# Patient Record
Sex: MALE | Race: BLACK OR AFRICAN AMERICAN | Employment: OTHER | URBAN - METROPOLITAN AREA
[De-identification: names, ages, dates, MRNs, and addresses within clinical notes are randomized per-mention and may not be internally consistent; named-entity substitution may affect disease eponyms.]

---

## 2017-01-05 ENCOUNTER — HOSPITAL ENCOUNTER (EMERGENCY)
Age: 52
Discharge: HOME | End: 2017-01-05
Attending: EMERGENCY MEDICINE
Payer: COMMERCIAL

## 2017-01-05 ENCOUNTER — APPOINTMENT (OUTPATIENT)
Dept: GENERAL RADIOLOGY | Age: 52
End: 2017-01-05
Attending: EMERGENCY MEDICINE
Payer: COMMERCIAL

## 2017-01-05 VITALS — SYSTOLIC BLOOD PRESSURE: 150 MMHG | DIASTOLIC BLOOD PRESSURE: 92 MMHG | HEART RATE: 114 BPM | OXYGEN SATURATION: 94 %

## 2017-01-05 DIAGNOSIS — F43.21 GRIEF REACTION: ICD-10-CM

## 2017-01-05 DIAGNOSIS — R06.00 DYSPNEA, UNSPECIFIED TYPE: Primary | ICD-10-CM

## 2017-01-05 DIAGNOSIS — F41.0 ANXIETY ATTACK: ICD-10-CM

## 2017-01-05 DIAGNOSIS — R05.9 COUGH: ICD-10-CM

## 2017-01-05 LAB
ALBUMIN SERPL BCP-MCNC: 3.6 G/DL (ref 3.5–5)
ALBUMIN/GLOB SERPL: 1.1 {RATIO} (ref 1.2–3.5)
ALP SERPL-CCNC: 59 U/L (ref 50–136)
ALT SERPL-CCNC: 29 U/L (ref 12–65)
ANION GAP BLD CALC-SCNC: 11 MMOL/L (ref 7–16)
AST SERPL W P-5'-P-CCNC: 53 U/L (ref 15–37)
ATRIAL RATE: 115 BPM
BASOPHILS # BLD AUTO: 0 K/UL (ref 0–0.2)
BASOPHILS # BLD: 0 % (ref 0–2)
BILIRUB SERPL-MCNC: 0.9 MG/DL (ref 0.2–1.1)
BNP SERPL-MCNC: 105 PG/ML
BUN SERPL-MCNC: 15 MG/DL (ref 6–23)
CALCIUM SERPL-MCNC: 8.1 MG/DL (ref 8.3–10.4)
CALCULATED P AXIS, ECG09: 68 DEGREES
CALCULATED R AXIS, ECG10: -8 DEGREES
CALCULATED T AXIS, ECG11: -134 DEGREES
CHLORIDE SERPL-SCNC: 108 MMOL/L (ref 98–107)
CO2 SERPL-SCNC: 23 MMOL/L (ref 21–32)
CREAT SERPL-MCNC: 1.15 MG/DL (ref 0.8–1.5)
DIAGNOSIS, 93000: NORMAL
DIASTOLIC BP, ECG02: NORMAL MMHG
DIFFERENTIAL METHOD BLD: ABNORMAL
EOSINOPHIL # BLD: 0.1 K/UL (ref 0–0.8)
EOSINOPHIL NFR BLD: 2 % (ref 0.5–7.8)
ERYTHROCYTE [DISTWIDTH] IN BLOOD BY AUTOMATED COUNT: 13.9 % (ref 11.9–14.6)
GLOBULIN SER CALC-MCNC: 3.4 G/DL (ref 2.3–3.5)
GLUCOSE SERPL-MCNC: 99 MG/DL (ref 65–100)
HCT VFR BLD AUTO: 37.7 % (ref 41.1–50.3)
HGB BLD-MCNC: 12.6 G/DL (ref 13.6–17.2)
IMM GRANULOCYTES # BLD: 0 K/UL (ref 0–0.5)
IMM GRANULOCYTES NFR BLD AUTO: 0.2 % (ref 0–5)
LYMPHOCYTES # BLD AUTO: 32 % (ref 13–44)
LYMPHOCYTES # BLD: 1.7 K/UL (ref 0.5–4.6)
MCH RBC QN AUTO: 32.9 PG (ref 26.1–32.9)
MCHC RBC AUTO-ENTMCNC: 33.4 G/DL (ref 31.4–35)
MCV RBC AUTO: 98.4 FL (ref 79.6–97.8)
MONOCYTES # BLD: 0.2 K/UL (ref 0.1–1.3)
MONOCYTES NFR BLD AUTO: 4 % (ref 4–12)
NEUTS SEG # BLD: 3.2 K/UL (ref 1.7–8.2)
NEUTS SEG NFR BLD AUTO: 62 % (ref 43–78)
P-R INTERVAL, ECG05: 152 MS
PLATELET # BLD AUTO: 244 K/UL (ref 150–450)
PMV BLD AUTO: 9.5 FL (ref 10.8–14.1)
POTASSIUM SERPL-SCNC: 3.8 MMOL/L (ref 3.5–5.1)
PROT SERPL-MCNC: 7 G/DL (ref 6.3–8.2)
Q-T INTERVAL, ECG07: 304 MS
QRS DURATION, ECG06: 100 MS
QTC CALCULATION (BEZET), ECG08: 413 MS
RBC # BLD AUTO: 3.83 M/UL (ref 4.23–5.67)
SODIUM SERPL-SCNC: 142 MMOL/L (ref 136–145)
SYSTOLIC BP, ECG01: NORMAL MMHG
VENTRICULAR RATE, ECG03: 111 BPM
WBC # BLD AUTO: 5.3 K/UL (ref 4.3–11.1)

## 2017-01-05 PROCEDURE — 71010 XR CHEST PORT: CPT

## 2017-01-05 PROCEDURE — 93005 ELECTROCARDIOGRAM TRACING: CPT | Performed by: EMERGENCY MEDICINE

## 2017-01-05 PROCEDURE — 74011250637 HC RX REV CODE- 250/637: Performed by: EMERGENCY MEDICINE

## 2017-01-05 PROCEDURE — 74011250636 HC RX REV CODE- 250/636: Performed by: EMERGENCY MEDICINE

## 2017-01-05 PROCEDURE — 83880 ASSAY OF NATRIURETIC PEPTIDE: CPT | Performed by: EMERGENCY MEDICINE

## 2017-01-05 PROCEDURE — 80053 COMPREHEN METABOLIC PANEL: CPT | Performed by: EMERGENCY MEDICINE

## 2017-01-05 PROCEDURE — 99284 EMERGENCY DEPT VISIT MOD MDM: CPT | Performed by: EMERGENCY MEDICINE

## 2017-01-05 PROCEDURE — 85025 COMPLETE CBC W/AUTO DIFF WBC: CPT | Performed by: EMERGENCY MEDICINE

## 2017-01-05 PROCEDURE — 96374 THER/PROPH/DIAG INJ IV PUSH: CPT | Performed by: EMERGENCY MEDICINE

## 2017-01-05 RX ORDER — HYDROCODONE BITARTRATE AND HOMATROPINE METHYLBROMIDE 1.5; 5 MG/5ML; MG/5ML
5 SYRUP ORAL
Qty: 120 ML | Refills: 0 | Status: SHIPPED | OUTPATIENT
Start: 2017-01-05 | End: 2017-03-28 | Stop reason: SDUPTHER

## 2017-01-05 RX ORDER — ONDANSETRON 8 MG/1
8 TABLET, ORALLY DISINTEGRATING ORAL
Qty: 12 TAB | Refills: 0 | Status: SHIPPED | OUTPATIENT
Start: 2017-01-05 | End: 2018-02-20 | Stop reason: SDUPTHER

## 2017-01-05 RX ORDER — HYDROCODONE BITARTRATE AND HOMATROPINE METHYLBROMIDE 1.5; 5 MG/5ML; MG/5ML
5 SYRUP ORAL
Status: COMPLETED | OUTPATIENT
Start: 2017-01-05 | End: 2017-01-05

## 2017-01-05 RX ORDER — ONDANSETRON 8 MG/1
8 TABLET, ORALLY DISINTEGRATING ORAL
Status: COMPLETED | OUTPATIENT
Start: 2017-01-05 | End: 2017-01-05

## 2017-01-05 RX ORDER — LORAZEPAM 2 MG/ML
1 INJECTION INTRAMUSCULAR
Status: COMPLETED | OUTPATIENT
Start: 2017-01-05 | End: 2017-01-05

## 2017-01-05 RX ADMIN — ONDANSETRON 8 MG: 8 TABLET, ORALLY DISINTEGRATING ORAL at 05:49

## 2017-01-05 RX ADMIN — HYDROCODONE BITARTRATE AND HOMATROPINE METHYLBROMIDE 5 ML: 5; 1.5 SOLUTION ORAL at 05:09

## 2017-01-05 RX ADMIN — LORAZEPAM 1 MG: 2 INJECTION, SOLUTION INTRAMUSCULAR; INTRAVENOUS at 03:57
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
## 2017-01-05 NOTE — ED NOTES
Patient discharged home ambulatory with spouse. Patient report feeling better. Medication, discharge, and follow up discussed with patient. Patient verbalized understanding.

## 2017-01-05 NOTE — DISCHARGE INSTRUCTIONS
Panic Attacks: Care Instructions  Your Care Instructions  During a panic attack, you may have a feeling of intense fear or terror, trouble breathing, chest pain or tightness, heartbeat changes, dizziness, sweating, and shaking. A panic attack starts suddenly and usually lasts from 5 to 20 minutes but may last even longer. You have the most anxiety about 10 minutes after the attack starts. An attack can begin with a stressful event, or it can happen without a cause. Although panic attacks can cause scary symptoms, you can learn to manage them with self-care, counseling, and medicine. Follow-up care is a key part of your treatment and safety. Be sure to make and go to all appointments, and call your doctor if you are having problems. It's also a good idea to know your test results and keep a list of the medicines you take. How can you care for yourself at home? · Take your medicine exactly as directed. Call your doctor if you think you are having a problem with your medicine. · Go to your counseling sessions and follow-up appointments. · Recognize and accept your anxiety. Then, when you are in a situation that makes you anxious, say to yourself, \"This is not an emergency. I feel uncomfortable, but I am not in danger. I can keep going even if I feel anxious. \"  · Be kind to your body:  ¨ Relieve tension with exercise or a massage. ¨ Get enough rest.  ¨ Avoid alcohol, caffeine, nicotine, and illegal drugs. They can increase your anxiety level, cause sleep problems, or trigger a panic attack. ¨ Learn and do relaxation techniques. See below for more about these techniques. · Engage your mind. Get out and do something you enjoy. Go to a funny movie, or take a walk or hike. Plan your day. Having too much or too little to do can make you anxious. · Keep a record of your symptoms. Discuss your fears with a good friend or family member, or join a support group for people with similar problems.  Talking to others sometimes relieves stress. · Get involved in social groups, or volunteer to help others. Being alone sometimes makes things seem worse than they are. · Get at least 30 minutes of exercise on most days of the week to relieve stress. Walking is a good choice. You also may want to do other activities, such as running, swimming, cycling, or playing tennis or team sports. Relaxation techniques  Do relaxation exercises for 10 to 20 minutes a day. You can play soothing, relaxing music while you do them, if you wish. · Tell others in your house that you are going to do your relaxation exercises. Ask them not to disturb you. · Find a comfortable place, away from all distractions and noise. · Lie down on your back, or sit with your back straight. · Focus on your breathing. Make it slow and steady. · Breathe in through your nose. Breathe out through either your nose or mouth. · Breathe deeply, filling up the area between your navel and your rib cage. Breathe so that your belly goes up and down. · Do not hold your breath. · Breathe like this for 5 to 10 minutes. Notice the feeling of calmness throughout your whole body. As you continue to breathe slowly and deeply, relax by doing the following for another 5 to 10 minutes:  · Tighten and relax each muscle group in your body. You can begin at your toes and work your way up to your head. · Imagine your muscle groups relaxing and becoming heavy. · Empty your mind of all thoughts. · Let yourself relax more and more deeply. · Become aware of the state of calmness that surrounds you. · When your relaxation time is over, you can bring yourself back to alertness by moving your fingers and toes and then your hands and feet and then stretching and moving your entire body. Sometimes people fall asleep during relaxation, but they usually wake up shortly afterward.   · Always give yourself time to return to full alertness before you drive a car or do anything that might cause an accident if you are not fully alert. Never play a relaxation tape while driving a car. When should you call for help? Call 911 anytime you think you may need emergency care. For example, call if:  · You feel you cannot stop from hurting yourself or someone else. Watch closely for changes in your health, and be sure to contact your doctor if:  · Your panic attacks get worse. · You have new or different anxiety. · You are not getting better as expected. Where can you learn more? Go to http://edmundo-isi.info/. Enter H601 in the search box to learn more about \"Panic Attacks: Care Instructions. \"  Current as of: July 26, 2016  Content Version: 11.1  © 20067418-1498 [employer]. Care instructions adapted under license by [employer] ( [employer] ) (which disclaims liability or warranty for this information). If you have questions about a medical condition or this instruction, always ask your healthcare professional. [place] [address] any warranty or liability for your use of this information. Grief (Actual/Anticipated): Care Instructions  Your Care Instructions  Grief is your emotional reaction to a major loss. The words \"sorrow\" and \"heartache\" often are used to describe feelings of grief. You feel grief when you lose a beloved person, pet, place, or thing. It is also natural to feel grief when you lose a valued way of life, such as a job, marriage, or good health. You may begin to grieve before a loss occurs. You may grieve for a loved one who is sick and dying. Children and adults often feel the pain of loss before a big move or divorce. This type of grief helps you get ready for a loss. Grief is different for each person. There is no \"normal\" or \"expected\" period of time for grieving. Some people adjust to their loss within a couple of months.  Others may take 2 years or longer, especially if their lives were changed a lot or if the loss was sudden and shocking. Grieving can cause problems such as headaches, loss of appetite, and trouble with thinking or sleeping. You may withdraw from friends and family and behave in ways that are unusual for you. Grief may cause you to question your beliefs and views about life. Grief is natural and does not require medical treatment. But if you have trouble sleeping, it may help to take sleeping pills for a short time. It may help to talk with people who have been through or are going through similar losses. You may also want to talk to a counselor about your feelings. Talking about your loss, sharing your cares and concerns, and getting support from others are important parts of healthy grieving. Follow-up care is a key part of your treatment and safety. Be sure to make and go to all appointments, and call your doctor if you are having problems. Its also a good idea to know your test results and keep a list of the medicines you take. How can you care for yourself at home? · Get enough sleep. Your mind helps make sense of your life while you sleep. Missing sleep can lead to illness and make it harder for you to deal with your grief. · Eat healthy foods. Try to avoid eating only foods that give you comfort. Ask someone to join you for a meal if you do not like eating alone. Consider taking a multivitamin every day. · Get some exercise every day. Even a walk can help you deal with your grief. Other exercises, such as yoga, can also help you manage stress. · Comfort yourself. Take time to look at photos or use special items that make you feel better. · Stay involved in your life. Do not withdraw from the activities you enjoy. People you know at work, Hindu, clubs, or other groups can help you get through your period of grief. · Think about joining a support group to help you deal with your grief. There are many support groups to help people recover from grief. When should you call for help?   Be sure to contact your doctor if:  · You feel that life is meaningless, or you think about killing yourself. · A grieving person you know talks about hurting himself or herself. · You have any of the following problems that last for 2 or more weeks:  ¨ You feel sad a lot or cry all the time. ¨ You have trouble sleeping, or you sleep too much. ¨ You find it hard to concentrate, make decisions, or remember things. ¨ You change how you normally eat. ¨ You feel guilty about the death or loss you have suffered. ¨ You are using alcohol or drugs to help you cope with your loss. Where can you learn more? Go to http://edmundo-isi.info/. Enter H249 in the search box to learn more about \"Grief (Actual/Anticipated): Care Instructions. \"  Current as of: February 24, 2016  Content Version: 11.1  © 5821-6366 [employer]. Care instructions adapted under license by [employer] (which disclaims liability or warranty for this information). If you have questions about a medical condition or this instruction, always ask your healthcare professional. [address] any warranty or liability for your use of this information.

## 2017-01-06 ENCOUNTER — PATIENT OUTREACH (OUTPATIENT)
Dept: CASE MANAGEMENT | Age: 52
End: 2017-01-06

## 2017-01-06 NOTE — PROGRESS NOTES
This note will not be viewable in [address] ED Transition of Care Discharge Follow-up Questionnaire   Date/Time of Call:   1/06/2017  9:31 am      What was the patient seen in the ED for? Patient was seen in ED for diagnosis of:  Cough, dyspnea, and anxiety. Does the patient understand his/her diagnosis and/or treatment and what happened during the ED visit? Patient voiced understanding of diagnosis and /or treatment. Did the patient receive discharge instructions from the ED? Yes. Patient states discharge instructions explained and received before discharge to home. Review any discharge instructions (see notes in [facility]). Ask patient if they understand these. Do they have any questions? Care Coordinator and patient reviewed discharge instructions per [facility]. Opportunity for questions and clarification provided. Patient verbalized understanding of instructions. Were home services ordered (nursing, PT, OT, ST, etc.)? No home services were ordered. If so, has the first visit occurred? If not, why? (Assist with coordination of services if necessary.)      n/a         Was any DME ordered? No DME ordered. If so, has it been received? If not, why?  (Assist with coordination of arranging DME orders if necessary.)   n/a     Complete a review of all medications (new, continued and discontinued meds per the D/C instructions and medication tab in [facility]). Care Coordinator and patient reviewed medications per [facility]. Were all new prescriptions filled? If not, why?  (Assist with obtainment of medications if necessary.)   Hycodan and Zofran were prescribed by ED physician and is being taken as ordered. Does the patient understand the purpose and dosing instructions for all medications? (If patient has questions, provide explanation and education.)   Patient voiced understanding of purpose and dosing instructions for all medications. Does the patient have any problems in performing ADLs? (If patient is unable to perform ADLs  what is the limiting factor(s)? Do they have a support system that can assist? If no support system is present, discuss possible assistance that they may be able to obtain.)       Patient is independent and is able to perform ADLs. Does the patient have all follow-up appointments scheduled? Has transportation been arranged? [facility] follow-up should be within 7 days of discharge; all others should have PCP follow-up within 7   Days of discharge; follow-ups with other specialists as appropriate or ordered.)      Patient encouraged and voiced agreement to schedule ED follow up with Dr. Claudio Willis within 7 days. Patient has transportation to appointments. Any other questions or concerns expressed by the patient? Patient voiced no other questions or concerns and was appreciative of call.          SYDNEE Call Completed By:   Fabián Amanda, [address] Coordinator  Demetrius WATSON

## 2017-03-13 ENCOUNTER — HOSPITAL ENCOUNTER (EMERGENCY)
Age: 52
Discharge: OTHER INSTITUTION | DRG: 287 | End: 2017-03-14
Attending: EMERGENCY MEDICINE
Payer: COMMERCIAL

## 2017-03-13 ENCOUNTER — APPOINTMENT (OUTPATIENT)
Dept: GENERAL RADIOLOGY | Age: 52
DRG: 287 | End: 2017-03-13
Attending: EMERGENCY MEDICINE
Payer: COMMERCIAL

## 2017-03-13 DIAGNOSIS — R06.00 DYSPNEA, UNSPECIFIED TYPE: ICD-10-CM

## 2017-03-13 DIAGNOSIS — I21.4 NON-ST ELEVATION MI (NSTEMI) (HCC): Primary | ICD-10-CM

## 2017-03-13 LAB
ALBUMIN SERPL BCP-MCNC: 3.9 G/DL (ref 3.5–5)
ALBUMIN/GLOB SERPL: 1.1 {RATIO} (ref 1.2–3.5)
ALP SERPL-CCNC: 82 U/L (ref 50–136)
ALT SERPL-CCNC: 46 U/L (ref 12–65)
ANION GAP BLD CALC-SCNC: 17 MMOL/L (ref 7–16)
AST SERPL W P-5'-P-CCNC: 92 U/L (ref 15–37)
BASOPHILS # BLD AUTO: 0 K/UL (ref 0–0.2)
BASOPHILS # BLD: 0 % (ref 0–2)
BILIRUB SERPL-MCNC: 2.1 MG/DL (ref 0.2–1.1)
BNP SERPL-MCNC: 266 PG/ML
BUN SERPL-MCNC: 6 MG/DL (ref 6–23)
CALCIUM SERPL-MCNC: 9.1 MG/DL (ref 8.3–10.4)
CHLORIDE SERPL-SCNC: 106 MMOL/L (ref 98–107)
CO2 SERPL-SCNC: 22 MMOL/L (ref 21–32)
CREAT SERPL-MCNC: 0.96 MG/DL (ref 0.8–1.5)
DIFFERENTIAL METHOD BLD: ABNORMAL
DIGOXIN SERPL-MCNC: 0.2 NG/ML (ref 0.9–2.1)
EOSINOPHIL # BLD: 0 K/UL (ref 0–0.8)
EOSINOPHIL NFR BLD: 1 % (ref 0.5–7.8)
ERYTHROCYTE [DISTWIDTH] IN BLOOD BY AUTOMATED COUNT: 15.2 % (ref 11.9–14.6)
GLOBULIN SER CALC-MCNC: 3.5 G/DL (ref 2.3–3.5)
GLUCOSE SERPL-MCNC: 124 MG/DL (ref 65–100)
HCT VFR BLD AUTO: 40.3 % (ref 41.1–50.3)
HGB BLD-MCNC: 13.5 G/DL (ref 13.6–17.2)
IMM GRANULOCYTES # BLD: 0 K/UL (ref 0–0.5)
IMM GRANULOCYTES NFR BLD AUTO: 0.2 % (ref 0–5)
LYMPHOCYTES # BLD AUTO: 32 % (ref 13–44)
LYMPHOCYTES # BLD: 1.5 K/UL (ref 0.5–4.6)
MCH RBC QN AUTO: 32.4 PG (ref 26.1–32.9)
MCHC RBC AUTO-ENTMCNC: 33.5 G/DL (ref 31.4–35)
MCV RBC AUTO: 96.6 FL (ref 79.6–97.8)
MONOCYTES # BLD: 0.4 K/UL (ref 0.1–1.3)
MONOCYTES NFR BLD AUTO: 9 % (ref 4–12)
NEUTS SEG # BLD: 2.8 K/UL (ref 1.7–8.2)
NEUTS SEG NFR BLD AUTO: 58 % (ref 43–78)
PLATELET # BLD AUTO: 166 K/UL (ref 150–450)
PMV BLD AUTO: 10.1 FL (ref 10.8–14.1)
POTASSIUM SERPL-SCNC: 3.4 MMOL/L (ref 3.5–5.1)
PROT SERPL-MCNC: 7.4 G/DL (ref 6.3–8.2)
RBC # BLD AUTO: 4.17 M/UL (ref 4.23–5.67)
SODIUM SERPL-SCNC: 145 MMOL/L (ref 136–145)
TROPONIN I BLD-MCNC: 0.43 NG/ML (ref 0–0.08)
WBC # BLD AUTO: 4.8 K/UL (ref 4.3–11.1)

## 2017-03-13 RX ORDER — FUROSEMIDE 10 MG/ML
40 INJECTION INTRAMUSCULAR; INTRAVENOUS
Status: COMPLETED | OUTPATIENT
Start: 2017-03-13 | End: 2017-03-13

## 2017-03-13 RX ORDER — SODIUM CHLORIDE 0.9 % (FLUSH) 0.9 %
5-10 SYRINGE (ML) INJECTION AS NEEDED
Status: DISCONTINUED | OUTPATIENT
Start: 2017-03-13 | End: 2017-03-14 | Stop reason: HOSPADM

## 2017-03-13 RX ORDER — GUAIFENESIN 100 MG/5ML
324 LIQUID (ML) ORAL
Status: COMPLETED | OUTPATIENT
Start: 2017-03-13 | End: 2017-03-13

## 2017-03-13 RX ORDER — SODIUM CHLORIDE 0.9 % (FLUSH) 0.9 %
5-10 SYRINGE (ML) INJECTION EVERY 8 HOURS
Status: DISCONTINUED | OUTPATIENT
Start: 2017-03-13 | End: 2017-03-14 | Stop reason: HOSPADM

## 2017-03-13 RX ADMIN — FUROSEMIDE 40 MG: 10 INJECTION, SOLUTION INTRAMUSCULAR; INTRAVENOUS at 23:33

## 2017-03-13 RX ADMIN — ASPIRIN 81 MG CHEWABLE TABLET 324 MG: 81 TABLET CHEWABLE at 23:31

## 2017-03-13 RX ADMIN — NITROGLYCERIN 1 INCH: 20 OINTMENT TOPICAL at 23:31

## 2017-03-14 ENCOUNTER — HOSPITAL ENCOUNTER (INPATIENT)
Age: 52
LOS: 11 days | Discharge: HOME | DRG: 287 | End: 2017-03-25
Attending: INTERNAL MEDICINE | Admitting: INTERNAL MEDICINE
Payer: COMMERCIAL

## 2017-03-14 ENCOUNTER — APPOINTMENT (OUTPATIENT)
Dept: CT IMAGING | Age: 52
DRG: 287 | End: 2017-03-14
Attending: EMERGENCY MEDICINE
Payer: COMMERCIAL

## 2017-03-14 VITALS
BODY MASS INDEX: 23.03 KG/M2 | OXYGEN SATURATION: 98 % | HEIGHT: 72 IN | SYSTOLIC BLOOD PRESSURE: 126 MMHG | HEART RATE: 114 BPM | RESPIRATION RATE: 18 BRPM | TEMPERATURE: 98.7 F | WEIGHT: 170 LBS | DIASTOLIC BLOOD PRESSURE: 96 MMHG

## 2017-03-14 DIAGNOSIS — F41.8 ANXIETY ASSOCIATED WITH DEPRESSION: Chronic | ICD-10-CM

## 2017-03-14 DIAGNOSIS — R06.09 DYSPNEA ON EXERTION: ICD-10-CM

## 2017-03-14 DIAGNOSIS — R77.8 ELEVATED TROPONIN: ICD-10-CM

## 2017-03-14 DIAGNOSIS — Z95.810 ICD (IMPLANTABLE CARDIOVERTER-DEFIBRILLATOR) IN PLACE: Chronic | ICD-10-CM

## 2017-03-14 DIAGNOSIS — I42.8 NON-ISCHEMIC CARDIOMYOPATHY (HCC): Chronic | ICD-10-CM

## 2017-03-14 DIAGNOSIS — I47.20 VENTRICULAR TACHYCARDIA: ICD-10-CM

## 2017-03-14 DIAGNOSIS — R00.0 TACHYCARDIA: ICD-10-CM

## 2017-03-14 DIAGNOSIS — F41.9 ANXIETY: ICD-10-CM

## 2017-03-14 DIAGNOSIS — R06.02 SHORTNESS OF BREATH: ICD-10-CM

## 2017-03-14 DIAGNOSIS — I50.22 CHRONIC SYSTOLIC HEART FAILURE (HCC): Chronic | ICD-10-CM

## 2017-03-14 DIAGNOSIS — I50.23 ACUTE ON CHRONIC SYSTOLIC HEART FAILURE (HCC): ICD-10-CM

## 2017-03-14 DIAGNOSIS — R06.1 STRIDOR: ICD-10-CM

## 2017-03-14 LAB
ANION GAP BLD CALC-SCNC: 13 MMOL/L (ref 7–16)
APTT PPP: 35.2 SEC (ref 23.5–31.7)
APTT PPP: 45 SEC (ref 23.5–31.7)
ATRIAL RATE: 118 BPM
ATRIAL RATE: 122 BPM
ATRIAL RATE: 123 BPM
BUN SERPL-MCNC: 7 MG/DL (ref 6–23)
CALCIUM SERPL-MCNC: 9.1 MG/DL (ref 8.3–10.4)
CALCULATED P AXIS, ECG09: 44 DEGREES
CALCULATED P AXIS, ECG09: 46 DEGREES
CALCULATED P AXIS, ECG09: 73 DEGREES
CALCULATED R AXIS, ECG10: -23 DEGREES
CALCULATED R AXIS, ECG10: -7 DEGREES
CALCULATED R AXIS, ECG10: 1 DEGREES
CALCULATED T AXIS, ECG11: -160 DEGREES
CALCULATED T AXIS, ECG11: 159 DEGREES
CALCULATED T AXIS, ECG11: 179 DEGREES
CHLORIDE SERPL-SCNC: 103 MMOL/L (ref 98–107)
CO2 SERPL-SCNC: 24 MMOL/L (ref 21–32)
CREAT SERPL-MCNC: 1.01 MG/DL (ref 0.8–1.5)
D DIMER PPP FEU-MCNC: 0.71 UG/ML(FEU)
DIAGNOSIS, 93000: NORMAL
DIASTOLIC BP, ECG02: NORMAL MMHG
ERYTHROCYTE [DISTWIDTH] IN BLOOD BY AUTOMATED COUNT: 15 % (ref 11.9–14.6)
GLUCOSE SERPL-MCNC: 107 MG/DL (ref 65–100)
HCT VFR BLD AUTO: 36.8 % (ref 41.1–50.3)
HGB BLD-MCNC: 12.6 G/DL (ref 13.6–17.2)
MAGNESIUM SERPL-MCNC: 1.7 MG/DL (ref 1.8–2.4)
MCH RBC QN AUTO: 32.7 PG (ref 26.1–32.9)
MCHC RBC AUTO-ENTMCNC: 34.2 G/DL (ref 31.4–35)
MCV RBC AUTO: 95.6 FL (ref 79.6–97.8)
P-R INTERVAL, ECG05: 154 MS
P-R INTERVAL, ECG05: 164 MS
P-R INTERVAL, ECG05: 170 MS
PLATELET # BLD AUTO: 188 K/UL (ref 150–450)
PMV BLD AUTO: 9.6 FL (ref 10.8–14.1)
POTASSIUM SERPL-SCNC: 3.2 MMOL/L (ref 3.5–5.1)
Q-T INTERVAL, ECG07: 296 MS
Q-T INTERVAL, ECG07: 314 MS
Q-T INTERVAL, ECG07: 330 MS
QRS DURATION, ECG06: 100 MS
QRS DURATION, ECG06: 100 MS
QRS DURATION, ECG06: 102 MS
QTC CALCULATION (BEZET), ECG08: 421 MS
QTC CALCULATION (BEZET), ECG08: 440 MS
QTC CALCULATION (BEZET), ECG08: 472 MS
RBC # BLD AUTO: 3.85 M/UL (ref 4.23–5.67)
SODIUM SERPL-SCNC: 140 MMOL/L (ref 136–145)
SYSTOLIC BP, ECG01: NORMAL MMHG
TROPONIN I BLD-MCNC: 0.34 NG/ML (ref 0–0.08)
TROPONIN I SERPL-MCNC: 1.88 NG/ML (ref 0.02–0.05)
TROPONIN I SERPL-MCNC: 2.01 NG/ML (ref 0.02–0.05)
VENTRICULAR RATE, ECG03: 118 BPM
VENTRICULAR RATE, ECG03: 122 BPM
VENTRICULAR RATE, ECG03: 123 BPM
WBC # BLD AUTO: 4.5 K/UL (ref 4.3–11.1)

## 2017-03-14 PROCEDURE — 36415 COLL VENOUS BLD VENIPUNCTURE: CPT | Performed by: NURSE PRACTITIONER

## 2017-03-14 PROCEDURE — 74011250636 HC RX REV CODE- 250/636: Performed by: INTERNAL MEDICINE

## 2017-03-14 PROCEDURE — C8929 TTE W OR WO FOL WCON,DOPPLER: HCPCS

## 2017-03-14 PROCEDURE — 94640 AIRWAY INHALATION TREATMENT: CPT

## 2017-03-14 PROCEDURE — 94760 N-INVAS EAR/PLS OXIMETRY 1: CPT

## 2017-03-14 PROCEDURE — 74011250636 HC RX REV CODE- 250/636: Performed by: NURSE PRACTITIONER

## 2017-03-14 PROCEDURE — 74011250637 HC RX REV CODE- 250/637: Performed by: NURSE PRACTITIONER

## 2017-03-14 PROCEDURE — 85730 THROMBOPLASTIN TIME PARTIAL: CPT | Performed by: INTERNAL MEDICINE

## 2017-03-14 PROCEDURE — 93005 ELECTROCARDIOGRAM TRACING: CPT | Performed by: NURSE PRACTITIONER

## 2017-03-14 PROCEDURE — 97162 PT EVAL MOD COMPLEX 30 MIN: CPT

## 2017-03-14 PROCEDURE — 74011000302 HC RX REV CODE- 302: Performed by: INTERNAL MEDICINE

## 2017-03-14 PROCEDURE — 85027 COMPLETE CBC AUTOMATED: CPT | Performed by: INTERNAL MEDICINE

## 2017-03-14 PROCEDURE — 84484 ASSAY OF TROPONIN QUANT: CPT | Performed by: NURSE PRACTITIONER

## 2017-03-14 PROCEDURE — 74011000250 HC RX REV CODE- 250: Performed by: NURSE PRACTITIONER

## 2017-03-14 PROCEDURE — 65660000000 HC RM CCU STEPDOWN

## 2017-03-14 PROCEDURE — 86580 TB INTRADERMAL TEST: CPT | Performed by: INTERNAL MEDICINE

## 2017-03-14 PROCEDURE — 74011000250 HC RX REV CODE- 250: Performed by: INTERNAL MEDICINE

## 2017-03-14 PROCEDURE — 83735 ASSAY OF MAGNESIUM: CPT | Performed by: NURSE PRACTITIONER

## 2017-03-14 PROCEDURE — 80048 BASIC METABOLIC PNL TOTAL CA: CPT | Performed by: NURSE PRACTITIONER

## 2017-03-14 PROCEDURE — 74011250636 HC RX REV CODE- 250/636

## 2017-03-14 RX ORDER — PANTOPRAZOLE SODIUM 40 MG/1
40 TABLET, DELAYED RELEASE ORAL
Status: DISCONTINUED | OUTPATIENT
Start: 2017-03-14 | End: 2017-03-25 | Stop reason: HOSPADM

## 2017-03-14 RX ORDER — LEVALBUTEROL INHALATION SOLUTION 1.25 MG/3ML
1.25 SOLUTION RESPIRATORY (INHALATION)
Status: DISCONTINUED | OUTPATIENT
Start: 2017-03-14 | End: 2017-03-17

## 2017-03-14 RX ORDER — HYDROCODONE BITARTRATE AND ACETAMINOPHEN 10; 325 MG/1; MG/1
1 TABLET ORAL
Status: DISCONTINUED | OUTPATIENT
Start: 2017-03-14 | End: 2017-03-19

## 2017-03-14 RX ORDER — POTASSIUM CHLORIDE 20 MEQ/1
40 TABLET, EXTENDED RELEASE ORAL DAILY
Status: DISCONTINUED | OUTPATIENT
Start: 2017-03-14 | End: 2017-03-15

## 2017-03-14 RX ORDER — ALPRAZOLAM 0.5 MG/1
1 TABLET ORAL
Status: DISCONTINUED | OUTPATIENT
Start: 2017-03-14 | End: 2017-03-25 | Stop reason: HOSPADM

## 2017-03-14 RX ORDER — HEPARIN SODIUM 5000 [USP'U]/ML
35 INJECTION, SOLUTION INTRAVENOUS; SUBCUTANEOUS ONCE
Status: COMPLETED | OUTPATIENT
Start: 2017-03-14 | End: 2017-03-14

## 2017-03-14 RX ORDER — MORPHINE SULFATE 2 MG/ML
INJECTION, SOLUTION INTRAMUSCULAR; INTRAVENOUS
Status: ACTIVE
Start: 2017-03-14 | End: 2017-03-15

## 2017-03-14 RX ORDER — ATORVASTATIN CALCIUM 40 MG/1
80 TABLET, FILM COATED ORAL DAILY
Status: DISCONTINUED | OUTPATIENT
Start: 2017-03-14 | End: 2017-03-25 | Stop reason: HOSPADM

## 2017-03-14 RX ORDER — METOPROLOL TARTRATE 5 MG/5ML
5 INJECTION INTRAVENOUS ONCE
Status: COMPLETED | OUTPATIENT
Start: 2017-03-14 | End: 2017-03-14

## 2017-03-14 RX ORDER — AMLODIPINE BESYLATE 5 MG/1
5 TABLET ORAL DAILY
Status: DISCONTINUED | OUTPATIENT
Start: 2017-03-14 | End: 2017-03-15

## 2017-03-14 RX ORDER — FUROSEMIDE 10 MG/ML
40 INJECTION INTRAMUSCULAR; INTRAVENOUS 2 TIMES DAILY
Status: DISCONTINUED | OUTPATIENT
Start: 2017-03-14 | End: 2017-03-18

## 2017-03-14 RX ORDER — DIGOXIN 250 MCG
0.25 TABLET ORAL DAILY
Status: DISCONTINUED | OUTPATIENT
Start: 2017-03-15 | End: 2017-03-14

## 2017-03-14 RX ORDER — SODIUM CHLORIDE 0.9 % (FLUSH) 0.9 %
10 SYRINGE (ML) INJECTION
Status: COMPLETED | OUTPATIENT
Start: 2017-03-14 | End: 2017-03-14

## 2017-03-14 RX ORDER — CARVEDILOL 25 MG/1
25 TABLET ORAL 2 TIMES DAILY WITH MEALS
Status: DISCONTINUED | OUTPATIENT
Start: 2017-03-14 | End: 2017-03-15

## 2017-03-14 RX ORDER — ONDANSETRON 2 MG/ML
4 INJECTION INTRAMUSCULAR; INTRAVENOUS
Status: DISCONTINUED | OUTPATIENT
Start: 2017-03-14 | End: 2017-03-25 | Stop reason: HOSPADM

## 2017-03-14 RX ORDER — HEPARIN SODIUM 5000 [USP'U]/ML
60 INJECTION, SOLUTION INTRAVENOUS; SUBCUTANEOUS ONCE
Status: COMPLETED | OUTPATIENT
Start: 2017-03-14 | End: 2017-03-14

## 2017-03-14 RX ORDER — LOSARTAN POTASSIUM 50 MG/1
50 TABLET ORAL DAILY
Status: DISCONTINUED | OUTPATIENT
Start: 2017-03-14 | End: 2017-03-15

## 2017-03-14 RX ORDER — HEPARIN SODIUM 5000 [USP'U]/100ML
12-25 INJECTION, SOLUTION INTRAVENOUS
Status: DISCONTINUED | OUTPATIENT
Start: 2017-03-14 | End: 2017-03-14 | Stop reason: HOSPADM

## 2017-03-14 RX ORDER — HEPARIN SODIUM 5000 [USP'U]/100ML
12-25 INJECTION, SOLUTION INTRAVENOUS
Status: DISCONTINUED | OUTPATIENT
Start: 2017-03-14 | End: 2017-03-16

## 2017-03-14 RX ORDER — DIGOXIN 0.25 MG/ML
250 INJECTION INTRAMUSCULAR; INTRAVENOUS
Status: DISCONTINUED | OUTPATIENT
Start: 2017-03-14 | End: 2017-03-14

## 2017-03-14 RX ADMIN — ONDANSETRON 4 MG: 2 INJECTION INTRAMUSCULAR; INTRAVENOUS at 12:09

## 2017-03-14 RX ADMIN — AMLODIPINE BESYLATE 5 MG: 5 TABLET ORAL at 07:36

## 2017-03-14 RX ADMIN — CARVEDILOL 25 MG: 25 TABLET, FILM COATED ORAL at 07:37

## 2017-03-14 RX ADMIN — IOVERSOL 100 ML: 741 INJECTION INTRA-ARTERIAL; INTRAVENOUS at 00:19

## 2017-03-14 RX ADMIN — ALPRAZOLAM 1 MG: 0.5 TABLET ORAL at 05:54

## 2017-03-14 RX ADMIN — TUBERCULIN PURIFIED PROTEIN DERIVATIVE 5 UNITS: 5 INJECTION INTRADERMAL at 18:09

## 2017-03-14 RX ADMIN — SODIUM CHLORIDE 100 ML: 900 INJECTION, SOLUTION INTRAVENOUS at 00:20

## 2017-03-14 RX ADMIN — POTASSIUM CHLORIDE 40 MEQ: 20 TABLET, EXTENDED RELEASE ORAL at 07:42

## 2017-03-14 RX ADMIN — HYDROCODONE BITARTRATE AND ACETAMINOPHEN 1 TABLET: 10; 325 TABLET ORAL at 12:08

## 2017-03-14 RX ADMIN — LEVALBUTEROL HYDROCHLORIDE 1.25 MG: 1.25 SOLUTION RESPIRATORY (INHALATION) at 18:27

## 2017-03-14 RX ADMIN — LOSARTAN POTASSIUM 50 MG: 50 TABLET ORAL at 07:37

## 2017-03-14 RX ADMIN — Medication 10 ML: at 00:20

## 2017-03-14 RX ADMIN — ALPRAZOLAM 1 MG: 0.5 TABLET ORAL at 12:08

## 2017-03-14 RX ADMIN — HEPARIN SODIUM AND DEXTROSE 12 UNITS/KG/HR: 5000; 5 INJECTION INTRAVENOUS at 05:23

## 2017-03-14 RX ADMIN — LEVALBUTEROL HYDROCHLORIDE 1.25 MG: 1.25 SOLUTION RESPIRATORY (INHALATION) at 23:00

## 2017-03-14 RX ADMIN — ATORVASTATIN CALCIUM 80 MG: 40 TABLET, FILM COATED ORAL at 07:35

## 2017-03-14 RX ADMIN — FUROSEMIDE 40 MG: 10 INJECTION, SOLUTION INTRAMUSCULAR; INTRAVENOUS at 07:38

## 2017-03-14 RX ADMIN — HEPARIN SODIUM 2700 UNITS: 5000 INJECTION, SOLUTION INTRAVENOUS; SUBCUTANEOUS at 22:19

## 2017-03-14 RX ADMIN — HYDROCODONE BITARTRATE AND ACETAMINOPHEN 1 TABLET: 10; 325 TABLET ORAL at 05:32

## 2017-03-14 RX ADMIN — HEPARIN SODIUM AND DEXTROSE 12 UNITS/KG/HR: 5000; 5 INJECTION INTRAVENOUS at 01:53

## 2017-03-14 RX ADMIN — HEPARIN SODIUM 2700 UNITS: 5000 INJECTION, SOLUTION INTRAVENOUS; SUBCUTANEOUS at 13:16

## 2017-03-14 RX ADMIN — PANTOPRAZOLE SODIUM 40 MG: 40 TABLET, DELAYED RELEASE ORAL at 07:35

## 2017-03-14 RX ADMIN — HEPARIN SODIUM 4650 UNITS: 5000 INJECTION, SOLUTION INTRAVENOUS; SUBCUTANEOUS at 01:51

## 2017-03-14 RX ADMIN — METOPROLOL TARTRATE 5 MG: 5 INJECTION INTRAVENOUS at 05:14

## 2017-03-14 RX ADMIN — PERFLUTREN 1 ML: 6.52 INJECTION, SUSPENSION INTRAVENOUS at 09:00
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
## 2017-03-14 NOTE — IP AVS SNAPSHOT
Current Discharge Medication List  
  
START taking these medications Dose & Instructions Dispensing Information Comments Morning Noon Evening Bedtime  
 aspirin 81 mg chewable tablet Your last dose was: Your next dose is:    
   
   
 Dose:  81 mg Take 1 Tab by mouth daily. Refills:  0  
     
   
   
   
  
 clindamycin 150 mg capsule Commonly known as:  CLEOCIN Your last dose was: Your next dose is:    
   
   
 Dose:  150 mg Take 1 Cap by mouth three (3) times daily for 21 days. Quantity:  63 Cap Refills:  0  
     
   
   
   
  
 escitalopram oxalate 10 mg tablet Commonly known as:  Donavan Suns Your last dose was: Your next dose is:    
   
   
 Dose:  10 mg Take 1 Tab by mouth daily. Quantity:  30 Tab Refills:  1  
     
   
   
   
  
 guaiFENesin 1,200 mg Ta12 ER tablet Commonly known as:  Og & Og Your last dose was: Your next dose is:    
   
   
 Dose:  1200 mg Take 1 Tab by mouth two (2) times a day. Quantity:  60 Tab Refills:  1  
     
   
   
   
  
 magnesium oxide 400 mg tablet Commonly known as:  MAG-OX Your last dose was: Your next dose is:    
   
   
 Dose:  400 mg Take 1 Tab by mouth every twelve (12) hours. Quantity:  60 Tab Refills:  1 CONTINUE these medications which have CHANGED Dose & Instructions Dispensing Information Comments Morning Noon Evening Bedtime  
 furosemide 40 mg tablet Commonly known as:  LASIX What changed:   
- medication strength 
- how much to take Your last dose was: Your next dose is:    
   
   
 Dose:  40 mg Take 1 Tab by mouth daily. Quantity:  30 Tab Refills:  6  
     
   
   
   
  
 gabapentin 300 mg capsule Commonly known as:  NEURONTIN What changed:  when to take this Your last dose was:     
   
Your next dose is:    
   
   
 Dose:  300 mg  
 Take 1 Cap by mouth three (3) times daily. Quantity:  42 Cap Refills:  0 CONTINUE these medications which have NOT CHANGED Dose & Instructions Dispensing Information Comments Morning Noon Evening Bedtime Albuterol (Bulk) Powd Your last dose was: Your next dose is:    
   
   
 by Does Not Apply route. Refills:  0 ALPRAZolam 1 mg tablet Commonly known as:  Rory Vargas Your last dose was: Your next dose is:    
   
   
 Dose:  1 mg Take 1 Tab by mouth nightly as needed. Max Daily Amount: 1 mg. Quantity:  30 Tab Refills:  3  
     
   
   
   
  
 atorvastatin 80 mg tablet Commonly known as:  LIPITOR Your last dose was: Your next dose is:    
   
   
 Dose:  80 mg Take 1 Tab by mouth daily. Quantity:  30 Tab Refills:  3  
     
   
   
   
  
 carvedilol 12.5 mg tablet Commonly known as:  Ehsan Lindsay Your last dose was: Your next dose is:    
   
   
 Dose:  12.5 mg Take 1 Tab by mouth two (2) times daily (with meals). Quantity:  60 Tab Refills:  11  
     
   
   
   
  
 eplerenone 25 mg tablet Commonly known as:  Ford Doty Your last dose was: Your next dose is:    
   
   
 Dose:  25 mg  
25 mg once. Refills:  1 HYDROcodone-homatropine 5-1.5 mg/5 mL (5 mL) syrup Commonly known as:  HYCODAN Your last dose was: Your next dose is:    
   
   
 Dose:  5 mL Take 5 mL by mouth four (4) times daily as needed for Cough. Max Daily Amount: 20 mL. Quantity:  120 mL Refills:  0  
     
   
   
   
  
 losartan 25 mg tablet Commonly known as:  COZAAR Your last dose was: Your next dose is:    
   
   
 Dose:  25 mg Take 1 Tab by mouth daily. Quantity:  30 Tab Refills:  11 MIRALAX 17 gram/dose powder Generic drug:  polyethylene glycol Your last dose was: Your next dose is:    
   
   
 Dose:  17 g Take 17 g by mouth daily. Refills:  0 NEXIUM PO Your last dose was: Your next dose is:    
   
   
 Dose:  1 Cap Take 1 Cap by mouth two (2) times a day. Refills:  0 NORCO  mg tablet Generic drug:  HYDROcodone-acetaminophen Your last dose was: Your next dose is:    
   
   
 1 tablet q4-6hrs prn pain, brand only Quantity:  150 Tab Refills:  0  
     
   
   
   
  
 ondansetron 8 mg disintegrating tablet Commonly known as:  ZOFRAN ODT Your last dose was: Your next dose is:    
   
   
 Dose:  8 mg Take 1 Tab by mouth every eight (8) hours as needed for Nausea. Quantity:  12 Tab Refills:  0  
     
   
   
   
  
 potassium chloride 20 mEq tablet Commonly known as:  K-DUR, KLOR-CON Your last dose was: Your next dose is:    
   
   
 Dose:  20 mEq Take 1 Tab by mouth daily. Quantity:  30 Tab Refills:  6  
     
   
   
   
  
 valACYclovir 1 gram tablet Commonly known as:  VALTREX Your last dose was: Your next dose is:    
   
   
 Dose:  1000 mg Take 1 Tab by mouth three (3) times daily. Quantity:  21 Tab Refills:  0 STOP taking these medications   
 amLODIPine 5 mg tablet Commonly known as:  NORVASC  
   
  
 digoxin 0.25 mg tablet Commonly known as:  Jovita Freedman Where to Get Your Medications These medications were sent to [address] [place] [address], [address] [place] [address] Crossing  [address] [place] [id number] Phone:  [phone]  
  clindamycin 150 mg capsule  
 escitalopram oxalate 10 mg tablet  
 furosemide 40 mg tablet  
 guaiFENesin 1,200 mg Ta12 ER tablet  
 magnesium oxide 400 mg tablet

## 2017-03-14 NOTE — PROGRESS NOTES
Bedside shift change report received from Karen Lutz RN. Report included the following information SBAR, Kardex, MAR and Recent Results.    Heparin drip rate verified with oncoming nurse

## 2017-03-14 NOTE — PROGRESS NOTES
TRANSFER - IN REPORT:    Verbal report received from [facility] (name) on Wilda Ray  being received from  [place] (unit) for routine progression of care      Report consisted of patients Situation, Background, Assessment and   Recommendations(SBAR). Information from the following report(s) SBAR, Kardex, [facility] and Recent Results was reviewed with the receiving nurse. Opportunity for questions and clarification was provided. Assessment completed upon patients arrival to unit and care assumed.

## 2017-03-14 NOTE — PROGRESS NOTES
MEWS score noted to be 4. Charge nurse, Katia Bower RN informed and assessed patient.  informed and will assess patient. High MEWS score noted due to HR and BP. Vitals signs to be completed every 2 hours. Will continue to monitor.

## 2017-03-14 NOTE — PROGRESS NOTES
Problem: Mobility Impaired (Adult and Pediatric)  Goal: *Acute Goals and Plan of Care (Insert Text)  STG:  ((1.)Mr. Adal Andujar will transfer from bed to chair and chair to bed with SUPERVISION using the least restrictive device within 3 day(s). (2.)Mr. Adal Andujar will ambulate with SUPERVISION for 50 feet with the least restrictive device within 3 day(s). LTG:  (1.)Mr. Adal Andujar will transfer from bed to chair and chair to bed with INDEPENDENT using the least restrictive device within 7 day(s). (2.)Mr. Adal Andujar will ambulate with INDEPENDENT for 100 feet with the least restrictive device within 7 day(s). (3.) Mr. Adal Andujar will ascend/descend 6 steps with use of L handrail with STAND BY ASSIST within 7 days. ________________________________________________________________________________________________      PHYSICAL THERAPY: INITIAL ASSESSMENT, AM 3/14/2017  INPATIENT: Hospital Day: 1  Payor: Mago Booker / [place] / Product Type: PPO /      NAME/AGE/GENDER: Patrick Dozier is a 46 y.o. male   PRIMARY DIAGNOSIS: NSTEMI MI Elevated Troponin  Acute on chronic systolic heart failure (HCC) Acute on chronic systolic heart failure (HCC) Acute on chronic systolic heart failure (HCC)        ICD-10: Treatment Diagnosis:       · Generalized Muscle Weakness (M62.81)  · Difficulty in walking, Not elsewhere classified (R26.2)  · History of falling (Z91.81)   Precaution/Allergies:  Review of patient's allergies indicates no known allergies. ASSESSMENT:      Mr. Adal Andujar presents is supine upon contact and agreeable to PT evaluation. Pt reports living at home with wife and kids. He states that \"for awhile now\" he has been experiencing overall generalized weakness and lack of endurance in performance of daily activities. He reports having approximately 3 falls in the past six months in which he describes that he just \"blacked out\" with one fall resulting in injury to his L shoulder.  He reports being able to walk independently very short distances before he has to take rest breaks and continue with activity. He reports getting fatigued with activities such as putting on his socks and showering. He states that at this time he is unable to tolerate walking community distances. PT assisted pt to restroom due to pt having diarrhea and assisted pt back to bed. PT recommended trying exercises for LE strengthening today but pt declined due to fatigue. Yoly Hanson will benefit from skilled PT (medically necessary) to address decreased strength, decreased balance, decreased functional tolerance, decreased cardiopulmonary endurance affecting participation in basic ADLs and functional tasks. This section established at most recent assessment   PROBLEM LIST (Impairments causing functional limitations):  1. Decreased Strength  2. Decreased ADL/Functional Activities  3. Decreased Transfer Abilities  4. Decreased Ambulation Ability/Technique  5. Decreased Balance  6. Decreased Activity Tolerance  7. Increased Fatigue  8. Increased Shortness of Breath  9. Decreased [place] with Home Exercise Program    INTERVENTIONS PLANNED: (Benefits and precautions of physical therapy have been discussed with the patient.)  1. Balance Exercise  2. Family Education  3. Gait Training  4. Home Exercise Program (HEP)  5. Neuromuscular Re-education/Strengthening  6. Range of Motion (ROM)  7. Therapeutic Activites  8. Therapeutic Exercise/Strengthening  9. Group Therapy      TREATMENT PLAN: Frequency/Duration: 3 times a week for duration of hospital stay  Rehabilitation Potential For Stated Goals: GOOD      RECOMMENDED REHABILITATION/EQUIPMENT: (at time of discharge pending progress): Continue Skilled Therapy and Home Health: Physical Therapy.                    HISTORY:   History of Present Injury/Illness (Reason for Referral):  Generalized weakness and SOB  Past Medical History/Comorbidities:   Mr. Enrique Chaney  has a past medical history of Arthritis; CAD (coronary artery disease); CHF (congestive heart failure) ([facility] Chronic alcoholism ([facility] [facility] Chronic back pain; Chronic neck pain; Chronic systolic heart failure ([facility] [facility] (4/21/2011); Depression; Dizziness - light-headed; GERD (gastroesophageal reflux disease); Gynecomastia (2/22/2016); Heart failure ([facility] [address] [facility] History of implantable cardioverter-defibrillator (ICD) placement (2/22/2016); Hypertension; Psychiatric disorder; Situational depression (2/22/2016); and Ventricular tachycardia ([facility] [facility] (2/22/2016). Mr. Denita Alfaro  has a past surgical history that includes heart catheterization (9/21/10) and pacemaker. Social History/Living Environment:   Home Environment: Private residence  # Steps to Enter: 6  Rails to Enter: Yes  Hand Rails : Left  One/Two Story Residence: One story  Living Alone: No  Support Systems: Child(cahrlene), Spouse/Significant Other/Partner  Patient Expects to be Discharged to[de-identified] Private residence  Current DME Used/Available at Home: None  Tub or Shower Type: Tub/Shower combination  Prior Level of Function/Work/Activity:  Pt reports limited tolerance for functional activities- PT unable to determine how long these symptoms have been going on      Number of Personal Factors/Comorbidities that affect the Plan of Care: 3+: HIGH COMPLEXITY   EXAMINATION:   Most Recent Physical Functioning:   Gross Assessment:  AROM: Generally decreased, functional  Strength: Generally decreased, functional               Posture:     Balance:  Sitting: Intact; Without support  Standing: Intact; Without support Bed Mobility:  Supine to Sit: Independent  Sit to Supine: Independent  Wheelchair Mobility:     Transfers:  Sit to Stand: Stand-by asssistance  Stand to Sit: Stand-by asssistance  Gait:     Base of Support: Narrowed  Speed/Treasure: Shuffled  Step Length: Left shortened;Right shortened  Gait Abnormalities: Decreased step clearance; Path deviations  Distance (ft): 25 Feet (ft)  Ambulation - Level of Assistance: Stand-by asssistance  Interventions: Safety awareness training;Verbal cues       Body Structures Involved:  1. Heart  2. Lungs  3. Bones  4. Joints  5. Muscles  6. Ligaments Body Functions Affected:  1. Sensory/Pain  2. Cardio  3. Respiratory  4. Neuromusculoskeletal  5. Movement Related Activities and Participation Affected:  1. General Tasks and Demands  2. Mobility  3. Self Care  4. Domestic Life  5. Interpersonal Interactions and Relationships  6. Community, Social and [place]   Number of elements that affect the Plan of Care: 4+: HIGH COMPLEXITY   CLINICAL PRESENTATION:   Presentation: Evolving clinical presentation with changing clinical characteristics: MODERATE COMPLEXITY   CLINICAL DECISION MAKIN [facility] Mobility Inpatient Short Form  How much difficulty does the patient currently have. .. Unable A Lot A Little None   1. Turning over in bed (including adjusting bedclothes, sheets and blankets)? [ ] 1   [ ] 2   [ ] 3   [X] 4   2. Sitting down on and standing up from a chair with arms ( e.g., wheelchair, bedside commode, etc.)   [ ] 1   [ ] 2   [ ] 3   [X] 4   3. Moving from lying on back to sitting on the side of the bed? [ ] 1   [ ] 2   [ ] 3   [X] 4   How much help from another person does the patient currently need. .. Total A Lot A Little None   4. Moving to and from a bed to a chair (including a wheelchair)? [ ] 1   [ ] 2   [X] 3   [ ] 4   5. Need to walk in hospital room? [ ] 1   [ ] 2   [X] 3   [ ] 4   6. Climbing 3-5 steps with a railing? [ ] 1   [ ] 2   [X] 3   [ ] 4   © , Trustees of [address] [place] [address] [address] [address], under license to [employer]. All rights reserved    Score:  Initial: 21 Most Recent: X (Date: -- )     Interpretation of Tool:  Represents activities that are increasingly more difficult (i.e. Bed mobility, Transfers, Gait).        Score 24 23 22-20 19-15 14-10 9-7 6       Modifier CH CI CJ CK CL CM CN         · Mobility - Walking and Moving Around:               - CURRENT STATUS:    CJ - 20%-39% impaired, limited or restricted               - GOAL STATUS:           CI - 1%-19% impaired, limited or restricted               - D/C STATUS:                       ---------------To be determined---------------  Payor: BLUE CROSS / [place] / Product Type: PPO /       Medical Necessity:     · Patient is expected to demonstrate progress in strength, range of motion, balance, coordination and functional technique to increase independence with gait and functional mobility. Reason for Services/Other Comments:  · Patient continues to require present interventions due to patient's inability to perform daily functional activites independently. Use of outcome tool(s) and clinical judgement create a POC that gives a: Questionable prediction of patient's progress: MODERATE COMPLEXITY                 TREATMENT:   (In addition to Assessment/Re-Assessment sessions the following treatments were rendered)   Pre-treatment Symptoms/Complaints:  Pt reports feeling very very weak  Pain: Initial:     0/10 Post Session:  0/10      Assessment/Reassessment only, no treatment provided today     Braces/Orthotics/Lines/Etc:   · IV  · O2 Device: Nasal cannula  Treatment/Session Assessment:    · Response to Treatment:  Increased fatigue  · Interdisciplinary Collaboration:  · Registered Nurse  · After treatment position/precautions:  · Supine in bed  · Bed/Chair-wheels locked  · Bed in low position  · Call light within reach  · RN notified  · Compliance with Program/Exercises: Will assess as treatment progresses. · Recommendations/Intent for next treatment session: \"Next visit will focus on advancements to more challenging activities and reduction in assistance provided\".   Total Treatment Duration:  PT Patient Time In/Time Out  Time In: 0920  Time Out: Leonel 5

## 2017-03-14 NOTE — ED NOTES
TRANSFER - OUT REPORT:    Verbal report given to Kenyon Bacon RN(name) on Boston Worley  being transferred to [address] [place] [address] for routine progression of care       Report consisted of patients Situation, Background, Assessment and   Recommendations(SBAR). Information from the following report(s) ED Summary was reviewed with the receiving nurse. Lines:   Peripheral IV 03/13/17 Left Antecubital (Active)   Site Assessment Clean, dry, & intact 3/13/2017 10:45 PM   Phlebitis Assessment 0 3/13/2017 10:45 PM   Infiltration Assessment 0 3/13/2017 10:45 PM        Opportunity for questions and clarification was provided.       Patient transported with:   Monitor

## 2017-03-14 NOTE — PROGRESS NOTES
Care Management Interventions  PCP Verified by CM: Yes  Transition of Care Consult (CM Consult): Discharge Planning  Physical Therapy Consult: Yes  Occupational Therapy Consult: Yes  Current Support Network: Lives with Spouse  Confirm Follow Up Transport: Family  Plan discussed with Pt/Family/Caregiver: Yes    [facility]. Adm with acute CHF. Alert and oriented in all spheres. Resides with spouse who works during the day. Pt is typically indep with ambulation and ADLs but reports a several week period of generalized weakness and lack of endurance. Spouse provides transportation. Pt has a PCP and Rx coverage. No DME other than a shower bench. No current HH involvement. PT worked with pt today. OT consulted as well. PPD placed. Pt feels [facility] . Expressed preference for [facility]. [facility] informed that pt may not meet requirements for acute rehab but will send a referral to [facility] today. Provided pt with a SNF list requesting that he identify a couple of facility  preferences in the event he is declined for the acute rehab setting.

## 2017-03-14 NOTE — PROGRESS NOTES
Bedside and Verbal shift change report given to Twan Ray RN (oncoming nurse) by self (offgoing nurse). Report included the following information SBAR, Kardex, MAR and Recent Results.      Heparin gtt verified with on coming RN

## 2017-03-14 NOTE — IP AVS SNAPSHOT
Douglas Ahumada 
 
 
 [address] 
[phone] Patient: Stephen Solis MRN: [id number] LN:[birth year] You are allergic to the following No active allergies Immunizations Administered for This Admission Name Date  
 TB Skin Test (PPD) Intradermal 3/14/2017 Recent Documentation Height Weight BMI Smoking Status 1.803 m 92.2 kg 28.35 kg/m2 Never Smoker Emergency Contacts Name Discharge Info Relation Home Work Mobile Donaldo Clayton  Spouse [3] [phone] Aleisha Braun  Mother [14] [phone] About your hospitalization You were admitted on:  March 14, 2017 You last received care in the:  [facility] 3 TELEMETRY You were discharged on:  March 25, 2017 Unit phone number:  [phone] Why you were hospitalized Your primary diagnosis was:  Acute On Chronic Systolic Heart Failure (Hcc) Your diagnoses also included:  Htn (Hypertension), Icd (Implantable Cardioverter-Defibrillator) In Place, Non-Ischemic Cardiomyopathy (Hcc), Shortness Of Breath, Tachycardia, Elevated Troponin, Stridor Providers Seen During Your Hospitalizations Provider Role Specialty Primary office phone Dana Goncalves MD Attending Provider Cardiology [phone] Your Primary Care Physician (PCP) Primary Care Physician Office Phone Office Fax [address] [facility], [address] [place] [address] [phone] Follow-up Information Follow up With Details Comments Contact Info Sagrario Cui MD Schedule an appointment as soon as possible for a visit  [address] [place] 
[phone] Gladys Sutherland MD  We will call pt with appt  [address] [place] [address] 
[phone] Current Discharge Medication List  
  
START taking these medications Dose & Instructions Dispensing Information Comments Morning Noon Evening Bedtime  
 aspirin 81 mg chewable tablet Your last dose was: Your next dose is:    
   
   
 Dose:  81 mg Take 1 Tab by mouth daily. Refills:  0  
     
   
   
   
  
 clindamycin 150 mg capsule Commonly known as:  CLEOCIN Your last dose was: Your next dose is:    
   
   
 Dose:  150 mg Take 1 Cap by mouth three (3) times daily for 21 days. Quantity:  63 Cap Refills:  0  
     
   
   
   
  
 escitalopram oxalate 10 mg tablet Commonly known as:  Bernardo Ramp Your last dose was: Your next dose is:    
   
   
 Dose:  10 mg Take 1 Tab by mouth daily. Quantity:  30 Tab Refills:  1  
     
   
   
   
  
 guaiFENesin 1,200 mg Ta12 ER tablet Commonly known as:  [facility] Your last dose was: Your next dose is:    
   
   
 Dose:  1200 mg Take 1 Tab by mouth two (2) times a day. Quantity:  60 Tab Refills:  1  
     
   
   
   
  
 magnesium oxide 400 mg tablet Commonly known as:  MAG-OX Your last dose was: Your next dose is:    
   
   
 Dose:  400 mg Take 1 Tab by mouth every twelve (12) hours. Quantity:  60 Tab Refills:  1 CONTINUE these medications which have CHANGED Dose & Instructions Dispensing Information Comments Morning Noon Evening Bedtime  
 furosemide 40 mg tablet Commonly known as:  LASIX What changed:   
- medication strength 
- how much to take Your last dose was: Your next dose is:    
   
   
 Dose:  40 mg Take 1 Tab by mouth daily. Quantity:  30 Tab Refills:  6  
     
   
   
   
  
 gabapentin 300 mg capsule Commonly known as:  NEURONTIN What changed:  when to take this Your last dose was: Your next dose is:    
   
   
 Dose:  300 mg Take 1 Cap by mouth three (3) times daily. Quantity:  42 Cap Refills:  0 CONTINUE these medications which have NOT CHANGED Dose & Instructions Dispensing Information Comments Morning Noon Evening Bedtime Albuterol (Bulk) Powd Your last dose was: Your next dose is:    
   
   
 by Does Not Apply route. Refills:  0 ALPRAZolam 1 mg tablet Commonly known as:  [facility] Curio Your last dose was: Your next dose is:    
   
   
 Dose:  1 mg Take 1 Tab by mouth nightly as needed. Max Daily Amount: 1 mg. Quantity:  30 Tab Refills:  3  
     
   
   
   
  
 atorvastatin 80 mg tablet Commonly known as:  LIPITOR Your last dose was: Your next dose is:    
   
   
 Dose:  80 mg Take 1 Tab by mouth daily. Quantity:  30 Tab Refills:  3  
     
   
   
   
  
 carvedilol 12.5 mg tablet Commonly known as:  Ozdinah Bitter Your last dose was: Your next dose is:    
   
   
 Dose:  12.5 mg Take 1 Tab by mouth two (2) times daily (with meals). Quantity:  60 Tab Refills:  11  
     
   
   
   
  
 eplerenone 25 mg tablet Commonly known as:  Gerhardt Folks Your last dose was: Your next dose is:    
   
   
 Dose:  25 mg  
25 mg once. Refills:  1 HYDROcodone-homatropine 5-1.5 mg/5 mL (5 mL) syrup Commonly known as:  HYCODAN Your last dose was: Your next dose is:    
   
   
 Dose:  5 mL Take 5 mL by mouth four (4) times daily as needed for Cough. Max Daily Amount: 20 mL. Quantity:  120 mL Refills:  0  
     
   
   
   
  
 losartan 25 mg tablet Commonly known as:  COZAAR Your last dose was: Your next dose is:    
   
   
 Dose:  25 mg Take 1 Tab by mouth daily. Quantity:  30 Tab Refills:  11 MIRALAX 17 gram/dose powder Generic drug:  polyethylene glycol Your last dose was: Your next dose is:    
   
   
 Dose:  17 g Take 17 g by mouth daily. Refills:  0 NEXIUM PO Your last dose was: Your next dose is:    
   
   
 Dose:  1 Cap Take 1 Cap by mouth two (2) times a day. Refills:  0 NORCO  mg tablet Generic drug:  HYDROcodone-acetaminophen Your last dose was: Your next dose is:    
   
   
 1 tablet q4-6hrs prn pain, brand only Quantity:  150 Tab Refills:  0  
     
   
   
   
  
 ondansetron 8 mg disintegrating tablet Commonly known as:  ZOFRAN ODT Your last dose was: Your next dose is:    
   
   
 Dose:  8 mg Take 1 Tab by mouth every eight (8) hours as needed for Nausea. Quantity:  12 Tab Refills:  0  
     
   
   
   
  
 potassium chloride 20 mEq tablet Commonly known as:  K-DUR, KLOR-CON Your last dose was: Your next dose is:    
   
   
 Dose:  20 mEq Take 1 Tab by mouth daily. Quantity:  30 Tab Refills:  6  
     
   
   
   
  
 valACYclovir 1 gram tablet Commonly known as:  VALTREX Your last dose was: Your next dose is:    
   
   
 Dose:  1000 mg Take 1 Tab by mouth three (3) times daily. Quantity:  21 Tab Refills:  0 STOP taking these medications   
 amLODIPine 5 mg tablet Commonly known as:  NORVASC  
   
  
 digoxin 0.25 mg tablet Commonly known as:  Narcisa Ta Where to Get Your Medications These medications were sent to [address] [place] [address], [address] [place] [address] Crossing  [address] [place] [id number] Phone:  [phone]  
  clindamycin 150 mg capsule  
 escitalopram oxalate 10 mg tablet  
 furosemide 40 mg tablet  
 guaiFENesin 1,200 mg Ta12 ER tablet  
 magnesium oxide 400 mg tablet Discharge Instructions Heart Failure: Care Instructions Your Care Instructions Heart failure occurs when your heart does not pump as much blood as the body needs. Failure does not mean that the heart has stopped pumping but rather that it is not pumping as well as it should. Over time, this causes fluid buildup in your lungs and other parts of your body. Fluid buildup can cause shortness of breath, fatigue, swollen ankles, and other problems. By taking medicines regularly, reducing sodium (salt) in your diet, checking your weight every day, and making lifestyle changes, you can feel better and live longer. Follow-up care is a key part of your treatment and safety. Be sure to make and go to all appointments, and call your doctor if you are having problems. It's also a good idea to know your test results and keep a list of the medicines you take. How can you care for yourself at home? Medicines · Be safe with medicines. Take your medicines exactly as prescribed. Call your doctor if you think you are having a problem with your medicine. · Do not take any vitamins, over-the-counter medicine, or herbal products without talking to your doctor first. Ravi Garcia not take ibuprofen (Advil or Motrin) and naproxen (Aleve) without talking to your doctor first. They could make your heart failure worse. · You may be taking some of the following medicine. ¨ Beta-blockers can slow heart rate, decrease blood pressure, and improve your condition. Taking a beta-blocker may lower your chance of needing to be hospitalized. ¨ Angiotensin-converting enzyme inhibitors (ACEIs) reduce the heart's workload, lower blood pressure, and reduce swelling. Taking an ACEI may lower your chance of needing to be hospitalized again. ¨ Angiotensin II receptor blockers (ARBs) work like ACEIs. Your doctor may prescribe them instead of ACEIs. ¨ Diuretics, also called water pills, reduce swelling. ¨ Potassium supplements replace this important mineral, which is sometimes lost with diuretics. ¨ Aspirin and other blood thinners prevent blood clots, which can cause a stroke or heart attack. You will get more details on the specific medicines your doctor prescribes. Diet · Your doctor may suggest that you limit sodium to 2,000 milligrams (mg) a day or less. That is less than 1 teaspoon of salt a day, including all the salt you eat in cooking or in packaged foods. People get most of their sodium from processed foods. Fast food and restaurant meals also tend to be very high in sodium. · Ask your doctor how much liquid you can drink each day. You may have to limit liquids. Weight · Weigh yourself without clothing at the same time each day. Record your weight. Call your doctor if you gain more than 3 pounds in 2 to 3 days. A sudden weight gain may mean that your heart failure is getting worse. Activity level · Start light exercise (if your doctor says it is okay). Even if you can only do a small amount, exercise will help you get stronger, have more energy, and manage your weight and your stress. Walking is an easy way to get exercise. Start out by walking a little more than you did before. Bit by bit, increase the amount you walk. · When you exercise, watch for signs that your heart is working too hard. You are pushing yourself too hard if you cannot talk while you are exercising. If you become short of breath or dizzy or have chest pain, stop, sit down, and rest. 
· If you feel \"wiped out\" the day after you exercise, walk slower or for a shorter distance until you can work up to a better pace. · Get enough rest at night. Sleeping with 1 or 2 pillows under your upper body and head may help you breathe easier. Lifestyle changes · Do not smoke. Smoking can make a heart condition worse. If you need help quitting, talk to your doctor about stop-smoking programs and medicines. These can increase your chances of quitting for good. Quitting smoking may be the most important step you can take to protect your heart. · Limit alcohol to 2 drinks a day for men and 1 drink a day for women.  Too much alcohol can cause health problems. · Avoid getting sick from colds and the flu. Get a pneumococcal vaccine shot. If you have had one before, ask your doctor whether you need another dose. Get a flu shot each year. If you must be around people with colds or the flu, wash your hands often. When should you call for help? Call 911 if you have symptoms of sudden heart failure such as: 
· You have severe trouble breathing. · You cough up pink, foamy mucus. · You have a new irregular or rapid heartbeat. Call your doctor now or seek immediate medical care if: 
· You have new or increased shortness of breath. · You are dizzy or lightheaded, or you feel like you may faint. · You have sudden weight gain, such as 3 pounds or more in 2 to 3 days. · You have increased swelling in your legs, ankles, or feet. · You are suddenly so tired or weak that you cannot do your usual activities. Watch closely for changes in your health, and be sure to contact your doctor if: 
· You develop new symptoms. Where can you learn more? Go to http://edmundo-isi.info/. Enter C320 in the search box to learn more about \"Heart Failure: Care Instructions. \" Current as of: January 27, 2016 Content Version: 11.1 © 7907-0976 [employer]. Care instructions adapted under license by [employer] (which disclaims liability or warranty for this information). If you have questions about a medical condition or this instruction, always ask your healthcare professional. [place] [address] any warranty or liability for your use of this information. DISCHARGE SUMMARY from Nurse The following personal items are in your possession at time of discharge: 
 
Dental Appliances: None Visual Aid: None Home Medications: None Jewelry: None Clothing: At bedside, Footwear, Pants, Shirt, Socks, Undergarments Other Valuables: None PATIENT INSTRUCTIONS: 
 
 
F-face looks uneven A-arms unable to move or move unevenly S-speech slurred or non-existent T-time-call 911 as soon as signs and symptoms begin-DO NOT go Back to bed or wait to see if you get better-TIME IS BRAIN. Warning Signs of HEART ATTACK Call 911 if you have these symptoms: 
? Chest discomfort. Most heart attacks involve discomfort in the center of the chest that lasts more than a few minutes, or that goes away and comes back. It can feel like uncomfortable pressure, squeezing, fullness, or pain. ? Discomfort in other areas of the upper body. Symptoms can include pain or discomfort in one or both arms, the back, neck, jaw, or stomach. ? Shortness of breath with or without chest discomfort. ? Other signs may include breaking out in a cold sweat, nausea, or lightheadedness. Don't wait more than five minutes to call [address]! Fast action can save your life. Calling 911 is almost always the fastest way to get lifesaving treatment. Emergency Medical Services staff can begin treatment when they arrive  up to an hour sooner than if someone gets to the hospital by car. The discharge information has been reviewed with the patient. The patient verbalized understanding. Discharge medications reviewed with the patient and appropriate educational materials and side effects teaching were provided. Discharge Orders Procedure Order Date Status Priority Quantity Spec Type Associated Dx METABOLIC PANEL, BASIC 50/82/31 0857 Future Routine 1 Blood Comments:  Chronic systolic heart failure MAGNESIUM 03/25/17 0857 Future Routine 1 Serum Comments:  Chronic systolic heart failure ACO Transitions of Care Introducing [employer] offers a voluntary care coordination program to provide high quality service and care to [facility] fee-for-service alphonseLuis Salmeron was designed to help you enhance your health and well-being through the following services: ? Transitions of Care  support for individuals who are transitioning from one care setting to another (example: Hospital to home). ? Chronic and Complex Care Coordination  support for individuals and caregivers of those with serious or chronic illnesses or with more than one chronic (ongoing) condition and those who take a number of different medications. If you meet specific medical criteria, a [facility] [address] may call you directly to coordinate your care with your primary care physician and your other care providers. For questions about the [facility] programs, please, contact your physicians office. For general questions or additional information about Accountable Care Organizations: 
Please visit www.medicare.gov/acos. html or call 1-800-MEDICARE (9-905.470.4266) TTY users should call 6-918.484.5452. [employer] Announcement We are excited to announce that we are making your provider's discharge notes available to you in [employer] Info. You will see these notes when they are completed and signed by the physician that discharged you from your recent hospital stay. If you have any questions or concerns about any information you see in [employer]nt, please call the Health Information Department where you were seen or reach out to your Primary Care Provider for more information about your plan of care. Introducing [facility]! Dear Padmaja Drew: Thank you for requesting a [employer] account. Our records indicate that you already have an active [employer] account. You can access your account anytime at [email or web address]. [email or web address] Did you know that you can access your hospital and ER discharge instructions at any time in [employer] Info? You can also review all of your test results from your hospital stay or ER visit. Additional Information If you have questions, please visit the Frequently Asked Questions section of the [employer] website at [email or web address] Electronic Payment and Services (EPS). [email or web address] Remember, MyChart is NOT to be used for urgent needs. For medical emergencies, dial 911. Now available from your iPhone and Android! General Information Please provide this summary of care documentation to your next provider. Patient Signature:  ____________________________________________________________ Date:  ____________________________________________________________  
  
[facility] Provider Signature:  ____________________________________________________________ Date:  ____________________________________________________________

## 2017-03-14 NOTE — PROGRESS NOTES
Chart reviewed in regard to IP Consult to Cardiac Rehab for Heart Failure; he also has elevated troponin. Patient will not be seen at hospital today but will be contacted after discharge when qualifying referral to [address] [place] [address] is received.

## 2017-03-14 NOTE — ED TRIAGE NOTES
Pt presents to ER for SOB w/ cough and rapid HR x 2 days. Pt admits to not taking his medications like he is supposed to though took all his meds today, pt states that he has been getting calls from [facility] for f/u appt to come in w/ pt stating that he was scared that they were going to admit him so stayed away.

## 2017-03-14 NOTE — PROGRESS NOTES
Bedside and Verbal shift change report given to self (oncoming nurse) by Samanta Godfrey RN (offgoing nurse). Report included the following information SBAR, Kardex, MAR and Recent Results.      Heparin gtt verified with off going RN

## 2017-03-14 NOTE — PROGRESS NOTES
Bedside and Verbal shift change report given to Shaneka Cline RN (oncoming nurse) by Zita Taylor RN (offgoing nurse). Report included the following information SBAR, Kardex, MAR and Recent Results. Verified heparin gtt at beside.

## 2017-03-15 ENCOUNTER — PATIENT OUTREACH (OUTPATIENT)
Dept: CASE MANAGEMENT | Age: 52
End: 2017-03-15

## 2017-03-15 LAB
ANION GAP BLD CALC-SCNC: 14 MMOL/L (ref 7–16)
APTT PPP: 49.1 SEC (ref 23.5–31.7)
APTT PPP: 59.9 SEC (ref 23.5–31.7)
APTT PPP: 75.3 SEC (ref 23.5–31.7)
BUN SERPL-MCNC: 13 MG/DL (ref 6–23)
CALCIUM SERPL-MCNC: 7.9 MG/DL (ref 8.3–10.4)
CHLORIDE SERPL-SCNC: 102 MMOL/L (ref 98–107)
CO2 SERPL-SCNC: 23 MMOL/L (ref 21–32)
CREAT SERPL-MCNC: 1.51 MG/DL (ref 0.8–1.5)
ERYTHROCYTE [DISTWIDTH] IN BLOOD BY AUTOMATED COUNT: 15.3 % (ref 11.9–14.6)
GLUCOSE SERPL-MCNC: 119 MG/DL (ref 65–100)
HCT VFR BLD AUTO: 35.6 % (ref 41.1–50.3)
HGB BLD-MCNC: 11.8 G/DL (ref 13.6–17.2)
MAGNESIUM SERPL-MCNC: 1.4 MG/DL (ref 1.8–2.4)
MCH RBC QN AUTO: 32.2 PG (ref 26.1–32.9)
MCHC RBC AUTO-ENTMCNC: 33.1 G/DL (ref 31.4–35)
MCV RBC AUTO: 97 FL (ref 79.6–97.8)
PLATELET # BLD AUTO: 178 K/UL (ref 150–450)
PMV BLD AUTO: 9.3 FL (ref 10.8–14.1)
POTASSIUM SERPL-SCNC: 3.2 MMOL/L (ref 3.5–5.1)
RBC # BLD AUTO: 3.67 M/UL (ref 4.23–5.67)
SODIUM SERPL-SCNC: 139 MMOL/L (ref 136–145)
WBC # BLD AUTO: 4.5 K/UL (ref 4.3–11.1)

## 2017-03-15 PROCEDURE — 74011250637 HC RX REV CODE- 250/637: Performed by: NURSE PRACTITIONER

## 2017-03-15 PROCEDURE — 74011250636 HC RX REV CODE- 250/636: Performed by: NURSE PRACTITIONER

## 2017-03-15 PROCEDURE — 74011250637 HC RX REV CODE- 250/637: Performed by: INTERNAL MEDICINE

## 2017-03-15 PROCEDURE — 85027 COMPLETE CBC AUTOMATED: CPT | Performed by: NURSE PRACTITIONER

## 2017-03-15 PROCEDURE — 97166 OT EVAL MOD COMPLEX 45 MIN: CPT

## 2017-03-15 PROCEDURE — 65660000000 HC RM CCU STEPDOWN

## 2017-03-15 PROCEDURE — 85730 THROMBOPLASTIN TIME PARTIAL: CPT | Performed by: INTERNAL MEDICINE

## 2017-03-15 PROCEDURE — 74011250636 HC RX REV CODE- 250/636: Performed by: INTERNAL MEDICINE

## 2017-03-15 PROCEDURE — 83735 ASSAY OF MAGNESIUM: CPT | Performed by: NURSE PRACTITIONER

## 2017-03-15 PROCEDURE — 80048 BASIC METABOLIC PNL TOTAL CA: CPT | Performed by: NURSE PRACTITIONER

## 2017-03-15 PROCEDURE — 36415 COLL VENOUS BLD VENIPUNCTURE: CPT | Performed by: NURSE PRACTITIONER

## 2017-03-15 PROCEDURE — 85730 THROMBOPLASTIN TIME PARTIAL: CPT | Performed by: NURSE PRACTITIONER

## 2017-03-15 RX ORDER — HEPARIN SODIUM 5000 [USP'U]/ML
35 INJECTION, SOLUTION INTRAVENOUS; SUBCUTANEOUS ONCE
Status: COMPLETED | OUTPATIENT
Start: 2017-03-15 | End: 2017-03-15

## 2017-03-15 RX ORDER — POTASSIUM CHLORIDE 20 MEQ/1
40 TABLET, EXTENDED RELEASE ORAL 3 TIMES DAILY
Status: COMPLETED | OUTPATIENT
Start: 2017-03-15 | End: 2017-03-15

## 2017-03-15 RX ORDER — MAGNESIUM SULFATE HEPTAHYDRATE 40 MG/ML
2 INJECTION, SOLUTION INTRAVENOUS ONCE
Status: COMPLETED | OUTPATIENT
Start: 2017-03-15 | End: 2017-03-22

## 2017-03-15 RX ORDER — METOPROLOL SUCCINATE 25 MG/1
25 TABLET, EXTENDED RELEASE ORAL DAILY
Status: DISCONTINUED | OUTPATIENT
Start: 2017-03-15 | End: 2017-03-25 | Stop reason: HOSPADM

## 2017-03-15 RX ADMIN — PANTOPRAZOLE SODIUM 40 MG: 40 TABLET, DELAYED RELEASE ORAL at 07:14

## 2017-03-15 RX ADMIN — ALPRAZOLAM 1 MG: 0.5 TABLET ORAL at 05:00

## 2017-03-15 RX ADMIN — ALPRAZOLAM 1 MG: 0.5 TABLET ORAL at 11:36

## 2017-03-15 RX ADMIN — HEPARIN SODIUM 2700 UNITS: 5000 INJECTION, SOLUTION INTRAVENOUS; SUBCUTANEOUS at 07:13

## 2017-03-15 RX ADMIN — MAGNESIUM SULFATE HEPTAHYDRATE 2 G: 40 INJECTION, SOLUTION INTRAVENOUS at 07:59

## 2017-03-15 RX ADMIN — ATORVASTATIN CALCIUM 80 MG: 40 TABLET, FILM COATED ORAL at 09:42

## 2017-03-15 RX ADMIN — FUROSEMIDE 40 MG: 10 INJECTION, SOLUTION INTRAMUSCULAR; INTRAVENOUS at 17:50

## 2017-03-15 RX ADMIN — MAGNESIUM SULFATE HEPTAHYDRATE 2 G: 40 INJECTION, SOLUTION INTRAVENOUS at 07:13

## 2017-03-15 RX ADMIN — POTASSIUM CHLORIDE 40 MEQ: 20 TABLET, EXTENDED RELEASE ORAL at 07:58

## 2017-03-15 RX ADMIN — FUROSEMIDE 40 MG: 10 INJECTION, SOLUTION INTRAMUSCULAR; INTRAVENOUS at 09:42

## 2017-03-15 RX ADMIN — METOPROLOL SUCCINATE 25 MG: 25 TABLET, EXTENDED RELEASE ORAL at 09:42

## 2017-03-15 RX ADMIN — POTASSIUM CHLORIDE 40 MEQ: 1500 TABLET, EXTENDED RELEASE ORAL at 16:19

## 2017-03-15 RX ADMIN — HEPARIN SODIUM AND DEXTROSE 18 UNITS/KG/HR: 5000; 5 INJECTION INTRAVENOUS at 05:01

## 2017-03-15 RX ADMIN — POTASSIUM CHLORIDE 40 MEQ: 1500 TABLET, EXTENDED RELEASE ORAL at 21:35

## 2017-03-15 RX ADMIN — HYDROCODONE BITARTRATE AND ACETAMINOPHEN 1 TABLET: 10; 325 TABLET ORAL at 11:36

## 2017-03-15 RX ADMIN — HYDROCODONE BITARTRATE AND ACETAMINOPHEN 1 TABLET: 10; 325 TABLET ORAL at 17:52

## 2017-03-15 RX ADMIN — HYDROCODONE BITARTRATE AND ACETAMINOPHEN 1 TABLET: 10; 325 TABLET ORAL at 05:00

## 2017-03-15 NOTE — PROGRESS NOTES
Problem: Self Care Deficits Care Plan (Adult)  Goal: *Acute Goals and Plan of Care (Insert Text)  1. Patient will complete lower body bathing and dressing with stand by assist and adaptive equipment as needed. 2. Patient will complete toileting with modified independence as least restrictive adaptive equipment. 3. Patient will tolerate 20 minutes of OT treatment with less than 4 rest breaks to increase activity tolerance for ADLs. 4. Patient will complete functional transfers with independence and adaptive equipment as needed. 5. Patient will verbalize 3 energy conservation techniques with 100% accuracy. Timeframe: 7 visits     Comments:       OCCUPATIONAL THERAPY: Initial Assessment and PM 3/15/2017  INPATIENT: Hospital Day: 2  Payor: BLUE CROSS / Plan[place] FED[place] / Product Type: PPO /      NAME/AGE/GENDER: Aicha Michael is a 46 y.o. male   PRIMARY DIAGNOSIS:  NSTEMI MI Elevated Troponin  Acute on chronic systolic heart failure (HCC) Acute on chronic systolic heart failure (HCC) Acute on chronic systolic heart failure (HCC)        ICD-10: Treatment Diagnosis:        · Pain in Left Shoulder (M25.512)  · Stiffness of Left Shoulder, Not elsewhere classified (M25.612)  · Generalized Muscle Weakness (M62.81)  · Other lack of cordination (R27.8)  · History of falling (Z91.81)   Precautions/Allergies:         Review of patient's allergies indicates no known allergies. ASSESSMENT:      Mr. Zachariah Rush presents to hospital with NSTEMI, elevated troponin levels, and acute on chronic systolic heart failure. Upon entry of OT, patient was supine in bed watching television. He was willing to participate in OT eval with a lot of encouragement. He reports that he has been experiencing generalized weakness and fatigue for some time now, but that he complete ADLs independently with extra time and frequent rest breaks.  He also states that he fell ~1 month ago and hurt his L shoulder, which he believes is contributing to his decreased UE ROM and strength, as observed in MMT and AROM screening. Pt sat edge of bed with modified independence, using bed rails and requiring extra time. Sitting balance appears intact. He refused to stand, stating that he is too  tired due to lack of sleep last night. Pt reports that he wants to receive rehab upon discharge, stating, \"If I go home I know I won't do anything. \"  He would benefit from continued skilled OT at this time due to decreased activity tolerance, decreased UE strength/ROM, increased fatigue, decreased energy conservation techniques, and decreased safety with ADLs, in order to improve safety and independence with daily functional tasks. Will follow. This section established at most recent assessment   PROBLEM LIST (Impairments causing functional limitations):  1. Decreased Strength  2. Decreased ADL/Functional Activities  3. Decreased Transfer Abilities  4. Decreased Ambulation Ability/Technique  5. Decreased Activity Tolerance  6. Decreased Work Simplification/Energy Conservation Techniques  7. Increased Fatigue  8. Decreased Flexibility/Joint Mobility    INTERVENTIONS PLANNED: (Benefits and precautions of occupational therapy have been discussed with the patient.)  1. Activities of daily living training  2. Adaptive equipment training  3. Clothing management  4. Donning&doffing training  5. Group therapy  6. Theraputic activity  7. Theraputic exercise      TREATMENT PLAN: Frequency/Duration: Follow patient 3x/week to address above goals. Rehabilitation Potential For Stated Goals: GOOD      RECOMMENDED REHABILITATION/EQUIPMENT: (at time of discharge pending progress): Continue Skilled Therapy.                       OCCUPATIONAL PROFILE AND HISTORY:   History of Present Injury/Illness (Reason for Referral):  Per H&P: \"Reno Persaud is a 46 y.o. male with past medical history of NICM, ICD in place, HTN, dyslipidemia, chronic back pain, depression/anxiety and nonobstructive CAD who presented to the ER at [facility] with complaints of cough, shortness of breath and rapid heart rate for 2 days. Denies chest pain, or n/v but does admit to diarrhea. He admitted that he has not been taking his medications as prescribed. Upon arrival to the ER, he was found to be tachycardic with HR of 145. EKG shows ST without acute ST/T wave changes. Labs showed elevated POC troponin of 0.43 and d-dimer of 0.71. Chest CT was done that showed no evidence of PE. Other pertinent labs include digoxin 0.2,  and lab troponin of 2.01. While in the ER, he was given 324mg ASA, 40mg IV Lasix, 1\" topical nitro, and 4650 unit IV heparin bolus followed by heparin drip. Patient was transferred to [facility] and admitted to telemetry for further care. Upon arrival to telemetry, patient remains tachycardic with HR in mid 120s to 130s. Continues to deny chest pain. \"  Past Medical History/Comorbidities:   Mr. Cesia Maldonado  has a past medical history of Arthritis; CAD (coronary artery disease); CHF (congestive heart failure) ([address] Chronic alcoholism ([address] Chronic back pain; Chronic neck pain; Chronic systolic heart failure ([address] (4/21/2011); Depression; Dizziness - light-headed; GERD (gastroesophageal reflux disease); Gynecomastia (2/22/2016); Heart failure ([address] History of implantable cardioverter-defibrillator (ICD) placement (2/22/2016); Hypertension; Psychiatric disorder; Situational depression (2/22/2016); and Ventricular tachycardia ([address] (2/22/2016). Mr. Cesia Maldonado  has a past surgical history that includes heart catheterization (9/21/10) and pacemaker.   Social History/Living Environment:   Home Environment: Private residence  # Steps to Enter: 6  Rails to Enter: Yes  Hand Rails : Left  One/Two Story Residence: One story  Living Alone: No  Support Systems: Child(charlene), Spouse/Significant Other/Partner  Patient Expects to be Discharged to[de-identified] Rehabilitation facility  Current DME Used/Available at Home: None  Tub or Shower Type: Tub/Shower combination  Prior Level of Function/Work/Activity:  Lives in [facility] with wife and one high school aged child. Reports 6 ALBARO. Requires extra time and rest breaks for ADL completion at baseline. Personal Factors:          Sex:  male        Age:  46 y.o. Social Background:  Strong support from wife   Number of Personal Factors/Comorbidities that affect the Plan of Care: Expanded review of therapy/medical records (1-2):  MODERATE COMPLEXITY   ASSESSMENT OF OCCUPATIONAL PERFORMANCE[de-identified]   Activities of Daily Living:           Basic ADLs (From Assessment) Complex ADLs (From Assessment)   Basic ADL  Feeding: Independent  Oral Facial Hygiene/Grooming: Independent  Bathing: Minimum assistance  Upper Body Dressing: Setup  Lower Body Dressing: Minimum assistance  Toileting: Stand by assistance Instrumental ADL  Meal Preparation: Maximum assistance  Homemaking: Maximum assistance  Medication Management: Independent  Financial Management: Independent   Grooming/Bathing/Dressing Activities of Daily Living     Cognitive Retraining  Safety/Judgement: Awareness of environment; Insight into deficits                       Bed/Mat Mobility  Supine to Sit: Modified independent (extra time; bed rails)  Sit to Supine: Modified independent (extra time; bed rails)  Scooting: Independent          Most Recent Physical Functioning:   Gross Assessment:  AROM: Generally decreased, functional (BUE (L shoulder with flexion/abduction))  Strength: Generally decreased, functional (BUE )               Posture:     Balance:  Sitting: Intact Bed Mobility:  Supine to Sit: Modified independent (extra time; bed rails)  Sit to Supine: Modified independent (extra time; bed rails)  Scooting: Independent  Wheelchair Mobility:     Transfers:                       Patient Vitals for the past 6 hrs:       BP BP Patient Position SpO2 Pulse   03/15/17 1145 118/84 At rest 98 % 89        Mental Status  Neurologic State: Alert  Orientation Level: Oriented X4  Cognition: Appropriate decision making  Perception: Appears intact  Perseveration: No perseveration noted  Safety/Judgement: Awareness of environment, Insight into deficits                               Physical Skills Involved:  1. Range of Motion  2. Mobility  3. Strength  4. Endurance Cognitive Skills Affected (resulting in the inability to perform in a timely and safe manner):  1. None Psychosocial Skills Affected:  1. Habits  2. Routines and Behaviors  3. Environmental Adaptations   Number of elements that affect the Plan of Care: 5+:  HIGH COMPLEXITY   CLINICAL DECISION MAKIN [facility] Mobility Inpatient Short Form  How much help from another person does the patient currently need. .. Total A Lot A Little None   1. Putting on and taking off regular lower body clothing?   [ ] 1   [ ] 2   [X] 3   [ ] 4   2. Bathing (including washing, rinsing, drying)? [ ] 1   [ ] 2   [X] 3   [ ] 4   3. Toileting, which includes using toilet, bedpan or urinal?   [ ] 1   [ ] 2   [X] 3   [ ] 4   4. Putting on and taking off regular upper body clothing?   [ ] 1   [ ] 2   [ ] 3   [X] 4   5. Taking care of personal grooming such as brushing teeth? [ ] 1   [ ] 2   [ ] 3   [X] 4   6. Eating meals? [ ] 1   [ ] 2   [ ] 3   [X] 4   © , Trustees of [address] [place] [address] [address] [address], under license to [employer]. All rights reserved    Score:  Initial: 21 Most Recent: X (Date: -- )     Interpretation of Tool:  Represents activities that are increasingly more difficult (i.e. Bed mobility, Transfers, Gait).        Score 24 23 22-20 19-15 14-10 9-7 6       Modifier CH CI CJ CK CL CM CN         · Self Care:               - CURRENT STATUS:    CJ - 20%-39% impaired, limited or restricted               - GOAL STATUS:           CI - 1%-19% impaired, limited or restricted               - D/C STATUS:                       ---------------To be determined---------------  Payor: BLUE CROSS / [place] / Product Type: PPO /       Medical Necessity:     · Patient demonstrates good rehab potential due to higher previous functional level. Reason for Services/Other Comments:  · Patient continues to require present interventions due to patient's inability to complete ADLs independently. .   Use of outcome tool(s) and clinical judgement create a POC that gives a: MODERATE COMPLEXITY             TREATMENT:   (In addition to Assessment/Re-Assessment sessions the following treatments were rendered)      Pre-treatment Symptoms/Complaints:    Pain: Initial:   Pain Intensity 1: 0  Post Session:  0/10      Assessment/Reassessment only, no treatment provided today     Braces/Orthotics/Lines/Etc:   · IV  · O2 Device: Room air  Treatment/Session Assessment:    · Response to Treatment:  Tolerated well w/o complaints. · Interdisciplinary Collaboration:  · Registered Nurse  · After treatment position/precautions:  · Supine in bed  · Bed/Chair-wheels locked  · Bed in low position  · Call light within reach  · RN notified  · Side rails x 2  · Compliance with Program/Exercises: Will assess as treatment progresses. · Recommendations/Intent for next treatment session: \"Next visit will focus on advancements to more challenging activities and reduction in assistance provided\".   Total Treatment Duration:  OT Patient Time In/Time Out  Time In: 1350  Time Out: 17[address]

## 2017-03-15 NOTE — PROGRESS NOTES
Verbal bedside report received from Destiney Hughes RN. Assumed care of patient. Heparin IV drip verified at bedside with outgoing RN.

## 2017-03-15 NOTE — PROGRESS NOTES
SW spoke with Admissions at [facility] today. It is unlikely they will accept pt secondary to lack of medical complexity . SW spoke with pt afterwards and encouraged him to identify a couple of SNF preferences for rehab. Will follow up.

## 2017-03-15 NOTE — PROGRESS NOTES
PT note:    PT attempted treatment this morning but pt declined due to [place] a really bad night\" and refused to get out of bed. Pt will attempt again as schedule allows.     Juan Raygoza, PT

## 2017-03-15 NOTE — PROGRESS NOTES
Spoke with Mable Horn concerning pt B/P being low and creatinine going up. He stated to hold all pt B/P and diuretic medication for now until he looked at him.

## 2017-03-15 NOTE — PROGRESS NOTES
Verbal bedside report given to Meliza Miranda oncoming RN. Patient's situation, background, assessment and recommendations provided. Opportunity for questions provided. Oncoming RN assumed care of patient. Heparin IV drip verified at bedside with oncoming RN.

## 2017-03-15 NOTE — PROGRESS NOTES
[facility] CARDIOLOGY PROGRESS NOTE           3/15/2017 8:28 AM    Admit Date: 3/14/2017      Subjective:   Patient denies any chest pain but notes continued dyspnea and is unable to lie flat. Non-compliant with medications at home. Hypotensive after \"home meds\" yesterday. Lasix held last night. Renal function worse today. ROS:  Cardiovascular:  As noted above    Objective:      Vitals:    03/14/17 2327 03/15/17 0149 03/15/17 0441 03/15/17 0705   BP: 98/68 106/74 113/81 107/80   Pulse: 94 (!) 109 94 96   Resp: 20 20 20 20   Temp: 97.6 °F (36.4 °C) 97.8 °F (36.6 °C) 98.2 °F (36.8 °C) 97.5 °F (36.4 °C)   SpO2: 96% 99% 99% 98%   Weight:   77.1 kg (169 lb 14.4 oz)    Height:   5' 11\" (1.803 m)        Physical Exam:  General-No Acute Distress  Neck- supple, moderate JVD  CV- regular rate and rhythm no MRG  Lung- clear bilaterally  Abd- soft, nontender, nondistended  Ext- no edema bilaterally. Skin- warm and dry      Data Review:   Recent Labs      03/15/17   0521  03/14/17   0732   NA  139  140   K  3.2*  3.2*   MG  1.4*  1.7*   BUN  13  7   CREA  1.51*  1.01   GLU  119*  107*   WBC  4.5  4.5   HGB  11.8*  12.6*   HCT  35.6*  36.8*   PLT  178  188      No results found for: Rayjuliocesar Macedoer, TROPT, TNIPOC    Echo (3/15/17):  -  Left ventricle: The ventricle was mildly dilated. Systolic function was severely reduced. Ejection fraction was estimated to be 15 %. There was   Severe diffuse hypokinesis with regional variations. There was mild concentric hypertrophy. -  Right ventricle: Systolic function was reduced. Derotha Hals, systemic arteries: The root exhibited mild dilatation. -  Pericardium: A trivial pericardial effusion was identified. Assessment/Plan:     Principal Problem:    Acute on chronic systolic heart failure ([address] (3/14/2017)  Severe LV dysfunction. EF 15%. Poor prognosis with non-compliance and limited insight into condition. Hypotension with meds. Will stop all prior CV meds. Start low dose toprol and attempt to continue lasix. Monitor daily BMP. Active Problems:    ICD (implantable cardioverter-defibrillator) in place (4/22/2011)  Noted      HTN (hypertension) (11/29/2011)  See above      Elevated troponin (3/14/2017)  Significantly tachycardic on arrival.  Cath 3/16 with mild CAD. Suspect supply/demand imbalance with sustained tachycardia in 130-140s. ON IV heparin. Planned for cath but unable to lie flat. Assess daily. If renal function worsens would consider medical therapy unless angina.                  Airam Araiza MD  3/15/2017 8:28 AM

## 2017-03-15 NOTE — PROGRESS NOTES
Verbal and bedside report received from Zita Nazario [place]. Heparin IV drip verified.

## 2017-03-15 NOTE — PROGRESS NOTES
Problem: Gas Exchange - Impaired  Goal: *Absence of hypoxia  Outcome: Progressing Towards Goal  Pt continues to have SOB and much wheezing.   He is unable to lay flat

## 2017-03-15 NOTE — ROUTINE PROCESS
CHF teaching started post introduction to pt.; aware of diagnosis. Planner/scale @ BS and will follow. Smoking/ ETOH/Illicit drug use cessation covered. Pt. aware that I can not prescribe nor adjust  medications: 15mins  Palliative Care score: 20, none  Start 2 L/D FR  CHF teaching continues to pt/family. Emphasis on taking prescription meds as ordered, to keep F/U appts and to call MD STAT if any of the following occur:   If you gain 2 lbs in one day or 5 lbs in a week, and short of breath.  If you can not lay flat without developing short of breath or rapid breathing at night; or if it wakes you up. Develop a cough or wheezing.  If you notice swollen hands/feet/ankles or stomach with a bloated/ full feeling.  If you become confused or mentally fuzzy or dizzy.  If you notice a rapid or change in your heart rate.  If you become more exhausted all the time and unable to do the same level of activity without stopping to catch your breath. Drink no more than 8 cups a day in 8 oz. cups. Your Heart can not handle any more. Stay away from salt (limit anything with salt or sodium in it). Limit to 250mg per serving.   Pt/family verbalizes understanding, will follow to reinforce teaching skills: 20 mins    LHC  Can not lay flat/ cough  Start no progress in s/sx management  Advised to take prescription meds as ordered

## 2017-03-15 NOTE — PROGRESS NOTES
Problem: Falls - Risk of  Goal: *Absence of falls  Outcome: Progressing Towards Goal  Fall precautions in place. Red socks on. Instructed to only get OOB with assistance. Voiced understanding. Call light in reach.

## 2017-03-15 NOTE — PROGRESS NOTES
This note will not be viewable in [address] Patient ineligible for [facility] program at this time.     Patient currently admitted to [facility].

## 2017-03-15 NOTE — PROGRESS NOTES
Bedside and Verbal shift change report given to KADEN Smith (oncoming nurse) by Demetria Pulido RN (offgoing nurse). Report included the following information SBAR, Kardex, Accordion and Recent Results.

## 2017-03-16 ENCOUNTER — APPOINTMENT (OUTPATIENT)
Dept: GENERAL RADIOLOGY | Age: 52
DRG: 287 | End: 2017-03-16
Attending: PHYSICIAN ASSISTANT
Payer: COMMERCIAL

## 2017-03-16 LAB
ANION GAP BLD CALC-SCNC: 15 MMOL/L (ref 7–16)
APTT PPP: 69.3 SEC (ref 23.5–31.7)
BUN SERPL-MCNC: 11 MG/DL (ref 6–23)
CALCIUM SERPL-MCNC: 8 MG/DL (ref 8.3–10.4)
CHLORIDE SERPL-SCNC: 105 MMOL/L (ref 98–107)
CO2 SERPL-SCNC: 20 MMOL/L (ref 21–32)
CREAT SERPL-MCNC: 1.13 MG/DL (ref 0.8–1.5)
ERYTHROCYTE [DISTWIDTH] IN BLOOD BY AUTOMATED COUNT: 15.2 % (ref 11.9–14.6)
GLUCOSE SERPL-MCNC: 100 MG/DL (ref 65–100)
HCT VFR BLD AUTO: 33 % (ref 41.1–50.3)
HGB BLD-MCNC: 10.8 G/DL (ref 13.6–17.2)
MAGNESIUM SERPL-MCNC: 2.3 MG/DL (ref 1.8–2.4)
MCH RBC QN AUTO: 31.9 PG (ref 26.1–32.9)
MCHC RBC AUTO-ENTMCNC: 32.7 G/DL (ref 31.4–35)
MCV RBC AUTO: 97.3 FL (ref 79.6–97.8)
MM INDURATION POC: 0 MM (ref 0–5)
MM INDURATION POC: 0 MM (ref 0–5)
PLATELET # BLD AUTO: 183 K/UL (ref 150–450)
PMV BLD AUTO: 9.5 FL (ref 10.8–14.1)
POTASSIUM SERPL-SCNC: 4.1 MMOL/L (ref 3.5–5.1)
PPD POC: NEGATIVE NEGATIVE
PPD POC: NORMAL NEGATIVE
RBC # BLD AUTO: 3.39 M/UL (ref 4.23–5.67)
SODIUM SERPL-SCNC: 140 MMOL/L (ref 136–145)
WBC # BLD AUTO: 3.9 K/UL (ref 4.3–11.1)

## 2017-03-16 PROCEDURE — 80048 BASIC METABOLIC PNL TOTAL CA: CPT | Performed by: NURSE PRACTITIONER

## 2017-03-16 PROCEDURE — 65660000000 HC RM CCU STEPDOWN

## 2017-03-16 PROCEDURE — 74011250636 HC RX REV CODE- 250/636: Performed by: NURSE PRACTITIONER

## 2017-03-16 PROCEDURE — 74011250637 HC RX REV CODE- 250/637: Performed by: INTERNAL MEDICINE

## 2017-03-16 PROCEDURE — 85027 COMPLETE CBC AUTOMATED: CPT | Performed by: NURSE PRACTITIONER

## 2017-03-16 PROCEDURE — 85730 THROMBOPLASTIN TIME PARTIAL: CPT | Performed by: INTERNAL MEDICINE

## 2017-03-16 PROCEDURE — 99222 1ST HOSP IP/OBS MODERATE 55: CPT | Performed by: INTERNAL MEDICINE

## 2017-03-16 PROCEDURE — 36415 COLL VENOUS BLD VENIPUNCTURE: CPT | Performed by: NURSE PRACTITIONER

## 2017-03-16 PROCEDURE — 71020 XR CHEST PA LAT: CPT

## 2017-03-16 PROCEDURE — 74011250637 HC RX REV CODE- 250/637: Performed by: NURSE PRACTITIONER

## 2017-03-16 PROCEDURE — 83735 ASSAY OF MAGNESIUM: CPT | Performed by: NURSE PRACTITIONER

## 2017-03-16 RX ADMIN — HYDROCODONE BITARTRATE AND ACETAMINOPHEN 1 TABLET: 10; 325 TABLET ORAL at 22:49

## 2017-03-16 RX ADMIN — HYDROCODONE BITARTRATE AND ACETAMINOPHEN 1 TABLET: 10; 325 TABLET ORAL at 00:08

## 2017-03-16 RX ADMIN — HYDROCODONE BITARTRATE AND ACETAMINOPHEN 1 TABLET: 10; 325 TABLET ORAL at 14:38

## 2017-03-16 RX ADMIN — ATORVASTATIN CALCIUM 80 MG: 40 TABLET, FILM COATED ORAL at 09:13

## 2017-03-16 RX ADMIN — METOPROLOL SUCCINATE 25 MG: 25 TABLET, EXTENDED RELEASE ORAL at 09:13

## 2017-03-16 RX ADMIN — PANTOPRAZOLE SODIUM 40 MG: 40 TABLET, DELAYED RELEASE ORAL at 09:18

## 2017-03-16 RX ADMIN — ALPRAZOLAM 1 MG: 0.5 TABLET ORAL at 00:08

## 2017-03-16 RX ADMIN — FUROSEMIDE 40 MG: 10 INJECTION, SOLUTION INTRAMUSCULAR; INTRAVENOUS at 09:13

## 2017-03-16 RX ADMIN — FUROSEMIDE 40 MG: 10 INJECTION, SOLUTION INTRAMUSCULAR; INTRAVENOUS at 17:36

## 2017-03-16 RX ADMIN — HEPARIN SODIUM AND DEXTROSE 20 UNITS/KG/HR: 5000; 5 INJECTION INTRAVENOUS at 00:08

## 2017-03-16 RX ADMIN — ALPRAZOLAM 1 MG: 0.5 TABLET ORAL at 21:40

## 2017-03-16 RX ADMIN — ALPRAZOLAM 1 MG: 0.5 TABLET ORAL at 14:38

## 2017-03-16 NOTE — PROGRESS NOTES
Problem: Falls - Risk of  Goal: *Absence of falls  Outcome: Progressing Towards Goal  Fall precautions in place. Red socks on. Instructed to only get OOB with assistance. Voiced understanding. Call light in reach. Goal: *Knowledge of fall prevention  Outcome: Progressing Towards Goal  Pt educated on fall prevention and to use call light for assistance. Pt verbalizes understanding and will use call light for assistance. Problem: Gas Exchange - Impaired  Goal: *Absence of hypoxia  Outcome: Progressing Towards Goal  Patient shows no sign of distress. Audible scattered wheezing. Encouraged patient to wear oxygen and take breathing treatments. Will continue to monitor.

## 2017-03-16 NOTE — PROGRESS NOTES
Please send her primary care physician a copy of my consultation note. Verbal and bedside report received from [facility].

## 2017-03-16 NOTE — PROGRESS NOTES
Problem: Falls - Risk of  Goal: *Absence of falls  Outcome: Progressing Towards Goal  Pt progressing towards goal. No falls since admission. Bed low and locked. Call light within reach. Side rails x 2. Gripper socks applied. Personal belongings within reach. Pt verbalizes understanding to call for assistance.          Problem: Gas Exchange - Impaired  Goal: *Absence of hypoxia  Outcome: Not Progressing Towards Goal  Cardiology has consulted Pulmmonology

## 2017-03-16 NOTE — PROGRESS NOTES
3/16/2017 12:48 PM    Admit Date: 3/14/2017    Admit Diagnosis: NSTEMI MI Elevated Troponin;Acute on chronic systolic heart*      Subjective:   No cp- still sob- productive cough present      Objective:      Visit Vitals    /81 (BP 1 Location: Right arm, BP Patient Position: At rest)    Pulse 97    Temp 97.4 °F (36.3 °C)    Resp 18    Ht 5' 11\" (1.803 m)    Wt 81.1 kg (178 lb 14.4 oz)    SpO2 99%    BMI 24.95 kg/m2       Physical Exam:  General-Well Developed, Well Nourished, No Acute Distress, Alert & Oriented x 3, appropriate mood. Neck- supple, no JVD  CV- regular rate and rhythm no MRG  Lung- wheezes bilaterally  Abd- soft, nontender, nondistended  Ext- no edema bilaterally. Skin- warm and dry        Data Review:   Recent Labs      03/16/17   0340   NA  140   K  4.1   BUN  11   CREA  1.13   WBC  3.9*   HGB  10.8*   HCT  33.0*   PLT  183       Assessment/Plan:     Principal Problem:    Acute on chronic systolic heart failure ([facility] (3/14/2017)Improved with current therapy.  Will continue medications    Active Problems:    ICD (implantable cardioverter-defibrillator) in place (4/22/2011)      HTN (hypertension) (11/29/2011)      Non-ischemic cardiomyopathy ([facility] (3/24/2016)      Shortness of breath (3/25/2016)- couggh and wheezing- cannot lie flat in spite of diuresis with increased cr- pulm to see- cxr      Tachycardia (3/14/2017)      Elevated troponin (3/14/2017)- liekly demand ischemia- cath when pulm issues resolved- stop heparin

## 2017-03-16 NOTE — PROGRESS NOTES
Verbal bedside report given to Yumiko Prince oncoming RN. Patient's situation, background, assessment and recommendations provided. Opportunity for questions provided. Oncoming RN assumed care of patient.

## 2017-03-16 NOTE — PROGRESS NOTES
SW attempted to discuss dc plan (rehab choices) with pt and spouse. Pt stated he had \"too much going on\" and didn't want to pursue the conversation further. SW will follow up later.

## 2017-03-16 NOTE — PROGRESS NOTES
Bedside and Verbal shift change report given to Vida Briceño, RN (oncoming nurse) by Kori Rosa RN (offgoing nurse). Report included the following information SBAR, Kardex, Accordion and Recent Results   JESUS castro c/d/iLuis Springer

## 2017-03-16 NOTE — PROGRESS NOTES
Call made to Dr. Tanisha David. Pt states that he needs to go home and wants to sign out AMA secondary to family emergency. Dr Tanisha David notified and stated that if he is on any new medications, Kathya ALLEN can write the RX. At the current time family is in talking to the pt to try to convince him to stay for treatment. Will follow up with family after allowing them time to talk with pt    1450:  Pt has decided to stay . There has been a death in the family but the family supports him staying in the hospital.  Spoke with pt and he is calm and wanting to stay at this time. Have given pt PRN medication for anxiety. 1800:   Pt spoke with RN about his grief concerning the death of his 22year old son in January 2016. His son was murdered. Pt continues to have a flat affect but he states that he is receiving counseling. Pt has family in the room the majority of the time.

## 2017-03-16 NOTE — PROGRESS NOTES
Verbal bedside report received from Laura Saldaña RN. Assumed care of patient. Heparin IV drip verified at bedside with outgoing RN.

## 2017-03-16 NOTE — PROGRESS NOTES
PT note:    Nursing requested to hold PT until this afternoon due to pulmonary consult this AM. Will try back again in afternoon.     Gulshan Underwood, PT

## 2017-03-16 NOTE — PROGRESS NOTES
Occupational Therapy Note;    Charts reviewed. Pt c/o difficulty breathing. RN requested hold therapy until pulmonary consult. Will re-attempt again later if schedule permits.      Thanks,    [place], OTR/L

## 2017-03-16 NOTE — PROGRESS NOTES
PT note:    Discussed with nursing and pt is trying to leave AMA due to family emergency. PT will attempt again as schedule allows.      German Oakes, PT

## 2017-03-17 LAB
ANION GAP BLD CALC-SCNC: 11 MMOL/L (ref 7–16)
APTT PPP: 24 SEC (ref 23.5–31.7)
BASOPHILS # BLD AUTO: 0 K/UL (ref 0–0.2)
BASOPHILS # BLD: 1 % (ref 0–2)
BNP SERPL-MCNC: 58 PG/ML
BUN SERPL-MCNC: 13 MG/DL (ref 6–23)
CALCIUM SERPL-MCNC: 8.6 MG/DL (ref 8.3–10.4)
CHLORIDE SERPL-SCNC: 104 MMOL/L (ref 98–107)
CO2 SERPL-SCNC: 23 MMOL/L (ref 21–32)
CREAT SERPL-MCNC: 1.24 MG/DL (ref 0.8–1.5)
DIFFERENTIAL METHOD BLD: ABNORMAL
EOSINOPHIL # BLD: 0.1 K/UL (ref 0–0.8)
EOSINOPHIL NFR BLD: 2 % (ref 0.5–7.8)
ERYTHROCYTE [DISTWIDTH] IN BLOOD BY AUTOMATED COUNT: 15.6 % (ref 11.9–14.6)
GLUCOSE SERPL-MCNC: 102 MG/DL (ref 65–100)
HCT VFR BLD AUTO: 34.9 % (ref 41.1–50.3)
HGB BLD-MCNC: 11.4 G/DL (ref 13.6–17.2)
IMM GRANULOCYTES # BLD: 0 K/UL (ref 0–0.5)
IMM GRANULOCYTES NFR BLD AUTO: 0 % (ref 0–5)
LYMPHOCYTES # BLD AUTO: 31 % (ref 13–44)
LYMPHOCYTES # BLD: 1 K/UL (ref 0.5–4.6)
MAGNESIUM SERPL-MCNC: 1.8 MG/DL (ref 1.8–2.4)
MCH RBC QN AUTO: 32.3 PG (ref 26.1–32.9)
MCHC RBC AUTO-ENTMCNC: 32.7 G/DL (ref 31.4–35)
MCV RBC AUTO: 98.9 FL (ref 79.6–97.8)
MM INDURATION POC: 0 MM (ref 0–5)
MONOCYTES # BLD: 0.4 K/UL (ref 0.1–1.3)
MONOCYTES NFR BLD AUTO: 11 % (ref 4–12)
NEUTS SEG # BLD: 1.8 K/UL (ref 1.7–8.2)
NEUTS SEG NFR BLD AUTO: 55 % (ref 43–78)
PLATELET # BLD AUTO: 178 K/UL (ref 150–450)
PMV BLD AUTO: 9.8 FL (ref 10.8–14.1)
POTASSIUM SERPL-SCNC: 4.4 MMOL/L (ref 3.5–5.1)
PPD POC: NEGATIVE NEGATIVE
RBC # BLD AUTO: 3.53 M/UL (ref 4.23–5.67)
SODIUM SERPL-SCNC: 138 MMOL/L (ref 136–145)
WBC # BLD AUTO: 3.2 K/UL (ref 4.3–11.1)

## 2017-03-17 PROCEDURE — 94640 AIRWAY INHALATION TREATMENT: CPT

## 2017-03-17 PROCEDURE — 36415 COLL VENOUS BLD VENIPUNCTURE: CPT | Performed by: NURSE PRACTITIONER

## 2017-03-17 PROCEDURE — 74011250637 HC RX REV CODE- 250/637: Performed by: INTERNAL MEDICINE

## 2017-03-17 PROCEDURE — 83735 ASSAY OF MAGNESIUM: CPT | Performed by: NURSE PRACTITIONER

## 2017-03-17 PROCEDURE — 74011250636 HC RX REV CODE- 250/636: Performed by: INTERNAL MEDICINE

## 2017-03-17 PROCEDURE — 74011250636 HC RX REV CODE- 250/636: Performed by: NURSE PRACTITIONER

## 2017-03-17 PROCEDURE — 80048 BASIC METABOLIC PNL TOTAL CA: CPT | Performed by: NURSE PRACTITIONER

## 2017-03-17 PROCEDURE — 74011000250 HC RX REV CODE- 250: Performed by: INTERNAL MEDICINE

## 2017-03-17 PROCEDURE — 99232 SBSQ HOSP IP/OBS MODERATE 35: CPT | Performed by: INTERNAL MEDICINE

## 2017-03-17 PROCEDURE — 85025 COMPLETE CBC W/AUTO DIFF WBC: CPT | Performed by: INTERNAL MEDICINE

## 2017-03-17 PROCEDURE — 85730 THROMBOPLASTIN TIME PARTIAL: CPT | Performed by: INTERNAL MEDICINE

## 2017-03-17 PROCEDURE — 74011000258 HC RX REV CODE- 258: Performed by: INTERNAL MEDICINE

## 2017-03-17 PROCEDURE — 83880 ASSAY OF NATRIURETIC PEPTIDE: CPT | Performed by: INTERNAL MEDICINE

## 2017-03-17 PROCEDURE — 65660000000 HC RM CCU STEPDOWN

## 2017-03-17 PROCEDURE — 94760 N-INVAS EAR/PLS OXIMETRY 1: CPT

## 2017-03-17 PROCEDURE — 74011250637 HC RX REV CODE- 250/637: Performed by: NURSE PRACTITIONER

## 2017-03-17 RX ORDER — GUAIFENESIN 600 MG/1
1200 TABLET, EXTENDED RELEASE ORAL 2 TIMES DAILY
Status: DISCONTINUED | OUTPATIENT
Start: 2017-03-17 | End: 2017-03-25 | Stop reason: HOSPADM

## 2017-03-17 RX ORDER — BUDESONIDE 0.5 MG/2ML
500 INHALANT ORAL
Status: DISCONTINUED | OUTPATIENT
Start: 2017-03-17 | End: 2017-03-22

## 2017-03-17 RX ORDER — LEVALBUTEROL INHALATION SOLUTION 1.25 MG/3ML
1.25 SOLUTION RESPIRATORY (INHALATION)
Status: DISCONTINUED | OUTPATIENT
Start: 2017-03-17 | End: 2017-03-24

## 2017-03-17 RX ADMIN — GUAIFENESIN 1200 MG: 600 TABLET, EXTENDED RELEASE ORAL at 17:35

## 2017-03-17 RX ADMIN — BUDESONIDE 500 MCG: 0.5 SUSPENSION RESPIRATORY (INHALATION) at 19:57

## 2017-03-17 RX ADMIN — FUROSEMIDE 40 MG: 10 INJECTION, SOLUTION INTRAMUSCULAR; INTRAVENOUS at 09:20

## 2017-03-17 RX ADMIN — LEVALBUTEROL HYDROCHLORIDE 1.25 MG: 1.25 SOLUTION RESPIRATORY (INHALATION) at 13:00

## 2017-03-17 RX ADMIN — HYDROCODONE BITARTRATE AND ACETAMINOPHEN 1 TABLET: 10; 325 TABLET ORAL at 10:43

## 2017-03-17 RX ADMIN — BUDESONIDE 500 MCG: 0.5 SUSPENSION RESPIRATORY (INHALATION) at 07:17

## 2017-03-17 RX ADMIN — LEVALBUTEROL HYDROCHLORIDE 1.25 MG: 1.25 SOLUTION RESPIRATORY (INHALATION) at 02:30

## 2017-03-17 RX ADMIN — METHYLPREDNISOLONE SODIUM SUCCINATE 40 MG: 125 INJECTION, POWDER, FOR SOLUTION INTRAMUSCULAR; INTRAVENOUS at 14:01

## 2017-03-17 RX ADMIN — DOXYCYCLINE 100 MG: 100 INJECTION, POWDER, LYOPHILIZED, FOR SOLUTION INTRAVENOUS at 03:09

## 2017-03-17 RX ADMIN — METHYLPREDNISOLONE SODIUM SUCCINATE 40 MG: 125 INJECTION, POWDER, FOR SOLUTION INTRAMUSCULAR; INTRAVENOUS at 21:18

## 2017-03-17 RX ADMIN — LEVALBUTEROL HYDROCHLORIDE 1.25 MG: 1.25 SOLUTION RESPIRATORY (INHALATION) at 19:57

## 2017-03-17 RX ADMIN — DOXYCYCLINE 100 MG: 100 INJECTION, POWDER, LYOPHILIZED, FOR SOLUTION INTRAVENOUS at 14:01

## 2017-03-17 RX ADMIN — LEVALBUTEROL HYDROCHLORIDE 1.25 MG: 1.25 SOLUTION RESPIRATORY (INHALATION) at 07:17

## 2017-03-17 RX ADMIN — ALPRAZOLAM 1 MG: 0.5 TABLET ORAL at 10:43

## 2017-03-17 RX ADMIN — GUAIFENESIN 1200 MG: 600 TABLET, EXTENDED RELEASE ORAL at 03:09

## 2017-03-17 RX ADMIN — PANTOPRAZOLE SODIUM 40 MG: 40 TABLET, DELAYED RELEASE ORAL at 09:20

## 2017-03-17 RX ADMIN — HYDROCODONE BITARTRATE AND ACETAMINOPHEN 1 TABLET: 10; 325 TABLET ORAL at 23:48

## 2017-03-17 RX ADMIN — HYDROCODONE BITARTRATE AND ACETAMINOPHEN 1 TABLET: 10; 325 TABLET ORAL at 17:35

## 2017-03-17 RX ADMIN — ALPRAZOLAM 1 MG: 0.5 TABLET ORAL at 15:32

## 2017-03-17 RX ADMIN — ATORVASTATIN CALCIUM 80 MG: 40 TABLET, FILM COATED ORAL at 09:20

## 2017-03-17 RX ADMIN — FUROSEMIDE 40 MG: 10 INJECTION, SOLUTION INTRAMUSCULAR; INTRAVENOUS at 17:34

## 2017-03-17 RX ADMIN — METOPROLOL SUCCINATE 25 MG: 25 TABLET, EXTENDED RELEASE ORAL at 09:20

## 2017-03-17 RX ADMIN — ALPRAZOLAM 1 MG: 0.5 TABLET ORAL at 23:48

## 2017-03-17 RX ADMIN — GUAIFENESIN 1200 MG: 600 TABLET, EXTENDED RELEASE ORAL at 09:21

## 2017-03-17 NOTE — PROGRESS NOTES
Problem: Falls - Risk of  Goal: *Absence of falls  Outcome: Progressing Towards Goal  Fall precautions in place. Red socks on. Instructed to only get OOB with assistance. Voiced understanding. Call light in reach. Goal: *Knowledge of fall prevention  Outcome: Progressing Towards Goal  Pt educated on fall prevention and to use call light for assistance. Pt verbalizes understanding and will use call light for assistance. Problem: Gas Exchange - Impaired  Goal: *Absence of hypoxia  Outcome: Progressing Towards Goal  Patient shows no sign of distress. Lungs sound course with scattered wheezing. Encouraged patient to use oxygen.

## 2017-03-17 NOTE — PROGRESS NOTES
[facility] CARDIOLOGY PROGRESS NOTE           3/17/2017 10:31 AM    Admit Date: 3/14/2017      Subjective:   Pt up in room, reports continued SOB, orthopnea, audible wheezing. Some right-sided chest pain. Very stressed with son being killed in January. ROS:  Cardiovascular:  As noted above    Objective:      Vitals:    03/17/17 0233 03/17/17 0434 03/17/17 0720 03/17/17 0730   BP:  110/79 110/72    Pulse:  87 94    Resp:  18 18    Temp:  97.5 °F (36.4 °C) 97.7 °F (36.5 °C)    SpO2: 99% 98% 99% 97%   Weight:  81.3 kg (179 lb 3.2 oz)     Height:           Physical Exam:  General-No Acute Distress  Neck- supple, no JVD  CV- regular rate and rhythm no MRG  Lung- wheezing and rhonchi bilaterally  Abd- soft, nontender, nondistended  Ext- no edema bilaterally.   Skin- warm and dry    Data Review:   Recent Labs      03/17/17   0514  03/16/17   0340   NA  138  140   K  4.4  4.1   MG  1.8  2.3   BUN  13  11   CREA  1.24  1.13   GLU  102*  100   WBC  3.2*  3.9*   HGB  11.4*  10.8*   HCT  34.9*  33.0*   PLT  178  183       Assessment/Plan:     Principal Problem:    Acute on chronic systolic heart failure (HCC) (3/14/2017)- EF 15%, continue Toprol XL, IV lasix, consider ACE-I  Active Problems:    ICD (implantable cardioverter-defibrillator) in place (4/22/2011)      HTN (hypertension) (11/29/2011)- controlled, monitor      Non-ischemic cardiomyopathy ([facility] [address] (3/24/2016)      Shortness of breath (3/25/2016)- IV lasix, pulmonary following      Tachycardia (3/14/2017)      Elevated troponin (3/14/2017)- [facility] when he can lay flat      Pablo Gallegos PA-C  3/17/2017 10:31 AM
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
## 2017-03-17 NOTE — PROGRESS NOTES
SW spoke with pt today. Chart review indicates he's not ready for dc yet. However, he wants to go to rehab when the time comes. Declined by RCP. Pt prefers [facility]. Will make referral to [facility] as dc nears.

## 2017-03-17 NOTE — PROGRESS NOTES
Problem: Gas Exchange - Impaired  Goal: *Absence of hypoxia  Outcome: Progressing Towards Goal  Pt has been started on breathing treatments as well as antibiotics.

## 2017-03-17 NOTE — PROGRESS NOTES
Teresita Robles  Admission Date: 3/14/2017             Daily Progress Note: 3/17/2017    The patient's chart is reviewed and the patient is discussed with the staff. 46 y.o.  male seen and evaluated at the request of Dr. Jaren Granados. The patient is known to have chronic systolic congestive heart failure and he has noted increasing dyspnea on exertion and orthopnea. He also had paroxysmal nocturnal dyspnea. He was admitted for recurrent symptoms and worsening shortness of breath. He had a follow-up CT of the chest which did not reveal any acute pathology no pulmonary emboli. The patient has AICD in place. He also noted productive cough with greenish sputum. No definite fever or chills or hemoptysis.  No history of smoking previously or known lung disease    Subjective:     Up to side of the bed, family at bedside   Cough with green sputum  CAMARILLO    Current Facility-Administered Medications   Medication Dose Route Frequency    levalbuterol (XOPENEX) nebulizer soln 1.25 mg/3 mL  1.25 mg Nebulization Q6H RT    budesonide (PULMICORT) 500 mcg/2 ml nebulizer suspension  500 mcg Nebulization BID RT    doxycycline (VIBRAMYCIN) 100 mg in 0.9% sodium chloride (MBP/ADV) 100 mL  100 mg IntraVENous Q12H    guaiFENesin ER (MUCINEX) tablet 1,200 mg  1,200 mg Oral BID    metoprolol succinate (TOPROL-XL) XL tablet 25 mg  25 mg Oral DAILY    ALPRAZolam (XANAX) tablet 1 mg  1 mg Oral TID PRN    atorvastatin (LIPITOR) tablet 80 mg  80 mg Oral DAILY    pantoprazole (PROTONIX) tablet 40 mg  40 mg Oral ACB    HYDROcodone-acetaminophen (NORCO)  mg tablet 1 Tab  1 Tab Oral Q6H PRN    ondansetron (ZOFRAN) injection 4 mg  4 mg IntraVENous Q6H PRN    furosemide (LASIX) injection 40 mg  40 mg IntraVENous BID       Review of Systems  Constitutional: negative for fever, chills, sweats  Cardiovascular: negative for chest pain, palpitations, syncope, edema  Gastrointestinal:  negative for dysphagia, reflux, vomiting, diarrhea, abdominal pain, or melena  Neurologic:  negative for focal weakness, numbness, headache    Objective:     Vitals:    03/17/17 0233 03/17/17 0434 03/17/17 0720 03/17/17 0730   BP:  110/79 110/72    Pulse:  87 94    Resp:  18 18    Temp:  97.5 °F (36.4 °C) 97.7 °F (36.5 °C)    SpO2: 99% 98% 99% 97%   Weight:  179 lb 3.2 oz (81.3 kg)     Height:         Intake and Output:   03/15 1901 - 03/17 0700  In: 1790 [P.O.:1790]  Out: 1200 [Urine:1200]       Physical Exam:   Constitution:  the patient is well developed and in no acute distress, on room air   EENMT:  Sclera clear, pupils equal, oral mucosa moist  Respiratory: wheezing throughout, some rhonchi  Cardiovascular:  RRR without M,G,R  Gastrointestinal: soft and non-tender; with positive bowel sounds. Musculoskeletal: warm without cyanosis. There is no lower leg edema. Skin:  no jaundice or rashes, no open wounds   Neurologic: no gross neuro deficits     Psychiatric:  alert and oriented x 3     CHEST XRAY 3/16/17:       LAB  No results for input(s): GLUCPOC in the last 72 hours. No lab exists for component: [place]   Recent Labs      03/17/17   0514  03/16/17   0340  03/15/17   0521   WBC  3.2*  3.9*  4.5   HGB  11.4*  10.8*  11.8*   HCT  34.9*  33.0*  35.6*   PLT  178  183  178     Recent Labs      03/17/17   0514  03/16/17   0340  03/15/17   0521   NA  138  140  139   K  4.4  4.1  3.2*   CL  104  105  102   CO2  23  20*  23   GLU  102*  100  119*   BUN  13  11  13   CREA  1.24  1.13  1.51*   MG  1.8  2.3  1.4*   CA  8.6  8.0*  7.9*     No results for input(s): PH, PCO2, PO2, HCO3 in the last 72 hours. No results for input(s): LCAD, LAC in the last 72 hours.       Assessment:  (Medical Decision Making)     Hospital Problems  Date Reviewed: 3/17/2017          Codes Class Noted POA    * (Principal)Acute on chronic systolic heart failure ([facility] [address] ICD-10-CM: I50.23  ICD-9-CM: 428.23  3/14/2017 Yes    Dyspnea related to volume overload Tachycardia ICD-10-CM: R00.0  ICD-9-CM: 785.0  3/14/2017 Yes    Improved     Elevated troponin ICD-10-CM: R79.89  ICD-9-CM: 790.6  3/14/2017 Yes    Per cardiology, likely demand ischemia, needs cath     Shortness of breath ICD-10-CM: R06.02  ICD-9-CM: 786.05  3/25/2016 Yes    Ongoing, CAMARILLO     Non-ischemic cardiomyopathy (HCC) (Chronic) ICD-10-CM: I42.9  ICD-9-CM: 425.4  3/24/2016 Yes        HTN (hypertension) (Chronic) ICD-10-CM: I10  ICD-9-CM: 401.9  11/29/2011 Yes    Chronic, sbp ranges     ICD (implantable cardioverter-defibrillator) in place (Chronic) ICD-10-CM: Z95.810  ICD-9-CM: V45.02  4/22/2011 Yes    Unchanged           Plan:  (Medical Decision Making)     --Pulmicort, Xopenex, Mucinex   --Doxycycline day 1   --Continue lasix, unable to determine accurate net output   --Strict I/Os  --Recheck BNP    More than 50% of the time documented was spent in face-to-face contact with the patient and in the care of the patient on the floor/unit where the patient is located. Alonso Schwartz NP          Lungs:  Rhonchi   Heart:  RRR with no Murmur/Rubs/Gallops    Additional Comments:  As above, few doses of solumedrol     I have spoken with and examined the patient. I agree with the above assessment and plan as documented.     Rylan Mcdaniels MD

## 2017-03-17 NOTE — PROGRESS NOTES
Called and spoke with Mau Aguilar concerning pts. Creatinine and IV lasix. Desi Blankenship stated to go ahead and give laisx today.

## 2017-03-17 NOTE — PROGRESS NOTES
Bedside and Verbal shift change report given to KADEN Smith (oncoming nurse) by Lucrecia Padilla RN (offgoing nurse). Report included the following information SBAR, Kardex, Accordion and Recent Results.

## 2017-03-17 NOTE — PROGRESS NOTES
Bedside and Verbal shift change report given to Corin Prince (oncoming nurse) by Altagracia Whittaker RN (offgoing nurse). Report included the following information SBAR, Kardex, MAR and Recent Results.

## 2017-03-17 NOTE — ROUTINE PROCESS
CHF teaching completed to pt/family, Emphasis to report worsening dyspnea, Planner/scale @ home.  Pass post test; 1 hr total

## 2017-03-17 NOTE — PROGRESS NOTES
Verbal bedside report given to melvi Vides RN. Patient's situation, background, assessment and recommendations provided. Opportunity for questions provided. Oncoming RN assumed care of patient.

## 2017-03-18 ENCOUNTER — APPOINTMENT (OUTPATIENT)
Dept: GENERAL RADIOLOGY | Age: 52
DRG: 287 | End: 2017-03-18
Attending: INTERNAL MEDICINE
Payer: COMMERCIAL

## 2017-03-18 PROBLEM — F41.8 ANXIETY ASSOCIATED WITH DEPRESSION: Chronic | Status: ACTIVE | Noted: 2017-03-18

## 2017-03-18 PROBLEM — M54.9 CHRONIC BACK PAIN: Chronic | Status: ACTIVE | Noted: 2017-03-18

## 2017-03-18 PROBLEM — I25.10 CAD (CORONARY ARTERY DISEASE): Status: ACTIVE | Noted: 2017-03-18

## 2017-03-18 PROBLEM — I25.10 CAD (CORONARY ARTERY DISEASE): Chronic | Status: ACTIVE | Noted: 2017-03-18

## 2017-03-18 PROBLEM — G89.29 CHRONIC BACK PAIN: Chronic | Status: ACTIVE | Noted: 2017-03-18

## 2017-03-18 LAB
ANION GAP BLD CALC-SCNC: 13 MMOL/L (ref 7–16)
ATRIAL RATE: 122 BPM
BUN SERPL-MCNC: 17 MG/DL (ref 6–23)
CALCIUM SERPL-MCNC: 8.9 MG/DL (ref 8.3–10.4)
CALCULATED P AXIS, ECG09: 34 DEGREES
CALCULATED R AXIS, ECG10: -21 DEGREES
CALCULATED T AXIS, ECG11: 157 DEGREES
CHLORIDE SERPL-SCNC: 103 MMOL/L (ref 98–107)
CO2 SERPL-SCNC: 22 MMOL/L (ref 21–32)
CREAT SERPL-MCNC: 1.36 MG/DL (ref 0.8–1.5)
DIAGNOSIS, 93000: NORMAL
DIASTOLIC BP, ECG02: NORMAL MMHG
ERYTHROCYTE [DISTWIDTH] IN BLOOD BY AUTOMATED COUNT: 15.2 % (ref 11.9–14.6)
GLUCOSE SERPL-MCNC: 166 MG/DL (ref 65–100)
HCT VFR BLD AUTO: 36.3 % (ref 41.1–50.3)
HGB BLD-MCNC: 12.3 G/DL (ref 13.6–17.2)
MAGNESIUM SERPL-MCNC: 1.6 MG/DL (ref 1.8–2.4)
MCH RBC QN AUTO: 33.1 PG (ref 26.1–32.9)
MCHC RBC AUTO-ENTMCNC: 33.9 G/DL (ref 31.4–35)
MCV RBC AUTO: 97.6 FL (ref 79.6–97.8)
P-R INTERVAL, ECG05: 148 MS
PLATELET # BLD AUTO: 215 K/UL (ref 150–450)
PMV BLD AUTO: 9.7 FL (ref 10.8–14.1)
POTASSIUM SERPL-SCNC: 4.3 MMOL/L (ref 3.5–5.1)
Q-T INTERVAL, ECG07: 320 MS
QRS DURATION, ECG06: 94 MS
QTC CALCULATION (BEZET), ECG08: 456 MS
RBC # BLD AUTO: 3.72 M/UL (ref 4.23–5.67)
SODIUM SERPL-SCNC: 138 MMOL/L (ref 136–145)
SYSTOLIC BP, ECG01: NORMAL MMHG
TROPONIN I SERPL-MCNC: 0.7 NG/ML (ref 0.02–0.05)
TSH SERPL DL<=0.005 MIU/L-ACNC: 0.9 UIU/ML (ref 0.36–3.74)
VENTRICULAR RATE, ECG03: 122 BPM
WBC # BLD AUTO: 5.4 K/UL (ref 4.3–11.1)

## 2017-03-18 PROCEDURE — 93005 ELECTROCARDIOGRAM TRACING: CPT | Performed by: INTERNAL MEDICINE

## 2017-03-18 PROCEDURE — 74011250636 HC RX REV CODE- 250/636: Performed by: NURSE PRACTITIONER

## 2017-03-18 PROCEDURE — 84484 ASSAY OF TROPONIN QUANT: CPT | Performed by: INTERNAL MEDICINE

## 2017-03-18 PROCEDURE — 74011250637 HC RX REV CODE- 250/637: Performed by: INTERNAL MEDICINE

## 2017-03-18 PROCEDURE — 94640 AIRWAY INHALATION TREATMENT: CPT

## 2017-03-18 PROCEDURE — 84443 ASSAY THYROID STIM HORMONE: CPT | Performed by: INTERNAL MEDICINE

## 2017-03-18 PROCEDURE — 94760 N-INVAS EAR/PLS OXIMETRY 1: CPT

## 2017-03-18 PROCEDURE — 65660000000 HC RM CCU STEPDOWN

## 2017-03-18 PROCEDURE — 99232 SBSQ HOSP IP/OBS MODERATE 35: CPT | Performed by: INTERNAL MEDICINE

## 2017-03-18 PROCEDURE — 74011250636 HC RX REV CODE- 250/636: Performed by: INTERNAL MEDICINE

## 2017-03-18 PROCEDURE — 74011000258 HC RX REV CODE- 258: Performed by: INTERNAL MEDICINE

## 2017-03-18 PROCEDURE — 83735 ASSAY OF MAGNESIUM: CPT | Performed by: NURSE PRACTITIONER

## 2017-03-18 PROCEDURE — 74011000250 HC RX REV CODE- 250: Performed by: INTERNAL MEDICINE

## 2017-03-18 PROCEDURE — 73030 X-RAY EXAM OF SHOULDER: CPT

## 2017-03-18 PROCEDURE — 77010033678 HC OXYGEN DAILY

## 2017-03-18 PROCEDURE — 74011250637 HC RX REV CODE- 250/637: Performed by: NURSE PRACTITIONER

## 2017-03-18 PROCEDURE — 85027 COMPLETE CBC AUTOMATED: CPT | Performed by: NURSE PRACTITIONER

## 2017-03-18 PROCEDURE — 36415 COLL VENOUS BLD VENIPUNCTURE: CPT | Performed by: NURSE PRACTITIONER

## 2017-03-18 PROCEDURE — 80048 BASIC METABOLIC PNL TOTAL CA: CPT | Performed by: NURSE PRACTITIONER

## 2017-03-18 RX ORDER — HEPARIN SODIUM 5000 [USP'U]/ML
5000 INJECTION, SOLUTION INTRAVENOUS; SUBCUTANEOUS EVERY 12 HOURS
Status: DISCONTINUED | OUTPATIENT
Start: 2017-03-18 | End: 2017-03-25 | Stop reason: HOSPADM

## 2017-03-18 RX ORDER — MORPHINE SULFATE 2 MG/ML
2 INJECTION, SOLUTION INTRAMUSCULAR; INTRAVENOUS
Status: DISCONTINUED | OUTPATIENT
Start: 2017-03-18 | End: 2017-03-22

## 2017-03-18 RX ORDER — NITROGLYCERIN 0.4 MG/1
0.4 TABLET SUBLINGUAL AS NEEDED
Status: DISCONTINUED | OUTPATIENT
Start: 2017-03-18 | End: 2017-03-25 | Stop reason: HOSPADM

## 2017-03-18 RX ORDER — GUAIFENESIN 100 MG/5ML
81 LIQUID (ML) ORAL DAILY
Status: DISCONTINUED | OUTPATIENT
Start: 2017-03-18 | End: 2017-03-25 | Stop reason: HOSPADM

## 2017-03-18 RX ORDER — HYDRALAZINE HYDROCHLORIDE 25 MG/1
25 TABLET, FILM COATED ORAL 2 TIMES DAILY
Status: DISCONTINUED | OUTPATIENT
Start: 2017-03-18 | End: 2017-03-19

## 2017-03-18 RX ADMIN — LEVALBUTEROL HYDROCHLORIDE 1.25 MG: 1.25 SOLUTION RESPIRATORY (INHALATION) at 08:37

## 2017-03-18 RX ADMIN — METHYLPREDNISOLONE SODIUM SUCCINATE 40 MG: 40 INJECTION, POWDER, FOR SOLUTION INTRAMUSCULAR; INTRAVENOUS at 15:29

## 2017-03-18 RX ADMIN — HYDROCODONE BITARTRATE AND ACETAMINOPHEN 1 TABLET: 10; 325 TABLET ORAL at 06:30

## 2017-03-18 RX ADMIN — LEVALBUTEROL HYDROCHLORIDE 1.25 MG: 1.25 SOLUTION RESPIRATORY (INHALATION) at 14:58

## 2017-03-18 RX ADMIN — ALPRAZOLAM 1 MG: 0.5 TABLET ORAL at 12:43

## 2017-03-18 RX ADMIN — HEPARIN SODIUM 5000 UNITS: 5000 INJECTION, SOLUTION INTRAVENOUS; SUBCUTANEOUS at 22:55

## 2017-03-18 RX ADMIN — LEVALBUTEROL HYDROCHLORIDE 1.25 MG: 1.25 SOLUTION RESPIRATORY (INHALATION) at 03:41

## 2017-03-18 RX ADMIN — LEVALBUTEROL HYDROCHLORIDE 1.25 MG: 1.25 SOLUTION RESPIRATORY (INHALATION) at 20:34

## 2017-03-18 RX ADMIN — Medication 2 MG: at 16:41

## 2017-03-18 RX ADMIN — GUAIFENESIN 1200 MG: 600 TABLET, EXTENDED RELEASE ORAL at 10:46

## 2017-03-18 RX ADMIN — HYDRALAZINE HYDROCHLORIDE 25 MG: 25 TABLET, FILM COATED ORAL at 10:46

## 2017-03-18 RX ADMIN — ONDANSETRON 4 MG: 2 INJECTION INTRAMUSCULAR; INTRAVENOUS at 09:53

## 2017-03-18 RX ADMIN — HYDROCODONE BITARTRATE AND ACETAMINOPHEN 1 TABLET: 10; 325 TABLET ORAL at 18:40

## 2017-03-18 RX ADMIN — NITROGLYCERIN 0.4 MG: 0.4 TABLET SUBLINGUAL at 03:58

## 2017-03-18 RX ADMIN — HEPARIN SODIUM 5000 UNITS: 5000 INJECTION, SOLUTION INTRAVENOUS; SUBCUTANEOUS at 10:49

## 2017-03-18 RX ADMIN — Medication 2 MG: at 09:48

## 2017-03-18 RX ADMIN — METHYLPREDNISOLONE SODIUM SUCCINATE 40 MG: 40 INJECTION, POWDER, FOR SOLUTION INTRAMUSCULAR; INTRAVENOUS at 19:59

## 2017-03-18 RX ADMIN — HYDROCODONE BITARTRATE AND ACETAMINOPHEN 1 TABLET: 10; 325 TABLET ORAL at 12:43

## 2017-03-18 RX ADMIN — BUDESONIDE 500 MCG: 0.5 SUSPENSION RESPIRATORY (INHALATION) at 20:34

## 2017-03-18 RX ADMIN — HYDRALAZINE HYDROCHLORIDE 25 MG: 25 TABLET, FILM COATED ORAL at 17:29

## 2017-03-18 RX ADMIN — Medication 2 MG: at 22:55

## 2017-03-18 RX ADMIN — PANTOPRAZOLE SODIUM 40 MG: 40 TABLET, DELAYED RELEASE ORAL at 06:30

## 2017-03-18 RX ADMIN — ALPRAZOLAM 1 MG: 0.5 TABLET ORAL at 06:30

## 2017-03-18 RX ADMIN — GUAIFENESIN 1200 MG: 600 TABLET, EXTENDED RELEASE ORAL at 17:29

## 2017-03-18 RX ADMIN — ASPIRIN 81 MG: 81 TABLET, CHEWABLE ORAL at 10:47

## 2017-03-18 RX ADMIN — ATORVASTATIN CALCIUM 80 MG: 40 TABLET, FILM COATED ORAL at 10:46

## 2017-03-18 RX ADMIN — DOXYCYCLINE 100 MG: 100 INJECTION, POWDER, LYOPHILIZED, FOR SOLUTION INTRAVENOUS at 15:30

## 2017-03-18 RX ADMIN — ALPRAZOLAM 1 MG: 0.5 TABLET ORAL at 22:56

## 2017-03-18 RX ADMIN — DOXYCYCLINE 100 MG: 100 INJECTION, POWDER, LYOPHILIZED, FOR SOLUTION INTRAVENOUS at 03:17

## 2017-03-18 RX ADMIN — FUROSEMIDE 40 MG: 10 INJECTION, SOLUTION INTRAMUSCULAR; INTRAVENOUS at 10:02

## 2017-03-18 RX ADMIN — BUDESONIDE 500 MCG: 0.5 SUSPENSION RESPIRATORY (INHALATION) at 08:36

## 2017-03-18 RX ADMIN — METOPROLOL SUCCINATE 25 MG: 25 TABLET, EXTENDED RELEASE ORAL at 10:46

## 2017-03-18 NOTE — PROGRESS NOTES
Bedside and Verbal shift change report given to self (oncoming nurse) by Ama Pope (offgoing nurse). Report included the following information SBAR, Kardex, MAR and Recent Results.

## 2017-03-18 NOTE — PROGRESS NOTES
MEWS score noted to be 3. Charge nurse, Sil Bhandari RN informed and assessed patient. High MEWS score noted due to HR. Vitals signs to be completed every 2 hours. Will continue to monitor.

## 2017-03-18 NOTE — PROGRESS NOTES
Demetra Maguire  Admission Date: 3/14/2017             Daily Progress Note: 3/18/2017    The patient's chart is reviewed and the patient is discussed with the staff. 45 yo male with a history of chronic back pain, anxiety, depression, non-obstructive CAD, HTN, HL, chronic sCHF, non-ischemic cardiomyopathy s/p ICD placement (EF <20%). Son traumatically killed in January. Presented with increase HR, sob, and cough. He admitted to non-compliance with medications. CT chest negative for PE. Cardiology admitted with acute on chronic sCHF with demand ischemia. [facility] planned with patient able to lie flat. We were consulted for sob. Patient was started on nebs, mucinex, and short course of abx.        multifactorial in nature and likely related to CHF, reactive airway disease and ineffective mucus clearance     Subjective:     Currently on RA with O2 sat 99%. Ongoing sob and cough but only able to get up small amounts of green mucus - not able to effectively clear secretions. Had CP last night alleviated with NTG.       Current Facility-Administered Medications   Medication Dose Route Frequency    nitroglycerin (NITROSTAT) tablet 0.4 mg  0.4 mg SubLINGual PRN    morphine injection 2 mg  2 mg IntraVENous Q6H PRN    hydrALAZINE (APRESOLINE) tablet 25 mg  25 mg Oral BID    aspirin chewable tablet 81 mg  81 mg Oral DAILY    heparin (porcine) injection 5,000 Units  5,000 Units SubCUTAneous Q12H    levalbuterol (XOPENEX) nebulizer soln 1.25 mg/3 mL  1.25 mg Nebulization Q6H RT    budesonide (PULMICORT) 500 mcg/2 ml nebulizer suspension  500 mcg Nebulization BID RT    doxycycline (VIBRAMYCIN) 100 mg in 0.9% sodium chloride (MBP/ADV) 100 mL  100 mg IntraVENous Q12H    guaiFENesin ER (MUCINEX) tablet 1,200 mg  1,200 mg Oral BID    metoprolol succinate (TOPROL-XL) XL tablet 25 mg  25 mg Oral DAILY    ALPRAZolam (XANAX) tablet 1 mg  1 mg Oral TID PRN    atorvastatin (LIPITOR) tablet 80 mg  80 mg Oral DAILY    pantoprazole (PROTONIX) tablet 40 mg  40 mg Oral ACB    HYDROcodone-acetaminophen (NORCO)  mg tablet 1 Tab  1 Tab Oral Q6H PRN    ondansetron (ZOFRAN) injection 4 mg  4 mg IntraVENous Q6H PRN       Review of Systems  Constitutional: negative for fever, chills, sweats  Cardiovascular: negative for chest pain - had CP last night, palpitations, syncope, edema  Gastrointestinal:  negative for dysphagia, reflux, vomiting, diarrhea, abdominal pain, or melena  Neurologic:  negative for focal weakness, numbness, headache    Objective:     Vitals:    03/18/17 0549 03/18/17 0628 03/18/17 0745 03/18/17 0840   BP:  126/86 (!) 129/91    Pulse:   (!) 111    Resp:  20 17    Temp:   98.2 °F (36.8 °C)    SpO2:   94% 99%   Weight: 179 lb 9.6 oz (81.5 kg)      Height: 5' 11\" (1.803 m)        Intake and Output:   03/16 1901 - 03/18 0700  In: 3060 [P.O.:3060]  Out: 1800 [Urine:1800]  03/18 0701 - 03/18 1900  In: -   Out: 500 [Urine:500]    Physical Exam:   Constitution:  the patient is well developed and in no acute distress  EENMT:  Sclera clear, pupils equal, oral mucosa moist  Respiratory: rhonchi throughout, RA  Cardiovascular:  RRR without M,G,R  Gastrointestinal: soft and non-tender; with positive bowel sounds. Musculoskeletal: warm without cyanosis. There is no lower leg edema.   Skin:  no jaundice or rashes, no open wounds   Neurologic: no gross neuro deficits     Psychiatric:  alert and oriented x 3    CHEST XRAY:   3/16      LAB   Recent Labs      03/18/17   0415  03/17/17   0514  03/16/17   0340   WBC  5.4  3.2*  3.9*   HGB  12.3*  11.4*  10.8*   HCT  36.3*  34.9*  33.0*   PLT  215  178  183     Recent Labs      03/18/17   0415  03/17/17   0514  03/16/17   0340   NA  138  138  140   K  4.3  4.4  4.1   CL  103  104  105   CO2  22  23  20*   GLU  166*  102*  100   BUN  17  13  11   CREA  1.36  1.24  1.13   MG  1.6*  1.8  2.3   CA  8.9  8.6  8.0*       Assessment:  (Medical Decision Making)     Hospital Problems  Date Reviewed: 3/18/2017          Codes Class Noted POA    * (Principal)Acute on chronic systolic heart failure ([facility] ICD-10-CM: I50.23  ICD-9-CM: 428.23  3/14/2017 Yes    Management per Cardiology  Planning for [facility] if patient is able to lie flat on Monday      Tachycardia ICD-10-CM: R00.0  ICD-9-CM: 785.0  3/14/2017 Yes    HR 110s this am      Elevated troponin ICD-10-CM: R79.89  ICD-9-CM: 790.6  3/14/2017 Yes    Plan for [facility] on Monday      Shortness of breath ICD-10-CM: R06.02  ICD-9-CM: 786.05  3/25/2016 Yes        Non-ischemic cardiomyopathy (HCC) (Chronic) ICD-10-CM: I42.9  ICD-9-CM: 425.4  3/24/2016 Yes    EF <20%  ICD      HTN (hypertension) (Chronic) ICD-10-CM: I10  ICD-9-CM: 401.9  11/29/2011 Yes    Remains elevated at times      ICD (implantable cardioverter-defibrillator) in place (Chronic) ICD-10-CM: Z95.810  ICD-9-CM: V45.02  4/22/2011 Yes            Creat 1.36    BNP 58    Plan:  (Medical Decision Making)     --pulmicort / xopenex  --doxy 3   WBC 5.4  --mucinex  --not able to expectorate secretions. May need to consider bronch so that patient is able to tolerate lying flat for procedure Monday  --LHC on Monday    More than 50% of the time documented was spent in face-to-face contact with the patient and in the care of the patient on the floor/unit where the patient is located. Nazia Staff, NP          The patient has been seen and examined by me personally, the chart,labs, and radiographic studies have been reviewed. Chest: bilateral wheezing  Extremities: 1+ edema    I agree with the above assessment and plan. He may have underlying asthma or RADS- although this is all likely cardiac asthma- will add a trial of systemic steroid and see if this will help.   Discussed with patient    Phoenix Winkler MD.

## 2017-03-18 NOTE — PROGRESS NOTES
Bedside and Verbal shift change report given to Rafael Beltre RN (oncoming nurse) by Bang Ruff RN (offgoing nurse). Report included the following information SBAR, Kardex, MAR and Recent Results.

## 2017-03-18 NOTE — PROGRESS NOTES
Bedside and Verbal shift change report received from [address], [address] [facility]

## 2017-03-18 NOTE — PROGRESS NOTES
Pt c/o severe headache, 10/10 that is all around head. States it began after getting nitroglycerin tablet overnight. Pupils equal and reactive. R eyelid slightly drooped; smile even,  weak but equal, no arm drift. Dr. Yajaira Collier notified, orders for IV morphine and states he will assess patient shortly.

## 2017-03-18 NOTE — PROGRESS NOTES
Pt reports chest pain 8/10 and SOB. /108 's, O2 sat 100%. 2L nasal cannula. Contacted respiratory therapist for PRN breathing treatment due to expiratory wheezing in all lobes. One sublingual nitroglycerin administered. Pt reports chest pain 7/10, declines further nitroglycerin. Pt declines further pain medication and interventions. Will continue to assess.

## 2017-03-18 NOTE — PROGRESS NOTES
[facility] CARDIOLOGY PROGRESS NOTE           3/18/2017 10:03 AM    Admit Date: 3/14/2017      Subjective:   Had some CP again last night, better with SL NTG, has HA now. No edema. Still on IV lasix. EF < 20% on the echo. He was not taking meds for a week before the admission, but compliance overall seems better. ROS:  Cardiovascular:  As noted above    Objective:      Vitals:    03/18/17 0549 03/18/17 0628 03/18/17 0745 03/18/17 0840   BP:  126/86 (!) 129/91    Pulse:   (!) 111    Resp:  20 17    Temp:   98.2 °F (36.8 °C)    SpO2:   94% 99%   Weight: 81.5 kg (179 lb 9.6 oz)      Height: 5' 11\" (1.803 m)          Physical Exam:  General-No Acute Distress  Neck- supple, no JVD  CV- regular rate and rhythm no MRG  Lung- clear bilaterally  Abd- soft, nontender, nondistended  Ext- no edema bilaterally. Skin- warm and dry    Data Review:   Recent Labs      03/18/17   0415  03/17/17   0514   NA  138  138   K  4.3  4.4   MG  1.6*  1.8   BUN  17  13   CREA  1.36  1.24   GLU  166*  102*   WBC  5.4  3.2*   HGB  12.3*  11.4*   HCT  36.3*  34.9*   PLT  215  178       Assessment/Plan:     Principal Problem:    Acute on chronic systolic heart failure ([facility] [address] (3/14/2017): better, stop IV lasix. Replace Mg, check labs with BNP in the AM.  Check TSH. Add PO BID hyd for more effective afterload reduction. Remain on toprol    Active Problems:    ICD (implantable cardioverter-defibrillator) in place (4/22/2011): recent device check reviewed, follow for now      HTN (hypertension) (11/29/2011): meds adjusted as above, follow      Non-ischemic cardiomyopathy ([facility] (3/24/2016): severe. As above      Shortness of breath (3/25/2016)      Tachycardia (3/14/2017)      Elevated troponin (3/14/2017): ongoing CP, better with NTG. May plan for [facility] on Monday. Add ASA today. Check another trop, follow trend. Possible supply demand ischemia. Follow BP. Remain on statin, ASA and BB.           Alonzo Hurst,   3/18/2017 10:03 AM

## 2017-03-19 LAB
ANION GAP BLD CALC-SCNC: 10 MMOL/L (ref 7–16)
BNP SERPL-MCNC: 73 PG/ML
BUN SERPL-MCNC: 20 MG/DL (ref 6–23)
CALCIUM SERPL-MCNC: 8.7 MG/DL (ref 8.3–10.4)
CHLORIDE SERPL-SCNC: 105 MMOL/L (ref 98–107)
CO2 SERPL-SCNC: 26 MMOL/L (ref 21–32)
CREAT SERPL-MCNC: 1.14 MG/DL (ref 0.8–1.5)
ERYTHROCYTE [DISTWIDTH] IN BLOOD BY AUTOMATED COUNT: 15.5 % (ref 11.9–14.6)
GLUCOSE SERPL-MCNC: 138 MG/DL (ref 65–100)
HCT VFR BLD AUTO: 34.2 % (ref 41.1–50.3)
HGB BLD-MCNC: 11.4 G/DL (ref 13.6–17.2)
MAGNESIUM SERPL-MCNC: 1.5 MG/DL (ref 1.8–2.4)
MCH RBC QN AUTO: 32.7 PG (ref 26.1–32.9)
MCHC RBC AUTO-ENTMCNC: 33.3 G/DL (ref 31.4–35)
MCV RBC AUTO: 98 FL (ref 79.6–97.8)
PLATELET # BLD AUTO: 258 K/UL (ref 150–450)
PMV BLD AUTO: 9.8 FL (ref 10.8–14.1)
POTASSIUM SERPL-SCNC: 4.2 MMOL/L (ref 3.5–5.1)
RBC # BLD AUTO: 3.49 M/UL (ref 4.23–5.67)
SODIUM SERPL-SCNC: 141 MMOL/L (ref 136–145)
WBC # BLD AUTO: 8.9 K/UL (ref 4.3–11.1)

## 2017-03-19 PROCEDURE — 85027 COMPLETE CBC AUTOMATED: CPT | Performed by: NURSE PRACTITIONER

## 2017-03-19 PROCEDURE — 65660000000 HC RM CCU STEPDOWN

## 2017-03-19 PROCEDURE — 83880 ASSAY OF NATRIURETIC PEPTIDE: CPT | Performed by: INTERNAL MEDICINE

## 2017-03-19 PROCEDURE — 94640 AIRWAY INHALATION TREATMENT: CPT

## 2017-03-19 PROCEDURE — 74011000258 HC RX REV CODE- 258: Performed by: INTERNAL MEDICINE

## 2017-03-19 PROCEDURE — 74011250637 HC RX REV CODE- 250/637: Performed by: NURSE PRACTITIONER

## 2017-03-19 PROCEDURE — 36415 COLL VENOUS BLD VENIPUNCTURE: CPT | Performed by: NURSE PRACTITIONER

## 2017-03-19 PROCEDURE — 74011250636 HC RX REV CODE- 250/636: Performed by: INTERNAL MEDICINE

## 2017-03-19 PROCEDURE — 74011000250 HC RX REV CODE- 250: Performed by: INTERNAL MEDICINE

## 2017-03-19 PROCEDURE — 74011250637 HC RX REV CODE- 250/637: Performed by: INTERNAL MEDICINE

## 2017-03-19 PROCEDURE — 99232 SBSQ HOSP IP/OBS MODERATE 35: CPT | Performed by: INTERNAL MEDICINE

## 2017-03-19 PROCEDURE — 94760 N-INVAS EAR/PLS OXIMETRY 1: CPT

## 2017-03-19 PROCEDURE — 80048 BASIC METABOLIC PNL TOTAL CA: CPT | Performed by: NURSE PRACTITIONER

## 2017-03-19 PROCEDURE — 83735 ASSAY OF MAGNESIUM: CPT | Performed by: NURSE PRACTITIONER

## 2017-03-19 PROCEDURE — 74011250636 HC RX REV CODE- 250/636: Performed by: NURSE PRACTITIONER

## 2017-03-19 RX ORDER — MAGNESIUM SULFATE HEPTAHYDRATE 40 MG/ML
2 INJECTION, SOLUTION INTRAVENOUS ONCE
Status: COMPLETED | OUTPATIENT
Start: 2017-03-19 | End: 2017-03-22

## 2017-03-19 RX ORDER — HYDROCODONE BITARTRATE AND ACETAMINOPHEN 10; 325 MG/1; MG/1
1 TABLET ORAL EVERY 4 HOURS
Status: DISCONTINUED | OUTPATIENT
Start: 2017-03-19 | End: 2017-03-25 | Stop reason: HOSPADM

## 2017-03-19 RX ORDER — HYDRALAZINE HYDROCHLORIDE 50 MG/1
50 TABLET, FILM COATED ORAL 2 TIMES DAILY
Status: DISCONTINUED | OUTPATIENT
Start: 2017-03-19 | End: 2017-03-20

## 2017-03-19 RX ADMIN — ALPRAZOLAM 1 MG: 0.5 TABLET ORAL at 23:38

## 2017-03-19 RX ADMIN — BUDESONIDE 500 MCG: 0.5 SUSPENSION RESPIRATORY (INHALATION) at 20:23

## 2017-03-19 RX ADMIN — Medication 2 MG: at 17:48

## 2017-03-19 RX ADMIN — LEVALBUTEROL HYDROCHLORIDE 1.25 MG: 1.25 SOLUTION RESPIRATORY (INHALATION) at 07:44

## 2017-03-19 RX ADMIN — HYDRALAZINE HYDROCHLORIDE 50 MG: 50 TABLET, FILM COATED ORAL at 17:03

## 2017-03-19 RX ADMIN — HEPARIN SODIUM 5000 UNITS: 5000 INJECTION, SOLUTION INTRAVENOUS; SUBCUTANEOUS at 11:14

## 2017-03-19 RX ADMIN — METHYLPREDNISOLONE SODIUM SUCCINATE 40 MG: 40 INJECTION, POWDER, FOR SOLUTION INTRAMUSCULAR; INTRAVENOUS at 23:38

## 2017-03-19 RX ADMIN — LEVALBUTEROL HYDROCHLORIDE 1.25 MG: 1.25 SOLUTION RESPIRATORY (INHALATION) at 13:39

## 2017-03-19 RX ADMIN — HYDROCODONE BITARTRATE AND ACETAMINOPHEN 1 TABLET: 10; 325 TABLET ORAL at 08:36

## 2017-03-19 RX ADMIN — GUAIFENESIN 1200 MG: 600 TABLET, EXTENDED RELEASE ORAL at 17:03

## 2017-03-19 RX ADMIN — ASPIRIN 81 MG: 81 TABLET, CHEWABLE ORAL at 08:36

## 2017-03-19 RX ADMIN — DOXYCYCLINE 100 MG: 100 INJECTION, POWDER, LYOPHILIZED, FOR SOLUTION INTRAVENOUS at 14:28

## 2017-03-19 RX ADMIN — MAGNESIUM SULFATE HEPTAHYDRATE 2 G: 40 INJECTION, SOLUTION INTRAVENOUS at 15:43

## 2017-03-19 RX ADMIN — HEPARIN SODIUM 5000 UNITS: 5000 INJECTION, SOLUTION INTRAVENOUS; SUBCUTANEOUS at 22:24

## 2017-03-19 RX ADMIN — GUAIFENESIN 1200 MG: 600 TABLET, EXTENDED RELEASE ORAL at 08:37

## 2017-03-19 RX ADMIN — BUDESONIDE 500 MCG: 0.5 SUSPENSION RESPIRATORY (INHALATION) at 07:44

## 2017-03-19 RX ADMIN — METHYLPREDNISOLONE SODIUM SUCCINATE 40 MG: 40 INJECTION, POWDER, FOR SOLUTION INTRAMUSCULAR; INTRAVENOUS at 05:48

## 2017-03-19 RX ADMIN — Medication 2 MG: at 11:15

## 2017-03-19 RX ADMIN — HYDROCODONE BITARTRATE AND ACETAMINOPHEN 1 TABLET: 10; 325 TABLET ORAL at 16:30

## 2017-03-19 RX ADMIN — METOPROLOL SUCCINATE 25 MG: 25 TABLET, EXTENDED RELEASE ORAL at 08:36

## 2017-03-19 RX ADMIN — METHYLPREDNISOLONE SODIUM SUCCINATE 40 MG: 40 INJECTION, POWDER, FOR SOLUTION INTRAMUSCULAR; INTRAVENOUS at 00:40

## 2017-03-19 RX ADMIN — HYDROCODONE BITARTRATE AND ACETAMINOPHEN 1 TABLET: 10; 325 TABLET ORAL at 20:04

## 2017-03-19 RX ADMIN — Medication 2 MG: at 23:48

## 2017-03-19 RX ADMIN — ALPRAZOLAM 1 MG: 0.5 TABLET ORAL at 08:36

## 2017-03-19 RX ADMIN — DOXYCYCLINE 100 MG: 100 INJECTION, POWDER, LYOPHILIZED, FOR SOLUTION INTRAVENOUS at 03:07

## 2017-03-19 RX ADMIN — PANTOPRAZOLE SODIUM 40 MG: 40 TABLET, DELAYED RELEASE ORAL at 08:36

## 2017-03-19 RX ADMIN — ATORVASTATIN CALCIUM 80 MG: 40 TABLET, FILM COATED ORAL at 08:37

## 2017-03-19 RX ADMIN — METHYLPREDNISOLONE SODIUM SUCCINATE 40 MG: 40 INJECTION, POWDER, FOR SOLUTION INTRAMUSCULAR; INTRAVENOUS at 11:13

## 2017-03-19 RX ADMIN — METHYLPREDNISOLONE SODIUM SUCCINATE 40 MG: 40 INJECTION, POWDER, FOR SOLUTION INTRAMUSCULAR; INTRAVENOUS at 17:03

## 2017-03-19 RX ADMIN — HYDRALAZINE HYDROCHLORIDE 25 MG: 25 TABLET, FILM COATED ORAL at 08:36

## 2017-03-19 RX ADMIN — MAGNESIUM SULFATE HEPTAHYDRATE 2 G: 40 INJECTION, SOLUTION INTRAVENOUS at 14:28

## 2017-03-19 RX ADMIN — HYDROCODONE BITARTRATE AND ACETAMINOPHEN 1 TABLET: 10; 325 TABLET ORAL at 12:35

## 2017-03-19 RX ADMIN — HYDROCODONE BITARTRATE AND ACETAMINOPHEN 1 TABLET: 10; 325 TABLET ORAL at 23:39

## 2017-03-19 RX ADMIN — ALPRAZOLAM 1 MG: 0.5 TABLET ORAL at 16:30

## 2017-03-19 RX ADMIN — LEVALBUTEROL HYDROCHLORIDE 1.25 MG: 1.25 SOLUTION RESPIRATORY (INHALATION) at 20:23

## 2017-03-19 NOTE — PROGRESS NOTES
Bedside and Verbal shift change report given to [facility], [address]

## 2017-03-19 NOTE — PROGRESS NOTES
[facility] CARDIOLOGY PROGRESS NOTE           3/19/2017 12:54 PM    Admit Date: 3/14/2017      Subjective:   Up in chair, states \"unable to lay flat\". + CP chronic better right now 6/10, + SOB as well. BNP 73 and TSH OK. Conner troponin yesterday and trending down . 70. ROS:  Cardiovascular:  As noted above    Objective:      Vitals:    03/19/17 0545 03/19/17 0747 03/19/17 0816 03/19/17 1204   BP: 106/71  131/88 (!) 140/110   Pulse: 94  96 93   Resp: 20 18 19   Temp: 98 °F (36.7 °C)  97 °F (36.1 °C) 97 °F (36.1 °C)   SpO2: 92% 99% 97% 96%   Weight: 82.1 kg (181 lb)      Height: 5' 11\" (1.803 m)          Physical Exam:  General-No Acute Distress  Neck- supple, no JVD  CV- regular rate and rhythm no MRG  Lung- upper airway rhonchi with exp wheezes   Abd- soft, nontender, nondistended  Ext- no edema bilaterally. Skin- warm and dry    Data Review:   Recent Labs      03/19/17   0513  03/18/17   1125  03/18/17   0415   NA  141   --   138   K  4.2   --   4.3   MG  1.5*   --   1.6*   BUN  20   --   17   CREA  1.14   --   1.36   GLU  138*   --   166*   WBC  8.9   --   5.4   HGB  11.4*   --   12.3*   HCT  34.2*   --   36.3*   PLT  258   --   215   TROIQ   --   0.70*   --        Assessment/Plan:     Principal Problem:  Acute on chronic systolic heart failure ([facility] [address] (3/14/2017): improved;  stop IV lasix yesterday and BNP today 73. Replace Mg. Continue asa, statin, hydralazine, and Toprol.      Active Problems:  ICD (implantable cardioverter-defibrillator) in place (4/22/2011): recent device check reviewed, follow for now     HTN (hypertension) (11/29/2011): meds adjusted as above, follow     Non-ischemic cardiomyopathy ([facility] [address] (3/24/2016): severe. As above     Shortness of breath (3/25/2016)     Tachycardia (3/14/2017)     Elevated troponin (3/14/2017): ongoing CP, better this am. Will continue asa, statin, hyralazine and BB. Reviewed Pul note and may need bronch.  Will likely need LHC when pulmonary issues resolve and patient can lay flat. Troponins trending downward. Possible supply demand ischemia. Follow BP.      HypoMg+-- replace today and crystal in am        Angelina Moreau NP  3/19/2017 12:54 PM

## 2017-03-19 NOTE — PROGRESS NOTES
Pt with audible wheezing when in room talking to patient. Lung sounds course with scattered wheezing. Received breathing treatment as ordered. Wheezing not audible when patient is asleep or at rest without staff present.

## 2017-03-19 NOTE — PROGRESS NOTES
Verbal bedside report given to oncoming RNKrystian. Patient's situation, background, assessment and recommendations provided. Opportunity for questions provided. Oncoming RN assumed care of patient.

## 2017-03-19 NOTE — PROGRESS NOTES
has known patient for many years. Patient having multiple struggles with physical, emotional and spiritual needs. Patient shared some personal details as a friend. Prayed with him and will continue to follow thru admission.   Signed by chaplain Kendra

## 2017-03-19 NOTE — PROGRESS NOTES
Vilma Love  Admission Date: 3/14/2017             Daily Progress Note: 3/19/2017    The patient's chart is reviewed and the patient is discussed with the staff. 47 yo male with a history of chronic back pain, anxiety, depression, non-obstructive CAD, HTN, HL, chronic sCHF, non-ischemic cardiomyopathy s/p ICD placement (EF <20%). Son traumatically killed in January. Presented with increase HR, sob, and cough. He admitted to non-compliance with medications. CT chest negative for PE. Cardiology admitted with acute on chronic sCHF with demand ischemia. [facility] planned with patient able to lie flat. We were consulted for sob. Patient was started on nebs, mucinex, and short course of abx. Subjective:     Currently on RA with O2 sat 97%. Ongoing sob, wheeze, and cough but only able to get up small amounts of green mucus - not able to effectively clear secretions. Ongoing chest pain but NTG gave him severe HA.         Current Facility-Administered Medications   Medication Dose Route Frequency    nitroglycerin (NITROSTAT) tablet 0.4 mg  0.4 mg SubLINGual PRN    morphine injection 2 mg  2 mg IntraVENous Q6H PRN    hydrALAZINE (APRESOLINE) tablet 25 mg  25 mg Oral BID    aspirin chewable tablet 81 mg  81 mg Oral DAILY    heparin (porcine) injection 5,000 Units  5,000 Units SubCUTAneous Q12H    methylPREDNISolone (PF) (SOLU-MEDROL) injection 40 mg  40 mg IntraVENous Q6H    levalbuterol (XOPENEX) nebulizer soln 1.25 mg/3 mL  1.25 mg Nebulization Q6H RT    budesonide (PULMICORT) 500 mcg/2 ml nebulizer suspension  500 mcg Nebulization BID RT    doxycycline (VIBRAMYCIN) 100 mg in 0.9% sodium chloride (MBP/ADV) 100 mL  100 mg IntraVENous Q12H    guaiFENesin ER (MUCINEX) tablet 1,200 mg  1,200 mg Oral BID    metoprolol succinate (TOPROL-XL) XL tablet 25 mg  25 mg Oral DAILY    ALPRAZolam (XANAX) tablet 1 mg  1 mg Oral TID PRN    atorvastatin (LIPITOR) tablet 80 mg  80 mg Oral DAILY    pantoprazole (PROTONIX) tablet 40 mg  40 mg Oral ACB    HYDROcodone-acetaminophen (NORCO)  mg tablet 1 Tab  1 Tab Oral Q6H PRN    ondansetron (ZOFRAN) injection 4 mg  4 mg IntraVENous Q6H PRN       Review of Systems  Constitutional: negative for fever, chills, sweats  Cardiovascular: negative for chest pain - had CP last night, palpitations, syncope, edema  Gastrointestinal:  negative for dysphagia, reflux, vomiting, diarrhea, abdominal pain, or melena  Neurologic:  negative for focal weakness, numbness, headache    Objective:     Vitals:    03/19/17 0114 03/19/17 0545 03/19/17 0747 03/19/17 0816   BP: 119/71 106/71  131/88   Pulse: (!) 108 94  96   Resp: 20 20  18   Temp: 97.9 °F (36.6 °C) 98 °F (36.7 °C)  97 °F (36.1 °C)   SpO2: 98% 92% 99% 97%   Weight:  181 lb (82.1 kg)     Height:  5' 11\" (1.803 m)       Intake and Output:   03/17 1901 - 03/19 0700  In: 0151 [P.O.:1470; I.V.:100]  Out: 2275 [Urine:2275]       Physical Exam:   Constitution:  the patient is well developed and in no acute distress  EENMT:  Sclera clear, pupils equal, oral mucosa moist  Respiratory: rhonchi/wheezing throughout, RA  Cardiovascular:  RRR without M,G,R  Gastrointestinal: soft and non-tender; with positive bowel sounds. Musculoskeletal: warm without cyanosis. There is no lower leg edema.   Skin:  no jaundice or rashes, no open wounds   Neurologic: no gross neuro deficits     Psychiatric:  alert and oriented x 3    CHEST XRAY:   3/16      LAB   Recent Labs      03/19/17   0513  03/18/17   0415  03/17/17   0514   WBC  8.9  5.4  3.2*   HGB  11.4*  12.3*  11.4*   HCT  34.2*  36.3*  34.9*   PLT  258  215  178     Recent Labs      03/19/17   0513  03/18/17   1125  03/18/17   0415  03/17/17   0514   NA  141   --   138  138   K  4.2   --   4.3  4.4   CL  105   --   103  104   CO2  26   --   22  23   GLU  138*   --   166*  102*   BUN  20   --   17  13   CREA  1.14   --   1.36  1.24   MG  1.5*   --   1.6*  1.8   CA  8.7   --   8.9  8.6 TROIQ   --   0.70*   --    --        Assessment:  (Medical Decision Making)     Hospital Problems  Date Reviewed: 3/18/2017          Codes Class Noted POA    * (Principal)Acute on chronic systolic heart failure [facility]) ICD-10-CM: M81.08  ICD-9-CM: 428.23  3/14/2017 Yes    Management per Cardiology  Planning for [facility] if patient is able to lie flat on Monday - would like to defer until Wednesday when family is able to be present. Tachycardia ICD-10-CM: R00.0  ICD-9-CM: 785.0  3/14/2017 Yes    HR 90s this am      Elevated troponin ICD-10-CM: R79.89  ICD-9-CM: 790.6  3/14/2017 Yes    Plan for LHC on Monday      Shortness of breath ICD-10-CM: R06.02  ICD-9-CM: 786.05  3/25/2016 Yes    multifactorial in nature and likely related to CHF, reactive airway disease and ineffective mucus clearance       Non-ischemic cardiomyopathy (HCC) (Chronic) ICD-10-CM: I42.9  ICD-9-CM: 425.4  3/24/2016 Yes    EF <20%  ICD      HTN (hypertension) (Chronic) ICD-10-CM: I10  ICD-9-CM: 401.9  11/29/2011 Yes    Remains elevated at times      ICD (implantable cardioverter-defibrillator) in place (Chronic) ICD-10-CM: Z95.810  ICD-9-CM: V45.02  4/22/2011 Yes            Creat 1.14    BNP 73    Plan:  (Medical Decision Making)     --pulmicort / xopenex  --doxy 4   WBC 5.4  --solu-medrol 40 mg IV q 6 hours  --mucinex  --Mg++ supplemented  --not able to expectorate secretions. Add flutter valve. May need to consider bronch so that patient is able to tolerate lying flat for procedure Monday  --LHC on Monday - would like to defer until Wednesday when family is able to be present    More than 50% of the time documented was spent in face-to-face contact with the patient and in the care of the patient on the floor/unit where the patient is located.     Addison Edge, VALERIANO Gandara MD.

## 2017-03-19 NOTE — PROGRESS NOTES
Gwendolyn Hubbard  Admission Date: 3/14/2017             Daily Progress Note: 3/19/2017    The patient's chart is reviewed and the patient is discussed with the staff. 47 yo male with a history of chronic back pain, anxiety, depression, non-obstructive CAD, HTN, HL, chronic sCHF, non-ischemic cardiomyopathy s/p ICD placement (EF <20%). Son traumatically killed in January. Presented with increase HR, sob, and cough. He admitted to non-compliance with medications. CT chest negative for PE. Cardiology admitted with acute on chronic sCHF with demand ischemia. [facility] planned with patient able to lie flat. We were consulted for sob. Patient was started on nebs, mucinex, and short course of abx. Subjective:     Currently on RA with O2 sat 97%. Ongoing sob, wheeze, and cough but only able to get up small amounts of green mucus - not able to effectively clear secretions. Ongoing chest pain but NTG gave him severe HA. His breathing pattern and raspiness seems volitional- he does not seem to have problems when he is talking or sleeping.        Current Facility-Administered Medications   Medication Dose Route Frequency    HYDROcodone-acetaminophen (NORCO)  mg tablet 1 Tab  1 Tab Oral Q4H    magnesium sulfate 2 g/50 ml IVPB (premix or compounded)  2 g IntraVENous ONCE    nitroglycerin (NITROSTAT) tablet 0.4 mg  0.4 mg SubLINGual PRN    morphine injection 2 mg  2 mg IntraVENous Q6H PRN    hydrALAZINE (APRESOLINE) tablet 25 mg  25 mg Oral BID    aspirin chewable tablet 81 mg  81 mg Oral DAILY    heparin (porcine) injection 5,000 Units  5,000 Units SubCUTAneous Q12H    methylPREDNISolone (PF) (SOLU-MEDROL) injection 40 mg  40 mg IntraVENous Q6H    levalbuterol (XOPENEX) nebulizer soln 1.25 mg/3 mL  1.25 mg Nebulization Q6H RT    budesonide (PULMICORT) 500 mcg/2 ml nebulizer suspension  500 mcg Nebulization BID RT    doxycycline (VIBRAMYCIN) 100 mg in 0.9% sodium chloride (MBP/ADV) 100 mL  100 mg IntraVENous Q12H    guaiFENesin ER (MUCINEX) tablet 1,200 mg  1,200 mg Oral BID    metoprolol succinate (TOPROL-XL) XL tablet 25 mg  25 mg Oral DAILY    ALPRAZolam (XANAX) tablet 1 mg  1 mg Oral TID PRN    atorvastatin (LIPITOR) tablet 80 mg  80 mg Oral DAILY    pantoprazole (PROTONIX) tablet 40 mg  40 mg Oral ACB    ondansetron (ZOFRAN) injection 4 mg  4 mg IntraVENous Q6H PRN       Review of Systems  Constitutional: negative for fever, chills, sweats  Cardiovascular: negative for chest pain - had CP last night, palpitations, syncope, edema  Gastrointestinal:  negative for dysphagia, reflux, vomiting, diarrhea, abdominal pain, or melena  Neurologic:  negative for focal weakness, numbness, headache    Objective:     Vitals:    03/19/17 0747 03/19/17 0816 03/19/17 1204 03/19/17 1341   BP:  131/88 (!) 140/110    Pulse:  96 93    Resp:  18 19    Temp:  97 °F (36.1 °C) 97 °F (36.1 °C)    SpO2: 99% 97% 96% 97%   Weight:       Height:         Intake and Output:   03/17 1901 - 03/19 0700  In: 7461 [P.O.:1470; I.V.:100]  Out: 2275 [Urine:2275]  03/19 0701 - 03/19 1900  In: -   Out: 550 [Urine:550]    Physical Exam:   Constitution:  the patient is well developed and in no acute distress  EENMT:  Sclera clear, pupils equal, oral mucosa moist  Respiratory: rhonchi/wheezing throughout, RA  Cardiovascular:  RRR without M,G,R  Gastrointestinal: soft and non-tender; with positive bowel sounds. Musculoskeletal: warm without cyanosis. There is no lower leg edema.   Skin:  no jaundice or rashes, no open wounds   Neurologic: no gross neuro deficits     Psychiatric:  alert and oriented x 3    CHEST XRAY:   3/16      LAB   Recent Labs      03/19/17   0513  03/18/17   0415  03/17/17   0514   WBC  8.9  5.4  3.2*   HGB  11.4*  12.3*  11.4*   HCT  34.2*  36.3*  34.9*   PLT  258  215  178     Recent Labs      03/19/17   0513  03/18/17   1125  03/18/17   0415  03/17/17   0514   NA  141   --   138  138   K  4.2   --   4.3 4.4   CL  105   --   103  104   CO2  26   --   22  23   GLU  138*   --   166*  102*   BUN  20   --   17  13   CREA  1.14   --   1.36  1.24   MG  1.5*   --   1.6*  1.8   CA  8.7   --   8.9  8.6   TROIQ   --   0.70*   --    --        Assessment:  (Medical Decision Making)     Hospital Problems  Date Reviewed: 3/18/2017          Codes Class Noted POA    * (Principal)Acute on chronic systolic heart failure [facility]) ICD-10-CM: V38.55  ICD-9-CM: 428.23  3/14/2017 Yes    Management per Cardiology  Planning for [facility] if patient is able to lie flat on Monday - would like to defer until Wednesday when family is able to be present. Tachycardia ICD-10-CM: R00.0  ICD-9-CM: 785.0  3/14/2017 Yes    HR 90s this am      Elevated troponin ICD-10-CM: R79.89  ICD-9-CM: 790.6  3/14/2017 Yes    Plan for LHC on Monday      Shortness of breath ICD-10-CM: R06.02  ICD-9-CM: 786.05  3/25/2016 Yes    multifactorial in nature and likely related to CHF, reactive airway disease and ineffective mucus clearance       Non-ischemic cardiomyopathy (HCC) (Chronic) ICD-10-CM: I42.9  ICD-9-CM: 425.4  3/24/2016 Yes    EF <20%  ICD      HTN (hypertension) (Chronic) ICD-10-CM: I10  ICD-9-CM: 401.9  11/29/2011 Yes    Remains elevated at times      ICD (implantable cardioverter-defibrillator) in place (Chronic) ICD-10-CM: Z95.810  ICD-9-CM: V45.02  4/22/2011 Yes            Creat 1.14    BNP 73    Plan:  (Medical Decision Making)     --pulmicort / xopenex  --doxy 4   WBC 5.4  --solu-medrol 40 mg IV q 6 hours  --mucinex  --Mg++ supplemented  --not able to expectorate secretions. Add flutter valve. May need to consider bronch so that patient is able to tolerate lying flat for procedure Monday  --LHC on Monday - would like to defer until Wednesday when family is able to be present    More than 50% of the time documented was spent in face-to-face contact with the patient and in the care of the patient on the floor/unit where the patient is located.     Frances Niño MD [facility]

## 2017-03-19 NOTE — PROGRESS NOTES
Patient had some ectopy ? MD Evelyn Fletcher looked at the MD eloisa states \" have oncoming RN call [employer] today and interrogate his ICD\".

## 2017-03-19 NOTE — PROGRESS NOTES
Bedside and Verbal shift change report received from [place], [address] [facility]

## 2017-03-19 NOTE — PROGRESS NOTES
Received bedside and verbal report from [place] [facility]

## 2017-03-19 NOTE — PROGRESS NOTES
Pt still c/o severe headaches, pain is somewhat relieved with medications. No neurological deficits noted. Discussed with Dr. Tanisha Eduardo, instructed to continue pain meds as ordered. Also discussed ectopy on monitor that occurred on night shift and Dr. Amy Ozuna recommendation for pacemaker interrogation. MD reviewed strips.

## 2017-03-20 LAB
ANION GAP BLD CALC-SCNC: 10 MMOL/L (ref 7–16)
BNP SERPL-MCNC: 163 PG/ML
BUN SERPL-MCNC: 23 MG/DL (ref 6–23)
CALCIUM SERPL-MCNC: 8.3 MG/DL (ref 8.3–10.4)
CHLORIDE SERPL-SCNC: 106 MMOL/L (ref 98–107)
CO2 SERPL-SCNC: 24 MMOL/L (ref 21–32)
CREAT SERPL-MCNC: 1.18 MG/DL (ref 0.8–1.5)
GLUCOSE SERPL-MCNC: 142 MG/DL (ref 65–100)
MAGNESIUM SERPL-MCNC: 2.2 MG/DL (ref 1.8–2.4)
POTASSIUM SERPL-SCNC: 4 MMOL/L (ref 3.5–5.1)
SODIUM SERPL-SCNC: 140 MMOL/L (ref 136–145)

## 2017-03-20 PROCEDURE — 94640 AIRWAY INHALATION TREATMENT: CPT

## 2017-03-20 PROCEDURE — 74011250637 HC RX REV CODE- 250/637: Performed by: INTERNAL MEDICINE

## 2017-03-20 PROCEDURE — 92610 EVALUATE SWALLOWING FUNCTION: CPT

## 2017-03-20 PROCEDURE — 97110 THERAPEUTIC EXERCISES: CPT

## 2017-03-20 PROCEDURE — 74011250637 HC RX REV CODE- 250/637: Performed by: NURSE PRACTITIONER

## 2017-03-20 PROCEDURE — 74011250636 HC RX REV CODE- 250/636: Performed by: INTERNAL MEDICINE

## 2017-03-20 PROCEDURE — 36415 COLL VENOUS BLD VENIPUNCTURE: CPT | Performed by: NURSE PRACTITIONER

## 2017-03-20 PROCEDURE — 83735 ASSAY OF MAGNESIUM: CPT | Performed by: NURSE PRACTITIONER

## 2017-03-20 PROCEDURE — 94760 N-INVAS EAR/PLS OXIMETRY 1: CPT

## 2017-03-20 PROCEDURE — 99232 SBSQ HOSP IP/OBS MODERATE 35: CPT | Performed by: INTERNAL MEDICINE

## 2017-03-20 PROCEDURE — 83880 ASSAY OF NATRIURETIC PEPTIDE: CPT | Performed by: INTERNAL MEDICINE

## 2017-03-20 PROCEDURE — 74011000258 HC RX REV CODE- 258: Performed by: INTERNAL MEDICINE

## 2017-03-20 PROCEDURE — 65660000000 HC RM CCU STEPDOWN

## 2017-03-20 PROCEDURE — 80048 BASIC METABOLIC PNL TOTAL CA: CPT | Performed by: NURSE PRACTITIONER

## 2017-03-20 PROCEDURE — 74011000250 HC RX REV CODE- 250: Performed by: INTERNAL MEDICINE

## 2017-03-20 RX ORDER — SPIRONOLACTONE 25 MG/1
25 TABLET ORAL DAILY
Status: DISCONTINUED | OUTPATIENT
Start: 2017-03-20 | End: 2017-03-25 | Stop reason: HOSPADM

## 2017-03-20 RX ORDER — LOSARTAN POTASSIUM 25 MG/1
25 TABLET ORAL DAILY
Status: DISCONTINUED | OUTPATIENT
Start: 2017-03-20 | End: 2017-03-25 | Stop reason: HOSPADM

## 2017-03-20 RX ADMIN — ATORVASTATIN CALCIUM 80 MG: 40 TABLET, FILM COATED ORAL at 09:52

## 2017-03-20 RX ADMIN — BUDESONIDE 500 MCG: 0.5 SUSPENSION RESPIRATORY (INHALATION) at 20:02

## 2017-03-20 RX ADMIN — ASPIRIN 81 MG: 81 TABLET, CHEWABLE ORAL at 09:52

## 2017-03-20 RX ADMIN — BUDESONIDE 500 MCG: 0.5 SUSPENSION RESPIRATORY (INHALATION) at 08:15

## 2017-03-20 RX ADMIN — GUAIFENESIN 1200 MG: 600 TABLET, EXTENDED RELEASE ORAL at 09:53

## 2017-03-20 RX ADMIN — PANTOPRAZOLE SODIUM 40 MG: 40 TABLET, DELAYED RELEASE ORAL at 09:53

## 2017-03-20 RX ADMIN — Medication 2 MG: at 06:37

## 2017-03-20 RX ADMIN — HYDROCODONE BITARTRATE AND ACETAMINOPHEN 1 TABLET: 10; 325 TABLET ORAL at 21:28

## 2017-03-20 RX ADMIN — HYDROCODONE BITARTRATE AND ACETAMINOPHEN 1 TABLET: 10; 325 TABLET ORAL at 18:16

## 2017-03-20 RX ADMIN — SPIRONOLACTONE 25 MG: 25 TABLET, FILM COATED ORAL at 09:53

## 2017-03-20 RX ADMIN — HYDROCODONE BITARTRATE AND ACETAMINOPHEN 1 TABLET: 10; 325 TABLET ORAL at 12:40

## 2017-03-20 RX ADMIN — LEVALBUTEROL HYDROCHLORIDE 1.25 MG: 1.25 SOLUTION RESPIRATORY (INHALATION) at 08:14

## 2017-03-20 RX ADMIN — LEVALBUTEROL HYDROCHLORIDE 1.25 MG: 1.25 SOLUTION RESPIRATORY (INHALATION) at 01:30

## 2017-03-20 RX ADMIN — LEVALBUTEROL HYDROCHLORIDE 1.25 MG: 1.25 SOLUTION RESPIRATORY (INHALATION) at 20:02

## 2017-03-20 RX ADMIN — METHYLPREDNISOLONE SODIUM SUCCINATE 40 MG: 40 INJECTION, POWDER, FOR SOLUTION INTRAMUSCULAR; INTRAVENOUS at 18:16

## 2017-03-20 RX ADMIN — METHYLPREDNISOLONE SODIUM SUCCINATE 40 MG: 40 INJECTION, POWDER, FOR SOLUTION INTRAMUSCULAR; INTRAVENOUS at 12:41

## 2017-03-20 RX ADMIN — DOXYCYCLINE 100 MG: 100 INJECTION, POWDER, LYOPHILIZED, FOR SOLUTION INTRAVENOUS at 15:28

## 2017-03-20 RX ADMIN — GUAIFENESIN 1200 MG: 600 TABLET, EXTENDED RELEASE ORAL at 18:15

## 2017-03-20 RX ADMIN — LEVALBUTEROL HYDROCHLORIDE 1.25 MG: 1.25 SOLUTION RESPIRATORY (INHALATION) at 13:59

## 2017-03-20 RX ADMIN — HYDROCODONE BITARTRATE AND ACETAMINOPHEN 1 TABLET: 10; 325 TABLET ORAL at 09:53

## 2017-03-20 RX ADMIN — LOSARTAN POTASSIUM 25 MG: 25 TABLET, FILM COATED ORAL at 09:54

## 2017-03-20 RX ADMIN — METOPROLOL SUCCINATE 25 MG: 25 TABLET, EXTENDED RELEASE ORAL at 09:53

## 2017-03-20 RX ADMIN — DOXYCYCLINE 100 MG: 100 INJECTION, POWDER, LYOPHILIZED, FOR SOLUTION INTRAVENOUS at 03:21

## 2017-03-20 RX ADMIN — ALPRAZOLAM 1 MG: 0.5 TABLET ORAL at 18:16

## 2017-03-20 RX ADMIN — METHYLPREDNISOLONE SODIUM SUCCINATE 40 MG: 40 INJECTION, POWDER, FOR SOLUTION INTRAMUSCULAR; INTRAVENOUS at 06:08

## 2017-03-20 RX ADMIN — Medication 2 MG: at 15:26

## 2017-03-20 RX ADMIN — HEPARIN SODIUM 5000 UNITS: 5000 INJECTION, SOLUTION INTRAVENOUS; SUBCUTANEOUS at 12:40

## 2017-03-20 RX ADMIN — ALPRAZOLAM 1 MG: 0.5 TABLET ORAL at 11:14

## 2017-03-20 NOTE — PROGRESS NOTES
Problem: Self Care Deficits Care Plan (Adult)  Goal: *Acute Goals and Plan of Care (Insert Text)  1. Patient will complete lower body bathing and dressing with stand by assist and adaptive equipment as needed. 2. Patient will complete toileting with modified independence as least restrictive adaptive equipment. 3. Patient will tolerate 20 minutes of OT treatment with less than 4 rest breaks to increase activity tolerance for ADLs. 4. Patient will complete functional transfers with independence and adaptive equipment as needed. 5. Patient will verbalize 3 energy conservation techniques with 100% accuracy. Timeframe: 7 visits     Comments:       OCCUPATIONAL THERAPY: Daily Note, Treatment Day: 1st and PM 3/20/2017  INPATIENT: Hospital Day: 7  Payor: Gwendolyn Sow / [place] / Product Type: PPO /      NAME/AGE/GENDER: Aicha Michael is a 46 y.o. male   PRIMARY DIAGNOSIS:  NSTEMI MI Elevated Troponin  Acute on chronic systolic heart failure (HCC) Acute on chronic systolic heart failure (HCC) Acute on chronic systolic heart failure (HCC)        ICD-10: Treatment Diagnosis:        · Pain in Left Shoulder (M25.512)  · Stiffness of Left Shoulder, Not elsewhere classified (M25.612)  · Generalized Muscle Weakness (M62.81)  · Other lack of cordination (R27.8)  · History of falling (Z91.81)   Precautions/Allergies:         Review of patient's allergies indicates no known allergies. ASSESSMENT:   Mr. Zachariah Rush was admitted for the above diagnosis. Pt presents coming out of bathroom upon arrival. Pt performed UE exercises (in grid below) to increase strength and activity tolerance for ADLs. Pt given foam block as well to increase hand strength for fine motor tasks. Pt may benefit from tub bench at discharge due to patient having falls in the past. Pt doing well at this time. Did not want to over do it because PT was coming back to walk with patient.  Pt was also given long handle sponge to assist with LB bathing and energy conservation. Will continue to benefit from skilled OT during stay to increase activity tolerance. This section established at most recent assessment   PROBLEM LIST (Impairments causing functional limitations):  1. Decreased Strength  2. Decreased ADL/Functional Activities  3. Decreased Transfer Abilities  4. Decreased Ambulation Ability/Technique  5. Decreased Activity Tolerance  6. Decreased Work Simplification/Energy Conservation Techniques  7. Increased Fatigue  8. Decreased Flexibility/Joint Mobility    INTERVENTIONS PLANNED: (Benefits and precautions of occupational therapy have been discussed with the patient.)  1. Activities of daily living training  2. Adaptive equipment training  3. Clothing management  4. Donning&doffing training  5. Group therapy  6. Theraputic activity  7. Theraputic exercise      TREATMENT PLAN: Frequency/Duration: Follow patient 3x/week to address above goals. Rehabilitation Potential For Stated Goals: GOOD      RECOMMENDED REHABILITATION/EQUIPMENT: (at time of discharge pending progress): Continue Skilled Therapy. OCCUPATIONAL PROFILE AND HISTORY:   History of Present Injury/Illness (Reason for Referral):  Per H&P: \"Reno Saunders is a 46 y.o. male with past medical history of NICM, ICD in place, HTN, dyslipidemia, chronic back pain, depression/anxiety and nonobstructive CAD who presented to the ER at [facility] with complaints of cough, shortness of breath and rapid heart rate for 2 days. Denies chest pain, or n/v but does admit to diarrhea. He admitted that he has not been taking his medications as prescribed. Upon arrival to the ER, he was found to be tachycardic with HR of 145. EKG shows ST without acute ST/T wave changes. Labs showed elevated POC troponin of 0.43 and d-dimer of 0.71. Chest CT was done that showed no evidence of PE. Other pertinent labs include digoxin 0.2,  and lab troponin of 2.01.  While in the ER, he was given 324mg ASA, 40mg IV Lasix, 1\" topical nitro, and 4650 unit IV heparin bolus followed by heparin drip. Patient was transferred to [facility] and admitted to telemetry for further care. Upon arrival to telemetry, patient remains tachycardic with HR in mid 120s to 130s. Continues to deny chest pain. \"  Past Medical History/Comorbidities:   Mr. Juleen Fabry  has a past medical history of Arthritis; CAD (coronary artery disease); CHF (congestive heart failure) ([address] Chronic alcoholism ([address] Chronic back pain; Chronic neck pain; Chronic systolic heart failure ([address] (4/21/2011); Depression; Dizziness - light-headed; GERD (gastroesophageal reflux disease); Gynecomastia (2/22/2016); Heart failure ([address] History of implantable cardioverter-defibrillator (ICD) placement (2/22/2016); Hypertension; Psychiatric disorder; Situational depression (2/22/2016); and Ventricular tachycardia ([address] (2/22/2016). Mr. Juleen Fabry  has a past surgical history that includes heart catheterization (9/21/10) and pacemaker. Social History/Living Environment:   Home Environment: Private residence  # Steps to Enter: 6  Rails to Enter: Yes  Hand Rails : Left  One/Two Story Residence: One story  Living Alone: No  Support Systems: Child(charlene), Spouse/Significant Other/Partner  Patient Expects to be Discharged to[de-identified] Rehabilitation facility  Current DME Used/Available at Home: None  Tub or Shower Type: Tub/Shower combination  Prior Level of Function/Work/Activity:  Lives in [place] with wife and one high school aged child. Reports 6 ALBARO. Requires extra time and rest breaks for ADL completion at baseline. Personal Factors:          Sex:  male        Age:  46 y.o.         Social Background:  Strong support from wife   Number of Personal Factors/Comorbidities that affect the Plan of Care: Expanded review of therapy/medical records (1-2):  MODERATE COMPLEXITY   ASSESSMENT OF OCCUPATIONAL PERFORMANCE[de-identified]   Activities of Daily Living:           Basic ADLs (From Assessment) Complex ADLs (From Assessment)   Basic ADL  Feeding: Independent  Oral Facial Hygiene/Grooming: Independent  Bathing: Minimum assistance  Upper Body Dressing: Setup  Lower Body Dressing: Minimum assistance  Toileting: Stand by assistance Instrumental ADL  Meal Preparation: Maximum assistance  Homemaking: Maximum assistance  Medication Management: Independent  Financial Management: Independent   Grooming/Bathing/Dressing Activities of Daily Living     Cognitive Retraining  Safety/Judgement: Awareness of environment                       Bed/Mat Mobility  Sit to Stand: Independent          Most Recent Physical Functioning:   Gross Assessment:                  Posture:     Balance:  Sitting: Intact Bed Mobility:     Wheelchair Mobility:     Transfers:  Sit to Stand: Independent  Stand to Sit: Independent                    Patient Vitals for the past 6 hrs:   BP BP Patient Position SpO2 Pulse   17 1135 (!) 134/98 Sitting 99 % 85        Mental Status  Neurologic State: Alert, Appropriate for age  Orientation Level: Oriented X4  Cognition: Appropriate decision making, Follows commands  Perception: Appears intact  Perseveration: No perseveration noted  Safety/Judgement: Awareness of environment                               Physical Skills Involved:  1. Range of Motion  2. Mobility  3. Strength  4. Endurance Cognitive Skills Affected (resulting in the inability to perform in a timely and safe manner):  1. None Psychosocial Skills Affected:  1. Habits  2. Routines and Behaviors  3. Environmental Adaptations   Number of elements that affect the Plan of Care: 5+:  HIGH COMPLEXITY   CLINICAL DECISION MAKIN [facility] Mobility Inpatient Short Form  How much help from another person does the patient currently need. .. Total A Lot A Little None   1. Putting on and taking off regular lower body clothing?   [ ] 1   [ ] 2   [X] 3   [ ] 4   2. Bathing (including washing, rinsing, drying)?    [ ] 1   [ ] 2 [X] 3   [ ] 4   3. Toileting, which includes using toilet, bedpan or urinal?   [ ] 1   [ ] 2   [X] 3   [ ] 4   4. Putting on and taking off regular upper body clothing?   [ ] 1   [ ] 2   [ ] 3   [X] 4   5. Taking care of personal grooming such as brushing teeth? [ ] 1   [ ] 2   [ ] 3   [X] 4   6. Eating meals? [ ] 1   [ ] 2   [ ] 3   [X] 4   © 2007, Trustees of [address] [place] [address] [address] [address], under license to [employer]. All rights reserved    Score:  Initial: 21 Most Recent: X (Date: -- )     Interpretation of Tool:  Represents activities that are increasingly more difficult (i.e. Bed mobility, Transfers, Gait). Score 24 23 22-20 19-15 14-10 9-7 6       Modifier CH CI CJ CK CL CM CN         · Self Care:               - CURRENT STATUS:    CJ - 20%-39% impaired, limited or restricted               - GOAL STATUS:           CI - 1%-19% impaired, limited or restricted               - D/C STATUS:                       ---------------To be determined---------------  Payor: BLUE CROSS / [place] / Product Type: PPO /       Medical Necessity:     · Patient demonstrates good rehab potential due to higher previous functional level. Reason for Services/Other Comments:  · Patient continues to require present interventions due to patient's inability to complete ADLs independently. .   Use of outcome tool(s) and clinical judgement create a POC that gives a: MODERATE COMPLEXITY             TREATMENT:   (In addition to Assessment/Re-Assessment sessions the following treatments were rendered)      Pre-treatment Symptoms/Complaints:    Pain: Initial:   Pain Intensity 1: 0  Post Session:  0/10      Therapeutic Exercise: (10 minutes):  Exercises per grid below to improve mobility, strength and activity tolerance. Required minimal visual, verbal and tactile cues to promote proper body alignment and promote proper body mechanics. Progressed resistance and repetitions as indicated.     UE Exercises (with theraband) Date:  3/20/2017 Date:   Date:     Activity/Exercise Parameters Parameters Parameters   Shoulder Abd/Adduction 10 reps     Shoulder Flexion Did not want to attempt due to pain from fall      Elbow Flexion 10 reps     Punches 10 reps                              Braces/Orthotics/Lines/Etc:   · IV  · O2 Device: Room air  Treatment/Session Assessment:    · Response to Treatment:  Tolerated well w/o complaints. · Interdisciplinary Collaboration:  · Certified Occupational Therapy Assistant and Registered Nurse  · After treatment position/precautions:  · Up in chair, Bed/Chair-wheels locked and Call light within reach  · Compliance with Program/Exercises: Will assess as treatment progresses. · Recommendations/Intent for next treatment session: \"Next visit will focus on advancements to more challenging activities and reduction in assistance provided\".   Total Treatment Duration:  OT Patient Time In/Time Out  Time In: 1420  Time Out: 7[address] [facility]

## 2017-03-20 NOTE — PROGRESS NOTES
Speech language pathology: bedside swallow note: Initial Assessment and Discharge    NAME/AGE/GENDER: Brenda Buck is a 46 y.o. male  DATE: 3/20/2017  PRIMARY DIAGNOSIS: NSTEMI MI Elevated Troponin  Acute on chronic systolic heart failure (HCC)       ICD-10: Treatment Diagnosis: dysphagia; unspecified R13.10  INTERDISCIPLINARY COLLABORATION: RN  PRECAUTIONS/ALLERGIES: Review of patient's allergies indicates no known allergies. ASSESSMENT:Based on the objective data described below, Mr. Feng George presents with a swallow within functional limits. Patient was finishing his lunch. No signs/sx of aspiration with regular textures. He ate 100% of his salad. Tolerated several trials of thin liquids via the cup with no overt signs/sx of aspiration. Patient with preference to drink from the cup. Noted dry baseline cough during conversation x2. Patient has CHF. Chest CT without acute process. RN reports no dysphagia noted with pills or previous meals. Recommend continue current cardiac diet. No further ST indicated at this time. ?????? ? ? This section established at most recent assessment??????????  PROBLEM LIST (Impairments causing functional limitations):  1. n/a  REHABILITATION POTENTIAL FOR STATED GOALS: n/a  PLAN OF CARE:   Patient will benefit from skilled intervention to address the following impairments. RECOMMENDATIONS AND PLANNED INTERVENTIONS (Benefits and precautions of therapy have been discussed with the patient.):  · PO:  Regular  · Liquids:  regular thin  MEDICATIONS:  · With liquid  COMPENSATORY STRATEGIES/MODIFICATIONS INCLUDING:  · None  OTHER RECOMMENDATIONS (including follow up treatment recommendations):   · n/a  RECOMMENDED DIET MODIFICATIONS DISCUSSED WITH:  · Nursing  · Patient  · FREQUENCY/DURATION: Will not continue to follow patient to address above goals. RECOMMENDED REHABILITATION/EQUIPMENT: (at time of discharge pending progress):   None. SUBJECTIVE:   Cooperative.   History of Present Injury/Illness: Mr. Ernestine Larose  has a past medical history of Arthritis; CAD (coronary artery disease); CHF ([address]estive heart failure) [address] Chronic alcoholism ([facility] Chronic back pain; Chronic neck pain; Chronic systolic heart failure ([facility] (4/21/2011); Depression; Dizziness - light-headed; GERD (gastroesophageal reflux disease); Gynecomastia (2/22/2016); Heart failure ([facility] History of implantable cardioverter-defibrillator (ICD) placement (2/22/2016); Hypertension; Psychiatric disorder; Situational depression (2/22/2016); and Ventricular tachycardia ([facility] (2/22/2016). He also  has a past surgical history that includes heart catheterization (9/21/10) and pacemaker. Present Symptoms: n/a  Pain Intensity 1: 0  Pain Location 1: Chest, Head  Pain Orientation 1: Mid  Pain Intervention(s) 1: Medication (see MAR)  Current Medications:   No current facility-administered medications on file prior to encounter. Current Outpatient Prescriptions on File Prior to Encounter   Medication Sig Dispense Refill    gabapentin (NEURONTIN) 300 mg capsule Take 1 Cap by mouth three (3) times daily. (Patient taking differently: Take 300 mg by mouth two (2) times a day.) 42 Cap 0    atorvastatin (LIPITOR) 80 mg tablet Take 1 Tab by mouth daily. 30 Tab 3    carvedilol (COREG) 12.5 mg tablet Take 1 Tab by mouth two (2) times daily (with meals). 60 Tab 11    digoxin (DIGITEK) 0.25 mg tablet Take 1 Tab by mouth daily. 30 Tab 11    amLODIPine (NORVASC) 5 mg tablet Take 1 Tab by mouth daily. 30 Tab 11    potassium chloride (K-DUR, KLOR-CON) 20 mEq tablet Take 1 Tab by mouth daily. 30 Tab 6    polyethylene glycol (MIRALAX) 17 gram/dose powder Take 17 g by mouth daily.  eplerenone (INSPRA) 25 mg tablet 25 mg once. 1    furosemide (LASIX) 20 mg tablet Take 1 Tab by mouth daily. 30 Tab 4    ESOMEPRAZOLE MAG TRIHYDRATE (NEXIUM PO) Take 1 Cap by mouth two (2) times a day.       ALPRAZolam (XANAX) 1 mg tablet Take 1 Tab by mouth nightly as needed. Max Daily Amount: 1 mg. 30 Tab 3    NORCO  mg tablet 1 tablet q4-6hrs prn pain, brand only 150 Tab 0    HYDROcodone-homatropine (HYCODAN) 5-1.5 mg/5 mL (5 mL) syrup Take 5 mL by mouth four (4) times daily as needed for Cough. Max Daily Amount: 20 mL. 120 mL 0    ondansetron (ZOFRAN ODT) 8 mg disintegrating tablet Take 1 Tab by mouth every eight (8) hours as needed for Nausea. 12 Tab 0    valACYclovir (VALTREX) 1 gram tablet Take 1 Tab by mouth three (3) times daily. 21 Tab 0    losartan (COZAAR) 25 mg tablet Take 1 Tab by mouth daily. 30 Tab 11    Albuterol, Bulk, powd by Does Not Apply route. Current Dietary Status:  Regular textures      History of reflux:  NO     Social History/Home Situation: home with wife  Home Environment: Private residence  # Steps to Enter: 6  Rails to Enter: Yes  Hand Rails : Left  One/Two Story Residence: One story  Living Alone: No  Support Systems: Child(charlene), Spouse/Significant Other/Partner  Patient Expects to be Discharged to[de-identified] Rehabilitation facility  Current DME Used/Available at Home: None  Tub or Shower Type: Tub/Shower combination  OBJECTIVE:   Respiratory Status:  Room air  0 l/min  CXR Results:No acute findings in the chest  MRI/CT Results: n/a  Oral Motor Structure/Speech:  Oral-Motor Structure/Motor Speech  Labial: No impairment  Dentition: Natural  Oral Hygiene: adequate  Lingual: No impairment    Cognitive and Communication Status:  Neurologic State: Alert  Orientation Level: Oriented X4  Cognition: Appropriate decision making  Perception: Appears intact  Perseveration: No perseveration noted  Safety/Judgement: Awareness of environment    BEDSIDE SWALLOW EVALUATION  Oral Assessment:  Oral Assessment  Labial: No impairment  Dentition: Natural  Oral Hygiene: adequate  Lingual: No impairment  P.O. Trials:  Patient Position: upright in bed    The patient was given tsp to cup amounts of the following:   Consistency Presented:  Thin liquid; Solid  How Presented: Self-fed/presented;Cup/sip;Spoon;Cup/gulp    ORAL PHASE:  Bolus Acceptance: No impairment  Bolus Formation/Control: No impairment  Propulsion: No impairment     Oral Residue: None    PHARYNGEAL PHASE:  Initiation of Swallow: No impairment     Aspiration Signs/Symptoms: None  Vocal Quality: No impairment           Pharyngeal Phase Characteristics: No impairment, issues, or problems     OTHER OBSERVATIONS:  Rate/bite size: WNL   Endurance: WNL     Tool Used: Dysphagia Outcome and Severity Scale (DOUG)    Score Comments   Normal Diet  [x] 7 With no strategies or extra time needed   Functional Swallow  [] 6 May have mild oral or pharyngeal delay       Mild Dysphagia    [] 5 Which may require one diet consistency restricted (those who demonstrate penetration which is entirely cleared on MBS would be included)   Mild-Moderate Dysphagia  [] 4 With 1-2 diet consistencies restricted       Moderate Dysphagia  [] 3 With 2 or more diet consistencies restricted       Moderately Severe Dysphagia  [] 2 With partial PO strategies (trials with ST only)       Severe Dysphagia  [] 1 With inability to tolerate any PO safely          Score:  Initial: 7 Most Recent: X (Date: -- )   Interpretation of Tool: The Dysphagia Outcome and Severity Scale (DOUG) is a simple, easy-to-use, 7-point scale developed to systematically rate the functional severity of dysphagia based on objective assessment and make recommendations for diet level, independence level, and type of nutrition. Score 7 6 5 4 3 2 1   Modifier CH CI CJ CK CL CM CN   ?  Swallowing:     - CURRENT STATUS: CH - 0% impaired, limited or restricted    - GOAL STATUS:  CH - 0% impaired, limited or restricted    - D/C STATUS:  CH - 0% impaired, limited or restricted  Payor: BLUE CROSS / [place] / Product Type: PPO /     TREATMENT:    (In addition to Assessment/Re-Assessment sessions the following treatments were rendered)  Assessment/Reassessment only, no treatment provided today    __________________________________________________________________________________________________  Safety:   After treatment position/precautions:  · RN notified  · Upright in Bed    Total Treatment Duration:  Time In: 1246  Time Out: 800 MertensRobert Prasad MS, SLP

## 2017-03-20 NOTE — PROGRESS NOTES
Bedside and Verbal shift change report given to Anne Galeas RN (oncoming nurse) by self Keven gardiner). Report included the following information SBAR, Kardex, MAR and Recent Results.

## 2017-03-20 NOTE — PROGRESS NOTES
Problem: Mobility Impaired (Adult and Pediatric)  Goal: *Acute Goals and Plan of Care (Insert Text)  STG:  ((1.)Mr. Ernestine Larose will transfer from bed to chair and chair to bed with SUPERVISION using the least restrictive device within 3 day(s). Met 3/20/2017   (2.)Mr. Ernestine Larose will ambulate with SUPERVISION for 50 feet with the least restrictive device within 3 day(s). LTG:  (1.)Mr. Ernestine Larose will transfer from bed to chair and chair to bed with INDEPENDENT using the least restrictive device within 7 day(s). (2.)Mr. Ernestine Larose will ambulate with INDEPENDENT for 100 feet with the least restrictive device within 7 day(s). (3.) Mr. Ernestine Larose will ascend/descend 6 steps with use of L handrail with STAND BY ASSIST within 7 days. ________________________________________________________________________________________________      PHYSICAL THERAPY: Daily Note, Treatment Day: 1st and PM 3/20/2017  INPATIENT: Hospital Day: 7  Payor: Darryle Blander / Plan[place] FEDER[place] / Product Type: PPO /      NAME/AGE/GENDER: [facility] is a 46 y.o. male   PRIMARY DIAGNOSIS: NSTEMI MI Elevated Troponin  Acute on chronic systolic heart failure (HCC) Acute on chronic systolic heart failure (HCC) Acute on chronic systolic heart failure (HCC)        ICD-10: Treatment Diagnosis:       · Generalized Muscle Weakness (M62.81)  · Difficulty in walking, Not elsewhere classified (R26.2)  · History of falling (Z91.81)   Precaution/Allergies:  Review of patient's allergies indicates no known allergies. ASSESSMENT:      Mr. Ernestnie Larose presents sitting in chair and agreeable to PT. Patient stood independently and ambulated 100' with 1 standing rest break, HR increased slightly from 99 to 102. Julianna Martin Rested, then stood again and complained of dizziness. Took BP sitting 140/94 and standing 150/93, . Patient then ambulated another 100' with standing rest break. Then instructed patient in below exercises.   He has demonstrated an increase in activity tolerance and independence. Patient states that he is interested in rehab, will address this closer to discharge as he may not need it by then. Mozelle Gilford will benefit from skilled PT (medically necessary) to address decreased strength, decreased balance, decreased functional tolerance, decreased cardiopulmonary endurance affecting participation in basic ADLs and functional tasks. This section established at most recent assessment   PROBLEM LIST (Impairments causing functional limitations):  1. Decreased Strength  2. Decreased ADL/Functional Activities  3. Decreased Transfer Abilities  4. Decreased Ambulation Ability/Technique  5. Decreased Balance  6. Decreased Activity Tolerance  7. Increased Fatigue  8. Increased Shortness of Breath  9. Decreased [place] with Home Exercise Program    INTERVENTIONS PLANNED: (Benefits and precautions of physical therapy have been discussed with the patient.)  1. Balance Exercise  2. Family Education  3. Gait Training  4. Home Exercise Program (HEP)  5. Neuromuscular Re-education/Strengthening  6. Range of Motion (ROM)  7. Therapeutic Activites  8. Therapeutic Exercise/Strengthening  9. Group Therapy      TREATMENT PLAN: Frequency/Duration: 3 times a week for duration of hospital stay  Rehabilitation Potential For Stated Goals: GOOD      RECOMMENDED REHABILITATION/EQUIPMENT: (at time of discharge pending progress): Continue Skilled Therapy and Home Health: Physical Therapy. vs. Outpatient vs. Rehab? ???                   HISTORY:   History of Present Injury/Illness (Reason for Referral):  Generalized weakness and SOB  Past Medical History/Comorbidities:   Mr. Carolann Schwartz  has a past medical history of Arthritis; CAD (coronary artery disease); CHF (congestive heart failure) ([facility] [address] Chronic alcoholism ([facility] [address] Chronic back pain; Chronic neck pain; Chronic systolic heart failure ([facility] [address] (4/21/2011); Depression; Dizziness - light-headed; GERD (gastroesophageal reflux disease);  Gynecomastia (2/22/2016); Heart failure ([facility] [address] History of implantable cardioverter-defibrillator (ICD) placement (2/22/2016); Hypertension; Psychiatric disorder; Situational depression (2/22/2016); and Ventricular tachycardia ([facility] [address] (2/22/2016). Mr. Zachariah Rush  has a past surgical history that includes heart catheterization (9/21/10) and pacemaker. Social History/Living Environment:   Home Environment: Private residence  # Steps to Enter: 6  Rails to Enter: Yes  Hand Rails : Left  One/Two Story Residence: One story  Living Alone: No  Support Systems: Child(charlene), Spouse/Significant Other/Partner  Patient Expects to be Discharged to[de-identified] Rehabilitation facility  Current DME Used/Available at Home: None  Tub or Shower Type: Tub/Shower combination  Prior Level of Function/Work/Activity:  Pt reports limited tolerance for functional activities- PT unable to determine how long these symptoms have been going on. Pt reports living at home with wife and kids. He states that \"for awhile now\" he has been experiencing overall generalized weakness and lack of endurance in performance of daily activities. He reports having approximately 3 falls in the past six months in which he describes that he just \"blacked out\" with one fall resulting in injury to his L shoulder. He reports being able to walk independently very short distances before he has to take rest breaks and continue with activity. Number of Personal Factors/Comorbidities that affect the Plan of Care: 3+: HIGH COMPLEXITY   EXAMINATION:   Most Recent Physical Functioning:   Gross Assessment:                  Posture:     Balance:  Sitting: Intact Bed Mobility:     Wheelchair Mobility:     Transfers:  Sit to Stand: Independent  Stand to Sit: Independent  Gait:     Base of Support: Center of gravity altered  Speed/Treasure: Slow  Step Length: Right shortened;Left shortened  Gait Abnormalities: Decreased step clearance; Path deviations  Distance (ft): 100 Feet (ft) (x2)  Ambulation - Level of Assistance: Stand-by asssistance  Interventions: Verbal cues; Tactile cues       Body Structures Involved:  1. Heart  2. Lungs  3. Bones  4. Joints  5. Muscles  6. Ligaments Body Functions Affected:  1. Sensory/Pain  2. Cardio  3. Respiratory  4. Neuromusculoskeletal  5. Movement Related Activities and Participation Affected:  1. General Tasks and Demands  2. Mobility  3. Self Care  4. Domestic Life  5. Interpersonal Interactions and Relationships  6. Community, Social and [place]   Number of elements that affect the Plan of Care: 4+: HIGH COMPLEXITY   CLINICAL PRESENTATION:   Presentation: Evolving clinical presentation with changing clinical characteristics: MODERATE COMPLEXITY   CLINICAL DECISION MAKIN [facility] Mobility Inpatient Short Form  How much difficulty does the patient currently have. .. Unable A Lot A Little None   1. Turning over in bed (including adjusting bedclothes, sheets and blankets)? [ ] 1   [ ] 2   [ ] 3   [X] 4   2. Sitting down on and standing up from a chair with arms ( e.g., wheelchair, bedside commode, etc.)   [ ] 1   [ ] 2   [ ] 3   [X] 4   3. Moving from lying on back to sitting on the side of the bed? [ ] 1   [ ] 2   [ ] 3   [X] 4   How much help from another person does the patient currently need. .. Total A Lot A Little None   4. Moving to and from a bed to a chair (including a wheelchair)? [ ] 1   [ ] 2   [X] 3   [ ] 4   5. Need to walk in hospital room? [ ] 1   [ ] 2   [X] 3   [ ] 4   6. Climbing 3-5 steps with a railing? [ ] 1   [ ] 2   [X] 3   [ ] 4   © , Trustees of [address] [place] [address] [address] [address], under license to [employer]. All rights reserved    Score:  Initial: 21 Most Recent: X (Date: -- )     Interpretation of Tool:  Represents activities that are increasingly more difficult (i.e. Bed mobility, Transfers, Gait).        Score 24 23 22-20 19-15 14-10 9-7 6       Modifier CH CI CJ CK CL CM CN         · Mobility - Walking and Moving Around:               - CURRENT STATUS:    CJ - 20%-39% impaired, limited or restricted               - GOAL STATUS:           CI - 1%-19% impaired, limited or restricted               - D/C STATUS:                       ---------------To be determined---------------  Payor: BLUE CR[place] / [place] / Product Type: PPO /       Medical Necessity:     · Patient is expected to demonstrate progress in strength, range of motion, balance, coordination and functional technique to increase independence with gait and functional mobility. Reason for Services/Other Comments:  · Patient continues to require present interventions due to patient's inability to perform daily functional activites independently. Use of outcome tool(s) and clinical judgement create a POC that gives a: Questionable prediction of patient's progress: MODERATE COMPLEXITY                 TREATMENT:   (In addition to Assessment/Re-Assessment sessions the following treatments were rendered)   Pre-treatment Symptoms/Complaints:  Pt reports feeling very very weak  Pain: Initial:   Pain Intensity 1: 0 0/10 Post Session:  0/10      Therapeutic Exercise: (23 Minutes):  Exercises per grid below to improve mobility, strength, balance and coordination. Required minimal visual and verbal cues to promote proper body alignment and promote proper body breathing techniques. Progressed complexity of movement as indicated.      Date: 3/20/17        Ambulation  Device  assistance 100'x2  None  SBA        Partial Squats         Hip Abduction/ Adduction Standing         Heel Raises  Standing x10        Toe Raises Standing x10        Hip Flexion Standing         Sit to Stand         Key:  R=right, L=left, B=bilaterally, A=active, AA=active assisted, P=passive       Braces/Orthotics/Lines/Etc:   · none  Treatment/Session Assessment:    · Response to Treatment:  Increased fatigue  · Interdisciplinary Collaboration:  · Physical Therapist, Registered Nurse and Certified Nursing Assistant/Patient Care Technician  · After treatment position/precautions:  · Up in chair, Bed/Chair-wheels locked and Call light within reach  · Compliance with Program/Exercises: Will assess as treatment progresses. · Recommendations/Intent for next treatment session: \"Next visit will focus on advancements to more challenging activities and reduction in assistance provided\".   Total Treatment Duration:  PT Patient Time In/Time Out  Time In: 1440  Time Out: Rhea 94, PT

## 2017-03-20 NOTE — PROGRESS NOTES
[facility] CARDIOLOGY PROGRESS NOTE           3/20/2017 8:28 AM    Admit Date: 3/14/2017      Subjective:   Patient denies any chest pain but notes continued dyspnea and is unable to lie flat. . Plans for possible bronchoscopy. ROS:  Cardiovascular:  As noted above    Objective:      Vitals:    03/20/17 0153 03/20/17 0555 03/20/17 0705 03/20/17 0815   BP: 114/74 124/80 123/86    Pulse: 92 91 83    Resp: 19 19 18    Temp: 97.6 °F (36.4 °C) 97.5 °F (36.4 °C) 97.8 °F (36.6 °C)    SpO2: 98% 98% 98% 97%   Weight:  83.5 kg (184 lb)     Height:           Physical Exam:  General-No Acute Distress  Neck- supple, moderate JVD  CV- regular rate and rhythm no MRG  Lung- coarse BS  Abd- soft, nontender, nondistended  Ext- no edema bilaterally. Skin- warm and dry      Data Review:   Recent Labs      03/20/17   0430  03/19/17   0513  03/18/17   0415   NA  140  141  138   K  4.0  4.2  4.3   MG  2.2  1.5*  1.6*   BUN  23  20  17   CREA  1.18  1.14  1.36   GLU  142*  138*  166*   WBC   --   8.9  5.4   HGB   --   11.4*  12.3*   HCT   --   34.2*  36.3*   PLT   --   258  215      No results found for: Kaleb Cone, TROPT, TNIPOC    Echo (3/15/17):  -  Left ventricle: The ventricle was mildly dilated. Systolic function was severely reduced. Ejection fraction was estimated to be 15 %. There was   Severe diffuse hypokinesis with regional variations. There was mild concentric hypertrophy. -  Right ventricle: Systolic function was reduced. Delia Irma, systemic arteries: The root exhibited mild dilatation. -  Pericardium: A trivial pericardial effusion was identified. Assessment/Plan:     Principal Problem:    Acute on chronic systolic heart failure ([address] (3/14/2017)  Severe LV dysfunction. EF 15%. Poor prognosis with non-compliance and limited insight into condition. On toprol and hydralazine. Hold hydralazine and restart Cozaar. Start aldactone.       Active Problems:    ICD (implantable cardioverter-defibrillator) in place (4/22/2011)  Noted      HTN (hypertension) (11/29/2011)  See above      Elevated troponin (3/14/2017)  Significantly tachycardic on arrival.  Cath 3/16 with mild CAD. Suspect supply/demand imbalance with sustained tachycardia in 130-140s. ON IV heparin. Planned for cath but unable to lie flat. Assess daily.             Barbara Lo MD  3/20/2017 8:28 AM

## 2017-03-21 PROBLEM — R06.1 STRIDOR: Status: ACTIVE | Noted: 2017-03-21

## 2017-03-21 LAB
ANION GAP BLD CALC-SCNC: 9 MMOL/L (ref 7–16)
BUN SERPL-MCNC: 19 MG/DL (ref 6–23)
CALCIUM SERPL-MCNC: 8.3 MG/DL (ref 8.3–10.4)
CHLORIDE SERPL-SCNC: 106 MMOL/L (ref 98–107)
CO2 SERPL-SCNC: 25 MMOL/L (ref 21–32)
CREAT SERPL-MCNC: 1.14 MG/DL (ref 0.8–1.5)
GLUCOSE SERPL-MCNC: 160 MG/DL (ref 65–100)
MAGNESIUM SERPL-MCNC: 2 MG/DL (ref 1.8–2.4)
POTASSIUM SERPL-SCNC: 4.1 MMOL/L (ref 3.5–5.1)
SODIUM SERPL-SCNC: 140 MMOL/L (ref 136–145)

## 2017-03-21 PROCEDURE — 74011250637 HC RX REV CODE- 250/637: Performed by: NURSE PRACTITIONER

## 2017-03-21 PROCEDURE — 65660000000 HC RM CCU STEPDOWN

## 2017-03-21 PROCEDURE — 80048 BASIC METABOLIC PNL TOTAL CA: CPT | Performed by: NURSE PRACTITIONER

## 2017-03-21 PROCEDURE — 99232 SBSQ HOSP IP/OBS MODERATE 35: CPT | Performed by: INTERNAL MEDICINE

## 2017-03-21 PROCEDURE — 74011250636 HC RX REV CODE- 250/636: Performed by: INTERNAL MEDICINE

## 2017-03-21 PROCEDURE — 74011250637 HC RX REV CODE- 250/637: Performed by: INTERNAL MEDICINE

## 2017-03-21 PROCEDURE — 74011000258 HC RX REV CODE- 258: Performed by: INTERNAL MEDICINE

## 2017-03-21 PROCEDURE — 83735 ASSAY OF MAGNESIUM: CPT | Performed by: NURSE PRACTITIONER

## 2017-03-21 PROCEDURE — 74011000250 HC RX REV CODE- 250: Performed by: INTERNAL MEDICINE

## 2017-03-21 PROCEDURE — 94640 AIRWAY INHALATION TREATMENT: CPT

## 2017-03-21 PROCEDURE — 94760 N-INVAS EAR/PLS OXIMETRY 1: CPT

## 2017-03-21 RX ADMIN — SPIRONOLACTONE 25 MG: 25 TABLET, FILM COATED ORAL at 09:59

## 2017-03-21 RX ADMIN — HYDROCODONE BITARTRATE AND ACETAMINOPHEN 1 TABLET: 10; 325 TABLET ORAL at 23:59

## 2017-03-21 RX ADMIN — LEVALBUTEROL HYDROCHLORIDE 1.25 MG: 1.25 SOLUTION RESPIRATORY (INHALATION) at 19:41

## 2017-03-21 RX ADMIN — BUDESONIDE 500 MCG: 0.5 SUSPENSION RESPIRATORY (INHALATION) at 19:41

## 2017-03-21 RX ADMIN — METHYLPREDNISOLONE SODIUM SUCCINATE 40 MG: 40 INJECTION, POWDER, FOR SOLUTION INTRAMUSCULAR; INTRAVENOUS at 19:51

## 2017-03-21 RX ADMIN — ALPRAZOLAM 1 MG: 0.5 TABLET ORAL at 00:03

## 2017-03-21 RX ADMIN — HYDROCODONE BITARTRATE AND ACETAMINOPHEN 1 TABLET: 10; 325 TABLET ORAL at 10:00

## 2017-03-21 RX ADMIN — HEPARIN SODIUM 5000 UNITS: 5000 INJECTION, SOLUTION INTRAVENOUS; SUBCUTANEOUS at 23:59

## 2017-03-21 RX ADMIN — METHYLPREDNISOLONE SODIUM SUCCINATE 40 MG: 40 INJECTION, POWDER, FOR SOLUTION INTRAMUSCULAR; INTRAVENOUS at 12:50

## 2017-03-21 RX ADMIN — ALPRAZOLAM 1 MG: 0.5 TABLET ORAL at 23:59

## 2017-03-21 RX ADMIN — Medication 2 MG: at 00:00

## 2017-03-21 RX ADMIN — METHYLPREDNISOLONE SODIUM SUCCINATE 40 MG: 40 INJECTION, POWDER, FOR SOLUTION INTRAMUSCULAR; INTRAVENOUS at 00:00

## 2017-03-21 RX ADMIN — DOXYCYCLINE 100 MG: 100 INJECTION, POWDER, LYOPHILIZED, FOR SOLUTION INTRAVENOUS at 04:31

## 2017-03-21 RX ADMIN — GUAIFENESIN 1200 MG: 600 TABLET, EXTENDED RELEASE ORAL at 19:50

## 2017-03-21 RX ADMIN — HEPARIN SODIUM 5000 UNITS: 5000 INJECTION, SOLUTION INTRAVENOUS; SUBCUTANEOUS at 00:00

## 2017-03-21 RX ADMIN — HYDROCODONE BITARTRATE AND ACETAMINOPHEN 1 TABLET: 10; 325 TABLET ORAL at 02:41

## 2017-03-21 RX ADMIN — METHYLPREDNISOLONE SODIUM SUCCINATE 40 MG: 40 INJECTION, POWDER, FOR SOLUTION INTRAMUSCULAR; INTRAVENOUS at 06:44

## 2017-03-21 RX ADMIN — Medication 2 MG: at 15:53

## 2017-03-21 RX ADMIN — HEPARIN SODIUM 5000 UNITS: 5000 INJECTION, SOLUTION INTRAVENOUS; SUBCUTANEOUS at 12:50

## 2017-03-21 RX ADMIN — BUDESONIDE 500 MCG: 0.5 SUSPENSION RESPIRATORY (INHALATION) at 08:32

## 2017-03-21 RX ADMIN — ATORVASTATIN CALCIUM 80 MG: 40 TABLET, FILM COATED ORAL at 09:59

## 2017-03-21 RX ADMIN — ALPRAZOLAM 1 MG: 0.5 TABLET ORAL at 16:19

## 2017-03-21 RX ADMIN — ASPIRIN 81 MG: 81 TABLET, CHEWABLE ORAL at 09:59

## 2017-03-21 RX ADMIN — HYDROCODONE BITARTRATE AND ACETAMINOPHEN 1 TABLET: 10; 325 TABLET ORAL at 19:50

## 2017-03-21 RX ADMIN — GUAIFENESIN 1200 MG: 600 TABLET, EXTENDED RELEASE ORAL at 09:59

## 2017-03-21 RX ADMIN — HYDROCODONE BITARTRATE AND ACETAMINOPHEN 1 TABLET: 10; 325 TABLET ORAL at 06:44

## 2017-03-21 RX ADMIN — LOSARTAN POTASSIUM 25 MG: 25 TABLET, FILM COATED ORAL at 09:59

## 2017-03-21 RX ADMIN — PANTOPRAZOLE SODIUM 40 MG: 40 TABLET, DELAYED RELEASE ORAL at 06:44

## 2017-03-21 RX ADMIN — DOXYCYCLINE 100 MG: 100 INJECTION, POWDER, LYOPHILIZED, FOR SOLUTION INTRAVENOUS at 15:57

## 2017-03-21 RX ADMIN — LEVALBUTEROL HYDROCHLORIDE 1.25 MG: 1.25 SOLUTION RESPIRATORY (INHALATION) at 08:31

## 2017-03-21 RX ADMIN — ALPRAZOLAM 1 MG: 0.5 TABLET ORAL at 10:06

## 2017-03-21 RX ADMIN — Medication 2 MG: at 06:44

## 2017-03-21 RX ADMIN — METOPROLOL SUCCINATE 25 MG: 25 TABLET, EXTENDED RELEASE ORAL at 09:59

## 2017-03-21 NOTE — PROGRESS NOTES
Able to place CPAP on and turn on. Reviewed importance of use for airway and heart benefits. Wore the CPAP for about 10 minutes and took off stating it made him feel like he was suffocating. Encouraged to wear, stated \"I will put it back on later. \"

## 2017-03-21 NOTE — PROGRESS NOTES
PT note:     Pt up in bathroom brushing teeth upon contact. PT attempted treatment this afternoon but pt declined due to not being in the right mental state due to his grandmother's  today. He requested to hold treatment until tomorrow. Pt reports he has been doing the exercises in his room. PT will check back tomorrow or as schedule allows.     Zabrina Phan, PT

## 2017-03-21 NOTE — PROGRESS NOTES
[facility] CARDIOLOGY PROGRESS NOTE           3/21/2017 8:28 AM    Admit Date: 3/14/2017      Subjective:   Patient denies any chest pain. Still with significant dyspnea and upper airway congestion. Awaiting pulmonary plans in regard to bronchoscopy. ROS:  Cardiovascular:  As noted above    Objective:      Vitals:    03/20/17 2002 03/20/17 2142 03/21/17 0149 03/21/17 0548   BP:  (!) 152/96 122/75 128/86   Pulse:  93 96 90   Resp:  18 18 18   Temp:  97.2 °F (36.2 °C) 97.4 °F (36.3 °C) 97.6 °F (36.4 °C)   SpO2: 97% 99% 100% 98%   Weight:    86.2 kg (190 lb)   Height:           Physical Exam:  General-No Acute Distress  Neck- supple, moderate JVD  CV- regular rate and rhythm no MRG  Lung- coarse upper airway congestion. Abd- soft, nontender, nondistended  Ext- no edema bilaterally. Skin- warm and dry      Data Review:   Recent Labs      03/21/17   0500  03/20/17   0430  03/19/17   0513   NA  140  140  141   K  4.1  4.0  4.2   MG  2.0  2.2  1.5*   BUN  19  23  20   CREA  1.14  1.18  1.14   GLU  160*  142*  138*   WBC   --    --   8.9   HGB   --    --   11.4*   HCT   --    --   34.2*   PLT   --    --   258      No results found for: KRISHNA Andrade    Echo (3/15/17):  -  Left ventricle: The ventricle was mildly dilated. Systolic function was severely reduced. Ejection fraction was estimated to be 15 %. There was   Severe diffuse hypokinesis with regional variations. There was mild concentric hypertrophy. -  Right ventricle: Systolic function was reduced. Destiney Clemons systemic arteries: The root exhibited mild dilatation. -  Pericardium: A trivial pericardial effusion was identified. Assessment/Plan:     Principal Problem:    Acute on chronic systolic heart failure ([address] (3/14/2017)  Severe LV dysfunction. EF 15%. On toprol, cozaar and aldactone. Appears compensated from volume standpoint. Difficult to assess with pulmonary airway congestion.   Check BNP in AM.  If increasing may need daily lasix. Active Problems:    ICD (implantable cardioverter-defibrillator) in place (4/22/2011)  Noted      HTN (hypertension) (11/29/2011)  See above      Elevated troponin (3/14/2017)  Significantly tachycardic on arrival.  Cath 3/16 with mild CAD. Suspect supply/demand imbalance with sustained tachycardia in 130-140s. Planned for cath but unable to lie flat. Assess daily. Dyspnea  Appreciate pulmonary input. Await input in regards to bronchoscopy. On antibiotics and steroids.            Anitra Leavitt MD  3/21/2017 8:28 AM

## 2017-03-21 NOTE — PROGRESS NOTES
Problem: Nutrition Deficit  Goal: *Optimize nutritional status  Nutrition:  Assessment based on LOS. Assessment:  Anthropometrics:              Ht - 5'11\", wgt - 86.2 kg (standing scale 3/21/17 telemetry), BMI 26.5 c/w \"overweight\", edema - none reported. Macronutrient Needs:  Estimated calorie needs - 5538-8672 johnathon/day (20-22 johnathon/kg/day) (110% IBW)  Estimated protein needs - 78-94 gm pro/day (1-1.2 gm pro/kgIBW/day) (GFR >60 ml/min)  Intake/Comparative Standards: This patient's average intake of cardiac diet for the past 2 recorded days/4 meals: 100%. This potentially meets 100% of calorie and 100% of protein goals. Diet:              Cardiac  Pertinent Medications:              Solumedrol, Aldactone. Food/Nutrition History:              The patient presents with no acute nutrition risk factors based on the nursing admission malnutrition screen. He reports an increased appetite with the start of steroids which he welcomed since he mentioned that his intake had dropped off in recent months due to \"things going on\" in his life. Diagnosis (Nutrition):  No nutrition diagnosis at this time. Intervention:  Meals and Snacks: Cardiac. The patient has a copy of the cardiac menu and uses it to order his meals. Monitoring/Evaluation:   Monitor clinical course, POC, oral intake. Follow-up based on standards of care or if a need arises. Wes Wells.  [facility]  [phone]

## 2017-03-21 NOTE — PROGRESS NOTES
Avni Cespedes  Admission Date: 3/14/2017             Daily Progress Note: 3/21/2017    The patient's chart is reviewed and the patient is discussed with the staff. 45 yo male with a history of chronic back pain, anxiety, depression, non-obstructive CAD, HTN, HL, chronic sCHF, non-ischemic cardiomyopathy s/p ICD placement (EF <20%). Son traumatically killed in January. Presented with increase HR, sob, and cough. He admitted to non-compliance with medications. CT chest negative for PE. Cardiology admitted with acute on chronic sCHF with demand ischemia. [facility] planned with patient able to lie flat. We were consulted for sob. Patient was started on nebs, mucinex, and short course of abx. Subjective:     Remains on room air. O2 sat 98%  Ongoing cough with green sputum and audible breathing with congestion. Stated that he wore the CPAP a few hours overnight and some yesterday without improvement. Per RT, patient worse CPAP 10 min overnight and removed it after feeling as if he was suffocating.      Current Facility-Administered Medications   Medication Dose Route Frequency    losartan (COZAAR) tablet 25 mg  25 mg Oral DAILY    spironolactone (ALDACTONE) tablet 25 mg  25 mg Oral DAILY    HYDROcodone-acetaminophen (NORCO)  mg tablet 1 Tab  1 Tab Oral Q4H    nitroglycerin (NITROSTAT) tablet 0.4 mg  0.4 mg SubLINGual PRN    morphine injection 2 mg  2 mg IntraVENous Q6H PRN    aspirin chewable tablet 81 mg  81 mg Oral DAILY    heparin (porcine) injection 5,000 Units  5,000 Units SubCUTAneous Q12H    methylPREDNISolone (PF) (SOLU-MEDROL) injection 40 mg  40 mg IntraVENous Q6H    levalbuterol (XOPENEX) nebulizer soln 1.25 mg/3 mL  1.25 mg Nebulization Q6H RT    budesonide (PULMICORT) 500 mcg/2 ml nebulizer suspension  500 mcg Nebulization BID RT    doxycycline (VIBRAMYCIN) 100 mg in 0.9% sodium chloride (MBP/ADV) 100 mL  100 mg IntraVENous Q12H    guaiFENesin ER (MUCINEX) tablet 1,200 mg 1,200 mg Oral BID    metoprolol succinate (TOPROL-XL) XL tablet 25 mg  25 mg Oral DAILY    ALPRAZolam (XANAX) tablet 1 mg  1 mg Oral TID PRN    atorvastatin (LIPITOR) tablet 80 mg  80 mg Oral DAILY    pantoprazole (PROTONIX) tablet 40 mg  40 mg Oral ACB    ondansetron (ZOFRAN) injection 4 mg  4 mg IntraVENous Q6H PRN       Review of Systems  Constitutional: negative for fever, chills, sweats  Cardiovascular: negative for chest pain, palpitations, syncope, edema  Gastrointestinal:  negative for dysphagia, reflux, vomiting, diarrhea, abdominal pain, or melena  Neurologic:  negative for focal weakness, numbness, headache    Objective:     Vitals:    03/21/17 0149 03/21/17 0548 03/21/17 0833 03/21/17 0857   BP: 122/75 128/86  (!) 135/91   Pulse: 96 90  81   Resp: 18 18 18   Temp: 97.4 °F (36.3 °C) 97.6 °F (36.4 °C)  96.3 °F (35.7 °C)   SpO2: 100% 98% 99% 98%   Weight:  190 lb (86.2 kg)     Height:         Intake and Output:   03/19 1901 - 03/21 0700  In: 1100 [P.O.:1100]  Out: 1350 [Urine:1350]       Physical Exam:   Constitution:  the patient is well developed and in no acute distress, on room air   EENMT:  Sclera clear, pupils equal, oral mucosa moist  Respiratory: rhonchi and wheeze louder in upper airways and neck. Cardiovascular:  RRR without M,G,R  Gastrointestinal: soft and non-tender; with positive bowel sounds. Musculoskeletal: warm without cyanosis. There is no lower leg edema. Skin:  no jaundice or rashes, no open wounds   Neurologic: no gross neuro deficits     Psychiatric:  alert and oriented x 3     CHEST XRAY 3/16/17:       LAB  No results for input(s): GLUCPOC in the last 72 hours.     No lab exists for component: [place]   Recent Labs      03/19/17   0513   WBC  8.9   HGB  11.4*   HCT  34.2*   PLT  258     Recent Labs      03/21/17   0500  03/20/17   0430  03/19/17   0513  03/18/17   1125   NA  140  140  141   --    K  4.1  4.0  4.2   --    CL  106  106  105   --    CO2  25  24  26   --    GLU 160*  142*  138*   --    BUN  19  23  20   --    CREA  1.14  1.18  1.14   --    MG  2.0  2.2  1.5*   --    CA  8.3  8.3  8.7   --    TROIQ   --    --    --   0.70*     No results for input(s): PH, PCO2, PO2, HCO3 in the last 72 hours. No results for input(s): LCAD, LAC in the last 72 hours. Assessment:  (Medical Decision Making)     Hospital Problems  Date Reviewed: 3/21/2017          Codes Class Noted POA    * (Principal)Acute on chronic systolic heart failure ([facility] [address] ICD-10-CM: I50.23  ICD-9-CM: 428.23  3/14/2017 Yes    Ongoing, recent bnp 163     Tachycardia ICD-10-CM: R00.0  ICD-9-CM: 785.0  3/14/2017 Yes    Rate controlled     Elevated troponin ICD-10-CM: R79.89  ICD-9-CM: 790.6  3/14/2017 Yes    Needs [facility]     Shortness of breath ICD-10-CM: R06.02  ICD-9-CM: 786.05  3/25/2016 Yes    Ongoing with ambulation     Non-ischemic cardiomyopathy (HCC) (Chronic) ICD-10-CM: I42.9  ICD-9-CM: 425.4  3/24/2016 Yes    Chronic     HTN (hypertension) (Chronic) ICD-10-CM: I10  ICD-9-CM: 401.9  11/29/2011 Yes        ICD (implantable cardioverter-defibrillator) in place (Chronic) ICD-10-CM: Z95.810  ICD-9-CM: V45.02  4/22/2011 Yes    Unchanged           Plan:  (Medical Decision Making)     --Xopenex, Pulmicort, Mucinex  --Continue doxycycline. --Solu-Medrol 40mg IV q 6 hours   --Continue aldactone  --Further plan per Dr. Aries Shipman. Bronch vs CT neck     More than 50% of the time documented was spent in face-to-face contact with the patient and in the care of the patient on the floor/unit where the patient is located. Rocio Pump, NP    Lungs:  Probable stridor  Heart:  RRR with no Murmur/Rubs/Gallops    Additional Comments: Will set up bronch to evaluate ,  heliox    I have spoken with and examined the patient. I agree with the above assessment and plan as documented.     Carnella Aase, MD

## 2017-03-21 NOTE — PROGRESS NOTES
[facility] Pt resting in bed. Will monitor.

## 2017-03-22 LAB
ANION GAP BLD CALC-SCNC: 9 MMOL/L (ref 7–16)
BNP SERPL-MCNC: 64 PG/ML
BUN SERPL-MCNC: 23 MG/DL (ref 6–23)
CALCIUM SERPL-MCNC: 8.1 MG/DL (ref 8.3–10.4)
CHLORIDE SERPL-SCNC: 109 MMOL/L (ref 98–107)
CO2 SERPL-SCNC: 24 MMOL/L (ref 21–32)
CREAT SERPL-MCNC: 1.15 MG/DL (ref 0.8–1.5)
GLUCOSE SERPL-MCNC: 138 MG/DL (ref 65–100)
MAGNESIUM SERPL-MCNC: 1.8 MG/DL (ref 1.8–2.4)
POTASSIUM SERPL-SCNC: 4.1 MMOL/L (ref 3.5–5.1)
SODIUM SERPL-SCNC: 142 MMOL/L (ref 136–145)

## 2017-03-22 PROCEDURE — C1769 GUIDE WIRE: HCPCS

## 2017-03-22 PROCEDURE — 74011000250 HC RX REV CODE- 250: Performed by: INTERNAL MEDICINE

## 2017-03-22 PROCEDURE — C1894 INTRO/SHEATH, NON-LASER: HCPCS

## 2017-03-22 PROCEDURE — 80048 BASIC METABOLIC PNL TOTAL CA: CPT | Performed by: NURSE PRACTITIONER

## 2017-03-22 PROCEDURE — 99153 MOD SED SAME PHYS/QHP EA: CPT

## 2017-03-22 PROCEDURE — 76040000019: Performed by: INTERNAL MEDICINE

## 2017-03-22 PROCEDURE — 74011250637 HC RX REV CODE- 250/637: Performed by: NURSE PRACTITIONER

## 2017-03-22 PROCEDURE — 77030004559 HC CATH ANGI DX SUPT CARD -B

## 2017-03-22 PROCEDURE — 74011250637 HC RX REV CODE- 250/637: Performed by: INTERNAL MEDICINE

## 2017-03-22 PROCEDURE — 77030004534 HC CATH ANGI DX INFN CARD -A

## 2017-03-22 PROCEDURE — 74011250636 HC RX REV CODE- 250/636: Performed by: INTERNAL MEDICINE

## 2017-03-22 PROCEDURE — 83735 ASSAY OF MAGNESIUM: CPT | Performed by: NURSE PRACTITIONER

## 2017-03-22 PROCEDURE — 4A023N7 MEASUREMENT OF CARDIAC SAMPLING AND PRESSURE, LEFT HEART, PERCUTANEOUS APPROACH: ICD-10-PCS | Performed by: INTERNAL MEDICINE

## 2017-03-22 PROCEDURE — 36415 COLL VENOUS BLD VENIPUNCTURE: CPT | Performed by: NURSE PRACTITIONER

## 2017-03-22 PROCEDURE — 93458 L HRT ARTERY/VENTRICLE ANGIO: CPT

## 2017-03-22 PROCEDURE — B2151ZZ FLUOROSCOPY OF LEFT HEART USING LOW OSMOLAR CONTRAST: ICD-10-PCS | Performed by: INTERNAL MEDICINE

## 2017-03-22 PROCEDURE — 65660000000 HC RM CCU STEPDOWN

## 2017-03-22 PROCEDURE — 74011636320 HC RX REV CODE- 636/320: Performed by: INTERNAL MEDICINE

## 2017-03-22 PROCEDURE — 77030029997 HC DEV COM RDL R BND TELE -B

## 2017-03-22 PROCEDURE — 74011250636 HC RX REV CODE- 250/636

## 2017-03-22 PROCEDURE — 77030012699 HC VLV SUC CNTRL OCOA -A: Performed by: INTERNAL MEDICINE

## 2017-03-22 PROCEDURE — 31622 DX BRONCHOSCOPE/WASH: CPT | Performed by: INTERNAL MEDICINE

## 2017-03-22 PROCEDURE — B2111ZZ FLUOROSCOPY OF MULTIPLE CORONARY ARTERIES USING LOW OSMOLAR CONTRAST: ICD-10-PCS | Performed by: INTERNAL MEDICINE

## 2017-03-22 PROCEDURE — 99153 MOD SED SAME PHYS/QHP EA: CPT | Performed by: INTERNAL MEDICINE

## 2017-03-22 PROCEDURE — 83880 ASSAY OF NATRIURETIC PEPTIDE: CPT | Performed by: INTERNAL MEDICINE

## 2017-03-22 PROCEDURE — 99232 SBSQ HOSP IP/OBS MODERATE 35: CPT | Performed by: INTERNAL MEDICINE

## 2017-03-22 PROCEDURE — 77030015766

## 2017-03-22 PROCEDURE — 94760 N-INVAS EAR/PLS OXIMETRY 1: CPT

## 2017-03-22 PROCEDURE — 99152 MOD SED SAME PHYS/QHP 5/>YRS: CPT

## 2017-03-22 PROCEDURE — 99152 MOD SED SAME PHYS/QHP 5/>YRS: CPT | Performed by: INTERNAL MEDICINE

## 2017-03-22 PROCEDURE — 74011000258 HC RX REV CODE- 258: Performed by: INTERNAL MEDICINE

## 2017-03-22 PROCEDURE — 0BJ08ZZ INSPECTION OF TRACHEOBRONCHIAL TREE, VIA NATURAL OR ARTIFICIAL OPENING ENDOSCOPIC: ICD-10-PCS | Performed by: INTERNAL MEDICINE

## 2017-03-22 PROCEDURE — 94640 AIRWAY INHALATION TREATMENT: CPT

## 2017-03-22 RX ORDER — LIDOCAINE HYDROCHLORIDE 40 MG/ML
SOLUTION TOPICAL
Status: COMPLETED | OUTPATIENT
Start: 2017-03-22 | End: 2017-03-22

## 2017-03-22 RX ORDER — SODIUM CHLORIDE 9 MG/ML
75 INJECTION, SOLUTION INTRAVENOUS CONTINUOUS
Status: DISCONTINUED | OUTPATIENT
Start: 2017-03-22 | End: 2017-03-23

## 2017-03-22 RX ORDER — SODIUM CHLORIDE 0.9 % (FLUSH) 0.9 %
5-10 SYRINGE (ML) INJECTION EVERY 8 HOURS
Status: DISCONTINUED | OUTPATIENT
Start: 2017-03-22 | End: 2017-03-22 | Stop reason: HOSPADM

## 2017-03-22 RX ORDER — MIDAZOLAM HYDROCHLORIDE 1 MG/ML
1-6 INJECTION, SOLUTION INTRAMUSCULAR; INTRAVENOUS
Status: DISCONTINUED | OUTPATIENT
Start: 2017-03-22 | End: 2017-03-22

## 2017-03-22 RX ORDER — ACETAMINOPHEN 325 MG/1
650 TABLET ORAL
Status: DISCONTINUED | OUTPATIENT
Start: 2017-03-22 | End: 2017-03-25 | Stop reason: HOSPADM

## 2017-03-22 RX ORDER — HYDROCODONE BITARTRATE AND ACETAMINOPHEN 5; 325 MG/1; MG/1
1 TABLET ORAL
Status: DISCONTINUED | OUTPATIENT
Start: 2017-03-22 | End: 2017-03-25 | Stop reason: HOSPADM

## 2017-03-22 RX ORDER — FUROSEMIDE 40 MG/1
40 TABLET ORAL DAILY
Status: DISCONTINUED | OUTPATIENT
Start: 2017-03-23 | End: 2017-03-25 | Stop reason: HOSPADM

## 2017-03-22 RX ORDER — LIDOCAINE HYDROCHLORIDE 20 MG/ML
1-20 INJECTION, SOLUTION INFILTRATION; PERINEURAL ONCE
Status: COMPLETED | OUTPATIENT
Start: 2017-03-22 | End: 2017-03-22

## 2017-03-22 RX ORDER — SODIUM CHLORIDE 0.9 % (FLUSH) 0.9 %
5-10 SYRINGE (ML) INJECTION AS NEEDED
Status: DISCONTINUED | OUTPATIENT
Start: 2017-03-22 | End: 2017-03-25 | Stop reason: HOSPADM

## 2017-03-22 RX ORDER — HEPARIN SODIUM 200 [USP'U]/100ML
25 INJECTION, SOLUTION INTRAVENOUS CONTINUOUS
Status: DISCONTINUED | OUTPATIENT
Start: 2017-03-22 | End: 2017-03-22

## 2017-03-22 RX ORDER — SODIUM CHLORIDE 0.9 % (FLUSH) 0.9 %
5-10 SYRINGE (ML) INJECTION EVERY 8 HOURS
Status: DISCONTINUED | OUTPATIENT
Start: 2017-03-22 | End: 2017-03-25 | Stop reason: HOSPADM

## 2017-03-22 RX ORDER — FENTANYL CITRATE 50 UG/ML
50 INJECTION, SOLUTION INTRAMUSCULAR; INTRAVENOUS
Status: DISCONTINUED | OUTPATIENT
Start: 2017-03-22 | End: 2017-03-22 | Stop reason: HOSPADM

## 2017-03-22 RX ORDER — ONDANSETRON 2 MG/ML
4 INJECTION INTRAMUSCULAR; INTRAVENOUS
Status: DISCONTINUED | OUTPATIENT
Start: 2017-03-22 | End: 2017-03-25 | Stop reason: HOSPADM

## 2017-03-22 RX ORDER — MIDAZOLAM HYDROCHLORIDE 1 MG/ML
.25-5 INJECTION, SOLUTION INTRAMUSCULAR; INTRAVENOUS
Status: DISCONTINUED | OUTPATIENT
Start: 2017-03-22 | End: 2017-03-22 | Stop reason: HOSPADM

## 2017-03-22 RX ORDER — FENTANYL CITRATE 50 UG/ML
25-100 INJECTION, SOLUTION INTRAMUSCULAR; INTRAVENOUS
Status: DISCONTINUED | OUTPATIENT
Start: 2017-03-22 | End: 2017-03-22

## 2017-03-22 RX ORDER — SODIUM CHLORIDE 9 MG/ML
1000 INJECTION, SOLUTION INTRAVENOUS CONTINUOUS
Status: DISCONTINUED | OUTPATIENT
Start: 2017-03-22 | End: 2017-03-22 | Stop reason: HOSPADM

## 2017-03-22 RX ORDER — LIDOCAINE HYDROCHLORIDE 20 MG/ML
JELLY TOPICAL
Status: COMPLETED | OUTPATIENT
Start: 2017-03-22 | End: 2017-03-22

## 2017-03-22 RX ADMIN — DOXYCYCLINE 100 MG: 100 INJECTION, POWDER, LYOPHILIZED, FOR SOLUTION INTRAVENOUS at 06:34

## 2017-03-22 RX ADMIN — MIDAZOLAM HYDROCHLORIDE 1 MG: 1 INJECTION, SOLUTION INTRAMUSCULAR; INTRAVENOUS at 12:11

## 2017-03-22 RX ADMIN — Medication 10 ML: at 23:39

## 2017-03-22 RX ADMIN — PANTOPRAZOLE SODIUM 40 MG: 40 TABLET, DELAYED RELEASE ORAL at 10:30

## 2017-03-22 RX ADMIN — Medication 2 MG: at 00:44

## 2017-03-22 RX ADMIN — SODIUM CHLORIDE 1000 ML: 900 INJECTION, SOLUTION INTRAVENOUS at 09:05

## 2017-03-22 RX ADMIN — GUAIFENESIN 1200 MG: 600 TABLET, EXTENDED RELEASE ORAL at 15:44

## 2017-03-22 RX ADMIN — ALPRAZOLAM 1 MG: 0.5 TABLET ORAL at 10:37

## 2017-03-22 RX ADMIN — MIDAZOLAM HYDROCHLORIDE 2 MG: 1 INJECTION, SOLUTION INTRAMUSCULAR; INTRAVENOUS at 09:26

## 2017-03-22 RX ADMIN — HEPARIN SODIUM 5000 UNITS: 5000 INJECTION, SOLUTION INTRAVENOUS; SUBCUTANEOUS at 10:36

## 2017-03-22 RX ADMIN — METHYLPREDNISOLONE SODIUM SUCCINATE 40 MG: 40 INJECTION, POWDER, FOR SOLUTION INTRAMUSCULAR; INTRAVENOUS at 00:44

## 2017-03-22 RX ADMIN — LOSARTAN POTASSIUM 25 MG: 25 TABLET, FILM COATED ORAL at 10:30

## 2017-03-22 RX ADMIN — HYDROCODONE BITARTRATE AND ACETAMINOPHEN 1 TABLET: 10; 325 TABLET ORAL at 20:42

## 2017-03-22 RX ADMIN — LIDOCAINE HYDROCHLORIDE 60 MG: 20 INJECTION, SOLUTION INFILTRATION; PERINEURAL at 12:09

## 2017-03-22 RX ADMIN — Medication 5 ML: at 14:16

## 2017-03-22 RX ADMIN — FENTANYL CITRATE 50 MCG: 50 INJECTION, SOLUTION INTRAMUSCULAR; INTRAVENOUS at 09:21

## 2017-03-22 RX ADMIN — HYDROCODONE BITARTRATE AND ACETAMINOPHEN 1 TABLET: 10; 325 TABLET ORAL at 10:30

## 2017-03-22 RX ADMIN — IOPAMIDOL 130 ML: 755 INJECTION, SOLUTION INTRAVENOUS at 12:30

## 2017-03-22 RX ADMIN — HEPARIN SODIUM 25 ML/HR: 200 INJECTION, SOLUTION INTRAVENOUS at 11:59

## 2017-03-22 RX ADMIN — MIDAZOLAM HYDROCHLORIDE 2 MG: 1 INJECTION, SOLUTION INTRAMUSCULAR; INTRAVENOUS at 09:21

## 2017-03-22 RX ADMIN — DOXYCYCLINE 100 MG: 100 INJECTION, POWDER, LYOPHILIZED, FOR SOLUTION INTRAVENOUS at 14:16

## 2017-03-22 RX ADMIN — LIDOCAINE HYDROCHLORIDE 7 ML: 40 SOLUTION TOPICAL at 09:30

## 2017-03-22 RX ADMIN — ALPRAZOLAM 1 MG: 0.5 TABLET ORAL at 23:48

## 2017-03-22 RX ADMIN — HEPARIN SODIUM 25 ML/HR: 200 INJECTION, SOLUTION INTRAVENOUS at 11:51

## 2017-03-22 RX ADMIN — LEVALBUTEROL HYDROCHLORIDE 1.25 MG: 1.25 SOLUTION RESPIRATORY (INHALATION) at 07:45

## 2017-03-22 RX ADMIN — FENTANYL CITRATE 50 MCG: 50 INJECTION, SOLUTION INTRAMUSCULAR; INTRAVENOUS at 12:03

## 2017-03-22 RX ADMIN — ASPIRIN 81 MG: 81 TABLET, CHEWABLE ORAL at 10:30

## 2017-03-22 RX ADMIN — Medication 10 ML: at 23:38

## 2017-03-22 RX ADMIN — FENTANYL CITRATE 50 MCG: 50 INJECTION, SOLUTION INTRAMUSCULAR; INTRAVENOUS at 09:23

## 2017-03-22 RX ADMIN — HEPARIN SODIUM 5000 UNITS: 5000 INJECTION, SOLUTION INTRAVENOUS; SUBCUTANEOUS at 23:31

## 2017-03-22 RX ADMIN — HEPARIN SODIUM 2 ML: 10000 INJECTION, SOLUTION INTRAVENOUS; SUBCUTANEOUS at 12:09

## 2017-03-22 RX ADMIN — METHYLPREDNISOLONE SODIUM SUCCINATE 40 MG: 40 INJECTION, POWDER, FOR SOLUTION INTRAMUSCULAR; INTRAVENOUS at 06:36

## 2017-03-22 RX ADMIN — ATORVASTATIN CALCIUM 80 MG: 40 TABLET, FILM COATED ORAL at 10:30

## 2017-03-22 RX ADMIN — HYDROCODONE BITARTRATE AND ACETAMINOPHEN 1 TABLET: 10; 325 TABLET ORAL at 15:44

## 2017-03-22 RX ADMIN — FENTANYL CITRATE 50 MCG: 50 INJECTION, SOLUTION INTRAMUSCULAR; INTRAVENOUS at 09:03

## 2017-03-22 RX ADMIN — MIDAZOLAM HYDROCHLORIDE 2 MG: 1 INJECTION, SOLUTION INTRAMUSCULAR; INTRAVENOUS at 12:03

## 2017-03-22 RX ADMIN — MIDAZOLAM HYDROCHLORIDE 2 MG: 1 INJECTION, SOLUTION INTRAMUSCULAR; INTRAVENOUS at 09:03

## 2017-03-22 RX ADMIN — GUAIFENESIN 1200 MG: 600 TABLET, EXTENDED RELEASE ORAL at 10:30

## 2017-03-22 RX ADMIN — MIDAZOLAM HYDROCHLORIDE 1 MG: 1 INJECTION, SOLUTION INTRAMUSCULAR; INTRAVENOUS at 09:24

## 2017-03-22 RX ADMIN — FENTANYL CITRATE 50 MCG: 50 INJECTION, SOLUTION INTRAMUSCULAR; INTRAVENOUS at 09:27

## 2017-03-22 RX ADMIN — LIDOCAINE HYDROCHLORIDE 1 ML: 20 JELLY TOPICAL at 09:29

## 2017-03-22 RX ADMIN — HYDROCODONE BITARTRATE AND ACETAMINOPHEN 1 TABLET: 10; 325 TABLET ORAL at 06:36

## 2017-03-22 RX ADMIN — BUDESONIDE 500 MCG: 0.5 SUSPENSION RESPIRATORY (INHALATION) at 07:45

## 2017-03-22 RX ADMIN — METOPROLOL SUCCINATE 25 MG: 25 TABLET, EXTENDED RELEASE ORAL at 10:30

## 2017-03-22 RX ADMIN — SPIRONOLACTONE 25 MG: 25 TABLET, FILM COATED ORAL at 10:30

## 2017-03-22 NOTE — PROGRESS NOTES
TRANSFER - OUT REPORT:    Verbal report given to KADEN Cho on Brenda Buck  being transferred to [address] [place] [address] for ordered procedure       Report consisted of patients Situation, Background, Assessment and Recommendations(SBAR). Information from the following report(s) SBAR, Kardex, Procedure Summary, Intake/Output, MAR, Accordion and Recent Results was reviewed with the receiving nurse. Opportunity for questions and clarification was provided.

## 2017-03-22 NOTE — PROGRESS NOTES
TRANSFER - IN REPORT:    Verbal report received from [facility] on Princess Hopping  being received from [address] for ordered procedure      Report consisted of patients Situation, Background, Assessment and   Recommendations(SBAR). Information from the following report(s) SBAR and Kardex was reviewed with the receiving RRT. Opportunity for questions and clarification was provided.       A

## 2017-03-22 NOTE — PROGRESS NOTES
Verbal bedside report given to Javon Dozier oncoming RN. Patient's situation, background, assessment and recommendations provided. Opportunity for questions provided. Oncoming RN assumed care of patient. Right radial cath site visualized.

## 2017-03-22 NOTE — PROGRESS NOTES
Removed Morphine 2mg from pyxis machine because patient was complaining of pain. When arrived in room, patient states he does not want the Morphine and wishes to wait until it comes time for his Norco again. Morphine, unopened, returned to pyxis machine.

## 2017-03-22 NOTE — PROGRESS NOTES
Verbal and bedside report received from Sonny Walker, [address] radial visualized, dressing c/d/i.

## 2017-03-22 NOTE — PROGRESS NOTES
Problem: Unstable angina/NSTEMI: Day 2  Goal: Nutrition/Diet  Outcome: Progressing Towards Goal  NPO for heart cath. Goal: Treatments/Interventions/Procedures  Outcome: Progressing Towards Goal  Cardiac catheterization scheduled for today. Goal: Psychosocial  Outcome: Progressing Towards Goal  Family support at bedside. Problem: Activity Intolerance  Goal: *Oxygen saturation during activity within specified parameters  Outcome: Progressing Towards Goal  Scheduled for bronchoscopy today.

## 2017-03-22 NOTE — PROCEDURES
Brief Cardiac Procedure Note    Patient: Boston Worley MRN: [id number]  SSN: [id number]    YOB: 1965  Age: 46 y.o. Sex: male      Date of Procedure: 3/22/2017     Pre-procedure Diagnosis: Chest pain    Post-procedure Diagnosis: Non-cardiac chest pain    Procedure: Left Heart Catheterization    Brief Description of Procedure: As above    Performed By: Yasmine Greco MD     Assistants: None    Anesthesia: Moderate Sedation    Estimated Blood Loss: Less than 10 mL      Specimens: None    Implants: None    Findings: Mild non-obstructive CAD. Complications: None    Recommendations: Continue medical therapy.     Signed By: Yasmine Greco MD     March 22, 2017

## 2017-03-22 NOTE — PROGRESS NOTES
[facility] CARDIOLOGY PROGRESS NOTE           3/22/2017 8:28 AM    Admit Date: 3/14/2017      Subjective:   Patient denies any chest pain. Still with significant dyspnea and upper airway congestion. Awaiting  Bronchoscopy later today. BNP 64    ROS:  Cardiovascular:  As noted above    Objective:      Vitals:    03/22/17 0041 03/22/17 0623 03/22/17 0735 03/22/17 0747   BP: 135/90 126/82 (!) 142/94    Pulse: (!) 101 85 83    Resp: 18 18 17    Temp: 98 °F (36.7 °C) 98 °F (36.7 °C) 96.8 °F (36 °C)    SpO2: 98% 99% 92% 99%   Weight:  87.7 kg (193 lb 4.8 oz)     Height:           Physical Exam:  General-No Acute Distress  Neck- supple, moderate JVD  CV- regular rate and rhythm no MRG  Lung- coarse upper airway congestion. Abd- soft, nontender, nondistended  Ext- no edema bilaterally. Skin- warm and dry      Data Review:   Recent Labs      03/22/17   0550  03/21/17   0500   NA  142  140   K  4.1  4.1   MG  1.8  2.0   BUN  23  19   CREA  1.15  1.14   GLU  138*  160*      No results found for: KRISHNA Andrade    Echo (3/15/17):  -  Left ventricle: The ventricle was mildly dilated. Systolic function was severely reduced. Ejection fraction was estimated to be 15 %. There was   Severe diffuse hypokinesis with regional variations. There was mild concentric hypertrophy. -  Right ventricle: Systolic function was reduced. Destiney Clemons systemic arteries: The root exhibited mild dilatation. -  Pericardium: A trivial pericardial effusion was identified. Assessment/Plan:     Principal Problem:    Acute on chronic systolic heart failure ([address] (3/14/2017)  Severe LV dysfunction. EF 15%. On toprol, cozaar and aldactone. Appears compensated from volume standpoint with normal BNP. Difficult to assess with pulmonary airway congestion.       Active Problems:    ICD (implantable cardioverter-defibrillator) in place (4/22/2011)  Noted      HTN (hypertension) (11/29/2011)  See above      Elevated troponin (3/14/2017)  Significantly tachycardic on arrival.  Cath 3/16 with mild CAD. Suspect supply/demand imbalance with sustained tachycardia in 130-140s. Planned for cath but unable to lie flat. Assess daily. Dyspnea  Appreciate pulmonary input. Await input in regards to bronchoscopy. On antibiotics and steroids.            Basilia Squires MD  3/22/2017 8:28 AM

## 2017-03-22 NOTE — PROGRESS NOTES
PT Note:  Attempted PT, however, patient refused stating he's been out of room all morning and wants to eat. Will attempt another time/day as schedule permits.   A Dana Moran, PT
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
## 2017-03-22 NOTE — PROGRESS NOTES
Bedside and Verbal shift change report given to Silvia Meléndez RN (oncoming nurse) by Arash Hughes RN (offgoing nurse). Report included the following information SBAR, Kardex, Accordion and Recent Results.

## 2017-03-22 NOTE — INTERVAL H&P NOTE
H&P Update:  Beth Valenzuela was seen and examined. History and physical has been reviewed. The patient has been examined. There have been no significant clinical changes since the completion of the originally dated History and Physical.    Patient  For therapeutic bronchoscopy.       Signed By: Richi Buck MD     March 22, 2017 9:04 AM

## 2017-03-22 NOTE — PROCEDURES
PROCEDURE    Bronchoscopy with airway inspection    INDICATION   Stridor    EQUIPMENT:  Olympus T 180 Bronchhoscope. ANESTHESIA    Concious sedation with: Fentanyl 200 mcg IV; Versed 7mg IV; Lidocaine 200 mg to tracheo-bronchial tree and vocal cords    AIRWAY INSPECTION    After obtaining informed consent, using a bite block/, an Olympus T180 video bronchoscope was  introduced into the trachea through the vocal chords, without complication. RIGHT    LOCATION NORM/ABNORM DESCRIPTION   VOCAL CORDS NL Normal with normal adduction and abduction - symmetric movement and no obstructing lesions or deformities. TRACHEA NL The entire inspected airway was normal and free of occluding mucus, tumors,  secretions or foreign bodies   NYASIA NL    RMSB NL    RUL NL    BI NL    RML NL    SUP SEGM RLL NL    MED BASAL NL    ANTERIOR BASAL NL    LATERAL BASAL NL    POSTERIOR BASAL NL                                              LEFT    LOCATION NORMAL/ABNORMAL TYPE   LMSB NL    DEENA NL    LINGULA NL    SUPERIOR DIVISION NL    SUPERIOR SEG LLL NL    BENY-MEDIAL LLL NL    LATERAL LLL NL    POSTERIOR LLL NL        The procedure was completed  without complication and the patient tolerated the procedure well. EBL:   None    Recommendations:  Stop Pulmicort and solumedrol  Consider Psychological counseling  No contraindication to LHC(laying flat for procedure) from pulmonary stand point.   Arlet Walker MD

## 2017-03-22 NOTE — PROGRESS NOTES
TRANSFER - OUT REPORT:    Verbal report given to Dung Robles (RN) on Delia Powell  Being returned back to [facility] for routine progression of care       Report consisted of patients Situation, Background, Assessment and   Recommendations(SBAR). Information from the following report(s) Procedure Summary was reviewed with the receiving nurse. Opportunity for questions and clarification was provided.

## 2017-03-22 NOTE — PROGRESS NOTES
Jaena Crow  Admission Date: 3/14/2017             Daily Progress Note: 3/22/2017    The patient's chart is reviewed and the patient is discussed with the staff. 45 yo male with a history of chronic back pain, anxiety, depression, non-obstructive CAD, HTN, HL, chronic sCHF, non-ischemic cardiomyopathy s/p ICD placement (EF <20%). Son traumatically killed in January. Presented with increase HR, sob, and cough. He admitted to non-compliance with medications. CT chest negative for PE. Cardiology admitted with acute on chronic sCHF with demand ischemia. [facility] planned with patient able to lie flat. We were consulted for sob. Patient was started on nebs, mucinex, and short course of abx. Subjective:     Remains on room air. O2 sat 98%  Ongoing cough with green sputum and audible breathing with congestion. Stated that he wore the CPAP a few hours overnight and some yesterday without improvement. Per RT, patient worse CPAP 10 min overnight and removed it after feeling as if he was suffocating.      Current Facility-Administered Medications   Medication Dose Route Frequency    sodium chloride (NS) flush 5-10 mL  5-10 mL IntraVENous Q8H    sodium chloride (NS) flush 5-10 mL  5-10 mL IntraVENous PRN    midazolam (VERSED) injection 0.25-5 mg  0.25-5 mg IntraVENous Multiple    fentaNYL citrate (PF) injection 50 mcg  50 mcg IntraVENous Multiple    lidocaine (XYLOCAINE) 2 % jelly   Mouth/Throat ENDO ONCE    lidocaine (XYLOCAINE) 4 % (40 mg/mL) topical solution   TransTRACHeal ENDO ONCE    0.9% sodium chloride infusion 1,000 mL  1,000 mL IntraVENous CONTINUOUS    losartan (COZAAR) tablet 25 mg  25 mg Oral DAILY    spironolactone (ALDACTONE) tablet 25 mg  25 mg Oral DAILY    HYDROcodone-acetaminophen (NORCO)  mg tablet 1 Tab  1 Tab Oral Q4H    nitroglycerin (NITROSTAT) tablet 0.4 mg  0.4 mg SubLINGual PRN    morphine injection 2 mg  2 mg IntraVENous Q6H PRN    aspirin chewable tablet 81 mg 81 mg Oral DAILY    heparin (porcine) injection 5,000 Units  5,000 Units SubCUTAneous Q12H    methylPREDNISolone (PF) (SOLU-MEDROL) injection 40 mg  40 mg IntraVENous Q6H    levalbuterol (XOPENEX) nebulizer soln 1.25 mg/3 mL  1.25 mg Nebulization Q6H RT    budesonide (PULMICORT) 500 mcg/2 ml nebulizer suspension  500 mcg Nebulization BID RT    doxycycline (VIBRAMYCIN) 100 mg in 0.9% sodium chloride (MBP/ADV) 100 mL  100 mg IntraVENous Q12H    guaiFENesin ER (MUCINEX) tablet 1,200 mg  1,200 mg Oral BID    metoprolol succinate (TOPROL-XL) XL tablet 25 mg  25 mg Oral DAILY    ALPRAZolam (XANAX) tablet 1 mg  1 mg Oral TID PRN    atorvastatin (LIPITOR) tablet 80 mg  80 mg Oral DAILY    pantoprazole (PROTONIX) tablet 40 mg  40 mg Oral ACB    ondansetron (ZOFRAN) injection 4 mg  4 mg IntraVENous Q6H PRN       Review of Systems  Constitutional: negative for fever, chills, sweats  Cardiovascular: negative for chest pain, palpitations, syncope, edema  Gastrointestinal:  negative for dysphagia, reflux, vomiting, diarrhea, abdominal pain, or melena  Neurologic:  negative for focal weakness, numbness, headache    Objective:     Vitals:    03/22/17 0623 03/22/17 0735 03/22/17 0747 03/22/17 0851   BP: 126/82 (!) 142/94  (!) 143/101   Pulse: 85 83  79   Resp: 18 17  16   Temp: 98 °F (36.7 °C) 96.8 °F (36 °C)     SpO2: 99% 92% 99% 98%   Weight: 193 lb 4.8 oz (87.7 kg)      Height:         Intake and Output:   03/20 1901 - 03/22 0700  In: 800 [P.O.:800]  Out: 3500 [Urine:3500]       Physical Exam:   Constitution:  the patient is well developed and in no acute distress, on room air   EENMT:  Sclera clear, pupils equal, oral mucosa moist  Respiratory: rhonchi and wheeze louder in upper airways and neck again intermittent and seem to be volitional-disappear when patient distracted with other tasks or sleeping.   Cardiovascular:  RRR without M,G,R  Gastrointestinal: soft and non-tender; with positive bowel sounds. Musculoskeletal: warm without cyanosis. There is no lower leg edema. Skin:  no jaundice or rashes, no open wounds   Neurologic: no gross neuro deficits     Psychiatric:  alert and oriented x 3     CHEST XRAY 3/16/17:       LAB  No results for input(s): GLUCPOC in the last 72 hours. No lab exists for component: GLPOC   No results for input(s): WBC, HGB, HCT, PLT, INR, HGBEXT, HCTEXT, PLTEXT, HGBEXT, HCTEXT, PLTEXT in the last 72 hours. No lab exists for component: Kit Just  Recent Labs      03/22/17   0550  03/21/17   0500  03/20/17   0430   NA  142  140  140   K  4.1  4.1  4.0   CL  109*  106  106   CO2  24  25  24   GLU  138*  160*  142*   BUN  23  19  23   CREA  1.15  1.14  1.18   MG  1.8  2.0  2.2   CA  8.1*  8.3  8.3     No results for input(s): PH, PCO2, PO2, HCO3 in the last 72 hours. No results for input(s): LCAD, LAC in the last 72 hours.       Assessment:  (Medical Decision Making)     Patient Active Problem List   Diagnosis Code    Chronic systolic heart failure (HCC) I50.22    ICD (implantable cardioverter-defibrillator) in place Z95.810    HTN (hypertension) I10    Gynecomastia N62    Ventricular tachycardia (HCC) I47.2    Nausea & vomiting R11.2    Dyspnea R06.00    Non-ischemic cardiomyopathy (HCC) I42.9    Shortness of breath R06.02    Acute on chronic systolic heart failure (HCC) I50.23    Tachycardia R00.0    Elevated troponin R79.89    CAD (coronary artery disease) I25.10    Chronic back pain M54.9, G89.29    Anxiety associated with depression F41.8    Stridor R06.1           Plan:  (Medical Decision Making)     Hospital Problems  Date Reviewed: 3/21/2017          Codes Class Noted POA    * (Principal)Acute on chronic systolic heart failure ([facility] [address] ICD-10-CM: V02.66  ICD-9-CM: 428.23  3/14/2017 Yes    Ongoing, recent bnp 163 ==>64    Tachycardia ICD-10-CM: R00.0  ICD-9-CM: 785.0  3/14/2017 Yes    Rate controlled     Elevated troponin ICD-10-CM: R79.89  ICD-9-CM: 790.6  3/14/2017 Yes    Needs [facility]     Shortness of breath ICD-10-CM: R06.02  ICD-9-CM: 786.05  3/25/2016 Yes    Ongoing with ambulation     Non-ischemic cardiomyopathy ([address] (Chronic) ICD-10-CM: I42.9  ICD-9-CM: 425.4  3/24/2016 Yes    Chronic     HTN (hypertension) (Chronic) ICD-10-CM: I10  ICD-9-CM: 401.9  11/29/2011 Yes        ICD (implantable cardioverter-defibrillator) in place (Chronic) ICD-10-CM: Z95.810  ICD-9-CM: V45.02  4/22/2011 Yes    Unchanged         --Therapeutic bronchoscopy today revealed no evidence of obstruction or excessive mucus- no stridor was noted during or after the procedure and once present(stridor) when patient was prompted to stop he did confirming volitional character of stridor. Airways were completely normal- patient likely needs psychological counseling. Will therefore discontinue pulmicort and solumedrol and may use xopenex PRN. --Continue doxycycline. --Continue aldactone    --I see no reason to postpone LHC from pulmonary stand point and the patient tolerated laying flat during bronchoscopy without any orthopnea at all. More than 50% of the time documented was spent in face-to-face contact with the patient and in the care of the patient on the floor/unit where the patient is located.     Ant Manuel MD

## 2017-03-22 NOTE — PROGRESS NOTES
Back to room 315 via bed, accompanied by patient transport. Right radial band intact, no bleeding or hematoma. Denies pain.

## 2017-03-22 NOTE — PROGRESS NOTES
Occupational Therapy Note:      Charts reviewed, treatment attempted. Pt refused OT treatment today, reporting he got bad news from the doctor and he didn't feel up to it. Will re-attempt at a later date if schedule permits.      Thanks,    Santo Riojas, OTR/L

## 2017-03-22 NOTE — PROGRESS NOTES
TRANSFER - IN REPORT:    Verbal report received from KADEN Rasmussen on [facility] being received from [address] [place] [address] for routine progression of care. Report consisted of patients Situation, Background, Assessment and Recommendations(SBAR). Information from the following reports was reviewed: Kardex, Procedure Summary, MAR and Recent Results. Opportunity for questions and clarification was provided. Assessment completed upon patients arrival to unit and care assumed. Patient received back to room 315 and assessment completed. Patient connected to telemetry monitor and eagle with BP cycling every 15 minutes. Patient oriented to room and plan of care reviewed. Patient voiced understanding of keeping wrist immobilized. Right radial site benign, dressing dry and intact, no hematoma; R band in place. Patient provided with clear liquids. Patient aware to use call light to communicate needs. Instructed patient to not use arm for any pushing or pulling.

## 2017-03-22 NOTE — PROGRESS NOTES
Spoke with Dr. Bubba Vogt, Psychiatry. Consult in process in patient room. Recommendations faxed and placed on chart.

## 2017-03-22 NOTE — PROCEDURES
[address]       Name:  Bianca Person   MR#:  [id number]   :  1965   Account #:  [de-identified]   Date of Adm:  2017       PROCEDURES: Left heart cath, selective coronary arteriography,   left ventriculogram via the right radial approach. INDICATION: Tachycardia, chest pain in a patient with history of   nonischemic cardiomyopathy, but a troponin level 4. Rule out   acute coronary syndrome. TECHNICAL FACTORS: After informed consent was obtained, patient   brought to the cardiac catheterization lab. Right radial artery   was prepped and draped in the usual sterile fashion. Utilizing   modified Seldinger technique and micropuncture needle, the right   radial artery was entered. A 6-Bahamian Terumo Slender sheath was   placed without difficulty. A radial cocktail consisting of 2000   units heparin, 2 mg verapamil, 200 mcg nitroglycerin was   administered. 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 Ultimately, the right coronary   artery was engaged with a 6-Bahamian Tiger 4.0 catheter and a 5-  Western Allison JL 3.5 catheter was used to selectively engage the ostium   of the left main coronary artery. Selective injections in   various projections were performed. A multipurpose catheter was   used to cross the aortic valve and enter the left ventricle. Hemodynamic measurements and left ventriculogram were obtained. Left ventricular aortic pressure gradient was obtained by   pullback technique. MODERATE SEDATION: The patient received supervised moderate   sedation with total of 3 mg of Versed and 50 mcg of fentanyl. Total duration of sedation was 15 minutes. CONTRAST UTILIZATION: Isovue 130. HEMODYNAMIC RESULTS:   1. Aortic pressure 138/88 with a mean of 102.    2. Left ventricular end-diastolic pressure was 32.   3. There was no significant gradient across the aortic valve. ANGIOGRAPHIC RESULTS:   1. Left main coronary artery: Large caliber vessel. Angiographically normal.   2. LAD: A large caliber vessel, 20% proximal stenosis. 3. Ramus intermediate is a medium caliber vessel. Widely patent. 4. Left circumflex: Medium caliber, codominant vessel. Angiographically normal.   5. First obtuse marginal artery: Small caliber vessel. Normal.   6. Right coronary artery is a medium caliber, dominant vessel. Angiographically normal.   7. Right PDA: Small caliber vessels. Normal.   8. Left ventriculogram performed in GALINDO projection shows   severely dilated left ventricle with EF 15%-20%. CONCLUSION:   1. Minimal nonobstructive coronary artery disease. 2. Severely reduced left ventricular systolic function. 3. Left ventricular end-diastolic pressure was 32. PLAN: The patient with no identifiable etiology of his recurrent   stridor or wheezing. With sedation, this completely resolved. Throughout the catheterization, his oxygen saturation were above   98%. It appears that he may have a somatoform disorder as the   etiology of his recurrent dyspnea and stridor. Will ask   Psychiatry for evaluation. Will start Lasix 40 mg a day given   his elevated EDP.          MD KOLBY Araiza / [place]   D:  03/22/2017   12:40   T:  03/22/2017   13:01   Job #:  [id number]

## 2017-03-22 NOTE — ROUTINE PROCESS
TRANSFER - OUT REPORT:    Verbal report given to Corby Lemons RN (name) on Mozelle Gilford  being transferred to CPRU (unit) for routine progression of care       Report consisted of patients Situation, Background, Assessment and   Recommendations(SBAR). Information from the following report(s) Procedure Summary, MAR and Recent Results was reviewed with the receiving nurse. Lines:   Peripheral IV 03/22/17 Right Wrist (Active)   Site Assessment Clean, dry, & intact 3/22/2017 10:16 AM   Phlebitis Assessment 0 3/22/2017 10:16 AM   Infiltration Assessment 0 3/22/2017 10:16 AM   Dressing Status Clean, dry, & intact 3/22/2017 10:16 AM   Dressing Type Transparent;Tape 3/22/2017 10:16 AM   Hub Color/Line Status Flushed;Capped 3/22/2017 10:16 AM        Opportunity for questions and clarification was provided.       Patient transported with:   [facility] w/ Dr Ketty Hummel  No interventions needed   R band to right radial w/ 12 mls at 1230  Versed 3 mg IV  Fentanyl 50 mcg IV

## 2017-03-22 NOTE — PROGRESS NOTES
TRANSFER - IN REPORT:    Verbal report received from RT Esther on Jeanice Eisenmenger being received from [facility]'s for routine post - op      Report consisted of patients Situation, Background, Assessment and Recommendations(SBAR). Information from the following report(s) SBAR, Kardex, Procedure Summary, Intake/Output, MAR and Accordion was reviewed with the receiving nurse. Opportunity for questions and clarification was provided. Assessment completed upon patients arrival to unit and care assumed.

## 2017-03-23 LAB
ANION GAP BLD CALC-SCNC: 13 MMOL/L (ref 7–16)
BUN SERPL-MCNC: 26 MG/DL (ref 6–23)
CALCIUM SERPL-MCNC: 8 MG/DL (ref 8.3–10.4)
CHLORIDE SERPL-SCNC: 105 MMOL/L (ref 98–107)
CO2 SERPL-SCNC: 22 MMOL/L (ref 21–32)
CREAT SERPL-MCNC: 1.26 MG/DL (ref 0.8–1.5)
GLUCOSE SERPL-MCNC: 110 MG/DL (ref 65–100)
MAGNESIUM SERPL-MCNC: 1.5 MG/DL (ref 1.8–2.4)
POTASSIUM SERPL-SCNC: 3.4 MMOL/L (ref 3.5–5.1)
SODIUM SERPL-SCNC: 140 MMOL/L (ref 136–145)

## 2017-03-23 PROCEDURE — 74011000250 HC RX REV CODE- 250: Performed by: INTERNAL MEDICINE

## 2017-03-23 PROCEDURE — 74011250637 HC RX REV CODE- 250/637: Performed by: INTERNAL MEDICINE

## 2017-03-23 PROCEDURE — 83735 ASSAY OF MAGNESIUM: CPT | Performed by: NURSE PRACTITIONER

## 2017-03-23 PROCEDURE — 74011250637 HC RX REV CODE- 250/637: Performed by: NURSE PRACTITIONER

## 2017-03-23 PROCEDURE — 65660000000 HC RM CCU STEPDOWN

## 2017-03-23 PROCEDURE — 74011000258 HC RX REV CODE- 258: Performed by: INTERNAL MEDICINE

## 2017-03-23 PROCEDURE — 94760 N-INVAS EAR/PLS OXIMETRY 1: CPT

## 2017-03-23 PROCEDURE — 97110 THERAPEUTIC EXERCISES: CPT

## 2017-03-23 PROCEDURE — 94640 AIRWAY INHALATION TREATMENT: CPT

## 2017-03-23 PROCEDURE — 80048 BASIC METABOLIC PNL TOTAL CA: CPT | Performed by: NURSE PRACTITIONER

## 2017-03-23 PROCEDURE — 74011250636 HC RX REV CODE- 250/636: Performed by: INTERNAL MEDICINE

## 2017-03-23 PROCEDURE — 99232 SBSQ HOSP IP/OBS MODERATE 35: CPT | Performed by: INTERNAL MEDICINE

## 2017-03-23 PROCEDURE — 36415 COLL VENOUS BLD VENIPUNCTURE: CPT | Performed by: NURSE PRACTITIONER

## 2017-03-23 RX ORDER — ALPRAZOLAM 0.5 MG/1
0.25 TABLET ORAL 3 TIMES DAILY
Status: DISCONTINUED | OUTPATIENT
Start: 2017-03-23 | End: 2017-03-25 | Stop reason: HOSPADM

## 2017-03-23 RX ADMIN — HYDROCODONE BITARTRATE AND ACETAMINOPHEN 1 TABLET: 10; 325 TABLET ORAL at 00:45

## 2017-03-23 RX ADMIN — HEPARIN SODIUM 5000 UNITS: 5000 INJECTION, SOLUTION INTRAVENOUS; SUBCUTANEOUS at 11:37

## 2017-03-23 RX ADMIN — ATORVASTATIN CALCIUM 80 MG: 40 TABLET, FILM COATED ORAL at 09:51

## 2017-03-23 RX ADMIN — HYDROCODONE BITARTRATE AND ACETAMINOPHEN 1 TABLET: 10; 325 TABLET ORAL at 21:03

## 2017-03-23 RX ADMIN — HYDROCODONE BITARTRATE AND ACETAMINOPHEN 1 TABLET: 10; 325 TABLET ORAL at 11:37

## 2017-03-23 RX ADMIN — ALPRAZOLAM 0.25 MG: 0.5 TABLET ORAL at 11:36

## 2017-03-23 RX ADMIN — HYDROCODONE BITARTRATE AND ACETAMINOPHEN 1 TABLET: 10; 325 TABLET ORAL at 09:52

## 2017-03-23 RX ADMIN — GUAIFENESIN 1200 MG: 600 TABLET, EXTENDED RELEASE ORAL at 18:36

## 2017-03-23 RX ADMIN — ASPIRIN 81 MG: 81 TABLET, CHEWABLE ORAL at 09:51

## 2017-03-23 RX ADMIN — Medication 10 ML: at 05:06

## 2017-03-23 RX ADMIN — Medication 10 ML: at 21:03

## 2017-03-23 RX ADMIN — DOXYCYCLINE 100 MG: 100 INJECTION, POWDER, LYOPHILIZED, FOR SOLUTION INTRAVENOUS at 15:19

## 2017-03-23 RX ADMIN — LEVALBUTEROL HYDROCHLORIDE 1.25 MG: 1.25 SOLUTION RESPIRATORY (INHALATION) at 14:10

## 2017-03-23 RX ADMIN — SPIRONOLACTONE 25 MG: 25 TABLET, FILM COATED ORAL at 09:52

## 2017-03-23 RX ADMIN — GUAIFENESIN 1200 MG: 600 TABLET, EXTENDED RELEASE ORAL at 09:51

## 2017-03-23 RX ADMIN — LEVALBUTEROL HYDROCHLORIDE 1.25 MG: 1.25 SOLUTION RESPIRATORY (INHALATION) at 07:25

## 2017-03-23 RX ADMIN — Medication 5 ML: at 15:59

## 2017-03-23 RX ADMIN — ALPRAZOLAM 0.25 MG: 0.5 TABLET ORAL at 23:01

## 2017-03-23 RX ADMIN — PANTOPRAZOLE SODIUM 40 MG: 40 TABLET, DELAYED RELEASE ORAL at 09:52

## 2017-03-23 RX ADMIN — ALPRAZOLAM 0.25 MG: 0.5 TABLET ORAL at 16:00

## 2017-03-23 RX ADMIN — LEVALBUTEROL HYDROCHLORIDE 1.25 MG: 1.25 SOLUTION RESPIRATORY (INHALATION) at 22:15

## 2017-03-23 RX ADMIN — HYDROCODONE BITARTRATE AND ACETAMINOPHEN 1 TABLET: 10; 325 TABLET ORAL at 05:05

## 2017-03-23 RX ADMIN — FUROSEMIDE 40 MG: 40 TABLET ORAL at 09:52

## 2017-03-23 RX ADMIN — HYDROCODONE BITARTRATE AND ACETAMINOPHEN 1 TABLET: 10; 325 TABLET ORAL at 16:00

## 2017-03-23 RX ADMIN — HEPARIN SODIUM 5000 UNITS: 5000 INJECTION, SOLUTION INTRAVENOUS; SUBCUTANEOUS at 23:01

## 2017-03-23 RX ADMIN — LEVALBUTEROL HYDROCHLORIDE 1.25 MG: 1.25 SOLUTION RESPIRATORY (INHALATION) at 02:02

## 2017-03-23 RX ADMIN — DOXYCYCLINE 100 MG: 100 INJECTION, POWDER, LYOPHILIZED, FOR SOLUTION INTRAVENOUS at 03:52

## 2017-03-23 RX ADMIN — LOSARTAN POTASSIUM 25 MG: 25 TABLET, FILM COATED ORAL at 09:52

## 2017-03-23 RX ADMIN — METOPROLOL SUCCINATE 25 MG: 25 TABLET, EXTENDED RELEASE ORAL at 09:51

## 2017-03-23 NOTE — PROGRESS NOTES
Patient has order for IP Consult to Cardiac Rehab for diagnosis of Heart Failure with EF=15-20%. Upon review of chart, it is noted that patient had elevated troponin and LHC with no intervention. Patient will not be seen at hospital today but will be contacted after discharge when qualifying referral to [address] [place] [address] is received.

## 2017-03-23 NOTE — PROGRESS NOTES
Problem: Mobility Impaired (Adult and Pediatric)  Goal: *Acute Goals and Plan of Care (Insert Text)  STG:  ((1.)Mr. Abbie Boland will transfer from bed to chair and chair to bed with SUPERVISION using the least restrictive device within 3 day(s). Met 3/20/2017   (2.)Mr. Abbie Boland will ambulate with SUPERVISION for 50 feet with the least restrictive device within 3 day(s). Met 3/23/2017     LTG:  (1.)Mr. Abbie Boland will transfer from bed to chair and chair to bed with INDEPENDENT using the least restrictive device within 7 day(s). Met 3/23/2017   (2.)Mr. Abbie Boland will ambulate with INDEPENDENT for 100 feet with the least restrictive device within 7 day(s). (3.) Mr. Abbie Boland will ascend/descend 6 steps with use of L handrail with STAND BY ASSIST within 7 days. ________________________________________________________________________________________________      PHYSICAL THERAPY: Daily Note, Treatment Day: 1st and PM 3/23/2017  INPATIENT: Hospital Day: 10  Payor: BLUE CROSS / [place] / Product Type: PPO /      NAME/AGE/GENDER: Ruddy Alberto is a 46 y.o. male   PRIMARY DIAGNOSIS: Stridor [R06.1] Acute on chronic systolic heart failure (HCC) Acute on chronic systolic heart failure (HCC)  Procedure(s) (LRB):  BRONCHOSCOPY (N/A)  1 Day Post-Op  ICD-10: Treatment Diagnosis:       · Generalized Muscle Weakness (M62.81)  · Difficulty in walking, Not elsewhere classified (R26.2)  · History of falling (Z91.81)   Precaution/Allergies:  Review of patient's allergies indicates no known allergies. ASSESSMENT:      Mr. Abbie Boland presents sitting edge of bed with mother in room and agreeable to PT. Patient stood independently and ambulated 500'. During quiet times patient exhibited loud breathing, but when engaged in conversation breathing sounded quiet and normal.  Then instructed patient in below exercises. When patient instructed to inhale through nose and exhale through mouth he did not seem to attempt and kept his mouth closed. Then reported that INHALING through mouth makes him cough. He has demonstrated an increase in activity tolerance and independence. Patient appears to be close to his baseline. He may benefit from practicing diaphragmatic breathing or even yoga after he returns home. This section established at most recent assessment   PROBLEM LIST (Impairments causing functional limitations):  1. Decreased Strength  2. Decreased ADL/Functional Activities  3. Decreased Transfer Abilities  4. Decreased Ambulation Ability/Technique  5. Decreased Balance  6. Decreased Activity Tolerance  7. Increased Fatigue  8. Increased Shortness of Breath  9. Decreased [place] with Home Exercise Program    INTERVENTIONS PLANNED: (Benefits and precautions of physical therapy have been discussed with the patient.)  1. Balance Exercise  2. Family Education  3. Gait Training  4. Home Exercise Program (HEP)  5. Neuromuscular Re-education/Strengthening  6. Range of Motion (ROM)  7. Therapeutic Activites  8. Therapeutic Exercise/Strengthening  9. Group Therapy      TREATMENT PLAN: Frequency/Duration: 3 times a week for duration of hospital stay  Rehabilitation Potential For Stated Goals: GOOD      RECOMMENDED REHABILITATION/EQUIPMENT: (at time of discharge pending progress): not sure due to psych component  Possibly outpatient PT????                   HISTORY:   History of Present Injury/Illness (Reason for Referral):  Generalized weakness and SOB  Past Medical History/Comorbidities:   Mr. Momo Iqbal  has a past medical history of Arthritis; CAD (coronary artery disease); CHF (congestive heart failure) ([address] Chronic alcoholism ([address] Chronic back pain; Chronic neck pain; Chronic systolic heart failure ([address] (4/21/2011); Depression; Dizziness - light-headed; GERD (gastroesophageal reflux disease); Gynecomastia (2/22/2016); Heart failure ([address] History of implantable cardioverter-defibrillator (ICD) placement (2/22/2016);  Hypertension; Psychiatric disorder; Situational depression (2/22/2016); and Ventricular tachycardia ([address] (2/22/2016). Mr. Yamilka Ruano  has a past surgical history that includes heart catheterization (9/21/10) and pacemaker. Social History/Living Environment:   Home Environment: Private residence  # Steps to Enter: 6  Rails to Enter: Yes  Hand Rails : Left  One/Two Story Residence: One story  Living Alone: No  Support Systems: Child(charlene), Spouse/Significant Other/Partner  Patient Expects to be Discharged to[de-identified] Rehabilitation facility  Current DME Used/Available at Home: None  Tub or Shower Type: Tub/Shower combination  Prior Level of Function/Work/Activity:  Pt reports limited tolerance for functional activities- PT unable to determine how long these symptoms have been going on. Pt reports living at home with wife and kids. He states that \"for awhile now\" he has been experiencing overall generalized weakness and lack of endurance in performance of daily activities. He reports having approximately 3 falls in the past six months in which he describes that he just \"blacked out\" with one fall resulting in injury to his L shoulder. He reports being able to walk independently very short distances before he has to take rest breaks and continue with activity. Number of Personal Factors/Comorbidities that affect the Plan of Care: 3+: HIGH COMPLEXITY   EXAMINATION:   Most Recent Physical Functioning:   Gross Assessment:                  Posture:     Balance:  Sitting: Intact  Standing: Intact Bed Mobility:     Wheelchair Mobility:     Transfers:  Sit to Stand: Independent  Stand to Sit: Independent  Gait:     Speed/Treasure: Slow  Step Length: Right shortened;Left shortened  Gait Abnormalities: Decreased step clearance  Distance (ft): 500 Feet (ft)  Ambulation - Level of Assistance: Supervision  Interventions: Verbal cues; Visual/Demos       Body Structures Involved:  1. Heart  2. Lungs  3. Bones  4. Joints  5. Muscles  6.  Ligaments Body Functions Affected:  1. Sensory/Pain  2. Cardio  3. Respiratory  4. Neuromusculoskeletal  5. Movement Related Activities and Participation Affected:  1. General Tasks and Demands  2. Mobility  3. Self Care  4. Domestic Life  5. Interpersonal Interactions and Relationships  6. Community, Social and [place]   Number of elements that affect the Plan of Care: 4+: HIGH COMPLEXITY   CLINICAL PRESENTATION:   Presentation: Evolving clinical presentation with changing clinical characteristics: MODERATE COMPLEXITY   CLINICAL DECISION MAKIN [facility] Mobility Inpatient Short Form  How much difficulty does the patient currently have. .. Unable A Lot A Little None   1. Turning over in bed (including adjusting bedclothes, sheets and blankets)? [ ] 1   [ ] 2   [ ] 3   [X] 4   2. Sitting down on and standing up from a chair with arms ( e.g., wheelchair, bedside commode, etc.)   [ ] 1   [ ] 2   [ ] 3   [X] 4   3. Moving from lying on back to sitting on the side of the bed? [ ] 1   [ ] 2   [ ] 3   [X] 4   How much help from another person does the patient currently need. .. Total A Lot A Little None   4. Moving to and from a bed to a chair (including a wheelchair)? [ ] 1   [ ] 2   [X] 3   [ ] 4   5. Need to walk in hospital room? [ ] 1   [ ] 2   [X] 3   [ ] 4   6. Climbing 3-5 steps with a railing? [ ] 1   [ ] 2   [X] 3   [ ] 4   © , Trustees of [facility] [employer], under license to [employer]. All rights reserved    Score:  Initial: 21 Most Recent: X (Date: -- )     Interpretation of Tool:  Represents activities that are increasingly more difficult (i.e. Bed mobility, Transfers, Gait).        Score 24 23 22-20 19-15 14-10 9-7 6       Modifier CH CI CJ CK CL CM CN         · Mobility - Walking and Moving Around:               - CURRENT STATUS:    CJ - 20%-39% impaired, limited or restricted               - GOAL STATUS:           CI - 1%-19% impaired, limited or restricted  - D/C STATUS:                       ---------------To be determined---------------  Payor: BLUE CR[place] / [place] / Product Type: PPO /       Medical Necessity:     · Patient is expected to demonstrate progress in strength, range of motion, balance, coordination and functional technique to increase independence with gait and functional mobility. Reason for Services/Other Comments:  · Patient continues to require present interventions due to patient's inability to perform daily functional activites independently. Use of outcome tool(s) and clinical judgement create a POC that gives a: Questionable prediction of patient's progress: MODERATE COMPLEXITY                 TREATMENT:   (In addition to Assessment/Re-Assessment sessions the following treatments were rendered)   Pre-treatment Symptoms/Complaints:  HA  Pain: Initial:   Pain Intensity 1: 8  Pain Location 1: Head  Pain Intervention(s) 1: Ambulation/Increased Activity  Post Session:  unchanged      Therapeutic Exercise: ( 14 minutes):  Exercises per grid below to improve mobility, strength, balance and coordination. Required minimal visual and verbal cues to promote proper body alignment and promote proper body breathing techniques. Progressed complexity of movement as indicated.      Date: 3/20/17 3/23/17       Ambulation  Device  assistance 100'x2  None  '  None  supervision       Partial Squats  x10       Hip Abduction/ Adduction Standing         Heel Raises  Standing x10 x10       Toe Raises Standing x10 x10       Hip Flexion Standing         Sit to Stand         Key:  R=right, L=left, B=bilaterally, A=active, AA=active assisted, P=passive       Braces/Orthotics/Lines/Etc:   · none  Treatment/Session Assessment:    · Response to Treatment:    · Interdisciplinary Collaboration:  · Physical Therapist, Registered Nurse, Certified Nursing Assistant/Patient Care Technician and NP  · After treatment position/precautions:  · Bed/Chair-wheels locked, Call light within reach, Family at bedside and sitting edge of bed  · Compliance with Program/Exercises: Will assess as treatment progresses. · Recommendations/Intent for next treatment session: \"Next visit will focus on advancements to more challenging activities and reduction in assistance provided\".   Total Treatment Duration:  PT Patient Time In/Time Out  Time In: 0930  Time Out: [address] [facility], [place]

## 2017-03-23 NOTE — PROGRESS NOTES
Bedside and Verbal shift change report given to Otoniel Zavala (oncoming nurse) by Eriberto Porter RN (offgoing nurse). Report included the following information SBAR, Kardex, MAR and Recent Results.

## 2017-03-23 NOTE — PROGRESS NOTES
Spiritual Care visit. Initial Visit.  has made previous attempts to see patient, but others were giving him care. Today, patient was in a very good mood, perhaps due to his belief that he may be discharged tomorrow.  affirmed his emotion, and prayed for his health.     Visit by Bong Ruelas M.Ed., Th.B. ,Staff

## 2017-03-23 NOTE — PROGRESS NOTES
Problem: Falls - Risk of  Goal: *Absence of falls  Outcome: Progressing Towards Goal  Fall precautions in place. Red socks on. Instructed to only get OOB with assistance. Voiced understanding. Call light in reach. Goal: *Knowledge of fall prevention  Outcome: Progressing Towards Goal  Pt educated on fall prevention and to use call light for assistance. Pt verbalizes understanding and will use call light for assistance. Problem: [place] - Impaired  Goal: *Absence of hypoxia  Outcome: Progressing Towards Goal  Patient shows no sign of distress. O2 WNL.

## 2017-03-23 NOTE — PROGRESS NOTES
Dyspnea and difficulty breathing persists. Apparently, his signs and symptoms improve when he is sedated for procedures. A psychiatric eval. was requested yesterday, ? conclusion. I have added Xanax and will observe and ? Home tomorrow.

## 2017-03-23 NOTE — PROGRESS NOTES
Spent 45 minutes with pt talking and getting new IV. The only time that he wheezed was on the new stick for the IV.  His audible breathing was clear and without any coarse or wheezy sounds

## 2017-03-23 NOTE — PROGRESS NOTES
Jeannie Ayoub  Admission Date: 3/14/2017             Daily Progress Note: 3/23/2017    The patient's chart is reviewed and the patient is discussed with the staff. 45 yo male with a history of chronic back pain, anxiety, depression, non-obstructive CAD, HTN, HL, chronic sCHF, non-ischemic cardiomyopathy s/p ICD placement (EF <20%). Son traumatically killed in January. Presented with increase HR, sob, and cough. He admitted to non-compliance with medications. CT chest negative for PE. Cardiology admitted with acute on chronic sCHF with demand ischemia. [facility] planned with patient able to lie flat. We were consulted for sob. Patient was started on nebs, mucinex, and short course of abx.  3/22- therapeutic bronch with no evidence of obstruction or excessive mucous. Airways normal. Pulmicort and solumedrol was d/c. PRN xopenex  3/22 [facility] with mild non-obstructive CAD. Severely reduced left vent. Systolic function  Telepsych consult with Adjustment disorder. Recommends outpatient therapy for grief. Subjective: On room air, O2 sat 98%  Observed patient ambulating with PT without upper airway noise. Spoke with PT and reinforced my observations. Patient had upper airway noise when I entered the room but noise stopped when patient was talking and deep breathing.      Current Facility-Administered Medications   Medication Dose Route Frequency    ALPRAZolam (XANAX) tablet 0.25 mg  0.25 mg Oral TID    sodium chloride (NS) flush 5-10 mL  5-10 mL IntraVENous Q8H    sodium chloride (NS) flush 5-10 mL  5-10 mL IntraVENous PRN    sodium chloride (NS) flush 5-10 mL  5-10 mL IntraVENous PRN    sodium chloride (NS) flush 5-10 mL  5-10 mL IntraVENous Q8H    sodium chloride (NS) flush 5-10 mL  5-10 mL IntraVENous PRN    acetaminophen (TYLENOL) tablet 650 mg  650 mg Oral Q4H PRN    HYDROcodone-acetaminophen (NORCO) 5-325 mg per tablet 1 Tab  1 Tab Oral Q4H PRN    ondansetron (ZOFRAN) injection 4 mg  4 mg IntraVENous ONCE PRN    furosemide (LASIX) tablet 40 mg  40 mg Oral DAILY    losartan (COZAAR) tablet 25 mg  25 mg Oral DAILY    spironolactone (ALDACTONE) tablet 25 mg  25 mg Oral DAILY    HYDROcodone-acetaminophen (NORCO)  mg tablet 1 Tab  1 Tab Oral Q4H    nitroglycerin (NITROSTAT) tablet 0.4 mg  0.4 mg SubLINGual PRN    aspirin chewable tablet 81 mg  81 mg Oral DAILY    heparin (porcine) injection 5,000 Units  5,000 Units SubCUTAneous Q12H    levalbuterol (XOPENEX) nebulizer soln 1.25 mg/3 mL  1.25 mg Nebulization Q6H RT    doxycycline (VIBRAMYCIN) 100 mg in 0.9% sodium chloride (MBP/ADV) 100 mL  100 mg IntraVENous Q12H    guaiFENesin ER (MUCINEX) tablet 1,200 mg  1,200 mg Oral BID    metoprolol succinate (TOPROL-XL) XL tablet 25 mg  25 mg Oral DAILY    ALPRAZolam (XANAX) tablet 1 mg  1 mg Oral TID PRN    atorvastatin (LIPITOR) tablet 80 mg  80 mg Oral DAILY    pantoprazole (PROTONIX) tablet 40 mg  40 mg Oral ACB    ondansetron (ZOFRAN) injection 4 mg  4 mg IntraVENous Q6H PRN       Review of Systems  Constitutional: negative for fever, chills, sweats  Cardiovascular: negative for chest pain, palpitations, syncope, edema  Gastrointestinal:  negative for dysphagia, reflux, vomiting, diarrhea, abdominal pain, or melena  Neurologic:  negative for focal weakness, numbness, headache    Objective:     Vitals:    03/23/17 0202 03/23/17 0605 03/23/17 0725 03/23/17 0857   BP:  151/73  (!) 147/99   Pulse:  93  92   Resp:  18  16   Temp:  97 °F (36.1 °C)  97.9 °F (36.6 °C)   SpO2: 99% 98% 98% 98%   Weight:  199 lb 9.6 oz (90.5 kg)     Height:         Intake and Output:   03/21 1901 - 03/23 0700  In: 1762.5 [P.O.:1440; I.V.:322.5]  Out: 4300 [Urine:4300]       Physical Exam:   Constitution:  the patient is well developed and in no acute distress, on room air   EENMT:  Sclera clear, pupils equal, oral mucosa moist  Respiratory: clear   Cardiovascular:  RRR without M,G,R  Gastrointestinal: soft and non-tender; with positive bowel sounds. Musculoskeletal: warm without cyanosis. There is no lower leg edema. Skin:  no jaundice or rashes, no open wounds   Neurologic: no gross neuro deficits     Psychiatric:  alert and oriented x 3    CHEST XRAY 3/16/17:       LAB  No results for input(s): GLUCPOC in the last 72 hours. No lab exists for component: GLPOC   No results for input(s): WBC, HGB, HCT, PLT, INR, HGBEXT, HCTEXT, PLTEXT, HGBEXT, HCTEXT, PLTEXT in the last 72 hours. No lab exists for component: INREXT, INREXT  Recent Labs      03/23/17   0500  03/22/17   0550  03/21/17   0500   NA  140  142  140   K  3.4*  4.1  4.1   CL  105  109*  106   CO2  22  24  25   GLU  110*  138*  160*   BUN  26*  23  19   CREA  1.26  1.15  1.14   MG  1.5*  1.8  2.0   CA  8.0*  8.1*  8.3     No results for input(s): PH, PCO2, PO2, HCO3 in the last 72 hours. No results for input(s): LCAD, LAC in the last 72 hours. Assessment:  (Medical Decision Making)     Hospital Problems  Date Reviewed: 3/23/2017          Codes Class Noted POA    Stridor ICD-10-CM: R06.1  ICD-9-CM: 786.1  3/21/2017 Unknown    Resolved. * (Principal)Acute on chronic systolic heart failure (HCC) ICD-10-CM: I50.23  ICD-9-CM: 428.23  3/14/2017 Yes    Recent bnp 64    Tachycardia ICD-10-CM: R00.0  ICD-9-CM: 785.0  3/14/2017 Yes        Elevated troponin ICD-10-CM: R79.89  ICD-9-CM: 790.6  3/14/2017 Yes    C yesterday     Shortness of breath ICD-10-CM: R06.02  ICD-9-CM: 786.05  3/25/2016 Yes    Resolved. Non-ischemic cardiomyopathy (HCC) (Chronic) ICD-10-CM: I42.9  ICD-9-CM: 425.4  3/24/2016 Yes        HTN (hypertension) (Chronic) ICD-10-CM: I10  ICD-9-CM: 401.9  11/29/2011 Yes        ICD (implantable cardioverter-defibrillator) in place (Chronic) ICD-10-CM: Z95.810  ICD-9-CM: V45.02  4/22/2011 Yes              Plan:  (Medical Decision Making)     --continue Xopenex  --Doxycycline day 7- duration?  --Continue aldactone.   --On room air with sats acceptable. --Further plan per Cardiology  --Respiratory status is stable on room air. No further pulmonary needs noted at this time and we have nothing further to add. We will sign off but please call if we can be of further assistance. More than 50% of the time documented was spent in face-to-face contact with the patient and in the care of the patient on the floor/unit where the patient is located. Kiarra Lo NP        Lungs:  clear  Heart:  RRR with no Murmur/Rubs/Gallops    Additional Comments:  Home in AM per cardiology, nothing else to add ,will sign off, call if eneded    I have spoken with and examined the patient. I agree with the above assessment and plan as documented.     Ryanne Gleason MD

## 2017-03-23 NOTE — PROGRESS NOTES
Bedside and Verbal shift change report given to KADEN Smith (oncoming nurse) by Cherie Hughes RN (offgoing nurse). Report included the following information SBAR, Kardex, Accordion and Recent Results.

## 2017-03-24 LAB
ANION GAP BLD CALC-SCNC: 14 MMOL/L (ref 7–16)
BUN SERPL-MCNC: 25 MG/DL (ref 6–23)
CALCIUM SERPL-MCNC: 8.1 MG/DL (ref 8.3–10.4)
CHLORIDE SERPL-SCNC: 109 MMOL/L (ref 98–107)
CO2 SERPL-SCNC: 21 MMOL/L (ref 21–32)
CREAT SERPL-MCNC: 1.04 MG/DL (ref 0.8–1.5)
GLUCOSE SERPL-MCNC: 110 MG/DL (ref 65–100)
MAGNESIUM SERPL-MCNC: 1.6 MG/DL (ref 1.8–2.4)
POTASSIUM SERPL-SCNC: 3.3 MMOL/L (ref 3.5–5.1)
SODIUM SERPL-SCNC: 144 MMOL/L (ref 136–145)

## 2017-03-24 PROCEDURE — 74011250637 HC RX REV CODE- 250/637: Performed by: INTERNAL MEDICINE

## 2017-03-24 PROCEDURE — 74011250636 HC RX REV CODE- 250/636: Performed by: INTERNAL MEDICINE

## 2017-03-24 PROCEDURE — 36415 COLL VENOUS BLD VENIPUNCTURE: CPT | Performed by: NURSE PRACTITIONER

## 2017-03-24 PROCEDURE — 65660000000 HC RM CCU STEPDOWN

## 2017-03-24 PROCEDURE — 80048 BASIC METABOLIC PNL TOTAL CA: CPT | Performed by: NURSE PRACTITIONER

## 2017-03-24 PROCEDURE — 74011000258 HC RX REV CODE- 258: Performed by: INTERNAL MEDICINE

## 2017-03-24 PROCEDURE — 74011250637 HC RX REV CODE- 250/637: Performed by: NURSE PRACTITIONER

## 2017-03-24 PROCEDURE — 74011000250 HC RX REV CODE- 250: Performed by: INTERNAL MEDICINE

## 2017-03-24 PROCEDURE — 83735 ASSAY OF MAGNESIUM: CPT | Performed by: NURSE PRACTITIONER

## 2017-03-24 RX ORDER — AZITHROMYCIN 250 MG/1
250 TABLET, FILM COATED ORAL DAILY
Status: DISCONTINUED | OUTPATIENT
Start: 2017-03-25 | End: 2017-03-24

## 2017-03-24 RX ORDER — POTASSIUM CHLORIDE 20 MEQ/1
40 TABLET, EXTENDED RELEASE ORAL EVERY 12 HOURS
Status: COMPLETED | OUTPATIENT
Start: 2017-03-24 | End: 2017-03-24

## 2017-03-24 RX ORDER — ESCITALOPRAM OXALATE 10 MG/1
10 TABLET ORAL DAILY
Status: DISCONTINUED | OUTPATIENT
Start: 2017-03-24 | End: 2017-03-25 | Stop reason: HOSPADM

## 2017-03-24 RX ORDER — MAGNESIUM SULFATE HEPTAHYDRATE 40 MG/ML
2 INJECTION, SOLUTION INTRAVENOUS ONCE
Status: COMPLETED | OUTPATIENT
Start: 2017-03-24 | End: 2017-03-24

## 2017-03-24 RX ORDER — LANOLIN ALCOHOL/MO/W.PET/CERES
400 CREAM (GRAM) TOPICAL EVERY 12 HOURS
Status: DISCONTINUED | OUTPATIENT
Start: 2017-03-24 | End: 2017-03-25 | Stop reason: HOSPADM

## 2017-03-24 RX ORDER — LEVALBUTEROL INHALATION SOLUTION 1.25 MG/3ML
1.25 SOLUTION RESPIRATORY (INHALATION)
Status: DISCONTINUED | OUTPATIENT
Start: 2017-03-24 | End: 2017-03-25 | Stop reason: HOSPADM

## 2017-03-24 RX ORDER — POTASSIUM CHLORIDE 14.9 MG/ML
20 INJECTION INTRAVENOUS
Status: DISPENSED | OUTPATIENT
Start: 2017-03-24 | End: 2017-03-24

## 2017-03-24 RX ORDER — AZITHROMYCIN 250 MG/1
500 TABLET, FILM COATED ORAL
Status: DISCONTINUED | OUTPATIENT
Start: 2017-03-24 | End: 2017-03-24

## 2017-03-24 RX ADMIN — ALPRAZOLAM 0.25 MG: 0.5 TABLET ORAL at 09:16

## 2017-03-24 RX ADMIN — HYDROCODONE BITARTRATE AND ACETAMINOPHEN 1 TABLET: 10; 325 TABLET ORAL at 04:09

## 2017-03-24 RX ADMIN — LOSARTAN POTASSIUM 25 MG: 25 TABLET, FILM COATED ORAL at 09:18

## 2017-03-24 RX ADMIN — DOXYCYCLINE 100 MG: 100 INJECTION, POWDER, LYOPHILIZED, FOR SOLUTION INTRAVENOUS at 15:51

## 2017-03-24 RX ADMIN — POTASSIUM CHLORIDE 40 MEQ: 20 TABLET, EXTENDED RELEASE ORAL at 19:56

## 2017-03-24 RX ADMIN — PANTOPRAZOLE SODIUM 40 MG: 40 TABLET, DELAYED RELEASE ORAL at 06:33

## 2017-03-24 RX ADMIN — POTASSIUM CHLORIDE 20 MEQ: 14.9 INJECTION, SOLUTION INTRAVENOUS at 09:20

## 2017-03-24 RX ADMIN — HYDROCODONE BITARTRATE AND ACETAMINOPHEN 1 TABLET: 10; 325 TABLET ORAL at 00:23

## 2017-03-24 RX ADMIN — Medication 10 ML: at 22:34

## 2017-03-24 RX ADMIN — Medication 400 MG: at 12:08

## 2017-03-24 RX ADMIN — Medication 400 MG: at 19:57

## 2017-03-24 RX ADMIN — FUROSEMIDE 40 MG: 40 TABLET ORAL at 09:17

## 2017-03-24 RX ADMIN — ASPIRIN 81 MG: 81 TABLET, CHEWABLE ORAL at 09:18

## 2017-03-24 RX ADMIN — ESCITALOPRAM OXALATE 10 MG: 10 TABLET ORAL at 09:18

## 2017-03-24 RX ADMIN — GUAIFENESIN 1200 MG: 600 TABLET, EXTENDED RELEASE ORAL at 09:20

## 2017-03-24 RX ADMIN — GUAIFENESIN 1200 MG: 600 TABLET, EXTENDED RELEASE ORAL at 17:17

## 2017-03-24 RX ADMIN — ATORVASTATIN CALCIUM 80 MG: 40 TABLET, FILM COATED ORAL at 09:19

## 2017-03-24 RX ADMIN — HEPARIN SODIUM 5000 UNITS: 5000 INJECTION, SOLUTION INTRAVENOUS; SUBCUTANEOUS at 22:29

## 2017-03-24 RX ADMIN — MAGNESIUM SULFATE HEPTAHYDRATE 2 G: 40 INJECTION, SOLUTION INTRAVENOUS at 09:20

## 2017-03-24 RX ADMIN — METOPROLOL SUCCINATE 25 MG: 25 TABLET, EXTENDED RELEASE ORAL at 09:17

## 2017-03-24 RX ADMIN — HEPARIN SODIUM 5000 UNITS: 5000 INJECTION, SOLUTION INTRAVENOUS; SUBCUTANEOUS at 10:19

## 2017-03-24 RX ADMIN — HYDROCODONE BITARTRATE AND ACETAMINOPHEN 1 TABLET: 10; 325 TABLET ORAL at 15:51

## 2017-03-24 RX ADMIN — POTASSIUM CHLORIDE 40 MEQ: 20 TABLET, EXTENDED RELEASE ORAL at 12:08

## 2017-03-24 RX ADMIN — SPIRONOLACTONE 25 MG: 25 TABLET, FILM COATED ORAL at 09:17

## 2017-03-24 RX ADMIN — HYDROCODONE BITARTRATE AND ACETAMINOPHEN 1 TABLET: 10; 325 TABLET ORAL at 12:08

## 2017-03-24 RX ADMIN — ALPRAZOLAM 0.25 MG: 0.5 TABLET ORAL at 22:29

## 2017-03-24 RX ADMIN — HYDROCODONE BITARTRATE AND ACETAMINOPHEN 1 TABLET: 10; 325 TABLET ORAL at 09:19

## 2017-03-24 RX ADMIN — HYDROCODONE BITARTRATE AND ACETAMINOPHEN 1 TABLET: 10; 325 TABLET ORAL at 19:56

## 2017-03-24 RX ADMIN — Medication 10 ML: at 05:09

## 2017-03-24 RX ADMIN — ALPRAZOLAM 0.25 MG: 0.5 TABLET ORAL at 15:52

## 2017-03-24 RX ADMIN — DOXYCYCLINE 100 MG: 100 INJECTION, POWDER, LYOPHILIZED, FOR SOLUTION INTRAVENOUS at 03:03

## 2017-03-24 NOTE — PROGRESS NOTES
Spoke with Dr Aguilar Connolly about being unable to get IV line in pt. He gave new orders for replacement of Potassium and Magnesium.

## 2017-03-24 NOTE — PROGRESS NOTES
OT Note:    OT observed patient this afternoon ambulating in hallway. Pt stated he is doing well at this time with ADLs etc... OT will discontinue services at this time due to patient functioning at/near baseline. Please re-consult if necessary.     Thanks,  Bobbi Lowery

## 2017-03-24 NOTE — PROGRESS NOTES
Bedside and Verbal shift change report given to Allison Dennis RN (oncoming nurse) by Hardeep Zhou RN (offgoing nurse). Report included the following information SBAR, Kardex, MAR and Recent Results.

## 2017-03-24 NOTE — PROGRESS NOTES
Verbal bedside report given to Josemanuel Basilio oncoming RN. Patient's situation, background, assessment and recommendations provided. Opportunity for questions provided. Oncoming RN assumed care of patient.

## 2017-03-24 NOTE — DISCHARGE SUMMARY
[address] [facility] 121 Cardiology Discharge Summary     Patient ID:  Aicha Michael  [id number]  46 y.o.  1965    Admit date: 3/14/2017    Discharge date:  3/25/2017    Admitting Physician: Patti Sheffield MD     Discharge Physician: TIFFANY Kendrick/Dr. Jerica Sanon    Admission Diagnoses: Stridor [R06.1]    Discharge Diagnoses:    Diagnosis    Stridor    CAD (coronary artery disease)    Chronic back pain    Anxiety associated with depression    Acute on chronic systolic heart failure (HCC)    Tachycardia    Elevated troponin    Shortness of breath    Dyspnea    Non-ischemic cardiomyopathy (HCC)    Nausea & vomiting    Gynecomastia    Ventricular tachycardia (HCC)    HTN (hypertension)    ICD (implantable cardioverter-defibrillator) in place    Chronic systolic heart failure ([facility] [address]     Transitional care follow up with [address] [place] [address] [address] Cardiology Dr. Jerica Sanon     Cardiology Procedures this admission:  Diagnostic left heart catheterization, echo   Consults: Pulmonary/Intensive care, tele psych     Hospital Course: Patient presented to the emergency department of [facility] with complaints of progressive shortness of breath and cough. He has a known NICM s/p ICD. Patient was evaluated and subsequently admitted for further cardiac evaluation and treatment. The patient was started on IV Lasix for diuresis. He diuresed well and felt better. An echocardiogram was performed with report as follows:     -  Left ventricle: The ventricle was mildly dilated. Systolic function was  severely reduced. Ejection fraction was estimated to be 15 %. There was   severe  diffuse hypokinesis with regional variations. There was mild concentric  hypertrophy. -  Right ventricle: Systolic function was reduced. Celina Pique, systemic arteries: The root exhibited mild dilatation. -  Pericardium: A trivial pericardial effusion was identified. He had persistent sinus tach and elevated troponin at . 43- heparin was started.   Chest CT was negative for PE. He had not been compliant with home meds. It was suspected his elevated troponin was due to supply demand mismatch and heparin was stopped. Despite diuresis patient had continued orthopnea and pulmonary was consulted. They added nebs, mucinex, solu-medrol, and short course of antibiotics. On 3-22 due to persistent SOB bronchoscopy was performed which showed no abnormalities. On 3-22 he was taken to the cath lab by Dr Marilu Torrez. LHC showed mild non obstructive disease. It was noted while sedated his orthopnea was much improved. It was felt some of his symptoms may have been related to somatoform disorder. Telepsych was consulted and felt his anxiety and stress from the murder of his son might be contributing to his SOB, and that he continue outpatient counseling. Xanax was added. Pulmonary signed off. IV doxycycline course and solu medrol was completed. The morning of 3/25/2016 patient was up feeling well without any complaints shortness of breath. LE edema was much improved. He diuresed a total of 6L. Patient's labs were stable with creatinine of .92, mag low at 1.7 and replaced prior to discharge. Patient was seen and examined by Dr. Lanny Hallman and determined stable and ready for discharge. Patient was instructed on the importance of medication compliance, low sodium diet, 2 liter per day fluid restriction and daily weights. For maximized medical therapy of congestive heart failure, patient will continue use of BB and ARB. The patient will have close transitional care follow up with [facility] Cardiology Dr. Lanny Hallman - we will call pt with appt. Will check BMP and mag in one week. Will give 3 week rx of clindamycin for tooth abscess. He is to f/u with outpatient therapist for grief counseling. DISPOSITION: The patient is being discharged home in stable condition on a low saturated fat, low cholesterol and low salt diet.  The patient is instructed to advance activities as tolerated to the limit of fatigue or shortness of breath. The patient is informed to monitor daily weights and maintain a 2 liter per day fluid restriction. The patient is instructed to call the office for any shortness of breath, weight gain, or increased peripheral edema. Discharge Exam:   Visit Vitals    BP (!) 152/118 (BP 1 Location: Left arm, BP Patient Position: At rest)    Pulse 89    Temp 97.2 °F (36.2 °C)    Resp 18    Ht 5' 11\" (1.803 m)    Wt 91.5 kg (201 lb 12.8 oz)    SpO2 98%    BMI 28.15 kg/m2     Patient has been seen by Dr. Bubba Peraza: see his progress note for exam details. Recent Results (from the past 24 hour(s))   METABOLIC PANEL, BASIC    Collection Time: 03/24/17  6:15 AM   Result Value Ref Range    Sodium 144 136 - 145 mmol/L    Potassium 3.3 (L) 3.5 - 5.1 mmol/L    Chloride 109 (H) 98 - 107 mmol/L    CO2 21 21 - 32 mmol/L    Anion gap 14 7 - 16 mmol/L    Glucose 110 (H) 65 - 100 mg/dL    BUN 25 (H) 6 - 23 MG/DL    Creatinine 1.04 0.8 - 1.5 MG/DL    GFR est AA >60 >60 ml/min/1.73m2    GFR est non-AA >60 >60 ml/min/1.73m2    Calcium 8.1 (L) 8.3 - 10.4 MG/DL   MAGNESIUM    Collection Time: 03/24/17  6:15 AM   Result Value Ref Range    Magnesium 1.6 (L) 1.8 - 2.4 mg/dL         Patient Instructions:   Current Discharge Medication List      START taking these medications    Details   aspirin 81 mg chewable tablet Take 1 Tab by mouth daily. guaiFENesin ER (MUCINEX) 1,200 mg Ta12 ER tablet Take 1 Tab by mouth two (2) times a day. Qty: 60 Tab, Refills: 1      magnesium oxide (MAG-OX) 400 mg tablet Take 1 Tab by mouth every twelve (12) hours. Qty: 60 Tab, Refills: 1      escitalopram oxalate (LEXAPRO) 10 mg tablet Take 1 Tab by mouth daily. Qty: 30 Tab, Refills: 1      clindamycin (CLEOCIN) 150 mg capsule Take 1 Cap by mouth three (3) times daily for 21 days.   Qty: 63 Cap, Refills: 0         CONTINUE these medications which have CHANGED    Details   furosemide (LASIX) 40 mg tablet Take 1 Tab by mouth daily. Qty: 30 Tab, Refills: 6      ALPRAZolam (XANAX) 0.25 mg tablet Take 1 Tab by mouth three (3) times daily as needed. Max Daily Amount: 0.75 mg. Qty: 30 Tab, Refills: 0    Associated Diagnoses: Anxiety         CONTINUE these medications which have NOT CHANGED    Details   gabapentin (NEURONTIN) 300 mg capsule Take 1 Cap by mouth three (3) times daily. Qty: 42 Cap, Refills: 0    Associated Diagnoses: Other postherpetic nervous system involvement      atorvastatin (LIPITOR) 80 mg tablet Take 1 Tab by mouth daily. Qty: 30 Tab, Refills: 3      carvedilol (COREG) 12.5 mg tablet Take 1 Tab by mouth two (2) times daily (with meals). Qty: 60 Tab, Refills: 11      potassium chloride (K-DUR, KLOR-CON) 20 mEq tablet Take 1 Tab by mouth daily. Qty: 30 Tab, Refills: 6      polyethylene glycol (MIRALAX) 17 gram/dose powder Take 17 g by mouth daily. eplerenone (INSPRA) 25 mg tablet 25 mg once. Refills: 1      ESOMEPRAZOLE MAG TRIHYDRATE (NEXIUM PO) Take 1 Cap by mouth two (2) times a day. NORCO  mg tablet 1 tablet q4-6hrs prn pain, brand only  Qty: 150 Tab, Refills: 0    Associated Diagnoses: Chronic midline low back pain without sciatica; Chronic neck pain      HYDROcodone-homatropine (HYCODAN) 5-1.5 mg/5 mL (5 mL) syrup Take 5 mL by mouth four (4) times daily as needed for Cough. Max Daily Amount: 20 mL. Qty: 120 mL, Refills: 0      ondansetron (ZOFRAN ODT) 8 mg disintegrating tablet Take 1 Tab by mouth every eight (8) hours as needed for Nausea. Qty: 12 Tab, Refills: 0      valACYclovir (VALTREX) 1 gram tablet Take 1 Tab by mouth three (3) times daily. Qty: 21 Tab, Refills: 0    Associated Diagnoses: Other postherpetic nervous system involvement      losartan (COZAAR) 25 mg tablet Take 1 Tab by mouth daily. Qty: 30 Tab, Refills: 11      Albuterol, Bulk, powd by Does Not Apply route.          STOP taking these medications       digoxin (DIGITEK) 0.25 mg tablet Comments: Reason for Stopping:         amLODIPine (NORVASC) 5 mg tablet Comments:   Reason for Stopping:                 Signed:  Isabella Egan PA-C  3/24/2017  3:26 PM

## 2017-03-24 NOTE — PROGRESS NOTES
Received a call about change in heart rhythm for pt. Strips received and shown to Dr. Olivier Gray for evaluation. Pt having elongating NM interval with Paced beats taking over then returning to SR. This has happened this morning. Dr. Olivier Gray stated that the pacer is doing what it is supposed to and that there is no further action needed. No ECG required per Dr. Olivier Gray.

## 2017-03-24 NOTE — PROGRESS NOTES
[facility] CARDIOLOGY PROGRESS NOTE    3/24/2017 8:24 AM    Admit Date: 3/14/2017    Admit Diagnosis: Stridor [R06.1]      Subjective:   No angina, CHF, or palpitations but still  With cough, occasional upper resp wheezing, but vitals and exam stable. Objective:      Vitals:    03/23/17 2300 03/24/17 0020 03/24/17 0408 03/24/17 0534   BP:  101/63 135/77 135/77   Pulse:  (!) 102 95 95   Resp: 20 20 20 20   Temp:  97.5 °F (36.4 °C) 97.9 °F (36.6 °C) 97.9 °F (36.6 °C)   SpO2:  98% 98% 98%   Weight:    91.5 kg (201 lb 12.8 oz)   Height:    5' 11\" (1.803 m)       Physical Exam:  Neck- supple, no JVD at 60 deg  CV- regular rate and rhythm, 2/6 TR murmur  Lung- clear bilaterally apically, coarse bibasilar  Abd- soft, nontender, nondistended  Ext- no edema  Skin- warm and dry    Data Review:   Recent Labs      03/24/17   0615  03/23/17   0500   NA  144  140   K  3.3*  3.4*   MG  1.6*  1.5*   BUN  25*  26*   CREA  1.04  1.26   GLU  110*  110*       Assessment and Plan:     Principal Problem:    Acute on chronic systolic heart failure (HCC) (3/14/2017)- improved, net 6L out- continue lasix, replete K and Mg today, recheck in AM.      Active Problems:    ICD (implantable cardioverter-defibrillator) in place - stable, continue outpatient surveillance      HTN (hypertension)- stable, continue meds      Non-ischemic cardiomyopathy ([address]- stable, continue meds      Shortness of breath- stable, continue meds      Tachycardia- stable, continue meds      Elevated troponin- stable, continue meds      Stridor- stable, continue meds- continue xanax for anxiety. pulm claus appreciated     HypoK+ and HypoMg++ - replete today, recheck in AM    Home tomorrow if continues to do well. IV KCL and Mg today, recheck labs in AM, increase activity today.      Sakshi Noble MD  [facility] Cardiology  Pager [phone]

## 2017-03-24 NOTE — PROGRESS NOTES
Pt walked 500' with PT. Not appropriate for STR. Pt agrees. States he is feeling much better. Affirmed that he will continue seeing his therapist for grief counseling. Much brighter affect today.

## 2017-03-24 NOTE — PROGRESS NOTES
Problem:  Activity Intolerance  Goal: *Oxygen saturation during activity within specified parameters  Outcome: Progressing Towards Goal  Pt showing very little dyspnea today

## 2017-03-25 VITALS
WEIGHT: 203.3 LBS | DIASTOLIC BLOOD PRESSURE: 93 MMHG | BODY MASS INDEX: 28.46 KG/M2 | SYSTOLIC BLOOD PRESSURE: 133 MMHG | OXYGEN SATURATION: 97 % | HEART RATE: 86 BPM | TEMPERATURE: 98.2 F | HEIGHT: 71 IN | RESPIRATION RATE: 16 BRPM

## 2017-03-25 LAB
ANION GAP BLD CALC-SCNC: 8 MMOL/L (ref 7–16)
BUN SERPL-MCNC: 20 MG/DL (ref 6–23)
CALCIUM SERPL-MCNC: 8 MG/DL (ref 8.3–10.4)
CHLORIDE SERPL-SCNC: 110 MMOL/L (ref 98–107)
CO2 SERPL-SCNC: 25 MMOL/L (ref 21–32)
CREAT SERPL-MCNC: 0.92 MG/DL (ref 0.8–1.5)
ERYTHROCYTE [DISTWIDTH] IN BLOOD BY AUTOMATED COUNT: 15.5 % (ref 11.9–14.6)
GLUCOSE SERPL-MCNC: 105 MG/DL (ref 65–100)
HCT VFR BLD AUTO: 31.7 % (ref 41.1–50.3)
HGB BLD-MCNC: 10.3 G/DL (ref 13.6–17.2)
MAGNESIUM SERPL-MCNC: 1.7 MG/DL (ref 1.8–2.4)
MCH RBC QN AUTO: 31.4 PG (ref 26.1–32.9)
MCHC RBC AUTO-ENTMCNC: 32.5 G/DL (ref 31.4–35)
MCV RBC AUTO: 96.6 FL (ref 79.6–97.8)
PLATELET # BLD AUTO: 281 K/UL (ref 150–450)
PMV BLD AUTO: 8.8 FL (ref 10.8–14.1)
POTASSIUM SERPL-SCNC: 4 MMOL/L (ref 3.5–5.1)
RBC # BLD AUTO: 3.28 M/UL (ref 4.23–5.67)
SODIUM SERPL-SCNC: 143 MMOL/L (ref 136–145)
WBC # BLD AUTO: 5.9 K/UL (ref 4.3–11.1)

## 2017-03-25 PROCEDURE — 80048 BASIC METABOLIC PNL TOTAL CA: CPT | Performed by: NURSE PRACTITIONER

## 2017-03-25 PROCEDURE — 83735 ASSAY OF MAGNESIUM: CPT | Performed by: NURSE PRACTITIONER

## 2017-03-25 PROCEDURE — 74011250637 HC RX REV CODE- 250/637: Performed by: INTERNAL MEDICINE

## 2017-03-25 PROCEDURE — 36415 COLL VENOUS BLD VENIPUNCTURE: CPT | Performed by: NURSE PRACTITIONER

## 2017-03-25 PROCEDURE — 74011250637 HC RX REV CODE- 250/637: Performed by: NURSE PRACTITIONER

## 2017-03-25 PROCEDURE — 74011000258 HC RX REV CODE- 258: Performed by: INTERNAL MEDICINE

## 2017-03-25 PROCEDURE — 85027 COMPLETE CBC AUTOMATED: CPT | Performed by: NURSE PRACTITIONER

## 2017-03-25 PROCEDURE — 74011000250 HC RX REV CODE- 250: Performed by: INTERNAL MEDICINE

## 2017-03-25 RX ORDER — LANOLIN ALCOHOL/MO/W.PET/CERES
400 CREAM (GRAM) TOPICAL 2 TIMES DAILY
Status: DISCONTINUED | OUTPATIENT
Start: 2017-03-25 | End: 2017-03-25

## 2017-03-25 RX ORDER — FUROSEMIDE 40 MG/1
40 TABLET ORAL DAILY
Qty: 30 TAB | Refills: 6 | Status: SHIPPED | OUTPATIENT
Start: 2017-03-25 | End: 2017-12-15 | Stop reason: SDUPTHER

## 2017-03-25 RX ORDER — GUAIFENESIN 100 MG/5ML
81 LIQUID (ML) ORAL DAILY
Status: SHIPPED | COMMUNITY
Start: 2017-03-25 | End: 2017-07-13

## 2017-03-25 RX ORDER — LANOLIN ALCOHOL/MO/W.PET/CERES
400 CREAM (GRAM) TOPICAL EVERY 12 HOURS
Qty: 60 TAB | Refills: 1 | Status: SHIPPED | OUTPATIENT
Start: 2017-03-25 | End: 2018-05-01 | Stop reason: SDUPTHER

## 2017-03-25 RX ORDER — ALPRAZOLAM 0.25 MG/1
0.25 TABLET ORAL
Qty: 30 TAB | Refills: 0 | Status: SHIPPED
Start: 2017-03-25 | End: 2017-03-28 | Stop reason: SDUPTHER

## 2017-03-25 RX ORDER — ESCITALOPRAM OXALATE 10 MG/1
10 TABLET ORAL DAILY
Qty: 30 TAB | Refills: 1 | Status: ON HOLD | OUTPATIENT
Start: 2017-03-25 | End: 2018-07-08

## 2017-03-25 RX ORDER — CLINDAMYCIN HYDROCHLORIDE 150 MG/1
150 CAPSULE ORAL 3 TIMES DAILY
Qty: 63 CAP | Refills: 0 | Status: SHIPPED | OUTPATIENT
Start: 2017-03-25 | End: 2017-04-15

## 2017-03-25 RX ORDER — DOXYCYCLINE 150 MG/1
150 TABLET ORAL 2 TIMES DAILY
Qty: 42 TAB | Refills: 0 | Status: SHIPPED | OUTPATIENT
Start: 2017-03-25 | End: 2017-03-25

## 2017-03-25 RX ADMIN — ESCITALOPRAM OXALATE 10 MG: 10 TABLET ORAL at 08:56

## 2017-03-25 RX ADMIN — ASPIRIN 81 MG: 81 TABLET, CHEWABLE ORAL at 08:57

## 2017-03-25 RX ADMIN — ALPRAZOLAM 0.25 MG: 0.5 TABLET ORAL at 08:59

## 2017-03-25 RX ADMIN — Medication 10 ML: at 05:34

## 2017-03-25 RX ADMIN — METOPROLOL SUCCINATE 25 MG: 25 TABLET, EXTENDED RELEASE ORAL at 08:56

## 2017-03-25 RX ADMIN — SPIRONOLACTONE 25 MG: 25 TABLET, FILM COATED ORAL at 08:56

## 2017-03-25 RX ADMIN — HYDROCODONE BITARTRATE AND ACETAMINOPHEN 1 TABLET: 10; 325 TABLET ORAL at 03:28

## 2017-03-25 RX ADMIN — ATORVASTATIN CALCIUM 80 MG: 40 TABLET, FILM COATED ORAL at 08:57

## 2017-03-25 RX ADMIN — HYDROCODONE BITARTRATE AND ACETAMINOPHEN 1 TABLET: 10; 325 TABLET ORAL at 00:26

## 2017-03-25 RX ADMIN — FUROSEMIDE 40 MG: 40 TABLET ORAL at 08:57

## 2017-03-25 RX ADMIN — LOSARTAN POTASSIUM 25 MG: 25 TABLET, FILM COATED ORAL at 08:59

## 2017-03-25 RX ADMIN — GUAIFENESIN 1200 MG: 600 TABLET, EXTENDED RELEASE ORAL at 08:59

## 2017-03-25 RX ADMIN — HYDROCODONE BITARTRATE AND ACETAMINOPHEN 1 TABLET: 10; 325 TABLET ORAL at 08:57

## 2017-03-25 RX ADMIN — PANTOPRAZOLE SODIUM 40 MG: 40 TABLET, DELAYED RELEASE ORAL at 05:33

## 2017-03-25 RX ADMIN — DOXYCYCLINE 100 MG: 100 INJECTION, POWDER, LYOPHILIZED, FOR SOLUTION INTRAVENOUS at 03:28

## 2017-03-25 RX ADMIN — Medication 400 MG: at 08:57

## 2017-03-25 NOTE — PROGRESS NOTES
[facility] CARDIOLOGY PROGRESS NOTE    3/25/2017 8:17 AM    Admit Date: 3/14/2017    Admit Diagnosis: Stridor [R06.1]      Subjective:   Feeling and looking better today. Doing well since yesterday without interval angina, CHF, palpitations, edema, presyncope or syncope. Vitals controlled and tolerating meds well. Stridor, wheezing resolved today. Ready to go home. Complains of tooth abscess with pus drainage. We will give clindamycin until he sees dentist later this week. Objective:      Vitals:    03/24/17 1748 03/24/17 2044 03/25/17 0032 03/25/17 0510   BP: 141/83 135/89 (!) 154/99 113/72   Pulse: 93 99 93 94   Resp: 18 16 16 16   Temp: 97 °F (36.1 °C) 97.4 °F (36.3 °C) 97.6 °F (36.4 °C) 97.9 °F (36.6 °C)   SpO2: 98% 98% 96% 96%   Weight:    92.2 kg (203 lb 4.8 oz)   Height:           Physical Exam:  Neck- supple, no JVD  CV- regular rate and rhythm no MRG  Lung- clear bilaterally  Abd- soft, nontender, nondistended  Ext- no edema  Skin- warm and dry    Data Review:   Recent Labs      03/25/17   0657  03/24/17   0615   NA  143  144   K  4.0  3.3*   MG  1.7*  1.6*   BUN  20  25*   CREA  0.92  1.04   GLU  105*  110*   WBC  5.9   --    HGB  10.3*   --    HCT  31.7*   --    PLT  281   --        Assessment and Plan:     Principal Problem:  Acute on chronic systolic heart failure (HCC) (3/14/2017)- improved, net 6L out- continue lasix, current meds at discharge.      Active Problems:  ICD (implantable cardioverter-defibrillator) in place - stable, continue outpatient surveillance     HTN (hypertension)- stable, continue meds     Non-ischemic cardiomyopathy ([facility] [address]- stable, continue meds     Shortness of breath- stable, continue meds     Tachycardia- stable, continue meds     Elevated troponin- stable, continue meds     Stridor- stable, continue meds- continue xanax for anxiety. pulm eval appreciated     HypoK+ and HypoMg++ - replete today, recheck in AM.     Home today with TC 14 visit.      Philomena Cohen, MD  [facility] Cardiology  Pager [phone]

## 2017-03-25 NOTE — DISCHARGE INSTRUCTIONS
Heart Failure: Care Instructions  Your Care Instructions    Heart failure occurs when your heart does not pump as much blood as the body needs. Failure does not mean that the heart has stopped pumping but rather that it is not pumping as well as it should. Over time, this causes fluid buildup in your lungs and other parts of your body. Fluid buildup can cause shortness of breath, fatigue, swollen ankles, and other problems. By taking medicines regularly, reducing sodium (salt) in your diet, checking your weight every day, and making lifestyle changes, you can feel better and live longer. Follow-up care is a key part of your treatment and safety. Be sure to make and go to all appointments, and call your doctor if you are having problems. It's also a good idea to know your test results and keep a list of the medicines you take. How can you care for yourself at home? Medicines  · Be safe with medicines. Take your medicines exactly as prescribed. Call your doctor if you think you are having a problem with your medicine. · Do not take any vitamins, over-the-counter medicine, or herbal products without talking to your doctor first. Link Stefanie not take ibuprofen (Advil or Motrin) and naproxen (Aleve) without talking to your doctor first. They could make your heart failure worse. · You may be taking some of the following medicine. ¨ Beta-blockers can slow heart rate, decrease blood pressure, and improve your condition. Taking a beta-blocker may lower your chance of needing to be hospitalized. ¨ Angiotensin-converting enzyme inhibitors (ACEIs) reduce the heart's workload, lower blood pressure, and reduce swelling. Taking an ACEI may lower your chance of needing to be hospitalized again. ¨ Angiotensin II receptor blockers (ARBs) work like ACEIs. Your doctor may prescribe them instead of ACEIs. ¨ Diuretics, also called water pills, reduce swelling.   ¨ Potassium supplements replace this important mineral, which is sometimes lost with diuretics. ¨ Aspirin and other blood thinners prevent blood clots, which can cause a stroke or heart attack. You will get more details on the specific medicines your doctor prescribes. Diet  · Your doctor may suggest that you limit sodium to 2,000 milligrams (mg) a day or less. That is less than 1 teaspoon of salt a day, including all the salt you eat in cooking or in packaged foods. People get most of their sodium from processed foods. Fast food and restaurant meals also tend to be very high in sodium. · Ask your doctor how much liquid you can drink each day. You may have to limit liquids. Weight  · Weigh yourself without clothing at the same time each day. Record your weight. Call your doctor if you gain more than 3 pounds in 2 to 3 days. A sudden weight gain may mean that your heart failure is getting worse. Activity level  · Start light exercise (if your doctor says it is okay). Even if you can only do a small amount, exercise will help you get stronger, have more energy, and manage your weight and your stress. Walking is an easy way to get exercise. Start out by walking a little more than you did before. Bit by bit, increase the amount you walk. · When you exercise, watch for signs that your heart is working too hard. You are pushing yourself too hard if you cannot talk while you are exercising. If you become short of breath or dizzy or have chest pain, stop, sit down, and rest.  · If you feel \"wiped out\" the day after you exercise, walk slower or for a shorter distance until you can work up to a better pace. · Get enough rest at night. Sleeping with 1 or 2 pillows under your upper body and head may help you breathe easier. Lifestyle changes  · Do not smoke. Smoking can make a heart condition worse. If you need help quitting, talk to your doctor about stop-smoking programs and medicines. These can increase your chances of quitting for good.  Quitting smoking may be the most important step you can take to protect your heart. · Limit alcohol to 2 drinks a day for men and 1 drink a day for women. Too much alcohol can cause health problems. · Avoid getting sick from colds and the flu. Get a pneumococcal vaccine shot. If you have had one before, ask your doctor whether you need another dose. Get a flu shot each year. If you must be around people with colds or the flu, wash your hands often. When should you call for help? Call 911 if you have symptoms of sudden heart failure such as:  · You have severe trouble breathing. · You cough up pink, foamy mucus. · You have a new irregular or rapid heartbeat. Call your doctor now or seek immediate medical care if:  · You have new or increased shortness of breath. · You are dizzy or lightheaded, or you feel like you may faint. · You have sudden weight gain, such as 3 pounds or more in 2 to 3 days. · You have increased swelling in your legs, ankles, or feet. · You are suddenly so tired or weak that you cannot do your usual activities. Watch closely for changes in your health, and be sure to contact your doctor if:  · You develop new symptoms. Where can you learn more? Go to http://edmundo-isi.info/. Enter M317 in the search box to learn more about \"Heart Failure: Care Instructions. \"  Current as of: January 27, 2016  Content Version: 11.1  © 9871-1452 [employer]. Care instructions adapted under license by [employer] (which disclaims liability or warranty for this information). If you have questions about a medical condition or this instruction, always ask your healthcare professional. [place] [address] any warranty or liability for your use of this information. DISCHARGE SUMMARY from Nurse    The following personal items are in your possession at time of discharge:    Dental Appliances: None  Visual Aid: None     Home Medications: None  Jewelry: None  Clothing:  At bedside, Footwear, Pants, Shirt, Socks, Undergarments  Other Valuables: None             PATIENT INSTRUCTIONS:    After general anesthesia or intravenous sedation, for 24 hours or while taking prescription Narcotics:  · Limit your activities  · Do not drive and operate hazardous machinery  · Do not make important personal or business decisions  · Do  not drink alcoholic beverages  · If you have not urinated within 8 hours after discharge, please contact your surgeon on call. Report the following to your surgeon:  · Excessive pain, swelling, redness or odor of or around the surgical area  · Temperature over 100.5  · Nausea and vomiting lasting longer than 4 hours or if unable to take medications  · Any signs of decreased circulation or nerve impairment to extremity: change in color, persistent  numbness, tingling, coldness or increase pain  · Any questions          *  Please give a list of your current medications to your Primary Care Provider. *  Please update this list whenever your medications are discontinued, doses are      changed, or new medications (including over-the-counter products) are added. *  Please carry medication information at all times in case of emergency situations. These are general instructions for a healthy lifestyle:    No smoking/ No tobacco products/ Avoid exposure to second hand smoke    Surgeon General's Warning:  Quitting smoking now greatly reduces serious risk to your health. Obesity, smoking, and sedentary lifestyle greatly increases your risk for illness    A healthy diet, regular physical exercise & weight monitoring are important for maintaining a healthy lifestyle    You may be retaining fluid if you have a history of heart failure or if you experience any of the following symptoms:  Weight gain of 3 pounds or more overnight or 5 pounds in a week, increased swelling in our hands or feet or shortness of breath while lying flat in bed.   Please call your doctor as soon as you notice any of these symptoms; do not wait until your next office visit. Recognize signs and symptoms of STROKE:    F-face looks uneven    A-arms unable to move or move unevenly    S-speech slurred or non-existent    T-time-call 911 as soon as signs and symptoms begin-DO NOT go       Back to bed or wait to see if you get better-TIME IS BRAIN. Warning Signs of HEART ATTACK     Call 911 if you have these symptoms:   Chest discomfort. Most heart attacks involve discomfort in the center of the chest that lasts more than a few minutes, or that goes away and comes back. It can feel like uncomfortable pressure, squeezing, fullness, or pain.  Discomfort in other areas of the upper body. Symptoms can include pain or discomfort in one or both arms, the back, neck, jaw, or stomach.  Shortness of breath with or without chest discomfort.  Other signs may include breaking out in a cold sweat, nausea, or lightheadedness. Don't wait more than five minutes to call 911 - MINUTES MATTER! Fast action can save your life. Calling 911 is almost always the fastest way to get lifesaving treatment. Emergency Medical Services staff can begin treatment when they arrive -- up to an hour sooner than if someone gets to the hospital by car. The discharge information has been reviewed with the patient. The patient verbalized understanding. Discharge medications reviewed with the patient and appropriate educational materials and side effects teaching were provided.

## 2017-03-25 NOTE — PROGRESS NOTES
Discharge instructions reviewed with patient. Prescriptions sent to pharmacy and med info sheets provided for all new medications. Opportunity for questions provided. Patient voiced understanding of all discharge instructions. Awaiting ride, to call when wife gets here    Addendum 1055: prescription given for xanax as requested.

## 2017-03-27 ENCOUNTER — PATIENT OUTREACH (OUTPATIENT)
Dept: CASE MANAGEMENT | Age: 52
End: 2017-03-27

## 2017-05-18 NOTE — PROGRESS NOTES
[facility] [address]     [address]    [facility] [address]    Phone:  [phone]       Thank You for choosing us for your health care visit. We are glad to serve you and happy to provide you with this summary of your visit. Please help us to ensure we have accurate records. If you find anything that needs to be changed, please let our staff know as soon as possible.          Your Demographic Information     Patient Name Sex     Jacquelyn Chauhan Female 1977       Ethnic Group Patient Race    Not of  or  Origin White      Your Visit Details     Date & Time Provider Department    2017 2:20 PM Meme Sanabria MD [facility] [address]       Conditions Discussed Today or Order-Related Diagnoses        Comments    Physical exam, routine    -  Primary       Your Vitals Were     BP Pulse Height Weight LMP SpO2    116/68 72 5' 1.97\" (1.574 m) 128 lb 12 oz (58.4 kg) 2017 99%    BMI Smoking Status                23.57 kg/m2 Never Smoker          Medications Prescribed or Re-Ordered Today     SYNTHROID 75 MCG tablet    Sig - Route: Take 1 tablet by mouth daily. - Oral    Class: Eprescribe    Pharmacy: [facility] [address] [place] [address] [address] AT [facility] Ph #: [phone]      Your Current Medications Are        Disp Refills Start End    SYNTHROID 75 MCG tablet 30 tablet 10 2017     Sig - Route: Take 1 tablet by mouth daily. - Oral    Class: Eprescribe    Probiotic Product (PROBIOTIC ADVANCED PO)        Class: Historical Med    Route: Oral    naproxen (NAPROSYN) 500 MG tablet 30 tablet 0 2016     Sig - Route: Take 1 tablet by mouth 2 times daily. - Oral    DIAZepam (VALIUM) 5 MG tablet 20 tablet 0 2016     Sig - Route: Take 1 tablet by mouth every 6 hours as needed (Pain). - Oral    Multiple Vitamins-Minerals (MULTIPLE VITAMINS/WOMENS) Tab 30 tablet  3/12/2015     Sig - Route: Take  by mouth  Problem: Falls - Risk of  Goal: *Absence of falls  Outcome: Progressing Towards Goal  Pt progressing towards goal. No falls since admission. Bed low and locked. Call light within reach. Side rails x 2. Gripper socks applied. Personal belongings within reach. Pt verbalizes understanding to call for assistance. daily. - Oral    Class: Historical Med      Allergies     Latex ERYTHEMA    Sulfa Antibiotics RASH      Immunizations History as of 5/18/2017     Name Date    Hepatitis A - Adult 3/12/2015, 10/28/2013    Tdap 10/28/2013      Problem List as of 5/18/2017     Unspecified hypothyroidism            Patient Instructions     None

## 2017-07-13 PROBLEM — K02.9 DENTAL CARIES: Chronic | Status: ACTIVE | Noted: 2017-07-13

## 2018-02-13 ENCOUNTER — HOSPITAL ENCOUNTER (OUTPATIENT)
Dept: LAB | Age: 53
Discharge: HOME | End: 2018-02-13
Payer: COMMERCIAL

## 2018-02-13 DIAGNOSIS — I25.10 CORONARY ARTERY DISEASE INVOLVING NATIVE CORONARY ARTERY OF NATIVE HEART WITHOUT ANGINA PECTORIS: Chronic | ICD-10-CM

## 2018-02-13 DIAGNOSIS — I47.20 VENTRICULAR TACHYCARDIA: ICD-10-CM

## 2018-02-13 LAB
ANION GAP SERPL CALC-SCNC: 9 MMOL/L
BUN SERPL-MCNC: 17 MG/DL (ref 6–23)
CALCIUM SERPL-MCNC: 8.6 MG/DL (ref 8.3–10.4)
CHLORIDE SERPL-SCNC: 100 MMOL/L (ref 98–107)
CO2 SERPL-SCNC: 29 MMOL/L (ref 21–32)
CREAT SERPL-MCNC: 2 MG/DL (ref 0.8–1.5)
GLUCOSE SERPL-MCNC: 94 MG/DL (ref 65–100)
MAGNESIUM SERPL-MCNC: 2.1 MG/DL (ref 1.8–2.4)
POTASSIUM SERPL-SCNC: 4.5 MMOL/L (ref 3.5–5.1)
SODIUM SERPL-SCNC: 138 MMOL/L (ref 136–145)
TROPONIN I SERPL-MCNC: 0.11 NG/ML (ref 0.02–0.05)

## 2018-02-13 PROCEDURE — 80048 BASIC METABOLIC PNL TOTAL CA: CPT | Performed by: INTERNAL MEDICINE

## 2018-02-13 PROCEDURE — 36415 COLL VENOUS BLD VENIPUNCTURE: CPT | Performed by: INTERNAL MEDICINE

## 2018-02-13 PROCEDURE — 84484 ASSAY OF TROPONIN QUANT: CPT | Performed by: INTERNAL MEDICINE

## 2018-02-13 PROCEDURE — 83735 ASSAY OF MAGNESIUM: CPT | Performed by: INTERNAL MEDICINE

## 2018-02-13 NOTE — PROGRESS NOTES
creatinine 2.0 now  Stop lasix for now, rcheck bmp on Friday. Resume lasix if SOB, [place], edema or orthopnea develops in interim.

## 2018-07-08 ENCOUNTER — APPOINTMENT (OUTPATIENT)
Dept: ULTRASOUND IMAGING | Age: 53
DRG: 392 | End: 2018-07-08
Attending: EMERGENCY MEDICINE
Payer: COMMERCIAL

## 2018-07-08 ENCOUNTER — HOSPITAL ENCOUNTER (INPATIENT)
Age: 53
LOS: 4 days | Discharge: HOME | DRG: 392 | End: 2018-07-12
Attending: EMERGENCY MEDICINE | Admitting: FAMILY MEDICINE
Payer: COMMERCIAL

## 2018-07-08 ENCOUNTER — APPOINTMENT (OUTPATIENT)
Dept: GENERAL RADIOLOGY | Age: 53
DRG: 392 | End: 2018-07-08
Attending: EMERGENCY MEDICINE
Payer: COMMERCIAL

## 2018-07-08 ENCOUNTER — APPOINTMENT (OUTPATIENT)
Dept: CT IMAGING | Age: 53
DRG: 392 | End: 2018-07-08
Attending: EMERGENCY MEDICINE
Payer: COMMERCIAL

## 2018-07-08 DIAGNOSIS — M54.2 CHRONIC NECK PAIN: ICD-10-CM

## 2018-07-08 DIAGNOSIS — M54.50 CHRONIC MIDLINE LOW BACK PAIN WITHOUT SCIATICA: ICD-10-CM

## 2018-07-08 DIAGNOSIS — E83.42 HYPOMAGNESEMIA: ICD-10-CM

## 2018-07-08 DIAGNOSIS — G89.29 CHRONIC NECK PAIN: ICD-10-CM

## 2018-07-08 DIAGNOSIS — G89.29 CHRONIC MIDLINE LOW BACK PAIN WITHOUT SCIATICA: ICD-10-CM

## 2018-07-08 DIAGNOSIS — E86.0 DEHYDRATION: ICD-10-CM

## 2018-07-08 DIAGNOSIS — E87.29 HIGH ANION GAP METABOLIC ACIDOSIS: ICD-10-CM

## 2018-07-08 DIAGNOSIS — R11.2 INTRACTABLE VOMITING WITH NAUSEA, UNSPECIFIED VOMITING TYPE: Primary | ICD-10-CM

## 2018-07-08 DIAGNOSIS — R00.0 TACHYCARDIA: ICD-10-CM

## 2018-07-08 PROBLEM — I50.23 ACUTE ON CHRONIC SYSTOLIC HEART FAILURE (HCC): Status: RESOLVED | Noted: 2017-03-14 | Resolved: 2018-07-08

## 2018-07-08 PROBLEM — R74.01 ELEVATED TRANSAMINASE LEVEL: Status: ACTIVE | Noted: 2017-03-14

## 2018-07-08 PROBLEM — R77.8 ELEVATED TROPONIN: Status: RESOLVED | Noted: 2017-03-14 | Resolved: 2018-07-08

## 2018-07-08 PROBLEM — R06.1 STRIDOR: Status: RESOLVED | Noted: 2017-03-21 | Resolved: 2018-07-08

## 2018-07-08 PROBLEM — R17 ELEVATED BILIRUBIN: Status: ACTIVE | Noted: 2018-07-08

## 2018-07-08 PROBLEM — D53.9 MACROCYTIC ANEMIA: Status: ACTIVE | Noted: 2018-07-08

## 2018-07-08 PROBLEM — F10.10 ALCOHOL ABUSE: Status: ACTIVE | Noted: 2018-07-08

## 2018-07-08 LAB
ALBUMIN SERPL-MCNC: 4 G/DL (ref 3.5–5)
ALBUMIN/GLOB SERPL: 1.3 {RATIO} (ref 1.2–3.5)
ALP SERPL-CCNC: 148 U/L (ref 50–136)
ALT SERPL-CCNC: 84 U/L (ref 12–65)
ANION GAP SERPL CALC-SCNC: 15 MMOL/L (ref 7–16)
ANION GAP SERPL CALC-SCNC: 20 MMOL/L (ref 7–16)
ANION GAP SERPL CALC-SCNC: 27 MMOL/L (ref 7–16)
AST SERPL-CCNC: 165 U/L (ref 15–37)
ATRIAL RATE: 108 BPM
BASOPHILS # BLD: 0 K/UL (ref 0–0.2)
BASOPHILS # BLD: 0 K/UL (ref 0–0.2)
BASOPHILS NFR BLD: 0 % (ref 0–2)
BASOPHILS NFR BLD: 1 % (ref 0–2)
BILIRUB SERPL-MCNC: 1.3 MG/DL (ref 0.2–1.1)
BNP SERPL-MCNC: 394 PG/ML
BUN SERPL-MCNC: 7 MG/DL (ref 6–23)
BUN SERPL-MCNC: 7 MG/DL (ref 6–23)
BUN SERPL-MCNC: 8 MG/DL (ref 6–23)
CALCIUM SERPL-MCNC: 8.4 MG/DL (ref 8.3–10.4)
CALCIUM SERPL-MCNC: 8.8 MG/DL (ref 8.3–10.4)
CALCIUM SERPL-MCNC: 8.9 MG/DL (ref 8.3–10.4)
CALCULATED P AXIS, ECG09: 52 DEGREES
CALCULATED R AXIS, ECG10: -18 DEGREES
CALCULATED T AXIS, ECG11: 153 DEGREES
CHLORIDE SERPL-SCNC: 103 MMOL/L (ref 98–107)
CHLORIDE SERPL-SCNC: 104 MMOL/L (ref 98–107)
CHLORIDE SERPL-SCNC: 105 MMOL/L (ref 98–107)
CO2 SERPL-SCNC: 14 MMOL/L (ref 21–32)
CO2 SERPL-SCNC: 19 MMOL/L (ref 21–32)
CO2 SERPL-SCNC: 20 MMOL/L (ref 21–32)
CREAT SERPL-MCNC: 1.19 MG/DL (ref 0.8–1.5)
CREAT SERPL-MCNC: 1.28 MG/DL (ref 0.8–1.5)
CREAT SERPL-MCNC: 1.31 MG/DL (ref 0.8–1.5)
DIAGNOSIS, 93000: NORMAL
DIFFERENTIAL METHOD BLD: ABNORMAL
DIFFERENTIAL METHOD BLD: ABNORMAL
EOSINOPHIL # BLD: 0 K/UL (ref 0–0.8)
EOSINOPHIL # BLD: 0 K/UL (ref 0–0.8)
EOSINOPHIL NFR BLD: 0 % (ref 0.5–7.8)
EOSINOPHIL NFR BLD: 0 % (ref 0.5–7.8)
ERYTHROCYTE [DISTWIDTH] IN BLOOD BY AUTOMATED COUNT: 13.8 % (ref 11.9–14.6)
ERYTHROCYTE [DISTWIDTH] IN BLOOD BY AUTOMATED COUNT: 14.1 % (ref 11.9–14.6)
FOLATE SERPL-MCNC: 5.1 NG/ML (ref 3.1–17.5)
GLOBULIN SER CALC-MCNC: 3.1 G/DL (ref 2.3–3.5)
GLUCOSE SERPL-MCNC: 114 MG/DL (ref 65–100)
GLUCOSE SERPL-MCNC: 94 MG/DL (ref 65–100)
GLUCOSE SERPL-MCNC: 96 MG/DL (ref 65–100)
HCT VFR BLD AUTO: 36.8 % (ref 41.1–50.3)
HCT VFR BLD AUTO: 39.6 % (ref 41.1–50.3)
HGB BLD-MCNC: 11.8 G/DL (ref 13.6–17.2)
HGB BLD-MCNC: 12.3 G/DL (ref 13.6–17.2)
IMM GRANULOCYTES # BLD: 0 K/UL (ref 0–0.5)
IMM GRANULOCYTES # BLD: 0 K/UL (ref 0–0.5)
IMM GRANULOCYTES NFR BLD AUTO: 0 % (ref 0–5)
IMM GRANULOCYTES NFR BLD AUTO: 0 % (ref 0–5)
LACTATE BLD-SCNC: 2.1 MMOL/L (ref 0.5–1.9)
LIPASE SERPL-CCNC: 121 U/L (ref 73–393)
LYMPHOCYTES # BLD: 0.8 K/UL (ref 0.5–4.6)
LYMPHOCYTES # BLD: 0.8 K/UL (ref 0.5–4.6)
LYMPHOCYTES NFR BLD: 10 % (ref 13–44)
LYMPHOCYTES NFR BLD: 13 % (ref 13–44)
MAGNESIUM SERPL-MCNC: 1.5 MG/DL (ref 1.8–2.4)
MCH RBC QN AUTO: 33.6 PG (ref 26.1–32.9)
MCH RBC QN AUTO: 34 PG (ref 26.1–32.9)
MCHC RBC AUTO-ENTMCNC: 31.1 G/DL (ref 31.4–35)
MCHC RBC AUTO-ENTMCNC: 32.1 G/DL (ref 31.4–35)
MCV RBC AUTO: 106.1 FL (ref 79.6–97.8)
MCV RBC AUTO: 108.2 FL (ref 79.6–97.8)
MONOCYTES # BLD: 0.6 K/UL (ref 0.1–1.3)
MONOCYTES # BLD: 0.7 K/UL (ref 0.1–1.3)
MONOCYTES NFR BLD: 10 % (ref 4–12)
MONOCYTES NFR BLD: 9 % (ref 4–12)
NEUTS SEG # BLD: 4.6 K/UL (ref 1.7–8.2)
NEUTS SEG # BLD: 6.5 K/UL (ref 1.7–8.2)
NEUTS SEG NFR BLD: 76 % (ref 43–78)
NEUTS SEG NFR BLD: 81 % (ref 43–78)
P-R INTERVAL, ECG05: 164 MS
PLATELET # BLD AUTO: 180 K/UL (ref 150–450)
PLATELET # BLD AUTO: 198 K/UL (ref 150–450)
PMV BLD AUTO: 10 FL (ref 10.8–14.1)
PMV BLD AUTO: 9.9 FL (ref 10.8–14.1)
POTASSIUM SERPL-SCNC: 4 MMOL/L (ref 3.5–5.1)
POTASSIUM SERPL-SCNC: 4.9 MMOL/L (ref 3.5–5.1)
POTASSIUM SERPL-SCNC: 5.1 MMOL/L (ref 3.5–5.1)
PROT SERPL-MCNC: 7.1 G/DL (ref 6.3–8.2)
Q-T INTERVAL, ECG07: 358 MS
QRS DURATION, ECG06: 100 MS
QTC CALCULATION (BEZET), ECG08: 479 MS
RBC # BLD AUTO: 3.47 M/UL (ref 4.23–5.67)
RBC # BLD AUTO: 3.66 M/UL (ref 4.23–5.67)
SODIUM SERPL-SCNC: 140 MMOL/L (ref 136–145)
SODIUM SERPL-SCNC: 143 MMOL/L (ref 136–145)
SODIUM SERPL-SCNC: 144 MMOL/L (ref 136–145)
TROPONIN I BLD-MCNC: 0.02 NG/ML (ref 0.02–0.05)
VENTRICULAR RATE, ECG03: 108 BPM
VIT B12 SERPL-MCNC: 1175 PG/ML (ref 193–986)
WBC # BLD AUTO: 5.9 K/UL (ref 4.3–11.1)
WBC # BLD AUTO: 8.1 K/UL (ref 4.3–11.1)

## 2018-07-08 PROCEDURE — 74011250637 HC RX REV CODE- 250/637: Performed by: INTERNAL MEDICINE

## 2018-07-08 PROCEDURE — 82746 ASSAY OF FOLIC ACID SERUM: CPT | Performed by: FAMILY MEDICINE

## 2018-07-08 PROCEDURE — 74177 CT ABD & PELVIS W/CONTRAST: CPT

## 2018-07-08 PROCEDURE — 74011000250 HC RX REV CODE- 250: Performed by: FAMILY MEDICINE

## 2018-07-08 PROCEDURE — 99285 EMERGENCY DEPT VISIT HI MDM: CPT | Performed by: EMERGENCY MEDICINE

## 2018-07-08 PROCEDURE — 81003 URINALYSIS AUTO W/O SCOPE: CPT | Performed by: EMERGENCY MEDICINE

## 2018-07-08 PROCEDURE — 74011250636 HC RX REV CODE- 250/636: Performed by: FAMILY MEDICINE

## 2018-07-08 PROCEDURE — 80048 BASIC METABOLIC PNL TOTAL CA: CPT | Performed by: FAMILY MEDICINE

## 2018-07-08 PROCEDURE — 96361 HYDRATE IV INFUSION ADD-ON: CPT | Performed by: EMERGENCY MEDICINE

## 2018-07-08 PROCEDURE — 74011000258 HC RX REV CODE- 258: Performed by: EMERGENCY MEDICINE

## 2018-07-08 PROCEDURE — 93005 ELECTROCARDIOGRAM TRACING: CPT | Performed by: EMERGENCY MEDICINE

## 2018-07-08 PROCEDURE — 74011250637 HC RX REV CODE- 250/637: Performed by: EMERGENCY MEDICINE

## 2018-07-08 PROCEDURE — 76705 ECHO EXAM OF ABDOMEN: CPT

## 2018-07-08 PROCEDURE — 83690 ASSAY OF LIPASE: CPT | Performed by: EMERGENCY MEDICINE

## 2018-07-08 PROCEDURE — 83735 ASSAY OF MAGNESIUM: CPT | Performed by: EMERGENCY MEDICINE

## 2018-07-08 PROCEDURE — 74011250636 HC RX REV CODE- 250/636: Performed by: EMERGENCY MEDICINE

## 2018-07-08 PROCEDURE — 82607 VITAMIN B-12: CPT | Performed by: FAMILY MEDICINE

## 2018-07-08 PROCEDURE — 74022 RADEX COMPL AQT ABD SERIES: CPT

## 2018-07-08 PROCEDURE — 85025 COMPLETE CBC W/AUTO DIFF WBC: CPT | Performed by: EMERGENCY MEDICINE

## 2018-07-08 PROCEDURE — 96374 THER/PROPH/DIAG INJ IV PUSH: CPT | Performed by: EMERGENCY MEDICINE

## 2018-07-08 PROCEDURE — 65270000029 HC RM PRIVATE

## 2018-07-08 PROCEDURE — 83880 ASSAY OF NATRIURETIC PEPTIDE: CPT | Performed by: EMERGENCY MEDICINE

## 2018-07-08 PROCEDURE — 83605 ASSAY OF LACTIC ACID: CPT

## 2018-07-08 PROCEDURE — 74011636320 HC RX REV CODE- 636/320: Performed by: EMERGENCY MEDICINE

## 2018-07-08 PROCEDURE — 36415 COLL VENOUS BLD VENIPUNCTURE: CPT | Performed by: FAMILY MEDICINE

## 2018-07-08 PROCEDURE — 80053 COMPREHEN METABOLIC PANEL: CPT | Performed by: EMERGENCY MEDICINE

## 2018-07-08 PROCEDURE — 74011250637 HC RX REV CODE- 250/637: Performed by: FAMILY MEDICINE

## 2018-07-08 PROCEDURE — 84484 ASSAY OF TROPONIN QUANT: CPT

## 2018-07-08 RX ORDER — SODIUM CHLORIDE 0.9 % (FLUSH) 0.9 %
10 SYRINGE (ML) INJECTION
Status: COMPLETED | OUTPATIENT
Start: 2018-07-08 | End: 2018-07-08

## 2018-07-08 RX ORDER — HEPARIN SODIUM 5000 [USP'U]/ML
5000 INJECTION, SOLUTION INTRAVENOUS; SUBCUTANEOUS EVERY 8 HOURS
Status: DISCONTINUED | OUTPATIENT
Start: 2018-07-08 | End: 2018-07-09

## 2018-07-08 RX ORDER — ACETAMINOPHEN 325 MG/1
650 TABLET ORAL
Status: DISCONTINUED | OUTPATIENT
Start: 2018-07-08 | End: 2018-07-12 | Stop reason: HOSPADM

## 2018-07-08 RX ORDER — ONDANSETRON 2 MG/ML
4 INJECTION INTRAMUSCULAR; INTRAVENOUS
Status: DISCONTINUED | OUTPATIENT
Start: 2018-07-08 | End: 2018-07-12 | Stop reason: HOSPADM

## 2018-07-08 RX ORDER — HYDROCODONE BITARTRATE AND ACETAMINOPHEN 10; 325 MG/1; MG/1
1 TABLET ORAL
Status: DISCONTINUED | OUTPATIENT
Start: 2018-07-08 | End: 2018-07-12 | Stop reason: HOSPADM

## 2018-07-08 RX ORDER — SODIUM CHLORIDE 0.9 % (FLUSH) 0.9 %
5-10 SYRINGE (ML) INJECTION EVERY 8 HOURS
Status: DISCONTINUED | OUTPATIENT
Start: 2018-07-08 | End: 2018-07-12 | Stop reason: HOSPADM

## 2018-07-08 RX ORDER — ESCITALOPRAM OXALATE 10 MG/1
10 TABLET ORAL DAILY
Status: DISCONTINUED | OUTPATIENT
Start: 2018-07-08 | End: 2018-07-08

## 2018-07-08 RX ORDER — GABAPENTIN 300 MG/1
300 CAPSULE ORAL 3 TIMES DAILY
Status: DISCONTINUED | OUTPATIENT
Start: 2018-07-08 | End: 2018-07-12 | Stop reason: HOSPADM

## 2018-07-08 RX ORDER — ONDANSETRON 2 MG/ML
4 INJECTION INTRAMUSCULAR; INTRAVENOUS
Status: COMPLETED | OUTPATIENT
Start: 2018-07-08 | End: 2018-07-08

## 2018-07-08 RX ORDER — MAGNESIUM SULFATE HEPTAHYDRATE 40 MG/ML
2 INJECTION, SOLUTION INTRAVENOUS
Status: COMPLETED | OUTPATIENT
Start: 2018-07-08 | End: 2018-07-08

## 2018-07-08 RX ORDER — LOSARTAN POTASSIUM 25 MG/1
25 TABLET ORAL DAILY
Status: DISCONTINUED | OUTPATIENT
Start: 2018-07-08 | End: 2018-07-12 | Stop reason: HOSPADM

## 2018-07-08 RX ORDER — PANTOPRAZOLE SODIUM 40 MG/1
40 TABLET, DELAYED RELEASE ORAL
Status: DISCONTINUED | OUTPATIENT
Start: 2018-07-08 | End: 2018-07-12 | Stop reason: HOSPADM

## 2018-07-08 RX ORDER — SODIUM CHLORIDE 9 MG/ML
100 INJECTION, SOLUTION INTRAVENOUS CONTINUOUS
Status: DISCONTINUED | OUTPATIENT
Start: 2018-07-08 | End: 2018-07-12 | Stop reason: HOSPADM

## 2018-07-08 RX ORDER — LANOLIN ALCOHOL/MO/W.PET/CERES
400 CREAM (GRAM) TOPICAL ONCE
Status: COMPLETED | OUTPATIENT
Start: 2018-07-08 | End: 2018-07-08

## 2018-07-08 RX ORDER — LANOLIN ALCOHOL/MO/W.PET/CERES
400 CREAM (GRAM) TOPICAL EVERY 12 HOURS
Status: DISCONTINUED | OUTPATIENT
Start: 2018-07-08 | End: 2018-07-12 | Stop reason: HOSPADM

## 2018-07-08 RX ORDER — ADHESIVE BANDAGE
30 BANDAGE TOPICAL DAILY PRN
Status: DISCONTINUED | OUTPATIENT
Start: 2018-07-08 | End: 2018-07-12 | Stop reason: HOSPADM

## 2018-07-08 RX ORDER — MORPHINE SULFATE 2 MG/ML
4 INJECTION, SOLUTION INTRAMUSCULAR; INTRAVENOUS ONCE
Status: COMPLETED | OUTPATIENT
Start: 2018-07-08 | End: 2018-07-08

## 2018-07-08 RX ORDER — ONDANSETRON 2 MG/ML
4 INJECTION INTRAMUSCULAR; INTRAVENOUS
Status: DISCONTINUED | OUTPATIENT
Start: 2018-07-08 | End: 2018-07-08 | Stop reason: SDUPTHER

## 2018-07-08 RX ORDER — ATORVASTATIN CALCIUM 40 MG/1
80 TABLET, FILM COATED ORAL DAILY
Status: DISCONTINUED | OUTPATIENT
Start: 2018-07-08 | End: 2018-07-12 | Stop reason: HOSPADM

## 2018-07-08 RX ORDER — ALPRAZOLAM 0.5 MG/1
1 TABLET ORAL
Status: DISCONTINUED | OUTPATIENT
Start: 2018-07-08 | End: 2018-07-12 | Stop reason: HOSPADM

## 2018-07-08 RX ORDER — CARVEDILOL 6.25 MG/1
6.25 TABLET ORAL 2 TIMES DAILY WITH MEALS
Status: DISCONTINUED | OUTPATIENT
Start: 2018-07-08 | End: 2018-07-12 | Stop reason: HOSPADM

## 2018-07-08 RX ORDER — SODIUM CHLORIDE 0.9 % (FLUSH) 0.9 %
5-10 SYRINGE (ML) INJECTION AS NEEDED
Status: DISCONTINUED | OUTPATIENT
Start: 2018-07-08 | End: 2018-07-12 | Stop reason: HOSPADM

## 2018-07-08 RX ADMIN — CARVEDILOL 6.25 MG: 6.25 TABLET, FILM COATED ORAL at 08:46

## 2018-07-08 RX ADMIN — GABAPENTIN 300 MG: 300 CAPSULE ORAL at 17:21

## 2018-07-08 RX ADMIN — PROMETHAZINE HYDROCHLORIDE 12.5 MG: 25 INJECTION INTRAMUSCULAR; INTRAVENOUS at 18:40

## 2018-07-08 RX ADMIN — GABAPENTIN 300 MG: 300 CAPSULE ORAL at 08:45

## 2018-07-08 RX ADMIN — CARVEDILOL 6.25 MG: 6.25 TABLET, FILM COATED ORAL at 17:21

## 2018-07-08 RX ADMIN — Medication 10 ML: at 06:04

## 2018-07-08 RX ADMIN — MAGNESIUM SULFATE HEPTAHYDRATE 2 G: 40 INJECTION, SOLUTION INTRAVENOUS at 07:02

## 2018-07-08 RX ADMIN — FOLIC ACID: 5 INJECTION, SOLUTION INTRAMUSCULAR; INTRAVENOUS; SUBCUTANEOUS at 11:08

## 2018-07-08 RX ADMIN — HEPARIN SODIUM 5000 UNITS: 5000 INJECTION, SOLUTION INTRAVENOUS; SUBCUTANEOUS at 17:21

## 2018-07-08 RX ADMIN — SODIUM CHLORIDE 100 ML: 900 INJECTION, SOLUTION INTRAVENOUS at 06:04

## 2018-07-08 RX ADMIN — Medication 400 MG: at 04:51

## 2018-07-08 RX ADMIN — Medication 10 ML: at 11:08

## 2018-07-08 RX ADMIN — ATORVASTATIN CALCIUM 80 MG: 40 TABLET, FILM COATED ORAL at 08:45

## 2018-07-08 RX ADMIN — HYDROCODONE BITARTRATE AND ACETAMINOPHEN 1 TABLET: 10; 325 TABLET ORAL at 14:33

## 2018-07-08 RX ADMIN — SODIUM CHLORIDE 250 ML: 900 INJECTION, SOLUTION INTRAVENOUS at 03:15

## 2018-07-08 RX ADMIN — PROMETHAZINE HYDROCHLORIDE 12.5 MG: 25 INJECTION INTRAMUSCULAR; INTRAVENOUS at 22:47

## 2018-07-08 RX ADMIN — SODIUM CHLORIDE 250 ML: 900 INJECTION, SOLUTION INTRAVENOUS at 04:50

## 2018-07-08 RX ADMIN — Medication 400 MG: at 22:37

## 2018-07-08 RX ADMIN — PROMETHAZINE HYDROCHLORIDE 12.5 MG: 25 INJECTION INTRAMUSCULAR; INTRAVENOUS at 14:33

## 2018-07-08 RX ADMIN — ALPRAZOLAM 1 MG: 0.5 TABLET ORAL at 22:37

## 2018-07-08 RX ADMIN — PANTOPRAZOLE SODIUM 40 MG: 40 TABLET, DELAYED RELEASE ORAL at 08:45

## 2018-07-08 RX ADMIN — HYDROCODONE BITARTRATE AND ACETAMINOPHEN 1 TABLET: 10; 325 TABLET ORAL at 18:39

## 2018-07-08 RX ADMIN — LOSARTAN POTASSIUM 25 MG: 25 TABLET ORAL at 08:46

## 2018-07-08 RX ADMIN — HYDROCODONE BITARTRATE AND ACETAMINOPHEN 1 TABLET: 10; 325 TABLET ORAL at 22:47

## 2018-07-08 RX ADMIN — Medication 10 ML: at 14:39

## 2018-07-08 RX ADMIN — MORPHINE SULFATE 4 MG: 2 INJECTION, SOLUTION INTRAMUSCULAR; INTRAVENOUS at 03:15

## 2018-07-08 RX ADMIN — ONDANSETRON 4 MG: 2 INJECTION INTRAMUSCULAR; INTRAVENOUS at 03:15

## 2018-07-08 RX ADMIN — ACETAMINOPHEN 650 MG: 325 TABLET ORAL at 08:45

## 2018-07-08 RX ADMIN — IOPAMIDOL 100 ML: 755 INJECTION, SOLUTION INTRAVENOUS at 06:03

## 2018-07-08 RX ADMIN — SODIUM CHLORIDE 75 ML/HR: 900 INJECTION, SOLUTION INTRAVENOUS at 08:00

## 2018-07-08 RX ADMIN — PROMETHAZINE HYDROCHLORIDE 12.5 MG: 25 INJECTION INTRAMUSCULAR; INTRAVENOUS at 07:00

## 2018-07-08 RX ADMIN — GABAPENTIN 300 MG: 300 CAPSULE ORAL at 22:37

## 2018-07-08 RX ADMIN — HEPARIN SODIUM 5000 UNITS: 5000 INJECTION, SOLUTION INTRAVENOUS; SUBCUTANEOUS at 08:47

## 2018-07-08 RX ADMIN — Medication 400 MG: at 08:46
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
## 2018-07-08 NOTE — IP AVS SNAPSHOT
[address] [facility] [address] 
[phone] Patient: Avni Cespedes MRN: [id number] About your hospitalization You were admitted on:  July 8, 2018 You last received care in the:  [address] [place] [address] You were discharged on:  July 12, 2018 Why you were hospitalized Your primary diagnosis was:  Nausea & Vomiting Your diagnoses also included:  Chronic Systolic Heart Failure (Hcc), Htn (Hypertension), Cad (Coronary Artery Disease), Chronic Back Pain, Macrocytic Anemia, High Anion Gap Metabolic Acidosis, Elevated Bilirubin, Elevated Transaminase Level, Elevated Lactic Acid Level Follow-up Information Follow up With Details Comments Contact Info Dominik David MD In 5 days APPT. JULY 16th @ 10:40 Jay Ray [phone] [place] 
[phone] [facility]  OFFICE WILL CALL PATIENT WITH APPT. [address] [address] [phone] Your Scheduled Appointments Monday July 16, 2018 10:40 AM EDT Office Visit with Dominik David MD  
[address]  
[phone] Wednesday August 15, 2018  3:00 PM EDT Office Visit with Val Ramirez MD  
[facility] ([address] [place] [address]) [address] [place]  
[address] [facility] 81  
[phone] Discharge Orders None A check kamron indicates which time of day the medication should be taken. My Medications START taking these medications Instructions Each Dose to Equal  
 Morning Noon Evening Bedtime  
 promethazine 12.5 mg tablet Commonly known as:  PHENERGAN Your last dose was: Your next dose is: Take 1 Tab by mouth every six (6) hours as needed for Nausea.   
 12.5 mg  
    
   
   
   
  
  
 CONTINUE taking these medications Instructions Each Dose to Equal  
 Morning Noon Evening Bedtime ALPRAZolam 1 mg tablet Commonly known as:  Lalitha Strauss Your last dose was: Your next dose is: Take 1 Tab by mouth nightly. Max Daily Amount: 1 mg. Indications: anxiety 1 mg  
    
   
   
   
  
 atorvastatin 80 mg tablet Commonly known as:  LIPITOR Your last dose was: Your next dose is: Take 1 Tab by mouth daily. 80 mg  
    
   
   
   
  
 carvedilol 6.25 mg tablet Commonly known as:  Mike [place] Your last dose was: Your next dose is: Take 1 Tab by mouth two (2) times daily (with meals). 6.25 mg  
    
   
   
   
  
 furosemide 40 mg tablet Commonly known as:  LASIX Your last dose was: Your next dose is: Take 1 Tab by mouth daily. 40 mg  
    
   
   
   
  
 gabapentin 300 mg capsule Commonly known as:  NEURONTIN Your last dose was: Your next dose is: Take 1 Cap by mouth three (3) times daily. 300 mg HYDROcodone-acetaminophen  mg tablet Commonly known as:  Birda Cochran Your last dose was: Your next dose is:    
   
   
 1 tablet q4-6hrs prn pain, brand only  
     
   
   
   
  
 losartan 25 mg tablet Commonly known as:  COZAAR Your last dose was: Your next dose is: Take 1 Tab by mouth daily. 25 mg  
    
   
   
   
  
 magnesium oxide 400 mg tablet Commonly known as:  MAG-OX Your last dose was: Your next dose is: Take 1 Tab by mouth every twelve (12) hours. 400 mg NEXIUM PO Your last dose was: Your next dose is: Take 1 Cap by mouth two (2) times a day. 1 Cap  
    
   
   
   
  
 potassium chloride 20 mEq tablet Commonly known as:  K-DUR, KLOR-CON Your last dose was: Your next dose is: Take 1 Tab by mouth daily. 20 mEq  
    
   
   
   
  
 sildenafil citrate 100 mg tablet Commonly known as:  VIAGRA Your last dose was: Your next dose is: Take 1 Tab by mouth as needed. 100 mg  
    
   
   
   
  
  
STOP taking these medications   
 ondansetron 8 mg disintegrating tablet Commonly known as:  ZOFRAN ODT Where to Get Your Medications Information on where to get these meds will be given to you by the nurse or doctor. ! Ask your nurse or doctor about these medications HYDROcodone-acetaminophen  mg tablet  
 promethazine 12.5 mg tablet Opioid Education Prescription Opioids: What You Need to Know: 
 
 
 
F-face looks uneven A-arms unable to move or move unevenly S-speech slurred or non-existent T-time-call 911 as soon as signs and symptoms begin-DO NOT go Back to bed or wait to see if you get better-TIME IS BRAIN. Warning Signs of HEART ATTACK Call 911 if you have these symptoms: 
? Chest discomfort. Most heart attacks involve discomfort in the center of the chest that lasts more than a few minutes, or that goes away and comes back. It can feel like uncomfortable pressure, squeezing, fullness, or pain. ? Discomfort in other areas of the upper body.  Symptoms can include pain or discomfort in one or both arms, the back, neck, jaw, or stomach. ? Shortness of breath with or without chest discomfort. ? Other signs may include breaking out in a cold sweat, nausea, or lightheadedness. Don't wait more than five minutes to call [address]! Fast action can save your life. Calling 911 is almost always the fastest way to get lifesaving treatment. Emergency Medical Services staff can begin treatment when they arrive  up to an hour sooner than if someone gets to the hospital by car. The discharge information has been reviewed with the patient. The patient verbalized understanding. Discharge medications reviewed with the patient Hernia: Care Instructions Your Care Instructions A hernia develops when tissue bulges through a weak spot in the wall of your belly. The groin area and the navel are common areas for a hernia. A hernia can also develop near the area of a surgery you had before. Pressure from lifting, straining, or coughing can tear the weak area, causing the hernia to bulge and be painful. If you cannot push a hernia back into place, the tissue may become trapped outside the belly wall. If the hernia gets twisted and loses its blood supply, it will swell and die. This is called a strangulated hernia. It usually causes a lot of pain. It needs treatment right away. Some hernias need to be repaired to prevent a strangulated hernia. If your hernia causes symptoms or is large, you may need surgery. Follow-up care is a key part of your treatment and safety. Be sure to make and go to all appointments, and call your doctor if you are having problems. It's also a good idea to know your test results and keep a list of the medicines you take. How can you care for yourself at home? · Take care when lifting heavy objects. · Stay at a healthy weight. · Do not smoke.  Smoking can cause coughing, which can cause your hernia to bulge. If you need help quitting, talk to your doctor about stop-smoking programs and medicines. These can increase your chances of quitting for good. · Talk with your doctor before wearing a corset or truss for a hernia. These devices are not recommended for treating hernias and sometimes can do more harm than good. There may be certain situations when your doctor thinks a truss would work, but these are rare. When should you call for help? Call your doctor now or seek immediate medical care if: 
  · You have new or worse belly pain.  
  · You are vomiting.  
  · You cannot pass stools or gas.  
  · You cannot push the hernia back into place with gentle pressure when you are lying down.  
  · The area over the hernia turns red or becomes tender.  
 Watch closely for changes in your health, and be sure to contact your doctor if you have any problems. Where can you learn more? Go to http://edmundo-isi.info/. Enter C129 in the search box to learn more about \"Hernia: Care Instructions. \" Current as of: May 12, 2017 Content Version: 11.7 © 8290-4670 [employer]. Care instructions adapted under license by [employer] (which disclaims liability or warranty for this information). If you have questions about a medical condition or this instruction, always ask your healthcare professional. [address] any warranty or liability for your use of this information. and appropriate educational materials and side effects teaching were provided. ___________________________________________________________________________________________________________________________________ ACO Transitions of Care Introducing [employer] [address] offers a voluntary care coordination program to provide high quality service and care to Talib Maurer fee-for-service beneficiaries. [place]ha [place] was designed to help you enhance your health and well-being through the following services: ? Transitions of Care  support for individuals who are transitioning from one care setting to another (example: Hospital to home). ? Chronic and Complex Care Coordination  support for individuals and caregivers of those with serious or chronic illnesses or with more than one chronic (ongoing) condition and those who take a number of different medications. If you meet specific medical criteria, a [address] [place] [address] Rd may call you directly to coordinate your care with your primary care physician and your other care providers. For questions about the [facility] programs, please, contact your physicians office. For general questions or additional information about Accountable Care Organizations: 
Please visit www.medicare.gov/acos. html or call 1-800-MEDICARE (1-479.544.1479) TTY users should call 3-891.836.2252. [email or web address] Announcement We are excited to announce that we are making your provider's discharge notes available to you in [email or web address]. You will see these notes when they are completed and signed by the physician that discharged you from your recent hospital stay. If you have any questions or concerns about any information you see in Personal Style Findert, please call the Health Information Department where you were seen or reach out to your Primary Care Provider for more information about your plan of care. Introducing [facility] & HEALTH SERVICES! Dear Maya Zimmerman: Thank you for requesting a [email or web address] account. Our records indicate that you already have an active [email or web address] account. You can access your account anytime at [email or web address] Did you know that you can access your hospital and ER discharge instructions at any time in [email or web address]? You can also review all of your test results from your hospital stay or ER visit. Additional Information If you have questions, please visit the Frequently Asked Questions section of the MyChart website at [email or web address] Remember, ClickDeliveryhart is NOT to be used for urgent needs. For medical emergencies, dial 911. Now available from your iPhone and Android! Introducing Russell Bustillos As a [address] [place] [address] patient, I wanted to make you aware of our electronic visit tool called Russell Bustillos. PASSNFLY 24/7 allows you to connect within minutes with a medical provider 24 hours a day, seven days a week via a mobile device or tablet or logging into a secure website from your computer. You can access Russell Bustillos from anywhere in the [place]. A virtual visit might be right for you when you have a simple condition and feel like you just dont want to get out of bed, or cant get away from work for an appointment, when your regular [facility] provider is not available (evenings, weekends or holidays), or when youre out of town and need minor care. Electronic visits cost only $49 and if the [facility] 24/7 provider determines a prescription is needed to treat your condition, one can be electronically transmitted to a nearby pharmacy*. Please take a moment to enroll today if you have not already done so. The enrollment process is free and takes just a few minutes. To enroll, please download the The [employer] Times 24/7 rachel to your tablet or phone, or visit [email or web address] to enroll on your computer. And, as an [address] [place] [address] patient with a [employer] account, the results of your visits will be scanned into your electronic medical record and your primary care provider will be able to view the scanned results. We urge you to continue to see your regular [address] [place] [address] provider for your ongoing medical care.   And while your primary care provider may not be the one available when you seek a Russell Bustillos virtual visit, the peace of mind you get from getting a real diagnosis real time can be priceless. For more information on Russell Bustillos, view our Frequently Asked Questions (FAQs) at [email or web address]. Sincerely, 
 
Tasia Garcia MD 
Chief Medical Officer [address] *:  certain medications cannot be prescribed via Russell Bustillos Providers Seen During Your Hospitalization Provider Specialty Primary office phone [address] [place] Emergency Medicine [phone] Elaine Driscoll MD [facility] [phone] Your Primary Care Physician (PCP) Primary Care Physician Office Phone Office Fax [address] [facility], [address] [place] [address] [phone] You are allergic to the following No active allergies Recent Documentation Height Weight BMI Smoking Status 1.803 m 74.4 kg 22.88 kg/m2 Never Smoker Emergency Contacts Name Discharge Info Relation Home Work Mobile Sanford Guadalupe  Spouse [3] [phone] Aniyah Harley  Mother [14] [phone] Patient Belongings The following personal items are in your possession at time of discharge: 
  Dental Appliances: None  Visual Aid: None      Home Medications: None   Jewelry: None  Clothing: Pants, Shirt, Slippers    Other Valuables: None  Personal Items Sent to Safe: none Please provide this summary of care documentation to your next provider. Signatures-by signing, you are acknowledging that this After Visit Summary has been reviewed with you and you have received a copy. Patient Signature:  ____________________________________________________________ Date:  ____________________________________________________________  
  
Maggie Aaron Provider Signature:  ____________________________________________________________ Date:  ____________________________________________________________

## 2018-07-08 NOTE — ED TRIAGE NOTES
Pt complains of headache and vomiting with generalized body aches he reports has been going on for rachel 24 hours. Denies diarrhea and denies trouble urinating.

## 2018-07-08 NOTE — ED NOTES
TRANSFER - OUT REPORT:    Verbal report given to [facility] RN (name) on [place]ley  being transferred to [address] [place] [address] (unit) for routine progression of care       Report consisted of patients Situation, Background, Assessment and   Recommendations(SBAR). Information from the following report(s) ED Summary was reviewed with the receiving nurse. Lines:   Peripheral IV 07/08/18 Right Hand (Active)   Site Assessment Clean, dry, & intact 7/8/2018  3:27 AM   Phlebitis Assessment 0 7/8/2018  3:27 AM   Infiltration Assessment 0 7/8/2018  3:27 AM   Dressing Status Clean, dry, & intact 7/8/2018  3:27 AM       Peripheral IV 07/08/18 Left Forearm (Active)   Site Assessment Clean, dry, & intact 7/8/2018  3:43 AM   Phlebitis Assessment 0 7/8/2018  3:43 AM   Infiltration Assessment 0 7/8/2018  3:43 AM   Dressing Status Clean, dry, & intact 7/8/2018  3:43 AM        Opportunity for questions and clarification was provided.       Patient transported with:  RN

## 2018-07-08 NOTE — PROGRESS NOTES
TRANSFER - IN REPORT:    Verbal report received from [facility]) on [place] [facility]  being received from ER(unit) for routine progression of care      Report consisted of patients Situation, Background, Assessment and   Recommendations(SBAR). Information from the following report(s) Kardex was reviewed with the receiving nurse. Opportunity for questions and clarification was provided. Assessment completed upon patients arrival to unit and care assumed.

## 2018-07-08 NOTE — H&P
HOSPITALIST INITIAL HISTORY AND PHYSICAL 
 
NAME:  Jeannie Ayoub Age:  48 y.o. 
:   1965 MRN:   [id number] PCP: Jimenez Zamora MD 
Consulting MD: Treatment Team: Attending Provider: Mukund Carmichael MD; Primary Nurse: Kwadwo Ward RN 
 
CHIEF COMPLAINT: nausea, vomiting, abdominal pain HISTORY OF PRESENT ILLNESS:  
Jeannie Ayoub is a 48 y.o. male with a past medical history of CAD, chronic alcoholism (per chart, denied by patient), GERD, sCHF who presents to the ER with complaint of nausea, vomiting, and abdominal pain that started last night around 7 pm. He reports that it started suddenly, and since he has vomited multiple times. Reports that his vomitus has had some dark red material but no bright red blood or coffee ground material. Pain is all throughout his stomach. Reports that he does not feel any better than when he came in, in spite of IV pain and nausea medication in ER. Denies fevers, chills, diarrhea, constipation, chest pain, SOB. REVIEW OF SYSTEMS: Comprehensive ROS performed and negative except as stated in HPI. Past Medical History:  
Diagnosis Date  Arthritis  CAD (coronary artery disease)  CHF (congestive heart failure) ([address]  Chronic alcoholism ([address]  Chronic back pain   
 from [facility]  Chronic neck pain   
 from [facility]  Chronic systolic heart failure ([address] 2011  Depression  Dizziness - light-headed  GERD (gastroesophageal reflux disease)   
 under control with nexium  Gynecomastia 2016  Heart failure ([address]  History of implantable cardioverter-defibrillator (ICD) placement 2016  Hypertension  Psychiatric disorder   
 anxiety  Situational depression 2016  Ventricular tachycardia ([address] 2016 Past Surgical History:  
Procedure Laterality Date  HX HEART CATHETERIZATION  9/21/10  
 HX PACEMAKER    
 defibrillator Prior to Admission Medications Prescriptions Last Dose Informant Patient Reported? Taking? ALPRAZolam (XANAX) 1 mg tablet   No No  
Sig: Take 1 Tab by mouth nightly. Max Daily Amount: 1 mg. Indications: anxiety ESOMEPRAZOLE MAG TRIHYDRATE (NEXIUM PO)   Yes No  
Sig: Take 1 Cap by mouth two (2) times a day. HYDROcodone-acetaminophen (NORCO)  mg tablet   No No  
Si tablet q4-6hrs prn pain, brand only  
atorvastatin (LIPITOR) 80 mg tablet   No No  
Sig: Take 1 Tab by mouth daily. carvedilol (COREG) 6.25 mg tablet   No No  
Sig: Take 1 Tab by mouth two (2) times daily (with meals). escitalopram oxalate (LEXAPRO) 10 mg tablet   No No  
Sig: Take 1 Tab by mouth daily. furosemide (LASIX) 40 mg tablet   No No  
Sig: Take 1 Tab by mouth daily. gabapentin (NEURONTIN) 300 mg capsule   No No  
Sig: Take 1 Cap by mouth three (3) times daily. losartan (COZAAR) 25 mg tablet   No No  
Sig: Take 1 Tab by mouth daily. magnesium oxide (MAG-OX) 400 mg tablet   No No  
Sig: Take 1 Tab by mouth every twelve (12) hours. ondansetron (ZOFRAN ODT) 8 mg disintegrating tablet   No No  
Sig: Take 1 Tab by mouth every eight (8) hours as needed for Nausea. potassium chloride (K-DUR, KLOR-CON) 20 mEq tablet   No No  
Sig: Take 1 Tab by mouth daily. sildenafil citrate (VIAGRA) 100 mg tablet   No No  
Sig: Take 1 Tab by mouth as needed. Facility-Administered Medications: None No Known Allergies FAMILY HISTORY: Reviewed. Negative except Family History Problem Relation Age of Onset  Heart Disease Father CABG  
 Diabetes Father  Arthritis-rheumatoid Mother Social History Substance Use Topics  Smoking status: Never Smoker  Smokeless tobacco: Never Used  Alcohol use Yes Comment: occasi Objective:  
 
Visit Vitals  BP (!) 153/121  Pulse (!) 112  Temp 98.4 °F (36.9 °C)  Resp 20  
 Ht 5' 11\" (1.803 m)  Wt 76.2 kg (168 lb)  SpO2 100%  BMI 23.43 kg/m2 Temp (24hrs), Av.4 °F (36.9 °C), Min:98.4 °F (36.9 °C), Max:98.4 °F (36.9 °C) Oxygen Therapy O2 Sat (%): 100 % (07/08/18 0615) Pulse via Oximetry: 111 beats per minute (07/08/18 0615) O2 Device: Room air (07/08/18 0119) Physical Exam: 
General:    The patient is a pleasant middle aged male in no acute distress. Head:   Normocephalic/atraumatic. Eyes:  No palpebral pallor or scleral icterus. ENT:  External auricular and nasal exam within normal limits. Mucous membranes are tacky. Neck:  Supple, non-tender, no JVD. Lungs:   Clear to auscultation bilaterally without wheezes or crackles. No respiratory distress or accessory muscle use. Heart:   Regular rate and rhythm, without murmurs, rubs, or gallops. Abdomen:   Soft, non-tender, non-distended with normoactive bowel sounds. Genitourinary: No tenderness over the bladder or bilateral CVAs. Extremities: Without clubbing, cyanosis, or edema. Skin:     Normal color, texture, and turgor. No rashes, lesions, or jaundice. Pulses: Radial and dorsalis pedis pulses present 2+ bilaterally. Capillary refill <2s. Neurologic: CN II-XII grossly intact and symmetrical.  
  Moving all four extremities well with no focal deficits. Psychiatric: Pleasant demeanor, appropriate affect. Alert and oriented x 3 Data Review:  
Recent Results (from the past 24 hour(s)) CBC WITH AUTOMATED DIFF Collection Time: 07/08/18  1:38 AM  
Result Value Ref Range WBC 8.1 4.3 - 11.1 K/uL  
 RBC 3.66 (L) 4.23 - 5.67 M/uL  
 HGB 12.3 (L) 13.6 - 17.2 g/dL HCT 39.6 (L) 41.1 - 50.3 % .2 (H) 79.6 - 97.8 FL  
 MCH 33.6 (H) 26.1 - 32.9 PG  
 MCHC 31.1 (L) 31.4 - 35.0 g/dL  
 RDW 14.1 11.9 - 14.6 % PLATELET 010 917 - 169 K/uL MPV 9.9 (L) 10.8 - 14.1 FL  
 DF AUTOMATED NEUTROPHILS 81 (H) 43 - 78 % LYMPHOCYTES 10 (L) 13 - 44 % MONOCYTES 9 4.0 - 12.0 % EOSINOPHILS 0 (L) 0.5 - 7.8 % BASOPHILS 0 0.0 - 2.0 % IMMATURE GRANULOCYTES 0 0.0 - 5.0 %  
 ABS.  NEUTROPHILS 6.5 1.7 - 8.2 K/UL  
 ABS. LYMPHOCYTES 0.8 0.5 - 4.6 K/UL  
 ABS. MONOCYTES 0.7 0.1 - 1.3 K/UL  
 ABS. EOSINOPHILS 0.0 0.0 - 0.8 K/UL  
 ABS. BASOPHILS 0.0 0.0 - 0.2 K/UL  
 ABS. IMM. GRANS. 0.0 0.0 - 0.5 K/UL  
POC TROPONIN-I Collection Time: 07/08/18  2:23 AM  
Result Value Ref Range Troponin-I (POC) 0.02 0.02 - 0.05 ng/ml EKG, 12 LEAD, INITIAL Collection Time: 07/08/18  2:27 AM  
Result Value Ref Range Ventricular Rate 108 BPM  
 Atrial Rate 108 BPM  
 P-R Interval 164 ms QRS Duration 100 ms Q-T Interval 358 ms QTC Calculation (Bezet) 479 ms Calculated P Axis 52 degrees Calculated R Axis -18 degrees Calculated T Axis 153 degrees Diagnosis Sinus tachycardia with occasional Premature ventricular complexes Left ventricular hypertrophy with repolarization abnormality Abnormal ECG When compared with ECG of 18-MAR-2017 03:59, No significant change was found LIPASE Collection Time: 07/08/18  3:48 AM  
Result Value Ref Range Lipase 121 73 - 393 U/L MAGNESIUM Collection Time: 07/08/18  3:48 AM  
Result Value Ref Range Magnesium 1.5 (L) 1.8 - 2.4 mg/dL METABOLIC PANEL, COMPREHENSIVE Collection Time: 07/08/18  3:48 AM  
Result Value Ref Range Sodium 144 136 - 145 mmol/L Potassium 5.1 3.5 - 5.1 mmol/L Chloride 103 98 - 107 mmol/L  
 CO2 14 (L) 21 - 32 mmol/L Anion gap 27 (H) 7 - 16 mmol/L Glucose 96 65 - 100 mg/dL BUN 8 6 - 23 MG/DL Creatinine 1.31 0.8 - 1.5 MG/DL  
 GFR est AA >60 >60 ml/min/1.73m2 GFR est non-AA >60 >60 ml/min/1.73m2 Calcium 8.8 8.3 - 10.4 MG/DL Bilirubin, total 1.3 (H) 0.2 - 1.1 MG/DL  
 ALT (SGPT) 84 (H) 12 - 65 U/L  
 AST (SGOT) 165 (H) 15 - 37 U/L Alk. phosphatase 148 (H) 50 - 136 U/L Protein, total 7.1 6.3 - 8.2 g/dL Albumin 4.0 3.5 - 5.0 g/dL Globulin 3.1 2.3 - 3.5 g/dL A-G Ratio 1.3 1.2 - 3.5 BNP Collection Time: 07/08/18  3:48 AM  
Result Value Ref Range  pg/mL POC LACTIC ACID Collection Time: 07/08/18  3:49 AM  
Result Value Ref Range Lactic Acid (POC) 2.1 (H) 0.5 - 1.9 mmol/L Imaging Dylan Serve /Studies: Xr Abd Acute W 1 V Chest 
 
Result Date: 7/8/2018 IMPRESSION: No acute abnormality [facility] Result Date: 7/8/2018 IMPRESSION: 1. Fatty infiltration liver. 2. Mild gallbladder distention. Biliary sludge present Assessment and Plan:  
 
Principal Problem: 
  Nausea & vomiting (2/26/2016) Uncertain etiology. CT abd/pelvis pending. RUQ US unremarkable. Lipase WNL. Diff dx includes gastritis, viral syndrome. F/u CT findings, IVF, treat nausea and pain symptomatically. Active Problems: 
  Macrocytic anemia (7/8/2018) Likely related to alcohol use/poor nutrition. Will check B12, folate. Follow CBC. High anion gap metabolic acidosis (0/5/3128) Likely alcoholic ketosis, no hx of DM. Aggressive IVF, Follow q4h BMP until AG improves. [place] Alcohol abuse (7/8/2018) Pt reports only infrequent alcohol use. Elevated transaminase level (3/14/2017) Mild, follow CMP. Elevated bilirubin (7/8/2018) Mild, follow daily CMP. Chronic systolic heart failure ([address] (4/21/2011) Stable. HTN (hypertension) (11/29/2011) Stable, continue home meds CAD (coronary artery disease) (3/18/2017) Stable, continue home meds Chronic back pain (3/18/2017) Stable. DVT Prophylaxis: Heparin Code Status: FULL CODE Disposition: Admit to med/surg for evaluation and treatment as per above. Anticipated discharge: 2-3 days Signed By: Kandice Perez MD   
 July 8, 2018

## 2018-07-08 NOTE — IP AVS SNAPSHOT
[address] 
[phone] Patient: Moises Llamas MRN: [id number] VZK:9[birth year] A check kamron indicates which time of day the medication should be taken. My Medications START taking these medications Instructions Each Dose to Equal  
 Morning Noon Evening Bedtime  
 promethazine 12.5 mg tablet Commonly known as:  PHENERGAN Your last dose was: Your next dose is: Take 1 Tab by mouth every six (6) hours as needed for Nausea. 12.5 mg  
    
   
   
   
  
  
CONTINUE taking these medications Instructions Each Dose to Equal  
 Morning Noon Evening Bedtime ALPRAZolam 1 mg tablet Commonly known as:  Charnoy Benedict Your last dose was: Your next dose is: Take 1 Tab by mouth nightly. Max Daily Amount: 1 mg. Indications: anxiety 1 mg  
    
   
   
   
  
 atorvastatin 80 mg tablet Commonly known as:  LIPITOR Your last dose was: Your next dose is: Take 1 Tab by mouth daily. 80 mg  
    
   
   
   
  
 carvedilol 6.25 mg tablet Commonly known as:  [employer] [place] Your last dose was: Your next dose is: Take 1 Tab by mouth two (2) times daily (with meals). 6.25 mg  
    
   
   
   
  
 furosemide 40 mg tablet Commonly known as:  LASIX Your last dose was: Your next dose is: Take 1 Tab by mouth daily. 40 mg  
    
   
   
   
  
 gabapentin 300 mg capsule Commonly known as:  NEURONTIN Your last dose was: Your next dose is: Take 1 Cap by mouth three (3) times daily. 300 mg HYDROcodone-acetaminophen  mg tablet Commonly known as:  Uziel Ill Your last dose was: Your next dose is:    
   
   
 1 tablet q4-6hrs prn pain, brand only  
     
   
   
   
  
 losartan 25 mg tablet Commonly known as:  COZAAR  
   
 Your last dose was: Your next dose is: Take 1 Tab by mouth daily. 25 mg  
    
   
   
   
  
 magnesium oxide 400 mg tablet Commonly known as:  MAG-OX Your last dose was: Your next dose is: Take 1 Tab by mouth every twelve (12) hours. 400 mg NEXIUM PO Your last dose was: Your next dose is: Take 1 Cap by mouth two (2) times a day. 1 Cap  
    
   
   
   
  
 potassium chloride 20 mEq tablet Commonly known as:  K-DUR, KLOR-CON Your last dose was: Your next dose is: Take 1 Tab by mouth daily. 20 mEq  
    
   
   
   
  
 sildenafil citrate 100 mg tablet Commonly known as:  VIAGRA Your last dose was: Your next dose is: Take 1 Tab by mouth as needed. 100 mg  
    
   
   
   
  
  
STOP taking these medications   
 ondansetron 8 mg disintegrating tablet Commonly known as:  ZOFRAN ODT Where to Get Your Medications Information on where to get these meds will be given to you by the nurse or doctor. ! Ask your nurse or doctor about these medications HYDROcodone-acetaminophen  mg tablet  
 promethazine 12.5 mg tablet

## 2018-07-08 NOTE — PROGRESS NOTES
07/08/18 0735   Dual Skin Pressure Injury Assessment   Dual Skin Pressure Injury Assessment WDL   skin intact

## 2018-07-08 NOTE — PROGRESS NOTES
Shift assessment complete. Respirations present. Even and unlabored. No s/s of distress. Zero c/o pain at this time. Call light within reach. Encouraged patient to call for assistance. Patient verbalizes understanding. See Doc Flowsheet for assessment details. Patient resting in bed. With saline well intact to left arm with antibiotic infusing. Skin intact. Alert and oriented times 3. Ambulating to bathroom at intervals. Complaints of headache. Offered tylenol, but refused.

## 2018-07-08 NOTE — PROGRESS NOTES
Hospitalist Follow Up     2018       NAME: Patrick Dozier   :  1965   MRN:  [id number]   Attending: Mir Singh MD  PCP:  Echo Whitehead MD  Treatment Team: Attending Provider: Mir Singh MD    Patient admitted after midnight. Reports 2 episodes of vomiting after getting to the floor this morning, but no vomiting since. CT abdomen shows fatty liver and mildly distended gallbladder (US consistent with this). He denies abdominal pain at present. Seen with wife and grandchild at bedside and asked them to step out. He adamantly denies heavy alcohol use despite elevated LFTs and macrocytosis. Anion gap has closed with IVF.       Visit Vitals    BP (!) 140/96 (BP 1 Location: Right arm, BP Patient Position: At rest;Head of bed elevated (Comment degrees))  Comment: rn notified    Pulse 88    Temp 97.6 °F (36.4 °C)    Resp 20    Ht 5' 11\" (1.803 m)    Wt 76.2 kg (168 lb)    SpO2 100%    BMI 23.43 kg/m2     Patient Vitals for the past 24 hrs:   Temp Pulse Resp BP SpO2   18 1550 97.6 °F (36.4 °C) 88 20 (!) 140/96 100 %   18 1152 98.5 °F (36.9 °C) (!) 101 22 (!) 126/95 98 %   18 0735 97.8 °F (36.6 °C) 100 20 (!) 158/100 100 %   18 0700 - (!) 105 13 (!) 158/107 100 %   18 0615 - (!) 112 20 (!) 153/121 100 %   18 0531 - (!) 114 27 (!) 158/100 100 %   18 0504 - (!) 107 (!) 41 - 100 %   18 0501 - - - (!) 160/101 -   18 0457 - (!) 108 (!) 34 - 100 %   18 0438 - (!) 113 21 - 100 %   18 0431 - (!) 123 26 (!) 160/110 100 %   18 0423 - (!) 144 (!) 31 - 100 %   18 0401 - (!) 118 20 (!) 156/99 100 %   18 0331 - (!) 136 (!) 35 (!) 164/101 100 %   18 0320 - (!) 121 22 - 100 %   18 0319 - (!) 119 23 (!) 160/102 -   18 0249 - (!) 114 (!) 33 - 100 %   18 0231 - (!) 105 23 (!) 173/101 100 %   18 0229 - (!) 105 22 (!) 165/102 100 %   18 0119 98.4 °F (36.9 °C) (!) 125 17 (!) 170/121 100 %     Exam: Gen- NAD  CV- RRR  Lungs- CTA  Abd- soft, nontender, + BS    Recent Results (from the past 24 hour(s))   CBC WITH AUTOMATED DIFF    Collection Time: 07/08/18  1:38 AM   Result Value Ref Range    WBC 8.1 4.3 - 11.1 K/uL    RBC 3.66 (L) 4.23 - 5.67 M/uL    HGB 12.3 (L) 13.6 - 17.2 g/dL    HCT 39.6 (L) 41.1 - 50.3 %    .2 (H) 79.6 - 97.8 FL    MCH 33.6 (H) 26.1 - 32.9 PG    MCHC 31.1 (L) 31.4 - 35.0 g/dL    RDW 14.1 11.9 - 14.6 %    PLATELET 418 634 - 231 K/uL    MPV 9.9 (L) 10.8 - 14.1 FL    DF AUTOMATED      NEUTROPHILS 81 (H) 43 - 78 %    LYMPHOCYTES 10 (L) 13 - 44 %    MONOCYTES 9 4.0 - 12.0 %    EOSINOPHILS 0 (L) 0.5 - 7.8 %    BASOPHILS 0 0.0 - 2.0 %    IMMATURE GRANULOCYTES 0 0.0 - 5.0 %    ABS. NEUTROPHILS 6.5 1.7 - 8.2 K/UL    ABS. LYMPHOCYTES 0.8 0.5 - 4.6 K/UL    ABS. MONOCYTES 0.7 0.1 - 1.3 K/UL    ABS. EOSINOPHILS 0.0 0.0 - 0.8 K/UL    ABS. BASOPHILS 0.0 0.0 - 0.2 K/UL    ABS. IMM.  GRANS. 0.0 0.0 - 0.5 K/UL   POC TROPONIN-I    Collection Time: 07/08/18  2:23 AM   Result Value Ref Range    Troponin-I (POC) 0.02 0.02 - 0.05 ng/ml   EKG, 12 LEAD, INITIAL    Collection Time: 07/08/18  2:27 AM   Result Value Ref Range    Ventricular Rate 108 BPM    Atrial Rate 108 BPM    P-R Interval 164 ms    QRS Duration 100 ms    Q-T Interval 358 ms    QTC Calculation (Bezet) 479 ms    Calculated P Axis 52 degrees    Calculated R Axis -18 degrees    Calculated T Axis 153 degrees    Diagnosis       Sinus tachycardia with occasional Premature ventricular complexes  Left ventricular hypertrophy with repolarization abnormality  Abnormal ECG  When compared with ECG of 18-MAR-2017 03:59,  No significant change was found  Confirmed by Carmen Brody MD (), LISS YARBROUGH ([id number]) on 7/8/2018 2:07:32 PM     LIPASE    Collection Time: 07/08/18  3:48 AM   Result Value Ref Range    Lipase 121 73 - 393 U/L   MAGNESIUM    Collection Time: 07/08/18  3:48 AM   Result Value Ref Range    Magnesium 1.5 (L) 1.8 - 2.4 mg/dL   METABOLIC PANEL, COMPREHENSIVE    Collection Time: 07/08/18  3:48 AM   Result Value Ref Range    Sodium 144 136 - 145 mmol/L    Potassium 5.1 3.5 - 5.1 mmol/L    Chloride 103 98 - 107 mmol/L    CO2 14 (L) 21 - 32 mmol/L    Anion gap 27 (H) 7 - 16 mmol/L    Glucose 96 65 - 100 mg/dL    BUN 8 6 - 23 MG/DL    Creatinine 1.31 0.8 - 1.5 MG/DL    GFR est AA >60 >60 ml/min/1.73m2    GFR est non-AA >60 >60 ml/min/1.73m2    Calcium 8.8 8.3 - 10.4 MG/DL    Bilirubin, total 1.3 (H) 0.2 - 1.1 MG/DL    ALT (SGPT) 84 (H) 12 - 65 U/L    AST (SGOT) 165 (H) 15 - 37 U/L    Alk.  phosphatase 148 (H) 50 - 136 U/L    Protein, total 7.1 6.3 - 8.2 g/dL    Albumin 4.0 3.5 - 5.0 g/dL    Globulin 3.1 2.3 - 3.5 g/dL    A-G Ratio 1.3 1.2 - 3.5     BNP    Collection Time: 07/08/18  3:48 AM   Result Value Ref Range     pg/mL   VITAMIN B12    Collection Time: 07/08/18  3:48 AM   Result Value Ref Range    Vitamin B12 1175 (H) 193 - 986 pg/mL   FOLATE    Collection Time: 07/08/18  3:48 AM   Result Value Ref Range    Folate 5.1 3.1 - 17.5 ng/mL   POC LACTIC ACID    Collection Time: 07/08/18  3:49 AM   Result Value Ref Range    Lactic Acid (POC) 2.1 (H) 0.5 - 1.9 mmol/L   METABOLIC PANEL, BASIC    Collection Time: 07/08/18  9:00 AM   Result Value Ref Range    Sodium 143 136 - 145 mmol/L    Potassium 4.9 3.5 - 5.1 mmol/L    Chloride 104 98 - 107 mmol/L    CO2 19 (L) 21 - 32 mmol/L    Anion gap 20 (H) 7 - 16 mmol/L    Glucose 94 65 - 100 mg/dL    BUN 7 6 - 23 MG/DL    Creatinine 1.28 0.8 - 1.5 MG/DL    GFR est AA >60 >60 ml/min/1.73m2    GFR est non-AA >60 >60 ml/min/1.73m2    Calcium 8.9 8.3 - 10.4 MG/DL   CBC WITH AUTOMATED DIFF    Collection Time: 07/08/18  9:00 AM   Result Value Ref Range    WBC 5.9 4.3 - 11.1 K/uL    RBC 3.47 (L) 4.23 - 5.67 M/uL    HGB 11.8 (L) 13.6 - 17.2 g/dL    HCT 36.8 (L) 41.1 - 50.3 %    .1 (H) 79.6 - 97.8 FL    MCH 34.0 (H) 26.1 - 32.9 PG    MCHC 32.1 31.4 - 35.0 g/dL    RDW 13.8 11.9 - 14.6 %    PLATELET 693 573 - 354 K/uL MPV 10.0 (L) 10.8 - 14.1 FL    DF AUTOMATED      NEUTROPHILS 76 43 - 78 %    LYMPHOCYTES 13 13 - 44 %    MONOCYTES 10 4.0 - 12.0 %    EOSINOPHILS 0 (L) 0.5 - 7.8 %    BASOPHILS 1 0.0 - 2.0 %    IMMATURE GRANULOCYTES 0 0.0 - 5.0 %    ABS. NEUTROPHILS 4.6 1.7 - 8.2 K/UL    ABS. LYMPHOCYTES 0.8 0.5 - 4.6 K/UL    ABS. MONOCYTES 0.6 0.1 - 1.3 K/UL    ABS. EOSINOPHILS 0.0 0.0 - 0.8 K/UL    ABS. BASOPHILS 0.0 0.0 - 0.2 K/UL    ABS. IMM. GRANS. 0.0 0.0 - 0.5 K/UL   METABOLIC PANEL, BASIC    Collection Time: 07/08/18  3:20 PM   Result Value Ref Range    Sodium 140 136 - 145 mmol/L    Potassium 4.0 3.5 - 5.1 mmol/L    Chloride 105 98 - 107 mmol/L    CO2 20 (L) 21 - 32 mmol/L    Anion gap 15 7 - 16 mmol/L    Glucose 114 (H) 65 - 100 mg/dL    BUN 7 6 - 23 MG/DL    Creatinine 1.19 0.8 - 1.5 MG/DL    GFR est AA >60 >60 ml/min/1.73m2    GFR est non-AA >60 >60 ml/min/1.73m2    Calcium 8.4 8.3 - 10.4 MG/DL     Imaging:    CT ABD PELV W CONT   Final Result   IMPRESSION:   1. Fatty infiltration liver. 2. Mild distention of the gallbladder. No calcified stones. US ABD LTD   Final Result   IMPRESSION:   1. Fatty infiltration liver. 2. Mild gallbladder distention. Biliary sludge present      XR ABD ACUTE W 1 V CHEST   Final Result   IMPRESSION: No acute abnormality          Active Hospital Problems    Diagnosis Date Noted    Macrocytic anemia 07/08/2018    High anion gap metabolic acidosis 45/42/4320    Elevated bilirubin 07/08/2018    CAD (coronary artery disease) 03/18/2017     Non-obstructive      Chronic back pain 03/18/2017    Elevated transaminase level 03/14/2017    Nausea & vomiting 02/26/2016    HTN (hypertension) 11/29/2011    Chronic systolic heart failure ([facility] 04/21/2011     Plan:  - Continue NS.  - Stop banana bag after today. - Recheck lactic acid in AM.  - Stop q4 hours BMP. - Continue symptomatic nausea treatment. Signed By: Owen Kelley MD     July 8, 2018

## 2018-07-09 PROBLEM — R79.89 ELEVATED LACTIC ACID LEVEL: Status: ACTIVE | Noted: 2018-07-09

## 2018-07-09 LAB
ALBUMIN SERPL-MCNC: 3.2 G/DL (ref 3.5–5)
ALBUMIN/GLOB SERPL: 1.2 {RATIO} (ref 1.2–3.5)
ALP SERPL-CCNC: 119 U/L (ref 50–136)
ALT SERPL-CCNC: 64 U/L (ref 12–65)
ANION GAP SERPL CALC-SCNC: 12 MMOL/L (ref 7–16)
AST SERPL-CCNC: 128 U/L (ref 15–37)
BASOPHILS # BLD: 0 K/UL (ref 0–0.2)
BASOPHILS NFR BLD: 0 % (ref 0–2)
BILIRUB DIRECT SERPL-MCNC: 0.5 MG/DL
BILIRUB SERPL-MCNC: 1.5 MG/DL (ref 0.2–1.1)
BUN SERPL-MCNC: 5 MG/DL (ref 6–23)
CALCIUM SERPL-MCNC: 8.2 MG/DL (ref 8.3–10.4)
CHLORIDE SERPL-SCNC: 106 MMOL/L (ref 98–107)
CK SERPL-CCNC: 111 U/L (ref 21–215)
CO2 SERPL-SCNC: 21 MMOL/L (ref 21–32)
CREAT SERPL-MCNC: 1.17 MG/DL (ref 0.8–1.5)
DIFFERENTIAL METHOD BLD: ABNORMAL
EOSINOPHIL # BLD: 0 K/UL (ref 0–0.8)
EOSINOPHIL NFR BLD: 1 % (ref 0.5–7.8)
ERYTHROCYTE [DISTWIDTH] IN BLOOD BY AUTOMATED COUNT: 13.4 % (ref 11.9–14.6)
GLOBULIN SER CALC-MCNC: 2.6 G/DL (ref 2.3–3.5)
GLUCOSE SERPL-MCNC: 151 MG/DL (ref 65–100)
HCT VFR BLD AUTO: 34.4 % (ref 41.1–50.3)
HGB BLD-MCNC: 11.3 G/DL (ref 13.6–17.2)
IMM GRANULOCYTES # BLD: 0 K/UL (ref 0–0.5)
IMM GRANULOCYTES NFR BLD AUTO: 0 % (ref 0–5)
LACTATE SERPL-SCNC: 3.1 MMOL/L (ref 0.4–2)
LYMPHOCYTES # BLD: 0.5 K/UL (ref 0.5–4.6)
LYMPHOCYTES NFR BLD: 17 % (ref 13–44)
MCH RBC QN AUTO: 33.5 PG (ref 26.1–32.9)
MCHC RBC AUTO-ENTMCNC: 32.8 G/DL (ref 31.4–35)
MCV RBC AUTO: 102.1 FL (ref 79.6–97.8)
MONOCYTES # BLD: 0.2 K/UL (ref 0.1–1.3)
MONOCYTES NFR BLD: 6 % (ref 4–12)
NEUTS SEG # BLD: 2.3 K/UL (ref 1.7–8.2)
NEUTS SEG NFR BLD: 76 % (ref 43–78)
PLATELET # BLD AUTO: 129 K/UL (ref 150–450)
PMV BLD AUTO: 9.9 FL (ref 10.8–14.1)
POTASSIUM SERPL-SCNC: 3.6 MMOL/L (ref 3.5–5.1)
PROT SERPL-MCNC: 5.8 G/DL (ref 6.3–8.2)
RBC # BLD AUTO: 3.37 M/UL (ref 4.23–5.67)
SODIUM SERPL-SCNC: 139 MMOL/L (ref 136–145)
WBC # BLD AUTO: 3 K/UL (ref 4.3–11.1)

## 2018-07-09 PROCEDURE — 74011250636 HC RX REV CODE- 250/636: Performed by: FAMILY MEDICINE

## 2018-07-09 PROCEDURE — 74011250637 HC RX REV CODE- 250/637: Performed by: FAMILY MEDICINE

## 2018-07-09 PROCEDURE — 77030020263 HC SOL INJ SOD CL0.9% LFCR 1000ML

## 2018-07-09 PROCEDURE — 65270000029 HC RM PRIVATE

## 2018-07-09 PROCEDURE — 36415 COLL VENOUS BLD VENIPUNCTURE: CPT | Performed by: FAMILY MEDICINE

## 2018-07-09 PROCEDURE — 97161 PT EVAL LOW COMPLEX 20 MIN: CPT

## 2018-07-09 PROCEDURE — 82248 BILIRUBIN DIRECT: CPT | Performed by: FAMILY MEDICINE

## 2018-07-09 PROCEDURE — 74011250636 HC RX REV CODE- 250/636: Performed by: INTERNAL MEDICINE

## 2018-07-09 PROCEDURE — 74011250637 HC RX REV CODE- 250/637: Performed by: INTERNAL MEDICINE

## 2018-07-09 PROCEDURE — 83605 ASSAY OF LACTIC ACID: CPT | Performed by: INTERNAL MEDICINE

## 2018-07-09 PROCEDURE — 85025 COMPLETE CBC W/AUTO DIFF WBC: CPT | Performed by: FAMILY MEDICINE

## 2018-07-09 PROCEDURE — 74011000250 HC RX REV CODE- 250: Performed by: FAMILY MEDICINE

## 2018-07-09 PROCEDURE — 82550 ASSAY OF CK (CPK): CPT | Performed by: INTERNAL MEDICINE

## 2018-07-09 PROCEDURE — 80053 COMPREHEN METABOLIC PANEL: CPT | Performed by: FAMILY MEDICINE

## 2018-07-09 RX ADMIN — PROMETHAZINE HYDROCHLORIDE 12.5 MG: 25 INJECTION INTRAMUSCULAR; INTRAVENOUS at 20:42

## 2018-07-09 RX ADMIN — PROMETHAZINE HYDROCHLORIDE 12.5 MG: 25 INJECTION INTRAMUSCULAR; INTRAVENOUS at 15:15

## 2018-07-09 RX ADMIN — Medication 400 MG: at 20:41

## 2018-07-09 RX ADMIN — CARVEDILOL 6.25 MG: 6.25 TABLET, FILM COATED ORAL at 16:59

## 2018-07-09 RX ADMIN — HYDROCODONE BITARTRATE AND ACETAMINOPHEN 1 TABLET: 10; 325 TABLET ORAL at 20:42

## 2018-07-09 RX ADMIN — Medication 5 ML: at 20:42

## 2018-07-09 RX ADMIN — HYDROCODONE BITARTRATE AND ACETAMINOPHEN 1 TABLET: 10; 325 TABLET ORAL at 05:41

## 2018-07-09 RX ADMIN — ATORVASTATIN CALCIUM 80 MG: 40 TABLET, FILM COATED ORAL at 09:08

## 2018-07-09 RX ADMIN — HEPARIN SODIUM 5000 UNITS: 5000 INJECTION, SOLUTION INTRAVENOUS; SUBCUTANEOUS at 09:09

## 2018-07-09 RX ADMIN — ALPRAZOLAM 1 MG: 0.5 TABLET ORAL at 20:41

## 2018-07-09 RX ADMIN — HEPARIN SODIUM 5000 UNITS: 5000 INJECTION, SOLUTION INTRAVENOUS; SUBCUTANEOUS at 00:11

## 2018-07-09 RX ADMIN — SODIUM CHLORIDE 75 ML/HR: 900 INJECTION, SOLUTION INTRAVENOUS at 00:11

## 2018-07-09 RX ADMIN — CARVEDILOL 6.25 MG: 6.25 TABLET, FILM COATED ORAL at 09:07

## 2018-07-09 RX ADMIN — GABAPENTIN 300 MG: 300 CAPSULE ORAL at 15:15

## 2018-07-09 RX ADMIN — PROMETHAZINE HYDROCHLORIDE 12.5 MG: 25 INJECTION INTRAMUSCULAR; INTRAVENOUS at 05:41

## 2018-07-09 RX ADMIN — SODIUM CHLORIDE 100 ML/HR: 900 INJECTION, SOLUTION INTRAVENOUS at 16:59

## 2018-07-09 RX ADMIN — Medication 5 ML: at 05:46

## 2018-07-09 RX ADMIN — GABAPENTIN 300 MG: 300 CAPSULE ORAL at 09:07

## 2018-07-09 RX ADMIN — HYDROCODONE BITARTRATE AND ACETAMINOPHEN 1 TABLET: 10; 325 TABLET ORAL at 15:16

## 2018-07-09 RX ADMIN — GABAPENTIN 300 MG: 300 CAPSULE ORAL at 20:41

## 2018-07-09 RX ADMIN — LOSARTAN POTASSIUM 25 MG: 25 TABLET ORAL at 09:08

## 2018-07-09 RX ADMIN — Medication 400 MG: at 09:08

## 2018-07-09 RX ADMIN — PROMETHAZINE HYDROCHLORIDE 12.5 MG: 25 INJECTION INTRAMUSCULAR; INTRAVENOUS at 09:55

## 2018-07-09 RX ADMIN — PANTOPRAZOLE SODIUM 40 MG: 40 TABLET, DELAYED RELEASE ORAL at 09:08

## 2018-07-09 RX ADMIN — HYDROCODONE BITARTRATE AND ACETAMINOPHEN 1 TABLET: 10; 325 TABLET ORAL at 09:55

## 2018-07-09 NOTE — PROGRESS NOTES
Dr. Aurora Stiles notified of patient complaining of chest pain. States he will come and see patient. No orders received at this time.

## 2018-07-09 NOTE — PROGRESS NOTES
Hospitalist Progress Note    2018  Admit Date: 2018  1:22 AM   NAME: Vee Javier   :  1965   MRN:  [id number]   Attending: Homero Galvan MD  PCP:  Rebecca Galaviz MD    SUBJECTIVE:   Patient admitted  with abdominal pain, n/v, and transaminitis/elevated bili. Reports RUQ pain today and vomited x 1 this AM.  Imaging shows dilated gallbladder on US and CT with sludge seen on US. He also is complaining of dysphagia (even with liquids) feeling like water gets 'stuck down low' (points to just above epigastric area). Review of Systems negative with exception of pertinent positives noted above  PHYSICAL EXAM     Visit Vitals    BP (!) 130/97 (BP 1 Location: Right arm, BP Patient Position: At rest)    Pulse (!) 105    Temp 98.4 °F (36.9 °C)    Resp 16    Ht 5' 11\" (1.803 m)    Wt 76.2 kg (168 lb)    SpO2 100%    BMI 23.43 kg/m2      Temp (24hrs), Av.7 °F (36.5 °C), Min:97 °F (36.1 °C), Max:98.5 °F (36.9 °C)    Patient Vitals for the past 24 hrs:   Temp Pulse Resp BP SpO2   18 0913 98.4 °F (36.9 °C) (!) 105 16 (!) 130/97 100 %   18 0750 97 °F (36.1 °C) 100 18 130/75 98 %   18 0429 97.3 °F (36.3 °C) 99 16 125/88 100 %   18 0057 97.4 °F (36.3 °C) (!) 109 18 (!) 132/93 98 %   18 2033 98 °F (36.7 °C) 96 18 (!) 132/92 100 %   18 1550 97.6 °F (36.4 °C) 88 20 (!) 140/96 100 %   18 1152 98.5 °F (36.9 °C) (!) 101 22 (!) 126/95 98 %       Oxygen Therapy  O2 Sat (%): 100 % (18 0913)  Pulse via Oximetry: 106 beats per minute (18 0700)  O2 Device: Room air (18 0119)    Intake/Output Summary (Last 24 hours) at 18 1135  Last data filed at 18 0429   Gross per 24 hour   Intake                0 ml   Output             1720 ml   Net            -1720 ml      General: No acute distress    Lungs:  CTA Bilaterally.    Heart:  Regular rate and rhythm,  No murmur, rub, or gallop  Abdomen: Soft, Non distended, Non tender, Positive bowel sounds  Extremities: No cyanosis, clubbing or edema  Neurologic:  No focal deficits    Recent Results (from the past 24 hour(s))   METABOLIC PANEL, BASIC    Collection Time: 07/08/18  3:20 PM   Result Value Ref Range    Sodium 140 136 - 145 mmol/L    Potassium 4.0 3.5 - 5.1 mmol/L    Chloride 105 98 - 107 mmol/L    CO2 20 (L) 21 - 32 mmol/L    Anion gap 15 7 - 16 mmol/L    Glucose 114 (H) 65 - 100 mg/dL    BUN 7 6 - 23 MG/DL    Creatinine 1.19 0.8 - 1.5 MG/DL    GFR est AA >60 >60 ml/min/1.73m2    GFR est non-AA >60 >60 ml/min/1.73m2    Calcium 8.4 8.3 - 10.4 MG/DL   CBC WITH AUTOMATED DIFF    Collection Time: 07/09/18  6:02 AM   Result Value Ref Range    WBC 3.0 (L) 4.3 - 11.1 K/uL    RBC 3.37 (L) 4.23 - 5.67 M/uL    HGB 11.3 (L) 13.6 - 17.2 g/dL    HCT 34.4 (L) 41.1 - 50.3 %    .1 (H) 79.6 - 97.8 FL    MCH 33.5 (H) 26.1 - 32.9 PG    MCHC 32.8 31.4 - 35.0 g/dL    RDW 13.4 11.9 - 14.6 %    PLATELET 076 (L) 032 - 450 K/uL    MPV 9.9 (L) 10.8 - 14.1 FL    DF AUTOMATED      NEUTROPHILS 76 43 - 78 %    LYMPHOCYTES 17 13 - 44 %    MONOCYTES 6 4.0 - 12.0 %    EOSINOPHILS 1 0.5 - 7.8 %    BASOPHILS 0 0.0 - 2.0 %    IMMATURE GRANULOCYTES 0 0.0 - 5.0 %    ABS. NEUTROPHILS 2.3 1.7 - 8.2 K/UL    ABS. LYMPHOCYTES 0.5 0.5 - 4.6 K/UL    ABS. MONOCYTES 0.2 0.1 - 1.3 K/UL    ABS. EOSINOPHILS 0.0 0.0 - 0.8 K/UL    ABS. BASOPHILS 0.0 0.0 - 0.2 K/UL    ABS. IMM.  GRANS. 0.0 0.0 - 0.5 K/UL   METABOLIC PANEL, COMPREHENSIVE    Collection Time: 07/09/18  6:02 AM   Result Value Ref Range    Sodium 139 136 - 145 mmol/L    Potassium 3.6 3.5 - 5.1 mmol/L    Chloride 106 98 - 107 mmol/L    CO2 21 21 - 32 mmol/L    Anion gap 12 7 - 16 mmol/L    Glucose 151 (H) 65 - 100 mg/dL    BUN 5 (L) 6 - 23 MG/DL    Creatinine 1.17 0.8 - 1.5 MG/DL    GFR est AA >60 >60 ml/min/1.73m2    GFR est non-AA >60 >60 ml/min/1.73m2    Calcium 8.2 (L) 8.3 - 10.4 MG/DL    Bilirubin, total 1.5 (H) 0.2 - 1.1 MG/DL    ALT (SGPT) 64 12 - 65 U/L    AST (SGOT) 128 (H) 15 - 37 U/L    Alk. phosphatase 119 50 - 136 U/L    Protein, total 5.8 (L) 6.3 - 8.2 g/dL    Albumin 3.2 (L) 3.5 - 5.0 g/dL    Globulin 2.6 2.3 - 3.5 g/dL    A-G Ratio 1.2 1.2 - 3.5     LACTIC ACID    Collection Time: 07/09/18  6:02 AM   Result Value Ref Range    Lactic acid 3.1 (HH) 0.4 - 2.0 MMOL/L   BILIRUBIN, DIRECT    Collection Time: 07/09/18  6:02 AM   Result Value Ref Range    Bilirubin, direct 0.5 (H) <0.4 MG/DL     Imaging:   CT ABD PELV W CONT   Final Result   IMPRESSION:   1. Fatty infiltration liver. 2. Mild distention of the gallbladder. No calcified stones. US ABD LTD   Final Result   IMPRESSION:   1. Fatty infiltration liver. 2. Mild gallbladder distention.  Biliary sludge present      XR ABD ACUTE W 1 V CHEST   Final Result   IMPRESSION: No acute abnormality            ASSESSMENT      Hospital Problems as of 7/9/2018  Date Reviewed: 3/15/2018          Codes Class Noted - Resolved POA    Elevated lactic acid level ICD-10-CM: R79.89  ICD-9-CM: 276.2  7/9/2018 - Present Yes        Macrocytic anemia ICD-10-CM: D53.9  ICD-9-CM: 281.9  7/8/2018 - Present Yes        High anion gap metabolic acidosis YDL-25-PM: E87.2  ICD-9-CM: 276.2  7/8/2018 - Present Yes        Elevated bilirubin ICD-10-CM: R17  ICD-9-CM: 277.4  7/8/2018 - Present Yes        CAD (coronary artery disease) (Chronic) ICD-10-CM: I25.10  ICD-9-CM: 414.00  3/18/2017 - Present Yes    Overview Signed 3/18/2017 11:27 AM by Perla Leslie NP     Non-obstructive             Chronic back pain (Chronic) ICD-10-CM: M54.9, G89.29  ICD-9-CM: 724.5, 338.29  3/18/2017 - Present Yes        Elevated transaminase level ICD-10-CM: R74.0  ICD-9-CM: 790.4  3/14/2017 - Present Yes        * (Principal)Nausea & vomiting (Chronic) ICD-10-CM: R11.2  ICD-9-CM: 787.01  2/26/2016 - Present Yes        HTN (hypertension) (Chronic) ICD-10-CM: I10  ICD-9-CM: 401.9  11/29/2011 - Present Yes        Chronic systolic heart failure (HCC) (Chronic) ICD-10-CM: I50.22  ICD-9-CM: 428.22  4/21/2011 - Present Yes            Plan:  · Make NPO. · Increase IVF to 100 ml/hr due to elevated lactic acid. · Consult GI for evaluation for EGD/ERCP. Transaminitis and elevated total and direct bili indicating possible obstructive process. Also with dysphagia to liquids, ? Mass. · Continue other meds. DVT Prophylaxis: SCDs    Signed By: Aiden Munguia.  Tanisha David MD     July 9, 2018
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
## 2018-07-09 NOTE — PROGRESS NOTES
Called by [facility], RN for L chest discomfort. He reports L side of chest hurts but he 'can't explain it.'  Upon direct questioning, pain is sharp and hurts with deep inspiration. Has some intercostal discomfort on palpation. Likely musculoskeletal discomfort from vomiting.     Laquita Boles MD

## 2018-07-09 NOTE — PROGRESS NOTES
Problem: Mobility Impaired (Adult and Pediatric)  Goal: *Acute Goals and Plan of Care (Insert Text)  STG:  (1.)Mr. Kyrie Hernández will move from supine to sit and sit to supine  with INDEPENDENT within 1-2 treatment day(s). (2.)Mr. Kyrie Hernández will transfer from bed to chair and chair to bed with INDEPENDENT using the least restrictive device within 1-2 treatment day(s). (3.)Mr. Kyrie Hernández will ambulate with INDEPENDENT for 50 feet with the least restrictive device within 1-2 treatment day(s). LTG:  (1.)Mr. Kyrie Hernández will ambulate with INDEPENDENT for  feet with the least restrictive device within 3-7 treatment day(s). ________________________________________________________________________________________________    Outcome: Progressing Towards Goal    PHYSICAL THERAPY: Initial Assessment 7/9/2018  INPATIENT: Hospital Day: 2  [place] / Product Type: Medicare /      NAME/AGE/GENDER: Teresita Robles is a 48 y.o. male   PRIMARY DIAGNOSIS: Nausea and vomiting, intractability of vomiting not specified, unspecified vomiting type [R11.2] Nausea & vomiting Nausea & vomiting  Procedure(s) (LRB):  ESOPHAGOGASTRODUODENOSCOPY (EGD)/ 29/ 360 (N/A)     ICD-10: Treatment Diagnosis:    · Difficulty in walking, Not elsewhere classified (R26.2)  · Other abnormalities of gait and mobility (R26.89)   Precaution/Allergies:  Review of patient's allergies indicates no known allergies. ASSESSMENT:     Mr. Kyrie Hernández presents with decreased independence with functional mobility. Therapy will maximize independence with functional mobility. Pt plans to return home following hospital stay. Pt's mobility appears limited by neuropathy and foot pain. This section established at most recent assessment   PROBLEM LIST (Impairments causing functional limitations):  1. Decreased Strength  2. Decreased Transfer Abilities  3. Decreased Ambulation Ability/Technique  4. Decreased Balance  5. Increased Pain  6.  Decreased Activity Tolerance  7. Decreased Flexibility/Joint Mobility   INTERVENTIONS PLANNED: (Benefits and precautions of physical therapy have been discussed with the patient.)  1. Bed Mobility  2. Gait Training  3. Therapeutic Activites  4. Transfer Training  5. Group Therapy     TREATMENT PLAN: Frequency/Duration: daily for duration of hospital stay  Rehabilitation Potential For Stated Goals: Good     RECOMMENDED REHABILITATION/EQUIPMENT: (at time of discharge pending progress): Due to the probability of continued deficits (see above) this patient will not likely need continued skilled physical therapy after discharge. Equipment:    None at this time              HISTORY:   History of Present Injury/Illness (Reason for Referral):  Jeana Crow is a 48 y.o. male with a past medical history of CAD, chronic alcoholism (per chart, denied by patient), GERD, sCHF who presents to the ER with complaint of nausea, vomiting, and abdominal pain that started last night around 7 pm. He reports that it started suddenly, and since he has vomited multiple times. Reports that his vomitus has had some dark red material but no bright red blood or coffee ground material. Pain is all throughout his stomach. Reports that he does not feel any better than when he came in, in spite of IV pain and nausea medication in ER. Denies fevers, chills, diarrhea, constipation, chest pain, SOB. Past Medical History/Comorbidities:   Mr. Vasquez Owen  has a past medical history of Arthritis; CAD (coronary artery disease); CHF (congestive heart failure) ([address] Chronic alcoholism ([address] Chronic back pain; Chronic neck pain; Chronic systolic heart failure ([address] (4/21/2011); Depression; Dizziness - light-headed; GERD (gastroesophageal reflux disease); Gynecomastia (2/22/2016); Heart failure ([address] History of implantable cardioverter-defibrillator (ICD) placement (2/22/2016);  Hypertension; Psychiatric disorder; Situational depression (2/22/2016); and Ventricular tachycardia ([facility] (2016). Mr. Brooklyn Gao  has a past surgical history that includes hx heart catheterization (9/21/10) and hx pacemaker. Social History/Living Environment:   Home Environment: Private residence  # Steps to Enter: 0  One/Two Story Residence: One story  Living Alone: No  Support Systems: Child(charlene)  Patient Expects to be Discharged to[de-identified] Private residence  Current DME Used/Available at Home: None  Tub or Shower Type: Tub/Shower combination  Prior Level of Function/Work/Activity:  [place] with ambulation. Number of Personal Factors/Comorbidities that affect the Plan of Care: 0: LOW COMPLEXITY   EXAMINATION:   Most Recent Physical Functioning:   Gross Assessment:  AROM: Generally decreased, functional  Strength: Generally decreased, functional  Coordination: Generally decreased, functional               Posture:  Posture (WDL): Within defined limits  Balance:  Sitting: Intact  Standing: Intact Bed Mobility:  Supine to Sit: Supervision  Sit to Supine: Supervision  Wheelchair Mobility:     Transfers:  Sit to Stand: Supervision  Stand to Sit: Supervision  Gait:     Gait Abnormalities: Other (slightly unsteady gait)  Distance (ft): 15 Feet (ft)  Ambulation - Level of Assistance: Stand-by assistance      Body Structures Involved:  1. Bones  2. Joints  3. Muscles  4. Ligaments Body Functions Affected:  1. Neuromusculoskeletal  2. Movement Related Activities and Participation Affected:  1. Mobility   Number of elements that affect the Plan of Care: 4+: HIGH COMPLEXITY   CLINICAL PRESENTATION:   Presentation: Stable and uncomplicated: LOW COMPLEXITY   CLINICAL DECISION MAKIN \A Chronology of [place] Hospitals\"" [address] AM-PAC 6 Clicks   Basic Mobility Inpatient Short Form  How much difficulty does the patient currently have. .. Unable A Lot A Little None   1. Turning over in bed (including adjusting bedclothes, sheets and blankets)? [] 1   [] 2   [] 3   [x] 4   2.   Sitting down on and standing up from a chair with arms ( e.g., wheelchair, bedside commode, etc.)   [] 1   [] 2   [] 3   [x] 4   3. Moving from lying on back to sitting on the side of the bed? [] 1   [] 2   [] 3   [x] 4   How much help from another person does the patient currently need. .. Total A Lot A Little None   4. Moving to and from a bed to a chair (including a wheelchair)? [] 1   [] 2   [] 3   [x] 4   5. Need to walk in hospital room? [] 1   [] 2   [] 3   [x] 4   6. Climbing 3-5 steps with a railing? [] 1   [] 2   [x] 3   [] 4   © 2007, Trustees of [address] [place] [address] [address] [address], under license to [employer]. All rights reserved      Score:  Initial: 23 Most Recent: X (Date: -- )    Interpretation of Tool:  Represents activities that are increasingly more difficult (i.e. Bed mobility, Transfers, Gait). Score 24 23 22-20 19-15 14-10 9-7 6     Modifier CH CI CJ CK CL CM CN      ? Mobility - Walking and Moving Around:     - CURRENT STATUS: CI - 1%-19% impaired, limited or restricted    - GOAL STATUS: CI - 1%-19% impaired, limited or restricted    - D/C STATUS:  CI - 1%-19% impaired, limited or restricted  [place] / [place] / Product Type: Medicare /      Medical Necessity:     · Skilled intervention continues to be required due to decreased mobility ability. Reason for Services/Other Comments:  · Patient continues to require skilled intervention due to decreased mobility ability. Use of outcome tool(s) and clinical judgement create a POC that gives a: Clear prediction of patient's progress: LOW COMPLEXITY            TREATMENT:   (In addition to Assessment/Re-Assessment sessions the following treatments were rendered)   Pre-treatment Symptoms/Complaints:  No complaints. Pain: Initial:   Pain Intensity 1: 0  Post Session:  No complaints.      Assessment/Reassessment only, no treatment provided today    Braces/Orthotics/Lines/Etc:   · O2 Device: Room air  Treatment/Session Assessment:    · Response to Treatment:  Pt agreeable to take steps in room. · Interdisciplinary Collaboration:   o Physical Therapist  o Occupational Therapist  o Registered Nurse  o Rehabilitation Attendant  · After treatment position/precautions:   o Supine in bed  o Bed/Chair-wheels locked  o Bed in low position  o Call light within reach  o RN notified   · Compliance with Program/Exercises: compliant most of the time. · Recommendations/Intent for next treatment session: \"Next visit will focus on advancements to more challenging activities and reduction in assistance provided\".   Total Treatment Duration:  PT Patient Time In/Time Out  Time In: 1500  Time Out: Kia [address], PT

## 2018-07-09 NOTE — PROGRESS NOTES
Care Management Interventions  PCP Verified by CM: No  Mode of Transport at Discharge: Other (see comment)  Transition of Care Consult (CM Consult): Other  Current Support Network: Own Home  Confirm Follow Up Transport: Family  Plan discussed with Pt/Family/Caregiver: Yes  Freedom of Choice Offered: Yes  Discharge Location  Discharge Placement: Home  Visited with pt about d/c planning, pt is indep with ADL's at home with wife, drives, with no needs at this time.

## 2018-07-09 NOTE — PROGRESS NOTES
Spiritual Care initial visit made by [employer]. Card left. SUSSY Harrison Div  / Bereavement Coordinator

## 2018-07-09 NOTE — PROGRESS NOTES
PM assessment complete. A/O x4. Respirations present, non-labored. PIV intact, infusing w/o difficulty. Abdomen soft, tender, c/o of nausea at times. Voiding via urinal. Safety measures in place. Will continue to monitor hourly.

## 2018-07-09 NOTE — PROGRESS NOTES
Problem: Interdisciplinary Rounds  Goal: Interdisciplinary Rounds  Interdisciplinary team rounds were held 7/9/2018 with the following team members:Care Management, Nursing, Nutrition, Pharmacy, Physical Therapy and Physician and the patient. Plan of care discussed. See clinical pathway and/or care plan for interventions and desired outcomes.

## 2018-07-09 NOTE — PROGRESS NOTES
AM Assessment. Pt. A/O X4. Respirations even and unlabored. S1 & S2 auscultated. Lungs clear. Abd. Soft and nausea noted. Bowel Sounds active X4 quads. IV patent. Denies any weakness. Call light within reach. Will monitor hourly.

## 2018-07-10 ENCOUNTER — ANESTHESIA EVENT (OUTPATIENT)
Dept: ENDOSCOPY | Age: 53
DRG: 392 | End: 2018-07-10
Payer: COMMERCIAL

## 2018-07-10 ENCOUNTER — ANESTHESIA (OUTPATIENT)
Dept: ENDOSCOPY | Age: 53
DRG: 392 | End: 2018-07-10
Payer: COMMERCIAL

## 2018-07-10 LAB
ALBUMIN SERPL-MCNC: 2.9 G/DL (ref 3.5–5)
ALBUMIN/GLOB SERPL: 1.1 {RATIO} (ref 1.2–3.5)
ALP SERPL-CCNC: 108 U/L (ref 50–136)
ALT SERPL-CCNC: 56 U/L (ref 12–65)
ANION GAP SERPL CALC-SCNC: 10 MMOL/L (ref 7–16)
AST SERPL-CCNC: 96 U/L (ref 15–37)
BASOPHILS # BLD: 0 K/UL (ref 0–0.2)
BASOPHILS NFR BLD: 0 % (ref 0–2)
BILIRUB SERPL-MCNC: 1 MG/DL (ref 0.2–1.1)
BUN SERPL-MCNC: 3 MG/DL (ref 6–23)
CALCIUM SERPL-MCNC: 8.2 MG/DL (ref 8.3–10.4)
CHLORIDE SERPL-SCNC: 112 MMOL/L (ref 98–107)
CO2 SERPL-SCNC: 24 MMOL/L (ref 21–32)
CREAT SERPL-MCNC: 0.75 MG/DL (ref 0.8–1.5)
DIFFERENTIAL METHOD BLD: ABNORMAL
EOSINOPHIL # BLD: 0 K/UL (ref 0–0.8)
EOSINOPHIL NFR BLD: 1 % (ref 0.5–7.8)
ERYTHROCYTE [DISTWIDTH] IN BLOOD BY AUTOMATED COUNT: 13.4 % (ref 11.9–14.6)
GLOBULIN SER CALC-MCNC: 2.6 G/DL (ref 2.3–3.5)
GLUCOSE SERPL-MCNC: 91 MG/DL (ref 65–100)
HCT VFR BLD AUTO: 33.2 % (ref 41.1–50.3)
HGB BLD-MCNC: 10.7 G/DL (ref 13.6–17.2)
IMM GRANULOCYTES # BLD: 0 K/UL (ref 0–0.5)
IMM GRANULOCYTES NFR BLD AUTO: 0 % (ref 0–5)
LYMPHOCYTES # BLD: 0.5 K/UL (ref 0.5–4.6)
LYMPHOCYTES NFR BLD: 18 % (ref 13–44)
MCH RBC QN AUTO: 33.4 PG (ref 26.1–32.9)
MCHC RBC AUTO-ENTMCNC: 32.2 G/DL (ref 31.4–35)
MCV RBC AUTO: 103.8 FL (ref 79.6–97.8)
MONOCYTES # BLD: 0.2 K/UL (ref 0.1–1.3)
MONOCYTES NFR BLD: 9 % (ref 4–12)
NEUTS SEG # BLD: 1.8 K/UL (ref 1.7–8.2)
NEUTS SEG NFR BLD: 72 % (ref 43–78)
PLATELET # BLD AUTO: 118 K/UL (ref 150–450)
PMV BLD AUTO: 9.8 FL (ref 10.8–14.1)
POTASSIUM SERPL-SCNC: 3.6 MMOL/L (ref 3.5–5.1)
PROT SERPL-MCNC: 5.5 G/DL (ref 6.3–8.2)
RBC # BLD AUTO: 3.2 M/UL (ref 4.23–5.67)
SODIUM SERPL-SCNC: 146 MMOL/L (ref 136–145)
WBC # BLD AUTO: 2.5 K/UL (ref 4.3–11.1)

## 2018-07-10 PROCEDURE — 76060000031 HC ANESTHESIA FIRST 0.5 HR: Performed by: INTERNAL MEDICINE

## 2018-07-10 PROCEDURE — 36415 COLL VENOUS BLD VENIPUNCTURE: CPT | Performed by: FAMILY MEDICINE

## 2018-07-10 PROCEDURE — 74011250636 HC RX REV CODE- 250/636: Performed by: ANESTHESIOLOGY

## 2018-07-10 PROCEDURE — 77030032490 HC SLV COMPR SCD KNE COVD -B

## 2018-07-10 PROCEDURE — 77030020263 HC SOL INJ SOD CL0.9% LFCR 1000ML

## 2018-07-10 PROCEDURE — 74011250637 HC RX REV CODE- 250/637: Performed by: INTERNAL MEDICINE

## 2018-07-10 PROCEDURE — 74011000250 HC RX REV CODE- 250: Performed by: FAMILY MEDICINE

## 2018-07-10 PROCEDURE — 74011250637 HC RX REV CODE- 250/637: Performed by: FAMILY MEDICINE

## 2018-07-10 PROCEDURE — 74011250636 HC RX REV CODE- 250/636

## 2018-07-10 PROCEDURE — 76040000025: Performed by: INTERNAL MEDICINE

## 2018-07-10 PROCEDURE — 65270000029 HC RM PRIVATE

## 2018-07-10 PROCEDURE — 74011000250 HC RX REV CODE- 250: Performed by: ANESTHESIOLOGY

## 2018-07-10 PROCEDURE — 85025 COMPLETE CBC W/AUTO DIFF WBC: CPT | Performed by: FAMILY MEDICINE

## 2018-07-10 PROCEDURE — 0D758ZZ DILATION OF ESOPHAGUS, VIA NATURAL OR ARTIFICIAL OPENING ENDOSCOPIC: ICD-10-PCS | Performed by: INTERNAL MEDICINE

## 2018-07-10 PROCEDURE — 74011250636 HC RX REV CODE- 250/636: Performed by: INTERNAL MEDICINE

## 2018-07-10 PROCEDURE — 74011250636 HC RX REV CODE- 250/636: Performed by: FAMILY MEDICINE

## 2018-07-10 PROCEDURE — 80053 COMPREHEN METABOLIC PANEL: CPT | Performed by: FAMILY MEDICINE

## 2018-07-10 RX ORDER — SODIUM CHLORIDE, SODIUM LACTATE, POTASSIUM CHLORIDE, CALCIUM CHLORIDE 600; 310; 30; 20 MG/100ML; MG/100ML; MG/100ML; MG/100ML
100 INJECTION, SOLUTION INTRAVENOUS CONTINUOUS
Status: DISCONTINUED | OUTPATIENT
Start: 2018-07-10 | End: 2018-07-10 | Stop reason: HOSPADM

## 2018-07-10 RX ORDER — PROPOFOL 10 MG/ML
INJECTION, EMULSION INTRAVENOUS AS NEEDED
Status: DISCONTINUED | OUTPATIENT
Start: 2018-07-10 | End: 2018-07-10 | Stop reason: HOSPADM

## 2018-07-10 RX ORDER — SODIUM CHLORIDE 0.9 % (FLUSH) 0.9 %
5-10 SYRINGE (ML) INJECTION AS NEEDED
Status: DISCONTINUED | OUTPATIENT
Start: 2018-07-10 | End: 2018-07-10 | Stop reason: HOSPADM

## 2018-07-10 RX ORDER — MORPHINE SULFATE 2 MG/ML
2 INJECTION, SOLUTION INTRAMUSCULAR; INTRAVENOUS ONCE
Status: COMPLETED | OUTPATIENT
Start: 2018-07-10 | End: 2018-07-10

## 2018-07-10 RX ORDER — MORPHINE SULFATE 4 MG/ML
3 INJECTION, SOLUTION INTRAMUSCULAR; INTRAVENOUS ONCE
Status: COMPLETED | OUTPATIENT
Start: 2018-07-10 | End: 2018-07-10

## 2018-07-10 RX ORDER — SODIUM CHLORIDE, SODIUM LACTATE, POTASSIUM CHLORIDE, CALCIUM CHLORIDE 600; 310; 30; 20 MG/100ML; MG/100ML; MG/100ML; MG/100ML
75 INJECTION, SOLUTION INTRAVENOUS CONTINUOUS
Status: CANCELLED | OUTPATIENT
Start: 2018-07-10

## 2018-07-10 RX ORDER — PROMETHAZINE HYDROCHLORIDE 25 MG/ML
INJECTION, SOLUTION INTRAMUSCULAR; INTRAVENOUS
Status: ACTIVE
Start: 2018-07-10 | End: 2018-07-11

## 2018-07-10 RX ORDER — METOCLOPRAMIDE 10 MG/1
10 TABLET ORAL
Status: DISCONTINUED | OUTPATIENT
Start: 2018-07-10 | End: 2018-07-12

## 2018-07-10 RX ADMIN — PROPOFOL 30 MG: 10 INJECTION, EMULSION INTRAVENOUS at 12:17

## 2018-07-10 RX ADMIN — PROPOFOL 30 MG: 10 INJECTION, EMULSION INTRAVENOUS at 12:22

## 2018-07-10 RX ADMIN — PROPOFOL 40 MG: 10 INJECTION, EMULSION INTRAVENOUS at 12:16

## 2018-07-10 RX ADMIN — HYDROCODONE BITARTRATE AND ACETAMINOPHEN 1 TABLET: 10; 325 TABLET ORAL at 04:39

## 2018-07-10 RX ADMIN — PROPOFOL 20 MG: 10 INJECTION, EMULSION INTRAVENOUS at 12:19

## 2018-07-10 RX ADMIN — SODIUM CHLORIDE 100 ML/HR: 900 INJECTION, SOLUTION INTRAVENOUS at 04:38

## 2018-07-10 RX ADMIN — PROMETHAZINE HYDROCHLORIDE 12.5 MG: 25 INJECTION INTRAMUSCULAR; INTRAVENOUS at 15:49

## 2018-07-10 RX ADMIN — HYDROCODONE BITARTRATE AND ACETAMINOPHEN 1 TABLET: 10; 325 TABLET ORAL at 15:49

## 2018-07-10 RX ADMIN — ONDANSETRON 4 MG: 2 INJECTION INTRAMUSCULAR; INTRAVENOUS at 04:39

## 2018-07-10 RX ADMIN — LOSARTAN POTASSIUM 25 MG: 25 TABLET ORAL at 07:59

## 2018-07-10 RX ADMIN — GABAPENTIN 300 MG: 300 CAPSULE ORAL at 15:49

## 2018-07-10 RX ADMIN — Medication 400 MG: at 23:21

## 2018-07-10 RX ADMIN — GABAPENTIN 300 MG: 300 CAPSULE ORAL at 23:20

## 2018-07-10 RX ADMIN — Medication 5 ML: at 06:05

## 2018-07-10 RX ADMIN — PANTOPRAZOLE SODIUM 40 MG: 40 TABLET, DELAYED RELEASE ORAL at 06:05

## 2018-07-10 RX ADMIN — METOCLOPRAMIDE HYDROCHLORIDE 10 MG: 10 TABLET ORAL at 23:21

## 2018-07-10 RX ADMIN — GABAPENTIN 300 MG: 300 CAPSULE ORAL at 07:59

## 2018-07-10 RX ADMIN — MORPHINE SULFATE 3 MG: 4 INJECTION, SOLUTION INTRAMUSCULAR; INTRAVENOUS at 11:19

## 2018-07-10 RX ADMIN — CARVEDILOL 6.25 MG: 6.25 TABLET, FILM COATED ORAL at 07:58

## 2018-07-10 RX ADMIN — PROMETHAZINE HYDROCHLORIDE 6.25 MG: 25 INJECTION INTRAMUSCULAR; INTRAVENOUS at 12:43

## 2018-07-10 RX ADMIN — PROMETHAZINE HYDROCHLORIDE 12.5 MG: 25 INJECTION INTRAMUSCULAR; INTRAVENOUS at 20:24

## 2018-07-10 RX ADMIN — ATORVASTATIN CALCIUM 80 MG: 40 TABLET, FILM COATED ORAL at 07:59

## 2018-07-10 RX ADMIN — PROPOFOL 20 MG: 10 INJECTION, EMULSION INTRAVENOUS at 12:20

## 2018-07-10 RX ADMIN — MORPHINE SULFATE 2 MG: 2 INJECTION, SOLUTION INTRAMUSCULAR; INTRAVENOUS at 13:52

## 2018-07-10 RX ADMIN — HYDROCODONE BITARTRATE AND ACETAMINOPHEN 1 TABLET: 10; 325 TABLET ORAL at 20:24

## 2018-07-10 RX ADMIN — Medication 400 MG: at 07:59

## 2018-07-10 RX ADMIN — CARVEDILOL 6.25 MG: 6.25 TABLET, FILM COATED ORAL at 15:49

## 2018-07-10 RX ADMIN — Medication 5 ML: at 23:22

## 2018-07-10 RX ADMIN — SODIUM CHLORIDE, SODIUM LACTATE, POTASSIUM CHLORIDE, AND CALCIUM CHLORIDE 100 ML/HR: 600; 310; 30; 20 INJECTION, SOLUTION INTRAVENOUS at 12:01

## 2018-07-10 RX ADMIN — ALPRAZOLAM 1 MG: 0.5 TABLET ORAL at 23:21

## 2018-07-10 RX ADMIN — METOCLOPRAMIDE HYDROCHLORIDE 10 MG: 10 TABLET ORAL at 15:49

## 2018-07-10 NOTE — ANESTHESIA PREPROCEDURE EVALUATION
Anesthetic History               Review of Systems / Medical History  Patient summary reviewed and pertinent labs reviewed    Pulmonary          Shortness of breath      Comments: Pulmonary edema   Neuro/Psych   Within defined limits           Cardiovascular          CHF: NYHA Classification IV, orthopnea, PND and dyspnea on exertion  Dysrhythmias (vtach a month ago, was shocked)   Pacemaker (ICD)  Pertinent negatives: CAD: nonobstructive CAD.     Comments: ECHO 2017 with EF 15%   GI/Hepatic/Renal     GERD: well controlled           Endo/Other  Within defined limits           Other Findings            Physical Exam    Airway  Mallampati: I  TM Distance: 4 - 6 cm  Neck ROM: normal range of motion   Mouth opening: Normal     Cardiovascular    Rhythm: regular  Rate: normal         Dental  No notable dental hx       Pulmonary    Rhonchi      Rales:bilateral       Abdominal         Other Findings            Anesthetic Plan    ASA: 4  Anesthesia type: total IV anesthesia          Induction: Intravenous  Anesthetic plan and risks discussed with: Patient

## 2018-07-10 NOTE — PERIOP NOTES
TRANSFER - IN REPORT:    Verbal report received from [facility], RN on Aicha Michael  being received from 2rd Med-Surg for routine progression of care      Report consisted of patients Situation, Background, Assessment and   Recommendations(SBAR). Information from the following report(s) SBAR and MAR was reviewed with the receiving nurse. Opportunity for questions and clarification was provided. Assessment completed upon patients arrival to unit and care assumed.

## 2018-07-10 NOTE — PROGRESS NOTES
Attempted PT with patient this afternoon. Patient stated \"I can't walk\". When asked why, patient stated \"I just had surgery (EGD and RN ok'd therapy) and my feet\". When asked what was wrong with his feet, patient reported he had neuropathy. Explained to patient that movement helped with neuropathy but he should probably wear shoes instead of just socks. Patient reported he had sandals he could wear but a doctor had told him previously he should probably wear tennis shoes. Talked for a bit about different brands of shoes. Told patient he could walk with sandals but he declined again. He did agree to walk tomorrow. Encouraged patient to get up to go to the bathroom - after calling for nursing - instead of using the urinal to get some mobility. Patient agreed. Will attempt again tomorrow.     Amauri Jackson, PT

## 2018-07-10 NOTE — H&P (VIEW-ONLY)
[facility] Consult Note Primary GI Physician: Dr. Blessing Jennings formerly Referring Physician:  Dr. Naomi Kelley Consult Date:  7/9/2018 Admit Date:  7/8/2018 Chief Complaint:  RUQ abdominal pain, N&V Subjective:  
 
History of Present Illness:  Patient is a 48 y.o. male with PMH of (but not limited to CHF with ICD, CAD, Chronic pain on norco, HTN, anxiety, and GERD who is seen in consultation at the request of Dr. Naomi Kelley for N&V and abdominal pain. He states that when he eats he gets a sharp pain in the epigastric region [place] food doesn't want to go down\" followed by regurgitation of food. This has led to changes in diet and acc to patient he has lost almost 50lbs. He does not smoke but drinks [employer] kamron (states twice weekly). He denies NSAIDs as well. He was admitted on 7/8/18 with mild elevation of his transaminases with AST>ALT as well as macrocytosis on labs. . He is a known patient to Dr. Blessing Jennings who has been evaluated in the past for abdominal pain and N&V. He was last evaluated in the office by Dr. Tatyana Frederick in 2016 for the above symptoms. At that time, his N&V was thought secondary to constipation. He was started on Miralax at that time. He has never had a colonoscopy to his knowledge which is incorrect. Labs:7/8/18 WBC 8.1, Hgb 12.3, Hct 39.6, .2, RDW 14.1, plt 198, ALT 84, , , T bili 1.3, lipase 121, albumin 4.0 
 
EGD on 3/27/16 by Dr. Ashley Mcmanus revealed 8mm subepithelial lesion in the stomach. He underwent empiric dilation #54 FR Gandhi. Biopsies revealed chronic reactive reparative changes with focal lymphoid aggregate present and scattered predominantly mononuclear inflammatory cells noted in the lamina propria. Repeat rec in 1 yr Colonoscopy on 3/27/16 by Dr. Ashley Mcmanus revealed normal exam with sub-optimal prep.   
 
 
CT abdomen and pelvis dated 7/8/2018 
  
CLINICAL INFORMATION: Abdominal pain and vomiting 
  
Spiral images the abdomen and pelvis were obtained during intravenous injection 
of one or cc Isovue 370 intravenous contrast. 
  
This study was requested, and performed no oral contrast. 
  
CT abdomen 
  
Upper images show a moderate size hiatal hernia. 
  
Spleen is unremarkable. There is diffuse fatty infiltration of the liver. The 
gallbladder appears mildly distended. No calcified stones are seen. Pancreas is 
normal. Adrenals are normal. Kidneys are normal in size and unremarkable except 
for a few small subcentimeter cysts. No hydronephrosis. The abdominal aorta is 
normal in size. No adenopathy. 
  
CT PELVIS: 
No mass, adenopathy or abnormal fluid collection. 
  
Bowel loops have a grossly normal appearance in the abdomen and pelvis. There is 
no evidence of bowel obstruction. Appendix not definitely identified. There is 
no infiltration in the pericecal fat. 
  
IMPRESSION IMPRESSION: 
1. Fatty infiltration liver. 
  
  
2. Mild distention of the gallbladder. No calcified stones. Right upper quadrant ultrasound dated 7/8/2018 
  
CLINICAL INFORMATION: Elevated liver function test. Abdominal pain and nausea 
  
Liver is normal in size, 17 cm in height. Echogenicity is coarsened suggesting 
fatty infiltration. Doppler flow in the main portal vein is hepatopedal. Common 
bile duct is normal, 5.6 mm. Gallbladder appears distended. Wall does not appear 
thickened. Nons had owing sludge is noted. No shadowing stones are seen. Pancreas poorly visualized. Right kidney is normal size and echogenicity. No 
hydronephrosis. Proximal abdominal aorta is normal in size, 2.6 cm. IVC patent. 
  
  
  
IMPRESSION IMPRESSION: 
1. Fatty infiltration liver. 2. Mild gallbladder distention. Biliary sludge present 
  
PMH: 
Past Medical History:  
Diagnosis Date  Arthritis  CAD (coronary artery disease)  [facility]estive [facility]) [facility]  Chronic alcoholism ([facility]  Chronic back pain   
 from [facility]  Chronic neck pain   
 from [facility]  
 Chronic systolic heart failure ([facility] 4/21/2011  Depression  Dizziness - light-headed  GERD (gastroesophageal reflux disease)   
 under control with nexium  Gynecomastia 2/22/2016  Heart failure ([facility]  History of implantable cardioverter-defibrillator (ICD) placement 2/22/2016  Hypertension  Psychiatric disorder   
 anxiety  Situational depression 2/22/2016  Ventricular tachycardia ([facility] 2/22/2016 PSH: 
Past Surgical History:  
Procedure Laterality Date  HX HEART CATHETERIZATION  9/21/10  
 HX PACEMAKER    
 defibrillator Allergies: 
No Known Allergies Home Medications: 
Prior to Admission medications Medication Sig Start Date End Date Taking? Authorizing Provider HYDROcodone-acetaminophen (NORCO)  mg tablet 1 tablet q4-6hrs prn pain, brand only 6/14/18   Magali Patterson MD  
ALPRAZolam Shahla Drone) 1 mg tablet Take 1 Tab by mouth nightly. Max Daily Amount: 1 mg. Indications: anxiety 5/16/18   Magali Patterson MD  
losartan (COZAAR) 25 mg tablet Take 1 Tab by mouth daily. 5/1/18   Taina Lo MD  
furosemide (LASIX) 40 mg tablet Take 1 Tab by mouth daily. 5/1/18   Taina Lo MD  
magnesium oxide (MAG-OX) 400 mg tablet Take 1 Tab by mouth every twelve (12) hours. 5/1/18   Taina Lo MD  
atorvastatin (LIPITOR) 80 mg tablet Take 1 Tab by mouth daily. 5/1/18   Taina Lo MD  
potassium chloride (K-DUR, KLOR-CON) 20 mEq tablet Take 1 Tab by mouth daily. 5/1/18   Taina Lo MD  
carvedilol (COREG) 6.25 mg tablet Take 1 Tab by mouth two (2) times daily (with meals). 3/15/18   Taina Lo MD  
ondansetron (ZOFRAN ODT) 8 mg disintegrating tablet Take 1 Tab by mouth every eight (8) hours as needed for Nausea. 2/20/18   Magali Patterson MD  
gabapentin (NEURONTIN) 300 mg capsule Take 1 Cap by mouth three (3) times daily.  12/8/17   Magali Patterson MD  
sildenafil citrate (VIAGRA) 100 mg tablet Take 1 Tab by mouth as needed. 10/19/17   Dann Ca MD  
ESOMEPRAZOLE MAG TRIHYDRATE (NEXIUM PO) Take 1 Cap by mouth two (2) times a day. 4/14/11   Mary Head MD  
 
 
Hospital Medications: 
Current Facility-Administered Medications Medication Dose Route Frequency  ALPRAZolam (XANAX) tablet 1 mg  1 mg Oral QHS  atorvastatin (LIPITOR) tablet 80 mg  80 mg Oral DAILY  carvedilol (COREG) tablet 6.25 mg  6.25 mg Oral BID WITH MEALS  pantoprazole (PROTONIX) tablet 40 mg  40 mg Oral ACB  gabapentin (NEURONTIN) capsule 300 mg  300 mg Oral TID  losartan (COZAAR) tablet 25 mg  25 mg Oral DAILY  magnesium oxide (MAG-OX) tablet 400 mg  400 mg Oral Q12H  
 sodium chloride (NS) flush 5-10 mL  5-10 mL IntraVENous Q8H  
 sodium chloride (NS) flush 5-10 mL  5-10 mL IntraVENous PRN  
 acetaminophen (TYLENOL) tablet 650 mg  650 mg Oral Q4H PRN  
 ondansetron (ZOFRAN) injection 4 mg  4 mg IntraVENous Q4H PRN  
 magnesium hydroxide (MILK OF MAGNESIA) 400 mg/5 mL oral suspension 30 mL  30 mL Oral DAILY PRN  
 0.9% sodium chloride infusion  100 mL/hr IntraVENous CONTINUOUS  promethazine (PHENERGAN) with saline injection 12.5 mg  12.5 mg IntraVENous Q4H PRN  
 HYDROcodone-acetaminophen (NORCO)  mg tablet 1 Tab  1 Tab Oral Q4H PRN Social History: 
Social History Substance Use Topics  Smoking status: Never Smoker  Smokeless tobacco: Never Used  Alcohol use Yes Comment: occasi Family History: 
Family History Problem Relation Age of Onset  Heart Disease Father CABG  
 Diabetes Father  Arthritis-rheumatoid Mother Review of Systems: A detailed 10 system ROS is obtained, with pertinent positives as listed above. All others are negative. Diet:  Clear liquid Objective:  
 
Physical Exam: 
Vitals: 
Visit Vitals  /86 (BP 1 Location: Right arm, BP Patient Position: At rest;Head of bed elevated (Comment degrees))  Pulse 90  Temp 97.7 °F (36.5 °C)  Resp 18  Ht 5' 11\" (1.803 m)  Wt 76.2 kg (168 lb)  SpO2 99%  BMI 23.43 kg/m2 Gen:  Pt is alert, cooperative, no acute distress Skin:  Extremities and face reveal no rashes. HEENT: Sclerae anicteric. Extra-occular muscles are intact. No oral ulcers. No abnormal pigmentation of the lips. The neck is supple. Cardiovascular: Regular rate and rhythm. No murmurs, gallops, or rubs. Respiratory:  Comfortable breathing with no accessory muscle use. Clear breath sounds anteriorly with no wheezes, rales, or rhonchi. GI:  Abdomen nondistended, soft, and nontender. Normal active bowel sounds. No enlargement of the liver or spleen. No masses palpable. Rectal:  Deferred Musculoskeletal:  No pitting edema of the lower legs. Neurological:  Gross memory appears intact. Patient is alert and oriented. Psychiatric:  Mood appears appropriate with judgement intact. Lymphatic:  No cervical or supraclavicular adenopathy. Laboratory:   
Recent Labs  
   07/09/18 
 0602  07/08/18 
 1520  07/08/18 
 0900  07/08/18 
 0348  07/08/18 
 5816 WBC  3.0*   --   5.9   --   8.1 HGB  11.3*   --   11.8*   --   12.3*  
HCT  34.4*   --   36.8*   --   39.6*  
PLT  129*   --   180   --   198 MCV  102.1*   --   106.1*   --   108.2* NA  139  140  143  144   --   
K  3.6  4.0  4.9  5.1   --   
CL  106  105  104  103   --   
CO2  21  20*  19*  14*   --   
BUN  5*  7  7  8   --   
CREA  1.17  1.19  1.28  1.31   --   
CA  8.2*  8.4  8.9  8.8   --   
MG   --    --    --   1.5*   --   
GLU  151*  114*  94  96   --   
AP  119   --    --   148*   --   
SGOT  128*   --    --   165*   --   
ALT  64   --    --   84*   --   
TBILI  1.5*   --    --   1.3*   --   
CBIL  0.5*   --    --    --    --   
ALB  3.2*   --    --   4.0   --   
TP  5.8*   --    --   7.1   --   
LPSE   --    --    --   121   -- Assessment:  
 
Principal Problem: 
  Nausea & vomiting (2/26/2016) Active Problems: Chronic systolic heart failure ([facility] (4/21/2011) HTN (hypertension) (11/29/2011) Elevated transaminase level (3/14/2017) CAD (coronary artery disease) (3/18/2017) Overview: Non-obstructive Chronic back pain (3/18/2017) Macrocytic anemia (7/8/2018) High anion gap metabolic acidosis (8/9/2139) Elevated bilirubin (7/8/2018) Elevated lactic acid level (7/9/2018) 48 y.o. male with PMH of (but not limited to CHF with ICD, CAD, Chronic pain, HTN, anxiety, and GERD who is seen in consultation at the request of Dr. Mariam Wells for N&V and abdominal pain. He was admitted on 7/8/18 with mild elevation of his transaminases, abdominal pain and N&V. Labs:7/8/18  ALT 84, , , T bili 1.3, lipase 121, albumin 4.0. US on 7/8/18 revealed mild gallbladder distention with CBD of 5.6mm, biliary sludge. Clinical history and exam is not consistent with acute gallbladder/biliary pathology. Without fevers or WBC elevation will proceed with EGD first given upper gi symptoms and weight loss. If negative and symptoms persist then would proceed with MRCP and surgical evaluation. Plan: 1. E[facility] tomorrow [facility]. Risks and benefits discussed with patient to include risk of infection, bleeding, perforation, anesthesia and possible mortality. Patient verbalized understanding and wishes to proceed with EGD. 2.NPO after midnight 3. Continue Pantoprazole 40mg one po daily 4. If negative EGD, may consider MRCP in the future 5. Trend LFTs - suspect more ETOH use than revealed Karla Nelson MD 
[facility]

## 2018-07-10 NOTE — PROGRESS NOTES
GI DAILY PROGRESS NOTE    Admit Date:  7/8/2018    Today's Date:  7/10/2018    CC: Abdominal pain, Elevated LFTS    Subjective:     Patient:tolerating clear liquid diet. Asking for food. Denies any N&V. Patient admits to drinking ETOH regularly. \"I'm not stopping that. \"    ESOPHAGOGASTRODUODENOSCOPY     DATE of PROCEDURE: 7/10/2018     PT NAME: Iglesia Negrete     [id number]     MEDICATION:   MAC        INSTRUMENT: GIFH 190     SPECIAL PROCEDURE: 56 fr camara  BLOOD LOSS- 0 or min. SPEC- no  IMPLANT- none     PROCEDURE:  Standard EGD w/ dilation       ASSESSMENT:  1. schatzki's ring- expected amt of trauma seen with dilation  2. Moderate hiatal hernia  3. Mild diffuse gastropathy     PLAN:   1. Trial of reglan  2.  F/U inpt     C Juan José Reis MD          Medications:   Current Facility-Administered Medications   Medication Dose Route Frequency    metoclopramide HCl (REGLAN) tablet 10 mg  10 mg Oral AC&HS    promethazine (PHENERGAN) 25 mg/mL injection        ALPRAZolam (XANAX) tablet 1 mg  1 mg Oral QHS    atorvastatin (LIPITOR) tablet 80 mg  80 mg Oral DAILY    carvedilol (COREG) tablet 6.25 mg  6.25 mg Oral BID WITH MEALS    pantoprazole (PROTONIX) tablet 40 mg  40 mg Oral ACB    gabapentin (NEURONTIN) capsule 300 mg  300 mg Oral TID    losartan (COZAAR) tablet 25 mg  25 mg Oral DAILY    magnesium oxide (MAG-OX) tablet 400 mg  400 mg Oral Q12H    sodium chloride (NS) flush 5-10 mL  5-10 mL IntraVENous Q8H    sodium chloride (NS) flush 5-10 mL  5-10 mL IntraVENous PRN    acetaminophen (TYLENOL) tablet 650 mg  650 mg Oral Q4H PRN    ondansetron (ZOFRAN) injection 4 mg  4 mg IntraVENous Q4H PRN    magnesium hydroxide (MILK OF MAGNESIA) 400 mg/5 mL oral suspension 30 mL  30 mL Oral DAILY PRN    0.9% sodium chloride infusion  100 mL/hr IntraVENous CONTINUOUS    promethazine (PHENERGAN) with saline injection 12.5 mg  12.5 mg IntraVENous Q4H PRN    HYDROcodone-acetaminophen (NORCO)  mg tablet 1 Tab  1 Tab Oral Q4H PRN       Review of Systems:  ROS was obtained, with pertinent positives as listed above. No chest pain or SOB. Diet:  Clear liquid diet    Objective:   Vitals:  Visit Vitals    BP (!) 154/110 (BP 1 Location: Right arm, BP Patient Position: At rest;Head of bed elevated (Comment degrees))    Pulse 91    Temp 98.8 °F (37.1 °C)    Resp 14    Ht 5' 11\" (1.803 m)    Wt 76.2 kg (168 lb)    SpO2 100%    BMI 23.43 kg/m2     Intake/Output:  07/10 0701 - 07/10 1900  In: 250 [I.V.:250]  Out: 420 [Urine:420]  07/08 1901 - 07/10 0700  In: -   Out: 2000 [Urine:2000]  Exam:  General appearance: alert, cooperative, no distress  Lungs: clear to auscultation bilaterally anteriorly  Heart: regular rate and rhythm  Abdomen: soft, non-tender.  Bowel sounds normal. No masses, no organomegaly  Extremities: extremities normal, atraumatic, no cyanosis or edema  Neuro:  alert and oriented    Data Review (Labs):    Recent Labs      07/10/18   0613  07/09/18   0602  07/08/18   1520  07/08/18   0900  07/08/18   0348  07/08/18   0138   WBC  2.5*  3.0*   --   5.9   --   8.1   HGB  10.7*  11.3*   --   11.8*   --   12.3*   HCT  33.2*  34.4*   --   36.8*   --   39.6*   PLT  118*  129*   --   180   --   198   MCV  103.8*  102.1*   --   106.1*   --   108.2*   NA  146*  139  140  143  144   --    K  3.6  3.6  4.0  4.9  5.1   --    CL  112*  106  105  104  103   --    CO2  24  21  20*  19*  14*   --    BUN  3*  5*  7  7  8   --    CREA  0.75*  1.17  1.19  1.28  1.31   --    CA  8.2*  8.2*  8.4  8.9  8.8   --    MG   --    --    --    --   1.5*   --    GLU  91  151*  114*  94  96   --    AP  108  119   --    --   148*   --    SGOT  96*  128*   --    --   165*   --    ALT  56  64   --    --   84*   --    TBILI  1.0  1.5*   --    --   1.3*   --    CBIL   --   0.5*   --    --    --    --    ALB  2.9*  3.2*   --    --   4.0   --    TP  5.5*  5.8*   --    --   7.1   --    LPSE   --    --    --    --   121   --        Assessment: Principal Problem:    Nausea & vomiting (2/26/2016)    Active Problems:    Chronic systolic heart failure ([facility] [address] (4/21/2011)      HTN (hypertension) (11/29/2011)      Elevated transaminase level (3/14/2017)      CAD (coronary artery disease) (3/18/2017)      Overview: Non-obstructive      Chronic back pain (3/18/2017)      Macrocytic anemia (7/8/2018)      High anion gap metabolic acidosis (7/6/0138)      Elevated bilirubin (7/8/2018)      Elevated lactic acid level (7/9/2018)    48 y.o. male with PMH of (but not limited to CHF with ICD, CAD, Chronic pain, HTN, anxiety, and GERD who we are following for N&V and abdominal pain. He was admitted on 7/8/18 with mild elevation of his transaminases, abdominal pain and N&V. Labs:7/8/18  ALT 84 (now 56 on 7/10),  (96),  (108), T bili 1.3 (1.0), lipase 121, albumin 4.0. US on 7/8/18 revealed mild gallbladder distention with CBD of 5.6mm, biliary sludge. Clinical history and exam is not consistent with acute gallbladder/biliary pathology. EGD on 7/10 by Dr. Polo Mabry revealed diffuse gastropathy, moderate hiatal hernia and Schatzki's ring which was dilated with #56 Loye Stains. Transaminases are improving. Likely consistent with ETOH    Plan: 1. Continue reglan  2. Continue pantoprazole  3. Advance to low fat GI soft diet  4. Serial LFTS  5. Home per primary team  6. We will sign off and make a follow up outpatient appt. Please call with any questions. Janee Marin.  Binh Long in collaboration with Dr. Mary Mendez  [facility]

## 2018-07-10 NOTE — PROGRESS NOTES
TRANSFER - IN REPORT:    Verbal report received from Shaquille Meyer RN on [place] [facility]  being received from [facility]) for routine progression of care      Report consisted of patients Situation, Background, Assessment and   Recommendations(SBAR). Information from the following report(s) SBAR, Kardex, Procedure Summary and Intake/Output was reviewed with the receiving nurse. Opportunity for questions and clarification was provided. Assessment completed upon patients arrival to unit and care assumed.

## 2018-07-10 NOTE — PROGRESS NOTES
Problem: Interdisciplinary Rounds  Goal: Interdisciplinary Rounds  Interdisciplinary team rounds were held 7/10/2018 with the following team members:Care Management, Nursing, Nutrition, Pharmacy, Physical Therapy and Physician and the patient. Plan of care discussed. See clinical pathway and/or care plan for interventions and desired outcomes.

## 2018-07-10 NOTE — PROGRESS NOTES
Hospitalist Progress Note    7/10/2018  Admit Date: 2018  1:22 AM   NAME: Jeanice Eisenmenger   :  1965   MRN:  [id number]   Attending: Sudeep Medeiros MD  PCP:  Twila Duque MD    SUBJECTIVE:   Patient admitted  with abdominal pain, n/v, and transaminitis/elevated bili. Reports RUQ pain today and vomited x 1 this AM.  Imaging shows dilated gallbladder on US and CT with sludge seen on US. He also is complaining of dysphagia (even with liquids) feeling like water gets 'stuck down low' (points to just above epigastric area). 7/10:  Pt returned form EGD with findings of Schatzki's ring which was dilated and gastropathy. Trial of Reglan. Pt still with mild RUQ ttp. Also with headache present since admission. Top of head and circumferential, mod severity with photophobia. No Fevers or chills.       Review of Systems negative with exception of pertinent positives noted above  PHYSICAL EXAM     Visit Vitals    BP (!) 154/110 (BP 1 Location: Right arm, BP Patient Position: At rest;Head of bed elevated (Comment degrees))    Pulse 91    Temp 98.8 °F (37.1 °C)    Resp 14    Ht 5' 11\" (1.803 m)    Wt 76.2 kg (168 lb)    SpO2 100%    BMI 23.43 kg/m2      Temp (24hrs), Av.8 °F (36.6 °C), Min:97.1 °F (36.2 °C), Max:98.8 °F (37.1 °C)    Patient Vitals for the past 24 hrs:   Temp Pulse Resp BP SpO2   07/10/18 1305 98.8 °F (37.1 °C) 91 14 (!) 154/110 100 %   07/10/18 1253 - 90 20 (!) 151/104 99 %   07/10/18 1245 - 89 20 (!) 150/100 100 %   07/10/18 1240 - 90 20 (!) 148/103 100 %   07/10/18 1235 - 95 24 (!) 148/94 97 %   07/10/18 1228 97.7 °F (36.5 °C) (!) 103 18 (!) 139/98 100 %   07/10/18 1214 - 84 12 (!) 151/104 96 %   07/10/18 0758 - - - (!) 143/102 -   07/10/18 0745 97.1 °F (36.2 °C) 81 12 (!) 143/102 100 %   07/10/18 0434 97.4 °F (36.3 °C) 90 18 (!) 142/98 99 %   07/10/18 0032 98 °F (36.7 °C) 82 17 (!) 135/94 98 %   18 2023 97.9 °F (36.6 °C) (!) 105 18 127/81 99 %       Oxygen Therapy  O2 Sat (%): 100 % (07/10/18 1305)  Pulse via Oximetry: 106 beats per minute (07/08/18 0700)  O2 Device: Nasal cannula (07/10/18 1253)  O2 Flow Rate (L/min): 3 l/min (07/10/18 1253)    Intake/Output Summary (Last 24 hours) at 07/10/18 1651  Last data filed at 07/10/18 1223   Gross per 24 hour   Intake              250 ml   Output             1320 ml   Net            -1070 ml      General: No acute distress  aaox3  Lungs:  CTA Bilaterally. Heart:  Regular rate and rhythm,  No murmur, rub, or gallop  Abdomen: Soft, Non distended, ttt RUQ, Positive bowel sounds  Extremities: No cyanosis, clubbing or edema  Neurologic:  No focal deficits    Recent Results (from the past 24 hour(s))   CBC WITH AUTOMATED DIFF    Collection Time: 07/10/18  6:13 AM   Result Value Ref Range    WBC 2.5 (L) 4.3 - 11.1 K/uL    RBC 3.20 (L) 4.23 - 5.67 M/uL    HGB 10.7 (L) 13.6 - 17.2 g/dL    HCT 33.2 (L) 41.1 - 50.3 %    .8 (H) 79.6 - 97.8 FL    MCH 33.4 (H) 26.1 - 32.9 PG    MCHC 32.2 31.4 - 35.0 g/dL    RDW 13.4 11.9 - 14.6 %    PLATELET 736 (L) 148 - 450 K/uL    MPV 9.8 (L) 10.8 - 14.1 FL    DF AUTOMATED      NEUTROPHILS 72 43 - 78 %    LYMPHOCYTES 18 13 - 44 %    MONOCYTES 9 4.0 - 12.0 %    EOSINOPHILS 1 0.5 - 7.8 %    BASOPHILS 0 0.0 - 2.0 %    IMMATURE GRANULOCYTES 0 0.0 - 5.0 %    ABS. NEUTROPHILS 1.8 1.7 - 8.2 K/UL    ABS. LYMPHOCYTES 0.5 0.5 - 4.6 K/UL    ABS. MONOCYTES 0.2 0.1 - 1.3 K/UL    ABS. EOSINOPHILS 0.0 0.0 - 0.8 K/UL    ABS. BASOPHILS 0.0 0.0 - 0.2 K/UL    ABS. IMM.  GRANS. 0.0 0.0 - 0.5 K/UL   METABOLIC PANEL, COMPREHENSIVE    Collection Time: 07/10/18  6:13 AM   Result Value Ref Range    Sodium 146 (H) 136 - 145 mmol/L    Potassium 3.6 3.5 - 5.1 mmol/L    Chloride 112 (H) 98 - 107 mmol/L    CO2 24 21 - 32 mmol/L    Anion gap 10 7 - 16 mmol/L    Glucose 91 65 - 100 mg/dL    BUN 3 (L) 6 - 23 MG/DL    Creatinine 0.75 (L) 0.8 - 1.5 MG/DL    GFR est AA >60 >60 ml/min/1.73m2    GFR est non-AA >60 >60 ml/min/1.73m2    Calcium 8.2 (L) 8.3 - 10.4 MG/DL    Bilirubin, total 1.0 0.2 - 1.1 MG/DL    ALT (SGPT) 56 12 - 65 U/L    AST (SGOT) 96 (H) 15 - 37 U/L    Alk. phosphatase 108 50 - 136 U/L    Protein, total 5.5 (L) 6.3 - 8.2 g/dL    Albumin 2.9 (L) 3.5 - 5.0 g/dL    Globulin 2.6 2.3 - 3.5 g/dL    A-G Ratio 1.1 (L) 1.2 - 3.5       Imaging:   CT ABD PELV W CONT   Final Result   IMPRESSION:   1. Fatty infiltration liver. 2. Mild distention of the gallbladder. No calcified stones. US ABD LTD   Final Result   IMPRESSION:   1. Fatty infiltration liver. 2. Mild gallbladder distention. Biliary sludge present      XR ABD ACUTE W 1 V CHEST   Final Result   IMPRESSION: No acute abnormality            ASSESSMENT      Hospital Problems as of 7/10/2018  Date Reviewed: 3/15/2018          Codes Class Noted - Resolved POA    Elevated lactic acid level ICD-10-CM: R79.89  ICD-9-CM: 276.2  7/9/2018 - Present Yes        Macrocytic anemia ICD-10-CM: D53.9  ICD-9-CM: 281.9  7/8/2018 - Present Yes        High anion gap metabolic acidosis TDN-81-PL: E87.2  ICD-9-CM: 276.2  7/8/2018 - Present Yes        Elevated bilirubin ICD-10-CM: R17  ICD-9-CM: 277.4  7/8/2018 - Present Yes        CAD (coronary artery disease) (Chronic) ICD-10-CM: I25.10  ICD-9-CM: 414.00  3/18/2017 - Present Yes    Overview Signed 3/18/2017 11:27 AM by Latoya Malcolm NP     Non-obstructive             Chronic back pain (Chronic) ICD-10-CM: M54.9, G89.29  ICD-9-CM: 724.5, 338.29  3/18/2017 - Present Yes        Elevated transaminase level ICD-10-CM: R74.0  ICD-9-CM: 790.4  3/14/2017 - Present Yes        * (Principal)Nausea & vomiting (Chronic) ICD-10-CM: R11.2  ICD-9-CM: 787.01  2/26/2016 - Present Yes        HTN (hypertension) (Chronic) ICD-10-CM: I10  ICD-9-CM: 401.9  11/29/2011 - Present Yes        Chronic systolic heart failure (HCC) (Chronic) ICD-10-CM: U15.70  ICD-9-CM: 428.22  4/21/2011 - Present Yes            GI input appreciated.   Trial IV Reglan, dany ppi  Await further reccs reagrding elev LFTs and RUQ pain  ?significance of distended GB omn CT scan  Consider CT head for HA if recurs. Neuro exam nonfocal.  Vitals stable.     SCDs    Signed By: Zee Junior MD     July 10, 2018

## 2018-07-10 NOTE — PROGRESS NOTES
Saw pt in interdisciplinarily rounds, plan of care and discharge date/ location discussed. Per the hospitalitis plan to review EDG results, have PT see pt to eval for d/c needs.  Plan to d/c home in 1-2 days

## 2018-07-10 NOTE — PERIOP NOTES
TRANSFER - OUT REPORT:    Verbal report given to [facility] JORGE A CRAVEN RN(name) on Traci Benavides  being transferred to Med/Surg(unit) for routine post - op       Report consisted of patients Situation, Background, Assessment and   Recommendations(SBAR). Information from the following report(s) SBAR, Kardex, Procedure Summary, Intake/Output and MAR was reviewed with the receiving nurse. Opportunity for questions and clarification was provided.       Patient transported with:   O2 @ 3 liters  Tech

## 2018-07-10 NOTE — ANESTHESIA POSTPROCEDURE EVALUATION
Post-Anesthesia Evaluation and Assessment    Patient: Iglesia Negrete MRN: [id number]  SSN: [id number]    YOB: 1965  Age: 48 y.o. Sex: male       Cardiovascular Function/Vital Signs  Visit Vitals    BP (!) 148/94    Pulse 95    Temp 36.5 °C (97.7 °F)    Resp 24    Ht 5' 11\" (1.803 m)    Wt 76.2 kg (168 lb)    SpO2 97%    BMI 23.43 kg/m2       Patient is status post total IV anesthesia anesthesia for Procedure(s):  ESOPHAGOGASTRODUODENOSCOPY (EGD)/ 29/ 360  ESOPHAGEAL DILATION. Nausea/Vomiting: None    Postoperative hydration reviewed and adequate. Pain:  Pain Scale 1: Numeric (0 - 10) (07/10/18 1235)  Pain Intensity 1: 0 (07/10/18 1235)   Managed    Neurological Status: At baseline    Mental Status and Level of Consciousness: Arousable    Pulmonary Status:   O2 Device: CO2 nasal cannula (07/10/18 1228)   Adequate oxygenation and airway patent    Complications related to anesthesia: None    Post-anesthesia assessment completed.  No concerns    Signed By: Mayank Oseguera MD     July 10, 2018

## 2018-07-10 NOTE — PROGRESS NOTES
Uneventful shift. Hourly rounds completed throughout shift. Vomited x1 this shift. Received pain and nausea medication x2 this shift. Patient denies needs at this time. Will continue to monitor and give bedside report to oncoming day shift nurse.

## 2018-07-10 NOTE — PROGRESS NOTES
TRANSFER - OUT REPORT:    Verbal report given to KADEN Lang on Brenda Buck  being transferred to Pre-op for routine progression of care       Report consisted of patients Situation, Background, Assessment and   Recommendations(SBAR). Information from the following report(s) SBAR, Kardex and MAR was reviewed with the receiving nurse. Lines:   Peripheral IV 07/10/18 Right Hand (Active)   Site Assessment Clean, dry, & intact 7/10/2018 11:00 AM   Phlebitis Assessment 0 7/10/2018 11:00 AM   Infiltration Assessment 0 7/10/2018 11:00 AM   Dressing Status Clean, dry, & intact 7/10/2018 11:00 AM   Dressing Type Transparent 7/10/2018 11:00 AM   Hub Color/Line Status Pink; Infusing 7/10/2018 11:00 AM        Opportunity for questions and clarification was provided.

## 2018-07-10 NOTE — INTERVAL H&P NOTE
H&P Update:  Iglesia Negrete was seen and examined. History and physical has been reviewed. The patient has been examined.  There have been no significant clinical changes since the completion of the originally dated History and Physical.    Signed By: Trini Stacy MD     July 10, 2018 11:44 AM

## 2018-07-11 LAB
ALBUMIN SERPL-MCNC: 2.5 G/DL (ref 3.5–5)
ALBUMIN/GLOB SERPL: 1.1 {RATIO} (ref 1.2–3.5)
ALP SERPL-CCNC: 89 U/L (ref 50–136)
ALT SERPL-CCNC: 47 U/L (ref 12–65)
ANION GAP SERPL CALC-SCNC: 8 MMOL/L (ref 7–16)
AST SERPL-CCNC: 73 U/L (ref 15–37)
BILIRUB SERPL-MCNC: 0.5 MG/DL (ref 0.2–1.1)
BUN SERPL-MCNC: 2 MG/DL (ref 6–23)
CALCIUM SERPL-MCNC: 7.3 MG/DL (ref 8.3–10.4)
CHLORIDE SERPL-SCNC: 112 MMOL/L (ref 98–107)
CO2 SERPL-SCNC: 24 MMOL/L (ref 21–32)
CREAT SERPL-MCNC: 0.94 MG/DL (ref 0.8–1.5)
GLOBULIN SER CALC-MCNC: 2.3 G/DL (ref 2.3–3.5)
GLUCOSE SERPL-MCNC: 130 MG/DL (ref 65–100)
POTASSIUM SERPL-SCNC: 3 MMOL/L (ref 3.5–5.1)
PROT SERPL-MCNC: 4.8 G/DL (ref 6.3–8.2)
SODIUM SERPL-SCNC: 144 MMOL/L (ref 136–145)

## 2018-07-11 PROCEDURE — 74011250636 HC RX REV CODE- 250/636: Performed by: FAMILY MEDICINE

## 2018-07-11 PROCEDURE — 74011250637 HC RX REV CODE- 250/637: Performed by: INTERNAL MEDICINE

## 2018-07-11 PROCEDURE — 74011250636 HC RX REV CODE- 250/636: Performed by: INTERNAL MEDICINE

## 2018-07-11 PROCEDURE — 74011250637 HC RX REV CODE- 250/637: Performed by: FAMILY MEDICINE

## 2018-07-11 PROCEDURE — 77030020263 HC SOL INJ SOD CL0.9% LFCR 1000ML

## 2018-07-11 PROCEDURE — 36415 COLL VENOUS BLD VENIPUNCTURE: CPT | Performed by: FAMILY MEDICINE

## 2018-07-11 PROCEDURE — 74011000250 HC RX REV CODE- 250: Performed by: FAMILY MEDICINE

## 2018-07-11 PROCEDURE — 80053 COMPREHEN METABOLIC PANEL: CPT | Performed by: FAMILY MEDICINE

## 2018-07-11 PROCEDURE — 97530 THERAPEUTIC ACTIVITIES: CPT

## 2018-07-11 PROCEDURE — 94760 N-INVAS EAR/PLS OXIMETRY 1: CPT

## 2018-07-11 PROCEDURE — 97116 GAIT TRAINING THERAPY: CPT

## 2018-07-11 PROCEDURE — 65270000029 HC RM PRIVATE

## 2018-07-11 RX ORDER — POTASSIUM CHLORIDE 20 MEQ/1
40 TABLET, EXTENDED RELEASE ORAL
Status: COMPLETED | OUTPATIENT
Start: 2018-07-11 | End: 2018-07-11

## 2018-07-11 RX ORDER — MORPHINE SULFATE 2 MG/ML
2 INJECTION, SOLUTION INTRAMUSCULAR; INTRAVENOUS ONCE
Status: COMPLETED | OUTPATIENT
Start: 2018-07-11 | End: 2018-07-11

## 2018-07-11 RX ADMIN — HYDROCODONE BITARTRATE AND ACETAMINOPHEN 1 TABLET: 10; 325 TABLET ORAL at 13:10

## 2018-07-11 RX ADMIN — HYDROCODONE BITARTRATE AND ACETAMINOPHEN 1 TABLET: 10; 325 TABLET ORAL at 22:27

## 2018-07-11 RX ADMIN — PANTOPRAZOLE SODIUM 40 MG: 40 TABLET, DELAYED RELEASE ORAL at 10:32

## 2018-07-11 RX ADMIN — PROMETHAZINE HYDROCHLORIDE 12.5 MG: 25 INJECTION INTRAMUSCULAR; INTRAVENOUS at 13:10

## 2018-07-11 RX ADMIN — ALPRAZOLAM 1 MG: 0.5 TABLET ORAL at 22:16

## 2018-07-11 RX ADMIN — SODIUM CHLORIDE 100 ML/HR: 900 INJECTION, SOLUTION INTRAVENOUS at 06:16

## 2018-07-11 RX ADMIN — METOCLOPRAMIDE HYDROCHLORIDE 10 MG: 10 TABLET ORAL at 22:16

## 2018-07-11 RX ADMIN — HYDROCODONE BITARTRATE AND ACETAMINOPHEN 1 TABLET: 10; 325 TABLET ORAL at 17:07

## 2018-07-11 RX ADMIN — METOCLOPRAMIDE HYDROCHLORIDE 10 MG: 10 TABLET ORAL at 10:32

## 2018-07-11 RX ADMIN — CARVEDILOL 6.25 MG: 6.25 TABLET, FILM COATED ORAL at 17:07

## 2018-07-11 RX ADMIN — Medication 10 ML: at 22:50

## 2018-07-11 RX ADMIN — PROMETHAZINE HYDROCHLORIDE 12.5 MG: 25 INJECTION INTRAMUSCULAR; INTRAVENOUS at 22:27

## 2018-07-11 RX ADMIN — METOCLOPRAMIDE HYDROCHLORIDE 10 MG: 10 TABLET ORAL at 08:50

## 2018-07-11 RX ADMIN — POTASSIUM CHLORIDE 40 MEQ: 20 TABLET, EXTENDED RELEASE ORAL at 08:51

## 2018-07-11 RX ADMIN — SODIUM CHLORIDE 100 ML/HR: 900 INJECTION, SOLUTION INTRAVENOUS at 22:16

## 2018-07-11 RX ADMIN — HYDROCODONE BITARTRATE AND ACETAMINOPHEN 1 TABLET: 10; 325 TABLET ORAL at 06:16

## 2018-07-11 RX ADMIN — CARVEDILOL 6.25 MG: 6.25 TABLET, FILM COATED ORAL at 08:50

## 2018-07-11 RX ADMIN — Medication 400 MG: at 08:50

## 2018-07-11 RX ADMIN — GABAPENTIN 300 MG: 300 CAPSULE ORAL at 22:16

## 2018-07-11 RX ADMIN — Medication 5 ML: at 06:30

## 2018-07-11 RX ADMIN — Medication 400 MG: at 22:16

## 2018-07-11 RX ADMIN — METOCLOPRAMIDE HYDROCHLORIDE 10 MG: 10 TABLET ORAL at 17:07

## 2018-07-11 RX ADMIN — GABAPENTIN 300 MG: 300 CAPSULE ORAL at 08:50

## 2018-07-11 RX ADMIN — MORPHINE SULFATE 2 MG: 2 INJECTION, SOLUTION INTRAMUSCULAR; INTRAVENOUS at 10:32

## 2018-07-11 RX ADMIN — ATORVASTATIN CALCIUM 80 MG: 40 TABLET, FILM COATED ORAL at 08:50

## 2018-07-11 RX ADMIN — HYDROCODONE BITARTRATE AND ACETAMINOPHEN 1 TABLET: 10; 325 TABLET ORAL at 00:48

## 2018-07-11 RX ADMIN — PROMETHAZINE HYDROCHLORIDE 12.5 MG: 25 INJECTION INTRAMUSCULAR; INTRAVENOUS at 06:17

## 2018-07-11 RX ADMIN — LOSARTAN POTASSIUM 25 MG: 25 TABLET ORAL at 08:50

## 2018-07-11 RX ADMIN — PROMETHAZINE HYDROCHLORIDE 12.5 MG: 25 INJECTION INTRAMUSCULAR; INTRAVENOUS at 17:07

## 2018-07-11 RX ADMIN — PROMETHAZINE HYDROCHLORIDE 12.5 MG: 25 INJECTION INTRAMUSCULAR; INTRAVENOUS at 00:48

## 2018-07-11 RX ADMIN — GABAPENTIN 300 MG: 300 CAPSULE ORAL at 17:07

## 2018-07-11 NOTE — PROGRESS NOTES
Saw pt in interdisciplinarily rounds, plan of care and discharge date/ location discussed.  Per the hospitalitis plan is to advance pt to regular diet for dinner, if continue to roya then will d/c tomorrow am.

## 2018-07-11 NOTE — PROGRESS NOTES
Hospitalist Progress Note    2018  Admit Date: 2018  1:22 AM   NAME: Patrick Dozier   :  1965   MRN:  [id number]   Attending: Mir Singh MD  PCP:  Echo Whitehead MD    SUBJECTIVE:   Patient admitted  with abdominal pain, n/v, and transaminitis/elevated bili. Reports RUQ pain today and vomited x 1 this AM.  Imaging shows dilated gallbladder on US and CT with sludge seen on US. He also is complaining of dysphagia (even with liquids) feeling like water gets 'stuck down low' (points to just above epigastric area). 7/10:  Pt returned form EGD with findings of Schatzki's ring which was dilated and gastropathy. Trial of Reglan. Pt still with mild RUQ ttp. Also with headache present since admission. Top of head and circumferential, mod severity with photophobia. No Fevers or chills. :  Overall improved. Abd discomfort improved. roya po's. HA resolved. Still weak and [id number] toward increased ambulation. Diet advanced to full.       Review of Systems negative with exception of pertinent positives noted above  PHYSICAL EXAM     Visit Vitals    /87 (BP 1 Location: Left arm, BP Patient Position: At rest)    Pulse 94    Temp 97.9 °F (36.6 °C)    Resp 17    Ht 5' 11\" (1.803 m)    Wt 76.2 kg (168 lb)    SpO2 100%    BMI 23.43 kg/m2      Temp (24hrs), Av.8 °F (36.6 °C), Min:96.6 °F (35.9 °C), Max:98.5 °F (36.9 °C)    Patient Vitals for the past 24 hrs:   Temp Pulse Resp BP SpO2   18 1200 97.9 °F (36.6 °C) 94 17 124/87 100 %   18 0830 98 °F (36.7 °C) 84 13 139/69 100 %   18 0805 - - - - 98 %   18 0413 97.9 °F (36.6 °C) 99 18 109/75 99 %   18 0026 97.9 °F (36.6 °C) 100 16 118/83 99 %   07/10/18 1942 98.5 °F (36.9 °C) (!) 110 17 113/71 99 %   07/10/18 1710 96.6 °F (35.9 °C) 98 15 116/89 100 %       Oxygen Therapy  O2 Sat (%): 100 % (18 1200)  Pulse via Oximetry: 84 beats per minute (18 08)  O2 Device: Room air (18 08)  O2 Flow Rate (L/min): 3 l/min (07/10/18 1253)    Intake/Output Summary (Last 24 hours) at 07/11/18 1404  Last data filed at 07/11/18 0830   Gross per 24 hour   Intake                0 ml   Output             2200 ml   Net            -2200 ml      General: No acute distress  aaox3  Lungs:  CTA Bilaterally. Heart:  Regular rate and rhythm,  No murmur, rub, or gallop  Abdomen: Soft, Non distended, ttt RUQ improved, Positive bowel sounds  Extremities: No cyanosis, clubbing or edema  Neurologic:  No focal deficits    Recent Results (from the past 24 hour(s))   METABOLIC PANEL, COMPREHENSIVE    Collection Time: 07/11/18  6:44 AM   Result Value Ref Range    Sodium 144 136 - 145 mmol/L    Potassium 3.0 (L) 3.5 - 5.1 mmol/L    Chloride 112 (H) 98 - 107 mmol/L    CO2 24 21 - 32 mmol/L    Anion gap 8 7 - 16 mmol/L    Glucose 130 (H) 65 - 100 mg/dL    BUN 2 (L) 6 - 23 MG/DL    Creatinine 0.94 0.8 - 1.5 MG/DL    GFR est AA >60 >60 ml/min/1.73m2    GFR est non-AA >60 >60 ml/min/1.73m2    Calcium 7.3 (L) 8.3 - 10.4 MG/DL    Bilirubin, total 0.5 0.2 - 1.1 MG/DL    ALT (SGPT) 47 12 - 65 U/L    AST (SGOT) 73 (H) 15 - 37 U/L    Alk. phosphatase 89 50 - 136 U/L    Protein, total 4.8 (L) 6.3 - 8.2 g/dL    Albumin 2.5 (L) 3.5 - 5.0 g/dL    Globulin 2.3 2.3 - 3.5 g/dL    A-G Ratio 1.1 (L) 1.2 - 3.5       Imaging:   CT ABD PELV W CONT   Final Result   IMPRESSION:   1. Fatty infiltration liver. 2. Mild distention of the gallbladder. No calcified stones. US ABD LTD   Final Result   IMPRESSION:   1. Fatty infiltration liver. 2. Mild gallbladder distention.  Biliary sludge present      XR ABD ACUTE W 1 V CHEST   Final Result   IMPRESSION: No acute abnormality            ASSESSMENT      Hospital Problems as of 7/11/2018  Date Reviewed: 3/15/2018          Codes Class Noted - Resolved POA    Elevated lactic acid level ICD-10-CM: R79.89  ICD-9-CM: 276.2  7/9/2018 - Present Yes        Macrocytic anemia ICD-10-CM: D53.9  ICD-9-CM: 281.9  7/8/2018 - Present Yes        High anion gap metabolic acidosis TQB-96-CF: E87.2  ICD-9-CM: 276.2  7/8/2018 - Present Yes        Elevated bilirubin ICD-10-CM: R17  ICD-9-CM: 277.4  7/8/2018 - Present Yes        CAD (coronary artery disease) (Chronic) ICD-10-CM: I25.10  ICD-9-CM: 414.00  3/18/2017 - Present Yes    Overview Signed 3/18/2017 11:27 AM by Ulises Aguero NP     Non-obstructive             Chronic back pain (Chronic) ICD-10-CM: M54.9, G89.29  ICD-9-CM: 724.5, 338.29  3/18/2017 - Present Yes        Elevated transaminase level ICD-10-CM: R74.0  ICD-9-CM: 790.4  3/14/2017 - Present Yes        * (Principal)Nausea & vomiting (Chronic) ICD-10-CM: R11.2  ICD-9-CM: 787.01  2/26/2016 - Present Yes        HTN (hypertension) (Chronic) ICD-10-CM: I10  ICD-9-CM: 401.9  11/29/2011 - Present Yes        Chronic systolic heart failure (HCC) (Chronic) ICD-10-CM: S90.03  ICD-9-CM: 428.22  4/21/2011 - Present Yes            GI input appreciated. Trial IV Reglan, continue ppi  Await further reccs reagrding elev LFTs and RUQ pain    7/11:  Improving, adv diet  ?significance of distended GB omn CT scan      HA - resolved    Elev Liver enzymes - monitor    Outpt GI follow up iris may.     SCDs    Signed By: Abdulkadir Keenan MD     July 11, 2018

## 2018-07-11 NOTE — PROGRESS NOTES
Shift assessment complete. Pt sitting in bed at this time. Pt is visibly upset and tearful, nurse asked what's wrong, pt stated he is not ready to go home tomorrow due to his legs/feet being numb due to his neuropathy and he got a bad call from his wife about his son. This nurse encouraged pt to call if he needs anything, or if he just needs someone to talk to. HR slightly elevated. IV clean, dry, intact, infusing as ordered. PRN pain and nausea medication given as requested. Bed low and locked, call light within reach. Encouraged to call for any needs. Will continue to monitor.

## 2018-07-11 NOTE — PROGRESS NOTES
Problem: Mobility Impaired (Adult and Pediatric)  Goal: *Acute Goals and Plan of Care (Insert Text)  STG:  (1.)Mr. Katiuska Tang will move from supine to sit and sit to supine  with INDEPENDENT within 1-2 treatment day(s). (2.)Mr. Katiuska Tang will transfer from bed to chair and chair to bed with INDEPENDENT using the least restrictive device within 1-2 treatment day(s). (3.)Mr. Katiuska Tang will ambulate with INDEPENDENT for 50 feet with the least restrictive device within 1-2 treatment day(s). LTG:  (1.)Mr. Katiuska Tang will ambulate with INDEPENDENT for  feet with the least restrictive device within 3-7 treatment day(s). ________________________________________________________________________________________________    Outcome: Progressing Towards Goal    PHYSICAL THERAPY: Daily Note, Treatment Day: 1st, AM 7/11/2018  INPATIENT: Hospital Day: 4  Payor: Ross Khalil / [place] / Product Type: PPO /      NAME/AGE/GENDER: Lj Villegas is a 48 y.o. male   PRIMARY DIAGNOSIS: Nausea and vomiting, intractability of vomiting not specified, unspecified vomiting type [R11.2] Nausea & vomiting Nausea & vomiting  Procedure(s) (LRB):  ESOPHAGOGASTRODUODENOSCOPY (EGD)/ 29/ 360 (N/A)  ESOPHAGEAL DILATION (N/A)  1 Day Post-Op  ICD-10: Treatment Diagnosis:    · Difficulty in walking, Not elsewhere classified (R26.2)  · Other abnormalities of gait and mobility (R26.89)   Precaution/Allergies:  Review of patient's allergies indicates no known allergies. ASSESSMENT:     Mr. Katiuska Tang presents with decreased independence with functional mobility. Therapy will maximize independence with functional mobility. Pt plans to return home following hospital stay. Pt's mobility appears limited by neuropathy and foot pain. 7/11--Pt performed supine to sit, with encouragement. Pt sat to put on flip-flops. Pt stood and took steps in room. Pt agreeable to slightly more ambulation today.  Pt states he gets short of breath with ambulation due to congestive heart failure. This section established at most recent assessment   PROBLEM LIST (Impairments causing functional limitations):  1. Decreased Strength  2. Decreased Transfer Abilities  3. Decreased Ambulation Ability/Technique  4. Decreased Balance  5. Increased Pain  6. Decreased Activity Tolerance  7. Decreased Flexibility/Joint Mobility   INTERVENTIONS PLANNED: (Benefits and precautions of physical therapy have been discussed with the patient.)  1. Bed Mobility  2. Gait Training  3. Therapeutic Activites  4. Transfer Training  5. Group Therapy     TREATMENT PLAN: Frequency/Duration: daily for duration of hospital stay  Rehabilitation Potential For Stated Goals: Good     RECOMMENDED REHABILITATION/EQUIPMENT: (at time of discharge pending progress): Due to the probability of continued deficits (see above) this patient will not likely need continued skilled physical therapy after discharge. Equipment:    None at this time              HISTORY:   History of Present Injury/Illness (Reason for Referral):  Francheska Frey is a 48 y.o. male with a past medical history of CAD, chronic alcoholism (per chart, denied by patient), GERD, sCHF who presents to the ER with complaint of nausea, vomiting, and abdominal pain that started last night around 7 pm. He reports that it started suddenly, and since he has vomited multiple times. Reports that his vomitus has had some dark red material but no bright red blood or coffee ground material. Pain is all throughout his stomach. Reports that he does not feel any better than when he came in, in spite of IV pain and nausea medication in ER. Denies fevers, chills, diarrhea, constipation, chest pain, SOB. Past Medical History/Comorbidities:   Mr. Brooklyn Gao  has a past medical history of Arthritis; CAD (coronary artery disease); CHF (congestive heart failure) ([facility] [address] Chronic alcoholism ([facility] [address]  Chronic back pain; Chronic neck pain; Chronic systolic heart failure ([address] (4/21/2011); Depression; Dizziness - light-headed; GERD (gastroesophageal reflux disease); Gynecomastia (2016); Heart failure ([address] History of implantable cardioverter-defibrillator (ICD) placement (2016); Hypertension; Psychiatric disorder; Schatzki's ring (07/10/2018); Situational depression (2016); and Ventricular tachycardia ([address] (2016). Mr. Long Moore  has a past surgical history that includes hx heart catheterization (9/21/10) and hx pacemaker. Social History/Living Environment:   Home Environment: Private residence  # Steps to Enter: 0  One/Two Story Residence: One story  Living Alone: No  Support Systems: Child(charlene)  Patient Expects to be Discharged to[de-identified] Private residence  Current DME Used/Available at Home: None  Tub or Shower Type: Tub/Shower combination  Prior Level of Function/Work/Activity:  [place] with ambulation. Number of Personal Factors/Comorbidities that affect the Plan of Care: 0: LOW COMPLEXITY   EXAMINATION:   Most Recent Physical Functioning:   Gross Assessment:  AROM: Generally decreased, functional  Strength: Generally decreased, functional  Coordination: Generally decreased, functional               Posture:  Posture (WDL): Within defined limits  Balance:  Sitting: Intact  Standing: Pull to stand; With support Bed Mobility:     Wheelchair Mobility:     Transfers:  Sit to Stand: Supervision  Stand to Sit: Supervision  Gait:     Gait Abnormalities: Antalgic  Distance (ft): 45 Feet (ft)  Ambulation - Level of Assistance: Stand-by assistance      Body Structures Involved:  1. Bones  2. Joints  3. Muscles  4. Ligaments Body Functions Affected:  1. Neuromusculoskeletal  2. Movement Related Activities and Participation Affected:  1.  Mobility   Number of elements that affect the Plan of Care: 4+: HIGH COMPLEXITY   CLINICAL PRESENTATION:   Presentation: Stable and uncomplicated: LOW COMPLEXITY   CLINICAL DECISION MAKIN [address] Short Form  How much difficulty does the patient currently have. .. Unable A Lot A Little None   1. Turning over in bed (including adjusting bedclothes, sheets and blankets)? [] 1   [] 2   [] 3   [x] 4   2. Sitting down on and standing up from a chair with arms ( e.g., wheelchair, bedside commode, etc.)   [] 1   [] 2   [] 3   [x] 4   3. Moving from lying on back to sitting on the side of the bed? [] 1   [] 2   [] 3   [x] 4   How much help from another person does the patient currently need. .. Total A Lot A Little None   4. Moving to and from a bed to a chair (including a wheelchair)? [] 1   [] 2   [] 3   [x] 4   5. Need to walk in hospital room? [] 1   [] 2   [] 3   [x] 4   6. Climbing 3-5 steps with a railing? [] 1   [] 2   [x] 3   [] 4   © 2007, Trustees of [facility] [employer], under license to [employer]. All rights reserved      Score:  Initial: 23 Most Recent: X (Date: -- )    Interpretation of Tool:  Represents activities that are increasingly more difficult (i.e. Bed mobility, Transfers, Gait). Score 24 23 22-20 19-15 14-10 9-7 6     Modifier CH CI CJ CK CL CM CN      ? Mobility - Walking and Moving Around:     - CURRENT STATUS: CI - 1%-19% impaired, limited or restricted    - GOAL STATUS: CI - 1%-19% impaired, limited or restricted    - D/C STATUS:  CI - 1%-19% impaired, limited or restricted  Payor: BLUE CROSS / [place] / Product Type: PPO /      Medical Necessity:     · Skilled intervention continues to be required due to decreased mobility ability. Reason for Services/Other Comments:  · Patient continues to require skilled intervention due to decreased mobility ability.    Use of outcome tool(s) and clinical judgement create a POC that gives a: Clear prediction of patient's progress: LOW COMPLEXITY            TREATMENT:   (In addition to Assessment/Re-Assessment sessions the following treatments were rendered)   Pre-treatment Symptoms/Complaints:  No complaints. Pain: Initial:   Pain Intensity 1: 8  Pain Location 1: Foot, Leg  Pain Orientation 1: Left, Right  Post Session:  No complaints. Gait Training (  15 minutes):  Gait training to improve and/or restore physical functioning as related to mobility. Ambulated 45 Feet (ft) with Stand-by assistance   Therapeutic Activity: (    8 minutes): Therapeutic activities including bed transfers and ambulation on level ground to improve mobility. Braces/Orthotics/Lines/Etc:   · O2 Device: Room air  Treatment/Session Assessment:    · Response to Treatment:  Pt asks, \"Why did you come so early (9am)? \" Pt appeared ready to decline therapy, then states, \"I want to walk. \" Pt agreeable to ambulate in room. · Interdisciplinary Collaboration:   o Physical Therapist  o Occupational Therapist  o Registered Nurse  o Rehabilitation Attendant  · After treatment position/precautions:   o Supine in bed  o Bed/Chair-wheels locked  o Bed in low position  o Call light within reach  o RN notified   · Compliance with Program/Exercises: compliant most of the time. · Recommendations/Intent for next treatment session: \"Next visit will focus on advancements to more challenging activities and reduction in assistance provided\".   Total Treatment Duration:  PT Patient Time In/Time Out  Time In: 0900  Time Out: [facility], PT

## 2018-07-11 NOTE — PROGRESS NOTES
Shift assessment complete. Pt in bed at this time. IV clean, dry, intact, infusing per orders. BP elevated. Pt notes weakness to BLE. Pt aware that discharge tomorrow is likely. Bed low and locked, call light within reach. Encouraged to call for any needs. Will continue to monitor. No complaints/concerns at this time.

## 2018-07-11 NOTE — PROGRESS NOTES
Problem: Interdisciplinary Rounds  Goal: Interdisciplinary Rounds  Interdisciplinary team rounds were held 7/11/2018 with the following team members:Care Management, Nursing, Pharmacy, Physical Therapy and Physician and the patient. Plan of care discussed. See clinical pathway and/or care plan for interventions and desired outcomes.

## 2018-07-11 NOTE — PROGRESS NOTES
Problem: Falls - Risk of  Goal: *Absence of Falls  Document Chery Fall Risk and appropriate interventions in the flowsheet.    Outcome: Progressing Towards Goal  Fall Risk Interventions:  Mobility Interventions: Patient to call before getting OOB, PT Consult for mobility concerns         Medication Interventions: Patient to call before getting OOB, Teach patient to arise slowly

## 2018-07-12 VITALS
SYSTOLIC BLOOD PRESSURE: 139 MMHG | HEART RATE: 97 BPM | HEIGHT: 71 IN | OXYGEN SATURATION: 99 % | DIASTOLIC BLOOD PRESSURE: 92 MMHG | RESPIRATION RATE: 20 BRPM | BODY MASS INDEX: 22.96 KG/M2 | TEMPERATURE: 98.3 F | WEIGHT: 164.02 LBS

## 2018-07-12 PROCEDURE — 74011250636 HC RX REV CODE- 250/636: Performed by: FAMILY MEDICINE

## 2018-07-12 PROCEDURE — 74011000250 HC RX REV CODE- 250: Performed by: FAMILY MEDICINE

## 2018-07-12 PROCEDURE — 74011250637 HC RX REV CODE- 250/637: Performed by: FAMILY MEDICINE

## 2018-07-12 PROCEDURE — 74011250637 HC RX REV CODE- 250/637: Performed by: INTERNAL MEDICINE

## 2018-07-12 RX ORDER — PROMETHAZINE HYDROCHLORIDE 12.5 MG/1
12.5 TABLET ORAL
Qty: 30 TAB | Refills: 0 | Status: SHIPPED | OUTPATIENT
Start: 2018-07-12 | End: 2018-07-17 | Stop reason: SDUPTHER

## 2018-07-12 RX ORDER — HYDROCODONE BITARTRATE AND ACETAMINOPHEN 10; 325 MG/1; MG/1
TABLET ORAL
Qty: 30 TAB | Refills: 0 | Status: SHIPPED | OUTPATIENT
Start: 2018-07-12 | End: 2018-07-17 | Stop reason: SDUPTHER

## 2018-07-12 RX ORDER — METOCLOPRAMIDE 10 MG/1
10 TABLET ORAL
Qty: 40 TAB | Refills: 0 | Status: SHIPPED | OUTPATIENT
Start: 2018-07-12 | End: 2018-07-17 | Stop reason: SDUPTHER

## 2018-07-12 RX ORDER — PROMETHAZINE HYDROCHLORIDE 25 MG/1
12.5 TABLET ORAL
Status: DISCONTINUED | OUTPATIENT
Start: 2018-07-12 | End: 2018-07-12 | Stop reason: HOSPADM

## 2018-07-12 RX ADMIN — GABAPENTIN 300 MG: 300 CAPSULE ORAL at 08:00

## 2018-07-12 RX ADMIN — Medication 400 MG: at 08:00

## 2018-07-12 RX ADMIN — PROMETHAZINE HYDROCHLORIDE 12.5 MG: 25 INJECTION INTRAMUSCULAR; INTRAVENOUS at 08:00

## 2018-07-12 RX ADMIN — LOSARTAN POTASSIUM 25 MG: 25 TABLET ORAL at 08:00

## 2018-07-12 RX ADMIN — PROMETHAZINE HYDROCHLORIDE 12.5 MG: 25 INJECTION INTRAMUSCULAR; INTRAVENOUS at 04:12

## 2018-07-12 RX ADMIN — PANTOPRAZOLE SODIUM 40 MG: 40 TABLET, DELAYED RELEASE ORAL at 08:00

## 2018-07-12 RX ADMIN — CARVEDILOL 6.25 MG: 6.25 TABLET, FILM COATED ORAL at 08:00

## 2018-07-12 RX ADMIN — HYDROCODONE BITARTRATE AND ACETAMINOPHEN 1 TABLET: 10; 325 TABLET ORAL at 08:00

## 2018-07-12 RX ADMIN — ATORVASTATIN CALCIUM 80 MG: 40 TABLET, FILM COATED ORAL at 08:00

## 2018-07-12 RX ADMIN — METOCLOPRAMIDE HYDROCHLORIDE 10 MG: 10 TABLET ORAL at 08:00

## 2018-07-12 RX ADMIN — HYDROCODONE BITARTRATE AND ACETAMINOPHEN 1 TABLET: 10; 325 TABLET ORAL at 04:12

## 2018-07-12 RX ADMIN — Medication 5 ML: at 05:42

## 2018-07-12 NOTE — PROGRESS NOTES
Problem: Falls - Risk of  Goal: *Absence of Falls  Document Chery Fall Risk and appropriate interventions in the flowsheet.    Outcome: Progressing Towards Goal  Fall Risk Interventions:  Mobility Interventions: Patient to call before getting OOB         Medication Interventions: Patient to call before getting OOB

## 2018-07-12 NOTE — PROGRESS NOTES
AM assessment completed. Pt alert/oriented x4. Resting in bed quietly. Respirations even and unlabored, lung sounds clear bilaterally on RA. Pt reports some nausea. Tolerating regular diet. Voiding w/o difficulty. Pt instructed not to get OOB without help. All safety measures in place.

## 2018-07-12 NOTE — PROGRESS NOTES
Pt discharged home. Received new prescriptions and verbalized understanding of discharge instructions. Aware to call when ready to leave. No distress noted at this time.

## 2018-07-12 NOTE — DISCHARGE INSTRUCTIONS
DISCHARGE SUMMARY from Nurse    PATIENT INSTRUCTIONS:    After general anesthesia or intravenous sedation, for 24 hours or while taking prescription Narcotics:  · Limit your activities  · Do not drive and operate hazardous machinery  · Do not make important personal or business decisions  · Do  not drink alcoholic beverages  · If you have not urinated within 8 hours after discharge, please contact your surgeon on call. Report the following to your surgeon:  · Excessive pain, swelling, redness or odor of or around the surgical area  · Temperature over 100.5  · Nausea and vomiting lasting longer than 4 hours or if unable to take medications  · Any signs of decreased circulation or nerve impairment to extremity: change in color, persistent  numbness, tingling, coldness or increase pain  · Any questions    What to do at Home:  Recommended activity: Activity as tolerated, and as told by your doctor. If you experience any of the following symptoms listed below, please follow up with your doctor. *  Please give a list of your current medications to your Primary Care Provider. *  Please update this list whenever your medications are discontinued, doses are      changed, or new medications (including over-the-counter products) are added. *  Please carry medication information at all times in case of emergency situations. These are general instructions for a healthy lifestyle:    No smoking/ No tobacco products/ Avoid exposure to second hand smoke  Surgeon General's Warning:  Quitting smoking now greatly reduces serious risk to your health.     Obesity, smoking, and sedentary lifestyle greatly increases your risk for illness    A healthy diet, regular physical exercise & weight monitoring are important for maintaining a healthy lifestyle    You may be retaining fluid if you have a history of heart failure or if you experience any of the following symptoms:  Weight gain of 3 pounds or more overnight or 5 pounds in a week, increased swelling in our hands or feet or shortness of breath while lying flat in bed. Please call your doctor as soon as you notice any of these symptoms; do not wait until your next office visit. Recognize signs and symptoms of STROKE:    F-face looks uneven    A-arms unable to move or move unevenly    S-speech slurred or non-existent    T-time-call 911 as soon as signs and symptoms begin-DO NOT go       Back to bed or wait to see if you get better-TIME IS BRAIN. Warning Signs of HEART ATTACK     Call 911 if you have these symptoms:   Chest discomfort. Most heart attacks involve discomfort in the center of the chest that lasts more than a few minutes, or that goes away and comes back. It can feel like uncomfortable pressure, squeezing, fullness, or pain.  Discomfort in other areas of the upper body. Symptoms can include pain or discomfort in one or both arms, the back, neck, jaw, or stomach.  Shortness of breath with or without chest discomfort.  Other signs may include breaking out in a cold sweat, nausea, or lightheadedness. Don't wait more than five minutes to call 911 - MINUTES MATTER! Fast action can save your life. Calling 911 is almost always the fastest way to get lifesaving treatment. Emergency Medical Services staff can begin treatment when they arrive -- up to an hour sooner than if someone gets to the hospital by car. The discharge information has been reviewed with the patient. The patient verbalized understanding. Discharge medications reviewed with the patient     Hernia: Care Instructions  Your Care Instructions    A hernia develops when tissue bulges through a weak spot in the wall of your belly. The groin area and the navel are common areas for a hernia. A hernia can also develop near the area of a surgery you had before. Pressure from lifting, straining, or coughing can tear the weak area, causing the hernia to bulge and be painful.   If you cannot push a hernia back into place, the tissue may become trapped outside the belly wall. If the hernia gets twisted and loses its blood supply, it will swell and die. This is called a strangulated hernia. It usually causes a lot of pain. It needs treatment right away. Some hernias need to be repaired to prevent a strangulated hernia. If your hernia causes symptoms or is large, you may need surgery. Follow-up care is a key part of your treatment and safety. Be sure to make and go to all appointments, and call your doctor if you are having problems. It's also a good idea to know your test results and keep a list of the medicines you take. How can you care for yourself at home? · Take care when lifting heavy objects. · Stay at a healthy weight. · Do not smoke. Smoking can cause coughing, which can cause your hernia to bulge. If you need help quitting, talk to your doctor about stop-smoking programs and medicines. These can increase your chances of quitting for good. · Talk with your doctor before wearing a corset or truss for a hernia. These devices are not recommended for treating hernias and sometimes can do more harm than good. There may be certain situations when your doctor thinks a truss would work, but these are rare. When should you call for help? Call your doctor now or seek immediate medical care if:    · You have new or worse belly pain.     · You are vomiting.     · You cannot pass stools or gas.     · You cannot push the hernia back into place with gentle pressure when you are lying down.     · The area over the hernia turns red or becomes tender.    Watch closely for changes in your health, and be sure to contact your doctor if you have any problems. Where can you learn more? Go to http://edmundo-isi.info/. Enter C129 in the search box to learn more about \"Hernia: Care Instructions. \"  Current as of: May 12, 2017  Content Version: 11.7  © 0298-1961 [employer], [facility].  Care instructions adapted under license by [employer] (which disclaims liability or warranty for this information). If you have questions about a medical condition or this instruction, always ask your healthcare professional. [address] any warranty or liability for your use of this information. and appropriate educational materials and side effects teaching were provided.   ___________________________________________________________________________________________________________________________________

## 2018-07-12 NOTE — PROGRESS NOTES
Attempted PT with patient this am.  Patient, however, declined stating he was going home. Suggested he work with PT to make sure he felt comfortable at home but he again declined stating he was fine. Patient did ask to speak with the SW and request relayed to [facility]. Patient has complained about being weak and difficulty walking because of neuropathy in his feet. He, however, has declined PT twice in the past 3 days and also declined a walker when SW asked if he would like for her to order one. Did ask patient if he would be willing to work with PT at home and he did agree to that.       Annalisa Artis, PT

## 2018-07-12 NOTE — DISCHARGE SUMMARY
Hospitalist Discharge Summary     Admit Date:  2018  1:22 AM   Name:  Crescencio Kennedy   Age:  48 y.o.  :  1965   MRN:  [id number]   PCP:  Arina Garg MD  Treatment Team: Attending Provider: Ramana Yanez MD; Utilization Review: Casandra Albrecht RN    Problem List for this Hospitalization:  Hospital Problems as of 2018  Date Reviewed: 3/15/2018          Codes Class Noted - Resolved POA    Elevated lactic acid level ICD-10-CM: R79.89  ICD-9-CM: 276.2  2018 - Present Yes        Macrocytic anemia ICD-10-CM: D53.9  ICD-9-CM: 281.9  2018 - Present Yes        High anion gap metabolic acidosis UUN-57-AH: E87.2  ICD-9-CM: 276.2  2018 - Present Yes        Elevated bilirubin ICD-10-CM: R17  ICD-9-CM: 277.4  2018 - Present Yes        CAD (coronary artery disease) (Chronic) ICD-10-CM: I25.10  ICD-9-CM: 414.00  3/18/2017 - Present Yes    Overview Signed 3/18/2017 11:27 AM by Ulises Aguero NP     Non-obstructive             Chronic back pain (Chronic) ICD-10-CM: M54.9, G89.29  ICD-9-CM: 724.5, 338.29  3/18/2017 - Present Yes        Elevated transaminase level ICD-10-CM: R74.0  ICD-9-CM: 790.4  3/14/2017 - Present Yes        * (Principal)Nausea & vomiting (Chronic) ICD-10-CM: R11.2  ICD-9-CM: 787.01  2016 - Present Yes        HTN (hypertension) (Chronic) ICD-10-CM: I10  ICD-9-CM: 401.9  2011 - Present Yes        Chronic systolic heart failure (HCC) (Chronic) ICD-10-CM: I50.22  ICD-9-CM: 428.22  2011 - Present Yes                Admission HPI from 2018:    Crescencio Kennedy is a 48 y.o. male with a past medical history of CAD, chronic alcoholism (per chart, denied by patient), GERD, sCHF who presents to the ER with complaint of nausea, vomiting, and abdominal pain that started last night around 7 pm. He reports that it started suddenly, and since he has vomited multiple times.  Reports that his vomitus has had some dark red material but no bright red blood or coffee ground material. Pain is all throughout his stomach. Reports that he does not feel any better than when he came in, in spite of IV pain and nausea medication in ER. Denies fevers, chills, diarrhea, constipation, chest pain, SOB. Labs revealed elev liver enzymes:  ALT 84, , AlkP 148 TB 1.3, Lipase 121. WBC nml. Pt was seen in consult by GI.  US Abd revealed mild gallbladder distension w CBD 5.6 mm, and biliary sludge. GI not convinced pt had acute cholecysitis maxime in absence of fever of leukocytosis. Pt underwent EGD showing: Schatzki's ring which was dilated, mod hiatal hernia and mild diffuse gastropathy. Pt was offered a trial of reglan. Pt improved but did not want to continue reglan, instead asked for phenergan as he states this has worked well in past.    Pts symptoms did improve. Pt stable for dc. His liver enzymes improved with ALT 47, AST 73, AlkP 89. Plan at this point is to continue medical management and if sx recur or persist then consider MRCP. Pt to follow up with GI as outpt. Stable for dc. Hospital Course: Follow up instructions below. Plan was discussed with the patient. All questions answered. Patient was stable at time of discharge and was instructed to call or return if there are any concerns or recurrence of symptoms. Diagnostic Imaging/Tests:   Xr Abd Acute W 1 V Chest    Result Date: 7/8/2018  Chest and abdomen 3 views Exam date: 7/8/2018 CLINICAL INFORMATION: Intractable nausea and vomiting PA view of the chest. There is a dual-lead pacemaker. Heart normal size. Hiatal hernia is present posterior to the heart, mediastinum otherwise unremarkable. Pulmonary vascularity appears normal. Lungs are clear. No pleural effusion. No free air under the diaphragm. 2 views the abdomen show a nonspecific gas pattern and no abnormal calcification.      IMPRESSION: No acute abnormality    Ct Abd Pelv W Cont    Result Date: 7/8/2018  CT abdomen and pelvis dated 7/8/2018 CLINICAL INFORMATION: Abdominal pain and vomiting Spiral images the abdomen and pelvis were obtained during intravenous injection of one or cc Isovue 370 intravenous contrast. This study was requested, and performed no oral contrast. CT abdomen Upper images show a moderate size hiatal hernia. Spleen is unremarkable. There is diffuse fatty infiltration of the liver. The gallbladder appears mildly distended. No calcified stones are seen. Pancreas is normal. Adrenals are normal. Kidneys are normal in size and unremarkable except for a few small subcentimeter cysts. No hydronephrosis. The abdominal aorta is normal in size. No adenopathy. CT PELVIS: No mass, adenopathy or abnormal fluid collection. Bowel loops have a grossly normal appearance in the abdomen and pelvis. There is no evidence of bowel obstruction. Appendix not definitely identified. There is no infiltration in the pericecal fat. IMPRESSION: 1. Fatty infiltration liver. 2. Mild distention of the gallbladder. No calcified stones. [address] [facility]    Result Date: 7/8/2018  Right upper quadrant ultrasound dated 7/8/2018 CLINICAL INFORMATION: Elevated liver function test. Abdominal pain and nausea Liver is normal in size, 17 cm in height. Echogenicity is coarsened suggesting fatty infiltration. Doppler flow in the main portal vein is hepatopedal. Common bile duct is normal, 5.6 mm. Gallbladder appears distended. Wall does not appear thickened. Nons had owing sludge is noted. No shadowing stones are seen. Pancreas poorly visualized. Right kidney is normal size and echogenicity. No hydronephrosis. Proximal abdominal aorta is normal in size, 2.6 cm. IVC patent. IMPRESSION: 1. Fatty infiltration liver. 2. Mild gallbladder distention. Biliary sludge present      Echocardiogram results:  No results found for this visit on 07/08/18.       All Micro Results     None          Labs: Results:       BMP, Mg, Phos Recent Labs      07/11/18   0644  07/10/18 7634   NA  144  146*   K  3.0*  3.6   CL  112*  112*   CO2  24  24   AGAP  8  10   BUN  2*  3*   CREA  0.94  0.75*   CA  7.3*  8.2*   GLU  130*  91      CBC Recent Labs      07/10/18   0613   WBC  2.5*   RBC  3.20*   HGB  10.7*   HCT  33.2*   PLT  118*   GRANS  72   LYMPH  18   EOS  1   MONOS  9   BASOS  0   IG  0   ANEU  1.8   ABL  0.5   ANJU  0.0   ABM  0.2   ABB  0.0   AIG  0.0      LFT Recent Labs      07/11/18   0644  07/10/18   0613   SGOT  73*  96*   ALT  47  56   AP  89  108   TP  4.8*  5.5*   ALB  2.5*  2.9*   GLOB  2.3  2.6   AGRAT  1.1*  1.1*      Cardiac Testing Lab Results   Component Value Date/Time     07/08/2018 03:48 AM    BNP 64 03/22/2017 05:50 AM     03/20/2017 04:30 AM     01/05/2017 03:51 AM    BNP 27 10/13/2016 08:10 PM    BNP 69 03/29/2016 05:20 AM     07/09/2018 06:02 AM    Troponin-I, Qt. 0.11 () 02/13/2018 11:08 AM    Troponin-I, Qt. 0.70 () 03/18/2017 11:25 AM    Troponin-I, Qt. 1.88 () 03/14/2017 07:32 AM      Coagulation Tests Lab Results   Component Value Date/Time    Prothrombin time 10.2 01/27/2014 01:40 AM    Prothrombin time 10.7 11/28/2011 06:20 PM    Prothrombin time 10.8 (L) 04/06/2011 09:10 AM    INR 1.0 01/27/2014 01:40 AM    INR 1.1 11/28/2011 06:20 PM    INR 0.9 04/06/2011 09:10 AM    aPTT 24.0 03/17/2017 05:14 AM    aPTT 69.3 (H) 03/16/2017 03:40 AM    aPTT 75.3 (H) 03/15/2017 07:37 PM      A1c Lab Results   Component Value Date/Time    Hemoglobin A1c 4.7 (L) 12/06/2017 12:36 PM      Lipid Panel Lab Results   Component Value Date/Time    Cholesterol, total 217 (H) 12/06/2017 12:36 PM    HDL Cholesterol 131 12/06/2017 12:36 PM    LDL,Direct 71 03/25/2016 04:35 AM    LDL, calculated 71 12/06/2017 12:36 PM    VLDL, calculated 15 12/06/2017 12:36 PM    Triglyceride 75 12/06/2017 12:36 PM    CHOL/HDL Ratio 2.8 03/25/2016 04:35 AM      Thyroid Panel Lab Results   Component Value Date/Time    TSH 1.010 12/06/2017 12:36 PM    TSH 0.901 03/18/2017 11:25 AM        Most Recent UA Lab Results   Component Value Date/Time    Color YELLOW 03/28/2016 04:00 PM    Appearance CLEAR 03/28/2016 04:00 PM    Specific gravity 1.003 03/28/2016 04:00 PM    pH (UA) 5.5 03/28/2016 04:00 PM    Protein NEGATIVE  03/28/2016 04:00 PM    Glucose NEGATIVE  03/28/2016 04:00 PM    Ketone NEGATIVE  03/28/2016 04:00 PM    Bilirubin NEGATIVE  03/28/2016 04:00 PM    Blood NEGATIVE  03/28/2016 04:00 PM    Urobilinogen 0.2 03/28/2016 04:00 PM    Nitrites NEGATIVE  03/28/2016 04:00 PM    Leukocyte Esterase NEGATIVE  03/28/2016 04:00 PM        No Known Allergies  Immunization History   Administered Date(s) Administered    TB Skin Test (PPD) Intradermal 03/14/2017       All Labs from Last 24 Hrs:  No results found for this or any previous visit (from the past 24 hour(s)). Discharge Exam:  Patient Vitals for the past 24 hrs:   Temp Pulse Resp BP SpO2   07/12/18 0409 97.4 °F (36.3 °C) 94 18 (!) 136/91 98 %   07/12/18 0053 97.2 °F (36.2 °C) 94 18 (!) 144/92 99 %   07/11/18 2120 96 °F (35.6 °C) 93 18 (!) 153/101 100 %   07/11/18 1627 98.7 °F (37.1 °C) 99 18 130/90 99 %   07/11/18 1200 97.9 °F (36.6 °C) 94 17 124/87 100 %     Oxygen Therapy  O2 Sat (%): 98 % (07/12/18 0409)  Pulse via Oximetry: 84 beats per minute (07/11/18 0805)  O2 Device: Room air (07/11/18 0805)  O2 Flow Rate (L/min): 3 l/min (07/10/18 1253)    Intake/Output Summary (Last 24 hours) at 07/12/18 0831  Last data filed at 07/12/18 0409   Gross per 24 hour   Intake                0 ml   Output             1100 ml   Net            -1100 ml       General:    Well nourished. Alert. No distress. Eyes:   Normal sclera. Extraocular movements intact. ENT:  Normocephalic, atraumatic. Moist mucous membranes  CV:   Regular rate and rhythm. No murmur, rub, or gallop. Lungs:  Clear to auscultation bilaterally. No wheezing, rhonchi, or rales. Abdomen: Soft mild epig ttp (improved per pt), nondistended.  Bowel sounds normal. Extremities: Warm and dry. No cyanosis or edema. Neurologic: CN II-XII grossly intact. Sensation intact. Skin:     No rashes or jaundice. Psych:  Normal mood and affect. Discharge Info:   Current Discharge Medication List            Disposition: home    Activity: Activity as tolerated  Diet: DIET REGULAR    No orders of the defined types were placed in this encounter. Follow-up Information     None          Time spent in patient discharge planning and coordination 35 minutes. Signed:  Randi Hernandez MD     N/V abd pain felt due to gastropathy, poss gastroparesis.

## 2018-07-13 ENCOUNTER — PATIENT OUTREACH (OUTPATIENT)
Dept: CASE MANAGEMENT | Age: 53
End: 2018-07-13

## 2018-07-13 NOTE — PROGRESS NOTES
Transition of Care Discharge Follow-up Questionnaire   Date/Time of Call:   July 13, 2018 12:27PM   What was the patient hospitalized for? Nausea and Vomiting       Does the patient understand his/her diagnosis and/or treatment and what happened during the hospitalization? Care Coordinator spoke with patient who agreed to Transitions of Care Outreach Call. Patient states understanding of diagnosis and treatment plan during hospitalization. Did the patient receive discharge instructions? Yes   CM Assessed Risk for Readmission:           Patient stated Risk for Readmission:      Low to Moderate risk for readmit related to complications from Nausea and vomiting  and other comorbidities. Patient states he is still vomiting off and on. Review any discharge instructions (see discharge instructions/AVS in [facility]). Ask patient if they understand these. Do they have any questions? Patient states understanding of discharge instructions, patient states no questions. Were home services ordered (nursing, PT, OT, ST, etc.)? No home health services ordered. If so, has the first visit occurred? If not, why? (Assist with coordination of services if necessary. )   NA   Was any DME ordered? No durable medical equipment ordered. If so, has it been received? If not, why?  (Assist patient in obtaining DME orders &/or equipment if necessary. ) NA         Complete a review of all medications (new, continued and discontinued meds per the D/C instructions and medication tab in [facility]). Care Coordinator reviewed all medications with patient per [facility] who verbalized understanding. Were all new prescriptions filled?   If not, why?  (Assist patient in obtaining medications if necessary  escalate for CCM &/or SW if ongoing issues are verbalized by pt or anticipated)   Yes, patient states all new prescriptions filled and being taken per doctor's order         Does the patient understand the purpose and dosing instructions for all medications? (If patient has questions, provide explanation and education.)   Patient states understanding of current medications and dosing instructions. Care Coordinator educated patient on the importance of medication compliance and reporting medication side effects to PCP/Specialist.      Does the patient have any problems in performing ADLs? (If patient is unable to perform ADLs  what is the limiting factor(s)? Do they have a support system that can assist? If no support system is present, discuss possible assistance that they may be able to obtain. Escalate for CCM/SW if ongoing issues are verbalized by pt or anticipated)   Patient states he is independent with ADL's and ambulation. Patient states he is feeling okay and states he continues to vomit off and on despite taking po medication to stop nausea and vomiting. Care Coordinator advised patient to call PCP office with concern. Patient states he call if vomiting becomes worst.               Does the patient have all follow-up appointments scheduled? 7 day f/up with PCP?   (f/up with PCP may be w/in 14 days if patient has a f/up with their specialist w/in 7 days)    7-14 day f/up with specialist?   (or per discharge instructions)    If f/up has not been made  what actions has the care coordinator made to accomplish this? Has transportation been arranged? Yes      7/16/2018 10:40 AM MD India Farmer  Coordinator informed patient per discharge instructions to be expecting call from [facility] with appointment date and time. Yes   Any other questions or concerns expressed by the patient? No further needs identified: Patient instructed to call Care Coordinator if further questions or concerns arise   Schedule next appointment with DEACON Hankins or refer to RN Case Manager/ per the workflow guidelines.       When is care coordinators next follow-up call scheduled? If referred for CCM  what RN care manager was the referral assigned? NA          Care Coordinator provided contact information if assistance is needed for concerns or questions. NA   SYDNEE Call Completed By: FOREIGN Grimes ACO  Care Coordinator         This note will not be viewable in [address]

## 2018-08-15 ENCOUNTER — APPOINTMENT (OUTPATIENT)
Dept: ULTRASOUND IMAGING | Age: 53
End: 2018-08-15
Attending: EMERGENCY MEDICINE
Payer: COMMERCIAL

## 2018-08-15 ENCOUNTER — HOSPITAL ENCOUNTER (EMERGENCY)
Age: 53
Discharge: HOME | End: 2018-08-15
Attending: EMERGENCY MEDICINE
Payer: COMMERCIAL

## 2018-08-15 ENCOUNTER — APPOINTMENT (OUTPATIENT)
Dept: GENERAL RADIOLOGY | Age: 53
End: 2018-08-15
Attending: EMERGENCY MEDICINE
Payer: COMMERCIAL

## 2018-08-15 VITALS
WEIGHT: 165 LBS | HEIGHT: 71 IN | BODY MASS INDEX: 23.1 KG/M2 | OXYGEN SATURATION: 99 % | TEMPERATURE: 98.2 F | DIASTOLIC BLOOD PRESSURE: 97 MMHG | SYSTOLIC BLOOD PRESSURE: 153 MMHG | HEART RATE: 82 BPM | RESPIRATION RATE: 18 BRPM

## 2018-08-15 DIAGNOSIS — R07.89 ATYPICAL CHEST PAIN: ICD-10-CM

## 2018-08-15 DIAGNOSIS — E83.42 HYPOMAGNESEMIA: ICD-10-CM

## 2018-08-15 DIAGNOSIS — R10.9 ACUTE ABDOMINAL PAIN: Primary | ICD-10-CM

## 2018-08-15 LAB
ALBUMIN SERPL-MCNC: 3.6 G/DL (ref 3.5–5)
ALBUMIN/GLOB SERPL: 1.1 {RATIO} (ref 1.2–3.5)
ALP SERPL-CCNC: 82 U/L (ref 50–136)
ALT SERPL-CCNC: 29 U/L (ref 12–65)
AMPHET UR QL SCN: NEGATIVE
ANION GAP SERPL CALC-SCNC: 18 MMOL/L (ref 7–16)
AST SERPL-CCNC: 148 U/L (ref 15–37)
BARBITURATES UR QL SCN: NEGATIVE
BASOPHILS # BLD: 0 K/UL
BASOPHILS NFR BLD: 1 % (ref 0–2)
BENZODIAZ UR QL: NEGATIVE
BILIRUB SERPL-MCNC: 0.8 MG/DL (ref 0.2–1.1)
BNP SERPL-MCNC: 76 PG/ML
BUN SERPL-MCNC: 8 MG/DL (ref 6–23)
CALCIUM SERPL-MCNC: 7.1 MG/DL (ref 8.3–10.4)
CANNABINOIDS UR QL SCN: NEGATIVE
CHLORIDE SERPL-SCNC: 101 MMOL/L (ref 98–107)
CO2 SERPL-SCNC: 24 MMOL/L (ref 21–32)
COCAINE UR QL SCN: NEGATIVE
CREAT SERPL-MCNC: 0.73 MG/DL (ref 0.8–1.5)
DIFFERENTIAL METHOD BLD: ABNORMAL
EOSINOPHIL # BLD: 0 K/UL
EOSINOPHIL NFR BLD: 0 % (ref 0.5–7.8)
ERYTHROCYTE [DISTWIDTH] IN BLOOD BY AUTOMATED COUNT: 13.7 %
ETHANOL SERPL-MCNC: 284 MG/DL
GLOBULIN SER CALC-MCNC: 3.2 G/DL (ref 2.3–3.5)
GLUCOSE SERPL-MCNC: 104 MG/DL (ref 65–100)
HCT VFR BLD AUTO: 32 % (ref 41.1–50.3)
HGB BLD-MCNC: 11 G/DL (ref 13.6–17.2)
IMM GRANULOCYTES # BLD: 0 K/UL
IMM GRANULOCYTES NFR BLD AUTO: 0 % (ref 0–5)
LIPASE SERPL-CCNC: 129 U/L (ref 73–393)
LIPASE SERPL-CCNC: 91 U/L (ref 73–393)
LYMPHOCYTES # BLD: 1 K/UL
LYMPHOCYTES NFR BLD: 37 % (ref 13–44)
MAGNESIUM SERPL-MCNC: 1 MG/DL (ref 1.8–2.4)
MCH RBC QN AUTO: 33.1 PG (ref 26.1–32.9)
MCHC RBC AUTO-ENTMCNC: 34.4 G/DL (ref 31.4–35)
MCV RBC AUTO: 96.4 FL (ref 79.6–97.8)
METHADONE UR QL: NEGATIVE
MONOCYTES # BLD: 0.3 K/UL
MONOCYTES NFR BLD: 9 % (ref 4–12)
NEUTS SEG # BLD: 1.4 K/UL
NEUTS SEG NFR BLD: 52 % (ref 43–78)
NRBC # BLD: 0 K/UL (ref 0–0.2)
OPIATES UR QL: POSITIVE
PCP UR QL: NEGATIVE
PHOSPHATE SERPL-MCNC: 4 MG/DL (ref 2.5–4.5)
PLATELET # BLD AUTO: 209 K/UL (ref 150–450)
PMV BLD AUTO: 8.7 FL (ref 9.4–12.3)
POTASSIUM SERPL-SCNC: 2.7 MMOL/L (ref 3.5–5.1)
PROT SERPL-MCNC: 6.8 G/DL (ref 6.3–8.2)
RBC # BLD AUTO: 3.32 M/UL (ref 4.23–5.6)
SODIUM SERPL-SCNC: 143 MMOL/L (ref 136–145)
TROPONIN I SERPL-MCNC: 0.07 NG/ML (ref 0.02–0.05)
TROPONIN I SERPL-MCNC: 0.08 NG/ML (ref 0.02–0.05)
WBC # BLD AUTO: 2.8 K/UL (ref 4.3–11.1)

## 2018-08-15 PROCEDURE — 80307 DRUG TEST PRSMV CHEM ANLYZR: CPT

## 2018-08-15 PROCEDURE — 74011250636 HC RX REV CODE- 250/636: Performed by: EMERGENCY MEDICINE

## 2018-08-15 PROCEDURE — 83690 ASSAY OF LIPASE: CPT

## 2018-08-15 PROCEDURE — 85025 COMPLETE CBC W/AUTO DIFF WBC: CPT

## 2018-08-15 PROCEDURE — 84100 ASSAY OF PHOSPHORUS: CPT

## 2018-08-15 PROCEDURE — 96365 THER/PROPH/DIAG IV INF INIT: CPT | Performed by: EMERGENCY MEDICINE

## 2018-08-15 PROCEDURE — 71046 X-RAY EXAM CHEST 2 VIEWS: CPT

## 2018-08-15 PROCEDURE — 83880 ASSAY OF NATRIURETIC PEPTIDE: CPT

## 2018-08-15 PROCEDURE — 84484 ASSAY OF TROPONIN QUANT: CPT

## 2018-08-15 PROCEDURE — 93005 ELECTROCARDIOGRAM TRACING: CPT | Performed by: EMERGENCY MEDICINE

## 2018-08-15 PROCEDURE — 80053 COMPREHEN METABOLIC PANEL: CPT

## 2018-08-15 PROCEDURE — 76705 ECHO EXAM OF ABDOMEN: CPT

## 2018-08-15 PROCEDURE — 83735 ASSAY OF MAGNESIUM: CPT

## 2018-08-15 PROCEDURE — 99284 EMERGENCY DEPT VISIT MOD MDM: CPT | Performed by: EMERGENCY MEDICINE

## 2018-08-15 PROCEDURE — 96361 HYDRATE IV INFUSION ADD-ON: CPT | Performed by: EMERGENCY MEDICINE

## 2018-08-15 PROCEDURE — 96375 TX/PRO/DX INJ NEW DRUG ADDON: CPT | Performed by: EMERGENCY MEDICINE

## 2018-08-15 RX ORDER — PROMETHAZINE HYDROCHLORIDE 25 MG/ML
25 INJECTION, SOLUTION INTRAMUSCULAR; INTRAVENOUS
Status: DISCONTINUED | OUTPATIENT
Start: 2018-08-15 | End: 2018-08-16 | Stop reason: HOSPADM

## 2018-08-15 RX ORDER — MAGNESIUM SULFATE HEPTAHYDRATE 40 MG/ML
2 INJECTION, SOLUTION INTRAVENOUS
Status: COMPLETED | OUTPATIENT
Start: 2018-08-15 | End: 2018-08-15

## 2018-08-15 RX ORDER — SUCRALFATE 1 G/10ML
1 SUSPENSION ORAL
Status: DISCONTINUED | OUTPATIENT
Start: 2018-08-15 | End: 2018-08-15 | Stop reason: ALTCHOICE

## 2018-08-15 RX ORDER — ONDANSETRON 2 MG/ML
4 INJECTION INTRAMUSCULAR; INTRAVENOUS
Status: COMPLETED | OUTPATIENT
Start: 2018-08-15 | End: 2018-08-15

## 2018-08-15 RX ADMIN — ONDANSETRON 4 MG: 2 INJECTION INTRAMUSCULAR; INTRAVENOUS at 18:33

## 2018-08-15 RX ADMIN — MAGNESIUM SULFATE IN WATER 2 G: 40 INJECTION, SOLUTION INTRAVENOUS at 20:18

## 2018-08-15 RX ADMIN — SODIUM CHLORIDE 1000 ML: 900 INJECTION, SOLUTION INTRAVENOUS at 18:33
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
## 2018-08-15 NOTE — ED TRIAGE NOTES
Pt states he was seen at cardiology today and sent to the er for chest and right sided pain. Pt reports onset was when he got out of the hospital. Pt reports ongoing sob and weight loss.

## 2018-08-16 LAB
ATRIAL RATE: 122 BPM
CALCULATED P AXIS, ECG09: 56 DEGREES
CALCULATED R AXIS, ECG10: 35 DEGREES
CALCULATED T AXIS, ECG11: -139 DEGREES
DIAGNOSIS, 93000: NORMAL
P-R INTERVAL, ECG05: 128 MS
Q-T INTERVAL, ECG07: 366 MS
QRS DURATION, ECG06: 98 MS
QTC CALCULATION (BEZET), ECG08: 521 MS
VENTRICULAR RATE, ECG03: 122 BPM

## 2018-08-16 NOTE — ED NOTES
I have reviewed discharge instructions with the patient. The patient verbalized understanding. Patient left ED via Discharge Method: ambulatory to Home with self. Opportunity for questions and clarification provided. Patient given 0 scripts. Pt in no acute distress at time of discharge. To continue your aftercare when you leave the hospital, you may receive an automated call from our care team to check in on how you are doing. This is a free service and part of our promise to provide the best care and service to meet your aftercare needs.  If you have questions, or wish to unsubscribe from this service please call [phone]. Thank you for Choosing our [facility].

## 2018-08-16 NOTE — DISCHARGE INSTRUCTIONS
Return with any fevers, vomiting, difficulty breathing, worsening symptoms, or additional concerns. Follow up with your primary care doctor as needed.

## 2018-09-14 ENCOUNTER — HOME HEALTH ADMISSION (OUTPATIENT)
Dept: HOME HEALTH SERVICES | Facility: HOME HEALTH | Age: 53
End: 2018-09-14

## 2018-09-15 ENCOUNTER — HOME CARE VISIT (OUTPATIENT)
Dept: HOME HEALTH SERVICES | Facility: HOME HEALTH | Age: 53
End: 2018-09-15

## 2018-09-19 ENCOUNTER — HOME CARE VISIT (OUTPATIENT)
Dept: SCHEDULING | Facility: HOME HEALTH | Age: 53
End: 2018-09-19

## 2018-09-19 ENCOUNTER — HOME CARE VISIT (OUTPATIENT)
Dept: HOME HEALTH SERVICES | Facility: HOME HEALTH | Age: 53
End: 2018-09-19

## 2018-12-22 ENCOUNTER — HOSPITAL ENCOUNTER (INPATIENT)
Age: 53
LOS: 4 days | Discharge: HOME | DRG: 315 | End: 2018-12-26
Attending: EMERGENCY MEDICINE | Admitting: INTERNAL MEDICINE
Payer: COMMERCIAL

## 2018-12-22 ENCOUNTER — APPOINTMENT (OUTPATIENT)
Dept: GENERAL RADIOLOGY | Age: 53
DRG: 315 | End: 2018-12-22
Attending: EMERGENCY MEDICINE
Payer: COMMERCIAL

## 2018-12-22 DIAGNOSIS — T82.198A INAPPROPRIATE DISCHARGE OF IMPLANTABLE CARDIOVERTER-DEFIBRILLATOR (ICD), INITIAL ENCOUNTER: ICD-10-CM

## 2018-12-22 DIAGNOSIS — R00.0 TACHYCARDIA: Primary | ICD-10-CM

## 2018-12-22 DIAGNOSIS — F41.9 ANXIETY: ICD-10-CM

## 2018-12-22 PROBLEM — I47.1 SVT (SUPRAVENTRICULAR TACHYCARDIA) (HCC): Status: ACTIVE | Noted: 2018-12-22

## 2018-12-22 LAB
ALBUMIN SERPL-MCNC: 3.9 G/DL (ref 3.5–5)
ALBUMIN/GLOB SERPL: 1.1 {RATIO} (ref 1.2–3.5)
ALP SERPL-CCNC: 80 U/L (ref 50–136)
ALT SERPL-CCNC: 28 U/L (ref 12–65)
ANION GAP SERPL CALC-SCNC: 11 MMOL/L (ref 7–16)
AST SERPL-CCNC: 81 U/L (ref 15–37)
BASOPHILS # BLD: 0 K/UL (ref 0–0.2)
BASOPHILS NFR BLD: 0 % (ref 0–2)
BILIRUB SERPL-MCNC: 2.7 MG/DL (ref 0.2–1.1)
BUN SERPL-MCNC: 6 MG/DL (ref 6–23)
CALCIUM SERPL-MCNC: 8.5 MG/DL (ref 8.3–10.4)
CHLORIDE SERPL-SCNC: 99 MMOL/L (ref 98–107)
CO2 SERPL-SCNC: 23 MMOL/L (ref 21–32)
CREAT SERPL-MCNC: 0.98 MG/DL (ref 0.8–1.5)
DIFFERENTIAL METHOD BLD: ABNORMAL
EOSINOPHIL # BLD: 0 K/UL (ref 0–0.8)
EOSINOPHIL NFR BLD: 0 % (ref 0.5–7.8)
ERYTHROCYTE [DISTWIDTH] IN BLOOD BY AUTOMATED COUNT: 17.1 % (ref 11.9–14.6)
ETHANOL SERPL-MCNC: <3 MG/DL
GLOBULIN SER CALC-MCNC: 3.5 G/DL (ref 2.3–3.5)
GLUCOSE SERPL-MCNC: 143 MG/DL (ref 65–100)
HCT VFR BLD AUTO: 34.1 % (ref 41.1–50.3)
HGB BLD-MCNC: 11.9 G/DL (ref 13.6–17.2)
IMM GRANULOCYTES # BLD: 0 K/UL (ref 0–0.5)
IMM GRANULOCYTES NFR BLD AUTO: 0 % (ref 0–5)
LYMPHOCYTES # BLD: 0.7 K/UL (ref 0.5–4.6)
LYMPHOCYTES NFR BLD: 14 % (ref 13–44)
MAGNESIUM SERPL-MCNC: 1.2 MG/DL (ref 1.8–2.4)
MCH RBC QN AUTO: 33.2 PG (ref 26.1–32.9)
MCHC RBC AUTO-ENTMCNC: 34.9 G/DL (ref 31.4–35)
MCV RBC AUTO: 95.3 FL (ref 79.6–97.8)
MONOCYTES # BLD: 0.2 K/UL (ref 0.1–1.3)
MONOCYTES NFR BLD: 4 % (ref 4–12)
NEUTS SEG # BLD: 4 K/UL (ref 1.7–8.2)
NEUTS SEG NFR BLD: 82 % (ref 43–78)
NRBC # BLD: 0.03 K/UL (ref 0–0.2)
PHOSPHATE SERPL-MCNC: 2.5 MG/DL (ref 2.5–4.5)
PLATELET # BLD AUTO: 160 K/UL (ref 150–450)
PMV BLD AUTO: 9.7 FL (ref 9.4–12.3)
POTASSIUM SERPL-SCNC: 2.9 MMOL/L (ref 3.5–5.1)
POTASSIUM SERPL-SCNC: 6 MMOL/L (ref 3.5–5.1)
PROT SERPL-MCNC: 7.4 G/DL (ref 6.3–8.2)
RBC # BLD AUTO: 3.58 M/UL (ref 4.23–5.6)
SODIUM SERPL-SCNC: 133 MMOL/L (ref 136–145)
TROPONIN I BLD-MCNC: 0.03 NG/ML (ref 0.02–0.05)
WBC # BLD AUTO: 4.9 K/UL (ref 4.3–11.1)

## 2018-12-22 PROCEDURE — 85025 COMPLETE CBC W/AUTO DIFF WBC: CPT

## 2018-12-22 PROCEDURE — 85027 COMPLETE CBC AUTOMATED: CPT

## 2018-12-22 PROCEDURE — 74011250636 HC RX REV CODE- 250/636

## 2018-12-22 PROCEDURE — 96375 TX/PRO/DX INJ NEW DRUG ADDON: CPT | Performed by: EMERGENCY MEDICINE

## 2018-12-22 PROCEDURE — 74011250637 HC RX REV CODE- 250/637: Performed by: EMERGENCY MEDICINE

## 2018-12-22 PROCEDURE — 36415 COLL VENOUS BLD VENIPUNCTURE: CPT

## 2018-12-22 PROCEDURE — 74011250636 HC RX REV CODE- 250/636: Performed by: NURSE PRACTITIONER

## 2018-12-22 PROCEDURE — 83735 ASSAY OF MAGNESIUM: CPT

## 2018-12-22 PROCEDURE — 93005 ELECTROCARDIOGRAM TRACING: CPT | Performed by: EMERGENCY MEDICINE

## 2018-12-22 PROCEDURE — 84132 ASSAY OF SERUM POTASSIUM: CPT

## 2018-12-22 PROCEDURE — 96365 THER/PROPH/DIAG IV INF INIT: CPT | Performed by: EMERGENCY MEDICINE

## 2018-12-22 PROCEDURE — 65660000000 HC RM CCU STEPDOWN

## 2018-12-22 PROCEDURE — 74011250636 HC RX REV CODE- 250/636: Performed by: EMERGENCY MEDICINE

## 2018-12-22 PROCEDURE — 71045 X-RAY EXAM CHEST 1 VIEW: CPT

## 2018-12-22 PROCEDURE — 84484 ASSAY OF TROPONIN QUANT: CPT

## 2018-12-22 PROCEDURE — 74011250637 HC RX REV CODE- 250/637: Performed by: NURSE PRACTITIONER

## 2018-12-22 PROCEDURE — 80053 COMPREHEN METABOLIC PANEL: CPT

## 2018-12-22 PROCEDURE — 80307 DRUG TEST PRSMV CHEM ANLYZR: CPT

## 2018-12-22 PROCEDURE — 84100 ASSAY OF PHOSPHORUS: CPT

## 2018-12-22 PROCEDURE — 99285 EMERGENCY DEPT VISIT HI MDM: CPT | Performed by: EMERGENCY MEDICINE

## 2018-12-22 RX ORDER — SODIUM CHLORIDE 0.9 % (FLUSH) 0.9 %
5-10 SYRINGE (ML) INJECTION AS NEEDED
Status: DISCONTINUED | OUTPATIENT
Start: 2018-12-22 | End: 2018-12-26 | Stop reason: HOSPADM

## 2018-12-22 RX ORDER — MAGNESIUM SULFATE HEPTAHYDRATE 40 MG/ML
2 INJECTION, SOLUTION INTRAVENOUS
Status: COMPLETED | OUTPATIENT
Start: 2018-12-22 | End: 2018-12-22

## 2018-12-22 RX ORDER — PROMETHAZINE HYDROCHLORIDE 25 MG/1
12.5 TABLET ORAL
Status: DISCONTINUED | OUTPATIENT
Start: 2018-12-22 | End: 2018-12-23

## 2018-12-22 RX ORDER — ONDANSETRON 2 MG/ML
4 INJECTION INTRAMUSCULAR; INTRAVENOUS
Status: COMPLETED | OUTPATIENT
Start: 2018-12-22 | End: 2018-12-22

## 2018-12-22 RX ORDER — HYDROCODONE BITARTRATE AND ACETAMINOPHEN 10; 325 MG/1; MG/1
1 TABLET ORAL
Status: DISCONTINUED | OUTPATIENT
Start: 2018-12-22 | End: 2018-12-23

## 2018-12-22 RX ORDER — LOSARTAN POTASSIUM 25 MG/1
25 TABLET ORAL DAILY
Status: DISCONTINUED | OUTPATIENT
Start: 2018-12-23 | End: 2018-12-26 | Stop reason: HOSPADM

## 2018-12-22 RX ORDER — ALPRAZOLAM 0.5 MG/1
1 TABLET ORAL
Status: DISCONTINUED | OUTPATIENT
Start: 2018-12-22 | End: 2018-12-23

## 2018-12-22 RX ORDER — MORPHINE SULFATE 2 MG/ML
2 INJECTION, SOLUTION INTRAMUSCULAR; INTRAVENOUS
Status: DISCONTINUED | OUTPATIENT
Start: 2018-12-22 | End: 2018-12-23

## 2018-12-22 RX ORDER — GABAPENTIN 300 MG/1
300 CAPSULE ORAL 3 TIMES DAILY
Status: DISCONTINUED | OUTPATIENT
Start: 2018-12-22 | End: 2018-12-23

## 2018-12-22 RX ORDER — MAGNESIUM SULFATE HEPTAHYDRATE 40 MG/ML
2 INJECTION, SOLUTION INTRAVENOUS ONCE
Status: ACTIVE | OUTPATIENT
Start: 2018-12-22 | End: 2018-12-23

## 2018-12-22 RX ORDER — CARVEDILOL 12.5 MG/1
12.5 TABLET ORAL 2 TIMES DAILY WITH MEALS
Status: DISCONTINUED | OUTPATIENT
Start: 2018-12-22 | End: 2018-12-26 | Stop reason: HOSPADM

## 2018-12-22 RX ORDER — ENOXAPARIN SODIUM 100 MG/ML
40 INJECTION SUBCUTANEOUS EVERY 24 HOURS
Status: DISCONTINUED | OUTPATIENT
Start: 2018-12-22 | End: 2018-12-26 | Stop reason: HOSPADM

## 2018-12-22 RX ORDER — ATORVASTATIN CALCIUM 40 MG/1
80 TABLET, FILM COATED ORAL DAILY
Status: DISCONTINUED | OUTPATIENT
Start: 2018-12-23 | End: 2018-12-26 | Stop reason: HOSPADM

## 2018-12-22 RX ORDER — ONDANSETRON 2 MG/ML
INJECTION INTRAMUSCULAR; INTRAVENOUS
Status: COMPLETED
Start: 2018-12-22 | End: 2018-12-22

## 2018-12-22 RX ORDER — MORPHINE SULFATE 2 MG/ML
4 INJECTION, SOLUTION INTRAMUSCULAR; INTRAVENOUS
Status: COMPLETED | OUTPATIENT
Start: 2018-12-22 | End: 2018-12-22

## 2018-12-22 RX ORDER — POTASSIUM CHLORIDE 20 MEQ/1
40 TABLET, EXTENDED RELEASE ORAL
Status: COMPLETED | OUTPATIENT
Start: 2018-12-22 | End: 2018-12-22

## 2018-12-22 RX ORDER — FUROSEMIDE 40 MG/1
40 TABLET ORAL DAILY
Status: DISCONTINUED | OUTPATIENT
Start: 2018-12-23 | End: 2018-12-23

## 2018-12-22 RX ORDER — SODIUM CHLORIDE 0.9 % (FLUSH) 0.9 %
5-10 SYRINGE (ML) INJECTION EVERY 8 HOURS
Status: DISCONTINUED | OUTPATIENT
Start: 2018-12-22 | End: 2018-12-26 | Stop reason: HOSPADM

## 2018-12-22 RX ORDER — POTASSIUM CHLORIDE 20 MEQ/1
20 TABLET, EXTENDED RELEASE ORAL DAILY
Status: DISCONTINUED | OUTPATIENT
Start: 2018-12-23 | End: 2018-12-26 | Stop reason: HOSPADM

## 2018-12-22 RX ORDER — POTASSIUM CHLORIDE 20 MEQ/1
40 TABLET, EXTENDED RELEASE ORAL EVERY 4 HOURS
Status: COMPLETED | OUTPATIENT
Start: 2018-12-22 | End: 2018-12-23

## 2018-12-22 RX ORDER — LORAZEPAM 2 MG/ML
1 INJECTION INTRAMUSCULAR
Status: COMPLETED | OUTPATIENT
Start: 2018-12-22 | End: 2018-12-22

## 2018-12-22 RX ORDER — LANOLIN ALCOHOL/MO/W.PET/CERES
400 CREAM (GRAM) TOPICAL EVERY 12 HOURS
Status: DISCONTINUED | OUTPATIENT
Start: 2018-12-22 | End: 2018-12-26 | Stop reason: HOSPADM

## 2018-12-22 RX ADMIN — LORAZEPAM 1 MG: 2 INJECTION, SOLUTION INTRAMUSCULAR; INTRAVENOUS at 13:55

## 2018-12-22 RX ADMIN — ONDANSETRON 4 MG: 2 INJECTION INTRAMUSCULAR; INTRAVENOUS at 12:48

## 2018-12-22 RX ADMIN — POTASSIUM CHLORIDE 40 MEQ: 20 TABLET, EXTENDED RELEASE ORAL at 17:22

## 2018-12-22 RX ADMIN — MORPHINE SULFATE 2 MG: 2 INJECTION, SOLUTION INTRAMUSCULAR; INTRAVENOUS at 18:23

## 2018-12-22 RX ADMIN — CARVEDILOL 12.5 MG: 12.5 TABLET, FILM COATED ORAL at 17:23

## 2018-12-22 RX ADMIN — MAGNESIUM SULFATE HEPTAHYDRATE 2 G: 40 INJECTION, SOLUTION INTRAVENOUS at 13:56

## 2018-12-22 RX ADMIN — POTASSIUM CHLORIDE 40 MEQ: 20 TABLET, EXTENDED RELEASE ORAL at 21:59

## 2018-12-22 RX ADMIN — ALPRAZOLAM 1 MG: 0.5 TABLET ORAL at 22:02

## 2018-12-22 RX ADMIN — Medication 400 MG: at 22:02

## 2018-12-22 RX ADMIN — POTASSIUM CHLORIDE 40 MEQ: 20 TABLET, EXTENDED RELEASE ORAL at 15:38

## 2018-12-22 RX ADMIN — Medication 5 ML: at 17:45

## 2018-12-22 RX ADMIN — Medication 10 ML: at 22:13

## 2018-12-22 RX ADMIN — HYDROCODONE BITARTRATE AND ACETAMINOPHEN 1 TABLET: 10; 325 TABLET ORAL at 17:32

## 2018-12-22 RX ADMIN — GABAPENTIN 300 MG: 300 CAPSULE ORAL at 21:59

## 2018-12-22 RX ADMIN — MORPHINE SULFATE 4 MG: 2 INJECTION, SOLUTION INTRAMUSCULAR; INTRAVENOUS at 13:55

## 2018-12-22 RX ADMIN — SODIUM CHLORIDE 1000 ML: 900 INJECTION, SOLUTION INTRAVENOUS at 14:12

## 2018-12-22 RX ADMIN — PROMETHAZINE HYDROCHLORIDE 12.5 MG: 25 TABLET ORAL at 17:31

## 2018-12-22 RX ADMIN — ENOXAPARIN SODIUM 40 MG: 40 INJECTION SUBCUTANEOUS at 17:30

## 2018-12-22 RX ADMIN — GABAPENTIN 300 MG: 300 CAPSULE ORAL at 17:23

## 2018-12-22 NOTE — ED TRIAGE NOTES
Pt arrives per EMS from home for complaints of defibrillator going off 7 times. Pt was loading his car with trash when the first time went off. Defibrillator did go off when EMS was with him when HR was upwards of 150. Pt thinks he has a biotronic device. States device went off two years ago.
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
## 2018-12-22 NOTE — PROGRESS NOTES
TRANSFER - IN REPORT:    Verbal report received from KADEN Jackson on Cha Mew being received from ED for routine progression of care      Report consisted of patients Situation, Background, Assessment and Recommendations(SBAR). Information from the following report(s) SBA, MAR was reviewed with the receiving nurse. Opportunity for questions and clarification was provided.       Patient to be transported to room 308
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
## 2018-12-23 PROBLEM — G93.40 ENCEPHALOPATHY ACUTE: Status: ACTIVE | Noted: 2018-12-23

## 2018-12-23 PROBLEM — G93.41 ACUTE METABOLIC ENCEPHALOPATHY: Status: ACTIVE | Noted: 2018-12-23

## 2018-12-23 LAB
AMYLASE SERPL-CCNC: 115 U/L (ref 25–115)
ANION GAP SERPL CALC-SCNC: 13 MMOL/L (ref 7–16)
ARTERIAL PATENCY WRIST A: YES
ATRIAL RATE: 131 BPM
BASE DEFICIT BLD-SCNC: 3 MMOL/L
BDY SITE: ABNORMAL
BODY TEMPERATURE: 98.6
BUN SERPL-MCNC: 8 MG/DL (ref 6–23)
CALCIUM SERPL-MCNC: 7.9 MG/DL (ref 8.3–10.4)
CALCULATED P AXIS, ECG09: 62 DEGREES
CALCULATED R AXIS, ECG10: -29 DEGREES
CALCULATED T AXIS, ECG11: 155 DEGREES
CHLORIDE SERPL-SCNC: 105 MMOL/L (ref 98–107)
CHOLEST SERPL-MCNC: 181 MG/DL
CO2 BLD-SCNC: 21 MMOL/L
CO2 SERPL-SCNC: 21 MMOL/L (ref 21–32)
COLLECT TIME,HTIME: 1357
CREAT SERPL-MCNC: 1.22 MG/DL (ref 0.8–1.5)
DIAGNOSIS, 93000: NORMAL
ERYTHROCYTE [DISTWIDTH] IN BLOOD BY AUTOMATED COUNT: 17.2 % (ref 11.9–14.6)
GAS FLOW.O2 O2 DELIVERY SYS: ABNORMAL L/MIN
GLUCOSE BLD STRIP.AUTO-MCNC: 126 MG/DL (ref 65–100)
GLUCOSE SERPL-MCNC: 101 MG/DL (ref 65–100)
HCO3 BLD-SCNC: 19.9 MMOL/L (ref 22–26)
HCT VFR BLD AUTO: 30.7 % (ref 41.1–50.3)
HDLC SERPL-MCNC: 90 MG/DL (ref 40–60)
HDLC SERPL: 2 {RATIO}
HGB BLD-MCNC: 10.3 G/DL (ref 13.6–17.2)
LDLC SERPL CALC-MCNC: 73.2 MG/DL
LIPASE SERPL-CCNC: 345 U/L (ref 73–393)
LIPID PROFILE,FLP: ABNORMAL
MAGNESIUM SERPL-MCNC: 1.6 MG/DL (ref 1.8–2.4)
MCH RBC QN AUTO: 33 PG (ref 26.1–32.9)
MCHC RBC AUTO-ENTMCNC: 33.6 G/DL (ref 31.4–35)
MCV RBC AUTO: 98.4 FL (ref 79.6–97.8)
NRBC # BLD: 0.1 K/UL (ref 0–0.2)
O2/TOTAL GAS SETTING VFR VENT: 21 %
P-R INTERVAL, ECG05: 118 MS
PCO2 BLD: 28.8 MMHG (ref 35–45)
PH BLD: 7.45 [PH] (ref 7.35–7.45)
PLATELET # BLD AUTO: 145 K/UL (ref 150–450)
PMV BLD AUTO: 9.4 FL (ref 9.4–12.3)
PO2 BLD: 75 MMHG (ref 75–100)
POTASSIUM SERPL-SCNC: 3.4 MMOL/L (ref 3.5–5.1)
Q-T INTERVAL, ECG07: 326 MS
QRS DURATION, ECG06: 102 MS
QTC CALCULATION (BEZET), ECG08: 481 MS
RBC # BLD AUTO: 3.12 M/UL (ref 4.23–5.6)
SAO2 % BLD: 96 % (ref 95–98)
SERVICE CMNT-IMP: ABNORMAL
SODIUM SERPL-SCNC: 139 MMOL/L (ref 136–145)
SPECIMEN TYPE: ABNORMAL
TRIGL SERPL-MCNC: 89 MG/DL (ref 35–150)
VENTRICULAR RATE, ECG03: 131 BPM
VLDLC SERPL CALC-MCNC: 17.8 MG/DL (ref 6–23)
WBC # BLD AUTO: 4.7 K/UL (ref 4.3–11.1)

## 2018-12-23 PROCEDURE — 74011250636 HC RX REV CODE- 250/636: Performed by: INTERNAL MEDICINE

## 2018-12-23 PROCEDURE — 74011250636 HC RX REV CODE- 250/636: Performed by: NURSE PRACTITIONER

## 2018-12-23 PROCEDURE — 94760 N-INVAS EAR/PLS OXIMETRY 1: CPT

## 2018-12-23 PROCEDURE — 74011250637 HC RX REV CODE- 250/637: Performed by: NURSE PRACTITIONER

## 2018-12-23 PROCEDURE — 80048 BASIC METABOLIC PNL TOTAL CA: CPT

## 2018-12-23 PROCEDURE — 65660000000 HC RM CCU STEPDOWN

## 2018-12-23 PROCEDURE — 80061 LIPID PANEL: CPT

## 2018-12-23 PROCEDURE — 83690 ASSAY OF LIPASE: CPT

## 2018-12-23 PROCEDURE — 74011250636 HC RX REV CODE- 250/636: Performed by: HOSPITALIST

## 2018-12-23 PROCEDURE — 36600 WITHDRAWAL OF ARTERIAL BLOOD: CPT

## 2018-12-23 PROCEDURE — 83735 ASSAY OF MAGNESIUM: CPT

## 2018-12-23 PROCEDURE — 82803 BLOOD GASES ANY COMBINATION: CPT

## 2018-12-23 PROCEDURE — 77010033678 HC OXYGEN DAILY

## 2018-12-23 PROCEDURE — 82150 ASSAY OF AMYLASE: CPT

## 2018-12-23 PROCEDURE — 36415 COLL VENOUS BLD VENIPUNCTURE: CPT

## 2018-12-23 PROCEDURE — 74011250636 HC RX REV CODE- 250/636

## 2018-12-23 PROCEDURE — 82962 GLUCOSE BLOOD TEST: CPT

## 2018-12-23 PROCEDURE — 77030020263 HC SOL INJ SOD CL0.9% LFCR 1000ML

## 2018-12-23 PROCEDURE — 74011250637 HC RX REV CODE- 250/637: Performed by: HOSPITALIST

## 2018-12-23 RX ORDER — TRAMADOL HYDROCHLORIDE 50 MG/1
50 TABLET ORAL
Status: DISCONTINUED | OUTPATIENT
Start: 2018-12-23 | End: 2018-12-26 | Stop reason: HOSPADM

## 2018-12-23 RX ORDER — ONDANSETRON 4 MG/1
4 TABLET, ORALLY DISINTEGRATING ORAL
Status: DISCONTINUED | OUTPATIENT
Start: 2018-12-23 | End: 2018-12-26 | Stop reason: HOSPADM

## 2018-12-23 RX ORDER — NALOXONE HYDROCHLORIDE 0.4 MG/ML
1 INJECTION, SOLUTION INTRAMUSCULAR; INTRAVENOUS; SUBCUTANEOUS ONCE
Status: COMPLETED | OUTPATIENT
Start: 2018-12-23 | End: 2018-12-23

## 2018-12-23 RX ORDER — NALOXONE HYDROCHLORIDE 0.4 MG/ML
INJECTION, SOLUTION INTRAMUSCULAR; INTRAVENOUS; SUBCUTANEOUS
Status: COMPLETED
Start: 2018-12-23 | End: 2018-12-23

## 2018-12-23 RX ORDER — NALOXONE HYDROCHLORIDE 0.4 MG/ML
INJECTION, SOLUTION INTRAMUSCULAR; INTRAVENOUS; SUBCUTANEOUS
Status: ACTIVE
Start: 2018-12-23 | End: 2018-12-24

## 2018-12-23 RX ORDER — ACETAMINOPHEN 325 MG/1
650 TABLET ORAL
Status: DISCONTINUED | OUTPATIENT
Start: 2018-12-23 | End: 2018-12-26 | Stop reason: HOSPADM

## 2018-12-23 RX ADMIN — POTASSIUM CHLORIDE 20 MEQ: 20 TABLET, EXTENDED RELEASE ORAL at 08:10

## 2018-12-23 RX ADMIN — MORPHINE SULFATE 2 MG: 2 INJECTION, SOLUTION INTRAMUSCULAR; INTRAVENOUS at 03:24

## 2018-12-23 RX ADMIN — POTASSIUM CHLORIDE 40 MEQ: 20 TABLET, EXTENDED RELEASE ORAL at 00:46

## 2018-12-23 RX ADMIN — NALOXONE HYDROCHLORIDE 1 MG: 0.4 INJECTION, SOLUTION INTRAMUSCULAR; INTRAVENOUS; SUBCUTANEOUS at 13:31

## 2018-12-23 RX ADMIN — PROMETHAZINE HYDROCHLORIDE 12.5 MG: 25 TABLET ORAL at 07:07

## 2018-12-23 RX ADMIN — Medication 400 MG: at 08:10

## 2018-12-23 RX ADMIN — PROMETHAZINE HYDROCHLORIDE 12.5 MG: 25 TABLET ORAL at 00:45

## 2018-12-23 RX ADMIN — Medication 10 ML: at 21:17

## 2018-12-23 RX ADMIN — HYDROCODONE BITARTRATE AND ACETAMINOPHEN 1 TABLET: 10; 325 TABLET ORAL at 07:07

## 2018-12-23 RX ADMIN — MORPHINE SULFATE 2 MG: 2 INJECTION, SOLUTION INTRAMUSCULAR; INTRAVENOUS at 08:10

## 2018-12-23 RX ADMIN — ENOXAPARIN SODIUM 40 MG: 40 INJECTION SUBCUTANEOUS at 17:22

## 2018-12-23 RX ADMIN — CARVEDILOL 12.5 MG: 12.5 TABLET, FILM COATED ORAL at 08:10

## 2018-12-23 RX ADMIN — FUROSEMIDE 40 MG: 40 TABLET ORAL at 08:10

## 2018-12-23 RX ADMIN — SODIUM CHLORIDE 250 ML: 900 INJECTION, SOLUTION INTRAVENOUS at 13:19

## 2018-12-23 RX ADMIN — Medication 5 ML: at 06:14

## 2018-12-23 RX ADMIN — ATORVASTATIN CALCIUM 80 MG: 40 TABLET, FILM COATED ORAL at 08:10

## 2018-12-23 RX ADMIN — Medication 5 ML: at 13:20

## 2018-12-23 RX ADMIN — ACETAMINOPHEN 650 MG: 325 TABLET ORAL at 20:23

## 2018-12-23 RX ADMIN — GABAPENTIN 300 MG: 300 CAPSULE ORAL at 08:10

## 2018-12-23 RX ADMIN — LOSARTAN POTASSIUM 25 MG: 25 TABLET ORAL at 08:10

## 2018-12-23 RX ADMIN — HYDROCODONE BITARTRATE AND ACETAMINOPHEN 1 TABLET: 10; 325 TABLET ORAL at 00:45

## 2018-12-23 RX ADMIN — NALOXONE HYDROCHLORIDE 1 MG: 0.4 INJECTION, SOLUTION INTRAMUSCULAR; INTRAVENOUS; SUBCUTANEOUS at 13:04

## 2018-12-23 RX ADMIN — CARVEDILOL 12.5 MG: 12.5 TABLET, FILM COATED ORAL at 17:20

## 2018-12-23 RX ADMIN — ONDANSETRON 4 MG: 4 TABLET, ORALLY DISINTEGRATING ORAL at 21:14

## 2018-12-23 RX ADMIN — Medication 400 MG: at 20:23

## 2018-12-23 RX ADMIN — SODIUM CHLORIDE 250 ML: 900 INJECTION, SOLUTION INTRAVENOUS at 20:24

## 2018-12-23 NOTE — PROGRESS NOTES
Verbal bedside report given to melvi Kelly RN. Patient's situation, background, assessment and recommendations provided. Opportunity for questions provided. Oncoming RN assumed care of patient.

## 2018-12-23 NOTE — PROGRESS NOTES
Problem: Falls - Risk of  Goal: *Absence of Falls  Document Chery Fall Risk and appropriate interventions in the flowsheet.   Fall Risk Interventions:            Medication Interventions: Patient to call before getting OOB

## 2018-12-23 NOTE — PROGRESS NOTES
Problem: Falls - Risk of  Goal: *Absence of Falls  Document Chery Fall Risk and appropriate interventions in the flowsheet.   Outcome: Progressing Towards Goal  Fall Risk Interventions:            Medication Interventions: Patient to call before getting OOB, Evaluate medications/consider consulting pharmacy, Teach patient to arise slowly

## 2018-12-23 NOTE — PROGRESS NOTES
Problem: Nutrition Deficit  Goal: *Optimize nutritional status  Nutrition  Reason for assessment: Referral received from nursing admission Malnutrition Screening Tool for recently lost >33# without trying and eating poorly due to decreased appetite. Assessment:   Diet order(s): cardiac with ensure enlive TID  Food/Nutrition Patient History:  Pt states that he has had a 100+ pound weight loss in 6 months and states that he weighed 250 pounds. Pt reports that he eats better in the hospital because people are cooking meals for him. He states \"they are trying to figure out why i'm losing weight and they think I am doing drugs. \"  No c/o barriers to intake with exception of not preparing meals for himself. Denies any GI issues. He actually has no c/o n/v/d during or PTA. Anthropometrics:Height: 5' 11\" (180.3 cm),  Weight: 66 kg (145 lb 6.4 oz),  , Body mass index is 20.28 kg/m². BMI class of normal weight. WT / BMI 12/23/2018 12/18/2018 11/27/2018 11/27/2018 11/12/2018   WEIGHT 145 lb 6.4 oz 149 lb 154 lb 154 lb 156 lb     WT / BMI 9/14/2018 8/15/2018 8/15/2018 7/17/2018 7/11/2018   WEIGHT 158 lb 12.8 oz 165 lb 145 lb 161 lb 164 lb 0.4 oz     WT / BMI 3/15/2018 2/20/2018 2/13/2018 12/21/2017   WEIGHT 169 lb 3.2 oz 158 lb 158 lb 162 lb   Per weights listed in EMR, potential for a 19 pound, 11.5% weight loss within 6 months. Macronutrient needs:  EER:  1134-9373 kcal /day (25-30 kcal/kg listed BW)  EPR:  53-66 grams protein/day (0.8-1 grams/kg listed BW)  Intake/Comparative Standards: Per RD meal rounds: 50% of breakfast. No recorded meal intakes. Nutrition Diagnosis: Unintentional weight loss r/t inadequate energy intake as evidenced by pt report of eating poorly d/t not cooking meals, weight loss noted above. Intervention:  Meals and snacks: Continue current diet. Nutrition Supplement Therapy: continue ensure enlive - decrease to 1 per day at lunch. Discharge nutrition plan:  Too soon to determine.     Joann Hoskins [address], [address] [place] [address], [address] [place] [address], [phone]

## 2018-12-23 NOTE — PROGRESS NOTES
1255: Patient hypotensive 70's/40's, very lethargic. No command following, not opening eyes and only responding to sternal rub. MD Paulino notified, new orders for 1mg Narcan and 250cc NS bolus. Wife at bedside    1325: Pt still lethargic and not responding to Narcan, addition dose of 1mg ordered by MD Salma Londono. Stat consult for hospitalist ordered to evaluate AMS. 1331: Second dose of Narcan given    1340: Patient opening eyes to voice at this time. MD Svetlana Morley at bedside. New orders received for UA, UDS and ABG    1400: ABG resulted (-). Hospitalist notified.
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
## 2018-12-23 NOTE — PROGRESS NOTES
Verbal bedside report given to melvi Whitman RN. Patient's situation, background, assessment and recommendations provided. Opportunity for questions provided. Oncoming RN assumed care of patient.

## 2018-12-23 NOTE — PROGRESS NOTES
Pt requesting IV Phenergan instead of PO. In no distress. Does not appear nauseated and has not had any vomiting episodes. Can swallow pills without any difficulty. Pt wanted me to notify doctor. Notified Rogelio Gaston NP. No new orders at this time.
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
## 2018-12-23 NOTE — PROGRESS NOTES
Bedside and Verbal shift change report given to Pipe Connell RN (oncoming nurse) by Marlen Hilario RN (offgoing nurse).  Report included the following information SBAR, Kardex, Intake/Output, MAR, Recent Results, Med Rec Status and Cardiac Rhythm ST.

## 2018-12-24 LAB
ALBUMIN SERPL-MCNC: 3 G/DL (ref 3.5–5)
ALBUMIN/GLOB SERPL: 1.2 {RATIO} (ref 1.2–3.5)
ALP SERPL-CCNC: 58 U/L (ref 50–136)
ALT SERPL-CCNC: 14 U/L (ref 12–65)
AMPHET UR QL SCN: NEGATIVE
ANION GAP SERPL CALC-SCNC: 8 MMOL/L (ref 7–16)
AST SERPL-CCNC: 27 U/L (ref 15–37)
BARBITURATES UR QL SCN: NEGATIVE
BENZODIAZ UR QL: POSITIVE
BILIRUB DIRECT SERPL-MCNC: 0.2 MG/DL
BILIRUB SERPL-MCNC: 0.6 MG/DL (ref 0.2–1.1)
BUN SERPL-MCNC: 9 MG/DL (ref 6–23)
CALCIUM SERPL-MCNC: 7.8 MG/DL (ref 8.3–10.4)
CANNABINOIDS UR QL SCN: NEGATIVE
CHLORIDE SERPL-SCNC: 105 MMOL/L (ref 98–107)
CO2 SERPL-SCNC: 26 MMOL/L (ref 21–32)
COCAINE UR QL SCN: NEGATIVE
CREAT SERPL-MCNC: 1.01 MG/DL (ref 0.8–1.5)
GLOBULIN SER CALC-MCNC: 2.6 G/DL (ref 2.3–3.5)
GLUCOSE SERPL-MCNC: 99 MG/DL (ref 65–100)
MAGNESIUM SERPL-MCNC: 1.5 MG/DL (ref 1.8–2.4)
METHADONE UR QL: NEGATIVE
OPIATES UR QL: POSITIVE
PCP UR QL: NEGATIVE
POTASSIUM SERPL-SCNC: 3.9 MMOL/L (ref 3.5–5.1)
PROT SERPL-MCNC: 5.6 G/DL (ref 6.3–8.2)
SODIUM SERPL-SCNC: 139 MMOL/L (ref 136–145)

## 2018-12-24 PROCEDURE — 74011250636 HC RX REV CODE- 250/636: Performed by: NURSE PRACTITIONER

## 2018-12-24 PROCEDURE — 80048 BASIC METABOLIC PNL TOTAL CA: CPT

## 2018-12-24 PROCEDURE — 80076 HEPATIC FUNCTION PANEL: CPT

## 2018-12-24 PROCEDURE — 80307 DRUG TEST PRSMV CHEM ANLYZR: CPT

## 2018-12-24 PROCEDURE — C8929 TTE W OR WO FOL WCON,DOPPLER: HCPCS

## 2018-12-24 PROCEDURE — 74011250636 HC RX REV CODE- 250/636: Performed by: INTERNAL MEDICINE

## 2018-12-24 PROCEDURE — 74011000250 HC RX REV CODE- 250: Performed by: INTERNAL MEDICINE

## 2018-12-24 PROCEDURE — 36415 COLL VENOUS BLD VENIPUNCTURE: CPT

## 2018-12-24 PROCEDURE — 74011250637 HC RX REV CODE- 250/637: Performed by: HOSPITALIST

## 2018-12-24 PROCEDURE — 83735 ASSAY OF MAGNESIUM: CPT

## 2018-12-24 PROCEDURE — 74011250637 HC RX REV CODE- 250/637: Performed by: NURSE PRACTITIONER

## 2018-12-24 PROCEDURE — 65660000000 HC RM CCU STEPDOWN

## 2018-12-24 PROCEDURE — 74011250637 HC RX REV CODE- 250/637: Performed by: FAMILY MEDICINE

## 2018-12-24 PROCEDURE — 74011250636 HC RX REV CODE- 250/636: Performed by: FAMILY MEDICINE

## 2018-12-24 RX ORDER — MAG HYDROX/ALUMINUM HYD/SIMETH 200-200-20
30 SUSPENSION, ORAL (FINAL DOSE FORM) ORAL
Status: DISCONTINUED | OUTPATIENT
Start: 2018-12-24 | End: 2018-12-26 | Stop reason: HOSPADM

## 2018-12-24 RX ORDER — HYDROCODONE BITARTRATE AND ACETAMINOPHEN 5; 325 MG/1; MG/1
1 TABLET ORAL
Status: DISCONTINUED | OUTPATIENT
Start: 2018-12-24 | End: 2018-12-26 | Stop reason: HOSPADM

## 2018-12-24 RX ORDER — MAGNESIUM SULFATE HEPTAHYDRATE 40 MG/ML
2 INJECTION, SOLUTION INTRAVENOUS ONCE
Status: COMPLETED | OUTPATIENT
Start: 2018-12-24 | End: 2018-12-24

## 2018-12-24 RX ORDER — PANTOPRAZOLE SODIUM 40 MG/1
40 TABLET, DELAYED RELEASE ORAL
Status: DISCONTINUED | OUTPATIENT
Start: 2018-12-24 | End: 2018-12-26 | Stop reason: HOSPADM

## 2018-12-24 RX ADMIN — Medication 10 ML: at 08:12

## 2018-12-24 RX ADMIN — PERFLUTREN 1 ML: 6.52 INJECTION, SUSPENSION INTRAVENOUS at 09:00

## 2018-12-24 RX ADMIN — Medication 400 MG: at 08:11

## 2018-12-24 RX ADMIN — CARVEDILOL 12.5 MG: 12.5 TABLET, FILM COATED ORAL at 08:12

## 2018-12-24 RX ADMIN — ENOXAPARIN SODIUM 40 MG: 40 INJECTION SUBCUTANEOUS at 17:17

## 2018-12-24 RX ADMIN — POTASSIUM CHLORIDE 20 MEQ: 20 TABLET, EXTENDED RELEASE ORAL at 08:11

## 2018-12-24 RX ADMIN — LOSARTAN POTASSIUM 25 MG: 25 TABLET ORAL at 08:12

## 2018-12-24 RX ADMIN — PANTOPRAZOLE SODIUM 40 MG: 40 TABLET, DELAYED RELEASE ORAL at 08:12

## 2018-12-24 RX ADMIN — ATORVASTATIN CALCIUM 80 MG: 40 TABLET, FILM COATED ORAL at 08:12

## 2018-12-24 RX ADMIN — Medication 10 ML: at 21:05

## 2018-12-24 RX ADMIN — MAGNESIUM SULFATE HEPTAHYDRATE 2 G: 40 INJECTION, SOLUTION INTRAVENOUS at 08:09

## 2018-12-24 RX ADMIN — Medication 400 MG: at 21:05

## 2018-12-24 RX ADMIN — CARVEDILOL 12.5 MG: 12.5 TABLET, FILM COATED ORAL at 17:17

## 2018-12-24 RX ADMIN — TRAMADOL HYDROCHLORIDE 50 MG: 50 TABLET, FILM COATED ORAL at 01:11

## 2018-12-24 RX ADMIN — HYDROCODONE BITARTRATE AND ACETAMINOPHEN 1 TABLET: 5; 325 TABLET ORAL at 12:10

## 2018-12-24 RX ADMIN — ALUMINUM HYDROXIDE, MAGNESIUM HYDROXIDE, AND SIMETHICONE 30 ML: 200; 200; 20 SUSPENSION ORAL at 01:36

## 2018-12-24 RX ADMIN — HYDROCODONE BITARTRATE AND ACETAMINOPHEN 1 TABLET: 5; 325 TABLET ORAL at 18:11

## 2018-12-24 RX ADMIN — Medication 10 ML: at 05:20

## 2018-12-24 NOTE — PROGRESS NOTES
Problem: Falls - Risk of  Goal: *Absence of Falls  Document Chery Fall Risk and appropriate interventions in the flowsheet.   Outcome: Progressing Towards Goal  Fall Risk Interventions:            Medication Interventions: Evaluate medications/consider consulting pharmacy, Patient to call before getting OOB, Teach patient to arise slowly

## 2018-12-24 NOTE — PROGRESS NOTES
Bedside and Verbal shift change report given to Isabell Lutz RN (oncoming nurse) by self Alisia Todd nurse). Report included the following information SBAR, Kardex, MAR and Recent Results.

## 2018-12-24 NOTE — PROGRESS NOTES
[facility] CARDIOLOGY PROGRESS NOTE           12/24/2018 7:54 AM    Admit Date: 12/22/2018    Admit Diagnosis: Inappropriate shocks from ICD (implantable cardioverter-defibrillator)      Subjective:   Complaints of chronic pain this AM    Interval History: (History of pertinent interval events obtained from nursing staff)  Pain meds held 2/2 AMS yesterday of unclear etiology    ROS:  GEN:  No fever or chills  Cardiovascular:  As noted above  Pulmonary:  As noted above  Neuro:  No new focal motor or sensory loss      Objective:     Vitals:    12/23/18 1819 12/23/18 2054 12/24/18 0057 12/24/18 0519   BP: (!) 89/49 (!) 88/52 96/67 108/73   Pulse: (!) 112 (!) 104 (!) 115 (!) 104   Resp: 17 18 18 18   Temp: 98.8 °F (37.1 °C) 98.6 °F (37 °C) 100 °F (37.8 °C) 98.7 °F (37.1 °C)   SpO2: 98% 98% 98% 97%   Weight:    69.9 kg (154 lb 3.2 oz)   Height:           Physical Exam:  General-Well Developed, Well Nourished, No Acute Distress, Alert & Oriented x 3, appropriate mood. Neck- supple, no JVD  CV- regular rate and rhythm no MRG  Lung- clear bilaterally  Abd- soft, nontender, nondistended  Ext- no edema bilaterally.   Skin- warm and dry    Current Facility-Administered Medications   Medication Dose Route Frequency    pantoprazole (PROTONIX) tablet 40 mg  40 mg Oral ACB    alum-mag hydroxide-simeth (MYLANTA) oral suspension 30 mL  30 mL Oral Q4H PRN    magnesium sulfate 2 g/50 ml IVPB (premix or compounded)  2 g IntraVENous ONCE    acetaminophen (TYLENOL) tablet 650 mg  650 mg Oral Q6H PRN    ondansetron (ZOFRAN ODT) tablet 4 mg  4 mg Oral Q6H PRN    traMADol (ULTRAM) tablet 50 mg  50 mg Oral Q6H PRN    atorvastatin (LIPITOR) tablet 80 mg  80 mg Oral DAILY    carvedilol (COREG) tablet 12.5 mg  12.5 mg Oral BID WITH MEALS    losartan (COZAAR) tablet 25 mg  25 mg Oral DAILY    magnesium oxide (MAG-OX) tablet 400 mg  400 mg Oral Q12H    potassium chloride (K-DUR, KLOR-CON) SR tablet 20 mEq  20 mEq Oral DAILY    sodium chloride (NS) flush 5-10 mL  5-10 mL IntraVENous Q8H    sodium chloride (NS) flush 5-10 mL  5-10 mL IntraVENous PRN    enoxaparin (LOVENOX) injection 40 mg  40 mg SubCUTAneous Q24H     Data Review:   Recent Results (from the past 24 hour(s))   GLUCOSE, POC    Collection Time: 12/23/18  1:48 PM   Result Value Ref Range    Glucose (POC) 126 (H) 65 - 100 mg/dL   POC G3    Collection Time: 12/23/18  2:00 PM   Result Value Ref Range    Device: ROOM AIR      FIO2 (POC) 21 %    pH (POC) 7.448 7.35 - 7.45      pCO2 (POC) 28.8 (L) 35 - 45 MMHG    pO2 (POC) 75 75 - 100 MMHG    HCO3 (POC) 19.9 (L) 22 - 26 MMOL/L    sO2 (POC) 96 95 - 98 %    Base deficit (POC) 3 mmol/L    Allens test (POC) YES      Site RIGHT RADIAL      Patient temp. 98.6      Specimen type (POC) ARTERIAL      Performed by [email or web address]     CO2, POC 21 MMOL/L    COLLECT TIME 0,839     METABOLIC PANEL, BASIC    Collection Time: 12/24/18  3:41 AM   Result Value Ref Range    Sodium 139 136 - 145 mmol/L    Potassium 3.9 3.5 - 5.1 mmol/L    Chloride 105 98 - 107 mmol/L    CO2 26 21 - 32 mmol/L    Anion gap 8 7 - 16 mmol/L    Glucose 99 65 - 100 mg/dL    BUN 9 6 - 23 MG/DL    Creatinine 1.01 0.8 - 1.5 MG/DL    GFR est AA >60 >60 ml/min/1.73m2    GFR est non-AA >60 >60 ml/min/1.73m2    Calcium 7.8 (L) 8.3 - 10.4 MG/DL   MAGNESIUM    Collection Time: 12/24/18  3:41 AM   Result Value Ref Range    Magnesium 1.5 (L) 1.8 - 2.4 mg/dL   HEPATIC FUNCTION PANEL    Collection Time: 12/24/18  3:41 AM   Result Value Ref Range    Protein, total 5.6 (L) 6.3 - 8.2 g/dL    Albumin 3.0 (L) 3.5 - 5.0 g/dL    Globulin 2.6 2.3 - 3.5 g/dL    A-G Ratio 1.2 1.2 - 3.5      Bilirubin, total 0.6 0.2 - 1.1 MG/DL    Bilirubin, direct 0.2 <0.4 MG/DL    Alk.  phosphatase 58 50 - 136 U/L    AST (SGOT) 27 15 - 37 U/L    ALT (SGPT) 14 12 - 65 U/L       EKG:  (EKG has been independently visualized by me with interpretation below)  Assessment:     Principal Problem: Inappropriate shocks from ICD (implantable cardioverter-defibrillator) (12/22/2018)    Active Problems:    Chronic systolic heart failure ([facility] [address] (4/21/2011)      ICD (implantable cardioverter-defibrillator) in place (4/22/2011)      HTN (hypertension) (11/29/2011)      Ventricular tachycardia ([address] (2/22/2016)      Non-ischemic cardiomyopathy ([facility] [address] (3/24/2016)      SVT (supraventricular tachycardia) ([facility] [address] (12/22/2018)      Acute metabolic encephalopathy (94/60/7567)    48 y. o. male with known hx of NICM, VT, SVT, ETOH abuse, depression, last EF of 15 % last year with cath noting no evidence of obstructive CAD (3/17), s/p [employer] ICD in place that is at AIDA with planned gen change who presented to the ER after sustaining several shocks from his ICD with device interrogation revealing inappropriate ICD therapy for SVT and EP was consulted for management. Plan:   1. ICD shocks, uncontrolled: Interrogation reviewed, appears ATP and ICD shocks for SVT in the setting of critically low potassium, magnesium and medications noncompliance. Will replace electrolytes, cont coreg and titrate up as tolerated, pending response may need to consider short term amiodarone, however, Pt has not been taking current medications. Check TTE today, labs improving, supplementing mag this AM.     2. Nausea, vomiting: improved     3. SOB: unclear etiology CXR is clear, clear lungs this AM, no complaints of SOB today     4. ICD: AIDA, Pt is currently scheduled for ICD gen change as outpatient on 1/2     5. CAD: nonobstructive, cardiomyopathy is nonischemic    6. AMS: resolved, unclear etiology, awaiting UDS however Pt is on chronic opioids    Miki Paluino MD  Cardiology/Electrophysiology
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
## 2018-12-24 NOTE — PROGRESS NOTES
Bedside and Verbal shift change report given to Sandeep Lawrence RN (oncoming nurse) by Keyon Mitchell RN (offgoing nurse).  Report included the following information SBAR, Kardex, Intake/Output, MAR, Recent Results, Med Rec Status and Cardiac Rhythm ST.

## 2018-12-24 NOTE — PROGRESS NOTES
Care Management Interventions  PCP Verified by CM: Yes  Palliative Care Criteria Met (RRAT>21 & CHF Dx)?: No(RRAT 14 Dx ICD shocks)  Transition of Care Consult (CM Consult): Discharge Planning  Discharge Durable Medical Equipment: No  Physical Therapy Consult: No  Occupational Therapy Consult: No  Speech Therapy Consult: No  Current Support Network: Lives with Spouse  Confirm Follow Up Transport: Family  Plan discussed with Pt/Family/Caregiver: Yes  Freedom of Choice Offered: Yes  Discharge Location  Discharge Placement: Home  Met with patient for d/c planning. Patient does not feel well today. He lives with his wife and is independent of ADL's. He requested CM come back another time as he does not wish to talk to anyone. He plans to d/c home when stable.

## 2018-12-24 NOTE — PROGRESS NOTES
Verbal bedside report given to [facility] oncoming RN. Patient's situation, background, assessment and recommendations provided. Opportunity for questions provided. Oncoming RN assumed care of patient.

## 2018-12-24 NOTE — PROGRESS NOTES
Patient is a long time friend of   Supported him in his journey  He shared how scared he was when his defibrillator went off as he was sitting in car  Will continue to follow during his admission    Suellen Cabral, staff [address] [facility]  /   [email or web address]

## 2018-12-24 NOTE — PROGRESS NOTES
Verbal bedside report given to Snow Cohen oncconrado RN. Patient's situation, background, assessment and recommendations provided. Opportunity for questions provided. Oncoming RN assumed care of patient.

## 2018-12-25 LAB
ANION GAP SERPL CALC-SCNC: 6 MMOL/L (ref 7–16)
BUN SERPL-MCNC: 9 MG/DL (ref 6–23)
CALCIUM SERPL-MCNC: 7.7 MG/DL (ref 8.3–10.4)
CHLORIDE SERPL-SCNC: 107 MMOL/L (ref 98–107)
CO2 SERPL-SCNC: 25 MMOL/L (ref 21–32)
CREAT SERPL-MCNC: 0.76 MG/DL (ref 0.8–1.5)
GLUCOSE SERPL-MCNC: 94 MG/DL (ref 65–100)
MAGNESIUM SERPL-MCNC: 1.9 MG/DL (ref 1.8–2.4)
POTASSIUM SERPL-SCNC: 4.2 MMOL/L (ref 3.5–5.1)
SODIUM SERPL-SCNC: 138 MMOL/L (ref 136–145)

## 2018-12-25 PROCEDURE — 74011250637 HC RX REV CODE- 250/637: Performed by: NURSE PRACTITIONER

## 2018-12-25 PROCEDURE — 74011250637 HC RX REV CODE- 250/637: Performed by: FAMILY MEDICINE

## 2018-12-25 PROCEDURE — 74011250636 HC RX REV CODE- 250/636: Performed by: NURSE PRACTITIONER

## 2018-12-25 PROCEDURE — 65660000000 HC RM CCU STEPDOWN

## 2018-12-25 PROCEDURE — 80048 BASIC METABOLIC PNL TOTAL CA: CPT

## 2018-12-25 PROCEDURE — 36415 COLL VENOUS BLD VENIPUNCTURE: CPT

## 2018-12-25 PROCEDURE — 83735 ASSAY OF MAGNESIUM: CPT

## 2018-12-25 RX ORDER — ALPRAZOLAM 0.5 MG/1
0.5 TABLET ORAL
Status: DISCONTINUED | OUTPATIENT
Start: 2018-12-25 | End: 2018-12-26 | Stop reason: HOSPADM

## 2018-12-25 RX ORDER — GABAPENTIN 300 MG/1
300 CAPSULE ORAL 2 TIMES DAILY
Status: DISCONTINUED | OUTPATIENT
Start: 2018-12-25 | End: 2018-12-26 | Stop reason: HOSPADM

## 2018-12-25 RX ADMIN — POTASSIUM CHLORIDE 20 MEQ: 20 TABLET, EXTENDED RELEASE ORAL at 08:20

## 2018-12-25 RX ADMIN — HYDROCODONE BITARTRATE AND ACETAMINOPHEN 1 TABLET: 5; 325 TABLET ORAL at 20:31

## 2018-12-25 RX ADMIN — Medication 400 MG: at 08:21

## 2018-12-25 RX ADMIN — LOSARTAN POTASSIUM 25 MG: 25 TABLET ORAL at 08:20

## 2018-12-25 RX ADMIN — HYDROCODONE BITARTRATE AND ACETAMINOPHEN 1 TABLET: 5; 325 TABLET ORAL at 00:21

## 2018-12-25 RX ADMIN — CARVEDILOL 12.5 MG: 12.5 TABLET, FILM COATED ORAL at 17:30

## 2018-12-25 RX ADMIN — HYDROCODONE BITARTRATE AND ACETAMINOPHEN 1 TABLET: 5; 325 TABLET ORAL at 08:20

## 2018-12-25 RX ADMIN — ENOXAPARIN SODIUM 40 MG: 40 INJECTION SUBCUTANEOUS at 17:29

## 2018-12-25 RX ADMIN — Medication 5 ML: at 23:22

## 2018-12-25 RX ADMIN — Medication 10 ML: at 06:00

## 2018-12-25 RX ADMIN — HYDROCODONE BITARTRATE AND ACETAMINOPHEN 1 TABLET: 5; 325 TABLET ORAL at 14:27

## 2018-12-25 RX ADMIN — ATORVASTATIN CALCIUM 80 MG: 40 TABLET, FILM COATED ORAL at 08:20

## 2018-12-25 RX ADMIN — ALPRAZOLAM 0.5 MG: 0.5 TABLET ORAL at 23:22

## 2018-12-25 RX ADMIN — GABAPENTIN 300 MG: 300 CAPSULE ORAL at 17:30

## 2018-12-25 RX ADMIN — CARVEDILOL 12.5 MG: 12.5 TABLET, FILM COATED ORAL at 08:21

## 2018-12-25 RX ADMIN — Medication 10 ML: at 13:39

## 2018-12-25 RX ADMIN — PANTOPRAZOLE SODIUM 40 MG: 40 TABLET, DELAYED RELEASE ORAL at 08:21

## 2018-12-25 RX ADMIN — Medication 400 MG: at 20:31

## 2018-12-25 NOTE — PROGRESS NOTES
Problem: Falls - Risk of  Goal: *Absence of Falls  Document Chery Fall Risk and appropriate interventions in the flowsheet. Outcome: Progressing Towards Goal  Fall Risk Interventions:    Pt progressing towards goal. No falls since admission. Bed low and locked. Call light within reach. Side rails x 2. Gripper socks applied. Personal belongings within reach. Pt verbalizes understanding to call for assistance.             Medication Interventions: Patient to call before getting OOB         History of Falls Interventions: Bed/chair exit alarm

## 2018-12-25 NOTE — PROGRESS NOTES
Verbal bedside report received from Duyen, [facility]. Assumed care of patient.

## 2018-12-25 NOTE — PROGRESS NOTES
[facility] CARDIOLOGY PROGRESS NOTE           12/25/2018 8:22 AM    Admit Date: 12/22/2018    Admit Diagnosis: Inappropriate shocks from ICD (implantable cardioverter-defibrillator)      Subjective:   No complaints this AM, no chest pain or shortness of breath    Interval History: (History of pertinent interval events obtained from nursing staff)    ROS:  GEN:  No fever or chills  Cardiovascular:  As noted above  Pulmonary:  As noted above  Neuro:  No new focal motor or sensory loss      Objective:     Vitals:    12/24/18 1623 12/24/18 2105 12/25/18 0050 12/25/18 0445   BP: 100/68 107/68 114/78 109/78   Pulse: (!) 104 (!) 105 (!) 105 99   Resp: 18 18 18 18   Temp: 98.6 °F (37 °C) 99.4 °F (37.4 °C) 98.8 °F (37.1 °C) 98.7 °F (37.1 °C)   SpO2: 98% 99% 99% 98%   Weight:    70.5 kg (155 lb 6.4 oz)   Height:           Physical Exam:  General-Well Developed, Well Nourished, No Acute Distress, Alert & Oriented x 3, appropriate mood. Neck- supple, no JVD  CV- regular rate and rhythm no MRG  Lung- clear bilaterally  Abd- soft, nontender, nondistended  Ext- no edema bilaterally.   Skin- warm and dry    Current Facility-Administered Medications   Medication Dose Route Frequency    pantoprazole (PROTONIX) tablet 40 mg  40 mg Oral ACB    alum-mag hydroxide-simeth (MYLANTA) oral suspension 30 mL  30 mL Oral Q4H PRN    HYDROcodone-acetaminophen (NORCO) 5-325 mg per tablet 1 Tab  1 Tab Oral Q6H PRN    acetaminophen (TYLENOL) tablet 650 mg  650 mg Oral Q6H PRN    ondansetron (ZOFRAN ODT) tablet 4 mg  4 mg Oral Q6H PRN    traMADol (ULTRAM) tablet 50 mg  50 mg Oral Q6H PRN    atorvastatin (LIPITOR) tablet 80 mg  80 mg Oral DAILY    carvedilol (COREG) tablet 12.5 mg  12.5 mg Oral BID WITH MEALS    losartan (COZAAR) tablet 25 mg  25 mg Oral DAILY    magnesium oxide (MAG-OX) tablet 400 mg  400 mg Oral Q12H    potassium chloride (K-DUR, KLOR-CON) SR tablet 20 mEq  20 mEq Oral DAILY    sodium chloride (NS) flush 5-10 mL  5-10 mL IntraVENous Q8H    sodium chloride (NS) flush 5-10 mL  5-10 mL IntraVENous PRN    enoxaparin (LOVENOX) injection 40 mg  40 mg SubCUTAneous Q24H     Data Review:   Recent Results (from the past 24 hour(s))   METABOLIC PANEL, BASIC    Collection Time: 12/25/18  3:51 AM   Result Value Ref Range    Sodium 138 136 - 145 mmol/L    Potassium 4.2 3.5 - 5.1 mmol/L    Chloride 107 98 - 107 mmol/L    CO2 25 21 - 32 mmol/L    Anion gap 6 (L) 7 - 16 mmol/L    Glucose 94 65 - 100 mg/dL    BUN 9 6 - 23 MG/DL    Creatinine 0.76 (L) 0.8 - 1.5 MG/DL    GFR est AA >60 >60 ml/min/1.73m2    GFR est non-AA >60 >60 ml/min/1.73m2    Calcium 7.7 (L) 8.3 - 10.4 MG/DL   MAGNESIUM    Collection Time: 12/25/18  3:51 AM   Result Value Ref Range    Magnesium 1.9 1.8 - 2.4 mg/dL       EKG:  (EKG has been independently visualized by me with interpretation below)  Assessment:     Principal Problem:    Inappropriate shocks from ICD (implantable cardioverter-defibrillator) (12/22/2018)    Active Problems:    Chronic systolic heart failure ([address] (4/21/2011)      ICD (implantable cardioverter-defibrillator) in place (4/22/2011)      HTN (hypertension) (11/29/2011)      Ventricular tachycardia ([address] (2/22/2016)      Non-ischemic cardiomyopathy ([address] (3/24/2016)      SVT (supraventricular tachycardia) ([address] (12/22/2018)      Acute metabolic encephalopathy (16/44/6940)      Plan:   1.  ICD shocks, uncontrolled: Interrogation reviewed, appears ATP and ICD shocks for SVT in the setting of critically low potassium, magnesium and medications noncompliance. Will replace electrolytes, cont coreg and titrate up as tolerated, pending response may need to consider short term amiodarone, however, Pt has not been taking current medications. TTE with EF 15%, cont OMT     2. Nausea, vomiting: improved     3. SOB: unclear etiology CXR is clear, clear lungs this AM, improved     4.  ICD: AIDA, Pt is currently scheduled for ICD gen change as outpatient on 1/2     5. CAD: nonobstructive, cardiomyopathy is nonischemic     6. AMS: resolved, unclear etiology, appreciate hospitalist input      Cheo Paulino MD  Cardiology/Electrophysiology
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
## 2018-12-25 NOTE — PROGRESS NOTES
Bedside and Verbal shift change report given to self (oncoming nurse) by Chris Kay RN (offgoing nurse). Report included the following information SBAR, Kardex, MAR and Recent Results.

## 2018-12-25 NOTE — PROGRESS NOTES
Verbal bedside report given to Bowen Lopez oncoming RN. Patient's situation, background, assessment and recommendations provided. Opportunity for questions provided. Oncoming RN assumed care of patient.

## 2018-12-26 VITALS
BODY MASS INDEX: 21.76 KG/M2 | HEART RATE: 99 BPM | SYSTOLIC BLOOD PRESSURE: 142 MMHG | OXYGEN SATURATION: 100 % | DIASTOLIC BLOOD PRESSURE: 103 MMHG | WEIGHT: 155.4 LBS | RESPIRATION RATE: 16 BRPM | HEIGHT: 71 IN | TEMPERATURE: 98.1 F

## 2018-12-26 LAB — MAGNESIUM SERPL-MCNC: 1.9 MG/DL (ref 1.8–2.4)

## 2018-12-26 PROCEDURE — 83735 ASSAY OF MAGNESIUM: CPT

## 2018-12-26 PROCEDURE — 74011250637 HC RX REV CODE- 250/637: Performed by: FAMILY MEDICINE

## 2018-12-26 PROCEDURE — 74011250637 HC RX REV CODE- 250/637: Performed by: NURSE PRACTITIONER

## 2018-12-26 PROCEDURE — 36415 COLL VENOUS BLD VENIPUNCTURE: CPT

## 2018-12-26 RX ORDER — ATORVASTATIN CALCIUM 80 MG/1
80 TABLET, FILM COATED ORAL DAILY
Qty: 90 TAB | Refills: 3 | Status: SHIPPED | OUTPATIENT
Start: 2018-12-26 | End: 2019-12-06 | Stop reason: SDUPTHER

## 2018-12-26 RX ORDER — CARVEDILOL 12.5 MG/1
12.5 TABLET ORAL 2 TIMES DAILY WITH MEALS
Qty: 60 TAB | Refills: 11 | Status: SHIPPED | OUTPATIENT
Start: 2018-12-26 | End: 2019-12-06

## 2018-12-26 RX ORDER — ALPRAZOLAM 1 MG/1
0.5 TABLET ORAL
Qty: 30 TAB | Refills: 3 | Status: SHIPPED | OUTPATIENT
Start: 2018-12-26 | End: 2019-02-07 | Stop reason: SDUPTHER

## 2018-12-26 RX ORDER — LANOLIN ALCOHOL/MO/W.PET/CERES
400 CREAM (GRAM) TOPICAL EVERY 12 HOURS
Qty: 180 TAB | Refills: 3 | Status: SHIPPED | OUTPATIENT
Start: 2018-12-26 | End: 2020-10-05

## 2018-12-26 RX ORDER — LOSARTAN POTASSIUM 25 MG/1
25 TABLET ORAL DAILY
Qty: 90 TAB | Refills: 3 | Status: SHIPPED | OUTPATIENT
Start: 2018-12-26 | End: 2019-05-11

## 2018-12-26 RX ORDER — ONDANSETRON 4 MG/1
4 TABLET, ORALLY DISINTEGRATING ORAL
Qty: 60 TAB | Refills: 3 | Status: SHIPPED | OUTPATIENT
Start: 2018-12-26 | End: 2018-12-26

## 2018-12-26 RX ORDER — FUROSEMIDE 40 MG/1
40 TABLET ORAL DAILY
Qty: 90 TAB | Refills: 3 | Status: SHIPPED | OUTPATIENT
Start: 2018-12-26 | End: 2019-05-11

## 2018-12-26 RX ORDER — POTASSIUM CHLORIDE 20 MEQ/1
20 TABLET, EXTENDED RELEASE ORAL DAILY
Qty: 90 TAB | Refills: 3 | Status: SHIPPED | OUTPATIENT
Start: 2018-12-26 | End: 2019-05-11

## 2018-12-26 RX ADMIN — Medication 10 ML: at 05:59

## 2018-12-26 RX ADMIN — GABAPENTIN 300 MG: 300 CAPSULE ORAL at 08:00

## 2018-12-26 RX ADMIN — HYDROCODONE BITARTRATE AND ACETAMINOPHEN 1 TABLET: 5; 325 TABLET ORAL at 09:59

## 2018-12-26 RX ADMIN — LOSARTAN POTASSIUM 25 MG: 25 TABLET ORAL at 08:00

## 2018-12-26 RX ADMIN — PANTOPRAZOLE SODIUM 40 MG: 40 TABLET, DELAYED RELEASE ORAL at 07:59

## 2018-12-26 RX ADMIN — Medication 400 MG: at 08:00

## 2018-12-26 RX ADMIN — ATORVASTATIN CALCIUM 80 MG: 40 TABLET, FILM COATED ORAL at 08:00

## 2018-12-26 RX ADMIN — POTASSIUM CHLORIDE 20 MEQ: 20 TABLET, EXTENDED RELEASE ORAL at 08:00

## 2018-12-26 RX ADMIN — HYDROCODONE BITARTRATE AND ACETAMINOPHEN 1 TABLET: 5; 325 TABLET ORAL at 02:49

## 2018-12-26 RX ADMIN — CARVEDILOL 12.5 MG: 12.5 TABLET, FILM COATED ORAL at 07:59

## 2018-12-26 NOTE — PROGRESS NOTES
Verbal bedside report received from Lyn Crow, [facility]. Assumed care of patient.

## 2018-12-26 NOTE — PROGRESS NOTES
Discharge instructions reviewed with Patient. Prescriptions given for coreg, potassium, magnesium, cozaar and lipitor and med info sheets provided for all new medications. Opportunity for questions provided. Pt voiced understanding of all discharge instructions.

## 2018-12-26 NOTE — PROGRESS NOTES
Care Management Interventions  PCP Verified by CM: Yes  Palliative Care Criteria Met (RRAT>21 & CHF Dx)?: No(RRAT 14 Dx ICD shocks)  Mode of Transport at Discharge: Other (see comment)(family)  Transition of Care Consult (CM Consult): Discharge Planning  Discharge Durable Medical Equipment: No  Physical Therapy Consult: No  Occupational Therapy Consult: No  Speech Therapy Consult: No  Current Support Network: Lives with Spouse  Confirm Follow Up Transport: Family  Plan discussed with Pt/Family/Caregiver: Yes  Freedom of Choice Offered: Yes  Discharge Location  Discharge Placement: Home  Patient d/c home with family.  Voices no concerns or needs for d/c

## 2018-12-26 NOTE — DISCHARGE INSTRUCTIONS
DISCHARGE SUMMARY from Nurse    PATIENT INSTRUCTIONS:    After general anesthesia or intravenous sedation, for 24 hours or while taking prescription Narcotics:  · Limit your activities  · Do not drive and operate hazardous machinery  · Do not make important personal or business decisions  · Do  not drink alcoholic beverages  · If you have not urinated within 8 hours after discharge, please contact your surgeon on call. Report the following to your surgeon:  · Excessive pain, swelling, redness or odor of or around the surgical area  · Temperature over 100.5  · Nausea and vomiting lasting longer than 4 hours or if unable to take medications  · Any signs of decreased circulation or nerve impairment to extremity: change in color, persistent  numbness, tingling, coldness or increase pain  · Any questions    What to do at Home:  Recommended activity: Activity as tolerated    If you experience any of the following symptoms chest pain, shortness of breath, weight gain of 3 pounds overnight or 5 pounds in a week please follow up with [address] [place] [address] [address] [facility]. *  Please give a list of your current medications to your Primary Care Provider. *  Please update this list whenever your medications are discontinued, doses are      changed, or new medications (including over-the-counter products) are added. *  Please carry medication information at all times in case of emergency situations. These are general instructions for a healthy lifestyle:    No smoking/ No tobacco products/ Avoid exposure to second hand smoke  Surgeon General's Warning:  Quitting smoking now greatly reduces serious risk to your health.     Obesity, smoking, and sedentary lifestyle greatly increases your risk for illness    A healthy diet, regular physical exercise & weight monitoring are important for maintaining a healthy lifestyle    You may be retaining fluid if you have a history of heart failure or if you experience any of the following symptoms:  Weight gain of 3 pounds or more overnight or 5 pounds in a week, increased swelling in our hands or feet or shortness of breath while lying flat in bed. Please call your doctor as soon as you notice any of these symptoms; do not wait until your next office visit. Recognize signs and symptoms of STROKE:    F-face looks uneven    A-arms unable to move or move unevenly    S-speech slurred or non-existent    T-time-call 911 as soon as signs and symptoms begin-DO NOT go       Back to bed or wait to see if you get better-TIME IS BRAIN. Warning Signs of HEART ATTACK     Call 911 if you have these symptoms:   Chest discomfort. Most heart attacks involve discomfort in the center of the chest that lasts more than a few minutes, or that goes away and comes back. It can feel like uncomfortable pressure, squeezing, fullness, or pain.  Discomfort in other areas of the upper body. Symptoms can include pain or discomfort in one or both arms, the back, neck, jaw, or stomach.  Shortness of breath with or without chest discomfort.  Other signs may include breaking out in a cold sweat, nausea, or lightheadedness. Don't wait more than five minutes to call 911 - MINUTES MATTER! Fast action can save your life. Calling 911 is almost always the fastest way to get lifesaving treatment. Emergency Medical Services staff can begin treatment when they arrive -- up to an hour sooner than if someone gets to the hospital by car. The discharge information has been reviewed with the patient. The patient verbalized understanding. Discharge medications reviewed with the patient and appropriate educational materials and side effects teaching were provided. ___________________________________________________________________________________________________________________________________     Learning About ICD Shocks  What are ICDs and ICD shocks?     An implantable cardioverter-defibrillator (ICD) is a device that is placed under the skin of your chest. It has thin wires (called leads). Most of the time, these leads are placed inside the heart. The ICD is always checking your heart. If it detects a life-threatening rapid heart rhythm, it tries to slow the rhythm to get it back to normal. If the dangerous rhythm does not stop, the ICD sends an electric shock to the heart to restore a normal rhythm. The device then goes back to its watchful mode. The idea of living with an ICD and getting shocked worries some people. The shock can be uncomfortable. It may feel like you are being kicked in the chest. For many people, getting a shock can cause anxiety and depression. It's normal to be worried about living with an ICD. After all, you don't know when a shock might occur, and a shock could be a reminder that your heart is not as healthy as it could be. But an ICD is an important part of your treatment. It can save your life. If you take a few simple steps, you can feel better about having an ICD. How can you get over your fears about the ICD? Know your ICD treatment  · Learn how the ICD works, what it does, and how it keeps you safe. This can help reduce any anxiety you may feel. · Keep your regular doctor appointments. Your doctor:  ? Sets both the rate at which a shock will occur and the level of shock needed to restore your heart to a normal rate. ? Checks to see whether the ICD has given you any shocks since your last visit. This helps your doctor know if your medicines need to be adjusted. ? Checks the ICD battery and replaces it as needed. · Talk with others who have an ICD. Ask them if they have been shocked and what it was like. Ask them how they cope with it. Talking with others can help you feel better. · Always carry your ICD identity card, a list of all the medicines you are taking, and your doctor's name and phone number. This will help you get the best possible treatment if you get a shock and need help.   Make an action plan  Talk to your doctor about making an action plan for what to do if you get shocked. Here is an example:  · After one shock:  ? Call 911 or other emergency services right away if you feel bad or have symptoms like chest pain. ? Call your doctor soon if you feel fine right away after the shock. Your doctor may want to talk about the shock and schedule a follow-up visit. · If you get a second shock in a 24-hour period, call your doctor right away. Call even if you feel fine right away. Stay calm after a shock  · Follow the action plan you made with your doctor. · Do some breathing exercises. They may help you relax. ? Sit or lie in a comfortable position. Put one hand on your belly just below your ribs and the other hand on your chest.  ? Take a deep breath in through your nose, and let your belly push your hand out. Your chest should not move. ? Breathe out through pursed lips as if you were whistling. Feel the hand on your belly go in, and use it to push all the air out. ? Breathe in and out like this until you feel more relaxed. · Keep a good attitude. When you've had a shock, you may question how healthy you are or worry about getting another shock. But try to focus on the positive things in your life, like loving relationships, pleasant activities, or good friends. · Don't make changes in what you do. You may want to avoid an action because you think it caused the shock. But a shock can occur at any time, and you can't prevent shocks by your actions alone. Don't stop doing things you enjoy to try to avoid a shock. Follow-up care is a key part of your treatment and safety. Be sure to make and go to all appointments, and call your doctor if you are having problems. It's also a good idea to know your test results and keep a list of the medicines you take. Where can you learn more? Go to http://edmundo-isi.info/.   Enter M515 in the search box to learn more about \"Learning About ICD Shocks. \"  Current as of: December 6, 2017  Content Version: 11.8  © 3429-6284 [employer]. Care instructions adapted under license by [employer] (which disclaims liability or warranty for this information). If you have questions about a medical condition or this instruction, always ask your healthcare professional. [facility] [address] any warranty or liability for your use of this information. Avoiding Triggers With Heart Failure: Care Instructions  Your Care Instructions    Triggers are anything that make your heart failure flare up. A flare-up is also called \"sudden heart failure\" or \"acute heart failure. \" When you have a flare-up, fluid builds up in your lungs, and you have problems breathing. You might need to go to the hospital. By watching for changes in your condition and avoiding triggers, you can prevent heart failure flare-ups. Follow-up care is a key part of your treatment and safety. Be sure to make and go to all appointments, and call your doctor if you are having problems. It's also a good idea to know your test results and keep a list of the medicines you take. How can you care for yourself at home? Watch for changes in your weight and condition  · Weigh yourself without clothing at the same time each day. Record your weight. Call your doctor if you have sudden weight gain, such as more than 2 to 3 pounds in a day or 5 pounds in a week. (Your doctor may suggest a different range of weight gain.) A sudden weight gain may mean that your heart failure is getting worse. · Keep a daily record of your symptoms. Write down any changes in how you feel, such as new shortness of breath, cough, or problems eating. Also record if your ankles are more swollen than usual and if you feel more tired than usual. Note anything that you ate or did that could have triggered these changes. Limit sodium  Sodium causes your body to hold on to extra water. This may cause your heart failure symptoms to get worse. People get most of their sodium from processed foods. Fast food and restaurant meals also tend to be very high in sodium. · Your doctor may suggest that you limit sodium to 2,000 milligrams (mg) a day or less. That is less than 1 teaspoon of salt a day, including all the salt you eat in cooking or in packaged foods. · Read food labels on cans and food packages. They tell you how much sodium you get in one serving. Check the serving size. If you eat more than one serving, you are getting more sodium. · Be aware that sodium can come in forms other than salt, including monosodium glutamate (MSG), sodium citrate, and sodium bicarbonate (baking soda). MSG is often added to Asian food. You can sometimes ask for food without MSG or salt. · Slowly reducing salt will help you adjust to the taste. Take the salt shaker off the table. · Flavor your food with garlic, lemon juice, onion, vinegar, herbs, and spices instead of salt. Do not use soy sauce, steak sauce, onion salt, garlic salt, mustard, or ketchup on your food, unless it is labeled \"low-sodium\" or \"low-salt. \"  · Make your own salad dressings, sauces, and ketchup without adding salt. · Use fresh or frozen ingredients, instead of canned ones, whenever you can. Choose low-sodium canned goods. · Eat less processed food and food from restaurants, including fast food. Exercise as directed  Moderate, regular exercise is very good for your heart. It improves your blood flow and helps control your weight. But too much exercise can stress your heart and cause a heart failure flare-up. · Check with your doctor before you start an exercise program.  · Walking is an easy way to get exercise. Start out slowly. Gradually increase the length and pace of your walk. Swimming, riding a bike, and using a treadmill are also good forms of exercise. · When you exercise, watch for signs that your heart is working too hard. You are pushing yourself too hard if you cannot talk while you are exercising. If you become short of breath or dizzy or have chest pain, stop, sit down, and rest.  · Do not exercise when you do not feel well. Take medicines correctly  · Take your medicines exactly as prescribed. Call your doctor if you think you are having a problem with your medicine. · Make a list of all the medicines you take. Include those prescribed to you by other doctors and any over-the-counter medicines, vitamins, or supplements you take. Take this list with you when you go to any doctor. · Take your medicines at the same time every day. It may help you to post a list of all the medicines you take every day and what time of day you take them. · Make taking your medicine as simple as you can. Plan times to take your medicines when you are doing other things, such as eating a meal or getting ready for bed. This will make it easier to remember to take your medicines. · Get organized. Use helpful tools, such as daily or weekly pill containers. When should you call for help? Call 911 if you have symptoms of sudden heart failure such as:    · You have severe trouble breathing.     · You cough up pink, foamy mucus.     · You have a new irregular or rapid heartbeat.    Call your doctor now or seek immediate medical care if:    · You have new or increased shortness of breath.     · You are dizzy or lightheaded, or you feel like you may faint.     · You have sudden weight gain, such as more than 2 to 3 pounds in a day or 5 pounds in a week. (Your doctor may suggest a different range of weight gain.)     · You have increased swelling in your legs, ankles, or feet.     · You are suddenly so tired or weak that you cannot do your usual activities.    Watch closely for changes in your health, and be sure to contact your doctor if you develop new symptoms. Where can you learn more? Go to http://edmundo-isi.info/.   Enter R278 in the search box to learn more about \"Avoiding Triggers With Heart Failure: Care Instructions. \"  Current as of: December 6, 2017  Content Version: 11.8  © 3220-2840 [employer]. Care instructions adapted under license by [employer] (which disclaims liability or warranty for this information). If you have questions about a medical condition or this instruction, always ask your healthcare professional. [place] [address] any warranty or liability for your use of this information.

## 2018-12-26 NOTE — PROGRESS NOTES
[facility] CARDIOLOGY PROGRESS NOTE           12/26/2018 6:50 AM    Admit Date: 12/22/2018    Admit Diagnosis: Inappropriate shocks from ICD (implantable cardioverter-defibrillator)      Subjective:   No complaints this AM, no chest pain or shortness of breath    Interval History: (History of pertinent interval events obtained from nursing staff)  No events    ROS:  GEN:  No fever or chills  Cardiovascular:  As noted above  Pulmonary:  As noted above  Neuro:  No new focal motor or sensory loss      Objective:     Vitals:    12/25/18 1850 12/25/18 2047 12/26/18 0054 12/26/18 0501   BP: 132/79 116/84 102/70 129/77   Pulse: 96 93 (!) 108 100   Resp: 20 18 18 16   Temp: 99 °F (37.2 °C) 99.2 °F (37.3 °C) 98.1 °F (36.7 °C) 97.5 °F (36.4 °C)   SpO2: 96% 99% 100% 100%   Weight:    70.5 kg (155 lb 6.4 oz)   Height:           Physical Exam:  General-Well Developed, Well Nourished, No Acute Distress, Alert & Oriented x 3, appropriate mood. Neck- supple, no JVD  CV- regular rate and rhythm no MRG  Lung- clear bilaterally  Abd- soft, nontender, nondistended  Ext- no edema bilaterally.   Skin- warm and dry    Current Facility-Administered Medications   Medication Dose Route Frequency    gabapentin (NEURONTIN) capsule 300 mg  300 mg Oral BID    ALPRAZolam (XANAX) tablet 0.5 mg  0.5 mg Oral QHS PRN    pantoprazole (PROTONIX) tablet 40 mg  40 mg Oral ACB    alum-mag hydroxide-simeth (MYLANTA) oral suspension 30 mL  30 mL Oral Q4H PRN    HYDROcodone-acetaminophen (NORCO) 5-325 mg per tablet 1 Tab  1 Tab Oral Q6H PRN    acetaminophen (TYLENOL) tablet 650 mg  650 mg Oral Q6H PRN    ondansetron (ZOFRAN ODT) tablet 4 mg  4 mg Oral Q6H PRN    traMADol (ULTRAM) tablet 50 mg  50 mg Oral Q6H PRN    atorvastatin (LIPITOR) tablet 80 mg  80 mg Oral DAILY    carvedilol (COREG) tablet 12.5 mg  12.5 mg Oral BID WITH MEALS    losartan (COZAAR) tablet 25 mg  25 mg Oral DAILY    magnesium oxide (MAG-OX) tablet 400 mg 400 mg Oral Q12H    potassium chloride (K-DUR, KLOR-CON) SR tablet 20 mEq  20 mEq Oral DAILY    sodium chloride (NS) flush 5-10 mL  5-10 mL IntraVENous Q8H    sodium chloride (NS) flush 5-10 mL  5-10 mL IntraVENous PRN    enoxaparin (LOVENOX) injection 40 mg  40 mg SubCUTAneous Q24H     Data Review:   Recent Results (from the past 24 hour(s))   MAGNESIUM    Collection Time: 12/26/18  3:49 AM   Result Value Ref Range    Magnesium 1.9 1.8 - 2.4 mg/dL       EKG:  (EKG has been independently visualized by me with interpretation below)  Assessment:     Principal Problem:    Inappropriate shocks from ICD (implantable cardioverter-defibrillator) (12/22/2018)    Active Problems:    Chronic systolic heart failure ([address] (4/21/2011)      ICD (implantable cardioverter-defibrillator) in place (4/22/2011)      HTN (hypertension) (11/29/2011)      Ventricular tachycardia ([address] (2/22/2016)      Non-ischemic cardiomyopathy ([address] (3/24/2016)      SVT (supraventricular tachycardia) ([address] (12/22/2018)      Acute metabolic encephalopathy (45/67/6039)      Plan:   1.  ICD shocks: Interrogation reviewed, appears ATP and ICD shocks for SVT in the setting of critically low potassium, magnesium and medications noncompliance. Replaced electrolytes, cont coreg and titrate up as tolerated. TTE with EF 15%, cont OMT     2. Nausea, vomiting: resolved     3. SOB: unclear etiology CXR is clear, clear lungs this AM, resolved     4. ICD: AIDA, Pt is currently scheduled for ICD gen change as outpatient on 1/2     5. CAD: nonobstructive, cardiomyopathy is nonischemic     6. AMS: resolved    7. Dispo: D/C home today    Liana Paulino MD  Cardiology/Electrophysiology

## 2018-12-26 NOTE — DISCHARGE SUMMARY
[facility] Cardiology Discharge Summary     Patient ID:  Kami Chandler  [id number]  46 y.o.  1965    Admit date: 12/22/2018    Discharge date and time:  12/26/18    Admitting Physician: Starr Lovell MD     Discharge Physician: Angela Jaime NP/Dr. Endy Caraballo    Admission Diagnoses: Inappropriate shocks from ICD (implantable cardioverter-defibrillator)    Discharge Diagnoses:   Patient Active Problem List    Diagnosis Date Noted    Acute metabolic encephalopathy 65/73/7381    SVT (supraventricular tachycardia) ([address] 12/22/2018    Inappropriate shocks from ICD (implantable cardioverter-defibrillator) 12/22/2018    Elevated lactic acid level 07/09/2018    Macrocytic anemia 07/08/2018    High anion gap metabolic acidosis 47/00/6794    Elevated bilirubin 07/08/2018    Dental caries 07/13/2017    CAD (coronary artery disease) 03/18/2017    Chronic back pain 03/18/2017    Anxiety associated with depression 03/18/2017    Elevated transaminase level 03/14/2017    Non-ischemic cardiomyopathy ([address] 03/24/2016    Nausea & vomiting 02/26/2016    Gynecomastia 02/22/2016    Ventricular tachycardia ([address] 02/22/2016    HTN (hypertension) 11/29/2011    ICD (implantable cardioverter-defibrillator) in place 04/22/2011    Chronic systolic heart failure ([address] 04/21/2011       Cardiology Procedures this admission: PPM interrogation,     Consults: IM for [address] Course:     He presents to ER on 12/22/2018 after sustaining several shocks from his ICD. He was noted to be profoundly hypokalemic and hypomagnesia. Interrogation of his device revealed shocks for SVT. He has a hx of ETOH abuse and medical non compliance. He was admitted to the hospital for Echo, PPM interrogation, and Electrolytes replacement. After stopping home medications that could cause possible AMS including pain medication, Neurontin, and xanax by IM the patient AMS resolved. Echo: SUMMARY:    -  Left ventricle:  The ventricle was mildly dilated. Systolic function was  markedly reduced. Ejection fraction was estimated to be 15 %. There was   severe  diffuse hypokinesis. Wall thickness was mildly increased. There was mild  concentric hypertrophy. -  Left atrium: The atrium was mildly dilated. The day of discharge the patient K and Mag have returned to normal values. PT requested new scripts for his cardiac medications at discharge. He was instructed on the proper use of medication and the importance of medication compliance to avoid further ICD shocks. Pt was seen and examined by Dr. Tony Farley and determined stable and ready for discharge. Pt was instructed to follow discharge instructions given by nursing staff. He has a follow up appointment for a Device battery change on Wednesday Jan 2 at 1 pm.  The patient was instructed this is for end of life of his device and the importance of making his surgical appointment. DISPOSITION: The patient is being discharged home in stable condition on a low saturated fat, low cholesterol and low salt diet. Pt is instructed to advance activities as tolerated limited to fatigue or shortness of breath. Discharge Exam:   Visit Vitals  BP (!) 142/103   Pulse 99   Temp 98.1 °F (36.7 °C)   Resp 16   Ht 5' 11\" (1.803 m)   Wt 70.5 kg (155 lb 6.4 oz)   SpO2 100%   BMI 21.67 kg/m²       Recent Results (from the past 24 hour(s))   MAGNESIUM    Collection Time: 12/26/18  3:49 AM   Result Value Ref Range    Magnesium 1.9 1.8 - 2.4 mg/dL         Patient Instructions:     Current Discharge Medication List      CONTINUE these medications which have CHANGED    Details   carvedilol (COREG) 12.5 mg tablet Take 1 Tab by mouth two (2) times daily (with meals). Qty: 60 Tab, Refills: 11      ALPRAZolam (XANAX) 1 mg tablet Take 0.5 Tabs by mouth nightly.  Max Daily Amount: 0.5 mg.  Qty: 30 Tab, Refills: 3    Associated Diagnoses: Anxiety      potassium chloride (K-DUR, KLOR-CON) 20 mEq tablet Take 1 Tab by mouth daily. Qty: 90 Tab, Refills: 3      magnesium oxide (MAG-OX) 400 mg tablet Take 1 Tab by mouth every twelve (12) hours. Qty: 180 Tab, Refills: 3      furosemide (LASIX) 40 mg tablet Take 1 Tab by mouth daily. Qty: 90 Tab, Refills: 3      losartan (COZAAR) 25 mg tablet Take 1 Tab by mouth daily. Qty: 90 Tab, Refills: 3      atorvastatin (LIPITOR) 80 mg tablet Take 1 Tab by mouth daily. Qty: 90 Tab, Refills: 3         CONTINUE these medications which have NOT CHANGED    Details   HYDROcodone-acetaminophen (NORCO)  mg tablet 1 tablet q4-6hrs prn pain,  Qty: 150 Tab, Refills: 0    Associated Diagnoses: Chronic midline low back pain without sciatica; Chronic neck pain      sildenafil citrate (VIAGRA) 100 mg tablet Take 1 Tab by mouth as needed. Qty: 10 Tab, Refills: 3      promethazine (PHENERGAN) 12.5 mg tablet Take 1 Tab by mouth every six (6) hours as needed for Nausea. Qty: 30 Tab, Refills: 4    Associated Diagnoses: Nausea      gabapentin (NEURONTIN) 300 mg capsule Take 1 Cap by mouth three (3) times daily. 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                Signed:  Megan Brenner NP  12/26/2018  7:11 AM

## 2018-12-26 NOTE — PROGRESS NOTES
Verbal bedside report given to Champ Conti oncoming RN. Patient's situation, background, assessment and recommendations provided. Opportunity for questions provided. Oncoming RN assumed care of patient.

## 2018-12-31 ENCOUNTER — ANESTHESIA EVENT (OUTPATIENT)
Dept: SURGERY | Age: 53
End: 2018-12-31
Payer: COMMERCIAL

## 2018-12-31 RX ORDER — HYDROMORPHONE HYDROCHLORIDE 2 MG/ML
0.5 INJECTION, SOLUTION INTRAMUSCULAR; INTRAVENOUS; SUBCUTANEOUS
Status: CANCELLED | OUTPATIENT
Start: 2018-12-31

## 2018-12-31 RX ORDER — SODIUM CHLORIDE 9 MG/ML
50 INJECTION, SOLUTION INTRAVENOUS CONTINUOUS
Status: CANCELLED | OUTPATIENT
Start: 2018-12-31

## 2018-12-31 RX ORDER — ACETAMINOPHEN 500 MG
1000 TABLET ORAL
Status: CANCELLED | OUTPATIENT
Start: 2018-12-31

## 2018-12-31 RX ORDER — HYDROCODONE BITARTRATE AND ACETAMINOPHEN 5; 325 MG/1; MG/1
1 TABLET ORAL AS NEEDED
Status: CANCELLED | OUTPATIENT
Start: 2018-12-31

## 2018-12-31 RX ORDER — SODIUM CHLORIDE, SODIUM LACTATE, POTASSIUM CHLORIDE, CALCIUM CHLORIDE 600; 310; 30; 20 MG/100ML; MG/100ML; MG/100ML; MG/100ML
150 INJECTION, SOLUTION INTRAVENOUS CONTINUOUS
Status: CANCELLED | OUTPATIENT
Start: 2018-12-31

## 2018-12-31 NOTE — PROGRESS NOTES
Patient pre-assessment complete for ICD generator change with DR Rosario Mccoy scheduled for 19 at 12:30 pm, arrival time 10:30am. Patient verified using . Patient instructed to bring all home medications in labeled bottles on the day of procedure. NPO status reinforced. Patient instructed to HOLD lasix & losartan the am of procedure. Instructed they can take all other medications excluding vitamins & supplements. Patient verbalizes understanding of all instructions & denies any questions at this time.

## 2019-01-02 ENCOUNTER — HOSPITAL ENCOUNTER (OUTPATIENT)
Age: 54
Setting detail: OUTPATIENT SURGERY
Discharge: HOME | End: 2019-01-02
Attending: INTERNAL MEDICINE | Admitting: INTERNAL MEDICINE
Payer: COMMERCIAL

## 2019-01-02 ENCOUNTER — APPOINTMENT (OUTPATIENT)
Dept: CARDIAC CATH/INVASIVE PROCEDURES | Age: 54
End: 2019-01-02
Payer: COMMERCIAL

## 2019-01-02 ENCOUNTER — ANESTHESIA (OUTPATIENT)
Dept: SURGERY | Age: 54
End: 2019-01-02
Payer: COMMERCIAL

## 2019-01-02 VITALS
BODY MASS INDEX: 21.84 KG/M2 | OXYGEN SATURATION: 98 % | HEART RATE: 97 BPM | WEIGHT: 156 LBS | DIASTOLIC BLOOD PRESSURE: 96 MMHG | HEIGHT: 71 IN | SYSTOLIC BLOOD PRESSURE: 152 MMHG | TEMPERATURE: 98 F | RESPIRATION RATE: 20 BRPM

## 2019-01-02 LAB
ALBUMIN SERPL-MCNC: 3.8 G/DL (ref 3.5–5)
ALBUMIN/GLOB SERPL: 1.1 {RATIO} (ref 1.2–3.5)
ALP SERPL-CCNC: 75 U/L (ref 50–136)
ALT SERPL-CCNC: 17 U/L (ref 12–65)
ANION GAP SERPL CALC-SCNC: 8 MMOL/L (ref 7–16)
AST SERPL-CCNC: 43 U/L (ref 15–37)
ATRIAL RATE: 88 BPM
BILIRUB SERPL-MCNC: 0.9 MG/DL (ref 0.2–1.1)
BUN SERPL-MCNC: 13 MG/DL (ref 6–23)
CALCIUM SERPL-MCNC: 8.9 MG/DL (ref 8.3–10.4)
CALCULATED P AXIS, ECG09: 38 DEGREES
CALCULATED R AXIS, ECG10: -19 DEGREES
CALCULATED T AXIS, ECG11: -165 DEGREES
CHLORIDE SERPL-SCNC: 103 MMOL/L (ref 98–107)
CO2 SERPL-SCNC: 29 MMOL/L (ref 21–32)
CREAT SERPL-MCNC: 0.89 MG/DL (ref 0.8–1.5)
DIAGNOSIS, 93000: NORMAL
ERYTHROCYTE [DISTWIDTH] IN BLOOD BY AUTOMATED COUNT: 17.6 % (ref 11.9–14.6)
GLOBULIN SER CALC-MCNC: 3.6 G/DL (ref 2.3–3.5)
GLUCOSE SERPL-MCNC: 97 MG/DL (ref 65–100)
HCT VFR BLD AUTO: 33.5 % (ref 41.1–50.3)
HGB BLD-MCNC: 11.1 G/DL (ref 13.6–17.2)
INR PPP: 1
MCH RBC QN AUTO: 32.5 PG (ref 26.1–32.9)
MCHC RBC AUTO-ENTMCNC: 33.1 G/DL (ref 31.4–35)
MCV RBC AUTO: 98 FL (ref 79.6–97.8)
NRBC # BLD: 0 K/UL (ref 0–0.2)
P-R INTERVAL, ECG05: 188 MS
PLATELET # BLD AUTO: 568 K/UL (ref 150–450)
PMV BLD AUTO: 8.7 FL (ref 9.4–12.3)
POTASSIUM SERPL-SCNC: 5.3 MMOL/L (ref 3.5–5.1)
PROT SERPL-MCNC: 7.4 G/DL (ref 6.3–8.2)
PROTHROMBIN TIME: 12.9 SEC (ref 11.7–14.5)
Q-T INTERVAL, ECG07: 396 MS
QRS DURATION, ECG06: 102 MS
QTC CALCULATION (BEZET), ECG08: 479 MS
RBC # BLD AUTO: 3.42 M/UL (ref 4.23–5.6)
SODIUM SERPL-SCNC: 140 MMOL/L (ref 136–145)
VENTRICULAR RATE, ECG03: 88 BPM
WBC # BLD AUTO: 3.4 K/UL (ref 4.3–11.1)

## 2019-01-02 PROCEDURE — 74011250636 HC RX REV CODE- 250/636

## 2019-01-02 PROCEDURE — 74011250636 HC RX REV CODE- 250/636: Performed by: INTERNAL MEDICINE

## 2019-01-02 PROCEDURE — 74011000250 HC RX REV CODE- 250: Performed by: INTERNAL MEDICINE

## 2019-01-02 PROCEDURE — 85610 PROTHROMBIN TIME: CPT

## 2019-01-02 PROCEDURE — 33263 RMVL & RPLCMT DFB GEN 2 LEAD: CPT

## 2019-01-02 PROCEDURE — 77030008467 HC STPLR SKN COVD -B

## 2019-01-02 PROCEDURE — 80053 COMPREHEN METABOLIC PANEL: CPT

## 2019-01-02 PROCEDURE — 77030012935 HC DRSG AQUACEL BMS -B

## 2019-01-02 PROCEDURE — C1721 AICD, DUAL CHAMBER: HCPCS

## 2019-01-02 PROCEDURE — 74011000272 HC RX REV CODE- 272: Performed by: INTERNAL MEDICINE

## 2019-01-02 PROCEDURE — 93005 ELECTROCARDIOGRAM TRACING: CPT | Performed by: INTERNAL MEDICINE

## 2019-01-02 PROCEDURE — 77030019908 HC STETH ESOPH SIMS -A: Performed by: ANESTHESIOLOGY

## 2019-01-02 PROCEDURE — 76060000033 HC ANESTHESIA 1 TO 1.5 HR: Performed by: INTERNAL MEDICINE

## 2019-01-02 PROCEDURE — 77030028698 HC BLD TISS PLSM MEDT -D

## 2019-01-02 PROCEDURE — 85027 COMPLETE CBC AUTOMATED: CPT

## 2019-01-02 PROCEDURE — 74011250636 HC RX REV CODE- 250/636: Performed by: ANESTHESIOLOGY

## 2019-01-02 RX ORDER — FAMOTIDINE 20 MG/1
20 TABLET, FILM COATED ORAL ONCE
Status: DISCONTINUED | OUTPATIENT
Start: 2019-01-02 | End: 2019-01-02 | Stop reason: HOSPADM

## 2019-01-02 RX ORDER — MIDAZOLAM HYDROCHLORIDE 1 MG/ML
INJECTION, SOLUTION INTRAMUSCULAR; INTRAVENOUS AS NEEDED
Status: DISCONTINUED | OUTPATIENT
Start: 2019-01-02 | End: 2019-01-02 | Stop reason: HOSPADM

## 2019-01-02 RX ORDER — FENTANYL CITRATE 50 UG/ML
INJECTION, SOLUTION INTRAMUSCULAR; INTRAVENOUS AS NEEDED
Status: DISCONTINUED | OUTPATIENT
Start: 2019-01-02 | End: 2019-01-02 | Stop reason: HOSPADM

## 2019-01-02 RX ORDER — PROPOFOL 10 MG/ML
INJECTION, EMULSION INTRAVENOUS
Status: DISCONTINUED | OUTPATIENT
Start: 2019-01-02 | End: 2019-01-02 | Stop reason: HOSPADM

## 2019-01-02 RX ORDER — ONDANSETRON 2 MG/ML
4 INJECTION INTRAMUSCULAR; INTRAVENOUS ONCE
Status: COMPLETED | OUTPATIENT
Start: 2019-01-02 | End: 2019-01-02

## 2019-01-02 RX ORDER — LIDOCAINE HYDROCHLORIDE 10 MG/ML
0.1 INJECTION INFILTRATION; PERINEURAL AS NEEDED
Status: DISCONTINUED | OUTPATIENT
Start: 2019-01-02 | End: 2019-01-02 | Stop reason: HOSPADM

## 2019-01-02 RX ORDER — MIDAZOLAM HYDROCHLORIDE 1 MG/ML
2 INJECTION, SOLUTION INTRAMUSCULAR; INTRAVENOUS
Status: DISCONTINUED | OUTPATIENT
Start: 2019-01-02 | End: 2019-01-02 | Stop reason: HOSPADM

## 2019-01-02 RX ORDER — CEFAZOLIN SODIUM/WATER 2 G/20 ML
2 SYRINGE (ML) INTRAVENOUS
Status: COMPLETED | OUTPATIENT
Start: 2019-01-02 | End: 2019-01-02

## 2019-01-02 RX ORDER — FENTANYL CITRATE 50 UG/ML
100 INJECTION, SOLUTION INTRAMUSCULAR; INTRAVENOUS ONCE
Status: DISCONTINUED | OUTPATIENT
Start: 2019-01-02 | End: 2019-01-02 | Stop reason: HOSPADM

## 2019-01-02 RX ORDER — BUPIVACAINE HYDROCHLORIDE AND EPINEPHRINE 5; 5 MG/ML; UG/ML
60 INJECTION, SOLUTION EPIDURAL; INTRACAUDAL; PERINEURAL ONCE
Status: COMPLETED | OUTPATIENT
Start: 2019-01-02 | End: 2019-01-02

## 2019-01-02 RX ORDER — PROPOFOL 10 MG/ML
INJECTION, EMULSION INTRAVENOUS AS NEEDED
Status: DISCONTINUED | OUTPATIENT
Start: 2019-01-02 | End: 2019-01-02 | Stop reason: HOSPADM

## 2019-01-02 RX ORDER — CEFAZOLIN SODIUM/WATER 2 G/20 ML
2 SYRINGE (ML) INTRAVENOUS ONCE
Status: DISCONTINUED | OUTPATIENT
Start: 2019-01-02 | End: 2019-01-02 | Stop reason: HOSPADM

## 2019-01-02 RX ORDER — SODIUM CHLORIDE, SODIUM LACTATE, POTASSIUM CHLORIDE, CALCIUM CHLORIDE 600; 310; 30; 20 MG/100ML; MG/100ML; MG/100ML; MG/100ML
150 INJECTION, SOLUTION INTRAVENOUS CONTINUOUS
Status: DISCONTINUED | OUTPATIENT
Start: 2019-01-02 | End: 2019-01-02 | Stop reason: HOSPADM

## 2019-01-02 RX ADMIN — MIDAZOLAM HYDROCHLORIDE 0.5 MG: 1 INJECTION, SOLUTION INTRAMUSCULAR; INTRAVENOUS at 13:35

## 2019-01-02 RX ADMIN — FENTANYL CITRATE 25 MCG: 50 INJECTION, SOLUTION INTRAMUSCULAR; INTRAVENOUS at 13:30

## 2019-01-02 RX ADMIN — FENTANYL CITRATE 25 MCG: 50 INJECTION, SOLUTION INTRAMUSCULAR; INTRAVENOUS at 13:41

## 2019-01-02 RX ADMIN — FENTANYL CITRATE 25 MCG: 50 INJECTION, SOLUTION INTRAMUSCULAR; INTRAVENOUS at 13:25

## 2019-01-02 RX ADMIN — Medication 2 G: at 16:00

## 2019-01-02 RX ADMIN — Medication 2 G: at 13:20

## 2019-01-02 RX ADMIN — MIDAZOLAM HYDROCHLORIDE 0.5 MG: 1 INJECTION, SOLUTION INTRAMUSCULAR; INTRAVENOUS at 13:20

## 2019-01-02 RX ADMIN — PROPOFOL 100 MCG/KG/MIN: 10 INJECTION, EMULSION INTRAVENOUS at 13:20

## 2019-01-02 RX ADMIN — FENTANYL CITRATE 25 MCG: 50 INJECTION, SOLUTION INTRAMUSCULAR; INTRAVENOUS at 13:20

## 2019-01-02 RX ADMIN — BUPIVACAINE HYDROCHLORIDE AND EPINEPHRINE BITARTRATE 30 MG: 5; .005 INJECTION, SOLUTION EPIDURAL; INTRACAUDAL; PERINEURAL at 13:24

## 2019-01-02 RX ADMIN — ONDANSETRON 4 MG: 2 INJECTION INTRAMUSCULAR; INTRAVENOUS at 16:05

## 2019-01-02 RX ADMIN — MIDAZOLAM HYDROCHLORIDE 0.5 MG: 1 INJECTION, SOLUTION INTRAMUSCULAR; INTRAVENOUS at 13:18

## 2019-01-02 RX ADMIN — SODIUM CHLORIDE, SODIUM LACTATE, POTASSIUM CHLORIDE, AND CALCIUM CHLORIDE: 600; 310; 30; 20 INJECTION, SOLUTION INTRAVENOUS at 13:16

## 2019-01-02 RX ADMIN — MIDAZOLAM HYDROCHLORIDE 0.5 MG: 1 INJECTION, SOLUTION INTRAMUSCULAR; INTRAVENOUS at 13:40

## 2019-01-02 RX ADMIN — PROPOFOL 30 MG: 10 INJECTION, EMULSION INTRAVENOUS at 13:40

## 2019-01-02 RX ADMIN — PROPOFOL 50 MG: 10 INJECTION, EMULSION INTRAVENOUS at 13:42

## 2019-01-02 RX ADMIN — BACITRACIN: 50000 INJECTION, POWDER, LYOPHILIZED, FOR SOLUTION INTRAMUSCULAR at 13:24

## 2019-01-02 NOTE — ANESTHESIA POSTPROCEDURE EVALUATION
Procedure(s): ICD GEN CHANGE. Anesthesia Post Evaluation Multimodal analgesia: multimodal analgesia used between 6 hours prior to anesthesia start to PACU discharge Patient location during evaluation: PACU Patient participation: complete - patient participated Level of consciousness: awake and awake and alert Pain management: adequate Airway patency: patent Anesthetic complications: no 
Cardiovascular status: acceptable Respiratory status: acceptable Hydration status: acceptable Visit Vitals BP (!) 163/102 Pulse 86 Temp 36.7 °C (98 °F) Resp 15 Ht 5' 11\" (1.803 m) Wt 70.8 kg (156 lb) SpO2 97% BMI 21.76 kg/m²

## 2019-01-02 NOTE — DISCHARGE INSTRUCTIONS
Pacemaker Battery Change Out    Keep your incision dry for 14 days. DO NOT put any lotions, creams, powders, ointments over your incision for 2 weeks. You may remove your bandage after 3 days. If you have strips of tape over your incision please DO NOT remove them. These will fall off on their own. If you have staples or stitches these will be removed at your follow-up appointment. If your incision becomes very red, swollen, there is drainage coming out of the incision, tender, or you have a fever greater than 101 please contact your doctor immediately. You may use your pacemaker arm but DO NOT raise the arm higher than your shoulder for the first 2 weeks so that your incision can heal.    DO NOT lift more than 5-10 pounds for 2 weeks and 20 pounds for one month. DO NOT push or pull with the pacemaker arm for 2 weeks. Microwaves will not harm your pacemaker. Remember to keep your pacemaker card with you at all times. Within 6 weeks you should receive your permanent card. Keep your temporary card as a spare. If you do not receive your permanent card or lose your card please contact your doctor. At airports, always show your pacemaker card. You may walk through the metal detectors, but DO NOT allow the hand held wand near your pacemaker. Be sure to talk with your doctor before having an MRI. If you need to change the follow up appointment that has been made for you please contact your doctor's office.

## 2019-01-02 NOTE — PROGRESS NOTES
Bedside report received from [address] [place] [address] for continued care following EP procedure. Left chest wall site with Aquaseal D&I. No complaints of pain at present.

## 2019-01-02 NOTE — PROGRESS NOTES
Pt arrived, ambulated to room with no visible problems, planned Gen Change for Dr Jaz Londono. Consent signed, Procedure discussed with pt all questions answered voiced understanding. Medications and history discussed with pt. Pt prepped per ordersThe patient has a fraility score of 4-VULNERABLE, based on ability to complete ADLs without assistance, increased symptoms on exertion.

## 2019-01-02 NOTE — PROCEDURES
Pre-Procedure Diagnosis  1. Chronic systolic heart failure, ejection fraction of 15%. 2. Non ischemic dilated cardiomyopathy. 3. Class III heart failure symptoms. 4. Chronic systolic heart failure for a minimum of 3 months duration on optimal medical therapy. 5. Primary prevention indications for defibrillator  6. Life expectancy greater than 1 year. 7. ICD AIDA    Procedure Performed  1. Dual Chamber ICD Gen Change    Anesthesia: MAC     Estimated Blood Loss: Less than 10 mL     Specimens: * No specimens in log *     Patient Information and Indications: The procedure, indications, risks, benefits, and alternatives were discussed with the patient and family members, who desired to proceed after questions were answered and informed consent was documented. Procedure: After informed consent was obtained, the patient was brought to the Electrophysiology Laboratory in a fasting state and was prepped and draped in sterile fashion. Prophylactic antibiotic was administered prior to skin incision: (Ancef 2gms). Conscious sedation was administered with continuous oxygen saturation measurement and blood pressure measurement by Anesthesia. Local anesthetic (sensorcaine) was delivered to the left pectoral region and an incision was made directly over the prior surgical scar. The subcutaneous pocket was opened using blunt dissection and electrocautery, and adequate hemostasis was established. The device was freed from overlying fibrotic tissue and the leads freed to give enough slack for device exchange. The leads were individually removed from the old generator and tested using the PSA revealing excellent pacing parameters. The lead pins were then cleaned with antibiotic soaked gauze, dried gently, and attached to a new ICD generator. Pins were directly observed to pass the tip electrode, and the ring hex wrench screws were secured, and leads tug tested. The device and leads were gently positioned within the pocket. The pocket was irrigated copiously with a saline antimicrobial solution prior to device positioning. The device and leads were tested a second time prior to pocket closure. The wound was closed with multiple layers of absorbable suture followed by skin closure with staples. The patient tolerated the procedure well and left the lab in good condition. Complications: None     Device:   Pulse Generator Model #  Serial # Location Implant/Explant   [id number] Biotronik [id number] Left Pectoral Implant   [id number] Biotronik [id number] Left Pectoral Explant     Lead Model Number  Serial Number Lead position Implant/Explant   RA [id number] Biotronik [id number] RA Appendage Implanted on 04/21/2011   RV [id number] Biotronik [id number] RV [place] Implanted on 04/21/2011     Lead Sensitivity and Threshold  Lead R or P sensitivity (mv) Threshold (V) Threshold PW (msec) Impedance (ohms) Final output Voltage (V) Final PW (msec)   RA 1.5 0.9 0.4 867 2.5 0.4   RV 24.2 0.7 0.4 447 2.0 0.4     Bradycardia Settings  S[place]rick Mode LRL URL Pace AVD (ms) Sense AVD (ms) Rate Response Mode Switching Mode SW Rate   DDD 60 110 350 350 Off On 160     Tachycardia Settings  Zone Type VT1 VT2 VF   ON/OFF/  MONITOR MONITOR ON ON   Zone Rate 158 188 222   1st Therapy Type -- ATP-burst x2 ATP-burst x1   Energy (J) -- 80% 80%   2nd Therapy Type -- ATP-ramp x2 Shock    Energy (J) -- 80% 30   3rd Therapy Type -- Shock Shock   Energy (J) -- 25 40   4th Therapy Type -- Shock Shock   Energy (J) -- 40 40   5th Therapy Type -- Shock Shock   Energy (J) -- 40 40   6th Therapy Type -- Shock Shock   Energy (J) -- 40*5 40*4     No Defibrillation Threshold Testing    No contrast    Shock Impedance: 53 ohms     No Fluoro      Impression: 1) Successful dual chamber ICD generator replacement. Roni Paulino MD, MS  Clinical Cardiac Electrophysiology  1/2/2019  2:09 PM

## 2019-02-15 ENCOUNTER — HOSPITAL ENCOUNTER (OUTPATIENT)
Dept: GENERAL RADIOLOGY | Age: 54
Discharge: HOME | End: 2019-02-15
Attending: INTERNAL MEDICINE

## 2019-02-15 DIAGNOSIS — I42.8 NON-ISCHEMIC CARDIOMYOPATHY (HCC): Chronic | ICD-10-CM

## 2019-02-15 DIAGNOSIS — I50.22 CHRONIC SYSTOLIC HEART FAILURE (HCC): Chronic | ICD-10-CM

## 2019-03-08 ENCOUNTER — HOSPITAL ENCOUNTER (EMERGENCY)
Age: 54
Discharge: HOME | End: 2019-03-08
Attending: EMERGENCY MEDICINE
Payer: COMMERCIAL

## 2019-03-08 ENCOUNTER — APPOINTMENT (OUTPATIENT)
Dept: GENERAL RADIOLOGY | Age: 54
End: 2019-03-08
Attending: EMERGENCY MEDICINE
Payer: COMMERCIAL

## 2019-03-08 VITALS
DIASTOLIC BLOOD PRESSURE: 100 MMHG | WEIGHT: 150.6 LBS | SYSTOLIC BLOOD PRESSURE: 144 MMHG | TEMPERATURE: 98.1 F | RESPIRATION RATE: 17 BRPM | HEART RATE: 85 BPM | OXYGEN SATURATION: 97 % | HEIGHT: 71 IN | BODY MASS INDEX: 21.08 KG/M2

## 2019-03-08 DIAGNOSIS — R07.89 ATYPICAL CHEST PAIN: Primary | ICD-10-CM

## 2019-03-08 DIAGNOSIS — R11.0 NAUSEA WITHOUT VOMITING: ICD-10-CM

## 2019-03-08 DIAGNOSIS — G62.9 NEUROPATHY: ICD-10-CM

## 2019-03-08 LAB
ALBUMIN SERPL-MCNC: 3.6 G/DL (ref 3.5–5)
ALBUMIN/GLOB SERPL: 1.2 {RATIO}
ALP SERPL-CCNC: 109 U/L (ref 50–136)
ALT SERPL-CCNC: 40 U/L (ref 12–65)
ANION GAP SERPL CALC-SCNC: 9 MMOL/L
AST SERPL-CCNC: 155 U/L (ref 15–37)
ATRIAL RATE: 83 BPM
ATRIAL RATE: 97 BPM
BASOPHILS # BLD: 0.1 K/UL (ref 0–0.2)
BASOPHILS NFR BLD: 3 % (ref 0–2)
BILIRUB SERPL-MCNC: 1.3 MG/DL (ref 0.2–1.1)
BNP SERPL-MCNC: 117 PG/ML
BUN SERPL-MCNC: 14 MG/DL (ref 6–23)
CALCIUM SERPL-MCNC: 7.9 MG/DL (ref 8.3–10.4)
CALCULATED P AXIS, ECG09: 32 DEGREES
CALCULATED P AXIS, ECG09: 49 DEGREES
CALCULATED R AXIS, ECG10: -16 DEGREES
CALCULATED R AXIS, ECG10: -5 DEGREES
CALCULATED T AXIS, ECG11: 161 DEGREES
CALCULATED T AXIS, ECG11: 170 DEGREES
CHLORIDE SERPL-SCNC: 104 MMOL/L (ref 98–107)
CO2 SERPL-SCNC: 29 MMOL/L (ref 21–32)
CREAT SERPL-MCNC: 0.95 MG/DL (ref 0.8–1.5)
DIAGNOSIS, 93000: NORMAL
DIAGNOSIS, 93000: NORMAL
DIFFERENTIAL METHOD BLD: ABNORMAL
EOSINOPHIL # BLD: 0.1 K/UL (ref 0–0.8)
EOSINOPHIL NFR BLD: 6 % (ref 0.5–7.8)
ERYTHROCYTE [DISTWIDTH] IN BLOOD BY AUTOMATED COUNT: 18.6 % (ref 11.9–14.6)
ETHANOL SERPL-MCNC: 127 MG/DL
GLOBULIN SER CALC-MCNC: 3 G/DL (ref 2.3–3.5)
GLUCOSE SERPL-MCNC: 117 MG/DL (ref 65–100)
HCT VFR BLD AUTO: 31.8 % (ref 41.1–50.3)
HGB BLD-MCNC: 10.6 G/DL (ref 13.6–17.2)
IMM GRANULOCYTES # BLD AUTO: 0 K/UL (ref 0–0.5)
IMM GRANULOCYTES NFR BLD AUTO: 1 % (ref 0–5)
LYMPHOCYTES # BLD: 0.8 K/UL (ref 0.5–4.6)
LYMPHOCYTES NFR BLD: 38 % (ref 13–44)
MCH RBC QN AUTO: 32 PG (ref 26.1–32.9)
MCHC RBC AUTO-ENTMCNC: 33.3 G/DL (ref 31.4–35)
MCV RBC AUTO: 96.1 FL (ref 79.6–97.8)
MONOCYTES # BLD: 0.1 K/UL (ref 0.1–1.3)
MONOCYTES NFR BLD: 6 % (ref 4–12)
NEUTS SEG # BLD: 1 K/UL (ref 1.7–8.2)
NEUTS SEG NFR BLD: 47 % (ref 43–78)
NRBC # BLD: 0 K/UL (ref 0–0.2)
P-R INTERVAL, ECG05: 176 MS
P-R INTERVAL, ECG05: 180 MS
PLATELET # BLD AUTO: 227 K/UL (ref 150–450)
PMV BLD AUTO: 9.4 FL (ref 9.4–12.3)
POTASSIUM SERPL-SCNC: 4.2 MMOL/L (ref 3.5–5.1)
PROT SERPL-MCNC: 6.6 G/DL
Q-T INTERVAL, ECG07: 380 MS
Q-T INTERVAL, ECG07: 386 MS
QRS DURATION, ECG06: 102 MS
QRS DURATION, ECG06: 98 MS
QTC CALCULATION (BEZET), ECG08: 453 MS
QTC CALCULATION (BEZET), ECG08: 482 MS
RBC # BLD AUTO: 3.31 M/UL (ref 4.23–5.6)
SODIUM SERPL-SCNC: 142 MMOL/L (ref 136–145)
TROPONIN I BLD-MCNC: 0 NG/ML (ref 0.02–0.05)
TROPONIN I SERPL-MCNC: 0.09 NG/ML (ref 0.02–0.05)
VENTRICULAR RATE, ECG03: 83 BPM
VENTRICULAR RATE, ECG03: 97 BPM
WBC # BLD AUTO: 2.2 K/UL (ref 4.3–11.1)

## 2019-03-08 PROCEDURE — 99285 EMERGENCY DEPT VISIT HI MDM: CPT | Performed by: EMERGENCY MEDICINE

## 2019-03-08 PROCEDURE — 96375 TX/PRO/DX INJ NEW DRUG ADDON: CPT | Performed by: EMERGENCY MEDICINE

## 2019-03-08 PROCEDURE — 93005 ELECTROCARDIOGRAM TRACING: CPT | Performed by: EMERGENCY MEDICINE

## 2019-03-08 PROCEDURE — 84484 ASSAY OF TROPONIN QUANT: CPT

## 2019-03-08 PROCEDURE — 80307 DRUG TEST PRSMV CHEM ANLYZR: CPT

## 2019-03-08 PROCEDURE — 83880 ASSAY OF NATRIURETIC PEPTIDE: CPT

## 2019-03-08 PROCEDURE — 96376 TX/PRO/DX INJ SAME DRUG ADON: CPT | Performed by: EMERGENCY MEDICINE

## 2019-03-08 PROCEDURE — 74011250636 HC RX REV CODE- 250/636: Performed by: EMERGENCY MEDICINE

## 2019-03-08 PROCEDURE — 96374 THER/PROPH/DIAG INJ IV PUSH: CPT | Performed by: EMERGENCY MEDICINE

## 2019-03-08 PROCEDURE — 85025 COMPLETE CBC W/AUTO DIFF WBC: CPT

## 2019-03-08 PROCEDURE — 71045 X-RAY EXAM CHEST 1 VIEW: CPT

## 2019-03-08 PROCEDURE — 80053 COMPREHEN METABOLIC PANEL: CPT

## 2019-03-08 RX ORDER — ONDANSETRON 2 MG/ML
4 INJECTION INTRAMUSCULAR; INTRAVENOUS
Status: COMPLETED | OUTPATIENT
Start: 2019-03-08 | End: 2019-03-08

## 2019-03-08 RX ORDER — MORPHINE SULFATE 2 MG/ML
2 INJECTION, SOLUTION INTRAMUSCULAR; INTRAVENOUS
Status: COMPLETED | OUTPATIENT
Start: 2019-03-08 | End: 2019-03-08

## 2019-03-08 RX ORDER — ONDANSETRON 4 MG/1
4 TABLET, ORALLY DISINTEGRATING ORAL
Qty: 12 TAB | Refills: 0 | Status: SHIPPED | OUTPATIENT
Start: 2019-03-08 | End: 2020-09-29

## 2019-03-08 RX ADMIN — MORPHINE SULFATE 2 MG: 2 INJECTION, SOLUTION INTRAMUSCULAR; INTRAVENOUS at 13:37

## 2019-03-08 RX ADMIN — ONDANSETRON 4 MG: 2 INJECTION INTRAMUSCULAR; INTRAVENOUS at 13:37

## 2019-03-08 RX ADMIN — ONDANSETRON 4 MG: 2 INJECTION INTRAMUSCULAR; INTRAVENOUS at 14:51

## 2019-03-08 RX ADMIN — MORPHINE SULFATE 2 MG: 2 INJECTION, SOLUTION INTRAMUSCULAR; INTRAVENOUS at 14:51

## 2019-03-08 NOTE — DISCHARGE INSTRUCTIONS
Take the Zofran as needed for nausea. Follow up their family doctor on Monday for repeat evaluation or if you develop any new concerning symptoms please return to the emergency department at that time.
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
## 2019-03-08 NOTE — ED TRIAGE NOTES
Pt to ED c/o shortness of breath that got worse last night. Hx of CHF and states he has a pacemaker/defibrillator. +feet pain due to neuropathy. States cough and nausea as well.

## 2019-03-08 NOTE — ED NOTES
I have reviewed discharge instructions with the patient. The patient verbalized understanding. Patient left ED via Discharge Method: ambulatory to Home with spouse. Opportunity for questions and clarification provided. Patient given 1 scripts. To continue your aftercare when you leave the hospital, you may receive an automated call from our care team to check in on how you are doing. This is a free service and part of our promise to provide the best care and service to meet your aftercare needs.  If you have questions, or wish to unsubscribe from this service please call [phone]. Thank you for Choosing our [facility] Emergency Department.

## 2019-05-07 ENCOUNTER — APPOINTMENT (OUTPATIENT)
Dept: GENERAL RADIOLOGY | Age: 54
DRG: 292 | End: 2019-05-07
Attending: EMERGENCY MEDICINE
Payer: COMMERCIAL

## 2019-05-07 ENCOUNTER — HOSPITAL ENCOUNTER (INPATIENT)
Age: 54
LOS: 2 days | Discharge: HOME | DRG: 292 | End: 2019-05-11
Attending: EMERGENCY MEDICINE | Admitting: INTERNAL MEDICINE
Payer: COMMERCIAL

## 2019-05-07 DIAGNOSIS — I50.23 ACUTE ON CHRONIC SYSTOLIC CONGESTIVE HEART FAILURE (HCC): Primary | ICD-10-CM

## 2019-05-07 PROBLEM — D72.819 LEUKOPENIA: Status: ACTIVE | Noted: 2019-05-07

## 2019-05-07 PROBLEM — I50.33 ACUTE ON CHRONIC DIASTOLIC (CONGESTIVE) HEART FAILURE (HCC): Status: ACTIVE | Noted: 2019-05-07

## 2019-05-07 LAB
ALBUMIN SERPL-MCNC: 3.5 G/DL (ref 3.5–5)
ALBUMIN/GLOB SERPL: 1.3 {RATIO} (ref 1.2–3.5)
ALP SERPL-CCNC: 80 U/L (ref 50–136)
ALT SERPL-CCNC: 33 U/L (ref 12–65)
ANION GAP SERPL CALC-SCNC: 7 MMOL/L (ref 7–16)
AST SERPL-CCNC: 95 U/L (ref 15–37)
ATRIAL RATE: 109 BPM
BASOPHILS # BLD: 0 K/UL (ref 0–0.2)
BASOPHILS NFR BLD: 1 % (ref 0–2)
BILIRUB SERPL-MCNC: 0.9 MG/DL (ref 0.2–1.1)
BNP SERPL-MCNC: 503 PG/ML
BUN SERPL-MCNC: 5 MG/DL (ref 6–23)
CALCIUM SERPL-MCNC: 8.5 MG/DL (ref 8.3–10.4)
CALCULATED P AXIS, ECG09: 44 DEGREES
CALCULATED R AXIS, ECG10: -7 DEGREES
CALCULATED T AXIS, ECG11: -85 DEGREES
CHLORIDE SERPL-SCNC: 104 MMOL/L (ref 98–107)
CO2 SERPL-SCNC: 28 MMOL/L (ref 21–32)
CREAT SERPL-MCNC: 0.82 MG/DL (ref 0.8–1.5)
DIAGNOSIS, 93000: NORMAL
DIFFERENTIAL METHOD BLD: ABNORMAL
EOSINOPHIL # BLD: 0.1 K/UL (ref 0–0.8)
EOSINOPHIL NFR BLD: 2 % (ref 0.5–7.8)
ERYTHROCYTE [DISTWIDTH] IN BLOOD BY AUTOMATED COUNT: 17.2 % (ref 11.9–14.6)
ETHANOL SERPL-MCNC: 4 MG/DL
GLOBULIN SER CALC-MCNC: 2.7 G/DL (ref 2.3–3.5)
GLUCOSE SERPL-MCNC: 99 MG/DL (ref 65–100)
HCT VFR BLD AUTO: 29.1 % (ref 41.1–50.3)
HGB BLD-MCNC: 9.4 G/DL (ref 13.6–17.2)
IMM GRANULOCYTES # BLD AUTO: 0 K/UL (ref 0–0.5)
IMM GRANULOCYTES NFR BLD AUTO: 0 % (ref 0–5)
LYMPHOCYTES # BLD: 1 K/UL (ref 0.5–4.6)
LYMPHOCYTES NFR BLD: 28 % (ref 13–44)
MCH RBC QN AUTO: 31.5 PG (ref 26.1–32.9)
MCHC RBC AUTO-ENTMCNC: 32.3 G/DL (ref 31.4–35)
MCV RBC AUTO: 97.7 FL (ref 79.6–97.8)
MONOCYTES # BLD: 0.2 K/UL (ref 0.1–1.3)
MONOCYTES NFR BLD: 6 % (ref 4–12)
NEUTS SEG # BLD: 2.2 K/UL (ref 1.7–8.2)
NEUTS SEG NFR BLD: 62 % (ref 43–78)
NRBC # BLD: 0 K/UL (ref 0–0.2)
P-R INTERVAL, ECG05: 158 MS
PLATELET # BLD AUTO: 338 K/UL (ref 150–450)
PMV BLD AUTO: 9.2 FL (ref 9.4–12.3)
POTASSIUM SERPL-SCNC: 4.1 MMOL/L (ref 3.5–5.1)
PROT SERPL-MCNC: 6.2 G/DL (ref 6.3–8.2)
Q-T INTERVAL, ECG07: 342 MS
QRS DURATION, ECG06: 96 MS
QTC CALCULATION (BEZET), ECG08: 460 MS
RBC # BLD AUTO: 2.98 M/UL (ref 4.23–5.6)
SODIUM SERPL-SCNC: 139 MMOL/L (ref 136–145)
TROPONIN I BLD-MCNC: 0 NG/ML (ref 0.02–0.05)
VENTRICULAR RATE, ECG03: 109 BPM
WBC # BLD AUTO: 3.6 K/UL (ref 4.3–11.1)

## 2019-05-07 PROCEDURE — 74011000250 HC RX REV CODE- 250: Performed by: NURSE PRACTITIONER

## 2019-05-07 PROCEDURE — 71046 X-RAY EXAM CHEST 2 VIEWS: CPT

## 2019-05-07 PROCEDURE — 83880 ASSAY OF NATRIURETIC PEPTIDE: CPT

## 2019-05-07 PROCEDURE — 96374 THER/PROPH/DIAG INJ IV PUSH: CPT | Performed by: EMERGENCY MEDICINE

## 2019-05-07 PROCEDURE — 74011250637 HC RX REV CODE- 250/637: Performed by: EMERGENCY MEDICINE

## 2019-05-07 PROCEDURE — 84484 ASSAY OF TROPONIN QUANT: CPT

## 2019-05-07 PROCEDURE — 85025 COMPLETE CBC W/AUTO DIFF WBC: CPT

## 2019-05-07 PROCEDURE — 93005 ELECTROCARDIOGRAM TRACING: CPT | Performed by: EMERGENCY MEDICINE

## 2019-05-07 PROCEDURE — 99218 HC RM OBSERVATION: CPT

## 2019-05-07 PROCEDURE — 99284 EMERGENCY DEPT VISIT MOD MDM: CPT | Performed by: EMERGENCY MEDICINE

## 2019-05-07 PROCEDURE — 96375 TX/PRO/DX INJ NEW DRUG ADDON: CPT | Performed by: EMERGENCY MEDICINE

## 2019-05-07 PROCEDURE — 80307 DRUG TEST PRSMV CHEM ANLYZR: CPT

## 2019-05-07 PROCEDURE — 80053 COMPREHEN METABOLIC PANEL: CPT

## 2019-05-07 PROCEDURE — 74011250636 HC RX REV CODE- 250/636: Performed by: NURSE PRACTITIONER

## 2019-05-07 PROCEDURE — 74011250636 HC RX REV CODE- 250/636: Performed by: EMERGENCY MEDICINE

## 2019-05-07 RX ORDER — ONDANSETRON 4 MG/1
4 TABLET, ORALLY DISINTEGRATING ORAL
Status: COMPLETED | OUTPATIENT
Start: 2019-05-07 | End: 2019-05-07

## 2019-05-07 RX ORDER — HYDROCODONE BITARTRATE AND ACETAMINOPHEN 5; 325 MG/1; MG/1
1 TABLET ORAL
Status: COMPLETED | OUTPATIENT
Start: 2019-05-07 | End: 2019-05-07

## 2019-05-07 RX ORDER — FUROSEMIDE 10 MG/ML
40 INJECTION INTRAMUSCULAR; INTRAVENOUS
Status: COMPLETED | OUTPATIENT
Start: 2019-05-07 | End: 2019-05-07

## 2019-05-07 RX ADMIN — ONDANSETRON 4 MG: 4 TABLET, ORALLY DISINTEGRATING ORAL at 18:38

## 2019-05-07 RX ADMIN — PROMETHAZINE HYDROCHLORIDE 12.5 MG: 25 INJECTION INTRAMUSCULAR; INTRAVENOUS at 21:41

## 2019-05-07 RX ADMIN — FUROSEMIDE 40 MG: 10 INJECTION, SOLUTION INTRAMUSCULAR; INTRAVENOUS at 18:02

## 2019-05-07 RX ADMIN — NITROGLYCERIN 1 INCH: 20 OINTMENT TOPICAL at 18:02

## 2019-05-07 RX ADMIN — HYDROCODONE BITARTRATE AND ACETAMINOPHEN 1 TABLET: 5; 325 TABLET ORAL at 18:38
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
## 2019-05-07 NOTE — ED TRIAGE NOTES
Patient here for SOBx 3-4 days with dry cough. Reports bilateral lower extremity edema. JVD present on left side. Patient seen at PCP yesterday. States that he contacted his cardiologist and was told to come to the ER.
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
## 2019-05-08 ENCOUNTER — ANESTHESIA EVENT (OUTPATIENT)
Dept: ENDOSCOPY | Age: 54
DRG: 292 | End: 2019-05-08
Payer: COMMERCIAL

## 2019-05-08 LAB
ANION GAP SERPL CALC-SCNC: 10 MMOL/L (ref 7–16)
BUN SERPL-MCNC: 5 MG/DL (ref 6–23)
CALCIUM SERPL-MCNC: 8.4 MG/DL (ref 8.3–10.4)
CHLORIDE SERPL-SCNC: 102 MMOL/L (ref 98–107)
CO2 SERPL-SCNC: 27 MMOL/L (ref 21–32)
CREAT SERPL-MCNC: 0.96 MG/DL (ref 0.8–1.5)
GLUCOSE SERPL-MCNC: 115 MG/DL (ref 65–100)
POTASSIUM SERPL-SCNC: 3.2 MMOL/L (ref 3.5–5.1)
POTASSIUM SERPL-SCNC: 4.2 MMOL/L (ref 3.5–5.1)
SODIUM SERPL-SCNC: 139 MMOL/L (ref 136–145)

## 2019-05-08 PROCEDURE — 36415 COLL VENOUS BLD VENIPUNCTURE: CPT

## 2019-05-08 PROCEDURE — 74011250637 HC RX REV CODE- 250/637: Performed by: NURSE PRACTITIONER

## 2019-05-08 PROCEDURE — 74011250637 HC RX REV CODE- 250/637: Performed by: INTERNAL MEDICINE

## 2019-05-08 PROCEDURE — 74011250636 HC RX REV CODE- 250/636: Performed by: NURSE PRACTITIONER

## 2019-05-08 PROCEDURE — 74011000250 HC RX REV CODE- 250: Performed by: INTERNAL MEDICINE

## 2019-05-08 PROCEDURE — 99218 HC RM OBSERVATION: CPT

## 2019-05-08 PROCEDURE — 74011000250 HC RX REV CODE- 250: Performed by: NURSE PRACTITIONER

## 2019-05-08 PROCEDURE — 84132 ASSAY OF SERUM POTASSIUM: CPT

## 2019-05-08 PROCEDURE — 80048 BASIC METABOLIC PNL TOTAL CA: CPT

## 2019-05-08 PROCEDURE — 74011250636 HC RX REV CODE- 250/636: Performed by: INTERNAL MEDICINE

## 2019-05-08 RX ORDER — HYDROCODONE BITARTRATE AND ACETAMINOPHEN 10; 325 MG/1; MG/1
2 TABLET ORAL
Status: DISCONTINUED | OUTPATIENT
Start: 2019-05-08 | End: 2019-05-08

## 2019-05-08 RX ORDER — LOSARTAN POTASSIUM 50 MG/1
50 TABLET ORAL DAILY
Status: DISCONTINUED | OUTPATIENT
Start: 2019-05-08 | End: 2019-05-11 | Stop reason: HOSPADM

## 2019-05-08 RX ORDER — NITROGLYCERIN 0.4 MG/1
0.4 TABLET SUBLINGUAL
Status: DISCONTINUED | OUTPATIENT
Start: 2019-05-08 | End: 2019-05-11 | Stop reason: HOSPADM

## 2019-05-08 RX ORDER — POTASSIUM CHLORIDE 20 MEQ/1
40 TABLET, EXTENDED RELEASE ORAL
Status: COMPLETED | OUTPATIENT
Start: 2019-05-08 | End: 2019-05-08

## 2019-05-08 RX ORDER — CARVEDILOL 12.5 MG/1
12.5 TABLET ORAL 2 TIMES DAILY WITH MEALS
Status: DISCONTINUED | OUTPATIENT
Start: 2019-05-08 | End: 2019-05-11 | Stop reason: HOSPADM

## 2019-05-08 RX ORDER — SODIUM CHLORIDE 0.9 % (FLUSH) 0.9 %
5-40 SYRINGE (ML) INJECTION EVERY 8 HOURS
Status: DISCONTINUED | OUTPATIENT
Start: 2019-05-08 | End: 2019-05-11 | Stop reason: HOSPADM

## 2019-05-08 RX ORDER — SPIRONOLACTONE 25 MG/1
25 TABLET ORAL DAILY
Status: DISCONTINUED | OUTPATIENT
Start: 2019-05-08 | End: 2019-05-11 | Stop reason: HOSPADM

## 2019-05-08 RX ORDER — ONDANSETRON 4 MG/1
4 TABLET, ORALLY DISINTEGRATING ORAL
Status: DISCONTINUED | OUTPATIENT
Start: 2019-05-08 | End: 2019-05-08

## 2019-05-08 RX ORDER — LANOLIN ALCOHOL/MO/W.PET/CERES
400 CREAM (GRAM) TOPICAL EVERY 12 HOURS
Status: DISCONTINUED | OUTPATIENT
Start: 2019-05-08 | End: 2019-05-11 | Stop reason: HOSPADM

## 2019-05-08 RX ORDER — ATORVASTATIN CALCIUM 40 MG/1
80 TABLET, FILM COATED ORAL DAILY
Status: DISCONTINUED | OUTPATIENT
Start: 2019-05-08 | End: 2019-05-11 | Stop reason: HOSPADM

## 2019-05-08 RX ORDER — METOCLOPRAMIDE 10 MG/1
10 TABLET ORAL
Status: DISCONTINUED | OUTPATIENT
Start: 2019-05-08 | End: 2019-05-11 | Stop reason: HOSPADM

## 2019-05-08 RX ORDER — PANTOPRAZOLE SODIUM 40 MG/1
40 TABLET, DELAYED RELEASE ORAL DAILY
Status: DISCONTINUED | OUTPATIENT
Start: 2019-05-08 | End: 2019-05-11 | Stop reason: HOSPADM

## 2019-05-08 RX ORDER — HYDROCODONE BITARTRATE AND ACETAMINOPHEN 10; 325 MG/1; MG/1
1 TABLET ORAL
Status: DISCONTINUED | OUTPATIENT
Start: 2019-05-08 | End: 2019-05-11 | Stop reason: HOSPADM

## 2019-05-08 RX ORDER — FUROSEMIDE 10 MG/ML
60 INJECTION INTRAMUSCULAR; INTRAVENOUS 2 TIMES DAILY
Status: DISCONTINUED | OUTPATIENT
Start: 2019-05-08 | End: 2019-05-09

## 2019-05-08 RX ORDER — GABAPENTIN 300 MG/1
300 CAPSULE ORAL 2 TIMES DAILY
Status: DISCONTINUED | OUTPATIENT
Start: 2019-05-08 | End: 2019-05-11 | Stop reason: HOSPADM

## 2019-05-08 RX ORDER — SODIUM CHLORIDE 0.9 % (FLUSH) 0.9 %
5-40 SYRINGE (ML) INJECTION AS NEEDED
Status: DISCONTINUED | OUTPATIENT
Start: 2019-05-08 | End: 2019-05-11 | Stop reason: HOSPADM

## 2019-05-08 RX ORDER — ALPRAZOLAM 0.5 MG/1
0.5 TABLET ORAL
Status: DISCONTINUED | OUTPATIENT
Start: 2019-05-08 | End: 2019-05-11 | Stop reason: HOSPADM

## 2019-05-08 RX ORDER — MORPHINE SULFATE 2 MG/ML
2 INJECTION, SOLUTION INTRAMUSCULAR; INTRAVENOUS
Status: DISCONTINUED | OUTPATIENT
Start: 2019-05-08 | End: 2019-05-11 | Stop reason: HOSPADM

## 2019-05-08 RX ADMIN — FUROSEMIDE 60 MG: 10 INJECTION, SOLUTION INTRAMUSCULAR; INTRAVENOUS at 17:27

## 2019-05-08 RX ADMIN — POTASSIUM CHLORIDE 40 MEQ: 20 TABLET, EXTENDED RELEASE ORAL at 09:49

## 2019-05-08 RX ADMIN — ALPRAZOLAM 0.5 MG: 0.5 TABLET ORAL at 21:31

## 2019-05-08 RX ADMIN — Medication 400 MG: at 21:31

## 2019-05-08 RX ADMIN — Medication 10 ML: at 02:29

## 2019-05-08 RX ADMIN — PROMETHAZINE HYDROCHLORIDE 12.5 MG: 25 INJECTION INTRAMUSCULAR; INTRAVENOUS at 06:03

## 2019-05-08 RX ADMIN — FUROSEMIDE 60 MG: 10 INJECTION, SOLUTION INTRAMUSCULAR; INTRAVENOUS at 09:48

## 2019-05-08 RX ADMIN — METOCLOPRAMIDE HYDROCHLORIDE 10 MG: 10 TABLET ORAL at 21:31

## 2019-05-08 RX ADMIN — Medication 400 MG: at 01:29

## 2019-05-08 RX ADMIN — Medication 10 ML: at 21:31

## 2019-05-08 RX ADMIN — ATORVASTATIN CALCIUM 80 MG: 40 TABLET, FILM COATED ORAL at 09:49

## 2019-05-08 RX ADMIN — HYDROCODONE BITARTRATE AND ACETAMINOPHEN 1 TABLET: 10; 325 TABLET ORAL at 19:36

## 2019-05-08 RX ADMIN — PANTOPRAZOLE SODIUM 40 MG: 40 TABLET, DELAYED RELEASE ORAL at 09:49

## 2019-05-08 RX ADMIN — Medication 400 MG: at 09:50

## 2019-05-08 RX ADMIN — SPIRONOLACTONE 25 MG: 25 TABLET ORAL at 09:50

## 2019-05-08 RX ADMIN — PROMETHAZINE HYDROCHLORIDE 12.5 MG: 25 INJECTION INTRAMUSCULAR; INTRAVENOUS at 12:46

## 2019-05-08 RX ADMIN — LOSARTAN POTASSIUM 50 MG: 50 TABLET ORAL at 09:50

## 2019-05-08 RX ADMIN — HYDROCODONE BITARTRATE AND ACETAMINOPHEN 1 TABLET: 10; 325 TABLET ORAL at 01:29

## 2019-05-08 RX ADMIN — CARVEDILOL 12.5 MG: 12.5 TABLET, FILM COATED ORAL at 09:50

## 2019-05-08 RX ADMIN — METOCLOPRAMIDE HYDROCHLORIDE 10 MG: 10 TABLET ORAL at 17:27

## 2019-05-08 RX ADMIN — ALPRAZOLAM 0.5 MG: 0.5 TABLET ORAL at 01:29

## 2019-05-08 RX ADMIN — GABAPENTIN 300 MG: 300 CAPSULE ORAL at 17:27

## 2019-05-08 RX ADMIN — HYDROCODONE BITARTRATE AND ACETAMINOPHEN 1 TABLET: 10; 325 TABLET ORAL at 07:25

## 2019-05-08 RX ADMIN — CARVEDILOL 12.5 MG: 12.5 TABLET, FILM COATED ORAL at 17:27

## 2019-05-08 RX ADMIN — Medication 5 ML: at 13:28

## 2019-05-08 RX ADMIN — PROMETHAZINE HYDROCHLORIDE 12.5 MG: 25 INJECTION INTRAMUSCULAR; INTRAVENOUS at 19:15

## 2019-05-08 RX ADMIN — Medication 10 ML: at 06:04

## 2019-05-08 RX ADMIN — GABAPENTIN 300 MG: 300 CAPSULE ORAL at 09:50

## 2019-05-08 RX ADMIN — HYDROCODONE BITARTRATE AND ACETAMINOPHEN 1 TABLET: 10; 325 TABLET ORAL at 13:27

## 2019-05-08 NOTE — PROGRESS NOTES
Initial visit was made, emotional support and a spiritual presence were provided.  card was left with the patient. [facility] [place]

## 2019-05-08 NOTE — PROGRESS NOTES
Problem: Patient Education: Go to Patient Education Activity Goal: Patient/Family Education Outcome: Progressing Towards Goal 
  
Problem: Heart Failure: Day 1 Goal: Activity/Safety Outcome: Progressing Towards Goal 
Goal: Medications Outcome: Progressing Towards Goal

## 2019-05-08 NOTE — PROGRESS NOTES
Pt non-compliant with fluid restriction Educated on risks, encouraged patient to limit fluid and sodium intake. Pt verbalized understanding. Continue to monitor Jose Sargent RN 
6:27 AM

## 2019-05-08 NOTE — ED NOTES
TRANSFER - OUT REPORT: 
 
Verbal report given to Tonny Interiano RN on Vilma Love  being transferred to [facility] for routine progression of care Report consisted of patients Situation, Background, Assessment and  
Recommendations(SBAR). Information from the following report(s) SBAR, ED Summary, [facility] and Recent Results was reviewed with the receiving nurse. Lines:  
Peripheral IV 05/07/19 Right Forearm (Active) Site Assessment Clean, dry, & intact 5/7/2019  3:48 PM  
Phlebitis Assessment 0 5/7/2019  3:48 PM  
Infiltration Assessment 0 5/7/2019  3:48 PM  
Dressing Status Clean, dry, & intact 5/7/2019  3:48 PM  
Hub Color/Line Status Green 5/7/2019  3:48 PM  
  
 
Opportunity for questions and clarification was provided. Patient transported with: 
 Monitor Registered Nurse

## 2019-05-08 NOTE — PROGRESS NOTES
TRANSFER - IN REPORT: 
 
Verbal report received from KADEN Campoverde on Teresita Robles being received from ED for routine progression of care Report consisted of patients Situation, Background, Assessment and Recommendations(SBAR). Information from the following report(s) SBAR was reviewed with the receiving nurse. Opportunity for questions and clarification was provided. Assessment completed upon patients arrival to unit and care assumed.

## 2019-05-08 NOTE — PROGRESS NOTES
Problem: Patient Education: Go to Patient Education Activity Goal: Patient/Family Education Outcome: Progressing Towards Goal 
  
Problem: Heart Failure: Day 1 Goal: Off Pathway (Use only if patient is Off Pathway) Outcome: Progressing Towards Goal 
Goal: Activity/Safety Outcome: Progressing Towards Goal 
Goal: Consults, if ordered Outcome: Progressing Towards Goal 
Goal: Diagnostic Test/Procedures Outcome: Progressing Towards Goal 
Goal: Nutrition/Diet Outcome: Progressing Towards Goal 
Goal: Discharge Planning Outcome: Progressing Towards Goal 
Goal: Medications Outcome: Progressing Towards Goal 
Goal: Respiratory Outcome: Progressing Towards Goal 
Goal: Treatments/Interventions/Procedures Outcome: Progressing Towards Goal 
Goal: Psychosocial 
Outcome: Progressing Towards Goal 
Goal: *Oxygen saturation within defined limits Outcome: Progressing Towards Goal 
Goal: *Hemodynamically stable Outcome: Progressing Towards Goal 
Goal: *Optimal pain control at patient's stated goal 
Outcome: Progressing Towards Goal 
Goal: *Anxiety reduced or absent Outcome: Progressing Towards Goal 
  
Problem: Falls - Risk of 
Goal: *Absence of Falls Description Document Nella Ruano Fall Risk and appropriate interventions in the flowsheet. Outcome: Progressing Towards Goal 
  
Problem: Patient Education: Go to Patient Education Activity Goal: Patient/Family Education Outcome: Progressing Towards Goal

## 2019-05-08 NOTE — PROGRESS NOTES
Monitor tech notified RN of HR in the 150's Pt asymptomatic, noted to be repositioning in bed HR now 105 on monitor. Will continue to closely monitor Zhang Villegas RN 
4:13 AM

## 2019-05-08 NOTE — PROGRESS NOTES
[facility] CARDIOLOGY PROGRESS NOTE 
 
5/8/2019 9:26 AM 
 
Admit Date: 5/7/2019 Admit Diagnosis: Acute on chronic diastolic (congestive) heart failure ([facility] [address] [I50.33] Subjective:  
Stable overnight without angina or palpitations but still SOB and still LE edema. Complains of recent recurrence dysphagia with prior esophageal dilatation in past, acute on chronic worsening of nausea as well. Requests GI evaluation. Objective:  
  
Vitals:  
 05/08/19 0110 05/08/19 8284 05/08/19 6012 05/08/19 9624 BP: 131/82 128/82 (!) 144/91 131/81 Pulse: (!) 109 (!) 110 (!) 106 96 Resp: 18 18 18 18 Temp:  97.7 °F (36.5 °C) 98.1 °F (36.7 °C) 97.6 °F (36.4 °C) SpO2: 96% 99% 97% 100% Weight: 72.8 kg (160 lb 8 oz) Height: 5' 11\" (1.803 m) Physical Exam: 
Neck- supple, 10cm JVD at 45 deg CV- regular rate and rhythm no MRG Lung- clear bilaterally Abd- soft, nontender, nondistended Ext-1+ pitting below knee edema Skin- warm and dry Data Review:  
Recent Labs 05/08/19 
0413 05/07/19 
1553 05/06/19 
1206  139 139  
K 3.2* 4.1 4.8 BUN 5* 5* 6  
CREA 0.96 0.82 0.81 * 99 82 WBC  --  3.6* 2.8* HGB  --  9.4* 8.3* HCT  --  29.1* 25.7*  
PLT  --  338 328 Assessment and Plan:  
 
Principal Problem: 
  Acute on chronic systolic (congestive) heart failure ([facility] [address] (5/7/2019)- IV lasix, elevate legs, recheck labs in AM 
 
Active Problems: 
  Chronic systolic heart failure ([facility] [address]- acute worsening recently, ? Prior non-compliance. As above. ICD (implantable cardioverter-defibrillator) in place - stable, no recent discharges, replete lytes as tolerated HTN (hypertension) - reassess after diuresis, augment CHF regimen as tolerated Dysphagia/chronic nausea- GI consult today. Sakshi Noble MD 
[facility] Cardiology Pager [phone]

## 2019-05-08 NOTE — PROGRESS NOTES
Verbal and bedside shift report received from Elvin Mccurdy, [address] [facility].

## 2019-05-08 NOTE — H&P (VIEW-ONLY)
[facility] Consult Note Consulting GI Physician: Dr. Yaneth Montelongo Referring Provider:  Dr. Loy Leventhal Consult Date:  5/8/2019 Admit Date:  5/7/2019 Chief Complaint:  Dysphagia s/p prior esophageal dilation. Chronic nausea. Subjective:  
 
History of Present Illness:  Patient is a 47 y.o. male with PMH of CHF with reduced ejection fraction of 15% on ECHO 12/2018 s/p ICD, nonischemic cardiomyopathy, chronic pain, HTN, Ventricular tachycardia, Anxiety, Arthritis, GERD, admitted to observation 5/7/19 for acute on chronic CHF/volume overload after presenting with c/o SOB, LE edema x1-2wks, currently being seen in GI consultation at the request of Dr. Loy Leventhal for dysphagia s/p prior esophageal dilation, chronic nausea. He reports intermittent dysphagia to solids, liquids, pills (seem to become stuck in the back of his throat when swallowing) at least over the past year. No odynophagia. This did not seem to improve with esophageal dilation in 7/2018. It can occur on average 3x weekly. Also has frequent nausea that is chronic at least over the last year, occurring typically in the mornings and with vomiting of brown liquid at times. No hematemesis or cge. He takes OTC Esomeprazole every other day or so with some relief of any heartburn/reflux symptoms. He denies use of NSAIDs, tobacco or regular/heavy ETOH (on average may have glass of wine 3 nights of the week). His appetite has been decreased and he feels that he has had significant unintentional weight loss. He reports regular bowel patterns without BRBPR. He has noticed some dark stools on occasion without use of Pepto bismol and without iron supplement. He was seen by our group on a prior admission 7/2018 for n/v,abd pain with mild transaminase elevation.    Labs:7/8/18  ALT 84 (56 on 7/10),  (96),  (108), T bili 1.3 (1.0), lipase 121, albumin 4.0. US on 7/8/18 revealed mild gallbladder distention with CBD of 5.6mm, biliary sludge. Clinical history and exam then was not felt to be consistent with acute gallbladder/biliary pathology. He had an EGD 7/10/18 by Dr. Moisés Walker that revealed diffuse gastropathy, moderate hiatal hernia and Schatzki's ring which was dilated with #56 Nanine Junk. Transaminases were improving and ultimately this was felt to be consistent with ETOH use then. LFTs on this admit remained improved and normal with exception of elevated AST of 60 and then 95. PMH: 
Past Medical History:  
Diagnosis Date  Arthritis  CAD (coronary artery disease)  CHF (congestive heart failure) ([address]  Chronic alcoholism ([address]  Chronic back pain   
 from [facility]  Chronic neck pain   
 from [facility]  Chronic systolic heart failure ([address] 4/21/2011  Depression  Dizziness - light-headed  GERD (gastroesophageal reflux disease)   
 under control with nexium  Gynecomastia 2/22/2016  Heart failure ([address]  History of implantable cardioverter-defibrillator (ICD) placement 2/22/2016  Hypertension  Psychiatric disorder   
 anxiety  Schatzki's ring 07/10/2018  Situational depression 2/22/2016  Ventricular tachycardia ([address] 2/22/2016 PSH: 
Past Surgical History:  
Procedure Laterality Date  HX HEART CATHETERIZATION  9/21/10  
 HX PACEMAKER    
 defibrillator Allergies: 
No Known Allergies Home Medications: 
Prior to Admission medications Medication Sig Start Date End Date Taking? Authorizing Provider HYDROcodone-acetaminophen (NORCO)  mg tablet Take 2 Tabs by mouth every eight (8) hours as needed for Pain for up to 30 days. Max Daily Amount: 6 Tabs. 1 tablet q4-6hrs prn pain, 4/13/19 5/13/19 Yes Grupo Deluca MD  
ondansetron (ZOFRAN ODT) 4 mg disintegrating tablet Take 1 Tab by mouth every eight (8) hours as needed for Nausea.  3/8/19  Yes Lakshmi Anderson,   
 ALPRAZolam (XANAX) 1 mg tablet Take 0.5 Tabs by mouth nightly. Max Daily Amount: 0.5 mg. 2/7/19  Yes Denis Lambert MD  
carvedilol (COREG) 12.5 mg tablet Take 1 Tab by mouth two (2) times daily (with meals). 12/26/18  Yes Greer Childs NP  
potassium chloride (K-DUR, KLOR-CON) 20 mEq tablet Take 1 Tab by mouth daily. 12/26/18  Yes Price YARBROUGH NP  
magnesium oxide (MAG-OX) 400 mg tablet Take 1 Tab by mouth every twelve (12) hours. 12/26/18  Yes Greer Childs NP  
furosemide (LASIX) 40 mg tablet Take 1 Tab by mouth daily. 12/26/18  Yes Greer Childs NP  
losartan (COZAAR) 25 mg tablet Take 1 Tab by mouth daily. 12/26/18  Yes Greer Childs NP  
atorvastatin (LIPITOR) 80 mg tablet Take 1 Tab by mouth daily. 12/26/18  Yes Greer Childs NP  
promethazine (PHENERGAN) 12.5 mg tablet Take 1 Tab by mouth every six (6) hours as needed for Nausea. 7/17/18  Yes Denis Lambert MD  
gabapentin (NEURONTIN) 300 mg capsule Take 1 Cap by mouth three (3) times daily. Patient taking differently: Take 300 mg by mouth two (2) times a day. 12/8/17  Yes Denis Lambert MD  
ESOMEPRAZOLE MAG TRIHYDRATE (NEXIUM PO) Take 1 Cap by mouth two (2) times a day. 4/14/11  Yes Mary Head MD  
sildenafil citrate (VIAGRA) 100 mg tablet Take 1 Tab by mouth as needed. 9/14/18   Candelaria Matthews MD  
 
 
[facility] Medications: 
Current Facility-Administered Medications Medication Dose Route Frequency  ALPRAZolam (XANAX) tablet 0.5 mg  0.5 mg Oral QHS  atorvastatin (LIPITOR) tablet 80 mg  80 mg Oral DAILY  carvedilol (COREG) tablet 12.5 mg  12.5 mg Oral BID WITH MEALS  pantoprazole (PROTONIX) tablet 40 mg  40 mg Oral DAILY  gabapentin (NEURONTIN) capsule 300 mg  300 mg Oral BID  magnesium oxide (MAG-OX) tablet 400 mg  400 mg Oral Q12H  
 ondansetron (ZOFRAN ODT) tablet 4 mg  4 mg Oral Q8H PRN  
 sodium chloride (NS) flush 5-40 mL  5-40 mL IntraVENous Q8H  
  sodium chloride (NS) flush 5-40 mL  5-40 mL IntraVENous PRN  
 nitroglycerin (NITROSTAT) tablet 0.4 mg  0.4 mg SubLINGual Q5MIN PRN  
 morphine injection 2 mg  2 mg IntraVENous Q4H PRN  
 furosemide (LASIX) injection 60 mg  60 mg IntraVENous BID  spironolactone (ALDACTONE) tablet 25 mg  25 mg Oral DAILY  losartan (COZAAR) tablet 50 mg  50 mg Oral DAILY  HYDROcodone-acetaminophen (NORCO)  mg tablet 1 Tab  1 Tab Oral Q6H PRN Social History: 
Social History Tobacco Use  Smoking status: Never Smoker  Smokeless tobacco: Never Used Substance Use Topics  Alcohol use: Yes Comment: occasi Pt denies any history of drug use, blood transfusions, or tattoos. Family History: 
Family History Problem Relation Age of Onset  Heart Disease Father CABG  
 Diabetes Father  Arthritis-rheumatoid Mother Review of Systems: A detailed 10 system ROS is obtained, with pertinent positives as listed above. All others are negative. Diet:  Cardiac Regular, 2gm Na Objective:  
 
Physical Exam: 
Vitals: 
Visit Vitals /81 Pulse 96 Temp 97.6 °F (36.4 °C) Resp 18 Ht 5' 11\" (1.803 m) Wt 72.8 kg (160 lb 8 oz) SpO2 100% BMI 22.39 kg/m² Gen:  Pt is alert, cooperative, no acute distress Skin:  Extremities and face reveal no rashes. HEENT: Sclerae anicteric. Extra-occular muscles are intact. No oral ulcers. No abnormal pigmentation of the lips. The neck is supple. Cardiovascular: Regular rate and rhythm. No murmurs, gallops, or rubs. Respiratory:  Comfortable breathing with no accessory muscle use. Clear breath sounds anteriorly with no wheezes, rales, or rhonchi. GI:  Abdomen nondistended, soft, and nontender. Normal active bowel sounds. No enlargement of the liver or spleen. No masses palpable. Rectal:  Deferred Musculoskeletal:  No pitting edema of the lower legs. Neurological:  Gross memory appears intact.   Patient is alert and oriented. Psychiatric:  Mood appears appropriate with judgement intact. Lymphatic:  No cervical or supraclavicular adenopathy. Laboratory:   
Recent Labs 05/08/19 
0413 05/07/19 
1553 05/06/19 
1206 WBC  --  3.6* 2.8* HGB  --  9.4* 8.3* HCT  --  29.1* 25.7*  
PLT  --  338 328 MCV  --  97.7 97  139 139  
K 3.2* 4.1 4.8  
 104 103 CO2 27 28 22 BUN 5* 5* 6  
CREA 0.96 0.82 0.81 CA 8.4 8.5 8.5*  
* 99 82 AP  --  80 71 SGOT  --  95* 60* ALT  --  33 21 TBILI  --  0.9 0.4 ALB  --  3.5 3.7 TP  --  6.2* 5.5* Labs:7/8/18 WBC 8.1, Hgb 12.3, Hct 39.6, .2, RDW 14.1, plt 198, ALT 84, , , T bili 1.3, lipase 121, albumin 4.0 
 
EGD w/dilation 7/10/2018 schatzki's ring- expected amt of trauma seen with dilation, Moderate hiatal hernia, Mild diffuse gastropathy. Started trial of Reglan. EGD on 3/27/16 by Dr. Lorenzo Ospina revealed 8mm subepithelial lesion in the stomach. He underwent empiric dilation #54 FR Gandhi. Biopsies revealed chronic reactive reparative changes with focal lymphoid aggregate present and scattered predominantly mononuclear inflammatory cells noted in the lamina propria. Repeat rec in 1 yr Colonoscopy on 3/27/16 by Dr. Lorenzo Ospina revealed normal exam with sub-optimal prep.  
  
  
CT abdomen and pelvis dated 7/8/2018 for Abdominal pain and vomiting CT abdomen Upper images show a moderate size hiatal hernia. Spleen is unremarkable. There is diffuse fatty infiltration of the liver. The gallbladder appears mildly distended. No calcified stones are seen. Pancreas is 
normal. Adrenals are normal. Kidneys are normal in size and unremarkable except for a few small subcentimeter cysts. No hydronephrosis. The abdominal aorta is normal in size. No adenopathy.  
CT PELVIS: 
No mass, adenopathy or abnormal fluid collection. Bowel loops have a grossly normal appearance in the abdomen and pelvis. There is no evidence of bowel obstruction. Appendix not definitely identified. There is no infiltration in the pericecal fat. IMPRESSION: 
1. Fatty infiltration liver. 2. Mild distention of the gallbladder. No calcified stones. 
  
Right upper quadrant ultrasound dated 7/8/2018 for Elevated liver function test. Abdominal pain and nausea Liver is normal in size, 17 cm in height. Echogenicity is coarsened suggesting fatty infiltration. Doppler flow in the main portal vein is hepatopedal. Common 
bile duct is normal, 5.6 mm. Gallbladder appears distended. Wall does not appear thickened. Nons had owing sludge is noted. No shadowing stones are seen. Pancreas poorly visualized. Right kidney is normal size and echogenicity. No hydronephrosis. Proximal abdominal aorta is normal in size, 2.6 cm. IVC patent. IMPRESSION: 
1. Fatty infiltration liver. 2. Mild gallbladder distention. Biliary sludge present [facility]: 2017 1. Minimal nonobstructive coronary artery disease. 2. Severely reduced left ventricular systolic function. 3. Left ventricular end-diastolic pressure was 32. Echo: 12/2018 -  Left ventricle: The ventricle was mildly dilated. Systolic function was markedly reduced. Ejection fraction was estimated to be 15 %. There was Severe diffuse hypokinesis. Wall thickness was mildly increased. There was mild concentric hypertrophy. -  Left atrium: The atrium was mildly dilated. Assessment:  
 
Principal Problem: 
  Systolic CHF, acute on chronic ([address] (4/21/2011) Active Problems: 
  ICD (implantable cardioverter-defibrillator) in place (4/22/2011) HTN (hypertension) (11/29/2011) Ventricular tachycardia ([address] (2/22/2016) Non-ischemic cardiomyopathy ([address] (3/24/2016) Leukopenia (5/7/2019)  47 y.o. male with PMH of CHF with reduced ejection fraction of 15% on ECHO 12/2018 s/p ICD, nonischemic cardiomyopathy, chronic pain, HTN, Ventricular tachycardia, Anxiety, Arthritis, GERD, admitted to observation 5/7/19 for acute on chronic CHF/volume overload after presenting with c/o SOB, LE edema x1-2wks, currently being seen in GI consultation at the request of Dr. Ministerio Chaney for dysphagia s/p prior esophageal dilation, chronic nausea with vomiting some mornings, some dark stools at times. On OTC Esomeprazole daily. Denies regular NSAIDs, Tobacco.  Reports glass of wine on average 3x weekly. Denies improvement following prior EGD 7/10/18 by Dr. Christofer Alcantar that revealed diffuse gastropathy, moderate hiatal hernia and Schatzki's ring which was dilated with #56 Ebb Master. Plan:  
 
-Continue PPI daily, currently receiving po Protonix 40mg qAM 
-Supportive care with anti-emetic p.r.n. Paullette Pee has been effective. Phenergan helps though he requests that this be scheduled.   
-Plan EGD with possible esophageal dilation tomorrow 5/9/19 with Dr. Irl Schwab Further recommendations will be based upon pt clinical course and findings on his EGD Libby Rincon PA-C [facility]

## 2019-05-08 NOTE — PROGRESS NOTES
Verbal bedside report given to melvi Wick RN. Patient's situation, background, assessment and recommendations provided. Opportunity for questions provided. Oncoming RN assumed care of patient.

## 2019-05-08 NOTE — PROGRESS NOTES
Care Management Interventions PCP Verified by CM: Yes Last Visit to PCP: 05/06/19 Mode of Transport at Discharge: Other (see comment)(Spouse Jess Kilgore [phone]) Transition of Care Consult (CM Consult): Discharge Planning Discharge Durable Medical Equipment: No 
Physical Therapy Consult: No 
Occupational Therapy Consult: No 
Speech Therapy Consult: No 
Current Support Network: Lives with Spouse, Own Home Confirm Follow Up Transport: Family Plan discussed with Pt/Family/Caregiver: Yes Freedom of Choice Offered: Yes Discharge Location Discharge Placement: Home Pt admitted to 3rd floor [facility] for acute on chronic diastolic HF. CM met with pt to discuss CM needs & DCP. Pt is A&Ox4. Pt is indep at home with all ADLS. Pt lives with spouse. Pt has no DME needs. Pt has no difficulty with obtaining medications or transport. DCP home with spouse. No further needs noted. CM to continue to monitor.
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
## 2019-05-08 NOTE — PROGRESS NOTES
Verbal bedside report received from [address] [place] [address] Rd, [id number] [address]. Assumed care of patient.

## 2019-05-08 NOTE — PROGRESS NOTES
Admission note: Pt arrived from ED. VSS. Afebrile. ST on monitor. Skin assessed, no skin issues noted, skin remains intact. Continue to monitor.

## 2019-05-09 ENCOUNTER — ANESTHESIA (OUTPATIENT)
Dept: ENDOSCOPY | Age: 54
DRG: 292 | End: 2019-05-09
Payer: COMMERCIAL

## 2019-05-09 LAB
ANION GAP SERPL CALC-SCNC: 6 MMOL/L (ref 7–16)
BUN SERPL-MCNC: 11 MG/DL (ref 6–23)
CALCIUM SERPL-MCNC: 8.3 MG/DL (ref 8.3–10.4)
CHLORIDE SERPL-SCNC: 102 MMOL/L (ref 98–107)
CO2 SERPL-SCNC: 31 MMOL/L (ref 21–32)
CREAT SERPL-MCNC: 1.15 MG/DL (ref 0.8–1.5)
ERYTHROCYTE [DISTWIDTH] IN BLOOD BY AUTOMATED COUNT: 17.5 % (ref 11.9–14.6)
GLUCOSE SERPL-MCNC: 104 MG/DL (ref 65–100)
HCT VFR BLD AUTO: 25.9 % (ref 41.1–50.3)
HGB BLD-MCNC: 8.3 G/DL (ref 13.6–17.2)
MAGNESIUM SERPL-MCNC: 1.7 MG/DL (ref 1.8–2.4)
MCH RBC QN AUTO: 31.3 PG (ref 26.1–32.9)
MCHC RBC AUTO-ENTMCNC: 32 G/DL (ref 31.4–35)
MCV RBC AUTO: 97.7 FL (ref 79.6–97.8)
NRBC # BLD: 0.02 K/UL (ref 0–0.2)
PLATELET # BLD AUTO: 292 K/UL (ref 150–450)
PMV BLD AUTO: 8.8 FL (ref 9.4–12.3)
POTASSIUM SERPL-SCNC: 4 MMOL/L (ref 3.5–5.1)
RBC # BLD AUTO: 2.65 M/UL (ref 4.23–5.6)
SODIUM SERPL-SCNC: 139 MMOL/L (ref 136–145)
WBC # BLD AUTO: 4.1 K/UL (ref 4.3–11.1)

## 2019-05-09 PROCEDURE — 74011250636 HC RX REV CODE- 250/636: Performed by: INTERNAL MEDICINE

## 2019-05-09 PROCEDURE — 76060000031 HC ANESTHESIA FIRST 0.5 HR: Performed by: INTERNAL MEDICINE

## 2019-05-09 PROCEDURE — 74011250636 HC RX REV CODE- 250/636

## 2019-05-09 PROCEDURE — 74011250636 HC RX REV CODE- 250/636: Performed by: ANESTHESIOLOGY

## 2019-05-09 PROCEDURE — 74011250636 HC RX REV CODE- 250/636: Performed by: NURSE PRACTITIONER

## 2019-05-09 PROCEDURE — 0D758ZZ DILATION OF ESOPHAGUS, VIA NATURAL OR ARTIFICIAL OPENING ENDOSCOPIC: ICD-10-PCS | Performed by: INTERNAL MEDICINE

## 2019-05-09 PROCEDURE — 83735 ASSAY OF MAGNESIUM: CPT

## 2019-05-09 PROCEDURE — 99218 HC RM OBSERVATION: CPT

## 2019-05-09 PROCEDURE — 65660000000 HC RM CCU STEPDOWN

## 2019-05-09 PROCEDURE — 74011000250 HC RX REV CODE- 250: Performed by: INTERNAL MEDICINE

## 2019-05-09 PROCEDURE — 36415 COLL VENOUS BLD VENIPUNCTURE: CPT

## 2019-05-09 PROCEDURE — 74011250637 HC RX REV CODE- 250/637: Performed by: NURSE PRACTITIONER

## 2019-05-09 PROCEDURE — 85027 COMPLETE CBC AUTOMATED: CPT

## 2019-05-09 PROCEDURE — 74011250637 HC RX REV CODE- 250/637: Performed by: INTERNAL MEDICINE

## 2019-05-09 PROCEDURE — 80048 BASIC METABOLIC PNL TOTAL CA: CPT

## 2019-05-09 PROCEDURE — 76040000025: Performed by: INTERNAL MEDICINE

## 2019-05-09 RX ORDER — SODIUM CHLORIDE, SODIUM LACTATE, POTASSIUM CHLORIDE, CALCIUM CHLORIDE 600; 310; 30; 20 MG/100ML; MG/100ML; MG/100ML; MG/100ML
100 INJECTION, SOLUTION INTRAVENOUS CONTINUOUS
Status: DISCONTINUED | OUTPATIENT
Start: 2019-05-09 | End: 2019-05-09

## 2019-05-09 RX ORDER — PROPOFOL 10 MG/ML
INJECTION, EMULSION INTRAVENOUS AS NEEDED
Status: DISCONTINUED | OUTPATIENT
Start: 2019-05-09 | End: 2019-05-09 | Stop reason: HOSPADM

## 2019-05-09 RX ORDER — SODIUM CHLORIDE, SODIUM LACTATE, POTASSIUM CHLORIDE, CALCIUM CHLORIDE 600; 310; 30; 20 MG/100ML; MG/100ML; MG/100ML; MG/100ML
INJECTION, SOLUTION INTRAVENOUS
Status: DISCONTINUED | OUTPATIENT
Start: 2019-05-09 | End: 2019-05-09 | Stop reason: HOSPADM

## 2019-05-09 RX ORDER — FUROSEMIDE 10 MG/ML
40 INJECTION INTRAMUSCULAR; INTRAVENOUS 2 TIMES DAILY
Status: DISCONTINUED | OUTPATIENT
Start: 2019-05-09 | End: 2019-05-10

## 2019-05-09 RX ADMIN — HYDROCODONE BITARTRATE AND ACETAMINOPHEN 1 TABLET: 10; 325 TABLET ORAL at 04:47

## 2019-05-09 RX ADMIN — Medication 10 ML: at 14:09

## 2019-05-09 RX ADMIN — SODIUM CHLORIDE, SODIUM LACTATE, POTASSIUM CHLORIDE, AND CALCIUM CHLORIDE 100 ML/HR: 600; 310; 30; 20 INJECTION, SOLUTION INTRAVENOUS at 07:44

## 2019-05-09 RX ADMIN — PROPOFOL 20 MG: 10 INJECTION, EMULSION INTRAVENOUS at 08:37

## 2019-05-09 RX ADMIN — Medication 5 ML: at 06:00

## 2019-05-09 RX ADMIN — CARVEDILOL 12.5 MG: 12.5 TABLET, FILM COATED ORAL at 09:45

## 2019-05-09 RX ADMIN — FUROSEMIDE 40 MG: 10 INJECTION, SOLUTION INTRAMUSCULAR; INTRAVENOUS at 17:35

## 2019-05-09 RX ADMIN — MORPHINE SULFATE 2 MG: 2 INJECTION, SOLUTION INTRAMUSCULAR; INTRAVENOUS at 22:41

## 2019-05-09 RX ADMIN — METOCLOPRAMIDE HYDROCHLORIDE 10 MG: 10 TABLET ORAL at 09:46

## 2019-05-09 RX ADMIN — METOCLOPRAMIDE HYDROCHLORIDE 10 MG: 10 TABLET ORAL at 21:57

## 2019-05-09 RX ADMIN — METOCLOPRAMIDE HYDROCHLORIDE 10 MG: 10 TABLET ORAL at 17:35

## 2019-05-09 RX ADMIN — LOSARTAN POTASSIUM 50 MG: 50 TABLET ORAL at 09:46

## 2019-05-09 RX ADMIN — Medication 400 MG: at 09:45

## 2019-05-09 RX ADMIN — SPIRONOLACTONE 25 MG: 25 TABLET ORAL at 09:46

## 2019-05-09 RX ADMIN — ALPRAZOLAM 0.5 MG: 0.5 TABLET ORAL at 21:57

## 2019-05-09 RX ADMIN — PROMETHAZINE HYDROCHLORIDE 12.5 MG: 25 INJECTION INTRAMUSCULAR; INTRAVENOUS at 22:05

## 2019-05-09 RX ADMIN — PROPOFOL 50 MG: 10 INJECTION, EMULSION INTRAVENOUS at 08:35

## 2019-05-09 RX ADMIN — METOCLOPRAMIDE HYDROCHLORIDE 10 MG: 10 TABLET ORAL at 12:41

## 2019-05-09 RX ADMIN — PROMETHAZINE HYDROCHLORIDE 12.5 MG: 25 INJECTION INTRAMUSCULAR; INTRAVENOUS at 03:41

## 2019-05-09 RX ADMIN — PROMETHAZINE HYDROCHLORIDE 12.5 MG: 25 INJECTION INTRAMUSCULAR; INTRAVENOUS at 09:59

## 2019-05-09 RX ADMIN — FUROSEMIDE 40 MG: 10 INJECTION, SOLUTION INTRAMUSCULAR; INTRAVENOUS at 09:46

## 2019-05-09 RX ADMIN — PROPOFOL 20 MG: 10 INJECTION, EMULSION INTRAVENOUS at 08:39

## 2019-05-09 RX ADMIN — PANTOPRAZOLE SODIUM 40 MG: 40 TABLET, DELAYED RELEASE ORAL at 09:46

## 2019-05-09 RX ADMIN — HYDROCODONE BITARTRATE AND ACETAMINOPHEN 1 TABLET: 10; 325 TABLET ORAL at 18:06

## 2019-05-09 RX ADMIN — Medication 400 MG: at 21:57

## 2019-05-09 RX ADMIN — HYDROCODONE BITARTRATE AND ACETAMINOPHEN 1 TABLET: 10; 325 TABLET ORAL at 11:51

## 2019-05-09 RX ADMIN — GABAPENTIN 300 MG: 300 CAPSULE ORAL at 17:35

## 2019-05-09 RX ADMIN — ATORVASTATIN CALCIUM 80 MG: 40 TABLET, FILM COATED ORAL at 09:45

## 2019-05-09 RX ADMIN — PROMETHAZINE HYDROCHLORIDE 12.5 MG: 25 INJECTION INTRAMUSCULAR; INTRAVENOUS at 14:08

## 2019-05-09 RX ADMIN — Medication 5 ML: at 22:00

## 2019-05-09 RX ADMIN — GABAPENTIN 300 MG: 300 CAPSULE ORAL at 09:45

## 2019-05-09 RX ADMIN — SODIUM CHLORIDE, SODIUM LACTATE, POTASSIUM CHLORIDE, CALCIUM CHLORIDE: 600; 310; 30; 20 INJECTION, SOLUTION INTRAVENOUS at 08:33

## 2019-05-09 RX ADMIN — CARVEDILOL 12.5 MG: 12.5 TABLET, FILM COATED ORAL at 17:36

## 2019-05-09 NOTE — PROGRESS NOTES
Verbal bedside report received from [place], [facility]. Assumed care of patient.

## 2019-05-09 NOTE — INTERVAL H&P NOTE
H&P Update: 
Jair Malave was seen and examined. History and physical has been reviewed. The patient has been examined.  There have been no significant clinical changes since the completion of the originally dated History and Physical.

## 2019-05-09 NOTE — PROGRESS NOTES
[facility] CARDIOLOGY PROGRESS NOTE 
      
 
5/9/2019 7:19 AM 
 
Admit Date: 5/7/2019 Subjective: C/o muscle cramps ROS: 
Cardiovascular:  As noted above Objective:  
  
Vitals:  
 05/08/19 1723 05/08/19 2102 05/09/19 5589 05/09/19 4441 BP: 122/87 107/77 94/61 107/70 Pulse: 94 92 99 85 Resp: 18 18 18 18 Temp: 98.8 °F (37.1 °C) 99.4 °F (37.4 °C) 97.9 °F (36.6 °C) 97.6 °F (36.4 °C) SpO2: 97% 100% 99% 100% Weight:    70.9 kg (156 lb 3.2 oz) Height:      
 
 
Physical Exam: 
General-No Acute Distress Neck- supple, no JVD 
CV- regular rate and rhythm no MRG Lung- clear bilaterally Abd- soft, nontender, nondistended Ext- no edema bilaterally, + diffuse muscle contractions. Skin- warm and dry Data Review:  
Recent Labs 05/09/19 
0330 05/08/19 
1542 05/08/19 
0413 05/07/19 
1553   --  139 139  
K 4.0 4.2 3.2* 4.1 MG 1.7*  --   --   --   
BUN 11  --  5* 5*  
CREA 1.15  --  0.96 0.82 *  --  115* 99 WBC 4.1*  --   --  3.6* HGB 8.3*  --   --  9.4* HCT 25.9*  --   --  29.1*  
  --   --  338 Assessment/Plan:  
 
Principal Problem: 
  Systolic CHF, acute on chronic ([facility] (4/21/2011) Active Problems: 
  ICD (implantable cardioverter-defibrillator) in place (4/22/2011) HTN (hypertension) (11/29/2011) [facility] ([facility] (2/22/2016) Non-ischemic cardiomyopathy ([facility] (3/24/2016) Leukopenia (5/7/2019) 
   
//// He is not volume overloaded currently. Will stop lasix short term and observe.  
 
 
 
 
Andres Chavarria MD 
5/9/2019 7:19 AM

## 2019-05-09 NOTE — PROGRESS NOTES
Problem: Patient Education: Go to Patient Education Activity Goal: Patient/Family Education Outcome: Progressing Towards Goal 
  
Problem: Heart Failure: Day 1 Goal: Off Pathway (Use only if patient is Off Pathway) Outcome: Progressing Towards Goal 
Goal: Activity/Safety Outcome: Progressing Towards Goal 
Goal: Consults, if ordered Outcome: Progressing Towards Goal 
Goal: Diagnostic Test/Procedures Outcome: Progressing Towards Goal 
Goal: Nutrition/Diet Outcome: Progressing Towards Goal 
Goal: Discharge Planning Outcome: Progressing Towards Goal 
Goal: Medications Outcome: Progressing Towards Goal 
Goal: Respiratory Outcome: Progressing Towards Goal 
Goal: Treatments/Interventions/Procedures Outcome: Progressing Towards Goal 
Goal: Psychosocial 
Outcome: Progressing Towards Goal 
Goal: *Oxygen saturation within defined limits Outcome: Progressing Towards Goal 
Goal: *Hemodynamically stable Outcome: Progressing Towards Goal 
Goal: *Optimal pain control at patient's stated goal 
Outcome: Progressing Towards Goal 
Goal: *Anxiety reduced or absent Outcome: Progressing Towards Goal 
  
Problem: Falls - Risk of 
Goal: *Absence of Falls Description Document Hoffman Beady Fall Risk and appropriate interventions in the flowsheet. Outcome: Progressing Towards Goal 
  
Problem: Patient Education: Go to Patient Education Activity Goal: Patient/Family Education Outcome: Progressing Towards Goal

## 2019-05-09 NOTE — PROGRESS NOTES
TRANSFER - IN REPORT: 
 
Verbal report received from KADEN Tirado on Jeana Crow being received from GI lab for ordered procedure Report consisted of patients Situation, Background, Assessment and Recommendations(SBAR). Information from the following report(s) Procedure Summary was reviewed with the receiving nurse. Opportunity for questions and clarification was provided. Assessment completed upon patients arrival to unit and care assumed.

## 2019-05-09 NOTE — PROCEDURES
ESOPHAGOGASTRODUODENOSCOPY    DATE of PROCEDURE: 5/9/2019    PT NAME: Laith Espino     [id number]    MEDICATION:   MAC       INSTRUMENT: GIFH 190    SPECIAL PROCEDURE: 60 fr camara  BLOOD LOSS- 0 or min. SPEC- no  IMPLANT- none    PROCEDURE:  Standard EGD  With dilation    ASSESSMENT:  1. schatzkis ring- min trauma with dilation  2. Moderate HH  3.  Mild gastropathy    Consider cardiac cachexia    PLAN:   1. F/U inpt    C Erick Bullock MD

## 2019-05-09 NOTE — PROGRESS NOTES
Bedside and Verbal shift change report given to Sarita Boggs and Janie Santamaria RN (oncoming nurse) by self Mihai Thomason nurse). Report included the following information SBAR, Kardex, Intake/Output, MAR, Recent Results and Med Rec Status.

## 2019-05-09 NOTE — PROGRESS NOTES
Bedside report received from [facility]. Opportunity for questions, concerns. Patient involved.
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
## 2019-05-09 NOTE — PROGRESS NOTES
TRANSFER - IN REPORT: 
 
Verbal report received from Jennifer(name) on Jeana Crow  being received from [facility](unit) for routine progression of care Report consisted of patients Situation, Background, Assessment and  
Recommendations(SBAR). Information from the following report(s) SBAR and Kardex was reviewed with the receiving nurse. Opportunity for questions and clarification was provided. Assessment completed upon patients arrival to unit and care assumed.

## 2019-05-09 NOTE — PROGRESS NOTES
Bedside and verbal report received from [facility]. Pt going down for procedure. Consent in chart.

## 2019-05-09 NOTE — ANESTHESIA POSTPROCEDURE EVALUATION
Procedure(s): ESOPHAGOGASTRODUODENOSCOPY (EGD)/ 23/ 320 ESOPHAGEAL DILATION. total IV anesthesia Anesthesia Post Evaluation Patient location during evaluation: PACU Patient participation: complete - patient participated Level of consciousness: awake Pain management: satisfactory to patient Airway patency: patent Anesthetic complications: no 
Cardiovascular status: hemodynamically stable Respiratory status: spontaneous ventilation Hydration status: euvolemic Post anesthesia nausea and vomiting:  none Vitals Value Taken Time /86 5/9/2019  8:56 AM  
Temp Pulse 98 5/9/2019  8:59 AM  
Resp 18 5/9/2019  8:56 AM  
SpO2 96 % 5/9/2019  8:59 AM  
Vitals shown include unvalidated device data.

## 2019-05-09 NOTE — PROGRESS NOTES
TRANSFER - IN REPORT: 
 
Verbal report received from Radha Mccloud RN on Mariela Munoz  being received from [facility] for routine progression of care Report consisted of patients Situation, Background, Assessment and  
Recommendations(SBAR). Information from the following report(s) SBAR was reviewed with the receiving nurse. Opportunity for questions and clarification was provided. Assessment completed upon patients arrival to unit and care assumed. Dual assessment completed with RN. No skin breakdown noted.

## 2019-05-09 NOTE — PROGRESS NOTES
Verbal bedside report given to [facility], oncoming RN. Patient's situation, background, assessment and recommendations provided. Opportunity for questions provided. Oncoming RN assumed care of patient.

## 2019-05-09 NOTE — PROGRESS NOTES
TRANSFER - OUT REPORT: 
 
Verbal report given to KADEN Herndon on Matt Chapa  being transferred to [facility] for routine progression of care Report consisted of patients Situation, Background, Assessment and  
Recommendations(SBAR). Information from the following report(s) SBAR was reviewed with the receiving nurse. Lines:  
Peripheral IV 05/07/19 Right Forearm (Active) Site Assessment Clean, dry, & intact 5/9/2019  7:30 AM  
Phlebitis Assessment 0 5/9/2019  7:30 AM  
Infiltration Assessment 0 5/9/2019  7:30 AM  
Dressing Status Clean, dry, & intact 5/9/2019  7:30 AM  
Dressing Type Transparent;Tape 5/9/2019  4:42 AM  
Hub Color/Line Status Patent 5/9/2019  7:30 AM  
Alcohol Cap Used No 5/9/2019  4:42 AM  
  
 
Opportunity for questions and clarification was provided.

## 2019-05-09 NOTE — PHYSICIAN ADVISORY
Letter of Determination: Upgrade from Observation to Inpatient Status This patient was originally hospitalized as Outpatient Status with Observation Services on 5/7/2019 for acute on chronic congestive heart failure. This patient now meets for Inpatient Admission based on medical necessity. The patient's stay was medically necessary based on failure of outpatient management with increased lasix 80 mg daily, history of non-ischemic cardiomyopathy with ejection fraction of 15%, prior episode of ventricular tachycardia with implantable cardiac defibrillator placement, and noncompliance with hospital therapy. It is our recommendation that this patient's hospitalization status should be upgraded from OBSERVATION to INPATIENT status.  
  
The final decision regarding the patient's hospitalization status depends on the attending physician's judgement. Mario Alberto Knowles MD, BRU[facility], Physician Advisor [id number] [facility].

## 2019-05-10 PROBLEM — R13.19 ESOPHAGEAL DYSPHAGIA: Status: ACTIVE | Noted: 2019-05-10

## 2019-05-10 PROBLEM — R10.11 RIGHT UPPER QUADRANT ABDOMINAL PAIN: Status: ACTIVE | Noted: 2019-05-10

## 2019-05-10 LAB
ALBUMIN SERPL-MCNC: 3.1 G/DL (ref 3.5–5)
ALBUMIN/GLOB SERPL: 1.1 {RATIO} (ref 1.2–3.5)
ALP SERPL-CCNC: 69 U/L (ref 50–136)
ALT SERPL-CCNC: 19 U/L (ref 12–65)
ANION GAP SERPL CALC-SCNC: 8 MMOL/L (ref 7–16)
AST SERPL-CCNC: 23 U/L (ref 15–37)
BILIRUB DIRECT SERPL-MCNC: <0.1 MG/DL
BILIRUB SERPL-MCNC: 0.3 MG/DL (ref 0.2–1.1)
BUN SERPL-MCNC: 13 MG/DL (ref 6–23)
CALCIUM SERPL-MCNC: 8.1 MG/DL (ref 8.3–10.4)
CHLORIDE SERPL-SCNC: 102 MMOL/L (ref 98–107)
CO2 SERPL-SCNC: 30 MMOL/L (ref 21–32)
CREAT SERPL-MCNC: 1.02 MG/DL (ref 0.8–1.5)
GLOBULIN SER CALC-MCNC: 2.7 G/DL (ref 2.3–3.5)
GLUCOSE SERPL-MCNC: 92 MG/DL (ref 65–100)
POTASSIUM SERPL-SCNC: 3.6 MMOL/L (ref 3.5–5.1)
PROT SERPL-MCNC: 5.8 G/DL (ref 6.3–8.2)
SODIUM SERPL-SCNC: 140 MMOL/L (ref 136–145)

## 2019-05-10 PROCEDURE — 80048 BASIC METABOLIC PNL TOTAL CA: CPT

## 2019-05-10 PROCEDURE — 74011250636 HC RX REV CODE- 250/636: Performed by: INTERNAL MEDICINE

## 2019-05-10 PROCEDURE — 74011000250 HC RX REV CODE- 250: Performed by: INTERNAL MEDICINE

## 2019-05-10 PROCEDURE — 80076 HEPATIC FUNCTION PANEL: CPT

## 2019-05-10 PROCEDURE — 74011250637 HC RX REV CODE- 250/637: Performed by: NURSE PRACTITIONER

## 2019-05-10 PROCEDURE — 74011250637 HC RX REV CODE- 250/637: Performed by: INTERNAL MEDICINE

## 2019-05-10 PROCEDURE — 65660000000 HC RM CCU STEPDOWN

## 2019-05-10 PROCEDURE — 36415 COLL VENOUS BLD VENIPUNCTURE: CPT

## 2019-05-10 PROCEDURE — 74011250636 HC RX REV CODE- 250/636: Performed by: NURSE PRACTITIONER

## 2019-05-10 RX ORDER — FUROSEMIDE 40 MG/1
40 TABLET ORAL
Status: DISCONTINUED | OUTPATIENT
Start: 2019-05-10 | End: 2019-05-11 | Stop reason: HOSPADM

## 2019-05-10 RX ORDER — SUCRALFATE 1 G/1
1 TABLET ORAL
Status: DISCONTINUED | OUTPATIENT
Start: 2019-05-10 | End: 2019-05-11 | Stop reason: HOSPADM

## 2019-05-10 RX ADMIN — FUROSEMIDE 40 MG: 10 INJECTION, SOLUTION INTRAMUSCULAR; INTRAVENOUS at 09:00

## 2019-05-10 RX ADMIN — Medication 5 ML: at 22:34

## 2019-05-10 RX ADMIN — PROMETHAZINE HYDROCHLORIDE 12.5 MG: 25 INJECTION INTRAMUSCULAR; INTRAVENOUS at 13:19

## 2019-05-10 RX ADMIN — GABAPENTIN 300 MG: 300 CAPSULE ORAL at 17:09

## 2019-05-10 RX ADMIN — METOCLOPRAMIDE HYDROCHLORIDE 10 MG: 10 TABLET ORAL at 13:19

## 2019-05-10 RX ADMIN — MORPHINE SULFATE 2 MG: 2 INJECTION, SOLUTION INTRAMUSCULAR; INTRAVENOUS at 10:19

## 2019-05-10 RX ADMIN — Medication 400 MG: at 22:33

## 2019-05-10 RX ADMIN — CARVEDILOL 12.5 MG: 12.5 TABLET, FILM COATED ORAL at 17:00

## 2019-05-10 RX ADMIN — ALPRAZOLAM 0.5 MG: 0.5 TABLET ORAL at 22:33

## 2019-05-10 RX ADMIN — PROMETHAZINE HYDROCHLORIDE 12.5 MG: 25 INJECTION INTRAMUSCULAR; INTRAVENOUS at 19:22

## 2019-05-10 RX ADMIN — HYDROCODONE BITARTRATE AND ACETAMINOPHEN 1 TABLET: 10; 325 TABLET ORAL at 19:23

## 2019-05-10 RX ADMIN — SUCRALFATE 1 G: 1 TABLET ORAL at 16:30

## 2019-05-10 RX ADMIN — MORPHINE SULFATE 2 MG: 2 INJECTION, SOLUTION INTRAMUSCULAR; INTRAVENOUS at 22:33

## 2019-05-10 RX ADMIN — HYDROCODONE BITARTRATE AND ACETAMINOPHEN 1 TABLET: 10; 325 TABLET ORAL at 04:05

## 2019-05-10 RX ADMIN — MORPHINE SULFATE 2 MG: 2 INJECTION, SOLUTION INTRAMUSCULAR; INTRAVENOUS at 16:48

## 2019-05-10 RX ADMIN — MORPHINE SULFATE 2 MG: 2 INJECTION, SOLUTION INTRAMUSCULAR; INTRAVENOUS at 06:13

## 2019-05-10 RX ADMIN — METOCLOPRAMIDE HYDROCHLORIDE 10 MG: 10 TABLET ORAL at 09:00

## 2019-05-10 RX ADMIN — Medication 10 ML: at 13:20

## 2019-05-10 RX ADMIN — Medication 400 MG: at 09:01

## 2019-05-10 RX ADMIN — CARVEDILOL 12.5 MG: 12.5 TABLET, FILM COATED ORAL at 09:01

## 2019-05-10 RX ADMIN — SUCRALFATE 1 G: 1 TABLET ORAL at 22:33

## 2019-05-10 RX ADMIN — GABAPENTIN 300 MG: 300 CAPSULE ORAL at 09:01

## 2019-05-10 RX ADMIN — Medication 10 ML: at 05:15

## 2019-05-10 RX ADMIN — ATORVASTATIN CALCIUM 80 MG: 40 TABLET, FILM COATED ORAL at 09:00

## 2019-05-10 RX ADMIN — METOCLOPRAMIDE HYDROCHLORIDE 10 MG: 10 TABLET ORAL at 16:30

## 2019-05-10 RX ADMIN — PANTOPRAZOLE SODIUM 40 MG: 40 TABLET, DELAYED RELEASE ORAL at 09:00

## 2019-05-10 RX ADMIN — PROMETHAZINE HYDROCHLORIDE 12.5 MG: 25 INJECTION INTRAMUSCULAR; INTRAVENOUS at 04:38

## 2019-05-10 RX ADMIN — METOCLOPRAMIDE HYDROCHLORIDE 10 MG: 10 TABLET ORAL at 22:33

## 2019-05-10 RX ADMIN — SUCRALFATE 1 G: 1 TABLET ORAL at 13:19

## 2019-05-10 RX ADMIN — LOSARTAN POTASSIUM 50 MG: 50 TABLET ORAL at 09:00

## 2019-05-10 RX ADMIN — PROMETHAZINE HYDROCHLORIDE 12.5 MG: 25 INJECTION INTRAMUSCULAR; INTRAVENOUS at 08:58

## 2019-05-10 RX ADMIN — FUROSEMIDE 40 MG: 40 TABLET ORAL at 16:30

## 2019-05-10 RX ADMIN — SPIRONOLACTONE 25 MG: 25 TABLET ORAL at 09:00

## 2019-05-10 RX ADMIN — HYDROCODONE BITARTRATE AND ACETAMINOPHEN 1 TABLET: 10; 325 TABLET ORAL at 13:20

## 2019-05-10 NOTE — PROGRESS NOTES
5/10/2019 2:34 PM 
 
Admit Date: 5/7/2019 Admit Diagnosis: Acute on chronic diastolic (congestive) heart failure ([address] [I50.33]; Acute on chronic diastolic (congestive) heart failure ([address] [I50.33] Subjective: No cp or sob Objective:  
  
Hr 107/60 Physical Exam: 
Nixon Gomez, Well Nourished, No Acute Distress, Alert & Oriented x 3, appropriate mood. Neck- supple, no JVD 
CV- regular rate and rhythm no MRG Lung- clear bilaterally Abd- soft, nontender, nondistended Ext- no edema bilaterally. Skin- warm and dry Data Review:  
Recent Labs 05/10/19 
0421 05/09/19 
0330  139  
K 3.6 4.0  
BUN 13 11 CREA 1.02 1.15 WBC  --  4.1* HGB  --  8.3* HCT  --  25.9*  
PLT  --  292 Assessment/Plan:  
 
Principal Problem: 
  Systolic CHF, acute on chronic (HCC) (4/21/2011)Improved with current therapy. Will continue medications Restart po lasix Active Problems: 
  ICD (implantable cardioverter-defibrillator) in place (4/22/2011) HTN (hypertension) (11/29/2011) Stable. Continue current medical therapy. Ventricular tachycardia ([address] (2/22/2016) Non-ischemic cardiomyopathy ([facility] (3/24/2016) Acute on chronic diastolic (congestive) heart failure ([facility] [facility] (5/7/2019) Leukopenia (5/7/2019) Esophageal dysphagia (5/10/2019) Right upper quadrant abdominal pain (5/10/2019)- per gi

## 2019-05-10 NOTE — ROUTINE PROCESS
CHF teaching started post introduction to pt.; aware of diagnosis. Planner/scale(home) @ BS and will follow. Smoking/ ETOH/Illicit drug use cessation and maintain a healthy weight covered. Pt. aware that I can not prescribe nor adjust  medications: 15mins Palliative Care score: 
Refused ACP on admission Start 2L/D Fluid restriction/ cardiac diet CHF teaching continues to pt. . Emphasis on taking prescription meds as ordered, to keep F/U appts and to call MD STAT if any of the following occur: ? If you gain 2 lbs in one day or 5 lbs in a week, and short of breath. ? If you can not lay flat without developing short of breath or rapid breathing at night; or if it wakes you up. Develop a cough or wheezing. reinforce

## 2019-05-10 NOTE — PROGRESS NOTES
Problem: Patient Education: Go to Patient Education Activity Goal: Patient/Family Education Outcome: Progressing Towards Goal 
  
Problem: Heart Failure: Day 1 Goal: Off Pathway (Use only if patient is Off Pathway) Outcome: Progressing Towards Goal 
Goal: Activity/Safety Outcome: Progressing Towards Goal 
Goal: Consults, if ordered Outcome: Progressing Towards Goal 
Goal: Diagnostic Test/Procedures Outcome: Progressing Towards Goal 
Goal: Nutrition/Diet Outcome: Progressing Towards Goal 
Goal: Discharge Planning Outcome: Progressing Towards Goal 
Goal: Medications Outcome: Progressing Towards Goal 
Goal: Respiratory Outcome: Progressing Towards Goal 
Goal: Treatments/Interventions/Procedures Outcome: Progressing Towards Goal 
Goal: Psychosocial 
Outcome: Progressing Towards Goal 
Goal: *Oxygen saturation within defined limits Outcome: Progressing Towards Goal 
Goal: *Hemodynamically stable Outcome: Progressing Towards Goal 
Goal: *Optimal pain control at patient's stated goal 
Outcome: Progressing Towards Goal 
Goal: *Anxiety reduced or absent Outcome: Progressing Towards Goal 
  
Problem: Falls - Risk of 
Goal: *Absence of Falls Description Document Mauricio Jones Fall Risk and appropriate interventions in the flowsheet. Outcome: Progressing Towards Goal 
  
Problem: Patient Education: Go to Patient Education Activity Goal: Patient/Family Education Outcome: Progressing Towards Goal

## 2019-05-10 NOTE — PROGRESS NOTES
Verbal bedside report given to [facility], oncoming RN. Patient's situation, background, assessment and recommendations provided. Opportunity for questions provided. Oncoming RN assumed care of patient.

## 2019-05-10 NOTE — PROGRESS NOTES
Problem: Heart Failure: Day 1 Goal: Activity/Safety Outcome: Progressing Towards Goal 
Goal: Nutrition/Diet Outcome: Progressing Towards Goal 
  
Problem: Falls - Risk of 
Goal: *Absence of Falls Description Document iMke Osuna Fall Risk and appropriate interventions in the flowsheet.  
Outcome: Progressing Towards Goal

## 2019-05-10 NOTE — PROGRESS NOTES
Bedside report received from Maximino Vences and [facility]. Pt resting in bed, family at bedside.

## 2019-05-10 NOTE — PROGRESS NOTES
Patient was calm Long time friend of  Provided supportive presence Danielle Trujillo, staff , [address], [id number] [facility] Juan  /   [email or web address]

## 2019-05-10 NOTE — PROGRESS NOTES
MEWS score noted to be 4 due to increased HR, pt currently in sinus tach, HR is 104, which has been documented in the patient's chart since 0610 . Clinical Coordinator, Misael Clayton RN informed and assessed patient. Patient resting in bed asymptomatic. Will continue to monitor.

## 2019-05-10 NOTE — PROGRESS NOTES
5/10/2019 Admit Date: 5/7/2019 Subjective: CHIEF COMPLAINT- abd pain HPI Overall-the same Diet-cardiac Appetite-fair Nausea-yes Vomiting-no Pain-ruq and right back BM-yes Bleeding-no Medications-reviewed and adjusted as appropriate IV FLUIDS-reviewed FAM HX-per H&P SH-per H&P 
 tob-no 
          etoh-yes Past Medical History:  
Diagnosis Date  Arthritis  CAD (coronary artery disease)  [facility]F (congestive heart failure) ([facility]  Chronic alcoholism ([facility]  Chronic back pain   
 from [facility]  Chronic neck pain   
 from [facility]  Chronic systolic heart failure ([facility] 4/21/2011  Depression  Dizziness - light-headed  GERD (gastroesophageal reflux disease)   
 under control with nexium  Gynecomastia 2/22/2016  Heart failure ([facility]  History of implantable cardioverter-defibrillator (ICD) placement 2/22/2016  Hypertension  Psychiatric disorder   
 anxiety  Schatzki's ring 07/10/2018  Situational depression 2/22/2016  Ventricular tachycardia ([facility] 2/22/2016 Past Surgical History:  
Procedure Laterality Date  EGD  5/9/2019  HX HEART CATHETERIZATION  9/21/10  
 HX PACEMAKER    
 defibrillator ROS-- 
               RESP-neg CARDIAC-neg -neg Further ROS as per PMH and PSH- see problem list     
                   
 
Objective:  
 
Visit Vitals /65 Pulse (!) 103 Temp 97.8 °F (36.6 °C) Resp 18 Ht 5' 11\" (1.803 m) Wt 72.3 kg (159 lb 4.8 oz) SpO2 97% BMI 22.22 kg/m² Intake/Output Summary (Last 24 hours) at 5/10/2019 9987 Last data filed at 5/10/2019 7358 Gross per 24 hour Intake 1200 ml Output 650 ml Net 550 ml EXAM:   
 NEURO-a&o HEENT-wnl LUNGS-clear [place] G[place] ABD-soft , min tenderness, no rebound, good bs- mostly ruq EXT-no edema LABS- 
Lab Results Component Value Date/Time WBC 4.1 (L) 05/09/2019 03:30 AM  
 RBC 2.65 (L) 05/09/2019 03:30 AM  
 HGB 8.3 (L) 05/09/2019 03:30 AM  
 HCT 25.9 (L) 05/09/2019 03:30 AM  
 PLATELET 192 81/27/8932 03:30 AM  
 
Lab Results Component Value Date/Time Sodium 140 05/10/2019 04:21 AM  
 Potassium 3.6 05/10/2019 04:21 AM  
 Chloride 102 05/10/2019 04:21 AM  
 CO2 30 05/10/2019 04:21 AM  
 Anion gap 8 05/10/2019 04:21 AM  
 Glucose 92 05/10/2019 04:21 AM  
 BUN 13 05/10/2019 04:21 AM  
 Creatinine 1.02 05/10/2019 04:21 AM  
 GFR est AA >60 05/10/2019 04:21 AM  
 GFR est non-AA >60 05/10/2019 04:21 AM  
 Calcium 8.1 (L) 05/10/2019 04:21 AM  
 Magnesium 1.7 (L) 05/09/2019 03:30 AM  
 Phosphorus 2.5 12/22/2018 12:53 PM  
 Albumin 3.5 05/07/2019 03:53 PM  
 Bilirubin, total 0.9 05/07/2019 03:53 PM  
 Protein, total 6.2 (L) 05/07/2019 03:53 PM  
 Globulin 2.7 05/07/2019 03:53 PM  
 A-G Ratio 1.3 05/07/2019 03:53 PM  
 AST (SGOT) 95 (H) 05/07/2019 03:53 PM  
 ALT (SGPT) 33 05/07/2019 03:53 PM  
 
 
    RADIOLOGY- had biliary work up in the past 
 
 
Assessment:  
 
Principal Problem: 
  Systolic CHF, acute on chronic ([address] (4/21/2011) Active Problems: 
  ICD (implantable cardioverter-defibrillator) in place (4/22/2011) HTN (hypertension) (11/29/2011) Ventricular tachycardia ([address] (2/22/2016) Non-ischemic cardiomyopathy ([address] (3/24/2016) Acute on chronic diastolic (congestive) heart failure ([address] (5/7/2019) Leukopenia (5/7/2019) Esophageal dysphagia (5/10/2019) Right upper quadrant abdominal pain (5/10/2019) 
 
slight soreness post dilation Had biliary evaluation last year and poor surgical candidate Likely some cardiac cachexia with nonspecific gastropathy Known biliary sludge Plan:  
 
Add carafate Recheck enzymes Might get HIDA scan PT SEEN AND EXAMINED AND PLAN DISCUSSED AND IMPLEMENTED.  
Momo Almendarez MD

## 2019-05-10 NOTE — ADT AUTH CERT NOTES
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 interrogation in the morning HTN (hypertension) (2011) Increase ARB, continue to monitor Leukopenia (2019) Noted since  this year. Needs further investigation, consider IM or Hematology consult. Darreld Apgar, NP 
 
2019 
 
6:48 PM 
 
  
 
I have personally seen and examined patient and agree with above assessment. I agree and confirm with findings with additional details/exceptions as listed below: 
 
  
 
Prior history of nonischemic cardiomyopathy possibly due possibly to alcoholic cardiomyopathy. Prior noted coronary angiogram in 2017 with mild nonobstructive disease. Also some prior history of noncompliance. Presented to the ER with increased lower extremity edema/dyspnea over the last week. Also increased cough/orthopnea over the last 24 hours. Some reproducible focal chest discomfort exacerbated with coughing. States compliant with all his medications. Normally on Lasix 40 mg daily but increased his dosing to twice a day over the last week due to his symptoms and presented to the ER due to persistent symptoms. Also complains of some right-sided flank pain. Denies any fevers chills/URI like symptoms. Noted elevated blood pressures on presentation to the ER and states blood pressures have been elevated of late. Exam consistent with volume overload and likely exacerbated by elevated blood pressures as well. Flank pain possibly due to hepatic congestion. Start IV diuresis. Increase losartan dose. Ideally Aldactone but appears some history of breast tenderness prior and plan starting eplerenone and assess tolerability. If blood pressures tolerate, consideration for starting entresto. Denies any significant alcohol intake and reports occasionally drinking some wine. Reports no hard alcohol in over a month. Stressed need for abstinence/moderation. Further recommendations per clinical course. Tavia Green MD 
 
2019  7:56 PM 
  
  
 
 
 
 
 
 
Patient Demographics Patient Name Gaetano Bloom, [address] 
[id number] Sex Male  
1965 Address [address] [place] [address] Phone [phone] (Home) *Preferred* 
[phone] [facility] CSN: 
 
 
[id number] Admit Date: 
 
Admit Time Room Bed May 7, 2019  4:24  [30278] 01 [804] Attending Providers Provider Pager From To Carmen Cherry MD  19 Dandy Rocha MD  19 Emergency Contact(s) Name Relation Home Work Mobile Burton Nathalia Spouse [phone] Magali Lomeli Mother [phone] Utilization Reviews Letter of Status Determination by Yolanda Bonds RN Review Entered Review Status 2019 14:01 In Primary Criteria Review Letter of Determination: Upgrade from Observation to Inpatient Status This patient was originally hospitalized as Outpatient Status with Observation Services on 2019 for acute on chronic congestive heart failure. This patient now meets for Inpatient Admission based on medical necessity.   The patient's stay was medically necessary based on failure of outpatient management with increased lasix 80 mg daily, history of non-ischemic cardiomyopathy with ejection fraction of 15%, prior episode of ventricular tachycardia with implantable cardiac defibrillator placement, and noncompliance with hospital therapy. It is our recommendation that this patient's hospitalization status should be upgraded from OBSERVATION to INPATIENT status. The final decision regarding the patient's hospitalization status depends on the attending physician's judgement. Miranda Butler MD, BRUCE, Physician Advisor Alex Curry Day 3 (5/9/2019) by Richard Wilson RN Review Entered Review Status 5/9/2019 14:00 Completed Criteria Review Care Day: 3 Care Date: 5/9/2019 Level of Care: Telemetry Guideline Day 3 Clinical Status  
(X) * Hemodynamic stability 5/9/2019 14:00:26 EDT by Richard Wilson   
vitals- 107/70, 85, 18, 97.6, 100%. (X) * Tachypnea absent 5/9/2019 14:00:26 EDT by Richard Wilson RR 18   
  
( ) * Oxygenation at baseline or acceptable for next level of care   
(X) * Dyspnea absent, at baseline, or acceptable for next level of care 5/9/2019 14:00:26 EDT by Richard Wilson Comfortable breathing with no accessory muscle use. Clear breath sounds anteriorly with no wheezes, rales, or rhonchi   
  
(X) * Cardiac rate and rhythm acceptable 5/9/2019 14:00:26 EDT by Richard Wilson CV- regular rate and rhythm no MRG   
  
(X) * Pulmonary edema absent or acceptable for next level of care 5/9/2019 14:00:26 EDT by Richard Wilson Ext- no edema bilaterally, + diffuse muscle contractions   
  
(X) * Peripheral or sacral edema absent, at baseline, or improved 5/9/2019 14:00:26 EDT by Richard Wilson Ext- no edema bilaterally, + diffuse muscle contractions   
  
(X) * Mental status at baseline 5/9/2019 14:00:26 EDT by Richard Wilson Pt is alert, cooperative, no acute distress ( ) * Volume status acceptable on oral medication ( ) * Renal function at baseline or acceptable for next level of care ( ) * Electrolyte levels normal or acceptable for next level of care ( ) * Immediate precipitating factors absent or controlled ( ) * Discharge plans and education understood Activity ( ) * Ambulatory Routes  
(X) * Oral hydration, medications, and diet 5/9/2019 14:00:26 EDT by Hilario Johnson   
xanax 0.5 mg po q d, coreg 12.5 mg po bid, neurontin 300 mg po bid, norco po q 6 hrs prn x 2, cozaar 50 mg po q d, mag ox po q 12 hrs, reglan 10 mg po id and qhs, protonix 40 mg po q d, aldactone 25 mg po q d, cardiac diet Interventions ( ) * Oxygen absent   
(X) Weigh 5/9/2019 14:00:26 EDT by Hilario Johnson 70.9 kg Medications (X) Diuretics 5/9/2019 14:00:26 EDT by Hilario Johnson   
lasix 40 mg iv bid   
  
(X) Beta-blocker 5/9/2019 14:00:26 EDT by Hilario Johnson   
lipitor 80 mg po q d   
  
  
  
  
  
* Milestone Additional Notes 5-9-19 Cardiology- C/o muscle cramps Principal Problem:  
  Systolic CHF, acute on chronic ([address] (4/21/2011) Active Problems:  
  ICD (implantable cardioverter-defibrillator) in place (4/22/2011) HTN (hypertension) (11/29/2011) Ventricular tachycardia ([address] (2/22/2016) Non-ischemic cardiomyopathy [address] (3/24/2016) Leukopenia (5/7/2019) He is not volume overloaded currently. Will stop lasix short term and observe GI- Continue PPI daily, currently receiving po Protonix 40mg qAM  
-Supportive care with anti-emetic p.r.n. Angela Patel has been effective. Phenergan helps though he requests that this be scheduled.    
-Plan EGD with possible esophageal dilation tomorrow 5/9/19 with Dr. Micah Guzman Further recommendations will be based upon pt clinical course and findings on his EGD PROCEDURE:  Standard EGD  With dilation ASSESSMENT:  
1. schatzkis ring- min trauma with dilation 2. Moderate HH 3. Mild gastropathy Consider cardiac cachexia PLAN:   
1. F/U Meds- Phenergan 12.5 mg iv q 4 hrs prn x 2, lr 100 cc/hr iv cont Heart Failure - Care Day 2 (5/8/2019) by Meaghan Ribeiro RN Review Entered Review Status 5/8/2019 12:49 Completed Criteria Review Care Day: 2 Care Date: 5/8/2019 Level of Care: Telemetry Guideline Day 2 Level Of Care (X) Intermediate care or floor 5/8/2019 12:49:43 EDT by Meaghan Ribeiro   
tele floor Clinical Status  
(X) * Hemodynamic stability 5/8/2019 12:49:43 EDT by Meaghan Ribeiro   
vitals- 131/81, 96, 18, 100%, 97.6.   
  
(X) * Mental status at baseline 5/8/2019 12:49:43 EDT by Meaghan Ribeiro Pt is alert, cooperative, no acute distress   
  
(X) * MI excluded 5/8/2019 12:49:43 EDT by Meaghan Ribeiro   
none noted   
  
(X) * Cardiac rate and rhythm acceptable 5/8/2019 12:49:43 EDT by Meaghan Ribeiro   
regular rate and rhythm no MRG   
  
(X) * Oxygenation at baseline or improved 5/8/2019 12:49:43 EDT by Meaghan Ribeiro   
pt in room air   
  
(X) * Pulmonary edema absent or improved 5/8/2019 12:49:43 EDT by Meaghan Ribeiro   
none noted Routes  
(X) Oral or parenteral medications 5/8/2019 12:49:43 EDT by Meaghan Ribeiro   
xanax 0.5 mg po q hs,coreg 12.5 mg po bid,neurontin 300 mg po bid, norco po q 6 hrs prn x 2, cozaar 50 mg po q d,mag ox po q 12 hrs, protonix 40 mg po q d, aldactone 15 mg po q d, eglan 10 mg po qid and qhs,klor 40 meq po x 1,phenergan 12.5 mg iv x 1   
  
(X) Low-salt diet 5/8/2019 12:49:43 EDT by Meaghan Ribeiro   
reg cardiac 2 gm na diet, npo after mn Interventions  
(X) * Pulmonary catheter absent 5/8/2019 12:49:43 EDT by Meaghan Ribeiro   
none noted (X) Weigh 5/8/2019 12:49:43 EDT by Rosendo Freitas 72.8 kg (X) Possible electrolytes[I] 5/8/2019 12:49:43 EDT by Rosendo Freitas   
abnl labs- k 3.2, glucose 115, bun 5 Medications (X) Diuretics 5/8/2019 12:49:43 EDT by Rosendo Freitas   
lasix 60 mg iv bid   
  
(X) Beta-blocker 5/8/2019 12:49:43 EDT by Rosendo Freitas   
lipitor 80 mg po q d   
  
  
  
  
  
* Milestone Additional Notes 5-8-19 Cardiology- Stable overnight without angina or palpitations but still SOB and still LE edema. Complains of recent recurrence dysphagia with prior esophageal dilatation in past, acute on chronic worsening of nausea as well. Requests GI evaluation Principal Problem:  
  Acute on chronic systolic (congestive) heart failure ([address] (5/7/2019)- IV lasix, elevate legs, recheck labs in AM  
 Active Problems:  
  Chronic systolic heart failure ([address]- acute worsening recently, ? Prior non-compliance. As above. ICD (implantable cardioverter-defibrillator) in place - stable, no recent discharges, replete lytes as tolerated HTN (hypertension) - reassess after diuresis, augment CHF regimen as tolerated Dysphagia/chronic nausea- GI consult today. GI- Continue PPI daily, currently receiving po Protonix 40mg qAM  
-Supportive care with anti-emetic p.r.n. Alfredo Jonh has been effective. Phenergan helps though he requests that this be scheduled.    
-Plan EGD with possible esophageal dilation tomorrow 5/9/19 with Dr. Donell Goodell Further recommendations will be based upon pt clinical course and findings on his EGD Heart Failure - Care Day 1 (5/7/2019) by Rosendo Freitas RN Review Entered Review Status 5/8/2019 07:40 Completed Criteria Review Care Day: 1 Care Date: 5/7/2019 Level of Care: Telemetry Guideline Day 1 Level Of Care (X) ICU[D]or intermediate care[E]after emergency treatment 5/8/2019 07:40:01 EDT by Peg Go   
tele floor Clinical Status  
(X) * Clinical Indications met[F] Activity  
(X) Initial bed rest   
5/8/2019 07:40:01 EDT by Peg Go   
yes Routes (X) Parenteral medications 5/8/2019 07:40:01 EDT by Peg Go   
phenergan 12.5 mg iv x 1   
  
(X) Oral hydration 5/8/2019 07:40:01 EDT by Peg Go   
reg cardiac 2 gm na diet (X) Low-salt diet 5/8/2019 07:40:01 EDT by Peg Go   
reg cardiac 2 gm na diet Interventions  
(X) ECG, CXR, ABG   
5/8/2019 07:40:01 EDT by Peg [place] CXR- Negative for acute abnormality EKG- Sinus tachycardia Cannot rule out Anterior infarct Abnormal ECG   
  
(X) Cardiac biomarkers, BNP   
5/8/2019 07:40:01 EDT by Peg Go Abnl labs- wbc 3.6, rbc 2.98, hgb 9.4, bun 5, tot prot 6.2, ast 95, bnp 503 (X) Electrolytes 5/8/2019 07:40:01 EDT by Peg Go Abnl labs- wbc 3.6, rbc 2.98, hgb 9.4, bun 5, tot prot 6.2, ast 95, bnp 503 (X) Weigh 5/8/2019 07:40:01 EDT by Peg Go 72.8 kg Medications (X) IV diuretics 5/8/2019 07:40:01 EDT by Peg Go   
lasix 40 mg iv x 1   
  
  
  
  
  
* Milestone Additional Notes 5-7-19 Principal Problem:  
  Acute on chronic diastolic (congestive) heart failure (HCC) (5/7/2019) Admit overnight strict I and O, Low sodium and volume restricted diet, continue HF approved BB, increase ARB, add Aldactone. Monitor I and O and BP. IV lasix 60 BID with Daily BMP. Will continue to evaluate and adjust medications as needed. Active Problems:  
  Chronic systolic heart failure ([address] (4/21/2011) Continue as above, consider adding Entresto if patients vitals tolerate new medications adjustments. ICD (implantable cardioverter-defibrillator) in place (4/22/2011) Consider interrogation in the morning HTN (hypertension) (11/29/2011) Increase ARB, continue to monitor Leukopenia (5/7/2019) Noted since [place] this year. Needs further investigation, consider IM or Hematology consult Vitals- 98.3, 93, 20, 158/112, 98%. Meds- norco po x 1, nitrobid 1 inch x 1, Zofran 4 mg po x 1, Heart Failure - Clinical Indications for Admission to Inpatient Care by Carie Borrero RN Review Entered Review Status 5/8/2019 07:36 Completed Criteria Review Clinical Indications for Admission to Inpatient Care Most Recent : Carie Borrero Most Recent Date: 5/8/2019 07:36:14 EDT  
(X) Admission is indicated by1 or more of the following(1)(2)(3)(4)(5)(6): (X) Other condition, treatment, or monitoring requiring inpatient admission 5/8/2019 07:36:14 EDT by Carie Borrero Acute on chronic diastolic (congestive) heart failure Additional Notes 5-7-19  
47 y.o. male who presents with Patient history of heart failure with reduced ejection fraction status post ICD, nonischemic cardiomyopathy, chronic pain, GERD, hypertension, anxiety, arthritis, and ventricular tachycardia. The patient presented to the ER after one week of shortness of breath, 2 weeks of increased lower limb edema, and one week of increased Lasix use to 80 mg daily. The patient's had a progressive cough that has been nonproductive and now reproducible chest pain while moving and coughing. This chest pain is non-radiation can be reproduced with palpation and increases with breathing and movement. At home he has had increased sleep disturbances has not been able to lay flat, and reports new night sweats.   He currently denies nausea, vomiting, diarrhea, recent illness, changes in medicine except for what she reported above, noncompliance with medicines, and has not been weighing himself on a daily basis. Review of recent lab work shows recurrent leukopenia of unknown etiology, Clear chest x-ray, and no ST segment changes on EKG Vitals- 98.3, 93, 20, 158/112, 98%. Abnl labs- wbc 3.6, rbc 2.98, hgb 9.4, bun 5, tot prot 6.2, ast 95, bnp 503 CXR- Negative for acute abnormality EKG- Sinus tachycardia Cannot rule out Anterior infarct Abnormal ECG

## 2019-05-11 VITALS
WEIGHT: 162.2 LBS | HEART RATE: 92 BPM | DIASTOLIC BLOOD PRESSURE: 69 MMHG | SYSTOLIC BLOOD PRESSURE: 94 MMHG | BODY MASS INDEX: 22.71 KG/M2 | TEMPERATURE: 97.4 F | RESPIRATION RATE: 17 BRPM | OXYGEN SATURATION: 97 % | HEIGHT: 71 IN

## 2019-05-11 LAB
ANION GAP SERPL CALC-SCNC: 8 MMOL/L (ref 7–16)
BUN SERPL-MCNC: 13 MG/DL (ref 6–23)
CALCIUM SERPL-MCNC: 8.2 MG/DL (ref 8.3–10.4)
CHLORIDE SERPL-SCNC: 105 MMOL/L (ref 98–107)
CO2 SERPL-SCNC: 28 MMOL/L (ref 21–32)
CREAT SERPL-MCNC: 1.02 MG/DL (ref 0.8–1.5)
GLUCOSE SERPL-MCNC: 93 MG/DL (ref 65–100)
POTASSIUM SERPL-SCNC: 4 MMOL/L (ref 3.5–5.1)
SODIUM SERPL-SCNC: 141 MMOL/L (ref 136–145)

## 2019-05-11 PROCEDURE — 74011250637 HC RX REV CODE- 250/637: Performed by: INTERNAL MEDICINE

## 2019-05-11 PROCEDURE — 36415 COLL VENOUS BLD VENIPUNCTURE: CPT

## 2019-05-11 PROCEDURE — 74011250637 HC RX REV CODE- 250/637: Performed by: NURSE PRACTITIONER

## 2019-05-11 PROCEDURE — 74011250636 HC RX REV CODE- 250/636: Performed by: INTERNAL MEDICINE

## 2019-05-11 PROCEDURE — 80048 BASIC METABOLIC PNL TOTAL CA: CPT

## 2019-05-11 PROCEDURE — 74011000250 HC RX REV CODE- 250: Performed by: INTERNAL MEDICINE

## 2019-05-11 PROCEDURE — 74011250636 HC RX REV CODE- 250/636: Performed by: NURSE PRACTITIONER

## 2019-05-11 RX ORDER — LOSARTAN POTASSIUM 50 MG/1
50 TABLET ORAL DAILY
Qty: 30 TAB | Refills: 11 | Status: SHIPPED | OUTPATIENT
Start: 2019-05-12 | End: 2019-12-06 | Stop reason: SDUPTHER

## 2019-05-11 RX ORDER — SUCRALFATE 1 G/1
1 TABLET ORAL
Qty: 120 TAB | Refills: 0 | Status: SHIPPED | OUTPATIENT
Start: 2019-05-11 | End: 2020-09-29

## 2019-05-11 RX ORDER — EPLERENONE 25 MG/1
25 TABLET, FILM COATED ORAL DAILY
Qty: 30 TAB | Refills: 5 | Status: SHIPPED | OUTPATIENT
Start: 2019-05-11 | End: 2019-12-06 | Stop reason: SDUPTHER

## 2019-05-11 RX ORDER — FUROSEMIDE 40 MG/1
40 TABLET ORAL 2 TIMES DAILY
Qty: 60 TAB | Refills: 11 | Status: SHIPPED | OUTPATIENT
Start: 2019-05-11 | End: 2019-12-06 | Stop reason: SDUPTHER

## 2019-05-11 RX ADMIN — PROMETHAZINE HYDROCHLORIDE 12.5 MG: 25 INJECTION INTRAMUSCULAR; INTRAVENOUS at 06:32

## 2019-05-11 RX ADMIN — MORPHINE SULFATE 2 MG: 2 INJECTION, SOLUTION INTRAMUSCULAR; INTRAVENOUS at 10:44

## 2019-05-11 RX ADMIN — LOSARTAN POTASSIUM 50 MG: 50 TABLET ORAL at 08:18

## 2019-05-11 RX ADMIN — SPIRONOLACTONE 25 MG: 25 TABLET ORAL at 08:18

## 2019-05-11 RX ADMIN — Medication 10 ML: at 06:39

## 2019-05-11 RX ADMIN — GABAPENTIN 300 MG: 300 CAPSULE ORAL at 08:18

## 2019-05-11 RX ADMIN — SUCRALFATE 1 G: 1 TABLET ORAL at 08:18

## 2019-05-11 RX ADMIN — METOCLOPRAMIDE HYDROCHLORIDE 10 MG: 10 TABLET ORAL at 10:44

## 2019-05-11 RX ADMIN — PROMETHAZINE HYDROCHLORIDE 12.5 MG: 25 INJECTION INTRAMUSCULAR; INTRAVENOUS at 00:36

## 2019-05-11 RX ADMIN — FUROSEMIDE 40 MG: 40 TABLET ORAL at 08:18

## 2019-05-11 RX ADMIN — CARVEDILOL 12.5 MG: 12.5 TABLET, FILM COATED ORAL at 08:18

## 2019-05-11 RX ADMIN — SUCRALFATE 1 G: 1 TABLET ORAL at 10:44

## 2019-05-11 RX ADMIN — ATORVASTATIN CALCIUM 80 MG: 40 TABLET, FILM COATED ORAL at 08:18

## 2019-05-11 RX ADMIN — HYDROCODONE BITARTRATE AND ACETAMINOPHEN 1 TABLET: 10; 325 TABLET ORAL at 08:17

## 2019-05-11 RX ADMIN — MORPHINE SULFATE 2 MG: 2 INJECTION, SOLUTION INTRAMUSCULAR; INTRAVENOUS at 06:32

## 2019-05-11 RX ADMIN — Medication 400 MG: at 08:17

## 2019-05-11 RX ADMIN — PROMETHAZINE HYDROCHLORIDE 12.5 MG: 25 INJECTION INTRAMUSCULAR; INTRAVENOUS at 10:44

## 2019-05-11 RX ADMIN — METOCLOPRAMIDE HYDROCHLORIDE 10 MG: 10 TABLET ORAL at 08:18

## 2019-05-11 RX ADMIN — PANTOPRAZOLE SODIUM 40 MG: 40 TABLET, DELAYED RELEASE ORAL at 08:18

## 2019-05-11 NOTE — PROGRESS NOTES
Discharge instructions were reviewed with patient. An opportunity was given for questions. All medications were reviewed, and information was given on the new medications - losartan, lasix, carafate, inspra. Patient verbalized understanding, and has no questions at this time. IV and telemetry monitor removed by primary RN.

## 2019-05-11 NOTE — PROGRESS NOTES
5/11/2019 Admit Date: 5/7/2019 Subjective: CHIEF COMPLAINT- abd pain HPI Overall-the same- dysphagia a little better Diet-cardiac Appetite-fair Nausea-yes Vomiting-no Pain-ruq and right back BM-yes Bleeding-no Medications-reviewed and adjusted as appropriate IV FLUIDS-reviewed FAM HX-per H&P SH-per H&P 
 tob-no 
          etoh-yes Past Medical History:  
Diagnosis Date  Arthritis  CAD (coronary artery disease)  [facility]F (congestive heart failure) ([facility]  Chronic alcoholism ([facility]  Chronic back pain   
 from [facility]va  Chronic neck pain   
 from [facility]va  Chronic systolic heart failure ([facility] 4/21/2011  Depression  Dizziness - light-headed  GERD (gastroesophageal reflux disease)   
 under control with nexium  Gynecomastia 2/22/2016  Heart failure ([facility]  History of implantable cardioverter-defibrillator (ICD) placement 2/22/2016  Hypertension  Psychiatric disorder   
 anxiety  Schatzki's ring 07/10/2018  Situational depression 2/22/2016  Ventricular tachycardia ([facility] 2/22/2016 Past Surgical History:  
Procedure Laterality Date  EGD  5/9/2019  HX HEART CATHETERIZATION  9/21/10  
 HX PACEMAKER    
 defibrillator ROS-- 
               RESP-neg CARDIAC-neg -neg Further ROS as per PMH and PSH- see problem list     
                   
 
Objective:  
 
Visit Vitals /68 (BP 1 Location: Right arm, BP Patient Position: At rest) Pulse 94 Temp 98.1 °F (36.7 °C) Resp 16 Ht 5' 11\" (1.803 m) Wt 73.6 kg (162 lb 3.2 oz) SpO2 98% BMI 22.62 kg/m² Intake/Output Summary (Last 24 hours) at 5/11/2019 5303 Last data filed at 5/11/2019 7840 Gross per 24 hour Intake 960 ml Output 1775 ml Net -815 ml EXAM:   
 NEURO-a&o HEENT-wnl LUNGS-clear Maddi Pradhan ABD-soft , min tenderness, no rebound, good bs- mostly ruq EXT-no edema LABS- 
Lab Results Component Value Date/Time WBC 4.1 (L) 05/09/2019 03:30 AM  
 RBC 2.65 (L) 05/09/2019 03:30 AM  
 HGB 8.3 (L) 05/09/2019 03:30 AM  
 HCT 25.9 (L) 05/09/2019 03:30 AM  
 PLATELET 350 74/11/1082 03:30 AM  
 
Lab Results Component Value Date/Time Sodium 141 05/11/2019 04:01 AM  
 Potassium 4.0 05/11/2019 04:01 AM  
 Chloride 105 05/11/2019 04:01 AM  
 CO2 28 05/11/2019 04:01 AM  
 Anion gap 8 05/11/2019 04:01 AM  
 Glucose 93 05/11/2019 04:01 AM  
 BUN 13 05/11/2019 04:01 AM  
 Creatinine 1.02 05/11/2019 04:01 AM  
 GFR est AA >60 05/11/2019 04:01 AM  
 GFR est non-AA >60 05/11/2019 04:01 AM  
 Calcium 8.2 (L) 05/11/2019 04:01 AM  
 Magnesium 1.7 (L) 05/09/2019 03:30 AM  
 Phosphorus 2.5 12/22/2018 12:53 PM  
 Albumin 3.1 (L) 05/10/2019 04:21 AM  
 Bilirubin, total 0.3 05/10/2019 04:21 AM  
 Protein, total 5.8 (L) 05/10/2019 04:21 AM  
 Globulin 2.7 05/10/2019 04:21 AM  
 A-G Ratio 1.1 (L) 05/10/2019 04:21 AM  
 AST (SGOT) 23 05/10/2019 04:21 AM  
 ALT (SGPT) 19 05/10/2019 04:21 AM  
 
 
    RADIOLOGY- 2016 HIDA was normal 
 
 
Assessment:  
 
Principal Problem: 
  Systolic CHF, acute on chronic ([facility] (4/21/2011) Active Problems: 
  ICD (implantable cardioverter-defibrillator) in place (4/22/2011) HTN (hypertension) (11/29/2011) Ventricular tachycardia ([facility] (2/22/2016) Non-ischemic cardiomyopathy ([facility] (3/24/2016) Acute on chronic diastolic (congestive) heart failure ([facility] (5/7/2019) Leukopenia (5/7/2019) Esophageal dysphagia (5/10/2019) Right upper quadrant abdominal pain (5/10/2019) 5/10/19 
slight soreness post dilation Had biliary evaluation last year and poor surgical candidate Likely some cardiac cachexia with nonspecific gastropathy Known biliary sludge 5/11/19 A little better today Told him we were probably at baseline He requests repeat HIDA Plan:  
 
 
 HIDA scan if he remains til Monday otherwise as outpt PT SEEN AND EXAMINED AND PLAN DISCUSSED AND IMPLEMENTED.  
Thierno Morillo MD

## 2019-05-11 NOTE — PROGRESS NOTES
Bedside and Verbal shift change report given to self (oncoming nurse) by Merly Welsh RN (offgoing nurse). Report included the following information SBAR, Kardex, MAR and Recent Results.

## 2019-05-11 NOTE — PROGRESS NOTES
[facility] CARDIOLOGY PROGRESS NOTE 
      
 
5/11/2019 11:20 AM 
 
Admit Date: 5/7/2019 Subjective:  
 
Improved symptoms since admission; improved nausea/dysphagia. On room air. Some chronic issues with neuropathy ROS: 
Cardiovascular:  As noted above Objective:  
  
Vitals:  
 05/10/19 2057 05/11/19 8289 05/11/19 0444 05/11/19 0820 BP: 102/67 (!) 84/54 113/74 120/68 Pulse: 97 (!) 104 92 94 Resp: 18 18 18 16 Temp: 98.6 °F (37 °C) 98.5 °F (36.9 °C) 98.4 °F (36.9 °C) 98.1 °F (36.7 °C) SpO2: 100% 98% 96% 98% Weight:   73.6 kg (162 lb 3.2 oz) Height:      
 
 
Physical Exam: 
General-No Acute Distress Neck- supple, no JVD 
CV- regular rate and rhythm no MRG Lung- clear bilaterally Abd- soft, nontender, nondistended Ext- no edema bilaterally. Skin- warm and dry Data Review:  
Recent Labs 05/11/19 
0401 05/10/19 
0421 05/09/19 
0330  140 139  
K 4.0 3.6 4.0 MG  --   --  1.7*  
BUN 13 13 11 CREA 1.02 1.02 1.15  
GLU 93 92 104* WBC  --   --  4.1* HGB  --   --  8.3* HCT  --   --  25.9*  
PLT  --   --  292 Assessment/Plan:  
 
Principal Problem: 
  Systolic CHF, acute on chronic ([address] (4/21/2011) Active Problems: 
  ICD (implantable cardioverter-defibrillator) in place (4/22/2011) HTN (hypertension) (11/29/2011) Ventricular tachycardia ([address] (2/22/2016) Non-ischemic cardiomyopathy ([address] (3/24/2016) Acute on chronic diastolic (congestive) heart failure ([facility] (5/7/2019) Leukopenia (5/7/2019) Esophageal dysphagia (5/10/2019) Right upper quadrant abdominal pain (5/10/2019) Exam euvolemic. Noted GI eval with plans for HIDA scan which can be done as outpt. Started on aldactone; some prior issues with breast tenderness and plan inspra 25mg on d/c. Some presenting sx likely also related to elevated BPs. Plans for home today Alen Ribeiro MD 
5/11/2019 11:20 AM

## 2019-05-11 NOTE — DISCHARGE INSTRUCTIONS
Avoiding Triggers With Heart Failure: Care Instructions  Your Care Instructions    Triggers are anything that make your heart failure flare up. A flare-up is also called \"sudden heart failure\" or \"acute heart failure. \" When you have a flare-up, fluid builds up in your lungs, and you have problems breathing. You might need to go to the hospital. By watching for changes in your condition and avoiding triggers, you can prevent heart failure flare-ups. Follow-up care is a key part of your treatment and safety. Be sure to make and go to all appointments, and call your doctor if you are having problems. It's also a good idea to know your test results and keep a list of the medicines you take. How can you care for yourself at home? Watch for changes in your weight and condition  · Weigh yourself without clothing at the same time each day. Record your weight. Call your doctor if you have sudden weight gain, such as more than 2 to 3 pounds in a day or 5 pounds in a week. (Your doctor may suggest a different range of weight gain.) A sudden weight gain may mean that your heart failure is getting worse. · Keep a daily record of your symptoms. Write down any changes in how you feel, such as new shortness of breath, cough, or problems eating. Also record if your ankles are more swollen than usual and if you feel more tired than usual. Note anything that you ate or did that could have triggered these changes. Limit sodium  Sodium causes your body to hold on to extra water. This may cause your heart failure symptoms to get worse. People get most of their sodium from processed foods. Fast food and restaurant meals also tend to be very high in sodium. · Your doctor may suggest that you limit sodium to 2,000 milligrams (mg) a day or less. That is less than 1 teaspoon of salt a day, including all the salt you eat in cooking or in packaged foods. · Read food labels on cans and food packages.  They tell you how much sodium you get in one serving. Check the serving size. If you eat more than one serving, you are getting more sodium. · Be aware that sodium can come in forms other than salt, including monosodium glutamate (MSG), sodium citrate, and sodium bicarbonate (baking soda). MSG is often added to Asian food. You can sometimes ask for food without MSG or salt. · Slowly reducing salt will help you adjust to the taste. Take the salt shaker off the table. · Flavor your food with garlic, lemon juice, onion, vinegar, herbs, and spices instead of salt. Do not use soy sauce, steak sauce, onion salt, garlic salt, mustard, or ketchup on your food, unless it is labeled \"low-sodium\" or \"low-salt. \"  · Make your own salad dressings, sauces, and ketchup without adding salt. · Use fresh or frozen ingredients, instead of canned ones, whenever you can. Choose low-sodium canned goods. · Eat less processed food and food from restaurants, including fast food. Exercise as directed  Moderate, regular exercise is very good for your heart. It improves your blood flow and helps control your weight. But too much exercise can stress your heart and cause a heart failure flare-up. · Check with your doctor before you start an exercise program.  · Walking is an easy way to get exercise. Start out slowly. Gradually increase the length and pace of your walk. Swimming, riding a bike, and using a treadmill are also good forms of exercise. · When you exercise, watch for signs that your heart is working too hard. You are pushing yourself too hard if you cannot talk while you are exercising. If you become short of breath or dizzy or have chest pain, stop, sit down, and rest.  · Do not exercise when you do not feel well. Take medicines correctly  · Take your medicines exactly as prescribed. Call your doctor if you think you are having a problem with your medicine. · Make a list of all the medicines you take.  Include those prescribed to you by other doctors and any over-the-counter medicines, vitamins, or supplements you take. Take this list with you when you go to any doctor. · Take your medicines at the same time every day. It may help you to post a list of all the medicines you take every day and what time of day you take them. · Make taking your medicine as simple as you can. Plan times to take your medicines when you are doing other things, such as eating a meal or getting ready for bed. This will make it easier to remember to take your medicines. · Get organized. Use helpful tools, such as daily or weekly pill containers. When should you call for help? Call 911 if you have symptoms of sudden heart failure such as:    · You have severe trouble breathing.     · You cough up pink, foamy mucus.     · You have a new irregular or rapid heartbeat.    Call your doctor now or seek immediate medical care if:    · You have new or increased shortness of breath.     · You are dizzy or lightheaded, or you feel like you may faint.     · You have sudden weight gain, such as more than 2 to 3 pounds in a day or 5 pounds in a week. (Your doctor may suggest a different range of weight gain.)     · You have increased swelling in your legs, ankles, or feet.     · You are suddenly so tired or weak that you cannot do your usual activities.    Watch closely for changes in your health, and be sure to contact your doctor if you develop new symptoms. Where can you learn more? Go to http://edmundo-isi.info/. Enter I231 in the search box to learn more about \"Avoiding Triggers With Heart Failure: Care Instructions. \"  Current as of: July 22, 2018  Content Version: 11.9  © 8945-7995 [employer]. Care instructions adapted under license by [employer] (which disclaims liability or warranty for this information).  If you have questions about a medical condition or this instruction, always ask your healthcare professional. [address] any warranty or liability for your use of this information. Limiting Sodium and Fluids With Heart Failure: Care Instructions  Your Care Instructions    Sodium causes your body to hold on to extra water. This may cause your heart failure symptoms to get worse. Limiting sodium may help you feel better and lower your risk of having to go to the hospital.  People get most of their sodium from processed foods. Fast food and restaurant meals also tend to be very high in sodium. Your doctor may suggest that you limit sodium to 2,000 milligrams (mg) a day or less. That is less than 1 teaspoon of salt a day, including all the salt you eat in cooked or packaged foods. Usually, you have to limit the amount of liquids you drink only if your heart failure is severe. Limiting sodium alone often is enough to help your body get rid of extra fluids. However, your doctor may tell you to limit your fluid intake to a set amount each day. Follow-up care is a key part of your treatment and safety. Be sure to make and go to all appointments, and call your doctor if you are having problems. It's also a good idea to know your test results and keep a list of the medicines you take. How can you care for yourself at home? Read food labels  · Read food labels on cans and food packages. The labels tell you how much sodium is in each serving. Make sure that you look at the serving size. If you eat more than the serving size, you have eaten more sodium than is listed for one serving. · Food labels also tell you the Percent Daily Value. If the Percent Daily Value says 50%, it means that you will get at least 50% of all the sodium you need for the entire day in one serving. Choose products with low Percent Daily Values for sodium. · Be aware that sodium can come in forms other than salt, including monosodium glutamate (MSG), sodium citrate, and sodium bicarbonate (baking soda). MSG is often added to Asian food.  You can sometimes ask for food without MSG or salt. Buy low-sodium foods  · Buy foods that are labeled \"unsalted\" (no salt added), \"sodium-free\" (less than 5 mg of sodium per serving), or \"low-sodium\" (less than 140 mg of sodium per serving). A food labeled \"light sodium\" has less than half of the full-sodium version of that food. Foods labeled \"reduced-sodium\" may still have too much sodium. · Buy fresh vegetables or plain, frozen vegetables. Buy low-sodium versions of canned vegetables, soups, and other canned goods. Prepare low-sodium meals  · Use less salt each day when cooking. Reducing salt in this way will help you adjust to the taste. Do not add salt after cooking. Take the salt shaker off the table. · Flavor your food with garlic, lemon juice, onion, vinegar, herbs, and spices instead of salt. Do not use soy sauce, steak sauce, onion salt, garlic salt, mustard, or ketchup on your food. · Make your own salad dressings, sauces, and ketchup without adding salt. · Use less salt (or none) when recipes call for it. You can often use half the salt a recipe calls for without losing flavor. Other dishes like rice, pasta, and grains do not need added salt. · Rinse canned vegetables. This removes some--but not all--of the salt. · Avoid water that has a naturally high sodium content or that has been treated with water softeners, which add sodium. Call your local water company to find out the sodium content of your water supply. If you buy bottled water, read the label and choose a sodium-free brand. Avoid high-sodium foods, such as:  · Smoked, cured, salted, and canned meat, fish, and poultry. · Ham, cabello, hot dogs, and luncheon meats. · Regular, hard, and processed cheese and regular peanut butter. · Crackers with salted tops. · Frozen prepared meals. · Canned and dried soups, broths, and bouillon, unless labeled sodium-free or low-sodium. · Canned vegetables, unless labeled sodium-free or low-sodium.   · Salted snack foods such as chips and pretzels. · [place] fries, pizza, tacos, and other fast foods. · Pickles, olives, ketchup, and other condiments, especially soy sauce, unless labeled sodium-free or low-sodium. If you cannot cook for yourself  · Have family members or friends help you, or have someone cook low-sodium meals. · Check with your local senior nutrition program to find out where meals are served and whether they offer a low-sodium option. You can often find these programs through your local health department or hospital.  · Have meals delivered to your home. Most [facility] have a Meals on [place]. These programs provide one hot meal a day for older adults, delivered to their homes. Ask whether these meals are low-sodium. Let them know that you are on a low-sodium diet. Limiting fluid intake  · Find a method that works for you. You might simply write down how much you drink every time you do. Some people keep a container filled with the amount of fluid allowed for that day. If they drink from a source other than the container, then they pour out that amount. · Measure your regular drinking glasses to find out how much fluid each one holds. Once you know this, you will not have to measure every time. · Besides water, milk, juices, and other drinks, some foods have a lot of fluid. Count any foods that will melt (such as ice cream or gelatin dessert) or liquid foods (such as soup) as part of your fluid intake for the day. Where can you learn more? Go to http://edmundo-isi.info/. Enter A166 in the search box to learn more about \"Limiting Sodium and Fluids With Heart Failure: Care Instructions. \"  Current as of: July 22, 2018  Content Version: 11.9  © 2673-7406 [employer]. Care instructions adapted under license by [employer] (which disclaims liability or warranty for this information).  If you have questions about a medical condition or this instruction, always ask your healthcare professional. [address] any warranty or liability for your use of this information. Learning About Heart Failure  What is heart failure? Heart failure means that your heart muscle does not pump as much blood as your body needs. Failure does not mean that your heart has stopped. It means that your heart is not pumping as well as it should. Your body has an amazing ability to make up for heart failure. It may do such a good job that you don't know you have a disease. But at some point, your heart and body will no longer be able to keep up. Then fluid starts to build up in your lungs and other parts of your body. What can you expect when you have heart failure? Heart failure is a lifelong (chronic) disease. Treatment may be able to slow the disease and help you feel better. But heart failure tends to get worse over time. Despite this, there are many steps you can take to feel better and stay healthy longer. Early on, your symptoms may not be too bad. As heart failure gets worse, symptoms typically get worse, and you may need to limit your activities. Heart failure can also get worse suddenly. If this happens, you need emergency care. Then, after treatment, your symptoms may go back to being stable (which means they stay the same) for a long time. Heart failure can lead to other health problems, such as heart rhythm problems. Over time, your treatment options may change, especially as your symptoms get worse. As heart failure gets worse, palliative care can help improve the quality of your life. You can do advance care planning to decide what kind of care you want at the end of your life. What are the symptoms? Symptoms of heart failure start to happen when your heart can't pump enough blood to the rest of your body. In the early stages of heart failure, you may:  · Feel tired easily. · Be short of breath when you exert yourself.   · Feel like your heart is pounding or racing (palpitations). · Feel weak or dizzy. As heart failure gets worse, fluid starts to build up in your lungs and other parts of your body. This may cause you to:  · Feel short of breath even at rest.  · Have swelling (edema), especially in your legs, ankles, and feet. · Gain weight. This may happen over just a day or two, or more slowly. · Cough or wheeze, especially when you lie down. How is heart failure treated? · You'll probably take several medicines. · You might attend cardiac rehabilitation (rehab) to get education and support that help you make lifestyle changes and stay as healthy as possible. · You may get a heart device. A pacemaker helps your heart pump blood. An ICD can stop abnormal heart rhythms. How can you care for yourself? There are many steps you can take to feel better and stay healthy longer. These steps are an important part of treatment. They can help you stay active and enjoy life. · Take your medicine the right way. Avoid medicines that can make your symptoms worse. · Check your weight and symptoms every day. Know what to do if your symptoms get worse. · Limit sodium. This helps keep fluid from building up. It may help you feel better. · Be active. Exercise regularly, but don't exercise too hard. · Be heart-healthy. Eat healthy foods, stay at a healthy weight, limit alcohol, and don't smoke. · Stay as healthy as possible. Avoid colds and flu, get help for depression and anxiety, and manage stress. Follow-up care is a key part of your treatment and safety. Be sure to make and go to all appointments, and call your doctor if you are having problems. It's also a good idea to know your test results and keep a list of the medicines you take. Where can you learn more? Go to http://edmundo-isi.info/. Enter P895 in the search box to learn more about \"Learning About Heart Failure. \"  Current as of: July 22, 2018  Content Version: 11.9  © 5292-7862 [employer]. Care instructions adapted under license by [employer] (which disclaims liability or warranty for this information). If you have questions about a medical condition or this instruction, always ask your healthcare professional. [place] [address] any warranty or liability for your use of this information. Upper GI Endoscopy: What to Expect at [address] [place] [address]  After you have an endoscopy, you will stay at the hospital or clinic for 1 to 2 hours. This will allow the medicine to wear off. You will be able to go home after your doctor or nurse checks to make sure you are not having any problems. You may have to stay overnight if you had treatment during the test. You may have a sore throat for a day or two after the test.  This care sheet gives you a general idea about what to expect after the test.  How can you care for yourself at home? Activity  · Rest as much as you need to after you go home. · You should be able to go back to your usual activities the day after the test.  Diet  · Follow your doctor's directions for eating after the test.  · Drink plenty of fluids (unless your doctor has told you not to). Medications  · If you have a sore throat the day after the test, use an over-the-counter spray to numb your throat. Follow-up care is a key part of your treatment and safety. Be sure to make and go to all appointments, and call your doctor if you are having problems. It's also a good idea to know your test results and keep a list of the medicines you take. When should you call for help? Call 911 anytime you think you may need emergency care. For example, call if:    · You passed out (loses consciousness).     · You have trouble breathing.     · You pass maroon or bloody stools.    Call your doctor now or seek immediate medical care if:    · You have pain that does not get better after your take pain medicine.     · You have new or worse belly pain.   · You have blood in your stools.     · You are sick to your stomach and cannot keep fluids down.     · You have a fever.     · You cannot pass stools or gas.    Watch closely for changes in your health, and be sure to contact your doctor if:    · Your throat still hurts after a day or two.     · You do not get better as expected. Where can you learn more? Go to http://edmundo-isi.info/. Enter [phone] in the search box to learn more about \"Upper GI Endoscopy: What to Expect at Home. \"  Current as of: March 27, 2018  Content Version: 11.9  © 5009-9918 [employer]. Care instructions adapted under license by [employer] (which disclaims liability or warranty for this information). If you have questions about a medical condition or this instruction, always ask your healthcare professional. [address] any warranty or liability for your use of this information. HIDA Scan: About This Test  What is it? A HIDA scan is an imaging test that checks how your gallbladder is working. The gallbladder is a small sac under your liver. It stores bile, a fluid that helps your body digest fats. If there are problems with the gallbladder, such as gallstones, the gallbladder may not store or empty bile properly. During a HIDA scan, a camera takes pictures of your gallbladder after a radioactive tracer is injected into a vein in your arm. The tracer travels through your liver, gallbladder, bile ducts, and small intestine. The camera takes a series of pictures of the tracer as it moves along. Your doctor can use these pictures to look for leaks, blockages, or any other problems. Why is this test done? The HIDA scan may be done to:  · Help find the cause of pain in the upper belly, especially if the pain is on the right side. · Find out if bile is leaking. · Find anything that may be blocking the bile ducts.   A HIDA scan is sometimes done if an earlier ultrasound test did not give enough information. How can you prepare for the test?  · Before the HIDA scan, tell your doctor if:  ? You are or might be pregnant. ? You are breastfeeding. Do not breastfeed your baby for 2 days after this test. During this time, you can give your baby breast milk you stored before the test, or you can give formula. Discard the breast milk you pump for 2 days after the test.  ? You're taking certain medicines like morphine for pain. ? Within the past 4 days, you've had an X-ray test that used barium. · The doctor may tell you not to eat or drink anything but water for 4 to 6 hours before the test. Follow all instructions carefully. If you haven't eaten for more than 24 hours before the test, tell your doctor. What happens during the test?  · You will remove any clothing around your belly. You will be given a gown or paper covering to use during the test.  · You will lie on your back on a table. · A thin tube, call an IV, will be put into a vein in your arm. · A radioactive tracer chemical will be injected into the IV. A medicine that stimulates your gallbladder may also be injected. · The scanning camera will be placed close over your belly. · A picture will be taken right away. The whole scan may last up to 60 minutes as the tracer passes through your liver and into your gallbladder and small intestine. Several more pictures, each lasting a few minutes, may be taken over the next 2 to 4 hours. Each picture will take only a few minutes, but you will have to lie still for the whole test.  What else should you know about the test?  · The HIDA scan itself is painless, but you may feel a brief sting or pinch as the IV is placed in your arm. · You may feel a brief pain in your belly as the medicine that stimulates your gallbladder starts to work. · The amount of radiation in the tracer chemical is very small.  It is generally not harmful to health, and it's not a risk to people who touch you after the test. But there is a slight risk of damage to cells or tissue from being exposed to any radiation. The camera itself does not produce any radiation. What happens after the test?  · You will probably be able to go home right away. · Most of the tracer will leave your body within 24 hours through your urine and stool. When you go to the bathroom during that time, be sure to flush the toilet and wash your hands well with soap and water. · Your doctor will discuss the results of the test with you. · You can go back to your usual activities right away. · If you are breastfeeding, you will need to use saved breast milk or formula for 2 days after the test. This is so you won't pass the tracer to your baby. Follow-up care is a key part of your treatment and safety. Be sure to make and go to all appointments, and call your doctor if you are having problems. It's also a good idea to keep a list of the medicines you take. Ask your doctor when you can expect to have your test results. Where can you learn more? Go to http://edmundo-isi.info/. Enter F573 in the search box to learn more about \"HIDA Scan: About This Test.\"  Current as of: June 25, 2018  Content Version: 11.9  © 3899-2694 [employer]. Care instructions adapted under license by [employer] (which disclaims liability or warranty for this information). If you have questions about a medical condition or this instruction, always ask your healthcare professional. [place] [address] any warranty or liability for your use of this information.     DISCHARGE SUMMARY from Nurse    PATIENT INSTRUCTIONS:    After general anesthesia or intravenous sedation, for 24 hours or while taking prescription Narcotics:  · Limit your activities  · Do not drive and operate hazardous machinery  · Do not make important personal or business decisions  · Do  not drink alcoholic beverages  · If you have not urinated within 8 hours after discharge, please contact your surgeon on call. Report the following to your surgeon:  · Excessive pain, swelling, redness or odor of or around the surgical area  · Temperature over 100.5  · Nausea and vomiting lasting longer than 4 hours or if unable to take medications  · Any signs of decreased circulation or nerve impairment to extremity: change in color, persistent  numbness, tingling, coldness or increase pain  · Any questions    What to do at Home:    *  Please give a list of your current medications to your Primary Care Provider. *  Please update this list whenever your medications are discontinued, doses are      changed, or new medications (including over-the-counter products) are added. *  Please carry medication information at all times in case of emergency situations. These are general instructions for a healthy lifestyle:    No smoking/ No tobacco products/ Avoid exposure to second hand smoke  Surgeon General's Warning:  Quitting smoking now greatly reduces serious risk to your health. Obesity, smoking, and sedentary lifestyle greatly increases your risk for illness    A healthy diet, regular physical exercise & weight monitoring are important for maintaining a healthy lifestyle    You may be retaining fluid if you have a history of heart failure or if you experience any of the following symptoms:  Weight gain of 3 pounds or more overnight or 5 pounds in a week, increased swelling in our hands or feet or shortness of breath while lying flat in bed. Please call your doctor as soon as you notice any of these symptoms; do not wait until your next office visit. Recognize signs and symptoms of STROKE:    F-face looks uneven    A-arms unable to move or move unevenly    S-speech slurred or non-existent    T-time-call 911 as soon as signs and symptoms begin-DO NOT go       Back to bed or wait to see if you get better-TIME IS BRAIN.     Warning Signs of HEART ATTACK     Call 911 if you have these symptoms:   Chest discomfort. Most heart attacks involve discomfort in the center of the chest that lasts more than a few minutes, or that goes away and comes back. It can feel like uncomfortable pressure, squeezing, fullness, or pain.  Discomfort in other areas of the upper body. Symptoms can include pain or discomfort in one or both arms, the back, neck, jaw, or stomach.  Shortness of breath with or without chest discomfort.  Other signs may include breaking out in a cold sweat, nausea, or lightheadedness. Don't wait more than five minutes to call 911 - MINUTES MATTER! Fast action can save your life. Calling 911 is almost always the fastest way to get lifesaving treatment. Emergency Medical Services staff can begin treatment when they arrive -- up to an hour sooner than if someone gets to the hospital by car. The discharge information has been reviewed with the patient. The patient verbalized understanding. Discharge medications reviewed with the patient and appropriate educational materials and side effects teaching were provided.   ___________________________________________________________________________________________________________________________________

## 2019-05-11 NOTE — DISCHARGE SUMMARY
[facility] Cardiology Discharge Summary     Patient ID:  Vilma Love  [id number]  47 y.o.  1965    Admit date: 5/7/2019    Discharge date:  05/11/19    Admitting Physician: Frank Pierre MD     Discharge Physician: Adrianne Loza NP/Dr. Jeannette Velasquez    Admission Diagnoses: Acute on chronic diastolic (congestive) heart failure ([facility] [address] [I50.33]  Acute on chronic diastolic (congestive) heart failure [facility]) [I50.33]    Discharge Diagnoses:   Patient Active Problem List    Diagnosis Date Noted    Esophageal dysphagia 05/10/2019    Right upper quadrant abdominal pain 05/10/2019    Acute on chronic diastolic (congestive) heart failure ([facility] [address] 05/07/2019    Leukopenia 05/07/2019    Acute metabolic encephalopathy 22/09/2637    SVT (supraventricular tachycardia) ([facility] [address] 12/22/2018    Inappropriate shocks from ICD (implantable cardioverter-defibrillator) 12/22/2018    Elevated lactic acid level 07/09/2018    Macrocytic anemia 07/08/2018    High anion gap metabolic acidosis 55/33/7384    Elevated bilirubin 07/08/2018    Dental caries 07/13/2017    CAD (coronary artery disease) 03/18/2017    Chronic back pain 03/18/2017    Anxiety associated with depression 03/18/2017    Elevated transaminase level 03/14/2017    Non-ischemic cardiomyopathy ([facility] [address] 03/24/2016    Nausea & vomiting 02/26/2016    Gynecomastia 02/22/2016    Ventricular tachycardia ([address] 02/22/2016    HTN (hypertension) 11/29/2011    ICD (implantable cardioverter-defibrillator) in place 17/94/9952    Systolic CHF, acute on chronic ([facility] [address] 04/21/2011       Cardiology Procedures this admission:  EchoCardiogram  Consults: None and GI    Hospital Course: Patient presented to the emergency department of [facility] with complaints of progressive shortness of breath and lower extremity edema. Patient was evaluated and subsequently admitted for further cardiac evaluation and treatment. The patient was started on IV Lasix for diuresis.   He diuresed well and felt better. GI saw the patient for Dysphagia with prior esophogeal dilation with coughing. 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 Post assessment was to consider for cardiac cachexia  If patient was to remain inpatient they would consider HIDA scan while inpatient but can be performed as an outpatient. The morning of 05/11/19 patient was up feeling well without any complaints shortness of breath. LE edema was much improved. Patient's labs were stable with creatinine of 1.02. Patient was seen and examined by Dr. Zuleyma Avendano and determined stable and ready for discharge. Patient was instructed on the importance of medication compliance, low sodium diet, 2 liter per day fluid restriction and daily weights. For maximized medical therapy of congestive heart failure, patient will continue use of BB and ARB. The patient will have close transitional care follow up with [facility] Cardiology Dr. Makayla Shell: The patient is being discharged home in stable condition on a low saturated fat, low cholesterol and low salt diet. The patient is instructed to advance activities as tolerated to the limit of fatigue or shortness of breath. The patient is informed to monitor daily weights and maintain a 2 liter per day fluid restriction. The patient is instructed to call the office for any shortness of breath, weight gain, or increased peripheral edema. Discharge Exam:   Visit Vitals  /68 (BP 1 Location: Right arm, BP Patient Position: At rest)   Pulse 94   Temp 98.1 °F (36.7 °C)   Resp 16   Ht 5' 11\" (1.803 m)   Wt 73.6 kg (162 lb 3.2 oz)   SpO2 98%   BMI 22.62 kg/m²     Patient has been seen by Dr. Zuleyma Avendano: see his progress note for exam details.     Recent Results (from the past 24 hour(s))   METABOLIC PANEL, BASIC    Collection Time: 05/11/19  4:01 AM   Result Value Ref Range    Sodium 141 136 - 145 mmol/L    Potassium 4.0 3.5 - 5.1 mmol/L    Chloride 105 98 - 107 mmol/L    CO2 28 21 - 32 mmol/L    Anion gap 8 7 - 16 mmol/L    Glucose 93 65 - 100 mg/dL    BUN 13 6 - 23 MG/DL    Creatinine 1.02 0.8 - 1.5 MG/DL    GFR est AA >60 >60 ml/min/1.73m2    GFR est non-AA >60 >60 ml/min/1.73m2    Calcium 8.2 (L) 8.3 - 10.4 MG/DL         Patient Instructions:     Current Discharge Medication List      START taking these medications    Details   sucralfate (CARAFATE) 1 gram tablet Take 1 Tab by mouth Before breakfast, lunch, dinner and at bedtime. Qty: 120 Tab, Refills: 0      eplerenone (INSPRA) 25 mg tablet Take 1 Tab by mouth daily. Qty: 30 Tab, Refills: 5         CONTINUE these medications which have CHANGED    Details   losartan (COZAAR) 50 mg tablet Take 1 Tab by mouth daily. Qty: 30 Tab, Refills: 11      furosemide (LASIX) 40 mg tablet Take 1 Tab by mouth two (2) times a day. Qty: 60 Tab, Refills: 11         CONTINUE these medications which have NOT CHANGED    Details   ondansetron (ZOFRAN ODT) 4 mg disintegrating tablet Take 1 Tab by mouth every eight (8) hours as needed for Nausea. Qty: 12 Tab, Refills: 0      carvedilol (COREG) 12.5 mg tablet Take 1 Tab by mouth two (2) times daily (with meals). Qty: 60 Tab, Refills: 11      magnesium oxide (MAG-OX) 400 mg tablet Take 1 Tab by mouth every twelve (12) hours. Qty: 180 Tab, Refills: 3      atorvastatin (LIPITOR) 80 mg tablet Take 1 Tab by mouth daily. Qty: 90 Tab, Refills: 3      gabapentin (NEURONTIN) 300 mg capsule Take 1 Cap by mouth three (3) times daily. 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 HYDROcodone-acetaminophen (NORCO)  mg tablet Take 2 Tabs by mouth every eight (8) hours as needed for Pain for up to 30 days. Max Daily Amount: 6 Tabs.  1 tablet q4-6hrs prn pain,  Qty: 150 Tab, Refills: 0 Associated Diagnoses: Chronic midline low back pain without sciatica; Chronic neck pain      ALPRAZolam (XANAX) 1 mg tablet Take 0.5 Tabs by mouth nightly. Max Daily Amount: 0.5 mg. Indications: anxious  Qty: 30 Tab, Refills: 3    Associated Diagnoses: Anxiety      sildenafil citrate (VIAGRA) 100 mg tablet Take 1 Tab by mouth as needed. Qty: 10 Tab, Refills: 3         STOP taking these medications       potassium chloride (K-DUR, KLOR-CON) 20 mEq tablet Comments:   Reason for Stopping:         promethazine (PHENERGAN) 12.5 mg tablet Comments:   Reason for Stopping:               Signed:  ABDELRAHMAN Monteiro  5/11/2019  12:29 PM    I have personally seen and examined patient and agree with above assessment. Please see my progress notes for more details.     Cara Solomon MD  5/11/2019

## 2019-05-11 NOTE — PROGRESS NOTES
Bedside and Verbal shift change report given to [facility]. Report included the following information SBAR, Kardex, MAR, Accordion and Recent Results.

## 2019-05-11 NOTE — PROGRESS NOTES
Problem: Falls - Risk of 
Goal: *Absence of Falls Description Document Edgar Salmon Fall Risk and appropriate interventions in the flowsheet. Outcome: Progressing Towards Goal 
  
Problem: Patient Education: Go to Patient Education Activity Goal: Patient/Family Education Outcome: Progressing Towards Goal 
  
Problem: Heart Failure: Day 4 Goal: Off Pathway (Use only if patient is Off Pathway) Outcome: Progressing Towards Goal 
Goal: Activity/Safety Outcome: Progressing Towards Goal 
Goal: Diagnostic Test/Procedures Outcome: Progressing Towards Goal 
Goal: Nutrition/Diet Outcome: Progressing Towards Goal 
Goal: Discharge Planning Outcome: Progressing Towards Goal 
Goal: Medications Outcome: Progressing Towards Goal 
Goal: Respiratory Outcome: Progressing Towards Goal 
Goal: Treatments/Interventions/Procedures Outcome: Progressing Towards Goal 
Goal: Psychosocial 
Outcome: Progressing Towards Goal 
Goal: *Oxygen saturation within defined limits Outcome: Progressing Towards Goal 
Goal: *Hemodynamically stable Outcome: Progressing Towards Goal 
Goal: *Optimal pain control at patient's stated goal 
Outcome: Progressing Towards Goal 
Goal: *Anxiety reduced or absent Outcome: Progressing Towards Goal 
Goal: *Demonstrates progressive activity Outcome: Progressing Towards Goal

## 2019-05-12 NOTE — ROUTINE PROCESS
Late entry:CHF teaching completed, verbalize emphasis on monitoring self and report to MD: 
? If you gain 2 lbs in one day or 5 lbs in a week, and short of breath. ? If you cannot lay flat without developing short of breath or rapid breathing at night; or if it wakes you up. Develop a cough or wheezing. ? If you notice swollen hands/feet/ankles or stomach with a bloated/ full feeling. ? If you become confused or mentally fuzzy or dizzy. ? If you notice a rapid or change in your heart rate. ? If you become more exhausted all the time and unable to do the same level of activity without stopping to catch your breath. Drink no more than 8 cups a day in 8 oz. cups. Limit Cola Drinks. Your Heart can not handle any more. Stay away from salt (limit anything with salt or sodium in it). Limit to 250mg per serving. Exercise needs to be started with your Doctors approval. 
Reduce stress, call your Doctor or myself if concerned Pass posttest via teach back; will make self-available post DC, if any questions arise. Diabetic teaching completed.  Planner/scale @ BS:  60 mins total

## 2019-05-13 ENCOUNTER — PATIENT OUTREACH (OUTPATIENT)
Dept: CASE MANAGEMENT | Age: 54
End: 2019-05-13

## 2019-05-13 NOTE — PROGRESS NOTES
SYDNEE call attempted but was unable to speak with patient or family. I did leave a message on wife's voicemail for her to have him call me. Patient discharged on 5/11/19 CHF and to follow up with cardiology and PCP. Will make another attempt later today or tomorrow. This note will not be viewable in [address]

## 2019-05-13 NOTE — PROGRESS NOTES
Incoming call from Mr. Mullins Luba. .. [facility] Transition of Care Discharge Follow-up Questionnaire Date/Time of Call: 
 5/13/19 1pm  
What was the patient hospitalized for? CHF Does the patient understand his/her diagnosis and/or treatment and what happened during the hospitalization? Yes Did the patient receive discharge instructions? yes Review any discharge instructions (see notes in [facility]). Ask patient if they understand these. Do they have any questions? About the nausea medication. Says he prefers Phenergan over Zofran as it works better Were home services ordered (nursing, PT, OT, ST, etc.)? No  
If so, has the first visit occurred? If not, why? (Assist with coordination of services if necessary.) Was any DME ordered? n/a If so, has it been received? If not, why?  (Assist with coordination of arranging DME orders if necessary.) Complete a review of all medications (new, continued and discontinued meds per the D/C instructions and medication tab in [facility]). Reviewed with patient Were all new prescriptions filled? If not, why?  (Assist with obtainment of medications if necessary.) No, says pharmacy has to order them Does the patient understand the purpose and dosing instructions for all medications? (If patient has questions, provide explanation and education.) yes Does the patient have any problems in performing ADLs? (If patient is unable to perform ADLs  what is the limiting factor(s)? Do they have a support system that can assist? If no support system is present, discuss possible assistance that they may be able to obtain.) No problems with ADL's Does the patient have all follow-up appointments scheduled? Has transportation been arranged?  
[facility] Pulmonary follow-up should be within 7 days of discharge; all others should have PCP follow-up within 7 days of discharge; follow-ups with other specialists as appropriate or ordered.) Working on scheduling with doctors now and does have transportation Any other questions or concerns expressed by the patient? No, but he says he will call me back if he needs help with coordination Schedule next appointment with DEACON FITZPATRICK Coordinator or refer to RN Case Manager/  as appropriate.  Vicky López,RN,CCM  
SYDNEE Call Completed By: Caryn Herrera

## 2019-05-17 ENCOUNTER — PATIENT OUTREACH (OUTPATIENT)
Dept: CASE MANAGEMENT | Age: 54
End: 2019-05-17

## 2019-05-17 NOTE — PROGRESS NOTES
Follow up call to  Olvin Bruce, left a message for him to call and update me about his weight, overall health status. Will plan a follow up call again to him next week. This note will not be viewable in [address]

## 2019-05-24 ENCOUNTER — PATIENT OUTREACH (OUTPATIENT)
Dept: CASE MANAGEMENT | Age: 54
End: 2019-05-24

## 2019-05-24 NOTE — PROGRESS NOTES
SYDNEE follow up call - which will roll over to ongoing case management services due to CHF issues and patient's current status. Completed a call with Mr. Denita Alfaro this morning and he reports he has some symptoms of bronchitis, he says he cancelled his cardiology appointment because he was sick (coughing on phone and sounded congested) He denies fever or chills. He says he knows he needs follow up but has put this off and he cannot report to me his weight today as he has not been monitoring this. I emphasized the importance of taking medications, weighing, and following fluid restrictions as well as keeping follow up appointments. I encouraged him to call his doctor or go to hospital to be seen about this coughing and some shortness of breath. Patient agreed to call and make an appointment or be seen soon. He will call me if he needs assistance. Follow up next week with his status. Complex Case Management  Initial Assessment  Addendum to [facility]  Note  Utilize questions pertinent to patient    Referral Source & Reason SYDNEE to CCM by me for education with patient on medications and follow up   Primary concerns per patient and/or pts family/caregiver Patient needs to learn how to effectively manage CHF and medications   Recent Hospitalization(s)/ED Visits Yes 5/7/19-5/11/19 CHF   Current with Home Health?  - Agency? No   Social Needs:  - Able to afford medications? - Financial assessment?  - Access to care? Transportation? Able to afford medications and has transportation   Nutritional Assessment  - Appetite? - Obesity?  - Failure to Thrive? - Bowels ? Fair appetite. Fluid restriction of 64 oz daily. No salt. Cognitive Assessment  - History of dementia  - Health literacy  no     Mobility/Activity Assessment  - Bed/chair bound? - Make use of assistive devices? - Does patient still drive?  Able to walk and drive   Plan/Interventions/Education  - Follow up Appointments  - Referrals Patient was asked to call and reschedule cardiology for asap as well as follow up with PCP or ED today due to SOB over phone with cough. Patient agreed.

## 2019-05-29 ENCOUNTER — PATIENT OUTREACH (OUTPATIENT)
Dept: CASE MANAGEMENT | Age: 54
End: 2019-05-29

## 2019-05-29 NOTE — PROGRESS NOTES
Community Care Management  Follow up Outreach Note   Outreach type: Phone call: Yes     Date/Time of Outreach: 5/29/19, [id number]     Reason for follow-up:   Continued outreach   Disease specific complaints/issues: Cough, SOB, some headaches       Patient progress towards goals set from last contact:   Stable   Has patient attended any PCP or specialist follow-up appointments since last contact? What was outcome of appointment? When is next follow-up scheduled? Encouraged pt to f/u w/ PCP this week - states he will    7/15 Cardiology, Dr Aguilar Connolly   Review medications. Any medication changes since last outreach? Does patient have any questions or issues related to their medications? Pt states med compliance, denies any questions, issues   Home health active? If yes  any issue? Progress? No   Referrals needed?  (SW, Diabetes education, HH, etc. ) No   Other issues/Miscellaneous? (Transportation, access to meals, ability to perform ADLs, adequate caregiver support, etc.)     Reports he has transportation to appmts         Next Outreach Scheduled: Next week     Next Steps/Goals:   F/U   Community Care Manager: Toby Salter RN     RNCM call to pt. Pt states he is \"tired\" and coughed several times during our brief conversation. Agreeable for this RN to reschedule Cardiology appmt. Requests call back around lunch time. Call to [facility] Cardiology, appmt rescheduled to 7/15 at 4pm.  Call back to pt, updated on new appmt w/ Cardiology and encouraged to call PCP for f/u this week or go to ED if worsens. He c/o SOB, cough, denies edema. States he weighs himself and checks BP but unable to report any specifics. Educated on fluid and salt restrictions, daily weights, med compliance, when to seek emergency help. Verbalizes understanding. States \"my wife and mother get on me\". F/U next week. Agreeable. This note will not be viewable in [address]

## 2019-06-25 ENCOUNTER — PATIENT OUTREACH (OUTPATIENT)
Dept: CASE MANAGEMENT | Age: 54
End: 2019-06-25

## 2019-06-25 NOTE — PROGRESS NOTES
Follow up call to Mr. Gm Peres, no answer, left a message and will attempt again on another date. This note will not be viewable in [address]

## 2019-07-01 ENCOUNTER — PATIENT OUTREACH (OUTPATIENT)
Dept: CASE MANAGEMENT | Age: 54
End: 2019-07-01

## 2019-07-01 NOTE — PROGRESS NOTES
Follow up call to Mr. Janessa Rodriguez, no answer, left another message and will plan to close for ongoing CCM since no responses from patient. This note will not be viewable in [address]

## 2019-12-16 ENCOUNTER — APPOINTMENT (OUTPATIENT)
Dept: GENERAL RADIOLOGY | Age: 54
End: 2019-12-16
Attending: EMERGENCY MEDICINE
Payer: COMMERCIAL

## 2019-12-16 ENCOUNTER — HOSPITAL ENCOUNTER (OUTPATIENT)
Age: 54
Setting detail: OBSERVATION
Discharge: HOME | End: 2019-12-19
Attending: EMERGENCY MEDICINE | Admitting: FAMILY MEDICINE
Payer: COMMERCIAL

## 2019-12-16 DIAGNOSIS — R11.2 INTRACTABLE VOMITING WITH NAUSEA, UNSPECIFIED VOMITING TYPE: ICD-10-CM

## 2019-12-16 DIAGNOSIS — E83.42 HYPOMAGNESEMIA: Primary | ICD-10-CM

## 2019-12-16 PROBLEM — R74.01 ELEVATED TRANSAMINASE LEVEL: Status: RESOLVED | Noted: 2017-03-14 | Resolved: 2019-12-16

## 2019-12-16 PROBLEM — D72.819 LEUKOPENIA: Status: RESOLVED | Noted: 2019-05-07 | Resolved: 2019-12-16

## 2019-12-16 PROBLEM — K02.9 DENTAL CARIES: Chronic | Status: RESOLVED | Noted: 2017-07-13 | Resolved: 2019-12-16

## 2019-12-16 PROBLEM — I50.33 ACUTE ON CHRONIC DIASTOLIC (CONGESTIVE) HEART FAILURE (HCC): Status: RESOLVED | Noted: 2019-05-07 | Resolved: 2019-12-16

## 2019-12-16 PROBLEM — D53.9 MACROCYTIC ANEMIA: Status: RESOLVED | Noted: 2018-07-08 | Resolved: 2019-12-16

## 2019-12-16 PROBLEM — R79.89 ELEVATED LACTIC ACID LEVEL: Status: RESOLVED | Noted: 2018-07-09 | Resolved: 2019-12-16

## 2019-12-16 PROBLEM — G89.29 CHRONIC BACK PAIN: Chronic | Status: RESOLVED | Noted: 2017-03-18 | Resolved: 2019-12-16

## 2019-12-16 PROBLEM — I47.1 SVT (SUPRAVENTRICULAR TACHYCARDIA) (HCC): Status: RESOLVED | Noted: 2018-12-22 | Resolved: 2019-12-16

## 2019-12-16 PROBLEM — T82.198A INAPPROPRIATE SHOCKS FROM ICD (IMPLANTABLE CARDIOVERTER-DEFIBRILLATOR): Status: RESOLVED | Noted: 2018-12-22 | Resolved: 2019-12-16

## 2019-12-16 PROBLEM — F41.8 ANXIETY ASSOCIATED WITH DEPRESSION: Chronic | Status: RESOLVED | Noted: 2017-03-18 | Resolved: 2019-12-16

## 2019-12-16 PROBLEM — R13.19 ESOPHAGEAL DYSPHAGIA: Status: RESOLVED | Noted: 2019-05-10 | Resolved: 2019-12-16

## 2019-12-16 PROBLEM — R17 ELEVATED BILIRUBIN: Status: RESOLVED | Noted: 2018-07-08 | Resolved: 2019-12-16

## 2019-12-16 PROBLEM — K52.9 AGE (ACUTE GASTROENTERITIS): Status: ACTIVE | Noted: 2019-12-16

## 2019-12-16 PROBLEM — M54.9 CHRONIC BACK PAIN: Chronic | Status: RESOLVED | Noted: 2017-03-18 | Resolved: 2019-12-16

## 2019-12-16 PROBLEM — G93.41 ACUTE METABOLIC ENCEPHALOPATHY: Status: RESOLVED | Noted: 2018-12-23 | Resolved: 2019-12-16

## 2019-12-16 PROBLEM — R10.11 RIGHT UPPER QUADRANT ABDOMINAL PAIN: Status: RESOLVED | Noted: 2019-05-10 | Resolved: 2019-12-16

## 2019-12-16 PROBLEM — E87.29 HIGH ANION GAP METABOLIC ACIDOSIS: Status: RESOLVED | Noted: 2018-07-08 | Resolved: 2019-12-16

## 2019-12-16 PROBLEM — I25.10 CAD (CORONARY ARTERY DISEASE): Chronic | Status: RESOLVED | Noted: 2017-03-18 | Resolved: 2019-12-16

## 2019-12-16 LAB
ALBUMIN SERPL-MCNC: 4.2 G/DL (ref 3.5–5)
ALBUMIN/GLOB SERPL: 1.3 {RATIO} (ref 1.2–3.5)
ALP SERPL-CCNC: 84 U/L (ref 50–136)
ALT SERPL-CCNC: 16 U/L (ref 12–65)
AMPHET UR QL SCN: NEGATIVE
ANION GAP SERPL CALC-SCNC: 11 MMOL/L (ref 7–16)
AST SERPL-CCNC: 31 U/L (ref 15–37)
ATRIAL RATE: 115 BPM
BACTERIA URNS QL MICRO: 0 /HPF
BARBITURATES UR QL SCN: NEGATIVE
BASOPHILS # BLD: 0 K/UL (ref 0–0.2)
BASOPHILS NFR BLD: 1 % (ref 0–2)
BENZODIAZ UR QL: NEGATIVE
BILIRUB SERPL-MCNC: 1.6 MG/DL (ref 0.2–1.1)
BUN SERPL-MCNC: 17 MG/DL (ref 6–23)
CALCIUM SERPL-MCNC: 8.8 MG/DL (ref 8.3–10.4)
CALCULATED P AXIS, ECG09: 65 DEGREES
CALCULATED R AXIS, ECG10: -42 DEGREES
CALCULATED T AXIS, ECG11: 85 DEGREES
CANNABINOIDS UR QL SCN: NEGATIVE
CASTS URNS QL MICRO: NORMAL /LPF
CHLORIDE SERPL-SCNC: 103 MMOL/L (ref 98–107)
CO2 SERPL-SCNC: 26 MMOL/L (ref 21–32)
COCAINE UR QL SCN: NEGATIVE
CREAT SERPL-MCNC: 1.28 MG/DL (ref 0.8–1.5)
DIAGNOSIS, 93000: NORMAL
DIFFERENTIAL METHOD BLD: ABNORMAL
EOSINOPHIL # BLD: 0 K/UL (ref 0–0.8)
EOSINOPHIL NFR BLD: 0 % (ref 0.5–7.8)
EPI CELLS #/AREA URNS HPF: NORMAL /HPF
ERYTHROCYTE [DISTWIDTH] IN BLOOD BY AUTOMATED COUNT: 15.9 % (ref 11.9–14.6)
FLUAV AG NPH QL IA: NEGATIVE
FLUBV AG NPH QL IA: NEGATIVE
GLOBULIN SER CALC-MCNC: 3.2 G/DL (ref 2.3–3.5)
GLUCOSE SERPL-MCNC: 122 MG/DL (ref 65–100)
HCT VFR BLD AUTO: 43.7 % (ref 41.1–50.3)
HGB BLD-MCNC: 14.5 G/DL (ref 13.6–17.2)
IMM GRANULOCYTES # BLD AUTO: 0 K/UL (ref 0–0.5)
IMM GRANULOCYTES NFR BLD AUTO: 0 % (ref 0–5)
LIPASE SERPL-CCNC: 119 U/L (ref 73–393)
LYMPHOCYTES # BLD: 2 K/UL (ref 0.5–4.6)
LYMPHOCYTES NFR BLD: 27 % (ref 13–44)
MAGNESIUM SERPL-MCNC: 1.1 MG/DL (ref 1.8–2.4)
MCH RBC QN AUTO: 27.6 PG (ref 26.1–32.9)
MCHC RBC AUTO-ENTMCNC: 33.2 G/DL (ref 31.4–35)
MCV RBC AUTO: 83.1 FL (ref 79.6–97.8)
METHADONE UR QL: NEGATIVE
MONOCYTES # BLD: 0.2 K/UL (ref 0.1–1.3)
MONOCYTES NFR BLD: 3 % (ref 4–12)
NEUTS SEG # BLD: 5 K/UL (ref 1.7–8.2)
NEUTS SEG NFR BLD: 69 % (ref 43–78)
NRBC # BLD: 0.02 K/UL (ref 0–0.2)
OPIATES UR QL: POSITIVE
P-R INTERVAL, ECG05: 146 MS
PCP UR QL: NEGATIVE
PLATELET # BLD AUTO: 210 K/UL (ref 150–450)
PMV BLD AUTO: 9.7 FL (ref 9.4–12.3)
POTASSIUM SERPL-SCNC: 3.4 MMOL/L (ref 3.5–5.1)
PROT SERPL-MCNC: 7.4 G/DL (ref 6.3–8.2)
Q-T INTERVAL, ECG07: 340 MS
QRS DURATION, ECG06: 110 MS
QTC CALCULATION (BEZET), ECG08: 470 MS
RBC # BLD AUTO: 5.26 M/UL (ref 4.23–5.6)
RBC #/AREA URNS HPF: NORMAL /HPF
SODIUM SERPL-SCNC: 140 MMOL/L (ref 136–145)
SPECIMEN SOURCE: NORMAL
TROPONIN I BLD-MCNC: 0 NG/ML (ref 0.02–0.05)
TROPONIN I BLD-MCNC: 0.01 NG/ML (ref 0.02–0.05)
VENTRICULAR RATE, ECG03: 115 BPM
WBC # BLD AUTO: 7.2 K/UL (ref 4.3–11.1)
WBC URNS QL MICRO: NORMAL /HPF

## 2019-12-16 PROCEDURE — 99218 HC RM OBSERVATION: CPT

## 2019-12-16 PROCEDURE — 80307 DRUG TEST PRSMV CHEM ANLYZR: CPT

## 2019-12-16 PROCEDURE — 96366 THER/PROPH/DIAG IV INF ADDON: CPT | Performed by: EMERGENCY MEDICINE

## 2019-12-16 PROCEDURE — 96365 THER/PROPH/DIAG IV INF INIT: CPT | Performed by: EMERGENCY MEDICINE

## 2019-12-16 PROCEDURE — 74011000250 HC RX REV CODE- 250: Performed by: EMERGENCY MEDICINE

## 2019-12-16 PROCEDURE — 81003 URINALYSIS AUTO W/O SCOPE: CPT | Performed by: EMERGENCY MEDICINE

## 2019-12-16 PROCEDURE — 87804 INFLUENZA ASSAY W/OPTIC: CPT

## 2019-12-16 PROCEDURE — 84484 ASSAY OF TROPONIN QUANT: CPT

## 2019-12-16 PROCEDURE — 96361 HYDRATE IV INFUSION ADD-ON: CPT | Performed by: EMERGENCY MEDICINE

## 2019-12-16 PROCEDURE — 83735 ASSAY OF MAGNESIUM: CPT

## 2019-12-16 PROCEDURE — 81015 MICROSCOPIC EXAM OF URINE: CPT

## 2019-12-16 PROCEDURE — 96375 TX/PRO/DX INJ NEW DRUG ADDON: CPT | Performed by: EMERGENCY MEDICINE

## 2019-12-16 PROCEDURE — 93005 ELECTROCARDIOGRAM TRACING: CPT | Performed by: EMERGENCY MEDICINE

## 2019-12-16 PROCEDURE — 83690 ASSAY OF LIPASE: CPT

## 2019-12-16 PROCEDURE — 85025 COMPLETE CBC W/AUTO DIFF WBC: CPT

## 2019-12-16 PROCEDURE — 74011250636 HC RX REV CODE- 250/636: Performed by: EMERGENCY MEDICINE

## 2019-12-16 PROCEDURE — 80053 COMPREHEN METABOLIC PANEL: CPT

## 2019-12-16 PROCEDURE — 99285 EMERGENCY DEPT VISIT HI MDM: CPT | Performed by: EMERGENCY MEDICINE

## 2019-12-16 PROCEDURE — 71046 X-RAY EXAM CHEST 2 VIEWS: CPT

## 2019-12-16 RX ORDER — ONDANSETRON 2 MG/ML
8 INJECTION INTRAMUSCULAR; INTRAVENOUS
Status: CANCELLED | OUTPATIENT
Start: 2019-12-16

## 2019-12-16 RX ORDER — MAGNESIUM SULFATE HEPTAHYDRATE 40 MG/ML
2 INJECTION, SOLUTION INTRAVENOUS
Status: COMPLETED | OUTPATIENT
Start: 2019-12-16 | End: 2019-12-17

## 2019-12-16 RX ORDER — HYOSCYAMINE SULFATE 0.12 MG/1
0.12 TABLET SUBLINGUAL
Status: DISCONTINUED | OUTPATIENT
Start: 2019-12-16 | End: 2019-12-19 | Stop reason: HOSPADM

## 2019-12-16 RX ORDER — ONDANSETRON 2 MG/ML
4 INJECTION INTRAMUSCULAR; INTRAVENOUS
Status: COMPLETED | OUTPATIENT
Start: 2019-12-16 | End: 2019-12-16

## 2019-12-16 RX ADMIN — SODIUM CHLORIDE 500 ML: 900 INJECTION, SOLUTION INTRAVENOUS at 21:34

## 2019-12-16 RX ADMIN — MAGNESIUM SULFATE HEPTAHYDRATE 2 G: 40 INJECTION, SOLUTION INTRAVENOUS at 23:13

## 2019-12-16 RX ADMIN — PROMETHAZINE HYDROCHLORIDE 12.5 MG: 25 INJECTION INTRAMUSCULAR; INTRAVENOUS at 22:59

## 2019-12-16 RX ADMIN — ONDANSETRON 4 MG: 2 INJECTION INTRAMUSCULAR; INTRAVENOUS at 21:31

## 2019-12-17 LAB
ANION GAP SERPL CALC-SCNC: 11 MMOL/L (ref 7–16)
ATRIAL RATE: 99 BPM
BASOPHILS # BLD: 0 K/UL (ref 0–0.2)
BASOPHILS NFR BLD: 0 % (ref 0–2)
BUN SERPL-MCNC: 15 MG/DL (ref 6–23)
CALCIUM SERPL-MCNC: 8.2 MG/DL (ref 8.3–10.4)
CALCULATED P AXIS, ECG09: 55 DEGREES
CALCULATED R AXIS, ECG10: -26 DEGREES
CALCULATED T AXIS, ECG11: 87 DEGREES
CHLORIDE SERPL-SCNC: 105 MMOL/L (ref 98–107)
CO2 SERPL-SCNC: 24 MMOL/L (ref 21–32)
CREAT SERPL-MCNC: 1.01 MG/DL (ref 0.8–1.5)
DIAGNOSIS, 93000: NORMAL
DIFFERENTIAL METHOD BLD: ABNORMAL
EOSINOPHIL # BLD: 0.2 K/UL (ref 0–0.8)
EOSINOPHIL NFR BLD: 2 % (ref 0.5–7.8)
ERYTHROCYTE [DISTWIDTH] IN BLOOD BY AUTOMATED COUNT: 15.8 % (ref 11.9–14.6)
GLUCOSE SERPL-MCNC: 112 MG/DL (ref 65–100)
HCT VFR BLD AUTO: 39 % (ref 41.1–50.3)
HGB BLD-MCNC: 12.9 G/DL (ref 13.6–17.2)
IMM GRANULOCYTES # BLD AUTO: 0 K/UL (ref 0–0.5)
IMM GRANULOCYTES NFR BLD AUTO: 0 % (ref 0–5)
LYMPHOCYTES # BLD: 1.6 K/UL (ref 0.5–4.6)
LYMPHOCYTES NFR BLD: 25 % (ref 13–44)
MAGNESIUM SERPL-MCNC: 1.9 MG/DL (ref 1.8–2.4)
MCH RBC QN AUTO: 27.6 PG (ref 26.1–32.9)
MCHC RBC AUTO-ENTMCNC: 33.1 G/DL (ref 31.4–35)
MCV RBC AUTO: 83.5 FL (ref 79.6–97.8)
MONOCYTES # BLD: 0.3 K/UL (ref 0.1–1.3)
MONOCYTES NFR BLD: 4 % (ref 4–12)
NEUTS SEG # BLD: 4.4 K/UL (ref 1.7–8.2)
NEUTS SEG NFR BLD: 68 % (ref 43–78)
NRBC # BLD: 0 K/UL (ref 0–0.2)
P-R INTERVAL, ECG05: 166 MS
PLATELET # BLD AUTO: 170 K/UL (ref 150–450)
PMV BLD AUTO: 10.2 FL (ref 9.4–12.3)
POTASSIUM SERPL-SCNC: 3.3 MMOL/L (ref 3.5–5.1)
Q-T INTERVAL, ECG07: 368 MS
QRS DURATION, ECG06: 108 MS
QTC CALCULATION (BEZET), ECG08: 472 MS
RBC # BLD AUTO: 4.67 M/UL (ref 4.23–5.6)
SODIUM SERPL-SCNC: 140 MMOL/L (ref 136–145)
VENTRICULAR RATE, ECG03: 99 BPM
WBC # BLD AUTO: 6.6 K/UL (ref 4.3–11.1)

## 2019-12-17 PROCEDURE — 85025 COMPLETE CBC W/AUTO DIFF WBC: CPT

## 2019-12-17 PROCEDURE — 96375 TX/PRO/DX INJ NEW DRUG ADDON: CPT | Performed by: EMERGENCY MEDICINE

## 2019-12-17 PROCEDURE — 96376 TX/PRO/DX INJ SAME DRUG ADON: CPT | Performed by: EMERGENCY MEDICINE

## 2019-12-17 PROCEDURE — 83735 ASSAY OF MAGNESIUM: CPT

## 2019-12-17 PROCEDURE — 96376 TX/PRO/DX INJ SAME DRUG ADON: CPT

## 2019-12-17 PROCEDURE — 99218 HC RM OBSERVATION: CPT

## 2019-12-17 PROCEDURE — 77030020263 HC SOL INJ SOD CL0.9% LFCR 1000ML

## 2019-12-17 PROCEDURE — 80048 BASIC METABOLIC PNL TOTAL CA: CPT

## 2019-12-17 PROCEDURE — 74011250636 HC RX REV CODE- 250/636: Performed by: EMERGENCY MEDICINE

## 2019-12-17 PROCEDURE — 74011250637 HC RX REV CODE- 250/637: Performed by: EMERGENCY MEDICINE

## 2019-12-17 PROCEDURE — 96375 TX/PRO/DX INJ NEW DRUG ADDON: CPT

## 2019-12-17 PROCEDURE — 74011250637 HC RX REV CODE- 250/637: Performed by: INTERNAL MEDICINE

## 2019-12-17 PROCEDURE — 96367 TX/PROPH/DG ADDL SEQ IV INF: CPT | Performed by: EMERGENCY MEDICINE

## 2019-12-17 PROCEDURE — 74011250637 HC RX REV CODE- 250/637: Performed by: FAMILY MEDICINE

## 2019-12-17 PROCEDURE — 74011250636 HC RX REV CODE- 250/636: Performed by: INTERNAL MEDICINE

## 2019-12-17 PROCEDURE — 74011000250 HC RX REV CODE- 250: Performed by: EMERGENCY MEDICINE

## 2019-12-17 PROCEDURE — 96361 HYDRATE IV INFUSION ADD-ON: CPT | Performed by: EMERGENCY MEDICINE

## 2019-12-17 PROCEDURE — 74011250636 HC RX REV CODE- 250/636: Performed by: FAMILY MEDICINE

## 2019-12-17 PROCEDURE — 96366 THER/PROPH/DIAG IV INF ADDON: CPT | Performed by: EMERGENCY MEDICINE

## 2019-12-17 PROCEDURE — 96372 THER/PROPH/DIAG INJ SC/IM: CPT | Performed by: EMERGENCY MEDICINE

## 2019-12-17 RX ORDER — FUROSEMIDE 10 MG/ML
40 INJECTION INTRAMUSCULAR; INTRAVENOUS ONCE
Status: COMPLETED | OUTPATIENT
Start: 2019-12-17 | End: 2019-12-17

## 2019-12-17 RX ORDER — POTASSIUM CHLORIDE 14.9 MG/ML
20 INJECTION INTRAVENOUS EVERY 4 HOURS
Status: DISCONTINUED | OUTPATIENT
Start: 2019-12-17 | End: 2019-12-17

## 2019-12-17 RX ORDER — PANTOPRAZOLE SODIUM 40 MG/1
40 TABLET, DELAYED RELEASE ORAL
Status: DISCONTINUED | OUTPATIENT
Start: 2019-12-17 | End: 2019-12-19 | Stop reason: HOSPADM

## 2019-12-17 RX ORDER — GABAPENTIN 300 MG/1
300 CAPSULE ORAL 2 TIMES DAILY
Status: DISCONTINUED | OUTPATIENT
Start: 2019-12-17 | End: 2019-12-19 | Stop reason: HOSPADM

## 2019-12-17 RX ORDER — BISACODYL 5 MG
5 TABLET, DELAYED RELEASE (ENTERIC COATED) ORAL DAILY PRN
Status: DISCONTINUED | OUTPATIENT
Start: 2019-12-17 | End: 2019-12-19 | Stop reason: HOSPADM

## 2019-12-17 RX ORDER — ATORVASTATIN CALCIUM 40 MG/1
80 TABLET, FILM COATED ORAL DAILY
Status: DISCONTINUED | OUTPATIENT
Start: 2019-12-17 | End: 2019-12-19 | Stop reason: HOSPADM

## 2019-12-17 RX ORDER — HALOPERIDOL 5 MG/ML
5 INJECTION INTRAMUSCULAR
Status: DISCONTINUED | OUTPATIENT
Start: 2019-12-17 | End: 2019-12-19 | Stop reason: HOSPADM

## 2019-12-17 RX ORDER — LANOLIN ALCOHOL/MO/W.PET/CERES
400 CREAM (GRAM) TOPICAL EVERY 12 HOURS
Status: DISCONTINUED | OUTPATIENT
Start: 2019-12-17 | End: 2019-12-19 | Stop reason: HOSPADM

## 2019-12-17 RX ORDER — NALOXONE HYDROCHLORIDE 0.4 MG/ML
0.4 INJECTION, SOLUTION INTRAMUSCULAR; INTRAVENOUS; SUBCUTANEOUS AS NEEDED
Status: DISCONTINUED | OUTPATIENT
Start: 2019-12-17 | End: 2019-12-19 | Stop reason: HOSPADM

## 2019-12-17 RX ORDER — ALPRAZOLAM 0.5 MG/1
0.5 TABLET ORAL
Status: DISCONTINUED | OUTPATIENT
Start: 2019-12-17 | End: 2019-12-19 | Stop reason: HOSPADM

## 2019-12-17 RX ORDER — ENOXAPARIN SODIUM 100 MG/ML
40 INJECTION SUBCUTANEOUS EVERY 24 HOURS
Status: DISCONTINUED | OUTPATIENT
Start: 2019-12-17 | End: 2019-12-19 | Stop reason: HOSPADM

## 2019-12-17 RX ORDER — EPLERENONE 25 MG/1
25 TABLET, FILM COATED ORAL DAILY
Status: DISCONTINUED | OUTPATIENT
Start: 2019-12-18 | End: 2019-12-19 | Stop reason: HOSPADM

## 2019-12-17 RX ORDER — OXYCODONE HYDROCHLORIDE 5 MG/1
5 TABLET ORAL EVERY 6 HOURS
Status: DISCONTINUED | OUTPATIENT
Start: 2019-12-17 | End: 2019-12-18

## 2019-12-17 RX ORDER — LOSARTAN POTASSIUM 50 MG/1
50 TABLET ORAL DAILY
Status: DISCONTINUED | OUTPATIENT
Start: 2019-12-17 | End: 2019-12-19 | Stop reason: HOSPADM

## 2019-12-17 RX ORDER — CARVEDILOL 25 MG/1
25 TABLET ORAL 2 TIMES DAILY WITH MEALS
Status: DISCONTINUED | OUTPATIENT
Start: 2019-12-17 | End: 2019-12-19 | Stop reason: HOSPADM

## 2019-12-17 RX ORDER — FUROSEMIDE 40 MG/1
40 TABLET ORAL 2 TIMES DAILY
Status: DISCONTINUED | OUTPATIENT
Start: 2019-12-17 | End: 2019-12-19 | Stop reason: HOSPADM

## 2019-12-17 RX ORDER — HYDROCODONE BITARTRATE AND ACETAMINOPHEN 10; 325 MG/1; MG/1
1 TABLET ORAL
Status: DISCONTINUED | OUTPATIENT
Start: 2019-12-17 | End: 2019-12-19 | Stop reason: HOSPADM

## 2019-12-17 RX ORDER — SODIUM CHLORIDE 9 MG/ML
125 INJECTION, SOLUTION INTRAVENOUS CONTINUOUS
Status: ACTIVE | OUTPATIENT
Start: 2019-12-17 | End: 2019-12-17

## 2019-12-17 RX ORDER — LORAZEPAM 1 MG/1
1 TABLET ORAL
Status: DISCONTINUED | OUTPATIENT
Start: 2019-12-17 | End: 2019-12-19 | Stop reason: HOSPADM

## 2019-12-17 RX ORDER — SODIUM CHLORIDE 0.9 % (FLUSH) 0.9 %
5-40 SYRINGE (ML) INJECTION EVERY 8 HOURS
Status: DISCONTINUED | OUTPATIENT
Start: 2019-12-17 | End: 2019-12-19 | Stop reason: HOSPADM

## 2019-12-17 RX ORDER — DIPHENHYDRAMINE HCL 25 MG
25 CAPSULE ORAL
Status: DISCONTINUED | OUTPATIENT
Start: 2019-12-17 | End: 2019-12-19 | Stop reason: HOSPADM

## 2019-12-17 RX ORDER — SODIUM CHLORIDE 0.9 % (FLUSH) 0.9 %
5-40 SYRINGE (ML) INJECTION AS NEEDED
Status: DISCONTINUED | OUTPATIENT
Start: 2019-12-17 | End: 2019-12-19 | Stop reason: HOSPADM

## 2019-12-17 RX ORDER — ACETAMINOPHEN 325 MG/1
650 TABLET ORAL
Status: DISCONTINUED | OUTPATIENT
Start: 2019-12-17 | End: 2019-12-19 | Stop reason: HOSPADM

## 2019-12-17 RX ADMIN — PROMETHAZINE HYDROCHLORIDE 12.5 MG: 25 INJECTION INTRAMUSCULAR; INTRAVENOUS at 17:30

## 2019-12-17 RX ADMIN — HALOPERIDOL LACTATE 5 MG: 5 INJECTION, SOLUTION INTRAMUSCULAR at 12:27

## 2019-12-17 RX ADMIN — OXYCODONE HYDROCHLORIDE 5 MG: 5 TABLET ORAL at 19:21

## 2019-12-17 RX ADMIN — Medication 400 MG: at 01:03

## 2019-12-17 RX ADMIN — FUROSEMIDE 40 MG: 40 TABLET ORAL at 08:26

## 2019-12-17 RX ADMIN — ENOXAPARIN SODIUM 40 MG: 40 INJECTION SUBCUTANEOUS at 05:53

## 2019-12-17 RX ADMIN — FUROSEMIDE 40 MG: 40 TABLET ORAL at 17:30

## 2019-12-17 RX ADMIN — PROMETHAZINE HYDROCHLORIDE 12.5 MG: 25 INJECTION INTRAMUSCULAR; INTRAVENOUS at 04:18

## 2019-12-17 RX ADMIN — ALPRAZOLAM 0.5 MG: 0.5 TABLET ORAL at 21:42

## 2019-12-17 RX ADMIN — POTASSIUM CHLORIDE 20 MEQ: 14.9 INJECTION, SOLUTION INTRAVENOUS at 12:30

## 2019-12-17 RX ADMIN — PANTOPRAZOLE SODIUM 40 MG: 40 TABLET, DELAYED RELEASE ORAL at 06:55

## 2019-12-17 RX ADMIN — HYDROCODONE BITARTRATE AND ACETAMINOPHEN 1 TABLET: 10; 325 TABLET ORAL at 11:43

## 2019-12-17 RX ADMIN — ATORVASTATIN CALCIUM 80 MG: 40 TABLET, FILM COATED ORAL at 11:43

## 2019-12-17 RX ADMIN — Medication 400 MG: at 08:26

## 2019-12-17 RX ADMIN — HYDROCODONE BITARTRATE AND ACETAMINOPHEN 1 TABLET: 10; 325 TABLET ORAL at 04:06

## 2019-12-17 RX ADMIN — LOSARTAN POTASSIUM 50 MG: 50 TABLET, FILM COATED ORAL at 11:43

## 2019-12-17 RX ADMIN — FUROSEMIDE 40 MG: 10 INJECTION, SOLUTION INTRAMUSCULAR; INTRAVENOUS at 19:20

## 2019-12-17 RX ADMIN — HALOPERIDOL LACTATE 5 MG: 5 INJECTION, SOLUTION INTRAMUSCULAR at 19:20

## 2019-12-17 RX ADMIN — GABAPENTIN 300 MG: 300 CAPSULE ORAL at 17:30

## 2019-12-17 RX ADMIN — CARVEDILOL 25 MG: 25 TABLET, FILM COATED ORAL at 17:30

## 2019-12-17 RX ADMIN — SODIUM CHLORIDE 125 ML/HR: 900 INJECTION, SOLUTION INTRAVENOUS at 01:06

## 2019-12-17 RX ADMIN — CARVEDILOL 25 MG: 25 TABLET, FILM COATED ORAL at 08:26

## 2019-12-17 RX ADMIN — PROMETHAZINE HYDROCHLORIDE 12.5 MG: 25 INJECTION INTRAMUSCULAR; INTRAVENOUS at 08:27

## 2019-12-17 RX ADMIN — Medication 10 ML: at 21:43

## 2019-12-17 RX ADMIN — Medication 400 MG: at 21:42

## 2019-12-17 RX ADMIN — POTASSIUM CHLORIDE 20 MEQ: 14.9 INJECTION, SOLUTION INTRAVENOUS at 16:40

## 2019-12-17 RX ADMIN — HYOSCYAMINE SULFATE 0.12 MG: 0.12 TABLET ORAL; SUBLINGUAL at 11:42

## 2019-12-17 RX ADMIN — PROMETHAZINE HYDROCHLORIDE 12.5 MG: 25 INJECTION INTRAMUSCULAR; INTRAVENOUS at 11:37

## 2019-12-17 RX ADMIN — ALPRAZOLAM 0.5 MG: 0.5 TABLET ORAL at 01:03

## 2019-12-17 RX ADMIN — GABAPENTIN 300 MG: 300 CAPSULE ORAL at 08:25

## 2019-12-17 NOTE — ED TRIAGE NOTES
Pt has multiple complaints and does not want to go back out to the waiting room so now he states he has chest pain with vomiting and a fever. No fever in triage, no distress noted.

## 2019-12-17 NOTE — ED NOTES
TRANSFER - OUT REPORT:    Verbal report given to Ishan Pump RN on Birtha Trace  being transferred to [address] [place] [address] for routine progression of care       Report consisted of patients Situation, Background, Assessment and   Recommendations(SBAR). Information from the following report(s) SBAR, ED Summary and MAR was reviewed with the receiving nurse. Lines:   Peripheral IV 12/16/19 Right;Upper Forearm (Active)   Site Assessment Clean, dry, & intact 12/16/2019  7:44 PM   Phlebitis Assessment 0 12/16/2019  7:44 PM   Infiltration Assessment 0 12/16/2019  7:44 PM   Dressing Status Clean, dry, & intact 12/16/2019  7:44 PM   Dressing Type Tape;Transparent 12/16/2019  7:44 PM   Hub Color/Line Status Pink;Flushed;Patent 12/16/2019  7:44 PM   Action Taken Blood drawn 12/16/2019  7:44 PM        Opportunity for questions and clarification was provided.       Patient transported with:   [employer]
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
## 2019-12-17 NOTE — PROGRESS NOTES
Hospitalist Progress Note     Admit Date:  2019  8:20 PM   Name:  Clifton Joseph   Age:  47 y.o.  :  1965   MRN:  [id number]   PCP:  Pao Hood MD  Treatment Team: Attending Provider: Letty Perez MD; Care Manager: Andrew Franklin, KADEN; Staff Nurse: Gee Gant RN    Subjective:   2019 - Pt last had vomiting earlier today. Last had diarrhea this am. He states he doesn't want to eat but per nursing he has been eating all day and drinking orange juice and crackers all day. Objective:     Patient Vitals for the past 24 hrs:   Temp Pulse Resp BP SpO2   19 1627 98.5 °F (36.9 °C) 87 18 114/84 99 %   19 1542 97.7 °F (36.5 °C) 81 18 121/85 100 %   19 1540    121/85 100 %   19 0950    128/88    19 0826  84  145/90    19 0655 98 °F (36.7 °C) 95 25 (!) 147/92 99 %   19 0533     95 %   19 0532     96 %   19 0531     99 %   19 0530     94 %   19 0529     95 %   19 0510    (!) 136/99 96 %   19 0209  100 27  95 %   19 0110  96 24 (!) 157/103    19 2338  96 16  98 %   19 2258  (!) 103 18 (!) 144/97 98 %   19 2153     99 %   19 2141  (!) 102 16 (!) 146/97 99 %   19 1922 98.1 °F (36.7 °C) 97 16 (!) 140/106 96 %     Oxygen Therapy  O2 Sat (%): 99 % (19 1627)  Pulse via Oximetry: 76 beats per minute (19 1540)  O2 Device: Room air (19 1630)    Intake/Output Summary (Last 24 hours) at 2019 1809  Last data filed at 2019 1430  Gross per 24 hour   Intake 1100 ml   Output    Net 1100 ml         General:    Well nourished. Alert. CV:   RRR. No murmur, rub, or gallop. Lungs:   CTAB. No wheezing, rhonchi, or rales. Abdomen:   Soft, nontender, nondistended. Extremities: Warm and dry. No cyanosis or edema. Skin:     No rashes or jaundice.      Data Review:  I have reviewed all labs, meds, telemetry events, and studies from the last 24 hours. Recent Results (from the past 24 hour(s))   EKG, 12 LEAD, INITIAL    Collection Time: 12/16/19  7:32 PM   Result Value Ref Range    Ventricular Rate 115 BPM    Atrial Rate 115 BPM    P-R Interval 146 ms    QRS Duration 110 ms    Q-T Interval 340 ms    QTC Calculation (Bezet) 470 ms    Calculated P Axis 65 degrees    Calculated R Axis -42 degrees    Calculated T Axis 85 degrees    Diagnosis       Sinus tachycardia with occasional and consecutive Premature ventricular   complexes  Possible Left atrial enlargement  Left axis deviation  T wave abnormality, consider lateral ischemia  Abnormal ECG  When compared with ECG of 07-MAY-2019 15:49,  Premature ventricular complexes are now Present  T wave inversion no longer evident in Inferior leads  Confirmed by ST RYAN HORNER MD (), EVIE ORELLANA ([id number]) on 12/16/2019 9:31:29 PM     CBC WITH AUTOMATED DIFF    Collection Time: 12/16/19  7:47 PM   Result Value Ref Range    WBC 7.2 4.3 - 11.1 K/uL    RBC 5.26 4.23 - 5.6 M/uL    HGB 14.5 13.6 - 17.2 g/dL    HCT 43.7 41.1 - 50.3 %    MCV 83.1 79.6 - 97.8 FL    MCH 27.6 26.1 - 32.9 PG    MCHC 33.2 31.4 - 35.0 g/dL    RDW 15.9 (H) 11.9 - 14.6 %    PLATELET 472 886 - 579 K/uL    MPV 9.7 9.4 - 12.3 FL    ABSOLUTE NRBC 0.02 0.0 - 0.2 K/uL    DF AUTOMATED      NEUTROPHILS 69 43 - 78 %    LYMPHOCYTES 27 13 - 44 %    MONOCYTES 3 (L) 4.0 - 12.0 %    EOSINOPHILS 0 (L) 0.5 - 7.8 %    BASOPHILS 1 0.0 - 2.0 %    IMMATURE GRANULOCYTES 0 0.0 - 5.0 %    ABS. NEUTROPHILS 5.0 1.7 - 8.2 K/UL    ABS. LYMPHOCYTES 2.0 0.5 - 4.6 K/UL    ABS. MONOCYTES 0.2 0.1 - 1.3 K/UL    ABS. EOSINOPHILS 0.0 0.0 - 0.8 K/UL    ABS. BASOPHILS 0.0 0.0 - 0.2 K/UL    ABS. IMM.  GRANS. 0.0 0.0 - 0.5 K/UL   METABOLIC PANEL, COMPREHENSIVE    Collection Time: 12/16/19  7:47 PM   Result Value Ref Range    Sodium 140 136 - 145 mmol/L    Potassium 3.4 (L) 3.5 - 5.1 mmol/L    Chloride 103 98 - 107 mmol/L    CO2 26 21 - 32 mmol/L    Anion gap 11 7 - 16 mmol/L    Glucose 122 (H) 65 - 100 mg/dL    BUN 17 6 - 23 MG/DL    Creatinine 1.28 0.8 - 1.5 MG/DL    GFR est AA >60 >60 ml/min/1.73m2    GFR est non-AA >60 >60 ml/min/1.73m2    Calcium 8.8 8.3 - 10.4 MG/DL    Bilirubin, total 1.6 (H) 0.2 - 1.1 MG/DL    ALT (SGPT) 16 12 - 65 U/L    AST (SGOT) 31 15 - 37 U/L    Alk.  phosphatase 84 50 - 136 U/L    Protein, total 7.4 6.3 - 8.2 g/dL    Albumin 4.2 3.5 - 5.0 g/dL    Globulin 3.2 2.3 - 3.5 g/dL    A-G Ratio 1.3 1.2 - 3.5     LIPASE    Collection Time: 12/16/19  7:47 PM   Result Value Ref Range    Lipase 119 73 - 393 U/L   MAGNESIUM    Collection Time: 12/16/19  7:47 PM   Result Value Ref Range    Magnesium 1.1 (LL) 1.8 - 2.4 mg/dL   POC TROPONIN-I    Collection Time: 12/16/19  7:54 PM   Result Value Ref Range    Troponin-I (POC) 0.01 (L) 0.02 - 0.05 ng/ml   INFLUENZA A & B AG (RAPID TEST)    Collection Time: 12/16/19  9:37 PM   Result Value Ref Range    Influenza A Ag NEGATIVE  NEG      Influenza B Ag NEGATIVE  NEG      Source NASOPHARYNGEAL     DRUG SCREEN, URINE    Collection Time: 12/16/19  9:37 PM   Result Value Ref Range    PCP(PHENCYCLIDINE) NEGATIVE       BENZODIAZEPINES NEGATIVE       COCAINE NEGATIVE       AMPHETAMINES NEGATIVE       METHADONE NEGATIVE       THC (TH-CANNABINOL) NEGATIVE       OPIATES POSITIVE      BARBITURATES NEGATIVE      URINE MICROSCOPIC    Collection Time: 12/16/19  9:37 PM   Result Value Ref Range    WBC 0-3 0 /hpf    RBC 0-3 0 /hpf    Epithelial cells 0-3 0 /hpf    Bacteria 0 0 /hpf    Casts 3-5 0 /lpf   EKG, 12 LEAD, SUBSEQUENT    Collection Time: 12/16/19 10:45 PM   Result Value Ref Range    Ventricular Rate 99 BPM    Atrial Rate 99 BPM    P-R Interval 166 ms    QRS Duration 108 ms    Q-T Interval 368 ms    QTC Calculation (Bezet) 472 ms    Calculated P Axis 55 degrees    Calculated R Axis -26 degrees    Calculated T Axis 87 degrees    Diagnosis       Normal sinus rhythm  Possible Left atrial enlargement  T wave abnormality, consider lateral ischemia  Prolonged QT  Abnormal ECG  When compared with ECG of 16-DEC-2019 19:32,  Premature ventricular complexes are no longer Present  Nonspecific T wave abnormality now evident in Inferior leads  Confirmed by ROSAMARIA DELGADILLO (), Hedy Ek ([id number]) on 12/17/2019 7:34:57 AM     POC TROPONIN-I    Collection Time: 12/16/19 10:51 PM   Result Value Ref Range    Troponin-I (POC) 0 (L) 0.02 - 6.93 ng/ml   METABOLIC PANEL, BASIC    Collection Time: 12/17/19  4:29 AM   Result Value Ref Range    Sodium 140 136 - 145 mmol/L    Potassium 3.3 (L) 3.5 - 5.1 mmol/L    Chloride 105 98 - 107 mmol/L    CO2 24 21 - 32 mmol/L    Anion gap 11 7 - 16 mmol/L    Glucose 112 (H) 65 - 100 mg/dL    BUN 15 6 - 23 MG/DL    Creatinine 1.01 0.8 - 1.5 MG/DL    GFR est AA >60 >60 ml/min/1.73m2    GFR est non-AA >60 >60 ml/min/1.73m2    Calcium 8.2 (L) 8.3 - 10.4 MG/DL   CBC WITH AUTOMATED DIFF    Collection Time: 12/17/19  4:29 AM   Result Value Ref Range    WBC 6.6 4.3 - 11.1 K/uL    RBC 4.67 4.23 - 5.6 M/uL    HGB 12.9 (L) 13.6 - 17.2 g/dL    HCT 39.0 (L) 41.1 - 50.3 %    MCV 83.5 79.6 - 97.8 FL    MCH 27.6 26.1 - 32.9 PG    MCHC 33.1 31.4 - 35.0 g/dL    RDW 15.8 (H) 11.9 - 14.6 %    PLATELET 719 495 - 887 K/uL    MPV 10.2 9.4 - 12.3 FL    ABSOLUTE NRBC 0.00 0.0 - 0.2 K/uL    DF AUTOMATED      NEUTROPHILS 68 43 - 78 %    LYMPHOCYTES 25 13 - 44 %    MONOCYTES 4 4.0 - 12.0 %    EOSINOPHILS 2 0.5 - 7.8 %    BASOPHILS 0 0.0 - 2.0 %    IMMATURE GRANULOCYTES 0 0.0 - 5.0 %    ABS. NEUTROPHILS 4.4 1.7 - 8.2 K/UL    ABS. LYMPHOCYTES 1.6 0.5 - 4.6 K/UL    ABS. MONOCYTES 0.3 0.1 - 1.3 K/UL    ABS. EOSINOPHILS 0.2 0.0 - 0.8 K/UL    ABS. BASOPHILS 0.0 0.0 - 0.2 K/UL    ABS. IMM.  GRANS. 0.0 0.0 - 0.5 K/UL   MAGNESIUM    Collection Time: 12/17/19  4:29 AM   Result Value Ref Range    Magnesium 1.9 1.8 - 2.4 mg/dL        All Micro Results     Procedure Component Value Units Date/Time    INFLUENZA A & B AG (RAPID TEST) [[id number]] Collected:  12/16/19 2137    Order Status:  Completed Specimen:  Nasopharyngeal from Nasal washing Updated:  12/16/19 2208     Influenza A Ag NEGATIVE         Comment: NEGATIVE FOR THE PRESENCE OF INFLUENZA A ANTIGEN  INFECTION DUE TO INFLUENZA A CANNOT BE RULED OUT. BECAUSE THE ANTIGEN PRESENT IN THE SAMPLE MAY BE BELOW  THE DETECTION LIMIT OF THE TEST. A NEGATIVE TEST IS PRESUMPTIVE AND IT IS RECOMMENDED THAT THESE RESULTS BE CONFIRMED BY VIRAL CULTURE OR AN FDA-CLEARED INFLUENZA A AND B MOLECULAR ASSAY. Influenza B Ag NEGATIVE         Comment: NEGATIVE FOR THE PRESENCE OF INFLUENZA B ANTIGEN  INFECTION DUE TO INFLUENZA B CANNOT BE RULED OUT. BECAUSE THE ANTIGEN PRESENT IN THE SAMPLE MAY BE BELOW  THE DETECTION LIMIT OF THE TEST. A NEGATIVE TEST IS PRESUMPTIVE AND IT IS RECOMMENDED THAT THESE RESULTS BE CONFIRMED BY VIRAL CULTURE OR AN FDA-CLEARED INFLUENZA A AND B MOLECULAR ASSAY.           Source NASOPHARYNGEAL             Current Meds:  Current Facility-Administered Medications   Medication Dose Route Frequency    ALPRAZolam (XANAX) tablet 0.5 mg  0.5 mg Oral QHS    atorvastatin (LIPITOR) tablet 80 mg  80 mg Oral DAILY    carvedilol (COREG) tablet 25 mg  25 mg Oral BID WITH MEALS    [START ON 12/18/2019] eplerenone (INSPRA) tablet 25 mg (Patient Supplied)  25 mg Oral DAILY    pantoprazole (PROTONIX) tablet 40 mg  40 mg Oral ACB    [Held by provider] furosemide (LASIX) tablet 40 mg  40 mg Oral BID    gabapentin (NEURONTIN) capsule 300 mg  300 mg Oral BID    HYDROcodone-acetaminophen (NORCO)  mg tablet 1 Tab  1 Tab Oral Q6H PRN    losartan (COZAAR) tablet 50 mg  50 mg Oral DAILY    magnesium oxide (MAG-OX) tablet 400 mg  400 mg Oral Q12H    sodium chloride (NS) flush 5-40 mL  5-40 mL IntraVENous Q8H    sodium chloride (NS) flush 5-40 mL  5-40 mL IntraVENous PRN    acetaminophen (TYLENOL) tablet 650 mg  650 mg Oral Q4H PRN    naloxone (NARCAN) injection 0.4 mg  0.4 mg IntraVENous PRN    diphenhydrAMINE (BENADRYL) capsule 25 mg  25 mg Oral Q6H PRN    bisacodyl (DULCOLAX) tablet 5 mg  5 mg Oral DAILY PRN    LORazepam (ATIVAN) tablet 1 mg  1 mg Oral BID PRN    enoxaparin (LOVENOX) injection 40 mg  40 mg SubCUTAneous Q24H    haloperidol lactate (HALDOL) injection 5 mg  5 mg IntraVENous Q6H PRN    potassium chloride 20 mEq in 100 ml IVPB  20 mEq IntraVENous Q4H    furosemide (LASIX) injection 40 mg  40 mg IntraVENous ONCE    promethazine (PHENERGAN) with saline injection 6.25 mg  6.25 mg IntraVENous Q8H PRN    hyoscyamine SL (LEVSIN/SL) tablet 0.125 mg  0.125 mg SubLINGual Q4H PRN       Other Studies (last 24 hours):  Xr Chest Pa Lat    Result Date: 12/16/2019  EXAM: XR CHEST PA LAT INDICATION: CP COMPARISON: 5/7/2019. FINDINGS: PA and lateral radiographs of the chest demonstrate clear lungs. Large hiatal hernia. Unremarkable AICD. Richard Angry The bones and soft tissues are within normal limits. IMPRESSION: No acute cardiopulmonary disease. Assessment and Plan:     Hospital Problems as of 12/17/2019 Date Reviewed: 12/6/2019          Codes Class Noted - Resolved POA    * (Principal) AGE (acute gastroenteritis) ICD-10-CM: K52.9  ICD-9-CM: 558.9  12/16/2019 - Present Yes        Nausea & vomiting ICD-10-CM: R11.2  ICD-9-CM: 787.01  2/26/2016 - Present Yes        HTN (hypertension) (Chronic) ICD-10-CM: I10  ICD-9-CM: 401.9  11/29/2011 - Present Yes        Chronic systolic (congestive) heart failure (HCC) (Chronic) ICD-10-CM: I50.22  ICD-9-CM: 428.22, 428.0  4/21/2011 - Present Yes              PLAN:    · AGE- pt - tolerating a diet - appears resolved.    · Wheezing - not asthmatic - sounds wet - will give iv lasix  · Nausea- continue prn meds and monitor for resolution  · chf - continue other appropriate home meds    DC planning/Dispo:  Next 24 hours  DVT ppx:  lovenox     Signed:  Gilford Roots, MD

## 2019-12-17 NOTE — PROGRESS NOTES
TRANSFER - IN REPORT:    Verbal report received from Joyce Raman RN(name) on Jennifer Ocampo  being received from ED(unit) for routine progression of care      Report consisted of patients Situation, Background, Assessment and   Recommendations(SBAR). Information from the following report(s) SBAR, Kardex, ED Summary, Intake/Output, MAR and Recent Results was reviewed with the receiving nurse. Opportunity for questions and clarification was provided. Assessment completed upon patients arrival to unit and care assumed.

## 2019-12-17 NOTE — PROGRESS NOTES
Care Management Interventions  PCP Verified by CM: Yes  Mode of Transport at Discharge: Other (see comment)  Transition of Care Consult (CM Consult): Other  Current Support Network: Own Home  Confirm Follow Up Transport: Family  Plan discussed with Pt/Family/Caregiver: Yes  Freedom of Choice Offered: Yes  Discharge Location  Discharge Placement: Home  Visited with pt regarding plans for discharge, pt lives at home, with wife, he is a a retired post , he is inde with ADL's. Has [facility] [place] and sees Dr. Anastasiya Sheppard. No further needs at this time, but will continue to follow. Referral made to Krysta Cervantes with the Ambulatory Care Team for ongoing case management needs, she will determine the need and forward pt info to the appropriate CM covering this pt  Medicare Outpatient Observation Notice provided to the patient. Oral explanation was provided and all questions answered. Signed document placed in the medical record under media tab.  Copy to patient

## 2019-12-17 NOTE — PROGRESS NOTES
Pt arrived to room and assessment completed at this time. Oriented pt to room/unit. VS done and are stable. Pt c/o no pain. Pt c/o nausea at times. Call light in reach. Pt has no other needs.

## 2019-12-17 NOTE — ED NOTES
Patient is requesting additional nausea medication. States that Zofran does not work and asking if he can have anything other than Phenergan. MD notified of request via [address] [place] [address] .

## 2019-12-18 LAB
ANION GAP SERPL CALC-SCNC: 10 MMOL/L (ref 7–16)
BASOPHILS # BLD: 0 K/UL (ref 0–0.2)
BASOPHILS NFR BLD: 0 % (ref 0–2)
BUN SERPL-MCNC: 12 MG/DL (ref 6–23)
CALCIUM SERPL-MCNC: 8.5 MG/DL (ref 8.3–10.4)
CHLORIDE SERPL-SCNC: 107 MMOL/L (ref 98–107)
CO2 SERPL-SCNC: 23 MMOL/L (ref 21–32)
CREAT SERPL-MCNC: 1.26 MG/DL (ref 0.8–1.5)
DIFFERENTIAL METHOD BLD: ABNORMAL
EOSINOPHIL # BLD: 0.3 K/UL (ref 0–0.8)
EOSINOPHIL NFR BLD: 5 % (ref 0.5–7.8)
ERYTHROCYTE [DISTWIDTH] IN BLOOD BY AUTOMATED COUNT: 16.3 % (ref 11.9–14.6)
GLUCOSE SERPL-MCNC: 114 MG/DL (ref 65–100)
HCT VFR BLD AUTO: 41 % (ref 41.1–50.3)
HGB BLD-MCNC: 13.2 G/DL (ref 13.6–17.2)
IMM GRANULOCYTES # BLD AUTO: 0 K/UL (ref 0–0.5)
IMM GRANULOCYTES NFR BLD AUTO: 0 % (ref 0–5)
LYMPHOCYTES # BLD: 1.7 K/UL (ref 0.5–4.6)
LYMPHOCYTES NFR BLD: 34 % (ref 13–44)
MCH RBC QN AUTO: 27.8 PG (ref 26.1–32.9)
MCHC RBC AUTO-ENTMCNC: 32.2 G/DL (ref 31.4–35)
MCV RBC AUTO: 86.5 FL (ref 79.6–97.8)
MONOCYTES # BLD: 0.2 K/UL (ref 0.1–1.3)
MONOCYTES NFR BLD: 3 % (ref 4–12)
NEUTS SEG # BLD: 2.8 K/UL (ref 1.7–8.2)
NEUTS SEG NFR BLD: 57 % (ref 43–78)
NRBC # BLD: 0 K/UL (ref 0–0.2)
PLATELET # BLD AUTO: 136 K/UL (ref 150–450)
PMV BLD AUTO: 10 FL (ref 9.4–12.3)
POTASSIUM SERPL-SCNC: 4.6 MMOL/L (ref 3.5–5.1)
RBC # BLD AUTO: 4.74 M/UL (ref 4.23–5.6)
SODIUM SERPL-SCNC: 140 MMOL/L (ref 136–145)
WBC # BLD AUTO: 4.9 K/UL (ref 4.3–11.1)

## 2019-12-18 PROCEDURE — 99218 HC RM OBSERVATION: CPT

## 2019-12-18 PROCEDURE — 74011250637 HC RX REV CODE- 250/637: Performed by: EMERGENCY MEDICINE

## 2019-12-18 PROCEDURE — 74011250636 HC RX REV CODE- 250/636: Performed by: FAMILY MEDICINE

## 2019-12-18 PROCEDURE — 74011250636 HC RX REV CODE- 250/636: Performed by: INTERNAL MEDICINE

## 2019-12-18 PROCEDURE — 74011250637 HC RX REV CODE- 250/637: Performed by: INTERNAL MEDICINE

## 2019-12-18 PROCEDURE — 74011250637 HC RX REV CODE- 250/637: Performed by: FAMILY MEDICINE

## 2019-12-18 PROCEDURE — 96372 THER/PROPH/DIAG INJ SC/IM: CPT

## 2019-12-18 PROCEDURE — 80048 BASIC METABOLIC PNL TOTAL CA: CPT

## 2019-12-18 PROCEDURE — 96366 THER/PROPH/DIAG IV INF ADDON: CPT

## 2019-12-18 PROCEDURE — 36415 COLL VENOUS BLD VENIPUNCTURE: CPT

## 2019-12-18 PROCEDURE — 96376 TX/PRO/DX INJ SAME DRUG ADON: CPT

## 2019-12-18 PROCEDURE — 74011000250 HC RX REV CODE- 250: Performed by: INTERNAL MEDICINE

## 2019-12-18 PROCEDURE — 85025 COMPLETE CBC W/AUTO DIFF WBC: CPT

## 2019-12-18 RX ORDER — PROMETHAZINE HYDROCHLORIDE 25 MG/1
12.5 TABLET ORAL
Status: DISCONTINUED | OUTPATIENT
Start: 2019-12-18 | End: 2019-12-19 | Stop reason: HOSPADM

## 2019-12-18 RX ORDER — OXYCODONE HYDROCHLORIDE 5 MG/1
5 TABLET ORAL
Status: DISCONTINUED | OUTPATIENT
Start: 2019-12-18 | End: 2019-12-19 | Stop reason: HOSPADM

## 2019-12-18 RX ADMIN — OXYCODONE HYDROCHLORIDE 5 MG: 5 TABLET ORAL at 20:32

## 2019-12-18 RX ADMIN — PROMETHAZINE HYDROCHLORIDE 6.25 MG: 25 INJECTION INTRAMUSCULAR; INTRAVENOUS at 08:21

## 2019-12-18 RX ADMIN — HALOPERIDOL LACTATE 5 MG: 5 INJECTION, SOLUTION INTRAMUSCULAR at 06:44

## 2019-12-18 RX ADMIN — HYDROCODONE BITARTRATE AND ACETAMINOPHEN 1 TABLET: 10; 325 TABLET ORAL at 16:21

## 2019-12-18 RX ADMIN — Medication 10 ML: at 14:00

## 2019-12-18 RX ADMIN — Medication 5 ML: at 06:47

## 2019-12-18 RX ADMIN — CARVEDILOL 25 MG: 25 TABLET, FILM COATED ORAL at 17:44

## 2019-12-18 RX ADMIN — FUROSEMIDE 40 MG: 40 TABLET ORAL at 17:43

## 2019-12-18 RX ADMIN — Medication 10 ML: at 22:00

## 2019-12-18 RX ADMIN — OXYCODONE HYDROCHLORIDE 5 MG: 5 TABLET ORAL at 13:18

## 2019-12-18 RX ADMIN — HYOSCYAMINE SULFATE 0.12 MG: 0.12 TABLET ORAL; SUBLINGUAL at 13:46

## 2019-12-18 RX ADMIN — GABAPENTIN 300 MG: 300 CAPSULE ORAL at 08:21

## 2019-12-18 RX ADMIN — PROMETHAZINE HYDROCHLORIDE 12.5 MG: 25 TABLET ORAL at 13:46

## 2019-12-18 RX ADMIN — Medication 400 MG: at 08:21

## 2019-12-18 RX ADMIN — HALOPERIDOL LACTATE 5 MG: 5 INJECTION, SOLUTION INTRAMUSCULAR at 13:00

## 2019-12-18 RX ADMIN — OXYCODONE HYDROCHLORIDE 5 MG: 5 TABLET ORAL at 03:25

## 2019-12-18 RX ADMIN — LOSARTAN POTASSIUM 50 MG: 50 TABLET, FILM COATED ORAL at 09:45

## 2019-12-18 RX ADMIN — ALPRAZOLAM 0.5 MG: 0.5 TABLET ORAL at 20:32

## 2019-12-18 RX ADMIN — HYDROCODONE BITARTRATE AND ACETAMINOPHEN 1 TABLET: 10; 325 TABLET ORAL at 09:25

## 2019-12-18 RX ADMIN — ATORVASTATIN CALCIUM 80 MG: 40 TABLET, FILM COATED ORAL at 08:19

## 2019-12-18 RX ADMIN — Medication 400 MG: at 20:32

## 2019-12-18 RX ADMIN — ENOXAPARIN SODIUM 40 MG: 40 INJECTION SUBCUTANEOUS at 06:38

## 2019-12-18 RX ADMIN — GABAPENTIN 300 MG: 300 CAPSULE ORAL at 17:43

## 2019-12-18 RX ADMIN — HALOPERIDOL LACTATE 5 MG: 5 INJECTION, SOLUTION INTRAMUSCULAR at 20:14

## 2019-12-18 RX ADMIN — CARVEDILOL 25 MG: 25 TABLET, FILM COATED ORAL at 08:20

## 2019-12-18 NOTE — PROGRESS NOTES
Shift assessment completed via doc flow sheet. Pt is in bed resting comfortably. No complaints voiced at this time. All safety measures in place. Call light within reach.

## 2019-12-18 NOTE — PROGRESS NOTES
Pt asking for orange juice; no c/o nausea at this time. Phenergan effective. No c/o abd cramps at this time. Levsin effective.

## 2019-12-18 NOTE — PROGRESS NOTES
Pt called me into his room stating his was throwing up. Pt holding green bag with small amount of clear liquid in the bag. No food noted in the bag. Large plate of half eaten french fries & a cheeseburger that the pt finished at the bedside. Asking for nausea meds.

## 2019-12-18 NOTE — PROGRESS NOTES
Problem: Falls - Risk of  Goal: *Absence of Falls  Description  Document Pete Denis Fall Risk and appropriate interventions in the flowsheet.   Outcome: Progressing Towards Goal  Note: Fall Risk Interventions:            Medication Interventions: Teach patient to arise slowly                   Problem: Patient Education: Go to Patient Education Activity  Goal: Patient/Family Education  Outcome: Progressing Towards Goal

## 2019-12-18 NOTE — PROGRESS NOTES
Hospitalist Progress Note     Admit Date:  2019  8:20 PM   Name:  Manuel Wilburn   Age:  47 y.o.  :  1965   MRN:  [id number]   PCP:  Argelia Almanzar MD  Treatment Team: Attending Provider: Charis Saez MD; Care Manager: John Lucio RN; Nurse Extern: Aidee Mcmahon    Subjective:   2019 - Pt last had vomiting earlier today. Last had diarrhea this am. He states he doesn't want to eat but per nursing he has been eating all day and drinking orange juice and crackers all day. 2019-patient seen and evaluated. He notes that he is having diarrhea. Not seen by nursing. Objective:     Patient Vitals for the past 24 hrs:   Temp Pulse Resp BP SpO2   19 0819  82  (!) 116/101    19 0328 97.5 °F (36.4 °C) 79 12 110/76 99 %   19 0037 97.7 °F (36.5 °C) 84 16 101/69 97 %   19 2335 97.8 °F (36.6 °C) 86 18 (!) 89/55 96 %   19 2030 96.9 °F (36.1 °C) 89 18 96/68 96 %   19 1627 98.5 °F (36.9 °C) 87 18 114/84 99 %   19 1542 97.7 °F (36.5 °C) 81 18 121/85 100 %   19 1540    121/85 100 %   19 0950    128/88      Oxygen Therapy  O2 Sat (%): 99 % (19 0328)  Pulse via Oximetry: 76 beats per minute (19 1540)  O2 Device: Room air (19 1630)    Intake/Output Summary (Last 24 hours) at 2019 0901  Last data filed at 2019 0037  Gross per 24 hour   Intake 540 ml   Output 525 ml   Net 15 ml         General:    Well nourished. Alert. CV:   RRR. No murmur, rub, or gallop. Lungs:   CTAB. No wheezing, rhonchi, or rales. Abdomen:   Soft, nontender, nondistended. Extremities: Warm and dry. No cyanosis or edema. Skin:     No rashes or jaundice. Data Review:  I have reviewed all labs, meds, telemetry events, and studies from the last 24 hours.     Recent Results (from the past 24 hour(s))   METABOLIC PANEL, BASIC    Collection Time: 19  6:34 AM   Result Value Ref Range    Sodium 140 136 - 145 mmol/L    Potassium 4.6 3.5 - 5.1 mmol/L    Chloride 107 98 - 107 mmol/L    CO2 23 21 - 32 mmol/L    Anion gap 10 7 - 16 mmol/L    Glucose 114 (H) 65 - 100 mg/dL    BUN 12 6 - 23 MG/DL    Creatinine 1.26 0.8 - 1.5 MG/DL    GFR est AA >60 >60 ml/min/1.73m2    GFR est non-AA >60 >60 ml/min/1.73m2    Calcium 8.5 8.3 - 10.4 MG/DL   CBC WITH AUTOMATED DIFF    Collection Time: 12/18/19  7:05 AM   Result Value Ref Range    WBC 4.9 4.3 - 11.1 K/uL    RBC 4.74 4.23 - 5.6 M/uL    HGB 13.2 (L) 13.6 - 17.2 g/dL    HCT 41.0 (L) 41.1 - 50.3 %    MCV 86.5 79.6 - 97.8 FL    MCH 27.8 26.1 - 32.9 PG    MCHC 32.2 31.4 - 35.0 g/dL    RDW 16.3 (H) 11.9 - 14.6 %    PLATELET 484 (L) 907 - 450 K/uL    MPV 10.0 9.4 - 12.3 FL    ABSOLUTE NRBC 0.00 0.0 - 0.2 K/uL    DF AUTOMATED      NEUTROPHILS 57 43 - 78 %    LYMPHOCYTES 34 13 - 44 %    MONOCYTES 3 (L) 4.0 - 12.0 %    EOSINOPHILS 5 0.5 - 7.8 %    BASOPHILS 0 0.0 - 2.0 %    IMMATURE GRANULOCYTES 0 0.0 - 5.0 %    ABS. NEUTROPHILS 2.8 1.7 - 8.2 K/UL    ABS. LYMPHOCYTES 1.7 0.5 - 4.6 K/UL    ABS. MONOCYTES 0.2 0.1 - 1.3 K/UL    ABS. EOSINOPHILS 0.3 0.0 - 0.8 K/UL    ABS. BASOPHILS 0.0 0.0 - 0.2 K/UL    ABS. IMM. GRANS. 0.0 0.0 - 0.5 K/UL        All Micro Results     Procedure Component Value Units Date/Time    INFLUENZA A & B AG (RAPID TEST) [[id number]] Collected:  12/16/19 8493    Order Status:  Completed Specimen:  Nasopharyngeal from Nasal washing Updated:  12/16/19 6751     Influenza A Ag NEGATIVE         Comment: NEGATIVE FOR THE PRESENCE OF INFLUENZA A ANTIGEN  INFECTION DUE TO INFLUENZA A CANNOT BE RULED OUT. BECAUSE THE ANTIGEN PRESENT IN THE SAMPLE MAY BE BELOW  THE DETECTION LIMIT OF THE TEST. A NEGATIVE TEST IS PRESUMPTIVE AND IT IS RECOMMENDED THAT THESE RESULTS BE CONFIRMED BY VIRAL CULTURE OR AN FDA-CLEARED INFLUENZA A AND B MOLECULAR ASSAY.           Influenza B Ag NEGATIVE         Comment: NEGATIVE FOR THE PRESENCE OF INFLUENZA B ANTIGEN  INFECTION DUE TO INFLUENZA B CANNOT BE RULED OUT. BECAUSE THE ANTIGEN PRESENT IN THE SAMPLE MAY BE BELOW  THE DETECTION LIMIT OF THE TEST. A NEGATIVE TEST IS PRESUMPTIVE AND IT IS RECOMMENDED THAT THESE RESULTS BE CONFIRMED BY VIRAL CULTURE OR AN FDA-CLEARED INFLUENZA A AND B MOLECULAR ASSAY. Source NASOPHARYNGEAL             Current Meds:  Current Facility-Administered Medications   Medication Dose Route Frequency    promethazine (PHENERGAN) tablet 12.5 mg  12.5 mg Oral Q6H PRN    ALPRAZolam (XANAX) tablet 0.5 mg  0.5 mg Oral QHS    atorvastatin (LIPITOR) tablet 80 mg  80 mg Oral DAILY    carvedilol (COREG) tablet 25 mg  25 mg Oral BID WITH MEALS    eplerenone (INSPRA) tablet 25 mg (Patient Supplied)  25 mg Oral DAILY    pantoprazole (PROTONIX) tablet 40 mg  40 mg Oral ACB    [Held by provider] furosemide (LASIX) tablet 40 mg  40 mg Oral BID    gabapentin (NEURONTIN) capsule 300 mg  300 mg Oral BID    HYDROcodone-acetaminophen (NORCO)  mg tablet 1 Tab  1 Tab Oral Q6H PRN    losartan (COZAAR) tablet 50 mg  50 mg Oral DAILY    magnesium oxide (MAG-OX) tablet 400 mg  400 mg Oral Q12H    sodium chloride (NS) flush 5-40 mL  5-40 mL IntraVENous Q8H    sodium chloride (NS) flush 5-40 mL  5-40 mL IntraVENous PRN    acetaminophen (TYLENOL) tablet 650 mg  650 mg Oral Q4H PRN    naloxone (NARCAN) injection 0.4 mg  0.4 mg IntraVENous PRN    diphenhydrAMINE (BENADRYL) capsule 25 mg  25 mg Oral Q6H PRN    bisacodyl (DULCOLAX) tablet 5 mg  5 mg Oral DAILY PRN    LORazepam (ATIVAN) tablet 1 mg  1 mg Oral BID PRN    enoxaparin (LOVENOX) injection 40 mg  40 mg SubCUTAneous Q24H    haloperidol lactate (HALDOL) injection 5 mg  5 mg IntraVENous Q6H PRN    oxyCODONE IR (ROXICODONE) tablet 5 mg  5 mg Oral Q6H    hyoscyamine SL (LEVSIN/SL) tablet 0.125 mg  0.125 mg SubLINGual Q4H PRN       Other Studies (last 24 hours):  No results found.     Assessment and Plan:     Hospital Problems as of 12/18/2019 Date Reviewed: 12/6/2019 Codes Class Noted - Resolved POA    * (Principal) AGE (acute gastroenteritis) ICD-10-CM: K52.9  ICD-9-CM: 558.9  12/16/2019 - Present Yes        Nausea & vomiting ICD-10-CM: R11.2  ICD-9-CM: 787.01  2/26/2016 - Present Yes        HTN (hypertension) (Chronic) ICD-10-CM: I10  ICD-9-CM: 401.9  11/29/2011 - Present Yes        Chronic systolic (congestive) heart failure (HCC) (Chronic) ICD-10-CM: I50.22  ICD-9-CM: 428.22, 428.0  4/21/2011 - Present Yes              PLAN:    · AGE- pt - tolerating a diet -but patient still complaining about skin; nurses have not noted it; asked the patient to not flush stools and have nurses evaluate  · Wheezing -  resolved status post Lasix   · Nausea & vomiting- continue prn meds and monitor for resolution  · chf - continue other appropriate home meds    DC planning/Dispo:  Next 24 hours or sooner if no diarrhea  DVT ppx:  lovenox     Signed:  Jarrett Tristan MD

## 2019-12-18 NOTE — PROGRESS NOTES
Pt states he did not void the whole day. The urinal was emptied with 550 ml of urine at the beginning on the shift. Bladder scan showed 205 ml urine in the bladder at 1745.

## 2019-12-18 NOTE — PROGRESS NOTES
Pt in bed, resting quietly. Alert, oriented x3 , not oriented to time. Respirations even, unlabored. HR regular. Abdomen soft, non tender.  C/o nausea and pain in abdomen 6/10

## 2019-12-19 VITALS
RESPIRATION RATE: 16 BRPM | HEART RATE: 97 BPM | WEIGHT: 186 LBS | BODY MASS INDEX: 29.19 KG/M2 | OXYGEN SATURATION: 98 % | DIASTOLIC BLOOD PRESSURE: 71 MMHG | TEMPERATURE: 97.4 F | HEIGHT: 67 IN | SYSTOLIC BLOOD PRESSURE: 104 MMHG

## 2019-12-19 LAB
ANION GAP SERPL CALC-SCNC: 6 MMOL/L (ref 7–16)
BASOPHILS # BLD: 0 K/UL (ref 0–0.2)
BASOPHILS NFR BLD: 0 % (ref 0–2)
BUN SERPL-MCNC: 14 MG/DL (ref 6–23)
CALCIUM SERPL-MCNC: 8.3 MG/DL (ref 8.3–10.4)
CHLORIDE SERPL-SCNC: 104 MMOL/L (ref 98–107)
CO2 SERPL-SCNC: 29 MMOL/L (ref 21–32)
CREAT SERPL-MCNC: 1.16 MG/DL (ref 0.8–1.5)
DIFFERENTIAL METHOD BLD: ABNORMAL
EOSINOPHIL # BLD: 0.2 K/UL (ref 0–0.8)
EOSINOPHIL NFR BLD: 5 % (ref 0.5–7.8)
ERYTHROCYTE [DISTWIDTH] IN BLOOD BY AUTOMATED COUNT: 16.1 % (ref 11.9–14.6)
GLUCOSE SERPL-MCNC: 105 MG/DL (ref 65–100)
HCT VFR BLD AUTO: 35.8 % (ref 41.1–50.3)
HGB BLD-MCNC: 11.7 G/DL (ref 13.6–17.2)
IMM GRANULOCYTES # BLD AUTO: 0 K/UL (ref 0–0.5)
IMM GRANULOCYTES NFR BLD AUTO: 0 % (ref 0–5)
LYMPHOCYTES # BLD: 1.4 K/UL (ref 0.5–4.6)
LYMPHOCYTES NFR BLD: 31 % (ref 13–44)
MCH RBC QN AUTO: 27.9 PG (ref 26.1–32.9)
MCHC RBC AUTO-ENTMCNC: 32.7 G/DL (ref 31.4–35)
MCV RBC AUTO: 85.4 FL (ref 79.6–97.8)
MONOCYTES # BLD: 0.2 K/UL (ref 0.1–1.3)
MONOCYTES NFR BLD: 4 % (ref 4–12)
NEUTS SEG # BLD: 2.8 K/UL (ref 1.7–8.2)
NEUTS SEG NFR BLD: 59 % (ref 43–78)
NRBC # BLD: 0.02 K/UL (ref 0–0.2)
PLATELET # BLD AUTO: 123 K/UL (ref 150–450)
PMV BLD AUTO: 9.4 FL (ref 9.4–12.3)
POTASSIUM SERPL-SCNC: 4 MMOL/L (ref 3.5–5.1)
RBC # BLD AUTO: 4.19 M/UL (ref 4.23–5.6)
SODIUM SERPL-SCNC: 139 MMOL/L (ref 136–145)
WBC # BLD AUTO: 4.7 K/UL (ref 4.3–11.1)

## 2019-12-19 PROCEDURE — 96372 THER/PROPH/DIAG INJ SC/IM: CPT

## 2019-12-19 PROCEDURE — 74011250637 HC RX REV CODE- 250/637: Performed by: INTERNAL MEDICINE

## 2019-12-19 PROCEDURE — 36415 COLL VENOUS BLD VENIPUNCTURE: CPT

## 2019-12-19 PROCEDURE — 74011250637 HC RX REV CODE- 250/637: Performed by: FAMILY MEDICINE

## 2019-12-19 PROCEDURE — 85025 COMPLETE CBC W/AUTO DIFF WBC: CPT

## 2019-12-19 PROCEDURE — 74011250636 HC RX REV CODE- 250/636: Performed by: FAMILY MEDICINE

## 2019-12-19 PROCEDURE — 99218 HC RM OBSERVATION: CPT

## 2019-12-19 PROCEDURE — 96376 TX/PRO/DX INJ SAME DRUG ADON: CPT

## 2019-12-19 PROCEDURE — 80048 BASIC METABOLIC PNL TOTAL CA: CPT

## 2019-12-19 PROCEDURE — 74011000250 HC RX REV CODE- 250: Performed by: FAMILY MEDICINE

## 2019-12-19 RX ADMIN — LOSARTAN POTASSIUM 50 MG: 50 TABLET, FILM COATED ORAL at 09:59

## 2019-12-19 RX ADMIN — Medication 400 MG: at 08:36

## 2019-12-19 RX ADMIN — GABAPENTIN 300 MG: 300 CAPSULE ORAL at 08:36

## 2019-12-19 RX ADMIN — PANTOPRAZOLE SODIUM 40 MG: 40 TABLET, DELAYED RELEASE ORAL at 08:36

## 2019-12-19 RX ADMIN — PROMETHAZINE HYDROCHLORIDE 12.5 MG: 25 TABLET ORAL at 09:47

## 2019-12-19 RX ADMIN — FUROSEMIDE 40 MG: 40 TABLET ORAL at 08:36

## 2019-12-19 RX ADMIN — ATORVASTATIN CALCIUM 80 MG: 40 TABLET, FILM COATED ORAL at 08:36

## 2019-12-19 RX ADMIN — Medication 10 ML: at 05:31

## 2019-12-19 RX ADMIN — HYDROCODONE BITARTRATE AND ACETAMINOPHEN 1 TABLET: 10; 325 TABLET ORAL at 09:46

## 2019-12-19 RX ADMIN — HYDROCODONE BITARTRATE AND ACETAMINOPHEN 1 TABLET: 10; 325 TABLET ORAL at 03:48

## 2019-12-19 RX ADMIN — ENOXAPARIN SODIUM 40 MG: 40 INJECTION SUBCUTANEOUS at 05:43

## 2019-12-19 RX ADMIN — PROMETHAZINE HYDROCHLORIDE 12.5 MG: 25 INJECTION INTRAMUSCULAR; INTRAVENOUS at 03:48

## 2019-12-19 RX ADMIN — CARVEDILOL 25 MG: 25 TABLET, FILM COATED ORAL at 08:36

## 2019-12-19 NOTE — DISCHARGE INSTRUCTIONS
DISCHARGE SUMMARY from Nurse    PATIENT INSTRUCTIONS:    After general anesthesia or intravenous sedation, for 24 hours or while taking prescription Narcotics:  · Limit your activities  · Do not drive and operate hazardous machinery  · Do not make important personal or business decisions  · Do  not drink alcoholic beverages  · If you have not urinated within 8 hours after discharge, please contact your surgeon on call. Report the following to your surgeon:  · Excessive pain, swelling, redness or odor of or around the surgical area  · Temperature over 100.5  · Nausea and vomiting lasting longer than 4 hours or if unable to take medications  · Any signs of decreased circulation or nerve impairment to extremity: change in color, persistent  numbness, tingling, coldness or increase pain  · Any questions    What to do at Home:  Recommended activity: Activity as tolerated, regular diet, follow up with PCP in 1 week    If you experience any of the following symptoms: worsening of symptoms, fever, chills, other concerns, please follow up with PCP or return to hospital.    *  Please give a list of your current medications to your Primary Care Provider. *  Please update this list whenever your medications are discontinued, doses are      changed, or new medications (including over-the-counter products) are added. *  Please carry medication information at all times in case of emergency situations. These are general instructions for a healthy lifestyle:    No smoking/ No tobacco products/ Avoid exposure to second hand smoke  Surgeon General's Warning:  Quitting smoking now greatly reduces serious risk to your health.     Obesity, smoking, and sedentary lifestyle greatly increases your risk for illness    A healthy diet, regular physical exercise & weight monitoring are important for maintaining a healthy lifestyle    You may be retaining fluid if you have a history of heart failure or if you experience any of the following symptoms:  Weight gain of 3 pounds or more overnight or 5 pounds in a week, increased swelling in our hands or feet or shortness of breath while lying flat in bed. Please call your doctor as soon as you notice any of these symptoms; do not wait until your next office visit. The discharge information has been reviewed with the patient. The patient verbalized understanding. Discharge medications reviewed with the patient and appropriate educational materials and side effects teaching were provided. ___________________________________________________________________________________________________________________________________      Patient Education        Nausea and Vomiting: Care Instructions  Your Care Instructions    When you are nauseated, you may feel weak and sweaty and notice a lot of saliva in your mouth. Nausea often leads to vomiting. Most of the time you do not need to worry about nausea and vomiting, but they can be signs of other illnesses. Two common causes of nausea and vomiting are stomach flu and food poisoning. Nausea and vomiting from viral stomach flu will usually start to improve within 24 hours. Nausea and vomiting from food poisoning may last from 12 to 48 hours. The doctor has checked you carefully, but problems can develop later. If you notice any problems or new symptoms, get medical treatment right away. Follow-up care is a key part of your treatment and safety. Be sure to make and go to all appointments, and call your doctor if you are having problems. It's also a good idea to know your test results and keep a list of the medicines you take. How can you care for yourself at home? · To prevent dehydration, drink plenty of fluids, enough so that your urine is light yellow or clear like water. Choose water and other caffeine-free clear liquids until you feel better.  If you have kidney, heart, or liver disease and have to limit fluids, talk with your doctor before you increase the amount of fluids you drink. · Rest in bed until you feel better. · When you are able to eat, try clear soups, mild foods, and liquids until all symptoms are gone for 12 to 48 hours. Other good choices include dry toast, crackers, cooked cereal, and gelatin dessert, such as Jell-O. When should you call for help? Call 911 anytime you think you may need emergency care. For example, call if:    · You passed out (lost consciousness).    Call your doctor now or seek immediate medical care if:    · You have symptoms of dehydration, such as:  ? Dry eyes and a dry mouth. ? Passing only a little dark urine. ? Feeling thirstier than usual.     · You have new or worsening belly pain.     · You have a new or higher fever.     · You vomit blood or what looks like coffee grounds.    Watch closely for changes in your health, and be sure to contact your doctor if:    · You have ongoing nausea and vomiting.     · Your vomiting is getting worse.     · Your vomiting lasts longer than 2 days.     · You are not getting better as expected. Where can you learn more? Go to http://edmundo-isi.info/. Enter 25 [id number] in the search box to learn more about \"Nausea and Vomiting: Care Instructions. \"  Current as of: June 26, 2019  Content Version: 12.2  © 1607-0027 [employer]. Care instructions adapted under license by [employer] (which disclaims liability or warranty for this information). If you have questions about a medical condition or this instruction, always ask your healthcare professional. [place] [address] any warranty or liability for your use of this information.

## 2019-12-19 NOTE — DISCHARGE SUMMARY
Hospitalist Discharge Summary     Patient ID:  Jennifer Hall  [id number]  47 y.o.  1965  Admit date: 12/16/2019  8:20 PM  Discharge date and time: 12/19/2019  Attending: Ousmane Munoz MD  PCP:  Reina Jacinto MD  Treatment Team: Attending Provider: Ousmane Munoz MD; Care Manager: Tata Piper, RN; Nurse Extern: Elizabeth Loja    Principal Diagnosis AGE (acute gastroenteritis)   Principal Problem:    AGE (acute gastroenteritis) (12/16/2019)    Active Problems:    Chronic systolic (congestive) heart failure ([facility] [address] (4/21/2011)      HTN (hypertension) (11/29/2011)      Nausea & vomiting (2/26/2016)             Hospital Course:  Please refer to the admission H&P for details of presentation. In summary, the patient is a 47years old AAM with hx of advance CHF with EF 15% s/p AICD, non ischemic cardiomyopathy, HTN, chronic nausea/vomiting, admitted on 12/17/19 for acute viral gastroenteritis in view of nausea/vomiting and diarrhea. Pt was recently on oral augmentin for sinus infection. Diarrhea likely explained by antibiotics. Nausea/vomiting improved with symptomatic treatment. Pt was able to tolerate oral diet and was asymptomatic. He likely had viral gastroenteritis, which was managed conservatively. Pt is hemodynamically stable, and medically cleared for discharge to home today. He is advised to follow up with PCP within a week. He has chronic lower extremity numbness for which he take gabapentin, he is advised to follow up with PCP if dose of gabapentin needs to be adjusted. Rest of the hospital course was uneventful. Significant Diagnostic Studies:   EXAM: XR CHEST PA LAT     INDICATION: CP     COMPARISON: 5/7/2019.     FINDINGS: PA and lateral radiographs of the chest demonstrate clear lungs. Large  hiatal hernia. Unremarkable AICD. Valarie Lucretia The bones and soft tissues are within normal  limits.      IMPRESSION  IMPRESSION: No acute cardiopulmonary disease.     Labs: Results: Chemistry Recent Labs     12/19/19  0546 12/18/19  0634 12/17/19 0429 12/16/19 1947   * 114* 112* 122*    140 140 140   K 4.0 4.6 3.3* 3.4*    107 105 103   CO2 29 23 24 26   BUN 14 12 15 17   CREA 1.16 1.26 1.01 1.28   CA 8.3 8.5 8.2* 8.8   AGAP 6* 10 11 11   AP  --   --   --  84   TP  --   --   --  7.4   ALB  --   --   --  4.2   GLOB  --   --   --  3.2   AGRAT  --   --   --  1.3      CBC w/Diff Recent Labs     12/19/19 0546 12/18/19  0705 12/17/19 0429   WBC 4.7 4.9 6.6   RBC 4.19* 4.74 4.67   HGB 11.7* 13.2* 12.9*   HCT 35.8* 41.0* 39.0*   * 136* 170   GRANS 59 57 68   LYMPH 31 34 25   EOS 5 5 2      Cardiac Enzymes No results for input(s): CPK, CKND1, CAROL in the last 72 hours. No lab exists for component: CKRMB, TROIP   Coagulation No results for input(s): PTP, INR, APTT, INREXT in the last 72 hours. Lipid Panel Lab Results   Component Value Date/Time    Cholesterol, total 181 12/23/2018 04:03 AM    HDL Cholesterol 90 (H) 12/23/2018 04:03 AM    LDL,Direct 71 03/25/2016 04:35 AM    LDL, calculated 73.2 12/23/2018 04:03 AM    VLDL, calculated 17.8 12/23/2018 04:03 AM    Triglyceride 89 12/23/2018 04:03 AM    CHOL/HDL Ratio 2.0 12/23/2018 04:03 AM      BNP No results for input(s): BNPP in the last 72 hours. Liver Enzymes Recent Labs     12/16/19 1947   TP 7.4   ALB 4.2   AP 84   SGOT 31      Thyroid Studies Lab Results   Component Value Date/Time    TSH 1.010 12/06/2017 12:36 PM            Discharge Exam:  Visit Vitals  /83 (BP 1 Location: Left arm, BP Patient Position: At rest;Head of bed elevated (Comment degrees))   Pulse 90   Temp 96.9 °F (36.1 °C)   Resp 16   Ht 5' 7\" (1.702 m)   Wt 84.4 kg (186 lb)   SpO2 98%   BMI 29.13 kg/m²     General appearance: alert, cooperative, no distress, appears stated age  Lungs: clear to auscultation bilaterally  Heart: regular rate and rhythm, S1, S2 normal, no murmur, click, rub or gallop  Abdomen: soft, non-tender.  Bowel sounds normal. No masses,  no organomegaly  Extremities: no cyanosis or edema  Neurologic: Grossly normal    Disposition: home  Discharge Condition: stable  Patient Instructions:   Current Discharge Medication List      CONTINUE these medications which have NOT CHANGED    Details   HYDROcodone-acetaminophen (NORCO)  mg tablet TAKE ONE TABLET BY MOUTH FOUR TIMES DAILY AS NEEDED  Refills: 0      carvedilol (COREG) 25 mg tablet Take 1 Tab by mouth two (2) times daily (with meals). Qty: 180 Tab, Refills: 3      losartan (COZAAR) 50 mg tablet Take 1 Tab by mouth daily. Qty: 90 Tab, Refills: 2      furosemide (LASIX) 40 mg tablet Take 1 Tab by mouth two (2) times a day. Qty: 180 Tab, Refills: 3      atorvastatin (LIPITOR) 80 mg tablet Take 1 Tab by mouth daily. Qty: 90 Tab, Refills: 3      eplerenone (INSPRA) 25 mg tablet Take 1 Tab by mouth daily. Qty: 90 Tab, Refills: 3      ALPRAZolam (XANAX) 1 mg tablet Take 0.5 Tabs by mouth nightly. Max Daily Amount: 0.5 mg. Indications: anxious  Qty: 30 Tab, Refills: 3    Associated Diagnoses: Anxiety      sucralfate (CARAFATE) 1 gram tablet Take 1 Tab by mouth Before breakfast, lunch, dinner and at bedtime. Qty: 120 Tab, Refills: 0      ondansetron (ZOFRAN ODT) 4 mg disintegrating tablet Take 1 Tab by mouth every eight (8) hours as needed for Nausea. Qty: 12 Tab, Refills: 0      magnesium oxide (MAG-OX) 400 mg tablet Take 1 Tab by mouth every twelve (12) hours. Qty: 180 Tab, Refills: 3      sildenafil citrate (VIAGRA) 100 mg tablet Take 1 Tab by mouth as needed. Qty: 10 Tab, Refills: 3      gabapentin (NEURONTIN) 300 mg capsule Take 1 Cap by mouth three (3) times daily. 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          STOP taking these medications       amoxicillin (AMOXIL) 875 mg tablet Comments:   Reason for Stopping: Activity: Activity as tolerated  Diet: Cardiac Diet  Wound Care: None needed    Follow-up  ·   Follow up with PCP in 1-2 weeks  Time spent to discharge patient 35 minutes  Signed:  Leah Cervantes MD  12/19/2019  7:27 AM

## 2019-12-19 NOTE — PROGRESS NOTES
Pt alert, oriented x4. Respirations even, unlabored. HR regular. C/o of tightness in the left side of the chest and decreased sensation in the lower extremities. Abdomen soft, tender.  Bowel sounds active

## 2020-03-19 ENCOUNTER — APPOINTMENT (OUTPATIENT)
Dept: GENERAL RADIOLOGY | Age: 55
End: 2020-03-19
Attending: EMERGENCY MEDICINE
Payer: COMMERCIAL

## 2020-03-19 ENCOUNTER — HOSPITAL ENCOUNTER (OUTPATIENT)
Age: 55
Setting detail: OBSERVATION
Discharge: HOME | End: 2020-03-23
Attending: EMERGENCY MEDICINE | Admitting: FAMILY MEDICINE
Payer: COMMERCIAL

## 2020-03-19 DIAGNOSIS — R05.9 COUGH: Primary | ICD-10-CM

## 2020-03-19 DIAGNOSIS — R06.02 SOB (SHORTNESS OF BREATH): ICD-10-CM

## 2020-03-19 PROBLEM — R06.03 RESPIRATORY DISTRESS: Status: ACTIVE | Noted: 2020-03-19

## 2020-03-19 LAB
ALBUMIN SERPL-MCNC: 4.2 G/DL (ref 3.5–5)
ALBUMIN/GLOB SERPL: 1.4 {RATIO} (ref 1.2–3.5)
ALP SERPL-CCNC: 62 U/L (ref 50–136)
ALT SERPL-CCNC: 36 U/L (ref 12–65)
ANION GAP SERPL CALC-SCNC: 13 MMOL/L (ref 7–16)
AST SERPL-CCNC: 39 U/L (ref 15–37)
ATRIAL RATE: 107 BPM
BASOPHILS # BLD: 0 K/UL (ref 0–0.2)
BASOPHILS NFR BLD: 1 % (ref 0–2)
BILIRUB SERPL-MCNC: 0.5 MG/DL (ref 0.2–1.1)
BNP SERPL-MCNC: 1479 PG/ML (ref 5–125)
BUN SERPL-MCNC: 9 MG/DL (ref 6–23)
CALCIUM SERPL-MCNC: 8.8 MG/DL (ref 8.3–10.4)
CALCULATED P AXIS, ECG09: 52 DEGREES
CALCULATED R AXIS, ECG10: 2 DEGREES
CALCULATED T AXIS, ECG11: -113 DEGREES
CHLORIDE SERPL-SCNC: 105 MMOL/L (ref 98–107)
CO2 SERPL-SCNC: 21 MMOL/L (ref 21–32)
CREAT SERPL-MCNC: 1.19 MG/DL (ref 0.8–1.5)
D DIMER PPP FEU-MCNC: 0.34 UG/ML(FEU)
DIAGNOSIS, 93000: NORMAL
DIFFERENTIAL METHOD BLD: ABNORMAL
EOSINOPHIL # BLD: 0.1 K/UL (ref 0–0.8)
EOSINOPHIL NFR BLD: 2 % (ref 0.5–7.8)
ERYTHROCYTE [DISTWIDTH] IN BLOOD BY AUTOMATED COUNT: 15.3 % (ref 11.9–14.6)
GLOBULIN SER CALC-MCNC: 3.1 G/DL (ref 2.3–3.5)
GLUCOSE SERPL-MCNC: 100 MG/DL (ref 65–100)
HCT VFR BLD AUTO: 39.8 % (ref 41.1–50.3)
HGB BLD-MCNC: 13.1 G/DL (ref 13.6–17.2)
IMM GRANULOCYTES # BLD AUTO: 0 K/UL (ref 0–0.5)
IMM GRANULOCYTES NFR BLD AUTO: 0 % (ref 0–5)
LACTATE SERPL-SCNC: 2.2 MMOL/L (ref 0.4–2)
LYMPHOCYTES # BLD: 1.5 K/UL (ref 0.5–4.6)
LYMPHOCYTES NFR BLD: 25 % (ref 13–44)
MCH RBC QN AUTO: 31.6 PG (ref 26.1–32.9)
MCHC RBC AUTO-ENTMCNC: 32.9 G/DL (ref 31.4–35)
MCV RBC AUTO: 95.9 FL (ref 79.6–97.8)
MONOCYTES # BLD: 0.6 K/UL (ref 0.1–1.3)
MONOCYTES NFR BLD: 10 % (ref 4–12)
NEUTS SEG # BLD: 3.7 K/UL (ref 1.7–8.2)
NEUTS SEG NFR BLD: 62 % (ref 43–78)
NRBC # BLD: 0 K/UL (ref 0–0.2)
P-R INTERVAL, ECG05: 122 MS
PLATELET # BLD AUTO: 212 K/UL (ref 150–450)
PMV BLD AUTO: 9.4 FL (ref 9.4–12.3)
POTASSIUM SERPL-SCNC: 3.5 MMOL/L (ref 3.5–5.1)
PROT SERPL-MCNC: 7.3 G/DL (ref 6.3–8.2)
Q-T INTERVAL, ECG07: 312 MS
QRS DURATION, ECG06: 98 MS
QTC CALCULATION (BEZET), ECG08: 416 MS
RBC # BLD AUTO: 4.15 M/UL (ref 4.23–5.6)
SODIUM SERPL-SCNC: 139 MMOL/L (ref 136–145)
TROPONIN I SERPL-MCNC: 0.11 NG/ML (ref 0.02–0.05)
VENTRICULAR RATE, ECG03: 107 BPM
WBC # BLD AUTO: 6 K/UL (ref 4.3–11.1)

## 2020-03-19 PROCEDURE — 99218 HC RM OBSERVATION: CPT

## 2020-03-19 PROCEDURE — 74011000258 HC RX REV CODE- 258: Performed by: EMERGENCY MEDICINE

## 2020-03-19 PROCEDURE — 74011250637 HC RX REV CODE- 250/637: Performed by: FAMILY MEDICINE

## 2020-03-19 PROCEDURE — 80053 COMPREHEN METABOLIC PANEL: CPT

## 2020-03-19 PROCEDURE — 85379 FIBRIN DEGRADATION QUANT: CPT

## 2020-03-19 PROCEDURE — 87040 BLOOD CULTURE FOR BACTERIA: CPT

## 2020-03-19 PROCEDURE — 83605 ASSAY OF LACTIC ACID: CPT

## 2020-03-19 PROCEDURE — 99285 EMERGENCY DEPT VISIT HI MDM: CPT

## 2020-03-19 PROCEDURE — 74011000250 HC RX REV CODE- 250: Performed by: EMERGENCY MEDICINE

## 2020-03-19 PROCEDURE — 93005 ELECTROCARDIOGRAM TRACING: CPT | Performed by: EMERGENCY MEDICINE

## 2020-03-19 PROCEDURE — 71045 X-RAY EXAM CHEST 1 VIEW: CPT

## 2020-03-19 PROCEDURE — 96375 TX/PRO/DX INJ NEW DRUG ADDON: CPT

## 2020-03-19 PROCEDURE — 74011250636 HC RX REV CODE- 250/636: Performed by: EMERGENCY MEDICINE

## 2020-03-19 PROCEDURE — 96365 THER/PROPH/DIAG IV INF INIT: CPT

## 2020-03-19 PROCEDURE — 83880 ASSAY OF NATRIURETIC PEPTIDE: CPT

## 2020-03-19 PROCEDURE — 85025 COMPLETE CBC W/AUTO DIFF WBC: CPT

## 2020-03-19 PROCEDURE — 96372 THER/PROPH/DIAG INJ SC/IM: CPT

## 2020-03-19 PROCEDURE — 84484 ASSAY OF TROPONIN QUANT: CPT

## 2020-03-19 PROCEDURE — 74011250636 HC RX REV CODE- 250/636: Performed by: FAMILY MEDICINE

## 2020-03-19 RX ORDER — SODIUM CHLORIDE 0.9 % (FLUSH) 0.9 %
5-40 SYRINGE (ML) INJECTION EVERY 8 HOURS
Status: DISCONTINUED | OUTPATIENT
Start: 2020-03-19 | End: 2020-03-23 | Stop reason: HOSPADM

## 2020-03-19 RX ORDER — LOSARTAN POTASSIUM 50 MG/1
50 TABLET ORAL DAILY
Status: DISCONTINUED | OUTPATIENT
Start: 2020-03-20 | End: 2020-03-23 | Stop reason: HOSPADM

## 2020-03-19 RX ORDER — EPLERENONE 25 MG/1
25 TABLET, FILM COATED ORAL DAILY
Status: DISCONTINUED | OUTPATIENT
Start: 2020-03-20 | End: 2020-03-23 | Stop reason: HOSPADM

## 2020-03-19 RX ORDER — ACETAMINOPHEN 325 MG/1
650 TABLET ORAL
Status: DISCONTINUED | OUTPATIENT
Start: 2020-03-19 | End: 2020-03-23 | Stop reason: HOSPADM

## 2020-03-19 RX ORDER — FUROSEMIDE 10 MG/ML
40 INJECTION INTRAMUSCULAR; INTRAVENOUS
Status: COMPLETED | OUTPATIENT
Start: 2020-03-19 | End: 2020-03-19

## 2020-03-19 RX ORDER — FUROSEMIDE 40 MG/1
40 TABLET ORAL 2 TIMES DAILY
Status: DISCONTINUED | OUTPATIENT
Start: 2020-03-19 | End: 2020-03-21

## 2020-03-19 RX ORDER — ATORVASTATIN CALCIUM 80 MG/1
80 TABLET, FILM COATED ORAL DAILY
Status: DISCONTINUED | OUTPATIENT
Start: 2020-03-20 | End: 2020-03-23 | Stop reason: HOSPADM

## 2020-03-19 RX ORDER — GABAPENTIN 300 MG/1
300 CAPSULE ORAL 2 TIMES DAILY
Status: DISCONTINUED | OUTPATIENT
Start: 2020-03-19 | End: 2020-03-23 | Stop reason: HOSPADM

## 2020-03-19 RX ORDER — ENOXAPARIN SODIUM 100 MG/ML
40 INJECTION SUBCUTANEOUS EVERY 24 HOURS
Status: DISCONTINUED | OUTPATIENT
Start: 2020-03-19 | End: 2020-03-23 | Stop reason: HOSPADM

## 2020-03-19 RX ORDER — PROMETHAZINE HYDROCHLORIDE 25 MG/1
25 TABLET ORAL
Status: DISCONTINUED | OUTPATIENT
Start: 2020-03-19 | End: 2020-03-23 | Stop reason: HOSPADM

## 2020-03-19 RX ORDER — SUCRALFATE 1 G/1
1 TABLET ORAL
Status: DISCONTINUED | OUTPATIENT
Start: 2020-03-19 | End: 2020-03-23 | Stop reason: HOSPADM

## 2020-03-19 RX ORDER — BISACODYL 5 MG
5 TABLET, DELAYED RELEASE (ENTERIC COATED) ORAL DAILY PRN
Status: DISCONTINUED | OUTPATIENT
Start: 2020-03-19 | End: 2020-03-23 | Stop reason: HOSPADM

## 2020-03-19 RX ORDER — CARVEDILOL 25 MG/1
25 TABLET ORAL 2 TIMES DAILY WITH MEALS
Status: DISCONTINUED | OUTPATIENT
Start: 2020-03-20 | End: 2020-03-21

## 2020-03-19 RX ORDER — HYDROCODONE BITARTRATE AND ACETAMINOPHEN 5; 325 MG/1; MG/1
1 TABLET ORAL
Status: DISCONTINUED | OUTPATIENT
Start: 2020-03-19 | End: 2020-03-20 | Stop reason: SDUPTHER

## 2020-03-19 RX ORDER — SODIUM CHLORIDE 0.9 % (FLUSH) 0.9 %
5-40 SYRINGE (ML) INJECTION AS NEEDED
Status: DISCONTINUED | OUTPATIENT
Start: 2020-03-19 | End: 2020-03-23 | Stop reason: HOSPADM

## 2020-03-19 RX ORDER — ALPRAZOLAM 0.5 MG/1
0.5 TABLET ORAL
Status: DISCONTINUED | OUTPATIENT
Start: 2020-03-19 | End: 2020-03-23 | Stop reason: HOSPADM

## 2020-03-19 RX ADMIN — ENOXAPARIN SODIUM 40 MG: 40 INJECTION SUBCUTANEOUS at 21:00

## 2020-03-19 RX ADMIN — FUROSEMIDE 40 MG: 10 INJECTION, SOLUTION INTRAMUSCULAR; INTRAVENOUS at 17:17

## 2020-03-19 RX ADMIN — AZITHROMYCIN DIHYDRATE 500 MG: 500 INJECTION, POWDER, LYOPHILIZED, FOR SOLUTION INTRAVENOUS at 19:08

## 2020-03-19 RX ADMIN — ALPRAZOLAM 0.5 MG: 0.5 TABLET ORAL at 21:17

## 2020-03-19 RX ADMIN — SUCRALFATE 1 G: 1 TABLET ORAL at 21:16

## 2020-03-19 RX ADMIN — PROMETHAZINE HYDROCHLORIDE 25 MG: 25 TABLET ORAL at 22:09

## 2020-03-19 RX ADMIN — GABAPENTIN 300 MG: 300 CAPSULE ORAL at 21:17

## 2020-03-19 RX ADMIN — FUROSEMIDE 40 MG: 40 TABLET ORAL at 21:17

## 2020-03-19 RX ADMIN — CEFTRIAXONE 2 G: 2 INJECTION, POWDER, FOR SOLUTION INTRAMUSCULAR; INTRAVENOUS at 17:51

## 2020-03-19 RX ADMIN — Medication 10 ML: at 21:19

## 2020-03-19 RX ADMIN — PROMETHAZINE HYDROCHLORIDE 12.5 MG: 25 INJECTION INTRAMUSCULAR; INTRAVENOUS at 18:50

## 2020-03-19 NOTE — ROUTINE PROCESS
TRANSFER - OUT REPORT: 
 
Verbal report given to yudi(name) on Patrick Maroon III  being transferred to ccu(unit) for routine progression of care Report consisted of patients Situation, Background, Assessment and  
Recommendations(SBAR). Information from the following report(s) SBAR was reviewed with the receiving nurse. Lines:  
Peripheral IV 03/19/20 Right Hand (Active) Opportunity for questions and clarification was provided. Patient transported with: 
 Monitor

## 2020-03-19 NOTE — ED TRIAGE NOTES
Pt arrives to ED with c/o cough, sob, fever, chills. States started 3-4 days ago. Denies any known sick contacts. Denies OTC meds.  States hx of chf.
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
## 2020-03-20 LAB
ANION GAP SERPL CALC-SCNC: 11 MMOL/L (ref 7–16)
BASOPHILS # BLD: 0 K/UL (ref 0–0.2)
BASOPHILS NFR BLD: 1 % (ref 0–2)
BUN SERPL-MCNC: 7 MG/DL (ref 6–23)
C DIFF GDH STL QL: NORMAL
C DIFF TOX A+B STL QL IA: NORMAL
CALCIUM SERPL-MCNC: 8.2 MG/DL (ref 8.3–10.4)
CHLORIDE SERPL-SCNC: 105 MMOL/L (ref 98–107)
CLINICAL CONSIDERATION: NORMAL
CO2 SERPL-SCNC: 25 MMOL/L (ref 21–32)
CREAT SERPL-MCNC: 1.16 MG/DL (ref 0.8–1.5)
DIFFERENTIAL METHOD BLD: ABNORMAL
EOSINOPHIL # BLD: 0.1 K/UL (ref 0–0.8)
EOSINOPHIL NFR BLD: 3 % (ref 0.5–7.8)
ERYTHROCYTE [DISTWIDTH] IN BLOOD BY AUTOMATED COUNT: 15.5 % (ref 11.9–14.6)
GLUCOSE SERPL-MCNC: 100 MG/DL (ref 65–100)
HCT VFR BLD AUTO: 38.4 % (ref 41.1–50.3)
HGB BLD-MCNC: 12.8 G/DL (ref 13.6–17.2)
IMM GRANULOCYTES # BLD AUTO: 0 K/UL (ref 0–0.5)
IMM GRANULOCYTES NFR BLD AUTO: 0 % (ref 0–5)
INTERPRETATION: NORMAL
LACTATE SERPL-SCNC: 2.6 MMOL/L (ref 0.4–2)
LACTATE SERPL-SCNC: 2.8 MMOL/L (ref 0.4–2)
LYMPHOCYTES # BLD: 1.5 K/UL (ref 0.5–4.6)
LYMPHOCYTES NFR BLD: 29 % (ref 13–44)
MCH RBC QN AUTO: 32.2 PG (ref 26.1–32.9)
MCHC RBC AUTO-ENTMCNC: 33.3 G/DL (ref 31.4–35)
MCV RBC AUTO: 96.5 FL (ref 79.6–97.8)
MONOCYTES # BLD: 0.5 K/UL (ref 0.1–1.3)
MONOCYTES NFR BLD: 10 % (ref 4–12)
NEUTS SEG # BLD: 3.1 K/UL (ref 1.7–8.2)
NEUTS SEG NFR BLD: 58 % (ref 43–78)
NRBC # BLD: 0 K/UL (ref 0–0.2)
PCR REFLEX: NORMAL
PLATELET # BLD AUTO: 206 K/UL (ref 150–450)
PMV BLD AUTO: 9.5 FL (ref 9.4–12.3)
POTASSIUM SERPL-SCNC: 3.1 MMOL/L (ref 3.5–5.1)
RBC # BLD AUTO: 3.98 M/UL (ref 4.23–5.6)
SODIUM SERPL-SCNC: 141 MMOL/L (ref 136–145)
WBC # BLD AUTO: 5.3 K/UL (ref 4.3–11.1)

## 2020-03-20 PROCEDURE — 74011250637 HC RX REV CODE- 250/637: Performed by: INTERNAL MEDICINE

## 2020-03-20 PROCEDURE — 85025 COMPLETE CBC W/AUTO DIFF WBC: CPT

## 2020-03-20 PROCEDURE — 96372 THER/PROPH/DIAG INJ SC/IM: CPT

## 2020-03-20 PROCEDURE — 96376 TX/PRO/DX INJ SAME DRUG ADON: CPT

## 2020-03-20 PROCEDURE — 74011250636 HC RX REV CODE- 250/636: Performed by: INTERNAL MEDICINE

## 2020-03-20 PROCEDURE — 87045 FECES CULTURE AEROBIC BACT: CPT

## 2020-03-20 PROCEDURE — 74011000250 HC RX REV CODE- 250: Performed by: INTERNAL MEDICINE

## 2020-03-20 PROCEDURE — 99218 HC RM OBSERVATION: CPT

## 2020-03-20 PROCEDURE — 36415 COLL VENOUS BLD VENIPUNCTURE: CPT

## 2020-03-20 PROCEDURE — 74011250637 HC RX REV CODE- 250/637: Performed by: FAMILY MEDICINE

## 2020-03-20 PROCEDURE — 0107U C DIFF TOX AG DETCJ IA STOOL: CPT

## 2020-03-20 PROCEDURE — 74011250636 HC RX REV CODE- 250/636: Performed by: FAMILY MEDICINE

## 2020-03-20 PROCEDURE — 83605 ASSAY OF LACTIC ACID: CPT

## 2020-03-20 PROCEDURE — 80048 BASIC METABOLIC PNL TOTAL CA: CPT

## 2020-03-20 RX ORDER — LANOLIN ALCOHOL/MO/W.PET/CERES
400 CREAM (GRAM) TOPICAL DAILY
Status: DISCONTINUED | OUTPATIENT
Start: 2020-03-20 | End: 2020-03-23 | Stop reason: HOSPADM

## 2020-03-20 RX ORDER — HYDROCODONE BITARTRATE AND ACETAMINOPHEN 10; 325 MG/1; MG/1
1 TABLET ORAL
Status: DISCONTINUED | OUTPATIENT
Start: 2020-03-20 | End: 2020-03-23 | Stop reason: HOSPADM

## 2020-03-20 RX ADMIN — ALPRAZOLAM 0.5 MG: 0.5 TABLET ORAL at 21:43

## 2020-03-20 RX ADMIN — Medication 10 ML: at 13:14

## 2020-03-20 RX ADMIN — SUCRALFATE 1 G: 1 TABLET ORAL at 21:43

## 2020-03-20 RX ADMIN — Medication 5 ML: at 06:09

## 2020-03-20 RX ADMIN — SUCRALFATE 1 G: 1 TABLET ORAL at 09:19

## 2020-03-20 RX ADMIN — HYDROCODONE BITARTRATE AND ACETAMINOPHEN 1 TABLET: 10; 325 TABLET ORAL at 21:43

## 2020-03-20 RX ADMIN — FUROSEMIDE 40 MG: 40 TABLET ORAL at 09:18

## 2020-03-20 RX ADMIN — PROMETHAZINE HYDROCHLORIDE 12.5 MG: 25 INJECTION INTRAMUSCULAR; INTRAVENOUS at 21:46

## 2020-03-20 RX ADMIN — PROMETHAZINE HYDROCHLORIDE 12.5 MG: 25 INJECTION INTRAMUSCULAR; INTRAVENOUS at 13:05

## 2020-03-20 RX ADMIN — CARVEDILOL 25 MG: 25 TABLET, FILM COATED ORAL at 17:32

## 2020-03-20 RX ADMIN — LOSARTAN POTASSIUM 50 MG: 50 TABLET, FILM COATED ORAL at 09:18

## 2020-03-20 RX ADMIN — PROMETHAZINE HYDROCHLORIDE 12.5 MG: 25 INJECTION INTRAMUSCULAR; INTRAVENOUS at 17:37

## 2020-03-20 RX ADMIN — Medication 400 MG: at 21:43

## 2020-03-20 RX ADMIN — SUCRALFATE 1 G: 1 TABLET ORAL at 15:40

## 2020-03-20 RX ADMIN — GABAPENTIN 300 MG: 300 CAPSULE ORAL at 09:18

## 2020-03-20 RX ADMIN — HYDROCODONE BITARTRATE AND ACETAMINOPHEN 1 TABLET: 10; 325 TABLET ORAL at 13:13

## 2020-03-20 RX ADMIN — Medication 5 ML: at 21:50

## 2020-03-20 RX ADMIN — CARVEDILOL 25 MG: 25 TABLET, FILM COATED ORAL at 09:18

## 2020-03-20 RX ADMIN — FUROSEMIDE 40 MG: 40 TABLET ORAL at 17:32

## 2020-03-20 RX ADMIN — ENOXAPARIN SODIUM 40 MG: 40 INJECTION SUBCUTANEOUS at 21:49

## 2020-03-20 RX ADMIN — GABAPENTIN 300 MG: 300 CAPSULE ORAL at 17:32

## 2020-03-20 RX ADMIN — ATORVASTATIN CALCIUM 80 MG: 80 TABLET, FILM COATED ORAL at 09:18

## 2020-03-20 RX ADMIN — POTASSIUM BICARBONATE 20 MEQ: 782 TABLET, EFFERVESCENT ORAL at 21:43

## 2020-03-20 RX ADMIN — SUCRALFATE 1 G: 1 TABLET ORAL at 06:09

## 2020-03-20 NOTE — PROGRESS NOTES
SBAR received from [facility], [address] [place] [address]. Patient remains in stable condition with respirations even/unlabored. No acute distress noted at this time. Patient on room air. Droplet plus precautions intact. Call light remains within reach, patient encouraged to call nurse prn assist. Will continue to monitor per policy.

## 2020-03-20 NOTE — PROGRESS NOTES
Care Management Interventions  PCP Verified by CM: Yes  Physical Therapy Consult: No  Occupational Therapy Consult: No  Current Support Network: Lives with Spouse  Confirm Follow Up Transport: Family  Freedom of Choice List was Provided with Basic Dialogue that Supports the Patient's Individualized Plan of Care/Goals, Treatment Preferences and Shares the Quality Data Associated with the Providers?: Yes  [place] Resource Information Provided?: No  Discharge Location  Discharge Placement: Home  CM called patient's wife. Patient is independent with ambulation. Wife assists with ADLs. No home 02. History of HH. No history of rehab. CM following but not anticipating any discharge needs.

## 2020-03-20 NOTE — PROGRESS NOTES
Nutrition Assessment for:   Best Practice Alert for Malnutrition Screening Tool: Recently Lost Weight Without Trying: Yes, If Yes, How Much Weight Loss: >33 lbs, Eating Poorly Due to Decreased Appetite: Yes    Assessment: Patient presented with cough and fever and chills. He is being worked up for Rita. He has a PMH of CAD, CHF, etoh, GERD, HTN, defibrillator placement. Called and spoke with patient due to isolation. He states that he has lost 50-60# in undetermined time frame. He states that usual body weight is ~210-215#, but he cannot remember the last time he weighed this. He states that he occasionally weighs at home and he has continued to lose weight, but he is unsure of current weight. When asked if he had been weighed at an MD office recently, he says he thinks he weighed 160 something maybe week. When asking about diet recall prior to admission he states that he he does not eat meals, but does not provide any other information. He states that he does not know how much lunch he ate today and states that he does not like Ensure. He then asks that RD call at another time because he does not feel well. DIET CARDIAC Regular  DIET NUTRITIONAL SUPPLEMENTS All Meals; Ensure Enlive    Per nursing charting, 100% of am meal consumed. Anthropometrics:  Height: 5' 7\" (170.2 cm), Weight: 83.5 kg (184 lb), Weight Source: Patient stated, Body mass index is 28.82 kg/m². BMI class of overweight. Patient was last assessed by RD in December of 2018 with reported weight loss. Weight at that time was 145#, and did note 19# loss in ~6 months. Recent outpatient office weight 3/11/2020 patient weighed 184#. It appears patient has gained weight. Macronutrient needs: (using Listed body weight 83.5 kg)  EER: 9835-7339 kcal/day (20-25 kcal/kg)  EPR:  g/day (20% of kcals)    Nutrition Diagnosis:  No nutrition diagnosis at this time.     Nutrition Intervention:  Meals and snacks: Continue current diet  Medical food supplement therapy: Discontinue Ensure Enlive - patient does not drink them  Discharge Nutrition Plan: No discharge needs at this time.      [address] [facility] [address] [place] [address], [address], [address]

## 2020-03-20 NOTE — PROGRESS NOTES
SPEECH PATHOLOGY NOTE:    Speech therapy consult received and appreciated. Consulted hospitalist, Dr. Ana Paula Ortiz, who ordered evaluation. Per hospitalist,  bedside swallowing assessment is not essential at this time and should be held until it is confirmed patient is negative for COVID. Swab currently pending. Dr. Ana Paula Ortiz recommends patient follow up for swallowing assessment as an outpatient if discharged before seen by speech therapy.          Eamon Parker MS, CCC-SLP

## 2020-03-20 NOTE — PROGRESS NOTES
Hospitalist Note     Admit Date:  3/19/2020  4:03 PM   Name:  Princess Houston III   Age:  54 y.o.  :  1965   MRN:  [id number]   PCP:  Jh James MD  Treatment Team: Attending Provider: Chalino Thompson MD; Utilization Review: Gabe Lara RN; Care Manager: Son Short    HPI/Subjective:   Patient is a 51yo M with hx nonischemic cardiomyopathy, with severe HFrEF and ICD who presents with 4 to 5 days cough, fever/chills, and worsening dyspnea. No leg swelling. Has been taking diuretics as prescribed. Orthopnea worse than baseline. Cough nonproductive. Very dyspneic when walking. Found in the ER to have RR up to 30, coughing fits. Unable to finish sentences. Mildly elevated trop (chronic), mildly elevated pBNP, mildly elevated lactic acid. Not currently febrile or hypoxic. CXR clear. Received lasix in ER, feeling slightly better    3/20: with cough persistent  States having diarrhea since Yd, stool studies not yet sent  He is asking for Lortab, he wants to go home soon? No other complaints  Objective:     Patient Vitals for the past 24 hrs:   Temp Pulse Resp BP SpO2   20 1052 97.4 °F (36.3 °C) (!) 110 19 107/78 95 %   20 0748 97.4 °F (36.3 °C) (!) 58 19 132/77 97 %   20 0314 97.6 °F (36.4 °C) 89 18 114/85 97 %   20 0002 97.9 °F (36.6 °C) 98 20 126/78 99 %   20 2139 97.6 °F (36.4 °C) (!) 102 22 134/89 97 %   20 1726  90 30  98 %   20 1725  99 16  99 %   20 1717  (!) 104      20 1714  (!) 110 28     20 1613  98 18 (!) 147/93 97 %   20 1607 98.7 °F (37.1 °C) (!) 105 23 (!) 157/122 97 %     Oxygen Therapy  O2 Sat (%): 95 % (20 1052)  Pulse via Oximetry: 88 beats per minute (20 1726)  O2 Device: Room air (20 0805)    Estimated body mass index is 28.82 kg/m² as calculated from the following:    Height as of this encounter: 5' 7\" (1.702 m).     Weight as of this encounter: 83.5 kg (184 lb).      Intake/Output Summary (Last 24 hours) at 3/20/2020 1104  Last data filed at 3/20/2020 1005  Gross per 24 hour   Intake 240 ml   Output 900 ml   Net -660 ml       *Note that automatically entered I/Os may not be accurate; dependent on patient compliance with collection and accurate  by techs. General:          Uncomfortable from respiratory distress  Head:               Normocephalic, without obvious abnormality, atraumatic. Nose:               Nares normal. No drainage or sinus tenderness. Lungs: Tachypnea and conversational dyspnea. No crackles or wheeze  Heart:              Tachy, regular. Abdomen:        Soft, non-tender. Not distended. Bowel sounds normal.   Extremities:     No cyanosis. No edema. No clubbing  Skin:                Texture, turgor normal. No rashes or lesions.   Not Jaundiced  Neurologic:      Alert and oriented x 3, no focal deficits  Data Review:  I have reviewed all labs, meds, and studies from the last 24 hours:    Recent Results (from the past 24 hour(s))   EKG, 12 LEAD, INITIAL    Collection Time: 03/19/20  4:09 PM   Result Value Ref Range    Ventricular Rate 107 BPM    Atrial Rate 107 BPM    P-R Interval 122 ms    QRS Duration 98 ms    Q-T Interval 312 ms    QTC Calculation (Bezet) 416 ms    Calculated P Axis 52 degrees    Calculated R Axis 2 degrees    Calculated T Axis -113 degrees    Diagnosis       Sinus tachycardia  Left ventricular hypertrophy with repolarization abnormality  Non-specific ST-t wave changes  Abnormal ECG  When compared with ECG of 16-DEC-2019 22:45,  ST more depressed Lateral leads  Confirmed by Chewelah De La Paz ([id number]) on 3/19/2020 0:51:32 PM     METABOLIC PANEL, COMPREHENSIVE    Collection Time: 03/19/20  4:14 PM   Result Value Ref Range    Sodium 139 136 - 145 mmol/L    Potassium 3.5 3.5 - 5.1 mmol/L    Chloride 105 98 - 107 mmol/L    CO2 21 21 - 32 mmol/L    Anion gap 13 7 - 16 mmol/L    Glucose 100 65 - 100 mg/dL    BUN 9 6 - 23 MG/DL    Creatinine 1.19 0.8 - 1.5 MG/DL    GFR est AA >60 >60 ml/min/1.73m2    GFR est non-AA >60 >60 ml/min/1.73m2    Calcium 8.8 8.3 - 10.4 MG/DL    Bilirubin, total 0.5 0.2 - 1.1 MG/DL    ALT (SGPT) 36 12 - 65 U/L    AST (SGOT) 39 (H) 15 - 37 U/L    Alk. phosphatase 62 50 - 136 U/L    Protein, total 7.3 6.3 - 8.2 g/dL    Albumin 4.2 3.5 - 5.0 g/dL    Globulin 3.1 2.3 - 3.5 g/dL    A-G Ratio 1.4 1.2 - 3.5     CBC WITH AUTOMATED DIFF    Collection Time: 03/19/20  4:14 PM   Result Value Ref Range    WBC 6.0 4.3 - 11.1 K/uL    RBC 4.15 (L) 4.23 - 5.6 M/uL    HGB 13.1 (L) 13.6 - 17.2 g/dL    HCT 39.8 (L) 41.1 - 50.3 %    MCV 95.9 79.6 - 97.8 FL    MCH 31.6 26.1 - 32.9 PG    MCHC 32.9 31.4 - 35.0 g/dL    RDW 15.3 (H) 11.9 - 14.6 %    PLATELET 605 153 - 916 K/uL    MPV 9.4 9.4 - 12.3 FL    ABSOLUTE NRBC 0.00 0.0 - 0.2 K/uL    DF AUTOMATED      NEUTROPHILS 62 43 - 78 %    LYMPHOCYTES 25 13 - 44 %    MONOCYTES 10 4.0 - 12.0 %    EOSINOPHILS 2 0.5 - 7.8 %    BASOPHILS 1 0.0 - 2.0 %    IMMATURE GRANULOCYTES 0 0.0 - 5.0 %    ABS. NEUTROPHILS 3.7 1.7 - 8.2 K/UL    ABS. LYMPHOCYTES 1.5 0.5 - 4.6 K/UL    ABS. MONOCYTES 0.6 0.1 - 1.3 K/UL    ABS. EOSINOPHILS 0.1 0.0 - 0.8 K/UL    ABS. BASOPHILS 0.0 0.0 - 0.2 K/UL    ABS. IMM.  GRANS. 0.0 0.0 - 0.5 K/UL   TROPONIN I    Collection Time: 03/19/20  4:14 PM   Result Value Ref Range    Troponin-I, Qt. 0.11 (HH) 0.02 - 0.05 NG/ML   NT-PRO BNP    Collection Time: 03/19/20  4:14 PM   Result Value Ref Range    NT pro-BNP 1,479 (H) 5 - 125 PG/ML   D DIMER    Collection Time: 03/19/20  4:14 PM   Result Value Ref Range    D DIMER 0.34 <0.56 ug/ml(FEU)   LACTIC ACID    Collection Time: 03/19/20  4:26 PM   Result Value Ref Range    Lactic acid 2.2 (HH) 0.4 - 2.0 MMOL/L   CULTURE, BLOOD    Collection Time: 03/19/20  5:44 PM   Result Value Ref Range    Special Requests: LEFT  HAND        Culture result: NO GROWTH AFTER 13 HOURS     CULTURE, BLOOD    Collection Time: 03/19/20  5:54 PM   Result Value Ref Range    Special Requests: LEFT  ARM        Culture result: NO GROWTH AFTER 13 HOURS     LACTIC ACID    Collection Time: 03/20/20 12:39 AM   Result Value Ref Range    Lactic acid 2.6 (HH) 0.4 - 2.0 MMOL/L   CBC WITH AUTOMATED DIFF    Collection Time: 03/20/20 12:39 AM   Result Value Ref Range    WBC 5.3 4.3 - 11.1 K/uL    RBC 3.98 (L) 4.23 - 5.6 M/uL    HGB 12.8 (L) 13.6 - 17.2 g/dL    HCT 38.4 (L) 41.1 - 50.3 %    MCV 96.5 79.6 - 97.8 FL    MCH 32.2 26.1 - 32.9 PG    MCHC 33.3 31.4 - 35.0 g/dL    RDW 15.5 (H) 11.9 - 14.6 %    PLATELET 972 568 - 250 K/uL    MPV 9.5 9.4 - 12.3 FL    ABSOLUTE NRBC 0.00 0.0 - 0.2 K/uL    DF AUTOMATED      NEUTROPHILS 58 43 - 78 %    LYMPHOCYTES 29 13 - 44 %    MONOCYTES 10 4.0 - 12.0 %    EOSINOPHILS 3 0.5 - 7.8 %    BASOPHILS 1 0.0 - 2.0 %    IMMATURE GRANULOCYTES 0 0.0 - 5.0 %    ABS. NEUTROPHILS 3.1 1.7 - 8.2 K/UL    ABS. LYMPHOCYTES 1.5 0.5 - 4.6 K/UL    ABS. MONOCYTES 0.5 0.1 - 1.3 K/UL    ABS. EOSINOPHILS 0.1 0.0 - 0.8 K/UL    ABS. BASOPHILS 0.0 0.0 - 0.2 K/UL    ABS. IMM.  GRANS. 0.0 0.0 - 0.5 K/UL   METABOLIC PANEL, BASIC    Collection Time: 03/20/20 12:39 AM   Result Value Ref Range    Sodium 141 136 - 145 mmol/L    Potassium 3.1 (L) 3.5 - 5.1 mmol/L    Chloride 105 98 - 107 mmol/L    CO2 25 21 - 32 mmol/L    Anion gap 11 7 - 16 mmol/L    Glucose 100 65 - 100 mg/dL    BUN 7 6 - 23 MG/DL    Creatinine 1.16 0.8 - 1.5 MG/DL    GFR est AA >60 >60 ml/min/1.73m2    GFR est non-AA >60 >60 ml/min/1.73m2    Calcium 8.2 (L) 8.3 - 10.4 MG/DL   LACTIC ACID    Collection Time: 03/20/20  5:14 AM   Result Value Ref Range    Lactic acid 2.8 (HH) 0.4 - 2.0 MMOL/L        All Micro Results     Procedure Component Value Units Date/Time    CULTURE, STOOL [[id number]]     Order Status:  Sent Specimen:  Stool     C. DIFFICILE AG & TOXIN A/B [[id number]]     Order Status:  Sent Specimen:  Stool     CULTURE, BLOOD [[id number]] Collected:  03/19/20 9675    Order Status:  Completed Specimen: Blood Updated:  03/20/20 0832     Special Requests: --        LEFT  HAND       Culture result: NO GROWTH AFTER 13 HOURS       CULTURE, BLOOD [[id number]] Collected:  03/19/20 1754    Order Status:  Completed Specimen:  Blood Updated:  03/20/20 0832     Special Requests: --        LEFT  ARM       Culture result: NO GROWTH AFTER 13 HOURS       EMERGENT DISEASE PANEL [[id number]] Collected:  03/19/20 1837    Order Status:  Completed Updated:  03/19/20 1903          Current Meds:  Current Facility-Administered Medications   Medication Dose Route Frequency    ALPRAZolam (XANAX) tablet 0.5 mg  0.5 mg Oral QHS    atorvastatin (LIPITOR) tablet 80 mg  80 mg Oral DAILY    carvediloL (COREG) tablet 25 mg  25 mg Oral BID WITH MEALS    eplerenone (INSPRA) tablet 25 mg (Patient Supplied)  25 mg Oral DAILY    furosemide (LASIX) tablet 40 mg  40 mg Oral BID    gabapentin (NEURONTIN) capsule 300 mg  300 mg Oral BID    losartan (COZAAR) tablet 50 mg  50 mg Oral DAILY    promethazine (PHENERGAN) tablet 25 mg  25 mg Oral Q6H PRN    sucralfate (CARAFATE) tablet 1 g  1 g Oral AC&HS    sodium chloride (NS) flush 5-40 mL  5-40 mL IntraVENous Q8H    sodium chloride (NS) flush 5-40 mL  5-40 mL IntraVENous PRN    acetaminophen (TYLENOL) tablet 650 mg  650 mg Oral Q4H PRN    HYDROcodone-acetaminophen (NORCO) 5-325 mg per tablet 1 Tab  1 Tab Oral Q4H PRN    bisacodyL (DULCOLAX) tablet 5 mg  5 mg Oral DAILY PRN    enoxaparin (LOVENOX) injection 40 mg  40 mg SubCUTAneous Q24H       Other Studies:  No results found for this visit on 03/19/20. Xr Chest Port    Result Date: 3/19/2020  Portable chest: History: Cough and shortness of breath x2 days. Comparison: 12/16/2019 Findings: A single view of the chest was obtained at 1622 hours. Dual-chamber cardiac pacer/AICD device is present. Retrocardiac opacity corresponds to a known moderate-sized hiatal hernia. The cardiac and mediastinal silhouette are normal in size and configuration. The lungs and pleural spaces are clear. The pulmonary vascularity is within normal limits. Impression: Unremarkable portable chest radiograph for acute pulmonary process. Assessment and Plan:     Hospital Problems as of 3/20/2020 Date Reviewed: 12/6/2019          Codes Class Noted - Resolved POA    Respiratory distress ICD-10-CM: R06.03  ICD-9-CM: 786.09  3/19/2020 - Present Yes        Non-ischemic cardiomyopathy ([address] (Chronic) ICD-10-CM: I42.8  ICD-9-CM: 425.4  3/24/2016 - Present Yes        Chronic systolic (congestive) heart failure (HCC) (Chronic) ICD-10-CM: I50.22  ICD-9-CM: 428.22, 428.0  4/21/2011 - Present Yes            47years old AAM with hx of advance CHF with EF 15% s/p AICD, non ischemic cardiomyopathy, HTN, chronic nausea/vomiting, admitted on 3/20 after worsening cough, subjective fever, dyspnea with speaking, concern for sepsis with elevated LA.        #cough, SOB, [place]-unclear etiology  -may bee CHF given lack of fever, xr negative but orthopnea is worse apparently; reason for LA unclear  -did however have couging issues in past related to dysphagia and required dilation for Schatzkis ring  -c/w diuresis and get ST eval  -c/w diuresis  -he is a r/o COVID  -monitor infection off antibiotics  -strict I/Os    #abd pain and diarrhea-check c diff and stool studies    #chronic/prior: esophageal dysphagia, Schatzki's ring, SVT, VT, GERD, HTN, alcoholism, depression        Diet:  DIET CARDIAC  DIET NUTRITIONAL SUPPLEMENTS  DVT ppx:      Signed:  Smita Duffy MD

## 2020-03-20 NOTE — PROGRESS NOTES
TRANSFER - IN REPORT:    Verbal report received from Gustav Lesch, RN on Jeana Blowers III  being received from ED for routine progression of care      Report consisted of patients Situation, Background, Assessment and   Recommendations(SBAR). Information from the following report(s) SBAR, Kardex and ED Summary was reviewed with the receiving nurse. Opportunity for questions and clarification was provided. Assessment completed upon patients arrival to unit and care assumed.

## 2020-03-21 LAB
ALBUMIN SERPL-MCNC: 3.8 G/DL (ref 3.5–5)
ALBUMIN/GLOB SERPL: 1.5 {RATIO} (ref 1.2–3.5)
ALP SERPL-CCNC: 57 U/L (ref 50–136)
ALT SERPL-CCNC: 27 U/L (ref 12–65)
AMYLASE SERPL-CCNC: 76 U/L (ref 25–115)
ANION GAP SERPL CALC-SCNC: 5 MMOL/L (ref 7–16)
AST SERPL-CCNC: 22 U/L (ref 15–37)
BASOPHILS # BLD: 0 K/UL (ref 0–0.2)
BASOPHILS NFR BLD: 1 % (ref 0–2)
BILIRUB SERPL-MCNC: 0.4 MG/DL (ref 0.2–1.1)
BUN SERPL-MCNC: 11 MG/DL (ref 6–23)
CALCIUM SERPL-MCNC: 8.7 MG/DL (ref 8.3–10.4)
CHLORIDE SERPL-SCNC: 106 MMOL/L (ref 98–107)
CO2 SERPL-SCNC: 29 MMOL/L (ref 21–32)
CREAT SERPL-MCNC: 1.08 MG/DL (ref 0.8–1.5)
DIFFERENTIAL METHOD BLD: ABNORMAL
EOSINOPHIL # BLD: 0.2 K/UL (ref 0–0.8)
EOSINOPHIL NFR BLD: 4 % (ref 0.5–7.8)
ERYTHROCYTE [DISTWIDTH] IN BLOOD BY AUTOMATED COUNT: 15.5 % (ref 11.9–14.6)
GLOBULIN SER CALC-MCNC: 2.6 G/DL (ref 2.3–3.5)
GLUCOSE SERPL-MCNC: 102 MG/DL (ref 65–100)
HCT VFR BLD AUTO: 39.1 % (ref 41.1–50.3)
HGB BLD-MCNC: 12.5 G/DL (ref 13.6–17.2)
IMM GRANULOCYTES # BLD AUTO: 0 K/UL (ref 0–0.5)
IMM GRANULOCYTES NFR BLD AUTO: 0 % (ref 0–5)
LACTATE SERPL-SCNC: 1.6 MMOL/L (ref 0.4–2)
LIPASE SERPL-CCNC: 172 U/L (ref 73–393)
LYMPHOCYTES # BLD: 1.3 K/UL (ref 0.5–4.6)
LYMPHOCYTES NFR BLD: 29 % (ref 13–44)
MCH RBC QN AUTO: 31.6 PG (ref 26.1–32.9)
MCHC RBC AUTO-ENTMCNC: 32 G/DL (ref 31.4–35)
MCV RBC AUTO: 99 FL (ref 79.6–97.8)
MONOCYTES # BLD: 0.4 K/UL (ref 0.1–1.3)
MONOCYTES NFR BLD: 9 % (ref 4–12)
NEUTS SEG # BLD: 2.6 K/UL (ref 1.7–8.2)
NEUTS SEG NFR BLD: 56 % (ref 43–78)
NRBC # BLD: 0 K/UL (ref 0–0.2)
PLATELET # BLD AUTO: 196 K/UL (ref 150–450)
PMV BLD AUTO: 9.6 FL (ref 9.4–12.3)
POTASSIUM SERPL-SCNC: 3.9 MMOL/L (ref 3.5–5.1)
PROT SERPL-MCNC: 6.4 G/DL (ref 6.3–8.2)
RBC # BLD AUTO: 3.95 M/UL (ref 4.23–5.6)
SODIUM SERPL-SCNC: 140 MMOL/L (ref 136–145)
WBC # BLD AUTO: 4.6 K/UL (ref 4.3–11.1)

## 2020-03-21 PROCEDURE — 74011250636 HC RX REV CODE- 250/636: Performed by: INTERNAL MEDICINE

## 2020-03-21 PROCEDURE — 74011250637 HC RX REV CODE- 250/637: Performed by: INTERNAL MEDICINE

## 2020-03-21 PROCEDURE — 96372 THER/PROPH/DIAG INJ SC/IM: CPT

## 2020-03-21 PROCEDURE — P9047 ALBUMIN (HUMAN), 25%, 50ML: HCPCS | Performed by: INTERNAL MEDICINE

## 2020-03-21 PROCEDURE — 96376 TX/PRO/DX INJ SAME DRUG ADON: CPT

## 2020-03-21 PROCEDURE — 80053 COMPREHEN METABOLIC PANEL: CPT

## 2020-03-21 PROCEDURE — 85025 COMPLETE CBC W/AUTO DIFF WBC: CPT

## 2020-03-21 PROCEDURE — 74011000250 HC RX REV CODE- 250: Performed by: INTERNAL MEDICINE

## 2020-03-21 PROCEDURE — 74011250637 HC RX REV CODE- 250/637: Performed by: FAMILY MEDICINE

## 2020-03-21 PROCEDURE — 99218 HC RM OBSERVATION: CPT

## 2020-03-21 PROCEDURE — 74011250636 HC RX REV CODE- 250/636: Performed by: FAMILY MEDICINE

## 2020-03-21 PROCEDURE — 96375 TX/PRO/DX INJ NEW DRUG ADDON: CPT

## 2020-03-21 PROCEDURE — 83605 ASSAY OF LACTIC ACID: CPT

## 2020-03-21 PROCEDURE — 82150 ASSAY OF AMYLASE: CPT

## 2020-03-21 PROCEDURE — 36415 COLL VENOUS BLD VENIPUNCTURE: CPT

## 2020-03-21 PROCEDURE — 83690 ASSAY OF LIPASE: CPT

## 2020-03-21 RX ORDER — TORSEMIDE 20 MG/1
20 TABLET ORAL 2 TIMES DAILY
Status: DISCONTINUED | OUTPATIENT
Start: 2020-03-21 | End: 2020-03-22

## 2020-03-21 RX ORDER — MORPHINE SULFATE 2 MG/ML
1 INJECTION, SOLUTION INTRAMUSCULAR; INTRAVENOUS
Status: DISCONTINUED | OUTPATIENT
Start: 2020-03-21 | End: 2020-03-23 | Stop reason: HOSPADM

## 2020-03-21 RX ORDER — CYCLOBENZAPRINE HCL 10 MG
5 TABLET ORAL
Status: ACTIVE | OUTPATIENT
Start: 2020-03-21 | End: 2020-03-22

## 2020-03-21 RX ORDER — ALBUMIN HUMAN 250 G/1000ML
25 SOLUTION INTRAVENOUS EVERY 12 HOURS
Status: DISCONTINUED | OUTPATIENT
Start: 2020-03-21 | End: 2020-03-23 | Stop reason: HOSPADM

## 2020-03-21 RX ADMIN — ALPRAZOLAM 0.5 MG: 0.5 TABLET ORAL at 21:23

## 2020-03-21 RX ADMIN — SUCRALFATE 1 G: 1 TABLET ORAL at 16:30

## 2020-03-21 RX ADMIN — LOSARTAN POTASSIUM 50 MG: 50 TABLET, FILM COATED ORAL at 08:24

## 2020-03-21 RX ADMIN — MORPHINE SULFATE 1 MG: 2 INJECTION, SOLUTION INTRAMUSCULAR; INTRAVENOUS at 18:22

## 2020-03-21 RX ADMIN — ALBUMIN (HUMAN) 25 G: 0.25 INJECTION, SOLUTION INTRAVENOUS at 12:56

## 2020-03-21 RX ADMIN — Medication 400 MG: at 08:24

## 2020-03-21 RX ADMIN — SUCRALFATE 1 G: 1 TABLET ORAL at 06:02

## 2020-03-21 RX ADMIN — Medication 10 ML: at 21:24

## 2020-03-21 RX ADMIN — SUCRALFATE 1 G: 1 TABLET ORAL at 10:37

## 2020-03-21 RX ADMIN — GABAPENTIN 300 MG: 300 CAPSULE ORAL at 08:24

## 2020-03-21 RX ADMIN — SUCRALFATE 1 G: 1 TABLET ORAL at 21:23

## 2020-03-21 RX ADMIN — HYDROCODONE BITARTRATE AND ACETAMINOPHEN 1 TABLET: 10; 325 TABLET ORAL at 04:36

## 2020-03-21 RX ADMIN — MORPHINE SULFATE 1 MG: 2 INJECTION, SOLUTION INTRAMUSCULAR; INTRAVENOUS at 22:26

## 2020-03-21 RX ADMIN — Medication 5 ML: at 13:17

## 2020-03-21 RX ADMIN — PROMETHAZINE HYDROCHLORIDE 12.5 MG: 25 INJECTION INTRAMUSCULAR; INTRAVENOUS at 22:39

## 2020-03-21 RX ADMIN — PROMETHAZINE HYDROCHLORIDE 12.5 MG: 25 INJECTION INTRAMUSCULAR; INTRAVENOUS at 10:36

## 2020-03-21 RX ADMIN — TORSEMIDE 20 MG: 20 TABLET ORAL at 12:56

## 2020-03-21 RX ADMIN — Medication 5 ML: at 06:10

## 2020-03-21 RX ADMIN — MORPHINE SULFATE 1 MG: 2 INJECTION, SOLUTION INTRAMUSCULAR; INTRAVENOUS at 12:56

## 2020-03-21 RX ADMIN — POTASSIUM BICARBONATE 20 MEQ: 782 TABLET, EFFERVESCENT ORAL at 08:24

## 2020-03-21 RX ADMIN — TORSEMIDE 20 MG: 20 TABLET ORAL at 17:11

## 2020-03-21 RX ADMIN — PROMETHAZINE HYDROCHLORIDE 12.5 MG: 25 INJECTION INTRAMUSCULAR; INTRAVENOUS at 18:29

## 2020-03-21 RX ADMIN — HYDROCODONE BITARTRATE AND ACETAMINOPHEN 1 TABLET: 10; 325 TABLET ORAL at 23:37

## 2020-03-21 RX ADMIN — HYDROCODONE BITARTRATE AND ACETAMINOPHEN 1 TABLET: 10; 325 TABLET ORAL at 10:37

## 2020-03-21 RX ADMIN — PROMETHAZINE HYDROCHLORIDE 12.5 MG: 25 INJECTION INTRAMUSCULAR; INTRAVENOUS at 04:36

## 2020-03-21 RX ADMIN — GABAPENTIN 300 MG: 300 CAPSULE ORAL at 17:11

## 2020-03-21 RX ADMIN — ALBUMIN (HUMAN) 25 G: 0.25 INJECTION, SOLUTION INTRAVENOUS at 21:25

## 2020-03-21 RX ADMIN — PROMETHAZINE HYDROCHLORIDE 12.5 MG: 25 INJECTION INTRAMUSCULAR; INTRAVENOUS at 14:16

## 2020-03-21 RX ADMIN — FUROSEMIDE 40 MG: 40 TABLET ORAL at 08:24

## 2020-03-21 RX ADMIN — HYDROCODONE BITARTRATE AND ACETAMINOPHEN 1 TABLET: 10; 325 TABLET ORAL at 17:11

## 2020-03-21 RX ADMIN — ENOXAPARIN SODIUM 40 MG: 40 INJECTION SUBCUTANEOUS at 21:23

## 2020-03-21 RX ADMIN — ATORVASTATIN CALCIUM 80 MG: 80 TABLET, FILM COATED ORAL at 08:24

## 2020-03-21 NOTE — PROGRESS NOTES
Received bedside shift report from Bethanie Mendoza RN. Pt lying in bed. No apparent distress. Respirations even and unlabored. Instructed to call for assistance with needs, as they arise. Pt voiced understanding.

## 2020-03-21 NOTE — CONSULTS
I saw and examined the patient. Consult dictated. DNF#:[id number]. Chronic HFrEF appears stable on current GDMT. I do not have much to add. Will be on standby.

## 2020-03-21 NOTE — PROGRESS NOTES
SPEECH PATHOLOGY NOTE:    Followed up with patient's RN this AM regarding swallow function. No s/sx of oropharyngeal dysphagia has been witnessed or reported. Dysphagia assessment is not deemed urgent at this time. Will continue to follow with plans to initiate assessment once Covid-19 testing results have returned. Speech will remain available in the event that urgent assessment is indicated at later time this admission.      Jaiden Ansari, [facility], [facility] ANGELA CARRIZALES, CCC-SLP

## 2020-03-21 NOTE — PROGRESS NOTES
Patient resting in bed, alert and oriented, cooperative with care,negative pressure intact,  patient denies pain or distress, safety measures in place, call light within reach.

## 2020-03-21 NOTE — PROGRESS NOTES
Hospitalist Note Admit Date:  3/19/2020  4:03 PM  
Name:  Silva Nyhan Age:  54 y.o. 
:  1965 MRN:  [id number] PCP:  Refugio Keita MD 
Treatment Team: Attending Provider: Estrellita Chapman MD; Utilization Review: Marco Antonio Louis RN; Care Manager: Jean Nielson.; Utilization Review: Gutierrez Vo RN 
 
HPI/Subjective:  
Patient is a 51yo M with hx nonischemic cardiomyopathy, with severe HFrEF and ICD who presents with 4 to 5 days cough, fever/chills, and worsening dyspnea. No leg swelling. Has been taking diuretics as prescribed. Orthopnea worse than baseline. Cough nonproductive. Very dyspneic when walking. Found in the ER to have RR up to 30, coughing fits. Unable to finish sentences. Mildly elevated trop (chronic), mildly elevated pBNP, mildly elevated lactic acid. Not currently febrile or hypoxic. CXR clear. Received lasix in ER, feeling slightly better 3/21: with cough persistent; thinks SOB worse Having abd pain 
c diff negative NO FEVER 
+810cc No other complaints Objective:  
 
Patient Vitals for the past 24 hrs: 
 Temp Pulse Resp BP SpO2  
20 0807 98 °F (36.7 °C) 82 18 117/69 96 % 20 0348 97.9 °F (36.6 °C) 85 18 104/68 96 % 20 2345 97.5 °F (36.4 °C) 92 19 90/69 93 % 20 97.7 °F (36.5 °C) 100 19 95/67 98 % 20 1455 98 °F (36.7 °C) (!) 104 19 99/65 95 % Oxygen Therapy O2 Sat (%): 96 % (20 0807) Pulse via Oximetry: 88 beats per minute (20 1726) O2 Device: Room air (20 1540) Estimated body mass index is 28.85 kg/m² as calculated from the following: 
  Height as of this encounter: 5' 7\" (1.702 m). Weight as of this encounter: 83.6 kg (184 lb 3.2 oz). Intake/Output Summary (Last 24 hours) at 3/21/2020 1124 Last data filed at 3/21/2020 2172 Gross per 24 hour Intake 1270 ml Output 700 ml Net 570 ml *Note that automatically entered I/Os may not be accurate; dependent on patient compliance with collection and accurate  by sellpoints. General:          Uncomfortable from respiratory distress Head:               Normocephalic, without obvious abnormality, atraumatic. Nose:               Nares normal. No drainage or sinus tenderness. Lungs: Tachypnea and conversational dyspnea. No crackles or wheeze Heart:              Tachy, regular. Abdomen:        Soft, non-tender. Not distended. Bowel sounds normal.  
Extremities:     No cyanosis. No edema. No clubbing Skin:                Texture, turgor normal. No rashes or lesions. Not Jaundiced Neurologic:      Alert and oriented x 3, no focal deficits Data Review: 
I have reviewed all labs, meds, and studies from the last 24 hours: 
 
Recent Results (from the past 24 hour(s)) LACTIC ACID Collection Time: 03/21/20  6:21 AM  
Result Value Ref Range Lactic acid 1.6 0.4 - 2.0 MMOL/L  
CBC WITH AUTOMATED DIFF Collection Time: 03/21/20  9:08 AM  
Result Value Ref Range WBC 4.6 4.3 - 11.1 K/uL  
 RBC 3.95 (L) 4.23 - 5.6 M/uL  
 HGB 12.5 (L) 13.6 - 17.2 g/dL HCT 39.1 (L) 41.1 - 50.3 % MCV 99.0 (H) 79.6 - 97.8 FL  
 MCH 31.6 26.1 - 32.9 PG  
 MCHC 32.0 31.4 - 35.0 g/dL  
 RDW 15.5 (H) 11.9 - 14.6 % PLATELET 462 394 - 144 K/uL MPV 9.6 9.4 - 12.3 FL ABSOLUTE NRBC 0.00 0.0 - 0.2 K/uL  
 DF AUTOMATED NEUTROPHILS 56 43 - 78 % LYMPHOCYTES 29 13 - 44 % MONOCYTES 9 4.0 - 12.0 % EOSINOPHILS 4 0.5 - 7.8 % BASOPHILS 1 0.0 - 2.0 % IMMATURE GRANULOCYTES 0 0.0 - 5.0 %  
 ABS. NEUTROPHILS 2.6 1.7 - 8.2 K/UL  
 ABS. LYMPHOCYTES 1.3 0.5 - 4.6 K/UL  
 ABS. MONOCYTES 0.4 0.1 - 1.3 K/UL  
 ABS. EOSINOPHILS 0.2 0.0 - 0.8 K/UL  
 ABS. BASOPHILS 0.0 0.0 - 0.2 K/UL  
 ABS. IMM. GRANS. 0.0 0.0 - 0.5 K/UL METABOLIC PANEL, COMPREHENSIVE Collection Time: 03/21/20  9:08 AM  
Result Value Ref Range Sodium 140 136 - 145 mmol/L Potassium 3.9 3.5 - 5.1 mmol/L  Chloride 106 98 - 107 mmol/L  
 CO2 29 21 - 32 mmol/L Anion gap 5 (L) 7 - 16 mmol/L Glucose 102 (H) 65 - 100 mg/dL BUN 11 6 - 23 MG/DL Creatinine 1.08 0.8 - 1.5 MG/DL  
 GFR est AA >60 >60 ml/min/1.73m2 GFR est non-AA >60 >60 ml/min/1.73m2 Calcium 8.7 8.3 - 10.4 MG/DL Bilirubin, total 0.4 0.2 - 1.1 MG/DL  
 ALT (SGPT) 27 12 - 65 U/L  
 AST (SGOT) 22 15 - 37 U/L Alk. phosphatase 57 50 - 136 U/L Protein, total 6.4 6.3 - 8.2 g/dL Albumin 3.8 3.5 - 5.0 g/dL Globulin 2.6 2.3 - 3.5 g/dL A-G Ratio 1.5 1.2 - 3.5 All Micro Results Procedure Component Value Units Date/Time Lennox Neighbor [[id number]] Collected:  03/20/20 1106 Order Status:  Completed Specimen:  Stool Updated:  03/21/20 6443 Special Requests: NO SPECIAL REQUESTS Culture result:    
  NO GROWTH OF SALMONELLA OR SHIGELLA IS EVIDENT ON FIRST READING, FINAL REPORT TO FOLLOW AFTER FURTHER INCUBATION OF CULTURE  
     
 CULTURE, BLOOD [[id number]] Collected:  03/19/20 1744 Order Status:  Completed Specimen:  Blood Updated:  03/21/20 6638 Special Requests: --     
  LEFT 
HAND Culture result: NO GROWTH 2 DAYS     
 CULTURE, BLOOD [[id number]] Collected:  03/19/20 0954 Order Status:  Completed Specimen:  Blood Updated:  03/21/20 9585 Special Requests: --     
  LEFT 
ARM Culture result: NO GROWTH 2 DAYS C. DIFFICILE AG & TOXIN A/B [[id number]] Collected:  03/20/20 1106 Order Status:  Completed Specimen:  Stool Updated:  03/20/20 6780 [address] [place] ANTIGEN    
  C. DIFFICILE GDH ANTIGEN-NEGATIVE  
     
  C. difficile toxin C. DIFFICILE TOXIN-NEGATIVE  
     
  PCR Reflex NOT APPLICABLE INTERPRETATION    
  NEGATIVE FOR TOXIGENIC C. DIFFICILE Clinical Consideration NEGATIVE FOR TOXIGENIC C. DIFFICILE  
     
 EMERGENT DISEASE PANEL [[id number]] Collected:  03/19/20 1837 Order Status:  Completed Updated:  03/19/20 1903 Current Meds: Current Facility-Administered Medications Medication Dose Route Frequency  torsemide (DEMADEX) tablet 20 mg  20 mg Oral BID  promethazine (PHENERGAN) with saline injection 12.5 mg  12.5 mg IntraVENous Q4H PRN  
 HYDROcodone-acetaminophen (NORCO)  mg tablet 1 Tab  1 Tab Oral Q6H PRN  
 magnesium oxide (MAG-OX) tablet 400 mg  400 mg Oral DAILY  potassium bicarb-citric acid (EFFER-K) tablet 20 mEq  20 mEq Oral DAILY  ALPRAZolam (XANAX) tablet 0.5 mg  0.5 mg Oral QHS  atorvastatin (LIPITOR) tablet 80 mg  80 mg Oral DAILY  eplerenone (INSPRA) tablet 25 mg (Patient Supplied)  25 mg Oral DAILY  gabapentin (NEURONTIN) capsule 300 mg  300 mg Oral BID  [Held by provider] losartan (COZAAR) tablet 50 mg  50 mg Oral DAILY  promethazine (PHENERGAN) tablet 25 mg  25 mg Oral Q6H PRN  
 sucralfate (CARAFATE) tablet 1 g  1 g Oral AC&HS  sodium chloride (NS) flush 5-40 mL  5-40 mL IntraVENous Q8H  
 sodium chloride (NS) flush 5-40 mL  5-40 mL IntraVENous PRN  
 acetaminophen (TYLENOL) tablet 650 mg  650 mg Oral Q4H PRN  
 bisacodyL (DULCOLAX) tablet 5 mg  5 mg Oral DAILY PRN  
 enoxaparin (LOVENOX) injection 40 mg  40 mg SubCUTAneous Q24H Other Studies: No results found for this visit on 03/19/20. No results found. Assessment and Plan:  
 
Hospital Problems as of 3/21/2020 Date Reviewed: 12/6/2019 Codes Class Noted - Resolved POA Respiratory distress ICD-10-CM: R06.03 
ICD-9-CM: 786.09  3/19/2020 - Present Yes Non-ischemic cardiomyopathy (HCC) (Chronic) ICD-10-CM: I42.8 ICD-9-CM: 425.4  3/24/2016 - Present Yes Chronic systolic (congestive) heart failure (HCC) (Chronic) ICD-10-CM: W71.51 ICD-9-CM: 428.22, 428.0  4/21/2011 - Present Yes 47years old AAM with hx of advance CHF with EF 15% s/p AICD, non ischemic cardiomyopathy, HTN, chronic nausea/vomiting, admitted on 3/20 after worsening cough, subjective fever, dyspnea with speaking, concern for sepsis with elevated LA. #cough, SOB, CAMARILLO-unclear etiology 
-may bee CHF given lack of fever, xr negative but orthopnea is worse apparently; reason for LA unclear 
-did however have couging issues in past related to dysphagia and required dilation for Schatzkis ring 
-he is a r/o COVID for some reason; VERY LIKELY NOT COVID GIVEN NO FEVER; nevertheless covid test pending 
-monitor infection off antibiotics while no fever 
-strict I/Os -switch PTA lasix 40mg bid to torsemide 20mg bid 
-holding PTA coreg 25mg bid and losartan 50mg bid for soft Bps while trying to diurese #abd pain and diarrhea-c diff/stool studies neg; check amlyase/lipase/RUQ US #chronic/prior: esophageal dysphagia, Schatzki's ring, SVT, VT, GERD, HTN, alcoholism, depression Diet:  DIET CARDIAC 
DVT ppx:   
 
Signed: 
Tyrone Price MD

## 2020-03-21 NOTE — PROGRESS NOTES
SBAR received from [facility]. Patient remains in stable condition with respirations even/unlabored. No acute distress noted at this time. Patient on room air. Droplet plus precautions in place. Negative pressure intact. Call light remains within reach, patient encouraged to call nurse prn assist. Will continue to monitor per policy.

## 2020-03-21 NOTE — PROGRESS NOTES
Hospitalist Note     Admit Date:  3/19/2020  4:03 PM   Name:  Chelo Ritter III   Age:  54 y.o.  :  1965   MRN:  [id number]   PCP:  Karen Winslow MD  Treatment Team: Attending Provider: Karen Oppenheim, MD; Utilization Review: Dimas Blizzard, RN; Care Manager: Washington Harada.; Utilization Review: Mirtha Degroot RN    HPI/Subjective:   Patient is a 51yo M with hx nonischemic cardiomyopathy, with severe HFrEF and ICD who presents with 4 to 5 days cough, fever/chills, and worsening dyspnea. No leg swelling. Has been taking diuretics as prescribed. Orthopnea worse than baseline. Cough nonproductive. Very dyspneic when walking. Found in the ER to have RR up to 30, coughing fits. Unable to finish sentences. Mildly elevated trop (chronic), mildly elevated pBNP, mildly elevated lactic acid. Not currently febrile or hypoxic. CXR clear. Received lasix in ER, feeling slightly better    3/21: with cough persistent; thinks SOB worse  Having abd pain  c diff negative  NO FEVER  He is asking for \"the IV pain medication so it works faster\"  +810cc    No other complaints  Objective:     Patient Vitals for the past 24 hrs:   Temp Pulse Resp BP SpO2   20 0807 98 °F (36.7 °C) 82 18 117/69 96 %   20 0348 97.9 °F (36.6 °C) 85 18 104/68 96 %   20 2345 97.5 °F (36.4 °C) 92 19 90/69 93 %   20 97.7 °F (36.5 °C) 100 19 95/67 98 %   20 1455 98 °F (36.7 °C) (!) 104 19 99/65 95 %     Oxygen Therapy  O2 Sat (%): 96 % (20 0807)  Pulse via Oximetry: 88 beats per minute (20 1726)  O2 Device: Room air (20 1540)    Estimated body mass index is 28.85 kg/m² as calculated from the following:    Height as of this encounter: 5' 7\" (1.702 m). Weight as of this encounter: 83.6 kg (184 lb 3.2 oz).       Intake/Output Summary (Last 24 hours) at 3/21/2020 1130  Last data filed at 3/21/2020 8980  Gross per 24 hour   Intake 1270 ml   Output 700 ml   Net 570 ml       *Note that automatically entered I/Os may not be accurate; dependent on patient compliance with collection and accurate  by techs. General:          Uncomfortable from respiratory distress  Head:               Normocephalic, without obvious abnormality, atraumatic. Nose:               Nares normal. No drainage or sinus tenderness. Lungs: Tachypnea and conversational dyspnea. No crackles or wheeze  Heart:              Tachy, regular. Abdomen:        Soft, non-tender. Not distended. Bowel sounds normal.   Extremities:     No cyanosis. No edema. No clubbing  Skin:                Texture, turgor normal. No rashes or lesions. Not Jaundiced  Neurologic:      Alert and oriented x 3, no focal deficits  Data Review:  I have reviewed all labs, meds, and studies from the last 24 hours:    Recent Results (from the past 24 hour(s))   LACTIC ACID    Collection Time: 03/21/20  6:21 AM   Result Value Ref Range    Lactic acid 1.6 0.4 - 2.0 MMOL/L   CBC WITH AUTOMATED DIFF    Collection Time: 03/21/20  9:08 AM   Result Value Ref Range    WBC 4.6 4.3 - 11.1 K/uL    RBC 3.95 (L) 4.23 - 5.6 M/uL    HGB 12.5 (L) 13.6 - 17.2 g/dL    HCT 39.1 (L) 41.1 - 50.3 %    MCV 99.0 (H) 79.6 - 97.8 FL    MCH 31.6 26.1 - 32.9 PG    MCHC 32.0 31.4 - 35.0 g/dL    RDW 15.5 (H) 11.9 - 14.6 %    PLATELET 530 133 - 103 K/uL    MPV 9.6 9.4 - 12.3 FL    ABSOLUTE NRBC 0.00 0.0 - 0.2 K/uL    DF AUTOMATED      NEUTROPHILS 56 43 - 78 %    LYMPHOCYTES 29 13 - 44 %    MONOCYTES 9 4.0 - 12.0 %    EOSINOPHILS 4 0.5 - 7.8 %    BASOPHILS 1 0.0 - 2.0 %    IMMATURE GRANULOCYTES 0 0.0 - 5.0 %    ABS. NEUTROPHILS 2.6 1.7 - 8.2 K/UL    ABS. LYMPHOCYTES 1.3 0.5 - 4.6 K/UL    ABS. MONOCYTES 0.4 0.1 - 1.3 K/UL    ABS. EOSINOPHILS 0.2 0.0 - 0.8 K/UL    ABS. BASOPHILS 0.0 0.0 - 0.2 K/UL    ABS. IMM.  GRANS. 0.0 0.0 - 0.5 K/UL   METABOLIC PANEL, COMPREHENSIVE    Collection Time: 03/21/20  9:08 AM   Result Value Ref Range    Sodium 140 136 - 145 mmol/L Potassium 3.9 3.5 - 5.1 mmol/L    Chloride 106 98 - 107 mmol/L    CO2 29 21 - 32 mmol/L    Anion gap 5 (L) 7 - 16 mmol/L    Glucose 102 (H) 65 - 100 mg/dL    BUN 11 6 - 23 MG/DL    Creatinine 1.08 0.8 - 1.5 MG/DL    GFR est AA >60 >60 ml/min/1.73m2    GFR est non-AA >60 >60 ml/min/1.73m2    Calcium 8.7 8.3 - 10.4 MG/DL    Bilirubin, total 0.4 0.2 - 1.1 MG/DL    ALT (SGPT) 27 12 - 65 U/L    AST (SGOT) 22 15 - 37 U/L    Alk.  phosphatase 57 50 - 136 U/L    Protein, total 6.4 6.3 - 8.2 g/dL    Albumin 3.8 3.5 - 5.0 g/dL    Globulin 2.6 2.3 - 3.5 g/dL    A-G Ratio 1.5 1.2 - 3.5          All Micro Results     Procedure Component Value Units Date/Time    CULTURE, STOOL [[id number]] Collected:  03/20/20 1106    Order Status:  Completed Specimen:  Stool Updated:  03/21/20 0646     Special Requests: NO SPECIAL REQUESTS        Culture result:       NO GROWTH OF SALMONELLA OR SHIGELLA IS EVIDENT ON FIRST READING, FINAL REPORT TO FOLLOW AFTER FURTHER INCUBATION OF CULTURE          CULTURE, BLOOD [[id number]] Collected:  03/19/20 1744    Order Status:  Completed Specimen:  Blood Updated:  03/21/20 0641     Special Requests: --        LEFT  HAND       Culture result: NO GROWTH 2 DAYS       CULTURE, BLOOD [[id number]] Collected:  03/19/20 1754    Order Status:  Completed Specimen:  Blood Updated:  03/21/20 0641     Special Requests: --        LEFT  ARM       Culture result: NO GROWTH 2 DAYS       C. DIFFICILE AG & TOXIN A/B [[id number]] Collected:  03/20/20 1106    Order Status:  Completed Specimen:  Stool Updated:  03/20/20 3920     [address] [place] ANTIGEN       C. DIFFICILE GDH ANTIGEN-NEGATIVE           C. difficile toxin       C. DIFFICILE TOXIN-NEGATIVE           PCR Reflex NOT APPLICABLE        INTERPRETATION       NEGATIVE FOR TOXIGENIC C. DIFFICILE           Clinical Consideration       NEGATIVE FOR TOXIGENIC C. DIFFICILE          EMERGENT DISEASE PANEL [[id number]] Collected:  03/19/20 1837    Order Status:  Completed Updated:  03/19/20 1903          Current Meds:  Current Facility-Administered Medications   Medication Dose Route Frequency    torsemide (DEMADEX) tablet 20 mg  20 mg Oral BID    promethazine (PHENERGAN) with saline injection 12.5 mg  12.5 mg IntraVENous Q4H PRN    HYDROcodone-acetaminophen (NORCO)  mg tablet 1 Tab  1 Tab Oral Q6H PRN    magnesium oxide (MAG-OX) tablet 400 mg  400 mg Oral DAILY    potassium bicarb-citric acid (EFFER-K) tablet 20 mEq  20 mEq Oral DAILY    ALPRAZolam (XANAX) tablet 0.5 mg  0.5 mg Oral QHS    atorvastatin (LIPITOR) tablet 80 mg  80 mg Oral DAILY    eplerenone (INSPRA) tablet 25 mg (Patient Supplied)  25 mg Oral DAILY    gabapentin (NEURONTIN) capsule 300 mg  300 mg Oral BID    [Held by provider] losartan (COZAAR) tablet 50 mg  50 mg Oral DAILY    promethazine (PHENERGAN) tablet 25 mg  25 mg Oral Q6H PRN    sucralfate (CARAFATE) tablet 1 g  1 g Oral AC&HS    sodium chloride (NS) flush 5-40 mL  5-40 mL IntraVENous Q8H    sodium chloride (NS) flush 5-40 mL  5-40 mL IntraVENous PRN    acetaminophen (TYLENOL) tablet 650 mg  650 mg Oral Q4H PRN    bisacodyL (DULCOLAX) tablet 5 mg  5 mg Oral DAILY PRN    enoxaparin (LOVENOX) injection 40 mg  40 mg SubCUTAneous Q24H       Other Studies:  No results found for this visit on 03/19/20. No results found.     Assessment and Plan:     Hospital Problems as of 3/21/2020 Date Reviewed: 12/6/2019          Codes Class Noted - Resolved POA    Respiratory distress ICD-10-CM: R06.03  ICD-9-CM: 786.09  3/19/2020 - Present Yes        Non-ischemic cardiomyopathy ([facility] [address] (Chronic) ICD-10-CM: I42.8  ICD-9-CM: 425.4  3/24/2016 - Present Yes        Chronic systolic (congestive) heart failure (HCC) (Chronic) ICD-10-CM: I50.22  ICD-9-CM: 428.22, 428.0  4/21/2011 - Present Yes            47years old AAM with hx of advance CHF with EF 15% s/p AICD, non ischemic cardiomyopathy, HTN, chronic nausea/vomiting, admitted on 3/20 after worsening cough, subjective fever, dyspnea with speaking, concern for sepsis with elevated LA.        #cough, SOB, CAMARILLO-unclear etiology  -may bee CHF given lack of fever, xr negative but orthopnea is worse apparently; reason for LA unclear  -did however have couging issues in past related to dysphagia and required dilation for Schatzkis ring  -he is a r/o COVID for some reason; VERY LIKELY NOT COVID GIVEN NO FEVER; nevertheless covid test pending  -monitor infection off antibiotics while no fever  -strict I/Os  -switch PTA lasix 40mg bid to torsemide 20mg bid  -holding PTA coreg 25mg bid and losartan 50mg bid for soft Bps while trying to diurese  -cardiology csx requested per patient request    #abd pain and diarrhea-c diff/stool studies neg; check amlyase/lipase/RUQ US; consider CT abd/p given elevated LA    #chronic/prior: esophageal dysphagia, Schatzki's ring, SVT, VT, GERD, HTN, alcoholism, depression        Diet:  DIET CARDIAC  DVT ppx:      Signed:  Andrea Mercado MD

## 2020-03-21 NOTE — PROGRESS NOTES
Norco 10 mg po givne. Phenergan 12.5mg IV given.      03/21/20 0436   Pain 1   Pain Scale 1 Numeric (0 - 10)   Pain Intensity 1 8   Pain Onset 1 chronic   Pain Orientation 1 Anterior   Pain Description 1 Aching   Pain Intervention(s) 1 Medication (see MAR)

## 2020-03-22 ENCOUNTER — APPOINTMENT (OUTPATIENT)
Dept: CT IMAGING | Age: 55
End: 2020-03-22
Attending: INTERNAL MEDICINE
Payer: COMMERCIAL

## 2020-03-22 LAB
ALBUMIN SERPL-MCNC: 4.3 G/DL (ref 3.5–5)
ALBUMIN/GLOB SERPL: 1.4 {RATIO} (ref 1.2–3.5)
ALP SERPL-CCNC: 51 U/L (ref 50–136)
ALT SERPL-CCNC: 25 U/L (ref 12–65)
ANION GAP SERPL CALC-SCNC: 6 MMOL/L (ref 7–16)
AST SERPL-CCNC: 20 U/L (ref 15–37)
BACTERIA SPEC CULT: NORMAL
BASOPHILS # BLD: 0 K/UL (ref 0–0.2)
BASOPHILS NFR BLD: 1 % (ref 0–2)
BILIRUB SERPL-MCNC: 0.5 MG/DL (ref 0.2–1.1)
BNP SERPL-MCNC: 182 PG/ML (ref 5–125)
BUN SERPL-MCNC: 19 MG/DL (ref 6–23)
CALCIUM SERPL-MCNC: 8.7 MG/DL (ref 8.3–10.4)
CHLORIDE SERPL-SCNC: 102 MMOL/L (ref 98–107)
CO2 SERPL-SCNC: 29 MMOL/L (ref 21–32)
CREAT SERPL-MCNC: 1.34 MG/DL (ref 0.8–1.5)
DIFFERENTIAL METHOD BLD: ABNORMAL
EMERGENT DISEASE PANEL, EDPR: NOT DETECTED
EOSINOPHIL # BLD: 0.2 K/UL (ref 0–0.8)
EOSINOPHIL NFR BLD: 4 % (ref 0.5–7.8)
ERYTHROCYTE [DISTWIDTH] IN BLOOD BY AUTOMATED COUNT: 15.4 % (ref 11.9–14.6)
GLOBULIN SER CALC-MCNC: 3 G/DL (ref 2.3–3.5)
GLUCOSE SERPL-MCNC: 102 MG/DL (ref 65–100)
HCT VFR BLD AUTO: 37.6 % (ref 41.1–50.3)
HGB BLD-MCNC: 12.4 G/DL (ref 13.6–17.2)
IMM GRANULOCYTES # BLD AUTO: 0 K/UL (ref 0–0.5)
IMM GRANULOCYTES NFR BLD AUTO: 0 % (ref 0–5)
LYMPHOCYTES # BLD: 1.5 K/UL (ref 0.5–4.6)
LYMPHOCYTES NFR BLD: 25 % (ref 13–44)
MCH RBC QN AUTO: 32 PG (ref 26.1–32.9)
MCHC RBC AUTO-ENTMCNC: 33 G/DL (ref 31.4–35)
MCV RBC AUTO: 96.9 FL (ref 79.6–97.8)
MONOCYTES # BLD: 0.4 K/UL (ref 0.1–1.3)
MONOCYTES NFR BLD: 8 % (ref 4–12)
NEUTS SEG # BLD: 3.6 K/UL (ref 1.7–8.2)
NEUTS SEG NFR BLD: 62 % (ref 43–78)
NRBC # BLD: 0 K/UL (ref 0–0.2)
PLATELET # BLD AUTO: 197 K/UL (ref 150–450)
PMV BLD AUTO: 9.3 FL (ref 9.4–12.3)
POTASSIUM SERPL-SCNC: 3.6 MMOL/L (ref 3.5–5.1)
PROT SERPL-MCNC: 7.3 G/DL (ref 6.3–8.2)
RBC # BLD AUTO: 3.88 M/UL (ref 4.23–5.6)
SERVICE CMNT-IMP: NORMAL
SODIUM SERPL-SCNC: 137 MMOL/L (ref 136–145)
WBC # BLD AUTO: 5.8 K/UL (ref 4.3–11.1)

## 2020-03-22 PROCEDURE — 74011250636 HC RX REV CODE- 250/636: Performed by: FAMILY MEDICINE

## 2020-03-22 PROCEDURE — 85025 COMPLETE CBC W/AUTO DIFF WBC: CPT

## 2020-03-22 PROCEDURE — 96372 THER/PROPH/DIAG INJ SC/IM: CPT

## 2020-03-22 PROCEDURE — 76937 US GUIDE VASCULAR ACCESS: CPT

## 2020-03-22 PROCEDURE — 74011636320 HC RX REV CODE- 636/320: Performed by: FAMILY MEDICINE

## 2020-03-22 PROCEDURE — 74011250636 HC RX REV CODE- 250/636: Performed by: INTERNAL MEDICINE

## 2020-03-22 PROCEDURE — 80053 COMPREHEN METABOLIC PANEL: CPT

## 2020-03-22 PROCEDURE — 36415 COLL VENOUS BLD VENIPUNCTURE: CPT

## 2020-03-22 PROCEDURE — 83880 ASSAY OF NATRIURETIC PEPTIDE: CPT

## 2020-03-22 PROCEDURE — 74177 CT ABD & PELVIS W/CONTRAST: CPT

## 2020-03-22 PROCEDURE — P9047 ALBUMIN (HUMAN), 25%, 50ML: HCPCS | Performed by: INTERNAL MEDICINE

## 2020-03-22 PROCEDURE — 74011250637 HC RX REV CODE- 250/637: Performed by: FAMILY MEDICINE

## 2020-03-22 PROCEDURE — 99218 HC RM OBSERVATION: CPT

## 2020-03-22 PROCEDURE — 74011250637 HC RX REV CODE- 250/637: Performed by: INTERNAL MEDICINE

## 2020-03-22 PROCEDURE — 96376 TX/PRO/DX INJ SAME DRUG ADON: CPT

## 2020-03-22 PROCEDURE — 74011000250 HC RX REV CODE- 250: Performed by: INTERNAL MEDICINE

## 2020-03-22 RX ORDER — SODIUM CHLORIDE 0.9 % (FLUSH) 0.9 %
10 SYRINGE (ML) INJECTION
Status: ACTIVE | OUTPATIENT
Start: 2020-03-22 | End: 2020-03-23

## 2020-03-22 RX ORDER — PANTOPRAZOLE SODIUM 40 MG/1
40 TABLET, DELAYED RELEASE ORAL
Status: DISCONTINUED | OUTPATIENT
Start: 2020-03-23 | End: 2020-03-23 | Stop reason: HOSPADM

## 2020-03-22 RX ORDER — TORSEMIDE 20 MG/1
40 TABLET ORAL 2 TIMES DAILY
Status: DISCONTINUED | OUTPATIENT
Start: 2020-03-22 | End: 2020-03-23 | Stop reason: HOSPADM

## 2020-03-22 RX ADMIN — ALBUMIN (HUMAN) 25 G: 0.25 INJECTION, SOLUTION INTRAVENOUS at 08:13

## 2020-03-22 RX ADMIN — GABAPENTIN 300 MG: 300 CAPSULE ORAL at 17:43

## 2020-03-22 RX ADMIN — Medication 10 ML: at 05:29

## 2020-03-22 RX ADMIN — IOPAMIDOL 100 ML: 755 INJECTION, SOLUTION INTRAVENOUS at 20:43

## 2020-03-22 RX ADMIN — PROMETHAZINE HYDROCHLORIDE 12.5 MG: 25 INJECTION INTRAMUSCULAR; INTRAVENOUS at 12:46

## 2020-03-22 RX ADMIN — HYDROCODONE BITARTRATE AND ACETAMINOPHEN 1 TABLET: 10; 325 TABLET ORAL at 11:22

## 2020-03-22 RX ADMIN — MORPHINE SULFATE 1 MG: 2 INJECTION, SOLUTION INTRAMUSCULAR; INTRAVENOUS at 15:44

## 2020-03-22 RX ADMIN — HYDROCODONE BITARTRATE AND ACETAMINOPHEN 1 TABLET: 10; 325 TABLET ORAL at 05:30

## 2020-03-22 RX ADMIN — SUCRALFATE 1 G: 1 TABLET ORAL at 17:43

## 2020-03-22 RX ADMIN — ENOXAPARIN SODIUM 40 MG: 40 INJECTION SUBCUTANEOUS at 22:49

## 2020-03-22 RX ADMIN — SUCRALFATE 1 G: 1 TABLET ORAL at 22:49

## 2020-03-22 RX ADMIN — SUCRALFATE 1 G: 1 TABLET ORAL at 05:29

## 2020-03-22 RX ADMIN — Medication 400 MG: at 08:08

## 2020-03-22 RX ADMIN — TORSEMIDE 20 MG: 20 TABLET ORAL at 08:08

## 2020-03-22 RX ADMIN — PROMETHAZINE HYDROCHLORIDE 12.5 MG: 25 INJECTION INTRAMUSCULAR; INTRAVENOUS at 17:50

## 2020-03-22 RX ADMIN — DIATRIZOATE MEGLUMINE AND DIATRIZOATE SODIUM 15 ML: 660; 100 LIQUID ORAL; RECTAL at 17:50

## 2020-03-22 RX ADMIN — POTASSIUM BICARBONATE 20 MEQ: 782 TABLET, EFFERVESCENT ORAL at 08:08

## 2020-03-22 RX ADMIN — GABAPENTIN 300 MG: 300 CAPSULE ORAL at 08:08

## 2020-03-22 RX ADMIN — Medication 10 ML: at 12:39

## 2020-03-22 RX ADMIN — ALPRAZOLAM 0.5 MG: 0.5 TABLET ORAL at 22:54

## 2020-03-22 RX ADMIN — DIATRIZOATE MEGLUMINE AND DIATRIZOATE SODIUM 15 ML: 660; 100 LIQUID ORAL; RECTAL at 12:47

## 2020-03-22 RX ADMIN — MORPHINE SULFATE 1 MG: 2 INJECTION, SOLUTION INTRAMUSCULAR; INTRAVENOUS at 02:32

## 2020-03-22 RX ADMIN — PROMETHAZINE HYDROCHLORIDE 12.5 MG: 25 INJECTION INTRAMUSCULAR; INTRAVENOUS at 02:32

## 2020-03-22 RX ADMIN — HYDROCODONE BITARTRATE AND ACETAMINOPHEN 1 TABLET: 10; 325 TABLET ORAL at 17:50

## 2020-03-22 RX ADMIN — PROMETHAZINE HYDROCHLORIDE 12.5 MG: 25 INJECTION INTRAMUSCULAR; INTRAVENOUS at 08:10

## 2020-03-22 RX ADMIN — ATORVASTATIN CALCIUM 80 MG: 80 TABLET, FILM COATED ORAL at 08:08

## 2020-03-22 RX ADMIN — Medication 10 ML: at 22:50

## 2020-03-22 RX ADMIN — SUCRALFATE 1 G: 1 TABLET ORAL at 10:32

## 2020-03-22 RX ADMIN — MORPHINE SULFATE 1 MG: 2 INJECTION, SOLUTION INTRAMUSCULAR; INTRAVENOUS at 08:20

## 2020-03-22 NOTE — PROGRESS NOTES
SBAR received from off going nurse. Patient remains in stable condition with respirations even/unlabored. No acute distress noted at this time. Patient on room air. Droplet plus precautions in place. Negative pressure intact. Call light remains within reach.

## 2020-03-22 NOTE — PROGRESS NOTES
Pt arrived to room 820. Pt resting in bed comfortably at this time, alert and oriented times 3. No distress noted, respirations even and unlabored on room air. Pt denies pain at this time. Lung sounds coarse. Pt has 22 G in right hand. Pt instructed to call for assistance if needed, call light in place, will continue to monitor.

## 2020-03-22 NOTE — PROGRESS NOTES
TRANSFER - OUT REPORT:    Verbal report given to [facility] AT KRISTIN CRAWFORD, KADEN on Matt Chapa III  being transferred to room 820 for routine progression of care       Report consisted of patients Situation, Background, Assessment and   Recommendations(SBAR). Information from the following report(s) SBAR, Intake/Output, MAR and Recent Results was reviewed with the receiving nurse. Lines:   Peripheral IV 03/19/20 Right Hand (Active)   Site Assessment Clean, dry, & intact 3/22/2020 11:22 AM   Phlebitis Assessment 0 3/22/2020 11:22 AM   Infiltration Assessment 0 3/22/2020 11:22 AM   Dressing Status Clean, dry, & intact 3/22/2020 11:22 AM   Dressing Type Transparent 3/22/2020 11:22 AM   Hub Color/Line Status Flushed;Patent 3/22/2020 11:22 AM   Alcohol Cap Used No 3/22/2020 11:22 AM        Opportunity for questions and clarification was provided.       Patient transported with:   [employer]
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
## 2020-03-22 NOTE — PROGRESS NOTES
Pt resting in bed comfortably at this time, alert and oriented times 3. No distress noted, respirations even and unlabored on room air. 20 G right AC and 22 in right hand. Pt denies pain at this time. Pt instructed to call for assistance if needed, call light in place, will continue to monitor. Will prepare for bedside shift report.

## 2020-03-22 NOTE — PROGRESS NOTES
Pt resting in bed comfortably at this time, alert and oriented times 3. No distress noted, respirations even and unlabored on room air. Pt denies pain at this time. Pt instructed to call for assistance if needed, call light in place, will continue to monitor.

## 2020-03-22 NOTE — PROGRESS NOTES
Hospitalist Note     Admit Date:  3/19/2020  4:03 PM   Name:  Eleonora Wang III   Age:  54 y.o.  :  1965   MRN:  [id number]   PCP:  Jason Jarrett MD  Treatment Team: Attending Provider: Zoë Hutchinson MD; Utilization Review: London Smyth RN; Care Manager: Frank Monroe.; Utilization Review: Kala Morales RN; Consulting Provider: Júnior Garber MD; Speech Language Pathologist: Dorita Dinh, SLP; Primary Nurse: Natan Valadez RN    HPI/Subjective:   Patient is a 49yo M with hx nonischemic cardiomyopathy, with severe HFrEF and ICD who presents with 4 to 5 days cough, fever/chills, and worsening dyspnea. No leg swelling. Has been taking diuretics as prescribed. Orthopnea worse than baseline. Cough nonproductive. Very dyspneic when walking. Found in the ER to have RR up to 30, coughing fits. Unable to finish sentences. Mildly elevated trop (chronic), mildly elevated pBNP, mildly elevated lactic acid. Not currently febrile or hypoxic. CXR clear.   Received lasix in ER, feeling slightly better    3/22: with cough persistent; thinks SOB worse  covid negative, off isolation  C/o dysphagia and cough and abd pain  Still SOB  NO FEVER  He is asking for \"the IV pain medication so it works faster\"  +400cc  sCr worse today    No other complaints  Objective:     Patient Vitals for the past 24 hrs:   Temp Pulse Resp BP SpO2   20 1553  96      20 1534 97.6 °F (36.4 °C) 95 18 116/80 98 %   20 1221 97.8 °F (36.6 °C) 91 18 119/87 96 %   20 1200  (!) 105      20 1055 98.1 °F (36.7 °C) 90 19 122/73 98 %   20 0821 98.2 °F (36.8 °C) 99 18 99/70 98 %   20 0300 98.4 °F (36.9 °C) 96 18 126/88    20 0200    128/80    20 0014 97.6 °F (36.4 °C) 100 20 108/71 93 %   20 2100    126/88    20 1900 97.8 °F (36.6 °C) 91 18 113/89 90 %   20 1724 97.3 °F (36.3 °C) 90 19 105/42 98 %     Oxygen Therapy  O2 Sat (%): 98 % (20 1534)  Pulse via Oximetry: 88 beats per minute (03/19/20 1726)  O2 Device: Room air (03/22/20 0820)    Estimated body mass index is 28.85 kg/m² as calculated from the following:    Height as of this encounter: 5' 7\" (1.702 m). Weight as of this encounter: 83.6 kg (184 lb 3.2 oz). Intake/Output Summary (Last 24 hours) at 3/22/2020 1554  Last data filed at 3/22/2020 0864  Gross per 24 hour   Intake 940 ml   Output    Net 940 ml       *Note that automatically entered I/Os may not be accurate; dependent on patient compliance with collection and accurate  by techs. General:          Uncomfortable from respiratory distress  Head:               Normocephalic, without obvious abnormality, atraumatic. Nose:               Nares normal. No drainage or sinus tenderness. Lungs: Tachypnea and conversational dyspnea. No crackles or wheeze  Heart:              Tachy, regular. Abdomen:        Soft, non-tender. Not distended. Bowel sounds normal.   Extremities:     No cyanosis. No edema. No clubbing  Skin:                Texture, turgor normal. No rashes or lesions. Not Jaundiced  Neurologic:      Alert and oriented x 3, no focal deficits  Data Review:  I have reviewed all labs, meds, and studies from the last 24 hours:    Recent Results (from the past 24 hour(s))   CBC WITH AUTOMATED DIFF    Collection Time: 03/22/20 11:29 AM   Result Value Ref Range    WBC 5.8 4.3 - 11.1 K/uL    RBC 3.88 (L) 4.23 - 5.6 M/uL    HGB 12.4 (L) 13.6 - 17.2 g/dL    HCT 37.6 (L) 41.1 - 50.3 %    MCV 96.9 79.6 - 97.8 FL    MCH 32.0 26.1 - 32.9 PG    MCHC 33.0 31.4 - 35.0 g/dL    RDW 15.4 (H) 11.9 - 14.6 %    PLATELET 683 288 - 640 K/uL    MPV 9.3 (L) 9.4 - 12.3 FL    ABSOLUTE NRBC 0.00 0.0 - 0.2 K/uL    DF AUTOMATED      NEUTROPHILS 62 43 - 78 %    LYMPHOCYTES 25 13 - 44 %    MONOCYTES 8 4.0 - 12.0 %    EOSINOPHILS 4 0.5 - 7.8 %    BASOPHILS 1 0.0 - 2.0 %    IMMATURE GRANULOCYTES 0 0.0 - 5.0 %    ABS.  NEUTROPHILS 3.6 1.7 - 8.2 K/UL    ABS. LYMPHOCYTES 1.5 0.5 - 4.6 K/UL    ABS. MONOCYTES 0.4 0.1 - 1.3 K/UL    ABS. EOSINOPHILS 0.2 0.0 - 0.8 K/UL    ABS. BASOPHILS 0.0 0.0 - 0.2 K/UL    ABS. IMM. GRANS. 0.0 0.0 - 0.5 K/UL   METABOLIC PANEL, COMPREHENSIVE    Collection Time: 03/22/20 11:29 AM   Result Value Ref Range    Sodium 137 136 - 145 mmol/L    Potassium 3.6 3.5 - 5.1 mmol/L    Chloride 102 98 - 107 mmol/L    CO2 29 21 - 32 mmol/L    Anion gap 6 (L) 7 - 16 mmol/L    Glucose 102 (H) 65 - 100 mg/dL    BUN 19 6 - 23 MG/DL    Creatinine 1.34 0.8 - 1.5 MG/DL    GFR est AA >60 >60 ml/min/1.73m2    GFR est non-AA 59 (L) >60 ml/min/1.73m2    Calcium 8.7 8.3 - 10.4 MG/DL    Bilirubin, total 0.5 0.2 - 1.1 MG/DL    ALT (SGPT) 25 12 - 65 U/L    AST (SGOT) 20 15 - 37 U/L    Alk. phosphatase 51 50 - 136 U/L    Protein, total 7.3 6.3 - 8.2 g/dL    Albumin 4.3 3.5 - 5.0 g/dL    Globulin 3.0 2.3 - 3.5 g/dL    A-G Ratio 1.4 1.2 - 3.5     NT-PRO BNP    Collection Time: 03/22/20 11:29 AM   Result Value Ref Range    NT pro- (H) 5 - 125 PG/ML        All Micro Results     Procedure Component Value Units Date/Time    CULTURE, STOOL [[id number]] Collected:  03/20/20 1106    Order Status:  Completed Specimen:  Stool Updated:  03/22/20 0940     Special Requests: NO SPECIAL REQUESTS        Culture result:       No Salmonella, Shigella, or Ecoli 0157 isolated.           CULTURE, BLOOD [[id number]] Collected:  03/19/20 9414    Order Status:  Completed Specimen:  Blood Updated:  03/22/20 0759     Special Requests: --        LEFT  HAND       Culture result: NO GROWTH 3 DAYS       CULTURE, BLOOD [[id number]] Collected:  03/19/20 1754    Order Status:  Completed Specimen:  Blood Updated:  03/22/20 0721     Special Requests: --        LEFT  ARM       Culture result: NO GROWTH 3 DAYS       EMERGENT DISEASE PANEL [[id number]] Collected:  03/19/20 1837    Order Status:  Completed Specimen:  Not Specified Updated:  03/22/20 3126     Emergent disease panel NOT DETECTED        Comment: RESULTS SCANNED IN [facility] CARE  03/22/20, ADS  TEST PERFORMED BY [facility]         C. DIFFICILE AG & TOXIN A/B [[id number]] Collected:  03/20/20 1106    Order Status:  Completed Specimen:  Stool Updated:  03/20/20 [address] ANTIGEN       C. DIFFICILE GDH ANTIGEN-NEGATIVE           C. difficile toxin       C. DIFFICILE TOXIN-NEGATIVE           PCR Reflex NOT APPLICABLE        INTERPRETATION       NEGATIVE FOR TOXIGENIC C. DIFFICILE           Clinical Consideration       NEGATIVE FOR TOXIGENIC C. DIFFICILE                Current Meds:  Current Facility-Administered Medications   Medication Dose Route Frequency    torsemide (DEMADEX) tablet 40 mg  40 mg Oral BID    iopamidoL (ISOVUE-370) 76 % injection 100 mL  100 mL IntraVENous RAD ONCE    sodium chloride 0.9 % bolus infusion 100 mL  100 mL IntraVENous RAD ONCE    saline peripheral flush soln 10 mL  10 mL InterCATHeter RAD ONCE    [START ON 3/23/2020] pantoprazole (PROTONIX) tablet 40 mg  40 mg Oral ACB    albumin human 25% (BUMINATE) solution 25 g  25 g IntraVENous Q12H    morphine injection 1 mg  1 mg IntraVENous Q4H PRN    cyclobenzaprine (FLEXERIL) tablet 5 mg  5 mg Oral TID PRN    promethazine (PHENERGAN) with saline injection 12.5 mg  12.5 mg IntraVENous Q4H PRN    HYDROcodone-acetaminophen (NORCO)  mg tablet 1 Tab  1 Tab Oral Q6H PRN    magnesium oxide (MAG-OX) tablet 400 mg  400 mg Oral DAILY    potassium bicarb-citric acid (EFFER-K) tablet 20 mEq  20 mEq Oral DAILY    ALPRAZolam (XANAX) tablet 0.5 mg  0.5 mg Oral QHS    atorvastatin (LIPITOR) tablet 80 mg  80 mg Oral DAILY    eplerenone (INSPRA) tablet 25 mg (Patient Supplied)  25 mg Oral DAILY    gabapentin (NEURONTIN) capsule 300 mg  300 mg Oral BID    [Held by provider] losartan (COZAAR) tablet 50 mg  50 mg Oral DAILY    promethazine (PHENERGAN) tablet 25 mg  25 mg Oral Q6H PRN    sucralfate (CARAFATE) tablet 1 g  1 g Oral AC&HS    sodium chloride (NS) flush 5-40 mL  5-40 mL IntraVENous Q8H    sodium chloride (NS) flush 5-40 mL  5-40 mL IntraVENous PRN    acetaminophen (TYLENOL) tablet 650 mg  650 mg Oral Q4H PRN    bisacodyL (DULCOLAX) tablet 5 mg  5 mg Oral DAILY PRN    enoxaparin (LOVENOX) injection 40 mg  40 mg SubCUTAneous Q24H       Other Studies:  No results found for this visit on 03/19/20. No results found. Assessment and Plan:     Hospital Problems as of 3/22/2020 Date Reviewed: 12/6/2019          Codes Class Noted - Resolved POA    Respiratory distress ICD-10-CM: R06.03  ICD-9-CM: 786.09  3/19/2020 - Present Yes        Non-ischemic cardiomyopathy ([facility] [address] (Chronic) ICD-10-CM: I42.8  ICD-9-CM: 425.4  3/24/2016 - Present Yes        Chronic systolic (congestive) heart failure (HCC) (Chronic) ICD-10-CM: I50.22  ICD-9-CM: 428.22, 428.0  4/21/2011 - Present Yes            47years old AAM with hx of advance CHF with EF 15% s/p AICD, non ischemic cardiomyopathy, HTN, chronic nausea/vomiting, admitted on 3/20 after worsening cough, subjective fever, dyspnea with speaking, concern for sepsis with elevated LA.        #cough, SOB, [place]O-unclear etiology  -intiially thought CHF related but NTPBNP only 182  -did however have couging issues in past related to dysphagia and required dilation for Schatzkis ring  -covid negative  -monitor infection off antibiotics while no fever  -strict I/Os: apparently he is net +ve today  -initially switched PTA lasix 40mg bid to torsemide 20mg bid; hold torsemide for increasing sCr  -holding PTA coreg 25mg bid and losartan 50mg bid for soft Bps while trying to diurese  -cardiology csx requested per patient request  -trend BNP    #abd pain and diarrhea-c diff/stool studies neg; check amlyase/lipase/RUQ US; consider CT abd/p given elevated LA    #chronic/prior: esophageal dysphagia, Schatzki's ring, SVT, VT, GERD, HTN, alcoholism, depression        Diet:  DIET CARDIAC  DVT ppx:      Signed:  Carolina Robles MD

## 2020-03-22 NOTE — CONSULTS
[address] [place] [address]    Name:  Wilda Amaya  MR#:  [id number]  :  1965  ACCOUNT #:  [de-identified]  DATE OF SERVICE:  2020      CLINICAL INDICATION FOR CONSULTATION:  Known nonischemic cardiomyopathy with shortness of breath, orthopnea, elevated lactic acid level, questionable CHF related. HISTORY OF PRESENT ILLNESS:  The patient is a 59-year-old -American male, who is followed in our office by Dr. Suzanne Tompkins for a nonischemic cardiomyopathy. The patient was last seen by Dr. Suzanne Tompkins in 2019. He has chronic HFrEF (chronic systolic heart failure). The patient has an ICD and has also had a history of ventricular tachycardia and supraventricular tachycardia. Previous cardiac catheterization in 2017 showed minimal nonobstructive coronary artery disease. In any event, the patient was admitted on this occasion with increasing shortness of breath at rest with a nonproductive cough. The patient has a chronically elevated troponin level and also has had elevated lactic acid levels. Initial cultures have been negative for influenza and he is awaiting be coronavirus assay. Presently, the patient denies any weight change, medication noncompliance, or increasing edema. The patient's initial chest x-ray on 2020 showed no acute cardiopulmonary processes. ADDITIONAL PAST MEDICAL HISTORY:  1. Nonischemic cardiomyopathy. 2.  ICD. 3.  Minimal nonobstructive coronary artery disease. 4.  History of ventricular tachycardia. 5.  History of nonsustained supraventricular tachycardia. 6.  History of dyslipidemia. 7.  History of chronic abdominal pain. ALLERGIES:  NO KNOWN ALLERGIES. SOCIAL HISTORY:  The patient denies alcohol or tobacco use.     CURRENT MEDICATIONS:  Coreg 25 mg twice a day, losartan 50 mg daily, Lasix 40 mg twice a day, Lipitor 80 mg daily, Inspra 25 mg daily, Carafate 1 g q.i.d., Zofran 4 mg q.8 h. p.r.n. nausea, magnesium oxide 400 mg twice a day, and gabapentin 300 mg three times a day. REVIEW OF SYSTEMS:  SKIN:  The patient denies rash. NEURO:  The patient denies stroke or seizures. PSYCH:  The patient denies anxiety or depression. ENT:  The patient has headache. PULMONARY:  Please see history of present illness. CARDIOVASCULAR:  Please see history of present illness. GI:  The patient denies diarrhea or melena. :  The patient denies hematuria or dysuria. MUSCULOSKELETAL:  The patient denies fall or fracture. ENDOCRINE:  The patient denies diabetes. PHYSICAL EXAMINATION:  VITAL SIGNS:  Temperature is 98.2, pulse is 96, respirations 20, blood pressure 122/89, O2 saturation on room air is 96%. GENERAL:  The patient is a pleasant middle-aged -American male, who appears to be in no acute distress. SKIN:  Warm and dry. NEURO:  Alert and oriented. PSYCH:  Normal mood and affect. ENT:  Normocephalic, atraumatic. Pupils reactive. NECK:  Supple. 2+ carotid upstroke. I cannot appreciate any JVD. He has normal 2+ carotid upstrokes without bruits. CHEST:  Coarse bilateral breath sounds. CARDIAC:  Regular rate and rhythm without significant murmur, rub, or gallop. ABDOMEN:  Soft and nondistended without masses. EXTREMITIES:  No edema, cyanosis, or clubbing. AVAILABLE LAB DATA:  BMP:  Sodium is 140, potassium is 3.9, chloride 106, CO2 is 29, creatinine is 1.08, BUN is 11, glucose is 102, lipase is 172, amylase is 76, lactic acid level is 1.6. WBC is 4.6, hemoglobin 12.9, hematocrit 39.1. IMPRESSION AND PLAN:  1. Chronic HFrEF (chronic systolic congestive heart failure). Clinically, the patient appears to be stable at this point. Chest x-ray shows no obvious pulmonary edema. He does have symptoms of dyspnea with minimal exertion. I would continue goal directed medical therapy. 2.  Increasing dyspnea probably multifactorial origin. Presently, he is awaiting viral screening for coronavirus.   This is being managed per the hospitalist.  I do not have additional recommendations for this at the present time. 3.  History of ventricular tachycardia - presently stable. Continue beta-blocker therapy. 4.  History of implantable cardioverter-defibrillator - presently stable. Continue device in the clinic. 5.  History of nonsustained supraventricular tachycardia - presently stable on beta-blocker therapy. Denisse Traore MD      [id number]  D:  03/21/2020 15:16  T:  03/21/2020 21:10  JOB #:  [id number]  CC:   [id number] Cardiology

## 2020-03-22 NOTE — PROGRESS NOTES
Pt given norco  mg tablet orally for back pain 8/10. Will continue to monitor. Pt given phenergan 12.5 mg IV for complaints of nausea.

## 2020-03-23 VITALS
SYSTOLIC BLOOD PRESSURE: 112 MMHG | WEIGHT: 187.7 LBS | RESPIRATION RATE: 18 BRPM | TEMPERATURE: 97.3 F | OXYGEN SATURATION: 96 % | DIASTOLIC BLOOD PRESSURE: 68 MMHG | BODY MASS INDEX: 29.46 KG/M2 | HEIGHT: 67 IN | HEART RATE: 85 BPM

## 2020-03-23 LAB
ALBUMIN SERPL-MCNC: 3.9 G/DL (ref 3.5–5)
ALBUMIN/GLOB SERPL: 1.4 {RATIO} (ref 1.2–3.5)
ALP SERPL-CCNC: 54 U/L (ref 50–136)
ALT SERPL-CCNC: 26 U/L (ref 12–65)
ANION GAP SERPL CALC-SCNC: 9 MMOL/L (ref 7–16)
AST SERPL-CCNC: 25 U/L (ref 15–37)
BASOPHILS # BLD: 0 K/UL (ref 0–0.2)
BASOPHILS NFR BLD: 1 % (ref 0–2)
BILIRUB SERPL-MCNC: 0.4 MG/DL (ref 0.2–1.1)
BUN SERPL-MCNC: 21 MG/DL (ref 6–23)
CALCIUM SERPL-MCNC: 8.8 MG/DL (ref 8.3–10.4)
CHLORIDE SERPL-SCNC: 105 MMOL/L (ref 98–107)
CO2 SERPL-SCNC: 24 MMOL/L (ref 21–32)
CREAT SERPL-MCNC: 1.1 MG/DL (ref 0.8–1.5)
DIFFERENTIAL METHOD BLD: ABNORMAL
EOSINOPHIL # BLD: 0.2 K/UL (ref 0–0.8)
EOSINOPHIL NFR BLD: 4 % (ref 0.5–7.8)
ERYTHROCYTE [DISTWIDTH] IN BLOOD BY AUTOMATED COUNT: 15.5 % (ref 11.9–14.6)
GLOBULIN SER CALC-MCNC: 2.8 G/DL (ref 2.3–3.5)
GLUCOSE SERPL-MCNC: 100 MG/DL (ref 65–100)
HCT VFR BLD AUTO: 37 % (ref 41.1–50.3)
HGB BLD-MCNC: 12.4 G/DL (ref 13.6–17.2)
IMM GRANULOCYTES # BLD AUTO: 0 K/UL (ref 0–0.5)
IMM GRANULOCYTES NFR BLD AUTO: 1 % (ref 0–5)
LYMPHOCYTES # BLD: 1.7 K/UL (ref 0.5–4.6)
LYMPHOCYTES NFR BLD: 27 % (ref 13–44)
MAGNESIUM SERPL-MCNC: 2.2 MG/DL (ref 1.8–2.4)
MCH RBC QN AUTO: 32.1 PG (ref 26.1–32.9)
MCHC RBC AUTO-ENTMCNC: 33.5 G/DL (ref 31.4–35)
MCV RBC AUTO: 95.9 FL (ref 79.6–97.8)
MONOCYTES # BLD: 0.5 K/UL (ref 0.1–1.3)
MONOCYTES NFR BLD: 9 % (ref 4–12)
NEUTS SEG # BLD: 3.7 K/UL (ref 1.7–8.2)
NEUTS SEG NFR BLD: 59 % (ref 43–78)
NRBC # BLD: 0 K/UL (ref 0–0.2)
PLATELET # BLD AUTO: 214 K/UL (ref 150–450)
PMV BLD AUTO: 9.5 FL (ref 9.4–12.3)
POTASSIUM SERPL-SCNC: 3.6 MMOL/L (ref 3.5–5.1)
PROT SERPL-MCNC: 6.7 G/DL (ref 6.3–8.2)
RBC # BLD AUTO: 3.86 M/UL (ref 4.23–5.6)
SODIUM SERPL-SCNC: 138 MMOL/L (ref 136–145)
WBC # BLD AUTO: 6.3 K/UL (ref 4.3–11.1)

## 2020-03-23 PROCEDURE — 74011250637 HC RX REV CODE- 250/637: Performed by: INTERNAL MEDICINE

## 2020-03-23 PROCEDURE — 74011000250 HC RX REV CODE- 250: Performed by: INTERNAL MEDICINE

## 2020-03-23 PROCEDURE — 80053 COMPREHEN METABOLIC PANEL: CPT

## 2020-03-23 PROCEDURE — 99218 HC RM OBSERVATION: CPT

## 2020-03-23 PROCEDURE — 74011250637 HC RX REV CODE- 250/637: Performed by: FAMILY MEDICINE

## 2020-03-23 PROCEDURE — 85025 COMPLETE CBC W/AUTO DIFF WBC: CPT

## 2020-03-23 PROCEDURE — 36415 COLL VENOUS BLD VENIPUNCTURE: CPT

## 2020-03-23 PROCEDURE — 96376 TX/PRO/DX INJ SAME DRUG ADON: CPT

## 2020-03-23 PROCEDURE — 74011250636 HC RX REV CODE- 250/636: Performed by: INTERNAL MEDICINE

## 2020-03-23 PROCEDURE — 74011250637 HC RX REV CODE- 250/637: Performed by: NURSE PRACTITIONER

## 2020-03-23 PROCEDURE — 83735 ASSAY OF MAGNESIUM: CPT

## 2020-03-23 RX ORDER — CYCLOBENZAPRINE HCL 5 MG
5 TABLET ORAL
Qty: 30 TAB | Refills: 0 | Status: SHIPPED | OUTPATIENT
Start: 2020-03-23 | End: 2020-09-29

## 2020-03-23 RX ADMIN — ATORVASTATIN CALCIUM 80 MG: 80 TABLET, FILM COATED ORAL at 08:29

## 2020-03-23 RX ADMIN — PANTOPRAZOLE SODIUM 40 MG: 40 TABLET, DELAYED RELEASE ORAL at 06:36

## 2020-03-23 RX ADMIN — HYDROCODONE BITARTRATE AND ACETAMINOPHEN 1 TABLET: 10; 325 TABLET ORAL at 08:37

## 2020-03-23 RX ADMIN — PROMETHAZINE HYDROCHLORIDE 12.5 MG: 25 INJECTION INTRAMUSCULAR; INTRAVENOUS at 06:36

## 2020-03-23 RX ADMIN — Medication 400 MG: at 08:29

## 2020-03-23 RX ADMIN — SUCRALFATE 1 G: 1 TABLET ORAL at 11:49

## 2020-03-23 RX ADMIN — Medication 5 ML: at 12:00

## 2020-03-23 RX ADMIN — MORPHINE SULFATE 1 MG: 2 INJECTION, SOLUTION INTRAMUSCULAR; INTRAVENOUS at 06:37

## 2020-03-23 RX ADMIN — GABAPENTIN 300 MG: 300 CAPSULE ORAL at 08:29

## 2020-03-23 RX ADMIN — Medication 10 ML: at 06:34

## 2020-03-23 RX ADMIN — MORPHINE SULFATE 1 MG: 2 INJECTION, SOLUTION INTRAMUSCULAR; INTRAVENOUS at 00:40

## 2020-03-23 RX ADMIN — POTASSIUM BICARBONATE 20 MEQ: 782 TABLET, EFFERVESCENT ORAL at 08:29

## 2020-03-23 RX ADMIN — SUCRALFATE 1 G: 1 TABLET ORAL at 06:36

## 2020-03-23 RX ADMIN — PROMETHAZINE HYDROCHLORIDE 12.5 MG: 25 INJECTION INTRAMUSCULAR; INTRAVENOUS at 11:57

## 2020-03-23 RX ADMIN — PROMETHAZINE HYDROCHLORIDE 12.5 MG: 25 INJECTION INTRAMUSCULAR; INTRAVENOUS at 00:40

## 2020-03-23 NOTE — PROGRESS NOTES
Pt is wanting to speak with md and wants to leave AMA. Pt asking for Hycodan and flexeril scripts for dc.  Will notify Md

## 2020-03-23 NOTE — PROGRESS NOTES
[facility] Progress Note         Admit Date:  3/19/2020    Today's Date:  3/23/2020    CC:  Dysphagia, abd pain    Subjective:     Patient with complaints of back pain. No abd pain at present. Medications:   Current Facility-Administered Medications   Medication Dose Route Frequency    [Held by provider] torsemide (DEMADEX) tablet 40 mg  40 mg Oral BID    pantoprazole (PROTONIX) tablet 40 mg  40 mg Oral ACB    [Held by provider] albumin human 25% (BUMINATE) solution 25 g  25 g IntraVENous Q12H    morphine injection 1 mg  1 mg IntraVENous Q4H PRN    promethazine (PHENERGAN) with saline injection 12.5 mg  12.5 mg IntraVENous Q4H PRN    HYDROcodone-acetaminophen (NORCO)  mg tablet 1 Tab  1 Tab Oral Q6H PRN    magnesium oxide (MAG-OX) tablet 400 mg  400 mg Oral DAILY    potassium bicarb-citric acid (EFFER-K) tablet 20 mEq  20 mEq Oral DAILY    ALPRAZolam (XANAX) tablet 0.5 mg  0.5 mg Oral QHS    atorvastatin (LIPITOR) tablet 80 mg  80 mg Oral DAILY    eplerenone (INSPRA) tablet 25 mg (Patient Supplied)  25 mg Oral DAILY    gabapentin (NEURONTIN) capsule 300 mg  300 mg Oral BID    [Held by provider] losartan (COZAAR) tablet 50 mg  50 mg Oral DAILY    promethazine (PHENERGAN) tablet 25 mg  25 mg Oral Q6H PRN    sucralfate (CARAFATE) tablet 1 g  1 g Oral AC&HS    sodium chloride (NS) flush 5-40 mL  5-40 mL IntraVENous Q8H    sodium chloride (NS) flush 5-40 mL  5-40 mL IntraVENous PRN    acetaminophen (TYLENOL) tablet 650 mg  650 mg Oral Q4H PRN    bisacodyL (DULCOLAX) tablet 5 mg  5 mg Oral DAILY PRN    enoxaparin (LOVENOX) injection 40 mg  40 mg SubCUTAneous Q24H       Review of Systems:  ROS was obtained, with pertinent positives as listed above. SOB with conversation.      Diet:  Cardiac regular    Objective:   Vitals:  Visit Vitals  /87   Pulse 85   Temp 97.4 °F (36.3 °C)   Resp 18   Ht 5' 7\" (1.702 m)   Wt 85.1 kg (187 lb 11.2 oz)   SpO2 100%   BMI 29.40 kg/m² Intake/Output:  No intake/output data recorded. 03/21 1901 - 03/23 0700  In: 8893 [P.O.:1360; I.V.:100]  Out: -   Exam:  General appearance: alert, cooperative, no distress  Abdomen: soft, non-tender. Bowel sounds normal. No masses, no organomegaly  Extremities: extremities normal, atraumatic, no cyanosis or edema  Neuro:  alert and oriented    Data Review (Labs):    Recent Labs     03/23/20  0525 03/22/20  1129 03/21/20  0908   WBC 6.3 5.8 4.6   HGB 12.4* 12.4* 12.5*   HCT 37.0* 37.6* 39.1*    197 196   MCV 95.9 96.9 99.0*    137 140   K 3.6 3.6 3.9    102 106   CO2 24 29 29   BUN 21 19 11   CREA 1.10 1.34 1.08   CA 8.8 8.7 8.7   MG 2.2  --   --     102* 102*   AP 54 51 57   SGOT 25 20 22   ALT 26 25 27   TBILI 0.4 0.5 0.4   ALB 3.9 4.3 3.8   TP 6.7 7.3 6.4   AML  --   --  76   LPSE  --   --  172     CT ABDOMEN AND PELVIS WITH CONTRAST  3/22/2020  HISTORY: Abdominal pain   COMPARISON: None  FINDINGS:  *  LUNG BASES: Coronary artery disease. Pacemaker. Moderate-sized hiatal hernia. Left lower lobe atelectasis. *  LIVER: Within normal limits. *  GALLBLADDER AND BILE DUCTS: Gallbladder sludge versus small stones. *  SPLEEN: Within normal limits. *  URINARY TRACT: Simple left renal cysts. *  ADRENALS: Within normal limits. *  PANCREAS: Within normal limits. *  GASTROINTESTINAL TRACT: Within normal limits. *  RETROPERITONEUM: Within normal limits. *  PERITONEAL CAVITY AND ABDOMINAL WALL: Within normal limits. *  PELVIS: Within normal limits. *  SPINE / BONES: Within normal limits.   *  OTHER COMMENTS: None.   IMPRESSION  IMPRESSION:   Coronary artery disease.   Moderate-sized hiatal hernia with left lower lobe atelectasis.   Pacemaker.   Gallbladder sludge versus small stones.   Simple left renal cyst.   Date of Dictation: 3/22/2020 9:12 PM      Assessment:     Principal Problem:    Respiratory distress (3/19/2020)    Active Problems:    Chronic systolic (congestive) heart failure ([facility] [address] (4/21/2011)      Non-ischemic cardiomyopathy ([facility] [address] (3/24/2016)      Esophageal dysphagia (5/10/2019)    54 yr old male with hx of systolic dysfunction CHF with EF in 20% range, seen in consultation for hx of dysphagia. This is felt likely secondary to large [place] rather than ring, given dilation with 60Fr camara in 2019 which did not improve symptoms. Additionally he has had weight loss, not documented by review of chart notes, which could be considered to be cardiac cachexia mentioned in EGD note from 2019. He presents with severe SOB and coughing, negative testing for covid-19, and GI consulted for abd issues. Cardiology consult with stable chronic CHF. Plan:     1. Continue Protonix qd  2.  CT chest/Abd/pelvis with moderate hiatal hernia, left lower lobe atelectasis, simple left renal cyst  3. Defer EGD for now    Patient is seen and examined in collaboration with Dr. Keyon Granados. Assessment and plan as per Dr. Keyon Granados.   Manual Riff NP

## 2020-03-23 NOTE — PROGRESS NOTES
SPEECH PATHOLOGY NOTE:    Speech therapy attempt this PM as patient is now off isolation precautions. Patient on RA and sitting on side of bed after eating lunch. He endorses sensation of food \"stuck\" in lower chest/upper abdomin. Denies oropharyngeal dysphagia symptoms and stated \"I'd rather not do this. I don't want to waste your time\". Orders discontinued per patient request. Speech therapy will remain available should new concerns with oropharyngeal swallow function arise.      Glenna Bloch, [facility]IA CARRIZALES, CCC-SLP

## 2020-03-23 NOTE — DISCHARGE SUMMARY
Hospitalist Discharge Summary     Admit Date:  3/19/2020  4:03 PM   Name:  Ira Rankin   Age:  54 y.o.  :  1965   MRN:  [id number]   PCP:  Pratima Zhang MD  Treatment Team: Utilization Review: Leodan Rodriguez RN; Utilization Review: Emilia Faye RN; Primary Nurse: Loreto Newell RN; Care Manager: Vani Celaya RN    Problem List for this Hospitalization:  Hospital Problems as of 3/23/2020 Date Reviewed: 2019          Codes Class Noted - Resolved POA    * (Principal) Respiratory distress ICD-10-CM: R06.03  ICD-9-CM: 786.09  3/19/2020 - Present Yes        Esophageal dysphagia ICD-10-CM: R13.10  ICD-9-CM: 787.20  5/10/2019 - Present Unknown        Non-ischemic cardiomyopathy ([facility] [address] (Chronic) ICD-10-CM: I42.8  ICD-9-CM: 425.4  3/24/2016 - Present Yes        Chronic systolic (congestive) heart failure (HCC) (Chronic) ICD-10-CM: I50.22  ICD-9-CM: 428.22, 428.0  2011 - Present Yes                Admission HPI from 3/19/2020:    \" Patient is a 49yo M with hx nonischemic cardiomyopathy, with severe HFrEF and ICD who presents with 4 to 5 days cough, fever/chills, and worsening dyspnea. No leg swelling. Has been taking diuretics as prescribed. Orthopnea worse than baseline. Cough nonproductive. Very dyspneic when walking. Found in the ER to have RR up to 30, coughing fits. Unable to finish sentences. Mildly elevated trop (chronic), mildly elevated pBNP, mildly elevated lactic acid. Not currently febrile or hypoxic. CXR clear. Received lasix in ER, feeling slightly better. \"    Hospital Course:  Pt admitted for SOB with URI symptoms, was r/o for COVID. Also had some abd pain of unclear etiology. Cardiology didn't think was CHF related. CT c/a/p was with moderate hernia and atelectasis and GB sludge v stones. No acute pathology. Had hx cough and SOB thought initially related to [place] ring in past and GI consulted. GI wanted to defer possibility of EGD to outpatient.  Pt felt well and wanted to go home. He didn't want to stay for barium swallow. He felt well and w/o SOB cough and wanted to leave or sign out AMA. He was dc'd with OP f/u PMD, cardiology, GI. Return precautions given. He was strongly asking for rx flexeril until see's PMD. It was provided. Disposition: Home or Self Care  Activity: Activity as tolerated  Diet: No diet orders on file  Code Status: Prior    Follow up instructions, discharge meds at bottom of this note. Plan was discussed with patient. All questions answered. Patient was stable at time of discharge. Patient will call a physician or return if any concerns. Discharge summary was sent to PCP electronically via \"Comm Mgt\" link in [facility], if possible. Diagnostic Imaging/Tests:   Ct Abd Pelv W Cont    Result Date: 3/22/2020  CT ABDOMEN AND PELVIS WITH CONTRAST HISTORY: Abdominal pain COMPARISON: None TECHNIQUE: Helical imaging was performed from the lung bases through the ischial tuberosities during the intravenous infusion of 100 cc of Isovue-370. Oral contrast was administered. Coronal and sagittal reformats were performed. Dose reduction techniques used: Automated exposure control, adjustment of the mAs and/or kVp according to patient's size, and iterative reconstruction techniques. FINDINGS: *  LUNG BASES: Coronary artery disease. Pacemaker. Moderate-sized hiatal hernia. Left lower lobe atelectasis. *  LIVER: Within normal limits. *  GALLBLADDER AND BILE DUCTS: Gallbladder sludge versus small stones. *  SPLEEN: Within normal limits. *  URINARY TRACT: Simple left renal cysts. *  ADRENALS: Within normal limits. *  PANCREAS: Within normal limits. *  GASTROINTESTINAL TRACT: Within normal limits. *  RETROPERITONEUM: Within normal limits. *  PERITONEAL CAVITY AND ABDOMINAL WALL: Within normal limits. *  PELVIS: Within normal limits. *  SPINE / BONES: Within normal limits. *  OTHER COMMENTS: None. IMPRESSION: Coronary artery disease. Moderate-sized hiatal hernia with left lower lobe atelectasis. Pacemaker. Gallbladder sludge versus small stones. Simple left renal cyst. Date of Dictation: 3/22/2020 9:12 PM     Xr Chest Port    Result Date: 3/19/2020  Portable chest: History: Cough and shortness of breath x2 days. Comparison: 12/16/2019 Findings: A single view of the chest was obtained at 1622 hours. Dual-chamber cardiac pacer/AICD device is present. Retrocardiac opacity corresponds to a known moderate-sized hiatal hernia. The cardiac and mediastinal silhouette are normal in size and configuration. The lungs and pleural spaces are clear. The pulmonary vascularity is within normal limits. Impression: Unremarkable portable chest radiograph for acute pulmonary process. Echocardiogram results:  No results found for this visit on 03/19/20. Procedures done this admission:  * No surgery found *    All Micro Results     Procedure Component Value Units Date/Time    CULTURE, BLOOD [[id number]] Collected:  03/19/20 1754    Order Status:  Completed Specimen:  Blood Updated:  03/23/20 0742     Special Requests: --        LEFT  ARM       Culture result: NO GROWTH 4 DAYS       CULTURE, BLOOD [[id number]] Collected:  03/19/20 1744    Order Status:  Completed Specimen:  Blood Updated:  03/23/20 0742     Special Requests: --        LEFT  HAND       Culture result: NO GROWTH 4 DAYS       CULTURE, STOOL [[id number]] Collected:  03/20/20 1106    Order Status:  Completed Specimen:  Stool Updated:  03/22/20 0915     Special Requests: NO SPECIAL REQUESTS        Culture result:       No Salmonella, Shigella, or Ecoli 0157 isolated.           EMERGENT DISEASE PANEL [[id number]] Collected:  03/19/20 1837    Order Status:  Completed Specimen:  Not Specified Updated:  03/22/20 0650     Emergent disease panel NOT DETECTED        Comment: RESULTS SCANNED IN [facility]  03/22/20, ADS  TEST PERFORMED BY [facility]         C. DIFFICILE AG & TOXIN A/B [[id number]] Collected: 03/20/20 1106    Order Status:  Completed Specimen:  Stool Updated:  03/20/20 1154     [address] ANTIGEN       C. DIFFICILE GDH ANTIGEN-NEGATIVE           C. difficile toxin       C. DIFFICILE TOXIN-NEGATIVE           PCR Reflex NOT APPLICABLE        INTERPRETATION       NEGATIVE FOR TOXIGENIC C. DIFFICILE           Clinical Consideration       NEGATIVE FOR TOXIGENIC C. DIFFICILE                Labs: Results:       BMP, Mg, Phos Recent Labs     03/23/20 0525 03/22/20  1129 03/21/20  0908    137 140   K 3.6 3.6 3.9    102 106   CO2 24 29 29   AGAP 9 6* 5*   BUN 21 19 11   CREA 1.10 1.34 1.08   CA 8.8 8.7 8.7    102* 102*   MG 2.2  --   --       CBC Recent Labs     03/23/20 0525 03/22/20 1129 03/21/20  0908   WBC 6.3 5.8 4.6   RBC 3.86* 3.88* 3.95*   HGB 12.4* 12.4* 12.5*   HCT 37.0* 37.6* 39.1*    197 196   GRANS 59 62 56   LYMPH 27 25 29   EOS 4 4 4   MONOS 9 8 9   BASOS 1 1 1   IG 1 0 0   ANEU 3.7 3.6 2.6   ABL 1.7 1.5 1.3   ANJU 0.2 0.2 0.2   ABM 0.5 0.4 0.4   ABB 0.0 0.0 0.0   AIG 0.0 0.0 0.0      LFT Recent Labs     03/23/20 0525 03/22/20  1129 03/21/20  0908   SGOT 25 20 22   ALT 26 25 27   AP 54 51 57   TP 6.7 7.3 6.4   ALB 3.9 4.3 3.8   GLOB 2.8 3.0 2.6   AGRAT 1.4 1.4 1.5      Cardiac Testing Lab Results   Component Value Date/Time     (H) 05/07/2019 03:53 PM     03/08/2019 01:10 PM    BNP 76 08/15/2018 04:48 PM     01/05/2017 03:51 AM    BNP 27 10/13/2016 08:10 PM    BNP 69 03/29/2016 05:20 AM    B-type Natriuretic Peptide 244.6 (H) 05/06/2019 12:06 PM     07/09/2018 06:02 AM    Troponin-I, Qt. 0.11 (HH) 03/19/2020 04:14 PM    Troponin-I, Qt. 0.09 (H) 03/08/2019 01:10 PM    Troponin-I, Qt. 0.07 (H) 08/15/2018 06:48 PM      Coagulation Tests Lab Results   Component Value Date/Time    Prothrombin time 12.9 01/02/2019 10:55 AM    Prothrombin time 10.2 01/27/2014 01:40 AM    Prothrombin time 10.7 11/28/2011 06:20 PM    INR 1.0 01/02/2019 10:55 AM    INR 1.0 01/27/2014 01:40 AM    INR 1.1 11/28/2011 06:20 PM    aPTT 24.0 03/17/2017 05:14 AM    aPTT 69.3 (H) 03/16/2017 03:40 AM    aPTT 75.3 (H) 03/15/2017 07:37 PM      A1c Lab Results   Component Value Date/Time    Hemoglobin A1c 4.7 (L) 12/06/2017 12:36 PM      Lipid Panel Lab Results   Component Value Date/Time    Cholesterol, total 181 12/23/2018 04:03 AM    HDL Cholesterol 90 (H) 12/23/2018 04:03 AM    LDL,Direct 71 03/25/2016 04:35 AM    LDL, calculated 73.2 12/23/2018 04:03 AM    VLDL, calculated 17.8 12/23/2018 04:03 AM    Triglyceride 89 12/23/2018 04:03 AM    CHOL/HDL Ratio 2.0 12/23/2018 04:03 AM      Thyroid Panel Lab Results   Component Value Date/Time    TSH 1.010 12/06/2017 12:36 PM    TSH 0.901 03/18/2017 11:25 AM        Most Recent UA Lab Results   Component Value Date/Time    Color YELLOW 03/28/2016 04:00 PM    Appearance CLEAR 03/28/2016 04:00 PM    Specific gravity 1.003 03/28/2016 04:00 PM    pH (UA) 5.5 03/28/2016 04:00 PM    Protein NEGATIVE  03/28/2016 04:00 PM    Glucose NEGATIVE  03/28/2016 04:00 PM    Ketone NEGATIVE  03/28/2016 04:00 PM    Bilirubin NEGATIVE  03/28/2016 04:00 PM    Blood NEGATIVE  03/28/2016 04:00 PM    Urobilinogen 0.2 03/28/2016 04:00 PM    Nitrites NEGATIVE  03/28/2016 04:00 PM    Leukocyte Esterase NEGATIVE  03/28/2016 04:00 PM    WBC 0-3 12/16/2019 09:37 PM    RBC 0-3 12/16/2019 09:37 PM    Epithelial cells 0-3 12/16/2019 09:37 PM    Bacteria 0 12/16/2019 09:37 PM    Casts 3-5 12/16/2019 09:37 PM        No Known Allergies  Immunization History   Administered Date(s) Administered    TB Skin Test (PPD) Intradermal 03/14/2017       All Labs from Last 24 Hrs:  Recent Results (from the past 24 hour(s))   CBC WITH AUTOMATED DIFF    Collection Time: 03/23/20  5:25 AM   Result Value Ref Range    WBC 6.3 4.3 - 11.1 K/uL    RBC 3.86 (L) 4.23 - 5.6 M/uL    HGB 12.4 (L) 13.6 - 17.2 g/dL    HCT 37.0 (L) 41.1 - 50.3 %    MCV 95.9 79.6 - 97.8 FL    MCH 32.1 26.1 - 32.9 PG    MCHC 33.5 31.4 - 35.0 g/dL    RDW 15.5 (H) 11.9 - 14.6 %    PLATELET 379 680 - 642 K/uL    MPV 9.5 9.4 - 12.3 FL    ABSOLUTE NRBC 0.00 0.0 - 0.2 K/uL    DF AUTOMATED      NEUTROPHILS 59 43 - 78 %    LYMPHOCYTES 27 13 - 44 %    MONOCYTES 9 4.0 - 12.0 %    EOSINOPHILS 4 0.5 - 7.8 %    BASOPHILS 1 0.0 - 2.0 %    IMMATURE GRANULOCYTES 1 0.0 - 5.0 %    ABS. NEUTROPHILS 3.7 1.7 - 8.2 K/UL    ABS. LYMPHOCYTES 1.7 0.5 - 4.6 K/UL    ABS. MONOCYTES 0.5 0.1 - 1.3 K/UL    ABS. EOSINOPHILS 0.2 0.0 - 0.8 K/UL    ABS. BASOPHILS 0.0 0.0 - 0.2 K/UL    ABS. IMM. GRANS. 0.0 0.0 - 0.5 K/UL   METABOLIC PANEL, COMPREHENSIVE    Collection Time: 03/23/20  5:25 AM   Result Value Ref Range    Sodium 138 136 - 145 mmol/L    Potassium 3.6 3.5 - 5.1 mmol/L    Chloride 105 98 - 107 mmol/L    CO2 24 21 - 32 mmol/L    Anion gap 9 7 - 16 mmol/L    Glucose 100 65 - 100 mg/dL    BUN 21 6 - 23 MG/DL    Creatinine 1.10 0.8 - 1.5 MG/DL    GFR est AA >60 >60 ml/min/1.73m2    GFR est non-AA >60 >60 ml/min/1.73m2    Calcium 8.8 8.3 - 10.4 MG/DL    Bilirubin, total 0.4 0.2 - 1.1 MG/DL    ALT (SGPT) 26 12 - 65 U/L    AST (SGOT) 25 15 - 37 U/L    Alk. phosphatase 54 50 - 136 U/L    Protein, total 6.7 6.3 - 8.2 g/dL    Albumin 3.9 3.5 - 5.0 g/dL    Globulin 2.8 2.3 - 3.5 g/dL    A-G Ratio 1.4 1.2 - 3.5     MAGNESIUM    Collection Time: 03/23/20  5:25 AM   Result Value Ref Range    Magnesium 2.2 1.8 - 2.4 mg/dL       Current Med List in Hospital:   No current facility-administered medications for this encounter. Current Outpatient Medications   Medication Sig    cyclobenzaprine (FLEXERIL) 5 mg tablet Take 1 Tab by mouth two (2) times daily as needed (for severe muscle spasms). Do not take if fatigued, drowsy or if operating heavy machinery or driving    ALPRAZolam (XANAX) 1 mg tablet Take 0.5 Tabs by mouth nightly. Max Daily Amount: 0.5 mg. Indications: anxious    promethazine (PHENERGAN) 25 mg tablet Take 1 Tab by mouth every six (6) hours as needed for Nausea.  sildenafil citrate (VIAGRA) 100 mg tablet Take 1 Tab by mouth as needed (as directed).  HYDROcodone-acetaminophen (NORCO)  mg tablet TAKE ONE TABLET BY MOUTH FOUR TIMES DAILY AS NEEDED    carvedilol (COREG) 25 mg tablet Take 1 Tab by mouth two (2) times daily (with meals).  losartan (COZAAR) 50 mg tablet Take 1 Tab by mouth daily.  furosemide (LASIX) 40 mg tablet Take 1 Tab by mouth two (2) times a day.  atorvastatin (LIPITOR) 80 mg tablet Take 1 Tab by mouth daily.  eplerenone (INSPRA) 25 mg tablet Take 1 Tab by mouth daily.  sucralfate (CARAFATE) 1 gram tablet Take 1 Tab by mouth Before breakfast, lunch, dinner and at bedtime.  ondansetron (ZOFRAN ODT) 4 mg disintegrating tablet Take 1 Tab by mouth every eight (8) hours as needed for Nausea.  magnesium oxide (MAG-OX) 400 mg tablet Take 1 Tab by mouth every twelve (12) hours.  gabapentin (NEURONTIN) 300 mg capsule Take 1 Cap by mouth three (3) times daily. (Patient taking differently: Take 300 mg by mouth two (2) times a day.)    ESOMEPRAZOLE MAG TRIHYDRATE (NEXIUM PO) Take 1 Cap by mouth two (2) times a day. Discharge Exam:  Patient Vitals for the past 24 hrs:   Temp Pulse Resp BP SpO2   03/23/20 1152 97.3 °F (36.3 °C) 85 18 112/68 96 %   03/23/20 0840 97.4 °F (36.3 °C) 85 18 123/87 100 %   03/23/20 0455 97.7 °F (36.5 °C) 84 18 128/88 98 %   03/23/20 0112  82 18 130/78    03/22/20 1930 97.9 °F (36.6 °C) 86 17 121/83 98 %     Oxygen Therapy  O2 Sat (%): 96 % (03/23/20 1152)  Pulse via Oximetry: 88 beats per minute (03/19/20 1726)  O2 Device: Room air (03/22/20 0820)    Estimated body mass index is 29.4 kg/m² as calculated from the following:    Height as of this encounter: 5' 7\" (1.702 m). Weight as of this encounter: 85.1 kg (187 lb 11.2 oz).       Intake/Output Summary (Last 24 hours) at 3/23/2020 1843  Last data filed at 3/23/2020 0458  Gross per 24 hour   Intake 480 ml   Output    Net 480 ml       *Note that automatically entered I/Os may not be accurate; dependent on patient compliance with collection and accurate  by assistants. General:          NAD   Head:               Normocephalic, without obvious abnormality, atraumatic. Nose:               Nares normal. No drainage or sinus tenderness. Lungs:             CTA b/l. No crackles or wheeze  Heart:               RRR  Abdomen:        Soft, non-tender. Not distended.  Bowel sounds normal. No r/g/r  Extremities:     No cyanosis.  No edema. No clubbing  Skin:                Texture, turgor normal. No rashes or lesions.  Not Jaundiced  Neurologic:      Alert and oriented x 3, no focal deficits      Discharge Info:   Discharge Medication List as of 3/23/2020  1:47 PM      START taking these medications    Details   cyclobenzaprine (FLEXERIL) 5 mg tablet Take 1 Tab by mouth two (2) times daily as needed (for severe muscle spasms). Do not take if fatigued, drowsy or if operating heavy machinery or driving, Print, QYAA-33 Tab, R-0         CONTINUE these medications which have NOT CHANGED    Details   ALPRAZolam (XANAX) 1 mg tablet Take 0.5 Tabs by mouth nightly. Max Daily Amount: 0.5 mg. Indications: anxious, Normal, Disp-30 Tab, R-3      promethazine (PHENERGAN) 25 mg tablet Take 1 Tab by mouth every six (6) hours as needed for Nausea., Normal, Disp-30 Tab, R-0      sildenafil citrate (VIAGRA) 100 mg tablet Take 1 Tab by mouth as needed (as directed). , Normal, Disp-10 Tab, R-3      HYDROcodone-acetaminophen (NORCO)  mg tablet TAKE ONE TABLET BY MOUTH FOUR TIMES DAILY AS NEEDED, Historical Med, R-0      carvedilol (COREG) 25 mg tablet Take 1 Tab by mouth two (2) times daily (with meals). , Normal, Disp-180 Tab, R-3      losartan (COZAAR) 50 mg tablet Take 1 Tab by mouth daily. , Normal, Disp-90 Tab, R-2      furosemide (LASIX) 40 mg tablet Take 1 Tab by mouth two (2) times a day., Normal, Disp-180 Tab, R-3      atorvastatin (LIPITOR) 80 mg tablet Take 1 Tab by mouth daily. , Normal, Disp-90 Tab, R-3      eplerenone (INSPRA) 25 mg tablet Take 1 Tab by mouth daily. , Normal, Disp-90 Tab, R-3      sucralfate (CARAFATE) 1 gram tablet Take 1 Tab by mouth Before breakfast, lunch, dinner and at bedtime. , Print, Disp-120 Tab, R-0      ondansetron (ZOFRAN ODT) 4 mg disintegrating tablet Take 1 Tab by mouth every eight (8) hours as needed for Nausea. , Print, Disp-12 Tab, R-0      magnesium oxide (MAG-OX) 400 mg tablet Take 1 Tab by mouth every twelve (12) hours. , Print, Disp-180 Tab, R-3      gabapentin (NEURONTIN) 300 mg capsule Take 1 Cap by mouth three (3) times daily. , Normal, Disp-90 Cap, R-3      ESOMEPRAZOLE MAG TRIHYDRATE (NEXIUM PO) Take 1 Cap by mouth two (2) times a day., Historical Med             Follow Up Orders: Follow-up Appointments   Procedures    FOLLOW UP VISIT Appointment in: Other (Specify) PMD in 3-5 days, Cardiology 3-5days, GI in 2 weeks. PMD in 3-5 days, Cardiology 3-5days, GI in 2 weeks. Standing Status:   Standing     Number of Occurrences:   1     Order Specific Question:   Appointment in     Answer: Other (Specify)       Follow-up Information     Follow up With Specialties Details Why Rebecca Watt MD Family Practice In 1 week Please call and make a 1-week hospital follow-up appointment. [facility]      Samanta Clemons MD Cardiology On 3/31/2020 11 AM [address] [place] [address]  [phone]      [facility]  In 2 weeks Office will call you with an appointment date and time. [place] [id number] [phone]          Time spent in patient discharge planning and coordination 35 minutes.     Signed:  Toi Valadez MD

## 2020-03-23 NOTE — PROGRESS NOTES
Farideh Delacruz. Informed Unable to obtain 20 g ac or higher IV for the CT chest scan. He has a 22 g in his hand they can push the contrast for the abdominal scans. Pt already drank oral contrast. Do you want us to proceed with just the abd scans? and do the Chest tomorrow? Dr. Lucio Delacruz replied. Yes, that sounds like a plan. If CT chest is not a PE protocol, then we can do chest tonight as well. If it's a PE protocol then do chest tomorrow.  I'm not seeing PE protocol though in the order

## 2020-03-23 NOTE — DISCHARGE INSTRUCTIONS
DISCHARGE SUMMARY from Nurse    PATIENT INSTRUCTIONS:    After general anesthesia or intravenous sedation, for 24 hours or while taking prescription Narcotics:  · Limit your activities  · Do not drive and operate hazardous machinery  · Do not make important personal or business decisions  · Do  not drink alcoholic beverages  · If you have not urinated within 8 hours after discharge, please contact your surgeon on call. Report the following to your surgeon:  · Excessive pain, swelling, redness or odor of or around the surgical area  · Temperature over 100.5  · Nausea and vomiting lasting longer than 4 hours or if unable to take medications  · Any signs of decreased circulation or nerve impairment to extremity: change in color, persistent  numbness, tingling, coldness or increase pain  · Any questions    What to do at Home:  Recommended activity: Activity as tolerated    If you experience any of the following symptoms shortness of breath not relieved by rest, pain not relieved by medications, chest pain or pressure, increase in weakness fatigue or swelling or temperature greater than 101 please follow up with MD.    *  Please give a list of your current medications to your Primary Care Provider. *  Please update this list whenever your medications are discontinued, doses are      changed, or new medications (including over-the-counter products) are added. *  Please carry medication information at all times in case of emergency situations. These are general instructions for a healthy lifestyle:    No smoking/ No tobacco products/ Avoid exposure to second hand smoke  Surgeon General's Warning:  Quitting smoking now greatly reduces serious risk to your health.     Obesity, smoking, and sedentary lifestyle greatly increases your risk for illness    A healthy diet, regular physical exercise & weight monitoring are important for maintaining a healthy lifestyle    You may be retaining fluid if you have a history of heart failure or if you experience any of the following symptoms:  Weight gain of 3 pounds or more overnight or 5 pounds in a week, increased swelling in our hands or feet or shortness of breath while lying flat in bed. Please call your doctor as soon as you notice any of these symptoms; do not wait until your next office visit. The discharge information has been reviewed with the patient. The patient verbalized understanding. Discharge medications reviewed with the patient and appropriate educational materials and side effects teaching were provided.   ___________________________________________________________________________________________________________________________________

## 2020-03-23 NOTE — PROGRESS NOTES
Norco 10 mg po given.   Phenergan given for c/o nausea.     03/22/20 0736   Pain 1   Pain Scale 1 Numeric (0 - 10)   Pain Intensity 1 8   Patient Stated Pain Goal 0   Pain Onset 1 chronic   Pain Location 1 Back   Pain Orientation 1 Mid   Pain Description 1 Constant   Pain Intervention(s) 1 Medication (see MAR)

## 2020-03-23 NOTE — PROGRESS NOTES
MSN, CM:  Patient to be discharged home today with no services ordered or requested. Patient agrees with this discharge plan. Patient has met all milestones for this admission. Care Management Interventions  PCP Verified by CM:  Yes  Physical Therapy Consult: No  Occupational Therapy Consult: No  Current Support Network: Lives with Spouse  Confirm Follow Up Transport: Family  Freedom of Choice List was Provided with Basic Dialogue that Supports the Patient's Individualized Plan of Care/Goals, Treatment Preferences and Shares the Quality Data Associated with the Providers?: Yes  [place] Resource Information Provided?: No  Discharge Location  Discharge Placement: Home

## 2020-03-23 NOTE — PROGRESS NOTES
DC instructions given to pt. Pt being DC home to self care with follow up appts. Pt medications, appts, and instructions given and understood. Flexeril script sent home with pt. Pt awaiting ride from wife and then will be DC home to self care.

## 2020-03-23 NOTE — PROGRESS NOTES
Pt sitting up in chair. Pt alert oriented times 3 a this time. Pt complaints of back pain 5/10 at this time. No distress at this time.   Pt encouraged to call for assistance if needed call light in reach, will monitor

## 2020-03-23 NOTE — PROGRESS NOTES
Received bedside shift report from [facility], RN. Pt lying in bed. No apparent distress. Respirations even and unlabored. Instructed to call for assistance with needs, as they arise. Pt voiced understanding.

## 2020-03-23 NOTE — PROGRESS NOTES
Called patient by phone (precaution policy)  Mary Ellen Freedman has known patient for many years  He was very thankful for the call  Prayer offered      Blanquita Gomes, staff

## 2020-03-23 NOTE — PROGRESS NOTES
Morphine 1 mg IV given. Phenergan 12.5 mg IV given for c/o nausea.      03/23/20 0040   Pain 1   Pain Scale 1 Numeric (0 - 10)   Pain Intensity 1 8   Patient Stated Pain Goal 0   Pain Onset 1 chronic   Pain Location 1 Back   Pain Orientation 1 Mid   Pain Description 1 Constant   Pain Intervention(s) 1 Medication (see MAR)

## 2020-03-23 NOTE — PROGRESS NOTES
Morphine 1 mg IV given.      03/23/20 0637   Pain 1   Pain Scale 1 Numeric (0 - 10)   Pain Intensity 1 8   Patient Stated Pain Goal 0   Pain Onset 1 chronic   Pain Location 1 Back   Pain Orientation 1 Mid   Pain Description 1 Constant   Pain Intervention(s) 1 Medication (see MAR)

## 2020-03-23 NOTE — PROGRESS NOTES
Norco 10 mg po given.        03/23/20 0216   Pain 1   Pain Scale 1 Numeric (0 - 10)   Pain Intensity 1 8   Patient Stated Pain Goal 0   Pain Onset 1 chronic   Pain Location 1 Back   Pain Orientation 1 Mid   Pain Description 1 Constant   Pain Intervention(s) 1 Medication (see MAR)

## 2020-03-24 LAB
BACTERIA SPEC CULT: NORMAL
BACTERIA SPEC CULT: NORMAL
SERVICE CMNT-IMP: NORMAL
SERVICE CMNT-IMP: NORMAL

## 2020-07-02 ENCOUNTER — HOSPITAL ENCOUNTER (INPATIENT)
Age: 55
LOS: 6 days | Discharge: HOME | DRG: 204 | End: 2020-07-08
Attending: EMERGENCY MEDICINE | Admitting: FAMILY MEDICINE
Payer: COMMERCIAL

## 2020-07-02 ENCOUNTER — APPOINTMENT (OUTPATIENT)
Dept: GENERAL RADIOLOGY | Age: 55
DRG: 204 | End: 2020-07-02
Attending: EMERGENCY MEDICINE
Payer: COMMERCIAL

## 2020-07-02 DIAGNOSIS — J98.01 ACUTE BRONCHOSPASM: Primary | ICD-10-CM

## 2020-07-02 PROBLEM — R05.9 COUGH: Status: ACTIVE | Noted: 2020-07-02

## 2020-07-02 PROBLEM — R11.10 VOMITING: Status: ACTIVE | Noted: 2020-07-02

## 2020-07-02 PROBLEM — Z20.822 SUSPECTED COVID-19 VIRUS INFECTION: Status: ACTIVE | Noted: 2020-07-02

## 2020-07-02 LAB
ALBUMIN SERPL-MCNC: 3.8 G/DL (ref 3.5–5)
ALBUMIN/GLOB SERPL: 1 {RATIO} (ref 1.2–3.5)
ALP SERPL-CCNC: 78 U/L (ref 50–136)
ALT SERPL-CCNC: 35 U/L (ref 12–65)
ANION GAP SERPL CALC-SCNC: 26 MMOL/L (ref 7–16)
AST SERPL-CCNC: 54 U/L (ref 15–37)
BASOPHILS # BLD: 0 K/UL (ref 0–0.2)
BASOPHILS NFR BLD: 1 % (ref 0–2)
BILIRUB SERPL-MCNC: 1.7 MG/DL (ref 0.2–1.1)
BNP SERPL-MCNC: 266 PG/ML (ref 5–125)
BUN SERPL-MCNC: 12 MG/DL (ref 6–23)
CALCIUM SERPL-MCNC: 8 MG/DL (ref 8.3–10.4)
CHLORIDE SERPL-SCNC: 100 MMOL/L (ref 98–107)
CO2 SERPL-SCNC: 12 MMOL/L (ref 21–32)
CREAT SERPL-MCNC: 1.02 MG/DL (ref 0.8–1.5)
DIFFERENTIAL METHOD BLD: ABNORMAL
EOSINOPHIL # BLD: 0.1 K/UL (ref 0–0.8)
EOSINOPHIL NFR BLD: 1 % (ref 0.5–7.8)
ERYTHROCYTE [DISTWIDTH] IN BLOOD BY AUTOMATED COUNT: 17.1 % (ref 11.9–14.6)
FERRITIN SERPL-MCNC: 213 NG/ML (ref 8–388)
GLOBULIN SER CALC-MCNC: 3.7 G/DL (ref 2.3–3.5)
GLUCOSE SERPL-MCNC: 112 MG/DL (ref 65–100)
HCT VFR BLD AUTO: 38.9 % (ref 41.1–50.3)
HGB BLD-MCNC: 12.8 G/DL (ref 13.6–17.2)
IMM GRANULOCYTES # BLD AUTO: 0 K/UL (ref 0–0.5)
IMM GRANULOCYTES NFR BLD AUTO: 0 % (ref 0–5)
LDH SERPL L TO P-CCNC: 586 U/L (ref 100–190)
LIPASE SERPL-CCNC: 48 U/L (ref 73–393)
LYMPHOCYTES # BLD: 1.3 K/UL (ref 0.5–4.6)
LYMPHOCYTES NFR BLD: 30 % (ref 13–44)
MCH RBC QN AUTO: 29.8 PG (ref 26.1–32.9)
MCHC RBC AUTO-ENTMCNC: 32.9 G/DL (ref 31.4–35)
MCV RBC AUTO: 90.7 FL (ref 79.6–97.8)
MONOCYTES # BLD: 0.2 K/UL (ref 0.1–1.3)
MONOCYTES NFR BLD: 5 % (ref 4–12)
NEUTS SEG # BLD: 2.7 K/UL (ref 1.7–8.2)
NEUTS SEG NFR BLD: 63 % (ref 43–78)
NRBC # BLD: 0 K/UL (ref 0–0.2)
PLATELET # BLD AUTO: 199 K/UL (ref 150–450)
PMV BLD AUTO: 8.8 FL (ref 9.4–12.3)
POTASSIUM SERPL-SCNC: 3.6 MMOL/L (ref 3.5–5.1)
PROT SERPL-MCNC: 7.5 G/DL (ref 6.3–8.2)
RBC # BLD AUTO: 4.29 M/UL (ref 4.23–5.6)
SODIUM SERPL-SCNC: 138 MMOL/L (ref 136–145)
WBC # BLD AUTO: 4.3 K/UL (ref 4.3–11.1)

## 2020-07-02 PROCEDURE — 71045 X-RAY EXAM CHEST 1 VIEW: CPT

## 2020-07-02 PROCEDURE — 83880 ASSAY OF NATRIURETIC PEPTIDE: CPT

## 2020-07-02 PROCEDURE — 74011250636 HC RX REV CODE- 250/636: Performed by: EMERGENCY MEDICINE

## 2020-07-02 PROCEDURE — 94640 AIRWAY INHALATION TREATMENT: CPT

## 2020-07-02 PROCEDURE — 80053 COMPREHEN METABOLIC PANEL: CPT

## 2020-07-02 PROCEDURE — 85025 COMPLETE CBC W/AUTO DIFF WBC: CPT

## 2020-07-02 PROCEDURE — 83690 ASSAY OF LIPASE: CPT

## 2020-07-02 PROCEDURE — 99285 EMERGENCY DEPT VISIT HI MDM: CPT

## 2020-07-02 PROCEDURE — 93005 ELECTROCARDIOGRAM TRACING: CPT | Performed by: EMERGENCY MEDICINE

## 2020-07-02 PROCEDURE — 81003 URINALYSIS AUTO W/O SCOPE: CPT

## 2020-07-02 PROCEDURE — 87635 SARS-COV-2 COVID-19 AMP PRB: CPT

## 2020-07-02 PROCEDURE — 82728 ASSAY OF FERRITIN: CPT

## 2020-07-02 PROCEDURE — 81015 MICROSCOPIC EXAM OF URINE: CPT

## 2020-07-02 PROCEDURE — 74011000250 HC RX REV CODE- 250: Performed by: EMERGENCY MEDICINE

## 2020-07-02 PROCEDURE — 96372 THER/PROPH/DIAG INJ SC/IM: CPT

## 2020-07-02 PROCEDURE — 96374 THER/PROPH/DIAG INJ IV PUSH: CPT

## 2020-07-02 PROCEDURE — 83615 LACTATE (LD) (LDH) ENZYME: CPT

## 2020-07-02 PROCEDURE — 65270000029 HC RM PRIVATE

## 2020-07-02 RX ORDER — PROMETHAZINE HYDROCHLORIDE 25 MG/ML
25 INJECTION, SOLUTION INTRAMUSCULAR; INTRAVENOUS ONCE
Status: COMPLETED | OUTPATIENT
Start: 2020-07-02 | End: 2020-07-02

## 2020-07-02 RX ORDER — MORPHINE SULFATE 4 MG/ML
4 INJECTION INTRAVENOUS
Status: COMPLETED | OUTPATIENT
Start: 2020-07-02 | End: 2020-07-02

## 2020-07-02 RX ORDER — IPRATROPIUM BROMIDE AND ALBUTEROL SULFATE 2.5; .5 MG/3ML; MG/3ML
3 SOLUTION RESPIRATORY (INHALATION)
Status: COMPLETED | OUTPATIENT
Start: 2020-07-02 | End: 2020-07-02

## 2020-07-02 RX ORDER — FUROSEMIDE 10 MG/ML
40 INJECTION INTRAMUSCULAR; INTRAVENOUS
Status: DISCONTINUED | OUTPATIENT
Start: 2020-07-02 | End: 2020-07-02

## 2020-07-02 RX ADMIN — IPRATROPIUM BROMIDE AND ALBUTEROL SULFATE 3 ML: .5; 3 SOLUTION RESPIRATORY (INHALATION) at 21:45

## 2020-07-02 RX ADMIN — MORPHINE SULFATE 4 MG: 4 INJECTION INTRAVENOUS at 22:05

## 2020-07-02 RX ADMIN — PROMETHAZINE HYDROCHLORIDE 25 MG: 25 INJECTION INTRAMUSCULAR; INTRAVENOUS at 22:01

## 2020-07-02 NOTE — ED TRIAGE NOTES
Patient arrives ambulatory to triage with mask in place. Patient reports \"a host of problems. \"  Patient reports shortness of breath, fatigue, neck and back pain. Patient with dry cough. Patient reports hot flashes, no chills, no fever. Patient reports positive covid exposure this week. Patient reports RLQ abdominal pain and severe nausea, diarrhea.

## 2020-07-03 PROBLEM — E87.6 HYPOKALEMIA: Status: ACTIVE | Noted: 2020-07-03

## 2020-07-03 PROBLEM — R94.31 LONG QT INTERVAL: Status: ACTIVE | Noted: 2020-07-03

## 2020-07-03 LAB
ANION GAP SERPL CALC-SCNC: 14 MMOL/L (ref 7–16)
APTT PPP: 28.3 SEC (ref 24.3–35.4)
ARTERIAL PATENCY WRIST A: NO
ATRIAL RATE: 108 BPM
BACTERIA URNS QL MICRO: 0 /HPF
BASE EXCESS BLD CALC-SCNC: 2 MMOL/L
BASOPHILS # BLD: 0 K/UL (ref 0–0.2)
BASOPHILS NFR BLD: 0 % (ref 0–2)
BDY SITE: ABNORMAL
BUN SERPL-MCNC: 10 MG/DL (ref 6–23)
CALCIUM SERPL-MCNC: 7.7 MG/DL (ref 8.3–10.4)
CALCULATED P AXIS, ECG09: 45 DEGREES
CALCULATED R AXIS, ECG10: -32 DEGREES
CALCULATED T AXIS, ECG11: 108 DEGREES
CASTS URNS QL MICRO: 0 /LPF
CHLORIDE SERPL-SCNC: 99 MMOL/L (ref 98–107)
CO2 BLD-SCNC: 27 MMOL/L
CO2 SERPL-SCNC: 25 MMOL/L (ref 21–32)
COLLECT TIME,HTIME: 0
CREAT SERPL-MCNC: 1.1 MG/DL (ref 0.8–1.5)
CRP SERPL-MCNC: <0.3 MG/DL (ref 0–0.9)
D DIMER PPP FEU-MCNC: <0.27 UG/ML(FEU)
DIAGNOSIS, 93000: NORMAL
DIFFERENTIAL METHOD BLD: ABNORMAL
EOSINOPHIL # BLD: 0.1 K/UL (ref 0–0.8)
EOSINOPHIL NFR BLD: 1 % (ref 0.5–7.8)
EPI CELLS #/AREA URNS HPF: 0 /HPF
ERYTHROCYTE [DISTWIDTH] IN BLOOD BY AUTOMATED COUNT: 17.1 % (ref 11.9–14.6)
FIBRINOGEN PPP-MCNC: 180 MG/DL (ref 190–501)
GAS FLOW.O2 O2 DELIVERY SYS: ABNORMAL L/MIN
GLUCOSE SERPL-MCNC: 103 MG/DL (ref 65–100)
HCO3 BLD-SCNC: 25.7 MMOL/L (ref 22–26)
HCT VFR BLD AUTO: 36.1 % (ref 41.1–50.3)
HGB BLD-MCNC: 12.3 G/DL (ref 13.6–17.2)
IMM GRANULOCYTES # BLD AUTO: 0 K/UL (ref 0–0.5)
IMM GRANULOCYTES NFR BLD AUTO: 0 % (ref 0–5)
INR PPP: 1.1
LDH SERPL L TO P-CCNC: 399 U/L (ref 100–190)
LYMPHOCYTES # BLD: 1.6 K/UL (ref 0.5–4.6)
LYMPHOCYTES NFR BLD: 33 % (ref 13–44)
MAGNESIUM SERPL-MCNC: 1.4 MG/DL (ref 1.8–2.4)
MCH RBC QN AUTO: 31 PG (ref 26.1–32.9)
MCHC RBC AUTO-ENTMCNC: 34.1 G/DL (ref 31.4–35)
MCV RBC AUTO: 90.9 FL (ref 79.6–97.8)
MONOCYTES # BLD: 0.2 K/UL (ref 0.1–1.3)
MONOCYTES NFR BLD: 3 % (ref 4–12)
NEUTS SEG # BLD: 3 K/UL (ref 1.7–8.2)
NEUTS SEG NFR BLD: 62 % (ref 43–78)
NRBC # BLD: 0 K/UL (ref 0–0.2)
P-R INTERVAL, ECG05: 166 MS
PCO2 BLD: 36.1 MMHG (ref 35–45)
PH BLD: 7.46 [PH] (ref 7.35–7.45)
PHOSPHATE SERPL-MCNC: 3.6 MG/DL (ref 2.5–4.5)
PLATELET # BLD AUTO: 188 K/UL (ref 150–450)
PMV BLD AUTO: 9 FL (ref 9.4–12.3)
PO2 BLD: 84 MMHG (ref 75–100)
POTASSIUM SERPL-SCNC: 2.7 MMOL/L (ref 3.5–5.1)
PROTHROMBIN TIME: 14.2 SEC (ref 12–14.7)
Q-T INTERVAL, ECG07: 354 MS
QRS DURATION, ECG06: 106 MS
QTC CALCULATION (BEZET), ECG08: 474 MS
RBC # BLD AUTO: 3.97 M/UL (ref 4.23–5.6)
RBC #/AREA URNS HPF: NORMAL /HPF
SAO2 % BLD: 97 % (ref 95–98)
SERVICE CMNT-IMP: ABNORMAL
SODIUM SERPL-SCNC: 138 MMOL/L (ref 136–145)
SPECIMEN TYPE: ABNORMAL
VENTRICULAR RATE, ECG03: 108 BPM
WBC # BLD AUTO: 4.8 K/UL (ref 4.3–11.1)
WBC URNS QL MICRO: 0 /HPF

## 2020-07-03 PROCEDURE — 74011250637 HC RX REV CODE- 250/637: Performed by: FAMILY MEDICINE

## 2020-07-03 PROCEDURE — 83735 ASSAY OF MAGNESIUM: CPT

## 2020-07-03 PROCEDURE — 86140 C-REACTIVE PROTEIN: CPT

## 2020-07-03 PROCEDURE — 85025 COMPLETE CBC W/AUTO DIFF WBC: CPT

## 2020-07-03 PROCEDURE — 74011250636 HC RX REV CODE- 250/636: Performed by: FAMILY MEDICINE

## 2020-07-03 PROCEDURE — 86900 BLOOD TYPING SEROLOGIC ABO: CPT

## 2020-07-03 PROCEDURE — 82803 BLOOD GASES ANY COMBINATION: CPT

## 2020-07-03 PROCEDURE — 74011250636 HC RX REV CODE- 250/636: Performed by: EMERGENCY MEDICINE

## 2020-07-03 PROCEDURE — 36600 WITHDRAWAL OF ARTERIAL BLOOD: CPT

## 2020-07-03 PROCEDURE — 84132 ASSAY OF SERUM POTASSIUM: CPT

## 2020-07-03 PROCEDURE — 65660000000 HC RM CCU STEPDOWN

## 2020-07-03 PROCEDURE — 85384 FIBRINOGEN ACTIVITY: CPT

## 2020-07-03 PROCEDURE — 84100 ASSAY OF PHOSPHORUS: CPT

## 2020-07-03 PROCEDURE — 93005 ELECTROCARDIOGRAM TRACING: CPT | Performed by: FAMILY MEDICINE

## 2020-07-03 PROCEDURE — 85730 THROMBOPLASTIN TIME PARTIAL: CPT

## 2020-07-03 PROCEDURE — 83615 LACTATE (LD) (LDH) ENZYME: CPT

## 2020-07-03 PROCEDURE — 80048 BASIC METABOLIC PNL TOTAL CA: CPT

## 2020-07-03 PROCEDURE — 85379 FIBRIN DEGRADATION QUANT: CPT

## 2020-07-03 PROCEDURE — 85610 PROTHROMBIN TIME: CPT

## 2020-07-03 RX ORDER — LORAZEPAM 2 MG/ML
0.5 INJECTION INTRAMUSCULAR
Status: DISCONTINUED | OUTPATIENT
Start: 2020-07-03 | End: 2020-07-08 | Stop reason: HOSPADM

## 2020-07-03 RX ORDER — ATORVASTATIN CALCIUM 80 MG/1
80 TABLET, FILM COATED ORAL DAILY
Status: DISCONTINUED | OUTPATIENT
Start: 2020-07-03 | End: 2020-07-08 | Stop reason: HOSPADM

## 2020-07-03 RX ORDER — PANTOPRAZOLE SODIUM 40 MG/1
40 TABLET, DELAYED RELEASE ORAL
Status: DISCONTINUED | OUTPATIENT
Start: 2020-07-03 | End: 2020-07-08 | Stop reason: HOSPADM

## 2020-07-03 RX ORDER — LANOLIN ALCOHOL/MO/W.PET/CERES
400 CREAM (GRAM) TOPICAL EVERY 12 HOURS
Status: DISCONTINUED | OUTPATIENT
Start: 2020-07-03 | End: 2020-07-03

## 2020-07-03 RX ORDER — ACETAMINOPHEN 325 MG/1
650 TABLET ORAL
Status: DISCONTINUED | OUTPATIENT
Start: 2020-07-03 | End: 2020-07-08 | Stop reason: HOSPADM

## 2020-07-03 RX ORDER — ENOXAPARIN SODIUM 100 MG/ML
40 INJECTION SUBCUTANEOUS EVERY 24 HOURS
Status: DISCONTINUED | OUTPATIENT
Start: 2020-07-03 | End: 2020-07-08 | Stop reason: HOSPADM

## 2020-07-03 RX ORDER — HYDROCODONE BITARTRATE AND ACETAMINOPHEN 10; 325 MG/1; MG/1
1 TABLET ORAL
Status: DISCONTINUED | OUTPATIENT
Start: 2020-07-03 | End: 2020-07-04

## 2020-07-03 RX ORDER — MAGNESIUM SULFATE HEPTAHYDRATE 40 MG/ML
2 INJECTION, SOLUTION INTRAVENOUS
Status: COMPLETED | OUTPATIENT
Start: 2020-07-03 | End: 2020-07-03

## 2020-07-03 RX ORDER — LOSARTAN POTASSIUM 50 MG/1
50 TABLET ORAL DAILY
Status: DISCONTINUED | OUTPATIENT
Start: 2020-07-03 | End: 2020-07-08 | Stop reason: HOSPADM

## 2020-07-03 RX ORDER — CYCLOBENZAPRINE HCL 10 MG
5 TABLET ORAL
Status: DISCONTINUED | OUTPATIENT
Start: 2020-07-03 | End: 2020-07-08 | Stop reason: HOSPADM

## 2020-07-03 RX ORDER — ONDANSETRON 4 MG/1
4 TABLET, ORALLY DISINTEGRATING ORAL
Status: DISCONTINUED | OUTPATIENT
Start: 2020-07-03 | End: 2020-07-03

## 2020-07-03 RX ORDER — GABAPENTIN 300 MG/1
300 CAPSULE ORAL 2 TIMES DAILY
Status: DISCONTINUED | OUTPATIENT
Start: 2020-07-03 | End: 2020-07-08 | Stop reason: HOSPADM

## 2020-07-03 RX ORDER — SUCRALFATE 1 G/1
1 TABLET ORAL
Status: DISCONTINUED | OUTPATIENT
Start: 2020-07-03 | End: 2020-07-08 | Stop reason: HOSPADM

## 2020-07-03 RX ORDER — PROMETHAZINE HYDROCHLORIDE 25 MG/1
25 TABLET ORAL
Status: DISCONTINUED | OUTPATIENT
Start: 2020-07-03 | End: 2020-07-03

## 2020-07-03 RX ORDER — EPLERENONE 25 MG/1
25 TABLET, FILM COATED ORAL DAILY
Status: DISCONTINUED | OUTPATIENT
Start: 2020-07-03 | End: 2020-07-08 | Stop reason: HOSPADM

## 2020-07-03 RX ORDER — MAGNESIUM SULFATE HEPTAHYDRATE 40 MG/ML
2 INJECTION, SOLUTION INTRAVENOUS
Status: DISPENSED | OUTPATIENT
Start: 2020-07-03 | End: 2020-07-03

## 2020-07-03 RX ORDER — PROMETHAZINE HYDROCHLORIDE 25 MG/ML
25 INJECTION, SOLUTION INTRAMUSCULAR; INTRAVENOUS ONCE
Status: COMPLETED | OUTPATIENT
Start: 2020-07-03 | End: 2020-07-03

## 2020-07-03 RX ORDER — SODIUM CHLORIDE 9 MG/ML
100 INJECTION, SOLUTION INTRAVENOUS CONTINUOUS
Status: DISPENSED | OUTPATIENT
Start: 2020-07-03 | End: 2020-07-04

## 2020-07-03 RX ORDER — BENZONATATE 100 MG/1
100 CAPSULE ORAL
Status: DISCONTINUED | OUTPATIENT
Start: 2020-07-03 | End: 2020-07-08 | Stop reason: HOSPADM

## 2020-07-03 RX ORDER — POTASSIUM CHLORIDE 14.9 MG/ML
20 INJECTION INTRAVENOUS
Status: COMPLETED | OUTPATIENT
Start: 2020-07-03 | End: 2020-07-03

## 2020-07-03 RX ORDER — POTASSIUM CHLORIDE 14.9 MG/ML
20 INJECTION INTRAVENOUS
Status: DISCONTINUED | OUTPATIENT
Start: 2020-07-03 | End: 2020-07-03

## 2020-07-03 RX ORDER — SODIUM CHLORIDE 0.9 % (FLUSH) 0.9 %
5-40 SYRINGE (ML) INJECTION EVERY 8 HOURS
Status: DISCONTINUED | OUTPATIENT
Start: 2020-07-03 | End: 2020-07-08 | Stop reason: HOSPADM

## 2020-07-03 RX ORDER — LANOLIN ALCOHOL/MO/W.PET/CERES
400 CREAM (GRAM) TOPICAL EVERY 12 HOURS
Status: DISCONTINUED | OUTPATIENT
Start: 2020-07-03 | End: 2020-07-08 | Stop reason: HOSPADM

## 2020-07-03 RX ORDER — FUROSEMIDE 10 MG/ML
40 INJECTION INTRAMUSCULAR; INTRAVENOUS
Status: COMPLETED | OUTPATIENT
Start: 2020-07-03 | End: 2020-07-03

## 2020-07-03 RX ORDER — SODIUM CHLORIDE 0.9 % (FLUSH) 0.9 %
5-40 SYRINGE (ML) INJECTION AS NEEDED
Status: DISCONTINUED | OUTPATIENT
Start: 2020-07-03 | End: 2020-07-08 | Stop reason: HOSPADM

## 2020-07-03 RX ORDER — ALPRAZOLAM 0.5 MG/1
0.5 TABLET ORAL
Status: DISCONTINUED | OUTPATIENT
Start: 2020-07-03 | End: 2020-07-03

## 2020-07-03 RX ORDER — FUROSEMIDE 40 MG/1
40 TABLET ORAL 2 TIMES DAILY
Status: DISCONTINUED | OUTPATIENT
Start: 2020-07-03 | End: 2020-07-07

## 2020-07-03 RX ORDER — CARVEDILOL 25 MG/1
25 TABLET ORAL 2 TIMES DAILY WITH MEALS
Status: DISCONTINUED | OUTPATIENT
Start: 2020-07-03 | End: 2020-07-08 | Stop reason: HOSPADM

## 2020-07-03 RX ORDER — ONDANSETRON 2 MG/ML
4 INJECTION INTRAMUSCULAR; INTRAVENOUS
Status: DISCONTINUED | OUTPATIENT
Start: 2020-07-03 | End: 2020-07-03

## 2020-07-03 RX ADMIN — LORAZEPAM 0.5 MG: 2 INJECTION INTRAMUSCULAR; INTRAVENOUS at 12:31

## 2020-07-03 RX ADMIN — MAGNESIUM GLUCONATE 500 MG ORAL TABLET 400 MG: 500 TABLET ORAL at 21:19

## 2020-07-03 RX ADMIN — HYDROCODONE BITARTRATE AND ACETAMINOPHEN 1 TABLET: 10; 325 TABLET ORAL at 22:09

## 2020-07-03 RX ADMIN — POTASSIUM CHLORIDE 20 MEQ: 200 INJECTION, SOLUTION INTRAVENOUS at 09:10

## 2020-07-03 RX ADMIN — Medication 5 ML: at 01:31

## 2020-07-03 RX ADMIN — GABAPENTIN 300 MG: 300 CAPSULE ORAL at 08:12

## 2020-07-03 RX ADMIN — SUCRALFATE 1 G: 1 TABLET ORAL at 15:54

## 2020-07-03 RX ADMIN — CARVEDILOL 25 MG: 25 TABLET, FILM COATED ORAL at 16:58

## 2020-07-03 RX ADMIN — PROMETHAZINE HYDROCHLORIDE 25 MG: 25 INJECTION INTRAMUSCULAR; INTRAVENOUS at 01:22

## 2020-07-03 RX ADMIN — SUCRALFATE 1 G: 1 TABLET ORAL at 12:31

## 2020-07-03 RX ADMIN — SUCRALFATE 1 G: 1 TABLET ORAL at 21:19

## 2020-07-03 RX ADMIN — HYDROCODONE BITARTRATE AND ACETAMINOPHEN 1 TABLET: 10; 325 TABLET ORAL at 15:54

## 2020-07-03 RX ADMIN — MAGNESIUM SULFATE HEPTAHYDRATE 2 G: 40 INJECTION, SOLUTION INTRAVENOUS at 15:54

## 2020-07-03 RX ADMIN — FUROSEMIDE 40 MG: 40 TABLET ORAL at 08:12

## 2020-07-03 RX ADMIN — POTASSIUM CHLORIDE 20 MEQ: 200 INJECTION, SOLUTION INTRAVENOUS at 19:40

## 2020-07-03 RX ADMIN — MAGNESIUM SULFATE HEPTAHYDRATE 2 G: 40 INJECTION, SOLUTION INTRAVENOUS at 16:58

## 2020-07-03 RX ADMIN — GABAPENTIN 300 MG: 300 CAPSULE ORAL at 18:12

## 2020-07-03 RX ADMIN — POTASSIUM CHLORIDE 20 MEQ: 200 INJECTION, SOLUTION INTRAVENOUS at 15:05

## 2020-07-03 RX ADMIN — PROMETHAZINE HYDROCHLORIDE 25 MG: 25 TABLET ORAL at 09:09

## 2020-07-03 RX ADMIN — HYDROCODONE BITARTRATE AND ACETAMINOPHEN 1 TABLET: 10; 325 TABLET ORAL at 08:12

## 2020-07-03 RX ADMIN — Medication 5 ML: at 06:00

## 2020-07-03 RX ADMIN — FUROSEMIDE 40 MG: 40 TABLET ORAL at 18:12

## 2020-07-03 RX ADMIN — MAGNESIUM SULFATE HEPTAHYDRATE 2 G: 40 INJECTION, SOLUTION INTRAVENOUS at 18:12

## 2020-07-03 RX ADMIN — HYDROCODONE BITARTRATE AND ACETAMINOPHEN 1 TABLET: 10; 325 TABLET ORAL at 03:10

## 2020-07-03 RX ADMIN — LORAZEPAM 0.5 MG: 2 INJECTION INTRAMUSCULAR; INTRAVENOUS at 18:12

## 2020-07-03 RX ADMIN — SUCRALFATE 1 G: 1 TABLET ORAL at 08:12

## 2020-07-03 RX ADMIN — MAGNESIUM GLUCONATE 500 MG ORAL TABLET 400 MG: 500 TABLET ORAL at 08:12

## 2020-07-03 RX ADMIN — ATORVASTATIN CALCIUM 80 MG: 80 TABLET, FILM COATED ORAL at 08:12

## 2020-07-03 RX ADMIN — ONDANSETRON 4 MG: 2 INJECTION INTRAMUSCULAR; INTRAVENOUS at 08:12

## 2020-07-03 RX ADMIN — FUROSEMIDE 40 MG: 10 INJECTION INTRAMUSCULAR; INTRAVENOUS at 01:22

## 2020-07-03 RX ADMIN — Medication 5 ML: at 23:24

## 2020-07-03 RX ADMIN — ENOXAPARIN SODIUM 40 MG: 40 INJECTION SUBCUTANEOUS at 08:11

## 2020-07-03 RX ADMIN — PANTOPRAZOLE SODIUM 40 MG: 40 TABLET, DELAYED RELEASE ORAL at 08:12

## 2020-07-03 RX ADMIN — LOSARTAN POTASSIUM 50 MG: 50 TABLET, FILM COATED ORAL at 08:12

## 2020-07-03 RX ADMIN — SODIUM CHLORIDE 100 ML/HR: 9 INJECTION, SOLUTION INTRAVENOUS at 23:24

## 2020-07-03 RX ADMIN — CARVEDILOL 25 MG: 25 TABLET, FILM COATED ORAL at 08:12

## 2020-07-03 RX ADMIN — POTASSIUM CHLORIDE 20 MEQ: 200 INJECTION, SOLUTION INTRAVENOUS at 16:58

## 2020-07-03 RX ADMIN — SODIUM CHLORIDE, SODIUM LACTATE, POTASSIUM CHLORIDE, AND CALCIUM CHLORIDE 500 ML: 600; 310; 30; 20 INJECTION, SOLUTION INTRAVENOUS at 12:30

## 2020-07-03 NOTE — PROGRESS NOTES
History of Present Illness/Hospital Course:  Patient is a 47yoM with PMH significant for cardiomyopathy with ICD, CHF, HTN who presents with cough, SOB, and fatigue. Patient states that he began having intractable vomiting yesterday. Reports associated diarrhea and myalgias. States that he also had dry cough starting later that day as well. He feels SOB, but is not hypoxic in ER. Labs are overall unremarkable, except with low CO2 of 12 and elevated AG of 26. He reports COVID exposure as family recently was diagnosed with COVID. Given his condition and exposure, Hospitalist consulted for admission. Today: Patient with critical hypomagnesemia and critical hypokalemia that are being replaced. Patient continues to have nausea and vomiting but I am able to give ondansetron and promethazine given prolonged QT on EKG. Will have lorazepam as needed for nausea vomiting. Will have Norco as needed for abdominal pain and pleuritic pain from cough. Patient is agreeable to this. Repeat EKG in the morning. Comprehensive review of systems negative unless stated above. I have personally reviewed all current data for this patient.     Physical Exam:  General: Middle-aged black male, sitting up in bed, no acute distress  HEENT: NCAT, moist mucous membranes  Skin: No rash noted  Cardio: RRR, normal S1/S2, no rubs, no gallops, no murmurs  Pulm: Non labored respirations on room air, LCAB, no wheezing, no rales, no rhonchi, frequent cough  GI: Soft, tender in bilateral flanks, Nd, Nml bowel sounds, no masses noted  Extremity: Atraumatic, no deformities, no edema  Neuro: Alert, oriented, moving all extremities, no focal deficits noted  Psych: Pleasant, cooperative, normal range of affect    Assessment and Plan:    #Hypokalemia, hypomagnesemia: From vomiting.  -Replace and trend    #Intractable nausea and vomiting: Given multiple doses of ondansetron and promethazine in the emergency department.  -Lorazepam IV as needed for nausea vomiting    #Prolonged QT: Likely from hypomagnesiumemia, hypokalemia, ondansetron, promethazine.  -Repeat EKG in the morning  -Correct electrolytes as above  -Hold QT prolonging medications    #Suspected novel coronavirus infection: Not hypoxic. No findings on chest x-ray.  -Follow novel coronavirus assay    #Anion gap metabolic acidosis: Resolved. #HFrEF: Continue home carvedilol, eplerenone, furosemide, losartan  #HTN: As above.   #HLD: Continue atorvastatin    Inpatient for above    Full code    Enoxaparin for VTE prophylaxis

## 2020-07-03 NOTE — ED NOTES
TRANSFER - OUT REPORT:    Verbal report given to Joleen(name) on Merribeth Redder III  being transferred to 808(unit) for routine progression of care       Report consisted of patients Situation, Background, Assessment and   Recommendations(SBAR). Information from the following report(s) ED Summary was reviewed with the receiving nurse. Lines:   Peripheral IV 07/02/20 Left Hand (Active)   Site Assessment Clean, dry, & intact 7/2/2020  6:31 PM   Phlebitis Assessment 0 7/2/2020  6:31 PM   Infiltration Assessment 0 7/2/2020  6:31 PM   Dressing Status Clean, dry, & intact 7/2/2020  6:31 PM   Dressing Type 4 X 4;Transparent 7/2/2020  6:31 PM   Hub Color/Line Status Blue 7/2/2020  6:31 PM       Peripheral IV 07/03/20 Right Forearm (Active)   Site Assessment Clean, dry, & intact 7/3/2020 12:45 PM   Phlebitis Assessment 0 7/3/2020 12:45 PM   Infiltration Assessment 0 7/3/2020 12:45 PM   Dressing Status Clean, dry, & intact 7/3/2020 12:45 PM   Hub Color/Line Status Blue 7/3/2020 12:45 PM        Opportunity for questions and clarification was provided.       Patient transported with:   [facility]
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
## 2020-07-03 NOTE — PROGRESS NOTES
07/03/20 1414   Dual Skin Pressure Injury Assessment   Dual Skin Pressure Injury Assessment WDL   Second Care Provider (Based on [address] [place] [address]) Rodney Coughlin.  Rn
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
## 2020-07-03 NOTE — PROGRESS NOTES
Received pt from the ER in stable condition. Assisted pt into a gown & into bed. Resp even & unlabored on room air at rest; dyspnea on exertion; no acute signs of distress noted. Oriented to room & call light. Bed low & locked; call light in reach; no needs voiced.

## 2020-07-04 LAB
ABO + RH BLD: NORMAL
ALBUMIN SERPL-MCNC: 3.1 G/DL (ref 3.5–5)
ALBUMIN SERPL-MCNC: 3.2 G/DL (ref 3.5–5)
ALBUMIN/GLOB SERPL: 1.1 {RATIO} (ref 1.2–3.5)
ALBUMIN/GLOB SERPL: 1.1 {RATIO} (ref 1.2–3.5)
ALP SERPL-CCNC: 72 U/L (ref 50–136)
ALP SERPL-CCNC: 76 U/L (ref 50–136)
ALT SERPL-CCNC: 21 U/L (ref 12–65)
ALT SERPL-CCNC: 23 U/L (ref 12–65)
ANION GAP SERPL CALC-SCNC: 11 MMOL/L (ref 7–16)
ANION GAP SERPL CALC-SCNC: 8 MMOL/L (ref 7–16)
APTT PPP: 27.3 SEC (ref 24.3–35.4)
AST SERPL-CCNC: 22 U/L (ref 15–37)
AST SERPL-CCNC: 23 U/L (ref 15–37)
ATRIAL RATE: 88 BPM
BASOPHILS # BLD: 0 K/UL (ref 0–0.2)
BASOPHILS # BLD: 0 K/UL (ref 0–0.2)
BASOPHILS NFR BLD: 1 % (ref 0–2)
BASOPHILS NFR BLD: 1 % (ref 0–2)
BILIRUB SERPL-MCNC: 0.7 MG/DL (ref 0.2–1.1)
BILIRUB SERPL-MCNC: 0.8 MG/DL (ref 0.2–1.1)
BLOOD GROUP ANTIBODIES SERPL: NORMAL
BUN SERPL-MCNC: 10 MG/DL (ref 6–23)
BUN SERPL-MCNC: 8 MG/DL (ref 6–23)
CALCIUM SERPL-MCNC: 7.3 MG/DL (ref 8.3–10.4)
CALCIUM SERPL-MCNC: 7.4 MG/DL (ref 8.3–10.4)
CALCULATED P AXIS, ECG09: 45 DEGREES
CALCULATED R AXIS, ECG10: -35 DEGREES
CALCULATED T AXIS, ECG11: -148 DEGREES
CHLORIDE SERPL-SCNC: 104 MMOL/L (ref 98–107)
CHLORIDE SERPL-SCNC: 106 MMOL/L (ref 98–107)
CO2 SERPL-SCNC: 22 MMOL/L (ref 21–32)
CO2 SERPL-SCNC: 24 MMOL/L (ref 21–32)
CREAT SERPL-MCNC: 1.15 MG/DL (ref 0.8–1.5)
CREAT SERPL-MCNC: 1.34 MG/DL (ref 0.8–1.5)
D DIMER PPP FEU-MCNC: <0.27 UG/ML(FEU)
DIAGNOSIS, 93000: NORMAL
DIFFERENTIAL METHOD BLD: ABNORMAL
DIFFERENTIAL METHOD BLD: ABNORMAL
EOSINOPHIL # BLD: 0.2 K/UL (ref 0–0.8)
EOSINOPHIL # BLD: 0.2 K/UL (ref 0–0.8)
EOSINOPHIL NFR BLD: 4 % (ref 0.5–7.8)
EOSINOPHIL NFR BLD: 4 % (ref 0.5–7.8)
ERYTHROCYTE [DISTWIDTH] IN BLOOD BY AUTOMATED COUNT: 17.2 % (ref 11.9–14.6)
ERYTHROCYTE [DISTWIDTH] IN BLOOD BY AUTOMATED COUNT: 17.2 % (ref 11.9–14.6)
FERRITIN SERPL-MCNC: 158 NG/ML (ref 8–388)
FIBRINOGEN PPP-MCNC: 219 MG/DL (ref 190–501)
GLOBULIN SER CALC-MCNC: 2.8 G/DL (ref 2.3–3.5)
GLOBULIN SER CALC-MCNC: 2.8 G/DL (ref 2.3–3.5)
GLUCOSE SERPL-MCNC: 108 MG/DL (ref 65–100)
GLUCOSE SERPL-MCNC: 149 MG/DL (ref 65–100)
HCT VFR BLD AUTO: 33.5 % (ref 41.1–50.3)
HCT VFR BLD AUTO: 33.8 % (ref 41.1–50.3)
HGB BLD-MCNC: 10.7 G/DL (ref 13.6–17.2)
HGB BLD-MCNC: 10.8 G/DL (ref 13.6–17.2)
IMM GRANULOCYTES # BLD AUTO: 0 K/UL (ref 0–0.5)
IMM GRANULOCYTES # BLD AUTO: 0 K/UL (ref 0–0.5)
IMM GRANULOCYTES NFR BLD AUTO: 0 % (ref 0–5)
IMM GRANULOCYTES NFR BLD AUTO: 0 % (ref 0–5)
INR PPP: 0.9
LYMPHOCYTES # BLD: 1.6 K/UL (ref 0.5–4.6)
LYMPHOCYTES # BLD: 1.7 K/UL (ref 0.5–4.6)
LYMPHOCYTES NFR BLD: 29 % (ref 13–44)
LYMPHOCYTES NFR BLD: 33 % (ref 13–44)
MAGNESIUM SERPL-MCNC: 2.2 MG/DL (ref 1.8–2.4)
MAGNESIUM SERPL-MCNC: 2.4 MG/DL (ref 1.8–2.4)
MAGNESIUM SERPL-MCNC: 2.9 MG/DL (ref 1.8–2.4)
MCH RBC QN AUTO: 29.9 PG (ref 26.1–32.9)
MCH RBC QN AUTO: 30.1 PG (ref 26.1–32.9)
MCHC RBC AUTO-ENTMCNC: 31.9 G/DL (ref 31.4–35)
MCHC RBC AUTO-ENTMCNC: 32 G/DL (ref 31.4–35)
MCV RBC AUTO: 93.6 FL (ref 79.6–97.8)
MCV RBC AUTO: 94.4 FL (ref 79.6–97.8)
MONOCYTES # BLD: 0.2 K/UL (ref 0.1–1.3)
MONOCYTES # BLD: 0.2 K/UL (ref 0.1–1.3)
MONOCYTES NFR BLD: 4 % (ref 4–12)
MONOCYTES NFR BLD: 5 % (ref 4–12)
NEUTS SEG # BLD: 2.8 K/UL (ref 1.7–8.2)
NEUTS SEG # BLD: 3.6 K/UL (ref 1.7–8.2)
NEUTS SEG NFR BLD: 57 % (ref 43–78)
NEUTS SEG NFR BLD: 63 % (ref 43–78)
NRBC # BLD: 0.02 K/UL (ref 0–0.2)
NRBC # BLD: 0.02 K/UL (ref 0–0.2)
P-R INTERVAL, ECG05: 184 MS
PHOSPHATE SERPL-MCNC: 2.1 MG/DL (ref 2.5–4.5)
PLATELET # BLD AUTO: 162 K/UL (ref 150–450)
PLATELET # BLD AUTO: 174 K/UL (ref 150–450)
PMV BLD AUTO: 9.3 FL (ref 9.4–12.3)
PMV BLD AUTO: 9.6 FL (ref 9.4–12.3)
POTASSIUM SERPL-SCNC: 3 MMOL/L (ref 3.5–5.1)
POTASSIUM SERPL-SCNC: 3.1 MMOL/L (ref 3.5–5.1)
POTASSIUM SERPL-SCNC: 3.4 MMOL/L (ref 3.5–5.1)
PROT SERPL-MCNC: 5.9 G/DL (ref 6.3–8.2)
PROT SERPL-MCNC: 6 G/DL (ref 6.3–8.2)
PROTHROMBIN TIME: 12.7 SEC (ref 12–14.7)
Q-T INTERVAL, ECG07: 374 MS
QRS DURATION, ECG06: 100 MS
QTC CALCULATION (BEZET), ECG08: 452 MS
RBC # BLD AUTO: 3.55 M/UL (ref 4.23–5.6)
RBC # BLD AUTO: 3.61 M/UL (ref 4.23–5.6)
SODIUM SERPL-SCNC: 137 MMOL/L (ref 136–145)
SODIUM SERPL-SCNC: 138 MMOL/L (ref 136–145)
SPECIMEN EXP DATE BLD: NORMAL
VENTRICULAR RATE, ECG03: 88 BPM
WBC # BLD AUTO: 4.8 K/UL (ref 4.3–11.1)
WBC # BLD AUTO: 5.7 K/UL (ref 4.3–11.1)

## 2020-07-04 PROCEDURE — 74011250636 HC RX REV CODE- 250/636: Performed by: INTERNAL MEDICINE

## 2020-07-04 PROCEDURE — 85730 THROMBOPLASTIN TIME PARTIAL: CPT

## 2020-07-04 PROCEDURE — 84100 ASSAY OF PHOSPHORUS: CPT

## 2020-07-04 PROCEDURE — 85384 FIBRINOGEN ACTIVITY: CPT

## 2020-07-04 PROCEDURE — 65660000000 HC RM CCU STEPDOWN

## 2020-07-04 PROCEDURE — 85610 PROTHROMBIN TIME: CPT

## 2020-07-04 PROCEDURE — 85025 COMPLETE CBC W/AUTO DIFF WBC: CPT

## 2020-07-04 PROCEDURE — 74011250636 HC RX REV CODE- 250/636: Performed by: FAMILY MEDICINE

## 2020-07-04 PROCEDURE — 74011000250 HC RX REV CODE- 250: Performed by: INTERNAL MEDICINE

## 2020-07-04 PROCEDURE — 74011250637 HC RX REV CODE- 250/637: Performed by: INTERNAL MEDICINE

## 2020-07-04 PROCEDURE — 74011250637 HC RX REV CODE- 250/637: Performed by: FAMILY MEDICINE

## 2020-07-04 PROCEDURE — 80053 COMPREHEN METABOLIC PANEL: CPT

## 2020-07-04 PROCEDURE — 83735 ASSAY OF MAGNESIUM: CPT

## 2020-07-04 PROCEDURE — 36415 COLL VENOUS BLD VENIPUNCTURE: CPT

## 2020-07-04 PROCEDURE — 85379 FIBRIN DEGRADATION QUANT: CPT

## 2020-07-04 PROCEDURE — 82728 ASSAY OF FERRITIN: CPT

## 2020-07-04 RX ORDER — POTASSIUM CHLORIDE 20 MEQ/1
40 TABLET, EXTENDED RELEASE ORAL
Status: COMPLETED | OUTPATIENT
Start: 2020-07-04 | End: 2020-07-04

## 2020-07-04 RX ORDER — HYDROCODONE BITARTRATE AND ACETAMINOPHEN 10; 325 MG/1; MG/1
1 TABLET ORAL
Status: DISCONTINUED | OUTPATIENT
Start: 2020-07-04 | End: 2020-07-07

## 2020-07-04 RX ORDER — ALPRAZOLAM 0.5 MG/1
0.5 TABLET ORAL ONCE
Status: COMPLETED | OUTPATIENT
Start: 2020-07-04 | End: 2020-07-04

## 2020-07-04 RX ORDER — FUROSEMIDE 10 MG/ML
40 INJECTION INTRAMUSCULAR; INTRAVENOUS 2 TIMES DAILY
Status: DISCONTINUED | OUTPATIENT
Start: 2020-07-04 | End: 2020-07-07

## 2020-07-04 RX ADMIN — Medication 10 ML: at 15:45

## 2020-07-04 RX ADMIN — SUCRALFATE 1 G: 1 TABLET ORAL at 17:09

## 2020-07-04 RX ADMIN — GABAPENTIN 300 MG: 300 CAPSULE ORAL at 09:40

## 2020-07-04 RX ADMIN — SUCRALFATE 1 G: 1 TABLET ORAL at 06:21

## 2020-07-04 RX ADMIN — HYDROCODONE BITARTRATE AND ACETAMINOPHEN 1 TABLET: 10; 325 TABLET ORAL at 06:21

## 2020-07-04 RX ADMIN — MAGNESIUM GLUCONATE 500 MG ORAL TABLET 400 MG: 500 TABLET ORAL at 09:40

## 2020-07-04 RX ADMIN — LORAZEPAM 0.5 MG: 2 INJECTION INTRAMUSCULAR; INTRAVENOUS at 00:36

## 2020-07-04 RX ADMIN — Medication 5 ML: at 22:12

## 2020-07-04 RX ADMIN — HYDROCODONE BITARTRATE AND ACETAMINOPHEN 1 TABLET: 10; 325 TABLET ORAL at 23:41

## 2020-07-04 RX ADMIN — MAGNESIUM GLUCONATE 500 MG ORAL TABLET 400 MG: 500 TABLET ORAL at 22:07

## 2020-07-04 RX ADMIN — POTASSIUM CHLORIDE 40 MEQ: 20 TABLET, EXTENDED RELEASE ORAL at 17:09

## 2020-07-04 RX ADMIN — HYDROCODONE BITARTRATE AND ACETAMINOPHEN 1 TABLET: 10; 325 TABLET ORAL at 17:08

## 2020-07-04 RX ADMIN — SUCRALFATE 1 G: 1 TABLET ORAL at 22:06

## 2020-07-04 RX ADMIN — FUROSEMIDE 40 MG: 10 INJECTION, SOLUTION INTRAMUSCULAR; INTRAVENOUS at 17:08

## 2020-07-04 RX ADMIN — ALPRAZOLAM 0.5 MG: 0.5 TABLET ORAL at 03:20

## 2020-07-04 RX ADMIN — HYDROCODONE BITARTRATE AND ACETAMINOPHEN 1 TABLET: 10; 325 TABLET ORAL at 11:32

## 2020-07-04 RX ADMIN — LOSARTAN POTASSIUM 50 MG: 50 TABLET, FILM COATED ORAL at 09:40

## 2020-07-04 RX ADMIN — CARVEDILOL 25 MG: 25 TABLET, FILM COATED ORAL at 09:40

## 2020-07-04 RX ADMIN — FUROSEMIDE 40 MG: 40 TABLET ORAL at 09:41

## 2020-07-04 RX ADMIN — POTASSIUM CHLORIDE 40 MEQ: 20 TABLET, EXTENDED RELEASE ORAL at 00:36

## 2020-07-04 RX ADMIN — PANTOPRAZOLE SODIUM 40 MG: 40 TABLET, DELAYED RELEASE ORAL at 06:21

## 2020-07-04 RX ADMIN — CARVEDILOL 25 MG: 25 TABLET, FILM COATED ORAL at 17:09

## 2020-07-04 RX ADMIN — SUCRALFATE 1 G: 1 TABLET ORAL at 11:32

## 2020-07-04 RX ADMIN — ATORVASTATIN CALCIUM 80 MG: 80 TABLET, FILM COATED ORAL at 09:40

## 2020-07-04 RX ADMIN — LORAZEPAM 0.5 MG: 2 INJECTION INTRAMUSCULAR; INTRAVENOUS at 09:59

## 2020-07-04 RX ADMIN — ENOXAPARIN SODIUM 40 MG: 40 INJECTION SUBCUTANEOUS at 09:40

## 2020-07-04 RX ADMIN — SODIUM CHLORIDE 25 MG: 9 INJECTION INTRAMUSCULAR; INTRAVENOUS; SUBCUTANEOUS at 17:08

## 2020-07-04 RX ADMIN — GABAPENTIN 300 MG: 300 CAPSULE ORAL at 17:09

## 2020-07-04 NOTE — PROGRESS NOTES
Resting quietly, awake, medicated for pain, waiting for effectiveness during report given to Lennie Olvera RN.

## 2020-07-04 NOTE — PROGRESS NOTES
Progress Note    Patient: Alejandra Box MRN: [id number]  SSN: [id number]    YOB: 1965  Age: 54 y.o. Sex: male      Admit Date: 7/2/2020    LOS: 2 days     Subjective:     Seen and examined, c/p pain \"all over\". And also nausea, an requesting \"phenergan IV, because only that works for [employer] . He also requested morphine IV. Objective:     Vitals:    07/04/20 0024 07/04/20 0313 07/04/20 0844 07/04/20 1242   BP: 112/77 109/71 118/74 94/57   Pulse: 100 (!) 103 99 90   Resp: 18 20 20 16   Temp:  98.6 °F (37 °C) 97.9 °F (36.6 °C) 98 °F (36.7 °C)   SpO2: 98% 98% 97% 100%   Weight:       Height:            Intake and Output:  Current Shift: 07/04 0701 - 07/04 1900  In: -   Out: 1000 [Urine:1000]  Last three shifts: 07/02 1901 - 07/04 0700  In: 2881 [P.O.:900; I.V.:1981]  Out: 1225 [Urine:1225]    Physical Exam:   GENERAL: alert, cooperative, no distress, appears stated age  LUNG: few scattered wheezes, b/l  HEART: regular rate and rhythm, S1, S2 normal, no murmur, click, rub or gallop  ABDOMEN: soft, non-tender. Bowel sounds normal. No masses,  no organomegaly  EXTREMITIES:  extremities normal, atraumatic, no cyanosis or edema    Lab/Data Review: All lab results for the last 24 hours reviewed. Assessment:     Principal Problem:    Vomiting (7/2/2020)    Active Problems:    Chronic systolic (congestive) heart failure (HCC) (4/21/2011)      HTN (hypertension) (11/29/2011)      Non-ischemic cardiomyopathy ([address] (3/24/2016)      Suspected COVID-19 virus infection (7/2/2020)      Cough (7/2/2020)      Hypokalemia (7/3/2020)      Long QT interval (7/3/2020)        Plan:     Cough  Possible multifactorial, patient has history of a severe systolic congestive heart failure with an ejection fraction of 15%, also he suspected of COVID at this time. He claims he never smoked.   Persistent  Continue samples, oxygen as needed monitor     Wheezing, could be cardiac asthma  In the records his last echocardiography was from 2018, will order echocardiography, which probably will not be done until he is COVID test will return back negative  Change Lasix p.o. to Lasix IV 40 mg IV push twice daily  Continue to monitor BMP daily   No reported h/o asthma/COPD/smoking  Does have bilateral diffuse wheeze  Continue Nebs prn  Per Cardiology note from earlier this year (March), pt had wheezing at that time as well     COVID-19 rule-out  Swabbed in ER  Patient had exposure to family members positive for COVID    Nausea/Vomiting  On my evaluation today, the patient did not have vomiting, however he still claims he is nauseated, and only Phenergan works for him. Phenergan IV ordered.   Continue to monitor  continue Zofran prn,      Low Bicarb and Elevated AG  Continue to monitor BMP     Cardiomyopathy/CHF  EF of 15-20% on last echo  ICD in place  Home meds     HTN  Stable  continue Home meds    dvt ppx  lovenox SQ    Signed By: Graeme Rodriguez MD     July 4, 2020

## 2020-07-04 NOTE — PROGRESS NOTES
Pt complains of generalized pain 10/10. PRN Norco  mg one tab given PO per MD order. Safety measures in place, call light within reach. Will continue to monitor.

## 2020-07-04 NOTE — PROGRESS NOTES
Resting quietly, awake, resp even, unlab, skin warm, dry. Heart monitor in place. AP reg, lungs sounds coarse. No c/o. No distress noted.

## 2020-07-04 NOTE — PROGRESS NOTES
Ativan 0.5 mg given IVP slowly for c/o nausea. Head of bed elevated. Aware to use emesis bag for nurse to assess.

## 2020-07-04 NOTE — PROGRESS NOTES
Pt complains of generalized pain. PRN Norco  mg one tab given PO per MD order. Pt also complains of nausea. PRN Phenergan 25 mg given slow, IVP per MD order. Safety measures in place, call light within reach. Will continue to monitor.

## 2020-07-04 NOTE — PROGRESS NOTES
Reported to Dr. Raúl Ca, pt's report of having vomited x 2, unsaved, not observed, reports poor PO fluid intake, BP 98/60 and difficulty swallowing, not detected during PO med intake with hx of dysphagia. Orders received. Pt reports having had loose BM with no bag to save vomit. Emesis bag placed in bathroom for easy access, and instructions to pt to use, agreed.

## 2020-07-04 NOTE — PROGRESS NOTES
Nikki Chew is unable to visit patient presently due to covid restrictions    Reviewed chart for spiritual concerns    Current alertness :  Alert    Patient is long time friend of  cassie Vargas, staff

## 2020-07-04 NOTE — PROGRESS NOTES
Reported to Dr. Chandler Alcantar, pt's brief, symptomatic episode of reported V-tach, pt's status on assessment and electrolyte replacement of magnesium and potassium in progress. No new orders at this time. Pt to have repeat lab work later this evening for follow-up.

## 2020-07-04 NOTE — PROGRESS NOTES
Shift assessment complete. Pt alert and oriented x4. Respirations tachypneic. Wheezing heard on auscultation. HR tachy, tele in place per MD order. Abdomen soft, tender, with active bowel sounds heard in all four quadrants. Skin intact. No edema noted. IV patent and capped. All needs met at this time. Safety measures in place, call light within reach. Will continue to monitor.

## 2020-07-04 NOTE — PROGRESS NOTES
2087: Pt complains of nausea. PRN Ativan 0.5 mg given slow, IVP per MD order. Safety measures in place, call light within reach. Will continue to monitor. Pt complains of generalized pain 10/10. Explained to pt that it is too soon for PRN Norco at this time, last administered at 0621. Pt verbalized understanding. PRN Tylenol offered, pt refused. Pt requesting \"morphine\" for pain. MD notified of pt requests. Will continue to monitor.

## 2020-07-04 NOTE — PROGRESS NOTES
After receiving call from monitor room, pt had a run of V-tach, nurse to room. Pt resting quietly, reports he felt his heart racing and was short of breath at the brief time it occurred. Resp even, unlab, denying any further shortness of breath. , reg, skin warm, dry. Lung sounds remain coarse. Reports he just doesn't feel good. No distress noted.

## 2020-07-05 LAB
ALBUMIN SERPL-MCNC: 3.1 G/DL (ref 3.5–5)
ALBUMIN/GLOB SERPL: 1.1 {RATIO} (ref 1.2–3.5)
ALP SERPL-CCNC: 70 U/L (ref 50–136)
ALT SERPL-CCNC: 20 U/L (ref 12–65)
ANION GAP SERPL CALC-SCNC: 6 MMOL/L (ref 7–16)
APTT PPP: 27.2 SEC (ref 24.3–35.4)
AST SERPL-CCNC: 16 U/L (ref 15–37)
BASOPHILS # BLD: 0 K/UL (ref 0–0.2)
BASOPHILS NFR BLD: 1 % (ref 0–2)
BILIRUB SERPL-MCNC: 0.6 MG/DL (ref 0.2–1.1)
BUN SERPL-MCNC: 9 MG/DL (ref 6–23)
CALCIUM SERPL-MCNC: 7.9 MG/DL (ref 8.3–10.4)
CHLORIDE SERPL-SCNC: 108 MMOL/L (ref 98–107)
CO2 SERPL-SCNC: 26 MMOL/L (ref 21–32)
CREAT SERPL-MCNC: 1.17 MG/DL (ref 0.8–1.5)
D DIMER PPP FEU-MCNC: <0.27 UG/ML(FEU)
DIFFERENTIAL METHOD BLD: ABNORMAL
EOSINOPHIL # BLD: 0.2 K/UL (ref 0–0.8)
EOSINOPHIL NFR BLD: 4 % (ref 0.5–7.8)
ERYTHROCYTE [DISTWIDTH] IN BLOOD BY AUTOMATED COUNT: 17.4 % (ref 11.9–14.6)
FIBRINOGEN PPP-MCNC: 281 MG/DL (ref 190–501)
GLOBULIN SER CALC-MCNC: 2.8 G/DL (ref 2.3–3.5)
GLUCOSE SERPL-MCNC: 126 MG/DL (ref 65–100)
HCT VFR BLD AUTO: 32.9 % (ref 41.1–50.3)
HGB BLD-MCNC: 10.5 G/DL (ref 13.6–17.2)
IMM GRANULOCYTES # BLD AUTO: 0 K/UL (ref 0–0.5)
IMM GRANULOCYTES NFR BLD AUTO: 0 % (ref 0–5)
INR PPP: 0.9
LYMPHOCYTES # BLD: 1.4 K/UL (ref 0.5–4.6)
LYMPHOCYTES NFR BLD: 30 % (ref 13–44)
MAGNESIUM SERPL-MCNC: 1.8 MG/DL (ref 1.8–2.4)
MCH RBC QN AUTO: 30 PG (ref 26.1–32.9)
MCHC RBC AUTO-ENTMCNC: 31.9 G/DL (ref 31.4–35)
MCV RBC AUTO: 94 FL (ref 79.6–97.8)
MONOCYTES # BLD: 0.2 K/UL (ref 0.1–1.3)
MONOCYTES NFR BLD: 4 % (ref 4–12)
NEUTS SEG # BLD: 2.9 K/UL (ref 1.7–8.2)
NEUTS SEG NFR BLD: 61 % (ref 43–78)
NRBC # BLD: 0.02 K/UL (ref 0–0.2)
PHOSPHATE SERPL-MCNC: 1.1 MG/DL (ref 2.5–4.5)
PLATELET # BLD AUTO: 155 K/UL (ref 150–450)
PMV BLD AUTO: 9.5 FL (ref 9.4–12.3)
POTASSIUM SERPL-SCNC: 3.8 MMOL/L (ref 3.5–5.1)
PROT SERPL-MCNC: 5.9 G/DL (ref 6.3–8.2)
PROTHROMBIN TIME: 12.3 SEC (ref 12–14.7)
RBC # BLD AUTO: 3.5 M/UL (ref 4.23–5.6)
SODIUM SERPL-SCNC: 140 MMOL/L (ref 136–145)
WBC # BLD AUTO: 4.7 K/UL (ref 4.3–11.1)

## 2020-07-05 PROCEDURE — 85025 COMPLETE CBC W/AUTO DIFF WBC: CPT

## 2020-07-05 PROCEDURE — 74011000250 HC RX REV CODE- 250: Performed by: FAMILY MEDICINE

## 2020-07-05 PROCEDURE — 84100 ASSAY OF PHOSPHORUS: CPT

## 2020-07-05 PROCEDURE — 74011250637 HC RX REV CODE- 250/637: Performed by: INTERNAL MEDICINE

## 2020-07-05 PROCEDURE — 85610 PROTHROMBIN TIME: CPT

## 2020-07-05 PROCEDURE — 83735 ASSAY OF MAGNESIUM: CPT

## 2020-07-05 PROCEDURE — 85379 FIBRIN DEGRADATION QUANT: CPT

## 2020-07-05 PROCEDURE — 74011250636 HC RX REV CODE- 250/636: Performed by: INTERNAL MEDICINE

## 2020-07-05 PROCEDURE — 74011000250 HC RX REV CODE- 250: Performed by: INTERNAL MEDICINE

## 2020-07-05 PROCEDURE — 80053 COMPREHEN METABOLIC PANEL: CPT

## 2020-07-05 PROCEDURE — 85384 FIBRINOGEN ACTIVITY: CPT

## 2020-07-05 PROCEDURE — 74011250636 HC RX REV CODE- 250/636: Performed by: FAMILY MEDICINE

## 2020-07-05 PROCEDURE — 85730 THROMBOPLASTIN TIME PARTIAL: CPT

## 2020-07-05 PROCEDURE — 65660000000 HC RM CCU STEPDOWN

## 2020-07-05 PROCEDURE — 74011250637 HC RX REV CODE- 250/637: Performed by: FAMILY MEDICINE

## 2020-07-05 RX ADMIN — CARVEDILOL 25 MG: 25 TABLET, FILM COATED ORAL at 18:25

## 2020-07-05 RX ADMIN — Medication 10 ML: at 22:15

## 2020-07-05 RX ADMIN — SUCRALFATE 1 G: 1 TABLET ORAL at 11:03

## 2020-07-05 RX ADMIN — FUROSEMIDE 40 MG: 10 INJECTION, SOLUTION INTRAMUSCULAR; INTRAVENOUS at 18:25

## 2020-07-05 RX ADMIN — MAGNESIUM GLUCONATE 500 MG ORAL TABLET 400 MG: 500 TABLET ORAL at 11:04

## 2020-07-05 RX ADMIN — LOSARTAN POTASSIUM 50 MG: 50 TABLET, FILM COATED ORAL at 11:04

## 2020-07-05 RX ADMIN — HYDROCODONE BITARTRATE AND ACETAMINOPHEN 1 TABLET: 10; 325 TABLET ORAL at 11:35

## 2020-07-05 RX ADMIN — GABAPENTIN 300 MG: 300 CAPSULE ORAL at 11:04

## 2020-07-05 RX ADMIN — SODIUM CHLORIDE 12.5 MG: 9 INJECTION INTRAMUSCULAR; INTRAVENOUS; SUBCUTANEOUS at 20:37

## 2020-07-05 RX ADMIN — MAGNESIUM GLUCONATE 500 MG ORAL TABLET 400 MG: 500 TABLET ORAL at 20:37

## 2020-07-05 RX ADMIN — ATORVASTATIN CALCIUM 80 MG: 80 TABLET, FILM COATED ORAL at 11:04

## 2020-07-05 RX ADMIN — Medication 10 ML: at 14:00

## 2020-07-05 RX ADMIN — SUCRALFATE 1 G: 1 TABLET ORAL at 18:25

## 2020-07-05 RX ADMIN — HYDROCODONE BITARTRATE AND ACETAMINOPHEN 1 TABLET: 10; 325 TABLET ORAL at 18:50

## 2020-07-05 RX ADMIN — HYDROCODONE BITARTRATE AND ACETAMINOPHEN 1 TABLET: 10; 325 TABLET ORAL at 23:08

## 2020-07-05 RX ADMIN — GABAPENTIN 300 MG: 300 CAPSULE ORAL at 18:25

## 2020-07-05 RX ADMIN — ENOXAPARIN SODIUM 40 MG: 40 INJECTION SUBCUTANEOUS at 11:02

## 2020-07-05 RX ADMIN — SODIUM CHLORIDE 25 MG: 9 INJECTION INTRAMUSCULAR; INTRAVENOUS; SUBCUTANEOUS at 11:16

## 2020-07-05 RX ADMIN — FUROSEMIDE 40 MG: 10 INJECTION, SOLUTION INTRAMUSCULAR; INTRAVENOUS at 11:04

## 2020-07-05 RX ADMIN — EPLERENONE 25 MG: 25 TABLET, FILM COATED ORAL at 09:00

## 2020-07-05 RX ADMIN — SODIUM CHLORIDE 25 MG: 9 INJECTION INTRAMUSCULAR; INTRAVENOUS; SUBCUTANEOUS at 00:44

## 2020-07-05 RX ADMIN — CARVEDILOL 25 MG: 25 TABLET, FILM COATED ORAL at 11:04

## 2020-07-05 RX ADMIN — SUCRALFATE 1 G: 1 TABLET ORAL at 23:24

## 2020-07-05 RX ADMIN — PANTOPRAZOLE SODIUM 40 MG: 40 TABLET, DELAYED RELEASE ORAL at 11:04

## 2020-07-05 NOTE — PROGRESS NOTES
Phenergan 25 mg given IVP slowly for c/o nausea, no emesis. Reports he was unable to eat all of sandwich tray. Aware to call for asst to be out of bed if steadiness questionable.

## 2020-07-05 NOTE — PROGRESS NOTES
Norco 10 mg given PO for c/o abdom and lower back pain. [place]ima tray and soft drink requested and consumed.

## 2020-07-05 NOTE — PROGRESS NOTES
Resting quietly, awake, resp even, unlab, skin warm, dry. AP reg, lungs sounds clear. Abdom remains soft, tender with active bowel sounds. Assessment completed. No c/o, no distress.

## 2020-07-05 NOTE — PROGRESS NOTES
Tiburcio Daly is unable to visit patient presently due to covid restrictions    Reviewed chart for spiritual concerns    No needs noted currently    Friend of       Satish Masters, staff

## 2020-07-05 NOTE — PROGRESS NOTES
Progress Note    Patient: Alise Collado MRN: [id number]  SSN: [id number]    YOB: 1965  Age: 54 y.o. Sex: male      Admit Date: 7/2/2020    LOS: 3 days     Subjective:     Seen and examined, s/p dyspnea, and cough, no fevers. ECHO pending    Objective:     Vitals:    07/05/20 0310 07/05/20 0930 07/05/20 1201 07/05/20 1544   BP: 109/59 134/87 137/80 115/50   Pulse: 89 70 98 93   Resp: 18 12 18 12   Temp: 98.5 °F (36.9 °C) 98.2 °F (36.8 °C) 97.9 °F (36.6 °C) 98.8 °F (37.1 °C)   SpO2: 97% 99% 99% 98%   Weight:       Height:            Intake and Output:  Current Shift: No intake/output data recorded. Last three shifts: 07/03 1901 - 07/05 0700  In: 2381 [P.O.:900; I.V.:1481]  Out: 4750 [Urine:4750]    Physical Exam:   GENERAL: alert, cooperative, no distress, appears stated age  LUNG: wheezes, b/l lungs  HEART: regular rate and rhythm, S1, S2 normal, no murmur, click, rub or gallop  ABDOMEN: soft, non-tender. Bowel sounds normal. No masses,  no organomegaly  EXTREMITIES:  Trace edema b/l LES    Lab/Data Review: All lab results for the last 24 hours reviewed. Assessment:     Principal Problem:    Vomiting (7/2/2020)    Active Problems:    Chronic systolic (congestive) heart failure (HCC) (4/21/2011)      HTN (hypertension) (11/29/2011)      Non-ischemic cardiomyopathy ([address] (3/24/2016)      Suspected COVID-19 virus infection (7/2/2020)      Cough (7/2/2020)      Hypokalemia (7/3/2020)      Long QT interval (7/3/2020)        Plan:     Cough    7/5/2020  Continue diuresis with lasix IVP, monitor BMP    Patient overall stable  7/4/2020  Possible multifactorial, patient has history of a severe systolic congestive heart failure with an ejection fraction of 15%, also he suspected of COVID at this time. He claims he never smoked.   Persistent  Continue samples, oxygen as needed monitor     Wheezing, could be cardiac asthma  In the records his last echocardiography was from 2018, will order echocardiography, which probably will not be done until he is COVID test will return back negative  Change Lasix p.o. to Lasix IV 40 mg IV push twice daily  Continue to monitor BMP daily   No reported h/o asthma/COPD/smoking  Does have bilateral diffuse wheeze  Continue Nebs prn  Per Cardiology note from earlier this year (March), pt had wheezing at that time as well     COVID-19 rule-out  Swabbed in ER  Patient had exposure to family members positive for COVID     Nausea/Vomiting  7/5/2020 resolved    7/4/2020  On my evaluation today, the patient did not have vomiting, however he still claims he is nauseated, and only Phenergan works for him. Phenergan IV ordered.   Continue to monitor  continue Zofran prn,      Low Bicarb and Elevated AG  Continue to monitor BMP     Cardiomyopathy/CHF  EF of 15-20% on last echo  ICD in place  Home meds     HTN  Stable  continue Home meds     dvt ppx  lovenox SQ    Signed By: Roverto Jose MD     July 5, 2020

## 2020-07-06 LAB
ALBUMIN SERPL-MCNC: 3.4 G/DL (ref 3.5–5)
ALBUMIN/GLOB SERPL: 1 {RATIO} (ref 1.2–3.5)
ALP SERPL-CCNC: 69 U/L (ref 50–136)
ALT SERPL-CCNC: 18 U/L (ref 12–65)
ANION GAP SERPL CALC-SCNC: 6 MMOL/L (ref 7–16)
APTT PPP: 29 SEC (ref 24.3–35.4)
AST SERPL-CCNC: 19 U/L (ref 15–37)
BASOPHILS # BLD: 0 K/UL (ref 0–0.2)
BASOPHILS NFR BLD: 1 % (ref 0–2)
BILIRUB SERPL-MCNC: 0.6 MG/DL (ref 0.2–1.1)
BUN SERPL-MCNC: 14 MG/DL (ref 6–23)
CALCIUM SERPL-MCNC: 8.1 MG/DL (ref 8.3–10.4)
CHLORIDE SERPL-SCNC: 105 MMOL/L (ref 98–107)
CO2 SERPL-SCNC: 28 MMOL/L (ref 21–32)
CREAT SERPL-MCNC: 1.14 MG/DL (ref 0.8–1.5)
D DIMER PPP FEU-MCNC: 0.39 UG/ML(FEU)
DIFFERENTIAL METHOD BLD: ABNORMAL
EOSINOPHIL # BLD: 0.2 K/UL (ref 0–0.8)
EOSINOPHIL NFR BLD: 4 % (ref 0.5–7.8)
ERYTHROCYTE [DISTWIDTH] IN BLOOD BY AUTOMATED COUNT: 17.9 % (ref 11.9–14.6)
FIBRINOGEN PPP-MCNC: 361 MG/DL (ref 190–501)
GLOBULIN SER CALC-MCNC: 3.3 G/DL (ref 2.3–3.5)
GLUCOSE SERPL-MCNC: 114 MG/DL (ref 65–100)
HCT VFR BLD AUTO: 35.1 % (ref 41.1–50.3)
HGB BLD-MCNC: 11.5 G/DL (ref 13.6–17.2)
IMM GRANULOCYTES # BLD AUTO: 0.1 K/UL (ref 0–0.5)
IMM GRANULOCYTES NFR BLD AUTO: 1 % (ref 0–5)
INR PPP: 0.9
LYMPHOCYTES # BLD: 1.4 K/UL (ref 0.5–4.6)
LYMPHOCYTES NFR BLD: 27 % (ref 13–44)
MAGNESIUM SERPL-MCNC: 0.8 MG/DL (ref 1.8–2.4)
MCH RBC QN AUTO: 31 PG (ref 26.1–32.9)
MCHC RBC AUTO-ENTMCNC: 32.8 G/DL (ref 31.4–35)
MCV RBC AUTO: 94.6 FL (ref 79.6–97.8)
MONOCYTES # BLD: 0.3 K/UL (ref 0.1–1.3)
MONOCYTES NFR BLD: 6 % (ref 4–12)
NEUTS SEG # BLD: 3.2 K/UL (ref 1.7–8.2)
NEUTS SEG NFR BLD: 61 % (ref 43–78)
NRBC # BLD: 0.03 K/UL (ref 0–0.2)
PHOSPHATE SERPL-MCNC: 2.9 MG/DL (ref 2.5–4.5)
PLATELET # BLD AUTO: 165 K/UL (ref 150–450)
PMV BLD AUTO: 9.5 FL (ref 9.4–12.3)
POTASSIUM SERPL-SCNC: 3.4 MMOL/L (ref 3.5–5.1)
PROT SERPL-MCNC: 6.7 G/DL (ref 6.3–8.2)
PROTHROMBIN TIME: 12.4 SEC (ref 12–14.7)
RBC # BLD AUTO: 3.71 M/UL (ref 4.23–5.6)
SODIUM SERPL-SCNC: 139 MMOL/L (ref 136–145)
WBC # BLD AUTO: 5.2 K/UL (ref 4.3–11.1)

## 2020-07-06 PROCEDURE — 74011250637 HC RX REV CODE- 250/637: Performed by: FAMILY MEDICINE

## 2020-07-06 PROCEDURE — 83735 ASSAY OF MAGNESIUM: CPT

## 2020-07-06 PROCEDURE — 74011250636 HC RX REV CODE- 250/636: Performed by: FAMILY MEDICINE

## 2020-07-06 PROCEDURE — 85379 FIBRIN DEGRADATION QUANT: CPT

## 2020-07-06 PROCEDURE — 85730 THROMBOPLASTIN TIME PARTIAL: CPT

## 2020-07-06 PROCEDURE — 85384 FIBRINOGEN ACTIVITY: CPT

## 2020-07-06 PROCEDURE — 36415 COLL VENOUS BLD VENIPUNCTURE: CPT

## 2020-07-06 PROCEDURE — 84100 ASSAY OF PHOSPHORUS: CPT

## 2020-07-06 PROCEDURE — 74011250636 HC RX REV CODE- 250/636: Performed by: INTERNAL MEDICINE

## 2020-07-06 PROCEDURE — 74011250637 HC RX REV CODE- 250/637: Performed by: INTERNAL MEDICINE

## 2020-07-06 PROCEDURE — 74011000250 HC RX REV CODE- 250: Performed by: FAMILY MEDICINE

## 2020-07-06 PROCEDURE — 85610 PROTHROMBIN TIME: CPT

## 2020-07-06 PROCEDURE — 65660000000 HC RM CCU STEPDOWN

## 2020-07-06 PROCEDURE — 85025 COMPLETE CBC W/AUTO DIFF WBC: CPT

## 2020-07-06 PROCEDURE — 80053 COMPREHEN METABOLIC PANEL: CPT

## 2020-07-06 PROCEDURE — 76937 US GUIDE VASCULAR ACCESS: CPT

## 2020-07-06 PROCEDURE — 77030037759

## 2020-07-06 RX ORDER — MAGNESIUM SULFATE HEPTAHYDRATE 40 MG/ML
4 INJECTION, SOLUTION INTRAVENOUS ONCE
Status: COMPLETED | OUTPATIENT
Start: 2020-07-06 | End: 2020-07-06

## 2020-07-06 RX ADMIN — MAGNESIUM GLUCONATE 500 MG ORAL TABLET 400 MG: 500 TABLET ORAL at 22:00

## 2020-07-06 RX ADMIN — MAGNESIUM SULFATE IN WATER 4 G: 40 INJECTION, SOLUTION INTRAVENOUS at 12:12

## 2020-07-06 RX ADMIN — GABAPENTIN 300 MG: 300 CAPSULE ORAL at 08:36

## 2020-07-06 RX ADMIN — ATORVASTATIN CALCIUM 80 MG: 80 TABLET, FILM COATED ORAL at 08:36

## 2020-07-06 RX ADMIN — MAGNESIUM GLUCONATE 500 MG ORAL TABLET 400 MG: 500 TABLET ORAL at 08:35

## 2020-07-06 RX ADMIN — SUCRALFATE 1 G: 1 TABLET ORAL at 22:00

## 2020-07-06 RX ADMIN — EPLERENONE 25 MG: 25 TABLET, FILM COATED ORAL at 09:00

## 2020-07-06 RX ADMIN — HYDROCODONE BITARTRATE AND ACETAMINOPHEN 1 TABLET: 10; 325 TABLET ORAL at 11:04

## 2020-07-06 RX ADMIN — SODIUM CHLORIDE 12.5 MG: 9 INJECTION INTRAMUSCULAR; INTRAVENOUS; SUBCUTANEOUS at 17:30

## 2020-07-06 RX ADMIN — GABAPENTIN 300 MG: 300 CAPSULE ORAL at 17:32

## 2020-07-06 RX ADMIN — SODIUM CHLORIDE 12.5 MG: 9 INJECTION INTRAMUSCULAR; INTRAVENOUS; SUBCUTANEOUS at 04:20

## 2020-07-06 RX ADMIN — ENOXAPARIN SODIUM 40 MG: 40 INJECTION SUBCUTANEOUS at 08:37

## 2020-07-06 RX ADMIN — SUCRALFATE 1 G: 1 TABLET ORAL at 11:31

## 2020-07-06 RX ADMIN — PANTOPRAZOLE SODIUM 40 MG: 40 TABLET, DELAYED RELEASE ORAL at 08:36

## 2020-07-06 RX ADMIN — HYDROCODONE BITARTRATE AND ACETAMINOPHEN 1 TABLET: 10; 325 TABLET ORAL at 17:32

## 2020-07-06 RX ADMIN — HYDROCODONE BITARTRATE AND ACETAMINOPHEN 1 TABLET: 10; 325 TABLET ORAL at 22:10

## 2020-07-06 RX ADMIN — LOSARTAN POTASSIUM 50 MG: 50 TABLET, FILM COATED ORAL at 08:36

## 2020-07-06 RX ADMIN — Medication 10 ML: at 22:00

## 2020-07-06 RX ADMIN — SUCRALFATE 1 G: 1 TABLET ORAL at 08:36

## 2020-07-06 RX ADMIN — Medication 10 ML: at 14:00

## 2020-07-06 RX ADMIN — FUROSEMIDE 40 MG: 10 INJECTION, SOLUTION INTRAMUSCULAR; INTRAVENOUS at 08:36

## 2020-07-06 RX ADMIN — FUROSEMIDE 40 MG: 10 INJECTION, SOLUTION INTRAMUSCULAR; INTRAVENOUS at 17:30

## 2020-07-06 RX ADMIN — CARVEDILOL 25 MG: 25 TABLET, FILM COATED ORAL at 17:32

## 2020-07-06 RX ADMIN — CARVEDILOL 25 MG: 25 TABLET, FILM COATED ORAL at 08:36

## 2020-07-06 RX ADMIN — SUCRALFATE 1 G: 1 TABLET ORAL at 17:32

## 2020-07-06 RX ADMIN — HYDROCODONE BITARTRATE AND ACETAMINOPHEN 1 TABLET: 10; 325 TABLET ORAL at 04:22

## 2020-07-06 RX ADMIN — SODIUM CHLORIDE 12.5 MG: 9 INJECTION INTRAMUSCULAR; INTRAVENOUS; SUBCUTANEOUS at 11:04

## 2020-07-06 NOTE — PROGRESS NOTES
#20g 2.25in PIV accucath lot number [id number] inserted into Left cephalic using aseptic technique under US guidance. Pt tolerated well.        Jessica Larose RN VAT

## 2020-07-06 NOTE — INTERDISCIPLINARY ROUNDS
Interdisciplinary team rounds were held 7/6/2020 with the following team members:Care Management, Nursing, Physical Therapy and Physician. Plan of care discussed. See clinical pathway and/or care plan for interventions and desired outcomes.

## 2020-07-06 NOTE — PROGRESS NOTES
Progress Note    Patient: Rodger Wilks MRN: [id number]  SSN: [id number]    YOB: 1965  Age: 54 y.o. Sex: male      Admit Date: 7/2/2020    LOS: 4 days     Subjective:     Seen and examined, no acute distress    Objective:     Vitals:    07/06/20 0403 07/06/20 0739 07/06/20 1102 07/06/20 1527   BP: 119/87 127/87 108/69 100/45   Pulse: 84 85 91 93   Resp: 18 12 18 18   Temp: 98.1 °F (36.7 °C) 98.4 °F (36.9 °C) 97.5 °F (36.4 °C) 97.9 °F (36.6 °C)   SpO2: 98% 96% 96% 97%   Weight:       Height:            Intake and Output:  Current Shift: 07/06 0701 - 07/06 1900  In: -   Out: 350 [Urine:350]  Last three shifts: 07/04 1901 - 07/06 0700  In: -   Out: 0457 [Urine:1575]    Physical Exam:   GENERAL: alert, cooperative, no distress, appears stated age  LUNG: clear to auscultation bilaterally  HEART: regular rate and rhythm, S1, S2 normal, no murmur, click, rub or gallop  ABDOMEN: soft, non-tender. Bowel sounds normal. No masses,  no organomegaly  EXTREMITIES:  extremities normal, atraumatic, no cyanosis or edema    Lab/Data Review: All lab results for the last 24 hours reviewed. Assessment:     Principal Problem:    Vomiting (7/2/2020)    Active Problems:    Chronic systolic (congestive) heart failure (HCC) (4/21/2011)      HTN (hypertension) (11/29/2011)      Non-ischemic cardiomyopathy ([facility] [address] [facility] (3/24/2016)      Suspected COVID-19 virus infection (7/2/2020)      Cough (7/2/2020)      Hypokalemia (7/3/2020)      Long QT interval (7/3/2020)        Plan:     Cough/possible CHF exacerbation, vs covid19(low probability)  7/6/2020  Continue diuresis, with lasix 40 mg IVP BID       7/5/2020  Continue diuresis with lasix IVP, monitor BMP     Patient overall stable  7/4/2020  Possible multifactorial, patient has history of a severe systolic congestive heart failure with an ejection fraction of 15%, also he suspected of COVID at this time. Zach Rangel claims he never smoked.   Persistent  Continue samples, oxygen as needed monitor     Wheezing, could be cardiac asthma  In the records his last echocardiography was from 2018, will order echocardiography, which probably will not be done until he is COVID test will return back negative  Change Lasix p.o. to Lasix IV 40 mg IV push twice daily  Continue to monitor BMP daily   No reported h/o asthma/COPD/smoking  Does have bilateral diffuse wheeze  Continue Nebs prn  Per Cardiology note from earlier this year (March), pt had wheezing at that time as well     COVID-19 rule-out  Swabbed in ER, still pending  Patient had exposure to family members positive for COVID     Nausea/Vomiting  7/5/2020 resolved     7/4/2020  On my evaluation today, the patient did not have vomiting, however he still claims he is nauseated, and only Phenergan works for him. Adina Umana IV ordered.  Continue to monitor  continue Zofran prn,      Low Bicarb and Elevated AG  Continue to monitor BMP     Cardiomyopathy/CHF  EF of 15-20% on last echo  ICD in place  Home meds     HTN  Stable  continue Home meds     dvt ppx  lovenox SQ        Signed By: Marvin Clement MD     July 6, 2020

## 2020-07-06 NOTE — PROGRESS NOTES
Problem: Falls - Risk of  Goal: *Absence of Falls  Description: Document Diane Kirby Fall Risk and appropriate interventions in the flowsheet.   Outcome: Progressing Towards Goal  Note: Fall Risk Interventions:            Medication Interventions: Patient to call before getting OOB, Teach patient to arise slowly(agrees to call for asst to be OOB if steadiness questionable)

## 2020-07-06 NOTE — PROGRESS NOTES
After waking, requested nausea med; Phenergan 12.5 mg given IVP slowly. No emesis noted. Norco 10 mg given PO for c/o returned pain.

## 2020-07-06 NOTE — PROGRESS NOTES
MSN, CM:  Spoke with patient this AM about discharge planning. Patient lives with his wife Isabella Buitrago" in own house with 5 steps for entrance. Patient's sister-in-law lives close and helps if needed. Patient is independent with all ADL's and requires no equipment for ambulation. Patient denies any home oxygen, HH or rehab in the past.  Patient denies any discharge needs at this time. Case Management will continue to follow for any discharge needs that may arise. Care Management Interventions  PCP Verified by CM: Yes(Dr. Chauncey Steward)  Mode of Transport at Discharge:  Other (see comment)(family  to transport)  Transition of Care Consult (CM Consult): Discharge Planning  Current Support Network: Own Home, Lives with Spouse, Family Lives Nearby  Confirm Follow Up Transport: Self  Freedom of Choice List was Provided with Basic Dialogue that Supports the Patient's Individualized Plan of Care/Goals, Treatment Preferences and Shares the Quality Data Associated with the Providers?: Yes  Discharge Location  Discharge Placement: Home

## 2020-07-07 ENCOUNTER — APPOINTMENT (OUTPATIENT)
Dept: GENERAL RADIOLOGY | Age: 55
DRG: 204 | End: 2020-07-07
Attending: FAMILY MEDICINE
Payer: COMMERCIAL

## 2020-07-07 PROBLEM — E87.6 HYPOKALEMIA: Status: RESOLVED | Noted: 2020-07-03 | Resolved: 2020-07-07

## 2020-07-07 LAB
ALBUMIN SERPL-MCNC: 3.4 G/DL (ref 3.5–5)
ALBUMIN/GLOB SERPL: 0.9 {RATIO} (ref 1.2–3.5)
ALP SERPL-CCNC: 77 U/L (ref 50–136)
ALT SERPL-CCNC: 21 U/L (ref 12–65)
ANION GAP SERPL CALC-SCNC: 8 MMOL/L (ref 7–16)
APTT PPP: 25.9 SEC (ref 24.3–35.4)
AST SERPL-CCNC: 26 U/L (ref 15–37)
ATRIAL RATE: 94 BPM
BILIRUB SERPL-MCNC: 0.2 MG/DL (ref 0.2–1.1)
BUN SERPL-MCNC: 10 MG/DL (ref 6–23)
CALCIUM SERPL-MCNC: 8.2 MG/DL (ref 8.3–10.4)
CALCULATED P AXIS, ECG09: 26 DEGREES
CALCULATED R AXIS, ECG10: -26 DEGREES
CALCULATED T AXIS, ECG11: 136 DEGREES
CHLORIDE SERPL-SCNC: 103 MMOL/L (ref 98–107)
CO2 SERPL-SCNC: 26 MMOL/L (ref 21–32)
CREAT SERPL-MCNC: 1.31 MG/DL (ref 0.8–1.5)
D DIMER PPP FEU-MCNC: 0.38 UG/ML(FEU)
DIAGNOSIS, 93000: NORMAL
FIBRINOGEN PPP-MCNC: 340 MG/DL (ref 190–501)
GLOBULIN SER CALC-MCNC: 3.6 G/DL (ref 2.3–3.5)
GLUCOSE SERPL-MCNC: 134 MG/DL (ref 65–100)
INR PPP: 0.8
MAGNESIUM SERPL-MCNC: 2.4 MG/DL (ref 1.8–2.4)
P-R INTERVAL, ECG05: 180 MS
PHOSPHATE SERPL-MCNC: 2.2 MG/DL (ref 2.5–4.5)
POTASSIUM SERPL-SCNC: 3.7 MMOL/L (ref 3.5–5.1)
PROT SERPL-MCNC: 7 G/DL (ref 6.3–8.2)
PROTHROMBIN TIME: 11.7 SEC (ref 12–14.7)
Q-T INTERVAL, ECG07: 356 MS
QRS DURATION, ECG06: 100 MS
QTC CALCULATION (BEZET), ECG08: 445 MS
SODIUM SERPL-SCNC: 137 MMOL/L (ref 136–145)
TROPONIN-HIGH SENSITIVITY: 60.6 PG/ML (ref 0–14)
VENTRICULAR RATE, ECG03: 94 BPM

## 2020-07-07 PROCEDURE — 74011250636 HC RX REV CODE- 250/636: Performed by: FAMILY MEDICINE

## 2020-07-07 PROCEDURE — 85384 FIBRINOGEN ACTIVITY: CPT

## 2020-07-07 PROCEDURE — 74011250636 HC RX REV CODE- 250/636: Performed by: INTERNAL MEDICINE

## 2020-07-07 PROCEDURE — 85379 FIBRIN DEGRADATION QUANT: CPT

## 2020-07-07 PROCEDURE — 71045 X-RAY EXAM CHEST 1 VIEW: CPT

## 2020-07-07 PROCEDURE — 84100 ASSAY OF PHOSPHORUS: CPT

## 2020-07-07 PROCEDURE — 74011000250 HC RX REV CODE- 250: Performed by: FAMILY MEDICINE

## 2020-07-07 PROCEDURE — 83735 ASSAY OF MAGNESIUM: CPT

## 2020-07-07 PROCEDURE — 74011250637 HC RX REV CODE- 250/637: Performed by: INTERNAL MEDICINE

## 2020-07-07 PROCEDURE — 84484 ASSAY OF TROPONIN QUANT: CPT

## 2020-07-07 PROCEDURE — 80053 COMPREHEN METABOLIC PANEL: CPT

## 2020-07-07 PROCEDURE — 74011250637 HC RX REV CODE- 250/637: Performed by: FAMILY MEDICINE

## 2020-07-07 PROCEDURE — 85730 THROMBOPLASTIN TIME PARTIAL: CPT

## 2020-07-07 PROCEDURE — 65660000000 HC RM CCU STEPDOWN

## 2020-07-07 PROCEDURE — 85610 PROTHROMBIN TIME: CPT

## 2020-07-07 RX ORDER — HYDROCODONE BITARTRATE AND ACETAMINOPHEN 10; 325 MG/1; MG/1
1 TABLET ORAL
Status: DISCONTINUED | OUTPATIENT
Start: 2020-07-07 | End: 2020-07-08 | Stop reason: HOSPADM

## 2020-07-07 RX ORDER — DEXAMETHASONE 4 MG/1
4 TABLET ORAL EVERY 12 HOURS
Status: DISCONTINUED | OUTPATIENT
Start: 2020-07-07 | End: 2020-07-08 | Stop reason: HOSPADM

## 2020-07-07 RX ORDER — HYDROCODONE BITARTRATE AND ACETAMINOPHEN 10; 325 MG/1; MG/1
1 TABLET ORAL ONCE
Status: COMPLETED | OUTPATIENT
Start: 2020-07-07 | End: 2020-07-07

## 2020-07-07 RX ORDER — FUROSEMIDE 40 MG/1
40 TABLET ORAL 2 TIMES DAILY
Status: DISCONTINUED | OUTPATIENT
Start: 2020-07-07 | End: 2020-07-08 | Stop reason: HOSPADM

## 2020-07-07 RX ORDER — MORPHINE SULFATE 2 MG/ML
2 INJECTION, SOLUTION INTRAMUSCULAR; INTRAVENOUS ONCE
Status: COMPLETED | OUTPATIENT
Start: 2020-07-07 | End: 2020-07-07

## 2020-07-07 RX ORDER — ALBUTEROL SULFATE 90 UG/1
1 AEROSOL, METERED RESPIRATORY (INHALATION)
Status: DISCONTINUED | OUTPATIENT
Start: 2020-07-07 | End: 2020-07-08 | Stop reason: HOSPADM

## 2020-07-07 RX ORDER — POTASSIUM CHLORIDE 750 MG/1
10 TABLET, EXTENDED RELEASE ORAL DAILY
Status: DISCONTINUED | OUTPATIENT
Start: 2020-07-07 | End: 2020-07-08 | Stop reason: HOSPADM

## 2020-07-07 RX ORDER — HYDROCODONE BITARTRATE AND ACETAMINOPHEN 5; 325 MG/1; MG/1
1 TABLET ORAL
Status: DISCONTINUED | OUTPATIENT
Start: 2020-07-07 | End: 2020-07-08 | Stop reason: HOSPADM

## 2020-07-07 RX ORDER — SODIUM,POTASSIUM PHOSPHATES 280-250MG
1 POWDER IN PACKET (EA) ORAL 4 TIMES DAILY
Status: COMPLETED | OUTPATIENT
Start: 2020-07-07 | End: 2020-07-07

## 2020-07-07 RX ADMIN — PANTOPRAZOLE SODIUM 40 MG: 40 TABLET, DELAYED RELEASE ORAL at 08:24

## 2020-07-07 RX ADMIN — HYDROCODONE BITARTRATE AND ACETAMINOPHEN 1 TABLET: 10; 325 TABLET ORAL at 18:19

## 2020-07-07 RX ADMIN — CARVEDILOL 25 MG: 25 TABLET, FILM COATED ORAL at 08:24

## 2020-07-07 RX ADMIN — SODIUM CHLORIDE 12.5 MG: 9 INJECTION INTRAMUSCULAR; INTRAVENOUS; SUBCUTANEOUS at 12:51

## 2020-07-07 RX ADMIN — HYDROCODONE BITARTRATE AND ACETAMINOPHEN 1 TABLET: 10; 325 TABLET ORAL at 10:46

## 2020-07-07 RX ADMIN — SODIUM CHLORIDE 12.5 MG: 9 INJECTION INTRAMUSCULAR; INTRAVENOUS; SUBCUTANEOUS at 06:15

## 2020-07-07 RX ADMIN — GABAPENTIN 300 MG: 300 CAPSULE ORAL at 18:00

## 2020-07-07 RX ADMIN — LORAZEPAM 0.5 MG: 2 INJECTION INTRAMUSCULAR; INTRAVENOUS at 04:16

## 2020-07-07 RX ADMIN — DEXAMETHASONE 4 MG: 4 TABLET ORAL at 22:01

## 2020-07-07 RX ADMIN — CARVEDILOL 25 MG: 25 TABLET, FILM COATED ORAL at 17:58

## 2020-07-07 RX ADMIN — Medication 10 ML: at 14:00

## 2020-07-07 RX ADMIN — FUROSEMIDE 40 MG: 10 INJECTION, SOLUTION INTRAMUSCULAR; INTRAVENOUS at 08:24

## 2020-07-07 RX ADMIN — POTASSIUM & SODIUM PHOSPHATES POWDER PACK 280-160-250 MG 1 PACKET: 280-160-250 PACK at 22:01

## 2020-07-07 RX ADMIN — POTASSIUM & SODIUM PHOSPHATES POWDER PACK 280-160-250 MG 1 PACKET: 280-160-250 PACK at 12:52

## 2020-07-07 RX ADMIN — Medication 10 ML: at 04:18

## 2020-07-07 RX ADMIN — FUROSEMIDE 40 MG: 40 TABLET ORAL at 10:45

## 2020-07-07 RX ADMIN — SUCRALFATE 1 G: 1 TABLET ORAL at 08:24

## 2020-07-07 RX ADMIN — MAGNESIUM GLUCONATE 500 MG ORAL TABLET 400 MG: 500 TABLET ORAL at 08:24

## 2020-07-07 RX ADMIN — DEXAMETHASONE 4 MG: 4 TABLET ORAL at 10:49

## 2020-07-07 RX ADMIN — LOSARTAN POTASSIUM 50 MG: 50 TABLET, FILM COATED ORAL at 08:24

## 2020-07-07 RX ADMIN — FUROSEMIDE 40 MG: 40 TABLET ORAL at 17:58

## 2020-07-07 RX ADMIN — HYDROCODONE BITARTRATE AND ACETAMINOPHEN 1 TABLET: 5; 325 TABLET ORAL at 13:06

## 2020-07-07 RX ADMIN — ATORVASTATIN CALCIUM 80 MG: 80 TABLET, FILM COATED ORAL at 08:24

## 2020-07-07 RX ADMIN — POTASSIUM & SODIUM PHOSPHATES POWDER PACK 280-160-250 MG 1 PACKET: 280-160-250 PACK at 10:46

## 2020-07-07 RX ADMIN — MAGNESIUM GLUCONATE 500 MG ORAL TABLET 400 MG: 500 TABLET ORAL at 22:01

## 2020-07-07 RX ADMIN — HYDROCODONE BITARTRATE AND ACETAMINOPHEN 1 TABLET: 10; 325 TABLET ORAL at 06:16

## 2020-07-07 RX ADMIN — POTASSIUM CHLORIDE 10 MEQ: 750 TABLET, EXTENDED RELEASE ORAL at 10:49

## 2020-07-07 RX ADMIN — SODIUM CHLORIDE 12.5 MG: 9 INJECTION INTRAMUSCULAR; INTRAVENOUS; SUBCUTANEOUS at 00:43

## 2020-07-07 RX ADMIN — SUCRALFATE 1 G: 1 TABLET ORAL at 17:58

## 2020-07-07 RX ADMIN — POTASSIUM & SODIUM PHOSPHATES POWDER PACK 280-160-250 MG 1 PACKET: 280-160-250 PACK at 17:59

## 2020-07-07 RX ADMIN — SUCRALFATE 1 G: 1 TABLET ORAL at 10:54

## 2020-07-07 RX ADMIN — GABAPENTIN 300 MG: 300 CAPSULE ORAL at 08:24

## 2020-07-07 RX ADMIN — Medication 10 ML: at 22:17

## 2020-07-07 RX ADMIN — SUCRALFATE 1 G: 1 TABLET ORAL at 22:01

## 2020-07-07 RX ADMIN — SODIUM CHLORIDE 12.5 MG: 9 INJECTION INTRAMUSCULAR; INTRAVENOUS; SUBCUTANEOUS at 18:19

## 2020-07-07 RX ADMIN — MORPHINE SULFATE 2 MG: 2 INJECTION, SOLUTION INTRAMUSCULAR; INTRAVENOUS at 03:01

## 2020-07-07 RX ADMIN — ENOXAPARIN SODIUM 40 MG: 40 INJECTION SUBCUTANEOUS at 08:25

## 2020-07-07 RX ADMIN — LORAZEPAM 0.5 MG: 2 INJECTION INTRAMUSCULAR; INTRAVENOUS at 22:17

## 2020-07-07 NOTE — ADT AUTH CERT NOTES
COVID 19 RESULTS by Vipin Damon RN  
 
   
Review Status Review Entered In Primary 7/7/2020 16:34  
   
Criteria Review Is the illness suspected to be related to the Coronavirus (COVID-19)? Yes, Has the member been tested for the COVID-19? Yes If Yes, what are the results of the COVID-19? Negative What is the severity of the members condition (i.e. Isolation, Ventilator use)? 
  
Droplet Precautions   
  
  
COVID 19 TEST RESULTS 
SARS-CoV-2   PENDING                                            Incomplete Specimen source        Nasopharyngeal                                              Final 
COVID-19 rapid test   Not detected                NOTD               
  
   
   
Vomiting - Care Day 5 (7/6/2020) by River Alvarado, KADEN  
 
   
Review Status Review Entered Completed 7/7/2020 16:27  
   
Criteria Review Care Day: 5 Care Date: 7/6/2020 Level of Care: Inpatient Floor Guideline Day 2 Clinical Status   
(X) * Hemodynamic stability 7/7/2020 16:27:10 EDT by Lisa Alvarado   
  99.3 95 18 97% 120/62 r/a (X) * Vomiting absent or controlled   
(X) * Electrolyte levels adequate   
(X) * Life-threatening causes of vomiting excluded   
(X) * Pain absent or managed ( ) * Discharge plans and education understood Activity   
(X) * Ambulatory Routes   
(X) * Oral hydration   
(X) * Liquid or advanced diet 7/7/2020 16:27:10 EDT by Dion Matute   
  Cardiac diet   
(X) Oral medications 7/7/2020 16:27:10 EDT by Ashley Sarna   
  Lipitor 80 mg po x1, Coreg 25 mg po x2, Lovenox 40 mg sc x1, inspra 25 mg po x1, Neurontin 300 mg po x2, Cozaar 50 mg po x1, mag ox 400 mg po x2, protonix 40 mg po x1, Phenergan 12.5 mg IV x3, Carafate 1 gm po x4, Mag 4 gm IV x1, Norco 10/325 mg po x Medications (X) Possible antiemetics 7/7/2020 16:27:10 EDT by Dion Matute   
  Phenergan 12.5 mg IV x3,   
* Milestone Additional Notes 7/6  
  
 99.3 95 18 97% 120/62 r/a  
  
7/6/2020 09:33  
RBC 3.71 (L) HGB 11.5 (L) HCT 35.1 (L) MCV 94.6 MCH 31.0 MCHC 32.8  
RDW 17.9 (H)  
  
  
7/6/2020 04:45 7/6/2020 09:33 Sodium 139 Potassium 3.4 (L) Chloride 105 CO2 28 Anion gap 6 (L) Glucose 114 (H) BUN 14 Creatinine 1.14 Calcium 8.1 (L) Phosphorus 2.9 Magnesium 0.8 (LL)  
GFR est non-AA >60   
GFR est AA >60 Bilirubin, total 0.6 Protein, total 6.7 Albumin 3.4 (L) Globulin 3.3 A-G Ratio 1.0 (L) COVID 19 TEST RESULTS  
SARS-CoV-2 PENDING Incomplete Specimen source Nasopharyngeal Final  
COVID-19 rapid test Not detected NOTD Orders Medical IP, Full Code, VS, Cardiac monitoring, Droplet precautions, Cardiac diet Meds Lipitor 80 mg po x1, Coreg 25 mg po x2, Lovenox 40 mg sc x1, inspra 25 mg po x1, Neurontin 300 mg po x2, Cozaar 50 mg po x1, mag ox 400 mg po x2, protonix 40 mg po x1, Phenergan 12.5 mg IV x3, Carafate 1 gm po x4, Mag 4 gm IV x1, Norco 10/325 mg po x4, Nurses Notes After waking, requested nausea med; Phenergan 12.5 mg given IVP slowly. No emesis noted. Norco 10 mg given PO for c/o returned pain. Resting quietly, awake, requesting pain med; Norco 10 mg given PO for c/o abdom and right lower back pain. Agrees to call for asst to be out of bed if steadiness questionable. Assessment completed. Reminded nurse that he could have his Phenergan at midnight. CM NOTE  
MSN, CM:  Spoke with patient this AM about discharge planning.  Patient lives with his wife No Cid" in own house with 5 steps for entrance.  Patient's sister-in-law lives close and helps if needed. Jayna Villegas is independent with all ADL's and requires no equipment for ambulation.  Patient denies any home oxygen, HH or rehab in the past. Viveca Stalls denies any discharge needs at this time.  Case Management will continue to follow for any discharge needs that may arise.    
   
Care Management Interventions PCP Verified by CM: Yes(Dr. Homero Coleman) Mode of Transport at Discharge: Other (see comment)(family  to transport) Transition of Care Consult (CM Consult): Discharge Planning Current Support Network: Own Home, Lives with Spouse, Family Lives [place] Confirm Follow Up Transport: Self Freedom of Choice List was Provided with Basic Dialogue that Supports the Patient's Individualized Plan of Care/Goals, Treatment Preferences and Shares the Quality Data Associated with the Providers?: Yes Discharge Location Discharge Placement: Home PICC NOTE #20g 2.25in PIV accucath lot number [id number] inserted into Left cephalic using aseptic technique under US guidance. Pt tolerated well.   
 IM NOTE Seen and examined, no acute distress Physical Exam:   
GENERAL: alert, cooperative, no distress, appears stated age LUNG: clear to auscultation bilaterally HEART: regular rate and rhythm, S1, S2 normal, no murmur, click, rub or gallop ABDOMEN: soft, non-tender. Bowel sounds normal. No masses,  no organomegaly EXTREMITIES:  extremities normal, atraumatic, no cyanosis or edema  
   
Assessment Principal Problem:  
  Vomiting (7/2/2020)  
  Active Problems:  
  Chronic systolic (congestive) heart failure ([address] (4/21/2011)    
  HTN (hypertension) (11/29/2011)    
  Non-ischemic cardiomyopathy ([address] (3/24/2016)    
  Suspected COVID-19 virus infection (7/2/2020)    
  Cough (7/2/2020)    
  Hypokalemia (7/3/2020)    
  Long QT interval (7/3/2020)  
   
Plan Cough/possible CHF exacerbation, vs covid19(low probability) 7/6/2020 Continue diuresis, with lasix 40 mg IVP BID  
   
   
7/5/2020 Continue diuresis with lasix IVP, monitor BMP  
   
Patient overall stable 7/4/2020 Possible multifactorial, patient has history of a severe systolic congestive heart failure with an ejection fraction of 15%, also he suspected of COVID at this time. Gabbie Fischer claims he never smoked. Persistent Continue samples, oxygen as needed monitor  
   
Wheezing, could be cardiac asthma In the records his last echocardiography was from 2018, will order echocardiography, which probably will not be done until he is COVID test will return back negative Change Lasix p.o. to Lasix IV 40 mg IV push twice daily Continue to monitor BMP daily  
 No reported h/o asthma/COPD/smoking Does have bilateral diffuse wheeze Continue Nebs prn Per Cardiology note from earlier this year (March), pt had wheezing at that time as well  
   
COVID-19 rule-out Swabbed in ER, still pending Patient had exposure to family members positive for COVID  
   
Nausea/Vomiting 7/5/2020 resolved  
   
7/4/2020 On my evaluation today, the patient did not have vomiting, however he still claims he is nauseated, and only Phenergan works for him. [employer] IV ordered.  Continue to monitor  
continue Zofran prn,   
   
Low Bicarb and Elevated AG Continue to monitor BMP  
   
Cardiomyopathy/CHF  
EF of 15-20% on last echo  
ICD in place Home meds  
   
HTN Stable  
continue Home meds  
   
dvt ppx  
lovenox SQ

## 2020-07-07 NOTE — PROGRESS NOTES
Phenergan 12.5 mg given for c/o worsening nausea; Norco 10 mg given for c/o worsening pain. Resp remain even, unlab, skin warm, dry. Inquired about getting some ice cream to eat.

## 2020-07-07 NOTE — PROGRESS NOTES
Messaged Dr. Ricardo Solis with pt's c/o shortness of breath, chest pain, stable vital signs, O2 sat 99% on room air, lungs sounds, NSR, sinus tach on monitor, , reg, skin pink, warm, dry. Phenergan given for reported nausea.

## 2020-07-07 NOTE — PROGRESS NOTES
Pain med not yet effective, resp even, unlab at 20 bpm with O2 at 2L N/C, O2 sat 100%. Skin remains pink, warm, dry. Reports he still feels like it's hard to catch his breath. Will continue to monitor.

## 2020-07-07 NOTE — PROGRESS NOTES
Resting quietly, awake, Phenergan 12.5 mg given, as pt reminded nurse earlier p.m he would like his Phenergan at midnight when it's due. During admin, pt report he has nausea now and has had chest pain since 0010, and feeling short of breath, with lightheadedness. , reg, O2 sat 99% on room air, lungs sounds clear, diminished in bases. Skin pink, warm, dry. Resp even, unlab at 20 bpm during assessment with no obvious signs of distress. Requested ice, during assessment. Pt instructed to call nurse at time of onset when these type of symptoms occur. Pt stated \"I didn't want to bother you and knew you would be coming soon with my Phenergan\". Aware nurse to notify Md, after assessment completed.

## 2020-07-07 NOTE — PROGRESS NOTES
Late entry     0310 Received call for critical lab from [facility]  Troponin 60.6  Report given to primary RN

## 2020-07-07 NOTE — PROGRESS NOTES
Sitting quietly on side of bed, awake with no c/o during report received from Yuriy Platt RN. No distress noted.

## 2020-07-07 NOTE — PROGRESS NOTES
Morphine 2 mg given IVP slowly for c/o chest pain. Agrees to call for asst to be out of bed, due to continued feeling of lightheadedness.

## 2020-07-07 NOTE — PROGRESS NOTES
Problem: Falls - Risk of  Goal: *Absence of Falls  Description: Document Augustus De Leon Fall Risk and appropriate interventions in the flowsheet.   Outcome: Progressing Towards Goal  Note: Fall Risk Interventions:  Mobility Interventions: Communicate number of staff needed for ambulation/transfer, Patient to call before getting OOB, PT Consult for mobility concerns         Medication Interventions: Evaluate medications/consider consulting pharmacy, Teach patient to arise slowly

## 2020-07-07 NOTE — PROGRESS NOTES
Resting quietly, awake, requesting pain med; Norco 10 mg given PO for c/o abdom and right lower back pain. Agrees to call for asst to be out of bed if steadiness questionable. Assessment completed. Reminded nurse that he could have his Phenergan at midnight.

## 2020-07-07 NOTE — PROGRESS NOTES
Ativan 0.5 mg given IVP slowly for c/o continued nausea, no emesis. Head of bed elevated. Describes chest discomfort as unchanged, painful with inhaling, no pain upon exhaling. States, \"I don't want anymore pain medicine.

## 2020-07-07 NOTE — PROGRESS NOTES
Hospitalist Note     Admit Date:  2020  8:53 PM   Name:  Netta Ayoub III   Age:  54 y.o.  :  1965   MRN:  [id number]   PCP:  Roberto Pedroza MD  Treatment Team: Attending Provider: Angela Smiley MD; Utilization Review: Jesse De Leon RN; Care Manager: Zbigniew Simmons RN    HPI/Subjective:   Patient is a 52DCB with PMH significant for cardiomyopathy with ICD, CHF, HTN who presents with cough, SOB, and fatigue. Patient states that he began having intractable vomiting yesterday. Reports associated diarrhea and myalgias. States that he also had dry cough starting later that day as well. He feels SOB, but is not hypoxic in ER. Labs are overall unremarkable, except with low CO2 of 12 and elevated AG of 26. He reports COVID exposure as family recently was diagnosed with COVID. Given his condition and exposure, Hospitalist consulted. Rapid COVID was neg.  - pt still c/o nausea. Has some dull left sided CP moderate, asking for pain meds. No outward distress. No CP. Mild SOB. occ dry cough. No fevers. Feels better than yest overall    No other complaints  Objective:     Patient Vitals for the past 24 hrs:   Temp Pulse Resp BP SpO2   20 0831 97.4 °F (36.3 °C) 95 16 104/74 93 %   20 0246 98.6 °F (37 °C) 95 20 122/84 100 %   20 0055 98.6 °F (37 °C) 100 20 107/73 99 %   20 2208 99.3 °F (37.4 °C) 95 18 120/62 97 %   20 1527 97.9 °F (36.6 °C) 93 18 100/45 97 %   20 1102 97.5 °F (36.4 °C) 91 18 108/69 96 %     Oxygen Therapy  O2 Sat (%): 93 % (20 0831)  Pulse via Oximetry: 92 beats per minute (20 1243)  O2 Device: Room air (20 1823)    Estimated body mass index is 31.11 kg/m² as calculated from the following:    Height as of this encounter: 5' 7\" (1.702 m). Weight as of this encounter: 90.1 kg (198 lb 9.6 oz).       Intake/Output Summary (Last 24 hours) at 2020 0956  Last data filed at 2020 0055  Gross per 24 hour Intake    Output 700 ml   Net -700 ml       *Note that automatically entered I/Os may not be accurate; dependent on patient compliance with collection and accurate  by techs. General:    Well nourished. Alert. CV:   RRR. No murmur, rub, or gallop. Lungs:   Faint exp wheezing bilat  Extremities: Warm and dry. No cyanosis or edema. Skin:     No rashes or jaundice.    Neuro:  No gross focal deficits    Data Reviewed:  I have reviewed all labs, meds, and studies from the last 24 hours:  Recent Results (from the past 24 hour(s))   EKG, 12 LEAD, INITIAL    Collection Time: 07/07/20  2:02 AM   Result Value Ref Range    Ventricular Rate 94 BPM    Atrial Rate 94 BPM    P-R Interval 180 ms    QRS Duration 100 ms    Q-T Interval 356 ms    QTC Calculation (Bezet) 445 ms    Calculated P Axis 26 degrees    Calculated R Axis -26 degrees    Calculated T Axis 136 degrees    Diagnosis       Normal sinus rhythm  Left ventricular hypertrophy with repolarization abnormality  Inferior infarct (cited on or before 02-JUL-2020)  Abnormal ECG  When compared with ECG of 03-JUL-2020 12:40,  Premature ventricular complexes are no longer Present  Nonspecific T wave abnormality has replaced inverted T waves in Inferior   leads  Confirmed by [facility]  MD ()JOSEPH ([id number]) on 7/7/2020 7:43:44 AM     MAGNESIUM    Collection Time: 07/07/20  2:17 AM   Result Value Ref Range    Magnesium 2.4 1.8 - 2.4 mg/dL   METABOLIC PANEL, COMPREHENSIVE    Collection Time: 07/07/20  2:17 AM   Result Value Ref Range    Sodium 137 136 - 145 mmol/L    Potassium 3.7 3.5 - 5.1 mmol/L    Chloride 103 98 - 107 mmol/L    CO2 26 21 - 32 mmol/L    Anion gap 8 7 - 16 mmol/L    Glucose 134 (H) 65 - 100 mg/dL    BUN 10 6 - 23 MG/DL    Creatinine 1.31 0.8 - 1.5 MG/DL    GFR est AA >60 >60 ml/min/1.73m2    GFR est non-AA >60 >60 ml/min/1.73m2    Calcium 8.2 (L) 8.3 - 10.4 MG/DL    Bilirubin, total 0.2 0.2 - 1.1 MG/DL    ALT (SGPT) 21 12 - 65 U/L AST (SGOT) 26 15 - 37 U/L    Alk.  phosphatase 77 50 - 136 U/L    Protein, total 7.0 6.3 - 8.2 g/dL    Albumin 3.4 (L) 3.5 - 5.0 g/dL    Globulin 3.6 (H) 2.3 - 3.5 g/dL    A-G Ratio 0.9 (L) 1.2 - 3.5     PHOSPHORUS    Collection Time: 07/07/20  2:17 AM   Result Value Ref Range    Phosphorus 2.2 (L) 2.5 - 4.5 MG/DL   TROPONIN-HIGH SENSITIVITY    Collection Time: 07/07/20  2:17 AM   Result Value Ref Range    Troponin-High Sensitivity 60.6 (HH) 0 - 14 pg/mL   PROTHROMBIN TIME + INR    Collection Time: 07/07/20  4:15 AM   Result Value Ref Range    Prothrombin time 11.7 (L) 12.0 - 14.7 sec    INR 0.8     PTT    Collection Time: 07/07/20  4:15 AM   Result Value Ref Range    aPTT 25.9 24.3 - 35.4 SEC   FIBRINOGEN    Collection Time: 07/07/20  4:15 AM   Result Value Ref Range    Fibrinogen 340 190 - 501 mg/dL   D DIMER    Collection Time: 07/07/20  4:15 AM   Result Value Ref Range    D DIMER 0.38 <0.56 ug/ml(FEU)        Current Meds:  Current Facility-Administered Medications   Medication Dose Route Frequency    potassium, sodium phosphates (NEUTRA-PHOS) packet 1 Packet  1 Packet Oral QID    furosemide (LASIX) tablet 40 mg  40 mg Oral BID    potassium chloride (KLOR-CON) tablet 10 mEq  10 mEq Oral DAILY    HYDROcodone-acetaminophen (NORCO)  mg tablet 1 Tab  1 Tab Oral Q4H PRN    HYDROcodone-acetaminophen (NORCO) 5-325 mg per tablet 1 Tab  1 Tab Oral Q4H PRN    promethazine (PHENERGAN) with saline injection 12.5 mg  12.5 mg IntraVENous Q6H PRN    pantoprazole (PROTONIX) tablet 40 mg  40 mg Oral ACB    gabapentin (NEURONTIN) capsule 300 mg  300 mg Oral BID    sucralfate (CARAFATE) tablet 1 g  1 g Oral AC&HS    carvediloL (COREG) tablet 25 mg  25 mg Oral BID WITH MEALS    losartan (COZAAR) tablet 50 mg  50 mg Oral DAILY    atorvastatin (LIPITOR) tablet 80 mg  80 mg Oral DAILY    eplerenone (INSPRA) tablet 25 mg (Patient Supplied)  25 mg Oral DAILY    cyclobenzaprine (FLEXERIL) tablet 5 mg  5 mg Oral BID PRN  sodium chloride (NS) flush 5-40 mL  5-40 mL IntraVENous Q8H    sodium chloride (NS) flush 5-40 mL  5-40 mL IntraVENous PRN    acetaminophen (TYLENOL) tablet 650 mg  650 mg Oral Q4H PRN    enoxaparin (LOVENOX) injection 40 mg  40 mg SubCUTAneous Q24H    magnesium oxide (MAG-OX) tablet 400 mg  400 mg Oral Q12H    benzonatate (TESSALON) capsule 100 mg  100 mg Oral TID PRN    LORazepam (ATIVAN) injection 0.5 mg  0.5 mg IntraVENous Q6H PRN       Other Studies:  No results found for this visit on 07/02/20. Xr Chest Sngl V    Result Date: 7/7/2020  EXAM: XR CHEST SNGL V INDICATION: chest pain COMPARISON: 721 FINDINGS: A portable AP radiograph of the chest was obtained at 0141 hours. The patient is on a cardiac monitor. Left lower lobe airspace disease and left hiatal hernia unchanged. . The cardiac and mediastinal contours and pulmonary vascularity are normal.  The bones and soft tissues are grossly within normal limits. IMPRESSION: Left lower lobe atelectasis and left hiatal hernia unchanged.       All Micro Results     None          SARS-CoV-2 Lab Results  \"Novel Coronavirus\" Test:   Lab Results   Component Value Date/Time    COV2NT PENDING 07/02/2020 11:58 PM      \"Emergent Disease\" Test:   Lab Results   Component Value Date/Time    EDPR NOT DETECTED 03/19/2020 06:37 PM     \"SARS-COV-2\" Test: No results found for: XGCOVT  As of: 9:56 AM on 7/7/2020          Assessment and Plan:     Hospital Problems as of 7/7/2020 Date Reviewed: 7/2/2020          Codes Class Noted - Resolved POA    Long QT interval ICD-10-CM: R94.31  ICD-9-CM: 794.31  7/3/2020 - Present Yes        Suspected COVID-19 virus infection ICD-10-CM: Z20.828  ICD-9-CM: V01.79  7/2/2020 - Present Yes        Cough ICD-10-CM: R05  ICD-9-CM: 786.2  7/2/2020 - Present Yes        * (Principal) Vomiting ICD-10-CM: R11.10  ICD-9-CM: 787.03  7/2/2020 - Present Yes        Non-ischemic cardiomyopathy (HCC) (Chronic) ICD-10-CM: I42.8  ICD-9-CM: 425.4 3/24/2016 - Present Yes        HTN (hypertension) (Chronic) ICD-10-CM: I10  ICD-9-CM: 401.9  11/29/2011 - Present Yes        Chronic systolic (congestive) heart failure (HCC) (Chronic) ICD-10-CM: I50.22  ICD-9-CM: 428.22, 428.0  4/21/2011 - Present Yes        RESOLVED: Hypokalemia ICD-10-CM: E87.6  ICD-9-CM: 276.8  7/3/2020 - 7/7/2020 Yes              Plan:  · Change lasix back to PO  · Replace Phos  · Trend trops, likely demand ischemia. · Echo pending  · Rapid COVID neg.   PCR pending  · Start dexamethasone as benefit outweighs risk despite not confirmed COVID yet  · Cont other meds as shown  · Home soon    Diet:  DIET CARDIAC  DVT ppx:  lovenox    Signed:  Rishi Conti MD

## 2020-07-07 NOTE — PROGRESS NOTES
O2 applied at 2L N/C to facilitate comfort with breathing. Sitting up on side of bed. Ice given upon request. Asked nurse for ice cream at this time.  Yasmin Hou in telemetry room confirmed pt's rhythm normal sinus, sinus tachycardia, range 99 -103 bpm.

## 2020-07-08 VITALS
WEIGHT: 203.1 LBS | OXYGEN SATURATION: 96 % | SYSTOLIC BLOOD PRESSURE: 133 MMHG | HEART RATE: 97 BPM | RESPIRATION RATE: 12 BRPM | DIASTOLIC BLOOD PRESSURE: 76 MMHG | BODY MASS INDEX: 31.88 KG/M2 | TEMPERATURE: 97.7 F | HEIGHT: 67 IN

## 2020-07-08 PROBLEM — R05.9 COUGH: Status: RESOLVED | Noted: 2020-07-02 | Resolved: 2020-07-08

## 2020-07-08 PROBLEM — R11.10 VOMITING: Status: RESOLVED | Noted: 2020-07-02 | Resolved: 2020-07-08

## 2020-07-08 PROBLEM — R94.31 LONG QT INTERVAL: Status: RESOLVED | Noted: 2020-07-03 | Resolved: 2020-07-08

## 2020-07-08 LAB
COVID-19 RAPID TEST, COVR: NOT DETECTED
SARS-COV-2, COV2NT: NOT DETECTED
SOURCE, COVRS: NORMAL

## 2020-07-08 PROCEDURE — 74011000250 HC RX REV CODE- 250: Performed by: INTERNAL MEDICINE

## 2020-07-08 PROCEDURE — C8929 TTE W OR WO FOL WCON,DOPPLER: HCPCS

## 2020-07-08 PROCEDURE — 74011250637 HC RX REV CODE- 250/637: Performed by: FAMILY MEDICINE

## 2020-07-08 PROCEDURE — 74011250637 HC RX REV CODE- 250/637: Performed by: INTERNAL MEDICINE

## 2020-07-08 PROCEDURE — 74011250636 HC RX REV CODE- 250/636: Performed by: FAMILY MEDICINE

## 2020-07-08 PROCEDURE — 74011250636 HC RX REV CODE- 250/636: Performed by: INTERNAL MEDICINE

## 2020-07-08 RX ORDER — MAG HYDROX/ALUMINUM HYD/SIMETH 200-200-20
30 SUSPENSION, ORAL (FINAL DOSE FORM) ORAL
Status: DISCONTINUED | OUTPATIENT
Start: 2020-07-08 | End: 2020-07-08 | Stop reason: HOSPADM

## 2020-07-08 RX ADMIN — DEXAMETHASONE 4 MG: 4 TABLET ORAL at 08:25

## 2020-07-08 RX ADMIN — ENOXAPARIN SODIUM 40 MG: 40 INJECTION SUBCUTANEOUS at 08:24

## 2020-07-08 RX ADMIN — PERFLUTREN 1 ML: 6.52 INJECTION, SUSPENSION INTRAVENOUS at 08:00

## 2020-07-08 RX ADMIN — CARVEDILOL 25 MG: 25 TABLET, FILM COATED ORAL at 08:25

## 2020-07-08 RX ADMIN — SODIUM CHLORIDE 12.5 MG: 9 INJECTION INTRAMUSCULAR; INTRAVENOUS; SUBCUTANEOUS at 06:14

## 2020-07-08 RX ADMIN — LORAZEPAM 0.5 MG: 2 INJECTION INTRAMUSCULAR; INTRAVENOUS at 04:11

## 2020-07-08 RX ADMIN — FUROSEMIDE 40 MG: 40 TABLET ORAL at 08:25

## 2020-07-08 RX ADMIN — POTASSIUM CHLORIDE 10 MEQ: 750 TABLET, EXTENDED RELEASE ORAL at 08:24

## 2020-07-08 RX ADMIN — HYDROCODONE BITARTRATE AND ACETAMINOPHEN 1 TABLET: 10; 325 TABLET ORAL at 04:11

## 2020-07-08 RX ADMIN — GABAPENTIN 300 MG: 300 CAPSULE ORAL at 08:24

## 2020-07-08 RX ADMIN — Medication 10 ML: at 06:00

## 2020-07-08 RX ADMIN — SUCRALFATE 1 G: 1 TABLET ORAL at 11:25

## 2020-07-08 RX ADMIN — ALUMINUM HYDROXIDE, MAGNESIUM HYDROXIDE, AND SIMETHICONE 30 ML: 200; 200; 20 SUSPENSION ORAL at 04:11

## 2020-07-08 RX ADMIN — HYDROCODONE BITARTRATE AND ACETAMINOPHEN 1 TABLET: 10; 325 TABLET ORAL at 10:02

## 2020-07-08 RX ADMIN — MAGNESIUM GLUCONATE 500 MG ORAL TABLET 400 MG: 500 TABLET ORAL at 08:25

## 2020-07-08 RX ADMIN — SUCRALFATE 1 G: 1 TABLET ORAL at 08:25

## 2020-07-08 RX ADMIN — SODIUM CHLORIDE 12.5 MG: 9 INJECTION INTRAMUSCULAR; INTRAVENOUS; SUBCUTANEOUS at 00:25

## 2020-07-08 RX ADMIN — LOSARTAN POTASSIUM 50 MG: 50 TABLET, FILM COATED ORAL at 08:24

## 2020-07-08 RX ADMIN — PANTOPRAZOLE SODIUM 40 MG: 40 TABLET, DELAYED RELEASE ORAL at 08:25

## 2020-07-08 RX ADMIN — HYDROCODONE BITARTRATE AND ACETAMINOPHEN 1 TABLET: 5; 325 TABLET ORAL at 06:14

## 2020-07-08 RX ADMIN — ATORVASTATIN CALCIUM 80 MG: 80 TABLET, FILM COATED ORAL at 08:25

## 2020-07-08 RX ADMIN — HYDROCODONE BITARTRATE AND ACETAMINOPHEN 1 TABLET: 10; 325 TABLET ORAL at 00:24

## 2020-07-08 NOTE — PROGRESS NOTES
Pt states that his nausea and vomiting has returned.  He also states that his chest, neck, back, and headache pain are not relieved by the PRN Norco.

## 2020-07-08 NOTE — PROGRESS NOTES
Problem: Falls - Risk of  Goal: *Absence of Falls  Description: Document Renuka Otrega Fall Risk and appropriate interventions in the flowsheet.   Outcome: Progressing Towards Goal  Note: Fall Risk Interventions:  Mobility Interventions: PT Consult for mobility concerns, Communicate number of staff needed for ambulation/transfer      Medication Interventions: Teach patient to arise slowly     Problem: Patient Education: Go to Patient Education Activity  Goal: Patient/Family Education  Outcome: Progressing Towards Goal

## 2020-07-08 NOTE — DISCHARGE SUMMARY
Hospitalist Discharge Summary     Admit Date:  2020  8:53 PM   Name:  Earl Strong III   Age:  54 y.o.  :  1965   MRN:  [id number]   PCP:  Favio Nunes MD  Treatment Team: Attending Provider: Ana Javier MD; Utilization Review: Suzan Velasquez RN; Care Manager: Frankey Landing, RN; Utilization Review: Rosendo Gastelum RN; Primary Nurse: Estefania Douglas    Problem List for this Hospitalization:  Hospital Problems as of 2020 Date Reviewed: 2020          Codes Class Noted - Resolved POA    COVID-19 ruled out ICD-10-CM: Z03.818  ICD-9-CM: V71.83  2020 - Present Yes        Non-ischemic cardiomyopathy ([facility] (Chronic) ICD-10-CM: I42.8  ICD-9-CM: 425.4  3/24/2016 - Present Yes        HTN (hypertension) (Chronic) ICD-10-CM: I10  ICD-9-CM: 401.9  2011 - Present Yes        Chronic systolic (congestive) heart failure ([facility] (Chronic) ICD-10-CM: I50.22  ICD-9-CM: 428.22, 428.0  2011 - Present Yes        RESOLVED: Hypokalemia ICD-10-CM: E87.6  ICD-9-CM: 276.8  7/3/2020 - 2020 Yes        RESOLVED: Long QT interval ICD-10-CM: R94.31  ICD-9-CM: 794.31  7/3/2020 - 2020 Yes        RESOLVED: Cough ICD-10-CM: R05  ICD-9-CM: 786.2  2020 - 2020 Yes        * (Principal) RESOLVED: Vomiting ICD-10-CM: R11.10  ICD-9-CM: 787.03  2020 - 2020 Yes                Admission HPI from 2020:    \" Patient is a 47yoM with PMH significant for cardiomyopathy with ICD, CHF, HTN who presents with cough, SOB, and fatigue. Patient states that he began having intractable vomiting yesterday. Reports associated diarrhea and myalgias. States that he also had dry cough starting later that day as well. He feels SOB, but is not hypoxic in ER. Labs are overall unremarkable, except with low CO2 of 12 and elevated AG of 26. He reports COVID exposure as family recently was diagnosed with COVID. Given his condition and exposure, Hospitalist consulted for admission.  [facility] Course:  Patient is a 47yoM with PMH significant for cardiomyopathy with ICD, CHF, HTN who presents with cough, SOB, and fatigue. Patient states that he began having intractable vomiting yesterday. Reports associated diarrhea and myalgias. States that he also had dry cough starting later that day as well. He feels SOB, but is not hypoxic in ER. Labs are overall unremarkable, except with low CO2 of 12 and elevated AG of 26. He reports COVID exposure as family recently was diagnosed with COVID. Given his condition and exposure, Hospitalist consulted. Rapid COVID was neg as was PCR. Unclear why his stay was so prolonged. He had some chest discomfort and mildly elevated trop likely demand ischemia. He also reports similar pain from hiatal hernia/GERD. No reason to keep in hospital.  DC home and outpatient management. Disposition: Home or Self Care  Activity: Activity as tolerated  Diet: DIET CARDIAC Regular  Code Status: Full Code    Follow Up Orders:  No orders of the defined types were placed in this encounter. Follow-up Information     Follow up With Specialties Details Why Contact Leatha Gitelman, MD Family Practice Call As needed [phone] [facility]  [phone]            Discharge meds at bottom of this note. Plan was discussed with pt. All questions answered. Patient was stable at time of discharge. Given instructions to call a physician or return if any concerns. Discharge summary and encounter summary was sent to PCP electronically via \"Comm Mgt\" link in [facility], if possible. Diagnostic Imaging/Tests:   Xr Chest Sngl V    Result Date: 7/7/2020  EXAM: XR CHEST SNGL V INDICATION: chest pain COMPARISON: 721 FINDINGS: A portable AP radiograph of the chest was obtained at 0141 hours. The patient is on a cardiac monitor. Left lower lobe airspace disease and left hiatal hernia unchanged. . The cardiac and mediastinal contours and pulmonary vascularity are normal.  The bones and soft tissues are grossly within normal limits. IMPRESSION: Left lower lobe atelectasis and left hiatal hernia unchanged. Xr Chest Sngl V    Result Date: 7/2/2020  AP chest radiograph History: sob;, 54 years Male Comparison: Chest radiograph March 19, 2020 Findings: A pacing device obscures part of the left hemithorax, leads appear to remain in anatomic position. Normal cardiomediastinal silhouette. Persistent low lung volumes. Mild subsegmental atelectasis bilateral lung bases. No evidence of pneumothorax, pleural effusion, or air space consolidation. Visualized soft tissue and osseous structures otherwise unremarkable. Impression:  No significant interval change. Echocardiogram results:  No results found for this visit on 07/02/20.     Procedures done this admission:  * No surgery found *    All Micro Results     None          SARS-CoV-2 Lab Results  \"Novel Coronavirus\" Test:   Lab Results   Component Value Date/Time    COV2NT Not Detected 07/02/2020 11:58 PM      \"Emergent Disease\" Test:   Lab Results   Component Value Date/Time    EDPR NOT DETECTED 03/19/2020 06:37 PM     \"SARS-COV-2\" Test: No results found for: XGCOVT  As of: 10:40 AM on 7/8/2020          Labs: Results:       BMP, Mg, Phos Recent Labs     07/07/20 0217 07/06/20  0933 07/06/20  0445     --  139   K 3.7  --  3.4*     --  105   CO2 26  --  28   AGAP 8  --  6*   BUN 10  --  14   CREA 1.31  --  1.14   CA 8.2*  --  8.1*   *  --  114*   MG 2.4 0.8*  --    PHOS 2.2* 2.9  --       CBC Recent Labs     07/06/20  0933   WBC 5.2   RBC 3.71*   HGB 11.5*   HCT 35.1*      GRANS 61   LYMPH 27   EOS 4   MONOS 6   BASOS 1   IG 1   ANEU 3.2   ABL 1.4   ANJU 0.2   ABM 0.3   ABB 0.0   AIG 0.1      LFT Recent Labs     07/07/20 0217 07/06/20  0445   ALT 21 18   AP 77 69   TP 7.0 6.7   ALB 3.4* 3.4*   GLOB 3.6* 3.3   AGRAT 0.9* 1.0*      Cardiac Testing Lab Results   Component Value Date/Time  (H) 05/07/2019 03:53 PM     03/08/2019 01:10 PM    BNP 76 08/15/2018 04:48 PM     01/05/2017 03:51 AM    BNP 27 10/13/2016 08:10 PM    BNP 69 03/29/2016 05:20 AM    B-type Natriuretic Peptide 244.6 (H) 05/06/2019 12:06 PM     07/09/2018 06:02 AM    Troponin-I, Qt. 0.11 (HH) 03/19/2020 04:14 PM    Troponin-I, Qt. 0.09 (H) 03/08/2019 01:10 PM    Troponin-I, Qt. 0.07 (H) 08/15/2018 06:48 PM      Coagulation Tests Lab Results   Component Value Date/Time    Prothrombin time 11.7 (L) 07/07/2020 04:15 AM    Prothrombin time 12.4 07/06/2020 09:33 AM    Prothrombin time 12.3 07/05/2020 03:15 AM    INR 0.8 07/07/2020 04:15 AM    INR 0.9 07/06/2020 09:33 AM    INR 0.9 07/05/2020 03:15 AM    aPTT 25.9 07/07/2020 04:15 AM    aPTT 29.0 07/06/2020 09:33 AM    aPTT 27.2 07/05/2020 03:15 AM      A1c Lab Results   Component Value Date/Time    Hemoglobin A1c 4.7 (L) 12/06/2017 12:36 PM      Lipid Panel Lab Results   Component Value Date/Time    Cholesterol, total 181 12/23/2018 04:03 AM    HDL Cholesterol 90 (H) 12/23/2018 04:03 AM    LDL,Direct 71 03/25/2016 04:35 AM    LDL, calculated 73.2 12/23/2018 04:03 AM    VLDL, calculated 17.8 12/23/2018 04:03 AM    Triglyceride 89 12/23/2018 04:03 AM    CHOL/HDL Ratio 2.0 12/23/2018 04:03 AM      Thyroid Panel Lab Results   Component Value Date/Time    TSH 1.010 12/06/2017 12:36 PM    TSH 0.901 03/18/2017 11:25 AM        Most Recent UA Lab Results   Component Value Date/Time    Color YELLOW 03/28/2016 04:00 PM    Appearance CLEAR 03/28/2016 04:00 PM    Specific gravity 1.003 03/28/2016 04:00 PM    pH (UA) 5.5 03/28/2016 04:00 PM    Protein NEGATIVE  03/28/2016 04:00 PM    Glucose NEGATIVE  03/28/2016 04:00 PM    Ketone NEGATIVE  03/28/2016 04:00 PM    Bilirubin NEGATIVE  03/28/2016 04:00 PM    Blood NEGATIVE  03/28/2016 04:00 PM    Urobilinogen 0.2 03/28/2016 04:00 PM    Nitrites NEGATIVE  03/28/2016 04:00 PM    Leukocyte Esterase NEGATIVE  03/28/2016 04:00 PM WBC 0 07/02/2020 11:58 PM    RBC 0-3 07/02/2020 11:58 PM    Epithelial cells 0 07/02/2020 11:58 PM    Bacteria 0 07/02/2020 11:58 PM    Casts 0 07/02/2020 11:58 PM        No Known Allergies  Immunization History   Administered Date(s) Administered    TB Skin Test (PPD) Intradermal 03/14/2017       All Labs from Last 24 Hrs:  No results found for this or any previous visit (from the past 24 hour(s)). Discharge Exam:  Patient Vitals for the past 24 hrs:   Temp Pulse Resp BP SpO2   07/08/20 0720 97.7 °F (36.5 °C) 97 12 133/76 96 %   07/08/20 0414 98.3 °F (36.8 °C) 93 20 116/74 95 %   07/07/20 2317 98.2 °F (36.8 °C) (!) 104 20 110/67    07/07/20 1950 (!) 96.3 °F (35.7 °C) (!) 112 16 130/72 98 %   07/07/20 1440 98.1 °F (36.7 °C) 94 16 (!) 89/56 96 %   07/07/20 1145 97.7 °F (36.5 °C) 94 14 93/59 97 %     Oxygen Therapy  O2 Sat (%): 96 % (07/08/20 0720)  Pulse via Oximetry: 92 beats per minute (07/03/20 1243)  O2 Device: Room air (07/07/20 0824)    Estimated body mass index is 31.81 kg/m² as calculated from the following:    Height as of this encounter: 5' 7\" (1.702 m). Weight as of this encounter: 92.1 kg (203 lb 1.6 oz). Intake/Output Summary (Last 24 hours) at 7/8/2020 1043  Last data filed at 7/7/2020 1950  Gross per 24 hour   Intake    Output 450 ml   Net -450 ml       *Note that automatically entered I/Os may not be accurate; dependent on patient compliance with collection and accurate  by assistants. General:    Well nourished. Alert. Eyes:   Normal sclerae. Extraocular movements intact. ENT:  Normocephalic, atraumatic. Moist mucous membranes  CV:   Regular rate and rhythm. No murmur, rub, or gallop. Lungs:  Clear to auscultation bilaterally. No wheezing, rhonchi, or rales. Abdomen: Soft, nontender, nondistended. Extremities: Warm and dry. No cyanosis or edema. Neurologic: CN II-XII grossly intact. No gross focal deficits   Skin:     No rashes or jaundice.     Psych:  Normal mood and affect. Current Med List in Hospital:   Current Facility-Administered Medications   Medication Dose Route Frequency    alum-mag hydroxide-simeth (MYLANTA) oral suspension 30 mL  30 mL Oral Q4H PRN    furosemide (LASIX) tablet 40 mg  40 mg Oral BID    potassium chloride (KLOR-CON) tablet 10 mEq  10 mEq Oral DAILY    HYDROcodone-acetaminophen (NORCO)  mg tablet 1 Tab  1 Tab Oral Q4H PRN    HYDROcodone-acetaminophen (NORCO) 5-325 mg per tablet 1 Tab  1 Tab Oral Q4H PRN    dexAMETHasone (DECADRON) tablet 4 mg  4 mg Oral Q12H    albuterol (PROVENTIL HFA, VENTOLIN HFA, PROAIR HFA) inhaler 1 Puff  1 Puff Inhalation Q6H PRN    promethazine (PHENERGAN) with saline injection 12.5 mg  12.5 mg IntraVENous Q6H PRN    pantoprazole (PROTONIX) tablet 40 mg  40 mg Oral ACB    gabapentin (NEURONTIN) capsule 300 mg  300 mg Oral BID    sucralfate (CARAFATE) tablet 1 g  1 g Oral AC&HS    carvediloL (COREG) tablet 25 mg  25 mg Oral BID WITH MEALS    losartan (COZAAR) tablet 50 mg  50 mg Oral DAILY    atorvastatin (LIPITOR) tablet 80 mg  80 mg Oral DAILY    eplerenone (INSPRA) tablet 25 mg (Patient Supplied)  25 mg Oral DAILY    cyclobenzaprine (FLEXERIL) tablet 5 mg  5 mg Oral BID PRN    sodium chloride (NS) flush 5-40 mL  5-40 mL IntraVENous Q8H    sodium chloride (NS) flush 5-40 mL  5-40 mL IntraVENous PRN    acetaminophen (TYLENOL) tablet 650 mg  650 mg Oral Q4H PRN    enoxaparin (LOVENOX) injection 40 mg  40 mg SubCUTAneous Q24H    magnesium oxide (MAG-OX) tablet 400 mg  400 mg Oral Q12H    benzonatate (TESSALON) capsule 100 mg  100 mg Oral TID PRN    LORazepam (ATIVAN) injection 0.5 mg  0.5 mg IntraVENous Q6H PRN       Discharge Info:   Current Discharge Medication List      CONTINUE these medications which have NOT CHANGED    Details   cyclobenzaprine (FLEXERIL) 5 mg tablet Take 1 Tab by mouth two (2) times daily as needed (for severe muscle spasms).  Do not take if fatigued, drowsy or if operating heavy machinery or driving  Qty: 30 Tab, Refills: 0      ALPRAZolam (XANAX) 1 mg tablet Take 0.5 Tabs by mouth nightly. Max Daily Amount: 0.5 mg. Indications: anxious  Qty: 30 Tab, Refills: 3    Associated Diagnoses: Anxiety      promethazine (PHENERGAN) 25 mg tablet Take 1 Tab by mouth every six (6) hours as needed for Nausea. Qty: 30 Tab, Refills: 0    Associated Diagnoses: Projectile vomiting with nausea      sildenafil citrate (VIAGRA) 100 mg tablet Take 1 Tab by mouth as needed (as directed). Qty: 10 Tab, Refills: 3      HYDROcodone-acetaminophen (NORCO)  mg tablet TAKE ONE TABLET BY MOUTH FOUR TIMES DAILY AS NEEDED  Refills: 0      carvedilol (COREG) 25 mg tablet Take 1 Tab by mouth two (2) times daily (with meals). Qty: 180 Tab, Refills: 3      losartan (COZAAR) 50 mg tablet Take 1 Tab by mouth daily. Qty: 90 Tab, Refills: 2      furosemide (LASIX) 40 mg tablet Take 1 Tab by mouth two (2) times a day. Qty: 180 Tab, Refills: 3      atorvastatin (LIPITOR) 80 mg tablet Take 1 Tab by mouth daily. Qty: 90 Tab, Refills: 3      eplerenone (INSPRA) 25 mg tablet Take 1 Tab by mouth daily. Qty: 90 Tab, Refills: 3      sucralfate (CARAFATE) 1 gram tablet Take 1 Tab by mouth Before breakfast, lunch, dinner and at bedtime. Qty: 120 Tab, Refills: 0      ondansetron (ZOFRAN ODT) 4 mg disintegrating tablet Take 1 Tab by mouth every eight (8) hours as needed for Nausea. Qty: 12 Tab, Refills: 0      magnesium oxide (MAG-OX) 400 mg tablet Take 1 Tab by mouth every twelve (12) hours. Qty: 180 Tab, Refills: 3      gabapentin (NEURONTIN) 300 mg capsule Take 1 Cap by mouth three (3) times daily. 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 Time spent in patient discharge planning and coordination 35 minutes.     Signed:  April Aguilera MD

## 2020-07-08 NOTE — PROGRESS NOTES
Patient's last Troponin was 60+. Dr. Duran Nash said if he does not want to have the lab drawn he may simply leave.

## 2020-07-09 ENCOUNTER — PATIENT OUTREACH (OUTPATIENT)
Dept: CASE MANAGEMENT | Age: 55
End: 2020-07-09

## 2020-07-09 NOTE — PROGRESS NOTES
Second [facility] outreach for education d/t at risk condition attempt made to home/mobile numbers. Unable to leave message to return calls. Unable to reach for [facility] program, will close case. Will reopen if call is returned.

## 2020-07-09 NOTE — PROGRESS NOTES
Initial [facility] outreach for education due to at risk condition attempt to patient's home/mobile number was unsuccessful. Unable to leave message to return call. Will attempt second outreach within 24 hours.

## 2020-07-31 NOTE — ADT AUTH CERT NOTES
Utilization Reviews  
 
   
Vomiting - Care Day 7 (7/8/2020) by Dallas Carias RN  
 
   
Review Status  Review Entered Completed  7/30/2020 14:57   
   
Criteria Review Care Day: 7 Care Date: 7/8/2020 Level of Care: Inpatient Floor Guideline Day 2 Clinical Status   
(X) * Hemodynamic stability   
(X) * Vomiting absent or controlled   
(X) * Electrolyte levels adequate   
(X) * Life-threatening causes of vomiting excluded   
(X) * Pain absent or managed ( ) * Discharge plans and education understood Activity   
(X) * Ambulatory Routes   
(X) * Oral hydration   
(X) * Liquid or advanced diet   
(X) Oral medications Medications (X) Possible antiemetics * Milestone Additional Notes Knowles 7/8   
  
97.7 ax 97 12 96% 133/76 r/a Orders Medical IP, Full Code, VS, Cardiac monitoring, Droplet precautions, Cardiac diet, DC patient Meds Lipitor 80 mg po x1, Coreg 25 mg po x2, Lasix 40 mg po x1, Lovenox 40 mg sc x1, inspra 25 mg po x1, Neurontin 300 mg po x1, Cozaar 50 mg po x1, mag ox 400 mg po x1, protonix 40 mg po x1, Phenergan 12.5 mg IV x2, Carafate 1 gm po x2, Norco 10/325 mg po x3, Xanax 0.5 mg po x1, KCL 10 meq po x1, Ativan 0.5 mg IV x1, Morphine 2 mg IV x1, Neutra Phos 1 packet po x4, Decadron 4 mg po x1, Mylanta 30 ml po x2, Norco 5/325 mg po x1,   
  
DC SUMMARY COVID-19 ruled out ICD-10-CM: Z03.818 ICD-9-CM: V71.83 7/2/2020 - Present Yes   
    
  Non-ischemic cardiomyopathy (HCC) (Chronic) ICD-10-CM: I42.8 ICD-9-CM: 425.4 3/24/2016 - Present Yes   
    
  HTN (hypertension) (Chronic) ICD-10-CM: I10   
ICD-9-CM: 401.9 11/29/2011 - Present Yes   
    
  Chronic systolic (congestive) heart failure (HCC) (Chronic) ICD-10-CM: O14.23 ICD-9-CM: 428.22, 428.0 4/21/2011 - Present Yes   
    
  RESOLVED: Hypokalemia ICD-10-CM: E87.6 ICD-9-CM: 276.8 7/3/2020 - 7/7/2020 Yes   
    
  RESOLVED: Long QT interval ICD-10-CM: R94.31   
 ICD-9-CM: 794.31 7/3/2020 - 7/8/2020 Yes   
    
  RESOLVED: Cough ICD-10-CM: X56 ICD-9-CM: 786.2 7/2/2020 - 7/8/2020 Yes   
    
  * (Principal) RESOLVED: Vomiting ICD-10-CM: R11.10 ICD-9-CM: 787.03 7/2/2020 - 7/8/2020 Yes   
    
    
  
    
    
Admission HPI from 7/2/2020:     
\" Patient is a 47yoM with PMH significant for cardiomyopathy with ICD, CHF, HTN who presents with cough, SOB, and fatigue.  Patient states that he began having intractable vomiting yesterday.  Reports associated diarrhea and myalgias.  States that he also had dry cough starting later that day as well.  He feels SOB, but is not hypoxic in ER.  Labs are overall unremarkable, except with low CO2 of 12 and elevated AG of 26.  He reports COVID exposure as family recently was diagnosed with Fady Castellani his condition and exposure, Hospitalist consulted for admission. \"   
    
Hospital Course:   
Patient is a 47yoM with PMH significant for cardiomyopathy with ICD, CHF, HTN who presents with cough, SOB, and fatigue.  Patient states that he began having intractable vomiting yesterday.  Reports associated diarrhea and myalgias.  States that he also had dry cough starting later that day as well.  He feels SOB, but is not hypoxic in ER.  Labs are overall unremarkable, except with low CO2 of 12 and elevated AG of 26.  He reports COVID exposure as family recently was diagnosed with Fady Castellani his condition and exposure, Hospitalist consulted. Don Second was neg as was PCR.  Unclear why his stay was so prolonged. Yelena Neal had some chest discomfort and mildly elevated trop likely demand ischemia.  He also reports similar pain from hiatal hernia/GERD.  No reason to keep in hospital. [facility] home and outpatient management.   
    
Disposition: Home or [address] [place] [address] Activity: Activity as tolerated Diet: DIET CARDIAC Regular Code Status: Full Code   
    
Follow Up Orders: No orders of the defined types were placed in this encounter.   
  
    
 Follow-up Information   
  Follow up With Specialties Details Why Contact Pepper Celaya MD Family Practice Call As needed Bedřicha Smetany [address] [place] [address] Dr [phone]   
    
    
  
    
Discharge meds at bottom of this note. Katlin Chase was discussed with pt.  All questions answered. Jerica Lin was stable at time of discharge. Lux Larkinumble instructions to call a physician or return if any concerns. [facility] and encounter summary was sent to PCP electronically via \"Comm Mgt\" link in [facility], if possible.   
    
Diagnostic Imaging/Tests:   
Xr Chest Sngl V   
    
Result Date: 7/7/2020 EXAM: XR CHEST SNGL V INDICATION: chest pain COMPARISON: 721 FINDINGS: A portable AP radiograph of the chest was obtained at 0141 hours. The patient is on a cardiac monitor. Left lower lobe airspace disease and left hiatal hernia unchanged. . The cardiac and mediastinal contours and pulmonary vascularity are normal.  The bones and soft tissues are grossly within normal limits.   
    
IMPRESSION: Left lower lobe atelectasis and left hiatal hernia unchanged.   
    
Xr Chest Sngl V   
    
Result Date: 7/2/2020 AP chest radiograph History: sob;, 54 years Male Comparison: Chest radiograph March 19, 2020 Findings: A pacing device obscures part of the left hemithorax, leads appear to remain in anatomic position.  Normal cardiomediastinal silhouette.  Persistent low lung volumes.  Mild subsegmental atelectasis bilateral lung bases.  No evidence of pneumothorax, pleural effusion, or air space consolidation. Visualized soft tissue and osseous structures otherwise unremarkable.     
    
Impression:  No significant interval change.   
    
    
Echocardiogram results: No results found for this visit on 07/02/20.   
    
Procedures done this admission: * No surgery found *   
    
  
All Micro Results   
  None   
    
  
    
SARS-CoV-2 Lab Results \"Novel Coronavirus\" Test:   
  
Lab Results Component Value Date/Time   
  COV2NT Not Detected 07/02/2020 11:58 PM   
    
\"Emergent Disease\" Test:   
  
Lab Results Component Value Date/Time   
  EDPR NOT DETECTED 03/19/2020 06:37 PM   
  
\"SARS-COV-2\" Test: No results found for: XGCOVT As of: 10:40 AM on 7/8/2020   
  
    
General:          Well nourished. Viola Bays. Eyes:               Normal sclerae.  Extraocular movements intact. ENT:                Normocephalic, atraumatic.  Moist mucous membranes CV:                  Regular rate and rhythm.  No murmur, rub, or gallop.     
Lungs:             Clear to auscultation bilaterally.  No wheezing, rhonchi, or rales. Abdomen:        Soft, nontender, nondistended. Extremities:     Warm and dry.  No cyanosis or edema. Neurologic:      CN II-XII grossly intact. No gross focal deficits Skin:                No rashes or jaundice.     
Psych:             Normal mood and affect. Nurses Notes Pt states that his nausea and vomiting has returned. He also states that his chest, neck, back, and headache pain are not relieved by the PRN Norco.   
Pt refusing trop redraw, pt states he is ready to go home. Patient's last Troponin was 60+. Dr. Gill Mcwilliams said if he does not want to have the lab drawn he may simply leave.   
  
  
   
Vomiting - Care Day 6 (7/7/2020) by Karen Berrios RN  
 
   
Review Status  Review Entered Completed  7/30/2020 14:49   
   
Criteria Review Care Day: 6 Care Date: 7/7/2020 Level of Care: Inpatient Floor Guideline Day 2 Clinical Status   
(X) * Hemodynamic stability   
(X) * Vomiting absent or controlled   
(X) * Electrolyte levels adequate   
(X) * Life-threatening causes of vomiting excluded   
(X) * Pain absent or managed ( ) * Discharge plans and education understood Activity   
(X) * Ambulatory Routes   
(X) * Oral hydration   
(X) * Liquid or advanced diet   
(X) Oral medications Medications (X) Possible antiemetics * Milestone Additional Notes 7/7   
  
98.2 104 20 98% 110/67 r/a   
  
7/7/2020 04:15 Prothrombin time 11.7 (L)   
  
  
7/7/2020 02:17 Glucose 134 (H) BUN 10 Creatinine 1.31 Calcium 8.2 (L) Phosphorus 2.2 (L) Magnesium 2.4 GFR est non-AA >60   
GFR est AA >60 Bilirubin, total 0.2 Protein, total 7.0 Albumin 3.4 (L) Globulin 3.6 (H) A-G Ratio 0.9 (L) ALT 21 AST 26 Alk. phosphatase 77 Troponin-High Sensitivity 60.6 (HH) CXR Left lower lobe atelectasis and left hiatal hernia unchanged. EKG Normal sinus rhythm Left ventricular hypertrophy with repolarization abnormality Inferior infarct (cited on or before 02-JUL-2020) Abnormal ECG When compared with ECG of 03-JUL-2020 12:40,   
Premature ventricular complexes are no longer Present Nonspecific T wave abnormality has replaced inverted T waves in Inferior Leads Orders Medical IP, Full Code, VS, Cardiac monitoring, Droplet precautions, Cardiac diet Meds Lipitor 80 mg po x1, Coreg 25 mg po x2, Lasix 40 mg IV x2, Lasix 40 mg IV x1, Lasix 40 mg po x2, Lovenox 40 mg sc x1, inspra 25 mg po x1, Neurontin 300 mg po x2, Cozaar 50 mg po x1, mag ox 400 mg po x2, protonix 40 mg po x1, Phenergan 12.5 mg IV x4, Carafate 1 gm po x4, Norco 10/325 mg po x2 Xanax 0.5 mg po x1, KCL 10 meq po x1, Ativan 0.5 mg po x2,Morphine 2 mg IV x1, Neutra Phos 1 packet po x4, Decadron 4 mg po x2, Nurses Notes Resting quietly, awake, Phenergan 12.5 mg given, as pt reminded nurse earlier p.m he would like his Phenergan at midnight when it's due. During admin, pt report he has nausea now and has had chest pain since 0010, and feeling short of breath, with lightheadedness. , reg, O2 sat 99% on room air, lungs sounds clear, diminished in bases. Skin pink, warm, dry.  Resp even, unlab at 20 bpm during assessment with no obvious signs of distress. Requested ice, during assessment. Pt instructed to call nurse at time of onset when these type of symptoms occur. Pt stated \"I didn't want to bother you and knew you would be coming soon with my Phenergan\". Aware nurse to notify Md, after assessment completed. Messaged Dr. May Tavares with pt's c/o shortness of breath, chest pain, stable vital signs, O2 sat 99% on room air, lungs sounds, NSR, sinus tach on monitor, , reg, skin pink, warm, dry. Phenergan given for reported nausea. O2 applied at 2L N/C to facilitate comfort with breathing. Sitting up on side of bed. Ice given upon request. Asked nurse for ice cream at this time. Jarek Olsen in telemetry room confirmed pt's rhythm normal sinus, sinus tachycardia, range 99 -103 bpm.   
  
Morphine 2 mg given IVP slowly for c/o chest pain. Agrees to call for asst to be out of bed, due to continued feeling of lightheadedness Pain med not yet effective, resp even, unlab at 20 bpm with O2 at 2L N/C, O2 sat 100%. Skin remains pink, warm, dry. Reports he still feels like it's hard to catch his breath. Will continue to monitor. Late entry   
    
0310 Received call for critical lab from [facility]  Troponin 60.6  Report given to primary RN Phenergan 12.5 mg given for c/o worsening nausea; Norco 10 mg given for c/o worsening pain. Resp remain even, unlab, skin warm, dry. Inquired about getting some ice cream to eat. IM NOTE   
  
7/7 - pt still c/o nausea.  Has some dull left sided CP moderate, asking for pain meds.  No outward distress.  No CP.  Mild SOB.  occ dry cough.  No fevers.  Feels better than yest overall   
    
Physical Exam:   
GENERAL: alert, cooperative, no distress, appears stated age LUNG: few scattered wheezes, b/l HEART: regular rate and rhythm, S1, S2 normal, no murmur, click, rub or gallop ABDOMEN: soft, non-tender. Bowel sounds normal. No masses,  no organomegaly EXTREMITIES:  extremities normal, atraumatic, no cyanosis or edema Assessment Principal Problem:   
  Vomiting (7/2/2020)   
  Active Problems:   
  Chronic systolic (congestive) heart failure ([facility] [address] (4/21/2011)     
  HTN (hypertension) (11/29/2011)     
  Non-ischemic cardiomyopathy ([facility] (3/24/2016)     
  Suspected COVID-19 virus infection (7/2/2020)     
  Cough (7/2/2020)     
  Hypokalemia (7/3/2020)     
  Long QT interval (7/3/2020)   
    
Plan Long QT interval ICD-10-CM: R94.31   
ICD-9-CM: 794.31 7/3/2020 - Present Yes   
    
  Suspected COVID-19 virus infection ICD-10-CM: Z20.828 ICD-9-CM: V01.79 7/2/2020 - Present Yes   
    
  Cough ICD-10-CM: R05 ICD-9-CM: 786.2 7/2/2020 - Present Yes   
    
  * (Principal) Vomiting ICD-10-CM: R11.10 ICD-9-CM: 787.03 7/2/2020 - Present Yes   
    
  Non-ischemic cardiomyopathy (HCC) (Chronic) ICD-10-CM: I42.8 ICD-9-CM: 425.4 3/24/2016 - Present Yes   
    
  HTN (hypertension) (Chronic) ICD-10-CM: I10   
ICD-9-CM: 401.9 11/29/2011 - Present Yes   
    
  Chronic systolic (congestive) heart failure (HCC) (Chronic) ICD-10-CM: Y80.92 ICD-9-CM: 428.22, 428.0 4/21/2011 - Present Yes   
    
  RESOLVED: Hypokalemia ICD-10-CM: E87.6 ICD-9-CM: 276.8 7/3/2020 - 7/7/2020 Yes   
    
    
  
    
Plan:   
\" Change lasix back to PO   
\" Replace Phos \" Trend trops, likely demand ischemia. \" Echo pending \" Rapid COVID neg.  PCR pending \" Start dexamethasone as benefit outweighs risk despite not confirmed COVID yet \" Cont other meds as shown \" Home soon   
    
Diet:  DIET CARDIAC   
DVT ppx:  lovenox   
    
  
    
  
  
  
  COVID 19 RESULTS by Loreto Hills RN  
 
   
Review Status  Review Entered In Primary  7/7/2020 16:34   
   
Criteria Review Is the illness suspected to be related to the Coronavirus (COVID-19)? Yes, Has the member been tested for the COVID-19? Yes  
 If Yes, what are the results of the COVID-19? Negative What is the severity of the members condition (i.e. Isolation, Ventilator use)? 
  
Droplet Precautions   
  
  
COVID 19 TEST RESULTS 
SARS-CoV-2   PENDING                                            Incomplete Specimen source        Nasopharyngeal                                              Final 
COVID-19 rapid test   Not detected                NOTD               
  
   
   
Vomiting - Care Day 5 (7/6/2020) by Jose Alvarado RN  
 
   
Review Status  Review Entered Completed  7/7/2020 16:27   
   
Criteria Review Care Day: 5 Care Date: 7/6/2020 Level of Care: Inpatient Floor Guideline Day 2 Clinical Status   
(X) * Hemodynamic stability 7/7/2020 16:27:10 EDT by Lisa Alvarado   
  99.3 95 18 97% 120/62 r/a (X) * Vomiting absent or controlled   
(X) * Electrolyte levels adequate   
(X) * Life-threatening causes of vomiting excluded   
(X) * Pain absent or managed ( ) * Discharge plans and education understood Activity   
(X) * Ambulatory Routes   
(X) * Oral hydration   
(X) * Liquid or advanced diet 7/7/2020 16:27:10 EDT by Marylen Brand   
  Cardiac diet   
(X) Oral medications 7/7/2020 16:27:10 EDT by Marylen Brand   
  Lipitor 80 mg po x1, Coreg 25 mg po x2, Lovenox 40 mg sc x1, inspra 25 mg po x1, Neurontin 300 mg po x2, Cozaar 50 mg po x1, mag ox 400 mg po x2, protonix 40 mg po x1, Phenergan 12.5 mg IV x3, Carafate 1 gm po x4, Mag 4 gm IV x1, Norco 10/325 mg po x Medications (X) Possible antiemetics 7/7/2020 16:27:10 EDT by Marylen Brand   
  Phenergan 12.5 mg IV x3,   
* Milestone Additional Notes 7/6   
  
99.3 95 18 97% 120/62 r/a   
  
7/6/2020 09:33   
RBC 3.71 (L) HGB 11.5 (L) HCT 35.1 (L) MCV 94.6 MCH 31.0 MCHC 32.8   
RDW 17.9 (H)   
  
  
7/6/2020 04:45 7/6/2020 09:33 Sodium 139 Potassium 3.4 (L) Chloride 105 CO2 28   
 Anion gap 6 (L) Glucose 114 (H) BUN 14 Creatinine 1.14 Calcium 8.1 (L) Phosphorus 2.9 Magnesium 0.8 (LL)   
GFR est non-AA >60   
GFR est AA >60 Bilirubin, total 0.6 Protein, total 6.7 Albumin 3.4 (L) Globulin 3.3 A-G Ratio 1.0 (L) COVID 19 TEST RESULTS   
SARS-CoV-2 PENDING Incomplete Specimen source Nasopharyngeal Final   
COVID-19 rapid test Not detected NOTD Orders Medical IP, Full Code, VS, Cardiac monitoring, Droplet precautions, Cardiac diet Meds Lipitor 80 mg po x1, Coreg 25 mg po x2, Lovenox 40 mg sc x1, inspra 25 mg po x1, Neurontin 300 mg po x2, Cozaar 50 mg po x1, mag ox 400 mg po x2, protonix 40 mg po x1, Phenergan 12.5 mg IV x3, Carafate 1 gm po x4, Mag 4 gm IV x1, Norco 10/325 mg po x4, Nurses Notes After waking, requested nausea med; Phenergan 12.5 mg given IVP slowly. No emesis noted. Norco 10 mg given PO for c/o returned pain. Resting quietly, awake, requesting pain med; Norco 10 mg given PO for c/o abdom and right lower back pain. Agrees to call for asst to be out of bed if steadiness questionable. Assessment completed. Reminded nurse that he could have his Phenergan at midnight. CM NOTE   
MSN, CM:  Spoke with patient this AM about discharge planning.  Patient lives with his wife Yajaira Hartman" in own house with 5 steps for entrance.  Patient's sister-in-law lives close and helps if needed. Jordan Farrell is independent with all ADL's and requires no equipment for ambulation.  Patient denies any home oxygen, HH or rehab in the past. Jordan Farrell denies any discharge needs at this time.  Case Management will continue to follow for any discharge needs that may arise.     
    
Care Management Interventions PCP Verified by CM: Yes(Dr. Rajinder Banks) Mode of Transport at Discharge: Other (see comment)(family  to transport) Transition of Care Consult (CM Consult): Discharge Planning Current Support Network: Own Home, Lives with Spouse, Family Lives [place] Confirm Follow Up Transport: Self Freedom of Choice List was Provided with Basic Dialogue that Supports the Patient's Individualized Plan of Care/Goals, Treatment Preferences and Shares the Quality Data Associated with the Providers?: Yes Discharge Location Discharge Placement: Home PICC NOTE #20g 2.25in PIV accucath lot number [id number] inserted into Left cephalic using aseptic technique under US guidance. Pt tolerated well.   
 IM NOTE Seen and examined, no acute distress Physical Exam:   
GENERAL: alert, cooperative, no distress, appears stated age LUNG: clear to auscultation bilaterally HEART: regular rate and rhythm, S1, S2 normal, no murmur, click, rub or gallop ABDOMEN: soft, non-tender. Bowel sounds normal. No masses,  no organomegaly EXTREMITIES:  extremities normal, atraumatic, no cyanosis or edema   
    
Assessment Principal Problem:   
  Vomiting (7/2/2020)   
  Active Problems:   
  Chronic systolic (congestive) heart failure ([facility] [address] (4/21/2011)     
  HTN (hypertension) (11/29/2011)     
  Non-ischemic cardiomyopathy ([facility] [address] (3/24/2016)     
  Suspected COVID-19 virus infection (7/2/2020)     
  Cough (7/2/2020)     
  Hypokalemia (7/3/2020)     
  Long QT interval (7/3/2020)   
    
Plan Cough/possible CHF exacerbation, vs covid19(low probability) 7/6/2020 Continue diuresis, with lasix 40 mg IVP BID   
    
    
7/5/2020 Continue diuresis with lasix IVP, monitor BMP   
    
Patient overall stable 7/4/2020 Possible multifactorial, patient has history of a severe systolic congestive heart failure with an ejection fraction of 15%, also he suspected of COVID at this time. [facility] claims he never smoked. Persistent Continue samples, oxygen as needed monitor   
    
Wheezing, could be cardiac asthma In the records his last echocardiography was from 2018, will order echocardiography, which probably will not be done until he is COVID test will return back negative Change Lasix p.o. to Lasix IV 40 mg IV push twice daily Continue to monitor BMP daily   
 No reported h/o asthma/COPD/smoking Does have bilateral diffuse wheeze Continue Nebs prn Per Cardiology note from earlier this year (March), pt had wheezing at that time as well   
    
COVID-19 rule-out Swabbed in ER, still pending Patient had exposure to family members positive for COVID   
    
Nausea/Vomiting 7/5/2020 resolved   
    
7/4/2020 On my evaluation today, the patient did not have vomiting, however he still claims he is nauseated, and only Phenergan works for him. Licha Staggers IV ordered.  Continue to monitor   
continue Zofran prn,   
    
Low Bicarb and Elevated AG Continue to monitor BMP   
    
Cardiomyopathy/CHF   
EF of 15-20% on last echo   
ICD in place Home meds   
    
HTN Stable   
continue Home meds   
    
dvt ppx   
lovenox SQ   
    
  
   
Clinical note Review 7/5 by Goldy Wilson RN  
 
   
Review Status  Review Entered In Primary  7/5/2020 11:48   
   
Criteria Review Knowles 7/5 
  
98.2 97 16 99% 122/70 r/a 
  
  7/5/2020 03:15  
RBC 3.50 (L) HGB 10.5 (L) HCT 32.9 (L) MCV 94.0 MCH 30.0 MCHC 31.9  
RDW 17.4 (H)   
  
  7/5/2020 03:15 Chloride 108 (H)  
CO2 26 Anion gap 6 (L) Glucose 126 (H) BUN 9 Creatinine 1.17 Calcium 7.9 (L) Phosphorus 1.1 (LL) Magnesium 1.8 GFR est non-AA >60  
GFR est AA >60 Bilirubin, total 0.6 Protein, total 5.9 (L) Albumin 3.1 (L) Globulin 2.8 A-G Ratio 1.1 (L)  
  
  
Orders Medical IP, Full Code, VS, Cardiac monitoring, Droplet precautions, Cardiac diet 
  
Meds Lipitor 80 mg po x1, Coreg 25 mg po x2, Lasix 40 mg IV x2, Lasix 40 mg po x1, Lovenox 40 mg sc x1, inspra 25 mg po x1, Neurontin 300 mg po x2, Cozaar 50 mg po x1, mag ox 400 mg po x2, protonix 40 mg po x1, Phenergan 12.5 mg IV x2, Carafate 1 gm po x3, Norco 10/325 mg po x3, Xanax 0.5 mg po x1, KCL 40 meq po x2, Ativan 0.5 mg po x2, 
  
IM NOTE 
 Seen and examined, s/p dyspnea, and cough, no fevers. ECHO pending 
  
Physical Exam:  
GENERAL: alert, cooperative, no distress, appears stated age LUNG: few scattered wheezes, b/l HEART: regular rate and rhythm, S1, S2 normal, no murmur, click, rub or gallop ABDOMEN: soft, non-tender. Bowel sounds normal. No masses,  no organomegaly EXTREMITIES:  extremities normal, atraumatic, no cyanosis or edema 
  
Assessment Principal Problem: 
  Vomiting (7/2/2020) 
  Active Problems: 
  Chronic systolic (congestive) heart failure ([facility] [address] (4/21/2011)   
  HTN (hypertension) (11/29/2011)   
  Non-ischemic cardiomyopathy ([facility] [address] (3/24/2016)   
  Suspected COVID-19 virus infection (7/2/2020)   
  Cough (7/2/2020)   
  Hypokalemia (7/3/2020)   
  Long QT interval (7/3/2020) 
 Plan 
  
  
 Cough Possible multifactorial, patient has history of a severe systolic congestive heart failure with an ejection fraction of 15%, also he suspected of COVID at this time. Jovita Doyle claims he never smoked. Persistent Continue samples, oxygen as needed monitor 
 Cough 
  
7/5/2020 Continue diuresis with lasix IVP, monitor BMP 
  
Patient overall stable 7/4/2020 Possible multifactorial, patient has history of a severe systolic congestive heart failure with an ejection fraction of 15%, also he suspected of COVID at this time. Erenest Budds claims he never smoked. Persistent Continue samples, oxygen as needed monitor 
  
Wheezing, could be cardiac asthma In the records his last echocardiography was from 2018, will order echocardiography, which probably will not be done until he is COVID test will return back negative Change Lasix p.o. to Lasix IV 40 mg IV push twice daily Continue to monitor BMP daily 
 No reported h/o asthma/COPD/smoking Does have bilateral diffuse wheeze Continue Nebs prn Per Cardiology note from earlier this year (March), pt had wheezing at that time as well 
  
COVID-19 rule-out Swabbed in ER Patient had exposure to family members positive for COVID 
  
Nausea/Vomiting 7/5/2020 resolved 
  
7/4/2020 On my evaluation today, the patient did not have vomiting, however he still claims he is nauseated, and only Phenergan works for him. Danielle Bachelor IV ordered.  Continue to monitor 
continue Zofran prn,  
  
Low Bicarb and Elevated AG Continue to monitor BMP 
  
Cardiomyopathy/CHF 
EF of 15-20% on last echo 
ICD in place Home meds 
  
HTN Stable 
continue Home meds 
  
dvt ppx 
lovenox SQ 
  
  
Nurses notes 
  
Phenergan 25 mg given IVP slowly for c/o nausea, no emesis. Reports he was unable to eat all of sandwich tray.  Aware to call for asst to be out of bed if steadiness questionable. 
  
Norco 10 mg given PO for c/o pain.  
   
IM note July 3 2020 by Karlos Menard RN  
 
   
Review Status  Review Entered In Primary  7/4/2020 16:07   
   
Criteria Review 7/3/20 IM note Today: Patient with critical hypomagnesemia and critical hypokalemia that are being replaced. Lizbeth Franco continues to have nausea and vomiting but I am able to give ondansetron and promethazine given prolonged QT on EKG.  Will have lorazepam as needed for nausea vomiting.  Will have Norco as needed for abdominal pain and pleuritic pain from cough.  Patient is agreeable to this.  Repeat EKG in the morning 
  
Physical Exam: 
General: Middle-aged black male, sitting up in bed, no acute distress HEENT: NCAT, moist mucous membranes Skin: No rash noted Cardio: RRR, normal S1/S2, no rubs, no gallops, no murmurs Pulm: Non labored respirations on room air, LCAB, no wheezing, no rales, no rhonchi, frequent cough GI: Soft, tender in bilateral flanks, Nd, Nml bowel sounds, no masses noted Extremity: Atraumatic, no deformities, no edema Neuro: Alert, oriented, moving all extremities, no focal deficits noted Psych: Pleasant, cooperative, normal range of affect 
  
Assessment and Plan: 
  
#Hypokalemia, hypomagnesemia: From vomiting. 
-Replace and trend 
  #Intractable nausea and vomiting: Given multiple doses of ondansetron and promethazine in the emergency department. 
-Lorazepam IV as needed for nausea vomiting 
  #Prolonged QT: Likely from hypomagnesiumemia, hypokalemia, ondansetron, promethazine. 
-Repeat EKG in the morning 
-Correct electrolytes as above 
-Hold QT prolonging medications 
  
#Suspected novel coronavirus infection: Not hypoxic.  No findings on chest x-ray. 
-Follow novel coronavirus assay 
  
#Anion gap metabolic acidosis: Resolved. 
  
#HFrEF: Continue home carvedilol, eplerenone, furosemide, losartan #HTN: As above. #HLD: Continue atorvastatin 
  
Inpatient for above 
  
Full code 
  
Enoxaparin for VTE prophylaxis  
   
Clinical Note 7/4 by Rosendo Gastelum RN  
 
   
Review Status  Review Entered In Primary  7/4/2020 11:37   
   
Criteria Review 7/4 
  
98.3 95 17 100% 112/72 r/a 
  
  7/3/2020 04:33 RBC 3.97 (L) HGB 12.3 (L) HCT 36.1 (L) MCV 90.9 MCH 31.0 MCHC 34.1  
RDW 17.1 (H) PLATELET 054 MPV 9.0 (L) NEUTROPHILS 62 LYMPHOCYTES 33 MONOCYTES 3 (L)  
  
  7/4/2020 03:40 7/4/2020 08:55 Sodium 137 138 Potassium 3.0 (L) 3.4 (L) Chloride 104 106 CO2 22 24 Anion gap 11 8 Glucose 149 (H) 108 (H) BUN 10 8 Creatinine 1.34 1.15 Calcium 7.3 (L) 7.4 (L) Phosphorus   2.1 (L) Magnesium 2.4 2.2 GFR est non-AA 59 (L) >60  
GFR est AA >60 >60 Bilirubin, total 0.8 0.7 Protein, total 5.9 (L) 6.0 (L) Albumin 3.1 (L) 3.2 (L) Globulin 2.8 2.8 A-G Ratio 1.1 (L) 1.1 (L)  
  
  
ECHO RESULTS 
  
SUMMARY: 
  
-  Left ventricle: The ventricle was mildly to moderately dilated. There was 
severe diffuse hypokinesis.  Wall thickness was mildly increased. 
  
 -  Left atrium: The atrium was markedly dilated. 
  
-  Right atrium: The atrium was moderately dilated. 
  
-  Mitral valve: There was mild regurgitation. 
  
-  Tricuspid valve: There was mild regurgitation. Orders Medical IP, Full Code, VS, Cardiac monitoring, Droplet precautions, Cardiac diet 
  
Meds Lipitor 80 mg po x1, Coreg 25 mg po x2, Lasix 40 mg IV x1, Lasix 40 mg po x1, Lovenox 40 mg sc x1, inspra 25 mg po x1, Neurontin 300 mg po x2, Cozaar 50 mg po x1, mag ox 400 mg po x2, protonix 40 mg po x1, Phenergan 25 mg IV x1, Carafate 1 gm po x4, Norco 10/325 mg po x4, Xanax 0.5 mg po x1, KCL 40 meq po x2, Ativan 0.5 mg po x2, 
  
IM NOTE Seen and examined, c/p pain \"all over\". And also nausea, an requesting \"phenergan IV, because only that works for [employer] . He also requested morphine IV. 
  
Physical Exam:  
  
GENERAL: alert, cooperative, no distress, appears stated age LUNG: few scattered wheezes, b/l HEART: regular rate and rhythm, S1, S2 normal, no murmur, click, rub or gallop ABDOMEN: soft, non-tender. Bowel sounds normal. No masses,  no organomegaly EXTREMITIES:  extremities normal, atraumatic, no cyanosis or edema 
  
Assessment Principal Problem: 
  Vomiting (7/2/2020) 
  Active Problems: 
  Chronic systolic (congestive) heart failure ([address] (4/21/2011)   
  HTN (hypertension) (11/29/2011)   
  Non-ischemic cardiomyopathy ([address] (3/24/2016)   
  Suspected COVID-19 virus infection (7/2/2020)   
  Cough (7/2/2020)   
  Hypokalemia (7/3/2020)   
  Long QT interval (7/3/2020)   
  
 Cough 
  
Possible multifactorial, patient has history of a severe systolic congestive heart failure with an ejection fraction of 15%, also he suspected of COVID at this time. Juanita Martinez claims he never smoked. Persistent Continue samples, oxygen as needed monitor 
  
Wheezing, could be cardiac asthma In the records his last echocardiography was from 2018, will order echocardiography, which probably will not be done until he is COVID test will return back negative Change Lasix p.o. to Lasix IV 40 mg IV push twice daily Continue to monitor BMP daily 
 No reported h/o asthma/COPD/smoking Does have bilateral diffuse wheeze Continue Nebs prn Per Cardiology note from earlier this year (March), pt had wheezing at that time as well 
  
COVID-19 rule-out Swabbed in ER Patient had exposure to family members positive for COVID 
  
Nausea/Vomiting On my evaluation today, the patient did not have vomiting, however he still claims he is nauseated, and only Phenergan works for him. Spiro Boop IV ordered.  Continue to monitor 
continue Zofran prn,  
  
Low Bicarb and Elevated AG Continue to monitor BMP 
  
Cardiomyopathy/CHF 
EF of 15-20% on last echo 
ICD in place Home meds 
  
HTN Stable 
continue Home meds 
  
dvt ppx 
lovenox SQ 
  
 Nurses notes 
  
Potassium result 3.1 reported to Dr. Snow Palmer; orders received. Ativan 0.5 mg given IVP slowly for c/o nausea. Head of bed elevated. Aware to use emesis bag for nurse to assess Xanax 0.5 mg given PO for sleep, after reporting to Dr. Snow Palmer with order received. Norco 10 mg given PO for c/o returned abdom and right lower back pain.  
  
  
Resting quietly, awake, medicated for pain, waiting for effectiveness during Shift assessment complete. Pt alert and oriented x4. Respirations tachypneic. Wheezing heard on auscultation. HR tachy, tele in place per MD order. Abdomen soft, tender, with active bowel sounds heard in all four quadrants. Skin intact. No edema noted. IV patent and capped. All needs met at this time. Safety measures in place, call light within reach. Will continue to monitor. 0959: Pt complains of nausea. PRN Ativan 0.5 mg given slow, IVP per MD order. Safety measures in place, call light within reach. Will continue to monitor.  
  
Pt complains of generalized pain 10/10.  Explained to pt that it is too soon for PRN Norco at this time, last administered at 0621. Pt verbalized understanding. PRN Tylenol offered, pt refused. Pt requesting \"morphine\" for pain. MD notified of pt requests. Will continue to monitor.  
Pt complains of generalized pain 10/10. PRN Norco  mg one tab given PO per MD order. Safety measures in place, call light within reach. Will continue to monitor.  
Pt complains of generalized pain. PRN Norco  mg one tab given PO per MD order. Pt also complains of nausea. PRN Phenergan 25 mg given slow, IVP per MD order. Safety measures in place, call light within reach. Will continue to monitor Resting quietly, awake, resp even, unlab, skin warm, dry. AP reg, lungs sounds clear. Abdom remains soft, tender with active bowel sounds. Assessment completed. No c/o, no distress Norco 10 mg given PO for c/o abdom and lower back pain. [place] tray and soft drink requested and consumed.

## 2020-09-01 ENCOUNTER — HOSPITAL ENCOUNTER (OUTPATIENT)
Dept: LAB | Age: 55
Discharge: HOME | End: 2020-09-01
Payer: COMMERCIAL

## 2020-09-01 DIAGNOSIS — I42.8 NON-ISCHEMIC CARDIOMYOPATHY (HCC): Chronic | ICD-10-CM

## 2020-09-01 LAB
ANION GAP SERPL CALC-SCNC: 8 MMOL/L (ref 7–16)
BNP SERPL-MCNC: 190 PG/ML (ref 5–125)
BUN SERPL-MCNC: 10 MG/DL (ref 6–23)
CALCIUM SERPL-MCNC: 8.2 MG/DL (ref 8.3–10.4)
CHLORIDE SERPL-SCNC: 108 MMOL/L (ref 98–107)
CO2 SERPL-SCNC: 24 MMOL/L (ref 21–32)
CREAT SERPL-MCNC: 1.34 MG/DL (ref 0.8–1.5)
GLUCOSE SERPL-MCNC: 99 MG/DL (ref 65–100)
MAGNESIUM SERPL-MCNC: 1.4 MG/DL (ref 1.8–2.4)
POTASSIUM SERPL-SCNC: 3.2 MMOL/L (ref 3.5–5.1)
SODIUM SERPL-SCNC: 140 MMOL/L (ref 136–145)

## 2020-09-01 PROCEDURE — 36415 COLL VENOUS BLD VENIPUNCTURE: CPT

## 2020-09-01 PROCEDURE — 80048 BASIC METABOLIC PNL TOTAL CA: CPT

## 2020-09-01 PROCEDURE — 83880 ASSAY OF NATRIURETIC PEPTIDE: CPT

## 2020-09-01 PROCEDURE — 83735 ASSAY OF MAGNESIUM: CPT

## 2020-09-30 ENCOUNTER — HOSPITAL ENCOUNTER (INPATIENT)
Age: 55
LOS: 3 days | Discharge: HOME | DRG: 292 | End: 2020-10-05
Attending: EMERGENCY MEDICINE | Admitting: INTERNAL MEDICINE
Payer: COMMERCIAL

## 2020-09-30 ENCOUNTER — APPOINTMENT (OUTPATIENT)
Dept: GENERAL RADIOLOGY | Age: 55
DRG: 292 | End: 2020-09-30
Attending: EMERGENCY MEDICINE
Payer: COMMERCIAL

## 2020-09-30 DIAGNOSIS — I50.23 ACUTE ON CHRONIC SYSTOLIC HEART FAILURE (HCC): ICD-10-CM

## 2020-09-30 DIAGNOSIS — I10 ESSENTIAL HYPERTENSION: Chronic | ICD-10-CM

## 2020-09-30 DIAGNOSIS — R11.12 PROJECTILE VOMITING WITH NAUSEA: ICD-10-CM

## 2020-09-30 DIAGNOSIS — I50.33 ACUTE ON CHRONIC DIASTOLIC CONGESTIVE HEART FAILURE (HCC): Primary | ICD-10-CM

## 2020-09-30 DIAGNOSIS — R07.9 CHEST PAIN, UNSPECIFIED TYPE: ICD-10-CM

## 2020-09-30 DIAGNOSIS — I47.20 VT (VENTRICULAR TACHYCARDIA): ICD-10-CM

## 2020-09-30 LAB
ALBUMIN SERPL-MCNC: 3.8 G/DL (ref 3.5–5)
ALBUMIN/GLOB SERPL: 1.2 {RATIO} (ref 1.2–3.5)
ALP SERPL-CCNC: 85 U/L (ref 50–136)
ALT SERPL-CCNC: 32 U/L (ref 12–65)
ANION GAP SERPL CALC-SCNC: 7 MMOL/L (ref 7–16)
AST SERPL-CCNC: 61 U/L (ref 15–37)
ATRIAL RATE: 103 BPM
ATRIAL RATE: 111 BPM
BASOPHILS # BLD: 0 K/UL (ref 0–0.2)
BASOPHILS NFR BLD: 1 % (ref 0–2)
BILIRUB SERPL-MCNC: 1 MG/DL (ref 0.2–1.1)
BNP SERPL-MCNC: 1902 PG/ML (ref 5–125)
BUN SERPL-MCNC: 7 MG/DL (ref 6–23)
CALCIUM SERPL-MCNC: 7.4 MG/DL (ref 8.3–10.4)
CALCULATED P AXIS, ECG09: 49 DEGREES
CALCULATED P AXIS, ECG09: 49 DEGREES
CALCULATED R AXIS, ECG10: -21 DEGREES
CALCULATED R AXIS, ECG10: -24 DEGREES
CALCULATED T AXIS, ECG11: 112 DEGREES
CALCULATED T AXIS, ECG11: 113 DEGREES
CHLORIDE SERPL-SCNC: 109 MMOL/L (ref 98–107)
CO2 SERPL-SCNC: 24 MMOL/L (ref 21–32)
CREAT SERPL-MCNC: 1.03 MG/DL (ref 0.8–1.5)
DIAGNOSIS, 93000: NORMAL
DIAGNOSIS, 93000: NORMAL
DIFFERENTIAL METHOD BLD: ABNORMAL
EOSINOPHIL # BLD: 0.2 K/UL (ref 0–0.8)
EOSINOPHIL NFR BLD: 3 % (ref 0.5–7.8)
ERYTHROCYTE [DISTWIDTH] IN BLOOD BY AUTOMATED COUNT: 15.6 % (ref 11.9–14.6)
GLOBULIN SER CALC-MCNC: 3.3 G/DL (ref 2.3–3.5)
GLUCOSE SERPL-MCNC: 99 MG/DL (ref 65–100)
HCT VFR BLD AUTO: 35.7 % (ref 41.1–50.3)
HGB BLD-MCNC: 11.8 G/DL (ref 13.6–17.2)
IMM GRANULOCYTES # BLD AUTO: 0 K/UL (ref 0–0.5)
IMM GRANULOCYTES NFR BLD AUTO: 0 % (ref 0–5)
LYMPHOCYTES # BLD: 2.8 K/UL (ref 0.5–4.6)
LYMPHOCYTES NFR BLD: 53 % (ref 13–44)
MAGNESIUM SERPL-MCNC: 1.3 MG/DL (ref 1.8–2.4)
MAGNESIUM SERPL-MCNC: 1.4 MG/DL (ref 1.8–2.4)
MCH RBC QN AUTO: 32.3 PG (ref 26.1–32.9)
MCHC RBC AUTO-ENTMCNC: 33.1 G/DL (ref 31.4–35)
MCV RBC AUTO: 97.8 FL (ref 79.6–97.8)
MONOCYTES # BLD: 0.5 K/UL (ref 0.1–1.3)
MONOCYTES NFR BLD: 9 % (ref 4–12)
NEUTS SEG # BLD: 1.8 K/UL (ref 1.7–8.2)
NEUTS SEG NFR BLD: 34 % (ref 43–78)
NRBC # BLD: 0 K/UL (ref 0–0.2)
P-R INTERVAL, ECG05: 160 MS
P-R INTERVAL, ECG05: 180 MS
PLATELET # BLD AUTO: 195 K/UL (ref 150–450)
PMV BLD AUTO: 9.2 FL (ref 9.4–12.3)
POTASSIUM SERPL-SCNC: 3.3 MMOL/L (ref 3.5–5.1)
PROT SERPL-MCNC: 7.1 G/DL (ref 6.3–8.2)
Q-T INTERVAL, ECG07: 332 MS
Q-T INTERVAL, ECG07: 334 MS
QRS DURATION, ECG06: 100 MS
QRS DURATION, ECG06: 104 MS
QTC CALCULATION (BEZET), ECG08: 437 MS
QTC CALCULATION (BEZET), ECG08: 451 MS
RBC # BLD AUTO: 3.65 M/UL (ref 4.23–5.6)
SODIUM SERPL-SCNC: 140 MMOL/L (ref 136–145)
TROPONIN-HIGH SENSITIVITY: 179.3 PG/ML (ref 0–14)
TROPONIN-HIGH SENSITIVITY: 199 PG/ML (ref 0–14)
VENTRICULAR RATE, ECG03: 103 BPM
VENTRICULAR RATE, ECG03: 111 BPM
WBC # BLD AUTO: 5.3 K/UL (ref 4.3–11.1)

## 2020-09-30 PROCEDURE — 96375 TX/PRO/DX INJ NEW DRUG ADDON: CPT

## 2020-09-30 PROCEDURE — 94640 AIRWAY INHALATION TREATMENT: CPT

## 2020-09-30 PROCEDURE — 96372 THER/PROPH/DIAG INJ SC/IM: CPT

## 2020-09-30 PROCEDURE — 85025 COMPLETE CBC W/AUTO DIFF WBC: CPT

## 2020-09-30 PROCEDURE — 99285 EMERGENCY DEPT VISIT HI MDM: CPT

## 2020-09-30 PROCEDURE — 96376 TX/PRO/DX INJ SAME DRUG ADON: CPT

## 2020-09-30 PROCEDURE — 84484 ASSAY OF TROPONIN QUANT: CPT

## 2020-09-30 PROCEDURE — 83735 ASSAY OF MAGNESIUM: CPT

## 2020-09-30 PROCEDURE — 71045 X-RAY EXAM CHEST 1 VIEW: CPT

## 2020-09-30 PROCEDURE — 74011250637 HC RX REV CODE- 250/637: Performed by: EMERGENCY MEDICINE

## 2020-09-30 PROCEDURE — 96365 THER/PROPH/DIAG IV INF INIT: CPT

## 2020-09-30 PROCEDURE — 83880 ASSAY OF NATRIURETIC PEPTIDE: CPT

## 2020-09-30 PROCEDURE — 2709999900 HC NON-CHARGEABLE SUPPLY

## 2020-09-30 PROCEDURE — 74011000250 HC RX REV CODE- 250: Performed by: PHYSICIAN ASSISTANT

## 2020-09-30 PROCEDURE — 96366 THER/PROPH/DIAG IV INF ADDON: CPT

## 2020-09-30 PROCEDURE — 93005 ELECTROCARDIOGRAM TRACING: CPT | Performed by: EMERGENCY MEDICINE

## 2020-09-30 PROCEDURE — 74011250636 HC RX REV CODE- 250/636: Performed by: PHYSICIAN ASSISTANT

## 2020-09-30 PROCEDURE — 80053 COMPREHEN METABOLIC PANEL: CPT

## 2020-09-30 PROCEDURE — 74011000250 HC RX REV CODE- 250: Performed by: NURSE PRACTITIONER

## 2020-09-30 PROCEDURE — 36415 COLL VENOUS BLD VENIPUNCTURE: CPT

## 2020-09-30 PROCEDURE — 99218 HC RM OBSERVATION: CPT

## 2020-09-30 PROCEDURE — 74011250636 HC RX REV CODE- 250/636: Performed by: NURSE PRACTITIONER

## 2020-09-30 PROCEDURE — 74011250636 HC RX REV CODE- 250/636: Performed by: EMERGENCY MEDICINE

## 2020-09-30 PROCEDURE — 74011250637 HC RX REV CODE- 250/637: Performed by: NURSE PRACTITIONER

## 2020-09-30 PROCEDURE — 74011000250 HC RX REV CODE- 250: Performed by: EMERGENCY MEDICINE

## 2020-09-30 RX ORDER — LANOLIN ALCOHOL/MO/W.PET/CERES
400 CREAM (GRAM) TOPICAL EVERY 12 HOURS
Status: DISCONTINUED | OUTPATIENT
Start: 2020-09-30 | End: 2020-09-30

## 2020-09-30 RX ORDER — MAGNESIUM SULFATE HEPTAHYDRATE 40 MG/ML
4 INJECTION, SOLUTION INTRAVENOUS ONCE
Status: COMPLETED | OUTPATIENT
Start: 2020-09-30 | End: 2020-09-30

## 2020-09-30 RX ORDER — LEVALBUTEROL INHALATION SOLUTION 0.63 MG/3ML
0.63 SOLUTION RESPIRATORY (INHALATION)
Status: COMPLETED | OUTPATIENT
Start: 2020-09-30 | End: 2020-09-30

## 2020-09-30 RX ORDER — HYDROMORPHONE HYDROCHLORIDE 1 MG/ML
0.5 INJECTION, SOLUTION INTRAMUSCULAR; INTRAVENOUS; SUBCUTANEOUS ONCE
Status: COMPLETED | OUTPATIENT
Start: 2020-09-30 | End: 2020-09-30

## 2020-09-30 RX ORDER — LEVALBUTEROL INHALATION SOLUTION 0.63 MG/3ML
0.63 SOLUTION RESPIRATORY (INHALATION)
Status: DISCONTINUED | OUTPATIENT
Start: 2020-09-30 | End: 2020-09-30 | Stop reason: CLARIF

## 2020-09-30 RX ORDER — EPLERENONE 25 MG/1
25 TABLET, FILM COATED ORAL DAILY
Status: DISCONTINUED | OUTPATIENT
Start: 2020-09-30 | End: 2020-10-05 | Stop reason: HOSPADM

## 2020-09-30 RX ORDER — HEPARIN SODIUM 5000 [USP'U]/ML
5000 INJECTION, SOLUTION INTRAVENOUS; SUBCUTANEOUS EVERY 8 HOURS
Status: DISCONTINUED | OUTPATIENT
Start: 2020-09-30 | End: 2020-10-05 | Stop reason: HOSPADM

## 2020-09-30 RX ORDER — SODIUM CHLORIDE 0.9 % (FLUSH) 0.9 %
5-40 SYRINGE (ML) INJECTION AS NEEDED
Status: DISCONTINUED | OUTPATIENT
Start: 2020-09-30 | End: 2020-10-05 | Stop reason: HOSPADM

## 2020-09-30 RX ORDER — POTASSIUM CHLORIDE 20 MEQ/1
20 TABLET, EXTENDED RELEASE ORAL
Status: COMPLETED | OUTPATIENT
Start: 2020-09-30 | End: 2020-09-30

## 2020-09-30 RX ORDER — LANOLIN ALCOHOL/MO/W.PET/CERES
800 CREAM (GRAM) TOPICAL EVERY 12 HOURS
Status: DISCONTINUED | OUTPATIENT
Start: 2020-10-01 | End: 2020-10-05 | Stop reason: HOSPADM

## 2020-09-30 RX ORDER — DIPHENHYDRAMINE HYDROCHLORIDE 50 MG/ML
25 INJECTION, SOLUTION INTRAMUSCULAR; INTRAVENOUS
Status: COMPLETED | OUTPATIENT
Start: 2020-09-30 | End: 2020-09-30

## 2020-09-30 RX ORDER — FUROSEMIDE 10 MG/ML
80 INJECTION INTRAMUSCULAR; INTRAVENOUS
Status: COMPLETED | OUTPATIENT
Start: 2020-09-30 | End: 2020-09-30

## 2020-09-30 RX ORDER — GABAPENTIN 300 MG/1
300 CAPSULE ORAL
Status: DISCONTINUED | OUTPATIENT
Start: 2020-09-30 | End: 2020-10-05 | Stop reason: HOSPADM

## 2020-09-30 RX ORDER — NITROGLYCERIN 0.4 MG/1
0.4 TABLET SUBLINGUAL
Status: DISCONTINUED | OUTPATIENT
Start: 2020-09-30 | End: 2020-10-05 | Stop reason: HOSPADM

## 2020-09-30 RX ORDER — PANTOPRAZOLE SODIUM 40 MG/1
40 TABLET, DELAYED RELEASE ORAL
Status: DISCONTINUED | OUTPATIENT
Start: 2020-09-30 | End: 2020-10-05 | Stop reason: HOSPADM

## 2020-09-30 RX ORDER — ACETAMINOPHEN 325 MG/1
650 TABLET ORAL
Status: DISCONTINUED | OUTPATIENT
Start: 2020-09-30 | End: 2020-10-05 | Stop reason: HOSPADM

## 2020-09-30 RX ORDER — HYDROCODONE BITARTRATE AND ACETAMINOPHEN 10; 325 MG/1; MG/1
1 TABLET ORAL
Status: DISCONTINUED | OUTPATIENT
Start: 2020-09-30 | End: 2020-10-05 | Stop reason: HOSPADM

## 2020-09-30 RX ORDER — ATORVASTATIN CALCIUM 80 MG/1
80 TABLET, FILM COATED ORAL DAILY
Status: DISCONTINUED | OUTPATIENT
Start: 2020-09-30 | End: 2020-10-05 | Stop reason: HOSPADM

## 2020-09-30 RX ORDER — LOSARTAN POTASSIUM 50 MG/1
50 TABLET ORAL DAILY
Status: DISCONTINUED | OUTPATIENT
Start: 2020-09-30 | End: 2020-10-05 | Stop reason: HOSPADM

## 2020-09-30 RX ORDER — HYDROMORPHONE HYDROCHLORIDE 1 MG/ML
0.5 INJECTION, SOLUTION INTRAMUSCULAR; INTRAVENOUS; SUBCUTANEOUS
Status: DISCONTINUED | OUTPATIENT
Start: 2020-09-30 | End: 2020-10-05 | Stop reason: HOSPADM

## 2020-09-30 RX ORDER — ALBUTEROL SULFATE 0.83 MG/ML
2.5 SOLUTION RESPIRATORY (INHALATION)
Status: DISCONTINUED | OUTPATIENT
Start: 2020-09-30 | End: 2020-10-05 | Stop reason: HOSPADM

## 2020-09-30 RX ORDER — SODIUM CHLORIDE 0.9 % (FLUSH) 0.9 %
5-40 SYRINGE (ML) INJECTION EVERY 8 HOURS
Status: DISCONTINUED | OUTPATIENT
Start: 2020-09-30 | End: 2020-10-05 | Stop reason: HOSPADM

## 2020-09-30 RX ORDER — MORPHINE SULFATE 2 MG/ML
2 INJECTION, SOLUTION INTRAMUSCULAR; INTRAVENOUS
Status: DISCONTINUED | OUTPATIENT
Start: 2020-09-30 | End: 2020-09-30

## 2020-09-30 RX ORDER — PROMETHAZINE HYDROCHLORIDE 25 MG/1
25 TABLET ORAL
Status: DISCONTINUED | OUTPATIENT
Start: 2020-09-30 | End: 2020-09-30

## 2020-09-30 RX ORDER — MAGNESIUM SULFATE HEPTAHYDRATE 40 MG/ML
4 INJECTION, SOLUTION INTRAVENOUS ONCE
Status: COMPLETED | OUTPATIENT
Start: 2020-10-01 | End: 2020-10-01

## 2020-09-30 RX ORDER — ONDANSETRON 2 MG/ML
4 INJECTION INTRAMUSCULAR; INTRAVENOUS
Status: DISCONTINUED | OUTPATIENT
Start: 2020-09-30 | End: 2020-09-30

## 2020-09-30 RX ORDER — ALPRAZOLAM 0.5 MG/1
0.5 TABLET ORAL
Status: DISCONTINUED | OUTPATIENT
Start: 2020-10-01 | End: 2020-10-05 | Stop reason: HOSPADM

## 2020-09-30 RX ORDER — ACETAMINOPHEN 325 MG/1
650 TABLET ORAL
Status: COMPLETED | OUTPATIENT
Start: 2020-09-30 | End: 2020-09-30

## 2020-09-30 RX ORDER — CARVEDILOL 25 MG/1
25 TABLET ORAL 2 TIMES DAILY WITH MEALS
Status: DISCONTINUED | OUTPATIENT
Start: 2020-09-30 | End: 2020-10-05 | Stop reason: HOSPADM

## 2020-09-30 RX ORDER — METOPROLOL TARTRATE 5 MG/5ML
5 INJECTION INTRAVENOUS ONCE
Status: COMPLETED | OUTPATIENT
Start: 2020-09-30 | End: 2020-09-30

## 2020-09-30 RX ORDER — FUROSEMIDE 10 MG/ML
80 INJECTION INTRAMUSCULAR; INTRAVENOUS 2 TIMES DAILY
Status: COMPLETED | OUTPATIENT
Start: 2020-09-30 | End: 2020-10-02

## 2020-09-30 RX ADMIN — Medication 10 ML: at 13:13

## 2020-09-30 RX ADMIN — GABAPENTIN 300 MG: 300 CAPSULE ORAL at 14:16

## 2020-09-30 RX ADMIN — LOSARTAN POTASSIUM 50 MG: 50 TABLET, FILM COATED ORAL at 08:48

## 2020-09-30 RX ADMIN — LEVALBUTEROL HYDROCHLORIDE 0.63 MG: 0.63 SOLUTION RESPIRATORY (INHALATION) at 03:57

## 2020-09-30 RX ADMIN — NITROGLYCERIN 1 INCH: 20 OINTMENT TOPICAL at 12:46

## 2020-09-30 RX ADMIN — MORPHINE SULFATE 2 MG: 2 INJECTION, SOLUTION INTRAMUSCULAR; INTRAVENOUS at 09:00

## 2020-09-30 RX ADMIN — FUROSEMIDE 80 MG: 10 INJECTION, SOLUTION INTRAMUSCULAR; INTRAVENOUS at 18:40

## 2020-09-30 RX ADMIN — DIPHENHYDRAMINE HYDROCHLORIDE: 50 INJECTION, SOLUTION INTRAMUSCULAR; INTRAVENOUS at 02:49

## 2020-09-30 RX ADMIN — PANTOPRAZOLE SODIUM 40 MG: 40 TABLET, DELAYED RELEASE ORAL at 18:40

## 2020-09-30 RX ADMIN — HYDROCODONE BITARTRATE AND ACETAMINOPHEN 1 TABLET: 10; 325 TABLET ORAL at 22:46

## 2020-09-30 RX ADMIN — PROMETHAZINE HYDROCHLORIDE 12.5 MG: 25 INJECTION INTRAMUSCULAR; INTRAVENOUS at 02:49

## 2020-09-30 RX ADMIN — ACETAMINOPHEN 325 MG: 325 TABLET, FILM COATED ORAL at 02:49

## 2020-09-30 RX ADMIN — MAGNESIUM SULFATE HEPTAHYDRATE 4 G: 40 INJECTION, SOLUTION INTRAVENOUS at 04:34

## 2020-09-30 RX ADMIN — ATORVASTATIN CALCIUM 80 MG: 80 TABLET, FILM COATED ORAL at 08:48

## 2020-09-30 RX ADMIN — Medication 400 MG: at 22:21

## 2020-09-30 RX ADMIN — HYDROCODONE BITARTRATE AND ACETAMINOPHEN 1 TABLET: 10; 325 TABLET ORAL at 18:46

## 2020-09-30 RX ADMIN — HYDROCODONE BITARTRATE AND ACETAMINOPHEN 1 TABLET: 10; 325 TABLET ORAL at 12:53

## 2020-09-30 RX ADMIN — ONDANSETRON 4 MG: 2 INJECTION INTRAMUSCULAR; INTRAVENOUS at 20:34

## 2020-09-30 RX ADMIN — PANTOPRAZOLE SODIUM 40 MG: 40 TABLET, DELAYED RELEASE ORAL at 07:33

## 2020-09-30 RX ADMIN — FUROSEMIDE 80 MG: 10 INJECTION, SOLUTION INTRAMUSCULAR; INTRAVENOUS at 02:30

## 2020-09-30 RX ADMIN — HYDROCODONE BITARTRATE AND ACETAMINOPHEN 1 TABLET: 10; 325 TABLET ORAL at 07:32

## 2020-09-30 RX ADMIN — NITROGLYCERIN 1 INCH: 20 OINTMENT TOPICAL at 18:41

## 2020-09-30 RX ADMIN — Medication 400 MG: at 08:48

## 2020-09-30 RX ADMIN — CARVEDILOL 25 MG: 25 TABLET, FILM COATED ORAL at 18:40

## 2020-09-30 RX ADMIN — HEPARIN SODIUM 5000 UNITS: 5000 INJECTION INTRAVENOUS; SUBCUTANEOUS at 14:16

## 2020-09-30 RX ADMIN — NITROGLYCERIN 1 INCH: 20 OINTMENT TOPICAL at 23:03

## 2020-09-30 RX ADMIN — NITROGLYCERIN 1 INCH: 20 OINTMENT TOPICAL at 07:33

## 2020-09-30 RX ADMIN — Medication 10 ML: at 22:22

## 2020-09-30 RX ADMIN — CARVEDILOL 25 MG: 25 TABLET, FILM COATED ORAL at 07:33

## 2020-09-30 RX ADMIN — HYDROMORPHONE HYDROCHLORIDE 0.5 MG: 1 INJECTION, SOLUTION INTRAMUSCULAR; INTRAVENOUS; SUBCUTANEOUS at 04:16

## 2020-09-30 RX ADMIN — PROMETHAZINE HYDROCHLORIDE 12.5 MG: 25 INJECTION INTRAMUSCULAR; INTRAVENOUS at 12:44

## 2020-09-30 RX ADMIN — Medication 10 ML: at 08:48

## 2020-09-30 RX ADMIN — HEPARIN SODIUM 5000 UNITS: 5000 INJECTION INTRAVENOUS; SUBCUTANEOUS at 22:21

## 2020-09-30 RX ADMIN — METOPROLOL TARTRATE 5 MG: 5 INJECTION INTRAVENOUS at 04:15

## 2020-09-30 RX ADMIN — PROMETHAZINE HYDROCHLORIDE 25 MG: 25 TABLET ORAL at 08:48

## 2020-09-30 RX ADMIN — POTASSIUM CHLORIDE 20 MEQ: 20 TABLET, EXTENDED RELEASE ORAL at 04:15

## 2020-09-30 RX ADMIN — PROMETHAZINE HYDROCHLORIDE 12.5 MG: 25 INJECTION INTRAMUSCULAR; INTRAVENOUS at 22:21
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
## 2020-09-30 NOTE — ROUTINE PROCESS
Bedside and Verbal shift change report given to Tremaine Payan RN (oncoming nurse) by self (offgoing nurse).  Report included the following information SBAR, Kardex, Intake/Output, MAR, Recent Results and Cardiac Rhythm SR.

## 2020-09-30 NOTE — PROGRESS NOTES
Bedside and Verbal shift change report given to Elsie Zhou RN (oncoming nurse) by self (offgoing nurse). Report included the following information SBAR, Kardex, Intake/Output, MAR and Recent Results.
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
## 2020-09-30 NOTE — PROGRESS NOTES
Dual skin assessment with second RN. Scar noted to top of left hand and left upper chest. No skin breakdown or redness to sacrum or heels. Skin is warm, dry and intact.

## 2020-09-30 NOTE — ED NOTES
Pt arrives to ER via POV wearing a mask. Pt complains of chest pain that started Saturday and progressed since. Pain is left sided and radiates down his left arm. Pt confirms dizziness and lightheadedness. In triage, pt with labored breathing HR of 170-210. Pt brought back to room. HR dropped to 100-120.

## 2020-09-30 NOTE — ED NOTES
TRANSFER - OUT REPORT:    Verbal report given to [address] [place] [address], RN(name) on Dennie Peto  being transferred to [facility](unit) for routine progression of care       Report consisted of patients Situation, Background, Assessment and   Recommendations(SBAR). Information from the following report(s) SBAR and ED Summary was reviewed with the receiving nurse. Lines:   Peripheral IV 09/30/20 Right Hand (Active)   Site Assessment Clean, dry, & intact 09/30/20 0156   Phlebitis Assessment 0 09/30/20 0156   Infiltration Assessment 0 09/30/20 0156   Dressing Status Clean, dry, & intact 09/30/20 0156        Opportunity for questions and clarification was provided.       Patient transported with:   Registered Nurse

## 2020-09-30 NOTE — ROUTINE PROCESS
Bedside and Verbal shift change report given to self (oncoming nurse) by Edie Bass RN (offgoing nurse).  Report included the following information SBAR, Kardex, ED Summary, Procedure Summary, Intake/Output, MAR, Recent Results and Cardiac Rhythm SR.

## 2020-09-30 NOTE — PROGRESS NOTES
TRANSFER - IN REPORT:    Verbal report received from KADEN Boles on Gely eLe being received from ER for routine progression of care      Report consisted of patients Situation, Background, Assessment and Recommendations(SBAR). Information from the following report(s) SBAR, Kardex, Intake/Output, MAR and Recent Results was reviewed with the receiving nurse. Opportunity for questions and clarification was provided. Assessment completed upon patients arrival to unit and care assumed.

## 2020-09-30 NOTE — PROGRESS NOTES
CM chart reviewed. At this time, it does not appear that pt will have d/c needs from CM. Will continue to follow. Care Management Interventions  PCP Verified by CM: Yes(Dr. Brittany Feliciano MD)  Mode of Transport at Discharge:  Other (see comment)(Family)  Transition of Care Consult (CM Consult): Discharge Planning  Current Support Network: Lives with Spouse, Family Lives Nearby  Confirm Follow Up Transport: Self  The Plan for Transition of Care is Related to the Following Treatment Goals : Patient return back to baseline  Discharge Location  Discharge Placement: Home

## 2020-09-30 NOTE — PROGRESS NOTES
Bedside and Verbal shift change report received from [facility]. Report included the following information SBAR, Kardex, Intake/Output, MAR and Recent Results.

## 2020-10-01 LAB
ANION GAP SERPL CALC-SCNC: 7 MMOL/L (ref 7–16)
BUN SERPL-MCNC: 7 MG/DL (ref 6–23)
CALCIUM SERPL-MCNC: 7.7 MG/DL (ref 8.3–10.4)
CHLORIDE SERPL-SCNC: 105 MMOL/L (ref 98–107)
CO2 SERPL-SCNC: 27 MMOL/L (ref 21–32)
CREAT SERPL-MCNC: 1.26 MG/DL (ref 0.8–1.5)
GLUCOSE SERPL-MCNC: 125 MG/DL (ref 65–100)
MAGNESIUM SERPL-MCNC: 2.6 MG/DL (ref 1.8–2.4)
POTASSIUM SERPL-SCNC: 3.3 MMOL/L (ref 3.5–5.1)
SODIUM SERPL-SCNC: 139 MMOL/L (ref 136–145)

## 2020-10-01 PROCEDURE — 36415 COLL VENOUS BLD VENIPUNCTURE: CPT

## 2020-10-01 PROCEDURE — 99232 SBSQ HOSP IP/OBS MODERATE 35: CPT | Performed by: INTERNAL MEDICINE

## 2020-10-01 PROCEDURE — 74011000250 HC RX REV CODE- 250: Performed by: NURSE PRACTITIONER

## 2020-10-01 PROCEDURE — 83735 ASSAY OF MAGNESIUM: CPT

## 2020-10-01 PROCEDURE — 74011250636 HC RX REV CODE- 250/636: Performed by: NURSE PRACTITIONER

## 2020-10-01 PROCEDURE — 80048 BASIC METABOLIC PNL TOTAL CA: CPT

## 2020-10-01 PROCEDURE — 99218 HC RM OBSERVATION: CPT

## 2020-10-01 PROCEDURE — 74011250637 HC RX REV CODE- 250/637: Performed by: NURSE PRACTITIONER

## 2020-10-01 PROCEDURE — 96372 THER/PROPH/DIAG INJ SC/IM: CPT

## 2020-10-01 PROCEDURE — 74011250637 HC RX REV CODE- 250/637: Performed by: INTERNAL MEDICINE

## 2020-10-01 PROCEDURE — 96376 TX/PRO/DX INJ SAME DRUG ADON: CPT

## 2020-10-01 RX ORDER — POTASSIUM CHLORIDE 20 MEQ/1
40 TABLET, EXTENDED RELEASE ORAL
Status: COMPLETED | OUTPATIENT
Start: 2020-10-01 | End: 2020-10-01

## 2020-10-01 RX ADMIN — PANTOPRAZOLE SODIUM 40 MG: 40 TABLET, DELAYED RELEASE ORAL at 16:23

## 2020-10-01 RX ADMIN — CARVEDILOL 25 MG: 25 TABLET, FILM COATED ORAL at 20:53

## 2020-10-01 RX ADMIN — Medication 10 ML: at 05:31

## 2020-10-01 RX ADMIN — HEPARIN SODIUM 5000 UNITS: 5000 INJECTION INTRAVENOUS; SUBCUTANEOUS at 14:50

## 2020-10-01 RX ADMIN — Medication 5 ML: at 13:02

## 2020-10-01 RX ADMIN — CARVEDILOL 25 MG: 25 TABLET, FILM COATED ORAL at 08:26

## 2020-10-01 RX ADMIN — HYDROCODONE BITARTRATE AND ACETAMINOPHEN 1 TABLET: 10; 325 TABLET ORAL at 12:59

## 2020-10-01 RX ADMIN — ATORVASTATIN CALCIUM 80 MG: 80 TABLET, FILM COATED ORAL at 09:36

## 2020-10-01 RX ADMIN — Medication 800 MG: at 21:00

## 2020-10-01 RX ADMIN — PROMETHAZINE HYDROCHLORIDE 12.5 MG: 25 INJECTION INTRAMUSCULAR; INTRAVENOUS at 08:26

## 2020-10-01 RX ADMIN — MAGNESIUM SULFATE HEPTAHYDRATE 4 G: 40 INJECTION, SOLUTION INTRAVENOUS at 01:24

## 2020-10-01 RX ADMIN — NITROGLYCERIN 1 INCH: 20 OINTMENT TOPICAL at 12:00

## 2020-10-01 RX ADMIN — HYDROCODONE BITARTRATE AND ACETAMINOPHEN 1 TABLET: 10; 325 TABLET ORAL at 21:50

## 2020-10-01 RX ADMIN — HYDROCODONE BITARTRATE AND ACETAMINOPHEN 1 TABLET: 10; 325 TABLET ORAL at 02:52

## 2020-10-01 RX ADMIN — HEPARIN SODIUM 5000 UNITS: 5000 INJECTION INTRAVENOUS; SUBCUTANEOUS at 20:54

## 2020-10-01 RX ADMIN — PROMETHAZINE HYDROCHLORIDE 12.5 MG: 25 INJECTION INTRAMUSCULAR; INTRAVENOUS at 17:45

## 2020-10-01 RX ADMIN — FUROSEMIDE 80 MG: 10 INJECTION, SOLUTION INTRAMUSCULAR; INTRAVENOUS at 09:36

## 2020-10-01 RX ADMIN — PROMETHAZINE HYDROCHLORIDE 12.5 MG: 25 INJECTION INTRAMUSCULAR; INTRAVENOUS at 21:54

## 2020-10-01 RX ADMIN — HYDROCODONE BITARTRATE AND ACETAMINOPHEN 1 TABLET: 10; 325 TABLET ORAL at 08:26

## 2020-10-01 RX ADMIN — FUROSEMIDE 80 MG: 10 INJECTION, SOLUTION INTRAMUSCULAR; INTRAVENOUS at 17:44

## 2020-10-01 RX ADMIN — PANTOPRAZOLE SODIUM 40 MG: 40 TABLET, DELAYED RELEASE ORAL at 05:24

## 2020-10-01 RX ADMIN — PROMETHAZINE HYDROCHLORIDE 12.5 MG: 25 INJECTION INTRAMUSCULAR; INTRAVENOUS at 12:59

## 2020-10-01 RX ADMIN — HEPARIN SODIUM 5000 UNITS: 5000 INJECTION INTRAVENOUS; SUBCUTANEOUS at 05:24

## 2020-10-01 RX ADMIN — Medication 800 MG: at 09:36

## 2020-10-01 RX ADMIN — Medication 5 ML: at 20:55

## 2020-10-01 RX ADMIN — HYDROCODONE BITARTRATE AND ACETAMINOPHEN 1 TABLET: 10; 325 TABLET ORAL at 17:45

## 2020-10-01 RX ADMIN — LOSARTAN POTASSIUM 50 MG: 50 TABLET, FILM COATED ORAL at 09:36

## 2020-10-01 RX ADMIN — PROMETHAZINE HYDROCHLORIDE 12.5 MG: 25 INJECTION INTRAMUSCULAR; INTRAVENOUS at 02:36

## 2020-10-01 RX ADMIN — POTASSIUM CHLORIDE 40 MEQ: 20 TABLET, EXTENDED RELEASE ORAL at 08:25

## 2020-10-01 NOTE — PROGRESS NOTES
Pt complaining of worsening nausea, headache, and chest pressure. Pt requesting to have phenergan IV doses scheduled because he says \"it is the only thing that helps with nausea and pain\". Moe Bliss NP notified and orders received to give phenergan IV q4 PRN. IV Phenergan given, will continue to monitor nausea and pain level.

## 2020-10-01 NOTE — PROGRESS NOTES
[facility] CARDIOLOGY PROGRESS NOTE    10/1/2020 7:34 AM    Admit Date: 9/30/2020        Subjective:   Stable overnight without angina or palpitations but still SOB. Vitals stable and controlled. Still mildly SOB but improving, good UOP and renal function. Complains of dysphagia, worsening. Previously seen by GI. No other complaints overnight. Tolerating meds well. Objective:      Vitals:    09/30/20 2051 09/30/20 2303 10/01/20 0107 10/01/20 0506   BP: 127/85  99/63 100/67   Pulse: 95 91 (!) 103 87   Resp: 18  18 18   Temp: 97.6 °F (36.4 °C)  97.9 °F (36.6 °C) 98 °F (36.7 °C)   SpO2: 95%  98% 97%   Weight:    193 lb 8 oz (87.8 kg)   Height:           Physical Exam:  Neck- supple,8cm JVD sitting upright  CV- regular rate and rhythm, soft TR murmur  Lung- clear bilaterally apically, decreased bibasilar  Abd- soft, nontender, nondistended  Ext- no edema  Skin- warm and dry    Data Review:   Recent Labs     10/01/20  0352 09/30/20  2302 09/30/20  0152     --  140   K 3.3*  --  3.3*   MG 2.6* 1.3* 1.4*   BUN 7  --  7   CREA 1.26  --  1.03   *  --  99   WBC  --   --  5.3   HGB  --   --  11.8*   HCT  --   --  35.7*   PLT  --   --  195       Assessment and Plan:     Principal Problem:    Acute on chronic systolic heart failure (HCC) (9/30/2020)- improving but still symptomatic- renal function stable. Continue IV lasix, medically maximized otherwise. Recheck BMP and replete K today. Active Problems:    HTN (hypertension)- stable, continue  meds      VT (ventricular tachycardia) ([facility] [address] - follow tele, stable. Chest pain (9/30/2020)- asymptomatic overnight, follow clinically      Dysphagia- worsening recently, previously seen by GI. Choking on solids and liquids now, requests GI evaluation while in hospital        ELVIA Prince MD  [facility] Cardiology  Pager [phone]

## 2020-10-01 NOTE — ROUTINE PROCESS
Bedside report received from outgoing nurse, Keo Marroquin. Assumed care at present. Assessment completed, see flowsheet.

## 2020-10-01 NOTE — ROUTINE PROCESS
Bedside and verbal shift change report given to Rob Holder RN (oncoming nurse) by self (offgoing nurse). Report included the following information SBAR, Kardex, Intake/Output, MAR and Recent Results.

## 2020-10-01 NOTE — PROGRESS NOTES
Problem: Falls - Risk of  Goal: *Absence of Falls  Description: Document Carlos Bowens Fall Risk and appropriate interventions in the flowsheet.   Outcome: Progressing Towards Goal  Note: Fall Risk Interventions:  Mobility Interventions: Communicate number of staff needed for ambulation/transfer, Patient to call before getting OOB, Strengthening exercises (ROM-active/passive)         Medication Interventions: Assess postural VS orthostatic hypotension, Evaluate medications/consider consulting pharmacy, Patient to call before getting OOB, Teach patient to arise slowly    Elimination Interventions: Call light in reach, Toileting schedule/hourly rounds, Patient to call for help with toileting needs

## 2020-10-02 ENCOUNTER — APPOINTMENT (OUTPATIENT)
Dept: GENERAL RADIOLOGY | Age: 55
DRG: 292 | End: 2020-10-02
Attending: NURSE PRACTITIONER
Payer: COMMERCIAL

## 2020-10-02 PROBLEM — I50.9 CHF (CONGESTIVE HEART FAILURE) (HCC): Status: ACTIVE | Noted: 2020-10-02

## 2020-10-02 LAB
ANION GAP SERPL CALC-SCNC: 4 MMOL/L (ref 7–16)
BUN SERPL-MCNC: 8 MG/DL (ref 6–23)
CALCIUM SERPL-MCNC: 7.7 MG/DL (ref 8.3–10.4)
CHLORIDE SERPL-SCNC: 105 MMOL/L (ref 98–107)
CO2 SERPL-SCNC: 29 MMOL/L (ref 21–32)
CREAT SERPL-MCNC: 1.28 MG/DL (ref 0.8–1.5)
GLUCOSE SERPL-MCNC: 117 MG/DL (ref 65–100)
MAGNESIUM SERPL-MCNC: 1.7 MG/DL (ref 1.8–2.4)
POTASSIUM SERPL-SCNC: 3.2 MMOL/L (ref 3.5–5.1)
SODIUM SERPL-SCNC: 138 MMOL/L (ref 136–145)

## 2020-10-02 PROCEDURE — 80048 BASIC METABOLIC PNL TOTAL CA: CPT

## 2020-10-02 PROCEDURE — 83735 ASSAY OF MAGNESIUM: CPT

## 2020-10-02 PROCEDURE — 74011250636 HC RX REV CODE- 250/636: Performed by: NURSE PRACTITIONER

## 2020-10-02 PROCEDURE — 65660000000 HC RM CCU STEPDOWN

## 2020-10-02 PROCEDURE — 99225 PR SBSQ OBSERVATION CARE/DAY 25 MINUTES: CPT | Performed by: INTERNAL MEDICINE

## 2020-10-02 PROCEDURE — 74011250637 HC RX REV CODE- 250/637: Performed by: NURSE PRACTITIONER

## 2020-10-02 PROCEDURE — 74011000255 HC RX REV CODE- 255: Performed by: INTERNAL MEDICINE

## 2020-10-02 PROCEDURE — 36415 COLL VENOUS BLD VENIPUNCTURE: CPT

## 2020-10-02 PROCEDURE — 96376 TX/PRO/DX INJ SAME DRUG ADON: CPT

## 2020-10-02 PROCEDURE — 92610 EVALUATE SWALLOWING FUNCTION: CPT

## 2020-10-02 PROCEDURE — 74011250636 HC RX REV CODE- 250/636: Performed by: INTERNAL MEDICINE

## 2020-10-02 PROCEDURE — 74011250637 HC RX REV CODE- 250/637: Performed by: INTERNAL MEDICINE

## 2020-10-02 PROCEDURE — 74220 X-RAY XM ESOPHAGUS 1CNTRST: CPT

## 2020-10-02 PROCEDURE — 74011000250 HC RX REV CODE- 250: Performed by: NURSE PRACTITIONER

## 2020-10-02 PROCEDURE — 96372 THER/PROPH/DIAG INJ SC/IM: CPT

## 2020-10-02 PROCEDURE — 99218 HC RM OBSERVATION: CPT

## 2020-10-02 RX ORDER — METOLAZONE 2.5 MG/1
5 TABLET ORAL DAILY
Status: COMPLETED | OUTPATIENT
Start: 2020-10-02 | End: 2020-10-02

## 2020-10-02 RX ORDER — POTASSIUM CHLORIDE 20 MEQ/1
40 TABLET, EXTENDED RELEASE ORAL 2 TIMES DAILY
Status: DISCONTINUED | OUTPATIENT
Start: 2020-10-02 | End: 2020-10-04

## 2020-10-02 RX ORDER — FUROSEMIDE 40 MG/1
80 TABLET ORAL EVERY 12 HOURS
Status: DISCONTINUED | OUTPATIENT
Start: 2020-10-02 | End: 2020-10-04

## 2020-10-02 RX ADMIN — POTASSIUM CHLORIDE 40 MEQ: 20 TABLET, EXTENDED RELEASE ORAL at 08:39

## 2020-10-02 RX ADMIN — CARVEDILOL 25 MG: 25 TABLET, FILM COATED ORAL at 16:54

## 2020-10-02 RX ADMIN — Medication 5 ML: at 21:02

## 2020-10-02 RX ADMIN — BARIUM SULFATE 700 MG: 700 TABLET ORAL at 15:20

## 2020-10-02 RX ADMIN — METOLAZONE 5 MG: 2.5 TABLET ORAL at 08:39

## 2020-10-02 RX ADMIN — BARIUM SULFATE 355 ML: 0.6 SUSPENSION ORAL at 15:21

## 2020-10-02 RX ADMIN — PANTOPRAZOLE SODIUM 40 MG: 40 TABLET, DELAYED RELEASE ORAL at 16:54

## 2020-10-02 RX ADMIN — PROMETHAZINE HYDROCHLORIDE 12.5 MG: 25 INJECTION INTRAMUSCULAR; INTRAVENOUS at 03:47

## 2020-10-02 RX ADMIN — Medication 800 MG: at 08:39

## 2020-10-02 RX ADMIN — Medication 5 ML: at 06:20

## 2020-10-02 RX ADMIN — FUROSEMIDE 80 MG: 10 INJECTION, SOLUTION INTRAMUSCULAR; INTRAVENOUS at 08:39

## 2020-10-02 RX ADMIN — HYDROCODONE BITARTRATE AND ACETAMINOPHEN 1 TABLET: 10; 325 TABLET ORAL at 03:51

## 2020-10-02 RX ADMIN — HYDROCODONE BITARTRATE AND ACETAMINOPHEN 1 TABLET: 10; 325 TABLET ORAL at 12:37

## 2020-10-02 RX ADMIN — HYDROCODONE BITARTRATE AND ACETAMINOPHEN 1 TABLET: 10; 325 TABLET ORAL at 08:39

## 2020-10-02 RX ADMIN — ATORVASTATIN CALCIUM 80 MG: 80 TABLET, FILM COATED ORAL at 08:39

## 2020-10-02 RX ADMIN — Medication 800 MG: at 20:51

## 2020-10-02 RX ADMIN — PANTOPRAZOLE SODIUM 40 MG: 40 TABLET, DELAYED RELEASE ORAL at 06:29

## 2020-10-02 RX ADMIN — HEPARIN SODIUM 5000 UNITS: 5000 INJECTION INTRAVENOUS; SUBCUTANEOUS at 13:08

## 2020-10-02 RX ADMIN — Medication 10 ML: at 13:08

## 2020-10-02 RX ADMIN — HEPARIN SODIUM 5000 UNITS: 5000 INJECTION INTRAVENOUS; SUBCUTANEOUS at 21:00

## 2020-10-02 RX ADMIN — HYDROCODONE BITARTRATE AND ACETAMINOPHEN 1 TABLET: 10; 325 TABLET ORAL at 20:51

## 2020-10-02 RX ADMIN — HYDROCODONE BITARTRATE AND ACETAMINOPHEN 1 TABLET: 10; 325 TABLET ORAL at 16:54

## 2020-10-02 RX ADMIN — LOSARTAN POTASSIUM 50 MG: 50 TABLET, FILM COATED ORAL at 08:39

## 2020-10-02 RX ADMIN — PROMETHAZINE HYDROCHLORIDE 12.5 MG: 25 INJECTION INTRAMUSCULAR; INTRAVENOUS at 20:51

## 2020-10-02 RX ADMIN — PROMETHAZINE HYDROCHLORIDE 12.5 MG: 25 INJECTION INTRAMUSCULAR; INTRAVENOUS at 16:54

## 2020-10-02 RX ADMIN — CARVEDILOL 25 MG: 25 TABLET, FILM COATED ORAL at 08:39

## 2020-10-02 RX ADMIN — PROMETHAZINE HYDROCHLORIDE 12.5 MG: 25 INJECTION INTRAMUSCULAR; INTRAVENOUS at 12:37

## 2020-10-02 RX ADMIN — POTASSIUM CHLORIDE 40 MEQ: 20 TABLET, EXTENDED RELEASE ORAL at 16:55

## 2020-10-02 RX ADMIN — HEPARIN SODIUM 5000 UNITS: 5000 INJECTION INTRAVENOUS; SUBCUTANEOUS at 06:00

## 2020-10-02 RX ADMIN — PROMETHAZINE HYDROCHLORIDE 12.5 MG: 25 INJECTION INTRAMUSCULAR; INTRAVENOUS at 08:39

## 2020-10-02 NOTE — PROGRESS NOTES
Patient has been longtime friend of the    good visit with him   talked about our friendship  Prayer

## 2020-10-02 NOTE — ROUTINE PROCESS
Pt continuing to complain of nausea and pain in head, shoulder, and chest; pain is chronic. Norco and Phenergan is ordered for this patient Q4 and patient is requesting it on an every 4 hour basis. Complaints of dysphagia remain although patient was observed at breakfast, lunch, and med passes swallowing okay. Pt did have one emesis occurrence that was clear colored upon assessment this morning, see flowsheets. Speech therapy and GI have both been consulted.

## 2020-10-02 NOTE — PROGRESS NOTES
Reports nausea, requesting Phenergan. No emesis. Phenergan 12.5 mg diluted in NS 10 ml, then given IVP, slowly. Will monitor for relief.

## 2020-10-02 NOTE — ROUTINE PROCESS
Bedside and verbal shift change report given to Antonette Barrera RN (oncoming nurse) by self Yajaira Jeffers nurse). Report included the following information SBAR, Kardex, Intake/Output, MAR, Recent Results, and Cardiac Rhythm NSR.

## 2020-10-02 NOTE — ROUTINE PROCESS
Shift change, bedside reporting completed, received VERBAL report from off-going Primary Rosanna Erickson RN. Reinforced Safety precautions: Call for assistance prior to activity, and use of nonskid socks before getting out of bed (OOB). Verbalized understanding of safety instructions. Hand held call-light within reach.

## 2020-10-02 NOTE — PROGRESS NOTES
CM spoke with pt regarding d/c planning. He reported that his wife will transport him home. He is not on O2, CPAP, use any DMEs, a BSC or SS. PCP was verified. Pt was A&O x4. Pt does not present with any dc needs at this time; will continue to follow. Care Management Interventions  PCP Verified by CM: Yes  Mode of Transport at Discharge:  Other (see comment)(Family)  Transition of Care Consult (CM Consult): Discharge Planning  Discharge Durable Medical Equipment: No  Physical Therapy Consult: No  Occupational Therapy Consult: No  Speech Therapy Consult: No  Current Support Network: Own Home, Family Lives Nearby  Confirm Follow Up Transport: Self  The Plan for Transition of Care is Related to the Following Treatment Goals : Return to baseline  Discharge Location  Discharge Placement: Home

## 2020-10-02 NOTE — PROGRESS NOTES
Problem: Falls - Risk of  Goal: *Absence of Falls  Description: Document Gertrude Gomez Fall Risk and appropriate interventions in the flowsheet.   Outcome: Progressing Towards Goal  Note: Fall Risk Interventions:  Mobility Interventions: Patient to call before getting OOB         Medication Interventions: Patient to call before getting OOB, Teach patient to arise slowly    Elimination Interventions: Call light in reach, Urinal in reach, Patient to call for help with toileting needs              Problem: Patient Education: Go to Patient Education Activity  Goal: Patient/Family Education  Outcome: Progressing Towards Goal

## 2020-10-02 NOTE — ROUTINE PROCESS
Requesting Phenergan, reports nausea. No emesis. Phenergan 12.5 mg given IVP, slowly. Will monitor for relief.

## 2020-10-02 NOTE — PROGRESS NOTES
SPEECH LANGUAGE PATHOLOGY: DYSPHAGIA- Initial Assessment and Discharge    NAME/AGE/GENDER: Addis Francois is a 54 y.o. male  DATE: 10/2/2020  PRIMARY DIAGNOSIS: Acute on chronic systolic heart failure (HCC) [I50.23]  CHF (congestive heart failure) ([facility] [facility] [I50.9]      ICD-10: Treatment Diagnosis: R13.14 Dysphagia, Pharyngoesophageal Phase    RECOMMENDATIONS   DIET:    PO:  Regular   Liquids:  regular thin    MEDICATIONS: With liquid     ASPIRATION PRECAUTIONS  · Slow rate of intake  · Small bites/sips  · Upright at 90 degrees during meal     COMPENSATORY STRATEGIES/MODIFICATIONS  · None     EDUCATION:  · Recommendations discussed with patient     CONTINUATION OF SKILLED SERVICES/MEDICAL NECESSITY:  No dysphagia goals identified as oropharyngeal swallow function is within normal limits. RECOMMENDATIONS for CONTINUED SPEECH THERAPY:   No further speech therapy indicated at this time. ASSESSMENT   Patient presents with functional oropharyngeal swallow. Limited intake due to reports of nausea. Reports sticking sensation. Complaints appear more consistent with esophageal dysphagia. Recommend regular diet/thin liquids. meds with liquid rinse. No dysphagia treatment indicated. GI consulted per chart. Please reconsult if new concerns arise. REHABILITATION POTENTIAL FOR STATED GOALS: Excellent    PLAN    FREQUENCY/DURATION: No further speech therapy indicated at this time as oropharyngeal swallow function is within normal limits. - Recommendations for next treatment session: No additional speech therapy indicated at this time. SUBJECTIVE   Upright in bed. Pleasant and conversant. Reports some nausea.      History of Present Injury/Illness: Mr. Ann Alegria  has a past medical history of Anxiety associated with depression (3/18/2017), Arthritis, CAD (coronary artery disease), CHF (congestive heart failure) ([facility] [address] Chronic alcoholism ([facility] [address] Chronic back pain, Chronic neck pain, Chronic systolic heart failure ([facility] [address] (4/21/2011), Dental caries (7/13/2017), Depression, Dizziness - light-headed, GERD (gastroesophageal reflux disease), Gynecomastia (2/22/2016), Heart failure ([address] History of implantable cardioverter-defibrillator (ICD) placement (2/22/2016), Hypertension, ICD (implantable cardioverter-defibrillator) in place (4/22/2011), Leukopenia (5/7/2019), Macrocytic anemia (7/8/2018), Psychiatric disorder, Schatzki's ring (07/10/2018), Situational depression (2/22/2016), SVT (supraventricular tachycardia) ([address] (12/22/2018), and Ventricular tachycardia ([address] (2/22/2016). He also has no past medical history of Asthma, Autoimmune disease ([address] Cancer ([address] Chronic kidney disease, Chronic obstructive pulmonary disease ([address] Diabetes ([address] Difficult intubation, Liver disease, Malignant hyperthermia due to anesthesia, Pseudocholinesterase deficiency, PUD (peptic ulcer disease), Seizures ([address] Sleep apnea, Thromboembolus ([address] or Thyroid disease. Mark Levinon He also  has a past surgical history that includes hx heart catheterization (9/21/10); hx pacemaker; and egd (5/9/2019). Problem List:  (Impairments causing functional limitations):  1. Oropharyngeal dysphagia- No symptoms identified  2. ? Esophageal dysphagia    Previous Dysphagia: YES patient reports history of esophageal dilation history GERD   ST consulted 3/2020, however complaints consistent with esophageal component, therefore bedside swallow not completed. Diet Prior to Evaluation: regular/thin liquids    Orientation:   Person  Place  Time  Situation    Pain: Pain Scale 1: Numeric (0 - 10)  Pain Intensity 1: 0  Pain Location 1: Chest;Head;Shoulder  Pain Intervention(s) 1: Medication (see MAR)(norco given)    Cognitive-Linguistic Screen:  Prior level of function: independent.  Speech Production:   o WFL   Expressive Language:  o WFL   Receptive Language:  o WFL   Cognition:   o Denies changes.    Recommendations: Given results of speech, language, cognitive screening, all appears to be within normal limits. No further assessment indicated at this time. OBJECTIVE   Oral Motor:   · Labial: No impairment  · Dentition: limited upper but adequate  · Oral Hygiene: Adequate  · Lingual: No impairment    Swallow evaluation:  Patient presented with thin liquid via cup and solids. Declined straws. Appropriate oral prep with all textures. Timely swallow initiation, and single swallows upon palpation. Adequate oral clearing. No overt signs or symptoms of airway compromise observed with liquid or solid textures. Sticking sensation pointing to upper and lower esophagus. Reports some nausea, therefore limited intake. INTERDISCIPLINARY COLLABORATION: n/a  PRECAUTIONS/ALLERGIES: Patient has no known allergies. Tool Used: Dysphagia Outcome and Severity Scale (DOUG)    Score Comments   Normal Diet  [] 7 With no strategies or extra time needed   Functional Swallow  [] 6 May have mild oral or pharyngeal delay   Mild Dysphagia  [] 5 Which may require one diet consistency restricted    Mild-Moderate Dysphagia  [] 4 With 1-2 diet consistencies restricted   Moderate Dysphagia  [] 3 With 2 or more diet consistencies restricted   Moderate-Severe Dysphagia  [] 2 With partial PO strategies (trials with ST only)   Severe Dysphagia  [] 1 With inability to tolerate any PO safely      Score:  Initial: 6 Most Recent: 6 (Date 10/02/20 )   Interpretation of Tool: The Dysphagia Outcome and Severity Scale (DOUG) is a simple, easy-to-use, 7-point scale developed to systematically rate the functional severity of dysphagia based on objective assessment and make recommendations for diet level, independence level, and type of nutrition. Current Medications:   No current facility-administered medications on file prior to encounter.       Current Outpatient Medications on File Prior to Encounter   Medication Sig Dispense Refill    gabapentin (NEURONTIN) 300 mg capsule 1 po bid prn pain 60 Cap 6  promethazine (PHENERGAN) 25 mg tablet Take 1 Tab by mouth every six (6) hours as needed for Nausea. 30 Tab 0    sildenafil citrate (Viagra) 100 mg tablet Take 1 Tab by mouth as needed (as directed). 10 Tab 3    HYDROcodone-acetaminophen (NORCO)  mg tablet TAKE ONE TABLET BY MOUTH FOUR TIMES DAILY AS NEEDED  0    carvedilol (COREG) 25 mg tablet Take 1 Tab by mouth two (2) times daily (with meals). 180 Tab 3    losartan (COZAAR) 50 mg tablet Take 1 Tab by mouth daily. 90 Tab 2    furosemide (LASIX) 40 mg tablet Take 1 Tab by mouth two (2) times a day. (Patient taking differently: Take 40 mg by mouth two (2) times a day. 80mg bid) 180 Tab 3    atorvastatin (LIPITOR) 80 mg tablet Take 1 Tab by mouth daily. 90 Tab 3    eplerenone (INSPRA) 25 mg tablet Take 1 Tab by mouth daily. 90 Tab 3    magnesium oxide (MAG-OX) 400 mg tablet Take 1 Tab by mouth every twelve (12) hours. 180 Tab 3    ESOMEPRAZOLE MAG TRIHYDRATE (NEXIUM PO) Take 1 Cap by mouth two (2) times a day.  ALPRAZolam (XANAX) 1 mg tablet Take 0.5 Tabs by mouth nightly. Max Daily Amount: 0.5 mg.  Indications: anxious 30 Tab 3       After treatment position/precautions:  · Upright in bed  · RN notified  · Call light within reach    Total Treatment Duration:   Time In: 4867  Time Out: [address], [facility] MEDICO DEL [facility], [facility] MEDICO ANGELA CARRIZALES, [address] [facility]
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
## 2020-10-02 NOTE — PROGRESS NOTES
[facility] CARDIOLOGY PROGRESS NOTE    10/2/2020 7:34 AM    Admit Date: 9/30/2020        Subjective:   Stable overnight without angina or palpitations. SOB is better. Vitals stable and controlled. Still mildly SOB but improving, good UOP and renal function. Complains of dysphagia, worsening. Previously seen by GI. No other complaints overnight. Tolerating meds well. Objective:      Vitals:    10/02/20 0000 10/02/20 0006 10/02/20 0354 10/02/20 0415   BP:  108/62  93/65   Pulse: 90 98 88 89   Resp:  18 17 18   Temp:  98.5 °F (36.9 °C)  98.3 °F (36.8 °C)   SpO2:  96%  96%   Weight:    191 lb 4.8 oz (86.8 kg)   Height:           Physical Exam:  Neck- supple,8cm JVD sitting upright  CV- regular rate and rhythm, soft TR murmur  Lung- clear bilaterally apically, decreased bibasilar  Abd- soft, nontender, nondistended  Ext- no edema  Skin- warm and dry    Data Review:   Recent Labs     10/02/20  0334 10/01/20  0352  09/30/20  0152    139  --  140   K 3.2* 3.3*  --  3.3*   MG 1.7* 2.6*   < > 1.4*   BUN 8 7  --  7   CREA 1.28 1.26  --  1.03   * 125*  --  99   WBC  --   --   --  5.3   HGB  --   --   --  11.8*   HCT  --   --   --  35.7*   PLT  --   --   --  195    < > = values in this interval not displayed. Assessment and Plan:     Principal Problem:    Acute on chronic systolic heart failure ([address] (9/30/2020)- improving but still symptomatic- renal function stable. Continue IV lasix, medically maximized otherwise. Recheck BMP and replete K again today. Added zaroxolyn. Likely home in AM tomorrow after GI work up today. Active Problems:    HTN (hypertension)- stable, continue  meds      VT (ventricular tachycardia) ([address] - follow tele, stable. Chest pain (9/30/2020)- asymptomatic overnight, follow clinically      Dysphagia- worsening recently, previously seen by GI. Choking on solids and liquids now, requests GI evaluation while in hospital.  Hopefully home later today.         Nico HARRISON Zoraida Dumont MD

## 2020-10-02 NOTE — PROGRESS NOTES
Comprehensive Nutrition Assessment    Type and Reason for Visit: Initial, Positive nutrition screen   Best Practice Alert for Malnutrition Screening Tool: Recently Lost Weight Without Trying: Yes, If Yes, How Much Weight Loss: 24 - 33 lbs, Eating Poorly Due to Decreased Appetite: Yes    Nutrition Recommendations/Plan:   Continue current diet. Nutrition Assessment:  Patient with PMH significant for CHF, VT with ICD, GERD, CAD, HTN, etoh abuse. He presented with worsening shortness of breath. He is also complaining of dysphagia. SLP assessed and no oropharyngeal dysphaiga. GI assessed and patient with known Schatzki's ring annd hiatal hermia, previous dilation. Plan for barium swallow. Patient seen and endorse weight loss, but does not provide amount or time frame. When RD asking about PO intake, he states that he's \"just been going through some stuff. \" Observed lunch tray with bites taken. He states he will finish later. He states he has not been drinking Ensure because he does not like and declines other supplements. Estimated Daily Nutrient Needs:  Energy (kcal):  9701-8219(20-25 kcal/kg (86.8kg))  Protein (g):  (20% kcal)         Current Nutrition Therapies:  DIET CARDIAC Regular; 2 GM NA (House Low NA); FR 2000ML  DIET NUTRITIONAL SUPPLEMENTS All Meals; Ensure [employer]   Per nursing documentation, intake %. Anthropometric Measures:  · Height:  5' 11\" (180.3 cm)  · Current Body Wt:  86.8 kg (191 lb 5.8 oz)   · Body mass index is 26.68 kg/m². · BMI Category: Overweight (BMI 25.0-29. 9)    · Weight history per review of outpt office weights: 3/23 187#, 7/7 203#, 9/29 195#. It appears patient has been losing weight. Weight also likely to be varied due to fluid status.     Nutrition Diagnosis:   · Inadequate oral intake related to swallowing difficulty as evidenced by (patient reported barrier to PO intake, reported weight loss)    Nutrition Interventions:   Food and/or Nutrient Delivery: Continue current diet, Discontinue oral nutrition supplement    Goals:  Meet at least 75% nutrition needs within 7 days       Nutrition Monitoring and Evaluation:   Food/Nutrient Intake Outcomes: Food and nutrient intake    Discharge Planning:     Too soon to determine     [address] [place], LD on 10/2/2020 at 3:35 PM  Contact: [phone]

## 2020-10-02 NOTE — ROUTINE PROCESS
Bedside and Verbal shift change report received from Ramírez Tucker [place] (offgoing nurse) to self (oncoming nurse). Report included the following information SBAR, Kardex, Intake/Output, MAR, Recent Results, and Cardiac Rhythm NSR.

## 2020-10-03 ENCOUNTER — ANESTHESIA EVENT (OUTPATIENT)
Dept: ENDOSCOPY | Age: 55
DRG: 292 | End: 2020-10-03
Payer: COMMERCIAL

## 2020-10-03 ENCOUNTER — ANESTHESIA (OUTPATIENT)
Dept: ENDOSCOPY | Age: 55
DRG: 292 | End: 2020-10-03
Payer: COMMERCIAL

## 2020-10-03 LAB
ALBUMIN SERPL-MCNC: 3.2 G/DL (ref 3.5–5)
ALBUMIN/GLOB SERPL: 0.9 {RATIO} (ref 1.2–3.5)
ALP SERPL-CCNC: 81 U/L (ref 50–136)
ALT SERPL-CCNC: 20 U/L (ref 12–65)
ANION GAP SERPL CALC-SCNC: 6 MMOL/L (ref 7–16)
ANION GAP SERPL CALC-SCNC: 8 MMOL/L (ref 7–16)
AST SERPL-CCNC: 21 U/L (ref 15–37)
BILIRUB SERPL-MCNC: 0.8 MG/DL (ref 0.2–1.1)
BUN SERPL-MCNC: 8 MG/DL (ref 6–23)
BUN SERPL-MCNC: 9 MG/DL (ref 6–23)
CALCIUM SERPL-MCNC: 8.1 MG/DL (ref 8.3–10.4)
CALCIUM SERPL-MCNC: 8.8 MG/DL (ref 8.3–10.4)
CHLORIDE SERPL-SCNC: 103 MMOL/L (ref 98–107)
CHLORIDE SERPL-SCNC: 103 MMOL/L (ref 98–107)
CO2 SERPL-SCNC: 26 MMOL/L (ref 21–32)
CO2 SERPL-SCNC: 29 MMOL/L (ref 21–32)
CREAT SERPL-MCNC: 1.29 MG/DL (ref 0.8–1.5)
CREAT SERPL-MCNC: 1.36 MG/DL (ref 0.8–1.5)
GLOBULIN SER CALC-MCNC: 3.7 G/DL (ref 2.3–3.5)
GLUCOSE SERPL-MCNC: 101 MG/DL (ref 65–100)
GLUCOSE SERPL-MCNC: 96 MG/DL (ref 65–100)
LIPASE SERPL-CCNC: 136 U/L (ref 73–393)
MAGNESIUM SERPL-MCNC: 1.7 MG/DL (ref 1.8–2.4)
POTASSIUM SERPL-SCNC: 3.6 MMOL/L (ref 3.5–5.1)
POTASSIUM SERPL-SCNC: 4 MMOL/L (ref 3.5–5.1)
PROT SERPL-MCNC: 6.9 G/DL (ref 6.3–8.2)
SODIUM SERPL-SCNC: 137 MMOL/L (ref 136–145)
SODIUM SERPL-SCNC: 138 MMOL/L (ref 136–145)

## 2020-10-03 PROCEDURE — 74011250636 HC RX REV CODE- 250/636: Performed by: ANESTHESIOLOGY

## 2020-10-03 PROCEDURE — 74011000250 HC RX REV CODE- 250: Performed by: NURSE ANESTHETIST, CERTIFIED REGISTERED

## 2020-10-03 PROCEDURE — 77030021593 HC FCPS BIOP ENDOSC BSC -A: Performed by: INTERNAL MEDICINE

## 2020-10-03 PROCEDURE — 76060000031 HC ANESTHESIA FIRST 0.5 HR: Performed by: INTERNAL MEDICINE

## 2020-10-03 PROCEDURE — 88305 TISSUE EXAM BY PATHOLOGIST: CPT

## 2020-10-03 PROCEDURE — 83690 ASSAY OF LIPASE: CPT

## 2020-10-03 PROCEDURE — 2709999900 HC NON-CHARGEABLE SUPPLY: Performed by: INTERNAL MEDICINE

## 2020-10-03 PROCEDURE — 74011250637 HC RX REV CODE- 250/637: Performed by: NURSE PRACTITIONER

## 2020-10-03 PROCEDURE — 74011000250 HC RX REV CODE- 250: Performed by: NURSE PRACTITIONER

## 2020-10-03 PROCEDURE — 76040000025: Performed by: INTERNAL MEDICINE

## 2020-10-03 PROCEDURE — 74011250636 HC RX REV CODE- 250/636: Performed by: NURSE PRACTITIONER

## 2020-10-03 PROCEDURE — 74011250637 HC RX REV CODE- 250/637: Performed by: INTERNAL MEDICINE

## 2020-10-03 PROCEDURE — 0DB28ZX EXCISION OF MIDDLE ESOPHAGUS, VIA NATURAL OR ARTIFICIAL OPENING ENDOSCOPIC, DIAGNOSTIC: ICD-10-PCS | Performed by: INTERNAL MEDICINE

## 2020-10-03 PROCEDURE — 0DB18ZX EXCISION OF UPPER ESOPHAGUS, VIA NATURAL OR ARTIFICIAL OPENING ENDOSCOPIC, DIAGNOSTIC: ICD-10-PCS | Performed by: INTERNAL MEDICINE

## 2020-10-03 PROCEDURE — 80053 COMPREHEN METABOLIC PANEL: CPT

## 2020-10-03 PROCEDURE — 65660000000 HC RM CCU STEPDOWN

## 2020-10-03 PROCEDURE — 83735 ASSAY OF MAGNESIUM: CPT

## 2020-10-03 PROCEDURE — 0D758ZZ DILATION OF ESOPHAGUS, VIA NATURAL OR ARTIFICIAL OPENING ENDOSCOPIC: ICD-10-PCS | Performed by: INTERNAL MEDICINE

## 2020-10-03 PROCEDURE — 74011250636 HC RX REV CODE- 250/636: Performed by: NURSE ANESTHETIST, CERTIFIED REGISTERED

## 2020-10-03 PROCEDURE — 36415 COLL VENOUS BLD VENIPUNCTURE: CPT

## 2020-10-03 RX ORDER — PROPOFOL 10 MG/ML
INJECTION, EMULSION INTRAVENOUS AS NEEDED
Status: DISCONTINUED | OUTPATIENT
Start: 2020-10-03 | End: 2020-10-03 | Stop reason: HOSPADM

## 2020-10-03 RX ORDER — SODIUM CHLORIDE, SODIUM LACTATE, POTASSIUM CHLORIDE, CALCIUM CHLORIDE 600; 310; 30; 20 MG/100ML; MG/100ML; MG/100ML; MG/100ML
25 INJECTION, SOLUTION INTRAVENOUS CONTINUOUS
Status: DISCONTINUED | OUTPATIENT
Start: 2020-10-03 | End: 2020-10-05

## 2020-10-03 RX ORDER — LIDOCAINE HYDROCHLORIDE 20 MG/ML
INJECTION, SOLUTION EPIDURAL; INFILTRATION; INTRACAUDAL; PERINEURAL AS NEEDED
Status: DISCONTINUED | OUTPATIENT
Start: 2020-10-03 | End: 2020-10-03 | Stop reason: HOSPADM

## 2020-10-03 RX ADMIN — CARVEDILOL 25 MG: 25 TABLET, FILM COATED ORAL at 07:55

## 2020-10-03 RX ADMIN — FUROSEMIDE 80 MG: 40 TABLET ORAL at 21:23

## 2020-10-03 RX ADMIN — Medication 800 MG: at 21:23

## 2020-10-03 RX ADMIN — LOSARTAN POTASSIUM 50 MG: 50 TABLET, FILM COATED ORAL at 11:09

## 2020-10-03 RX ADMIN — CARVEDILOL 25 MG: 25 TABLET, FILM COATED ORAL at 17:11

## 2020-10-03 RX ADMIN — PROMETHAZINE HYDROCHLORIDE 12.5 MG: 25 INJECTION INTRAMUSCULAR; INTRAVENOUS at 17:15

## 2020-10-03 RX ADMIN — Medication 5 ML: at 06:31

## 2020-10-03 RX ADMIN — Medication 800 MG: at 11:09

## 2020-10-03 RX ADMIN — HEPARIN SODIUM 5000 UNITS: 5000 INJECTION INTRAVENOUS; SUBCUTANEOUS at 06:30

## 2020-10-03 RX ADMIN — HEPARIN SODIUM 5000 UNITS: 5000 INJECTION INTRAVENOUS; SUBCUTANEOUS at 21:23

## 2020-10-03 RX ADMIN — PROPOFOL 20 MG: 10 INJECTION, EMULSION INTRAVENOUS at 09:02

## 2020-10-03 RX ADMIN — PROMETHAZINE HYDROCHLORIDE 12.5 MG: 25 INJECTION INTRAMUSCULAR; INTRAVENOUS at 12:05

## 2020-10-03 RX ADMIN — PROMETHAZINE HYDROCHLORIDE 12.5 MG: 25 INJECTION INTRAMUSCULAR; INTRAVENOUS at 21:24

## 2020-10-03 RX ADMIN — Medication 10 ML: at 17:18

## 2020-10-03 RX ADMIN — HYDROCODONE BITARTRATE AND ACETAMINOPHEN 1 TABLET: 10; 325 TABLET ORAL at 11:23

## 2020-10-03 RX ADMIN — FUROSEMIDE 80 MG: 40 TABLET ORAL at 11:09

## 2020-10-03 RX ADMIN — PANTOPRAZOLE SODIUM 40 MG: 40 TABLET, DELAYED RELEASE ORAL at 17:11

## 2020-10-03 RX ADMIN — PROMETHAZINE HYDROCHLORIDE 12.5 MG: 25 INJECTION INTRAMUSCULAR; INTRAVENOUS at 07:55

## 2020-10-03 RX ADMIN — HYDROCODONE BITARTRATE AND ACETAMINOPHEN 1 TABLET: 10; 325 TABLET ORAL at 17:15

## 2020-10-03 RX ADMIN — POTASSIUM CHLORIDE 40 MEQ: 20 TABLET, EXTENDED RELEASE ORAL at 11:09

## 2020-10-03 RX ADMIN — POTASSIUM CHLORIDE 40 MEQ: 20 TABLET, EXTENDED RELEASE ORAL at 17:24

## 2020-10-03 RX ADMIN — Medication 10 ML: at 21:32

## 2020-10-03 RX ADMIN — SODIUM CHLORIDE, SODIUM LACTATE, POTASSIUM CHLORIDE, AND CALCIUM CHLORIDE 25 ML/HR: 600; 310; 30; 20 INJECTION, SOLUTION INTRAVENOUS at 08:20

## 2020-10-03 RX ADMIN — LIDOCAINE HYDROCHLORIDE 60 MG: 20 INJECTION, SOLUTION EPIDURAL; INFILTRATION; INTRACAUDAL; PERINEURAL at 08:59

## 2020-10-03 RX ADMIN — SODIUM CHLORIDE 100 MCG: 900 INJECTION, SOLUTION INTRAVENOUS at 09:01

## 2020-10-03 RX ADMIN — PANTOPRAZOLE SODIUM 40 MG: 40 TABLET, DELAYED RELEASE ORAL at 07:59

## 2020-10-03 RX ADMIN — PROPOFOL 20 MG: 10 INJECTION, EMULSION INTRAVENOUS at 09:05

## 2020-10-03 RX ADMIN — PROPOFOL 50 MG: 10 INJECTION, EMULSION INTRAVENOUS at 08:59

## 2020-10-03 RX ADMIN — HYDROCODONE BITARTRATE AND ACETAMINOPHEN 1 TABLET: 10; 325 TABLET ORAL at 03:32

## 2020-10-03 RX ADMIN — HEPARIN SODIUM 5000 UNITS: 5000 INJECTION INTRAVENOUS; SUBCUTANEOUS at 14:26

## 2020-10-03 RX ADMIN — HYDROCODONE BITARTRATE AND ACETAMINOPHEN 1 TABLET: 10; 325 TABLET ORAL at 21:23

## 2020-10-03 RX ADMIN — ATORVASTATIN CALCIUM 80 MG: 80 TABLET, FILM COATED ORAL at 11:09

## 2020-10-03 RX ADMIN — HYDROMORPHONE HYDROCHLORIDE 0.5 MG: 1 INJECTION, SOLUTION INTRAMUSCULAR; INTRAVENOUS; SUBCUTANEOUS at 14:34

## 2020-10-03 RX ADMIN — PROMETHAZINE HYDROCHLORIDE 12.5 MG: 25 INJECTION INTRAMUSCULAR; INTRAVENOUS at 03:32

## 2020-10-03 NOTE — ROUTINE PROCESS
Bedside and Verbal report given to self by KADEN Harris.  Report included SBAR, Kardex, ED Summary, Procedure Summary, Intake and Output and Cardiac Rhythm SR.

## 2020-10-03 NOTE — ROUTINE PROCESS
TRANSFER - OUT REPORT: 
 
Verbal [facility] lab for ordered procedure Report consisted of patients Situation, Background, Assessment and  
Recommendations(SBAR). Information from the following report(s) Procedure Summary, Intake/Output, MAR and Cardiac Rhythm SR was reviewed with the receiving nurse. Lines:  
Peripheral IV 09/30/20 Right Arm (Active) Site Assessment Clean, dry, & intact 10/03/20 0415 Phlebitis Assessment 0 10/03/20 0415 Infiltration Assessment 0 10/03/20 0415 Dressing Status Clean, dry, & intact 10/03/20 0415 Dressing Type Transparent;Tape 10/03/20 0415 Hub Color/Line Status Patent; Flushed 10/03/20 0415 Alcohol Cap Used No 10/02/20 1645 Opportunity for questions and clarification was provided. Patient transported with: 
 Monitor

## 2020-10-03 NOTE — ROUTINE PROCESS
Verbal bedside report given to Cristino Terrazas oncoming RN. Patient's situation, background, assessment and recommendations provided. Opportunity for questions provided. Oncoming RN assumed care of patient.

## 2020-10-03 NOTE — ROUTINE PROCESS
Verbal bedside report received from Jaqueline [facility]. Assumed care of patient.

## 2020-10-03 NOTE — ROUTINE PROCESS
TRANSFER - OUT REPORT: 
 
Verbal report given to [place] RN (name) on Rice Mean  being transferred to [facility](unit) for routine progression of care Report consisted of patients Situation, Background, Assessment and  
Recommendations(SBAR). Information from the following report(s) SBAR and Kardex was reviewed with the receiving nurse. Lines:  
Peripheral IV 10/03/20 Left Hand (Active) Site Assessment Clean, dry, & intact 10/03/20 0820 Phlebitis Assessment 0 10/03/20 0820 Infiltration Assessment 0 10/03/20 0820 Dressing Status Clean, dry, & intact 10/03/20 0820 Dressing Type Tape;Transparent 10/03/20 0820 Hub Color/Line Status Pink; Infusing;Flushed;Patent 10/03/20 0820 Alcohol Cap Used No 10/03/20 0820 Opportunity for questions and clarification was provided.    
 
Patient transported with: 
Bed

## 2020-10-03 NOTE — PROCEDURES
[facility]  ESOPHAGOGASTRODUODENOSCOPY        DATE of PROCEDURE: 10/3/2020    PT NAME: Theresa Knowles  [id number]    PHYSICIAN:  Tj Leiva MD    MEDICATION:  MAC    INSTRUMENT: GIFQ    PROCEDURE:  EGD with empiric dilation of esophagus using Savary 54 Fr and then 60 Fr, EGD with biopsies, EGD diagnostic    INDICATIONS: Dysphagia    FINDINGS:  OROPHARYNX: Cords and pyriform recesses normal.  ESOPHAGUS: The esophagus is normal. The proximal, mid and distal portions are normal. The Z-Line is intact. Empiric dilation was done using a 54 Fr followed by 60 Fr Savary. Re-endoscopy after dilation showed no rents or heme. Multiple biopsies were taken from the mid and proximal esophagus using cold forceps to rule out EoE. STOMACH: The fundus on antegrade and retroflex views is normal. The body, antrum and pylorus is normal.  DUODENUM: The bulb and second portions are normal.    ASSESSMENT:  1. Normal EGD    PLAN:  1. Follow up pathology  2. Okay to restart diet. Note that patient passed his swallow evaluation with SLP. They recommended a regular diet with regular thin liquids. 3. Will sign off. Please call us with any further questions or concerns. 4. Will plan to see patient in clinic in 2-3 weeks. If dysphagia persists, we can discuss an esophageal manometry at that time.       Tj Leiva MD  [facility]

## 2020-10-03 NOTE — PROGRESS NOTES
Plan to d/c pt this afternoon but profuse vomiting requiring IV zofran, RUQ pain, HA- feeling \"bad\". GI on board. Will check RUQ ultrasound, lipase and monitor.    Patient Vitals for the past 4 hrs:   Temp Pulse Resp BP SpO2   10/03/20 1109 97.7 °F (36.5 °C) (!) 102 18 119/87 96 %         Stanley Wintersma

## 2020-10-03 NOTE — INTERVAL H&P NOTE
Update History & Physical 
 
The Patient's History and Physical of 10/2/20 was reviewed with the patient and I examined the patient. Patient had a barium swallow yesterday which showed some gastroesophageal 
reflux and esophageal dysmotility. There is question of mild presbyesohpagus. There were no strictures. Patient would like for us to take another look with an EGD with possible dilation. If that is unremarkable, will have to consider an esophageal manometry as outpatient. Patient was agreeable to the plan. Plan:  The risk, benefits, expected outcome, and alternative to the recommended procedure have been discussed with the patient. Patient understands and wants to proceed with the procedure.  
 
Electronically signed by Oscar Hanson MD on 10/3/2020 at 8:05 AM

## 2020-10-03 NOTE — PROGRESS NOTES
[facility] CARDIOLOGY PROGRESS NOTE           10/3/2020 11:27 AM    Admit Date: 9/30/2020    Subjective:   Underwent EGD today, normal per report. Some cough and a bit drowsy post procedure. No other complaints at this time. ROS:  Cardiovascular:  As noted above    Objective:      Vitals:    10/03/20 0942 10/03/20 0953 10/03/20 1003 10/03/20 1109   BP: 128/85 110/65 (!) 139/91 119/87   Pulse: 100 99 92 (!) 102   Resp: 20 18 18    Temp:       SpO2: 96% 99% 98%    Weight:       Height:           Physical Exam:  General-No Acute Distress, interactive  Neck- supple, no JVD  CV- tachycardic,  regular rhythm no MRG  Lung- clear bilaterally without crackles or wheezes   Abd- soft, nontender, nondistended  Ext- no edema bilaterally in legs   Skin- warm and dry    Data Review:   Recent Labs     10/03/20  0317 10/02/20  0334    138   K 3.6 3.2*   MG 1.7* 1.7*   BUN 9 8   CREA 1.29 1.28   GLU 96 117*       Assessment/Plan:     Principal Problem:    Acute on chronic systolic heart failure (HCC) (9/30/2020)    - appears euvolemic from a volume standpoint    - CHF meds: coreg, losartan, lasix     Active Problems:    HTN (hypertension) (11/29/2011)    - replacing today PO       VT (ventricular tachycardia) (HCC) (2/22/2016)    - in sinus, no shocks       Chest pain (9/30/2020)    - resolved       CHF (congestive heart failure) ([facility] [address] (10/2/2020)    - chronic       CAD     - on Atorvastatin    Stable and appropriate for discharge this afternoon.      Adriel Bocanegra DO  10/3/2020 11:27 AM

## 2020-10-03 NOTE — PROGRESS NOTES
TRANSFER - IN REPORT:    Verbal report received from [place] RN(name) on Sarah Underwood  being received from 3(unit) for ordered procedure      Report consisted of patients Situation, Background, Assessment and   Recommendations(SBAR). Information from the following report(s) SBAR and Kardex was reviewed with the receiving nurse. Opportunity for questions and clarification was provided. Assessment completed upon patients arrival to unit and care assumed.

## 2020-10-04 ENCOUNTER — APPOINTMENT (OUTPATIENT)
Dept: ULTRASOUND IMAGING | Age: 55
DRG: 292 | End: 2020-10-04
Attending: PHYSICIAN ASSISTANT
Payer: COMMERCIAL

## 2020-10-04 LAB
ANION GAP SERPL CALC-SCNC: 8 MMOL/L (ref 7–16)
BUN SERPL-MCNC: 11 MG/DL (ref 6–23)
CALCIUM SERPL-MCNC: 8.8 MG/DL (ref 8.3–10.4)
CHLORIDE SERPL-SCNC: 101 MMOL/L (ref 98–107)
CO2 SERPL-SCNC: 27 MMOL/L (ref 21–32)
CREAT SERPL-MCNC: 1.42 MG/DL (ref 0.8–1.5)
GLUCOSE SERPL-MCNC: 107 MG/DL (ref 65–100)
MAGNESIUM SERPL-MCNC: 1.8 MG/DL (ref 1.8–2.4)
POTASSIUM SERPL-SCNC: 4.3 MMOL/L (ref 3.5–5.1)
SODIUM SERPL-SCNC: 136 MMOL/L (ref 136–145)

## 2020-10-04 PROCEDURE — 36415 COLL VENOUS BLD VENIPUNCTURE: CPT

## 2020-10-04 PROCEDURE — 74011250636 HC RX REV CODE- 250/636: Performed by: NURSE PRACTITIONER

## 2020-10-04 PROCEDURE — 74011250637 HC RX REV CODE- 250/637: Performed by: INTERNAL MEDICINE

## 2020-10-04 PROCEDURE — 74011000250 HC RX REV CODE- 250: Performed by: NURSE PRACTITIONER

## 2020-10-04 PROCEDURE — 99232 SBSQ HOSP IP/OBS MODERATE 35: CPT | Performed by: INTERNAL MEDICINE

## 2020-10-04 PROCEDURE — 65660000000 HC RM CCU STEPDOWN

## 2020-10-04 PROCEDURE — 83735 ASSAY OF MAGNESIUM: CPT

## 2020-10-04 PROCEDURE — 76705 ECHO EXAM OF ABDOMEN: CPT

## 2020-10-04 PROCEDURE — 80048 BASIC METABOLIC PNL TOTAL CA: CPT

## 2020-10-04 PROCEDURE — 74011250637 HC RX REV CODE- 250/637: Performed by: NURSE PRACTITIONER

## 2020-10-04 RX ORDER — POTASSIUM CHLORIDE 20 MEQ/1
40 TABLET, EXTENDED RELEASE ORAL DAILY
Status: DISCONTINUED | OUTPATIENT
Start: 2020-10-05 | End: 2020-10-05

## 2020-10-04 RX ORDER — FUROSEMIDE 40 MG/1
40 TABLET ORAL EVERY 12 HOURS
Status: DISCONTINUED | OUTPATIENT
Start: 2020-10-04 | End: 2020-10-05

## 2020-10-04 RX ADMIN — HEPARIN SODIUM 5000 UNITS: 5000 INJECTION INTRAVENOUS; SUBCUTANEOUS at 07:15

## 2020-10-04 RX ADMIN — HYDROCODONE BITARTRATE AND ACETAMINOPHEN 1 TABLET: 10; 325 TABLET ORAL at 02:05

## 2020-10-04 RX ADMIN — HYDROCODONE BITARTRATE AND ACETAMINOPHEN 1 TABLET: 10; 325 TABLET ORAL at 12:58

## 2020-10-04 RX ADMIN — FUROSEMIDE 80 MG: 40 TABLET ORAL at 08:04

## 2020-10-04 RX ADMIN — LOSARTAN POTASSIUM 50 MG: 50 TABLET, FILM COATED ORAL at 08:04

## 2020-10-04 RX ADMIN — Medication 10 ML: at 13:04

## 2020-10-04 RX ADMIN — HYDROCODONE BITARTRATE AND ACETAMINOPHEN 1 TABLET: 10; 325 TABLET ORAL at 07:57

## 2020-10-04 RX ADMIN — ATORVASTATIN CALCIUM 80 MG: 80 TABLET, FILM COATED ORAL at 08:04

## 2020-10-04 RX ADMIN — CARVEDILOL 25 MG: 25 TABLET, FILM COATED ORAL at 17:20

## 2020-10-04 RX ADMIN — HEPARIN SODIUM 5000 UNITS: 5000 INJECTION INTRAVENOUS; SUBCUTANEOUS at 21:40

## 2020-10-04 RX ADMIN — PANTOPRAZOLE SODIUM 40 MG: 40 TABLET, DELAYED RELEASE ORAL at 07:57

## 2020-10-04 RX ADMIN — HYDROCODONE BITARTRATE AND ACETAMINOPHEN 1 TABLET: 10; 325 TABLET ORAL at 21:39

## 2020-10-04 RX ADMIN — PANTOPRAZOLE SODIUM 40 MG: 40 TABLET, DELAYED RELEASE ORAL at 17:17

## 2020-10-04 RX ADMIN — Medication 10 ML: at 21:45

## 2020-10-04 RX ADMIN — PROMETHAZINE HYDROCHLORIDE 12.5 MG: 25 INJECTION INTRAMUSCULAR; INTRAVENOUS at 21:40

## 2020-10-04 RX ADMIN — HYDROCODONE BITARTRATE AND ACETAMINOPHEN 1 TABLET: 10; 325 TABLET ORAL at 17:17

## 2020-10-04 RX ADMIN — HEPARIN SODIUM 5000 UNITS: 5000 INJECTION INTRAVENOUS; SUBCUTANEOUS at 12:57

## 2020-10-04 RX ADMIN — Medication 800 MG: at 21:39

## 2020-10-04 RX ADMIN — Medication 800 MG: at 08:04

## 2020-10-04 RX ADMIN — PROMETHAZINE HYDROCHLORIDE 12.5 MG: 25 INJECTION INTRAMUSCULAR; INTRAVENOUS at 02:05

## 2020-10-04 RX ADMIN — PROMETHAZINE HYDROCHLORIDE 12.5 MG: 25 INJECTION INTRAMUSCULAR; INTRAVENOUS at 17:16

## 2020-10-04 RX ADMIN — PROMETHAZINE HYDROCHLORIDE 12.5 MG: 25 INJECTION INTRAMUSCULAR; INTRAVENOUS at 12:58

## 2020-10-04 RX ADMIN — FUROSEMIDE 40 MG: 40 TABLET ORAL at 21:39

## 2020-10-04 RX ADMIN — CARVEDILOL 25 MG: 25 TABLET, FILM COATED ORAL at 07:57

## 2020-10-04 RX ADMIN — PROMETHAZINE HYDROCHLORIDE 12.5 MG: 25 INJECTION INTRAMUSCULAR; INTRAVENOUS at 07:57

## 2020-10-04 RX ADMIN — Medication 10 ML: at 07:16

## 2020-10-04 RX ADMIN — POTASSIUM CHLORIDE 40 MEQ: 20 TABLET, EXTENDED RELEASE ORAL at 08:04

## 2020-10-04 NOTE — PROGRESS NOTES
[facility] CARDIOLOGY PROGRESS NOTE           10/4/2020 10:40 AM    Admit Date: 9/30/2020      Subjective:   Reports continued nausea and abdominal discomfort this AM. Seen by GI,. US abd unremarkable for acute pathology. ROS:  Cardiovascular:  As noted above    Objective:      Vitals:    10/04/20 0027 10/04/20 0453 10/04/20 0745 10/04/20 0754   BP: 91/62 109/81  123/68   Pulse: 100 100  100   Resp: 18 22  19   Temp: 98.6 °F (37 °C) 98.5 °F (36.9 °C)  98.3 °F (36.8 °C)   SpO2: 98% 98% 100% 100%   Weight:  186 lb 12.8 oz (84.7 kg)     Height:           Physical Exam:  General-No Acute Distress, resting comfortably   Neck- supple, no JVD  CV- tachycardia, regular rhythm   Lung- clear bilaterally  Ext- no edema bilaterally in legs   Skin- warm and dry    Data Review:   Recent Labs     10/04/20  0432 10/03/20  1532 10/03/20  0317    137 138   K 4.3 4.0 3.6   MG 1.8  --  1.7*   BUN 11 8 9   CREA 1.42 1.36 1.29   * 101* 96       Assessment/Plan:     Principal Problem:    Acute on chronic systolic heart failure ([address] (9/30/2020)    - appears euvolemic from a volume standpoint    - CHF meds: coreg, losartan, lasix     Active Problems:    HTN (hypertension) (11/29/2011)    - replacing today PO        VT (ventricular tachycardia) ([address] (2/22/2016)    - in sinus, no shocks        Chest pain (9/30/2020)    - resolved        CHF (congestive heart failure) ([address] (10/2/2020)    - chronic        CAD     - on Atorvastatin      Nausea/Vomiting    - will attempt  To control symptoms today    - seen by GI, EGD and Abd US unremarkable    - decreased potassium supplementation in an effort to improve nausea as well     - if no improvement may consider possible emptying study per GI    Improved from cardiac perspective, pending improvement in GI issues.      Kvng Villanueva DO  10/4/2020 10:40 AM

## 2020-10-04 NOTE — ROUTINE PROCESS
Bedside and Verbal shift change report given to self, RN (oncoming nurse) by Quincy Del Real RN (offgoing nurse). Report included the following information SBAR, Kardex, Intake/Output, MAR and Recent Results.

## 2020-10-04 NOTE — ROUTINE PROCESS
Bedside and Verbal report given to KADEN Andujar by self.  Report included SBAR, Kardex, ED summary, procedure summary, recent results and cardiac rhythm SR.

## 2020-10-04 NOTE — DISCHARGE SUMMARY
[facility] Cardiology Discharge Summary     Patient ID:  Darrius Desai  [id number]  54 y.o.  1965    Admit date: 9/30/2020    Discharge date:  10/5/2020    Admitting Physician: Yasmine Gunderson MD     Discharge Physician: Dr. Bertrand Cline    Admission Diagnoses: Acute on chronic systolic heart failure ([facility] [address] [I50.23]  CHF (congestive heart failure) ([facility] [address] [I50.9]    Discharge Diagnoses:    Diagnosis    Acute on chronic systolic heart failure     Chest pain    Esophageal dysphagia    CAMARILLO (dyspnea on exertion)    Non-ischemic cardiomyopathy     Nausea & vomiting    VT (ventricular tachycardia) with ICD in place    HTN (hypertension)    Chronic systolic (congestive) heart failure        Cardiology Procedures this admission:  none  Consults: GI    HPI: Darrius Desai is a 54 y.o. male with past medical history of chronic systolic heart failure, VT with ICD in place, GERD, chronic back/leg pain, CAD and HTN who presented to the ER with complaints of worsening shortness of breath. Patient reports that he increased his home Lasix dose from 40mg BID to 80mg BID last Monday, however, his breathing has continued to get worse. He reports probable weigh gain of 15-20 pounds. States that he has been sleeping on the couch at least since Thursday of last week. He was seen in the office yesterday by Dr. Yudi Moeller and admission was recommended for volume removal and rate control, however, he refused. After leaving the office, his breathing became worse tonight. Upon arrival to the ER, EKG shows ST without acute ST/T wave changes. Initial hs-trop was elevated at 199. Pro-BNP was mildly elevated at 1902. In addition to his shortness of breath, he complains of abdominal pain, headache and chest pain. Hospital Course: Patient was evaluated and subsequently admitted for further cardiac evaluation and treatment. The patient was started on IV Lasix for diuresis. He diuresed well and felt better with net I&O - 3.6 L.  He was hypomagnesemic and this was supplemented. His sHF medications were optimized with the addition of Zaroxolyn. He was converted to PO Lasix on 10/2. He c/o worsening dysphagia and nausea so GI was consulted for evaluation. He underwent barium swallow which showed esophageal dysmotility. He then underwent EGD on 10/3/20 by Dr. Getachew Leija with empiric dilation. He was initially cleared for afternoon discharge but developed nausea and vomiting and discharge was canceled. On 10/3 he underwent Abd US that showed gallbladder stones and sludge but no acute cholecystitis. He was placed on scheduled anti-emetic therapy. The morning of 10/5/2020, the patient was up feeling well without any complaints of shortness of breath. Patient's labs were stable with creatinine of 1.81. Patient was seen and examined by Dr. Zoraida Dumont  and determined stable and ready for discharge. Patient was instructed on the importance of medication compliance, low sodium diet, 2 liter per day fluid restriction and daily weights. For maximized medical therapy of congestive heart failure, patient will continue use of BB and ARB. The patient will have close transitional care follow up (TC-7) with [facility] Dr. Maricruz Soria on 10/8/2020 at 1 am in [place] office. The patient will need BMP and Mg level prior to appt. DISPOSITION: The patient is being discharged home in stable condition on a low saturated fat, low cholesterol and low salt diet. The patient is instructed to advance activities as tolerated to the limit of fatigue or shortness of breath. The patient is informed to monitor daily weights and maintain a 2 liter per day fluid restriction. The patient is instructed to call the office for any shortness of breath, weight gain, or increased peripheral edema.         Discharge Exam:   Visit Vitals  BP 94/78 (BP 1 Location: Left arm, BP Patient Position: At rest)   Pulse (!) 104   Temp 98.3 °F (36.8 °C)   Resp 16   Ht 5' 11\" (1.803 m) Wt 83.7 kg (184 lb 8 oz)   SpO2 98%   BMI 25.73 kg/m²       Patient has been seen by Dr. Joyce Piper : see his progress note for exam details. Recent Results (from the past 24 hour(s))   MAGNESIUM    Collection Time: 10/05/20  3:18 AM   Result Value Ref Range    Magnesium 2.0 1.8 - 2.4 mg/dL   METABOLIC PANEL, BASIC    Collection Time: 10/05/20  3:18 AM   Result Value Ref Range    Sodium 135 (L) 136 - 145 mmol/L    Potassium 4.1 3.5 - 5.1 mmol/L    Chloride 99 98 - 107 mmol/L    CO2 29 21 - 32 mmol/L    Anion gap 7 7 - 16 mmol/L    Glucose 116 (H) 65 - 100 mg/dL    BUN 15 6 - 23 MG/DL    Creatinine 1.81 (H) 0.8 - 1.5 MG/DL    GFR est AA 50 (L) >60 ml/min/1.73m2    GFR est non-AA 42 (L) >60 ml/min/1.73m2    Calcium 8.9 8.3 - 10.4 MG/DL         Patient Instructions:       Current Discharge Medication List      START taking these medications    Details   potassium chloride (K-DUR, KLOR-CON) 20 mEq tablet Take 2 Tabs by mouth daily. Qty: 60 Tab, Refills: 2         CONTINUE these medications which have CHANGED    Details   magnesium oxide (MAG-OX) 400 mg tablet Take 2 Tabs by mouth every twelve (12) hours. Qty: 60 Tab, Refills: 5         CONTINUE these medications which have NOT CHANGED    Details   ALPRAZolam (XANAX) 1 mg tablet Take 0.5 Tabs by mouth nightly. Max Daily Amount: 0.5 mg. Indications: anxious  Qty: 30 Tab, Refills: 3    Associated Diagnoses: Anxiety      gabapentin (NEURONTIN) 300 mg capsule 1 po bid prn pain  Qty: 60 Cap, Refills: 6    Associated Diagnoses: Other postherpetic nervous system involvement      promethazine (PHENERGAN) 25 mg tablet Take 1 Tab by mouth every six (6) hours as needed for Nausea. Qty: 30 Tab, Refills: 0    Associated Diagnoses: Projectile vomiting with nausea      HYDROcodone-acetaminophen (NORCO)  mg tablet TAKE ONE TABLET BY MOUTH FOUR TIMES DAILY AS NEEDED  Refills: 0      carvedilol (COREG) 25 mg tablet Take 1 Tab by mouth two (2) times daily (with meals).   Qty: 180 Tab, Refills: 3      losartan (COZAAR) 50 mg tablet Take 1 Tab by mouth daily. Qty: 90 Tab, Refills: 2      furosemide (LASIX) 40 mg tablet Take 1 Tab by mouth two (2) times a day. Qty: 180 Tab, Refills: 3      ESOMEPRAZOLE MAG TRIHYDRATE (NEXIUM PO) Take 1 Cap by mouth two (2) times a day. sildenafil citrate (Viagra) 100 mg tablet Take 1 Tab by mouth as needed (as directed). Qty: 10 Tab, Refills: 3      atorvastatin (LIPITOR) 80 mg tablet Take 1 Tab by mouth daily. Qty: 90 Tab, Refills: 3      eplerenone (INSPRA) 25 mg tablet Take 1 Tab by mouth daily.   Qty: 90 Tab, Refills: 3

## 2020-10-04 NOTE — PROGRESS NOTES
[facility] DAILY PROGRESS NOTE    Admit Date:  9/30/2020    CC:  Nausea, Vomiting, Dysphagia    Problem List:  Principal Problem:    Acute on chronic systolic heart failure ([facility] [address] (9/30/2020)    Active Problems:    HTN (hypertension) (11/29/2011)      VT (ventricular tachycardia) ([facility] [address] (2/22/2016)      Chest pain (9/30/2020)      CHF (congestive heart failure) ([facility] [address] (10/2/2020)      Mr. Katiuska Tang is a 55 yo F with CHF with EF of 20%, VT with ICD and other PMH as listed below who we are asked to see of esophageal dysphagia, nausea and vomiting. Patient was admitted for acute on chronic systolic heart failure.       This has been chronic for him. It is to solids mostly but he also reports intermittent issues with liquids. He had an EGD which showed a Schatzki's ring and moderate hiatal hernia in 5/2019. He had a dilation of the esophagus up to 60 Fr and patient said that never helped. He has passed his swallow evaluation from SLP team and they have signed off. Barium swallow was done on 10/2/20 which showed no strictures or mass. Mild presbyesophagus was seen. EGD was done 10/3/20 which was completely normal.  I did not see a hiatal hernia. Note that his CT from 3/22/20 did see a hiatal hernia and this certainly could be sliding. Esophagus was again empirically dilated with 54 Fr and then 60 Fr Savary. Biopsies were taken from the esophagus and stomach which are still pending. Patient was supposed to be discharged home yesterday but then he started having vomiting and RUQ pain. Note that his LFTs are normal.  Lipase is normal.      RUQ US was done today which shows stones and sludge in gallbladder without findings of cholecystitis. The CBD is normal at 6 mm. 1. RUQ Pain  2. Nausea and Vomiting  3. Esophageal Dysphagia  4. Presbyesophagus on Barium Swallow  5.  Acute on Chronic Systolic Heart Failure    - Unclear etiology for patient's symptoms at this time  - His labs have been unrevealing  - EGD was unremarkable. Pathology is pending.  - RUQ US with no acute findings. Stones and sludge were seen in gallbladder without cholecystitis. - Her reports having normal BMs.    - The nausea and vomiting could be from his medications.    - Okay to use scheduled anti-emetics to see if we can get him to feel better. Patient says only Phenergan helps. - If no improvement in symptoms, can consider gastric emptying study. - If no improvement in dysphagia, can consider esophageal manometry. - Will follow. Kacey Cheema MD   [facility]      Subjective:     Patient was supposed to be discharged home yesterday but then he started having nausea and vomiting. He was also having RLQ pain. GI asked to see the patient again.     Medications:   Current Facility-Administered Medications   Medication Dose Route Frequency Provider Last Rate Last Dose    lactated Ringers infusion  25 mL/hr IntraVENous CONTINUOUS Dominique Rachel MD 25 mL/hr at 10/03/20 0820 25 mL/hr at 10/03/20 0820    potassium chloride (K-DUR, KLOR-CON) SR tablet 40 mEq  40 mEq Oral BID Leatha Sales MD   40 mEq at 10/04/20 0804    furosemide (LASIX) tablet 80 mg  80 mg Oral Q12H Leatha Sales MD   80 mg at 10/04/20 0804    ALPRAZolam (XANAX) tablet 0.5 mg  0.5 mg Oral QHS Sheryle Sasha A, NP   Stopped at 10/01/20 2215    atorvastatin (LIPITOR) tablet 80 mg  80 mg Oral DAILY Sheryle Sasha A, NP   80 mg at 10/04/20 0804    carvediloL (COREG) tablet 25 mg  25 mg Oral BID WITH MEALS Sheryle Sasha A, NP   25 mg at 10/04/20 0757    eplerenone (INSPRA) tablet 25 mg (Patient Supplied)  25 mg Oral DAILY Sheryle Sasha A, NP   Stopped at 10/01/20 0900    pantoprazole (PROTONIX) tablet 40 mg  40 mg Oral ACB&D Sheryle Sasha A, NP   40 mg at 10/04/20 0757    gabapentin (NEURONTIN) capsule 300 mg  300 mg Oral BID PRN Sheryle Sasha A, NP   300 mg at 09/30/20 1416    HYDROcodone-acetaminophen (NORCO)  mg tablet 1 Tab  1 Tab Oral Q4H PRN Av Moh A, NP   1 Tab at 10/04/20 0757    losartan (COZAAR) tablet 50 mg  50 mg Oral DAILY Av Moh A, NP   50 mg at 10/04/20 0804    sodium chloride (NS) flush 5-40 mL  5-40 mL IntraVENous Q8H Av Moh A, NP   10 mL at 10/04/20 0716    sodium chloride (NS) flush 5-40 mL  5-40 mL IntraVENous PRN Av Moh A, NP   5 mL at 10/01/20 2055    nitroglycerin (NITROSTAT) tablet 0.4 mg  0.4 mg SubLINGual Q5MIN PRN Everet Gaster, NP        acetaminophen (TYLENOL) tablet 650 mg  650 mg Oral Q4H PRN Av Moh A, NP        heparin (porcine) injection 5,000 Units  5,000 Units SubCUTAneous Q8H Av Moh A, NP   5,000 Units at 10/04/20 0715    albuterol (PROVENTIL VENTOLIN) nebulizer solution 2.5 mg  2.5 mg Nebulization Q4H PRN Stephen Rodriges MD        [facility]) with saline injection 12.5 mg  12.5 mg IntraVENous Q4H PRN Av Moh A, NP   12.5 mg at 10/04/20 0757    HYDROmorphone (PF) (DILAUDID) injection 0.5 mg  0.5 mg IntraVENous Q4H PRN [facility] A, NP   0.5 mg at 10/03/20 1434    magnesium oxide (MAG-OX) tablet 800 mg  800 mg Oral Q12H [facility] A, NP   800 mg at 10/04/20 2200       Review of Systems:  ROS was obtained, with pertinent positives as listed above. No chest pain or SOB. Diet:      Objective:   Vitals:  Visit Vitals  /68 (BP 1 Location: Right arm, BP Patient Position: At rest)   Pulse 100   Temp 98.3 °F (36.8 °C)   Resp 19   Ht 5' 11\" (1.803 m)   Wt 84.7 kg (186 lb 12.8 oz)   SpO2 100%   BMI 26.05 kg/m²     Intake/Output:  No intake/output data recorded. 10/02 1901 - 10/04 0700  In: 380 [P.O.:280; I.V.:100]  Out: 850 [Urine:850]  Exam:  General appearance: alert, cooperative, no distress. Sitting up in bed.     Lungs: clear to auscultation bilaterally anteriorly  Heart: regular rate and rhythm  Abdomen: soft, mildly tender in RLQ.  Bowel sounds normal. No masses, no organomegaly  Extremities: extremities normal, atraumatic, no cyanosis or edema  Neuro:  alert and oriented    Data Review (Labs):    Recent Labs     10/04/20  0432 10/03/20  1532 10/03/20  0317 10/02/20  0334    137 138 138   K 4.3 4.0 3.6 3.2*    103 103 105   CO2 27 26 29 29   BUN 11 8 9 8   MG 1.8  --  1.7* 1.7*   AST  --  21  --   --    ALT  --  20  --   --          Esthela Saldivar MD  [facility]

## 2020-10-05 VITALS
BODY MASS INDEX: 25.83 KG/M2 | TEMPERATURE: 98.2 F | HEIGHT: 71 IN | DIASTOLIC BLOOD PRESSURE: 68 MMHG | OXYGEN SATURATION: 94 % | SYSTOLIC BLOOD PRESSURE: 109 MMHG | HEART RATE: 99 BPM | RESPIRATION RATE: 18 BRPM | WEIGHT: 184.5 LBS

## 2020-10-05 LAB
ANION GAP SERPL CALC-SCNC: 7 MMOL/L (ref 7–16)
BUN SERPL-MCNC: 15 MG/DL (ref 6–23)
CALCIUM SERPL-MCNC: 8.9 MG/DL (ref 8.3–10.4)
CHLORIDE SERPL-SCNC: 99 MMOL/L (ref 98–107)
CO2 SERPL-SCNC: 29 MMOL/L (ref 21–32)
CREAT SERPL-MCNC: 1.81 MG/DL (ref 0.8–1.5)
GLUCOSE SERPL-MCNC: 116 MG/DL (ref 65–100)
MAGNESIUM SERPL-MCNC: 2 MG/DL (ref 1.8–2.4)
POTASSIUM SERPL-SCNC: 4.1 MMOL/L (ref 3.5–5.1)
SODIUM SERPL-SCNC: 135 MMOL/L (ref 136–145)

## 2020-10-05 PROCEDURE — 99238 HOSP IP/OBS DSCHRG MGMT 30/<: CPT | Performed by: INTERNAL MEDICINE

## 2020-10-05 PROCEDURE — 2709999900 HC NON-CHARGEABLE SUPPLY

## 2020-10-05 PROCEDURE — 74011250637 HC RX REV CODE- 250/637: Performed by: NURSE PRACTITIONER

## 2020-10-05 PROCEDURE — 80048 BASIC METABOLIC PNL TOTAL CA: CPT

## 2020-10-05 PROCEDURE — 74011000250 HC RX REV CODE- 250: Performed by: NURSE PRACTITIONER

## 2020-10-05 PROCEDURE — 36415 COLL VENOUS BLD VENIPUNCTURE: CPT

## 2020-10-05 PROCEDURE — 83735 ASSAY OF MAGNESIUM: CPT

## 2020-10-05 PROCEDURE — 74011250636 HC RX REV CODE- 250/636: Performed by: NURSE PRACTITIONER

## 2020-10-05 PROCEDURE — 74011250637 HC RX REV CODE- 250/637: Performed by: INTERNAL MEDICINE

## 2020-10-05 RX ORDER — LANOLIN ALCOHOL/MO/W.PET/CERES
800 CREAM (GRAM) TOPICAL EVERY 12 HOURS
Qty: 60 TAB | Refills: 5 | Status: SHIPPED | OUTPATIENT
Start: 2020-10-05 | End: 2020-12-30 | Stop reason: SDUPTHER

## 2020-10-05 RX ORDER — POTASSIUM CHLORIDE 20 MEQ/1
40 TABLET, EXTENDED RELEASE ORAL DAILY
Qty: 60 TAB | Refills: 2 | Status: SHIPPED | OUTPATIENT
Start: 2020-10-05 | End: 2020-12-30 | Stop reason: SDUPTHER

## 2020-10-05 RX ORDER — FUROSEMIDE 40 MG/1
40 TABLET ORAL EVERY 12 HOURS
Status: DISCONTINUED | OUTPATIENT
Start: 2020-10-05 | End: 2020-10-05 | Stop reason: HOSPADM

## 2020-10-05 RX ORDER — FUROSEMIDE 40 MG/1
40 TABLET ORAL DAILY
Status: DISCONTINUED | OUTPATIENT
Start: 2020-10-06 | End: 2020-10-05

## 2020-10-05 RX ADMIN — CARVEDILOL 25 MG: 25 TABLET, FILM COATED ORAL at 08:59

## 2020-10-05 RX ADMIN — PROMETHAZINE HYDROCHLORIDE 12.5 MG: 25 INJECTION INTRAMUSCULAR; INTRAVENOUS at 04:15

## 2020-10-05 RX ADMIN — PANTOPRAZOLE SODIUM 40 MG: 40 TABLET, DELAYED RELEASE ORAL at 08:14

## 2020-10-05 RX ADMIN — FUROSEMIDE 40 MG: 40 TABLET ORAL at 08:59

## 2020-10-05 RX ADMIN — Medication 10 ML: at 05:53

## 2020-10-05 RX ADMIN — ATORVASTATIN CALCIUM 80 MG: 80 TABLET, FILM COATED ORAL at 08:59

## 2020-10-05 RX ADMIN — Medication 800 MG: at 08:59

## 2020-10-05 RX ADMIN — HYDROCODONE BITARTRATE AND ACETAMINOPHEN 1 TABLET: 10; 325 TABLET ORAL at 03:36

## 2020-10-05 RX ADMIN — HEPARIN SODIUM 5000 UNITS: 5000 INJECTION INTRAVENOUS; SUBCUTANEOUS at 05:52

## 2020-10-05 RX ADMIN — LOSARTAN POTASSIUM 50 MG: 50 TABLET, FILM COATED ORAL at 08:59

## 2020-10-05 NOTE — PROGRESS NOTES
[facility] CARDIOLOGY PROGRESS NOTE           10/5/2020 7:36 AM    Admit Date: 9/30/2020      Subjective:   Patient feels better. Vitals are stable. ROS:  Cardiovascular:  As noted above    Objective:      Vitals:    10/04/20 2032 10/04/20 2139 10/05/20 0124 10/05/20 0516   BP: 92/66 94/71 95/71 94/78   Pulse: 100  (!) 101 (!) 104   Resp: 18  16 16   Temp: 98.7 °F (37.1 °C)  98.1 °F (36.7 °C) 98.3 °F (36.8 °C)   SpO2: 97%  98% 98%   Weight:    184 lb 8 oz (83.7 kg)   Height:           Physical Exam:  General-No Acute Distress  Neck- supple, no JVD  CV- regular rate and rhythm no MRG  Lung- clear bilaterally  Abd- soft, nontender, nondistended  Ext- no edema bilaterally. Skin- warm and dry    Data Review:   Recent Labs     10/05/20  0318 10/04/20  0432   * 136   K 4.1 4.3   MG 2.0 1.8   BUN 15 11   CREA 1.81* 1.42   * 107*       Assessment/Plan:     Principal Problem:    Acute on chronic systolic heart failure (HCC) (9/30/2020)    EF 20%. Medications are appropriate. Volume stable. On lasix 40mg BID and KCL 40meq daily. Will need TC-7 with Dr Terence Colunga  - check BMP, Mg day of visit    Active Problems:    HTN (hypertension) (11/29/2011)    Stable       VT (ventricular tachycardia) ([address] (2/22/2016)    Stable       Chest pain (9/30/2020)    Resolved       CHF (congestive heart failure) ([address] (10/2/2020)    As above    Nausea better.             Gordon Mckenzie MD  10/5/2020 7:36 AM

## 2020-10-05 NOTE — DISCHARGE INSTRUCTIONS
Patient Education        Heart Failure: Care Instructions  Your Care Instructions     Heart failure occurs when your heart does not pump as much blood as the body needs. Failure does not mean that the heart has stopped pumping but rather that it is not pumping as well as it should. Over time, this causes fluid buildup in your lungs and other parts of your body. Fluid buildup can cause shortness of breath, fatigue, swollen ankles, and other problems. By taking medicines regularly, reducing sodium (salt) in your diet, checking your weight every day, and making lifestyle changes, you can feel better and live longer. Follow-up care is a key part of your treatment and safety. Be sure to make and go to all appointments, and call your doctor if you are having problems. It's also a good idea to know your test results and keep a list of the medicines you take. How can you care for yourself at home? Medicines    · Be safe with medicines. Take your medicines exactly as prescribed. Call your doctor if you think you are having a problem with your medicine.     · Do not take any vitamins, over-the-counter medicine, or herbal products without talking to your doctor first. Marcus Faith not take ibuprofen (Advil or Motrin) and naproxen (Aleve) without talking to your doctor first. They could make your heart failure worse.     · You may take some of the following medicine. ? Angiotensin-converting enzyme inhibitors (ACEIs) or angiotensin II receptor blockers (ARBs) reduce the heart's workload, lower blood pressure, and reduce swelling. Taking an ACEI or ARB may lower your chance of needing to be hospitalized. ? Beta-blockers can slow heart rate, decrease blood pressure, and improve your condition. Taking a beta-blocker may lower your chance of needing to be hospitalized. ? Diuretics, also called water pills, reduce swelling. You will get more details on the specific medicines your doctor prescribes.   Diet    · Your doctor may suggest that you limit sodium. Your doctor can tell you how much sodium is right for you. An example is less than 3,000 mg a day. This includes all the salt you eat in cooking or in packaged foods. People get most of their sodium from processed foods. Fast food and restaurant meals also tend to be very high in sodium.     · Ask your doctor how much liquid you can drink each day. You may have to limit liquids. Weight    · Weigh yourself without clothing at the same time each day. Record your weight. Call your doctor if you have a sudden weight gain, such as more than 2 to 3 pounds in a day or 5 pounds in a week. (Your doctor may suggest a different range of weight gain.) A sudden weight gain may mean that your heart failure is getting worse. Activity level    · Start light exercise (if your doctor says it is okay). Even if you can only do a small amount, exercise will help you get stronger, have more energy, and manage your weight and your stress. Walking is an easy way to get exercise. Start out by walking a little more than you did before. Bit by bit, increase the amount you walk.     · When you exercise, watch for signs that your heart is working too hard. You are pushing yourself too hard if you cannot talk while you are exercising. If you become short of breath or dizzy or have chest pain, stop, sit down, and rest.     · If you feel \"wiped out\" the day after you exercise, walk slower or for a shorter distance until you can work up to a better pace.     · Get enough rest at night. Sleeping with 1 or 2 pillows under your upper body and head may help you breathe easier. Lifestyle changes    · Do not smoke. Smoking can make a heart condition worse. If you need help quitting, talk to your doctor about stop-smoking programs and medicines. These can increase your chances of quitting for good.  Quitting smoking may be the most important step you can take to protect your heart.     · Limit alcohol to 2 drinks a day for men and 1 drink a day for women. Too much alcohol can cause health problems.     · Avoid getting sick from colds and the flu. Get a pneumococcal vaccine shot. If you have had one before, ask your doctor whether you need another dose. Get a flu shot each year. If you must be around people with colds or the flu, wash your hands often. When should you call for help? Call 911 if you have symptoms of sudden heart failure such as:    · You have severe trouble breathing.     · You cough up pink, foamy mucus.     · You have a new irregular or rapid heartbeat. Call your doctor now or seek immediate medical care if:    · You have new or increased shortness of breath.     · You are dizzy or lightheaded, or you feel like you may faint.     · You have sudden weight gain, such as more than 2 to 3 pounds in a day or 5 pounds in a week. (Your doctor may suggest a different range of weight gain.)     · You have increased swelling in your legs, ankles, or feet.     · You are suddenly so tired or weak that you cannot do your usual activities. Watch closely for changes in your health, and be sure to contact your doctor if you develop new symptoms. Where can you learn more? Go to http://www.gray.com/  Enter Y622 in the search box to learn more about \"Heart Failure: Care Instructions. \"  Current as of: December 16, 2019               Content Version: 12.6  © 6689-9943 [employer]. Care instructions adapted under license by [employer] (which disclaims liability or warranty for this information). If you have questions about a medical condition or this instruction, always ask your healthcare professional. [place] [address] any warranty or liability for your use of this information. Patient Education      Potassium Chloride (K-Dur, K-Sol, K-Tab, Kennedy Mur) - (By mouth)   Why this medicine is used:   Prevents and treats low potassium levels in the blood.   Contact a nurse or doctor right away if you have:  · Uneven heartbeat, trouble breathing  · Confusion, weakness, numbness in your hands, feet, or lips  · Bloody or black, tarry stools     Common side effects:  · Mild nausea, diarrhea, gas  © 2017 [facility] Information is for End User's use only and may not be sold, redistributed or otherwise used for commercial purposes.

## 2020-10-05 NOTE — PROGRESS NOTES
Pt is discharging home; stable condition. Pt does not have any d/c needs from CM. Family is transporting him home and agreeable to the dc plan. CM will continue to follow. Care Management Interventions  PCP Verified by CM: Yes  Mode of Transport at Discharge:  Other (see comment)(Family)  Transition of Care Consult (CM Consult): Discharge Planning  Discharge Durable Medical Equipment: No  Physical Therapy Consult: No  Occupational Therapy Consult: No  Speech Therapy Consult: No  Current Support Network: Own Home, Family Lives Nearby, Lives with Spouse  Confirm Follow Up Transport: Self  The Plan for Transition of Care is Related to the Following Treatment Goals : Patient return to his baseline  The Patient and/or Patient Representative was Provided with a Choice of Provider and Agrees with the Discharge Plan?: Yes  Name of the Patient Representative Who was Provided with a Choice of Provider and Agrees with the Discharge Plan: Patient  Freedom of Choice List was Provided with Basic Dialogue that Supports the Patient's Individualized Plan of Care/Goals, Treatment Preferences and Shares the Quality Data Associated with the Providers?: Yes  [place] Resource Information Provided?: No  Discharge Location  Discharge Placement: Home

## 2020-10-05 NOTE — PROGRESS NOTES
[facility] DAILY PROGRESS NOTE    Admit Date:  9/30/2020    CC:  Nausea, Vomiting, Dysphagia    Problem List:  Principal Problem:    Acute on chronic systolic heart failure ([facility] (9/30/2020)    Active Problems:    HTN (hypertension) (11/29/2011)      VT (ventricular tachycardia) ([facility] (2/22/2016)      Chest pain (9/30/2020)      CHF (congestive heart failure) ([facility] (10/2/2020)      Mr. Milton Fong is a 55 yo F with CHF with EF of 20%, VT with ICD and other PMH as listed below who we are asked to see of esophageal dysphagia, nausea and vomiting. Patient was admitted for acute on chronic systolic heart failure.       This has been chronic for him. It is to solids mostly but he also reports intermittent issues with liquids. He had an EGD which showed a Schatzki's ring and moderate hiatal hernia in 5/2019. He had a dilation of the esophagus up to 60 Fr and patient said that never helped. He has passed his swallow evaluation from SLP team and they have signed off. Barium swallow was done on 10/2/20 which showed no strictures or mass. Mild presbyesophagus was seen. EGD was done 10/3/20 which was completely normal.  I did not see a hiatal hernia. Note that his CT from 3/22/20 did see a hiatal hernia and this certainly could be sliding. Esophagus was again empirically dilated with 54 Fr and then 60 Fr Savary. Biopsies were taken from the esophagus and stomach which are still pending. Patient was supposed to be discharged home but then he started having vomiting and RUQ pain. Note that his LFTs are normal.  Lipase is normal.      RUQ US was done twhich shows stones and sludge in gallbladder without findings of cholecystitis. The CBD is normal at 6 mm. 1. RUQ Pain  2. Nausea and Vomiting  3. Esophageal Dysphagia  4. Presbyesophagus on Barium Swallow  5.  Acute on Chronic Systolic Heart Failure    - Unclear etiology for patient's symptoms at this time  - His labs have been unrevealing  - EGD was unremarkable. Pathology is pending.  - RUQ US with no acute findings. Stones and sludge were seen in gallbladder without cholecystitis. - He reports having normal BMs.    - The nausea and vomiting could be from his medications.    - Okay to use scheduled anti-emetics to see if we can get him to feel better. Patient says only Phenergan helps. - If no improvement in symptoms, can consider gastric emptying study. - If no improvement in dysphagia, can consider esophageal manometry. -D/C today - GI will make follow up appt to evaluate and further LOPEZ of A/V and abd pain      Jasbir Horton NP   [facility]    Patient seen and examined n collaboration with Dr Elijah Pereira. Assessment and plan per Dr Rohit Gabriel. Subjective:     Patient reports continued nausea, worse in the AM and off and on during the day as well and ABD pain. Symptoms ongoing for a while. Being D/C today and we will follow out patient.       Medications:   Current Facility-Administered Medications   Medication Dose Route Frequency Provider Last Rate Last Dose    furosemide (LASIX) tablet 40 mg  40 mg Oral Q12H Abraham Daniels MD   40 mg at 10/05/20 0859    ALPRAZolam (XANAX) tablet 0.5 mg  0.5 mg Oral QHS Leonarda Meth A, NP   Stopped at 10/01/20 2215    atorvastatin (LIPITOR) tablet 80 mg  80 mg Oral DAILY Leonarda Meth A, NP   80 mg at 10/05/20 0859    carvediloL (COREG) tablet 25 mg  25 mg Oral BID WITH MEALS Leonarda Meth A, NP   25 mg at 10/05/20 0859    eplerenone (INSPRA) tablet 25 mg (Patient Supplied)  25 mg Oral DAILY Leonarda Meth A, NP   Stopped at 10/01/20 0900    pantoprazole (PROTONIX) tablet 40 mg  40 mg Oral ACB&D Leonarda Meth A, NP   40 mg at 10/05/20 0814    gabapentin (NEURONTIN) capsule 300 mg  300 mg Oral BID PRN Leonarda Meth A, NP   300 mg at 09/30/20 1416    HYDROcodone-acetaminophen (NORCO)  mg tablet 1 Tab  1 Tab Oral Q4H PRN Ran Asif, VALERIANO   1 Tab at 10/05/20 6594    losartan (COZAAR) tablet 50 mg  50 mg Oral DAILY Carey Morales A, NP   50 mg at 10/05/20 0859    sodium chloride (NS) flush 5-40 mL  5-40 mL IntraVENous Q8H Carey Birney A, NP   10 mL at 10/05/20 0553    sodium chloride (NS) flush 5-40 mL  5-40 mL IntraVENous PRN Carey Morales A, NP   5 mL at 10/01/20 2055    nitroglycerin (NITROSTAT) tablet 0.4 mg  0.4 mg SubLINGual Q5MIN PRN Ran Asif NP        acetaminophen (TYLENOL) tablet 650 mg  650 mg Oral Q4H PRN Carey HARRISON NP        heparin (porcine) injection 5,000 Units  5,000 Units SubCUTAneous Q8H Carey Birney A, NP   5,000 Units at 10/05/20 0552    albuterol (PROVENTIL VENTOLIN) nebulizer solution 2.5 mg  2.5 mg Nebulization Q4H PRN Cielo Enriquez MD        [facility]) with saline injection 12.5 mg  12.5 mg IntraVENous Q4H PRN Carey Morales A, NP   12.5 mg at 10/05/20 0415    HYDROmorphone (PF) (DILAUDID) injection 0.5 mg  0.5 mg IntraVENous Q4H PRN Carey Morales A, NP   0.5 mg at 10/03/20 1434    magnesium oxide (MAG-OX) tablet 800 mg  800 mg Oral Q12H Carey Morales A, NP   800 mg at 10/05/20 0772       Review of Systems:  ROS was obtained, with pertinent positives as listed above. No chest pain or SOB. Diet:  Cardiac    Objective:   Vitals:  Visit Vitals  /68   Pulse 99   Temp 98.2 °F (36.8 °C)   Resp 18   Ht 5' 11\" (1.803 m)   Wt 83.7 kg (184 lb 8 oz)   SpO2 94%   BMI 25.73 kg/m²     Intake/Output:  10/05 0701 - 10/05 1900  In: 240 [P.O.:240]  Out: -   10/03 1901 - 10/05 0700  In: 400 [P.O.:400]  Out: 500 [Urine:500]  Exam:  General appearance: alert, cooperative, no distress. Sitting up in bed. Lungs: clear to auscultation bilaterally anteriorly  Heart: regular rate and rhythm  Abdomen: soft, mildly tender in RLQ.  Bowel sounds normal. No masses, no organomegaly  Extremities: extremities normal, atraumatic, no cyanosis or edema  Neuro:  alert and oriented    Data Review (Labs):    Recent Labs     10/05/20  0318 10/04/20  0432 10/03/20  1532 10/03/20  0317   * 136 137 138   K 4.1 4.3 4.0 3.6   CL 99 101 103 103   CO2 29 27 26 29   BUN 15 11 8 9   MG 2.0 1.8  --  1.7*   AST  --   --  21  --    ALT  --   --  20  --          Brittani Knott NP  [facility]

## 2020-10-05 NOTE — ROUTINE PROCESS
Bedside and Verbal shift change report given to Evelyne John RN (oncoming nurse) by self, RN (offgoing nurse). Report included the following information SBAR, Kardex, Intake/Output, MAR, Recent Results and Cardiac Rhythm Sinus Tach.

## 2020-10-05 NOTE — ROUTINE PROCESS
CHF teaching reinfoerced post introduction to pt.; aware of diagnosis. Planner/scale @ BS and will follow. Smoking/ ETOH/Illicit drug use cessation and maintain a healthy weight covered. Pt. aware that I can not prescribe nor adjust  medications: 15mins Palliative Care score: 
Refused ACP on admission Start 2L/D Fluid restriction/ cardiac diet CHF teaching continues to pt. . Emphasis on taking prescription meds as ordered, to keep F/U appts and to call MD STAT. CHF teaching completed, verbalize emphasis on monitoring self and report to MD: If you gain 2 lbs in one day or 5 lbs in a week, and short of breath. If you can not lay flat without developing short of breath or rapid breathing at night; or if it wakes you up. Develop a cough or wheezing. If you notice swollen hands/feet/ankles or stomach with a bloated/ full feeling. If you are  more confused or mentally fuzzy or dizzy. If you notice a rapid or change in your heart rate. If you become more exhausted all the time and unable to do the same level of activity without stopping to catch your breath. Drink no more than 8 cups a day in 8 oz. cups. Limit Cola Drinks. Your Heart can not handle any more. Stay away from salt (limit anything with salt or sodium in it). Limit to 250mg per serving. Exercise needs to be started with your Doctors approval. 
Reduce stress; Call myself or Provider if assistance is needed. Pass post test via teach back, will make self available post DC ,if an questions arise. Diabetic teaching completed. Planner/scale @ BS:  60 mins total 
 
 
appt with Dr Lopez Friday; Carmen Acosta. Pt needs to make/ given to pt. Pt aware to report worsening s/sx.

## 2020-10-06 NOTE — ADT AUTH CERT NOTES
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  
 
   Rate and Rhythm: Regular rhythm. Tachycardia present. Pulmonary:  
 
   Breath sounds: Wheezing present. Abdominal:  
   General: Bowel sounds are normal.  
 
Musculoskeletal:     
 
   General: Swelling present. Skin: 
 
   General: Skin is warm. Neurological:  
 
   Mental Status: He is alert and oriented to person, place, and time.   
Psychiatric:     
 Mood and Affect: Mood normal.  
 
  
 
  
 
  
 
Cardiographics Telemetry: sinus tachycardia ECG: sinus tachycardia Echocardiogram: from 7/4/2020 -  Left ventricle: The ventricle was mildly to moderately dilated. There was severe diffuse hypokinesis. Wall thickness was mildly increased. -  Left atrium: The atrium was markedly dilated. -  Right atrium: The atrium was moderately dilated. -  Mitral valve: There was mild regurgitation. 
 
-  Tricuspid valve: There was mild regurgitation. Labs:  
 
Recent Results Patient has been seen and examined by Dr. Marcio Hill and he agrees with the following assessment and plan: 
 
  
 
 
 Assessment/Plan:  
 
 
  
 
  Acute on chronic systolic heart failure -- UO after IV lasix was ~ 1L. Admit to telemetry for observation. Continue Coreg, losartan, and 80mg IV lasix. Start topical nitrates to reduce afterload. Monitor BP, urine out and daily weights. HTN (hypertension) -- continue home medications. Monitor BP closely. Titrate medications as needed. VT (ventricular tachycardia) -- amiodarone discussed by EP in office, patient hesitant to start due to adverse effects. Replace electrolytes as needed. ICD in place. Chest pain -- start topical nitrates. Give 0.5mg IV dilaudid now. Repeat hs-trop at 0600. Hypomagnesemia -- give 4g IV mag now. Repeat daily. Goal > 2 Tho Marx NP 
 
2020 3:54 AM 
  
  
 
 
 
 
 
 
Patient Demographics Patient Name Heena Chen, [facility]  
[id number]  Sex Male    
1965  Address [address] [place] [address]  Phone [phone] (Home) *Preferred*  
[phone] (Work) [phone] (Mobile) CSN:  
 
 
[id number] Admit Date:  
 
Admit Time Room Bed Sep 30, 2020   1:39 AM  303 [[id number]]  01 [788] Attending Providers Provider Pager From To  
 
 
Cesia Hensley,    20 Faye Dotson MD   09/30/20  10/02/20 Christophe Schulte MD   10/02/20  10/05/20 Emergency Contact(s) Name Relation Home Work Mobile Logan Stable  Spouse  [address]  Mother  [phone] Utilization Reviews Heart Failure - Care Day 3 (10/2/2020) by Dominik Gabriel Review Entered Review Status 10/2/2020 15:28  Completed Criteria Review Care Day: 3 Care Date: 10/2/2020 Level of Care: Telemetry Guideline Day 3 Clinical Status  
  
(X) * Hemodynamic stability 10/2/2020 15:27:17 EDT by Cornel Renee  
  
  vs neyda (X) * Tachypnea absent  
  
(X) * Oxygenation at baseline or acceptable for next level of care ( ) * Dyspnea at baseline or acceptable for next level of care  
  
(X) * Cardiac rate and rhythm acceptable 10/2/2020 15:27:17 EDT by Will Elizalde  
  
  per cardiology; Stable overnight without angina or palpitations  
  
(X) * Pulmonary edema absent or acceptable for next level of care  
  
(X) * Peripheral or sacral edema absent or acceptable for next level of care  
  
(X) * Mental status at baseline  
  
(X) * Volume status acceptable on oral medication  
  
(X) * Renal function at baseline or acceptable for next level of care 10/2/2020 15:27:17 EDT by Will Elizalde  
  
  labs below ( ) * Electrolyte abnormalities absent or acceptable for next level of care 10/2/2020 15:27:17 EDT by Will Elizalde  
  
  labs below ( ) * Precipitating factors absent or controlled ( ) * Discharge plans and education understood Activity  
  
(X) * Ambulatory or acceptable for next level of care Routes ( ) * Oral hydration, medications, and diet 10/2/2020 15:19:08 EDT by Will Elizalde  
  
  xanax half mg po q hs, coreg 25 mg po bid, refused inspra po , heparin 5000  u sc q 8h, norco 10 mg po x 5 over 24h,  cozaar 50 mg po daily, mag ox 800 mg po q 12h,   protonix 40 mg po bid, , k dur 40 meq po bid, Phenergan 12.5 mg iv x five over 24h. Interventions  
  
(X) * Oxygen absent  
  
(X) Weigh  
  
10/2/2020 15:27:17 EDT by Will Elizalde  
  
  191 lb Medications  
  
(X) Neprilysin inhibitor with ARB, or ACE inhibitor or ARB  
  
10/2/2020 15:27:17 EDT by Will Elizalde  
  
  cozaar po ( see meds) (X) Beta-blocker 10/2/2020 15:27:18 EDT by Will Elizalde  
  
  coreg po( see meds) * Milestone Additional Notes 98.5 °F (36.9 °C)  88  114/79  - rr 16  94 %  RA.   
191 LB.   
 
  
10/2/2020 03:34 Potassium: 3.2 (L) Chloride: 105 CO2: 29 Anion gap: 4 (L) Glucose: 117 (H) BUN: 8 Creatinine: 1.28 Calcium: 7.7 (L) Magnesium: 1.7 (L)   
 
  
-----Per GI   
[ GI MD Addendum 53 yo F with CHF with EF of 20%, VT with ICD and other PMH as listed below who we are asked to see of esophageal dysphagia. This has been chronic for him. It is to solids mostly but he also reports intermittent issues with liquids. He had an EGD which showed a Schatzki's ring and moderate hiatal hernia in 5/2019. He had a dilation of the esophagus up to 60 Fr and patient says that never helped. He has passed his swallow evaluation from SLP team and they have signed off. Will plan for a barium swallow first.  Will determine repeat EGD vs. Manometry after that.  ] Chief Complaint:  dysphagia Subjective:    
History of Present Illness:  Patient is a 54 y.o. male with PMH of systolic dysfunction with EF in 20% range, VT with ICD in place,  chronic alcoholism, CAD, anxiety, depression, GERD, hx of Schatzki's ring, who is seen in consultation at the request of Royal Hernandez NP for dysphagia. Pt presented to ED 9/30/2020 with worsening SOB. He increased his Lasix from 40mg to 80mg with no improvement. He also reported weight gain of 15-20 pounds. He was seen at cardiology office and admission recommended for volume removal and rate control. He also complained of abdominal pain, headache, and chest pain. EKG without acute ST/T wave changes. Labs included hs-trop elevated at 199 and pro-BNP elevated 1902. He was admitted to cardiology and started on IV Lasix in addition to home cardiac meds. He began complaining of worsening dysphagia 10/1/2020. He reports choking on solids and liquids. Pt requested GI consult. He has had improvement of SOB. He complains of dysphagia with both liquids and solids.  Nursing documentation today states that pt has been observed at all meals and med passes and swallowing was ok. He does have nausea and had 1 episode of clear emesis this morning. He has a long hx of dysphagia for >1 year. He does feel like it is getting worse. He has difficulty with solids, liquids, and sometimes his own saliva. He has occasional regurgitation of clear emesis. He has heartburn and GERD daily despite taking daily PPI. He tends towards looser stool. He denies any signs of GIB. He has some improvement of SOB. He does complain of chest pain. He says he has had 2 episodes like this in his life, describes as severe sharp pain. After pain resolves, he has chest soreness for a long time. This has not improved with previous dilation with 60Fr Gandhi in 2019. Dysphagia was felt to be secondary to large [place]. He was seen during hospitalization 3/2020 for dysphagia. GI team deferred EGD as he had no improvement with previous dilation and recommended outpatient surgery referral to discuss possible Nissen. Assessment:   
    
Principal Problem:   
  Acute on chronic systolic heart failure ([address] (9/30/2020) Active Problems:   
  HTN (hypertension) (11/29/2011) VT (ventricular tachycardia) ([address] (2/22/2016) Chest pain (9/30/2020) CHF (congestive heart failure) ([facility] (10/2/2020) Patient is a 54 y.o. male with PMH of systolic dysfunction with EF in 20% range, VT with ICD in place,  chronic alcoholism, CAD, anxiety, depression, GERD, hx of Schatzki's ring, who is seen in consultation at the request of Jas Villegas NP for dysphagia. He has had dysphagia for more than 1 year. He had no improvement with dilation in 5/2019. Previously thought that dysphagia may be related to large [place]. He has dysphagia to liquids and solids. He has chronic nausea. He is still having SOB and now complains of chest pain. Plan:    
-Continue PPI BID   
-Will order barium swallow for further evaluation -Further recommendations pending results. -----------CARDIOLOGY Subjective:   
Stable overnight without angina or palpitations. SOB is better. Vitals stable and controlled. Still mildly SOB but improving, good UOP and renal function. Complains of dysphagia, worsening. Previously seen by GI. No other complaints overnight. Tolerating meds well. Physical Exam:   
Neck- supple,8cm JVD sitting upright CV- regular rate and rhythm, soft TR murmur Lung- clear bilaterally apically, decreased bibasilar Abd- soft, nontender, nondistended Ext- no edema Skin- warm and dry. Assessment and Plan:   
    
Principal Problem:   
  Acute on chronic systolic heart failure ([facility] (9/30/2020)- improving but still symptomatic- renal function stable. Continue IV lasix, medically maximized otherwise. Recheck BMP and replete K again today. Added zaroxolyn. Likely home in AM tomorrow after GI work up today. Active Problems:   
  HTN (hypertension)- stable, continue  meds VT (ventricular tachycardia) ([facility] - follow tele, stable. Chest pain (9/30/2020)- asymptomatic overnight, follow clinically Dysphagia- worsening recently, previously seen by GI. Choking on solids and liquids now, requests GI evaluation while in hospital.  Hopefully home later today.   
 
  
--------SPEECH THERAPIST SWALLOW EVAL;   
Patient presents with functional oropharyngeal swallow. Limited intake due to reports of nausea. Reports sticking sensation. Complaints appear more consistent with esophageal dysphagia. Recommend regular diet/thin liquids. meds with liquid rinse. No dysphagia treatment indicated. GI consulted per chart. Please reconsult if new concerns arise.   
 
  
----------Per RN;   
 Reports nausea, requesting Phenergan.    
 
  
(Later) Pt continuing to complain of nausea and pain in head, shoulder, and chest; pain is chronic. Norco and Phenergan is ordered for this patient Q4 and patient is requesting it on an every 4 hour basis. Complaints of dysphagia remain although patient was observed at breakfast, lunch, and med passes swallowing okay. Pt did have one emesis occurrence that was clear colored upon assessment this morning, see flowsheets. Speech therapy and GI have both been consulted. PA recommendation for IP by Hilda Simpson RN Review Entered Review Status 10/2/2020 08:58  In Primary Criteria Review We recommend that the following pt's hospitalization under OBSERVATION  
status is upgraded to INPATIENT If you agree, please place a new ADMIT order 
in [facility] as recommended. Name: Itzel Palumbo : 1965 [facility]# : [id number] Insurance: [employer] Clinical summary patient in acute on chronic systolic congestive heart failure Vitals hypotensive this morning Labs and Imaging Hypokalemic, hypocalcemic MCG criteria applies NO Comments patient with acute on chronic congestive heart failure, on IV 
diuresis, hypokalemic and hypocalcemic this morning, patient hypotensive this 
morning, needing medical optimization This chart was reviewed at 7:39 AM 10/2/2020 Irma Ashley MD MD  
Physician Advisor [facility] [phone] Heart Failure - Care Day 2 (10/1/2020) by Hilda Simpson RN Review Entered Review Status 10/1/2020 13:39  Completed Criteria Review Care Day: 2 Care Date: 10/1/2020 Level of Care:  
  
Guideline Day 2 Level Of Care (X) Floor 10/1/2020 13:39:01 EDT by Jose Bernstein  
  
  telemetry Clinical Status  
  
(X) * Hemodynamic stability 10/1/2020 13:39:01 EDT by Jose Bernstein Vitals: 98.2, , RR 18, BP 91/65, 97% on RA  
  
(X) * Mental status at baseline 10/1/2020 13:39:01 EDT by Henna Lau  
  
  alert and oriented  
  
(X) * No evidence of myocardial ischemia 10/1/2020 13:39:01 EDT by Henna Lau Acute on chronic systolic heart failure ([facility] (9/30/2020)- improving but still symptomatic- renal function stable  
  
(X) * Cardiac rate and rhythm acceptable  
  
10/1/2020 13:39:01 EDT by Henna Lau CV- regular rate and rhythm, soft TR murmur  
  
(X) * Oxygenation at baseline or improved 10/1/2020 13:39:01 EDT by Henna Lau 97% on RA  
  
(X) * Pulmonary edema absent or improved 10/1/2020 13:39:01 EDT by Henna Lau Acute on chronic systolic heart failure ([facility] (9/30/2020)- improving but still symptomatic- renal function stable Routes (X) Low-salt diet 10/1/2020 13:39:01 EDT by Henna Lau  
  
  cardiac regular 2gm NA diet Interventions  
  
(X) * Pulmonary catheter absent  
  
(X) Weigh  
  
10/1/2020 13:39:01 EDT by Henna Lau  
  
  97.0ZC Medications (X) Diuretics 10/1/2020 13:39:01 EDT by Henna Lau Lasix 80mg IV BID  
  
(X) Beta-blocker 10/1/2020 13:39:02 EDT by Henna Lau Coreg 25mg PO BID  
  
(X) Possible nitrates, hydralazine 10/1/2020 13:39:02 EDT by Henna Lau Nitrobid 1\" q6h * Milestone Additional Notes 10/1/2020 - Care day 2 LOC - OBS - concurrent review Cardiology note:   
Stable overnight without angina or palpitations but still SOB. Vitals stable and controlled. Still mildly SOB but improving, good UOP and renal function. Complains of dysphagia, worsening. Previously seen by GI. No other complaints overnight. Tolerating meds well Neck- supple,8cm JVD sitting upright Lung- clear bilaterally apically, decreased bibasilar Abd- soft, nontender, nondistended Ext- no edema Skin- warm and dry Principal Problem: Acute on chronic systolic heart failure ([facility] (9/30/2020)- improving but still symptomatic- renal function stable. Continue IV lasix, medically maximized otherwise. Recheck BMP and replete K today. Active Problems:   
  HTN (hypertension)- stable, continue  meds VT (ventricular tachycardia) ([facility] - follow tele, stable. Chest pain (9/30/2020)- asymptomatic overnight, follow clinically Dysphagia- worsening recently, previously seen by GI. Choking on solids and liquids now, requests GI evaluation while in hospital   
 
  
Vitals: 98.2, , RR 18, BP 91/65, 97% on RA Potassium: 3.3 (L) Chloride: 105 CO2: 27 Anion gap: 7 Glucose: 125 (H) BUN: 7 Creatinine: 1.26 Calcium: 7.7 (L) Magnesium: 2.6 (H) Meds:   
Lipitor 80mg PO daily Heparin 5,000units SC q8h Norco 10/325mg PO q4h PRN x 3 Cozaar 50mg PO daily Mag ox 800mg PO q12h Protonix 40mg PO BID Phenergan 12.5mg IV q4h PRN x 3 Xanax 0.5mg PO qhs   
Mag sulfate 4g IV x 1 Klor con 40meq PO x 1 Plan: daily mag, BMP, ambulate with assist, cardiac monitoring, daily weight, I&O, spot check oximetry Heart Failure - Care Day 1 (9/30/2020) by Zuleyma Israel RN Review Entered Review Status 10/1/2020 13:29  Completed Criteria Review Care Day: 1 Care Date: 9/30/2020 Level of Care:  
  
Guideline Day 1 Level Of Care (X) ICU [E] or intermediate care [F] or floor 10/1/2020 13:29:19 EDT by Madhav Woodard  
  
  telemetry Clinical Status ( ) * Clinical Indications met [G] Routes (X) Parenteral medications 10/1/2020 13:29:19 EDT by Madhav Woodard  
  
  see meds below (X) Low-salt diet 10/1/2020 13:29:19 EDT by Madhav Woodard  
  
  cardiac regular diet Interventions  
  
(X) ECG, CXR, ABG  
  
 10/1/2020 13:29:19 EDT by Davide White Chest xray: left lower lobe atelectasis or pneumonia improved EKG: Sinus tachycardia -Left ventricular hypertrophy with repolarization abnormality -Possible Lateral infarct (cited on or before 02-JUL-2020) -Inferior infarct , age undetermined -Abnorm (X) Cardiac biomarkers, BNP  
  
10/1/2020 13:29:19 EDT by Davide White Troponin-High Sensitivity: 199.0 (HH) 
 NT pro-BNP: 1,902 (H)  
  
(X) Electrolytes 10/1/2020 13:29:19 EDT by Davide Whiet Potassium: 3.3 (L) Chloride: 109 (H) Calcium: 7.4 (L) Magnesium: 1.4 (LL) 
 AST: 61 (H) Alk. phosphatase: 85  
  
(X) Weigh  
  
10/1/2020 13:29:19 EDT by Davide White 87.5kg Medications (X) IV diuretics 10/1/2020 13:29:19 EDT by Davide White Lasix 80mg IV BID Lasix 80mg IV x 1 in ED (X) Beta-blocker [I]  
  
10/1/2020 13:29:19 EDT by Davide White Coreg 25mg PO BID Lopressor 5mg IV x 1  
  
* Milestone Additional Notes 9/30/2020 LOC - OBS - telemetry Cardiology H&P:   
54 y.o. male with past medical history of chronic systolic heart failure, VT with ICD in place, GERD, chronic back/leg pain, CAD and HTN who presented to the ER with complaints of worsening shortness of breath. Patient reports that he increased his home Lasix dose from 40mg BID to 80mg BID last Monday, however, his breathing has continued to get worse. He reports probable weigh gain of 15-20 pounds. States that he has been sleeping on the couch at least since Thursday of last week. He was seen in the office yesterday by Dr. Silva Arias and admission was recommended for volume removal and rate control, however, he refused. After leaving the office, his breathing became worse tonight. Upon arrival to the ER, EKG shows ST without acute ST/T wave changes.  Initial hs-trop was elevated at 199. Pro-BNP was mildly elevated at 1902. In addition to his shortness of breath, he complains of abdominal pain, headache and chest pain. Meds given in the ER include tylenol 650mg po, IV 25mg benadryl, 80mg IV lasix, 12.5mg IV Phenergan Cardiovascular:   
   Rate and Rhythm: Regular rhythm. Tachycardia present. Pulmonary:   
   Breath sounds: Wheezing present. Abdominal:   
   General: Bowel sounds are normal.   
Musculoskeletal:       
   General: Swelling present. Skin:   
   General: Skin is warm. Neurological:   
   Mental Status: He is alert and oriented to person, place, and time. Psychiatric:       
   Mood and Affect: Mood normal   
 
  
Assessment/Plan:   
    
  Acute on chronic systolic heart failure -- UO after IV lasix was ~ 1L. Admit to telemetry for observation. Continue Coreg, losartan, and 80mg IV lasix. Start topical nitrates to reduce afterload. Monitor BP, urine out and daily weights. HTN (hypertension) -- continue home medications. Monitor BP closely. Titrate medications as needed. VT (ventricular tachycardia) -- amiodarone discussed by EP in office, patient hesitant to start due to adverse effects. Replace electrolytes as needed. ICD in place. Chest pain -- start topical nitrates. Give 0.5mg IV dilaudid now. Repeat hs-trop at 0600. Hypomagnesemia -- give 4g IV mag now. Repeat daily. Goal > 2 Vitals: 98.0, , RR 29, /100, 94% on RA   
 
  
 
  
RBC: 3.65 (L) HGB: 11.8 (L) HCT: 35.7 (L) MCV: 97.8 MCH: 32.3 MCHC: 33.1 RDW: 15.6 (H) PLATELET: 845 MPV: 9.2 (L) NEUTROPHILS: 34 (L) LYMPHOCYTES: 53 (H) Meds:   
Lipitor 80mg PO daily Neurontin 300mg PO BID Heparin 5,000units SC q8h Norco 10/325mg PO q4h PRN x 4 Cozaar 50mg PO daily Nitrobid 1\" q6h Protonix 40mg PO BID Phenergan 12.5mg IV q4h PRN x 1 Tylenol 325mg PO x 1 Benadryl 25mg IV x 1   
 Dilaudid 0.5mg IV x 1 in ED Xopenex neb x 1 in ED Mag sulfate 4g IV x 1 Klor con 20meq PO x 1 Phenergan 12.5mg IV x 1 in ED Mag-0ox 400mg PO q12h Morphine 2mg IV q4h PRN x 1 Zofran 4mg IV q4h PRN x 1 Phenergan 25mg PO q6h PRN x 1 Phenergan 12.5mg IV q6h PRN x 1 Plan: daily mag, BMP, , ambulate with assist, cardiac monitoring, daily weight, I&O, spot check oximetry Heart Failure - Clinical Indications for Admission to Inpatient Care by Fritz Wang RN Review Entered Review Status 10/1/2020 13:24  Completed Criteria Review Clinical Indications for Admission to Inpatient Care Most Recent : Hilario Perales Most Recent Date: 10/1/2020 13:24:58 EDT Additional Notes 9/30/2020 ED provider note:   
20-year-old male presenting to the emergency department day complaining of increasing shortness of breath. The patient was seen and evaluated by his cardiologist earlier today and they did recommend admission secondary to increased respiratory and crackles with swelling despite doubling up on his Lasix over the last week. Patient was not interested in admission at that time but his breathing got worse after his visit to the cardiologist office. The patient denies any chest pain Diagnosis management comments: CHF, COPD, pneumonia, PE,   
    
MI, coronary artery disease, unstable angina, coronary artery disease, Atrial fibrillation, cardiac arrhythmia, PVC, medication induced palpitations, heart block,   
electrolyte induced palpitations, Aortic dissection, aortic aneurysm, GERD, musculoskeletal pain, costochondritis, rib fracture, pleurisy,

## 2020-10-08 ENCOUNTER — PATIENT OUTREACH (OUTPATIENT)
Dept: CASE MANAGEMENT | Age: 55
End: 2020-10-08

## 2020-10-08 NOTE — PROGRESS NOTES
1st attempt to contact patient for [facility] call, no answer, mailbox full, unable to leave message. Will attempt 2nd call within 1 business day.

## 2020-10-16 ENCOUNTER — PATIENT OUTREACH (OUTPATIENT)
Dept: CASE MANAGEMENT | Age: 55
End: 2020-10-16

## 2020-10-16 NOTE — PROGRESS NOTES
Due to length of time since DC and unable to contact patient no further outreaches will be completed at this time. Episode resolved.

## 2021-01-13 ENCOUNTER — HOSPITAL ENCOUNTER (OUTPATIENT)
Dept: GENERAL RADIOLOGY | Age: 56
Discharge: HOME | End: 2021-01-13

## 2021-01-13 DIAGNOSIS — I50.22 CHRONIC SYSTOLIC (CONGESTIVE) HEART FAILURE (HCC): Chronic | ICD-10-CM

## 2021-01-13 DIAGNOSIS — R06.09 DOE (DYSPNEA ON EXERTION): ICD-10-CM

## 2021-01-13 NOTE — PROGRESS NOTES
Doesn't look like too much fluid, no pneumonia. Continue meds, OTC cough suppressant meds, PCP visit if continues to worsen.

## 2021-04-13 PROCEDURE — 99285 EMERGENCY DEPT VISIT HI MDM: CPT

## 2021-04-13 PROCEDURE — 96374 THER/PROPH/DIAG INJ IV PUSH: CPT

## 2021-04-13 PROCEDURE — 81003 URINALYSIS AUTO W/O SCOPE: CPT

## 2021-04-13 PROCEDURE — 96375 TX/PRO/DX INJ NEW DRUG ADDON: CPT

## 2021-04-13 RX ORDER — SODIUM CHLORIDE 9 MG/ML
1000 INJECTION, SOLUTION INTRAVENOUS CONTINUOUS
Status: DISCONTINUED | OUTPATIENT
Start: 2021-04-13 | End: 2021-04-14

## 2021-04-14 ENCOUNTER — APPOINTMENT (OUTPATIENT)
Dept: CT IMAGING | Age: 56
DRG: 417 | End: 2021-04-14
Attending: NURSE PRACTITIONER
Payer: COMMERCIAL

## 2021-04-14 ENCOUNTER — HOSPITAL ENCOUNTER (OUTPATIENT)
Dept: GENERAL RADIOLOGY | Age: 56
Discharge: HOME | End: 2021-04-14
Attending: EMERGENCY MEDICINE

## 2021-04-14 ENCOUNTER — HOSPITAL ENCOUNTER (INPATIENT)
Age: 56
LOS: 5 days | Discharge: HOME | DRG: 417 | End: 2021-04-21
Attending: EMERGENCY MEDICINE | Admitting: INTERNAL MEDICINE
Payer: COMMERCIAL

## 2021-04-14 ENCOUNTER — APPOINTMENT (OUTPATIENT)
Dept: ULTRASOUND IMAGING | Age: 56
DRG: 417 | End: 2021-04-14
Attending: EMERGENCY MEDICINE
Payer: COMMERCIAL

## 2021-04-14 DIAGNOSIS — I42.8 NON-ISCHEMIC CARDIOMYOPATHY (HCC): Chronic | ICD-10-CM

## 2021-04-14 DIAGNOSIS — R79.89 ELEVATED BRAIN NATRIURETIC PEPTIDE (BNP) LEVEL: ICD-10-CM

## 2021-04-14 DIAGNOSIS — I10 ESSENTIAL HYPERTENSION: Chronic | ICD-10-CM

## 2021-04-14 DIAGNOSIS — K80.20 GALLSTONES: ICD-10-CM

## 2021-04-14 DIAGNOSIS — R11.14 BILIOUS VOMITING WITH NAUSEA: ICD-10-CM

## 2021-04-14 DIAGNOSIS — R13.19 ESOPHAGEAL DYSPHAGIA: ICD-10-CM

## 2021-04-14 DIAGNOSIS — I50.9 ACUTE ON CHRONIC CONGESTIVE HEART FAILURE, UNSPECIFIED HEART FAILURE TYPE (HCC): Primary | ICD-10-CM

## 2021-04-14 DIAGNOSIS — R06.09 DOE (DYSPNEA ON EXERTION): ICD-10-CM

## 2021-04-14 DIAGNOSIS — I50.22 CHRONIC SYSTOLIC (CONGESTIVE) HEART FAILURE (HCC): Chronic | ICD-10-CM

## 2021-04-14 DIAGNOSIS — Z01.810 PREOP CARDIOVASCULAR EXAM: ICD-10-CM

## 2021-04-14 LAB
ALBUMIN SERPL-MCNC: 3.7 G/DL (ref 3.5–5)
ALBUMIN/GLOB SERPL: 1.3 {RATIO} (ref 1.2–3.5)
ALP SERPL-CCNC: 118 U/L (ref 50–136)
ALT SERPL-CCNC: 84 U/L (ref 12–65)
ANION GAP SERPL CALC-SCNC: 11 MMOL/L (ref 7–16)
AST SERPL-CCNC: 224 U/L (ref 15–37)
ATRIAL RATE: 102 BPM
BASOPHILS # BLD: 0 K/UL (ref 0–0.2)
BASOPHILS NFR BLD: 1 % (ref 0–2)
BILIRUB SERPL-MCNC: 2.3 MG/DL (ref 0.2–1.1)
BNP SERPL-MCNC: 2887 PG/ML (ref 5–125)
BUN SERPL-MCNC: 7 MG/DL (ref 6–23)
CALCIUM SERPL-MCNC: 8 MG/DL (ref 8.3–10.4)
CALCULATED P AXIS, ECG09: 47 DEGREES
CALCULATED R AXIS, ECG10: -22 DEGREES
CALCULATED T AXIS, ECG11: -96 DEGREES
CHLORIDE SERPL-SCNC: 100 MMOL/L (ref 98–107)
CO2 SERPL-SCNC: 26 MMOL/L (ref 21–32)
COVID-19 RAPID TEST, COVR: NOT DETECTED
CREAT SERPL-MCNC: 0.84 MG/DL (ref 0.8–1.5)
DIAGNOSIS, 93000: NORMAL
DIFFERENTIAL METHOD BLD: ABNORMAL
EOSINOPHIL # BLD: 0.1 K/UL (ref 0–0.8)
EOSINOPHIL NFR BLD: 1 % (ref 0.5–7.8)
ERYTHROCYTE [DISTWIDTH] IN BLOOD BY AUTOMATED COUNT: 13.8 % (ref 11.9–14.6)
GLOBULIN SER CALC-MCNC: 2.9 G/DL (ref 2.3–3.5)
GLUCOSE SERPL-MCNC: 122 MG/DL (ref 65–100)
HCT VFR BLD AUTO: 38.5 % (ref 41.1–50.3)
HGB BLD-MCNC: 13.4 G/DL (ref 13.6–17.2)
IMM GRANULOCYTES # BLD AUTO: 0 K/UL (ref 0–0.5)
IMM GRANULOCYTES NFR BLD AUTO: 1 % (ref 0–5)
LACTATE SERPL-SCNC: 2 MMOL/L (ref 0.4–2)
LACTATE SERPL-SCNC: 4 MMOL/L (ref 0.4–2)
LIPASE SERPL-CCNC: 62 U/L (ref 73–393)
LYMPHOCYTES # BLD: 1.2 K/UL (ref 0.5–4.6)
LYMPHOCYTES NFR BLD: 32 % (ref 13–44)
MCH RBC QN AUTO: 32.9 PG (ref 26.1–32.9)
MCHC RBC AUTO-ENTMCNC: 34.8 G/DL (ref 31.4–35)
MCV RBC AUTO: 94.6 FL (ref 79.6–97.8)
MONOCYTES # BLD: 0.3 K/UL (ref 0.1–1.3)
MONOCYTES NFR BLD: 8 % (ref 4–12)
NEUTS SEG # BLD: 2.2 K/UL (ref 1.7–8.2)
NEUTS SEG NFR BLD: 57 % (ref 43–78)
NRBC # BLD: 0 K/UL (ref 0–0.2)
P-R INTERVAL, ECG05: 172 MS
PLATELET # BLD AUTO: 191 K/UL (ref 150–450)
PMV BLD AUTO: 9.6 FL (ref 9.4–12.3)
POTASSIUM SERPL-SCNC: 3.4 MMOL/L (ref 3.5–5.1)
PROT SERPL-MCNC: 6.6 G/DL (ref 6.3–8.2)
Q-T INTERVAL, ECG07: 392 MS
QRS DURATION, ECG06: 98 MS
QTC CALCULATION (BEZET), ECG08: 510 MS
RBC # BLD AUTO: 4.07 M/UL (ref 4.23–5.6)
SARS-COV-2, COV2: NORMAL
SARS-COV-2, COV2: NOT DETECTED
SODIUM SERPL-SCNC: 137 MMOL/L (ref 136–145)
SOURCE, COVRS: NORMAL
SPECIMEN SOURCE, FCOV2M: NORMAL
TROPONIN-HIGH SENSITIVITY: 114.4 PG/ML (ref 0–14)
TROPONIN-HIGH SENSITIVITY: 98.8 PG/ML (ref 0–14)
VENTRICULAR RATE, ECG03: 102 BPM
WBC # BLD AUTO: 3.8 K/UL (ref 4.3–11.1)

## 2021-04-14 PROCEDURE — 74011250637 HC RX REV CODE- 250/637: Performed by: FAMILY MEDICINE

## 2021-04-14 PROCEDURE — 74177 CT ABD & PELVIS W/CONTRAST: CPT

## 2021-04-14 PROCEDURE — 99218 HC RM OBSERVATION: CPT

## 2021-04-14 PROCEDURE — 74011250636 HC RX REV CODE- 250/636: Performed by: FAMILY MEDICINE

## 2021-04-14 PROCEDURE — 74011000258 HC RX REV CODE- 258: Performed by: FAMILY MEDICINE

## 2021-04-14 PROCEDURE — 74011000636 HC RX REV CODE- 636: Performed by: FAMILY MEDICINE

## 2021-04-14 PROCEDURE — 71046 X-RAY EXAM CHEST 2 VIEWS: CPT

## 2021-04-14 PROCEDURE — 87635 SARS-COV-2 COVID-19 AMP PRB: CPT

## 2021-04-14 PROCEDURE — 83605 ASSAY OF LACTIC ACID: CPT

## 2021-04-14 PROCEDURE — 93005 ELECTROCARDIOGRAM TRACING: CPT | Performed by: EMERGENCY MEDICINE

## 2021-04-14 PROCEDURE — 85025 COMPLETE CBC W/AUTO DIFF WBC: CPT

## 2021-04-14 PROCEDURE — 96372 THER/PROPH/DIAG INJ SC/IM: CPT

## 2021-04-14 PROCEDURE — 96376 TX/PRO/DX INJ SAME DRUG ADON: CPT

## 2021-04-14 PROCEDURE — 76705 ECHO EXAM OF ABDOMEN: CPT

## 2021-04-14 PROCEDURE — 83690 ASSAY OF LIPASE: CPT

## 2021-04-14 PROCEDURE — 83880 ASSAY OF NATRIURETIC PEPTIDE: CPT

## 2021-04-14 PROCEDURE — 74011000258 HC RX REV CODE- 258: Performed by: EMERGENCY MEDICINE

## 2021-04-14 PROCEDURE — 84484 ASSAY OF TROPONIN QUANT: CPT

## 2021-04-14 PROCEDURE — 94640 AIRWAY INHALATION TREATMENT: CPT

## 2021-04-14 PROCEDURE — 74011250636 HC RX REV CODE- 250/636: Performed by: EMERGENCY MEDICINE

## 2021-04-14 PROCEDURE — U0003 INFECTIOUS AGENT DETECTION BY NUCLEIC ACID (DNA OR RNA); SEVERE ACUTE RESPIRATORY SYNDROME CORONAVIRUS 2 (SARS-COV-2) (CORONAVIRUS DISEASE [COVID-19]), AMPLIFIED PROBE TECHNIQUE, MAKING USE OF HIGH THROUGHPUT TECHNOLOGIES AS DESCRIBED BY CMS-2020-01-R: HCPCS

## 2021-04-14 PROCEDURE — 36415 COLL VENOUS BLD VENIPUNCTURE: CPT

## 2021-04-14 PROCEDURE — 80053 COMPREHEN METABOLIC PANEL: CPT

## 2021-04-14 PROCEDURE — 74011000250 HC RX REV CODE- 250: Performed by: FAMILY MEDICINE

## 2021-04-14 PROCEDURE — 87040 BLOOD CULTURE FOR BACTERIA: CPT

## 2021-04-14 RX ORDER — ACETAMINOPHEN 650 MG/1
650 SUPPOSITORY RECTAL
Status: DISCONTINUED | OUTPATIENT
Start: 2021-04-14 | End: 2021-04-20

## 2021-04-14 RX ORDER — LANOLIN ALCOHOL/MO/W.PET/CERES
800 CREAM (GRAM) TOPICAL EVERY 12 HOURS
Status: DISCONTINUED | OUTPATIENT
Start: 2021-04-14 | End: 2021-04-21 | Stop reason: HOSPADM

## 2021-04-14 RX ORDER — POTASSIUM CHLORIDE 20 MEQ/1
40 TABLET, EXTENDED RELEASE ORAL DAILY
Status: DISCONTINUED | OUTPATIENT
Start: 2021-04-14 | End: 2021-04-21 | Stop reason: HOSPADM

## 2021-04-14 RX ORDER — ENOXAPARIN SODIUM 100 MG/ML
40 INJECTION SUBCUTANEOUS DAILY
Status: DISCONTINUED | OUTPATIENT
Start: 2021-04-14 | End: 2021-04-21 | Stop reason: HOSPADM

## 2021-04-14 RX ORDER — FUROSEMIDE 40 MG/1
40 TABLET ORAL 2 TIMES DAILY
Status: DISCONTINUED | OUTPATIENT
Start: 2021-04-14 | End: 2021-04-21 | Stop reason: HOSPADM

## 2021-04-14 RX ORDER — GABAPENTIN 300 MG/1
300 CAPSULE ORAL 2 TIMES DAILY
Status: DISCONTINUED | OUTPATIENT
Start: 2021-04-14 | End: 2021-04-21 | Stop reason: HOSPADM

## 2021-04-14 RX ORDER — FUROSEMIDE 10 MG/ML
40 INJECTION INTRAMUSCULAR; INTRAVENOUS DAILY
Status: DISCONTINUED | OUTPATIENT
Start: 2021-04-15 | End: 2021-04-15

## 2021-04-14 RX ORDER — ALPRAZOLAM 0.5 MG/1
1 TABLET ORAL
Status: DISCONTINUED | OUTPATIENT
Start: 2021-04-14 | End: 2021-04-21 | Stop reason: HOSPADM

## 2021-04-14 RX ORDER — ONDANSETRON 2 MG/ML
4 INJECTION INTRAMUSCULAR; INTRAVENOUS
Status: DISPENSED | OUTPATIENT
Start: 2021-04-14 | End: 2021-04-14

## 2021-04-14 RX ORDER — PANTOPRAZOLE SODIUM 40 MG/1
40 TABLET, DELAYED RELEASE ORAL
Status: DISCONTINUED | OUTPATIENT
Start: 2021-04-14 | End: 2021-04-20 | Stop reason: ALTCHOICE

## 2021-04-14 RX ORDER — LEVALBUTEROL INHALATION SOLUTION 0.63 MG/3ML
0.63 SOLUTION RESPIRATORY (INHALATION)
Status: DISCONTINUED | OUTPATIENT
Start: 2021-04-14 | End: 2021-04-16

## 2021-04-14 RX ORDER — MORPHINE SULFATE 2 MG/ML
2 INJECTION, SOLUTION INTRAMUSCULAR; INTRAVENOUS
Status: DISCONTINUED | OUTPATIENT
Start: 2021-04-14 | End: 2021-04-17

## 2021-04-14 RX ORDER — ATORVASTATIN CALCIUM 40 MG/1
80 TABLET, FILM COATED ORAL DAILY
Status: DISCONTINUED | OUTPATIENT
Start: 2021-04-14 | End: 2021-04-21 | Stop reason: HOSPADM

## 2021-04-14 RX ORDER — SODIUM CHLORIDE 0.9 % (FLUSH) 0.9 %
10 SYRINGE (ML) INJECTION
Status: COMPLETED | OUTPATIENT
Start: 2021-04-14 | End: 2021-04-14

## 2021-04-14 RX ORDER — MORPHINE SULFATE 4 MG/ML
INJECTION INTRAVENOUS
Status: ACTIVE
Start: 2021-04-14 | End: 2021-04-14

## 2021-04-14 RX ORDER — HYDROCODONE BITARTRATE AND ACETAMINOPHEN 10; 325 MG/1; MG/1
1 TABLET ORAL
Status: DISCONTINUED | OUTPATIENT
Start: 2021-04-14 | End: 2021-04-19

## 2021-04-14 RX ORDER — PROMETHAZINE HYDROCHLORIDE 25 MG/ML
25 INJECTION, SOLUTION INTRAMUSCULAR; INTRAVENOUS
Status: COMPLETED | OUTPATIENT
Start: 2021-04-14 | End: 2021-04-14

## 2021-04-14 RX ORDER — ACETAMINOPHEN 325 MG/1
650 TABLET ORAL
Status: DISCONTINUED | OUTPATIENT
Start: 2021-04-14 | End: 2021-04-20

## 2021-04-14 RX ORDER — FUROSEMIDE 10 MG/ML
40 INJECTION INTRAMUSCULAR; INTRAVENOUS 2 TIMES DAILY
Status: DISCONTINUED | OUTPATIENT
Start: 2021-04-14 | End: 2021-04-14

## 2021-04-14 RX ORDER — MORPHINE SULFATE 4 MG/ML
4 INJECTION INTRAVENOUS
Status: DISCONTINUED | OUTPATIENT
Start: 2021-04-14 | End: 2021-04-14 | Stop reason: SDUPTHER

## 2021-04-14 RX ORDER — FUROSEMIDE 10 MG/ML
60 INJECTION INTRAMUSCULAR; INTRAVENOUS
Status: COMPLETED | OUTPATIENT
Start: 2021-04-14 | End: 2021-04-14

## 2021-04-14 RX ORDER — LOSARTAN POTASSIUM 50 MG/1
50 TABLET ORAL DAILY
Status: DISCONTINUED | OUTPATIENT
Start: 2021-04-14 | End: 2021-04-15

## 2021-04-14 RX ORDER — SODIUM CHLORIDE 0.9 % (FLUSH) 0.9 %
5-40 SYRINGE (ML) INJECTION AS NEEDED
Status: DISCONTINUED | OUTPATIENT
Start: 2021-04-14 | End: 2021-04-21 | Stop reason: HOSPADM

## 2021-04-14 RX ORDER — ONDANSETRON 2 MG/ML
4 INJECTION INTRAMUSCULAR; INTRAVENOUS
Status: DISCONTINUED | OUTPATIENT
Start: 2021-04-14 | End: 2021-04-21 | Stop reason: HOSPADM

## 2021-04-14 RX ORDER — POLYETHYLENE GLYCOL 3350 17 G/17G
17 POWDER, FOR SOLUTION ORAL DAILY PRN
Status: DISCONTINUED | OUTPATIENT
Start: 2021-04-14 | End: 2021-04-21 | Stop reason: HOSPADM

## 2021-04-14 RX ORDER — MORPHINE SULFATE 4 MG/ML
4 INJECTION INTRAVENOUS ONCE
Status: COMPLETED | OUTPATIENT
Start: 2021-04-14 | End: 2021-04-14

## 2021-04-14 RX ORDER — ALPRAZOLAM 0.5 MG/1
1 TABLET ORAL
Status: DISCONTINUED | OUTPATIENT
Start: 2021-04-14 | End: 2021-04-14 | Stop reason: SDUPTHER

## 2021-04-14 RX ORDER — CARVEDILOL 25 MG/1
25 TABLET ORAL 2 TIMES DAILY WITH MEALS
Status: DISCONTINUED | OUTPATIENT
Start: 2021-04-14 | End: 2021-04-21 | Stop reason: HOSPADM

## 2021-04-14 RX ORDER — SODIUM CHLORIDE 0.9 % (FLUSH) 0.9 %
5-40 SYRINGE (ML) INJECTION EVERY 8 HOURS
Status: DISCONTINUED | OUTPATIENT
Start: 2021-04-14 | End: 2021-04-21 | Stop reason: HOSPADM

## 2021-04-14 RX ADMIN — MORPHINE SULFATE 2 MG: 2 INJECTION, SOLUTION INTRAMUSCULAR; INTRAVENOUS at 13:36

## 2021-04-14 RX ADMIN — DIATRIZOATE MEGLUMINE AND DIATRIZOATE SODIUM 15 ML: 660; 100 LIQUID ORAL; RECTAL at 17:03

## 2021-04-14 RX ADMIN — HYDROCODONE BITARTRATE AND ACETAMINOPHEN 1 TABLET: 10; 325 TABLET ORAL at 13:43

## 2021-04-14 RX ADMIN — IOPAMIDOL 100 ML: 755 INJECTION, SOLUTION INTRAVENOUS at 22:22

## 2021-04-14 RX ADMIN — Medication 10 ML: at 22:37

## 2021-04-14 RX ADMIN — PROMETHAZINE HYDROCHLORIDE 12.5 MG: 25 INJECTION INTRAMUSCULAR; INTRAVENOUS at 06:03

## 2021-04-14 RX ADMIN — PROMETHAZINE HYDROCHLORIDE 12.5 MG: 25 INJECTION INTRAMUSCULAR; INTRAVENOUS at 10:39

## 2021-04-14 RX ADMIN — MAGNESIUM GLUCONATE 500 MG ORAL TABLET 800 MG: 500 TABLET ORAL at 21:59

## 2021-04-14 RX ADMIN — GABAPENTIN 300 MG: 300 CAPSULE ORAL at 08:18

## 2021-04-14 RX ADMIN — Medication 10 ML: at 13:38

## 2021-04-14 RX ADMIN — PROMETHAZINE HYDROCHLORIDE 12.5 MG: 25 INJECTION INTRAMUSCULAR; INTRAVENOUS at 16:08

## 2021-04-14 RX ADMIN — AZITHROMYCIN MONOHYDRATE 500 MG: 500 INJECTION, POWDER, LYOPHILIZED, FOR SOLUTION INTRAVENOUS at 03:06

## 2021-04-14 RX ADMIN — PROMETHAZINE HYDROCHLORIDE 25 MG: 25 INJECTION INTRAMUSCULAR; INTRAVENOUS at 01:19

## 2021-04-14 RX ADMIN — FUROSEMIDE 60 MG: 10 INJECTION, SOLUTION INTRAMUSCULAR; INTRAVENOUS at 02:40

## 2021-04-14 RX ADMIN — LEVALBUTEROL HYDROCHLORIDE 0.63 MG: 0.63 SOLUTION RESPIRATORY (INHALATION) at 19:36

## 2021-04-14 RX ADMIN — CARVEDILOL 25 MG: 25 TABLET, FILM COATED ORAL at 08:18

## 2021-04-14 RX ADMIN — CEFTRIAXONE SODIUM 2 G: 2 INJECTION, POWDER, FOR SOLUTION INTRAMUSCULAR; INTRAVENOUS at 02:29

## 2021-04-14 RX ADMIN — Medication 10 ML: at 17:21

## 2021-04-14 RX ADMIN — SODIUM CHLORIDE 100 ML: 900 INJECTION, SOLUTION INTRAVENOUS at 22:22

## 2021-04-14 RX ADMIN — GABAPENTIN 300 MG: 300 CAPSULE ORAL at 17:19

## 2021-04-14 RX ADMIN — ENOXAPARIN SODIUM 40 MG: 40 INJECTION SUBCUTANEOUS at 08:19

## 2021-04-14 RX ADMIN — LOSARTAN POTASSIUM 50 MG: 50 TABLET, FILM COATED ORAL at 08:20

## 2021-04-14 RX ADMIN — MORPHINE SULFATE 2 MG: 2 INJECTION, SOLUTION INTRAMUSCULAR; INTRAVENOUS at 06:43

## 2021-04-14 RX ADMIN — PROMETHAZINE HYDROCHLORIDE 12.5 MG: 25 INJECTION INTRAMUSCULAR; INTRAVENOUS at 22:00

## 2021-04-14 RX ADMIN — POTASSIUM CHLORIDE 40 MEQ: 1500 TABLET, EXTENDED RELEASE ORAL at 08:19

## 2021-04-14 RX ADMIN — FUROSEMIDE 40 MG: 10 INJECTION, SOLUTION INTRAMUSCULAR; INTRAVENOUS at 08:19

## 2021-04-14 RX ADMIN — SODIUM CHLORIDE 500 ML: 900 INJECTION, SOLUTION INTRAVENOUS at 02:29

## 2021-04-14 RX ADMIN — MORPHINE SULFATE 2 MG: 2 INJECTION, SOLUTION INTRAMUSCULAR; INTRAVENOUS at 22:00

## 2021-04-14 RX ADMIN — ALPRAZOLAM 1 MG: 0.5 TABLET ORAL at 22:36

## 2021-04-14 RX ADMIN — CARVEDILOL 25 MG: 25 TABLET, FILM COATED ORAL at 17:03

## 2021-04-14 RX ADMIN — MAGNESIUM GLUCONATE 500 MG ORAL TABLET 800 MG: 500 TABLET ORAL at 08:18

## 2021-04-14 RX ADMIN — Medication 10 ML: at 06:03

## 2021-04-14 RX ADMIN — MORPHINE SULFATE 4 MG: 4 INJECTION INTRAVENOUS at 03:36

## 2021-04-14 RX ADMIN — PANTOPRAZOLE SODIUM 40 MG: 40 TABLET, DELAYED RELEASE ORAL at 06:40

## 2021-04-14 RX ADMIN — ATORVASTATIN CALCIUM 80 MG: 40 TABLET, FILM COATED ORAL at 08:19
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
## 2021-04-14 NOTE — PROGRESS NOTES
Late entry due to hands on patient care. Patient received from ED and place on cardiac monitor. Sinus tach. Patient alert and oriented. Follow commands. Oxygen saturation 97 % on room air. Respiration even and unlabored. Head of bed elevated. Dual skin assessment completed with Ashley Nixon. Skin intact. No breakdown. Left great toe ,nail bed missing. Instructed patient to call for any needs. Verbalize an understanding. Call light and urinal within reach. Bed in low/lock position.

## 2021-04-14 NOTE — PROGRESS NOTES
Problem: Falls - Risk of  Goal: *Absence of Falls  Description: Document Yvon Sumner Fall Risk and appropriate interventions in the flowsheet.   Outcome: Progressing Towards Goal  Note: Fall Risk Interventions:            Medication Interventions: Patient to call before getting OOB                   Problem: Pain  Goal: *PALLIATIVE CARE:  Alleviation of Pain  Outcome: Progressing Towards Goal     Problem: Patient Education: Go to Patient Education Activity  Goal: Patient/Family Education  Outcome: Not Progressing Towards Goal     Problem: Pain  Goal: *Control of Pain  Outcome: Not Progressing Towards Goal

## 2021-04-14 NOTE — PROGRESS NOTES
Progress Note    Patient: Lovett Schlatter MRN: [id number]  SSN: [id number]    YOB: 1965  Age: 64 y.o. Sex: male      Admit Date: 4/14/2021    LOS: 0 days     Subjective:   F/U SOB, elevated BNP    \"64 y.o. male with medical h/o sCHF (EF25%), HTN presents to the ER with complaint of SOB. Patient reports that he started feeling bad yesterday. He reports BLE edema and some mild chest pain. On ER workup, patient has elevated pBNP (2,887). \" COVID rule out pending. Troponin 114.4-->98.8. Chest x ray with no acute process. Total bili 2.3. Admits to RUQ pain. US abdomen showed Distended gallbladder containing sludge and stones. No gallbladder wall thickening or surrounding fluid is seen. Common bile duct measures 4 mm    NO chest pain. With SOB, admits he used to use inhalers.    Current Facility-Administered Medications   Medication Dose Route Frequency    ondansetron (ZOFRAN) injection 4 mg  4 mg IntraVENous NOW    cefTRIAXone (ROCEPHIN) 2 g in 0.9% sodium chloride (MBP/ADV) 50 mL MBP  2 g IntraVENous Q24H    azithromycin (ZITHROMAX) 500 mg in 0.9% sodium chloride 250 mL (VIAL-MATE)  500 mg IntraVENous Q24H    morphine 4 mg/mL injection        pantoprazole (PROTONIX) tablet 40 mg  40 mg Oral ACB    HYDROcodone-acetaminophen (NORCO)  mg tablet 1 Tab  1 Tab Oral Q6H PRN    magnesium oxide (MAG-OX) tablet 800 mg  800 mg Oral Q12H    potassium chloride (K-DUR, KLOR-CON) SR tablet 40 mEq  40 mEq Oral DAILY    carvediloL (COREG) tablet 25 mg  25 mg Oral BID WITH MEALS    losartan (COZAAR) tablet 50 mg  50 mg Oral DAILY    [Held by provider] furosemide (LASIX) tablet 40 mg  40 mg Oral BID    atorvastatin (LIPITOR) tablet 80 mg  80 mg Oral DAILY    gabapentin (NEURONTIN) capsule 300 mg  300 mg Oral BID    sodium chloride (NS) flush 5-40 mL  5-40 mL IntraVENous Q8H    sodium chloride (NS) flush 5-40 mL  5-40 mL IntraVENous PRN    acetaminophen (TYLENOL) tablet 650 mg  650 mg Oral Q6H PRN    Or    acetaminophen (TYLENOL) suppository 650 mg  650 mg Rectal Q6H PRN    polyethylene glycol (MIRALAX) packet 17 g  17 g Oral DAILY PRN    enoxaparin (LOVENOX) injection 40 mg  40 mg SubCUTAneous DAILY    furosemide (LASIX) injection 40 mg  40 mg IntraVENous BID    promethazine (PHENERGAN) with saline injection 12.5 mg  12.5 mg IntraVENous Q6H PRN    ALPRAZolam (XANAX) tablet 1 mg  1 mg Oral QHS    morphine injection 2 mg  2 mg IntraVENous Q4H PRN       Objective:     Vitals:    04/14/21 0558 04/14/21 0609 04/14/21 0614 04/14/21 0700   BP:  (!) 134/102 (!) 118/93 122/89   Pulse:  (!) 108 (!) 102 98   Resp:  26 18 13   Temp:   98.6 °F (37 °C) 98.9 °F (37.2 °C)   SpO2:  97% 98% 97%   Weight:   81.4 kg (179 lb 6.4 oz)    Height: 5' 11\" (1.803 m)            Intake and Output:  Current Shift: No intake/output data recorded. Last three shifts: 04/12 1901 - 04/14 0700  In: 600 [P.O.:100; I.V.:500]  Out: 500 [Urine:500]    Physical Exam:   General:  Alert, cooperative   Eyes:  Conjunctivae/corneas clear. Ears:  Normal TMs and external ear canals both ears. Nose: Nares normal. Septum midline. Mouth/Throat: Lips, mucosa, and tongue normal.    Neck:  no JVD. Back:   deferred   Lungs:   Diffuse inspiratory wheezing    Heart:  Sinus tachycardia    Abdomen:   Soft, non-tender. Bowel sounds normal.   Extremities: Extremities normal, atraumatic, no cyanosis or edema. Pulses: 2+ and symmetric all extremities. Skin: Skin color, texture, turgor normal. No rashes or lesions   Lymph nodes: Cervical, supraclavicular, and axillary nodes normal.   Neurologic: CNII-XII intact.        Lab/Data Review:    Recent Results (from the past 24 hour(s))   CBC WITH AUTOMATED DIFF    Collection Time: 04/14/21 12:05 AM   Result Value Ref Range    WBC 3.8 (L) 4.3 - 11.1 K/uL    RBC 4.07 (L) 4.23 - 5.6 M/uL    HGB 13.4 (L) 13.6 - 17.2 g/dL    HCT 38.5 (L) 41.1 - 50.3 %    MCV 94.6 79.6 - 97.8 FL    MCH 32.9 26.1 - 32.9 PG MCHC 34.8 31.4 - 35.0 g/dL    RDW 13.8 11.9 - 14.6 %    PLATELET 529 253 - 643 K/uL    MPV 9.6 9.4 - 12.3 FL    ABSOLUTE NRBC 0.00 0.0 - 0.2 K/uL    DF AUTOMATED      NEUTROPHILS 57 43 - 78 %    LYMPHOCYTES 32 13 - 44 %    MONOCYTES 8 4.0 - 12.0 %    EOSINOPHILS 1 0.5 - 7.8 %    BASOPHILS 1 0.0 - 2.0 %    IMMATURE GRANULOCYTES 1 0.0 - 5.0 %    ABS. NEUTROPHILS 2.2 1.7 - 8.2 K/UL    ABS. LYMPHOCYTES 1.2 0.5 - 4.6 K/UL    ABS. MONOCYTES 0.3 0.1 - 1.3 K/UL    ABS. EOSINOPHILS 0.1 0.0 - 0.8 K/UL    ABS. BASOPHILS 0.0 0.0 - 0.2 K/UL    ABS. IMM. GRANS. 0.0 0.0 - 0.5 K/UL   METABOLIC PANEL, COMPREHENSIVE    Collection Time: 04/14/21 12:05 AM   Result Value Ref Range    Sodium 137 136 - 145 mmol/L    Potassium 3.4 (L) 3.5 - 5.1 mmol/L    Chloride 100 98 - 107 mmol/L    CO2 26 21 - 32 mmol/L    Anion gap 11 7 - 16 mmol/L    Glucose 122 (H) 65 - 100 mg/dL    BUN 7 6 - 23 MG/DL    Creatinine 0.84 0.8 - 1.5 MG/DL    GFR est AA >60 >60 ml/min/1.73m2    GFR est non-AA >60 >60 ml/min/1.73m2    Calcium 8.0 (L) 8.3 - 10.4 MG/DL    Bilirubin, total 2.3 (H) 0.2 - 1.1 MG/DL    ALT (SGPT) 84 (H) 12 - 65 U/L    AST (SGOT) 224 (H) 15 - 37 U/L    Alk.  phosphatase 118 50 - 136 U/L    Protein, total 6.6 6.3 - 8.2 g/dL    Albumin 3.7 3.5 - 5.0 g/dL    Globulin 2.9 2.3 - 3.5 g/dL    A-G Ratio 1.3 1.2 - 3.5     LIPASE    Collection Time: 04/14/21 12:05 AM   Result Value Ref Range    Lipase 62 (L) 73 - 393 U/L   LACTIC ACID    Collection Time: 04/14/21 12:05 AM   Result Value Ref Range    Lactic acid 4.0 (HH) 0.4 - 2.0 MMOL/L   NT-PRO BNP    Collection Time: 04/14/21 12:05 AM   Result Value Ref Range    NT pro-BNP 2,887 (H) 5 - 125 PG/ML   TROPONIN-HIGH SENSITIVITY    Collection Time: 04/14/21 12:05 AM   Result Value Ref Range    Troponin-High Sensitivity 114.4 (HH) 0 - 14 pg/mL   EKG, 12 LEAD, INITIAL    Collection Time: 04/14/21 12:31 AM   Result Value Ref Range    Ventricular Rate 102 BPM    Atrial Rate 102 BPM    P-R Interval 172 ms    QRS Duration 98 ms    Q-T Interval 392 ms    QTC Calculation (Bezet) 510 ms    Calculated P Axis 47 degrees    Calculated R Axis -22 degrees    Calculated T Axis -96 degrees    Diagnosis       !!! Poor data quality, interpretation may be adversely affected  Sinus tachycardia  Anterior infarct (cited on or before 02-JUL-2020)  ST & T wave abnormality, consider lateral ischemia  Abnormal ECG  When compared with ECG of 30-SEP-2020 03:19,  Poor data quality in current ECG precludes serial comparison  Confirmed by ST RYAN HORNER MD (), EVIE ORELLANA ([id number]) on 4/14/2021 8:49:05 AM     TROPONIN-HIGH SENSITIVITY    Collection Time: 04/14/21  2:33 AM   Result Value Ref Range    Troponin-High Sensitivity 98.8 (H) 0 - 14 pg/mL   LACTIC ACID    Collection Time: 04/14/21  2:33 AM   Result Value Ref Range    Lactic acid 2.0 0.4 - 2.0 MMOL/L   SARS-COV-2    Collection Time: 04/14/21  4:35 AM   Result Value Ref Range    SARS-CoV-2 Please find results under separate order     COVID-19 RAPID TEST    Collection Time: 04/14/21  4:35 AM   Result Value Ref Range    Specimen source Nasopharyngeal      COVID-19 rapid test Not detected NOTD         Assessment/ Plan:     Principal Problem:    Elevated brain natriuretic peptide (BNP) level (4/14/2021)    Active Problems:    Chronic systolic (congestive) heart failure ([facility] [address] (4/21/2011)      HTN (hypertension) (11/29/2011)      Nausea & vomiting (2/26/2016)      CAMARILLO (dyspnea on exertion) (3/25/2016)    Elevated pBNP  - CXR is clear, but due to SOB cover for possible CAP. Azithromycin and Ceftriaxone since 4/14  - no noted edema  - Normal O2 sats/not requiring supplemental O2 at this time  - But patient describes significant dyspnea, wheezing on exam so will add Xopenex. States he used to use inhalers. - Hold lasix since no noted edema  - Strict I/Os  - Monitor for improvement  -COVID rule out pending     sCHF  - Appears compensated.   -BB.  ARB     HTN  - Home meds     Elevated t.bili  - Recheck in AM  - RUQ pain, with sludge and gallstones.  Consult GI to see if would benefit from MRCP since with elevated total bili  -Hold statin    Anxiety   -Xanax 1mg qHS    DVT prophylaxis - lovenox  Signed By: Andrae Hodge DO     April 14, 2021

## 2021-04-14 NOTE — PROGRESS NOTES
TRANSFER - IN REPORT:    Verbal report received from [facility] on Valery Uriel  being received from ED) for routine progression of care      Report consisted of patients Situation, Background, Assessment and   Recommendations(SBAR). Information from the following report(s) SBAR, ED Summary, Intake/Output, MAR, Recent Results and Cardiac Rhythm Sinus Tach was reviewed with the receiving nurse. Opportunity for questions and clarification was provided. Assessment completed upon patients arrival to unit and care assumed.

## 2021-04-14 NOTE — ED NOTES
TRANSFER - OUT REPORT:    Verbal report given to [facility] on Dawit Jones  being transferred to [address] [place], [place] [address] [facility] for routine progression of care       Report consisted of patients Situation, Background, Assessment and   Recommendations(SBAR). Information from the following report(s) SBAR, Kardex, ED Summary, Intake/Output, MAR and Recent Results was reviewed with the receiving nurse. Lines:   Peripheral IV 04/14/21 Left Hand (Active)   Site Assessment Clean, dry, & intact 04/14/21 0006   Phlebitis Assessment 0 04/14/21 0006   Infiltration Assessment 0 04/14/21 0006   Dressing Status Clean, dry, & intact 04/14/21 0006   Dressing Type Transparent 04/14/21 0006   Hub Color/Line Status Patent; Flushed 04/14/21 0006   Action Taken Blood drawn 04/14/21 0006        Opportunity for questions and clarification was provided.       Patient transported with:   Registered Nurse

## 2021-04-14 NOTE — PROGRESS NOTES
Care Management Interventions  Current Support Network: Lives with Spouse  Confirm Follow Up Transport: Family  Discharge Location  Discharge Placement: Home   SW met with pt  In the ICU. Pt reports he lives with spouse, spouse works. Pt states he is alone during the day, is independent with ADLs, drives , prepares his own meals. Pt states he does not use DME but does use the electric scooter in the grocery store. No needs identified at the time of this assessment.   Hermila Crook BSW

## 2021-04-15 LAB
ALBUMIN SERPL-MCNC: 3 G/DL (ref 3.5–5)
ALBUMIN/GLOB SERPL: 1.2 {RATIO} (ref 1.2–3.5)
ALP SERPL-CCNC: 96 U/L (ref 50–136)
ALT SERPL-CCNC: 50 U/L (ref 12–65)
ANION GAP SERPL CALC-SCNC: 8 MMOL/L (ref 7–16)
AST SERPL-CCNC: 84 U/L (ref 15–37)
BILIRUB SERPL-MCNC: 1.4 MG/DL (ref 0.2–1.1)
BUN SERPL-MCNC: 7 MG/DL (ref 6–23)
CALCIUM SERPL-MCNC: 6.9 MG/DL (ref 8.3–10.4)
CHLORIDE SERPL-SCNC: 102 MMOL/L (ref 98–107)
CO2 SERPL-SCNC: 29 MMOL/L (ref 21–32)
CREAT SERPL-MCNC: 1.03 MG/DL (ref 0.8–1.5)
ERYTHROCYTE [DISTWIDTH] IN BLOOD BY AUTOMATED COUNT: 14.8 % (ref 11.9–14.6)
GLOBULIN SER CALC-MCNC: 2.6 G/DL (ref 2.3–3.5)
GLUCOSE SERPL-MCNC: 101 MG/DL (ref 65–100)
HCT VFR BLD AUTO: 33.9 % (ref 41.1–50.3)
HGB BLD-MCNC: 11.5 G/DL (ref 13.6–17.2)
MAGNESIUM SERPL-MCNC: 1.2 MG/DL (ref 1.8–2.4)
MCH RBC QN AUTO: 33.3 PG (ref 26.1–32.9)
MCHC RBC AUTO-ENTMCNC: 33.9 G/DL (ref 31.4–35)
MCV RBC AUTO: 98.3 FL (ref 79.6–97.8)
NRBC # BLD: 0 K/UL (ref 0–0.2)
PLATELET # BLD AUTO: 149 K/UL (ref 150–450)
PMV BLD AUTO: 9.8 FL (ref 9.4–12.3)
POTASSIUM SERPL-SCNC: 3 MMOL/L (ref 3.5–5.1)
PROT SERPL-MCNC: 5.6 G/DL (ref 6.3–8.2)
RBC # BLD AUTO: 3.45 M/UL (ref 4.23–5.6)
SODIUM SERPL-SCNC: 139 MMOL/L (ref 136–145)
WBC # BLD AUTO: 4.2 K/UL (ref 4.3–11.1)

## 2021-04-15 PROCEDURE — 80053 COMPREHEN METABOLIC PANEL: CPT

## 2021-04-15 PROCEDURE — 94640 AIRWAY INHALATION TREATMENT: CPT

## 2021-04-15 PROCEDURE — 74011250636 HC RX REV CODE- 250/636: Performed by: FAMILY MEDICINE

## 2021-04-15 PROCEDURE — 96376 TX/PRO/DX INJ SAME DRUG ADON: CPT

## 2021-04-15 PROCEDURE — 83735 ASSAY OF MAGNESIUM: CPT

## 2021-04-15 PROCEDURE — 99223 1ST HOSP IP/OBS HIGH 75: CPT | Performed by: SURGERY

## 2021-04-15 PROCEDURE — 74011000250 HC RX REV CODE- 250: Performed by: FAMILY MEDICINE

## 2021-04-15 PROCEDURE — 74011000258 HC RX REV CODE- 258: Performed by: EMERGENCY MEDICINE

## 2021-04-15 PROCEDURE — 99218 HC RM OBSERVATION: CPT

## 2021-04-15 PROCEDURE — 85027 COMPLETE CBC AUTOMATED: CPT

## 2021-04-15 PROCEDURE — 74011250636 HC RX REV CODE- 250/636: Performed by: INTERNAL MEDICINE

## 2021-04-15 PROCEDURE — 36415 COLL VENOUS BLD VENIPUNCTURE: CPT

## 2021-04-15 PROCEDURE — 74011250636 HC RX REV CODE- 250/636: Performed by: EMERGENCY MEDICINE

## 2021-04-15 PROCEDURE — 2709999900 HC NON-CHARGEABLE SUPPLY

## 2021-04-15 PROCEDURE — 74011250637 HC RX REV CODE- 250/637: Performed by: FAMILY MEDICINE

## 2021-04-15 PROCEDURE — 96375 TX/PRO/DX INJ NEW DRUG ADDON: CPT

## 2021-04-15 PROCEDURE — 96372 THER/PROPH/DIAG INJ SC/IM: CPT

## 2021-04-15 RX ORDER — POTASSIUM CHLORIDE 14.9 MG/ML
20 INJECTION INTRAVENOUS ONCE
Status: COMPLETED | OUTPATIENT
Start: 2021-04-15 | End: 2021-04-15

## 2021-04-15 RX ORDER — LOSARTAN POTASSIUM 50 MG/1
25 TABLET ORAL DAILY
Status: DISCONTINUED | OUTPATIENT
Start: 2021-04-16 | End: 2021-04-21 | Stop reason: HOSPADM

## 2021-04-15 RX ORDER — LOSARTAN POTASSIUM 25 MG/1
25 TABLET ORAL DAILY
Status: DISCONTINUED | OUTPATIENT
Start: 2021-04-16 | End: 2021-04-15

## 2021-04-15 RX ADMIN — PANTOPRAZOLE SODIUM 40 MG: 40 TABLET, DELAYED RELEASE ORAL at 07:30

## 2021-04-15 RX ADMIN — ALPRAZOLAM 1 MG: 0.5 TABLET ORAL at 22:07

## 2021-04-15 RX ADMIN — PROMETHAZINE HYDROCHLORIDE 12.5 MG: 25 INJECTION INTRAMUSCULAR; INTRAVENOUS at 07:14

## 2021-04-15 RX ADMIN — ONDANSETRON 4 MG: 2 INJECTION INTRAMUSCULAR; INTRAVENOUS at 13:56

## 2021-04-15 RX ADMIN — MORPHINE SULFATE 2 MG: 2 INJECTION, SOLUTION INTRAMUSCULAR; INTRAVENOUS at 07:14

## 2021-04-15 RX ADMIN — POTASSIUM CHLORIDE 40 MEQ: 1500 TABLET, EXTENDED RELEASE ORAL at 09:04

## 2021-04-15 RX ADMIN — MAGNESIUM GLUCONATE 500 MG ORAL TABLET 800 MG: 500 TABLET ORAL at 22:07

## 2021-04-15 RX ADMIN — LEVALBUTEROL HYDROCHLORIDE 0.63 MG: 0.63 SOLUTION RESPIRATORY (INHALATION) at 01:35

## 2021-04-15 RX ADMIN — HYDROCODONE BITARTRATE AND ACETAMINOPHEN 1 TABLET: 10; 325 TABLET ORAL at 13:52

## 2021-04-15 RX ADMIN — MAGNESIUM GLUCONATE 500 MG ORAL TABLET 800 MG: 500 TABLET ORAL at 09:05

## 2021-04-15 RX ADMIN — GABAPENTIN 300 MG: 300 CAPSULE ORAL at 18:40

## 2021-04-15 RX ADMIN — SODIUM CHLORIDE 250 ML: 900 INJECTION, SOLUTION INTRAVENOUS at 12:25

## 2021-04-15 RX ADMIN — CARVEDILOL 25 MG: 25 TABLET, FILM COATED ORAL at 09:06

## 2021-04-15 RX ADMIN — POTASSIUM CHLORIDE 20 MEQ: 14.9 INJECTION, SOLUTION INTRAVENOUS at 14:00

## 2021-04-15 RX ADMIN — ENOXAPARIN SODIUM 40 MG: 40 INJECTION SUBCUTANEOUS at 09:06

## 2021-04-15 RX ADMIN — CEFTRIAXONE SODIUM 2 G: 2 INJECTION, POWDER, FOR SOLUTION INTRAMUSCULAR; INTRAVENOUS at 01:59

## 2021-04-15 RX ADMIN — PROMETHAZINE HYDROCHLORIDE 12.5 MG: 25 INJECTION INTRAMUSCULAR; INTRAVENOUS at 20:22

## 2021-04-15 RX ADMIN — MORPHINE SULFATE 2 MG: 2 INJECTION, SOLUTION INTRAMUSCULAR; INTRAVENOUS at 20:18

## 2021-04-15 RX ADMIN — MORPHINE SULFATE 2 MG: 2 INJECTION, SOLUTION INTRAMUSCULAR; INTRAVENOUS at 15:45

## 2021-04-15 RX ADMIN — Medication 10 ML: at 22:07

## 2021-04-15 RX ADMIN — PROMETHAZINE HYDROCHLORIDE 12.5 MG: 25 INJECTION INTRAMUSCULAR; INTRAVENOUS at 14:33

## 2021-04-15 RX ADMIN — LOSARTAN POTASSIUM 50 MG: 50 TABLET, FILM COATED ORAL at 09:05

## 2021-04-15 RX ADMIN — AZITHROMYCIN MONOHYDRATE 500 MG: 500 INJECTION, POWDER, LYOPHILIZED, FOR SOLUTION INTRAVENOUS at 01:58

## 2021-04-15 RX ADMIN — GABAPENTIN 300 MG: 300 CAPSULE ORAL at 09:06

## 2021-04-15 RX ADMIN — LEVALBUTEROL HYDROCHLORIDE 0.63 MG: 0.63 SOLUTION RESPIRATORY (INHALATION) at 07:34

## 2021-04-15 RX ADMIN — CARVEDILOL 25 MG: 25 TABLET, FILM COATED ORAL at 18:40

## 2021-04-15 RX ADMIN — Medication 10 ML: at 06:00

## 2021-04-15 NOTE — PROGRESS NOTES
Following admin of HTN medication patient is hypotensive. MD Tarah Sharp notified. Patient receiving two doses of am HTN meds. Decreased cozaar r/t hypotension and changed the time of administration.

## 2021-04-15 NOTE — PROGRESS NOTES
[facility] Progress Note         Admit Date:  4/14/2021    Today's Date:  4/15/2021    CC:  RUQ pain, elevated T bili    Subjective:     Patient states he is continuing to have RUQ abd pain, radiating into his right back, sharp cramping pain, severe at times. He has nausea off and on, but denies any vomiting. He has had BM today without bleeding. He is not able to tolerate much po intake due to his symptoms. He has been hypotensive today. He describes continued difficulty swallowing, feeling like the food becomes stuck at the top of his stomach.     Medications:   Current Facility-Administered Medications   Medication Dose Route Frequency    promethazine (PHENERGAN) with saline injection 12.5 mg  12.5 mg IntraVENous Q6H PRN    [START ON 4/16/2021] losartan (COZAAR) tablet 25 mg  25 mg Oral DAILY    cefTRIAXone (ROCEPHIN) 2 g in 0.9% sodium chloride (MBP/ADV) 50 mL MBP  2 g IntraVENous Q24H    azithromycin (ZITHROMAX) 500 mg in 0.9% sodium chloride 250 mL (VIAL-MATE)  500 mg IntraVENous Q24H    pantoprazole (PROTONIX) tablet 40 mg  40 mg Oral ACB    HYDROcodone-acetaminophen (NORCO)  mg tablet 1 Tab  1 Tab Oral Q6H PRN    magnesium oxide (MAG-OX) tablet 800 mg  800 mg Oral Q12H    potassium chloride (K-DUR, KLOR-CON) SR tablet 40 mEq  40 mEq Oral DAILY    carvediloL (COREG) tablet 25 mg  25 mg Oral BID WITH MEALS    [Held by provider] furosemide (LASIX) tablet 40 mg  40 mg Oral BID    [Held by provider] atorvastatin (LIPITOR) tablet 80 mg  80 mg Oral DAILY    gabapentin (NEURONTIN) capsule 300 mg  300 mg Oral BID    sodium chloride (NS) flush 5-40 mL  5-40 mL IntraVENous Q8H    sodium chloride (NS) flush 5-40 mL  5-40 mL IntraVENous PRN    acetaminophen (TYLENOL) tablet 650 mg  650 mg Oral Q6H PRN    Or    acetaminophen (TYLENOL) suppository 650 mg  650 mg Rectal Q6H PRN    polyethylene glycol (MIRALAX) packet 17 g  17 g Oral DAILY PRN    enoxaparin (LOVENOX) injection 40 mg  40 mg SubCUTAneous DAILY    ALPRAZolam (XANAX) tablet 1 mg  1 mg Oral QHS    morphine injection 2 mg  2 mg IntraVENous Q4H PRN    levalbuterol (XOPENEX) nebulizer soln 0.63 mg/3 mL  0.63 mg Nebulization Q6H RT    ondansetron (ZOFRAN) injection 4 mg  4 mg IntraVENous Q4H PRN       Review of Systems:  ROS was obtained, with pertinent positives as listed above. No chest pain. He is having some improvement in SOB. Diet:  Cardiac, 2 gm Na+ diet    Objective:   Vitals:  Visit Vitals  BP (!) 81/58   Pulse (!) 107   Temp 98.3 °F (36.8 °C)   Resp 21   Ht 5' 11\" (1.803 m)   Wt 83 kg (182 lb 14.4 oz)   SpO2 95%   BMI 25.51 kg/m²     Intake/Output:  04/15 0701 - 04/15 1900  In: -   Out: 300 [Urine:300]  04/13 1901 - 04/15 0700  In: 1000 [P.O.:500; I.V.:500]  Out: 1900 [Urine:1900]  Exam:  General appearance: alert, cooperative, no distress, lying in bed  Lungs: coarse BS to auscultation bilaterally anteriorly  Heart: tachycardic  Abdomen: soft, tender over right side of abdomen.  Bowel sounds normal. No masses, no organomegaly  Neuro:  alert and oriented    Data Review (Labs):    Recent Labs     04/15/21  0501 04/14/21  0005   WBC 4.2* 3.8*   HGB 11.5* 13.4*   HCT 33.9* 38.5*   * 191   MCV 98.3* 94.6    137   K 3.0* 3.4*    100   CO2 29 26   BUN 7 7   CREA 1.03 0.84   CA 6.9* 8.0*   * 122*   AP 96 118   ALT 50 84*   TBILI 1.4* 2.3*   ALB 3.0* 3.7   TP 5.6* 6.6   LPSE  --  62*     SARS-COV-2, PCR [[id number]] (Order [id number]  Date: 4/14/2021 Department: [facility] Released By:  (auto-released) Authorizing: Swati Obando MD   Specimen Information: Nasopharyngeal        Component Value Flag Ref Range Units Status   Specimen source Nasopharyngeal      Final   SARS-CoV-2 Not detected   NOTD   Final     EGD 10/3/2020-Dr Lindsay  FINDINGS:  OROPHARYNX: Cords and pyriform recesses normal.  ESOPHAGUS: The esophagus is normal. The proximal, mid and distal portions are normal. The Z-Line is intact.  Empiric dilation was done using a 47 Fr followed by 60 Fr Savary.  Re-endoscopy after dilation showed no rents or heme.  Multiple biopsies were taken from the mid and proximal esophagus using cold forceps to rule out EoE.    STOMACH: The fundus on antegrade and retroflex views is normal. The body, antrum and pylorus is normal.  DUODENUM: The bulb and second portions are normal.   ASSESSMENT:  1. Normal EGD   PLAN:  1. Follow up pathology  2. Okay to restart diet.  Note that patient passed his swallow evaluation with SLP.  They recommended a regular diet with regular thin liquids.    3. Will sign off.  Please call us with any further questions or concerns.    4. Will plan to see patient in clinic in 2-3 weeks.  If dysphagia persists, we can discuss an esophageal manometry at that time. Andres Barragan MD  [facility]     Right upper quadrant ultrasound: 10/04/2020   HISTORY: nausea.   Real-time sonography of the right upper quadrant was performed.    Comparison: 08/15/2018   FINDINGS: There is homogeneous echotexture throughout the liver. There is no  intrahepatic biliary ductal dilatation. There is no focal hepatic mass.   There are sludge and stones within the gallbladder lumen. There is no  gallbladder wall thickening or pericholecystic fluid to suggest acute  cholecystitis. The common bile duct is normal  measuring 6 mm.   The pancreas is unable to be visualized as it was obscured by overlying bowel  gas. The right kidney is unremarkable without hydronephrosis or solid mass. The  right kidney was measured at approximately 10.4 cm.   The visualized portions of the abdominal aorta and inferior vena cava are  unremarkable. There is normal hepatopetal flow within the main portal vein.  No  free fluid is seen within the right upper quadrant.   IMPRESSION  IMPRESSION:    1. Stones and sludge within the gallbladder lumen without sonographic findings  of acute cholecystitis. 2. Nonvisualization of the pancreas due to overlying bowel gas. Limited abdomen ultrasound. 4/14/2021   INDICATION: Pain.   COMPARISON: Prior ultrasound on October 4, 2020.   TECHNIQUE: Standard protocol limited right upper quadrant abdomen ultrasound.   FINDINGS:   - Liver: The liver is mildly enlarged at 18.5 cm, new from the prior study at  which time it measured 16.3 cm. The liver is otherwise normal in appearance. - Gallbladder: Again noted are sludge and stones in the gallbladder lumen, with  no wall thickening or pericholecystic fluid. The gallbladder does not appear  distended, measuring 5.1 cm in diameter.  - Bile ducts: Within normal limits.  The common bile duct measures 4 mm. - Pancreas: Not well visualized due to overlying bowel gas. - Right kidney: Within normal limits. - Aorta and IVC: There is no abdominal aortic aneurysm. The IVC was obscured by  overlying bowel gas. - Portal vein: Within normal limits.  - Other: No ascites. IMPRESSION  1. Distended gallbladder containing sludge and stones. No gallbladder wall  thickening or surrounding fluid is seen. 2. New mild hepatomegaly. 3. The pancreas and IVC were obscured by bowel gas. CT OF THE ABDOMEN AND PELVIS WITH CONTRAST 14 April 2021     CLINICAL HISTORY:   Abdominal pain with neutropenia and abnormal liver function  tests.   TECHNIQUE:  Oral and nonionic intravenous contrast was administered, and the  abdomen and pelvis were scanned with spiral technique. Radiation dose reduction  was achieved using one or all of the following techniques: automated exposure  control, weight-based dosing, iterative reconstruction.   COMPARISON:  Ultrasound earlier today head CT of March 22, 2020.     CT ABDOMEN FINDINGS: Mild atelectasis is again noted adjacent to a moderate  hiatal hernia. The gallbladder contains small calcified stones but is no longer  distended.   No pericholecystic inflammatory changes or biliary ductal  dilatation. Diffuse hepatic parenchymal hypodensity may be associated with  steatosis or hepatitis. The spleen, pancreas, adrenals, and kidneys appear  unremarkable.   CT PELVIS FINDINGS: Appendix is at the upper limits of normal in thickness  without pericholecystic inflammatory changes to suggest acute appendicitis. No  pathologically enlarged lymph nodes or free fluid is evident. Scattered left  colonic diverticula without adjacent inflammatory changes. Bone windows  demonstrate no definite aggressive process, accounting for degenerative changes.   IMPRESSION   1. Cholelithiasis without evidence of cholecystitis or biliary ductal  dilatation.   2.  Diffuse hepatic parenchymal hypodensity may be associated with steatosis,  hepatitis, or other infiltrative process. No focal liver lesion identified.   3.  Left clonic diverticulosis without evidence of diverticulitis, appendicitis,  or other acute abdominopelvic abnormality identified. Assessment:     Principal Problem:    Elevated brain natriuretic peptide (BNP) level (4/14/2021)    Active Problems:    Chronic systolic (congestive) heart failure (HCC) (4/21/2011)      HTN (hypertension) (11/29/2011)      Nausea & vomiting (2/26/2016)      CAMARILLO (dyspnea on exertion) (3/25/2016)    65 yo male pt of Dr. Deven Chavarria with PMH of including but not limited to systolic dysfunction with EF ~20%, VT with ICD in place, chronic alcoholism, CAD, anxiety, depression, GERD, hx of Schatzki's ring, who was seen in consultation 14 April 2021 at the request of Dr. Gerard Bagley for RUQ pain and elevated T bili, after presented to ER with SOB and chest pain, and labs rnoted elevated T bili. RUQ ultrasound with GB sludge and stones, but CBD WNL and no mention of acute cholecystitis. Imaging comparable to ultrasound 10/2020. CT scan was negative for acute process of abd pain. Given the chronicity of the RUQ pain, would be unusual for this to be biliary colic.   Given his EF 20-25%, his admission troponin of 114.4 (normal < 14), elevation of pBNP 2887, he is at increased risk for invasive procedures. Unable to have MRCP due to ICD. Appreciate surgery evaluation. Elevated LFTs are improving. It is likely multifactorial - degree of ischemia, passive congestion, fatty liver. He has also had persistent dysphagia, though EGD with dilation in Oct 2020 was normal.  It may be related to underlying motility issues. Plan:     - Supportive care, maintain electrolytes. - Protonix 40 mg po daily.  - Surgery evaluating regarding cholecystectomy. - Monitor labs. - UGI series with tablet to assess for any motility issues of continued dysphagia. - Follow. Patient is seen and examined in collaboration with Dr. Estevan Suarez. Assessment and plan as per Dr. Manoj Das  [facility]

## 2021-04-15 NOTE — PROGRESS NOTES
Patients Bps have remained low with MAPs in the low 60s. MD Jose Manuel Webster notified. Advises 250 cc bolus. Order placed.

## 2021-04-15 NOTE — PROGRESS NOTES
Problem: Falls - Risk of  Goal: *Absence of Falls  Description: Document Alisha Ritter Fall Risk and appropriate interventions in the flowsheet.   Outcome: Progressing Towards Goal  Note: Fall Risk Interventions:  Mobility Interventions: Assess mobility with egress test, Bed/chair exit alarm, OT consult for ADLs, Patient to call before getting OOB, PT Consult for mobility concerns, PT Consult for assist device competence         Medication Interventions: Assess postural VS orthostatic hypotension, Bed/chair exit alarm, Evaluate medications/consider consulting pharmacy, Patient to call before getting OOB, Teach patient to arise slowly                   Problem: Patient Education: Go to Patient Education Activity  Goal: Patient/Family Education  Outcome: Progressing Towards Goal
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
## 2021-04-15 NOTE — PROGRESS NOTES
[facility]ist Service Progress Note    Patient is a 64 y.o. male with medical h/o sCHF, HTN presents to the ER with complaint of SOB. Patient reports that he started feeling bad yesterday. He reports BLE edema and some mild chest pain. On ER workup, patient has elevated pBNP. Hospitalist asked to admit. Denies fevers. Reports chest discomfort, SOB, nausea/vomiting, cough. INTERVAL HISTORY  / Subjective:    04/15/2021 - Pt seen and evaluated. Pt with borderline BP. He states that breathing is better and is on RA. He c/o continued abd pain. Assessment / Plan:    IMP:    1.) Acute on Chronic LVSD EF 20-25%/CHF - clinically better and off O2, IVVD impaired with borderline BP.    2.) Abd Pain with Abn LFT's - appreciate GI, f/u repeat, unable for MRCP due to ICD. ? Congestion playing a role. Ck Hep panel    3.) CAD/Abn Troponin - flat, ?  Due to CHF, ck dopplers    4.) HTN - with hypotension, give fluids, f/u MAP > 65    5.) H/O VT s/p  ICD - not MRI compatible    6.) Anxiety/Depression    7.) Chronic EtOH Abuse - start Thiamine,     8.) GERD h/o Schatzki's Ring    9.) Anemia/Macrocytosis    10.) HypoK+ - replace, ck Mg2+            Plan:    Give fluids  Hold diuretics  Start Thiamine  Ck B12/Folate  Replace K+, ck Mg2+  Ck am labs        Chief Complaint : Shortness of Breath        Objective:  Visit Vitals  BP (!) 81/58   Pulse (!) 107   Temp 98.3 °F (36.8 °C)   Resp 21   Ht 5' 11\" (1.803 m)   Wt 83 kg (182 lb 14.4 oz)   SpO2 95%   BMI 25.51 kg/m²                 Physical Exam: Pt examined 04/15/2021 and exam as below:        GEN - middle aged BM in no distress  HEENT - NC/At, PERRLA, EOMI, No JVD  Lungs - diminished, clear  CVS - nml S1, S2, RRR,   Abd - Soft, NT, ND, Nml BS+  Ext - Nml ROM, No edema      Intake and Output:  Date 04/14/21 0700 - 04/15/21 0659 04/15/21 0700 - 04/16/21 0659   Shift 0700-1859 1900-0659 24 Hour Total 0700-1859 1900-0659 24 Hour Total   INTAKE   P.O. 400  400 P.O. 400  400      Shift Total(mL/kg) 400(4.9)  400(4.9)      OUTPUT   Urine(mL/kg/hr) 900(0.9) 500(0.5) 1400(0.7) 300  300     Urine Voided  300  300     Urine Occurrence(s) 2 x  2 x      Stool           Stool Occurrence(s) 1 x 2 x 3 x      Shift Total(mL/kg) 900(11.1) 500(6.1) 1400(17.2) 300(3.6)  300(3.6)   NET -500 -500 -1000 -300  -300   Weight (kg) 81.4 81.4 81.4 83 83 83       LAB:  Admission on 04/14/2021   Component Date Value    WBC 04/14/2021 3.8*    RBC 04/14/2021 4.07*    HGB 04/14/2021 13.4*    HCT 04/14/2021 38.5*    MCV 04/14/2021 94.6     MCH 04/14/2021 32.9     MCHC 04/14/2021 34.8     RDW 04/14/2021 13.8     PLATELET 71/72/5482 807     MPV 04/14/2021 9.6     ABSOLUTE NRBC 04/14/2021 0.00     DF 04/14/2021 AUTOMATED     NEUTROPHILS 04/14/2021 57     LYMPHOCYTES 04/14/2021 32     MONOCYTES 04/14/2021 8     EOSINOPHILS 04/14/2021 1     BASOPHILS 04/14/2021 1     IMMATURE GRANULOCYTES 04/14/2021 1     ABS. NEUTROPHILS 04/14/2021 2.2     ABS. LYMPHOCYTES 04/14/2021 1.2     ABS. MONOCYTES 04/14/2021 0.3     ABS. EOSINOPHILS 04/14/2021 0.1     ABS. BASOPHILS 04/14/2021 0.0     ABS. IMM. GRANS. 04/14/2021 0.0     Sodium 04/14/2021 137     Potassium 04/14/2021 3.4*    Chloride 04/14/2021 100     CO2 04/14/2021 26     Anion gap 04/14/2021 11     Glucose 04/14/2021 122*    BUN 04/14/2021 7     Creatinine 04/14/2021 0.84     GFR est AA 04/14/2021 >60     GFR est non-AA 04/14/2021 >60     Calcium 04/14/2021 8.0*    Bilirubin, total 04/14/2021 2.3*    ALT (SGPT) 04/14/2021 84*    AST (SGOT) 04/14/2021 224*    Alk.  phosphatase 04/14/2021 118     Protein, total 04/14/2021 6.6     Albumin 04/14/2021 3.7     Globulin 04/14/2021 2.9     A-G Ratio 04/14/2021 1.3     Lipase 04/14/2021 62*    Ventricular Rate 04/14/2021 102     Atrial Rate 04/14/2021 102     P-R Interval 04/14/2021 172     QRS Duration 04/14/2021 98     Q-T Interval 04/14/2021 392     QTC Calculation (Bezet) 04/14/2021 510     Calculated P Axis 04/14/2021 47     Calculated R Axis 04/14/2021 -22     Calculated T Axis 04/14/2021 -96     Diagnosis 04/14/2021                      Value:!!! Poor data quality, interpretation may be adversely affected  Sinus tachycardia  Anterior infarct (cited on or before 02-JUL-2020)  ST & T wave abnormality, consider lateral ischemia  Abnormal ECG  When compared with ECG of 30-SEP-2020 03:19,  Poor data quality in current ECG precludes serial comparison  Confirmed by ST RYAN HORNER MD (), EVIE ORELLANA ([id number]) on 4/14/2021 8:49:05 AM      Lactic acid 04/14/2021 4.0*    NT pro-BNP 04/14/2021 2,887*    Troponin-High Sensitivity 04/14/2021 114.4*    Troponin-High Sensitivity 04/14/2021 98.8*    Lactic acid 04/14/2021 2.0     SARS-CoV-2 04/14/2021 Please find results under separate order     Specimen source 04/14/2021 Nasopharyngeal     COVID-19 rapid test 04/14/2021 Not detected     Specimen source 04/14/2021 Nasopharyngeal     SARS-CoV-2 04/14/2021 Not detected     Sodium 04/15/2021 139     Potassium 04/15/2021 3.0*    Chloride 04/15/2021 102     CO2 04/15/2021 29     Anion gap 04/15/2021 8     Glucose 04/15/2021 101*    BUN 04/15/2021 7     Creatinine 04/15/2021 1.03     GFR est AA 04/15/2021 >60     GFR est non-AA 04/15/2021 >60     Calcium 04/15/2021 6.9*    Bilirubin, total 04/15/2021 1.4*    ALT (SGPT) 04/15/2021 50     AST (SGOT) 04/15/2021 84*    Alk.  phosphatase 04/15/2021 96     Protein, total 04/15/2021 5.6*    Albumin 04/15/2021 3.0*    Globulin 04/15/2021 2.6     A-G Ratio 04/15/2021 1.2     WBC 04/15/2021 4.2*    RBC 04/15/2021 3.45*    HGB 04/15/2021 11.5*    HCT 04/15/2021 33.9*    MCV 04/15/2021 98.3*    MCH 04/15/2021 33.3*    MCHC 04/15/2021 33.9     RDW 04/15/2021 14.8*    PLATELET 23/36/9710 529*    MPV 04/15/2021 9.8     ABSOLUTE NRBC 04/15/2021 0.00        IMAGING:  Xr Chest Pa Lat    Result Date: 4/14/2021  No acute process. Ct Abd Pelv W Cont    Result Date: 4/14/2021  1. Cholelithiasis without evidence of cholecystitis or biliary ductal dilatation. 2.  Diffuse hepatic parenchymal hypodensity may be associated with steatosis, hepatitis, or other infiltrative process. No focal liver lesion identified. 3.  Left clonic diverticulosis without evidence of diverticulitis, appendicitis, or other acute abdominopelvic abnormality identified. [id number]    Result Date: 4/14/2021  1. Distended gallbladder containing sludge and stones. No gallbladder wall thickening or surrounding fluid is seen. 2. New mild hepatomegaly. 3. The pancreas and IVC were obscured by bowel gas. EKG:  Results for orders placed or performed in visit on 02/15/19   AMB POC EKG ROUTINE W/ 12 LEADS, INTER & REP     Status: None    Impression    Sinus  Rhythm 84bpm  Normal axis  Voltage criteria for LVH  (S(V1)+R(V6) exceeds 3.50 mV). -ST depression   +   T-abnormality  -Seen with left ventricular hypertrophy (strain) or digitalis effect              Johnathon Tafoya MD  4/15/2021 12:39 PM          Yany Oconnor. Praveen Perez MD, Kye Pinzon  Attending Physician  Hospitalist's SVC.

## 2021-04-15 NOTE — PROGRESS NOTES
Problem: Falls - Risk of  Goal: *Absence of Falls  Description: Document Dom Mendoza Fall Risk and appropriate interventions in the flowsheet.   Outcome: Progressing Towards Goal  Note: Fall Risk Interventions:            Medication Interventions: Patient to call before getting OOB, Teach patient to arise slowly                   Problem: Patient Education: Go to Patient Education Activity  Goal: Patient/Family Education  Outcome: Progressing Towards Goal     Problem: Pain  Goal: *Control of Pain  Outcome: Progressing Towards Goal    Problem: Patient Education: Go to Patient Education Activity  Goal: Patient/Family Education  Outcome: Progressing Towards Goal

## 2021-04-16 ENCOUNTER — APPOINTMENT (OUTPATIENT)
Dept: GENERAL RADIOLOGY | Age: 56
DRG: 417 | End: 2021-04-16
Attending: INTERNAL MEDICINE
Payer: COMMERCIAL

## 2021-04-16 PROBLEM — K80.20 GALLSTONES: Status: ACTIVE | Noted: 2021-04-16

## 2021-04-16 LAB
ALBUMIN SERPL-MCNC: 2.6 G/DL (ref 3.5–5)
ALBUMIN/GLOB SERPL: 1 {RATIO} (ref 1.2–3.5)
ALP SERPL-CCNC: 75 U/L (ref 50–136)
ALT SERPL-CCNC: 32 U/L (ref 12–65)
ANION GAP SERPL CALC-SCNC: 6 MMOL/L (ref 7–16)
AST SERPL-CCNC: 39 U/L (ref 15–37)
BILIRUB SERPL-MCNC: 0.5 MG/DL (ref 0.2–1.1)
BUN SERPL-MCNC: 5 MG/DL (ref 6–23)
CALCIUM SERPL-MCNC: 6.8 MG/DL (ref 8.3–10.4)
CHLORIDE SERPL-SCNC: 107 MMOL/L (ref 98–107)
CO2 SERPL-SCNC: 27 MMOL/L (ref 21–32)
CREAT SERPL-MCNC: 0.87 MG/DL (ref 0.8–1.5)
ERYTHROCYTE [DISTWIDTH] IN BLOOD BY AUTOMATED COUNT: 15 % (ref 11.9–14.6)
FERRITIN SERPL-MCNC: 333 NG/ML (ref 8–388)
FOLATE SERPL-MCNC: 3.9 NG/ML (ref 3.1–17.5)
GLOBULIN SER CALC-MCNC: 2.6 G/DL (ref 2.3–3.5)
GLUCOSE SERPL-MCNC: 98 MG/DL (ref 65–100)
HCT VFR BLD AUTO: 32.7 % (ref 41.1–50.3)
HGB BLD-MCNC: 10.5 G/DL (ref 13.6–17.2)
HGB RETIC QN AUTO: 41 PG (ref 29–35)
IMM RETICS NFR: 12 % (ref 2.3–13.4)
IRON SATN MFR SERPL: 14 %
IRON SERPL-MCNC: 27 UG/DL (ref 35–150)
MAGNESIUM SERPL-MCNC: 1.1 MG/DL (ref 1.8–2.4)
MCH RBC QN AUTO: 33.2 PG (ref 26.1–32.9)
MCHC RBC AUTO-ENTMCNC: 32.1 G/DL (ref 31.4–35)
MCV RBC AUTO: 103.5 FL (ref 79.6–97.8)
NRBC # BLD: 0 K/UL (ref 0–0.2)
PLATELET # BLD AUTO: 111 K/UL (ref 150–450)
PMV BLD AUTO: 10 FL (ref 9.4–12.3)
POTASSIUM SERPL-SCNC: 3.1 MMOL/L (ref 3.5–5.1)
PROT SERPL-MCNC: 5.2 G/DL (ref 6.3–8.2)
RBC # BLD AUTO: 3.16 M/UL (ref 4.23–5.6)
RETICS # AUTO: 0.04 M/UL (ref 0.03–0.1)
RETICS/RBC NFR AUTO: 1.1 % (ref 0.3–2)
SODIUM SERPL-SCNC: 140 MMOL/L (ref 136–145)
TIBC SERPL-MCNC: 194 UG/DL (ref 250–450)
VIT B12 SERPL-MCNC: 1099 PG/ML (ref 193–986)
WBC # BLD AUTO: 4 K/UL (ref 4.3–11.1)

## 2021-04-16 PROCEDURE — 99222 1ST HOSP IP/OBS MODERATE 55: CPT | Performed by: INTERNAL MEDICINE

## 2021-04-16 PROCEDURE — 94640 AIRWAY INHALATION TREATMENT: CPT

## 2021-04-16 PROCEDURE — 74011000258 HC RX REV CODE- 258: Performed by: EMERGENCY MEDICINE

## 2021-04-16 PROCEDURE — 96367 TX/PROPH/DG ADDL SEQ IV INF: CPT

## 2021-04-16 PROCEDURE — 82746 ASSAY OF FOLIC ACID SERUM: CPT

## 2021-04-16 PROCEDURE — 82607 VITAMIN B-12: CPT

## 2021-04-16 PROCEDURE — 85046 RETICYTE/HGB CONCENTRATE: CPT

## 2021-04-16 PROCEDURE — 74240 X-RAY XM UPR GI TRC 1CNTRST: CPT

## 2021-04-16 PROCEDURE — 99218 HC RM OBSERVATION: CPT

## 2021-04-16 PROCEDURE — 83735 ASSAY OF MAGNESIUM: CPT

## 2021-04-16 PROCEDURE — 85027 COMPLETE CBC AUTOMATED: CPT

## 2021-04-16 PROCEDURE — 96376 TX/PRO/DX INJ SAME DRUG ADON: CPT

## 2021-04-16 PROCEDURE — 97165 OT EVAL LOW COMPLEX 30 MIN: CPT

## 2021-04-16 PROCEDURE — 36415 COLL VENOUS BLD VENIPUNCTURE: CPT

## 2021-04-16 PROCEDURE — 80053 COMPREHEN METABOLIC PANEL: CPT

## 2021-04-16 PROCEDURE — 2709999900 HC NON-CHARGEABLE SUPPLY

## 2021-04-16 PROCEDURE — 97161 PT EVAL LOW COMPLEX 20 MIN: CPT

## 2021-04-16 PROCEDURE — 74011250636 HC RX REV CODE- 250/636: Performed by: FAMILY MEDICINE

## 2021-04-16 PROCEDURE — 82728 ASSAY OF FERRITIN: CPT

## 2021-04-16 PROCEDURE — 74011250637 HC RX REV CODE- 250/637: Performed by: FAMILY MEDICINE

## 2021-04-16 PROCEDURE — 74011000250 HC RX REV CODE- 250: Performed by: FAMILY MEDICINE

## 2021-04-16 PROCEDURE — 99231 SBSQ HOSP IP/OBS SF/LOW 25: CPT | Performed by: SURGERY

## 2021-04-16 PROCEDURE — 74011250637 HC RX REV CODE- 250/637: Performed by: INTERNAL MEDICINE

## 2021-04-16 PROCEDURE — 94760 N-INVAS EAR/PLS OXIMETRY 1: CPT

## 2021-04-16 PROCEDURE — 74011250636 HC RX REV CODE- 250/636: Performed by: EMERGENCY MEDICINE

## 2021-04-16 PROCEDURE — 83540 ASSAY OF IRON: CPT

## 2021-04-16 PROCEDURE — 74011000250 HC RX REV CODE- 250: Performed by: INTERNAL MEDICINE

## 2021-04-16 PROCEDURE — 96365 THER/PROPH/DIAG IV INF INIT: CPT

## 2021-04-16 PROCEDURE — 65270000029 HC RM PRIVATE

## 2021-04-16 PROCEDURE — 74011250636 HC RX REV CODE- 250/636: Performed by: INTERNAL MEDICINE

## 2021-04-16 PROCEDURE — 96375 TX/PRO/DX INJ NEW DRUG ADDON: CPT

## 2021-04-16 RX ORDER — MAGNESIUM SULFATE HEPTAHYDRATE 40 MG/ML
4 INJECTION, SOLUTION INTRAVENOUS ONCE
Status: COMPLETED | OUTPATIENT
Start: 2021-04-16 | End: 2021-04-16

## 2021-04-16 RX ORDER — POTASSIUM CHLORIDE 20 MEQ/1
40 TABLET, EXTENDED RELEASE ORAL
Status: COMPLETED | OUTPATIENT
Start: 2021-04-16 | End: 2021-04-16

## 2021-04-16 RX ORDER — LEVALBUTEROL INHALATION SOLUTION 0.63 MG/3ML
0.63 SOLUTION RESPIRATORY (INHALATION)
Status: DISCONTINUED | OUTPATIENT
Start: 2021-04-16 | End: 2021-04-21 | Stop reason: HOSPADM

## 2021-04-16 RX ADMIN — PROMETHAZINE HYDROCHLORIDE 12.5 MG: 25 INJECTION INTRAMUSCULAR; INTRAVENOUS at 21:16

## 2021-04-16 RX ADMIN — PROMETHAZINE HYDROCHLORIDE 12.5 MG: 25 INJECTION INTRAMUSCULAR; INTRAVENOUS at 02:37

## 2021-04-16 RX ADMIN — MORPHINE SULFATE 2 MG: 2 INJECTION, SOLUTION INTRAMUSCULAR; INTRAVENOUS at 02:37

## 2021-04-16 RX ADMIN — SODIUM CHLORIDE 500 ML: 900 INJECTION, SOLUTION INTRAVENOUS at 01:36

## 2021-04-16 RX ADMIN — LEVALBUTEROL HYDROCHLORIDE 0.63 MG: 0.63 SOLUTION RESPIRATORY (INHALATION) at 07:22

## 2021-04-16 RX ADMIN — BARIUM SULFATE 700 MG: 700 TABLET ORAL at 11:57

## 2021-04-16 RX ADMIN — Medication 10 ML: at 21:07

## 2021-04-16 RX ADMIN — MORPHINE SULFATE 2 MG: 2 INJECTION, SOLUTION INTRAMUSCULAR; INTRAVENOUS at 21:16

## 2021-04-16 RX ADMIN — PROMETHAZINE HYDROCHLORIDE 12.5 MG: 25 INJECTION INTRAMUSCULAR; INTRAVENOUS at 08:13

## 2021-04-16 RX ADMIN — HYDROCODONE BITARTRATE AND ACETAMINOPHEN 1 TABLET: 10; 325 TABLET ORAL at 11:26

## 2021-04-16 RX ADMIN — CARVEDILOL 25 MG: 25 TABLET, FILM COATED ORAL at 08:12

## 2021-04-16 RX ADMIN — MAGNESIUM GLUCONATE 500 MG ORAL TABLET 800 MG: 500 TABLET ORAL at 21:07

## 2021-04-16 RX ADMIN — MORPHINE SULFATE 2 MG: 2 INJECTION, SOLUTION INTRAMUSCULAR; INTRAVENOUS at 08:13

## 2021-04-16 RX ADMIN — GABAPENTIN 300 MG: 300 CAPSULE ORAL at 08:13

## 2021-04-16 RX ADMIN — CEFTRIAXONE SODIUM 2 G: 2 INJECTION, POWDER, FOR SOLUTION INTRAMUSCULAR; INTRAVENOUS at 01:15

## 2021-04-16 RX ADMIN — POTASSIUM CHLORIDE 40 MEQ: 1500 TABLET, EXTENDED RELEASE ORAL at 08:13

## 2021-04-16 RX ADMIN — AZITHROMYCIN MONOHYDRATE 500 MG: 500 INJECTION, POWDER, LYOPHILIZED, FOR SOLUTION INTRAVENOUS at 01:40

## 2021-04-16 RX ADMIN — BARIUM SULFATE 355 ML: 0.6 SUSPENSION ORAL at 11:57

## 2021-04-16 RX ADMIN — GABAPENTIN 300 MG: 300 CAPSULE ORAL at 17:35

## 2021-04-16 RX ADMIN — PROMETHAZINE HYDROCHLORIDE 12.5 MG: 25 INJECTION INTRAMUSCULAR; INTRAVENOUS at 14:53

## 2021-04-16 RX ADMIN — ALPRAZOLAM 1 MG: 0.5 TABLET ORAL at 21:07

## 2021-04-16 RX ADMIN — POTASSIUM CHLORIDE 40 MEQ: 1500 TABLET, EXTENDED RELEASE ORAL at 10:37

## 2021-04-16 RX ADMIN — Medication 10 ML: at 14:00

## 2021-04-16 RX ADMIN — MAGNESIUM GLUCONATE 500 MG ORAL TABLET 800 MG: 500 TABLET ORAL at 08:13

## 2021-04-16 RX ADMIN — MORPHINE SULFATE 2 MG: 2 INJECTION, SOLUTION INTRAMUSCULAR; INTRAVENOUS at 14:23

## 2021-04-16 RX ADMIN — Medication 10 ML: at 06:00

## 2021-04-16 RX ADMIN — MAGNESIUM SULFATE HEPTAHYDRATE 4 G: 40 INJECTION, SOLUTION INTRAVENOUS at 10:37

## 2021-04-16 NOTE — PROGRESS NOTES
OCCUPATIONAL THERAPY: Initial Assessment and Discharge 4/16/2021  OBSERVATION: OT Visit Days: 1  Payor: Gaetano Cobos / Plan: [place] / Product Type: PPO /      NAME/AGE/GENDER: Alexander Martinez is a 64 y.o. male   PRIMARY DIAGNOSIS:  Elevated brain natriuretic peptide (BNP) level [R79.89] Elevated brain natriuretic peptide (BNP) level Elevated brain natriuretic peptide (BNP) level        ICD-10: Treatment Diagnosis:    Generalized Muscle Weakness (M62.81)   Precautions/Allergies:     Patient has no known allergies. ASSESSMENT:     Mr. Jefferson Montalvo presents dx above. Patient is retired and lives with wife at home. He is having abdominal pain and some dizziness from pain meds during session. He is a cooperative but a bit agitated at start of evaluation due to pain and dizziness. He demonstrated independence with ADL' and txfs during self cares and apologized at end of session for his agitation at start of eval.  Patient will do well at home with spouse at d/c  D/C OT. This section established at most recent assessment   PROBLEM LIST (Impairments causing functional limitations):     INTERVENTIONS PLANNED: (Benefits and precautions of occupational therapy have been discussed with the patient.)       TREATMENT PLAN: Frequency/Duration: Follow patient Evaluation and d/c to address above goals. Rehabilitation Potential For Stated Goals:      REHAB RECOMMENDATIONS (at time of discharge pending progress):    Placement: It is my opinion, based on this patient's performance to date, that Mr. Jefferson Montalvo may benefit from being discharged with NO further skilled therapy due to a proven ability to function at baseline.   Equipment:   None at this time              OCCUPATIONAL PROFILE AND HISTORY:   History of Present Injury/Illness (Reason for Referral):  See H&P  Past Medical History/Comorbidities:   Mr. Jefferson Montalvo  has a past medical history of Anxiety associated with depression (3/18/2017), Arthritis, CAD (coronary artery disease), CHF (congestive heart failure) ([address] Chronic alcoholism ([address] Chronic back pain, Chronic neck pain, Chronic systolic heart failure ([address] (4/21/2011), Dental caries (7/13/2017), Depression, Dizziness - light-headed, GERD (gastroesophageal reflux disease), Gynecomastia (2/22/2016), Heart failure ([address] History of implantable cardioverter-defibrillator (ICD) placement (2/22/2016), Hypertension, ICD (implantable cardioverter-defibrillator) in place (4/22/2011), Leukopenia (5/7/2019), Macrocytic anemia (7/8/2018), Psychiatric disorder, Schatzki's ring (07/10/2018), Situational depression (2/22/2016), SVT (supraventricular tachycardia) ([address] (12/22/2018), and Ventricular tachycardia ([address] (2/22/2016). He also has no past medical history of Asthma, Autoimmune disease ([address] Cancer ([address] Chronic kidney disease, Chronic obstructive pulmonary disease ([address] Diabetes ([address] Difficult intubation, Liver disease, Malignant hyperthermia due to anesthesia, Pseudocholinesterase deficiency, PUD (peptic ulcer disease), Seizures ([address] Sleep apnea, Thromboembolus ([address] or Thyroid disease. Mr. George Sharif  has a past surgical history that includes hx heart catheterization (9/21/10); hx pacemaker; and egd (5/9/2019). Social History/Living Environment:   Home Environment: Private residence  # Steps to Enter: 5  One/Two Story Residence: One story  Living Alone: No  Support Systems: Spouse/Significant Other/Partner  Patient Expects to be Discharged to[de-identified] Private residence  Current DME Used/Available at Home: None  Prior Level of Function/Work/Activity:  Independent prior.       Number of Personal Factors/Comorbidities that affect the Plan of Care: Brief history (0):  LOW COMPLEXITY   ASSESSMENT OF OCCUPATIONAL PERFORMANCE[de-identified]   Activities of Daily Living:   Basic ADLs (From Assessment) Complex ADLs (From Assessment)   Oral Facial Hygiene/Grooming: Independent  Bathing: Independent  Upper Body Dressing: Independent  Lower Body Dressing: Independent     Grooming/Bathing/Dressing Activities of Daily Living                       Functional Transfers  Bathroom Mobility: Supervision/set up  Toilet Transfer : Independent     Bed/Mat Mobility  Supine to Sit: Independent  Sit to Stand: Supervision  Stand to Sit: Supervision  Bed to Chair: Supervision  Scooting: Independent     Most Recent Physical Functioning:   Gross Assessment:                  Posture:     Balance:  Sitting: Intact  Standing: With support Bed Mobility:  Supine to Sit: Independent  Scooting: Independent  Wheelchair Mobility:     Transfers:  Sit to Stand: Supervision  Stand to Sit: Supervision  Bed to Chair: Supervision            Patient Vitals for the past 6 hrs:   BP SpO2 Pulse   04/16/21 0722 95/67 98 % 84   04/16/21 0802 109/87 99 % 90   04/16/21 0822 115/83 97 % 82   04/16/21 0922 105/77 96 % 85       Mental Status  Neurologic State: Alert  Orientation Level: Oriented X4  Cognition: Appropriate decision making                          Physical Skills Involved:  Sensation Cognitive Skills Affected (resulting in the inability to perform in a timely and safe manner):  Sustained Attention Psychosocial Skills Affected:  Self-Awareness   Number of elements that affect the Plan of Care: 1-3:  LOW COMPLEXITY   CLINICAL DECISION MAKING:   [facility] AM-PAC 6 Clicks   Daily Activity Inpatient Short Form  How much help from another person does the patient currently need. .. Total A Lot A Little None   1. Putting on and taking off regular lower body clothing? [] 1   [] 2   [] 3   [x] 4   2. Bathing (including washing, rinsing, drying)? [] 1   [] 2   [] 3   [x] 4   3. Toileting, which includes using toilet, bedpan or urinal?   [] 1   [] 2   [] 3   [x] 4   4. Putting on and taking off regular upper body clothing? [] 1   [] 2   [] 3   [x] 4   5. Taking care of personal grooming such as brushing teeth? [] 1   [] 2   [] 3   [x] 4   6. Eating meals?    [] 1   [] 2   [] 3   [x] 4 © 2007, Trustees of [address] [place] [address] [address] [id number], under license to [employer]. All rights reserved      Score:  Initial: 24 Most Recent: X (Date: -- )    Interpretation of Tool:  Represents activities that are increasingly more difficult (i.e. Bed mobility, Transfers, Gait). Use of outcome tool(s) and clinical judgement create a POC that gives a: LOW COMPLEXITY         TREATMENT:   (In addition to Assessment/Re-Assessment sessions the following treatments were rendered)     Pre-treatment Symptoms/Complaints:    Pain: Initial:      Post Session:       Assessment/Reassessment only, no treatment provided today    Braces/Orthotics/Lines/Etc:   IV  O2 Device: None (Room air)  Treatment/Session Assessment:    Response to Treatment:  Good, sitting up in recliner  Interdisciplinary Collaboration:   Physical Therapist  Occupational Therapist  Registered Nurse  After treatment position/precautions:   Supine in bed  Call light within reach  RN notified   Compliance with Program/Exercises: Compliant all of the time, Will assess as treatment progresses.     Total Treatment Duration:  OT Patient Time In/Time Out  Time In: 0820  Time Out: GODWIN Falk

## 2021-04-16 NOTE — PROGRESS NOTES
TRANSFER - IN REPORT:    Verbal report received from [address] [place] [address] RN on Akanksha Diaz  being received from ICU. Report consisted of patients Situation, Background, Assessment and   Recommendations(SBAR). Information from the following report(s) SBAR was reviewed with the receiving nurse. Opportunity for questions and clarification was provided. Assessment completed upon patients arrival to unit and care assumed.

## 2021-04-16 NOTE — PROGRESS NOTES
TRANSFER - OUT REPORT:    Verbal report given to Priyank Staley RN on Ramon Gavin  being transferred to [phone] for routine progression of care       Report consisted of patients Situation, Background, Assessment and   Recommendations(SBAR). Information from the following report(s) SBAR was reviewed with the receiving nurse. Lines:   Peripheral IV 04/14/21 Left Antecubital (Active)   Site Assessment Clean, dry, & intact 04/16/21 0759   Phlebitis Assessment 0 04/16/21 0759   Infiltration Assessment 0 04/16/21 0759   Dressing Status Clean, dry, & intact 04/16/21 0759   Dressing Type Transparent;Tape 04/16/21 0759   Hub Color/Line Status Patent; Flushed 04/16/21 0759   Alcohol Cap Used No 04/16/21 0322        Opportunity for questions and clarification was provided.       Patient transported with:   Registered Nurse  Tech

## 2021-04-16 NOTE — PROGRESS NOTES
Admit Date: 2021    POD * No surgery date entered *    Procedure:  Procedure(s):  CHOLECYSTECTOMY LAPAROSCOPIC SINGLE INCISION (SILS) ROOM 357    Subjective:     Patient has complaints of pain. Pain control has been fair; pain is same as yesterday and chronic RUQ pain. Recently completed UGI- hungry. Objective:     Visit Vitals  BP (!) 116/100 (BP 1 Location: Right upper arm, BP Patient Position: At rest;Sitting)   Pulse 98   Temp 97.6 °F (36.4 °C)   Resp 18   Ht 5' 11\" (1.803 m)   Wt 181 lb 4.8 oz (82.2 kg)   SpO2 99%   BMI 25.29 kg/m²       Temp (24hrs), Av.9 °F (36.6 °C), Min:97.6 °F (36.4 °C), Max:98.1 °F (36.7 °C)  . I&O reviewed as documented. Physical Exam:    General-in no distress. resp unlabored  Abdomen- soft, TTP RUQ. Labs:   Recent Results (from the past 24 hour(s))   METABOLIC PANEL, COMPREHENSIVE    Collection Time: 21  3:54 AM   Result Value Ref Range    Sodium 140 136 - 145 mmol/L    Potassium 3.1 (L) 3.5 - 5.1 mmol/L    Chloride 107 98 - 107 mmol/L    CO2 27 21 - 32 mmol/L    Anion gap 6 (L) 7 - 16 mmol/L    Glucose 98 65 - 100 mg/dL    BUN 5 (L) 6 - 23 MG/DL    Creatinine 0.87 0.8 - 1.5 MG/DL    GFR est AA >60 >60 ml/min/1.73m2    GFR est non-AA >60 >60 ml/min/1.73m2    Calcium 6.8 (L) 8.3 - 10.4 MG/DL    Bilirubin, total 0.5 0.2 - 1.1 MG/DL    ALT (SGPT) 32 12 - 65 U/L    AST (SGOT) 39 (H) 15 - 37 U/L    Alk.  phosphatase 75 50 - 136 U/L    Protein, total 5.2 (L) 6.3 - 8.2 g/dL    Albumin 2.6 (L) 3.5 - 5.0 g/dL    Globulin 2.6 2.3 - 3.5 g/dL    A-G Ratio 1.0 (L) 1.2 - 3.5     CBC W/O DIFF    Collection Time: 21  3:54 AM   Result Value Ref Range    WBC 4.0 (L) 4.3 - 11.1 K/uL    RBC 3.16 (L) 4.23 - 5.6 M/uL    HGB 10.5 (L) 13.6 - 17.2 g/dL    HCT 32.7 (L) 41.1 - 50.3 %    .5 (H) 79.6 - 97.8 FL    MCH 33.2 (H) 26.1 - 32.9 PG    MCHC 32.1 31.4 - 35.0 g/dL    RDW 15.0 (H) 11.9 - 14.6 %    PLATELET 037 (L) 837 - 450 K/uL    MPV 10.0 9.4 - 12.3 FL ABSOLUTE NRBC 0.00 0.0 - 0.2 K/uL   MAGNESIUM    Collection Time: 04/16/21  3:54 AM   Result Value Ref Range    Magnesium 1.1 (L) 1.8 - 2.4 mg/dL            Assessment:     Principal Problem:    Elevated brain natriuretic peptide (BNP) level (4/14/2021)    Active Problems:    Chronic systolic (congestive) heart failure (HCC) (4/21/2011)      HTN (hypertension) (11/29/2011)      Nausea & vomiting (2/26/2016)      CAMARILLO (dyspnea on exertion) (3/25/2016)      Gallstones (4/16/2021)          Plan/Recommendations/Medical Decision Making:     UGI images reviewed- looks like mild presbyesophagus; discussed with Dr Yonny Kline who does not believe dilation (at time of lap choly) would be of significant benefit    Plan is definitely to proceed with lap choly Monday afternoon  Await final cardiology eval  Surgery briefly reviewed with pt  Surgery service available over weekend (Dr Salvador Lyman on call) but will not round on pt.

## 2021-04-16 NOTE — PROGRESS NOTES
[facility] Hospitalist Service Progress Note    Patient is a 64 y.o. male with medical h/o sCHF, HTN presents to the ER with complaint of SOB. Patient reports that he started feeling bad yesterday. He reports BLE edema and some mild chest pain. On ER workup, patient has elevated pBNP. Hospitalist asked to admit. Denies fevers. Reports chest discomfort, SOB, nausea/vomiting, cough. INTERVAL HISTORY  / Subjective:    04/15/2021 - Pt seen and evaluated. Pt with borderline BP. He states that breathing is better and is on RA. He c/o continued abd pain. 04/16/2021 - Pt states that he is breathing better but still with abd pain. Case d/w GI and surgery tentatively scheduled for Monday. Pt is in no distress. Assessment / Plan:    IMP:    1.) Acute on Chronic LVSD EF 20-25%/CHF - clinically better and off O2, IVVD impaired with borderline BP.    2.) Abd Pain with Abn LFT's - appreciate GI. ? Motility issues with UGI. For [place] on Monday. Keep NPO.    3.) CAD/Abn Troponin - flat, ?  Due to CHF, ck dopplers    4.) HTN - with hypotension, give fluids, f/u MAP > 65    5.) H/O VT s/p  ICD - not MRI compatible    6.) Anxiety/Depression    7.) Chronic EtOH Abuse - start Thiamine,     8.) GERD h/o Schatzki's Ring    9.) Anemia/Macrocytosis - ck Fe studies, B12, fol    10.) HypoK+/Mg2+ - replace, ck Mg2+            Plan:    Careful hydration  Hold diuretics  Start Thiamine  Ck B12/Folate  Ck Fe studies  Cardio eval  Replace K+,  Mg2+  Ck am labs  Tx to tele        Chief Complaint : Shortness of Breath        Objective:  Visit Vitals  /80 (BP 1 Location: Right upper arm, BP Patient Position: At rest;Sitting)   Pulse 99   Temp 98 °F (36.7 °C)   Resp 17   Ht 5' 11\" (1.803 m)   Wt 82.2 kg (181 lb 4.8 oz)   SpO2 100%   BMI 25.29 kg/m²                 Physical Exam: Pt examined 04/16/2021 and exam as below:        GEN - middle aged BM in no distress  HEENT - NC/At, PERRLA, EOMI, No JVD  Lungs - diminished, clear  CVS - nml S1, S2, RRR,   Abd - Soft, NT, ND, Nml BS+  Ext - Nml ROM, No edema      Intake and Output:  Date 04/15/21 0700 - 04/16/21 0659 04/16/21 0700 - 04/17/21 0659   Shift 2525-3735 5991-3067 24 Hour Total 1481-2051 5942-8028 24 Hour Total   INTAKE   Shift Total(mL/kg)         OUTPUT   Urine(mL/kg/hr) 700(0.7) 1000(1) 1700(0.9)        Urine Voided 700 1000 1700      Shift Total(mL/kg) 700(8.4) 1000(12.2) 1700(20.7)      NET -700 -1000 -1700      Weight (kg) 83 82.2 82.2 82.2 82.2 82.2       LAB:  Admission on 04/14/2021   Component Date Value    WBC 04/14/2021 3.8*    RBC 04/14/2021 4.07*    HGB 04/14/2021 13.4*    HCT 04/14/2021 38.5*    MCV 04/14/2021 94.6     MCH 04/14/2021 32.9     MCHC 04/14/2021 34.8     RDW 04/14/2021 13.8     PLATELET 35/26/1903 297     MPV 04/14/2021 9.6     ABSOLUTE NRBC 04/14/2021 0.00     DF 04/14/2021 AUTOMATED     NEUTROPHILS 04/14/2021 57     LYMPHOCYTES 04/14/2021 32     MONOCYTES 04/14/2021 8     EOSINOPHILS 04/14/2021 1     BASOPHILS 04/14/2021 1     IMMATURE GRANULOCYTES 04/14/2021 1     ABS. NEUTROPHILS 04/14/2021 2.2     ABS. LYMPHOCYTES 04/14/2021 1.2     ABS. MONOCYTES 04/14/2021 0.3     ABS. EOSINOPHILS 04/14/2021 0.1     ABS. BASOPHILS 04/14/2021 0.0     ABS. IMM. GRANS. 04/14/2021 0.0     Sodium 04/14/2021 137     Potassium 04/14/2021 3.4*    Chloride 04/14/2021 100     CO2 04/14/2021 26     Anion gap 04/14/2021 11     Glucose 04/14/2021 122*    BUN 04/14/2021 7     Creatinine 04/14/2021 0.84     GFR est AA 04/14/2021 >60     GFR est non-AA 04/14/2021 >60     Calcium 04/14/2021 8.0*    Bilirubin, total 04/14/2021 2.3*    ALT (SGPT) 04/14/2021 84*    AST (SGOT) 04/14/2021 224*    Alk.  phosphatase 04/14/2021 118     Protein, total 04/14/2021 6.6     Albumin 04/14/2021 3.7     Globulin 04/14/2021 2.9     A-G Ratio 04/14/2021 1.3     Lipase 04/14/2021 62*    Ventricular Rate 04/14/2021 102     Atrial Rate 04/14/2021 102  P-R Interval 04/14/2021 172     QRS Duration 04/14/2021 98     Q-T Interval 04/14/2021 392     QTC Calculation (Bezet) 04/14/2021 510     Calculated P Axis 04/14/2021 47     Calculated R Axis 04/14/2021 -22     Calculated T Axis 04/14/2021 -96     Diagnosis 04/14/2021                      Value:!!! Poor data quality, interpretation may be adversely affected  Sinus tachycardia  Anterior infarct (cited on or before 02-JUL-2020)  ST & T wave abnormality, consider lateral ischemia  Abnormal ECG  When compared with ECG of 30-SEP-2020 03:19,  Poor data quality in current ECG precludes serial comparison  Confirmed by ST RYAN HORNER MD (), EVIE ORELLANA ([id number]) on 4/14/2021 8:49:05 AM      Lactic acid 04/14/2021 4.0*    NT pro-BNP 04/14/2021 2,887*    Troponin-High Sensitivity 04/14/2021 114.4*    Troponin-High Sensitivity 04/14/2021 98.8*    Special Requests: 04/14/2021                      Value:LEFT  HAND      Culture result: 04/14/2021 NO GROWTH 2 DAYS     Special Requests: 04/14/2021                      Value:RIGHT  HAND      Culture result: 04/14/2021 NO GROWTH 2 DAYS     Lactic acid 04/14/2021 2.0     SARS-CoV-2 04/14/2021 Please find results under separate order     Specimen source 04/14/2021 Nasopharyngeal     COVID-19 rapid test 04/14/2021 Not detected     Specimen source 04/14/2021 Nasopharyngeal     SARS-CoV-2 04/14/2021 Not detected     Sodium 04/15/2021 139     Potassium 04/15/2021 3.0*    Chloride 04/15/2021 102     CO2 04/15/2021 29     Anion gap 04/15/2021 8     Glucose 04/15/2021 101*    BUN 04/15/2021 7     Creatinine 04/15/2021 1.03     GFR est AA 04/15/2021 >60     GFR est non-AA 04/15/2021 >60     Calcium 04/15/2021 6.9*    Bilirubin, total 04/15/2021 1.4*    ALT (SGPT) 04/15/2021 50     AST (SGOT) 04/15/2021 84*    Alk.  phosphatase 04/15/2021 96     Protein, total 04/15/2021 5.6*    Albumin 04/15/2021 3.0*    Globulin 04/15/2021 2.6     A-G Ratio 04/15/2021 1.2  WBC 04/15/2021 4.2*    RBC 04/15/2021 3.45*    HGB 04/15/2021 11.5*    HCT 04/15/2021 33.9*    MCV 04/15/2021 98.3*    MCH 04/15/2021 33.3*    MCHC 04/15/2021 33.9     RDW 04/15/2021 14.8*    PLATELET 78/14/1360 826*    MPV 04/15/2021 9.8     ABSOLUTE NRBC 04/15/2021 0.00     Magnesium 04/15/2021 1.2*    Sodium 04/16/2021 140     Potassium 04/16/2021 3.1*    Chloride 04/16/2021 107     CO2 04/16/2021 27     Anion gap 04/16/2021 6*    Glucose 04/16/2021 98     BUN 04/16/2021 5*    Creatinine 04/16/2021 0.87     GFR est AA 04/16/2021 >60     GFR est non-AA 04/16/2021 >60     Calcium 04/16/2021 6.8*    Bilirubin, total 04/16/2021 0.5     ALT (SGPT) 04/16/2021 32     AST (SGOT) 04/16/2021 39*    Alk. phosphatase 04/16/2021 75     Protein, total 04/16/2021 5.2*    Albumin 04/16/2021 2.6*    Globulin 04/16/2021 2.6     A-G Ratio 04/16/2021 1.0*    WBC 04/16/2021 4.0*    RBC 04/16/2021 3.16*    HGB 04/16/2021 10.5*    HCT 04/16/2021 32.7*    MCV 04/16/2021 103.5*    MCH 04/16/2021 33.2*    MCHC 04/16/2021 32.1     RDW 04/16/2021 15.0*    PLATELET 37/20/0415 904*    MPV 04/16/2021 10.0     ABSOLUTE NRBC 04/16/2021 0.00     Magnesium 04/16/2021 1.1*       IMAGING:  Xr Chest Pa Lat    Result Date: 4/14/2021  No acute process. Xr Upper Gi Series W Kub    Result Date: 4/16/2021  Mild presbyesophagus. No strictures or ulcerations. Ct Abd Pelv W Cont    Result Date: 4/14/2021  1. Cholelithiasis without evidence of cholecystitis or biliary ductal dilatation. 2.  Diffuse hepatic parenchymal hypodensity may be associated with steatosis, hepatitis, or other infiltrative process. No focal liver lesion identified. 3.  Left clonic diverticulosis without evidence of diverticulitis, appendicitis, or other acute abdominopelvic abnormality identified. [id number]    Result Date: 4/14/2021  1. Distended gallbladder containing sludge and stones.  No gallbladder wall thickening or surrounding fluid is seen. 2. New mild hepatomegaly. 3. The pancreas and IVC were obscured by bowel gas. EKG:  Results for orders placed or performed in visit on 02/15/19   AMB POC EKG ROUTINE W/ 12 LEADS, INTER & REP     Status: None    Impression    Sinus  Rhythm 84bpm  Normal axis  Voltage criteria for LVH  (S(V1)+R(V6) exceeds 3.50 mV). -ST depression   +   T-abnormality  -Seen with left ventricular hypertrophy (strain) or digitalis effect              Haley Martinez MD  4/16/2021 12:39 PM          Elaine Doll. Shreya Anthony MD, [place]  Attending Physician  Hospitalist's SVC.

## 2021-04-16 NOTE — PROGRESS NOTES
[facility] Progress Note         Admit Date:  4/14/2021    Today's Date:  4/16/2021    CC:  RUQ pain, elevated T bili    Subjective:     Patient states he continues with constant severe RUQP. He also confirms the chronic dysphagia.       Medications:   Current Facility-Administered Medications   Medication Dose Route Frequency    magnesium sulfate 4 g/100 mL IVPB  4 g IntraVENous ONCE    promethazine (PHENERGAN) with saline injection 12.5 mg  12.5 mg IntraVENous Q6H PRN    losartan (COZAAR) tablet 25 mg  25 mg Oral DAILY    cefTRIAXone (ROCEPHIN) 2 g in 0.9% sodium chloride (MBP/ADV) 50 mL MBP  2 g IntraVENous Q24H    azithromycin (ZITHROMAX) 500 mg in 0.9% sodium chloride 250 mL (VIAL-MATE)  500 mg IntraVENous Q24H    pantoprazole (PROTONIX) tablet 40 mg  40 mg Oral ACB    HYDROcodone-acetaminophen (NORCO)  mg tablet 1 Tab  1 Tab Oral Q6H PRN    magnesium oxide (MAG-OX) tablet 800 mg  800 mg Oral Q12H    potassium chloride (K-DUR, KLOR-CON) SR tablet 40 mEq  40 mEq Oral DAILY    carvediloL (COREG) tablet 25 mg  25 mg Oral BID WITH MEALS    [Held by provider] furosemide (LASIX) tablet 40 mg  40 mg Oral BID    [Held by provider] atorvastatin (LIPITOR) tablet 80 mg  80 mg Oral DAILY    gabapentin (NEURONTIN) capsule 300 mg  300 mg Oral BID    sodium chloride (NS) flush 5-40 mL  5-40 mL IntraVENous Q8H    sodium chloride (NS) flush 5-40 mL  5-40 mL IntraVENous PRN    acetaminophen (TYLENOL) tablet 650 mg  650 mg Oral Q6H PRN    Or    acetaminophen (TYLENOL) suppository 650 mg  650 mg Rectal Q6H PRN    polyethylene glycol (MIRALAX) packet 17 g  17 g Oral DAILY PRN    enoxaparin (LOVENOX) injection 40 mg  40 mg SubCUTAneous DAILY    ALPRAZolam (XANAX) tablet 1 mg  1 mg Oral QHS    morphine injection 2 mg  2 mg IntraVENous Q4H PRN    levalbuterol (XOPENEX) nebulizer soln 0.63 mg/3 mL  0.63 mg Nebulization Q6H RT    ondansetron (ZOFRAN) injection 4 mg  4 mg IntraVENous Q4H PRN       Objective:   Vitals:  Visit Vitals  BP (!) 116/100 (BP 1 Location: Right upper arm, BP Patient Position: At rest;Sitting)   Pulse 98   Temp 97.6 °F (36.4 °C)   Resp 18   Ht 5' 11\" (1.803 m)   Wt 82.2 kg (181 lb 4.8 oz)   SpO2 99%   BMI 25.29 kg/m²     Intake/Output:  No intake/output data recorded. 04/14 1901 - 04/16 0700  In: -   Out: 2200 [Urine:2200]  Exam:  General appearance: alert, cooperative  Lungs: coarse BS to auscultation bilaterally anteriorly  Heart: tachycardic  Abdomen: soft, very tender over right side of abdomen.  Bowel sounds normal. No masses, no organomegaly  Neuro:  alert and oriented    Data Review (Labs):    Recent Labs     04/16/21  0354 04/15/21  0501 04/14/21  0005   WBC 4.0* 4.2* 3.8*   HGB 10.5* 11.5* 13.4*   HCT 32.7* 33.9* 38.5*   * 149* 191   .5* 98.3* 94.6    139 137   K 3.1* 3.0* 3.4*    102 100   CO2 27 29 26   BUN 5* 7 7   CREA 0.87 1.03 0.84   CA 6.8* 6.9* 8.0*   MG 1.1* 1.2*  --    GLU 98 101* 122*   AP 75 96 118   ALT 32 50 84*   TBILI 0.5 1.4* 2.3*   ALB 2.6* 3.0* 3.7   TP 5.2* 5.6* 6.6   LPSE  --   --  62*     SARS-COV-2, PCR [[id number]] (Order [id number]  Date: 4/14/2021 Department: [facility] Released By:  (auto-released) Authorizing: Jeffy Perkins MD   Specimen Information: Nasopharyngeal        Component Value Flag Ref Range Units Status   Specimen source Nasopharyngeal      Final   SARS-CoV-2 Not detected   NOTD   Final     EGD 10/3/2020-Dr Romie Wu  FINDINGS:  OROPHARYNX: Cords and pyriform recesses normal.  ESOPHAGUS: The esophagus is normal. The proximal, mid and distal portions are normal. The Z-Line is intact.  Empiric dilation was done using a 47 Fr followed by 60 Fr Savary.  Re-endoscopy after dilation showed no rents or heme.  Multiple biopsies were taken from the mid and proximal esophagus using cold forceps to rule out EoE.    STOMACH: The fundus on antegrade and retroflex views is normal. The body, antrum and pylorus is normal.  DUODENUM: The bulb and second portions are normal.   ASSESSMENT:  1. Normal EGD   PLAN:  1. Follow up pathology  2. Okay to restart diet.  Note that patient passed his swallow evaluation with SLP.  They recommended a regular diet with regular thin liquids.    3. Will sign off.  Please call us with any further questions or concerns.    4. Will plan to see patient in clinic in 2-3 weeks.  If dysphagia persists, we can discuss an esophageal manometry at that time. Nito Chew MD  [facility]     Right upper quadrant ultrasound: 10/04/2020   HISTORY: nausea.   Real-time sonography of the right upper quadrant was performed.    Comparison: 08/15/2018   FINDINGS: There is homogeneous echotexture throughout the liver. There is no  intrahepatic biliary ductal dilatation. There is no focal hepatic mass.   There are sludge and stones within the gallbladder lumen. There is no  gallbladder wall thickening or pericholecystic fluid to suggest acute  cholecystitis. The common bile duct is normal  measuring 6 mm.   The pancreas is unable to be visualized as it was obscured by overlying bowel  gas. The right kidney is unremarkable without hydronephrosis or solid mass. The  right kidney was measured at approximately 10.4 cm.   The visualized portions of the abdominal aorta and inferior vena cava are  unremarkable. There is normal hepatopetal flow within the main portal vein. No  free fluid is seen within the right upper quadrant.   IMPRESSION  IMPRESSION:    1. Stones and sludge within the gallbladder lumen without sonographic findings  of acute cholecystitis. 2. Nonvisualization of the pancreas due to overlying bowel gas. Limited abdomen ultrasound. 4/14/2021   INDICATION: Pain.   COMPARISON: Prior ultrasound on October 4, 2020.   TECHNIQUE: Standard protocol limited right upper quadrant abdomen ultrasound.   FINDINGS:   - Liver:  The liver is mildly enlarged at 18.5 cm, new from the prior study at  which time it measured 16.3 cm. The liver is otherwise normal in appearance. - Gallbladder: Again noted are sludge and stones in the gallbladder lumen, with  no wall thickening or pericholecystic fluid. The gallbladder does not appear  distended, measuring 5.1 cm in diameter.  - Bile ducts: Within normal limits.  The common bile duct measures 4 mm. - Pancreas: Not well visualized due to overlying bowel gas. - Right kidney: Within normal limits. - Aorta and IVC: There is no abdominal aortic aneurysm. The IVC was obscured by  overlying bowel gas. - Portal vein: Within normal limits.  - Other: No ascites. IMPRESSION  1. Distended gallbladder containing sludge and stones. No gallbladder wall  thickening or surrounding fluid is seen. 2. New mild hepatomegaly. 3. The pancreas and IVC were obscured by bowel gas. CT OF THE ABDOMEN AND PELVIS WITH CONTRAST 14 April 2021     CLINICAL HISTORY:   Abdominal pain with neutropenia and abnormal liver function  tests.   TECHNIQUE:  Oral and nonionic intravenous contrast was administered, and the  abdomen and pelvis were scanned with spiral technique. Radiation dose reduction  was achieved using one or all of the following techniques: automated exposure  control, weight-based dosing, iterative reconstruction.   COMPARISON:  Ultrasound earlier today head CT of March 22, 2020.     CT ABDOMEN FINDINGS: Mild atelectasis is again noted adjacent to a moderate  hiatal hernia. The gallbladder contains small calcified stones but is no longer  distended. No pericholecystic inflammatory changes or biliary ductal  dilatation. Diffuse hepatic parenchymal hypodensity may be associated with  steatosis or hepatitis. The spleen, pancreas, adrenals, and kidneys appear  unremarkable.   CT PELVIS FINDINGS: Appendix is at the upper limits of normal in thickness  without pericholecystic inflammatory changes to suggest acute appendicitis.   No  pathologically enlarged lymph nodes or free fluid is evident. Scattered left  colonic diverticula without adjacent inflammatory changes. Bone windows  demonstrate no definite aggressive process, accounting for degenerative changes.   IMPRESSION   1. Cholelithiasis without evidence of cholecystitis or biliary ductal  dilatation.   2.  Diffuse hepatic parenchymal hypodensity may be associated with steatosis,  hepatitis, or other infiltrative process. No focal liver lesion identified.   3.  Left clonic diverticulosis without evidence of diverticulitis, appendicitis,  or other acute abdominopelvic abnormality identified. Assessment:     Principal Problem:    Elevated brain natriuretic peptide (BNP) level (4/14/2021)    Active Problems:    Chronic systolic (congestive) heart failure (HCC) (4/21/2011)      HTN (hypertension) (11/29/2011)      Nausea & vomiting (2/26/2016)      CAMARILLO (dyspnea on exertion) (3/25/2016)    65 yo male pt of Dr. Joyce Hutchinson with PMH of including but not limited to systolic dysfunction with EF ~20%, VT with ICD in place, chronic alcoholism, CAD, anxiety, depression, GERD, hx of Schatzki's ring, who was seen in consultation 14 April 2021 at the request of Dr. Myrna Murcia for RUQ pain and elevated T bili, after presented to ER with SOB and chest pain, and labs rnoted elevated T bili. RUQ ultrasound with GB sludge and stones, but CBD WNL and no mention of acute cholecystitis. Imaging comparable to ultrasound 10/2020. CT scan was negative for acute process of abd pain. Given the chronicity of the RUQ pain, would be unusual for this to be biliary colic. Given his EF 20-25%, his admission troponin of 114.4 (normal < 14), elevation of pBNP 2887, he is at increased risk for invasive procedures. Unable to have MRCP due to ICD. Appreciate surgery evaluation. Elevated LFTs are improving. It is likely multifactorial - degree of ischemia, passive congestion, fatty liver.   He has also had persistent dysphagia, though EGD with dilation in Oct 2020 was normal.  It may be related to underlying motility issues. Plan:   OOTH, abnormal LFTs. Spoke with Dr Luisa Saucedo, and he also feels patient with chronic biliary colic. Patient states he wants surgery, his QOL being very poor with this constant pain. I explained about his increased risk for surgery due to his severe cardiac disease. Await final consult from [facility]. Patient expresses understanding of this risk. LFTs have been improving with antibiotics. Dysphagia, chronic. Await UGI series.
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
## 2021-04-16 NOTE — PROGRESS NOTES
Care Management Interventions  PCP Verified by CM: Yes  Palliative Care Criteria Met (RRAT>21 & CHF Dx)?: No  Transition of Care Consult (CM Consult): Discharge Planning  Discharge Durable Medical Equipment: No(none)  Physical Therapy Consult: Yes  Occupational Therapy Consult: Yes  Speech Therapy Consult: No  Current Support Network: Lives with Spouse  Confirm Follow Up Transport: Family  Discharge Location  Discharge Placement: Home with family assistance  Prior to admission patient lives with his wife in one story home with 5 steps to entrance and independent of ADL's. He requires no DME and states uses a electric scooter at the grocery store. He has transportation as needed and has [facility] and [facility] . He obtains medications at [facility] or [facility]. Current d/c plan is home with spouse. PT/OT have been ordered and CM will f/u with recommendations. CM following.

## 2021-04-16 NOTE — PROGRESS NOTES
Problem: Mobility Impaired (Adult and Pediatric)  Goal: *Acute Goals and Plan of Care (Insert Text)  Description: ALL GOALS MET 4/16/21 :  (1.)Mr. Paco Barros will move from supine to sit and sit to supine  with INDEPENDENT. .  (2.)Mr. Paco Barros will transfer from bed to chair and chair to bed with SUPERVISION. (3.)Mr. Paco Barros will ambulate with SUPERVISION for 100 feet. Outcome: Resolved/Met     PHYSICAL THERAPY: Initial Assessment, Discharge and AM 4/16/2021  OBSERVATION: PT Visit Days : 1  Payor: Beverly Foster / Plan: [place] [employer] [employer] / Product Type: PPO /       NAME/AGE/GENDER: Jazmine Funez is a 64 y.o. male   PRIMARY DIAGNOSIS: Elevated brain natriuretic peptide (BNP) level [R79.89] Elevated brain natriuretic peptide (BNP) level Elevated brain natriuretic peptide (BNP) level  Procedure(s) (LRB):  CHOLECYSTECTOMY LAPAROSCOPIC SINGLE INCISION (SILS) ROOM 357 (N/A)     ICD-10: Treatment Diagnosis:    · Generalized Muscle Weakness (M62.81)  · Other abnormalities of gait and mobility (R26.89)   Precaution/Allergies:  Patient has no known allergies. ASSESSMENT:     Mr. Paco Barros presents with general weakness & deconditioning. This pt is functioning at a supervision level, No skilled PT follow up in is needed at this time. This section established at most recent assessment   PROBLEM LIST (Impairments causing functional limitations):  1. NA   INTERVENTIONS PLANNED: (Benefits and precautions of physical therapy have been discussed with the patient.)  1. NA     TREATMENT PLAN: Frequency/Duration: NA  Rehabilitation Potential For Stated Goals:  NA     REHAB RECOMMENDATIONS (at time of discharge pending progress):    Placement: It is my opinion, based on this patient's performance to date, that Mr. Paco Barros may benefit from being discharged with NO further skilled therapy due to a proven ability to function at baseline and all goals being met.   Equipment:    None at this time              HISTORY:   History of Present Injury/Illness (Reason for Referral):  Patient is a 64 y.o. male with medical h/o sCHF, HTN presents to the ER with complaint of SOB. Patient reports that he started feeling bad yesterday. He reports BLE edema and some mild chest pain. On ER workup, patient has elevated pBNP. Hospitalist asked to admit. Denies fevers. Reports chest discomfort, SOB, nausea/vomiting, cough. Past Medical History/Comorbidities:   Mr. Leopold Bamberg  has a past medical history of Anxiety associated with depression (3/18/2017), Arthritis, CAD (coronary artery disease), CHF (congestive heart failure) ([address] Chronic alcoholism ([address] Chronic back pain, Chronic neck pain, Chronic systolic heart failure ([address] (4/21/2011), Dental caries (7/13/2017), Depression, Dizziness - light-headed, GERD (gastroesophageal reflux disease), Gynecomastia (2/22/2016), Heart failure ([address] History of implantable cardioverter-defibrillator (ICD) placement (2/22/2016), Hypertension, ICD (implantable cardioverter-defibrillator) in place (4/22/2011), Leukopenia (5/7/2019), Macrocytic anemia (7/8/2018), Psychiatric disorder, Schatzki's ring (07/10/2018), Situational depression (2/22/2016), SVT (supraventricular tachycardia) ([address] (12/22/2018), and Ventricular tachycardia ([address] (2/22/2016). He also has no past medical history of Asthma, Autoimmune disease ([address] Cancer ([address] Chronic kidney disease, Chronic obstructive pulmonary disease ([address] Diabetes ([address] Difficult intubation, Liver disease, Malignant hyperthermia due to anesthesia, Pseudocholinesterase deficiency, PUD (peptic ulcer disease), Seizures ([address] Sleep apnea, Thromboembolus ([address] or Thyroid disease. Mr. Leopold Bamberg  has a past surgical history that includes hx heart catheterization (9/21/10); hx pacemaker; and egd (5/9/2019).   Social History/Living Environment:   Home Environment: Private residence  # Steps to Enter: 5  Rails to Enter: Yes  Hand Rails : Left  One/Two Story Residence: One story  Living Alone: No  Support Systems: Spouse/Significant Other/Partner  Patient Expects to be Discharged to[de-identified] Private residence  Current DME Used/Available at Home: None  Prior Level of Function/Work/Activity:  Pt was functioning without an assistive device prior to this admission  Personal Factors: Other factors that influence how disability is experienced by the patient:  current & PMH   Number of Personal Factors/Comorbidities that affect the Plan of Care: 3+: HIGH COMPLEXITY   EXAMINATION:   Most Recent Physical Functioning:   Gross Assessment:  AROM: Generally decreased, functional(all limbs & core)  Strength: Generally decreased, functional(all limbs & core)  Coordination: Generally decreased, functional(all limbs & core)                    Balance:  Sitting: Intact; Without support  Standing: Intact; Without support Bed Mobility:  Supine to Sit: Independent  Sit to Supine: (NT)  Scooting: Independent       Transfers:  Sit to Stand: Supervision  Stand to Sit: Supervision  Bed to Chair: Supervision  Gait:     Speed/Treasure: Delayed  Gait Abnormalities: Decreased step clearance(SOB after 50 ft short standing break then additional 50ft)  Distance (ft): 100 Feet (ft)  Assistive Device: (none)  Ambulation - Level of Assistance: Supervision  Stairs - Level of Assistance: (pt declined to practice stairs)   Functional Mobility:         Gait/Ambulation:  sup        Transfers:  sup        Bed Mobility:  ind   Body Structures Involved:  1. Metabolic  2. Joints  3. Muscles Body Functions Affected:  1. Movement Related  2. Metobolic/Endocrine Activities and Participation Affected:  1. General Tasks and Demands  2. Mobility   Number of elements that affect the Plan of Care: 4+: HIGH COMPLEXITY   CLINICAL PRESENTATION:   Presentation: Stable and uncomplicated: LOW COMPLEXITY   CLINICAL DECISION [facility] [place] [address] [id number] AM-PAC 6 Clicks   Basic Mobility Inpatient Short Form  How much difficulty does the patient currently have. ..  Unable A Lot A Little None   1. Turning over in bed (including adjusting bedclothes, sheets and blankets)? [] 1   [] 2   [] 3   [x] 4   2. Sitting down on and standing up from a chair with arms ( e.g., wheelchair, bedside commode, etc.)   [] 1   [] 2   [] 3   [x] 4   3. Moving from lying on back to sitting on the side of the bed? [] 1   [] 2   [] 3   [x] 4   How much help from another person does the patient currently need. .. Total A Lot A Little None   4. Moving to and from a bed to a chair (including a wheelchair)? [] 1   [] 2   [] 3   [x] 4   5. Need to walk in hospital room? [] 1   [] 2   [] 3   [x] 4   6. Climbing 3-5 steps with a railing? [] 1   [] 2   [x] 3   [] 4   © 2007, Trustees of [facility] [employer], under license to [employer]. All rights reserved      Score:  Initial: 23 Most Recent: X (Date: -- )    Interpretation of Tool:  Represents activities that are increasingly more difficult (i.e. Bed mobility, Transfers, Gait). Medical Necessity:     · No PT follow up. Reason for Services/Other Comments:  · No PT follow up.    Use of outcome tool(s) and clinical judgement create a POC that gives a: Clear prediction of patient's progress: LOW COMPLEXITY            TREATMENT:   (In addition to Assessment/Re-Assessment sessions the following treatments were rendered)   Pre-treatment Symptoms/Complaints:  none \"  I don't need therapy \"  Pain: Initial: numeric scale  Pain Intensity 1: 0  Post Session:  0/10     Assessment/Reassessment only, no treatment provided today    Braces/Orthotics/Lines/Etc:   · IV  Treatment/Session Assessment:    · Response to Treatment:  easily SOB with minimal exertion  · Interdisciplinary Collaboration:   o Registered Nurse  · After treatment position/precautions:   o Supine in bed  o Bed/Chair-wheels locked  o Bed in low position  o Call light within reach  o RN notified   · Compliance with Program/Exercises: Noncompliant much of the time  · Recommendations/ DC PT services.   · Total Treatment Duration:  PT Patient Time In/Time Out  Time In: 1021  Time Out: [address], PT

## 2021-04-17 LAB
ALBUMIN SERPL-MCNC: 2.9 G/DL (ref 3.5–5)
ALBUMIN/GLOB SERPL: 1 {RATIO} (ref 1.2–3.5)
ALP SERPL-CCNC: 86 U/L (ref 50–136)
ALT SERPL-CCNC: 30 U/L (ref 12–65)
ANION GAP SERPL CALC-SCNC: 5 MMOL/L (ref 7–16)
AST SERPL-CCNC: 39 U/L (ref 15–37)
BASOPHILS # BLD: 0 K/UL (ref 0–0.2)
BASOPHILS NFR BLD: 1 % (ref 0–2)
BILIRUB DIRECT SERPL-MCNC: 0.2 MG/DL
BILIRUB SERPL-MCNC: 0.3 MG/DL (ref 0.2–1.1)
BUN SERPL-MCNC: 6 MG/DL (ref 6–23)
CALCIUM SERPL-MCNC: 7.5 MG/DL (ref 8.3–10.4)
CHLORIDE SERPL-SCNC: 108 MMOL/L (ref 98–107)
CO2 SERPL-SCNC: 26 MMOL/L (ref 21–32)
CREAT SERPL-MCNC: 0.87 MG/DL (ref 0.8–1.5)
DIFFERENTIAL METHOD BLD: ABNORMAL
EOSINOPHIL # BLD: 0.2 K/UL (ref 0–0.8)
EOSINOPHIL NFR BLD: 5 % (ref 0.5–7.8)
ERYTHROCYTE [DISTWIDTH] IN BLOOD BY AUTOMATED COUNT: 15.2 % (ref 11.9–14.6)
GLOBULIN SER CALC-MCNC: 2.8 G/DL (ref 2.3–3.5)
GLUCOSE SERPL-MCNC: 95 MG/DL (ref 65–100)
HCT VFR BLD AUTO: 31.1 % (ref 41.1–50.3)
HGB BLD-MCNC: 10.3 G/DL (ref 13.6–17.2)
IMM GRANULOCYTES # BLD AUTO: 0 K/UL (ref 0–0.5)
IMM GRANULOCYTES NFR BLD AUTO: 0 % (ref 0–5)
LYMPHOCYTES # BLD: 0.9 K/UL (ref 0.5–4.6)
LYMPHOCYTES NFR BLD: 22 % (ref 13–44)
MAGNESIUM SERPL-MCNC: 2.2 MG/DL (ref 1.8–2.4)
MCH RBC QN AUTO: 33.2 PG (ref 26.1–32.9)
MCHC RBC AUTO-ENTMCNC: 33.1 G/DL (ref 31.4–35)
MCV RBC AUTO: 100.3 FL (ref 79.6–97.8)
MONOCYTES # BLD: 0.4 K/UL (ref 0.1–1.3)
MONOCYTES NFR BLD: 9 % (ref 4–12)
NEUTS SEG # BLD: 2.7 K/UL (ref 1.7–8.2)
NEUTS SEG NFR BLD: 63 % (ref 43–78)
NRBC # BLD: 0 K/UL (ref 0–0.2)
PLATELET # BLD AUTO: 155 K/UL (ref 150–450)
PMV BLD AUTO: 10 FL (ref 9.4–12.3)
POTASSIUM SERPL-SCNC: 3.6 MMOL/L (ref 3.5–5.1)
PROT SERPL-MCNC: 5.7 G/DL (ref 6.3–8.2)
RBC # BLD AUTO: 3.1 M/UL (ref 4.23–5.6)
SODIUM SERPL-SCNC: 139 MMOL/L (ref 136–145)
WBC # BLD AUTO: 4.2 K/UL (ref 4.3–11.1)

## 2021-04-17 PROCEDURE — 99233 SBSQ HOSP IP/OBS HIGH 50: CPT | Performed by: INTERNAL MEDICINE

## 2021-04-17 PROCEDURE — 2709999900 HC NON-CHARGEABLE SUPPLY

## 2021-04-17 PROCEDURE — 74011250637 HC RX REV CODE- 250/637: Performed by: FAMILY MEDICINE

## 2021-04-17 PROCEDURE — 74011250636 HC RX REV CODE- 250/636: Performed by: EMERGENCY MEDICINE

## 2021-04-17 PROCEDURE — 74011000250 HC RX REV CODE- 250: Performed by: FAMILY MEDICINE

## 2021-04-17 PROCEDURE — 74011250636 HC RX REV CODE- 250/636: Performed by: FAMILY MEDICINE

## 2021-04-17 PROCEDURE — 83735 ASSAY OF MAGNESIUM: CPT

## 2021-04-17 PROCEDURE — 74011250636 HC RX REV CODE- 250/636: Performed by: INTERNAL MEDICINE

## 2021-04-17 PROCEDURE — 85025 COMPLETE CBC W/AUTO DIFF WBC: CPT

## 2021-04-17 PROCEDURE — 80048 BASIC METABOLIC PNL TOTAL CA: CPT

## 2021-04-17 PROCEDURE — 74011000258 HC RX REV CODE- 258: Performed by: EMERGENCY MEDICINE

## 2021-04-17 PROCEDURE — 36415 COLL VENOUS BLD VENIPUNCTURE: CPT

## 2021-04-17 PROCEDURE — 65660000000 HC RM CCU STEPDOWN

## 2021-04-17 PROCEDURE — 80076 HEPATIC FUNCTION PANEL: CPT

## 2021-04-17 RX ORDER — MORPHINE SULFATE 2 MG/ML
2 INJECTION, SOLUTION INTRAMUSCULAR; INTRAVENOUS
Status: DISCONTINUED | OUTPATIENT
Start: 2021-04-17 | End: 2021-04-20

## 2021-04-17 RX ADMIN — PROMETHAZINE HYDROCHLORIDE 12.5 MG: 25 INJECTION INTRAMUSCULAR; INTRAVENOUS at 09:44

## 2021-04-17 RX ADMIN — Medication 20 ML: at 14:00

## 2021-04-17 RX ADMIN — PROMETHAZINE HYDROCHLORIDE 12.5 MG: 25 INJECTION INTRAMUSCULAR; INTRAVENOUS at 03:35

## 2021-04-17 RX ADMIN — PROMETHAZINE HYDROCHLORIDE 12.5 MG: 25 INJECTION INTRAMUSCULAR; INTRAVENOUS at 23:29

## 2021-04-17 RX ADMIN — MORPHINE SULFATE 2 MG: 2 INJECTION, SOLUTION INTRAMUSCULAR; INTRAVENOUS at 17:12

## 2021-04-17 RX ADMIN — PROMETHAZINE HYDROCHLORIDE 12.5 MG: 25 INJECTION INTRAMUSCULAR; INTRAVENOUS at 17:02

## 2021-04-17 RX ADMIN — Medication 10 ML: at 21:19

## 2021-04-17 RX ADMIN — HYDROCODONE BITARTRATE AND ACETAMINOPHEN 1 TABLET: 10; 325 TABLET ORAL at 23:29

## 2021-04-17 RX ADMIN — ENOXAPARIN SODIUM 40 MG: 40 INJECTION SUBCUTANEOUS at 09:48

## 2021-04-17 RX ADMIN — GABAPENTIN 300 MG: 300 CAPSULE ORAL at 17:02

## 2021-04-17 RX ADMIN — MORPHINE SULFATE 2 MG: 2 INJECTION, SOLUTION INTRAMUSCULAR; INTRAVENOUS at 03:35

## 2021-04-17 RX ADMIN — CARVEDILOL 25 MG: 25 TABLET, FILM COATED ORAL at 17:02

## 2021-04-17 RX ADMIN — PANTOPRAZOLE SODIUM 40 MG: 40 TABLET, DELAYED RELEASE ORAL at 09:44

## 2021-04-17 RX ADMIN — MORPHINE SULFATE 2 MG: 2 INJECTION, SOLUTION INTRAMUSCULAR; INTRAVENOUS at 13:28

## 2021-04-17 RX ADMIN — POTASSIUM CHLORIDE 40 MEQ: 1500 TABLET, EXTENDED RELEASE ORAL at 09:44

## 2021-04-17 RX ADMIN — Medication 10 ML: at 05:01

## 2021-04-17 RX ADMIN — AZITHROMYCIN MONOHYDRATE 500 MG: 500 INJECTION, POWDER, LYOPHILIZED, FOR SOLUTION INTRAVENOUS at 01:20

## 2021-04-17 RX ADMIN — ONDANSETRON 4 MG: 2 INJECTION INTRAMUSCULAR; INTRAVENOUS at 13:28

## 2021-04-17 RX ADMIN — CEFTRIAXONE SODIUM 2 G: 2 INJECTION, POWDER, FOR SOLUTION INTRAMUSCULAR; INTRAVENOUS at 00:49

## 2021-04-17 RX ADMIN — MAGNESIUM GLUCONATE 500 MG ORAL TABLET 800 MG: 500 TABLET ORAL at 09:44

## 2021-04-17 RX ADMIN — MORPHINE SULFATE 2 MG: 2 INJECTION, SOLUTION INTRAMUSCULAR; INTRAVENOUS at 10:02

## 2021-04-17 RX ADMIN — ALPRAZOLAM 1 MG: 0.5 TABLET ORAL at 21:19

## 2021-04-17 RX ADMIN — GABAPENTIN 300 MG: 300 CAPSULE ORAL at 09:44

## 2021-04-17 RX ADMIN — HYDROCODONE BITARTRATE AND ACETAMINOPHEN 1 TABLET: 10; 325 TABLET ORAL at 19:20

## 2021-04-17 RX ADMIN — MORPHINE SULFATE 2 MG: 2 INJECTION, SOLUTION INTRAMUSCULAR; INTRAVENOUS at 21:19

## 2021-04-17 RX ADMIN — MAGNESIUM GLUCONATE 500 MG ORAL TABLET 800 MG: 500 TABLET ORAL at 21:00

## 2021-04-17 NOTE — PROGRESS NOTES
[facility] Progress Note         Admit Date:  4/14/2021    Today's Date:  4/17/2021    CC:  RUQ pain, elevated T bili    Subjective:     Patient states he continues with constant severe RUQP. He is tolerating (not very well given significant nausea) the CL diet.     Medications:   Current Facility-Administered Medications   Medication Dose Route Frequency    levalbuterol (XOPENEX) nebulizer soln 0.63 mg/3 mL  0.63 mg Nebulization Q6H PRN    promethazine (PHENERGAN) with saline injection 12.5 mg  12.5 mg IntraVENous Q6H PRN    losartan (COZAAR) tablet 25 mg  25 mg Oral DAILY    cefTRIAXone (ROCEPHIN) 2 g in 0.9% sodium chloride (MBP/ADV) 50 mL MBP  2 g IntraVENous Q24H    azithromycin (ZITHROMAX) 500 mg in 0.9% sodium chloride 250 mL (VIAL-MATE)  500 mg IntraVENous Q24H    pantoprazole (PROTONIX) tablet 40 mg  40 mg Oral ACB    HYDROcodone-acetaminophen (NORCO)  mg tablet 1 Tab  1 Tab Oral Q6H PRN    magnesium oxide (MAG-OX) tablet 800 mg  800 mg Oral Q12H    potassium chloride (K-DUR, KLOR-CON) SR tablet 40 mEq  40 mEq Oral DAILY    carvediloL (COREG) tablet 25 mg  25 mg Oral BID WITH MEALS    [Held by provider] furosemide (LASIX) tablet 40 mg  40 mg Oral BID    [Held by provider] atorvastatin (LIPITOR) tablet 80 mg  80 mg Oral DAILY    gabapentin (NEURONTIN) capsule 300 mg  300 mg Oral BID    sodium chloride (NS) flush 5-40 mL  5-40 mL IntraVENous Q8H    sodium chloride (NS) flush 5-40 mL  5-40 mL IntraVENous PRN    acetaminophen (TYLENOL) tablet 650 mg  650 mg Oral Q6H PRN    Or    acetaminophen (TYLENOL) suppository 650 mg  650 mg Rectal Q6H PRN    polyethylene glycol (MIRALAX) packet 17 g  17 g Oral DAILY PRN    enoxaparin (LOVENOX) injection 40 mg  40 mg SubCUTAneous DAILY    ALPRAZolam (XANAX) tablet 1 mg  1 mg Oral QHS    morphine injection 2 mg  2 mg IntraVENous Q4H PRN    ondansetron (ZOFRAN) injection 4 mg  4 mg IntraVENous Q4H PRN       Objective: Vitals:  Visit Vitals  /79 (BP 1 Location: Right arm, BP Patient Position: At rest)   Pulse 99   Temp 97.9 °F (36.6 °C)   Resp 17   Ht 5' 11\" (1.803 m)   Wt 85.2 kg (187 lb 12.8 oz)   SpO2 97%   BMI 26.19 kg/m²     Intake/Output:  No intake/output data recorded.   04/15 1901 - 04/17 0700  In: -   Out: 1000 [Urine:1000]  Exam:  General appearance: alert, cooperative  Lungs: coarse BS to auscultation bilaterally anteriorly  Heart: tachycardic  Abdomen: soft, very tender over right side of abdomen to slightest palpation  Neuro:  alert and oriented    Data Review (Labs):    Recent Labs     04/17/21  0454 04/16/21  0354 04/15/21  0501   WBC 4.2* 4.0* 4.2*   HGB 10.3* 10.5* 11.5*   HCT 31.1* 32.7* 33.9*    111* 149*   .3* 103.5* 98.3*    140 139   K 3.6 3.1* 3.0*   * 107 102   CO2 26 27 29   BUN 6 5* 7   CREA 0.87 0.87 1.03   CA 7.5* 6.8* 6.9*   MG 2.2 1.1* 1.2*   GLU 95 98 101*   AP 86 75 96   ALT 30 32 50   TBILI 0.3 0.5 1.4*   CBIL 0.2  --   --    ALB 2.9* 2.6* 3.0*   TP 5.7* 5.2* 5.6*     SARS-COV-2, PCR [[id number]] (Order [id number]  Date: 4/14/2021 Department: [facility] Released By:  (auto-released) Authorizing: Bari Hashimoto, MD   Specimen Information: Nasopharyngeal        Component Value Flag Ref Range Units Status   Specimen source Nasopharyngeal      Final   SARS-CoV-2 Not detected   NOTD   Final     EGD 10/3/2020-Dr Joo Davis  FINDINGS:  OROPHARYNX: Cords and pyriform recesses normal.  ESOPHAGUS: The esophagus is normal. The proximal, mid and distal portions are normal. The Z-Line is intact.  Empiric dilation was done using a 47 Fr followed by 60 Fr Savary.  Re-endoscopy after dilation showed no rents or heme.  Multiple biopsies were taken from the mid and proximal esophagus using cold forceps to rule out EoE.    STOMACH: The fundus on antegrade and retroflex views is normal. The body, antrum and pylorus is normal.  DUODENUM: The bulb and second portions are normal.   ASSESSMENT:  1. Normal EGD   PLAN:  1. Follow up pathology  2. Okay to restart diet.  Note that patient passed his swallow evaluation with SLP.  They recommended a regular diet with regular thin liquids.    3. Will sign off.  Please call us with any further questions or concerns.    4. Will plan to see patient in clinic in 2-3 weeks.  If dysphagia persists, we can discuss an esophageal manometry at that time. Jigar Luciano MD  [facility]     Right upper quadrant ultrasound: 10/04/2020   HISTORY: nausea.   Real-time sonography of the right upper quadrant was performed.    Comparison: 08/15/2018   FINDINGS: There is homogeneous echotexture throughout the liver. There is no  intrahepatic biliary ductal dilatation. There is no focal hepatic mass.   There are sludge and stones within the gallbladder lumen. There is no  gallbladder wall thickening or pericholecystic fluid to suggest acute  cholecystitis. The common bile duct is normal  measuring 6 mm.   The pancreas is unable to be visualized as it was obscured by overlying bowel  gas. The right kidney is unremarkable without hydronephrosis or solid mass. The  right kidney was measured at approximately 10.4 cm.   The visualized portions of the abdominal aorta and inferior vena cava are  unremarkable. There is normal hepatopetal flow within the main portal vein. No  free fluid is seen within the right upper quadrant.   IMPRESSION  IMPRESSION:    1. Stones and sludge within the gallbladder lumen without sonographic findings  of acute cholecystitis. 2. Nonvisualization of the pancreas due to overlying bowel gas. Limited abdomen ultrasound. 4/14/2021   INDICATION: Pain.   COMPARISON: Prior ultrasound on October 4, 2020.   TECHNIQUE: Standard protocol limited right upper quadrant abdomen ultrasound.   FINDINGS:   - Liver: The liver is mildly enlarged at 18.5 cm, new from the prior study at  which time it measured 16.3 cm.  The liver is otherwise normal in appearance. - Gallbladder: Again noted are sludge and stones in the gallbladder lumen, with  no wall thickening or pericholecystic fluid. The gallbladder does not appear  distended, measuring 5.1 cm in diameter.  - Bile ducts: Within normal limits.  The common bile duct measures 4 mm. - Pancreas: Not well visualized due to overlying bowel gas. - Right kidney: Within normal limits. - Aorta and IVC: There is no abdominal aortic aneurysm. The IVC was obscured by  overlying bowel gas. - Portal vein: Within normal limits.  - Other: No ascites. IMPRESSION  1. Distended gallbladder containing sludge and stones. No gallbladder wall  thickening or surrounding fluid is seen. 2. New mild hepatomegaly. 3. The pancreas and IVC were obscured by bowel gas. CT OF THE ABDOMEN AND PELVIS WITH CONTRAST 14 April 2021     CLINICAL HISTORY:   Abdominal pain with neutropenia and abnormal liver function  tests.   TECHNIQUE:  Oral and nonionic intravenous contrast was administered, and the  abdomen and pelvis were scanned with spiral technique. Radiation dose reduction  was achieved using one or all of the following techniques: automated exposure  control, weight-based dosing, iterative reconstruction.   COMPARISON:  Ultrasound earlier today head CT of March 22, 2020.     CT ABDOMEN FINDINGS: Mild atelectasis is again noted adjacent to a moderate  hiatal hernia. The gallbladder contains small calcified stones but is no longer  distended. No pericholecystic inflammatory changes or biliary ductal  dilatation. Diffuse hepatic parenchymal hypodensity may be associated with  steatosis or hepatitis. The spleen, pancreas, adrenals, and kidneys appear  unremarkable.   CT PELVIS FINDINGS: Appendix is at the upper limits of normal in thickness  without pericholecystic inflammatory changes to suggest acute appendicitis. No  pathologically enlarged lymph nodes or free fluid is evident.   Scattered left  colonic diverticula without adjacent inflammatory changes. Bone windows  demonstrate no definite aggressive process, accounting for degenerative changes.   IMPRESSION   1. Cholelithiasis without evidence of cholecystitis or biliary ductal  dilatation.   2.  Diffuse hepatic parenchymal hypodensity may be associated with steatosis,  hepatitis, or other infiltrative process. No focal liver lesion identified.   3.  Left clonic diverticulosis without evidence of diverticulitis, appendicitis,  or other acute abdominopelvic abnormality identified. UPPER GI 4-16-21     HISTORY: Right upper quadrant pain and dysphasia.     TECHNIQUE: Single contrast examination was performed utilizing 4 700 cc thin  barium solution. A 1/2 inch barium tablet was used.     FINDINGS:  film: Unremarkable bowel gas pattern.     The patient swallowed without difficulty. No esophageal strictures ulcerations  or webs. There is some nonpropulsive tertiary contractions.     A 1/2 inch barium tablet passed freely into the stomach.     Stomach is otherwise grossly unremarkable. Rugal folds are not thickened. No  gastric ulcers. The duodenal bulb and C-loop were unremarkable. No duodenal  ulcers.     Fluoroscopy Time: 3.3 minutes. Images: 16 cine sequences.     IMPRESSION  Mild presbyesophagus.  No strictures or ulcerations.       Assessment:     Principal Problem:    Elevated brain natriuretic peptide (BNP) level (4/14/2021)    Active Problems:    Chronic systolic (congestive) heart failure (HCC) (4/21/2011)      HTN (hypertension) (11/29/2011)      Nausea & vomiting (2/26/2016)      CAMARILLO (dyspnea on exertion) (3/25/2016)      Gallstones (4/16/2021)    63 yo male pt of Dr. Wing Kathleen with PMH of including but not limited to systolic dysfunction with EF ~20%, VT with ICD in place, chronic alcoholism, CAD, anxiety, depression, GERD, hx of Schatzki's ring, who was seen in consultation 14 April 2021 at the request of Dr. Frankey Beau for RUQ pain and elevated T bilcher, after presented to ER with SOB and chest pain, and labs rnoted elevated T bili. RUQ ultrasound with GB sludge and stones, but CBD WNL and no mention of acute cholecystitis. Imaging comparable to ultrasound 10/2020. CT scan was negative for acute process of abd pain. Given his EF 20-25%, his admission troponin of 114.4 (normal < 14), elevation of pBNP 2887, he is at increased risk for invasive procedures. Unable to have MRCP due to ICD. Appreciate surgery evaluation. Elevated LFTs are improving. It is likely multifactorial - degree of ischemia, passive congestion, fatty liver, cholecystitis. He has also had persistent dysphagia, though EGD with dilation in Oct 2020 was normal. Recent UGI confirms motility disorder. Plan:   MTWB, abnormal LFT: presumed chronic cholecystitis. Patient still states he wants surgery. I explained about his increased risk for surgery due to his severe cardiac disease. Patient expresses understanding of this risk. LFTs have improved with antibiotics. Dysphagia, chronic. UGI shows mild presbyesophagus. EGD dilation is of low yield in this situation. Would not pursue EGD at this time. Dysphagia will continue to a chronic symptom. At this point, I have no further GI recommendations. Will sign off. We will arrange for outpatient followup. Please call if questions.

## 2021-04-17 NOTE — PROGRESS NOTES
[facility] Progress Note    Patient is a 64 y.o. male with medical h/o sCHF, HTN presents to the ER with complaint of SOB. Patient reports that he started feeling bad yesterday. He reports BLE edema and some mild chest pain. On ER workup, patient has elevated pBNP. Hospitalist asked to admit. Denies fevers. Reports chest discomfort, SOB, nausea/vomiting, cough. INTERVAL HISTORY  / Subjective:    04/15/2021 - Pt seen and evaluated. Pt with borderline BP. He states that breathing is better and is on RA. He c/o continued abd pain. 04/16/2021 - Pt states that he is breathing better but still with abd pain. Case d/w GI and surgery tentatively scheduled for Monday. Pt is in no distress. 04/17/2021 - Pt c/o continued abd pain. He asks about po and requests pain med increase. Assessment / Plan:    IMP:    1.) Acute on Chronic LVSD EF 20-25%/CHF - clinically better and off O2, IVVD impaired with borderline BP.    2.) Abd Pain with Abn LFT's - appreciate GI. ? Motility issues with UGI. For Buster on Monday. Keep NPO.    3.) CAD/Abn Troponin - flat, ? Due to CHF, appreciate cardio.     4.) HTN - with hypotension, give fluids, f/u MAP > 65    5.) H/O VT s/p  ICD - not MRI compatible    6.) Anxiety/Depression    7.) Chronic EtOH Abuse - start Thiamine,     8.) GERD h/o Schatzki's Ring    9.) Anemia/Macrocytosis - ACD per Fe studies, nml B12, fol    10.) HypoK+/Mg2+ - replaced            Plan:      Hold diuretics  Start Thiamine  Increase pain reg  Replace K+,  Mg2+  Ck am labs  Lap buster Monday        Chief Complaint : Shortness of Breath        Objective:  Visit Vitals  /89 (BP 1 Location: Left arm, BP Patient Position: Sitting)   Pulse (!) 102   Temp 97.9 °F (36.6 °C)   Resp 18   Ht 5' 11\" (1.803 m)   Wt 85.2 kg (187 lb 12.8 oz)   SpO2 98%   BMI 26.19 kg/m²                 Physical Exam: Pt examined 04/17/2021 and exam as below:        GEN - middle aged BM in no distress  HEENT - NC/At, PERRLA, EOMI, No JVD  Lungs - diminished, clear  CVS - nml S1, S2, RRR,   Abd - Soft, NT, ND, Nml BS+  Ext - Nml ROM, No edema      Intake and Output:  Date 04/16/21 0700 - 04/17/21 0659 04/17/21 0700 - 04/18/21 0659   Shift 6471-8832 3851-2201 24 Hour Total 6819-6782 2149-0670 24 Hour Total   INTAKE   P.O.    600  600     P. O.    600  600   Shift Total(mL/kg)    600(7)  600(7)   OUTPUT   Urine(mL/kg/hr)           Urine Occurrence(s)  3 x 3 x      Shift Total(mL/kg)         NET    600  600   Weight (kg) 82.2 85.2 85.2 85.2 85.2 85.2       LAB:  Admission on 04/14/2021   Component Date Value    WBC 04/14/2021 3.8*    RBC 04/14/2021 4.07*    HGB 04/14/2021 13.4*    HCT 04/14/2021 38.5*    MCV 04/14/2021 94.6     MCH 04/14/2021 32.9     MCHC 04/14/2021 34.8     RDW 04/14/2021 13.8     PLATELET 74/54/6508 621     MPV 04/14/2021 9.6     ABSOLUTE NRBC 04/14/2021 0.00     DF 04/14/2021 AUTOMATED     NEUTROPHILS 04/14/2021 57     LYMPHOCYTES 04/14/2021 32     MONOCYTES 04/14/2021 8     EOSINOPHILS 04/14/2021 1     BASOPHILS 04/14/2021 1     IMMATURE GRANULOCYTES 04/14/2021 1     ABS. NEUTROPHILS 04/14/2021 2.2     ABS. LYMPHOCYTES 04/14/2021 1.2     ABS. MONOCYTES 04/14/2021 0.3     ABS. EOSINOPHILS 04/14/2021 0.1     ABS. BASOPHILS 04/14/2021 0.0     ABS. IMM. GRANS. 04/14/2021 0.0     Sodium 04/14/2021 137     Potassium 04/14/2021 3.4*    Chloride 04/14/2021 100     CO2 04/14/2021 26     Anion gap 04/14/2021 11     Glucose 04/14/2021 122*    BUN 04/14/2021 7     Creatinine 04/14/2021 0.84     GFR est AA 04/14/2021 >60     GFR est non-AA 04/14/2021 >60     Calcium 04/14/2021 8.0*    Bilirubin, total 04/14/2021 2.3*    ALT (SGPT) 04/14/2021 84*    AST (SGOT) 04/14/2021 224*    Alk.  phosphatase 04/14/2021 118     Protein, total 04/14/2021 6.6     Albumin 04/14/2021 3.7     Globulin 04/14/2021 2.9     A-G Ratio 04/14/2021 1.3     Lipase 04/14/2021 62*    Ventricular Rate 04/14/2021 102     Atrial Rate 04/14/2021 102     P-R Interval 04/14/2021 172     QRS Duration 04/14/2021 98     Q-T Interval 04/14/2021 392     QTC Calculation (Bezet) 04/14/2021 510     Calculated P Axis 04/14/2021 47     Calculated R Axis 04/14/2021 -22     Calculated T Axis 04/14/2021 -96     Diagnosis 04/14/2021                      Value:!!! Poor data quality, interpretation may be adversely affected  Sinus tachycardia  Anterior infarct (cited on or before 02-JUL-2020)  ST & T wave abnormality, consider lateral ischemia  Abnormal ECG  When compared with ECG of 30-SEP-2020 03:19,  Poor data quality in current ECG precludes serial comparison  Confirmed by ST RYAN HORNER MD (), EVIE ORELLANA ([id number]) on 4/14/2021 8:49:05 AM      Lactic acid 04/14/2021 4.0*    NT pro-BNP 04/14/2021 2,887*    Troponin-High Sensitivity 04/14/2021 114.4*    Troponin-High Sensitivity 04/14/2021 98.8*    Special Requests: 04/14/2021                      Value:LEFT  HAND      Culture result: 04/14/2021 NO GROWTH 3 DAYS     Special Requests: 04/14/2021                      Value:RIGHT  HAND      Culture result: 04/14/2021 NO GROWTH 3 DAYS     Lactic acid 04/14/2021 2.0     SARS-CoV-2 04/14/2021 Please find results under separate order     Specimen source 04/14/2021 Nasopharyngeal     COVID-19 rapid test 04/14/2021 Not detected     Specimen source 04/14/2021 Nasopharyngeal     SARS-CoV-2 04/14/2021 Not detected     Sodium 04/15/2021 139     Potassium 04/15/2021 3.0*    Chloride 04/15/2021 102     CO2 04/15/2021 29     Anion gap 04/15/2021 8     Glucose 04/15/2021 101*    BUN 04/15/2021 7     Creatinine 04/15/2021 1.03     GFR est AA 04/15/2021 >60     GFR est non-AA 04/15/2021 >60     Calcium 04/15/2021 6.9*    Bilirubin, total 04/15/2021 1.4*    ALT (SGPT) 04/15/2021 50     AST (SGOT) 04/15/2021 84*    Alk.  phosphatase 04/15/2021 96     Protein, total 04/15/2021 5.6*    Albumin 04/15/2021 3.0*    Globulin 04/15/2021 2.6     A-G Ratio 04/15/2021 1.2     WBC 04/15/2021 4.2*    RBC 04/15/2021 3.45*    HGB 04/15/2021 11.5*    HCT 04/15/2021 33.9*    MCV 04/15/2021 98.3*    MCH 04/15/2021 33.3*    MCHC 04/15/2021 33.9     RDW 04/15/2021 14.8*    PLATELET 99/74/0199 521*    MPV 04/15/2021 9.8     ABSOLUTE NRBC 04/15/2021 0.00     Magnesium 04/15/2021 1.2*    Sodium 04/16/2021 140     Potassium 04/16/2021 3.1*    Chloride 04/16/2021 107     CO2 04/16/2021 27     Anion gap 04/16/2021 6*    Glucose 04/16/2021 98     BUN 04/16/2021 5*    Creatinine 04/16/2021 0.87     GFR est AA 04/16/2021 >60     GFR est non-AA 04/16/2021 >60     Calcium 04/16/2021 6.8*    Bilirubin, total 04/16/2021 0.5     ALT (SGPT) 04/16/2021 32     AST (SGOT) 04/16/2021 39*    Alk.  phosphatase 04/16/2021 75     Protein, total 04/16/2021 5.2*    Albumin 04/16/2021 2.6*    Globulin 04/16/2021 2.6     A-G Ratio 04/16/2021 1.0*    WBC 04/16/2021 4.0*    RBC 04/16/2021 3.16*    HGB 04/16/2021 10.5*    HCT 04/16/2021 32.7*    MCV 04/16/2021 103.5*    MCH 04/16/2021 33.2*    MCHC 04/16/2021 32.1     RDW 04/16/2021 15.0*    PLATELET 29/88/8070 389*    MPV 04/16/2021 10.0     ABSOLUTE NRBC 04/16/2021 0.00     Magnesium 04/16/2021 1.1*    Ferritin 04/16/2021 333     Folate 04/16/2021 3.9     Vitamin B12 04/16/2021 1,099*    Reticulocyte count 04/16/2021 1.1     Absolute Retic Cnt. 04/16/2021 0.0365     Immature Retic Fraction 04/16/2021 12.0     Retic Hgb Conc. 04/16/2021 41*    Iron 04/16/2021 27*    TIBC 04/16/2021 194*    Transferrin Saturation 04/16/2021 14     Sodium 04/17/2021 139     Potassium 04/17/2021 3.6     Chloride 04/17/2021 108*    CO2 04/17/2021 26     Anion gap 04/17/2021 5*    Glucose 04/17/2021 95     BUN 04/17/2021 6     Creatinine 04/17/2021 0.87     GFR est AA 04/17/2021 >60     GFR est non-AA 04/17/2021 >60     Calcium 04/17/2021 7.5*    WBC 04/17/2021 4.2*  RBC 04/17/2021 3.10*    HGB 04/17/2021 10.3*    HCT 04/17/2021 31.1*    MCV 04/17/2021 100.3*    MCH 04/17/2021 33.2*    MCHC 04/17/2021 33.1     RDW 04/17/2021 15.2*    PLATELET 96/74/9561 063     MPV 04/17/2021 10.0     ABSOLUTE NRBC 04/17/2021 0.00     DF 04/17/2021 AUTOMATED     NEUTROPHILS 04/17/2021 63     LYMPHOCYTES 04/17/2021 22     MONOCYTES 04/17/2021 9     EOSINOPHILS 04/17/2021 5     BASOPHILS 04/17/2021 1     IMMATURE GRANULOCYTES 04/17/2021 0     ABS. NEUTROPHILS 04/17/2021 2.7     ABS. LYMPHOCYTES 04/17/2021 0.9     ABS. MONOCYTES 04/17/2021 0.4     ABS. EOSINOPHILS 04/17/2021 0.2     ABS. BASOPHILS 04/17/2021 0.0     ABS. IMM. GRANS. 04/17/2021 0.0     Protein, total 04/17/2021 5.7*    Albumin 04/17/2021 2.9*    Globulin 04/17/2021 2.8     A-G Ratio 04/17/2021 1.0*    Bilirubin, total 04/17/2021 0.3     Bilirubin, direct 04/17/2021 0.2     Alk. phosphatase 04/17/2021 86     AST (SGOT) 04/17/2021 39*    ALT (SGPT) 04/17/2021 30     Magnesium 04/17/2021 2.2        IMAGING:  Xr Chest Pa Lat    Result Date: 4/14/2021  No acute process. Xr Upper Gi Series W Kub    Result Date: 4/16/2021  Mild presbyesophagus. No strictures or ulcerations. Ct Abd Pelv W Cont    Result Date: 4/14/2021  1. Cholelithiasis without evidence of cholecystitis or biliary ductal dilatation. 2.  Diffuse hepatic parenchymal hypodensity may be associated with steatosis, hepatitis, or other infiltrative process. No focal liver lesion identified. 3.  Left clonic diverticulosis without evidence of diverticulitis, appendicitis, or other acute abdominopelvic abnormality identified. [address]    Result Date: 4/14/2021  1. Distended gallbladder containing sludge and stones. No gallbladder wall thickening or surrounding fluid is seen. 2. New mild hepatomegaly. 3. The pancreas and IVC were obscured by bowel gas.       EKG:  Results for orders placed or performed in visit on 02/15/19   [facility] POC EKG ROUTINE W/ 12 LEADS, INTER & REP     Status: None    Impression    Sinus  Rhythm 84bpm  Normal axis  Voltage criteria for LVH  (S(V1)+R(V6) exceeds 3.50 mV). -ST depression   +   T-abnormality  -Seen with left ventricular hypertrophy (strain) or digitalis effect              Miki Cobian MD  4/17/2021 12:39 PM          Clarissa Kapoor. Briana Benson MD, [place]  Attending Physician  Hospitalist's [facility].

## 2021-04-17 NOTE — PROGRESS NOTES
[facility] CARDIOLOGY PROGRESS NOTE           4/17/2021 12:21 PM    Admit Date: 4/14/2021      Subjective:   Feels ok. Continues to experience RUQ abdominal pain. ROS:  GEN:  No fever or chills  Cardiovascular:  As noted above:no CP or palpitations. Pulmonary:  As noted above:Dyspnea is stable. Neuro:  No new focal motor or sensory loss    Objective:      Vitals:    04/17/21 0315 04/17/21 0644 04/17/21 0755 04/17/21 1145   BP: 108/79  114/85 122/86   Pulse: 99  86 97   Resp: 17  18 18   Temp: 97.9 °F (36.6 °C)  98.1 °F (36.7 °C) 98.1 °F (36.7 °C)   SpO2: 97%  98% 97%   Weight:  187 lb 12.8 oz (85.2 kg)     Height:           Physical Exam:  General-NAD  Neck- supple, no JVD  CV- regular rate and rhythm no MRG  Lung- clear bilaterally  Abd- soft, nontender, nondistended  Ext- trace edema bilaterally. Skin- warm and dry  Psychiatric:  Normal mood and affect. Neurologic:  Alert and oriented X 3      Data Review:   Recent Labs     04/17/21  0454 04/16/21  0354    140   K 3.6 3.1*   MG 2.2 1.1*   BUN 6 5*   CREA 0.87 0.87   GLU 95 98   WBC 4.2* 4.0*   HGB 10.3* 10.5*   HCT 31.1* 32.7*    111*       TELEMETRY:  N/A    Assessment/Plan:     Principal Problem:    Elevated brain natriuretic peptide (BNP) level (3/15/7609):JYGIPQV systolic HF is stable. He appears euvolemic. Continue Lasix,Corega,and Losartan. Active Problems:    Chronic systolic (congestive) heart failure ([address] (4/21/2011): Stable. Continue Lasix ,Losartan,and Coreg      HTN (hypertension) (11/29/2011): Stable. Continue Lasix,Losartan,and Coreg. Nausea & vomiting (2/26/2016)      [place] (dyspnea on exertion) (3/25/2016)      Gallstones (4/16/2021):Chronically ,moderate CV risk. Presently,CV status is stable. Avoid excessive iv fluids in the marie operative course to prevent exacerbation of HF. Will be on standby.                 Socrates Mcmillan MD  4/17/2021 12:21 PM

## 2021-04-18 ENCOUNTER — ANESTHESIA EVENT (OUTPATIENT)
Dept: SURGERY | Age: 56
DRG: 417 | End: 2021-04-18
Payer: COMMERCIAL

## 2021-04-18 PROCEDURE — 74011250636 HC RX REV CODE- 250/636: Performed by: EMERGENCY MEDICINE

## 2021-04-18 PROCEDURE — 74011250637 HC RX REV CODE- 250/637: Performed by: INTERNAL MEDICINE

## 2021-04-18 PROCEDURE — 74011250637 HC RX REV CODE- 250/637: Performed by: FAMILY MEDICINE

## 2021-04-18 PROCEDURE — 74011000250 HC RX REV CODE- 250: Performed by: FAMILY MEDICINE

## 2021-04-18 PROCEDURE — 74011250636 HC RX REV CODE- 250/636: Performed by: FAMILY MEDICINE

## 2021-04-18 PROCEDURE — 65660000000 HC RM CCU STEPDOWN

## 2021-04-18 PROCEDURE — 2709999900 HC NON-CHARGEABLE SUPPLY

## 2021-04-18 PROCEDURE — 74011000250 HC RX REV CODE- 250: Performed by: INTERNAL MEDICINE

## 2021-04-18 PROCEDURE — 74011250636 HC RX REV CODE- 250/636: Performed by: INTERNAL MEDICINE

## 2021-04-18 PROCEDURE — 74011000258 HC RX REV CODE- 258: Performed by: EMERGENCY MEDICINE

## 2021-04-18 RX ADMIN — ENOXAPARIN SODIUM 40 MG: 40 INJECTION SUBCUTANEOUS at 10:08

## 2021-04-18 RX ADMIN — MORPHINE SULFATE 2 MG: 2 INJECTION, SOLUTION INTRAMUSCULAR; INTRAVENOUS at 06:14

## 2021-04-18 RX ADMIN — MORPHINE SULFATE 2 MG: 2 INJECTION, SOLUTION INTRAMUSCULAR; INTRAVENOUS at 15:17

## 2021-04-18 RX ADMIN — Medication 10 ML: at 22:21

## 2021-04-18 RX ADMIN — CARVEDILOL 25 MG: 25 TABLET, FILM COATED ORAL at 17:52

## 2021-04-18 RX ADMIN — MAGNESIUM GLUCONATE 500 MG ORAL TABLET 800 MG: 500 TABLET ORAL at 20:31

## 2021-04-18 RX ADMIN — GABAPENTIN 300 MG: 300 CAPSULE ORAL at 17:52

## 2021-04-18 RX ADMIN — HYDROCODONE BITARTRATE AND ACETAMINOPHEN 1 TABLET: 10; 325 TABLET ORAL at 04:46

## 2021-04-18 RX ADMIN — PROMETHAZINE HYDROCHLORIDE 12.5 MG: 25 INJECTION INTRAMUSCULAR; INTRAVENOUS at 12:10

## 2021-04-18 RX ADMIN — PANTOPRAZOLE SODIUM 40 MG: 40 TABLET, DELAYED RELEASE ORAL at 10:08

## 2021-04-18 RX ADMIN — MORPHINE SULFATE 2 MG: 2 INJECTION, SOLUTION INTRAMUSCULAR; INTRAVENOUS at 01:30

## 2021-04-18 RX ADMIN — POTASSIUM CHLORIDE 40 MEQ: 1500 TABLET, EXTENDED RELEASE ORAL at 10:06

## 2021-04-18 RX ADMIN — MORPHINE SULFATE 2 MG: 2 INJECTION, SOLUTION INTRAMUSCULAR; INTRAVENOUS at 10:07

## 2021-04-18 RX ADMIN — MORPHINE SULFATE 2 MG: 2 INJECTION, SOLUTION INTRAMUSCULAR; INTRAVENOUS at 20:31

## 2021-04-18 RX ADMIN — GABAPENTIN 300 MG: 300 CAPSULE ORAL at 10:06

## 2021-04-18 RX ADMIN — Medication 10 ML: at 14:00

## 2021-04-18 RX ADMIN — MAGNESIUM GLUCONATE 500 MG ORAL TABLET 800 MG: 500 TABLET ORAL at 10:06

## 2021-04-18 RX ADMIN — Medication 10 ML: at 05:20

## 2021-04-18 RX ADMIN — PROMETHAZINE HYDROCHLORIDE 25 MG: 25 INJECTION INTRAMUSCULAR; INTRAVENOUS at 20:31

## 2021-04-18 RX ADMIN — AZITHROMYCIN MONOHYDRATE 500 MG: 500 INJECTION, POWDER, LYOPHILIZED, FOR SOLUTION INTRAVENOUS at 01:27

## 2021-04-18 RX ADMIN — PROMETHAZINE HYDROCHLORIDE 12.5 MG: 25 INJECTION INTRAMUSCULAR; INTRAVENOUS at 06:14

## 2021-04-18 RX ADMIN — LOSARTAN POTASSIUM 25 MG: 50 TABLET, FILM COATED ORAL at 14:00

## 2021-04-18 RX ADMIN — CEFTRIAXONE SODIUM 2 G: 2 INJECTION, POWDER, FOR SOLUTION INTRAMUSCULAR; INTRAVENOUS at 01:28

## 2021-04-18 NOTE — PROGRESS NOTES
[facility] Progress Note    Patient is a 64 y.o. male with medical h/o sCHF, HTN presents to the ER with complaint of SOB. Patient reports that he started feeling bad yesterday. He reports BLE edema and some mild chest pain. On ER workup, patient has elevated pBNP. Hospitalist asked to admit. Denies fevers. Reports chest discomfort, SOB, nausea/vomiting, cough. INTERVAL HISTORY  / Subjective:    04/15/2021 - Pt seen and evaluated. Pt with borderline BP. He states that breathing is better and is on RA. He c/o continued abd pain. 04/16/2021 - Pt states that he is breathing better but still with abd pain. Case d/w GI and surgery tentatively scheduled for Monday. Pt is in no distress. 04/17/2021 - Pt c/o continued abd pain. He asks about po and requests pain med increase. 04/18/2021 - Pt states that his nausea is worse and requests increased dose of phenergan. RN Stephane Dominguez then reports that pt is requesting cabello with his diet. Pt counseled with impending GB surgery and cardiac risks. Assessment / Plan:    IMP:    1.) Acute on Chronic LVSD EF 20-25%/CHF - clinically better and off O2, IVVD impaired with borderline BP.    2.) Abd Pain with Abn LFT's - appreciate GI. ? Motility issues with UGI. For Buster in am. NPO after MN.    3.) CAD/Abn Troponin - flat, ? Due to CHF, appreciate cardio.     4.) HTN - with hypotension, give fluids prn f/u MAP > 65    5.) H/O VT s/p  ICD - not MRI compatible    6.) Anxiety/Depression    7.) Chronic EtOH Abuse - cont Thiamine,     8.) GERD h/o Schatzki's Ring    9.) Anemia/Macrocytosis - ACD per Fe studies, nml B12, fol    10.) HypoK+/Mg2+ - replaced            Plan:      Hold diuretics  Start Thiamine  Increase pain reg/nausea reg  Replace K+,  Mg2+  Ck am labs  NPO   Lap buster Monday        Chief Complaint : Shortness of Breath        Objective:  Visit Vitals  /84 (BP 1 Location: Left arm, BP Patient Position: Sitting)   Pulse 94   Temp 97.7 °F (36.5 °C)   Resp 16   Ht 5' 11\" (1.803 m)   Wt 85.2 kg (187 lb 12.8 oz)   SpO2 97%   BMI 26.19 kg/m²                 Physical Exam: Pt examined 04/18/2021 and exam as below:        GEN - middle aged BM in no distress  HEENT - NC/At, PERRLA, EOMI, No JVD  Lungs - diminished, clear  CVS - nml S1, S2, RRR,   Abd - Soft, + RUQ tend, no rebound, ND, Nml BS+  Ext - Nml ROM, No edema      Intake and Output:  Date 04/17/21 0700 - 04/18/21 0659 04/18/21 0700 - 04/19/21 0659   Shift 9632-1667 8074-2181 24 Hour Total 7410-2206 2596-6300 24 Hour Total   INTAKE   P.O. 600  600        P. O. 600  600      Shift Total(mL/kg) 600(7)  600(7)      OUTPUT   Shift Total(mL/kg)           600      Weight (kg) 85.2 85.2 85.2 85.2 85.2 85.2       LAB:  Admission on 04/14/2021   Component Date Value    WBC 04/14/2021 3.8*    RBC 04/14/2021 4.07*    HGB 04/14/2021 13.4*    HCT 04/14/2021 38.5*    MCV 04/14/2021 94.6     MCH 04/14/2021 32.9     MCHC 04/14/2021 34.8     RDW 04/14/2021 13.8     PLATELET 03/18/9202 778     MPV 04/14/2021 9.6     ABSOLUTE NRBC 04/14/2021 0.00     DF 04/14/2021 AUTOMATED     NEUTROPHILS 04/14/2021 57     LYMPHOCYTES 04/14/2021 32     MONOCYTES 04/14/2021 8     EOSINOPHILS 04/14/2021 1     BASOPHILS 04/14/2021 1     IMMATURE GRANULOCYTES 04/14/2021 1     ABS. NEUTROPHILS 04/14/2021 2.2     ABS. LYMPHOCYTES 04/14/2021 1.2     ABS. MONOCYTES 04/14/2021 0.3     ABS. EOSINOPHILS 04/14/2021 0.1     ABS. BASOPHILS 04/14/2021 0.0     ABS. IMM.  GRANS. 04/14/2021 0.0     Sodium 04/14/2021 137     Potassium 04/14/2021 3.4*    Chloride 04/14/2021 100     CO2 04/14/2021 26     Anion gap 04/14/2021 11     Glucose 04/14/2021 122*    BUN 04/14/2021 7     Creatinine 04/14/2021 0.84     GFR est AA 04/14/2021 >60     GFR est non-AA 04/14/2021 >60     Calcium 04/14/2021 8.0*    Bilirubin, total 04/14/2021 2.3*    ALT (SGPT) 04/14/2021 84*    AST (SGOT) 04/14/2021 224*    Alk. phosphatase 04/14/2021 118     Protein, total 04/14/2021 6.6     Albumin 04/14/2021 3.7     Globulin 04/14/2021 2.9     A-G Ratio 04/14/2021 1.3     Lipase 04/14/2021 62*    Ventricular Rate 04/14/2021 102     Atrial Rate 04/14/2021 102     P-R Interval 04/14/2021 172     QRS Duration 04/14/2021 98     Q-T Interval 04/14/2021 392     QTC Calculation (Bezet) 04/14/2021 510     Calculated P Axis 04/14/2021 47     Calculated R Axis 04/14/2021 -22     Calculated T Axis 04/14/2021 -96     Diagnosis 04/14/2021                      Value:!!! Poor data quality, interpretation may be adversely affected  Sinus tachycardia  Anterior infarct (cited on or before 02-JUL-2020)  ST & T wave abnormality, consider lateral ischemia  Abnormal ECG  When compared with ECG of 30-SEP-2020 03:19,  Poor data quality in current ECG precludes serial comparison  Confirmed by ST RYAN HORNER MD (), EVIE ORELLANA ([id number]) on 4/14/2021 8:49:05 AM      Lactic acid 04/14/2021 4.0*    NT pro-BNP 04/14/2021 2,887*    Troponin-High Sensitivity 04/14/2021 114.4*    Troponin-High Sensitivity 04/14/2021 98.8*    Special Requests: 04/14/2021                      Value:LEFT  HAND      Culture result: 04/14/2021 NO GROWTH 4 DAYS     Special Requests: 04/14/2021                      Value:RIGHT  HAND      Culture result: 04/14/2021 NO GROWTH 4 DAYS     Lactic acid 04/14/2021 2.0     SARS-CoV-2 04/14/2021 Please find results under separate order     Specimen source 04/14/2021 Nasopharyngeal     COVID-19 rapid test 04/14/2021 Not detected     Specimen source 04/14/2021 Nasopharyngeal     SARS-CoV-2 04/14/2021 Not detected     Sodium 04/15/2021 139     Potassium 04/15/2021 3.0*    Chloride 04/15/2021 102     CO2 04/15/2021 29     Anion gap 04/15/2021 8     Glucose 04/15/2021 101*    BUN 04/15/2021 7     Creatinine 04/15/2021 1.03     GFR est AA 04/15/2021 >60     GFR est non-AA 04/15/2021 >60     Calcium 04/15/2021 6.9*    Bilirubin, total 04/15/2021 1.4*    ALT (SGPT) 04/15/2021 50     AST (SGOT) 04/15/2021 84*    Alk. phosphatase 04/15/2021 96     Protein, total 04/15/2021 5.6*    Albumin 04/15/2021 3.0*    Globulin 04/15/2021 2.6     A-G Ratio 04/15/2021 1.2     WBC 04/15/2021 4.2*    RBC 04/15/2021 3.45*    HGB 04/15/2021 11.5*    HCT 04/15/2021 33.9*    MCV 04/15/2021 98.3*    MCH 04/15/2021 33.3*    MCHC 04/15/2021 33.9     RDW 04/15/2021 14.8*    PLATELET 27/59/1383 405*    MPV 04/15/2021 9.8     ABSOLUTE NRBC 04/15/2021 0.00     Magnesium 04/15/2021 1.2*    Sodium 04/16/2021 140     Potassium 04/16/2021 3.1*    Chloride 04/16/2021 107     CO2 04/16/2021 27     Anion gap 04/16/2021 6*    Glucose 04/16/2021 98     BUN 04/16/2021 5*    Creatinine 04/16/2021 0.87     GFR est AA 04/16/2021 >60     GFR est non-AA 04/16/2021 >60     Calcium 04/16/2021 6.8*    Bilirubin, total 04/16/2021 0.5     ALT (SGPT) 04/16/2021 32     AST (SGOT) 04/16/2021 39*    Alk.  phosphatase 04/16/2021 75     Protein, total 04/16/2021 5.2*    Albumin 04/16/2021 2.6*    Globulin 04/16/2021 2.6     A-G Ratio 04/16/2021 1.0*    WBC 04/16/2021 4.0*    RBC 04/16/2021 3.16*    HGB 04/16/2021 10.5*    HCT 04/16/2021 32.7*    MCV 04/16/2021 103.5*    MCH 04/16/2021 33.2*    MCHC 04/16/2021 32.1     RDW 04/16/2021 15.0*    PLATELET 74/24/6391 506*    MPV 04/16/2021 10.0     ABSOLUTE NRBC 04/16/2021 0.00     Magnesium 04/16/2021 1.1*    Ferritin 04/16/2021 333     Folate 04/16/2021 3.9     Vitamin B12 04/16/2021 1,099*    Reticulocyte count 04/16/2021 1.1     Absolute Retic Cnt. 04/16/2021 0.0365     Immature Retic Fraction 04/16/2021 12.0     Retic Hgb Conc. 04/16/2021 41*    Iron 04/16/2021 27*    TIBC 04/16/2021 194*    Transferrin Saturation 04/16/2021 14     Sodium 04/17/2021 139     Potassium 04/17/2021 3.6     Chloride 04/17/2021 108*    CO2 04/17/2021 26     Anion gap 04/17/2021 5*    Glucose 04/17/2021 95     BUN 04/17/2021 6     Creatinine 04/17/2021 0.87     GFR est AA 04/17/2021 >60     GFR est non-AA 04/17/2021 >60     Calcium 04/17/2021 7.5*    WBC 04/17/2021 4.2*    RBC 04/17/2021 3.10*    HGB 04/17/2021 10.3*    HCT 04/17/2021 31.1*    MCV 04/17/2021 100.3*    MCH 04/17/2021 33.2*    MCHC 04/17/2021 33.1     RDW 04/17/2021 15.2*    PLATELET 61/69/1096 126     MPV 04/17/2021 10.0     ABSOLUTE NRBC 04/17/2021 0.00     DF 04/17/2021 AUTOMATED     NEUTROPHILS 04/17/2021 63     LYMPHOCYTES 04/17/2021 22     MONOCYTES 04/17/2021 9     EOSINOPHILS 04/17/2021 5     BASOPHILS 04/17/2021 1     IMMATURE GRANULOCYTES 04/17/2021 0     ABS. NEUTROPHILS 04/17/2021 2.7     ABS. LYMPHOCYTES 04/17/2021 0.9     ABS. MONOCYTES 04/17/2021 0.4     ABS. EOSINOPHILS 04/17/2021 0.2     ABS. BASOPHILS 04/17/2021 0.0     ABS. IMM. GRANS. 04/17/2021 0.0     Protein, total 04/17/2021 5.7*    Albumin 04/17/2021 2.9*    Globulin 04/17/2021 2.8     A-G Ratio 04/17/2021 1.0*    Bilirubin, total 04/17/2021 0.3     Bilirubin, direct 04/17/2021 0.2     Alk. phosphatase 04/17/2021 86     AST (SGOT) 04/17/2021 39*    ALT (SGPT) 04/17/2021 30     Magnesium 04/17/2021 2.2        IMAGING:  Xr Chest Pa Lat    Result Date: 4/14/2021  No acute process. Xr Upper Gi Series W Kub    Result Date: 4/16/2021  Mild presbyesophagus. No strictures or ulcerations. Ct Abd Pelv W Cont    Result Date: 4/14/2021  1. Cholelithiasis without evidence of cholecystitis or biliary ductal dilatation. 2.  Diffuse hepatic parenchymal hypodensity may be associated with steatosis, hepatitis, or other infiltrative process. No focal liver lesion identified. 3.  Left clonic diverticulosis without evidence of diverticulitis, appendicitis, or other acute abdominopelvic abnormality identified. [id number]    Result Date: 4/14/2021  1. Distended gallbladder containing sludge and stones.  No gallbladder wall thickening or surrounding fluid is seen. 2. New mild hepatomegaly. 3. The pancreas and IVC were obscured by bowel gas. EKG:  Results for orders placed or performed in visit on 02/15/19   AMB POC EKG ROUTINE W/ 12 LEADS, INTER & REP     Status: None    Impression    Sinus  Rhythm 84bpm  Normal axis  Voltage criteria for LVH  (S(V1)+R(V6) exceeds 3.50 mV). -ST depression   +   T-abnormality  -Seen with left ventricular hypertrophy (strain) or digitalis effect              Brown Murcia MD  4/18/2021 12:39 PM          Mary Cespedes. Violeta Andujar MD, [address]  Attending Physician  [facility].

## 2021-04-18 NOTE — PROGRESS NOTES
Notified Dr. Edith Arnold that pt did not want to take azithromycin anymore because of the side effects.  Dr. Edith Arnold said to d/c    Warren Sandoval RN

## 2021-04-18 NOTE — PROGRESS NOTES
Patient states that he gets very nauseous on azithromycin and doesn't want to take it again. Norco given x3. Morphine given x3. No needs voiced at this time. END OF SHIFT NOTE:    Intake/Output  No intake/output data recorded. Voiding: YES  Catheter: NO  Drain:      Stool:  0 occurrences. Stool Assessment  Stool Color: Brown;Yellow (04/15/21 0724)  Stool Appearance: Formed(per patient) (04/17/21 1915)  Stool Amount: Small (04/15/21 0724)  Stool Source/Status: Rectum (04/15/21 0724)    Emesis:  0 occurrences. VITAL SIGNS  Patient Vitals for the past 12 hrs:   Temp Pulse Resp BP SpO2   04/18/21 0400 97.4 °F (36.3 °C) 96 20 (!) 124/94 100 %   04/18/21 0029 97.7 °F (36.5 °C) 68 18 (!) 118/99 100 %   04/18/21 0000  88      04/17/21 2000  100      04/17/21 1915 97.8 °F (36.6 °C) (!) 101 20 116/78 100 %       Pain Assessment  Pain 1  Pain Scale 1: Numeric (0 - 10) (04/18/21 0446)  Pain Intensity 1: 6 (04/18/21 0446)  Patient Stated Pain Goal: 0 (04/18/21 0446)  Pain Reassessment 1: Patient resting w/respiratory rate greater than 10 (04/18/21 0203)  Pain Onset 1: yesterday (04/15/21 0715)  Pain Location 1: Abdomen (04/18/21 0446)  Pain Orientation 1: Right (04/17/21 0755)  Pain Description 1: Aching (04/17/21 0755)  Pain Intervention(s) 1: Medication (see MAR) (04/18/21 0130)    Ambulating  Yes    Additional Information:     Shift report given to oncoming nurseJessica at the bedside.     Pallavi Gillis

## 2021-04-19 ENCOUNTER — ANESTHESIA (OUTPATIENT)
Dept: SURGERY | Age: 56
DRG: 417 | End: 2021-04-19
Payer: COMMERCIAL

## 2021-04-19 PROCEDURE — 77030008522 HC TBNG INSUF LAPRO STRY -B: Performed by: SURGERY

## 2021-04-19 PROCEDURE — 74011250637 HC RX REV CODE- 250/637: Performed by: FAMILY MEDICINE

## 2021-04-19 PROCEDURE — 88304 TISSUE EXAM BY PATHOLOGIST: CPT

## 2021-04-19 PROCEDURE — 77030034154 HC SHR COAG HARM ACE J&J -F: Performed by: SURGERY

## 2021-04-19 PROCEDURE — 74011000250 HC RX REV CODE- 250: Performed by: INTERNAL MEDICINE

## 2021-04-19 PROCEDURE — 77030039425 HC BLD LARYNG TRULITE DISP TELE -A: Performed by: NURSE ANESTHETIST, CERTIFIED REGISTERED

## 2021-04-19 PROCEDURE — 74011250637 HC RX REV CODE- 250/637: Performed by: SURGERY

## 2021-04-19 PROCEDURE — 47562 LAPAROSCOPIC CHOLECYSTECTOMY: CPT | Performed by: SURGERY

## 2021-04-19 PROCEDURE — 76010000138 HC OR TIME 0.5 TO 1 HR: Performed by: SURGERY

## 2021-04-19 PROCEDURE — 65270000029 HC RM PRIVATE

## 2021-04-19 PROCEDURE — 74011250636 HC RX REV CODE- 250/636: Performed by: ANESTHESIOLOGY

## 2021-04-19 PROCEDURE — 77030010513 HC APPL CLP LIG J&J -C: Performed by: SURGERY

## 2021-04-19 PROCEDURE — 74011250636 HC RX REV CODE- 250/636: Performed by: NURSE ANESTHETIST, CERTIFIED REGISTERED

## 2021-04-19 PROCEDURE — 0FT44ZZ RESECTION OF GALLBLADDER, PERCUTANEOUS ENDOSCOPIC APPROACH: ICD-10-PCS | Performed by: SURGERY

## 2021-04-19 PROCEDURE — 74011250636 HC RX REV CODE- 250/636: Performed by: INTERNAL MEDICINE

## 2021-04-19 PROCEDURE — 77030037088 HC TUBE ENDOTRACH ORAL NSL COVD-A: Performed by: NURSE ANESTHETIST, CERTIFIED REGISTERED

## 2021-04-19 PROCEDURE — 74011250636 HC RX REV CODE- 250/636: Performed by: EMERGENCY MEDICINE

## 2021-04-19 PROCEDURE — 74011000250 HC RX REV CODE- 250: Performed by: SURGERY

## 2021-04-19 PROCEDURE — 76060000032 HC ANESTHESIA 0.5 TO 1 HR: Performed by: SURGERY

## 2021-04-19 PROCEDURE — 76210000006 HC OR PH I REC 0.5 TO 1 HR: Performed by: SURGERY

## 2021-04-19 PROCEDURE — 74011250636 HC RX REV CODE- 250/636: Performed by: SURGERY

## 2021-04-19 PROCEDURE — 77030031139 HC SUT VCRL2 J&J -A: Performed by: SURGERY

## 2021-04-19 PROCEDURE — 77030035048 HC TRCR ENDOSC OPTCL COVD -B: Performed by: SURGERY

## 2021-04-19 PROCEDURE — 77030018352 HC SHR COAG HARM2 J&J -E: Performed by: SURGERY

## 2021-04-19 PROCEDURE — 74011000250 HC RX REV CODE- 250: Performed by: NURSE ANESTHETIST, CERTIFIED REGISTERED

## 2021-04-19 PROCEDURE — 74011000258 HC RX REV CODE- 258: Performed by: EMERGENCY MEDICINE

## 2021-04-19 PROCEDURE — 2709999900 HC NON-CHARGEABLE SUPPLY: Performed by: SURGERY

## 2021-04-19 RX ORDER — ETOMIDATE 2 MG/ML
INJECTION INTRAVENOUS AS NEEDED
Status: DISCONTINUED | OUTPATIENT
Start: 2021-04-19 | End: 2021-04-19 | Stop reason: HOSPADM

## 2021-04-19 RX ORDER — DEXAMETHASONE SODIUM PHOSPHATE 4 MG/ML
INJECTION, SOLUTION INTRA-ARTICULAR; INTRALESIONAL; INTRAMUSCULAR; INTRAVENOUS; SOFT TISSUE AS NEEDED
Status: DISCONTINUED | OUTPATIENT
Start: 2021-04-19 | End: 2021-04-19 | Stop reason: HOSPADM

## 2021-04-19 RX ORDER — ACETAMINOPHEN 500 MG
1000 TABLET ORAL ONCE
Status: DISCONTINUED | OUTPATIENT
Start: 2021-04-19 | End: 2021-04-19 | Stop reason: HOSPADM

## 2021-04-19 RX ORDER — NEOSTIGMINE METHYLSULFATE 1 MG/ML
INJECTION, SOLUTION INTRAVENOUS AS NEEDED
Status: DISCONTINUED | OUTPATIENT
Start: 2021-04-19 | End: 2021-04-19 | Stop reason: HOSPADM

## 2021-04-19 RX ORDER — ROCURONIUM BROMIDE 10 MG/ML
INJECTION, SOLUTION INTRAVENOUS AS NEEDED
Status: DISCONTINUED | OUTPATIENT
Start: 2021-04-19 | End: 2021-04-19 | Stop reason: HOSPADM

## 2021-04-19 RX ORDER — ONDANSETRON 2 MG/ML
INJECTION INTRAMUSCULAR; INTRAVENOUS AS NEEDED
Status: DISCONTINUED | OUTPATIENT
Start: 2021-04-19 | End: 2021-04-19 | Stop reason: HOSPADM

## 2021-04-19 RX ORDER — LABETALOL HYDROCHLORIDE 5 MG/ML
INJECTION, SOLUTION INTRAVENOUS AS NEEDED
Status: DISCONTINUED | OUTPATIENT
Start: 2021-04-19 | End: 2021-04-19 | Stop reason: HOSPADM

## 2021-04-19 RX ORDER — MIDAZOLAM HYDROCHLORIDE 1 MG/ML
2 INJECTION, SOLUTION INTRAMUSCULAR; INTRAVENOUS
Status: DISCONTINUED | OUTPATIENT
Start: 2021-04-19 | End: 2021-04-19 | Stop reason: HOSPADM

## 2021-04-19 RX ORDER — FENTANYL CITRATE 50 UG/ML
100 INJECTION, SOLUTION INTRAMUSCULAR; INTRAVENOUS ONCE
Status: DISCONTINUED | OUTPATIENT
Start: 2021-04-19 | End: 2021-04-19 | Stop reason: HOSPADM

## 2021-04-19 RX ORDER — LIDOCAINE HYDROCHLORIDE 10 MG/ML
0.1 INJECTION INFILTRATION; PERINEURAL AS NEEDED
Status: DISCONTINUED | OUTPATIENT
Start: 2021-04-19 | End: 2021-04-19 | Stop reason: HOSPADM

## 2021-04-19 RX ORDER — SODIUM CHLORIDE, SODIUM LACTATE, POTASSIUM CHLORIDE, CALCIUM CHLORIDE 600; 310; 30; 20 MG/100ML; MG/100ML; MG/100ML; MG/100ML
25 INJECTION, SOLUTION INTRAVENOUS CONTINUOUS
Status: DISCONTINUED | OUTPATIENT
Start: 2021-04-19 | End: 2021-04-19 | Stop reason: HOSPADM

## 2021-04-19 RX ORDER — SODIUM CHLORIDE 0.9 % (FLUSH) 0.9 %
5-40 SYRINGE (ML) INJECTION AS NEEDED
Status: DISCONTINUED | OUTPATIENT
Start: 2021-04-19 | End: 2021-04-21 | Stop reason: HOSPADM

## 2021-04-19 RX ORDER — NALOXONE HYDROCHLORIDE 0.4 MG/ML
0.2 INJECTION, SOLUTION INTRAMUSCULAR; INTRAVENOUS; SUBCUTANEOUS AS NEEDED
Status: DISCONTINUED | OUTPATIENT
Start: 2021-04-19 | End: 2021-04-19 | Stop reason: HOSPADM

## 2021-04-19 RX ORDER — ONDANSETRON 2 MG/ML
4 INJECTION INTRAMUSCULAR; INTRAVENOUS
Status: DISCONTINUED | OUTPATIENT
Start: 2021-04-19 | End: 2021-04-19 | Stop reason: HOSPADM

## 2021-04-19 RX ORDER — HYDROCODONE BITARTRATE AND ACETAMINOPHEN 5; 325 MG/1; MG/1
2 TABLET ORAL
Status: DISCONTINUED | OUTPATIENT
Start: 2021-04-19 | End: 2021-04-21 | Stop reason: HOSPADM

## 2021-04-19 RX ORDER — NALOXONE HYDROCHLORIDE 0.4 MG/ML
0.2 INJECTION, SOLUTION INTRAMUSCULAR; INTRAVENOUS; SUBCUTANEOUS
Status: DISCONTINUED | OUTPATIENT
Start: 2021-04-19 | End: 2021-04-19 | Stop reason: HOSPADM

## 2021-04-19 RX ORDER — SODIUM CHLORIDE, SODIUM LACTATE, POTASSIUM CHLORIDE, CALCIUM CHLORIDE 600; 310; 30; 20 MG/100ML; MG/100ML; MG/100ML; MG/100ML
75 INJECTION, SOLUTION INTRAVENOUS CONTINUOUS
Status: DISCONTINUED | OUTPATIENT
Start: 2021-04-19 | End: 2021-04-19 | Stop reason: HOSPADM

## 2021-04-19 RX ORDER — MIDAZOLAM HYDROCHLORIDE 1 MG/ML
2 INJECTION, SOLUTION INTRAMUSCULAR; INTRAVENOUS ONCE
Status: COMPLETED | OUTPATIENT
Start: 2021-04-19 | End: 2021-04-19

## 2021-04-19 RX ORDER — HYDRALAZINE HYDROCHLORIDE 20 MG/ML
10 INJECTION INTRAMUSCULAR; INTRAVENOUS ONCE
Status: COMPLETED | OUTPATIENT
Start: 2021-04-19 | End: 2021-04-19

## 2021-04-19 RX ORDER — SODIUM CHLORIDE 0.9 % (FLUSH) 0.9 %
5-40 SYRINGE (ML) INJECTION EVERY 8 HOURS
Status: DISCONTINUED | OUTPATIENT
Start: 2021-04-19 | End: 2021-04-21 | Stop reason: HOSPADM

## 2021-04-19 RX ORDER — FENTANYL CITRATE 50 UG/ML
INJECTION, SOLUTION INTRAMUSCULAR; INTRAVENOUS AS NEEDED
Status: DISCONTINUED | OUTPATIENT
Start: 2021-04-19 | End: 2021-04-19 | Stop reason: HOSPADM

## 2021-04-19 RX ORDER — HYDROMORPHONE HYDROCHLORIDE 2 MG/ML
0.5 INJECTION, SOLUTION INTRAMUSCULAR; INTRAVENOUS; SUBCUTANEOUS
Status: DISCONTINUED | OUTPATIENT
Start: 2021-04-19 | End: 2021-04-19 | Stop reason: HOSPADM

## 2021-04-19 RX ORDER — HYDROMORPHONE HYDROCHLORIDE 1 MG/ML
.5-2 INJECTION, SOLUTION INTRAMUSCULAR; INTRAVENOUS; SUBCUTANEOUS
Status: DISCONTINUED | OUTPATIENT
Start: 2021-04-19 | End: 2021-04-20

## 2021-04-19 RX ORDER — LIDOCAINE HYDROCHLORIDE 20 MG/ML
INJECTION, SOLUTION EPIDURAL; INFILTRATION; INTRACAUDAL; PERINEURAL AS NEEDED
Status: DISCONTINUED | OUTPATIENT
Start: 2021-04-19 | End: 2021-04-19 | Stop reason: HOSPADM

## 2021-04-19 RX ORDER — OXYCODONE HYDROCHLORIDE 5 MG/1
5 TABLET ORAL
Status: DISCONTINUED | OUTPATIENT
Start: 2021-04-19 | End: 2021-04-19 | Stop reason: HOSPADM

## 2021-04-19 RX ORDER — HYDROCODONE BITARTRATE AND ACETAMINOPHEN 5; 325 MG/1; MG/1
1 TABLET ORAL
Status: DISCONTINUED | OUTPATIENT
Start: 2021-04-19 | End: 2021-04-21 | Stop reason: HOSPADM

## 2021-04-19 RX ORDER — BUPIVACAINE HYDROCHLORIDE AND EPINEPHRINE 5; 5 MG/ML; UG/ML
INJECTION, SOLUTION EPIDURAL; INTRACAUDAL; PERINEURAL AS NEEDED
Status: DISCONTINUED | OUTPATIENT
Start: 2021-04-19 | End: 2021-04-19 | Stop reason: HOSPADM

## 2021-04-19 RX ORDER — HYDROMORPHONE HYDROCHLORIDE 2 MG/ML
0.5 INJECTION, SOLUTION INTRAMUSCULAR; INTRAVENOUS; SUBCUTANEOUS
Status: COMPLETED | OUTPATIENT
Start: 2021-04-19 | End: 2021-04-19

## 2021-04-19 RX ORDER — GLYCOPYRROLATE 0.2 MG/ML
INJECTION INTRAMUSCULAR; INTRAVENOUS AS NEEDED
Status: DISCONTINUED | OUTPATIENT
Start: 2021-04-19 | End: 2021-04-19 | Stop reason: HOSPADM

## 2021-04-19 RX ORDER — ESMOLOL HYDROCHLORIDE 10 MG/ML
INJECTION INTRAVENOUS AS NEEDED
Status: DISCONTINUED | OUTPATIENT
Start: 2021-04-19 | End: 2021-04-19 | Stop reason: HOSPADM

## 2021-04-19 RX ADMIN — HYDROMORPHONE HYDROCHLORIDE 0.5 MG: 2 INJECTION INTRAMUSCULAR; INTRAVENOUS; SUBCUTANEOUS at 17:20

## 2021-04-19 RX ADMIN — SODIUM CHLORIDE, SODIUM LACTATE, POTASSIUM CHLORIDE, AND CALCIUM CHLORIDE: 600; 310; 30; 20 INJECTION, SOLUTION INTRAVENOUS at 16:55

## 2021-04-19 RX ADMIN — HYDROMORPHONE HYDROCHLORIDE 0.5 MG: 2 INJECTION INTRAMUSCULAR; INTRAVENOUS; SUBCUTANEOUS at 17:10

## 2021-04-19 RX ADMIN — SODIUM CHLORIDE, SODIUM LACTATE, POTASSIUM CHLORIDE, AND CALCIUM CHLORIDE 25 ML/HR: 600; 310; 30; 20 INJECTION, SOLUTION INTRAVENOUS at 13:14

## 2021-04-19 RX ADMIN — LABETALOL HYDROCHLORIDE 10 MG: 5 INJECTION INTRAVENOUS at 16:33

## 2021-04-19 RX ADMIN — ALPRAZOLAM 1 MG: 0.5 TABLET ORAL at 21:31

## 2021-04-19 RX ADMIN — CARVEDILOL 25 MG: 25 TABLET, FILM COATED ORAL at 08:10

## 2021-04-19 RX ADMIN — PROMETHAZINE HYDROCHLORIDE 25 MG: 25 INJECTION INTRAMUSCULAR; INTRAVENOUS at 08:28

## 2021-04-19 RX ADMIN — LABETALOL HYDROCHLORIDE 10 MG: 5 INJECTION INTRAVENOUS at 16:43

## 2021-04-19 RX ADMIN — PANTOPRAZOLE SODIUM 40 MG: 40 TABLET, DELAYED RELEASE ORAL at 08:10

## 2021-04-19 RX ADMIN — PROMETHAZINE HYDROCHLORIDE 25 MG: 25 INJECTION INTRAMUSCULAR; INTRAVENOUS at 12:28

## 2021-04-19 RX ADMIN — HYDROMORPHONE HYDROCHLORIDE 0.5 MG: 2 INJECTION INTRAMUSCULAR; INTRAVENOUS; SUBCUTANEOUS at 17:15

## 2021-04-19 RX ADMIN — Medication 10 ML: at 05:01

## 2021-04-19 RX ADMIN — Medication 10 ML: at 22:45

## 2021-04-19 RX ADMIN — MORPHINE SULFATE 2 MG: 2 INJECTION, SOLUTION INTRAMUSCULAR; INTRAVENOUS at 01:46

## 2021-04-19 RX ADMIN — DEXAMETHASONE SODIUM PHOSPHATE 5 MG: 4 INJECTION, SOLUTION INTRAMUSCULAR; INTRAVENOUS at 16:32

## 2021-04-19 RX ADMIN — ONDANSETRON 4 MG: 2 INJECTION INTRAMUSCULAR; INTRAVENOUS at 16:32

## 2021-04-19 RX ADMIN — MAGNESIUM GLUCONATE 500 MG ORAL TABLET 800 MG: 500 TABLET ORAL at 20:11

## 2021-04-19 RX ADMIN — HYDROMORPHONE HYDROCHLORIDE 0.5 MG: 2 INJECTION INTRAMUSCULAR; INTRAVENOUS; SUBCUTANEOUS at 17:05

## 2021-04-19 RX ADMIN — GLYCOPYRROLATE 0.4 MG: 0.2 INJECTION, SOLUTION INTRAMUSCULAR; INTRAVENOUS at 16:56

## 2021-04-19 RX ADMIN — MIDAZOLAM 2 MG: 1 INJECTION INTRAMUSCULAR; INTRAVENOUS at 13:40

## 2021-04-19 RX ADMIN — PROMETHAZINE HYDROCHLORIDE 25 MG: 25 INJECTION INTRAMUSCULAR; INTRAVENOUS at 20:09

## 2021-04-19 RX ADMIN — CEFTRIAXONE SODIUM 2 G: 2 INJECTION, POWDER, FOR SOLUTION INTRAMUSCULAR; INTRAVENOUS at 01:46

## 2021-04-19 RX ADMIN — GABAPENTIN 300 MG: 300 CAPSULE ORAL at 10:18

## 2021-04-19 RX ADMIN — MAGNESIUM GLUCONATE 500 MG ORAL TABLET 800 MG: 500 TABLET ORAL at 10:18

## 2021-04-19 RX ADMIN — ROCURONIUM BROMIDE 35 MG: 10 INJECTION, SOLUTION INTRAVENOUS at 16:17

## 2021-04-19 RX ADMIN — HYDROCODONE BITARTRATE AND ACETAMINOPHEN 1 TABLET: 10; 325 TABLET ORAL at 11:33

## 2021-04-19 RX ADMIN — LIDOCAINE HYDROCHLORIDE 60 MG: 20 INJECTION, SOLUTION EPIDURAL; INFILTRATION; INTRACAUDAL; PERINEURAL at 16:17

## 2021-04-19 RX ADMIN — MORPHINE SULFATE 2 MG: 2 INJECTION, SOLUTION INTRAMUSCULAR; INTRAVENOUS at 08:28

## 2021-04-19 RX ADMIN — PROMETHAZINE HYDROCHLORIDE 25 MG: 25 INJECTION INTRAMUSCULAR; INTRAVENOUS at 01:46

## 2021-04-19 RX ADMIN — HYDRALAZINE HYDROCHLORIDE 10 MG: 20 INJECTION INTRAMUSCULAR; INTRAVENOUS at 17:41

## 2021-04-19 RX ADMIN — HYDROMORPHONE HYDROCHLORIDE 1 MG: 1 INJECTION, SOLUTION INTRAMUSCULAR; INTRAVENOUS; SUBCUTANEOUS at 20:09

## 2021-04-19 RX ADMIN — LABETALOL HYDROCHLORIDE 10 MG: 5 INJECTION INTRAVENOUS at 16:38

## 2021-04-19 RX ADMIN — MORPHINE SULFATE 2 MG: 2 INJECTION, SOLUTION INTRAMUSCULAR; INTRAVENOUS at 12:28

## 2021-04-19 RX ADMIN — FENTANYL CITRATE 100 MCG: 50 INJECTION INTRAMUSCULAR; INTRAVENOUS at 16:17

## 2021-04-19 RX ADMIN — ETOMIDATE 16 MG: 2 INJECTION, SOLUTION INTRAVENOUS at 16:17

## 2021-04-19 RX ADMIN — ESMOLOL HYDROCHLORIDE 30 MG: 10 INJECTION, SOLUTION INTRAVENOUS at 16:18

## 2021-04-19 RX ADMIN — POTASSIUM CHLORIDE 40 MEQ: 1500 TABLET, EXTENDED RELEASE ORAL at 10:18

## 2021-04-19 RX ADMIN — NEOSTIGMINE METHYLSULFATE 3 MG: 1 INJECTION, SOLUTION INTRAVENOUS at 16:56

## 2021-04-19 NOTE — PERIOP NOTES
TRANSFER - IN REPORT:    Verbal report received from KADEN AVALOS on Noonan Screen  being received from [facility], Med-Surg for ordered procedure      Report consisted of patients Situation, Background, Assessment and   Recommendations(SBAR). Information from the following report(s) SBAR, Kardex, Intake/Output and MAR was reviewed with the receiving nurse. Opportunity for questions and clarification was provided. Assessment completed upon patients arrival to unit and care assumed.
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
## 2021-04-19 NOTE — PROGRESS NOTES
Comprehensive Nutrition Assessment    Type and Reason for Visit: Initial, RD nutrition re-screen/LOS      Nutrition Recommendations/Plan:    If po intake post buster does not improve, consider addtion of ONS     Malnutrition Assessment:  Malnutrition Status: At risk for malnutrition (specify)(Chronc dysphagia and abdominal pain)  Nutrition Assessment:   Nutrition History: 4/19: Pt out of room for cholecystectomy     Nutrition Background: H/O CHF, VT with ICD,HTN, CAD, GERD with dysphagia( Schatzki's ring annd hiatal hermia, previous dilation), ETOH. Presented with c/o SOB, cough and vomiting. Findings of elevated BNP, elevated LFT's, chronic RUQ pain, with sludge and gallstones. Suspected chronic biliary colic. UGI shows mild presbyesophagus  Daily Update:  Pt out of room for cholecystectomy today. No noted weight loss despite 1 year of abdominal and chronic dysphagia. Hopeful expectation is that abdominal pain will resolve post cholecystectomy. Nutrition Related Findings:   NFPE deferred-pt unavailable      Current Nutrition Therapies:  DIET NPO    Current Intake:   Average Meal Intake: 1-25%(2 recorded meals since admission) Average Supplement Intake: None ordered      Anthropometric Measures:  Height: 5' 11\" (180.3 cm)  Current Body Wt: 87.5 kg (192 lb 14.4 oz)(4/19), Weight source: Bed scale  BMI: 26.9, Overweight (BMI 25.0-29. 9)  Admission Body Weight: 179 lb 7.3 oz(bed scale)  Ideal Body Weight (lbs) (Calculated): 172 lbs (78 kg),    Usual Body Wt: (184-203# per EMR for past 1 year-no trend up or down.), Percent weight change:            Edema: No data recorded   Estimated Daily Nutrient Needs:  Energy (kcal/day): 0458-5165 ((20-22), Weight Used: Current(87.5 kg))  Protein (g/day): (1-1.2 grams/kg) Weight Used: (Current)  Fluid (ml/day):   ( )    Nutrition Diagnosis:   · Predicted inadequate energy intake related to altered GI function(predicted intake < needs, chronic abdominal pain) as evidenced by (intake < 25% of needs based on 2 recorded meal)    Nutrition Interventions:   Food and/or Nutrient Delivery: Continue current diet     Coordination of Nutrition Care: Continue to monitor while inpatient      Goals: Active Goal: Intake >75% of estimated needs within 7 days    Nutrition Monitoring and Evaluation:      Food/Nutrient Intake Outcomes: Diet advancement/tolerance       Discharge Planning:     Too soon to determine    Attila Ritchie, [address], [address] [place] [address], [address] [place] [address]

## 2021-04-19 NOTE — PROGRESS NOTES
[facility] Service Progress Note    Patient is a 64 y.o. male with medical h/o sCHF, HTN presents to the ER with complaint of SOB. Patient reports that he started feeling bad yesterday. He reports BLE edema and some mild chest pain. On ER workup, patient has elevated pBNP. Hospitalist asked to admit. Denies fevers. Reports chest discomfort, SOB, nausea/vomiting, cough. INTERVAL HISTORY  / Subjective:    04/15/2021 - Pt seen and evaluated. Pt with borderline BP. He states that breathing is better and is on RA. He c/o continued abd pain. 04/16/2021 - Pt states that he is breathing better but still with abd pain. Case d/w GI and surgery tentatively scheduled for Monday. Pt is in no distress. 04/17/2021 - Pt c/o continued abd pain. He asks about po and requests pain med increase. 04/18/2021 - Pt states that his nausea is worse and requests increased dose of phenergan. KADEN Noyola then reports that pt is requesting cabello with his diet. Pt counseled with impending GB surgery and cardiac risks. 04/19/2021 - Pt c/o RUQ pain. For lap buster today. Assessment / Plan:    IMP:    1.) Acute on Chronic LVSD EF 20-25%/CHF - clinically better and off O2, IVVD impaired with borderline BP.    2.) Abd Pain with Abn LFT's - appreciate GI. ? Motility issues with UGI. For Buster today and NPO    3.) CAD/Abn Troponin - flat, ? Due to CHF, appreciate cardio.     4.) HTN - with hypotension, give fluids prn f/u MAP > 65    5.) H/O VT s/p  ICD - not MRI compatible    6.) Anxiety/Depression    7.) Chronic EtOH Abuse - cont Thiamine,     8.) GERD h/o Schatzki's Ring    9.) Anemia/Macrocytosis - ACD per Fe studies, nml B12, fol    10.) HypoK+/Mg2+ - replaced            Plan:      Hold diuretics  Start Thiamine  Continue pain reg/nausea reg  Ck am labs  NPO   Lap buster today        Chief Complaint : Shortness of Breath        Objective:  Visit Vitals  BP (!) 140/99 (BP 1 Location: Right upper arm, BP Patient Position: Semi fowlers) Comment: nurse notified   Pulse 84   Temp 97.9 °F (36.6 °C)   Resp 18   Ht 5' 11\" (1.803 m)   Wt 87.5 kg (193 lb)   SpO2 99%   BMI 26.92 kg/m²                 Physical Exam: Pt examined 04/19/2021 and exam as below:        GEN - middle aged BM in no distress  HEENT - NC/At, PERRLA, EOMI, No JVD  Lungs - diminished, clear  CVS - nml S1, S2, RRR,   Abd - Soft, + RUQ tend, no rebound, ND, Nml BS+  Ext - Nml ROM, No edema      Intake and Output:  Date 04/18/21 0700 - 04/19/21 0659 04/19/21 0700 - 04/20/21 0659   Shift 6199-5137 7855-6152 24 Hour Total 3441-2972 7613-7530 24 Hour Total   INTAKE   P.O.  240 240        P. O.  240 240      Shift Total(mL/kg)  240(2.7) 240(2.7)      OUTPUT   Urine(mL/kg/hr)  300(0.3) 300(0.1)        Urine Voided  300 300      Shift Total(mL/kg)  300(3.4) 300(3.4)      NET  -60 -60      Weight (kg) 85.2 87.5 87.5 87.5 87.5 87.5       LAB:  Admission on 04/14/2021   Component Date Value    WBC 04/14/2021 3.8*    RBC 04/14/2021 4.07*    HGB 04/14/2021 13.4*    HCT 04/14/2021 38.5*    MCV 04/14/2021 94.6     MCH 04/14/2021 32.9     MCHC 04/14/2021 34.8     RDW 04/14/2021 13.8     PLATELET 00/29/7957 796     MPV 04/14/2021 9.6     ABSOLUTE NRBC 04/14/2021 0.00     DF 04/14/2021 AUTOMATED     NEUTROPHILS 04/14/2021 57     LYMPHOCYTES 04/14/2021 32     MONOCYTES 04/14/2021 8     EOSINOPHILS 04/14/2021 1     BASOPHILS 04/14/2021 1     IMMATURE GRANULOCYTES 04/14/2021 1     ABS. NEUTROPHILS 04/14/2021 2.2     ABS. LYMPHOCYTES 04/14/2021 1.2     ABS. MONOCYTES 04/14/2021 0.3     ABS. EOSINOPHILS 04/14/2021 0.1     ABS. BASOPHILS 04/14/2021 0.0     ABS. IMM.  GRANS. 04/14/2021 0.0     Sodium 04/14/2021 137     Potassium 04/14/2021 3.4*    Chloride 04/14/2021 100     CO2 04/14/2021 26     Anion gap 04/14/2021 11     Glucose 04/14/2021 122*    BUN 04/14/2021 7     Creatinine 04/14/2021 0.84     GFR est AA 04/14/2021 >60     GFR est non-AA 04/14/2021 >60     Calcium 04/14/2021 8.0*    Bilirubin, total 04/14/2021 2.3*    ALT (SGPT) 04/14/2021 84*    AST (SGOT) 04/14/2021 224*    Alk.  phosphatase 04/14/2021 118     Protein, total 04/14/2021 6.6     Albumin 04/14/2021 3.7     Globulin 04/14/2021 2.9     A-G Ratio 04/14/2021 1.3     Lipase 04/14/2021 62*    Ventricular Rate 04/14/2021 102     Atrial Rate 04/14/2021 102     P-R Interval 04/14/2021 172     QRS Duration 04/14/2021 98     Q-T Interval 04/14/2021 392     QTC Calculation (Bezet) 04/14/2021 510     Calculated P Axis 04/14/2021 47     Calculated R Axis 04/14/2021 -22     Calculated T Axis 04/14/2021 -96     Diagnosis 04/14/2021                      Value:!!! Poor data quality, interpretation may be adversely affected  Sinus tachycardia  Anterior infarct (cited on or before 02-JUL-2020)  ST & T wave abnormality, consider lateral ischemia  Abnormal ECG  When compared with ECG of 30-SEP-2020 03:19,  Poor data quality in current ECG precludes serial comparison  Confirmed by ST RYAN HORNER MD (), EVIE ORELLANA ([id number]) on 4/14/2021 8:49:05 AM      Lactic acid 04/14/2021 4.0*    NT pro-BNP 04/14/2021 2,887*    Troponin-High Sensitivity 04/14/2021 114.4*    Troponin-High Sensitivity 04/14/2021 98.8*    Special Requests: 04/14/2021                      Value:LEFT  HAND      Culture result: 04/14/2021 NO GROWTH 5 DAYS     Special Requests: 04/14/2021                      Value:RIGHT  HAND      Culture result: 04/14/2021 NO GROWTH 5 DAYS     Lactic acid 04/14/2021 2.0     SARS-CoV-2 04/14/2021 Please find results under separate order     Specimen source 04/14/2021 Nasopharyngeal     COVID-19 rapid test 04/14/2021 Not detected     Specimen source 04/14/2021 Nasopharyngeal     SARS-CoV-2 04/14/2021 Not detected     Sodium 04/15/2021 139     Potassium 04/15/2021 3.0*    Chloride 04/15/2021 102     CO2 04/15/2021 29     Anion gap 04/15/2021 8     Glucose 04/15/2021 101*    BUN 04/15/2021 7     Creatinine 04/15/2021 1.03     GFR est AA 04/15/2021 >60     GFR est non-AA 04/15/2021 >60     Calcium 04/15/2021 6.9*    Bilirubin, total 04/15/2021 1.4*    ALT (SGPT) 04/15/2021 50     AST (SGOT) 04/15/2021 84*    Alk. phosphatase 04/15/2021 96     Protein, total 04/15/2021 5.6*    Albumin 04/15/2021 3.0*    Globulin 04/15/2021 2.6     A-G Ratio 04/15/2021 1.2     WBC 04/15/2021 4.2*    RBC 04/15/2021 3.45*    HGB 04/15/2021 11.5*    HCT 04/15/2021 33.9*    MCV 04/15/2021 98.3*    MCH 04/15/2021 33.3*    MCHC 04/15/2021 33.9     RDW 04/15/2021 14.8*    PLATELET 53/33/1094 042*    MPV 04/15/2021 9.8     ABSOLUTE NRBC 04/15/2021 0.00     Magnesium 04/15/2021 1.2*    Sodium 04/16/2021 140     Potassium 04/16/2021 3.1*    Chloride 04/16/2021 107     CO2 04/16/2021 27     Anion gap 04/16/2021 6*    Glucose 04/16/2021 98     BUN 04/16/2021 5*    Creatinine 04/16/2021 0.87     GFR est AA 04/16/2021 >60     GFR est non-AA 04/16/2021 >60     Calcium 04/16/2021 6.8*    Bilirubin, total 04/16/2021 0.5     ALT (SGPT) 04/16/2021 32     AST (SGOT) 04/16/2021 39*    Alk.  phosphatase 04/16/2021 75     Protein, total 04/16/2021 5.2*    Albumin 04/16/2021 2.6*    Globulin 04/16/2021 2.6     A-G Ratio 04/16/2021 1.0*    WBC 04/16/2021 4.0*    RBC 04/16/2021 3.16*    HGB 04/16/2021 10.5*    HCT 04/16/2021 32.7*    MCV 04/16/2021 103.5*    MCH 04/16/2021 33.2*    MCHC 04/16/2021 32.1     RDW 04/16/2021 15.0*    PLATELET 22/68/0526 275*    MPV 04/16/2021 10.0     ABSOLUTE NRBC 04/16/2021 0.00     Magnesium 04/16/2021 1.1*    Ferritin 04/16/2021 333     Folate 04/16/2021 3.9     Vitamin B12 04/16/2021 1,099*    Reticulocyte count 04/16/2021 1.1     Absolute Retic Cnt. 04/16/2021 0.0365     Immature Retic Fraction 04/16/2021 12.0     Retic Hgb Conc. 04/16/2021 41*    Iron 04/16/2021 27*    TIBC 04/16/2021 194*    Transferrin Saturation 04/16/2021 14     Sodium 04/17/2021 139     Potassium 04/17/2021 3.6     Chloride 04/17/2021 108*    CO2 04/17/2021 26     Anion gap 04/17/2021 5*    Glucose 04/17/2021 95     BUN 04/17/2021 6     Creatinine 04/17/2021 0.87     GFR est AA 04/17/2021 >60     GFR est non-AA 04/17/2021 >60     Calcium 04/17/2021 7.5*    WBC 04/17/2021 4.2*    RBC 04/17/2021 3.10*    HGB 04/17/2021 10.3*    HCT 04/17/2021 31.1*    MCV 04/17/2021 100.3*    MCH 04/17/2021 33.2*    MCHC 04/17/2021 33.1     RDW 04/17/2021 15.2*    PLATELET 28/78/8142 090     MPV 04/17/2021 10.0     ABSOLUTE NRBC 04/17/2021 0.00     DF 04/17/2021 AUTOMATED     NEUTROPHILS 04/17/2021 63     LYMPHOCYTES 04/17/2021 22     MONOCYTES 04/17/2021 9     EOSINOPHILS 04/17/2021 5     BASOPHILS 04/17/2021 1     IMMATURE GRANULOCYTES 04/17/2021 0     ABS. NEUTROPHILS 04/17/2021 2.7     ABS. LYMPHOCYTES 04/17/2021 0.9     ABS. MONOCYTES 04/17/2021 0.4     ABS. EOSINOPHILS 04/17/2021 0.2     ABS. BASOPHILS 04/17/2021 0.0     ABS. IMM. GRANS. 04/17/2021 0.0     Protein, total 04/17/2021 5.7*    Albumin 04/17/2021 2.9*    Globulin 04/17/2021 2.8     A-G Ratio 04/17/2021 1.0*    Bilirubin, total 04/17/2021 0.3     Bilirubin, direct 04/17/2021 0.2     Alk. phosphatase 04/17/2021 86     AST (SGOT) 04/17/2021 39*    ALT (SGPT) 04/17/2021 30     Magnesium 04/17/2021 2.2        IMAGING:  Xr Chest Pa Lat    Result Date: 4/14/2021  No acute process. Xr Upper Gi Series W Kub    Result Date: 4/16/2021  Mild presbyesophagus. No strictures or ulcerations. Ct Abd Pelv W Cont    Result Date: 4/14/2021  1. Cholelithiasis without evidence of cholecystitis or biliary ductal dilatation. 2.  Diffuse hepatic parenchymal hypodensity may be associated with steatosis, hepatitis, or other infiltrative process. No focal liver lesion identified.  3.  Left clonic diverticulosis without evidence of diverticulitis, appendicitis, or other acute abdominopelvic abnormality identified. [id number]    Result Date: 4/14/2021  1. Distended gallbladder containing sludge and stones. No gallbladder wall thickening or surrounding fluid is seen. 2. New mild hepatomegaly. 3. The pancreas and IVC were obscured by bowel gas. EKG:  Results for orders placed or performed in visit on 02/15/19   AMB POC EKG ROUTINE W/ 12 LEADS, INTER & REP     Status: None    Impression    Sinus  Rhythm 84bpm  Normal axis  Voltage criteria for LVH  (S(V1)+R(V6) exceeds 3.50 mV). -ST depression   +   T-abnormality  -Seen with left ventricular hypertrophy (strain) or digitalis effect              Sai Hutchinson MD  4/19/2021 12:39 PM          Milka Wayne. Tarah Sharp MD, [place]  Attending Physician  Hospitalist's [facility] [place].

## 2021-04-19 NOTE — PERIOP NOTES
TRANSFER - OUT REPORT:    Verbal report given to [facility]- PSYCHIATRY & ADDICTIVE MED RN on Ana York  being transferred to [phone] for routine post - op       Report consisted of patients Situation, Background, Assessment and   Recommendations(SBAR). Information from the following report(s) SBAR, OR Summary and MAR was reviewed with the receiving nurse. Lines:   Peripheral IV 04/18/21 Anterior;Right Forearm (Active)   Site Assessment Clean, dry, & intact 04/19/21 0828   Phlebitis Assessment 0 04/19/21 1706   Infiltration Assessment 0 04/19/21 0828   Dressing Status Clean, dry, & intact 04/19/21 0828   Dressing Type Transparent 04/19/21 0828   Hub Color/Line Status Infusing;Flushed;Patent 04/19/21 0828   Alcohol Cap Used No 04/19/21 0441       Peripheral IV 04/19/21 Left Wrist (Active)   Site Assessment Clean, dry, & intact 04/19/21 1706   Phlebitis Assessment 0 04/19/21 1706   Infiltration Assessment 0 04/19/21 1706   Dressing Status Clean, dry, & intact 04/19/21 1706   Dressing Type Transparent;Tape 04/19/21 1706   Hub Color/Line Status Green; Infusing;Patent 04/19/21 1706        Opportunity for questions and clarification was provided. Patient transported with:   O2 @ 2 liters  Tech    VTE prophylaxis orders have been written for Ana York. Patient and family given floor number and nurses name. Family updated re: pt status after security code verified.

## 2021-04-19 NOTE — PROGRESS NOTES
Patient Vitals for the past 12 hrs:   Temp Pulse Resp BP SpO2   04/19/21 1130 97.9 °F (36.6 °C) 84  (!) 140/99 99 %   04/19/21 0810 97.7 °F (36.5 °C) 81 18 (!) 153/98 99 %     Patient NPO for procedure. PRN morphine 2mg IV given x's 2 for pain. PRN phenergan 25mg IV given x's 2 for nausea. PRN norco po given x's 1 for pain. Upon return to unit post surgery, umbilicus dressing CDI.     Bedside shift report given to Quizens

## 2021-04-19 NOTE — OP NOTES
Operative Report    Patient: Reza Jara MRN: [id number]      YOB: 1965  Age: 64 y.o. Sex: male       Date of Surgery: 4/19/2021     Preoperative Diagnosis: Gallstones [K80.20]     Postoperative Diagnosis: Gallstones [K80.20]     Procedure:  Laparoscopic Cholecystectomy -Single Incision/SILS    Anesthesia: General   Complications: none  Estimated Blood Loss: per anesthesia    Indications:  As outlined in the History and Physical.  Single incision technique is planned to provide the patient with the benefit of less incisional pain and improved cosmesis due to fewer incisions as well as the potential for lower risk of wound infection. This technique is significantly more intricate than standard laparoscopic techniques and typically increases the complexity of the procedure by 10-20%. Procedure in Detail:   Informed consent was obtained and the patient was brought to the operating room and placed on the table in supine position with adequate padding of all pressure points and compression devices on both lower extremities. After the successful induction of general anesthesia, the patients abdomen was prepped and draped sterilely. Under direct laparoscopic visualization and additional local anesthetic, a 5mm optiview-type Trocar was inserted through a curved infraumbilical  incision and pneumoperitoneum was created. Visual exploration revealed no immediately apparent pathology. An additional 5mm Trocar was placed through the incision  and a [place] grasper was inserted alongside that trocar sleeve. The gallbladder was elevated by the fundus revealing omental adhesions to the body which were lysed with harmonic eleuterio to expose the neck region; omentum was also adherent to the falciform and this was freed to allow inferior reflection of the omentum. The otherwise visibly normal gallbladder was grasped by its neck and retracted anterolaterally.  The tissues investing this area were incised and blunt dissection was used to skeletonize the normal caliber cystic duct for approximately 10mm. A generous window was created behind the duct allowing identification of the cystic artery, which was traced to the gallbladder wall and divided with the harmonic scalpel. This permitted  generation of a large window behind the neck of the gallbladder allowing a near 360 degree view of the region to confirm normal anatomy of the origin of the cystic duct. A clip was then placed across the neck of the gallbladder and two on the duct 0.5 cm medially and it was transected. The gallbladder was excised with the harmonic and after confirming hemostasis of the liver bed, it was extracted through the umbilical site. Pneumoperitoneum was released and the trocars were removed. The fascia was closed with a figure-of-eight 0-Vicryl,  then the incision was  made hemostatic with cautery and closed with subcuticular 4-0 Vicryl and Steri-Strips were applied. The patient tolerated the procedure well. There were no immediate apparent complications. He was awakened from anesthesia and extubated in the operating room, taken to recovery in satisfactory condition. Specimens:   ID Type Source Tests Collected by Time Destination   1 : Gallbladder Preservative Gallbladder  Shailesh Desai MD 4/19/2021 1651 Pathology           Counts: Sponge and needle counts were correct.     Signed By:  Tim Hciks MD     April 19, 2021

## 2021-04-19 NOTE — PROGRESS NOTES
04/19/21 1735   Vital Signs   Pulse (Heart Rate) 72   Resp Rate 16   BP (!) 148/100     Dr. Cathie Conde notified of BP. Verbal order received for hydralazine 10mg IV once.

## 2021-04-19 NOTE — PROGRESS NOTES
04/19/21 1755   Vital Signs   Pulse (Heart Rate) 83   Resp Rate 16   /84     Dr. Janett Verdugo notified of vitals. MD states ok to send pt to floor. Pt transported to floor with 2 PACU RNs.

## 2021-04-19 NOTE — PROGRESS NOTES
Admit Date: 2021    POD * No surgery date entered *    Procedure:  Procedure(s):  CHOLECYSTECTOMY LAPAROSCOPIC SINGLE INCISION (SILS) ROOM 357    Subjective:     Patient has complaints of pain. Pain controlled, c/o nausea this AM.      Objective:     Visit Vitals  /80 (BP 1 Location: Left upper arm, BP Patient Position: At rest)   Pulse 99   Temp 98 °F (36.7 °C)   Resp 15   Ht 5' 11\" (1.803 m)   Wt 193 lb (87.5 kg)   SpO2 95%   BMI 26.92 kg/m²       Temp (24hrs), Av °F (36.7 °C), Min:97.4 °F (36.3 °C), Max:98.5 °F (36.9 °C)  . I&O reviewed as documented. Physical Exam:    General-in no distress. resp unlabored  Abdomen- soft, TTP RUQ. Labs:   No results found for this or any previous visit (from the past 24 hour(s)).          Assessment:     Principal Problem:    Elevated brain natriuretic peptide (BNP) level (2021)    Active Problems:    Chronic systolic (congestive) heart failure ([facility] [address] (2011)      HTN (hypertension) (2011)      Nausea & vomiting (2016)      [place] (dyspnea on exertion) (3/25/2016)      Gallstones (2021)          Plan/Recommendations/Medical Decision Making:     UGI images reviewed- looks like mild presbyesophagus; discussed with Dr Toni Shah who does not believe dilation (at time of lap choly) would be of significant benefit  OR this afternoon for cholecystectomy  NPO  Pain/nausea control  Consent  Hold Lovenox  On Rocephin Delaney Crigler, PA

## 2021-04-19 NOTE — PROGRESS NOTES
End of Shift Note:    - Pt asked about changing diet from cardiac to regular. Educated the pt on the importance of eating a cardiac diet with his health history and current situation.  - Morphine x3 and phenergan x2.  - NPO at MN for cholestectomy. Consent form is signed and in-patient room. Report given to oncoming RN, Hetal Helm.     Jyoti Fisher, RN

## 2021-04-20 ENCOUNTER — APPOINTMENT (OUTPATIENT)
Dept: CT IMAGING | Age: 56
DRG: 417 | End: 2021-04-20
Attending: FAMILY MEDICINE
Payer: COMMERCIAL

## 2021-04-20 LAB
AMPHET UR QL SCN: NEGATIVE
ANION GAP SERPL CALC-SCNC: 7 MMOL/L (ref 7–16)
BARBITURATES UR QL SCN: NEGATIVE
BASOPHILS # BLD: 0 K/UL (ref 0–0.2)
BASOPHILS NFR BLD: 0 % (ref 0–2)
BENZODIAZ UR QL: POSITIVE
BUN SERPL-MCNC: 8 MG/DL (ref 6–23)
CALCIUM SERPL-MCNC: 9.3 MG/DL (ref 8.3–10.4)
CANNABINOIDS UR QL SCN: NEGATIVE
CHLORIDE SERPL-SCNC: 105 MMOL/L (ref 98–107)
CK MB CFR SERPL CALC: 0.7 %
CK MB SERPL-MCNC: 1.2 NG/ML (ref 1.5–3.6)
CK SERPL-CCNC: 174 U/L (ref 21–215)
CO2 SERPL-SCNC: 24 MMOL/L (ref 21–32)
COCAINE UR QL SCN: NEGATIVE
CREAT SERPL-MCNC: 0.97 MG/DL (ref 0.8–1.5)
DIFFERENTIAL METHOD BLD: ABNORMAL
EOSINOPHIL # BLD: 0 K/UL (ref 0–0.8)
EOSINOPHIL NFR BLD: 0 % (ref 0.5–7.8)
ERYTHROCYTE [DISTWIDTH] IN BLOOD BY AUTOMATED COUNT: 15.2 % (ref 11.9–14.6)
GLUCOSE SERPL-MCNC: 135 MG/DL (ref 65–100)
HCT VFR BLD AUTO: 36.2 % (ref 41.1–50.3)
HGB BLD-MCNC: 11.7 G/DL (ref 13.6–17.2)
IMM GRANULOCYTES # BLD AUTO: 0 K/UL (ref 0–0.5)
IMM GRANULOCYTES NFR BLD AUTO: 0 % (ref 0–5)
LYMPHOCYTES # BLD: 0.7 K/UL (ref 0.5–4.6)
LYMPHOCYTES NFR BLD: 13 % (ref 13–44)
MCH RBC QN AUTO: 32.9 PG (ref 26.1–32.9)
MCHC RBC AUTO-ENTMCNC: 32.3 G/DL (ref 31.4–35)
MCV RBC AUTO: 101.7 FL (ref 79.6–97.8)
METHADONE UR QL: NEGATIVE
MONOCYTES # BLD: 0.2 K/UL (ref 0.1–1.3)
MONOCYTES NFR BLD: 4 % (ref 4–12)
NEUTS SEG # BLD: 4.3 K/UL (ref 1.7–8.2)
NEUTS SEG NFR BLD: 82 % (ref 43–78)
NRBC # BLD: 0 K/UL (ref 0–0.2)
OPIATES UR QL: POSITIVE
PCP UR QL: NEGATIVE
PLATELET # BLD AUTO: 223 K/UL (ref 150–450)
PMV BLD AUTO: 9.2 FL (ref 9.4–12.3)
POTASSIUM SERPL-SCNC: 4.9 MMOL/L (ref 3.5–5.1)
RBC # BLD AUTO: 3.56 M/UL (ref 4.23–5.6)
SODIUM SERPL-SCNC: 136 MMOL/L (ref 136–145)
TROPONIN-HIGH SENSITIVITY: 46.5 PG/ML (ref 0–14)
WBC # BLD AUTO: 5.2 K/UL (ref 4.3–11.1)

## 2021-04-20 PROCEDURE — 36415 COLL VENOUS BLD VENIPUNCTURE: CPT

## 2021-04-20 PROCEDURE — 74011250637 HC RX REV CODE- 250/637: Performed by: SURGERY

## 2021-04-20 PROCEDURE — 2709999900 HC NON-CHARGEABLE SUPPLY

## 2021-04-20 PROCEDURE — 74011250637 HC RX REV CODE- 250/637: Performed by: FAMILY MEDICINE

## 2021-04-20 PROCEDURE — C9113 INJ PANTOPRAZOLE SODIUM, VIA: HCPCS | Performed by: INTERNAL MEDICINE

## 2021-04-20 PROCEDURE — 80307 DRUG TEST PRSMV CHEM ANLYZR: CPT

## 2021-04-20 PROCEDURE — 85025 COMPLETE CBC W/AUTO DIFF WBC: CPT

## 2021-04-20 PROCEDURE — 70450 CT HEAD/BRAIN W/O DYE: CPT

## 2021-04-20 PROCEDURE — 74011000250 HC RX REV CODE- 250: Performed by: INTERNAL MEDICINE

## 2021-04-20 PROCEDURE — 74011000258 HC RX REV CODE- 258: Performed by: SURGERY

## 2021-04-20 PROCEDURE — 74011250636 HC RX REV CODE- 250/636: Performed by: SURGERY

## 2021-04-20 PROCEDURE — 74011000250 HC RX REV CODE- 250: Performed by: FAMILY MEDICINE

## 2021-04-20 PROCEDURE — 74011000250 HC RX REV CODE- 250: Performed by: SURGERY

## 2021-04-20 PROCEDURE — 82550 ASSAY OF CK (CPK): CPT

## 2021-04-20 PROCEDURE — 84484 ASSAY OF TROPONIN QUANT: CPT

## 2021-04-20 PROCEDURE — 65270000029 HC RM PRIVATE

## 2021-04-20 PROCEDURE — 80048 BASIC METABOLIC PNL TOTAL CA: CPT

## 2021-04-20 PROCEDURE — 74011250636 HC RX REV CODE- 250/636: Performed by: INTERNAL MEDICINE

## 2021-04-20 RX ORDER — HYDRALAZINE HYDROCHLORIDE 50 MG/1
50 TABLET, FILM COATED ORAL
Status: DISCONTINUED | OUTPATIENT
Start: 2021-04-20 | End: 2021-04-21 | Stop reason: HOSPADM

## 2021-04-20 RX ORDER — KETOROLAC TROMETHAMINE 15 MG/ML
15 INJECTION, SOLUTION INTRAMUSCULAR; INTRAVENOUS EVERY 6 HOURS
Status: DISCONTINUED | OUTPATIENT
Start: 2021-04-20 | End: 2021-04-21 | Stop reason: HOSPADM

## 2021-04-20 RX ORDER — MORPHINE SULFATE 2 MG/ML
2-5 INJECTION, SOLUTION INTRAMUSCULAR; INTRAVENOUS
Status: DISCONTINUED | OUTPATIENT
Start: 2021-04-20 | End: 2021-04-21 | Stop reason: HOSPADM

## 2021-04-20 RX ORDER — ACETAMINOPHEN 325 MG/1
650 TABLET ORAL EVERY 6 HOURS
Status: DISCONTINUED | OUTPATIENT
Start: 2021-04-20 | End: 2021-04-21 | Stop reason: HOSPADM

## 2021-04-20 RX ORDER — PROCHLORPERAZINE MALEATE 5 MG
5 TABLET ORAL
Status: DISCONTINUED | OUTPATIENT
Start: 2021-04-20 | End: 2021-04-21 | Stop reason: HOSPADM

## 2021-04-20 RX ORDER — METOPROLOL TARTRATE 5 MG/5ML
5 INJECTION INTRAVENOUS ONCE
Status: COMPLETED | OUTPATIENT
Start: 2021-04-20 | End: 2021-04-20

## 2021-04-20 RX ORDER — ONDANSETRON 4 MG/1
4 TABLET, ORALLY DISINTEGRATING ORAL
Status: DISCONTINUED | OUTPATIENT
Start: 2021-04-20 | End: 2021-04-21 | Stop reason: HOSPADM

## 2021-04-20 RX ADMIN — ACETAMINOPHEN 650 MG: 325 TABLET, FILM COATED ORAL at 23:32

## 2021-04-20 RX ADMIN — MORPHINE SULFATE 2 MG: 2 INJECTION, SOLUTION INTRAMUSCULAR; INTRAVENOUS at 20:42

## 2021-04-20 RX ADMIN — METOPROLOL TARTRATE 5 MG: 1 INJECTION, SOLUTION INTRAVENOUS at 02:55

## 2021-04-20 RX ADMIN — PANTOPRAZOLE SODIUM 40 MG: 40 INJECTION, POWDER, FOR SOLUTION INTRAVENOUS at 20:43

## 2021-04-20 RX ADMIN — GABAPENTIN 300 MG: 300 CAPSULE ORAL at 09:13

## 2021-04-20 RX ADMIN — CARVEDILOL 25 MG: 25 TABLET, FILM COATED ORAL at 09:13

## 2021-04-20 RX ADMIN — PROMETHAZINE HYDROCHLORIDE 25 MG: 25 INJECTION INTRAMUSCULAR; INTRAVENOUS at 16:01

## 2021-04-20 RX ADMIN — PROMETHAZINE HYDROCHLORIDE 25 MG: 25 INJECTION INTRAMUSCULAR; INTRAVENOUS at 20:43

## 2021-04-20 RX ADMIN — MAGNESIUM GLUCONATE 500 MG ORAL TABLET 800 MG: 500 TABLET ORAL at 20:43

## 2021-04-20 RX ADMIN — MORPHINE SULFATE 2 MG: 2 INJECTION, SOLUTION INTRAMUSCULAR; INTRAVENOUS at 16:01

## 2021-04-20 RX ADMIN — CEFTRIAXONE SODIUM 2 G: 2 INJECTION, POWDER, FOR SOLUTION INTRAMUSCULAR; INTRAVENOUS at 01:14

## 2021-04-20 RX ADMIN — KETOROLAC TROMETHAMINE 15 MG: 15 INJECTION, SOLUTION INTRAMUSCULAR; INTRAVENOUS at 17:24

## 2021-04-20 RX ADMIN — Medication 10 ML: at 14:03

## 2021-04-20 RX ADMIN — ALPRAZOLAM 1 MG: 0.5 TABLET ORAL at 22:12

## 2021-04-20 RX ADMIN — CARVEDILOL 25 MG: 25 TABLET, FILM COATED ORAL at 17:24

## 2021-04-20 RX ADMIN — Medication 10 ML: at 06:18

## 2021-04-20 RX ADMIN — MORPHINE SULFATE 2 MG: 2 INJECTION, SOLUTION INTRAMUSCULAR; INTRAVENOUS at 11:49

## 2021-04-20 RX ADMIN — PANTOPRAZOLE SODIUM 40 MG: 40 INJECTION, POWDER, FOR SOLUTION INTRAVENOUS at 08:16

## 2021-04-20 RX ADMIN — ACETAMINOPHEN 650 MG: 325 TABLET, FILM COATED ORAL at 17:24

## 2021-04-20 RX ADMIN — Medication 10 ML: at 06:17

## 2021-04-20 RX ADMIN — GABAPENTIN 300 MG: 300 CAPSULE ORAL at 17:24

## 2021-04-20 RX ADMIN — HYDRALAZINE HYDROCHLORIDE 50 MG: 50 TABLET, FILM COATED ORAL at 01:40

## 2021-04-20 RX ADMIN — MAGNESIUM GLUCONATE 500 MG ORAL TABLET 800 MG: 500 TABLET ORAL at 09:14

## 2021-04-20 RX ADMIN — PROMETHAZINE HYDROCHLORIDE 25 MG: 25 INJECTION INTRAMUSCULAR; INTRAVENOUS at 01:32

## 2021-04-20 RX ADMIN — PROMETHAZINE HYDROCHLORIDE 25 MG: 25 INJECTION INTRAMUSCULAR; INTRAVENOUS at 11:49

## 2021-04-20 RX ADMIN — LOSARTAN POTASSIUM 25 MG: 50 TABLET, FILM COATED ORAL at 14:02

## 2021-04-20 RX ADMIN — PROMETHAZINE HYDROCHLORIDE 25 MG: 25 INJECTION INTRAMUSCULAR; INTRAVENOUS at 06:28

## 2021-04-20 RX ADMIN — ONDANSETRON 4 MG: 2 INJECTION INTRAMUSCULAR; INTRAVENOUS at 08:16

## 2021-04-20 RX ADMIN — HYDROCODONE BITARTRATE AND ACETAMINOPHEN 2 TABLET: 5; 325 TABLET ORAL at 09:14

## 2021-04-20 RX ADMIN — HYDROMORPHONE HYDROCHLORIDE 1 MG: 1 INJECTION, SOLUTION INTRAMUSCULAR; INTRAVENOUS; SUBCUTANEOUS at 01:14

## 2021-04-20 NOTE — ANESTHESIA POSTPROCEDURE EVALUATION
Procedure(s):  CHOLECYSTECTOMY LAPAROSCOPIC SINGLE INCISION (SILS) ROOM 357.     general    Anesthesia Post Evaluation      Multimodal analgesia: multimodal analgesia used between 6 hours prior to anesthesia start to PACU discharge  Patient location during evaluation: PACU  Patient participation: complete - patient participated  Level of consciousness: awake and alert  Pain management: adequate  Airway patency: patent  Anesthetic complications: no  Cardiovascular status: acceptable and hemodynamically stable  Respiratory status: acceptable  Hydration status: acceptable  Post anesthesia nausea and vomiting:  none  Final Post Anesthesia Temperature Assessment:  Normothermia (36.0-37.5 degrees C)      INITIAL Post-op Vital signs:   Vitals Value Taken Time   /84 04/19/21 1755   Temp 36.7 °C (98 °F) 04/19/21 1706   Pulse 83 04/19/21 1755   Resp 16 04/19/21 1755   SpO2 96 % 04/19/21 1755

## 2021-04-20 NOTE — PROGRESS NOTES
Pt complained of new onset, severe headache and blurred vision. This RN noticed a new stutter and inability to verbalize his words. BP and HR elevated, hydralazine given. Code S called. MD notified and protocols completed as directed.

## 2021-04-20 NOTE — PROGRESS NOTES
END OF SHIFT NOTE:  Patient medicated with nausea and pain - Zofran 4 mg IV once, Phenergan 25 mg IV twice and  Norco 2 ( 5 mg ) po once and Morphine 2 mg IV twice. Lowest report pain 7/10 - tolerable no number mention. Urine specimen to lab for drug screen as directed. Intake/Output  04/20 0701 - 04/20 1900  In: 325.3 [I.V.:325.3]  Out: 830 [Urine:500]   Voiding: yes   Catheter: no  Drain:              Stool:  nooccurrences. Stool Assessment  Stool Color: Brown;Yellow (04/15/21 0724)  Stool Appearance: Watery (04/20/21 0908)  Stool Amount: Medium (04/20/21 0908)  Stool Source/Status: Rectum (04/20/21 0908)    Emesis:  no occurrences. Emesis Assessment  Appearance: Hernandez (04/20/21 1157)  Emesis Amount: Small (04/20/21 1157)    VITAL SIGNS  Patient Vitals for the past 12 hrs:   Temp Pulse Resp BP SpO2   04/20/21 1524 98.9 °F (37.2 °C) 93 18 (!) 144/98 99 %   04/20/21 1132 98.9 °F (37.2 °C) (!) 104 22 (!) 150/112 96 %   04/20/21 0735 98.7 °F (37.1 °C) (!) 106 22 (!) 165/89 98 %       Pain Assessment  Pain 1  Pain Scale 1: Numeric (0 - 10) (04/20/21 1601)  Pain Intensity 1: 7 (04/20/21 1601)  Patient Stated Pain Goal: 0 (04/20/21 0204)  Pain Reassessment 1: Patient resting w/respiratory rate greater than 10 (04/20/21 0204)  Pain Onset 1: yesterday (04/15/21 0715)  Pain Location 1: Abdomen (04/20/21 1601)  Pain Orientation 1: Right;Upper (04/20/21 1601)  Pain Description 1: Mario Goodness; Stabbing(post- op ) (04/20/21 1601)  Pain Intervention(s) 1: (med not due) (04/19/21 2015)    Ambulating  Yes in room     Additional Information:  See above     Shift report given to oncoming nurse Luiz Palma RN at the bedside.     Roxy Mckenna RN

## 2021-04-20 NOTE — PROGRESS NOTES
END OF SHIFT NOTE:    Intake/Output  No intake/output data recorded. Voiding: YES  Catheter: NO  Drain:              Stool:  0 occurrences. Stool Assessment  Stool Color: Brown;Yellow (04/15/21 0724)  Stool Appearance: Formed(per patient) (04/18/21 1920)  Stool Amount: Small (04/15/21 0724)  Stool Source/Status: Rectum (04/15/21 0724)    Emesis:  1 occurrences. VITAL SIGNS  Patient Vitals for the past 12 hrs:   Temp Pulse Resp BP SpO2   04/20/21 0735 98.7 °F (37.1 °C) (!) 106 22 (!) 165/89 98 %   04/20/21 0255  (!) 106  (!) 142/99    04/20/21 0204  (!) 121  (!) 153/112 99 %   04/20/21 0154  (!) 112  (!) 159/98    04/20/21 0142    (!) 160/98    04/20/21 0129  (!) 126  (!) 170/114    04/20/21 0114  (!) 128  (!) 160/107    04/19/21 2247 97.7 °F (36.5 °C) (!) 101 16 122/89 99 %       Pain Assessment  Pain 1  Pain Scale 1: Visual (04/20/21 0204)  Pain Intensity 1: 0 (04/20/21 0204)  Patient Stated Pain Goal: 0 (04/20/21 3017)  Pain Reassessment 1: Patient resting w/respiratory rate greater than 10 (04/20/21 0204)  Pain Onset 1: yesterday (04/15/21 0715)  Pain Location 1: Abdomen; Head (04/20/21 0114)  Pain Orientation 1: Right;Upper (04/20/21 0114)  Pain Description 1: Stabbing; Sharp (04/20/21 0114)  Pain Intervention(s) 1: (med not due) (04/19/21 2015)    Ambulating  Yes    Additional Information: PT had a new onset headache and continued nausea with one episode of vomitting. PRN dilaudid x1 and phenergan given x3. BP trending up (170/114), hydralazine and lopresser given x1 (bp came down to 142/99). Code S called for new onset stutter and tremor. MD aware. Protocols completed as directed. Pain and nausea remained steady. PT complained of continued severe HA, but denied medications due to vomiting . Pt assessed with oncoming nurse. Pt resting at bed, MD at bedside. No needs voiced. Shift report given to oncoming nurse at the bedside.     Koby Taylor RN

## 2021-04-20 NOTE — PROGRESS NOTES
Hospitalist Progress Note for Rapid Response  2021  Admit Date: 2021 12:08 AM   NAME: Tommy Aguliar   :  1965   MRN:  [id number]   Attending: Lucrecia Ramires MD  PCP:  Emely Vinson MD    SUBJECTIVE:   CC:  Rapid Response called for: Code stroke    S: Patient is a 64 y.o. male who is admitted for elevated BNP level. He had a lap buster done earlier today. He reports that he started having unusual headache just a little bit ago. His nurse went to get his medications and when she came back in the room she noted that he seemed to have a bit of a tremor, complained of blurry vision, headache, and a stuttering speech. She subsequently called a code stroke. At the time of the stroke team arrival, patient felt back to normal except for persisting headache. He does also complain of pain in his surgical site. Review of Systems all reviewed and negative; with exception of pertinent positives/negatives noted above  PHYSICAL EXAM     Visit Vitals  BP (!) 153/112 (BP 1 Location: Right upper arm, BP Patient Position: At rest)   Pulse (!) 121   Temp 97.7 °F (36.5 °C)   Resp 16   Ht 5' 11\" (1.803 m)   Wt 87.5 kg (193 lb)   SpO2 99%   BMI 26.92 kg/m²      Temp (24hrs), Av.9 °F (36.6 °C), Min:97.7 °F (36.5 °C), Max:98 °F (36.7 °C)    Oxygen Therapy  O2 Sat (%): 99 % (21 0204)  Pulse via Oximetry: 85 beats per minute (21 0922)  O2 Device: Nasal cannula (21)  O2 Flow Rate (L/min): 2 l/min (21)    Intake/Output Summary (Last 24 hours) at 2021 0235  Last data filed at 2021 1850  Gross per 24 hour   Intake 1240 ml   Output 14 ml   Net 1226 ml          General: No acute distress    Eyes:  PERRL; EOMI  HENT:  Oropharynx clear;  Head atraumatic  Lungs:  CTA Bilaterally.    Heart:  Regular rate and rhythm,  No murmur, rub, or gallop  Abdomen: Soft, Non distended, Non tender, Positive bowel sounds  Extremities: No cyanosis, clubbing or edema  Neurologic:  No focal deficits; NIH 0  Psych:  A&Ox4       Data Review:   No results found for this or any previous visit (from the past 24 hour(s)). CXR Results  (Last 48 hours)    None        CT Results  (Last 48 hours)    None          ASSESSMENT      Active Hospital Problems    Diagnosis Date Noted    Gallstones 04/16/2021    Elevated brain natriuretic peptide (BNP) level 04/14/2021    CAMARILLO (dyspnea on exertion) 03/25/2016    Nausea & vomiting 02/26/2016    HTN (hypertension) 11/29/2011    Chronic systolic (congestive) heart failure (HCC) 04/21/2011     Code stroke    Plan:  Code stroke protocol was ordered and followed. CT scan was essentially negative. Telemetry neurologist did call to discuss case with me. She recommended continued blood pressure control. He is not a candidate for TPA due to surgery earlier today and normal neurological evaluation at this time.        Signed By: Junior Snellen, MD     April 20, 2021

## 2021-04-20 NOTE — PROGRESS NOTES
[facility] Hospitalist Service Progress Note    Patient is a 64 y.o. male with medical h/o sCHF, HTN presents to the ER with complaint of SOB. Patient reports that he started feeling bad yesterday. He reports BLE edema and some mild chest pain. On ER workup, patient has elevated pBNP. Hospitalist asked to admit. Denies fevers. Reports chest discomfort, SOB, nausea/vomiting, cough. INTERVAL HISTORY  / Subjective:    04/15/2021 - Pt seen and evaluated. Pt with borderline BP. He states that breathing is better and is on RA. He c/o continued abd pain. 04/16/2021 - Pt states that he is breathing better but still with abd pain. Case d/w GI and surgery tentatively scheduled for Monday. Pt is in no distress. 04/17/2021 - Pt c/o continued abd pain. He asks about po and requests pain med increase. 04/18/2021 - Pt states that his nausea is worse and requests increased dose of phenergan. RN Bekah Peralta then reports that pt is requesting cabello with his diet. Pt counseled with impending GB surgery and cardiac risks. 04/19/2021 - Pt c/o RUQ pain. For lap buster today. 04/20/2021 - Pt seen and evaluated. He had a code stroke called overnight due to blurred vision and HA with slurred speech. The workup was negative and prob related to meds. Assessment / Plan:    IMP:    1.) Acute on Chronic LVSD EF 20-25%/CHF - clinically better and off O2, IVVD impaired with borderline BP.    2.) Abd Pain with Abn LFT's - appreciate GI. ? Motility issues with UGI. S/P Lap buster. Mobilize and antiemetics yet watch with dosing and side effects. 3.) CAD/Abn Troponin - flat, ? Due to CHF, appreciate cardio.     4.) HTN - with hypotension, given fluids and better    5.) H/O VT s/p  ICD - not MRI compatible    6.) Anxiety/Depression    7.) Chronic EtOH Abuse - cont Thiamine,     8.) GERD h/o Schatzki's Ring    9.) Anemia/Macrocytosis - ACD per Fe studies, nml B12, fol    10.) HypoK+/Mg2+ - replaced            Plan:      Continue pain and antiemetics  SS for DC palnning  Mobilize  Limit sorbitol and xylitol containing compounds  Home in 1-2 days if ok with Surgery. Chief Complaint : Shortness of Breath        Objective:  Visit Vitals  BP (!) 144/98 (BP 1 Location: Left upper arm, BP Patient Position: At rest)   Pulse 93   Temp 98.9 °F (37.2 °C)   Resp 18   Ht 5' 11\" (1.803 m)   Wt 87.6 kg (193 lb 1.6 oz)   SpO2 99%   BMI 26.93 kg/m²                 Physical Exam: Pt examined 04/20/2021 and exam as below:        GEN - middle aged BM in no distress  HEENT - NC/At, PERRLA, EOMI, No JVD  Lungs - diminished, clear  CVS - nml S1, S2, RRR,   Abd - Soft, + RUQ tend, no rebound, ND, Nml BS+  Ext - Nml ROM, No edema      Intake and Output:  Date 04/19/21 0700 - 04/20/21 0659 04/20/21 0700 - 04/21/21 0659   Shift 7179-4250 4872-4478 24 Hour Total 3729-9234 0641-5981 24 Hour Total   INTAKE   P.O. 240 240 480        P. O. 240 240 480      I. V.(mL/kg/hr) 1000(1)  1000(0.5) 325.3  325.3     I.V.    325.3  325.3     Volume (lactated Ringers infusion) 1000  1000      Shift Total(mL/kg) 1240(14.2) 240(2.7) 1480(16.9) 325. 3(3.7)  325. 3(3.7)   OUTPUT   Urine(mL/kg/hr) 0(0) 700(0.7) 700(0.3) 500  500     Urine Voided  700 700 500  500     Urine Occurrence(s)    1 x  1 x     Urine Output 0  0      Emesis/NG output    330  330     Emesis    330  330     Emesis Occurrence(s)  1 x 1 x      Other 0  0        Other Output 0  0      Stool           Stool Occurrence(s)    1 x  1 x   Blood 14  14        Estimated Blood Loss 14  14      Shift Total(mL/kg) 14(0.2) 700(8) 714(8.2) 830(9.5)  830(9.5)   NET 1226 -460 766 -504.7  -504.7   Weight (kg) 87.5 87.6 87.6 87.6 87.6 87.6       LAB:  No results displayed because visit has over 200 results. IMAGING:  Xr Chest Pa Lat    Result Date: 4/14/2021  No acute process. Xr Upper Gi Series W Kub    Result Date: 4/16/2021  Mild presbyesophagus. No strictures or ulcerations. Ct Abd Pelv W Cont    Result Date: 4/14/2021  1. Cholelithiasis without evidence of cholecystitis or biliary ductal dilatation. 2.  Diffuse hepatic parenchymal hypodensity may be associated with steatosis, hepatitis, or other infiltrative process. No focal liver lesion identified. 3.  Left clonic diverticulosis without evidence of diverticulitis, appendicitis, or other acute abdominopelvic abnormality identified. [id number] [facility]    Result Date: 4/14/2021  1. Distended gallbladder containing sludge and stones. No gallbladder wall thickening or surrounding fluid is seen. 2. New mild hepatomegaly. 3. The pancreas and IVC were obscured by bowel gas. Ct Code Neuro Head Wo Contrast    Result Date: 4/20/2021  1. Small 8 mm area of decreased density in the left basal ganglia, consistent with age-indeterminate infarct. 2. No evidence of acute hemorrhage. EKG:  Results for orders placed or performed in visit on 02/15/19   AMB POC EKG ROUTINE W/ 12 LEADS, INTER & REP     Status: None    Impression    Sinus  Rhythm 84bpm  Normal axis  Voltage criteria for LVH  (S(V1)+R(V6) exceeds 3.50 mV). -ST depression   +   T-abnormality  -Seen with left ventricular hypertrophy (strain) or digitalis effect              Ara Lopez MD  4/20/2021 12:39 PM          Tejal Branch. Cedrick Llanes MD, [address]  Attending Physician  Hospitalist's SVC.

## 2021-04-20 NOTE — PROGRESS NOTES
Admit Date: 2021    POD * No surgery date entered *    Procedure:  Procedure(s):  CHOLECYSTECTOMY LAPAROSCOPIC SINGLE INCISION (SILS) ROOM 357    Subjective:   POD#1 s/p lap buster  Pt c/o incisional pain and nausea. -vomiting. AF, tachy, HTN. WBC 5. Objective:     Visit Vitals  BP (!) 165/89 (BP 1 Location: Left upper arm, BP Patient Position: Other (Comment)) Comment (BP Patient Position): pt has been vomiting    Pulse (!) 106   Temp 98.7 °F (37.1 °C)   Resp 22   Ht 5' 11\" (1.803 m)   Wt 193 lb 1.6 oz (87.6 kg)   SpO2 98%   BMI 26.93 kg/m²       Temp (24hrs), Av °F (36.7 °C), Min:97.7 °F (36.5 °C), Max:98.7 °F (37.1 °C)  . I&O reviewed as documented. Physical Exam:    General-in no distress. resp unlabored  Abdomen- incisional tenderness. + BS      Labs:   Recent Results (from the past 24 hour(s))   METABOLIC PANEL, BASIC    Collection Time: 21  5:25 AM   Result Value Ref Range    Sodium 136 136 - 145 mmol/L    Potassium 4.9 3.5 - 5.1 mmol/L    Chloride 105 98 - 107 mmol/L    CO2 24 21 - 32 mmol/L    Anion gap 7 7 - 16 mmol/L    Glucose 135 (H) 65 - 100 mg/dL    BUN 8 6 - 23 MG/DL    Creatinine 0.97 0.8 - 1.5 MG/DL    GFR est AA >60 >60 ml/min/1.73m2    GFR est non-AA >60 >60 ml/min/1.73m2    Calcium 9.3 8.3 - 10.4 MG/DL   CBC WITH AUTOMATED DIFF    Collection Time: 21  5:25 AM   Result Value Ref Range    WBC 5.2 4.3 - 11.1 K/uL    RBC 3.56 (L) 4.23 - 5.6 M/uL    HGB 11.7 (L) 13.6 - 17.2 g/dL    HCT 36.2 (L) 41.1 - 50.3 %    .7 (H) 79.6 - 97.8 FL    MCH 32.9 26.1 - 32.9 PG    MCHC 32.3 31.4 - 35.0 g/dL    RDW 15.2 (H) 11.9 - 14.6 %    PLATELET 818 583 - 896 K/uL    MPV 9.2 (L) 9.4 - 12.3 FL    ABSOLUTE NRBC 0.00 0.0 - 0.2 K/uL    DF AUTOMATED      NEUTROPHILS 82 (H) 43 - 78 %    LYMPHOCYTES 13 13 - 44 %    MONOCYTES 4 4.0 - 12.0 %    EOSINOPHILS 0 (L) 0.5 - 7.8 %    BASOPHILS 0 0.0 - 2.0 %    IMMATURE GRANULOCYTES 0 0.0 - 5.0 %    ABS.  NEUTROPHILS 4.3 1.7 - 8.2 K/UL ABS. LYMPHOCYTES 0.7 0.5 - 4.6 K/UL    ABS. MONOCYTES 0.2 0.1 - 1.3 K/UL    ABS. EOSINOPHILS 0.0 0.0 - 0.8 K/UL    ABS. BASOPHILS 0.0 0.0 - 0.2 K/UL    ABS. IMM.  GRANS. 0.0 0.0 - 0.5 K/UL   CK WITH MB    Collection Time: 04/20/21  5:25 AM   Result Value Ref Range    CK - MB 1.2 (L) 1.5 - 3.6 ng/ml    CK-MB Index 0.7 %     21 - 215 U/L   TROPONIN-HIGH SENSITIVITY    Collection Time: 04/20/21  5:25 AM   Result Value Ref Range    Troponin-High Sensitivity 46.5 (H) 0 - 14 pg/mL            Assessment:     Principal Problem:    Elevated brain natriuretic peptide (BNP) level (4/14/2021)    Active Problems:    Chronic systolic (congestive) heart failure (HCC) (4/21/2011)      HTN (hypertension) (11/29/2011)      Nausea & vomiting (2/26/2016)      CAMARILLO (dyspnea on exertion) (3/25/2016)      Gallstones (4/16/2021)          Plan/Recommendations/Medical Decision Making:     CLD, advance as tolerated to cardiac diet  Nausea/pain control  Continue current care per primary    TIFFANY Friedman

## 2021-04-21 VITALS
OXYGEN SATURATION: 98 % | TEMPERATURE: 97.4 F | WEIGHT: 193.1 LBS | BODY MASS INDEX: 27.03 KG/M2 | RESPIRATION RATE: 18 BRPM | SYSTOLIC BLOOD PRESSURE: 117 MMHG | HEIGHT: 71 IN | HEART RATE: 94 BPM | DIASTOLIC BLOOD PRESSURE: 84 MMHG

## 2021-04-21 PROCEDURE — 74011000258 HC RX REV CODE- 258: Performed by: SURGERY

## 2021-04-21 PROCEDURE — 74011250636 HC RX REV CODE- 250/636: Performed by: INTERNAL MEDICINE

## 2021-04-21 PROCEDURE — 74011000258 HC RX REV CODE- 258: Performed by: FAMILY MEDICINE

## 2021-04-21 PROCEDURE — 94760 N-INVAS EAR/PLS OXIMETRY 1: CPT

## 2021-04-21 PROCEDURE — 74011250637 HC RX REV CODE- 250/637: Performed by: SURGERY

## 2021-04-21 PROCEDURE — 74011000250 HC RX REV CODE- 250: Performed by: INTERNAL MEDICINE

## 2021-04-21 PROCEDURE — 2709999900 HC NON-CHARGEABLE SUPPLY

## 2021-04-21 PROCEDURE — C9113 INJ PANTOPRAZOLE SODIUM, VIA: HCPCS | Performed by: INTERNAL MEDICINE

## 2021-04-21 PROCEDURE — 74011000250 HC RX REV CODE- 250: Performed by: SURGERY

## 2021-04-21 PROCEDURE — 74011250636 HC RX REV CODE- 250/636: Performed by: SURGERY

## 2021-04-21 PROCEDURE — 74011250636 HC RX REV CODE- 250/636: Performed by: FAMILY MEDICINE

## 2021-04-21 RX ORDER — HYDROCODONE BITARTRATE AND ACETAMINOPHEN 5; 325 MG/1; MG/1
1 TABLET ORAL
Qty: 20 TAB | Refills: 0 | Status: SHIPPED | OUTPATIENT
Start: 2021-04-21 | End: 2021-04-24

## 2021-04-21 RX ORDER — PROCHLORPERAZINE MALEATE 5 MG
5 TABLET ORAL
Qty: 20 TAB | Refills: 0 | Status: SHIPPED | OUTPATIENT
Start: 2021-04-21 | End: 2021-04-28

## 2021-04-21 RX ORDER — MORPHINE SULFATE 4 MG/ML
4 INJECTION INTRAVENOUS
Status: DISCONTINUED | OUTPATIENT
Start: 2021-04-21 | End: 2021-04-21 | Stop reason: HOSPADM

## 2021-04-21 RX ORDER — ONDANSETRON 4 MG/1
4 TABLET, ORALLY DISINTEGRATING ORAL
Qty: 20 TAB | Refills: 0 | Status: SHIPPED | OUTPATIENT
Start: 2021-04-21 | End: 2021-07-15 | Stop reason: SDUPTHER

## 2021-04-21 RX ADMIN — ACETAMINOPHEN 650 MG: 325 TABLET, FILM COATED ORAL at 12:21

## 2021-04-21 RX ADMIN — PROMETHAZINE HYDROCHLORIDE 25 MG: 25 INJECTION INTRAMUSCULAR; INTRAVENOUS at 05:32

## 2021-04-21 RX ADMIN — POTASSIUM CHLORIDE 40 MEQ: 1500 TABLET, EXTENDED RELEASE ORAL at 08:12

## 2021-04-21 RX ADMIN — ACETAMINOPHEN 650 MG: 325 TABLET, FILM COATED ORAL at 05:32

## 2021-04-21 RX ADMIN — MAGNESIUM GLUCONATE 500 MG ORAL TABLET 800 MG: 500 TABLET ORAL at 08:12

## 2021-04-21 RX ADMIN — PROMETHAZINE HYDROCHLORIDE 25 MG: 25 INJECTION INTRAMUSCULAR; INTRAVENOUS at 09:51

## 2021-04-21 RX ADMIN — PROMETHAZINE HYDROCHLORIDE 25 MG: 25 INJECTION INTRAMUSCULAR; INTRAVENOUS at 01:44

## 2021-04-21 RX ADMIN — MORPHINE SULFATE 2 MG: 2 INJECTION, SOLUTION INTRAMUSCULAR; INTRAVENOUS at 09:52

## 2021-04-21 RX ADMIN — MORPHINE SULFATE 2 MG: 2 INJECTION, SOLUTION INTRAMUSCULAR; INTRAVENOUS at 05:33

## 2021-04-21 RX ADMIN — GABAPENTIN 300 MG: 300 CAPSULE ORAL at 08:12

## 2021-04-21 RX ADMIN — MORPHINE SULFATE 2 MG: 2 INJECTION, SOLUTION INTRAMUSCULAR; INTRAVENOUS at 01:44

## 2021-04-21 RX ADMIN — CEFTRIAXONE SODIUM 2 G: 2 INJECTION, POWDER, FOR SOLUTION INTRAMUSCULAR; INTRAVENOUS at 02:29

## 2021-04-21 RX ADMIN — CARVEDILOL 25 MG: 25 TABLET, FILM COATED ORAL at 08:11

## 2021-04-21 RX ADMIN — PANTOPRAZOLE SODIUM 40 MG: 40 INJECTION, POWDER, FOR SOLUTION INTRAVENOUS at 08:12

## 2021-04-21 RX ADMIN — KETOROLAC TROMETHAMINE 15 MG: 15 INJECTION, SOLUTION INTRAMUSCULAR; INTRAVENOUS at 05:32

## 2021-04-21 RX ADMIN — CEFTRIAXONE SODIUM 2 G: 2 INJECTION, POWDER, FOR SOLUTION INTRAMUSCULAR; INTRAVENOUS at 02:13

## 2021-04-21 RX ADMIN — Medication 10 ML: at 08:20

## 2021-04-21 NOTE — PROGRESS NOTES
Patient discharge  via wheelchair to front of hospital \"C\" accompany by CINDY Torres. No distress noted. Patient wife here to drive him home. Protective  mask in place.

## 2021-04-21 NOTE — PROGRESS NOTES
END OF SHIFT NOTE:    Intake/Output  04/21 0701 - 04/21 1900  In: -   Out: 500 [Urine:500]   Voiding: YES  Catheter: NO  Drain:              Stool:  0 occurrences. Stool Assessment  Stool Color: Brown;Yellow (04/15/21 0724)  Stool Appearance: Watery (04/20/21 0908)  Stool Amount: Medium (04/20/21 0908)  Stool Source/Status: Rectum (04/20/21 0908)    Emesis:  0 occurrences. Emesis Assessment  Appearance: Hernandez (04/20/21 1157)  Emesis Amount: Small (04/20/21 1157)    VITAL SIGNS  Patient Vitals for the past 12 hrs:   Temp Pulse Resp BP SpO2   04/21/21 0726 98.1 °F (36.7 °C) 95 18 116/74 98 %   04/21/21 0425    101/80    04/21/21 0400 98.3 °F (36.8 °C) 83 15 90/67 96 %   04/20/21 2302 98.2 °F (36.8 °C) 97 16 111/83 99 %   04/20/21 1943 98.4 °F (36.9 °C) 90 17 (!) 145/94 99 %       Pain Assessment  Pain 1  Pain Scale 1: Visual (04/21/21 0425)  Pain Intensity 1: 0 (04/21/21 0425)  Patient Stated Pain Goal: 0 (04/21/21 0425)  Pain Reassessment 1: Patient resting w/respiratory rate greater than 10 (04/21/21 0240)  Pain Onset 1: postop (04/21/21 0140)  Pain Location 1: Abdomen; Head (04/21/21 0140)  Pain Orientation 1: Right;Upper (04/21/21 0140)  Pain Description 1: Gloria Carbo; Stabbing (04/21/21 0140)  Pain Intervention(s) 1: Medication (see MAR) (04/21/21 0140)    Ambulating  Yes    Additional Information: Pt given prn morphine and prin phenergan x2 during the night. Pt unable to ambulate tonight due to pain. IV access lost and meds delayed. Pt very restless. New IV inserted later in the shift. Meds given and pain controlled. BP trending down. Pt resting in bed and stable at this time. Shift report given to oncoming nurse at the bedside.     See Sheffield RN

## 2021-04-21 NOTE — PROGRESS NOTES
Patient discharge instruction given voice understanding of information , with follow- up appointments. Prescriptions Zofran, Compazine and Norco given to patient.

## 2021-04-21 NOTE — PROGRESS NOTES
Admit Date: 2021    POD #2    Procedure:  Procedure(s):  CHOLECYSTECTOMY LAPAROSCOPIC SINGLE INCISION (SILS) ROOM 357    Subjective:   POD#2 s/p lap buster  Pt c/o incisional pain and nausea but is improving significantly compared to yesterday.  -vomiting. AF, tachy, HTN. Objective:     Visit Vitals  /74 (BP 1 Location: Left upper arm, BP Patient Position: At rest)   Pulse 95   Temp 98.1 °F (36.7 °C)   Resp 18   Ht 5' 11\" (1.803 m)   Wt 193 lb 1.6 oz (87.6 kg)   SpO2 97%   BMI 26.93 kg/m²       Temp (24hrs), Av.5 °F (36.9 °C), Min:98.1 °F (36.7 °C), Max:98.9 °F (37.2 °C)  . I&O reviewed as documented. Physical Exam:    General-in no distress, resp unlabored  Abdomen- incisional tenderness, no evidence of bleeding or cellulitis.        Labs:   Recent Results (from the past 24 hour(s))   DRUG SCREEN, URINE    Collection Time: 21  4:20 PM   Result Value Ref Range    PCP(PHENCYCLIDINE) Negative      BENZODIAZEPINES Positive      COCAINE Negative      AMPHETAMINES Negative      METHADONE Negative      THC (TH-CANNABINOL) Negative      OPIATES Positive      BARBITURATES Negative              Assessment:     Principal Problem:    Elevated brain natriuretic peptide (BNP) level (2021)    Active Problems:    Chronic systolic (congestive) heart failure ([address] (2011)      HTN (hypertension) (2011)      Nausea & vomiting (2016)      CAMARILLO (dyspnea on exertion) (3/25/2016)      Gallstones (2021)          Plan/Recommendations/Medical Decision Making:     CLD, advance as tolerated to cardiac diet  Nausea/pain control  Continue current care per primary  May discharge from a surgical standpoint  Will arrange surgery follow up    TIFFANY Amezquita

## 2021-04-21 NOTE — DISCHARGE SUMMARY
[address] [facility] Hospitalist Discharge Summary     Name:  Smith Conley    Age:56 y.o. Sex:male  :  1965       MRN:  [id number]       Admitting Physician: Sarita Dennis MD Admit Date: 2021 12:08 AM   Attending Physician: Mason Rosario MD  Primary Care Physician: Lon Avendano MD       Discharge Physician: Sarita Dennis MD  Discharge date: 21   Discharged Condition: Stable    Indication for Admission:   Chief Complaint   Patient presents with    Shortness of Breath        Reasons for hospitalization:  Hospital Problems as of 2021 Date Reviewed: 3/2/2021          Codes Class Noted - Resolved POA    Gallstones ICD-10-CM: K80.20  ICD-9-CM: 574.20  2021 - Present Unknown        * (Principal) Elevated brain natriuretic peptide (BNP) level ICD-10-CM: R79.89  ICD-9-CM: 790.99  2021 - Present Unknown        CAMARILLO (dyspnea on exertion) ICD-10-CM: R06.00  ICD-9-CM: 786.09  3/25/2016 - Present Yes        Nausea & vomiting ICD-10-CM: R11.2  ICD-9-CM: 787.01  2016 - Present Yes        HTN (hypertension) (Chronic) ICD-10-CM: I10  ICD-9-CM: 401.9  2011 - Present Yes        Chronic systolic (congestive) heart failure (HCC) (Chronic) ICD-10-CM: I50.22  ICD-9-CM: 428.22, 428.0  2011 - Present Yes                 Discharge Diagnosis:     1.) Acute on Chronic LVSD EF 20-25%/CHF      2.) Abd Pain with Abn LFT's     3.) CAD/Abn Troponin      4.) HTN      5.) H/O VT s/p  ICD     6.) Anxiety/Depression     7.) Chronic EtOH Abuse     8.) GERD h/o Schatzki's Ring     9.) Anemia/Macrocytosis      10.) HypoK+/Mg2+    Did Patient have Sepsis (YES OR NO): NO      Hospital Course/Interval history:  HPI:    Patient is a 64 y.o. male with medical h/o sCHF, HTN presents to the ER with complaint of SOB. Patient reports that he started feeling bad yesterday. He reports BLE edema and some mild chest pain. On ER workup, patient has elevated pBNP. Hospitalist asked to admit. Denies fevers. Reports chest discomfort, SOB, nausea/vomiting, cough. Hospital Course: For the full hx, please see the H+P but in summary, this is a 63 y/o BM with the above PMH who presented to [facility] due to SOB. The pt was admitted to the ICU with Acute CHF and hypoxic resp failure. The pt was found to in acute CHF with known LVSD with EF 20-25%. The pt was given IV Diuresis. The pt was c/o RUQ abd pain. The pt had a CT that showed:    1. Cholelithiasis without evidence of cholecystitis or biliary ductal  dilatation.     2. Diffuse hepatic parenchymal hypodensity may be associated with steatosis,  hepatitis, or other infiltrative process. No focal liver lesion identified.     3.  Left clonic diverticulosis without evidence of diverticulitis, appendicitis,  or other acute abdominopelvic abnormality identified. The pt then had US done and was seen by GI. The US showed:    1. Distended gallbladder containing sludge and stones. No gallbladder wall  thickening or surrounding fluid is seen. 2. New mild hepatomegaly. 3. The pancreas and IVC were obscured by bowel gas. The pt was seen by Gen Surgery and GI. The decision was made for lap buster. The pt underwent lap buster by Gen Surgery. Post op, the pt did well. The pt has had some persistent nausea and requested more meds. The pt was then found with AMS and weakness and Code stroke was called. CT and neuro workup was negative. The pt is feeling well today and seen by Gen Surgery. He does have some nausea that will be treated. Surgery felt that:    CLD, advance as tolerated to cardiac diet  Nausea/pain control  Continue current care per primary  May discharge from a surgical standpoint  Will arrange surgery follow up    The pt will be dc'd to home with op f/u with PMD in 1 week, Gen Surgery in 1-2 weeks and Cardio in 2-3 weeks. The pt voiced understanding and agrees to f/u.     Assessment/Plan:  As above    Consults:   IP CONSULT TO GASTROENTEROLOGY  IP CONSULT TO NEUROLOGY  IP CONSULT TO GENERAL SURGERY     Disposition: Home or Self Care  Diet:   DIET REGULAR  Code Status: Full Code      Follow up labs/imaging: CT ABd/US Abd  Follow up with 1 week with PMD, 1-2 weeks with Gen Surgery and 2-3 weeks with Cardio     Pending labs/studies: GB path    Current Discharge Medication List      START taking these medications    Details   HYDROcodone-acetaminophen (NORCO) 5-325 mg per tablet Take 1 Tab by mouth every four (4) hours as needed for Pain for up to 3 days. Max Daily Amount: 6 Tabs. Qty: 20 Tab, Refills: 0    Associated Diagnoses: Gallstones      ondansetron (ZOFRAN ODT) 4 mg disintegrating tablet Take 1 Tab by mouth every six (6) hours as needed for Nausea, Vomiting or Nausea or Vomiting. Qty: 20 Tab, Refills: 0      prochlorperazine (COMPAZINE) 5 mg tablet Take 1 Tab by mouth every six (6) hours as needed for Nausea or Vomiting for up to 7 days. Qty: 20 Tab, Refills: 0         CONTINUE these medications which have NOT CHANGED    Details   promethazine (PHENERGAN) 25 mg tablet Take 1 Tab by mouth every six (6) hours as needed for Nausea. Qty: 30 Tab, Refills: 0    Associated Diagnoses: Projectile vomiting with nausea      ALPRAZolam (XANAX) 1 mg tablet Take 1 Tab by mouth nightly. Max Daily Amount: 1 mg. Indications: anxious  Qty: 30 Tab, Refills: 3    Associated Diagnoses: Anxiety      gabapentin (NEURONTIN) 300 mg capsule 1 po bid prn pain  Qty: 60 Cap, Refills: 6    Associated Diagnoses: Other postherpetic nervous system involvement      magnesium oxide (MAG-OX) 400 mg tablet Take 2 Tabs by mouth every twelve (12) hours. Qty: 180 Tab, Refills: 3      potassium chloride (K-DUR, KLOR-CON) 20 mEq tablet Take 2 Tabs by mouth daily. Qty: 180 Tab, Refills: 3      carvediloL (COREG) 25 mg tablet Take 1 Tab by mouth two (2) times daily (with meals). Qty: 180 Tab, Refills: 3      losartan (COZAAR) 50 mg tablet Take 1 Tab by mouth daily.   Qty: 90 Tab, Refills: 3 furosemide (LASIX) 40 mg tablet Take 1 Tab by mouth two (2) times a day. Qty: 180 Tab, Refills: 3      atorvastatin (LIPITOR) 80 mg tablet Take 1 Tab by mouth daily. Qty: 90 Tab, Refills: 3      sildenafil citrate (Viagra) 100 mg tablet Take 1 Tab by mouth as needed (as directed). Qty: 10 Tab, Refills: 3      HYDROcodone-acetaminophen (NORCO)  mg tablet TAKE ONE TABLET BY MOUTH FOUR TIMES DAILY AS NEEDED  Refills: 0      eplerenone (INSPRA) 25 mg tablet Take 1 Tab by mouth daily. Qty: 90 Tab, Refills: 3      ESOMEPRAZOLE MAG TRIHYDRATE (NEXIUM PO) Take 1 Cap by mouth two (2) times a day. Medications Discontinued During This Encounter   Medication Reason    0.9% sodium chloride infusion     morphine injection 4 mg REORDER    ALPRAZolam (XANAX) tablet 1 mg REORDER    . PHARMACY TO SUBSTITUTE PER PROTOCOL (Reordered from: eplerenone (INSPRA) 25 mg tablet) Not A Current Medication    furosemide (LASIX) injection 40 mg     furosemide (LASIX) injection 40 mg     losartan (COZAAR) tablet 50 mg     losartan (COZAAR) tablet 25 mg     levalbuterol (XOPENEX) nebulizer soln 0.63 mg/3 mL     morphine injection 2 mg     azithromycin (ZITHROMAX) 500 mg in 0.9% sodium chloride 250 mL (VIAL-MATE)     promethazine (PHENERGAN) with saline injection 12.5 mg     lidocaine (XYLOCAINE) 10 mg/mL (1 %) injection 0.1 mL Patient Transfer    lactated Ringers infusion Patient Transfer    acetaminophen (TYLENOL) tablet 1,000 mg Patient Transfer    fentaNYL citrate (PF) injection 100 mcg Patient Transfer    midazolam (VERSED) injection 2 mg Patient Transfer    naloxone (NARCAN) injection 0.2 mg Patient Transfer    ondansetron (ZOFRAN) injection 4 mg Patient Transfer    bupivacaine-EPINEPHrine (PF) (SENSORCAINE PF) 0.5 %-1:200,000 injection Patient Transfer    lactated Ringers infusion Patient Transfer    oxyCODONE IR (ROXICODONE) tablet 5 mg Patient Transfer    naloxone (NARCAN) injection 0.2 mg Patient Transfer    HYDROmorphone (PF) (DILAUDID) injection 0.5 mg Patient Transfer    HYDROcodone-acetaminophen (NORCO)  mg tablet 1 Tab     pantoprazole (PROTONIX) tablet 40 mg DISCONTINUED BY ANOTHER CLINICIAN    acetaminophen (TYLENOL) suppository 650 mg     HYDROmorphone (DILAUDID) injection 0.5-2 mg     acetaminophen (TYLENOL) tablet 650 mg     morphine injection 2 mg     cefTRIAXone (ROCEPHIN) 2 g in 0.9% sodium chloride (MBP/ADV) 50 mL MBP     cefTRIAXone (ROCEPHIN) 2 g in lidocaine (XYLOCAINE) 10 mg/mL (1 %) IM syringe     cefTRIAXone (ROCEPHIN) 2 g in 0.9% sodium chloride (MBP/ADV) 50 mL MBP REORDER         Follow Up Orders: Follow-up Appointments   Procedures    FOLLOW UP VISIT Appointment in: One Week Ninoska ALELN s/p madonna ALLEN s/p madonna goins     Standing Status:   Standing     Number of Occurrences:   1     Order Specific Question:   Appointment in     Answer: One Week         Follow-up Information     Follow up With Specialties Details Why Contact Info    Claude Multani MD Family Medicine   [address] [place] [address] 2  [phone]              Discharge Exam:    Patient Vitals for the past 24 hrs:   Temp Pulse Resp BP SpO2   04/21/21 1123 97.4 °F (36.3 °C) 94 18 117/84 98 %   04/21/21 0946     97 %   04/21/21 0726 98.1 °F (36.7 °C) 95 18 116/74 98 %   04/21/21 0425    101/80    04/21/21 0400 98.3 °F (36.8 °C) 83 15 90/67 96 %   04/20/21 2302 98.2 °F (36.8 °C) 97 16 111/83 99 %   04/20/21 1943 98.4 °F (36.9 °C) 90 17 (!) 145/94 99 %   04/20/21 1524 98.9 °F (37.2 °C) 93 18 (!) 144/98 99 %     Oxygen Therapy  O2 Sat (%): 98 % (04/21/21 1123)  Pulse via Oximetry: 76 beats per minute (04/21/21 0946)  O2 Device: None (Room air) (04/21/21 0946)  O2 Flow Rate (L/min): (RA) (04/21/21 0946)    Estimated body mass index is 26.93 kg/m² as calculated from the following:    Height as of this encounter: 5' 11\" (1.803 m).     Weight as of this encounter: 87.6 kg (193 lb 1.6 oz). Intake/Output Summary (Last 24 hours) at 4/21/2021 1154  Last data filed at 4/21/2021 1727  Gross per 24 hour   Intake 977.85 ml   Output 1050 ml   Net -72.15 ml       *Note that automatically entered I/Os may not be accurate; dependent on patient compliance with collection and accurate  by assistants.     Physical Exam:   General:  NC/ATNo acute distress, speaking in full sentences, no use of accessory muscles   HEENT:  Pupils equal and reactive to light and accommodation, oropharynx is clear   Neck:   Supple, no lymphadenopathy, no JVD   Lungs:  Clear to auscultation bilaterally   CV:  Regular rate and rhythm with normal S1 and S2   Abdomen: Soft, nontender, nondistended, normoactive bowel sounds   Extremities:  No cyanosis clubbing or edema   Neuro:  Nonfocal, A&O x3   Psych:  Normal affect       All Labs from Last 24 Hrs:  Recent Results (from the past 24 hour(s))   DRUG SCREEN, URINE    Collection Time: 04/20/21  4:20 PM   Result Value Ref Range    PCP(PHENCYCLIDINE) Negative      BENZODIAZEPINES Positive      COCAINE Negative      AMPHETAMINES Negative      METHADONE Negative      THC (TH-CANNABINOL) Negative      OPIATES Positive      BARBITURATES Negative         All Micro Results     Procedure Component Value Units Date/Time    CULTURE, BLOOD [[id number]] Collected: 04/14/21 0237    Order Status: Completed Specimen: Blood Updated: 04/19/21 0718     Special Requests: --        LEFT  HAND       Culture result: NO GROWTH 5 DAYS       CULTURE, BLOOD [[id number]] Collected: 04/14/21 0249    Order Status: Completed Specimen: Blood Updated: 04/19/21 0718     Special Requests: --        RIGHT  HAND       Culture result: NO GROWTH 5 DAYS       SARS-COV-2, PCR [[id number]] Collected: 04/14/21 0435    Order Status: Completed Specimen: Nasopharyngeal Updated: 04/14/21 1342     Specimen source Nasopharyngeal        SARS-CoV-2 Not detected        Comment:      The specimen is NEGATIVE for SARS-CoV-2, the novel coronavirus associated with COVID-19. This test has been authorized by the FDA under an Emergency Use Authorization (EUA) for use by authorized laboratories. Fact sheet for Healthcare Providers: [employer]       Fact sheet for Patients: [employer]       Methodology: RT-PCR         COVID-19 RAPID TEST [[id number]] Collected: 04/14/21 0435    Order Status: Completed Specimen: Nasopharyngeal Updated: 04/14/21 0511     Specimen source Nasopharyngeal        COVID-19 rapid test Not detected        Comment:      The specimen is NEGATIVE for SARS-CoV-2, the novel coronavirus associated with COVID-19. A negative result does not rule out COVID-19. This test has been authorized by the FDA under an Emergency Use Authorization (EUA) for use by authorized laboratories. Fact sheet for Healthcare Providers: [employer]  Fact sheet for Patients: [employer]       Methodology: Isothermal Nucleic Acid Amplification               SARS-CoV-2 Lab Results  \"Novel Coronavirus\" Test:   Lab Results   Component Value Date/Time    COV2NT Not Detected 07/02/2020 11:58 PM      \"Emergent Disease\" Test:   Lab Results   Component Value Date/Time    EDPR NOT DETECTED 03/19/2020 06:37 PM     \"SARS-COV-2\" Test: No results found for: XGCOVT  Rapid Test:   Lab Results   Component Value Date/Time    COVR Not detected 04/14/2021 04:35 AM            Diagnostic Imaging/Tests:   Xr Upper Gi Series W Kub    Result Date: 4/16/2021  Mild presbyesophagus. No strictures or ulcerations. Ct Abd Pelv W Cont    Result Date: 4/14/2021  1. Cholelithiasis without evidence of cholecystitis or biliary ductal dilatation. 2.  Diffuse hepatic parenchymal hypodensity may be associated with steatosis, hepatitis, or other infiltrative process. No focal liver lesion identified.  3.  Left clonic diverticulosis without evidence of diverticulitis, appendicitis, or other acute abdominopelvic abnormality identified. Ct Code Neuro Head Wo Contrast    Result Date: 4/20/2021  1. Small 8 mm area of decreased density in the left basal ganglia, consistent with age-indeterminate infarct. 2. No evidence of acute hemorrhage. Echocardiogram/EKG results:  No results found for this visit on 04/14/21.     EKG Results     Procedure 720 Value Units Date/Time    EKG, 12 LEAD, INITIAL [[id number]] Collected: 04/14/21 0031    Order Status: Completed Updated: 04/14/21 0849     Ventricular Rate 102 BPM      Atrial Rate 102 BPM      P-R Interval 172 ms      QRS Duration 98 ms      Q-T Interval 392 ms      QTC Calculation (Bezet) 510 ms      Calculated P Axis 47 degrees      Calculated R Axis -22 degrees      Calculated T Axis -96 degrees      Diagnosis --     !!! Poor data quality, interpretation may be adversely affected  Sinus tachycardia  Anterior infarct (cited on or before 02-JUL-2020)  ST & T wave abnormality, consider lateral ischemia  Abnormal ECG  When compared with ECG of 30-SEP-2020 03:19,  Poor data quality in current ECG precludes serial comparison  Confirmed by ST RYAN HORNER MD (), EVIE ORELLANA ([id number]) on 4/14/2021 8:49:05 AM            Results for orders placed or performed during the hospital encounter of 04/14/21   EKG, 12 LEAD, INITIAL   Result Value Ref Range    Ventricular Rate 102 BPM    Atrial Rate 102 BPM    P-R Interval 172 ms    QRS Duration 98 ms    Q-T Interval 392 ms    QTC Calculation (Bezet) 510 ms    Calculated P Axis 47 degrees    Calculated R Axis -22 degrees    Calculated T Axis -96 degrees    Diagnosis       !!! Poor data quality, interpretation may be adversely affected  Sinus tachycardia  Anterior infarct (cited on or before 02-JUL-2020)  ST & T wave abnormality, consider lateral ischemia  Abnormal ECG  When compared with ECG of 30-SEP-2020 03:19,  Poor data quality in current ECG precludes serial comparison  Confirmed by ST RYAN HORNER MD (), EVIE ORELLANA ([id number]) on 4/14/2021 8:49:05 AM     Results for orders placed or performed in visit on 02/15/19   AMB POC EKG ROUTINE W/ 12 LEADS, INTER & REP    Impression    Sinus  Rhythm 84bpm  Normal axis  Voltage criteria for LVH  (S(V1)+R(V6) exceeds 3.50 mV). -ST depression   +   T-abnormality  -Seen with left ventricular hypertrophy (strain) or digitalis effect        Procedures done this admission:  Procedure(s):  CHOLECYSTECTOMY LAPAROSCOPIC SINGLE INCISION (SILS) ROOM 357        Time spent in patient discharge planning and coordination 34 min minutes.       Signed By: Bijal Palacios MD, [facility] Hospitalist Service    April 21, 2021  11:54 AM

## 2021-05-07 NOTE — CDMP QUERY
Patient admitted with gall stones. Noted documentation of acute respiratory failure in discharge summary on 4-21-21. In order to support the diagnosis of acute respiratory failure, please include additional clinical indicators in your documentation. Or please document if the diagnosis of acute respiratory failure has been ruled out after further study. [[Acute Respiratory Failure as evidenced by::This patient is in acute respiratory failure as evidenced by ##Please document evidence.]] [[Acute Respiratory Failure ruled out after study[de-identified] Acute Respiratory Failure has been ruled out after study. ]] The medical record reflects the following: 
  Risk Factors: acute on chronic systolic heart failure Clinical Indicators: \"No acute distress, speaking in full sentences, no use of accessory muscles\" respirations 17, no ABG's drawn Treatment: Room air Acute Respiratory Failure Clinical Indicators per  MS-DRG Training Guide and Quick Reference Guide: 
pO2 < 60 mmHg or SpO2 (pulse oximetry) < 91% breathing room air 
pCO2 > 50 and pH < 7.35 
P/F ratio (pO2 / FIO2) < 300 
pO2 decrease or pCO2 increase by 10 mmHg from baseline (if known) Supplemental oxygen of 40% or more Presence of respiratory distress, tachypnea, dyspnea, shortness of breath, wheezing Unable to speak in complete sentences Use of accessory muscles to breathe Extreme anxiety and feeling of impending doom Tripod position Confusion/altered mental status/obtunded Thank you, Matt Francis RN Clinical  
[phone]

## 2021-08-23 ENCOUNTER — APPOINTMENT (OUTPATIENT)
Dept: GENERAL RADIOLOGY | Age: 56
DRG: 177 | End: 2021-08-23
Attending: EMERGENCY MEDICINE
Payer: COMMERCIAL

## 2021-08-23 ENCOUNTER — HOSPITAL ENCOUNTER (INPATIENT)
Age: 56
LOS: 6 days | Discharge: HOME | DRG: 177 | End: 2021-08-29
Attending: EMERGENCY MEDICINE | Admitting: INTERNAL MEDICINE
Payer: COMMERCIAL

## 2021-08-23 DIAGNOSIS — U07.1 COVID-19: Primary | ICD-10-CM

## 2021-08-23 DIAGNOSIS — R52 PAIN: ICD-10-CM

## 2021-08-23 DIAGNOSIS — I50.22 CHRONIC SYSTOLIC (CONGESTIVE) HEART FAILURE (HCC): Chronic | ICD-10-CM

## 2021-08-23 DIAGNOSIS — I10 ESSENTIAL HYPERTENSION: Chronic | ICD-10-CM

## 2021-08-23 DIAGNOSIS — R05.9 COUGH: ICD-10-CM

## 2021-08-23 DIAGNOSIS — N17.9 ACUTE KIDNEY INJURY (HCC): ICD-10-CM

## 2021-08-23 DIAGNOSIS — R00.0 SINUS TACHYCARDIA: ICD-10-CM

## 2021-08-23 DIAGNOSIS — E86.0 DEHYDRATION: ICD-10-CM

## 2021-08-23 DIAGNOSIS — R77.8 ELEVATED TROPONIN: ICD-10-CM

## 2021-08-23 DIAGNOSIS — R19.7 VOMITING AND DIARRHEA: ICD-10-CM

## 2021-08-23 DIAGNOSIS — R11.10 VOMITING AND DIARRHEA: ICD-10-CM

## 2021-08-23 LAB
ALBUMIN SERPL-MCNC: 4.4 G/DL (ref 3.5–5)
ALBUMIN/GLOB SERPL: 0.9 {RATIO} (ref 1.2–3.5)
ALP SERPL-CCNC: 138 U/L (ref 50–136)
ALT SERPL-CCNC: 143 U/L (ref 12–65)
ANION GAP SERPL CALC-SCNC: 13 MMOL/L (ref 7–16)
AST SERPL-CCNC: 146 U/L (ref 15–37)
BASOPHILS # BLD: 0.1 K/UL (ref 0–0.2)
BASOPHILS NFR BLD: 1 % (ref 0–2)
BILIRUB SERPL-MCNC: 1.7 MG/DL (ref 0.2–1.1)
BNP SERPL-MCNC: 1996 PG/ML (ref 5–125)
BUN SERPL-MCNC: 27 MG/DL (ref 6–23)
CALCIUM SERPL-MCNC: 9.5 MG/DL (ref 8.3–10.4)
CHLORIDE SERPL-SCNC: 101 MMOL/L (ref 98–107)
CO2 SERPL-SCNC: 16 MMOL/L (ref 21–32)
COVID-19 RAPID TEST, COVR: DETECTED
CREAT SERPL-MCNC: 2.48 MG/DL (ref 0.8–1.5)
DIFFERENTIAL METHOD BLD: ABNORMAL
EOSINOPHIL # BLD: 0 K/UL (ref 0–0.8)
EOSINOPHIL NFR BLD: 0 % (ref 0.5–7.8)
ERYTHROCYTE [DISTWIDTH] IN BLOOD BY AUTOMATED COUNT: 13.2 % (ref 11.9–14.6)
GLOBULIN SER CALC-MCNC: 4.9 G/DL (ref 2.3–3.5)
GLUCOSE SERPL-MCNC: 131 MG/DL (ref 65–100)
HCT VFR BLD AUTO: 55.5 % (ref 41.1–50.3)
HGB BLD-MCNC: 19.1 G/DL (ref 13.6–17.2)
IMM GRANULOCYTES # BLD AUTO: 0 K/UL (ref 0–0.5)
IMM GRANULOCYTES NFR BLD AUTO: 0 % (ref 0–5)
LYMPHOCYTES # BLD: 1.6 K/UL (ref 0.5–4.6)
LYMPHOCYTES NFR BLD: 17 % (ref 13–44)
MAGNESIUM SERPL-MCNC: 1.9 MG/DL (ref 1.8–2.4)
MCH RBC QN AUTO: 31.9 PG (ref 26.1–32.9)
MCHC RBC AUTO-ENTMCNC: 34.4 G/DL (ref 31.4–35)
MCV RBC AUTO: 92.8 FL (ref 79.6–97.8)
MONOCYTES # BLD: 0.6 K/UL (ref 0.1–1.3)
MONOCYTES NFR BLD: 6 % (ref 4–12)
NEUTS SEG # BLD: 7.2 K/UL (ref 1.7–8.2)
NEUTS SEG NFR BLD: 76 % (ref 43–78)
NRBC # BLD: 0.02 K/UL (ref 0–0.2)
PLATELET # BLD AUTO: 204 K/UL (ref 150–450)
PMV BLD AUTO: 11.6 FL (ref 9.4–12.3)
POTASSIUM SERPL-SCNC: 5.3 MMOL/L (ref 3.5–5.1)
PROT SERPL-MCNC: 9.3 G/DL (ref 6.3–8.2)
RBC # BLD AUTO: 5.98 M/UL (ref 4.23–5.6)
SODIUM SERPL-SCNC: 130 MMOL/L (ref 138–145)
SOURCE, COVRS: ABNORMAL
TROPONIN-HIGH SENSITIVITY: 493.9 PG/ML (ref 0–14)
TROPONIN-HIGH SENSITIVITY: 494.3 PG/ML (ref 0–14)
WBC # BLD AUTO: 9.5 K/UL (ref 4.3–11.1)

## 2021-08-23 PROCEDURE — 87635 SARS-COV-2 COVID-19 AMP PRB: CPT

## 2021-08-23 PROCEDURE — 93005 ELECTROCARDIOGRAM TRACING: CPT | Performed by: EMERGENCY MEDICINE

## 2021-08-23 PROCEDURE — 85025 COMPLETE CBC W/AUTO DIFF WBC: CPT

## 2021-08-23 PROCEDURE — 99285 EMERGENCY DEPT VISIT HI MDM: CPT

## 2021-08-23 PROCEDURE — 71045 X-RAY EXAM CHEST 1 VIEW: CPT

## 2021-08-23 PROCEDURE — 84484 ASSAY OF TROPONIN QUANT: CPT

## 2021-08-23 PROCEDURE — 74011000250 HC RX REV CODE- 250: Performed by: EMERGENCY MEDICINE

## 2021-08-23 PROCEDURE — 80053 COMPREHEN METABOLIC PANEL: CPT

## 2021-08-23 PROCEDURE — 96374 THER/PROPH/DIAG INJ IV PUSH: CPT

## 2021-08-23 PROCEDURE — 65270000029 HC RM PRIVATE

## 2021-08-23 PROCEDURE — 83880 ASSAY OF NATRIURETIC PEPTIDE: CPT

## 2021-08-23 PROCEDURE — 94762 N-INVAS EAR/PLS OXIMTRY CONT: CPT

## 2021-08-23 PROCEDURE — 74011250636 HC RX REV CODE- 250/636: Performed by: EMERGENCY MEDICINE

## 2021-08-23 PROCEDURE — 83735 ASSAY OF MAGNESIUM: CPT

## 2021-08-23 RX ORDER — SODIUM CHLORIDE 0.9 % (FLUSH) 0.9 %
5-10 SYRINGE (ML) INJECTION EVERY 8 HOURS
Status: DISCONTINUED | OUTPATIENT
Start: 2021-08-23 | End: 2021-08-29 | Stop reason: HOSPADM

## 2021-08-23 RX ORDER — SODIUM CHLORIDE, SODIUM LACTATE, POTASSIUM CHLORIDE, CALCIUM CHLORIDE 600; 310; 30; 20 MG/100ML; MG/100ML; MG/100ML; MG/100ML
50 INJECTION, SOLUTION INTRAVENOUS CONTINUOUS
Status: DISCONTINUED | OUTPATIENT
Start: 2021-08-23 | End: 2021-08-25

## 2021-08-23 RX ORDER — PROMETHAZINE HYDROCHLORIDE 25 MG/ML
12.5 INJECTION, SOLUTION INTRAMUSCULAR; INTRAVENOUS
Status: COMPLETED | OUTPATIENT
Start: 2021-08-23 | End: 2021-08-23

## 2021-08-23 RX ORDER — METOPROLOL TARTRATE 5 MG/5ML
5 INJECTION INTRAVENOUS ONCE
Status: COMPLETED | OUTPATIENT
Start: 2021-08-24 | End: 2021-08-24

## 2021-08-23 RX ORDER — DILTIAZEM HYDROCHLORIDE 5 MG/ML
20 INJECTION INTRAVENOUS
Status: COMPLETED | OUTPATIENT
Start: 2021-08-23 | End: 2021-08-23

## 2021-08-23 RX ORDER — SODIUM CHLORIDE 0.9 % (FLUSH) 0.9 %
5-10 SYRINGE (ML) INJECTION AS NEEDED
Status: DISCONTINUED | OUTPATIENT
Start: 2021-08-23 | End: 2021-08-29 | Stop reason: HOSPADM

## 2021-08-23 RX ADMIN — PROMETHAZINE HYDROCHLORIDE 12.5 MG: 25 INJECTION INTRAMUSCULAR; INTRAVENOUS at 22:40

## 2021-08-23 RX ADMIN — DILTIAZEM HYDROCHLORIDE 20 MG: 5 INJECTION INTRAVENOUS at 22:00

## 2021-08-23 RX ADMIN — SODIUM CHLORIDE, SODIUM LACTATE, POTASSIUM CHLORIDE, AND CALCIUM CHLORIDE 150 ML/HR: 600; 310; 30; 20 INJECTION, SOLUTION INTRAVENOUS at 21:44

## 2021-08-23 NOTE — Clinical Note
Status[de-identified] INPATIENT [101]   Type of Bed: Medical [8]   Inpatient Hospitalization Certified Necessary for the Following Reasons: 3.  Patient receiving treatment that can only be provided in an inpatient setting (further clarification in H&P documentation)   Admitting Diagnosis: YAO (acute kidney injury) [facility]) [[id number]]   Admitting Physician: Precious David [[id number]]   Attending Physician: Lisa Yanez   Estimated Length of Stay: 3-4 Midnights   Discharge Plan[de-identified] Home with Office Follow-up

## 2021-08-24 PROBLEM — J96.00 ACUTE RESPIRATORY FAILURE DUE TO COVID-19 (HCC): Status: ACTIVE | Noted: 2021-08-24

## 2021-08-24 PROBLEM — I21.4 NSTEMI (NON-ST ELEVATED MYOCARDIAL INFARCTION) (HCC): Status: ACTIVE | Noted: 2021-08-24

## 2021-08-24 PROBLEM — J96.01 ACUTE RESPIRATORY FAILURE WITH HYPOXIA (HCC): Status: ACTIVE | Noted: 2021-08-24

## 2021-08-24 PROBLEM — E87.1 HYPONATREMIA: Status: ACTIVE | Noted: 2021-08-24

## 2021-08-24 PROBLEM — I24.8 DEMAND ISCHEMIA (HCC): Status: ACTIVE | Noted: 2021-08-24

## 2021-08-24 PROBLEM — U07.1 COVID-19: Status: ACTIVE | Noted: 2021-08-24

## 2021-08-24 PROBLEM — E87.20 METABOLIC ACIDOSIS: Status: ACTIVE | Noted: 2018-07-08

## 2021-08-24 LAB
ALBUMIN SERPL-MCNC: 4 G/DL (ref 3.5–5)
ALBUMIN/GLOB SERPL: 1 {RATIO} (ref 1.2–3.5)
ALP SERPL-CCNC: 117 U/L (ref 50–136)
ALT SERPL-CCNC: 110 U/L (ref 12–65)
ANION GAP SERPL CALC-SCNC: 14 MMOL/L (ref 7–16)
ANION GAP SERPL CALC-SCNC: 15 MMOL/L (ref 7–16)
APTT PPP: 28.6 SEC (ref 24.1–35.1)
AST SERPL-CCNC: 91 U/L (ref 15–37)
ATRIAL RATE: 127 BPM
ATRIAL RATE: 147 BPM
ATRIAL RATE: 163 BPM
BASOPHILS # BLD: 0 K/UL (ref 0–0.2)
BASOPHILS # BLD: 0 K/UL (ref 0–0.2)
BASOPHILS NFR BLD: 0 % (ref 0–2)
BASOPHILS NFR BLD: 0 % (ref 0–2)
BILIRUB SERPL-MCNC: 1.2 MG/DL (ref 0.2–1.1)
BUN SERPL-MCNC: 30 MG/DL (ref 6–23)
BUN SERPL-MCNC: 32 MG/DL (ref 6–23)
CALCIUM SERPL-MCNC: 8.5 MG/DL (ref 8.3–10.4)
CALCIUM SERPL-MCNC: 8.9 MG/DL (ref 8.3–10.4)
CALCULATED P AXIS, ECG09: 49 DEGREES
CALCULATED P AXIS, ECG09: 69 DEGREES
CALCULATED P AXIS, ECG09: 75 DEGREES
CALCULATED R AXIS, ECG10: -22 DEGREES
CALCULATED R AXIS, ECG10: -31 DEGREES
CALCULATED R AXIS, ECG10: -32 DEGREES
CALCULATED T AXIS, ECG11: 108 DEGREES
CALCULATED T AXIS, ECG11: 128 DEGREES
CALCULATED T AXIS, ECG11: 139 DEGREES
CHLORIDE SERPL-SCNC: 102 MMOL/L (ref 98–107)
CHLORIDE SERPL-SCNC: 103 MMOL/L (ref 98–107)
CO2 SERPL-SCNC: 16 MMOL/L (ref 21–32)
CO2 SERPL-SCNC: 18 MMOL/L (ref 21–32)
CREAT SERPL-MCNC: 1.71 MG/DL (ref 0.8–1.5)
CREAT SERPL-MCNC: 2.03 MG/DL (ref 0.8–1.5)
DIAGNOSIS, 93000: NORMAL
DIFFERENTIAL METHOD BLD: ABNORMAL
DIFFERENTIAL METHOD BLD: ABNORMAL
EOSINOPHIL # BLD: 0 K/UL (ref 0–0.8)
EOSINOPHIL # BLD: 0.2 K/UL (ref 0–0.8)
EOSINOPHIL NFR BLD: 0 % (ref 0.5–7.8)
EOSINOPHIL NFR BLD: 2 % (ref 0.5–7.8)
ERYTHROCYTE [DISTWIDTH] IN BLOOD BY AUTOMATED COUNT: 13.1 % (ref 11.9–14.6)
ERYTHROCYTE [DISTWIDTH] IN BLOOD BY AUTOMATED COUNT: 13.3 % (ref 11.9–14.6)
GLOBULIN SER CALC-MCNC: 4.1 G/DL (ref 2.3–3.5)
GLUCOSE SERPL-MCNC: 106 MG/DL (ref 65–100)
GLUCOSE SERPL-MCNC: 127 MG/DL (ref 65–100)
HCT VFR BLD AUTO: 51.6 % (ref 41.1–50.3)
HCT VFR BLD AUTO: 53.3 % (ref 41.1–50.3)
HGB BLD-MCNC: 17.4 G/DL (ref 13.6–17.2)
HGB BLD-MCNC: 17.7 G/DL (ref 13.6–17.2)
IMM GRANULOCYTES # BLD AUTO: 0 K/UL (ref 0–0.5)
IMM GRANULOCYTES # BLD AUTO: 0.1 K/UL (ref 0–0.5)
IMM GRANULOCYTES NFR BLD AUTO: 0 % (ref 0–5)
IMM GRANULOCYTES NFR BLD AUTO: 1 % (ref 0–5)
LYMPHOCYTES # BLD: 1.2 K/UL (ref 0.5–4.6)
LYMPHOCYTES # BLD: 1.3 K/UL (ref 0.5–4.6)
LYMPHOCYTES NFR BLD: 15 % (ref 13–44)
LYMPHOCYTES NFR BLD: 16 % (ref 13–44)
MAGNESIUM SERPL-MCNC: 1.9 MG/DL (ref 1.8–2.4)
MCH RBC QN AUTO: 31 PG (ref 26.1–32.9)
MCH RBC QN AUTO: 31.1 PG (ref 26.1–32.9)
MCHC RBC AUTO-ENTMCNC: 33.2 G/DL (ref 31.4–35)
MCHC RBC AUTO-ENTMCNC: 33.7 G/DL (ref 31.4–35)
MCV RBC AUTO: 92 FL (ref 79.6–97.8)
MCV RBC AUTO: 93.5 FL (ref 79.6–97.8)
MONOCYTES # BLD: 0.4 K/UL (ref 0.1–1.3)
MONOCYTES # BLD: 0.5 K/UL (ref 0.1–1.3)
MONOCYTES NFR BLD: 5 % (ref 4–12)
MONOCYTES NFR BLD: 6 % (ref 4–12)
NEUTS SEG # BLD: 5.7 K/UL (ref 1.7–8.2)
NEUTS SEG # BLD: 6.7 K/UL (ref 1.7–8.2)
NEUTS SEG NFR BLD: 76 % (ref 43–78)
NEUTS SEG NFR BLD: 80 % (ref 43–78)
NRBC # BLD: 0 K/UL (ref 0–0.2)
NRBC # BLD: 0 K/UL (ref 0–0.2)
P-R INTERVAL, ECG05: 124 MS
P-R INTERVAL, ECG05: 170 MS
P-R INTERVAL, ECG05: 78 MS
PLATELET # BLD AUTO: 153 K/UL (ref 150–450)
PLATELET # BLD AUTO: 160 K/UL (ref 150–450)
PMV BLD AUTO: 9.8 FL (ref 9.4–12.3)
PMV BLD AUTO: 9.9 FL (ref 9.4–12.3)
POTASSIUM SERPL-SCNC: 2.8 MMOL/L (ref 3.5–5.1)
POTASSIUM SERPL-SCNC: 3 MMOL/L (ref 3.5–5.1)
PROT SERPL-MCNC: 8.1 G/DL (ref 6.3–8.2)
Q-T INTERVAL, ECG07: 266 MS
Q-T INTERVAL, ECG07: 286 MS
Q-T INTERVAL, ECG07: 304 MS
QRS DURATION, ECG06: 104 MS
QRS DURATION, ECG06: 96 MS
QRS DURATION, ECG06: 98 MS
QTC CALCULATION (BEZET), ECG08: 415 MS
QTC CALCULATION (BEZET), ECG08: 416 MS
QTC CALCULATION (BEZET), ECG08: 500 MS
RBC # BLD AUTO: 5.61 M/UL (ref 4.23–5.6)
RBC # BLD AUTO: 5.7 M/UL (ref 4.23–5.6)
SODIUM SERPL-SCNC: 133 MMOL/L (ref 138–145)
SODIUM SERPL-SCNC: 135 MMOL/L (ref 138–145)
TROPONIN-HIGH SENSITIVITY: 411 PG/ML (ref 0–14)
TROPONIN-HIGH SENSITIVITY: 598.5 PG/ML (ref 0–14)
VENTRICULAR RATE, ECG03: 127 BPM
VENTRICULAR RATE, ECG03: 147 BPM
VENTRICULAR RATE, ECG03: 163 BPM
WBC # BLD AUTO: 7.6 K/UL (ref 4.3–11.1)
WBC # BLD AUTO: 8.4 K/UL (ref 4.3–11.1)

## 2021-08-24 PROCEDURE — 74011250637 HC RX REV CODE- 250/637: Performed by: INTERNAL MEDICINE

## 2021-08-24 PROCEDURE — 74011250636 HC RX REV CODE- 250/636: Performed by: HOSPITALIST

## 2021-08-24 PROCEDURE — 84484 ASSAY OF TROPONIN QUANT: CPT

## 2021-08-24 PROCEDURE — 74011250636 HC RX REV CODE- 250/636: Performed by: INTERNAL MEDICINE

## 2021-08-24 PROCEDURE — 2709999900 HC NON-CHARGEABLE SUPPLY

## 2021-08-24 PROCEDURE — 74011250637 HC RX REV CODE- 250/637: Performed by: NURSE PRACTITIONER

## 2021-08-24 PROCEDURE — 85730 THROMBOPLASTIN TIME PARTIAL: CPT

## 2021-08-24 PROCEDURE — 80053 COMPREHEN METABOLIC PANEL: CPT

## 2021-08-24 PROCEDURE — 99223 1ST HOSP IP/OBS HIGH 75: CPT | Performed by: INTERNAL MEDICINE

## 2021-08-24 PROCEDURE — 74011000250 HC RX REV CODE- 250: Performed by: EMERGENCY MEDICINE

## 2021-08-24 PROCEDURE — 65660000000 HC RM CCU STEPDOWN

## 2021-08-24 PROCEDURE — 74011250636 HC RX REV CODE- 250/636: Performed by: NURSE PRACTITIONER

## 2021-08-24 PROCEDURE — 74011250636 HC RX REV CODE- 250/636: Performed by: EMERGENCY MEDICINE

## 2021-08-24 PROCEDURE — 74011000250 HC RX REV CODE- 250: Performed by: NURSE PRACTITIONER

## 2021-08-24 PROCEDURE — 94760 N-INVAS EAR/PLS OXIMETRY 1: CPT

## 2021-08-24 PROCEDURE — 85025 COMPLETE CBC W/AUTO DIFF WBC: CPT

## 2021-08-24 PROCEDURE — 74011250637 HC RX REV CODE- 250/637: Performed by: FAMILY MEDICINE

## 2021-08-24 PROCEDURE — 74011000250 HC RX REV CODE- 250: Performed by: INTERNAL MEDICINE

## 2021-08-24 PROCEDURE — 83735 ASSAY OF MAGNESIUM: CPT

## 2021-08-24 PROCEDURE — 36415 COLL VENOUS BLD VENIPUNCTURE: CPT

## 2021-08-24 RX ORDER — ATORVASTATIN CALCIUM 80 MG/1
80 TABLET, FILM COATED ORAL DAILY
Status: DISCONTINUED | OUTPATIENT
Start: 2021-08-24 | End: 2021-08-24

## 2021-08-24 RX ORDER — SODIUM CHLORIDE 0.9 % (FLUSH) 0.9 %
5-40 SYRINGE (ML) INJECTION AS NEEDED
Status: DISCONTINUED | OUTPATIENT
Start: 2021-08-24 | End: 2021-08-29 | Stop reason: HOSPADM

## 2021-08-24 RX ORDER — POTASSIUM CHLORIDE 20 MEQ/1
40 TABLET, EXTENDED RELEASE ORAL ONCE
Status: COMPLETED | OUTPATIENT
Start: 2021-08-24 | End: 2021-08-24

## 2021-08-24 RX ORDER — POTASSIUM CHLORIDE 20 MEQ/1
40 TABLET, EXTENDED RELEASE ORAL 2 TIMES DAILY
Status: DISCONTINUED | OUTPATIENT
Start: 2021-08-24 | End: 2021-08-24

## 2021-08-24 RX ORDER — POTASSIUM CHLORIDE 14.9 MG/ML
20 INJECTION INTRAVENOUS ONCE
Status: COMPLETED | OUTPATIENT
Start: 2021-08-24 | End: 2021-08-24

## 2021-08-24 RX ORDER — HEPARIN SODIUM 5000 [USP'U]/100ML
12-25 INJECTION, SOLUTION INTRAVENOUS
Status: DISCONTINUED | OUTPATIENT
Start: 2021-08-24 | End: 2021-08-24

## 2021-08-24 RX ORDER — POTASSIUM CHLORIDE 14.9 MG/ML
20 INJECTION INTRAVENOUS ONCE
Status: DISCONTINUED | OUTPATIENT
Start: 2021-08-24 | End: 2021-08-24

## 2021-08-24 RX ORDER — HYDRALAZINE HYDROCHLORIDE 25 MG/1
25 TABLET, FILM COATED ORAL 4 TIMES DAILY
Status: DISCONTINUED | OUTPATIENT
Start: 2021-08-24 | End: 2021-08-28

## 2021-08-24 RX ORDER — SODIUM CHLORIDE 0.9 % (FLUSH) 0.9 %
5-40 SYRINGE (ML) INJECTION EVERY 8 HOURS
Status: DISCONTINUED | OUTPATIENT
Start: 2021-08-24 | End: 2021-08-29 | Stop reason: HOSPADM

## 2021-08-24 RX ORDER — DEXAMETHASONE 4 MG/1
6 TABLET ORAL DAILY
Status: DISCONTINUED | OUTPATIENT
Start: 2021-08-24 | End: 2021-08-27

## 2021-08-24 RX ORDER — ACETAMINOPHEN 325 MG/1
650 TABLET ORAL
Status: DISCONTINUED | OUTPATIENT
Start: 2021-08-24 | End: 2021-08-29 | Stop reason: HOSPADM

## 2021-08-24 RX ORDER — POLYETHYLENE GLYCOL 3350 17 G/17G
17 POWDER, FOR SOLUTION ORAL DAILY PRN
Status: DISCONTINUED | OUTPATIENT
Start: 2021-08-24 | End: 2021-08-29 | Stop reason: HOSPADM

## 2021-08-24 RX ORDER — SODIUM BICARBONATE 650 MG/1
325 TABLET ORAL 2 TIMES DAILY
Status: DISCONTINUED | OUTPATIENT
Start: 2021-08-24 | End: 2021-08-27

## 2021-08-24 RX ORDER — ACETAMINOPHEN 650 MG/1
650 SUPPOSITORY RECTAL
Status: DISCONTINUED | OUTPATIENT
Start: 2021-08-24 | End: 2021-08-29 | Stop reason: HOSPADM

## 2021-08-24 RX ORDER — HYDROMORPHONE HYDROCHLORIDE 1 MG/ML
INJECTION, SOLUTION INTRAMUSCULAR; INTRAVENOUS; SUBCUTANEOUS
Status: ACTIVE
Start: 2021-08-24 | End: 2021-08-24

## 2021-08-24 RX ORDER — GABAPENTIN 300 MG/1
300 CAPSULE ORAL 2 TIMES DAILY
Status: DISCONTINUED | OUTPATIENT
Start: 2021-08-24 | End: 2021-08-29 | Stop reason: HOSPADM

## 2021-08-24 RX ORDER — MORPHINE SULFATE 2 MG/ML
2 INJECTION, SOLUTION INTRAMUSCULAR; INTRAVENOUS
Status: DISCONTINUED | OUTPATIENT
Start: 2021-08-24 | End: 2021-08-28

## 2021-08-24 RX ORDER — HYDROCODONE BITARTRATE AND ACETAMINOPHEN 10; 325 MG/1; MG/1
1 TABLET ORAL
Status: DISCONTINUED | OUTPATIENT
Start: 2021-08-24 | End: 2021-08-29 | Stop reason: HOSPADM

## 2021-08-24 RX ORDER — METOPROLOL TARTRATE 5 MG/5ML
5 INJECTION INTRAVENOUS
Status: DISCONTINUED | OUTPATIENT
Start: 2021-08-24 | End: 2021-08-29 | Stop reason: HOSPADM

## 2021-08-24 RX ORDER — CARVEDILOL 25 MG/1
25 TABLET ORAL 2 TIMES DAILY WITH MEALS
Status: DISCONTINUED | OUTPATIENT
Start: 2021-08-24 | End: 2021-08-29 | Stop reason: HOSPADM

## 2021-08-24 RX ORDER — LORAZEPAM 2 MG/ML
1 INJECTION INTRAMUSCULAR ONCE
Status: COMPLETED | OUTPATIENT
Start: 2021-08-24 | End: 2021-08-24

## 2021-08-24 RX ORDER — ENOXAPARIN SODIUM 100 MG/ML
40 INJECTION SUBCUTANEOUS EVERY 24 HOURS
Status: DISCONTINUED | OUTPATIENT
Start: 2021-08-24 | End: 2021-08-29 | Stop reason: HOSPADM

## 2021-08-24 RX ORDER — ALPRAZOLAM 0.5 MG/1
1 TABLET ORAL
Status: DISCONTINUED | OUTPATIENT
Start: 2021-08-24 | End: 2021-08-29 | Stop reason: HOSPADM

## 2021-08-24 RX ORDER — MAGNESIUM SULFATE 1 G/100ML
1 INJECTION INTRAVENOUS ONCE
Status: COMPLETED | OUTPATIENT
Start: 2021-08-24 | End: 2021-08-24

## 2021-08-24 RX ORDER — LANOLIN ALCOHOL/MO/W.PET/CERES
800 CREAM (GRAM) TOPICAL EVERY 12 HOURS
Status: DISCONTINUED | OUTPATIENT
Start: 2021-08-24 | End: 2021-08-29 | Stop reason: HOSPADM

## 2021-08-24 RX ORDER — HEPARIN SODIUM 5000 [USP'U]/ML
5000 INJECTION, SOLUTION INTRAVENOUS; SUBCUTANEOUS EVERY 8 HOURS
Status: DISCONTINUED | OUTPATIENT
Start: 2021-08-24 | End: 2021-08-24

## 2021-08-24 RX ORDER — LORAZEPAM 2 MG/ML
INJECTION INTRAMUSCULAR
Status: ACTIVE
Start: 2021-08-24 | End: 2021-08-24

## 2021-08-24 RX ORDER — ATORVASTATIN CALCIUM 80 MG/1
80 TABLET, FILM COATED ORAL
Status: DISCONTINUED | OUTPATIENT
Start: 2021-08-24 | End: 2021-08-29 | Stop reason: HOSPADM

## 2021-08-24 RX ORDER — ASPIRIN 81 MG/1
81 TABLET ORAL DAILY
Status: DISCONTINUED | OUTPATIENT
Start: 2021-08-24 | End: 2021-08-29 | Stop reason: HOSPADM

## 2021-08-24 RX ORDER — HYDROMORPHONE HYDROCHLORIDE 1 MG/ML
0.5 INJECTION, SOLUTION INTRAMUSCULAR; INTRAVENOUS; SUBCUTANEOUS ONCE
Status: COMPLETED | OUTPATIENT
Start: 2021-08-24 | End: 2021-08-24

## 2021-08-24 RX ADMIN — ALPRAZOLAM 1 MG: 0.5 TABLET ORAL at 21:35

## 2021-08-24 RX ADMIN — POTASSIUM CHLORIDE 20 MEQ: 14.9 INJECTION, SOLUTION INTRAVENOUS at 13:22

## 2021-08-24 RX ADMIN — Medication 10 ML: at 13:32

## 2021-08-24 RX ADMIN — DEXAMETHASONE 6 MG: 4 TABLET ORAL at 09:34

## 2021-08-24 RX ADMIN — GABAPENTIN 300 MG: 300 CAPSULE ORAL at 09:36

## 2021-08-24 RX ADMIN — GABAPENTIN 300 MG: 300 CAPSULE ORAL at 21:35

## 2021-08-24 RX ADMIN — MAGNESIUM GLUCONATE 500 MG ORAL TABLET 800 MG: 500 TABLET ORAL at 21:36

## 2021-08-24 RX ADMIN — MORPHINE SULFATE 2 MG: 2 INJECTION, SOLUTION INTRAMUSCULAR; INTRAVENOUS at 09:46

## 2021-08-24 RX ADMIN — HYDRALAZINE HYDROCHLORIDE 25 MG: 25 TABLET, FILM COATED ORAL at 13:22

## 2021-08-24 RX ADMIN — MAGNESIUM GLUCONATE 500 MG ORAL TABLET 800 MG: 500 TABLET ORAL at 09:34

## 2021-08-24 RX ADMIN — HYDROMORPHONE HYDROCHLORIDE 0.5 MG: 1 INJECTION, SOLUTION INTRAMUSCULAR; INTRAVENOUS; SUBCUTANEOUS at 01:03

## 2021-08-24 RX ADMIN — CARVEDILOL 25 MG: 25 TABLET, FILM COATED ORAL at 16:23

## 2021-08-24 RX ADMIN — MAGNESIUM SULFATE HEPTAHYDRATE 1 G: 1 INJECTION, SOLUTION INTRAVENOUS at 19:25

## 2021-08-24 RX ADMIN — Medication 10 ML: at 05:42

## 2021-08-24 RX ADMIN — HYDRALAZINE HYDROCHLORIDE 25 MG: 25 TABLET, FILM COATED ORAL at 17:30

## 2021-08-24 RX ADMIN — SODIUM BICARBONATE 650 MG TABLET 325 MG: at 17:29

## 2021-08-24 RX ADMIN — ENOXAPARIN SODIUM 40 MG: 40 INJECTION SUBCUTANEOUS at 10:35

## 2021-08-24 RX ADMIN — Medication 10 ML: at 01:03

## 2021-08-24 RX ADMIN — POTASSIUM CHLORIDE 20 MEQ: 14.9 INJECTION, SOLUTION INTRAVENOUS at 21:44

## 2021-08-24 RX ADMIN — METOPROLOL TARTRATE 5 MG: 5 INJECTION INTRAVENOUS at 00:08

## 2021-08-24 RX ADMIN — POTASSIUM CHLORIDE 20 MEQ: 14.9 INJECTION, SOLUTION INTRAVENOUS at 10:35

## 2021-08-24 RX ADMIN — SODIUM CHLORIDE, SODIUM LACTATE, POTASSIUM CHLORIDE, AND CALCIUM CHLORIDE 150 ML/HR: 600; 310; 30; 20 INJECTION, SOLUTION INTRAVENOUS at 06:41

## 2021-08-24 RX ADMIN — ATORVASTATIN CALCIUM 80 MG: 80 TABLET, FILM COATED ORAL at 21:35

## 2021-08-24 RX ADMIN — MORPHINE SULFATE 2 MG: 2 INJECTION, SOLUTION INTRAMUSCULAR; INTRAVENOUS at 14:48

## 2021-08-24 RX ADMIN — ASPIRIN 81 MG: 81 TABLET ORAL at 09:33

## 2021-08-24 RX ADMIN — POTASSIUM CHLORIDE 40 MEQ: 20 TABLET, EXTENDED RELEASE ORAL at 05:41

## 2021-08-24 RX ADMIN — PROMETHAZINE HYDROCHLORIDE 25 MG: 25 INJECTION INTRAMUSCULAR; INTRAVENOUS at 17:29

## 2021-08-24 RX ADMIN — Medication 5 ML: at 00:10

## 2021-08-24 RX ADMIN — PROMETHAZINE HYDROCHLORIDE 25 MG: 25 INJECTION INTRAMUSCULAR; INTRAVENOUS at 03:47

## 2021-08-24 RX ADMIN — SODIUM BICARBONATE 650 MG TABLET 325 MG: at 09:45

## 2021-08-24 RX ADMIN — Medication 10 ML: at 13:33

## 2021-08-24 RX ADMIN — PROMETHAZINE HYDROCHLORIDE 25 MG: 25 INJECTION INTRAMUSCULAR; INTRAVENOUS at 07:48

## 2021-08-24 RX ADMIN — LORAZEPAM 1 MG: 2 INJECTION INTRAMUSCULAR; INTRAVENOUS at 01:03

## 2021-08-24 RX ADMIN — MORPHINE SULFATE 2 MG: 2 INJECTION, SOLUTION INTRAMUSCULAR; INTRAVENOUS at 19:20

## 2021-08-24 RX ADMIN — METOPROLOL TARTRATE 5 MG: 5 INJECTION INTRAVENOUS at 09:39

## 2021-08-24 RX ADMIN — HEPARIN SODIUM 5000 UNITS: 5000 INJECTION INTRAVENOUS; SUBCUTANEOUS at 05:42

## 2021-08-24 RX ADMIN — CARVEDILOL 25 MG: 25 TABLET, FILM COATED ORAL at 09:33

## 2021-08-24 RX ADMIN — PROMETHAZINE HYDROCHLORIDE 25 MG: 25 INJECTION INTRAMUSCULAR; INTRAVENOUS at 13:22

## 2021-08-24 RX ADMIN — PROMETHAZINE HYDROCHLORIDE 25 MG: 25 INJECTION INTRAMUSCULAR; INTRAVENOUS at 21:44

## 2021-08-24 NOTE — PROGRESS NOTES
Chart reviewed s/p admission to ICU covid positive isolation. Currently on 4L O2 NC. No gtts. Tx orders now to floor bed. Spoke with wife and she confirms demographics. Pt independent at home prior to admission. No current DME's, HH, or rehab. Aware CM to follow for any assist and d/c needs/POC. Unit number and security code given. Spoke with RN for update. Care Management Interventions  PCP Verified by CM: Yes  Mode of Transport at Discharge:  Other (see comment)  Transition of Care Consult (CM Consult): Discharge Planning  Discharge Durable Medical Equipment:  (none currently)  Current Support Network: Lives with Spouse  Confirm Follow Up Transport: Self  Freedom of Choice List was Provided with Basic Dialogue that Supports the Patient's Individualized Plan of Care/Goals, Treatment Preferences and Shares the Quality Data Associated with the Providers?: Yes  [employer] Information Provided?:  ([employer]/MCR )  Discharge Location  Discharge Placement: Unable to determine at this time

## 2021-08-24 NOTE — PROGRESS NOTES
Patient remains in stable condition with respirations even/unlabored. No acute distress noted. Safety measures in place. Patient on room air. Magnesium infusion has been started. Non-productive cough noted. Call light remains within reach. Preparing to give report to oncoming shift.

## 2021-08-24 NOTE — PROGRESS NOTES
Bedside and verbal shift change report received from [address] (offgoing nurse). Report included the following information SBAR, Kardex, ED Summary, Intake/Output, MAR, Recent Results and Cardiac Rhythm Sinus Tach.      Dual skin assessment completed at bedside: Skin WDL

## 2021-08-24 NOTE — PROGRESS NOTES
Notified Dr. Brianna Olmedo of morning potassium of 2.8. She ordered 40 meq of potassium tablets PO one time now. Orders placed, meds administered. Will recheck potassium in 4 hours.

## 2021-08-24 NOTE — PROGRESS NOTES
Call received from Dr. Rico Chaves. MD states, continue to trend troponin's but no need to report them unless there is a significant change in trend.

## 2021-08-24 NOTE — PROGRESS NOTES
Progress Note    Patient: cO Mejia MRN: [id number]  SSN: [id number]    YOB: 1965  Age: 64 y.o. Sex: male      Admit Date: 8/23/2021    LOS: 1 day     Assessment and Plan:   63 yo AAM with past history of Vfib and non-ischemic cardiomyopathy with EF of 20-25% s/p PPM, CAD, GERD, HTN, and HLD presents with 3-day history of worsening cough, shortness of breath, nausea/vomiting and diarrhea    1. Acute hypoxic respiratory failure 2/2 Covid 19 pna  -O2 therapy to maintain O2 Sat >92%  -Continue decadron  -Not candidate or remdesivir due to renal dysfunction   -Self proning   -Symptomatic management    2. YAO likely prerenal  -Continue gentle IVF hydration  -Avoid nephrotoxic agents  -Monitor renal function    3. Sinus tachycardia  -Telemetry  -On carvedilol  -Monitor K and mag, replete  -Cardiology on board    4. Chest pain with ST depressions on lateral leads and elevated troponin concerning of NSTEMI  -Started on heparin gtt and discontinued by cardiology  -Started ASA  -Telemetry  -Trend troponin  -EKG prn  -Cardiology on board    5. NICM EF 20 to 25% s/p ICD  -Telemetry  -Continue carvedilol   -Holding lasix and losartan for now due to YAO    6. HLD  -Continue statin    7. HTN  -Continue carvedilol and hydralazine    8. Hypokalemia  -Replete  -Telemetry  -Monitor K    DVT ppx    Subjective:   63 yo AAM with past history of Vfib and non-ischemic cardiomyopathy with EF of 20-25% s/p PPM, CAD, GERD, HTN, and HLD presents with 3-day history of worsening cough, shortness of breath, nausea/vomiting and diarrhea. Patient seen and examined at bedside. This morning the patient feeling unwell, still presenting with some SOB and cough. States that had some chest pain. Denies abdominal pain, no nausea.      Objective:     Vitals:    08/24/21 1331 08/24/21 1353 08/24/21 1503 08/24/21 1510   BP: 115/85 119/79 112/82    Pulse: (!) 118 (!) 110 (!) 115 (!) 114   Resp: 29 22 20    Temp:   99.1 °F (37.3 °C)    SpO2: 97% 99% 96%    Weight:       Height:            Intake and Output:  Current Shift: 08/24 0701 - 08/24 1900  In: 2338.3 [P.O.:240; I.V.:2098.3]  Out: 150 [Urine:150]  Last three shifts: No intake/output data recorded. ROS  10 ROS negative except from stated on subjective    Physical Exam:   General: Alert, oriented, NAD  HEENT: NC/AT, EOM are intact  Neck: supple, no JVD  Cardiovascular: RRR, S1, S2, no murmurs  Respiratory: Coarse sounds, no wheezes  Abdomen: Soft, NT, ND  Back: No CVA tenderness, no paraspinal tenderness  Extremities: LE without pedal edema, no erythema  Neuro: A&O, CN are intact, no focal deficits  Skin: no rash or ulcers  Psych: good mood and affect    Lab/Data Review:  I have personally reviewed patients laboratory data showing  Recent Results (from the past 24 hour(s))   EKG, 12 LEAD, INITIAL    Collection Time: 08/23/21  8:35 PM   Result Value Ref Range    Ventricular Rate 163 BPM    Atrial Rate 163 BPM    P-R Interval 78 ms    QRS Duration 96 ms    Q-T Interval 304 ms    QTC Calculation (Bezet) 500 ms    Calculated P Axis 49 degrees    Calculated R Axis -22 degrees    Calculated T Axis 108 degrees    Diagnosis       Sinus tachycardia with short KS  Left ventricular hypertrophy with repolarization abnormality  Inferior infarct , age undetermined  Abnormal ECG  When compared with ECG of 14-APR-2021 00:31,  Vent.  rate has increased BY  61 BPM  ST more depressed Lateral leads  T wave inversion no longer evident in Inferior leads  T wave inversion less evident in Anterolateral leads  Confirmed by ST RYAN HORNER MD (), EVIE ORELLANA ([id number]) on 8/24/2021 7:24:20 AM     CBC WITH AUTOMATED DIFF    Collection Time: 08/23/21  8:40 PM   Result Value Ref Range    WBC 9.5 4.3 - 11.1 K/uL    RBC 5.98 (H) 4.23 - 5.6 M/uL    HGB 19.1 (H) 13.6 - 17.2 g/dL    HCT 55.5 (H) 41.1 - 50.3 %    MCV 92.8 79.6 - 97.8 FL    MCH 31.9 26.1 - 32.9 PG    MCHC 34.4 31.4 - 35.0 g/dL    RDW 13.2 11.9 - 14.6 %    PLATELET 175 544 - 450 K/uL    MPV 11.6 9.4 - 12.3 FL    ABSOLUTE NRBC 0.02 0.0 - 0.2 K/uL    NEUTROPHILS 76 43 - 78 %    LYMPHOCYTES 17 13 - 44 %    MONOCYTES 6 4.0 - 12.0 %    EOSINOPHILS 0 (L) 0.5 - 7.8 %    BASOPHILS 1 0.0 - 2.0 %    IMMATURE GRANULOCYTES 0 0.0 - 5.0 %    ABS. NEUTROPHILS 7.2 1.7 - 8.2 K/UL    ABS. LYMPHOCYTES 1.6 0.5 - 4.6 K/UL    ABS. MONOCYTES 0.6 0.1 - 1.3 K/UL    ABS. EOSINOPHILS 0.0 0.0 - 0.8 K/UL    ABS. BASOPHILS 0.1 0.0 - 0.2 K/UL    ABS. IMM. GRANS. 0.0 0.0 - 0.5 K/UL    DF AUTOMATED     TROPONIN-HIGH SENSITIVITY    Collection Time: 08/23/21  8:42 PM   Result Value Ref Range    Troponin-High Sensitivity 493.9 (HH) 0 - 14 pg/mL   MAGNESIUM    Collection Time: 08/23/21  8:42 PM   Result Value Ref Range    Magnesium 1.9 1.8 - 2.4 mg/dL   METABOLIC PANEL, COMPREHENSIVE    Collection Time: 08/23/21  8:42 PM   Result Value Ref Range    Sodium 130 (L) 138 - 145 mmol/L    Potassium 5.3 (H) 3.5 - 5.1 mmol/L    Chloride 101 98 - 107 mmol/L    CO2 16 (L) 21 - 32 mmol/L    Anion gap 13 7 - 16 mmol/L    Glucose 131 (H) 65 - 100 mg/dL    BUN 27 (H) 6 - 23 MG/DL    Creatinine 2.48 (H) 0.8 - 1.5 MG/DL    GFR est AA 35 (L) >60 ml/min/1.73m2    GFR est non-AA 29 (L) >60 ml/min/1.73m2    Calcium 9.5 8.3 - 10.4 MG/DL    Bilirubin, total 1.7 (H) 0.2 - 1.1 MG/DL    ALT (SGPT) 143 (H) 12 - 65 U/L    AST (SGOT) 146 (H) 15 - 37 U/L    Alk.  phosphatase 138 (H) 50 - 136 U/L    Protein, total 9.3 (H) 6.3 - 8.2 g/dL    Albumin 4.4 3.5 - 5.0 g/dL    Globulin 4.9 (H) 2.3 - 3.5 g/dL    A-G Ratio 0.9 (L) 1.2 - 3.5     NT-PRO BNP    Collection Time: 08/23/21  8:42 PM   Result Value Ref Range    NT pro-BNP 1,996 (H) 5 - 125 PG/ML   COVID-19 RAPID TEST    Collection Time: 08/23/21  9:51 PM   Result Value Ref Range    Specimen source Nasopharyngeal      COVID-19 rapid test Detected (AA) NOTD     EKG, 12 LEAD, SUBSEQUENT    Collection Time: 08/23/21  9:53 PM   Result Value Ref Range    Ventricular Rate 147 BPM    Atrial Rate 147 BPM    P-R Interval 124 ms    QRS Duration 98 ms    Q-T Interval 266 ms    QTC Calculation (Bezet) 416 ms    Calculated P Axis 75 degrees    Calculated R Axis -31 degrees    Calculated T Axis 128 degrees    Diagnosis       !! AGE AND GENDER SPECIFIC ECG ANALYSIS !! Sinus tachycardia  Possible Left atrial enlargement  Left axis deviation  Inferior infarct (cited on or before 02-JUL-2020)  Marked ST abnormality, possible lateral subendocardial injury  Abnormal ECG  When compared with ECG of 23-AUG-2021 20:35,  No significant change was found  Confirmed by ST RYAN HORNER MD (), EVIE ORELLANA ([id number]) on 8/24/2021 7:25:35 AM     EKG, 12 LEAD, SUBSEQUENT    Collection Time: 08/23/21 10:13 PM   Result Value Ref Range    Ventricular Rate 127 BPM    Atrial Rate 127 BPM    P-R Interval 170 ms    QRS Duration 104 ms    Q-T Interval 286 ms    QTC Calculation (Bezet) 415 ms    Calculated P Axis 69 degrees    Calculated R Axis -32 degrees    Calculated T Axis 139 degrees    Diagnosis       !! AGE AND GENDER SPECIFIC ECG ANALYSIS !!   Sinus tachycardia with frequent Premature ventricular complexes  Possible Left atrial enlargement  Left axis deviation  Possible Inferior infarct (cited on or before 02-JUL-2020)  Marked ST abnormality, possible lateral subendocardial injury  Abnormal ECG  When compared with ECG of 23-AUG-2021 21:53,  Premature ventricular complexes are now Present    Confirmed by ST RYAN HORNER MD ()EVIE ([id number]) on 8/24/2021 7:25:56 AM     TROPONIN-HIGH SENSITIVITY    Collection Time: 08/23/21 10:33 PM   Result Value Ref Range    Troponin-High Sensitivity 494.3 (HH) 0 - 14 pg/mL   CBC WITH AUTOMATED DIFF    Collection Time: 08/24/21  3:15 AM   Result Value Ref Range    WBC 7.6 4.3 - 11.1 K/uL    RBC 5.70 (H) 4.23 - 5.6 M/uL    HGB 17.7 (H) 13.6 - 17.2 g/dL    HCT 53.3 (H) 41.1 - 50.3 %    MCV 93.5 79.6 - 97.8 FL    MCH 31.1 26.1 - 32.9 PG    MCHC 33.2 31.4 - 35.0 g/dL    RDW 13.1 11.9 - 14.6 %    PLATELET 066 919 - 041 K/uL MPV 9.8 9.4 - 12.3 FL    ABSOLUTE NRBC 0.00 0.0 - 0.2 K/uL    NEUTROPHILS 76 43 - 78 %    LYMPHOCYTES 16 13 - 44 %    MONOCYTES 6 4.0 - 12.0 %    EOSINOPHILS 2 0.5 - 7.8 %    BASOPHILS 0 0.0 - 2.0 %    IMMATURE GRANULOCYTES 0 0.0 - 5.0 %    ABS. NEUTROPHILS 5.7 1.7 - 8.2 K/UL    ABS. LYMPHOCYTES 1.2 0.5 - 4.6 K/UL    ABS. MONOCYTES 0.5 0.1 - 1.3 K/UL    ABS. EOSINOPHILS 0.2 0.0 - 0.8 K/UL    ABS. BASOPHILS 0.0 0.0 - 0.2 K/UL    ABS. IMM. GRANS. 0.0 0.0 - 0.5 K/UL    DF AUTOMATED     METABOLIC PANEL, COMPREHENSIVE    Collection Time: 08/24/21  3:15 AM   Result Value Ref Range    Sodium 135 (L) 138 - 145 mmol/L    Potassium 2.8 (L) 3.5 - 5.1 mmol/L    Chloride 102 98 - 107 mmol/L    CO2 18 (L) 21 - 32 mmol/L    Anion gap 15 7 - 16 mmol/L    Glucose 106 (H) 65 - 100 mg/dL    BUN 30 (H) 6 - 23 MG/DL    Creatinine 2.03 (H) 0.8 - 1.5 MG/DL    GFR est AA 44 (L) >60 ml/min/1.73m2    GFR est non-AA 36 (L) >60 ml/min/1.73m2    Calcium 8.9 8.3 - 10.4 MG/DL    Bilirubin, total 1.2 (H) 0.2 - 1.1 MG/DL    ALT (SGPT) 110 (H) 12 - 65 U/L    AST (SGOT) 91 (H) 15 - 37 U/L    Alk.  phosphatase 117 50 - 136 U/L    Protein, total 8.1 6.3 - 8.2 g/dL    Albumin 4.0 3.5 - 5.0 g/dL    Globulin 4.1 (H) 2.3 - 3.5 g/dL    A-G Ratio 1.0 (L) 1.2 - 3.5     MAGNESIUM    Collection Time: 08/24/21  3:15 AM   Result Value Ref Range    Magnesium 1.9 1.8 - 2.4 mg/dL   TROPONIN-HIGH SENSITIVITY    Collection Time: 08/24/21  9:32 AM   Result Value Ref Range    Troponin-High Sensitivity 598.5 (HH) 0 - 14 pg/mL   PTT    Collection Time: 08/24/21  9:33 AM   Result Value Ref Range    aPTT 28.6 24.1 - 35.1 SEC   CBC WITH AUTOMATED DIFF    Collection Time: 08/24/21  9:33 AM   Result Value Ref Range    WBC 8.4 4.3 - 11.1 K/uL    RBC 5.61 (H) 4.23 - 5.6 M/uL    HGB 17.4 (H) 13.6 - 17.2 g/dL    HCT 51.6 (H) 41.1 - 50.3 %    MCV 92.0 79.6 - 97.8 FL    MCH 31.0 26.1 - 32.9 PG    MCHC 33.7 31.4 - 35.0 g/dL    RDW 13.3 11.9 - 14.6 %    PLATELET 037 631 - 752 K/uL MPV 9.9 9.4 - 12.3 FL    ABSOLUTE NRBC 0.00 0.0 - 0.2 K/uL    DF AUTOMATED      NEUTROPHILS 80 (H) 43 - 78 %    LYMPHOCYTES 15 13 - 44 %    MONOCYTES 5 4.0 - 12.0 %    EOSINOPHILS 0 (L) 0.5 - 7.8 %    BASOPHILS 0 0.0 - 2.0 %    IMMATURE GRANULOCYTES 1 0.0 - 5.0 %    ABS. NEUTROPHILS 6.7 1.7 - 8.2 K/UL    ABS. LYMPHOCYTES 1.3 0.5 - 4.6 K/UL    ABS. MONOCYTES 0.4 0.1 - 1.3 K/UL    ABS. EOSINOPHILS 0.0 0.0 - 0.8 K/UL    ABS. BASOPHILS 0.0 0.0 - 0.2 K/UL    ABS. IMM. GRANS. 0.1 0.0 - 0.5 K/UL   METABOLIC PANEL, BASIC    Collection Time: 08/24/21  9:33 AM   Result Value Ref Range    Sodium 133 (L) 138 - 145 mmol/L    Potassium 3.0 (L) 3.5 - 5.1 mmol/L    Chloride 103 98 - 107 mmol/L    CO2 16 (L) 21 - 32 mmol/L    Anion gap 14 7 - 16 mmol/L    Glucose 127 (H) 65 - 100 mg/dL    BUN 32 (H) 6 - 23 MG/DL    Creatinine 1.71 (H) 0.8 - 1.5 MG/DL    GFR est AA 54 (L) >60 ml/min/1.73m2    GFR est non-AA 44 (L) >60 ml/min/1.73m2    Calcium 8.5 8.3 - 10.4 MG/DL      All Micro Results     Procedure Component Value Units Date/Time    COVID-19 RAPID TEST [[id number]]  (Abnormal) Collected: 08/23/21 2151    Order Status: Completed Specimen: Nasopharyngeal Updated: 08/23/21 2216     Specimen source Nasopharyngeal        COVID-19 rapid test Detected        Comment:      The specimen is POSITIVE for SARS-CoV-2, the novel coronavirus associated with COVID-19. This test has been authorized by the FDA under an Emergency Use Authorization (EUA) for use by authorized laboratories. Fact sheet for Healthcare Providers: ConventionUpdate.co.nz  Fact sheet for Patients: ConventionUpdate.co.nz       Methodology: Isothermal Nucleic Acid Amplification                Image:  I have personally reviewed patients imaging showing  XR CHEST PORT   Final Result      1. No evidence of acute pulmonary disease.               Hospital problems     Patient Active Problem List   Diagnosis Code    Chronic systolic (congestive) heart failure (HCC) I50.22    HTN (hypertension) I10    VT (ventricular tachycardia) (HCC) I47.2    Nausea & vomiting R11.2    Non-ischemic cardiomyopathy (HCC) I42.8    CAMARILLO (dyspnea on exertion) E88.80    Metabolic acidosis V92.5    Esophageal dysphagia R13.10    AGE (acute gastroenteritis) K52.9    Respiratory distress R06.03    COVID-19 ruled out Z20.822    Hypokalemia E87.6    Acute on chronic systolic heart failure (HCC) I50.23    Chest pain R07.9    CHF (congestive heart failure) (HCC) I50.9    Elevated brain natriuretic peptide (BNP) level R79.89    Gallstones K80.20    YAO (acute kidney injury) ([facility] [address] N17.9    Hyponatremia E87.1    Demand ischemia (HCC) I24.8    Acute respiratory failure with hypoxia (HCC) J96.01    Acute respiratory failure due to COVID-19 (HCC) U07.1, J96.00        I have reviewed, updated, and verified this note's content and spent 38 minutes of my 42 minutes visit performing counseling and coordination of care regarding medical management.        Signed By: Seth Fragoso MD     August 24, 2021

## 2021-08-24 NOTE — PROGRESS NOTES
MD notified; patient continues to complain of chest pain, epigastric pain that wraps around to his back. VSS. See DocFlow. Breathing becomes labored when nurse enterers room and rouses patient for medications. Patient is anticipating his next pain medication administration. 1813: Monitor room called and stated patient was in V-Tach. Monitor room is sending a strip. Patient checked and states his chest feels funny. Will notify MD.    4102: 15 beat run of V Tach noted. Strips placed on chart. MD notified.

## 2021-08-24 NOTE — PROGRESS NOTES
TRANSFER - IN REPORT:    Verbal report received from Nataliiaφόροprasanth LOJAοσειjoseώνοprasanth Mireles, RN on Stephany Bell  being received from ICU for routine progression of care      Report consisted of patients Situation, Background, Assessment and   Recommendations(SBAR). Information from the following report(s) SBAR, Intake/Output, MAR, Accordion, Recent Results and Cardiac Rhythm Sinus Tach was reviewed with the receiving nurse. Opportunity for questions and clarification was provided. Assessment completed upon patients arrival to unit and care assumed.
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
## 2021-08-24 NOTE — PROGRESS NOTES
Patient arrived to room 811 from ICU. Patient in stable condition. Resps even/unlabored. NAD. Complains of pain. Will medicate per MD orders. Dual skin assessment with KADEN Macario. Will continue to monitor.

## 2021-08-24 NOTE — PROGRESS NOTES
TRANSFER - IN REPORT:    Verbal report received from [place], RN(name) on Justine Fernandez  being received from ED(unit) for routine progression of care      Report consisted of patients Situation, Background, Assessment and   Recommendations(SBAR). Information from the following report(s) SBAR, Intake/Output, Recent Results and Cardiac Rhythm ST was reviewed with the receiving nurse. Opportunity for questions and clarification was provided. Assessment completed upon patients arrival to unit and care assumed.

## 2021-08-24 NOTE — PROGRESS NOTES
Contacted laboratory for new labs ordered for before AM medications, and was told lab would come by soon to draw blood.
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
## 2021-08-24 NOTE — ROUTINE PROCESS
TRANSFER - OUT REPORT:    Verbal report given to Radha(name) on Gomez Cintron  being transferred to ICU(unit) for routine progression of care       Report consisted of patients Situation, Background, Assessment and   Recommendations(SBAR). Information from the following report(s) SBAR was reviewed with the receiving nurse. Lines:   Peripheral IV 08/23/21 Right Hand (Active)   Site Assessment Clean, dry, & intact 08/23/21 2055   Phlebitis Assessment 0 08/23/21 2055   Infiltration Assessment 0 08/23/21 2055   Dressing Status Clean, dry, & intact 08/23/21 2055   Dressing Type Transparent 08/23/21 2055   Hub Color/Line Status Blue 08/23/21 2055        Opportunity for questions and clarification was provided.       Patient transported with:   Registered Nurse

## 2021-08-24 NOTE — PROGRESS NOTES
Problem: Airway Clearance - Ineffective  Goal: Achieve or maintain patent airway  Outcome: Progressing Towards Goal     Problem: Gas Exchange - Impaired  Goal: Absence of hypoxia  Outcome: Progressing Towards Goal  Goal: Promote optimal lung function  Outcome: Progressing Towards Goal     Problem: Breathing Pattern - Ineffective  Goal: Ability to achieve and maintain a regular respiratory rate  Outcome: Progressing Towards Goal     Problem: Body Temperature -  Risk of, Imbalanced  Goal: Ability to maintain a body temperature within defined limits  Outcome: Progressing Towards Goal  Goal: Will regain or maintain usual level of consciousness  Outcome: Progressing Towards Goal  Goal: Complications related to the disease process, condition or treatment will be avoided or minimized  Outcome: Progressing Towards Goal     Problem: Isolation Precautions - Risk of Spread of Infection  Goal: Prevent transmission of infectious organism to others  Outcome: Progressing Towards Goal     Problem: Nutrition Deficits  Goal: Optimize nutrtional status  Outcome: Progressing Towards Goal     Problem: Risk for Fluid Volume Deficit  Goal: Maintain normal heart rhythm  Outcome: Progressing Towards Goal  Goal: Maintain absence of muscle cramping  Outcome: Progressing Towards Goal  Goal: Maintain normal serum potassium, sodium, calcium, phosphorus, and pH  Outcome: Progressing Towards Goal     Problem: Loneliness or Risk for Loneliness  Goal: Demonstrate positive use of time alone when socialization is not possible  Outcome: Progressing Towards Goal     Problem: Fatigue  Goal: Verbalize increase energy and improved vitality  Outcome: Progressing Towards Goal     Problem: Patient Education: Go to Patient Education Activity  Goal: Patient/Family Education  Outcome: Progressing Towards Goal     Problem: Falls - Risk of  Goal: *Absence of Falls  Description: Document Chery Fall Risk and appropriate interventions in the flowsheet.   Outcome: Progressing Towards Goal  Note: Fall Risk Interventions:            Medication Interventions: Assess postural VS orthostatic hypotension, Evaluate medications/consider consulting pharmacy, Patient to call before getting OOB, Bed/chair exit alarm                   Problem: Patient Education: Go to Patient Education Activity  Goal: Patient/Family Education  Outcome: Progressing Towards Goal

## 2021-08-24 NOTE — PROGRESS NOTES
Patient arrived to ICU 3101 accompanied by 2 RN's on cardiac monitor. ST on monitor, . Pt on 150 LR infusing through IV. Pt alert and oriented x4, and complaining of cramping pain and asking for something to help him sleep. Notified Dr. Obey Olivia of patient request, he placed orders for dilaudid and ativan once. Meds administered, pt reports relief. Dual skin assessment completed with Charles Casillas RN. Patient has no significant skin findings. 22G Peripheral IV in the right thumb. Dr Ruiz Offer to bedside to help with IV placement after attempts by 2 RNs. 18G IV placed in the left upper arm by Dr. Ruiz Offer using ultrasound. Skin intact. Curlie Luis not applied as patient is ambulatory at this time.

## 2021-08-24 NOTE — ED TRIAGE NOTES
Pt arrives by [facility] with [facility]. States pt has CHF w/pacemaker and defibrillator. States has become SOB w/cough 3 days ago. Dyspnea on exertion. Not on o2 at home. On 4L NC In triage, was 94% at home but was placed on 4L w/EMS due to severe SOB. N/V/D and extreme fatigued.

## 2021-08-24 NOTE — ACP (ADVANCE CARE PLANNING)
Advance Care Planning     General Advance Care Planning (ACP) Conversation      Date of Conversation: 8/23/2021      Healthcare Decision Maker:       Primary Decision Maker: Ava Hale - Spouse - [phone]  Click here to complete [address] including selection of the Healthcare Decision Maker Relationship (ie \"Primary\")      Today we documented Decision Maker(s) consistent with Legal Next of Kin hierarchy. Content/Action Overview:   No LW/HCPOA on file. Spouse, legal NOK. Full code per MD orders.      Length of Voluntary ACP Conversation in minutes:  <16 minutes (Non-Billable)    Linda Gama RN

## 2021-08-24 NOTE — PROGRESS NOTES
TRANSFER - OUT REPORT:    Verbal report given to Diego Murray RN (name) on Naomi Alicea  being transferred to [address] [place] [address] (unit) for routine progression of care       Report consisted of patients Situation, Background, Assessment and   Recommendations(SBAR). Information from the following report(s) SBAR, Kardex, ED Summary, Procedure Summary, Intake/Output, MAR, Recent Results, Cardiac Rhythm Sinus tach and Alarm Parameters  was reviewed with the receiving nurse. Opportunity for questions and clarification was provided.       Patient to be transported with:   Monitor  Registered Nurse  Tech

## 2021-08-24 NOTE — PROGRESS NOTES
Initial visit made to patient and a prayer was provided.        Obdulia Wilder, [address] [facility], [facility]

## 2021-08-24 NOTE — PROGRESS NOTES
Bedside report received from [facility], [facility]. Patient in bed resting. Respirations even and unlabored. On RA. Safety measures in place. Call light in reach. No signs of distress at this time. Will continue to monitor.

## 2021-08-25 ENCOUNTER — APPOINTMENT (OUTPATIENT)
Dept: ULTRASOUND IMAGING | Age: 56
DRG: 177 | End: 2021-08-25
Attending: INTERNAL MEDICINE
Payer: COMMERCIAL

## 2021-08-25 ENCOUNTER — APPOINTMENT (OUTPATIENT)
Dept: GENERAL RADIOLOGY | Age: 56
DRG: 177 | End: 2021-08-25
Attending: HOSPITALIST
Payer: COMMERCIAL

## 2021-08-25 ENCOUNTER — APPOINTMENT (OUTPATIENT)
Dept: CT IMAGING | Age: 56
DRG: 177 | End: 2021-08-25
Attending: INTERNAL MEDICINE
Payer: COMMERCIAL

## 2021-08-25 LAB
ALBUMIN SERPL-MCNC: 3.4 G/DL (ref 3.5–5)
ALBUMIN/GLOB SERPL: 0.9 {RATIO} (ref 1.2–3.5)
ALP SERPL-CCNC: 95 U/L (ref 50–136)
ALT SERPL-CCNC: 78 U/L (ref 12–65)
ANION GAP SERPL CALC-SCNC: 10 MMOL/L (ref 7–16)
AST SERPL-CCNC: 72 U/L (ref 15–37)
BASOPHILS # BLD: 0 K/UL (ref 0–0.2)
BASOPHILS NFR BLD: 0 % (ref 0–2)
BILIRUB SERPL-MCNC: 1 MG/DL (ref 0.2–1.1)
BUN SERPL-MCNC: 30 MG/DL (ref 6–23)
CALCIUM SERPL-MCNC: 8.3 MG/DL (ref 8.3–10.4)
CHLORIDE SERPL-SCNC: 102 MMOL/L (ref 98–107)
CO2 SERPL-SCNC: 19 MMOL/L (ref 21–32)
CREAT SERPL-MCNC: 1.29 MG/DL (ref 0.8–1.5)
DIFFERENTIAL METHOD BLD: ABNORMAL
EOSINOPHIL # BLD: 0.1 K/UL (ref 0–0.8)
EOSINOPHIL NFR BLD: 2 % (ref 0.5–7.8)
ERYTHROCYTE [DISTWIDTH] IN BLOOD BY AUTOMATED COUNT: 13.1 % (ref 11.9–14.6)
GLOBULIN SER CALC-MCNC: 3.7 G/DL (ref 2.3–3.5)
GLUCOSE BLD STRIP.AUTO-MCNC: 155 MG/DL (ref 65–100)
GLUCOSE SERPL-MCNC: 124 MG/DL (ref 65–100)
HCT VFR BLD AUTO: 48.5 % (ref 41.1–50.3)
HGB BLD-MCNC: 16.2 G/DL (ref 13.6–17.2)
IMM GRANULOCYTES # BLD AUTO: 0 K/UL (ref 0–0.5)
IMM GRANULOCYTES NFR BLD AUTO: 0 % (ref 0–5)
LYMPHOCYTES # BLD: 1.1 K/UL (ref 0.5–4.6)
LYMPHOCYTES NFR BLD: 15 % (ref 13–44)
MCH RBC QN AUTO: 31.4 PG (ref 26.1–32.9)
MCHC RBC AUTO-ENTMCNC: 33.4 G/DL (ref 31.4–35)
MCV RBC AUTO: 94 FL (ref 79.6–97.8)
MONOCYTES # BLD: 0.4 K/UL (ref 0.1–1.3)
MONOCYTES NFR BLD: 5 % (ref 4–12)
NEUTS SEG # BLD: 5.8 K/UL (ref 1.7–8.2)
NEUTS SEG NFR BLD: 78 % (ref 43–78)
NRBC # BLD: 0.02 K/UL (ref 0–0.2)
PLATELET # BLD AUTO: 127 K/UL (ref 150–450)
PLATELET COMMENTS,PCOM: SLIGHT
PMV BLD AUTO: 9.7 FL (ref 9.4–12.3)
POTASSIUM SERPL-SCNC: 4.5 MMOL/L (ref 3.5–5.1)
PROT SERPL-MCNC: 7.1 G/DL (ref 6.3–8.2)
RBC # BLD AUTO: 5.16 M/UL (ref 4.23–5.6)
RBC MORPH BLD: ABNORMAL
SERVICE CMNT-IMP: ABNORMAL
SODIUM SERPL-SCNC: 131 MMOL/L (ref 138–145)
TROPONIN-HIGH SENSITIVITY: 453.5 PG/ML (ref 0–14)
WBC # BLD AUTO: 7.4 K/UL (ref 4.3–11.1)
WBC MORPH BLD: ABNORMAL

## 2021-08-25 PROCEDURE — 94664 DEMO&/EVAL PT USE INHALER: CPT

## 2021-08-25 PROCEDURE — 74011250636 HC RX REV CODE- 250/636: Performed by: INTERNAL MEDICINE

## 2021-08-25 PROCEDURE — 65660000000 HC RM CCU STEPDOWN

## 2021-08-25 PROCEDURE — 76937 US GUIDE VASCULAR ACCESS: CPT

## 2021-08-25 PROCEDURE — 36415 COLL VENOUS BLD VENIPUNCTURE: CPT

## 2021-08-25 PROCEDURE — 94640 AIRWAY INHALATION TREATMENT: CPT

## 2021-08-25 PROCEDURE — 71046 X-RAY EXAM CHEST 2 VIEWS: CPT

## 2021-08-25 PROCEDURE — 93971 EXTREMITY STUDY: CPT

## 2021-08-25 PROCEDURE — 70450 CT HEAD/BRAIN W/O DYE: CPT

## 2021-08-25 PROCEDURE — 74011250637 HC RX REV CODE- 250/637: Performed by: INTERNAL MEDICINE

## 2021-08-25 PROCEDURE — 80053 COMPREHEN METABOLIC PANEL: CPT

## 2021-08-25 PROCEDURE — 85025 COMPLETE CBC W/AUTO DIFF WBC: CPT

## 2021-08-25 PROCEDURE — 97530 THERAPEUTIC ACTIVITIES: CPT

## 2021-08-25 PROCEDURE — 74011250637 HC RX REV CODE- 250/637: Performed by: HOSPITALIST

## 2021-08-25 PROCEDURE — 82962 GLUCOSE BLOOD TEST: CPT

## 2021-08-25 PROCEDURE — 51798 US URINE CAPACITY MEASURE: CPT

## 2021-08-25 PROCEDURE — 97162 PT EVAL MOD COMPLEX 30 MIN: CPT

## 2021-08-25 PROCEDURE — 84484 ASSAY OF TROPONIN QUANT: CPT

## 2021-08-25 PROCEDURE — 74011250636 HC RX REV CODE- 250/636: Performed by: EMERGENCY MEDICINE

## 2021-08-25 PROCEDURE — 74011250637 HC RX REV CODE- 250/637: Performed by: NURSE PRACTITIONER

## 2021-08-25 PROCEDURE — 99232 SBSQ HOSP IP/OBS MODERATE 35: CPT | Performed by: INTERNAL MEDICINE

## 2021-08-25 PROCEDURE — 74011000250 HC RX REV CODE- 250: Performed by: EMERGENCY MEDICINE

## 2021-08-25 PROCEDURE — 74011250636 HC RX REV CODE- 250/636: Performed by: NURSE PRACTITIONER

## 2021-08-25 RX ORDER — IPRATROPIUM BROMIDE AND ALBUTEROL SULFATE 2.5; .5 MG/3ML; MG/3ML
3 SOLUTION RESPIRATORY (INHALATION)
Status: DISCONTINUED | OUTPATIENT
Start: 2021-08-25 | End: 2021-08-29 | Stop reason: HOSPADM

## 2021-08-25 RX ORDER — LORAZEPAM 0.5 MG/1
0.5 TABLET ORAL
Status: DISCONTINUED | OUTPATIENT
Start: 2021-08-25 | End: 2021-08-29 | Stop reason: HOSPADM

## 2021-08-25 RX ORDER — ALBUTEROL SULFATE 90 UG/1
2 AEROSOL, METERED RESPIRATORY (INHALATION)
Status: DISCONTINUED | OUTPATIENT
Start: 2021-08-25 | End: 2021-08-29 | Stop reason: HOSPADM

## 2021-08-25 RX ORDER — BENZONATATE 100 MG/1
100 CAPSULE ORAL
Status: DISCONTINUED | OUTPATIENT
Start: 2021-08-25 | End: 2021-08-29 | Stop reason: HOSPADM

## 2021-08-25 RX ADMIN — HYDRALAZINE HYDROCHLORIDE 25 MG: 25 TABLET, FILM COATED ORAL at 12:38

## 2021-08-25 RX ADMIN — PROMETHAZINE HYDROCHLORIDE 25 MG: 25 INJECTION INTRAMUSCULAR; INTRAVENOUS at 06:46

## 2021-08-25 RX ADMIN — GABAPENTIN 300 MG: 300 CAPSULE ORAL at 08:20

## 2021-08-25 RX ADMIN — LORAZEPAM 0.5 MG: 0.5 TABLET ORAL at 08:18

## 2021-08-25 RX ADMIN — CARVEDILOL 25 MG: 25 TABLET, FILM COATED ORAL at 08:18

## 2021-08-25 RX ADMIN — ALPRAZOLAM 1 MG: 0.5 TABLET ORAL at 20:51

## 2021-08-25 RX ADMIN — CARVEDILOL 25 MG: 25 TABLET, FILM COATED ORAL at 17:32

## 2021-08-25 RX ADMIN — MORPHINE SULFATE 2 MG: 2 INJECTION, SOLUTION INTRAMUSCULAR; INTRAVENOUS at 08:19

## 2021-08-25 RX ADMIN — MAGNESIUM GLUCONATE 500 MG ORAL TABLET 800 MG: 500 TABLET ORAL at 20:51

## 2021-08-25 RX ADMIN — Medication 10 ML: at 00:02

## 2021-08-25 RX ADMIN — Medication 5 ML: at 20:51

## 2021-08-25 RX ADMIN — ASPIRIN 81 MG: 81 TABLET ORAL at 08:18

## 2021-08-25 RX ADMIN — ALBUTEROL SULFATE 2 PUFF: 90 AEROSOL, METERED RESPIRATORY (INHALATION) at 04:44

## 2021-08-25 RX ADMIN — Medication 5 ML: at 12:38

## 2021-08-25 RX ADMIN — GABAPENTIN 300 MG: 300 CAPSULE ORAL at 20:51

## 2021-08-25 RX ADMIN — PROMETHAZINE HYDROCHLORIDE 25 MG: 25 INJECTION INTRAMUSCULAR; INTRAVENOUS at 18:16

## 2021-08-25 RX ADMIN — Medication 10 ML: at 06:08

## 2021-08-25 RX ADMIN — Medication 10 ML: at 06:07

## 2021-08-25 RX ADMIN — MORPHINE SULFATE 2 MG: 2 INJECTION, SOLUTION INTRAMUSCULAR; INTRAVENOUS at 03:43

## 2021-08-25 RX ADMIN — HYDRALAZINE HYDROCHLORIDE 25 MG: 25 TABLET, FILM COATED ORAL at 17:32

## 2021-08-25 RX ADMIN — HYDRALAZINE HYDROCHLORIDE 25 MG: 25 TABLET, FILM COATED ORAL at 20:51

## 2021-08-25 RX ADMIN — Medication 10 ML: at 20:51

## 2021-08-25 RX ADMIN — ATORVASTATIN CALCIUM 80 MG: 80 TABLET, FILM COATED ORAL at 20:51

## 2021-08-25 RX ADMIN — MORPHINE SULFATE 2 MG: 2 INJECTION, SOLUTION INTRAMUSCULAR; INTRAVENOUS at 17:32

## 2021-08-25 RX ADMIN — MAGNESIUM GLUCONATE 500 MG ORAL TABLET 800 MG: 500 TABLET ORAL at 08:19

## 2021-08-25 RX ADMIN — DEXAMETHASONE 6 MG: 4 TABLET ORAL at 08:19

## 2021-08-25 RX ADMIN — ENOXAPARIN SODIUM 40 MG: 40 INJECTION SUBCUTANEOUS at 10:37

## 2021-08-25 RX ADMIN — Medication 10 ML: at 00:01

## 2021-08-25 RX ADMIN — SODIUM CHLORIDE, SODIUM LACTATE, POTASSIUM CHLORIDE, AND CALCIUM CHLORIDE 50 ML/HR: 600; 310; 30; 20 INJECTION, SOLUTION INTRAVENOUS at 01:50

## 2021-08-25 RX ADMIN — BENZONATATE 100 MG: 100 CAPSULE ORAL at 13:19

## 2021-08-25 RX ADMIN — HYDRALAZINE HYDROCHLORIDE 25 MG: 25 TABLET, FILM COATED ORAL at 08:19

## 2021-08-25 RX ADMIN — HYDROCODONE BITARTRATE AND ACETAMINOPHEN 1 TABLET: 10; 325 TABLET ORAL at 10:49

## 2021-08-25 RX ADMIN — SODIUM BICARBONATE 650 MG TABLET 325 MG: at 08:20

## 2021-08-25 RX ADMIN — PROMETHAZINE HYDROCHLORIDE 25 MG: 25 INJECTION INTRAMUSCULAR; INTRAVENOUS at 13:19

## 2021-08-25 RX ADMIN — SODIUM BICARBONATE 650 MG TABLET 325 MG: at 17:32

## 2021-08-25 NOTE — PROGRESS NOTES
Patient resting quietly in bed at this time. Respirations regular and unlabored. No needs verbalized at this time. Call light in reach.

## 2021-08-25 NOTE — ROUTINE PROCESS
MRI unable to be done due to patient's ICD not being compatible. Dr. Valerie Fernandez made aware via perfect serve.

## 2021-08-25 NOTE — PROGRESS NOTES
Patient in bed resting. Respirations even and unlabored. On RA. No signs of distress at this time. Report given to oncoming RN.

## 2021-08-25 NOTE — ACP (ADVANCE CARE PLANNING)
Advance Care Planning     General Advance Care Planning (ACP) Conversation      Date of Conversation: 8/23/2021  Conducted with: Patient with Decision Making Capacity    Healthcare Decision Maker:     Primary Decision Maker: Josemanuel Townsend - Spouse - [phone]    Secondary Decision Maker: Edgard Chahal - Mother - [phone]  Click here to complete [address] including selection of the Healthcare Decision Maker Relationship (ie \"Primary\")      Today we documented Decision Maker(s) consistent with Legal Next of Kin hierarchy. Content/Action Overview:   DECLINED ACP conversation - will revisit periodically   Reviewed DNR/DNI and patient elects Full Code (Attempt Resuscitation)         Length of Voluntary ACP Conversation in minutes:  <16 minutes (Non-Billable)    Gely Escalante, RN             MSN, CM:  Patient unable to speak with CM this AM r/t dyspnea. Patient request that this CM contact his wife Natalie Hernandez. Natalie Hernandez was contacted and stated patient continues to want to be a full code. She also stated she is primary contact for healthcare decisions when patient is unable. Natalie Hernandez is unsure if secondary is Eduard Anthony (patient's mother). Will leave contact list as is until further notice and Natalie Hernandez informed of this and verbalized understanding.

## 2021-08-25 NOTE — PROGRESS NOTES
Pt complaining of severe headache that is not relieved with morphine. Dr. Renetta Goldstein made aware. Head CT ordered. Will continue to monitor.

## 2021-08-25 NOTE — PROGRESS NOTES
Patient c/o headache and abdominal pain 7/10. PRN morphine 2mg IV given. Patient c/o inability to \"catch breath\". O2 sat at 96% on room air without use of accessory muscles. Diffuse wheezing audible throughout bilateral lungs. Patient taking shallow, frequent breaths. Respiratory rate 30 breaths/min. Instructed patient on slow, deep breathing. Vital signs stable. See doc flowsheet. Patient reports \"feels like I'm getting worse, not better\". Patient remains alert and oriented to person, place, and situation at this time. MD notified. Orders received. Will continue to monitor.

## 2021-08-25 NOTE — PROGRESS NOTES
[facility] CARDIOLOGY PROGRESS NOTE    8/25/2021 9:07 AM    Admit Date: 8/23/2021        Subjective:   No angina or palpitations overnight but remains in sinus tachycardia which is largely compensatory with chronic systolic heart failure and concurrent Covid pneumonia. Blood pressure stable, on high-dose carvedilol chronically. He complains of worsening shortness of breath and cough and has coarse upper airway sounds today. Chest x-ray pending. Objective:      Vitals:    08/25/21 0320 08/25/21 0359 08/25/21 0444 08/25/21 0730   BP: 110/68 (!) 133/97     Pulse: 93 (!) 105     Resp: 23      Temp: 98.5 °F (36.9 °C)      SpO2: 100% 96% 97% 98%   Weight:       Height:           Physical Exam:  Neck- supple, no JVD at 60 degrees   CV-tachycardic but regular rate and rhythm no MRG  Lung-coarse upper airways and upper apices bilaterally  Abd- soft, nontender, nondistended  Ext- no edema  Skin- warm and dry    Data Review:   Recent Labs     08/25/21  0224 08/24/21  0933 08/24/21  0315 08/24/21  0315 08/23/21  2042 08/23/21  2040   * 133*   < > 135* 130*   < >   K 4.5 3.0*   < > 2.8* 5.3*   < >   MG  --   --   --  1.9 1.9  --    BUN 30* 32*   < > 30* 27*   < >   CREA 1.29 1.71*   < > 2.03* 2.48*   < >   * 127*   < > 106* 131*   < >   WBC 7.4 8.4   < > 7.6  --    < >   HGB 16.2 17.4*   < > 17.7*  --    < >   HCT 48.5 51.6*   < > 53.3*  --    < >   * 153   < > 160  --    < >    < > = values in this interval not displayed. Assessment and Plan:     Acute respiratory failure due to COVID -19 -- management per primary. On dexamethasone. Chest x-ray this morning given worsening cough and upper airway sounds. Continue medications and supportive care. Recheck BMP, CBC, magnesium in the morning.        YAO (acute kidney injury) -- improved slightly overnight        Elevated troponin-due to demand ischemia -- in the setting of COVID+, YAO, and tachycardia.   Stop heparin drip,  lovenox for DVT Prophylaxis. Continue PO coreg. Normal coronaries in the past with a severe nonischemic cardiomyopathy. No angina. Treat conservatively and maximize heart failure regimen as tolerated.       Chronic systolic (congestive) heart failure --  Hypovolemic on admission with YAO, gentle IV fluids infusing. Continue BB, holding ARB with YAO. Monitor volume status closely. Stop IV fluids now. Chest x-ray.       HTN (hypertension) -- stable, continue current meds.         Non-ischemic cardiomyopathy        Metabolic acidosis        Hypokalemia -- replacement ordered -recheck daily.       Hyponatremia -- improving        Acute respiratory failure due to COVID-19 ([facility] [address] (8/24/2021)       ELVIA Perkins MD  [facility] Cardiology  Pager [phone]

## 2021-08-25 NOTE — PROGRESS NOTES
ACUTE PHYSICAL THERAPY GOALS:  (Developed with and agreed upon by patient and/or caregiver.)  1. Mr. Estrella Lane will perform supine to sit and sit to supine independently in 7 days. 2.  Mr. Estrella Lane will perform sit to stand and bed to chair independently in 7 days. 3.  Mr. Estrella Lane will perform gait with least to no assistive device 150 ft independently in 7 days. PHYSICAL THERAPY ASSESSMENT: Initial Assessment and Daily Note PT Treatment Day # 1      Godwin Lane is a 64 y.o. male   PRIMARY DIAGNOSIS: YAO (acute kidney injury) ([facility]  YAO (acute kidney injury) ([facility] [address] [N17.9]       Reason for Referral:    ICD-10: Treatment Diagnosis: Generalized Muscle Weakness (M62.81)  Difficulty in walking, Not elsewhere classified (R26.2)  INPATIENT: Payor: BLUE CROSS / Plan: [place] BLUE CROSS FEDERAL / Product Type: PPO /     ASSESSMENT:     REHAB RECOMMENDATIONS:   Recommendation to date pending progress:  [address] [place] [address] Therapy  Equipment:    None     PRIOR LEVEL OF FUNCTION:  (Prior to Hospitalization) INITIAL/CURRENT LEVEL OF FUNCTION:  (Most Recently Demonstrated)   Bed Mobility:   Independent  Sit to Stand:   Independent  Transfers:   Independent  Gait/Mobility:   Independent Bed Mobility:   Supervision  Sit to Stand:  [employer] Assistance  Transfers:   Contact Guard Assistance  Gait/Mobility:   Contact Guard Assistance     ASSESSMENT:  Mr. Estrella Lane presents with decreased mobility and decreased gait. He got the vaccine but has covid. He has a history of CHF. His presentation is such that he gets SOB even just talking, O2 sats are 99. He appeared weak when he got up to ambulate reaching for external support along the way. His wife was out of town but on her way home today. He is retired. He is functioning below baseline and therefore appropriate for skilled PT to maximize his rehab potential.  May benefit from [address] at time of discharge. .     SUBJECTIVE:   Mr. Estrella Lane states, \"Its hard to breath. \"    SOCIAL HISTORY/LIVING ENVIRONMENT:   Home Environment: Private residence  One/Two Story Residence: One story  Living Alone: No  Support Systems: Child(charlene), Family member(s), Spouse/Significant Other/Partner  OBJECTIVE:     PAIN: VITAL SIGNS: LINES/DRAINS:   Pre Treatment:  0  Post Treatment: 0  O2 sats 99%   O2 Device: None (Room air)     GROSS EVALUATION:   Within Functional Limits Abnormal/ Functional Abnormal/ Non-Functional (see comments) Not Tested Comments:   AROM [x] [] [] []    PROM [] [] [] []    Strength [x] [] [] []    Balance [] [] [] []    Posture [] [] [] []    Sensation [] [] [] []    Coordination [] [] [] []    Tone [] [] [] []    Edema [] [] [] []    Activity Tolerance [] [x] [] [] Fatigues and gets SOB after 20 ft.    [] [] [] []      COGNITION/  PERCEPTION: Intact Impaired   (see comments) Comments:   Orientation [x] []    Vision [x] []    Hearing [x] []    Command Following [x] []    Safety Awareness [x] []     [] []      MOBILITY: I Mod I S SBA CGA Min Mod Max Total  NT x2 Comments:   Bed Mobility    Rolling [x] [] [] [] [] [] [] [] [] [] []    Supine to Sit [] [] [x] [] [] [] [] [] [] [] []    Scooting [] [] [x] [] [] [] [] [] [] [] []    Sit to Supine [] [] [x] [] [] [] [] [] [] [] []    Transfers    Sit to Stand [] [] [x] [] [] [] [] [] [] [] []    Bed to Chair [] [] [] [] [] [] [] [] [] [x] []    Stand to Sit [] [] [x] [] [] [] [] [] [] [] []    I=Independent, Mod I=Modified Independent, S=Supervision, SBA=Standby Assistance, CGA=Contact Guard Assistance,   Min=Minimal Assistance, Mod=Moderate Assistance, Max=Maximal Assistance, Total=Total Assistance, NT=Not Tested  GAIT: I Mod I S SBA CGA Min Mod Max Total  NT x2 Comments:   Level of Assistance [] [] [x] [] [x] [] [] [] [] [] []    Distance 20 ft    DME N/A    Gait Quality Reaching for external support    Weightbearing Status N/A     I=Independent, Mod I=Modified Independent, S=Supervision, SBA=Standby Assistance, CGA=Contact Guard Assistance, Min=Minimal Assistance, Mod=Moderate Assistance, Max=Maximal Assistance, Total=Total Assistance, NT=Not Tested    [address] [place] [id number] AM-PAC [place] Form       How much difficulty does the patient currently have. .. Unable A Lot A Little None   1. Turning over in bed (including adjusting bedclothes, sheets and blankets)? [] 1   [] 2   [x] 3   [] 4   2. Sitting down on and standing up from a chair with arms ( e.g., wheelchair, bedside commode, etc.)   [] 1   [] 2   [x] 3   [] 4   3. Moving from lying on back to sitting on the side of the bed? [] 1   [] 2   [x] 3   [] 4   How much help from another person does the patient currently need. .. Total A Lot A Little None   4. Moving to and from a bed to a chair (including a wheelchair)? [] 1   [] 2   [x] 3   [] 4   5. Need to walk in hospital room? [] 1   [] 2   [x] 3   [] 4   6. Climbing 3-5 steps with a railing? [] 1   [] 2   [x] 3   [] 4   © 2007, Trustees of [address] [place] [id number], under license to [employer]. All rights reserved     Score:  Initial: 18 Most Recent: X (Date: -- )    Interpretation of Tool:  Represents activities that are increasingly more difficult (i.e. Bed mobility, Transfers, Gait). PLAN:   FREQUENCY/DURATION: PT Plan of Care: 3 times/week for duration of hospital stay or until stated goals are met, whichever comes first.    PROBLEM LIST:   (Skilled intervention is medically necessary to address:)  1. Decreased Activity Tolerance  2. Decreased AROM/PROM  3. Decreased Gait Ability  4. Decreased Strength  5. Decreased Transfer Abilities   INTERVENTIONS PLANNED:   (Benefits and precautions of physical therapy have been discussed with the patient.)  1. Therapeutic Activity  2. Therapeutic Exercise/HEP  3. Neuromuscular Re-education  4. Gait Training  5.  Education     TREATMENT:     EVALUATION: Moderate Complexity : (Untimed Charge)    TREATMENT:   ($$ Therapeutic Activity: 8-22 mins    )  Therapeutic Activity (10 Minutes): Therapeutic activity included Rolling, Supine to Sit, Sit to Supine, Scooting, Transfer Training, Ambulation on level ground, Sitting balance  and Standing balance to improve functional Mobility, Strength and Activity tolerance.     TREATMENT GRID:  N/A    AFTER TREATMENT POSITION/PRECAUTIONS:  Bed, Needs within reach and RN notified    INTERDISCIPLINARY COLLABORATION:  RN/PCT and PT/PTA    TOTAL TREATMENT DURATION:  PT Patient Time In/Time Out  Time In: 1500  Time Out: [address] ALB[address] Macias, PT

## 2021-08-25 NOTE — PROGRESS NOTES
MSN, CM:  Patient unable to speak to CM this AM r/t dyspnea. Contacted patient's wife Rosenda Gardner" per his request.  All below information was gathered by [place]. Patient lives with his wife Rosenda Gardner" in own house with 5 steps for entrance. Patient has a son \"Shellie" which lives locally. Patient is independent with all ADL's and requires no equipment for ambulation. Patient is not on any home oxygen at this time and has never had rehab services but has received [facility] in the past.  Patient's PCP is Dr. Lauren Walker and he drives himself to all appointments. PT and OT consulted for evaluation and recommendations. Case Management will continue to follow for discharge needs. Care Management Interventions  PCP Verified by CM: Yes  Mode of Transport at Discharge:  Other (see comment)  Transition of Care Consult (CM Consult): Discharge Planning  Discharge Durable Medical Equipment:  (none currently)  Current Support Network: Lives with Spouse  Confirm Follow Up Transport: Self  Freedom of Choice List was Provided with Basic Dialogue that Supports the Patient's Individualized Plan of Care/Goals, Treatment Preferences and Shares the Quality Data Associated with the Providers?: Yes  [employer] Information Provided?:  (Blue Cross Fed/MCR )  Discharge Location  Discharge Placement: Unable to determine at this time

## 2021-08-25 NOTE — PROGRESS NOTES
Hospitalist Progress Note   Admit Date:  2021  9:15 PM   Name:  Riya Crabtree   Age:  64 y.o. Sex:  male  :  1965   MRN:  [id number]     Presenting Complaint: No chief complaint on file. Reason(s) for Admission: YAO (acute kidney injury) [facility]) [N17.9]     Hospital Course & Interval History:     Mr. Tushar Lopez is a 63 yo male with PMH of HFrEF EF 20-25%, VFIB, s/p PPM, CAD, HTN, HLP admitted with COVID 19, YAO and elevated troponin. He is on room air. CXR shows patchy infiltrates. He is on course of decadron. remdesivir contraindicated due to YAO. He has been seen by cardiology for rising troponin. They recommended stopping IV heparin and continued coreg. He has received gentle IVF for YAO, holding ARB.      Discharge plans pending       Subjective (21):    Coughing, wheezing, feels poorly, denies anxiety, has headaches and migraines, feels like he is stuttering his speech since last night, has diffuse weakness, no appetite, has diarrhea , RN noted LUE edema      Assessment & Plan:     Principal Problem:    YAO (acute kidney injury) ([address] () with metabolic acidosis:  ·   Stop IVF  · Trend BMP- improved 21  · Continued bicarb replacement      Active Problems:    Chronic systolic (congestive) heart failure ([address] (2011)  HTN (hypertension) (2011)  ·   Cardiology following   · Continued coreg, hydralazine   · ARB held due to YAO        Acute respiratory failure with hypoxia ([address] (2021)     Acute respiratory failure due to COVID-19 ([address] (2021)  ·   currently on room air   · D2/10 decadron  · Not remdesivir candidate due to renal failure  · No ACTEMRA- on room air  · Has nebs  · Has tessalon         NSTEMI (non-ST elevated myocardial infarction) ([address] (2021)  · appreciate cardiology  · Continued asa, lipitor     Anxiety:  · Continued xanax         Thrombocytopenia:  · Trend CBC       Hyponatremia:  · Trend BMP       Elevated LFTS:  · Trend lab        Change of speech:  · STAT CT head- negative   · Unsure if he can have MRI brain with PPM, ordered to MRI and will need screening       LUE edema:  · Check duplex       Dispo/Discharge Planning:      Pending to home     Diet:  ADULT DIET Regular; 4 carb choices (60 gm/meal)  DVT PPx: lovneox  Code status: Full Code    Active Hospital Problems    Diagnosis Date Noted    Hyponatremia 08/24/2021    Acute respiratory failure with hypoxia ([facility] 08/24/2021    Acute respiratory failure due to COVID-19 [facility]) 08/24/2021    NSTEMI (non-ST elevated myocardial infarction) ([facility] 08/24/2021    YAO (acute kidney injury) ([facility] 08/23/2021    Hypokalemia 59/42/1380    Metabolic acidosis 72/51/0522    Non-ischemic cardiomyopathy ([facility] 03/24/2016    HTN (hypertension) 11/29/2011    Chronic systolic (congestive) heart failure ([facility] 04/21/2011       Objective:     Patient Vitals for the past 24 hrs:   Temp Pulse Resp BP SpO2   08/25/21 1144 97.8 °F (36.6 °C) 92 20 104/77 99 %   08/25/21 0830 98.2 °F (36.8 °C) (!) 102 20 126/86 97 %   08/25/21 0730     98 %   08/25/21 0444     97 %   08/25/21 0359  (!) 105  (!) 133/97 96 %   08/25/21 0320 98.5 °F (36.9 °C) 93 23 110/68 100 %   08/24/21 2358  (!) 114      08/24/21 2249 98 °F (36.7 °C) 96 22 122/88 97 %   08/24/21 2000  (!) 105      08/24/21 1951     94 %   08/24/21 1935 98.8 °F (37.1 °C) (!) 106 25 108/89 94 %   08/24/21 1815  (!) 110      08/24/21 1810  (!) 106      08/24/21 1800 98.7 °F (37.1 °C) (!) 110 20 100/75 91 %     Oxygen Therapy  O2 Sat (%): 99 % (08/25/21 1144)  Pulse via Oximetry: 94 beats per minute (08/25/21 0444)  O2 Device: None (Room air) (08/25/21 1240)  Skin Assessment: Clean, dry, & intact (08/24/21 1102)  O2 Flow Rate (L/min): 2 l/min (08/25/21 0739)    Estimated body mass index is 25.09 kg/m² as calculated from the following:    Height as of this encounter: 5' 11\" (1.803 m).     Weight as of this encounter: 81.6 kg (179 lb 14.3 oz). Intake/Output Summary (Last 24 hours) at 8/25/2021 1625  Last data filed at 8/25/2021 0730  Gross per 24 hour   Intake 629 ml   Output    Net 629 ml         Physical Exam:     General:    Well nourished. No overt distress, anxious appearing, audible wheezing from doorway  Head:  Normocephalic, atraumatic  Eyes:  Sclerae appear normal.  Pupils equally round. ENT:  Nares appear normal, no drainage. Moist oral mucosa  Neck:  No restricted ROM. Trachea midline   CV:   RRR. No m/r/g. No jugular venous distension. Lungs:   Some upper airway wheezing, mild expiratory wheezing   Abdomen: Bowel sounds present. Soft, nontender, nondistended. Extremities: No cyanosis or clubbing. No edema  Skin:     No rashes and normal coloration. Warm and dry. Neuro:   grossly intact. 5/5 extremity strength, intermittent speech stuttering   Psych:  anxious    I have reviewed ordered lab tests and independently visualized imaging below:    Last 24hr Labs:  Recent Results (from the past 24 hour(s))   TROPONIN-HIGH SENSITIVITY    Collection Time: 08/24/21  7:50 PM   Result Value Ref Range    Troponin-High Sensitivity 411.0 (HH) 0 - 14 pg/mL   CBC WITH AUTOMATED DIFF    Collection Time: 08/25/21  2:24 AM   Result Value Ref Range    WBC 7.4 4.3 - 11.1 K/uL    RBC 5.16 4.23 - 5.6 M/uL    HGB 16.2 13.6 - 17.2 g/dL    HCT 48.5 41.1 - 50.3 %    MCV 94.0 79.6 - 97.8 FL    MCH 31.4 26.1 - 32.9 PG    MCHC 33.4 31.4 - 35.0 g/dL    RDW 13.1 11.9 - 14.6 %    PLATELET 359 (L) 010 - 450 K/uL    MPV 9.7 9.4 - 12.3 FL    ABSOLUTE NRBC 0.02 0.0 - 0.2 K/uL    NEUTROPHILS 78 43 - 78 %    LYMPHOCYTES 15 13 - 44 %    MONOCYTES 5 4.0 - 12.0 %    EOSINOPHILS 2 0.5 - 7.8 %    BASOPHILS 0 0.0 - 2.0 %    IMMATURE GRANULOCYTES 0 0.0 - 5.0 %    ABS. NEUTROPHILS 5.8 1.7 - 8.2 K/UL    ABS. LYMPHOCYTES 1.1 0.5 - 4.6 K/UL    ABS. MONOCYTES 0.4 0.1 - 1.3 K/UL    ABS. EOSINOPHILS 0.1 0.0 - 0.8 K/UL    ABS.  BASOPHILS 0.0 0.0 - 0.2 K/UL ABS. IMM. GRANS. 0.0 0.0 - 0.5 K/UL    RBC COMMENTS NORMOCYTIC/NORMOCHROMIC      WBC COMMENTS Result Confirmed By Smear      PLATELET COMMENTS SLIGHT      DF AUTOMATED     METABOLIC PANEL, COMPREHENSIVE    Collection Time: 08/25/21  2:24 AM   Result Value Ref Range    Sodium 131 (L) 138 - 145 mmol/L    Potassium 4.5 3.5 - 5.1 mmol/L    Chloride 102 98 - 107 mmol/L    CO2 19 (L) 21 - 32 mmol/L    Anion gap 10 7 - 16 mmol/L    Glucose 124 (H) 65 - 100 mg/dL    BUN 30 (H) 6 - 23 MG/DL    Creatinine 1.29 0.8 - 1.5 MG/DL    GFR est AA >60 >60 ml/min/1.73m2    GFR est non-AA >60 >60 ml/min/1.73m2    Calcium 8.3 8.3 - 10.4 MG/DL    Bilirubin, total 1.0 0.2 - 1.1 MG/DL    ALT (SGPT) 78 (H) 12 - 65 U/L    AST (SGOT) 72 (H) 15 - 37 U/L    Alk. phosphatase 95 50 - 136 U/L    Protein, total 7.1 6.3 - 8.2 g/dL    Albumin 3.4 (L) 3.5 - 5.0 g/dL    Globulin 3.7 (H) 2.3 - 3.5 g/dL    A-G Ratio 0.9 (L) 1.2 - 3.5     TROPONIN-HIGH SENSITIVITY    Collection Time: 08/25/21  2:24 AM   Result Value Ref Range    Troponin-High Sensitivity 453.5 (HH) 0 - 14 pg/mL   GLUCOSE, POC    Collection Time: 08/25/21  3:56 AM   Result Value Ref Range    Glucose (POC) 155 (H) 65 - 100 mg/dL    Performed by Sky        All Micro Results     Procedure Component Value Units Date/Time    COVID-19 RAPID TEST [[id number]]  (Abnormal) Collected: 08/23/21 2151    Order Status: Completed Specimen: Nasopharyngeal Updated: 08/23/21 2216     Specimen source Nasopharyngeal        COVID-19 rapid test Detected        Comment:      The specimen is POSITIVE for SARS-CoV-2, the novel coronavirus associated with COVID-19. This test has been authorized by the FDA under an Emergency Use Authorization (EUA) for use by authorized laboratories.         Fact sheet for Healthcare Providers: ConventionUpdate.co.nz  Fact sheet for Patients: ConventionUpdate.co.nz       Methodology: Isothermal Nucleic Acid Amplification Other Studies:  XR CHEST PA LAT    Result Date: 8/25/2021  TWO-VIEW CHEST: CLINICAL HISTORY: Shortness of breath, cough, and chest pain in a Covid-positive 64year-old. COMPARISON:  May 23, 2021. FINDINGS: PA and lateral chest images demonstrate no confluent pneumonic infiltrate or significant pleural fluid collection. There is minimal patchy infiltrate at both lung bases and in the left upper lobe. The heart remains mildly enlarged without evidence of congestive heart failure or pneumothorax. Moderate hiatal hernia is again noted. The bony thorax appears intact on these views. There are overlying radiopaque support devices. MINIMAL BIBASILAR AND LEFT UPPER LOBE PATCHY INFILTRATE IS NONSPECIFIC BUT CONSISTENT WITH COVID PNEUMONIA. CT HEAD WO CONT    Result Date: 8/25/2021  EXAMINATION: HEAD CT WITHOUT CONTRAST 8/25/2021 1:54 PM ACCESSION NUMBER: [id number] INDICATION: severe headache COMPARISON: Head CT 4/20/2021 TECHNIQUE: Multiple-row detector helical CT examination of the head without intravenous contrast. Radiation dose reduction techniques were used for this study:  Our CT scanners use one or all of the following: Automated exposure control, adjustment of the mA and/or kVp according to patient's size, iterative reconstruction. FINDINGS: Incidental cavum septum pellucidum et vergae. The ventricles are within normal limits for the mild degree of global brain parenchymal volume loss. There is no midline shift. The basilar cisterns are patent. There is no cerebellar tonsillar ectopia or herniation. Unchanged symmetric frontal lobe predominance to the global brain parenchymal volume loss. Unchanged punctate calcification in the right cerebellar hemisphere white matter. Unchanged chronic lacunar infarction in the anterior limb of the left internal capsule. There is no evidence of acute intracranial hemorrhage or extra-axial collections. There is no CT evidence of acute infarction.  Unchanged asymmetric prominence of the anterior left temporal extra-axial spaces, likely a small arachnoid cyst. Left sphenoid sinus mucosal retention cyst.     No acute intracranial abnormality.  VOICE DICTATED BY: Dr. Yesy Abrams       Current Meds:  Current Facility-Administered Medications   Medication Dose Route Frequency    albuterol-ipratropium (DUO-NEB) 2.5 MG-0.5 MG/3 ML  3 mL Nebulization Q4H PRN    albuterol (PROVENTIL HFA, VENTOLIN HFA, PROAIR HFA) inhaler 2 Puff  2 Puff Inhalation Q4H PRN    LORazepam (ATIVAN) tablet 0.5 mg  0.5 mg Oral Q12H PRN    benzonatate (TESSALON) capsule 100 mg  100 mg Oral TID PRN    sodium chloride (NS) flush 5-40 mL  5-40 mL IntraVENous Q8H    sodium chloride (NS) flush 5-40 mL  5-40 mL IntraVENous PRN    acetaminophen (TYLENOL) tablet 650 mg  650 mg Oral Q6H PRN    Or    acetaminophen (TYLENOL) suppository 650 mg  650 mg Rectal Q6H PRN    polyethylene glycol (MIRALAX) packet 17 g  17 g Oral DAILY PRN    promethazine (PHENERGAN) with saline injection 25 mg  25 mg IntraVENous Q4H PRN    ALPRAZolam (XANAX) tablet 1 mg  1 mg Oral QHS    carvediloL (COREG) tablet 25 mg  25 mg Oral BID WITH MEALS    gabapentin (NEURONTIN) capsule 300 mg  300 mg Oral BID    HYDROcodone-acetaminophen (NORCO)  mg tablet 1 Tablet  1 Tablet Oral Q6H PRN    magnesium oxide (MAG-OX) tablet 800 mg  800 mg Oral Q12H    dexAMETHasone (DECADRON) tablet 6 mg  6 mg Oral DAILY    sodium bicarbonate tablet 325 mg  325 mg Oral BID    aspirin delayed-release tablet 81 mg  81 mg Oral DAILY    morphine injection 2 mg  2 mg IntraVENous Q4H PRN    metoprolol (LOPRESSOR) injection 5 mg  5 mg IntraVENous Q4H PRN    atorvastatin (LIPITOR) tablet 80 mg  80 mg Oral QHS    enoxaparin (LOVENOX) injection 40 mg  40 mg SubCUTAneous Q24H    hydrALAZINE (APRESOLINE) tablet 25 mg  25 mg Oral QID    sodium chloride (NS) flush 5-10 mL  5-10 mL IntraVENous Q8H    sodium chloride (NS) flush 5-10 mL  5-10 mL IntraVENous PRN       Signed:  Dante Morrell MD    Part of this note may have been written by using a voice dictation software. The note has been proof read but may still contain some grammatical/other typographical errors.

## 2021-08-25 NOTE — PROGRESS NOTES
pt is having frequent diarrhea and not much urine output. Bladder scan showed 6ml in bladder. His fluids were stopped this morning. Dr. Anais Cavazos made aware via perfect serve. Instructed to continue to monitor for another 4 hours. Passed this on in report to oncoming RN.

## 2021-08-26 LAB
ALBUMIN SERPL-MCNC: 3.2 G/DL (ref 3.5–5)
ALBUMIN/GLOB SERPL: 0.9 {RATIO} (ref 1.2–3.5)
ALP SERPL-CCNC: 88 U/L (ref 50–136)
ALT SERPL-CCNC: 67 U/L (ref 12–65)
ANION GAP SERPL CALC-SCNC: 8 MMOL/L (ref 7–16)
AST SERPL-CCNC: 63 U/L (ref 15–37)
BASOPHILS # BLD: 0 K/UL (ref 0–0.2)
BASOPHILS NFR BLD: 0 % (ref 0–2)
BILIRUB SERPL-MCNC: 0.5 MG/DL (ref 0.2–1.1)
BUN SERPL-MCNC: 29 MG/DL (ref 6–23)
CALCIUM SERPL-MCNC: 8.2 MG/DL (ref 8.3–10.4)
CHLORIDE SERPL-SCNC: 104 MMOL/L (ref 98–107)
CO2 SERPL-SCNC: 21 MMOL/L (ref 21–32)
CREAT SERPL-MCNC: 1.16 MG/DL (ref 0.8–1.5)
CRP SERPL-MCNC: 0.6 MG/DL (ref 0–0.9)
D DIMER PPP FEU-MCNC: 0.56 UG/ML(FEU)
DIFFERENTIAL METHOD BLD: ABNORMAL
EOSINOPHIL # BLD: 0 K/UL (ref 0–0.8)
EOSINOPHIL NFR BLD: 0 % (ref 0.5–7.8)
ERYTHROCYTE [DISTWIDTH] IN BLOOD BY AUTOMATED COUNT: 13.1 % (ref 11.9–14.6)
GLOBULIN SER CALC-MCNC: 3.7 G/DL (ref 2.3–3.5)
GLUCOSE SERPL-MCNC: 94 MG/DL (ref 65–100)
HCT VFR BLD AUTO: 49.5 % (ref 41.1–50.3)
HGB BLD-MCNC: 16.4 G/DL (ref 13.6–17.2)
IMM GRANULOCYTES # BLD AUTO: 0.1 K/UL (ref 0–0.5)
IMM GRANULOCYTES NFR BLD AUTO: 1 % (ref 0–5)
LYMPHOCYTES # BLD: 1 K/UL (ref 0.5–4.6)
LYMPHOCYTES NFR BLD: 7 % (ref 13–44)
MAGNESIUM SERPL-MCNC: 3.1 MG/DL (ref 1.8–2.4)
MCH RBC QN AUTO: 30.9 PG (ref 26.1–32.9)
MCHC RBC AUTO-ENTMCNC: 33.1 G/DL (ref 31.4–35)
MCV RBC AUTO: 93.4 FL (ref 79.6–97.8)
MONOCYTES # BLD: 0.4 K/UL (ref 0.1–1.3)
MONOCYTES NFR BLD: 3 % (ref 4–12)
NEUTS SEG # BLD: 12.8 K/UL (ref 1.7–8.2)
NEUTS SEG NFR BLD: 90 % (ref 43–78)
NRBC # BLD: 0 K/UL (ref 0–0.2)
PLATELET # BLD AUTO: 117 K/UL (ref 150–450)
PMV BLD AUTO: 10.5 FL (ref 9.4–12.3)
POTASSIUM SERPL-SCNC: 3.6 MMOL/L (ref 3.5–5.1)
PROT SERPL-MCNC: 6.9 G/DL (ref 6.3–8.2)
RBC # BLD AUTO: 5.3 M/UL (ref 4.23–5.6)
SODIUM SERPL-SCNC: 133 MMOL/L (ref 138–145)
WBC # BLD AUTO: 14.3 K/UL (ref 4.3–11.1)

## 2021-08-26 PROCEDURE — 74011250637 HC RX REV CODE- 250/637: Performed by: NURSE PRACTITIONER

## 2021-08-26 PROCEDURE — 36415 COLL VENOUS BLD VENIPUNCTURE: CPT

## 2021-08-26 PROCEDURE — 85025 COMPLETE CBC W/AUTO DIFF WBC: CPT

## 2021-08-26 PROCEDURE — 74011250637 HC RX REV CODE- 250/637: Performed by: INTERNAL MEDICINE

## 2021-08-26 PROCEDURE — 85379 FIBRIN DEGRADATION QUANT: CPT

## 2021-08-26 PROCEDURE — 97530 THERAPEUTIC ACTIVITIES: CPT

## 2021-08-26 PROCEDURE — 97112 NEUROMUSCULAR REEDUCATION: CPT

## 2021-08-26 PROCEDURE — 74011250637 HC RX REV CODE- 250/637: Performed by: HOSPITALIST

## 2021-08-26 PROCEDURE — 74011250636 HC RX REV CODE- 250/636: Performed by: INTERNAL MEDICINE

## 2021-08-26 PROCEDURE — 65660000000 HC RM CCU STEPDOWN

## 2021-08-26 PROCEDURE — 97165 OT EVAL LOW COMPLEX 30 MIN: CPT

## 2021-08-26 PROCEDURE — 74011250636 HC RX REV CODE- 250/636: Performed by: NURSE PRACTITIONER

## 2021-08-26 PROCEDURE — 83735 ASSAY OF MAGNESIUM: CPT

## 2021-08-26 PROCEDURE — 80053 COMPREHEN METABOLIC PANEL: CPT

## 2021-08-26 PROCEDURE — 99232 SBSQ HOSP IP/OBS MODERATE 35: CPT | Performed by: INTERNAL MEDICINE

## 2021-08-26 PROCEDURE — 74011250636 HC RX REV CODE- 250/636: Performed by: EMERGENCY MEDICINE

## 2021-08-26 PROCEDURE — 74011000250 HC RX REV CODE- 250: Performed by: EMERGENCY MEDICINE

## 2021-08-26 PROCEDURE — 86140 C-REACTIVE PROTEIN: CPT

## 2021-08-26 RX ORDER — FAMOTIDINE 20 MG/1
20 TABLET, FILM COATED ORAL 2 TIMES DAILY
Status: DISCONTINUED | OUTPATIENT
Start: 2021-08-26 | End: 2021-08-29 | Stop reason: HOSPADM

## 2021-08-26 RX ADMIN — HYDRALAZINE HYDROCHLORIDE 25 MG: 25 TABLET, FILM COATED ORAL at 12:22

## 2021-08-26 RX ADMIN — FAMOTIDINE 20 MG: 20 TABLET, FILM COATED ORAL at 10:09

## 2021-08-26 RX ADMIN — Medication 10 ML: at 13:34

## 2021-08-26 RX ADMIN — MAGNESIUM GLUCONATE 500 MG ORAL TABLET 800 MG: 500 TABLET ORAL at 20:45

## 2021-08-26 RX ADMIN — GABAPENTIN 300 MG: 300 CAPSULE ORAL at 20:45

## 2021-08-26 RX ADMIN — SODIUM BICARBONATE 650 MG TABLET 325 MG: at 17:21

## 2021-08-26 RX ADMIN — MORPHINE SULFATE 2 MG: 2 INJECTION, SOLUTION INTRAMUSCULAR; INTRAVENOUS at 17:22

## 2021-08-26 RX ADMIN — DEXAMETHASONE 6 MG: 4 TABLET ORAL at 09:19

## 2021-08-26 RX ADMIN — SODIUM BICARBONATE 650 MG TABLET 325 MG: at 09:20

## 2021-08-26 RX ADMIN — MAGNESIUM GLUCONATE 500 MG ORAL TABLET 800 MG: 500 TABLET ORAL at 09:19

## 2021-08-26 RX ADMIN — CARVEDILOL 25 MG: 25 TABLET, FILM COATED ORAL at 17:21

## 2021-08-26 RX ADMIN — ATORVASTATIN CALCIUM 80 MG: 80 TABLET, FILM COATED ORAL at 20:45

## 2021-08-26 RX ADMIN — ENOXAPARIN SODIUM 40 MG: 40 INJECTION SUBCUTANEOUS at 10:09

## 2021-08-26 RX ADMIN — Medication 10 ML: at 20:51

## 2021-08-26 RX ADMIN — BENZONATATE 100 MG: 100 CAPSULE ORAL at 12:22

## 2021-08-26 RX ADMIN — GABAPENTIN 300 MG: 300 CAPSULE ORAL at 09:20

## 2021-08-26 RX ADMIN — CARVEDILOL 25 MG: 25 TABLET, FILM COATED ORAL at 09:20

## 2021-08-26 RX ADMIN — HYDROCODONE BITARTRATE AND ACETAMINOPHEN 1 TABLET: 10; 325 TABLET ORAL at 02:24

## 2021-08-26 RX ADMIN — HYDRALAZINE HYDROCHLORIDE 25 MG: 25 TABLET, FILM COATED ORAL at 20:45

## 2021-08-26 RX ADMIN — FAMOTIDINE 20 MG: 20 TABLET, FILM COATED ORAL at 17:21

## 2021-08-26 RX ADMIN — ALPRAZOLAM 1 MG: 0.5 TABLET ORAL at 20:45

## 2021-08-26 RX ADMIN — ASPIRIN 81 MG: 81 TABLET ORAL at 09:20

## 2021-08-26 RX ADMIN — Medication 10 ML: at 05:56

## 2021-08-26 RX ADMIN — MORPHINE SULFATE 2 MG: 2 INJECTION, SOLUTION INTRAMUSCULAR; INTRAVENOUS at 12:22

## 2021-08-26 RX ADMIN — PROMETHAZINE HYDROCHLORIDE 25 MG: 25 INJECTION INTRAMUSCULAR; INTRAVENOUS at 17:22

## 2021-08-26 RX ADMIN — HYDRALAZINE HYDROCHLORIDE 25 MG: 25 TABLET, FILM COATED ORAL at 17:22

## 2021-08-26 RX ADMIN — Medication 10 ML: at 05:55

## 2021-08-26 RX ADMIN — Medication 10 ML: at 20:46

## 2021-08-26 RX ADMIN — HYDRALAZINE HYDROCHLORIDE 25 MG: 25 TABLET, FILM COATED ORAL at 09:20

## 2021-08-26 RX ADMIN — HYDROCODONE BITARTRATE AND ACETAMINOPHEN 1 TABLET: 10; 325 TABLET ORAL at 10:09

## 2021-08-26 RX ADMIN — PROMETHAZINE HYDROCHLORIDE 25 MG: 25 INJECTION INTRAMUSCULAR; INTRAVENOUS at 10:09

## 2021-08-26 RX ADMIN — PROMETHAZINE HYDROCHLORIDE 25 MG: 25 INJECTION INTRAMUSCULAR; INTRAVENOUS at 02:24

## 2021-08-26 NOTE — PROGRESS NOTES
ACUTE PHYSICAL THERAPY GOALS:  (Developed with and agreed upon by patient and/or caregiver.)  1. Mr. Luisa Jordan will perform supine to sit and sit to supine independently in 7 days. 2.  Mr. Luisa Jordan will perform sit to stand and bed to chair independently in 7 days. 3.  Mr. Luisa Jordan will perform gait with least to no assistive device 150 ft independently in 7 days. PHYSICAL THERAPY: Daily Note and PM Treatment Day # 2    Gary Martins is a 64 y.o. male   PRIMARY DIAGNOSIS: YAO (acute kidney injury) ([facility]  YAO (acute kidney injury) ([facility] [N17.9]         ASSESSMENT:     REHAB RECOMMENDATIONS: CURRENT LEVEL OF FUNCTION:  (Most Recently Demonstrated)   Recommendation to date pending progress:  Setting:   No further skilled therapy   Equipment:    None Bed Mobility:   Independent  Sit to Stand:  Denita Cava Assistance  Transfers:   Standby Assistance  Gait/Mobility:   Standby Assistance     ASSESSMENT:  Mr. Luisa Jordan was reluctant to do anything but admits that he has been up moving around and really wants to go home. He is much steadier today than yesterday and with a lot better activity tolerance increasing gait distance to over 100 ft. No need for external support. A little shaky when he first got up but then steady. His O2 sats are 96 entire time. No need for further skilled PT .     SUBJECTIVE:   Mr. Luisa Jordan states, \"I want to go home. \"    SOCIAL HISTORY/ LIVING ENVIRONMENT:   Home Environment: Private residence  One/Two Story Residence: One story  Living Alone: No  Support Systems: Child(charlene), Family member(s), Spouse/Significant Other/Partner  OBJECTIVE:     PAIN: VITAL SIGNS: LINES/DRAINS:   Pre Treatment: Pain Screen  Pain Scale 1: Numeric (0 - 10)  Post Treatment: 0     O2 Device: None (Room air)     MOBILITY: I Mod I S SBA CGA Min Mod Max Total  NT x2 Comments:   Bed Mobility    Rolling [] [] [x] [] [] [] [] [] [] [] []    Supine to Sit [] [] [x] [] [] [] [] [] [] [] []    Scooting [] [] [x] [] [] [] [] [] [] [] [] Sit to Supine [] [] [] [] [] [] [] [] [] [x] []    Transfers    Sit to Stand [] [] [x] [] [] [] [] [] [] [] []    Bed to Chair [] [] [] [] [] [] [] [] [] [x] []    Stand to Sit [] [] [x] [] [] [] [] [] [] [] []    I=Independent, Mod I=Modified Independent, S=Supervision, SBA=Standby Assistance, CGA=Contact Guard Assistance,   Min=Minimal Assistance, Mod=Moderate Assistance, Max=Maximal Assistance, Total=Total Assistance, NT=Not Tested    BALANCE: Good Fair+ Fair Fair- Poor NT Comments   Sitting Static [x] [] [] [] [] []    Sitting Dynamic [x] [] [] [] [] []              Standing Static [] [x] [] [] [] []    Standing Dynamic [] [x] [] [] [] []      GAIT: I Mod I S SBA CGA Min Mod Max Total  NT x2 Comments:   Level of Assistance [] [] [] [x] [] [] [] [] [] [] []    Distance 120    DME None    Gait Quality slow    Weightbearing  Status N/A     I=Independent, Mod I=Modified Independent, S=Supervision, SBA=Standby Assistance, CGA=Contact Guard Assistance,   Min=Minimal Assistance, Mod=Moderate Assistance, Max=Maximal Assistance, Total=Total Assistance, NT=Not Tested    PLAN:   FREQUENCY/DURATION: PT Plan of Care: 3 times/week for duration of hospital stay or until stated goals are met, whichever comes first.  TREATMENT:     TREATMENT:   ($$ Therapeutic Activity: 8-22 mins    )  Therapeutic Activity (15 Minutes): Therapeutic activity included Supine to Sit, Sit to Supine, Transfer Training, Ambulation on level ground, Sitting balance  and Standing balance to improve functional Mobility, Strength and Activity tolerance.     TREATMENT GRID:  N/A    AFTER TREATMENT POSITION/PRECAUTIONS:  Bed and RN notified    INTERDISCIPLINARY COLLABORATION:  PT/PTA and OT/HERNANDEZ    TOTAL TREATMENT DURATION:  PT Patient Time In/Time Out  Time In: 1316  Time Out: 3131 [facility], PT

## 2021-08-26 NOTE — PROGRESS NOTES
Patient resting quietly watching TV at this time. No distress noted or needs verbalized at this time. Call light in reach.

## 2021-08-26 NOTE — PROGRESS NOTES
Hospitalist Progress Note   Admit Date:  2021  9:15 PM   Name:  Gary Martins   Age:  64 y.o. Sex:  male  :  1965   MRN:  [id number]     Presenting Complaint: No chief complaint on file. Reason(s) for Admission: YAO (acute kidney injury) [facility]) [N17.9]     Hospital Course & Interval History: This is a 63 yo male with PMH of HFrEF EF 20-25%, VFIB, s/p PPM, CAD, HTN, HLP admitted with COVID 19, YAO and elevated troponin. He is on room air. CXR shows patchy infiltrates. He is on course of decadron. Remdesivir contraindicated due to YAO. He has been seen by cardiology for rising troponin. They recommended stopping IV heparin and continued coreg. He has received gentle IVF for YAO, holding ARB. Discharge plans pending       Subjective (21): Patient reports feeling poorly for this morning. He has significant cough. Feels short of breath even with speaking. Denies any chest pain. No nausea no vomiting. No abdominal pain. Assessment & Plan:     Principal Problem:    YAO (acute kidney injury) ([facility] () with metabolic acidosis:  Prerenal POA  Acute kidney injury resolved. Creatinine is 1.1 this morning. Off of IV fluids. · Continue bicarb replacement with p.o. sodium bicarbonate. Active Problems:    Chronic systolic (congestive) heart failure ([address] (2011)  HTN (hypertension) (2011)  ·   Cardiology following   · Continued coreg, hydralazine   · ARB held due to YAO        Acute respiratory failure with hypoxia ([address] (2021)     Acute respiratory failure due to COVID-19 ([address] (2021)  ·   currently on room air   · D3/10 decadron  · D-dimer 0.5 expected with Covid. CRP 0.6. · Not remdesivir candidate as patient is not hypoxic and also acute kidney injury on admission.   · Not a candidate for ACTEMRA- on room air  · Has nebs  · Has tessalon         NSTEMI (non-ST elevated myocardial infarction) ([facility] (2021)  · appreciate cardiology  · Continued asa, lipitor     Anxiety:  · Continued xanax     Thrombocytopenia:  · Trend CBC       Hyponatremia:  · Trend BMP       Elevated LFTS:  · Trend lab    Change of speech: on 8/25 resolved  · STAT CT head- negative   · Unsure if he can have MRI brain with PPM, ordered to MRI and will need screening       LUE edema:  · Duplex ultrasound negative for DVT,       Dispo/Discharge Planning:      Pending to home likely tomorrow.  Home Oxygen screen in am.     Diet:  ADULT DIET Regular; 4 carb choices (60 gm/meal)  DVT PPx: lovneox  Code status: Full Code    Active Hospital Problems    Diagnosis Date Noted    Hyponatremia 08/24/2021    Acute respiratory failure with hypoxia (HCC) 08/24/2021    Acute respiratory failure due to COVID-19 [facility]) 08/24/2021    NSTEMI (non-ST elevated myocardial infarction) ([facility] 08/24/2021    [facility]AO (acute [facility]) ([facility] 08/23/2021    Hypokalemia 37/30/4950    Metabolic acidosis 34/63/8237    Non-ischemic cardiomyopathy ([facility] 03/24/2016    HTN (hypertension) 11/29/2011    Chronic systolic (congestive) heart failure ([facility] 04/21/2011       Objective:     Patient Vitals for the past 24 hrs:   Temp Pulse Resp BP SpO2   08/26/21 1128 97.8 °F (36.6 °C) 92 20 109/68 96 %   08/26/21 0750 97.9 °F (36.6 °C) 100 20 126/87 94 %   08/26/21 0400  86      08/26/21 0321 97.8 °F (36.6 °C) 86 20 (!) 129/93 97 %   08/25/21 2355  97      08/25/21 2253 97.8 °F (36.6 °C) 97 20 (!) 138/90 97 %   08/25/21 2000  95      08/25/21 1931 97.8 °F (36.6 °C) 95 19 116/85 98 %   08/25/21 1654 97.7 °F (36.5 °C) 96 18 124/86 95 %     Oxygen Therapy  O2 Sat (%): 96 % (08/26/21 1128)  Pulse via Oximetry: 94 beats per minute (08/25/21 0444)  O2 Device: None (Room air) (08/26/21 0631)  Skin Assessment: Clean, dry, & intact (08/24/21 1102)  O2 Flow Rate (L/min): 2 l/min (08/25/21 0739)    Estimated body mass index is 25.09 kg/m² as calculated from the following:    Height as of this encounter: 5' 11\" (1.803 m). Weight as of this encounter: 81.6 kg (179 lb 14.3 oz). Intake/Output Summary (Last 24 hours) at 8/26/2021 1405  Last data filed at 8/26/2021 0830  Gross per 24 hour   Intake 1460 ml   Output 0 ml   Net 1460 ml         Physical Exam:     General:    Well nourished. No overt distress, anxious appearing, significant cough and short of breath with coughing,  Head:  Normocephalic, atraumatic  Eyes:  Sclerae appear normal.  Pupils equally round. ENT:  Nares appear normal, no drainage. Moist oral mucosa  Neck:  No restricted ROM. Trachea midline   CV:   RRR. No m/r/g. No jugular venous distension. Lungs:   Some upper airway wheezing, mild expiratory wheezing   Abdomen: Bowel sounds present. Soft, nontender, nondistended. Extremities: No cyanosis or clubbing. No edema  Skin:     No rashes and normal coloration. Warm and dry. Neuro:   grossly intact. 5/5 extremity strength, intermittent speech stuttering   Psych:  anxious    I have reviewed ordered lab tests and independently visualized imaging below:    Last 24hr Labs:  Recent Results (from the past 24 hour(s))   CBC WITH AUTOMATED DIFF    Collection Time: 08/26/21  6:13 AM   Result Value Ref Range    WBC 14.3 (H) 4.3 - 11.1 K/uL    RBC 5.30 4.23 - 5.6 M/uL    HGB 16.4 13.6 - 17.2 g/dL    HCT 49.5 41.1 - 50.3 %    MCV 93.4 79.6 - 97.8 FL    MCH 30.9 26.1 - 32.9 PG    MCHC 33.1 31.4 - 35.0 g/dL    RDW 13.1 11.9 - 14.6 %    PLATELET 746 (L) 862 - 450 K/uL    MPV 10.5 9.4 - 12.3 FL    ABSOLUTE NRBC 0.00 0.0 - 0.2 K/uL    DF AUTOMATED      NEUTROPHILS 90 (H) 43 - 78 %    LYMPHOCYTES 7 (L) 13 - 44 %    MONOCYTES 3 (L) 4.0 - 12.0 %    EOSINOPHILS 0 (L) 0.5 - 7.8 %    BASOPHILS 0 0.0 - 2.0 %    IMMATURE GRANULOCYTES 1 0.0 - 5.0 %    ABS. NEUTROPHILS 12.8 (H) 1.7 - 8.2 K/UL    ABS. LYMPHOCYTES 1.0 0.5 - 4.6 K/UL    ABS. MONOCYTES 0.4 0.1 - 1.3 K/UL    ABS. EOSINOPHILS 0.0 0.0 - 0.8 K/UL    ABS.  BASOPHILS 0.0 0.0 - 0.2 K/UL ABS. IMM. GRANS. 0.1 0.0 - 0.5 K/UL   METABOLIC PANEL, COMPREHENSIVE    Collection Time: 08/26/21  6:13 AM   Result Value Ref Range    Sodium 133 (L) 138 - 145 mmol/L    Potassium 3.6 3.5 - 5.1 mmol/L    Chloride 104 98 - 107 mmol/L    CO2 21 21 - 32 mmol/L    Anion gap 8 7 - 16 mmol/L    Glucose 94 65 - 100 mg/dL    BUN 29 (H) 6 - 23 MG/DL    Creatinine 1.16 0.8 - 1.5 MG/DL    GFR est AA >60 >60 ml/min/1.73m2    GFR est non-AA >60 >60 ml/min/1.73m2    Calcium 8.2 (L) 8.3 - 10.4 MG/DL    Bilirubin, total 0.5 0.2 - 1.1 MG/DL    ALT (SGPT) 67 (H) 12 - 65 U/L    AST (SGOT) 63 (H) 15 - 37 U/L    Alk. phosphatase 88 50 - 136 U/L    Protein, total 6.9 6.3 - 8.2 g/dL    Albumin 3.2 (L) 3.5 - 5.0 g/dL    Globulin 3.7 (H) 2.3 - 3.5 g/dL    A-G Ratio 0.9 (L) 1.2 - 3.5     MAGNESIUM    Collection Time: 08/26/21  6:13 AM   Result Value Ref Range    Magnesium 3.1 (H) 1.8 - 2.4 mg/dL   C REACTIVE PROTEIN, QT    Collection Time: 08/26/21  9:33 AM   Result Value Ref Range    C-Reactive protein 0.6 0.0 - 0.9 mg/dL   D DIMER    Collection Time: 08/26/21  9:33 AM   Result Value Ref Range    D DIMER 0.56 (H) <0.56 ug/ml(FEU)       All Micro Results     Procedure Component Value Units Date/Time    COVID-19 RAPID TEST [[id number]]  (Abnormal) Collected: 08/23/21 2151    Order Status: Completed Specimen: Nasopharyngeal Updated: 08/23/21 2216     Specimen source Nasopharyngeal        COVID-19 rapid test Detected        Comment:      The specimen is POSITIVE for SARS-CoV-2, the novel coronavirus associated with COVID-19. This test has been authorized by the FDA under an Emergency Use Authorization (EUA) for use by authorized laboratories.         Fact sheet for Healthcare Providers: ConventionUpdate.co.nz  Fact sheet for Patients: ConventionUpdate.co.nz       Methodology: Isothermal Nucleic Acid Amplification               Other Studies:  DUPLEX UPPER EXT VENOUS LEFT    Result Date: 8/25/2021  HISTORY: Left upper extremity edema Exam: Left upper extremity ultrasound Technique: Realtime grayscale and color Doppler imaging is performed in the longitudinal and transverse planes. FINDINGS: There is normal flow, augmentation, and compressibility, where possible, within the deep venous structures of the left upper extremity. IMPRESSIONS: No evidence of deep venous thrombosis within the left upper extremity.        Current Meds:  Current Facility-Administered Medications   Medication Dose Route Frequency    famotidine (PEPCID) tablet 20 mg  20 mg Oral BID    albuterol-ipratropium (DUO-NEB) 2.5 MG-0.5 MG/3 ML  3 mL Nebulization Q4H PRN    albuterol (PROVENTIL HFA, VENTOLIN HFA, PROAIR HFA) inhaler 2 Puff  2 Puff Inhalation Q4H PRN    LORazepam (ATIVAN) tablet 0.5 mg  0.5 mg Oral Q12H PRN    benzonatate (TESSALON) capsule 100 mg  100 mg Oral TID PRN    sodium chloride (NS) flush 5-40 mL  5-40 mL IntraVENous Q8H    sodium chloride (NS) flush 5-40 mL  5-40 mL IntraVENous PRN    acetaminophen (TYLENOL) tablet 650 mg  650 mg Oral Q6H PRN    Or    acetaminophen (TYLENOL) suppository 650 mg  650 mg Rectal Q6H PRN    polyethylene glycol (MIRALAX) packet 17 g  17 g Oral DAILY PRN    promethazine (PHENERGAN) with saline injection 25 mg  25 mg IntraVENous Q4H PRN    ALPRAZolam (XANAX) tablet 1 mg  1 mg Oral QHS    carvediloL (COREG) tablet 25 mg  25 mg Oral BID WITH MEALS    gabapentin (NEURONTIN) capsule 300 mg  300 mg Oral BID    HYDROcodone-acetaminophen (NORCO)  mg tablet 1 Tablet  1 Tablet Oral Q6H PRN    magnesium oxide (MAG-OX) tablet 800 mg  800 mg Oral Q12H    dexAMETHasone (DECADRON) tablet 6 mg  6 mg Oral DAILY    sodium bicarbonate tablet 325 mg  325 mg Oral BID    aspirin delayed-release tablet 81 mg  81 mg Oral DAILY    morphine injection 2 mg  2 mg IntraVENous Q4H PRN    metoprolol (LOPRESSOR) injection 5 mg  5 mg IntraVENous Q4H PRN    atorvastatin (LIPITOR) tablet 80 mg 80 mg Oral QHS    enoxaparin (LOVENOX) injection 40 mg  40 mg SubCUTAneous Q24H    hydrALAZINE (APRESOLINE) tablet 25 mg  25 mg Oral QID    sodium chloride (NS) flush 5-10 mL  5-10 mL IntraVENous Q8H    sodium chloride (NS) flush 5-10 mL  5-10 mL IntraVENous PRN       Signed:  Bria Hay MD    Part of this note may have been written by using a voice dictation software. The note has been proof read but may still contain some grammatical/other typographical errors.

## 2021-08-26 NOTE — PROGRESS NOTES
ACUTE OT GOALS:  (Developed with and agreed upon by patient and/or caregiver.)  1. Patient will tolerate functional mobility for household distances while maintaining Sp02 > 90% with self-incorporated rest breaks as needed. 2. Patient will complete functional mobility for household distances with SBA and adaptive equipment as needed. 3. Patient will complete functional transfers with SBA and adaptive equipment as needed. OCCUPATIONAL THERAPY ASSESSMENT: Initial Assessment, Daily Note and Discharge OT Treatment Day # 1    Chalino Mcfarland is a 64 y.o. male   PRIMARY DIAGNOSIS: YAO (acute kidney injury) ([facility]  YAO (acute kidney injury) ([facility] [N17.9]       Reason for Referral:   ICD-10: Treatment Diagnosis: Generalized Muscle Weakness (M62.81)  INPATIENT: Payor: BLUE CROSS / Plan[place] FED[place] / Product Type: PPO /   ASSESSMENT:     REHAB RECOMMENDATIONS:   Recommendation to date pending progress:  Setting:   No further skilled therapy   Equipment:    None     PRIOR LEVEL OF FUNCTION:  (Prior to Hospitalization)  INITIAL/CURRENT LEVEL OF FUNCTION:  (Based on today's evaluation)   Bathing:   Independent  Dressing:   Independent  Feeding/Grooming:   Independent  Toileting:   Independent  Functional Mobility:   Independent Bathing:   Set Up  Dressing:   Set Up  Feeding/Grooming:   Independent  Toileting:   Independent  Functional Mobility:   Standby Assistance     ASSESSMENT:  Mr. Qasim Tamayo presents with deficits in overall strength, activity tolerance, ADL performance and functional mobility. Admitted for acute respiratory failure d/t Covid-19; resting on RA with sats at 97% at rest. Needs encouragement to participate in therapy but willing to get up and move around room (adamently declined sitting upright in chair). SBA for fx transfers and mobility around room. Sp02 maintaining > 95% during mobility. Has been managing ADLs independently. No further OT needs.  Will discharge patient from therapy services as patient is at/close to baseline. SUBJECTIVE:   Mr. Qasim Tamayo states, \"I don't want to sit up in the chair. \"    SOCIAL HISTORY/LIVING ENVIRONMENT: Lives with wife.    Home Environment: Private residence  One/Two Story Residence: One story  Living Alone: No  Support Systems: Child(charlene), Family member(s), Spouse/Significant Other/Partner    OBJECTIVE:     PAIN: VITAL SIGNS: LINES/DRAINS:   Pre Treatment: Pain Screen  Pain Scale 1: Numeric (0 - 10)  Pain Intensity 1: 0  Post Treatment: 0   none  O2 Device: None (Room air)     GROSS EVALUATION:  BUEs Within Functional Limits Abnormal/ Functional Abnormal/ Non-Functional (see comments) Not Tested Comments:   AROM [x] [] [] []    PROM [x] [] [] []    Strength [x] [] [] []    Balance [] [x] [] []    Posture [x] [] [] []    Sensation [] [] [] []    Coordination [] [] [] []    Tone [] [] [] []    Edema [] [] [] []    Activity Tolerance [] [x] [] [] SOB with activity however Sp02 stable.     [] [] [] []      COGNITION/  PERCEPTION: Intact Impaired   (see comments) Comments:   Orientation [x] []    Vision [x] []    Hearing [x] []    Judgment/ Insight [] []    Attention [x] []    Memory [x] []    Command Following [x] []    Emotional Regulation [x] []     [] []      ACTIVITIES OF DAILY LIVING: I Mod I S SBA CGA Min Mod Max Total NT Comments   BASIC ADLs:              Bathing/ Showering [] [] [] [] [] [] [] [] [] [x]    Toileting [] [] [] [] [] [] [] [] [] [x]    Dressing [] [] [] [] [] [] [] [] [] [x]    Feeding [] [] [] [] [] [] [] [] [] [x]    Grooming [] [] [] [] [] [] [] [] [] [x]    Personal Device Care [] [] [] [] [] [] [] [] [] [x]    Functional Mobility [] [] [] [x] [] [] [] [] [] []    I=Independent, Mod I=Modified Independent, S=Supervision, SBA=Standby Assistance, CGA=Contact Guard Assistance,   Min=Minimal Assistance, Mod=Moderate Assistance, Max=Maximal Assistance, Total=Total Assistance, NT=Not Tested    MOBILITY: I Mod I S SBA CGA Min Mod Max Total  NT x2 Comments:   Supine to sit [] [] [] [x] [] [] [] [] [] [] []    Sit to supine [] [] [] [x] [] [] [] [] [] [] []    Sit to stand [] [] [] [x] [] [] [] [] [] [] []    Bed to chair [] [] [] [x] [] [] [] [] [] [] []    I=Independent, Mod I=Modified Independent, S=Supervision, SBA=Standby Assistance, CGA=Contact Guard Assistance,   Min=Minimal Assistance, Mod=Moderate Assistance, Max=Maximal Assistance, Total=Total Assistance, NT=Not Tested    [address] [place] [address] [facility] Chavo Mccloud [facility]   Daily Activity Inpatient Short Form        How much help from another person does the patient currently need. .. Total A Lot A Little None   1. Putting on and taking off regular lower body clothing? [] 1   [] 2   [x] 3   [] 4   2. Bathing (including washing, rinsing, drying)? [] 1   [] 2   [x] 3   [] 4   3. Toileting, which includes using toilet, bedpan or urinal?   [] 1   [] 2   [] 3   [x] 4   4. Putting on and taking off regular upper body clothing? [] 1   [] 2   [] 3   [x] 4   5. Taking care of personal grooming such as brushing teeth? [] 1   [] 2   [] 3   [x] 4   6. Eating meals? [] 1   [] 2   [] 3   [x] 4   © 2007, Trustees of [address] [place] [id number], under license to [employer]. All rights reserved     Score:  Initial: 22 Most Recent: X (Date: -- )   Interpretation of Tool:  Represents activities that are increasingly more difficult (i.e. Bed mobility, Transfers, Gait). PLAN:   FREQUENCY/DURATION:   for duration of hospital stay or until stated goals are met, whichever comes first.    PROBLEM LIST:   (Skilled intervention is medically necessary to address:)  1. discharge   INTERVENTIONS PLANNED:   (Benefits and precautions of occupational therapy have been discussed with the patient.)  1.  discharge     TREATMENT:     EVALUATION: Low Complexity : (Untimed Charge)    TREATMENT:   ( $$ Neuromuscular Re-Education: 8-22 mins   )  Neuromuscular Re-education (12 Minutes): Neuromuscular Re-education included Balance Training, Coordination training, Functional mobility with facilitation, Postural training, Sitting balance training and Standing balance training to improve Balance, Coordination and Postural Control.     TREATMENT GRID:  N/A    AFTER TREATMENT POSITION/PRECAUTIONS:  Bed, Needs within reach and RN notified    INTERDISCIPLINARY COLLABORATION:  RN/PCT, PT/PTA and OT/HERNANDEZ    TOTAL TREATMENT DURATION:  OT Patient Time In/Time Out  Time In: 3386  Time Out: [address], OT

## 2021-08-26 NOTE — PROGRESS NOTES
[facility] CARDIOLOGY PROGRESS NOTE           8/26/2021 5:57 PM    Admit Date: 8/23/2021         Subjective: Cough and wheezing today    ROS:  Cardiovascular:  As noted above    Objective:      Vitals:    08/26/21 1128 08/26/21 1200 08/26/21 1528 08/26/21 1600   BP: 109/68  118/87    Pulse: 92 78 97 89   Resp: 20  20    Temp: 97.8 °F (36.6 °C)  97.9 °F (36.6 °C)    SpO2: 96%  96%    Weight:       Height:             Physical Exam:  General: Well Developed, Well Nourished, No Acute Distress, Alert & Oriented x 3, Appropriate mood  Neck: supple, no JVD  Heart: S1S2 with RRR without murmurs or gallops  Lungs: wheezing  Abd: soft, nontender, nondistended, with good bowel sounds  Ext: no edema bilaterally  Skin: warm and dry      Data Review:   Recent Labs     08/26/21  0613 08/25/21  0224 08/24/21  0933 08/24/21  0315   * 131*   < > 135*   K 3.6 4.5   < > 2.8*   MG 3.1*  --   --  1.9   BUN 29* 30*   < > 30*   CREA 1.16 1.29   < > 2.03*   GLU 94 124*   < > 106*   WBC 14.3* 7.4   < > 7.6   HGB 16.4 16.2   < > 17.7*   HCT 49.5 48.5   < > 53.3*   * 127*   < > 160    < > = values in this interval not displayed. No results for input(s): Isael Carrillo in the last 72 hours. Assessment/Plan:     Principal Problem:    YAO (acute kidney injury) ([facility] [address] (8/23/2021)    Active Problems:    Chronic systolic (congestive) heart failure ([facility] (4/21/2011)      HTN (hypertension) (11/29/2011)      Non-ischemic cardiomyopathy ([facility] (5/58/5113)      Metabolic acidosis (5/8/9991)      Hypokalemia (7/3/2020)      Hyponatremia (8/24/2021)      Acute respiratory failure with hypoxia ([facility] (8/24/2021)      Acute respiratory failure due to COVID-19 [facility]) (8/24/2021)      [facility]non-ST elevated [facility] (8/24/2021)    A/P  1) Resp failure -likely related to Covid pneumonia. Continue current meds.   2) schf -I believe he is euvolemic on exam continue Coreg hydralazine  3) elevated troponin -he is not having an acute coronary syndrome. We will stop checking serial troponin markers. This is likely related to systolic heart failure and Covid infection. I do not recommend heparin drip.       Sanford Hsieh MD  8/26/2021 5:57 PM

## 2021-08-26 NOTE — PROGRESS NOTES
VSS. No signs of distress. C/o pain, nausea and cough that was treated with ordered medications. Reports poor appetite. Active BS noted. Pt now resting quietly in bed. Bed low and locked with call bell within reach. Preparing report for oncoming nurse.

## 2021-08-27 PROCEDURE — 74011250636 HC RX REV CODE- 250/636: Performed by: INTERNAL MEDICINE

## 2021-08-27 PROCEDURE — 74011250637 HC RX REV CODE- 250/637: Performed by: INTERNAL MEDICINE

## 2021-08-27 PROCEDURE — 94760 N-INVAS EAR/PLS OXIMETRY 1: CPT

## 2021-08-27 PROCEDURE — 94640 AIRWAY INHALATION TREATMENT: CPT

## 2021-08-27 PROCEDURE — 74011250637 HC RX REV CODE- 250/637: Performed by: HOSPITALIST

## 2021-08-27 PROCEDURE — 99232 SBSQ HOSP IP/OBS MODERATE 35: CPT | Performed by: INTERNAL MEDICINE

## 2021-08-27 PROCEDURE — 74011250637 HC RX REV CODE- 250/637: Performed by: FAMILY MEDICINE

## 2021-08-27 PROCEDURE — 74011250636 HC RX REV CODE- 250/636: Performed by: FAMILY MEDICINE

## 2021-08-27 PROCEDURE — 74011000250 HC RX REV CODE- 250: Performed by: EMERGENCY MEDICINE

## 2021-08-27 PROCEDURE — 65660000000 HC RM CCU STEPDOWN

## 2021-08-27 PROCEDURE — 74011250636 HC RX REV CODE- 250/636: Performed by: EMERGENCY MEDICINE

## 2021-08-27 PROCEDURE — 74011250637 HC RX REV CODE- 250/637: Performed by: NURSE PRACTITIONER

## 2021-08-27 PROCEDURE — 74011250636 HC RX REV CODE- 250/636: Performed by: NURSE PRACTITIONER

## 2021-08-27 RX ORDER — HYDROCODONE BITARTRATE AND HOMATROPINE METHYLBROMIDE 1.5; 5 MG/5ML; MG/5ML
5 SYRUP ORAL
Status: DISCONTINUED | OUTPATIENT
Start: 2021-08-27 | End: 2021-08-29 | Stop reason: HOSPADM

## 2021-08-27 RX ORDER — DEXAMETHASONE 4 MG/1
6 TABLET ORAL EVERY 12 HOURS
Status: DISCONTINUED | OUTPATIENT
Start: 2021-08-27 | End: 2021-08-29 | Stop reason: HOSPADM

## 2021-08-27 RX ORDER — ALBUTEROL SULFATE 90 UG/1
2 AEROSOL, METERED RESPIRATORY (INHALATION)
Status: DISCONTINUED | OUTPATIENT
Start: 2021-08-27 | End: 2021-08-29

## 2021-08-27 RX ADMIN — HYDROCODONE BITARTRATE AND ACETAMINOPHEN 1 TABLET: 10; 325 TABLET ORAL at 16:00

## 2021-08-27 RX ADMIN — PROMETHAZINE HYDROCHLORIDE 25 MG: 25 INJECTION INTRAMUSCULAR; INTRAVENOUS at 01:24

## 2021-08-27 RX ADMIN — ASPIRIN 81 MG: 81 TABLET ORAL at 08:41

## 2021-08-27 RX ADMIN — HYDROCODONE BITARTRATE AND ACETAMINOPHEN 1 TABLET: 10; 325 TABLET ORAL at 08:40

## 2021-08-27 RX ADMIN — Medication 10 ML: at 13:25

## 2021-08-27 RX ADMIN — ALBUTEROL SULFATE 2 PUFF: 90 AEROSOL, METERED RESPIRATORY (INHALATION) at 20:35

## 2021-08-27 RX ADMIN — GABAPENTIN 300 MG: 300 CAPSULE ORAL at 21:10

## 2021-08-27 RX ADMIN — Medication 10 ML: at 05:05

## 2021-08-27 RX ADMIN — HYDRALAZINE HYDROCHLORIDE 25 MG: 25 TABLET, FILM COATED ORAL at 17:10

## 2021-08-27 RX ADMIN — FAMOTIDINE 20 MG: 20 TABLET, FILM COATED ORAL at 08:41

## 2021-08-27 RX ADMIN — CARVEDILOL 25 MG: 25 TABLET, FILM COATED ORAL at 17:00

## 2021-08-27 RX ADMIN — HYDRALAZINE HYDROCHLORIDE 25 MG: 25 TABLET, FILM COATED ORAL at 08:40

## 2021-08-27 RX ADMIN — DEXAMETHASONE 6 MG: 4 TABLET ORAL at 21:10

## 2021-08-27 RX ADMIN — PROMETHAZINE HYDROCHLORIDE 25 MG: 25 INJECTION INTRAMUSCULAR; INTRAVENOUS at 21:27

## 2021-08-27 RX ADMIN — MORPHINE SULFATE 2 MG: 2 INJECTION, SOLUTION INTRAMUSCULAR; INTRAVENOUS at 10:39

## 2021-08-27 RX ADMIN — ALPRAZOLAM 1 MG: 0.5 TABLET ORAL at 21:10

## 2021-08-27 RX ADMIN — MORPHINE SULFATE 2 MG: 2 INJECTION, SOLUTION INTRAMUSCULAR; INTRAVENOUS at 22:20

## 2021-08-27 RX ADMIN — PROMETHAZINE HYDROCHLORIDE 25 MG: 25 INJECTION INTRAMUSCULAR; INTRAVENOUS at 09:05

## 2021-08-27 RX ADMIN — MORPHINE SULFATE 2 MG: 2 INJECTION, SOLUTION INTRAMUSCULAR; INTRAVENOUS at 18:20

## 2021-08-27 RX ADMIN — CARVEDILOL 25 MG: 25 TABLET, FILM COATED ORAL at 08:40

## 2021-08-27 RX ADMIN — DEXAMETHASONE 6 MG: 4 TABLET ORAL at 08:41

## 2021-08-27 RX ADMIN — PROMETHAZINE HYDROCHLORIDE 25 MG: 25 INJECTION INTRAMUSCULAR; INTRAVENOUS at 15:59

## 2021-08-27 RX ADMIN — ENOXAPARIN SODIUM 40 MG: 40 INJECTION SUBCUTANEOUS at 08:39

## 2021-08-27 RX ADMIN — FAMOTIDINE 20 MG: 20 TABLET, FILM COATED ORAL at 17:10

## 2021-08-27 RX ADMIN — GABAPENTIN 300 MG: 300 CAPSULE ORAL at 08:41

## 2021-08-27 RX ADMIN — HYDRALAZINE HYDROCHLORIDE 25 MG: 25 TABLET, FILM COATED ORAL at 21:10

## 2021-08-27 RX ADMIN — HYDROCODONE BITARTRATE AND ACETAMINOPHEN 1 TABLET: 10; 325 TABLET ORAL at 01:24

## 2021-08-27 RX ADMIN — ATORVASTATIN CALCIUM 80 MG: 80 TABLET, FILM COATED ORAL at 21:10

## 2021-08-27 RX ADMIN — Medication 5 ML: at 21:09

## 2021-08-27 RX ADMIN — Medication 10 ML: at 05:04

## 2021-08-27 RX ADMIN — Medication 5 ML: at 21:12

## 2021-08-27 RX ADMIN — HYDRALAZINE HYDROCHLORIDE 25 MG: 25 TABLET, FILM COATED ORAL at 13:05

## 2021-08-27 NOTE — PROGRESS NOTES
[facility] CARDIOLOGY PROGRESS NOTE    8/27/2021 9:30 AM    Admit Date: 8/23/2021        Subjective:   Stable overnight without angina, CHF, or palpitations but still diffusely wheezing. On steroids and DuoNeb. Volume status stable clinically today in sinus tachycardia improving with stable blood pressure and clinically euvolemic on exam today. Vitals stable and controlled. No other complaints overnight. Tolerating meds well. Objective:      Vitals:    08/27/21 0000 08/27/21 0316 08/27/21 0400 08/27/21 0741   BP:  110/78  112/79   Pulse: 98 87 87 92   Resp:  20  20   Temp:  98.3 °F (36.8 °C)  98.2 °F (36.8 °C)   SpO2:  96%  94%   Weight:       Height:           Physical Exam:  Neck- supple, no JVD  CV- regular rate and rhythm no MRG  Lung-coarse diffusely with expiratory and inspiratory wheezing diffusely  Abd- soft, nontender, nondistended  Ext- no edema  Skin- warm and dry    Data Review:   Recent Labs     08/26/21  0613 08/25/21  0224   * 131*   K 3.6 4.5   MG 3.1*  --    BUN 29* 30*   CREA 1.16 1.29   GLU 94 124*   WBC 14.3* 7.4   HGB 16.4 16.2   HCT 49.5 48.5   * 127*       Assessment and Plan:     Acute respiratory failure due to COVID -19 -- management per primary.  On dexamethasone and DuoNeb. Continue medications and supportive care. Recheck BMP, CBC, magnesium in the morning.        YAO (acute kidney injury) -- improved slightly overnight        Elevated troponin-due to demand ischemia -- in the setting of COVID+, YAO, and tachycardia.  Stopped heparin drip,  lovenox for DVT Prophylaxis.  Continue PO coreg.    Normal coronaries in the past with a severe nonischemic cardiomyopathy. No angina. Treat conservatively and maximize heart failure regimen as tolerated.       Chronic systolic (congestive) heart failure --  Hypovolemic on admission with YAO, gentle IV fluids administered on admission. Continue BB, holding ARB with YAO.  Monitor volume status closely.     Recheck BMP in the morning.       HTN (hypertension) -- stable, continue current meds.         Non-ischemic cardiomyopathy        Metabolic acidosis        Hypokalemia -- replacement ordered -recheck daily.       Hyponatremia -- improving        Acute respiratory failure due to COVID-19 [facility]) (8/24/2021)     Continue current cardiac medication without change. Treat underlying pulmonary illness with Covid. We will follow with you. Recheck labs in the morning.     Amalia Hudson MD  [facility] Cardiology  Pager [phone]

## 2021-08-27 NOTE — PROGRESS NOTES
MSN, CM:  Patient not feeling well today. MD increased steroids and albuterol. Patient may discharge home tomorrow with no needs. Patient currently on RA. Case Management will continue to follow.

## 2021-08-27 NOTE — PROGRESS NOTES
SBAR from [facility]. Patient in stable condition with resps even/unlabored. NAD noted. Patient on room air. Safety measures noted. Will continue to monitor per policy.

## 2021-08-27 NOTE — PROGRESS NOTES
Progress Note    Patient: Chalino Mcfarland MRN: [id number]  SSN: [id number]    YOB: 1965  Age: 64 y.o. Sex: male      Admit Date: 8/23/2021    LOS: 4 days     Subjective:   F/U COVID, YAO, NSTEMI    \"65 yo male with PMH of HFrEF EF 20-25%, VFIB, s/p PPM, CAD, HTN, HLP admitted with COVID 19, YAO and elevated troponin. \" Hx of migraines, anxiety. Troponin as high as 453.5. Diagnosed with NSTEMI likely related to his sCHF and COVID infection. Cardiology recommended conservative management. Will need to follow up with cardiology outpatient. \"He is on room air. CXR shows patchy infiltrates. He is on course of decadron. Remdesivir contraindicated due to YAO. \" IV fluids given for his YAO which has resolved. Creatine was 1.7 but now 1.1. Diagnosed with COVID on 8/23    Edema noted to let upper extremity and no DVT seen. On RA but with significant cough and wheezing. Does stated his SOB has improved compared to yesterday. Weakness. Not eating or drinking well due to nausea.    Current Facility-Administered Medications   Medication Dose Route Frequency    famotidine (PEPCID) tablet 20 mg  20 mg Oral BID    albuterol-ipratropium (DUO-NEB) 2.5 MG-0.5 MG/3 ML  3 mL Nebulization Q4H PRN    albuterol (PROVENTIL HFA, VENTOLIN HFA, PROAIR HFA) inhaler 2 Puff  2 Puff Inhalation Q4H PRN    LORazepam (ATIVAN) tablet 0.5 mg  0.5 mg Oral Q12H PRN    benzonatate (TESSALON) capsule 100 mg  100 mg Oral TID PRN    sodium chloride (NS) flush 5-40 mL  5-40 mL IntraVENous Q8H    sodium chloride (NS) flush 5-40 mL  5-40 mL IntraVENous PRN    acetaminophen (TYLENOL) tablet 650 mg  650 mg Oral Q6H PRN    Or    acetaminophen (TYLENOL) suppository 650 mg  650 mg Rectal Q6H PRN    polyethylene glycol (MIRALAX) packet 17 g  17 g Oral DAILY PRN    promethazine (PHENERGAN) with saline injection 25 mg  25 mg IntraVENous Q4H PRN    ALPRAZolam (XANAX) tablet 1 mg  1 mg Oral QHS    carvediloL (COREG) tablet 25 mg  25 mg Oral BID WITH MEALS    gabapentin (NEURONTIN) capsule 300 mg  300 mg Oral BID    HYDROcodone-acetaminophen (NORCO)  mg tablet 1 Tablet  1 Tablet Oral Q6H PRN    magnesium oxide (MAG-OX) tablet 800 mg  800 mg Oral Q12H    dexAMETHasone (DECADRON) tablet 6 mg  6 mg Oral DAILY    sodium bicarbonate tablet 325 mg  325 mg Oral BID    aspirin delayed-release tablet 81 mg  81 mg Oral DAILY    morphine injection 2 mg  2 mg IntraVENous Q4H PRN    metoprolol (LOPRESSOR) injection 5 mg  5 mg IntraVENous Q4H PRN    atorvastatin (LIPITOR) tablet 80 mg  80 mg Oral QHS    enoxaparin (LOVENOX) injection 40 mg  40 mg SubCUTAneous Q24H    hydrALAZINE (APRESOLINE) tablet 25 mg  25 mg Oral QID    sodium chloride (NS) flush 5-10 mL  5-10 mL IntraVENous Q8H    sodium chloride (NS) flush 5-10 mL  5-10 mL IntraVENous PRN       Objective:     Patient Vitals for the past 24 hrs:   Temp Pulse Resp BP SpO2   08/27/21 0741 98.2 °F (36.8 °C) 92 20 112/79 94 %   08/27/21 0400  87      08/27/21 0316 98.3 °F (36.8 °C) 87 20 110/78 96 %   08/27/21 0000  98      08/26/21 2231 98.6 °F (37 °C) 98 20 98/66 96 %   08/26/21 2000  88      08/26/21 1929 98 °F (36.7 °C) 88 21 118/83 98 %   08/26/21 1600  89      08/26/21 1528 97.9 °F (36.6 °C) 97 20 118/87 96 %   08/26/21 1200  78      08/26/21 1128 97.8 °F (36.6 °C) 92 20 109/68 96 %         Intake and Output:  Current Shift: No intake/output data recorded. Last three shifts: 08/25 1901 - 08/27 0700  In: 1560 [P.O.:1560]  Out: -     Physical Exam:   General:  Alert, cooperative, constantly coughing and wheezing noted. Mild increase work of breathing noted. Eyes:  Conjunctivae/corneas clear. Ears:  Normal TMs and external ear canals both ears. Nose: Nares normal. Septum midline. Mouth/Throat: Dry tongue. Neck: no JVD. Back:   deferred. Lungs:   Diffuse wheezing with poor airway movement.     Heart:  Regular rate and rhythm, S1, S2 normal   Abdomen: Soft, non-tender. Bowel sounds normal. No masses,  No organomegaly. Extremities: Extremities normal, atraumatic, no cyanosis or edema. Pulses: 2+ and symmetric all extremities. Skin: Skin color, texture, turgor normal. No rashes or lesions   Lymph nodes: Cervical, supraclavicular, and axillary nodes normal.   Neurologic: CNII-XII intact. Limited ROM in bed. Lab/Data Review:    Recent Results (from the past 24 hour(s))   C REACTIVE PROTEIN, QT    Collection Time: 08/26/21  9:33 AM   Result Value Ref Range    C-Reactive protein 0.6 0.0 - 0.9 mg/dL   D DIMER    Collection Time: 08/26/21  9:33 AM   Result Value Ref Range    D DIMER 0.56 (H) <0.56 ug/ml(FEU)       Assessment/ Plan:     Principal Problem:    YAO (acute kidney injury) ([address] (8/23/2021)    Active Problems:    Chronic systolic (congestive) heart failure (HCC) (4/21/2011)      HTN (hypertension) (11/29/2011)      Non-ischemic cardiomyopathy ([address] (1/28/8275)      Metabolic acidosis (6/4/5121)      Hypokalemia (7/3/2020)      Hyponatremia (8/24/2021)      Acute respiratory failure with hypoxia ([address] (8/24/2021)      Acute respiratory failure due to COVID-19 ([address] (8/24/2021)      NSTEMI (non-ST elevated myocardial infarction) ([address] (8/24/2021)      Acute respiratory failure with hypoxia ([address] (8/24/2021)     Acute respiratory failure due to COVID-19 ([address] (8/24/2021)  ·   currently on room air   · Decadron since 8/24  · D-dimer 0.5 expected with Covid. CRP 0.6. · Not remdesivir candidate as patient is not hypoxic and also acute kidney injury on admission. · Not a candidate for ACTEMRA- on room air  · Add scheduled albuterol. · Wheezing significant on exam. Will increase decadron to BID.    · Order IS    YAO (acute kidney injury) ([address] (9/02/8126) with metabolic acidosis:  Prerenal POA  · Resolved     Active Problems:    Chronic systolic (congestive) heart failure ([facility] (4/21/2011)  HTN (hypertension) (11/29/2011)  ·   Cardiology following · Continued coreg, hydralazine   · ARB held due to YAO       NSTEMI (non-ST elevated myocardial infarction) ([facility] (8/24/2021)  · appreciate cardiology  · Continue asa, lipitor      Anxiety:  · Continued xanax      Hyponatremia:  · Improving. Encourage good oral intake.      Elevated LFTS:  · improving     LUE edema:  Duplex ultrasound negative for DVT,     Hypermagnesemia - Hold Mg supplement. Very symptomatic with SOB and weakness. Not eating much. Do not think safe to discharge. Hopeful dc in two days if wheezing, SOB, and oral intake improves.      DVT prophylaxis - Lovenox   Signed By: Susana Navas DO     August 27, 2021

## 2021-08-27 NOTE — PROGRESS NOTES
Patient remains in stable condition with respirations even/unlabored. No acute distress noted. No needs noted or voiced at this time. Safety measures in place. Patient remains on room air. Call light remains within reach. Preparing to give report to oncoming shift.

## 2021-08-28 ENCOUNTER — APPOINTMENT (OUTPATIENT)
Dept: GENERAL RADIOLOGY | Age: 56
DRG: 177 | End: 2021-08-28
Attending: FAMILY MEDICINE
Payer: COMMERCIAL

## 2021-08-28 LAB
ANION GAP SERPL CALC-SCNC: 9 MMOL/L (ref 7–16)
BUN SERPL-MCNC: 25 MG/DL (ref 6–23)
CALCIUM SERPL-MCNC: 8.5 MG/DL (ref 8.3–10.4)
CHLORIDE SERPL-SCNC: 105 MMOL/L (ref 98–107)
CO2 SERPL-SCNC: 21 MMOL/L (ref 21–32)
CREAT SERPL-MCNC: 0.86 MG/DL (ref 0.8–1.5)
ERYTHROCYTE [DISTWIDTH] IN BLOOD BY AUTOMATED COUNT: 12.8 % (ref 11.9–14.6)
GLUCOSE SERPL-MCNC: 108 MG/DL (ref 65–100)
HCT VFR BLD AUTO: 42.2 % (ref 41.1–50.3)
HGB BLD-MCNC: 14 G/DL (ref 13.6–17.2)
MAGNESIUM SERPL-MCNC: 2.2 MG/DL (ref 1.8–2.4)
MCH RBC QN AUTO: 31 PG (ref 26.1–32.9)
MCHC RBC AUTO-ENTMCNC: 33.2 G/DL (ref 31.4–35)
MCV RBC AUTO: 93.6 FL (ref 79.6–97.8)
NRBC # BLD: 0 K/UL (ref 0–0.2)
PLATELET # BLD AUTO: 156 K/UL (ref 150–450)
PMV BLD AUTO: 10.3 FL (ref 9.4–12.3)
POTASSIUM SERPL-SCNC: 4.1 MMOL/L (ref 3.5–5.1)
RBC # BLD AUTO: 4.51 M/UL (ref 4.23–5.6)
SODIUM SERPL-SCNC: 135 MMOL/L (ref 136–145)
WBC # BLD AUTO: 11.2 K/UL (ref 4.3–11.1)

## 2021-08-28 PROCEDURE — 74011250637 HC RX REV CODE- 250/637: Performed by: INTERNAL MEDICINE

## 2021-08-28 PROCEDURE — 74011250636 HC RX REV CODE- 250/636: Performed by: EMERGENCY MEDICINE

## 2021-08-28 PROCEDURE — 83735 ASSAY OF MAGNESIUM: CPT

## 2021-08-28 PROCEDURE — 74011250636 HC RX REV CODE- 250/636: Performed by: INTERNAL MEDICINE

## 2021-08-28 PROCEDURE — 36415 COLL VENOUS BLD VENIPUNCTURE: CPT

## 2021-08-28 PROCEDURE — 99232 SBSQ HOSP IP/OBS MODERATE 35: CPT | Performed by: INTERNAL MEDICINE

## 2021-08-28 PROCEDURE — 94640 AIRWAY INHALATION TREATMENT: CPT

## 2021-08-28 PROCEDURE — 74011250637 HC RX REV CODE- 250/637: Performed by: FAMILY MEDICINE

## 2021-08-28 PROCEDURE — 71045 X-RAY EXAM CHEST 1 VIEW: CPT

## 2021-08-28 PROCEDURE — 74011250637 HC RX REV CODE- 250/637: Performed by: NURSE PRACTITIONER

## 2021-08-28 PROCEDURE — 74011250637 HC RX REV CODE- 250/637: Performed by: HOSPITALIST

## 2021-08-28 PROCEDURE — 94760 N-INVAS EAR/PLS OXIMETRY 1: CPT

## 2021-08-28 PROCEDURE — 80048 BASIC METABOLIC PNL TOTAL CA: CPT

## 2021-08-28 PROCEDURE — 85027 COMPLETE CBC AUTOMATED: CPT

## 2021-08-28 PROCEDURE — 74011250636 HC RX REV CODE- 250/636: Performed by: FAMILY MEDICINE

## 2021-08-28 PROCEDURE — 74011000250 HC RX REV CODE- 250: Performed by: EMERGENCY MEDICINE

## 2021-08-28 PROCEDURE — 65660000000 HC RM CCU STEPDOWN

## 2021-08-28 PROCEDURE — 74011250636 HC RX REV CODE- 250/636: Performed by: NURSE PRACTITIONER

## 2021-08-28 RX ORDER — FUROSEMIDE 40 MG/1
40 TABLET ORAL DAILY
Status: DISCONTINUED | OUTPATIENT
Start: 2021-08-28 | End: 2021-08-29 | Stop reason: HOSPADM

## 2021-08-28 RX ORDER — SPIRONOLACTONE 25 MG/1
25 TABLET ORAL DAILY
Status: DISCONTINUED | OUTPATIENT
Start: 2021-08-28 | End: 2021-08-29 | Stop reason: HOSPADM

## 2021-08-28 RX ORDER — MORPHINE SULFATE 2 MG/ML
1 INJECTION, SOLUTION INTRAMUSCULAR; INTRAVENOUS
Status: DISCONTINUED | OUTPATIENT
Start: 2021-08-28 | End: 2021-08-29 | Stop reason: HOSPADM

## 2021-08-28 RX ORDER — VALSARTAN 80 MG/1
40 TABLET ORAL 2 TIMES DAILY
Status: DISCONTINUED | OUTPATIENT
Start: 2021-08-28 | End: 2021-08-29 | Stop reason: HOSPADM

## 2021-08-28 RX ADMIN — CARVEDILOL 25 MG: 25 TABLET, FILM COATED ORAL at 07:42

## 2021-08-28 RX ADMIN — MORPHINE SULFATE 2 MG: 2 INJECTION, SOLUTION INTRAMUSCULAR; INTRAVENOUS at 02:50

## 2021-08-28 RX ADMIN — DEXAMETHASONE 6 MG: 4 TABLET ORAL at 21:17

## 2021-08-28 RX ADMIN — ALBUTEROL SULFATE 2 PUFF: 90 AEROSOL, METERED RESPIRATORY (INHALATION) at 07:39

## 2021-08-28 RX ADMIN — Medication 10 ML: at 13:58

## 2021-08-28 RX ADMIN — Medication 5 ML: at 06:14

## 2021-08-28 RX ADMIN — HYDROCODONE BITARTRATE AND ACETAMINOPHEN 1 TABLET: 10; 325 TABLET ORAL at 07:42

## 2021-08-28 RX ADMIN — MORPHINE SULFATE 1 MG: 2 INJECTION, SOLUTION INTRAMUSCULAR; INTRAVENOUS at 13:57

## 2021-08-28 RX ADMIN — GABAPENTIN 300 MG: 300 CAPSULE ORAL at 07:41

## 2021-08-28 RX ADMIN — CARVEDILOL 25 MG: 25 TABLET, FILM COATED ORAL at 17:00

## 2021-08-28 RX ADMIN — MORPHINE SULFATE 1 MG: 2 INJECTION, SOLUTION INTRAMUSCULAR; INTRAVENOUS at 09:33

## 2021-08-28 RX ADMIN — ALPRAZOLAM 1 MG: 0.5 TABLET ORAL at 21:17

## 2021-08-28 RX ADMIN — ALBUTEROL SULFATE 2 PUFF: 90 AEROSOL, METERED RESPIRATORY (INHALATION) at 16:00

## 2021-08-28 RX ADMIN — PROMETHAZINE HYDROCHLORIDE 25 MG: 25 INJECTION INTRAMUSCULAR; INTRAVENOUS at 07:43

## 2021-08-28 RX ADMIN — HYDROCODONE BITARTRATE AND ACETAMINOPHEN 1 TABLET: 10; 325 TABLET ORAL at 17:00

## 2021-08-28 RX ADMIN — FAMOTIDINE 20 MG: 20 TABLET, FILM COATED ORAL at 17:03

## 2021-08-28 RX ADMIN — Medication 10 ML: at 21:18

## 2021-08-28 RX ADMIN — ATORVASTATIN CALCIUM 80 MG: 80 TABLET, FILM COATED ORAL at 21:17

## 2021-08-28 RX ADMIN — PROMETHAZINE HYDROCHLORIDE 25 MG: 25 INJECTION INTRAMUSCULAR; INTRAVENOUS at 02:50

## 2021-08-28 RX ADMIN — HYDRALAZINE HYDROCHLORIDE 25 MG: 25 TABLET, FILM COATED ORAL at 07:40

## 2021-08-28 RX ADMIN — DEXAMETHASONE 6 MG: 4 TABLET ORAL at 07:42

## 2021-08-28 RX ADMIN — ASPIRIN 81 MG: 81 TABLET ORAL at 07:42

## 2021-08-28 RX ADMIN — FUROSEMIDE 40 MG: 40 TABLET ORAL at 09:33

## 2021-08-28 RX ADMIN — PROMETHAZINE HYDROCHLORIDE 25 MG: 25 INJECTION INTRAMUSCULAR; INTRAVENOUS at 13:57

## 2021-08-28 RX ADMIN — ALBUTEROL SULFATE 2 PUFF: 90 AEROSOL, METERED RESPIRATORY (INHALATION) at 12:00

## 2021-08-28 RX ADMIN — FAMOTIDINE 20 MG: 20 TABLET, FILM COATED ORAL at 07:42

## 2021-08-28 RX ADMIN — VALSARTAN 40 MG: 80 TABLET ORAL at 17:03

## 2021-08-28 RX ADMIN — HYDROCODONE BITARTRATE AND ACETAMINOPHEN 1 TABLET: 10; 325 TABLET ORAL at 00:15

## 2021-08-28 RX ADMIN — GABAPENTIN 300 MG: 300 CAPSULE ORAL at 21:17

## 2021-08-28 RX ADMIN — ENOXAPARIN SODIUM 40 MG: 40 INJECTION SUBCUTANEOUS at 07:39

## 2021-08-28 RX ADMIN — HYDRALAZINE HYDROCHLORIDE 25 MG: 25 TABLET, FILM COATED ORAL at 13:00

## 2021-08-28 NOTE — PROGRESS NOTES
Patient c/o abd and chest pain 9/10 and nausea. Morpine 2 mg IV and phenergan 25 mg IV given at this time. Will reassess. Patient with strong, nonproductive cough. This RN offered patient PRN Hycodan, but he refused stating, \"I can't keep anything down. \" Will continue to monitor.

## 2021-08-28 NOTE — PROGRESS NOTES
Patient c/o of abd pain 8/10. Morphine 2 mg IV given at this time. Will reassess. TREATMENT SUMMARY Patient dialyzed in room 359 Rapid response called for sudden onset of patient becoming unresponsive but still breathing with pulse. LIJ TDC functioning well without complication of BFR or accessing   
567 ml removed via UF with a with a net removal 67 ml Report given to Sunita Zayas RN with all questions answered TREATMENT  NOTES  
 
[id number] patient noted to be confused on arrival. Patient with delayed responses and would on answer some questions. Patient does know who he is and where he is at but does not follow commands for hand  with Neuro. Patient is seen afterwards placing hand behind head and operating bed with left hand and left elbow. Patient keeps stating no one has brought him food but breakfast try has been sat in from of him. Patient is assisted with feeding but does not want what is on breakfast try stating \"ewwe eggs\". Patient ask to open Nepro. Nephro is held while patient drinks it. Between sips patient repeated \" Hello. .. is someone there\" \"help me\". Patient had to be reminded that I was at bedside repeatedly. Patient then be began stating that he does not want to be seen repeatedly. Patient next states can he please be seen repeatedly and he needs to be helped but could could not specify how. Patient next states his left foot was hurting and then is ankle was hurting. Patient states both multiple times. Sunita Zayas RN notifed of pain and states she will page the hospitalist. Patient next begins to ask help him hang his foot up. Patient could not specify what did he mean by this even as he repeatedly made the request. Patient then seen reaching for bed controls and he was asked if he wanted to be rasied or lowered. Patient states he wants to lowered. 1045 patient BP 90s SP after lowering HOB. UF/fluid removal stopped. 1100 -1107 patient seen repeatdlymessing with bed controls.  Patient could not state what he wanted , both saying he wants to raised and lowered. Patient is suddenly stops adjusting bed controls and heard snoring. Patient is not responding to voice or touch. BP is checked Patient blood is returned to patient SP > 90s. Sunita Zayas RN walks in room and states patient had a run of of vfib. Patient is still unresponsive after rinse back/tx termination and is still snoring. Sunita Zayas RN asked to call RRT. SP > 90 post rise back. Patient confirmed by [employer] tele to be NSR after termination of HD. Post HD patient went into Vfib/Vtach and code blue was called with MD Giuliana Dumas at bedside. ACUTE HEMODIALYSIS FLOW SHEET 
  
PATIENT INFORMATION [address] [facility]       DR. Isreal Estrada   
[]1st Time Acute  []Stat[x] Routine []Urgent []Chronic Unit  
[]Acute Room [x]Bedside  []ICU/CCU []ER Isolation Precautions: [x]Dialysis[x] Airborne []Contact []Droplet []Reverse Special Considerations:_______  [] Blood Consent Verified  []N/A Allergies:[] NKA Allergies Benadryl Extra Strength [Diphenhydramine-zinc Acetate] Unable to Obtain Not Specified  3/11/2021 Gentamicin Unable to Obtain Not Specified  3/11/2021 Code Status [x]Full Code [] DNR  [] Other_____ Diet: [] Renal [] NPO [] Diabetic  
[] Enteral Feeding [] Cardiac Diabetic: [x]Yes []No 
  
[x]Signed Treatment Consent Verified  
[x] Time Out/ Safety Check PRIMARY NURSE REPORT: FIRST INITIAL/ LAST NAME/TITLE 
PRE DIALYSIS:   Sunita Zayas RN    TIME:  0900 ACCESS CATHETER ACCESS: [] N/A  [] RIGHT  [x] LEFT  [x] IJ  [] SUBCL [] FEM [] First use X-ray  [x] Tunnel     [] Non-Tunneled [x] No S/S infection  [] Redness [] Drainage  [] Cultured [] Swelling [] Pain  
                 [x] Medical Aseptic [] Prep Dressing Changed  
               [] Clotted [] Patent []      Flows: [x] Good [] Poor [] Reversed              If Access Problem Dr. Meño Wilson: [] Yes [] No    Date:_____  [] N/A[]  
GRAFT/FISTULA ACCESS:  [x] N/A  [] RIGHT  [] LEFT  [] UE   [] LE   
   [] AVG  [] AVF [] BUTTONHOLE 
  [] +BRUIT/THRILL [] MEDICAL ASEPTIC PREP [] No S/S infection  [] Redness [] Drainage  [] Cultured [] Swelling [] Pain If Access Problem Dr. Meño Wilson: [] Yes [] No    Date:______ [] N/A  
GENERAL ASSESSMENT  
LUNGS:  SaO2% ____ [] Clear [] Coarse [x] Crackles [] Wheezing  
                                      [x] Diminished Location: [] RLL [] LLL [] RUL [] RML [] DOLORES   
COUGH:  [] Productive  [] Loose[] Dry [x] N/A  
RESPIRATIONS: [x] Easy []Labored THERAPY: [] RA   [] NC __2___. L/min    Mask: [] NRB [] Venti  _____O2%    
             [] Ventilator [] Intubated [] Trach [] BiPap [] CPap [] HI Flow CARDIAC: [] Regular [] Irregular [] Pericardial Rub [] JVD Monitored Rhythm:_SINUS TACH_____ [] N/A  
EDEMA: [x] None [] Generalized [] Facial [] Pedal [] UE [] LE 
           [] Pitting [] 1 [] 2 [] 3 [] 4    [] Right [] Left [] Bilateral  
SKIN:    [x] Warm [] Hot [] Cold  [x] Dry [] Pale [] Diaphoretic  
           [] Flushed [] Jaundiced [] Cyanotic [] Rash [] Weeping LOC:    [x] Alert  Oriented to: [] Person [x] Place [x] Time  
          [x] Confused [] Lethargic [] Medicated [] Non-responsive GI/ABDOMEN: [] Flat [] Distended [x] Soft [] Firm [] Diarrhea [x] Bowel Sounds Present [] Nausea [] Vomiting PAIN: [x] 0 [] 1 [] 2 [] 3 [] 4 [] 5 [] 6 [] 7 [] 8 [] 9 [] 10 Scale 1-10 Action/Follow Up_____ MOBILITY: [] Amb [] Amb/Assist [x] Bed  [] Wheelchair CURRENT LABS HBsAg ONLY: Date Drawn:    3/12/21         [x] Negative [] Positive [] Unknown. HBsAb: Date Drawn: 3/12/21           [] Susceptible <10 [x] Immune ?10 [] Unknown Date of Current Labs:  ATTACHED  
 
EDUCATION Person Educated: [x] Patient [] Other_________ Knowledge base: [] None [x] Minimal [] Substantial   
Barriers to learning  [x] None _______________ Preferred method of learning: [] Written [x] Oral [x] Visual [] Hands on Topic: [x] Access Care [] S&S of infection [x] Fluid Management 
 [] K+  [x] Procedural  [] Albumin [] Medications [] Tx Options [] Transplant [] Diet [] Other Teaching Tools: [x] Explain [x] Demonstration [] Handout_____ [] Video______ 
CARE PLAN [x] Renal Failure (Adult) Interdisciplinary · Fluid and electrolytes stabilized ? Interventions · Dehydration signs and symptoms (eg: Weight/lab monitoring; vomiting/diarrhea/urine; tenting; mucous membranes; dizziness/lethargy/irritability/confusion; weak pulse; tachycardia; blood pressure; I&O) · Fluid overload signs and symptoms assessment (eg: Body weight increased; dyspnea; edema; hypertension; respiratory crackles/wheezing; JVD; lab monitoring; mental status changes; I&O) · Monitor appropriate lab values · COMPLIANCE WITH PRESCRIBED THERAPY · ARTERIAL ACCESS SITE ASSESSMENT 
· NUTRITION SCREENING 
· Vital signs monitoring per assessed patient condition or unit standard · Cardiac monitoring · Hydration management · Intake and output measurement · Body weight monitoring · Skin care · DIALYSIS 
· Nutrition Care Process per nutrition screen · Oral hygiene care every 2 hours · Pain management · Outcome ? [x] Progressing Towards Goal 
? [] Not Progressing Towards Goals ? [] Goals Met/Resolved ? [] Goals Not Met/ Resolved · Patient/ Family Education ? Progressing Towards Goals RO/HEMODIAYLSIS MACHINE SAFETY CHECKS- BEFORE EACH TREATMENT [x] THE [facility]: Machine Serial #1:  [id number]   RO Serial #[id number] [] THE [facility]: Machine Serial #2:  [id number]   RO Serial #[id number] [] THE [facility]: Machine Serial #3:  [id number]    Serial #[id number] Alarm Test: [x] Pass  Time___0925_____ [x] RO/Machine Log Complete    [x] Extracorporeal circuit Tested for integrity Dialyzer_[id number]_________   Tubing__20I11-12__________ Dialysate: pH__7.4_  Temp.__37___Conductivity: Meter __14__ HD Machine__13.8_ CHLORINE TESTING- BEFORE EACH TREATMENT AND EVERY 4 HOURS Total Chlorine: [x] Less than 0.1 ppm Time:__0930___2nd Check Time:______ 
(If greater than 0.1 ppm from Primary then every 30 minutes from Secondary) TREATMENT INIATION-WITH DIALYSIS PRECAUTIONS [x] All Connections Secured   [x] Saline Line Double Clamped    [x] Venous Parameters Set [x] Arterial Parameters Set    [x] Prime Given 250 ml    
[x] Air Foam Detector Engaged PRE-TREATMENT  
UF Calculations: Wt to lose:____ml(+) Oral:_0_ml(+)IV Meds/Fluids/Blood prods_0__ml(+) Prime/Rinse__500_ml(=)Total UF Goal____mL Scale Type:[x] Bed scale [] Sling Scale [] Wheel Chair Scale []  Not Ordered [] [] Unable to obtain pt on stretcher/ bed scale malfunctioning Tx Initiation Note: RECEIVED REPORT. PATIENT ALERT AND CONFUSED VITAL SIGNS WNL. NAD. ALL QUESTIONS ANSWERED WITH PATIENT  SAFETY CHECKS COMPLETE. TIME OUT COMPLETE. TREATMENT INITIATED VIA _TDC____. NO CONCERNS NOTED. [x] Time Out/Safety Check  Time:_0930____ INTRADIALYTIC MONITORING 
(SEE ATTACHED FLOWSHEET) POST TREATMENT Time Medication Dose Volume Route Initials [facility] Signatures Title Initials Time Leno Flor RN PAP Dialyzer cleared: [x] Good [] Fair [] Poor Blood Volume Processed ____L Net UF Removed __67__mL Post Tx Access: AVF/AVG: Bleeding Stop Time      Art. NA_min Bc.__NA_min []+bruit/thrill Catheter: Locking Solution  [x] Heparin 1 ml/1000 units [] Normal Saline Art.__2___ ml Bc.___2__ml [x] New caps placed Post Assessment: Skin: [x]Warm [x]Dry []Diaphoretic []Flushed [] Pale []Cyanotic Lungs: [x]Clear []Coarse []Crackles []Wheezing Cardiac: [x]Regular []Irregular  []Monitored rhythm____ []N/A Edema: [x]None []General []Facial []Pedal  []UE []LE []RIGHT []LEFT Pain: [x]0 []1 []2 []3 []4 []5 []6 []7 []8 []9 []10  
POST Tx Note:  
Primary Nurse Report: First initial/Last name/Title Post Dialysis:___ Luanne Eubanks RN ___         Time:_____1130___________ Abbreviations: AVG-arterial venous graft, AVF-arterial venous fistula, IJ-Internal Jugular, Subcl-Subclavian, Fem-Femoral, Tx-treatment, AP/HR-apical heart rate, DFR-dialysate flow rate, BFR-blood flow rate, AP-arterial pressure, -venous pressure, UF-ultrafiltrate, TMP-transmembrane pressure, Bc-Venous, Art-Arterial, RO-Reverse Osmosis

## 2021-08-28 NOTE — PROGRESS NOTES
Patient resting quietly in bed. Respirations even and unlabored. On room air. No signs of distress. No needs expressed. To give report to oncoming RN.

## 2021-08-28 NOTE — PROGRESS NOTES
Received report from Deepti Mascorro, [facility]. Patient awake and in bed. A&O x4. Respirations present. On room air. No signs of distress. No needs expressed. Bed low and locked. Call light within reach. Will continue to monitor.

## 2021-08-28 NOTE — PROGRESS NOTES
Nurse administered 1300 dose of scheduled Hydralazine prior to new orders and note discontinuing Hydralazine. Dr. Cecily Simpson notified and states hold 1400 dose of spironolactone and start with tomorrow's dose.

## 2021-08-28 NOTE — PROGRESS NOTES
Progress Note    Patient: Naomi Alicea MRN: [id number]  SSN: [id number]    YOB: 1965  Age: 64 y.o. Sex: male      Admit Date: 8/23/2021    LOS: 5 days     Subjective:   F/U COVID, YAO, NSTEMI    \"63 yo male with PMH of HFrEF EF 20-25%, VFIB, s/p PPM, CAD, HTN, HLP admitted with COVID 19, YAO and elevated troponin. \" Hx of migraines, anxiety. Troponin as high as 453.5. Diagnosed with NSTEMI likely related to his sCHF and COVID infection. Cardiology recommended conservative management. Will need to follow up with cardiology outpatient. \"He is on room air. CXR shows patchy infiltrates. He is on course of decadron. Remdesivir contraindicated due to YAO. \" IV fluids given for his YAO which has resolved. Creatine was 1.7 but now 1.1. Diagnosed with COVID on 8/23    Edema noted to let upper extremity and no DVT seen. On RA but with cough and wheezing. SOB the same as yesterday. Weakness. Drinking well. Not eating much. Asking for more pain medication. Also asking when he can go home.      Is/Os (+) 8L, but this is not accurate since output has not been monitored  Current Facility-Administered Medications   Medication Dose Route Frequency    HYDROcodone-homatropine (HYCODAN) 5-1.5 mg/5 mL (5 mL) oral solution 5 mL  5 mL Oral Q4H PRN    albuterol (PROVENTIL HFA, VENTOLIN HFA, PROAIR HFA) inhaler 2 Puff  2 Puff Inhalation Q4H RT    dexAMETHasone (DECADRON) tablet 6 mg  6 mg Oral Q12H    famotidine (PEPCID) tablet 20 mg  20 mg Oral BID    albuterol-ipratropium (DUO-NEB) 2.5 MG-0.5 MG/3 ML  3 mL Nebulization Q4H PRN    albuterol (PROVENTIL HFA, VENTOLIN HFA, PROAIR HFA) inhaler 2 Puff  2 Puff Inhalation Q4H PRN    LORazepam (ATIVAN) tablet 0.5 mg  0.5 mg Oral Q12H PRN    benzonatate (TESSALON) capsule 100 mg  100 mg Oral TID PRN    sodium chloride (NS) flush 5-40 mL  5-40 mL IntraVENous Q8H    sodium chloride (NS) flush 5-40 mL  5-40 mL IntraVENous PRN    acetaminophen (TYLENOL) tablet 650 mg  650 mg Oral Q6H PRN    Or    acetaminophen (TYLENOL) suppository 650 mg  650 mg Rectal Q6H PRN    polyethylene glycol (MIRALAX) packet 17 g  17 g Oral DAILY PRN    promethazine (PHENERGAN) with saline injection 25 mg  25 mg IntraVENous Q4H PRN    ALPRAZolam (XANAX) tablet 1 mg  1 mg Oral QHS    carvediloL (COREG) tablet 25 mg  25 mg Oral BID WITH MEALS    gabapentin (NEURONTIN) capsule 300 mg  300 mg Oral BID    HYDROcodone-acetaminophen (NORCO)  mg tablet 1 Tablet  1 Tablet Oral Q6H PRN    [Held by provider] magnesium oxide (MAG-OX) tablet 800 mg  800 mg Oral Q12H    aspirin delayed-release tablet 81 mg  81 mg Oral DAILY    morphine injection 2 mg  2 mg IntraVENous Q4H PRN    metoprolol (LOPRESSOR) injection 5 mg  5 mg IntraVENous Q4H PRN    atorvastatin (LIPITOR) tablet 80 mg  80 mg Oral QHS    enoxaparin (LOVENOX) injection 40 mg  40 mg SubCUTAneous Q24H    hydrALAZINE (APRESOLINE) tablet 25 mg  25 mg Oral QID    sodium chloride (NS) flush 5-10 mL  5-10 mL IntraVENous Q8H    sodium chloride (NS) flush 5-10 mL  5-10 mL IntraVENous PRN       Objective:     Patient Vitals for the past 24 hrs:   Temp Pulse Resp BP SpO2   08/28/21 0737 97.2 °F (36.2 °C) 82 20 (!) 136/92 98 %   08/28/21 0426 97.6 °F (36.4 °C) 88 20 119/81 97 %   08/27/21 2335 98.5 °F (36.9 °C) 64 20 108/74 94 %   08/27/21 2035     93 %   08/27/21 2012 98.5 °F (36.9 °C) 78 20 118/89 98 %   08/27/21 1530 97.8 °F (36.6 °C) 73 20 133/73 94 %   08/27/21 1220  89      08/27/21 1138 97.7 °F (36.5 °C) 75 20 137/83 95 %         Intake and Output:  Current Shift: No intake/output data recorded. Last three shifts: 08/26 1901 - 08/28 0700  In: 1440 [P.O.:1440]  Out: -     Physical Exam:   General:  Alert, cooperative, mildly coughing and wheezing noted. No respiratory distress   Eyes:  Conjunctivae/corneas clear. Ears:  Normal TMs and external ear canals both ears. Nose: Nares normal. Septum midline.     Mouth/Throat: No obvious deformity. Neck: no JVD. Back:   deferred. Lungs:   Diffuse wheezing but improved compared to yesterday. Wet/fluid like breath sounds   Heart:  Regular rate and rhythm, S1, S2 normal   Abdomen:   Soft, tenderness to LUQ without rebound. Bowel sounds normal.    Extremities: Trace edema. Pulses: 2+ and symmetric all extremities. Skin: Skin color, texture, turgor normal. No rashes or lesions   Lymph nodes: Cervical, supraclavicular, and axillary nodes normal.   Neurologic: CNII-XII intact. Limited ROM in bed. Lab/Data Review:    Recent Results (from the past 24 hour(s))   CBC W/O DIFF    Collection Time: 08/28/21  8:08 AM   Result Value Ref Range    WBC 11.2 (H) 4.3 - 11.1 K/uL    RBC 4.51 4.23 - 5.6 M/uL    HGB 14.0 13.6 - 17.2 g/dL    HCT 42.2 41.1 - 50.3 %    MCV 93.6 79.6 - 97.8 FL    MCH 31.0 26.1 - 32.9 PG    MCHC 33.2 31.4 - 35.0 g/dL    RDW 12.8 11.9 - 14.6 %    PLATELET 139 921 - 331 K/uL    MPV 10.3 9.4 - 12.3 FL    ABSOLUTE NRBC 0.00 0.0 - 0.2 K/uL       Assessment/ Plan:     Principal Problem:    YAO (acute kidney injury) ([facility] [address] (8/23/2021)    Active Problems:    Chronic systolic (congestive) heart failure ([facility] [place] [address] (4/21/2011)      HTN (hypertension) (11/29/2011)      Non-ischemic cardiomyopathy ([facility] (9/69/9194)      Metabolic acidosis (8/5/4540)      Hypokalemia (7/3/2020)      Hyponatremia (8/24/2021)      Acute respiratory failure with hypoxia ([address] (8/24/2021)      Acute respiratory failure due to COVID-19 ([facility] [address] (8/24/2021)      NSTEMI (non-ST elevated myocardial infarction) ([facility] [address] (8/24/2021)      Acute respiratory failure with hypoxia ([address] (8/24/2021)     Acute respiratory failure due to COVID-19 ([facility] [address] (8/24/2021)  · Currently on room air   · Decadron since 8/24 that I increased to BID on 8/27  · D-dimer 0.5 expected with Covid. CRP 0.6. · Not remdesivir candidate as patient is not hypoxic and also acute kidney injury on admission.   · Not a candidate for ACTEMRA- on room air  · Scheduled albuterol. · Wheezing significant on exam but improved from yesterday. · Ordered IS  · Fluid like sounds, will order chest x ray. Also restart his lasix but only once daily. YAO (acute kidney injury) ([facility] (6/64/8430) with metabolic acidosis:  Prerenal POA  · Resolved     Active Problems:    Chronic systolic (congestive) heart failure ([facility] (4/21/2011)  HTN (hypertension) (11/29/2011)  ·   Cardiology following   · Continued coreg, hydralazine   · ARB held due to YAO  · Add back lasix 40mg but only once daily. Seems to have fluid overload on exam today. · Order strict Is and Os.        NSTEMI (non-ST elevated myocardial infarction) ([facility] (8/24/2021)  · appreciate cardiology  · Continue asa, lipitor      Anxiety:  · Continued xanax      Hyponatremia:  · Improving. Encourage good oral intake.      Elevated LFTS:  · improving     LUE edema:  Duplex ultrasound negative for DVT,     Hypermagnesemia - Hold Mg supplement. Symptomatic with SOB and weakness. Not eating much. Do not think safe to discharge until respiratory physical exam improved. Hopeful dc in two days if wheezing, SOB, and oral intake improves. Will not increase pain medications since no obvious pain during exam and can increase likelihood of respiratory failure.      DVT prophylaxis - Lovenox   Signed By: Glenn Sher DO     August 28, 2021

## 2021-08-28 NOTE — PROGRESS NOTES
SBAR from [place]. Patient in stable condition with resps even/unlabored. NAD noted. Patient on room air. Safety measures noted. Will continue to monitor per policy.

## 2021-08-28 NOTE — PROGRESS NOTES
Received report from [facility]. Patient awake and in bed. A&O x4. Respirations present. On room air. No signs of distress. No needs expressed. Bed low and locked. Call light within reach. Will continue to monitor.

## 2021-08-28 NOTE — PROGRESS NOTES
[facility] CARDIOLOGY PROGRESS NOTE           8/28/2021 1:07 PM    Admit Date: 8/23/2021      Subjective:   No inc sob. Objective:      Vitals:    08/28/21 0512 08/28/21 0737 08/28/21 0740 08/28/21 1137   BP:  (!) 136/92  (!) 120/93   Pulse:  82  81   Resp:  20  18   Temp:  97.2 °F (36.2 °C)  98 °F (36.7 °C)   SpO2:  98% 97% 97%   Weight: 88 kg (194 lb)      Height:           Physical Exam:  General-No Acute Distress  Neck- supple, no JVD  CV- regular rate and rhythm no MRG  Lung- + wheezes. Abd- soft, nontender, nondistended  Ext- no edema bilaterally.   Skin- warm and dry    Data Review:   Recent Labs     08/28/21  0808 08/26/21  0613   * 133*   K 4.1 3.6   MG 2.2 3.1*   BUN 25* 29*   CREA 0.86 1.16   * 94   WBC 11.2* 14.3*   HGB 14.0 16.4   HCT 42.2 49.5    117*       Assessment/Plan:     Covid  Respiratory insufficiency  Nicm  ///  Add MRA and ARB  Stop hydralazine    Judith Rubin MD  8/28/2021 1:07 PM

## 2021-08-29 VITALS
BODY MASS INDEX: 26.75 KG/M2 | DIASTOLIC BLOOD PRESSURE: 82 MMHG | TEMPERATURE: 98 F | WEIGHT: 191.1 LBS | RESPIRATION RATE: 18 BRPM | SYSTOLIC BLOOD PRESSURE: 104 MMHG | HEART RATE: 80 BPM | HEIGHT: 71 IN | OXYGEN SATURATION: 96 %

## 2021-08-29 PROCEDURE — 74011250636 HC RX REV CODE- 250/636: Performed by: FAMILY MEDICINE

## 2021-08-29 PROCEDURE — 74011250637 HC RX REV CODE- 250/637: Performed by: HOSPITALIST

## 2021-08-29 PROCEDURE — 74011250637 HC RX REV CODE- 250/637: Performed by: INTERNAL MEDICINE

## 2021-08-29 PROCEDURE — 74011250636 HC RX REV CODE- 250/636: Performed by: NURSE PRACTITIONER

## 2021-08-29 PROCEDURE — 74011250637 HC RX REV CODE- 250/637: Performed by: FAMILY MEDICINE

## 2021-08-29 PROCEDURE — 99231 SBSQ HOSP IP/OBS SF/LOW 25: CPT | Performed by: INTERNAL MEDICINE

## 2021-08-29 RX ORDER — ASPIRIN 81 MG/1
81 TABLET ORAL DAILY
Qty: 30 TABLET | Refills: 0 | Status: SHIPPED | OUTPATIENT
Start: 2021-08-30 | End: 2022-05-05

## 2021-08-29 RX ORDER — DEXAMETHASONE 6 MG/1
6 TABLET ORAL DAILY
Qty: 7 TABLET | Refills: 0 | Status: SHIPPED | OUTPATIENT
Start: 2021-08-29 | End: 2022-02-17

## 2021-08-29 RX ORDER — ALBUTEROL SULFATE 90 UG/1
2 AEROSOL, METERED RESPIRATORY (INHALATION)
Status: DISCONTINUED | OUTPATIENT
Start: 2021-08-29 | End: 2021-08-29 | Stop reason: HOSPADM

## 2021-08-29 RX ORDER — FUROSEMIDE 40 MG/1
40 TABLET ORAL DAILY
Qty: 1 TABLET | Refills: 0 | Status: SHIPPED
Start: 2021-08-29 | End: 2021-12-21 | Stop reason: SDUPTHER

## 2021-08-29 RX ORDER — NALOXONE HYDROCHLORIDE 4 MG/.1ML
1 SPRAY NASAL
Qty: 1 EACH | Refills: 0 | Status: SHIPPED | OUTPATIENT
Start: 2021-08-29 | End: 2021-08-29

## 2021-08-29 RX ORDER — VALSARTAN 40 MG/1
40 TABLET ORAL 2 TIMES DAILY
Qty: 60 TABLET | Refills: 0 | Status: SHIPPED | OUTPATIENT
Start: 2021-08-29 | End: 2022-02-17

## 2021-08-29 RX ORDER — ALBUTEROL SULFATE 90 UG/1
2 AEROSOL, METERED RESPIRATORY (INHALATION)
Qty: 1 INHALER | Refills: 0 | Status: SHIPPED | OUTPATIENT
Start: 2021-08-29

## 2021-08-29 RX ORDER — HYDROCODONE BITARTRATE AND HOMATROPINE METHYLBROMIDE 1.5; 5 MG/5ML; MG/5ML
5 SYRUP ORAL
Qty: 90 ML | Refills: 0 | Status: SHIPPED | OUTPATIENT
Start: 2021-08-29 | End: 2021-09-01

## 2021-08-29 RX ADMIN — GABAPENTIN 300 MG: 300 CAPSULE ORAL at 08:46

## 2021-08-29 RX ADMIN — SPIRONOLACTONE 25 MG: 25 TABLET ORAL at 08:47

## 2021-08-29 RX ADMIN — DEXAMETHASONE 6 MG: 4 TABLET ORAL at 08:47

## 2021-08-29 RX ADMIN — ENOXAPARIN SODIUM 40 MG: 40 INJECTION SUBCUTANEOUS at 08:45

## 2021-08-29 RX ADMIN — FUROSEMIDE 40 MG: 40 TABLET ORAL at 08:48

## 2021-08-29 RX ADMIN — VALSARTAN 40 MG: 80 TABLET ORAL at 08:45

## 2021-08-29 RX ADMIN — ASPIRIN 81 MG: 81 TABLET ORAL at 08:45

## 2021-08-29 RX ADMIN — HYDROCODONE BITARTRATE AND ACETAMINOPHEN 1 TABLET: 10; 325 TABLET ORAL at 08:58

## 2021-08-29 RX ADMIN — CARVEDILOL 25 MG: 25 TABLET, FILM COATED ORAL at 08:46

## 2021-08-29 RX ADMIN — FAMOTIDINE 20 MG: 20 TABLET, FILM COATED ORAL at 08:48

## 2021-08-29 NOTE — PROGRESS NOTES
Patient discharging to POV via JES[facility] Schaffer with mask in place. Respirations even/unlabored. NAD. No further needs noted.

## 2021-08-29 NOTE — PROGRESS NOTES
Patient will be d/c home today. Patient has no needs identified at being d/c home today. Family will transport home. Patient has met all treatment goals / milestones. CM will continue to monitor and remain available for any needs that may occur. Care Management Interventions  PCP Verified by CM: Yes  Mode of Transport at Discharge:  Other (see comment)  Transition of Care Consult (CM Consult): Discharge Planning  Discharge Durable Medical Equipment: No (none currently)  Physical Therapy Consult: No  Occupational Therapy Consult: Yes  Speech Therapy Consult: No  Current Support Network: Lives with Spouse, Own Home  Confirm Follow Up Transport: Self  The Patient and/or Patient Representative was Provided with a Choice of Provider and Agrees with the Discharge Plan?: No  Freedom of Choice List was Provided with Basic Dialogue that Supports the Patient's Individualized Plan of Care/Goals, Treatment Preferences and Shares the Quality Data Associated with the Providers?: Yes  The [employer] Information Provided?: No ([employer]/MCR )  Discharge Location  Discharge Placement: Home

## 2021-08-29 NOTE — PROGRESS NOTES
Patient initially ask for Norco as no IV is noted at this time. Patient wants to go home today. Patient then refuses Donald Cueto, states \"I've suffered this long, I might as well continue. \" Nurse encouraged patient to call if he changed his mind.

## 2021-08-29 NOTE — PROGRESS NOTES
Discharge instructions explained to patient. Understanding verbalized of all discharge instructions, both written and verbal. Adequate time given for questions. All questions asked/answered. Discharge medications reviewed as appropriate and Rx's given to patient. Patient verbalized understanding of need to call and make follow-up appointments. No PIV noted. Patient awaiting wife for transport home.

## 2021-08-29 NOTE — DISCHARGE SUMMARY
Hospitalist Discharge Summary     Patient ID:  Dorene Fields  [id number]  64 y.o.  1965  Admit date: 8/23/2021  9:15 PM  Discharge date and time: 8/29/2021  Attending: Akanksha Buckley DO  PCP:  Corby Putnam MD  Treatment Team: Attending Provider: Akanksha Buckley DO; Consulting Provider: Mami Meng MD; Care Manager: Chely Gonzalez RN; Primary Nurse: Janice Munroe    Principal Diagnosis YAO (acute kidney injury) [facility])   Principal Problem:    YAO (acute kidney injury) ([facility] (8/23/2021)    Active Problems:    Chronic systolic (congestive) heart failure ([facility] [address] (4/21/2011)      HTN (hypertension) (11/29/2011)      Non-ischemic cardiomyopathy ([facility] [address] (7/81/8541)      Metabolic acidosis (0/4/2970)      Hypokalemia (7/3/2020)      Hyponatremia (8/24/2021)      Acute respiratory failure with hypoxia ([facility] [facility] (8/24/2021)      Acute respiratory failure due to COVID-19 [facility]) (8/24/2021)      NSTEMI (non-ST elevated myocardial infarction) ([facility] [facility] (8/24/2021)     Hospital Course: \"65 yo male with PMH of HFrEF EF 20-25%, VFIB, s/p PPM, CAD, HTN, HLP admitted with COVID 19, YAO and elevated troponin. \" Hx of migraines, anxiety. Troponin as high as 453.5. Diagnosed with NSTEMI likely related to his sCHF and COVID infection. Cardiology recommended conservative management. Will need to follow up with cardiology outpatient. \"He is on room air. CXR shows patchy infiltrates. He is on course of decadron. Remdesivir contraindicated due to YAO. \" IV fluids given for his YAO which has resolved. Creatine was 1.7 but now 0.8.      Diagnosed with COVID on 8/23     Edema noted to let upper extremity and no DVT seen via US.      On RA but with cough and wheezing but improving daily. Diuretics restarted but only lasix 40mg daily instead of BID since just now starting to eat/drink more. Weigh self daily. If gains more than 3 pounds in one day, increase lasix to BID. Have close follow up with cardiology.      Mg found to be 3.1. Mg has been held and now 2.2. Get over the counter pulse ox. Goal is oxygen saturation >88%.    Self quarantine 10 days from day of diagnosis. Patient declined 6 minute walk. States he just wants to go home and \"suffer there. \" Has been ambulating in room without distress    Narcan written since takes opioids and benzos at home. If hypoxia, AMS, SOB, please come back to the ED. Please refer to the admission H&P for details of presentation. In summary, the patient is stable for discharge. Significant Diagnostic Studies:       Labs: Results:       Chemistry Recent Labs     08/28/21  0808   *   *   K 4.1      CO2 21   BUN 25*   CREA 0.86   CA 8.5   AGAP 9      CBC w/Diff Recent Labs     08/28/21  0808   WBC 11.2*   RBC 4.51   HGB 14.0   HCT 42.2         Cardiac Enzymes No results for input(s): CPK, CKND1, CAROL in the last 72 hours. No lab exists for component: CKRMB, TROIP   Coagulation No results for input(s): PTP, INR, APTT, INREXT in the last 72 hours. Lipid Panel Lab Results   Component Value Date/Time    Cholesterol, total 181 12/23/2018 04:03 AM    HDL Cholesterol 90 (H) 12/23/2018 04:03 AM    LDL,Direct 71 03/25/2016 04:35 AM    LDL, calculated 73.2 12/23/2018 04:03 AM    VLDL, calculated 17.8 12/23/2018 04:03 AM    Triglyceride 89 12/23/2018 04:03 AM    CHOL/HDL Ratio 2.0 12/23/2018 04:03 AM      BNP No results for input(s): BNPP in the last 72 hours. Liver Enzymes No results for input(s): TP, ALB, TBIL, AP in the last 72 hours.     No lab exists for component: SGOT, GPT, DBIL   Thyroid Studies Lab Results   Component Value Date/Time    TSH 1.010 12/06/2017 12:36 PM            Discharge Exam:  Patient Vitals for the past 24 hrs:   Temp Pulse Resp BP SpO2   08/29/21 0808 98 °F (36.7 °C) 80 18 104/82 96 %   08/28/21 2252 98 °F (36.7 °C) 82 19 138/87 97 %   08/28/21 2045     96 %   08/28/21 1944 97.9 °F (36.6 °C) 80 18 (!) 145/91 96 %   08/28/21 1519 97.5 °F (36.4 °C) 81 18 125/88 100 %   08/28/21 1137 98 °F (36.7 °C) 81 18 (!) 120/93 97 %      General appearance: alert, cooperative, mild cough  Lungs: mild wheezing throughout   Heart: regular rate and rhythm, S1, S2 normal  Abdomen: soft, mild tenderness to LUQ, no rebound. Bowel sounds normal.  Extremities: no cyanosis or edema  Neurologic: Grossly normal    Disposition:Home   Discharge Condition: stable  Patient Instructions: As above   Current Discharge Medication List      START taking these medications    Details   HYDROcodone-homatropine (HYCODAN) 5-1.5 mg/5 mL (5 mL) oral solution Take 5 mL by mouth every four (4) hours as needed for Cough for up to 3 days. Max Daily Amount: 30 mL. Qty: 90 mL, Refills: 0  Start date: 8/29/2021, End date: 9/1/2021    Associated Diagnoses: Cough      aspirin delayed-release 81 mg tablet Take 1 Tablet by mouth daily. Qty: 30 Tablet, Refills: 0  Start date: 8/30/2021      albuterol (PROVENTIL HFA, VENTOLIN HFA, PROAIR HFA) 90 mcg/actuation inhaler Take 2 Puffs by inhalation every four (4) hours as needed for Wheezing or Shortness of Breath. Qty: 1 Inhaler, Refills: 0  Start date: 8/29/2021      valsartan (DIOVAN) 40 mg tablet Take 1 Tablet by mouth two (2) times a day. Qty: 60 Tablet, Refills: 0  Start date: 8/29/2021      dexAMETHasone (DECADRON) 6 mg tablet Take 1 Tablet by mouth daily. Qty: 7 Tablet, Refills: 0  Start date: 8/29/2021      naloxone [facility]) 4 mg/actuation nasal spray 1 [place] by IntraNASal route once as needed for Overdose for up to 1 dose. Use 1 spray intranasally, then discard. Repeat with new spray every 2 min as needed for opioid overdose symptoms, alternating nostrils. Qty: 1 Each, Refills: 0  Start date: 8/29/2021, End date: 8/29/2021         CONTINUE these medications which have CHANGED    Details   furosemide (LASIX) 40 mg tablet Take 1 Tablet by mouth daily.   Qty: 1 Tablet, Refills: 0  Start date: 8/29/2021         CONTINUE these medications which have NOT CHANGED    Details   promethazine (PHENERGAN) 25 mg tablet Take 1 Tablet by mouth every six (6) hours as needed for Nausea. Qty: 30 Tablet, Refills: 0    Associated Diagnoses: Projectile vomiting with nausea      rizatriptan (MAXALT-MLT) 10 mg disintegrating tablet TAKE ONE TABLET BY MOUTH ONCE AS NEEDED  Qty: 10 Tablet, Refills: 6    Associated Diagnoses: Periodic headache syndrome, not intractable      ondansetron (ZOFRAN ODT) 4 mg disintegrating tablet Take 1 Tablet by mouth every six (6) hours as needed for Nausea, Vomiting or Nausea or Vomiting. Qty: 20 Tablet, Refills: 0    Associated Diagnoses: Nausea      ALPRAZolam (XANAX) 1 mg tablet Take 1 Tablet by mouth nightly. Max Daily Amount: 1 mg. Indications: anxious  Qty: 30 Tablet, Refills: 3    Associated Diagnoses: Anxiety      azelastine (ASTELIN) 137 mcg (0.1 %) nasal spray 1 [place] by Both Nostrils route two (2) times a day. Use in each nostril as directed  Qty: 1 Bottle, Refills: 2    Associated Diagnoses: Seasonal allergic rhinitis due to pollen      gabapentin (NEURONTIN) 300 mg capsule 1 po bid prn pain  Qty: 60 Cap, Refills: 6    Associated Diagnoses: Other postherpetic nervous system involvement      potassium chloride (K-DUR, KLOR-CON) 20 mEq tablet Take 2 Tabs by mouth daily. Qty: 180 Tab, Refills: 3      carvediloL (COREG) 25 mg tablet Take 1 Tab by mouth two (2) times daily (with meals). Qty: 180 Tab, Refills: 3      atorvastatin (LIPITOR) 80 mg tablet Take 1 Tab by mouth daily. Qty: 90 Tab, Refills: 3      sildenafil citrate (Viagra) 100 mg tablet Take 1 Tab by mouth as needed (as directed). Qty: 10 Tab, Refills: 3      HYDROcodone-acetaminophen (NORCO)  mg tablet TAKE ONE TABLET BY MOUTH FOUR TIMES DAILY AS NEEDED  Refills: 0      eplerenone (INSPRA) 25 mg tablet Take 1 Tab by mouth daily. Qty: 90 Tab, Refills: 3      ESOMEPRAZOLE MAG TRIHYDRATE (NEXIUM PO) Take 1 Cap by mouth two (2) times a day.          STOP taking these medications       magnesium oxide (MAG-OX) 400 mg tablet Comments:   Reason for Stopping:         losartan (COZAAR) 50 mg tablet Comments:   Reason for Stopping:               Activity:Up and lars   Diet:Cardiac, 2gm Na diet   Wound Care:None     Follow-up PCP in one week. Cardiology in one week.    ·     Time spent to discharge patient 35 minutes  Signed:  Santa Loza DO  8/29/2021  11:04 AM

## 2021-08-29 NOTE — DISCHARGE INSTRUCTIONS
DISCHARGE SUMMARY from doctor    PATIENT INSTRUCTIONS:    What to do at Home:  Recommended activity: Activity as tolerated, incentive spirometer 5-10 times every hour while awake    If you experience any of the following symptoms worsening cough or wheezing, shortness of breath or fatigue not relieved with rest, unrelieved pain, nausea or vomiting please follow up with your doctor: fever, chills, shortness of breath, chest pain. *  Please give a list of your current medications to your Primary Care Provider. *  Please update this list whenever your medications are discontinued, doses are      changed, or new medications (including over-the-counter products) are added. *  Please carry medication information at all times in case of emergency situations. These are general instructions for a healthy lifestyle:    No smoking/ No tobacco products/ Avoid exposure to second hand smoke  Surgeon General's Warning:  Quitting smoking now greatly reduces serious risk to your health. Obesity, smoking, and sedentary lifestyle greatly increases your risk for illness    A healthy diet, regular physical exercise & weight monitoring are important for maintaining a healthy lifestyle    You may be retaining fluid if you have a history of heart failure or if you experience any of the following symptoms:  Weight gain of 3 pounds or more overnight or 5 pounds in a week, increased swelling in our hands or feet or shortness of breath while lying flat in bed. Please call your doctor as soon as you notice any of these symptoms; do not wait until your next office visit. The discharge information has been reviewed with the patient. The patient verbalized understanding. Discharge medications reviewed with the patient and appropriate educational materials and side effects teaching were provided.     Advance Care Planning  People with COVID-19 may have no symptoms, mild symptoms, such as fever, cough, and shortness of breath or they may have more severe illness, developing severe and fatal pneumonia. As a result, Advance Care Planning with attention to naming a health care decision maker (someone you trust to make healthcare decisions for you if you could not speak for yourself) and sharing other health care preferences is important BEFORE a possible health crisis. Please contact your Primary Care Provider to discuss Advance Care Planning. Preventing the Spread of Coronavirus Disease 2019 in Homes and Residential Communities  For the most recent information go to [email or web address]    Prevention steps for People with confirmed or suspected COVID-19 (including persons under investigation) who do not need to be hospitalized  and   People with confirmed COVID-19 who were hospitalized and determined to be medically stable to go home    Your healthcare provider and public health staff will evaluate whether you can be cared for at home. If it is determined that you do not need to be hospitalized and can be isolated at home, you will be monitored by staff from your local or state health department. You should follow the prevention steps below until a healthcare provider or local or state health department says you can return to your normal activities. Stay home except to get medical care  People who are mildly ill with COVID-19 are able to isolate at home during their illness. You should restrict activities outside your home, except for getting medical care. Do not go to work, school, or public areas. Avoid using public transportation, ride-sharing, or taxis. Separate yourself from other people and animals in your home  People: As much as possible, you should stay in a specific room and away from other people in your home. Also, you should use a separate bathroom, if available. Animals:  You should restrict contact with pets and other animals while you are sick with COVID-19, just like you would around other people. Although there have not been reports of pets or other animals becoming sick with COVID-19, it is still recommended that people sick with COVID-19 limit contact with animals until more information is known about the virus. When possible, have another member of your household care for your animals while you are sick. If you are sick with COVID-19, avoid contact with your pet, including petting, snuggling, being kissed or licked, and sharing food. If you must care for your pet or be around animals while you are sick, wash your hands before and after you interact with pets and wear a facemask. Call ahead before visiting your doctor  If you have a medical appointment, call the healthcare provider and tell them that you have or may have COVID-19. This will help the healthcare providers office take steps to keep other people from getting infected or exposed. Wear a facemask  You should wear a facemask when you are around other people (e.g., sharing a room or vehicle) or pets and before you enter a healthcare providers office. If you are not able to wear a facemask (for example, because it causes trouble breathing), then people who live with you should not stay in the same room with you, or they should wear a facemask if they enter your room. Cover your coughs and sneezes  Cover your mouth and nose with a tissue when you cough or sneeze. Throw used tissues in a lined trash can. Immediately wash your hands with soap and water for at least 20 seconds or, if soap and water are not available, clean your hands with an alcohol-based hand  that contains at least 60% alcohol. Clean your hands often  Wash your hands often with soap and water for at least 20 seconds, especially after blowing your nose, coughing, or sneezing; going to the bathroom; and before eating or preparing food.  If soap and water are not readily available, use an alcohol-based hand  with at least 60% alcohol, covering all surfaces of your hands and rubbing them together until they feel dry. Soap and water are the best option if hands are visibly dirty. Avoid touching your eyes, nose, and mouth with unwashed hands. Avoid sharing personal household items  You should not share dishes, drinking glasses, cups, eating utensils, towels, or bedding with other people or pets in your home. After using these items, they should be washed thoroughly with soap and water. Clean all high-touch surfaces everyday  High touch surfaces include counters, tabletops, doorknobs, bathroom fixtures, toilets, phones, keyboards, tablets, and bedside tables. Also, clean any surfaces that may have blood, stool, or body fluids on them. Use a household cleaning spray or wipe, according to the label instructions. Labels contain instructions for safe and effective use of the cleaning product including precautions you should take when applying the product, such as wearing gloves and making sure you have good ventilation during use of the product. Monitor your symptoms  Seek prompt medical attention if your illness is worsening (e.g., difficulty breathing). Before seeking care, call your healthcare provider and tell them that you have, or are being evaluated for, COVID-19. Put on a facemask before you enter the facility. These steps will help the healthcare providers office to keep other people in the office or waiting room from getting infected or exposed. Ask your healthcare provider to call the local or state health department. Persons who are placed under active monitoring or facilitated self-monitoring should follow instructions provided by their local health department or occupational health professionals, as appropriate. When working with your local health department check their available hours.   If you have a medical emergency and need to call 911, notify the dispatch personnel that you have, or are being evaluated for COVID-19. If possible, put on a facemask before emergency medical services arrive. Discontinuing home isolation  Patients with confirmed COVID-19 should remain under home isolation precautions until the risk of secondary transmission to others is thought to be low. The decision to discontinue home isolation precautions should be made on a case-by-case basis, in consultation with healthcare providers and state and local health departments. Patient Education        Acute Kidney Injury: Care Instructions  Your Care Instructions     Acute kidney injury (YAO) is a sudden decrease in kidney function. This can happen over a period of hours, days or, in some cases, weeks. YAO used to be called acute renal failure, but kidney failure doesn't always happen with YAO. Common causes of YAO are dehydration, blood loss, and medicines. When YAO happens, the kidneys have trouble removing waste and excess fluids from the body. The waste and fluids build up and become harmful. Kidney function may return to normal if the cause of YAO is treated quickly. Your chance of a full recovery depends on what caused the problem, how quickly the cause was treated, and what other medical problems you have. A machine may be used to help your kidneys remove waste and fluids for a short period of time. This is called dialysis. Follow-up care is a key part of your treatment and safety. Be sure to make and go to all appointments, and call your doctor if you are having problems. It's also a good idea to know your test results and keep a list of the medicines you take. How can you care for yourself at home? · Talk to your doctor about how much fluid you should drink. · Eat a balanced diet. Talk to your doctor or a dietitian about what type of diet may be best for you. You may need to limit sodium, potassium, and phosphorus. · If you need dialysis, follow the instructions and schedule for dialysis that your doctor gives you. · Do not smoke. Smoking can make your condition worse. If you need help quitting, talk to your doctor about stop-smoking programs and medicines. These can increase your chances of quitting for good. · Do not drink alcohol. · Review all of your medicines with your doctor. Do not take any medicines, including nonsteroidal anti-inflammatory drugs (NSAIDs) such as ibuprofen (Advil, Motrin) or naproxen (Aleve), unless your doctor says it is safe for you to do so. · Make sure that anyone treating you for any health problem knows that you have had YAO. When should you call for help? Call 911 anytime you think you may need emergency care. For example, call if:    · You passed out (lost consciousness). Call your doctor now or seek immediate medical care if:    · You have new or worse nausea and vomiting.     · You have much less urine than normal, or you have no urine.     · You are feeling confused or cannot think clearly.     · You have new or more blood in your urine.     · You have new swelling.     · You are dizzy or lightheaded, or feel like you may faint. Watch closely for changes in your health, and be sure to contact your doctor if:    · You do not get better as expected. Where can you learn more? Go to http://www.gray.com/  Enter Z673 in the search box to learn more about \"Acute Kidney Injury: Care Instructions. \"  Current as of: December 17, 2020               Content Version: 12.8  © 8547-5277 [employer]. Care instructions adapted under license by [employer] (which disclaims liability or warranty for this information). If you have questions about a medical condition or this instruction, always ask your healthcare professional. [address] any warranty or liability for your use of this information.        Patient Education        Heart Failure: Care Instructions  Your Care Instructions     Heart failure occurs when your heart does not pump as much blood as the body needs. Failure does not mean that the heart has stopped pumping but rather that it is not pumping as well as it should. Over time, this causes fluid buildup in your lungs and other parts of your body. Fluid buildup can cause shortness of breath, fatigue, swollen ankles, and other problems. By taking medicines regularly, reducing sodium (salt) in your diet, checking your weight every day, and making lifestyle changes, you can feel better and live longer. Follow-up care is a key part of your treatment and safety. Be sure to make and go to all appointments, and call your doctor if you are having problems. It's also a good idea to know your test results and keep a list of the medicines you take. How can you care for yourself at home? Medicines    · Be safe with medicines. Take your medicines exactly as prescribed. Call your doctor if you think you are having a problem with your medicine.     · Do not take any vitamins, over-the-counter medicine, or herbal products without talking to your doctor first. Yfn Dose not take ibuprofen (Advil or Motrin) and naproxen (Aleve) without talking to your doctor first. They could make your heart failure worse.     · You may take some of the following medicine. ? Angiotensin-converting enzyme inhibitors (ACEIs) or angiotensin II receptor blockers (ARBs) reduce the heart's workload, lower blood pressure, and reduce swelling. Taking an ACEI or ARB may lower your chance of needing to be hospitalized. ? Beta-blockers can slow heart rate, decrease blood pressure, and improve your condition. Taking a beta-blocker may lower your chance of needing to be hospitalized. ? Diuretics, also called water pills, reduce swelling. You will get more details on the specific medicines your doctor prescribes. Diet    · Your doctor may suggest that you limit sodium. Your doctor can tell you how much sodium is right for you. An example is less than 3,000 mg a day.  This includes all the salt you eat in cooking or in packaged foods. People get most of their sodium from processed foods. Fast food and restaurant meals also tend to be very high in sodium.     · Ask your doctor how much liquid you can drink each day. You may have to limit liquids. Weight    · Weigh yourself without clothing at the same time each day. Record your weight. Call your doctor if you have a sudden weight gain, such as more than 2 to 3 pounds in a day or 5 pounds in a week. (Your doctor may suggest a different range of weight gain.) A sudden weight gain may mean that your heart failure is getting worse. Activity level    · Start light exercise (if your doctor says it is okay). Even if you can only do a small amount, exercise will help you get stronger, have more energy, and manage your weight and your stress. Walking is an easy way to get exercise. Start out by walking a little more than you did before. Bit by bit, increase the amount you walk.     · When you exercise, watch for signs that your heart is working too hard. You are pushing yourself too hard if you cannot talk while you are exercising. If you become short of breath or dizzy or have chest pain, stop, sit down, and rest.     · If you feel \"wiped out\" the day after you exercise, walk slower or for a shorter distance until you can work up to a better pace.     · Get enough rest at night. Sleeping with 1 or 2 pillows under your upper body and head may help you breathe easier. Lifestyle changes    · Do not smoke. Smoking can make a heart condition worse. If you need help quitting, talk to your doctor about stop-smoking programs and medicines. These can increase your chances of quitting for good. Quitting smoking may be the most important step you can take to protect your heart.     · Limit alcohol to 2 drinks a day for men and 1 drink a day for women. Too much alcohol can cause health problems.     · Avoid getting sick from colds and the flu.  Get a pneumococcal vaccine shot. If you have had one before, ask your doctor whether you need another dose. Get a flu shot each year. If you must be around people with colds or the flu, wash your hands often. When should you call for help? Call 911 if you have symptoms of sudden heart failure such as:    · You have severe trouble breathing.     · You cough up pink, foamy mucus.     · You have a new irregular or rapid heartbeat. Call your doctor now or seek immediate medical care if:    · You have new or increased shortness of breath.     · You are dizzy or lightheaded, or you feel like you may faint.     · You have sudden weight gain, such as more than 2 to 3 pounds in a day or 5 pounds in a week. (Your doctor may suggest a different range of weight gain.)     · You have increased swelling in your legs, ankles, or feet.     · You are suddenly so tired or weak that you cannot do your usual activities. Watch closely for changes in your health, and be sure to contact your doctor if you develop new symptoms. Where can you learn more? Go to http://www.gray.com/  Enter Z322 in the search box to learn more about \"Heart Failure: Care Instructions. \"  Current as of: August 31, 2020               Content Version: 12.8  © 2006-2021 [employer]. Care instructions adapted under license by [employer] (which disclaims liability or warranty for this information). If you have questions about a medical condition or this instruction, always ask your healthcare professional. [place] [address] any warranty or liability for your use of this information.          ___________________________________________________________________________________________________________________________________

## 2021-08-29 NOTE — PROGRESS NOTES
JOSEAR from [place], [facility]. Patient in stable condition with resps even/unlabored. NAD noted. Patient on room air. Safety measures noted. Will continue to monitor per policy.

## 2021-08-29 NOTE — PROGRESS NOTES
Patient with no IV access right now. Called ICU rover, ED, and ICU to to see if anyone was available, but no one could come. Dr. Melia Stein notified via [employer] and said it was ok to keep IV out since patient may be discharging tomorrow. This RN offered to give patient his PRN Norco, since he was unable to receive his IV morphine, but patient refused and said \"it doesn't matter because it won't work. \" Will continue to monitor.

## 2021-08-29 NOTE — PROGRESS NOTES
Patient called this RN to room, requesting to leave against medical advice. This RN asked patient why he wanted to leave AMA, to which he replied \"they said they were gonna send me home today anyway\". This RN explained to patient that the doctor will evaluate him this morning and determine if he is safe and appropriate for discharge. Patient still wanting to leave AMA. This RN notified Dr. Isaiah Dakins of patient wanting to leave AMA at  via [employer]. Charge RN, [facility], spoke with Dr. Isaiah Dakins via telephone at approximately [phone]. Charge RN, [facility] and this RN spoke with patient. Patient confirms that he wants to leave AMA because \"they said they'll discharge me today anyway\". This RN explained that the physician did not feel he was appropriate for discharge yesterday due to his wheezing. This RN informed patient that the doctor will be here in just a few hours and he can address discharge plans with her at that time. Patient stated, \"oh, my bad. I read the clock wrong. I will just wait til the doctor comes in and talk to her\". This RN asked patient if he had any other needs or concerns at this time, to which he declined. Patient is currently wheezing but in no apparent respiratory distress with respirations even and unlabored on room air. Of note, patient is wheezing in upper airway only and he is not wheezing while he is sleeping or talking.

## 2021-08-29 NOTE — PROGRESS NOTES
Oxygen Qualifier       Room air: SpO2 with O2 and liter flow   Resting SpO2 99% none   Ambulating SpO2  refused 98% no 02         Completed by:    Pamela Lowry, PTA

## 2021-08-29 NOTE — PROGRESS NOTES
[facility] CARDIOLOGY PROGRESS NOTE           8/29/2021 11:27 AM    Admit Date: 8/23/2021      Subjective:   No inc sob. Objective:      Vitals:    08/28/21 2045 08/28/21 2252 08/29/21 0337 08/29/21 0808   BP:  138/87  104/82   Pulse:  82  80   Resp:  19  18   Temp:  98 °F (36.7 °C)  98 °F (36.7 °C)   SpO2: 96% 97%  96%   Weight:   86.7 kg (191 lb 1.6 oz)    Height:           Physical Exam:  General-No Acute Distress  Neck- supple, no JVD  CV- regular rate and rhythm no MRG  Lung- harsh bs's  Abd- soft, nontender, nondistended  Ext- tr edema bilaterally. Skin- warm and dry    Data Review:   Recent Labs     08/28/21  0808   *   K 4.1   MG 2.2   BUN 25*   CREA 0.86   *   WBC 11.2*   HGB 14.0   HCT 42.2          Assessment/Plan:     Covid  Respiratory insufficiency  Nicm    ////    CV stable  On good cardiac rx  On standby.     Demetria Beth MD  8/29/2021 11:27 AM

## 2021-08-29 NOTE — PROGRESS NOTES
This RN was notified by primary RN, [place], that patient expressed wanting to leave AMA. This RN spoke with Dr. Seth Robbins via telephone at approximately [phone] about patient expressing his desire to leave AMA. Dr. Seth Robbins is currently at [address] [place] [address] seeing patients and advised this RN to encourage patient to stay but since patient is alert and oriented to person, place, situation, and time, he is able to leave AMA if he chooses to despite having the risks of doing such explained to him. Patient has not requested to speak to physician. This RN and primary RN, [place], will speak with patient and encourage he stay inpatient until he is safely discharged by his attending physician.

## 2021-09-01 ENCOUNTER — APPOINTMENT (OUTPATIENT)
Dept: GENERAL RADIOLOGY | Age: 56
End: 2021-09-01
Attending: STUDENT IN AN ORGANIZED HEALTH CARE EDUCATION/TRAINING PROGRAM
Payer: COMMERCIAL

## 2021-09-01 ENCOUNTER — HOSPITAL ENCOUNTER (EMERGENCY)
Age: 56
Discharge: HOME | End: 2021-09-02
Attending: EMERGENCY MEDICINE
Payer: COMMERCIAL

## 2021-09-01 DIAGNOSIS — R06.1 STRIDOR: Primary | ICD-10-CM

## 2021-09-01 DIAGNOSIS — U07.1 COVID-19: ICD-10-CM

## 2021-09-01 DIAGNOSIS — I10 HYPERTENSION, UNSPECIFIED TYPE: ICD-10-CM

## 2021-09-01 LAB
BNP SERPL-MCNC: 212 PG/ML (ref 5–125)
MAGNESIUM SERPL-MCNC: 1.8 MG/DL (ref 1.8–2.4)
TROPONIN-HIGH SENSITIVITY: 239.3 PG/ML (ref 0–14)

## 2021-09-01 PROCEDURE — 93005 ELECTROCARDIOGRAM TRACING: CPT | Performed by: EMERGENCY MEDICINE

## 2021-09-01 PROCEDURE — 84484 ASSAY OF TROPONIN QUANT: CPT

## 2021-09-01 PROCEDURE — 99285 EMERGENCY DEPT VISIT HI MDM: CPT

## 2021-09-01 PROCEDURE — 71045 X-RAY EXAM CHEST 1 VIEW: CPT

## 2021-09-01 PROCEDURE — 83880 ASSAY OF NATRIURETIC PEPTIDE: CPT

## 2021-09-01 PROCEDURE — 80053 COMPREHEN METABOLIC PANEL: CPT

## 2021-09-01 PROCEDURE — 83735 ASSAY OF MAGNESIUM: CPT

## 2021-09-01 PROCEDURE — 96374 THER/PROPH/DIAG INJ IV PUSH: CPT

## 2021-09-01 RX ORDER — SODIUM CHLORIDE 0.9 % (FLUSH) 0.9 %
5-10 SYRINGE (ML) INJECTION AS NEEDED
Status: DISCONTINUED | OUTPATIENT
Start: 2021-09-01 | End: 2021-09-02 | Stop reason: HOSPADM

## 2021-09-01 RX ORDER — SODIUM CHLORIDE 0.9 % (FLUSH) 0.9 %
5-10 SYRINGE (ML) INJECTION EVERY 8 HOURS
Status: DISCONTINUED | OUTPATIENT
Start: 2021-09-01 | End: 2021-09-02 | Stop reason: HOSPADM

## 2021-09-02 ENCOUNTER — APPOINTMENT (OUTPATIENT)
Dept: CT IMAGING | Age: 56
End: 2021-09-02
Attending: EMERGENCY MEDICINE
Payer: COMMERCIAL

## 2021-09-02 VITALS
BODY MASS INDEX: 26.74 KG/M2 | WEIGHT: 191 LBS | OXYGEN SATURATION: 99 % | SYSTOLIC BLOOD PRESSURE: 148 MMHG | DIASTOLIC BLOOD PRESSURE: 98 MMHG | RESPIRATION RATE: 18 BRPM | HEART RATE: 105 BPM | TEMPERATURE: 98.1 F | HEIGHT: 71 IN

## 2021-09-02 LAB
ALBUMIN SERPL-MCNC: 2.8 G/DL (ref 3.5–5)
ALBUMIN/GLOB SERPL: 0.8 {RATIO} (ref 1.2–3.5)
ALP SERPL-CCNC: 91 U/L (ref 50–136)
ALT SERPL-CCNC: 155 U/L (ref 12–65)
ANION GAP SERPL CALC-SCNC: 14 MMOL/L (ref 7–16)
AST SERPL-CCNC: 98 U/L (ref 15–37)
ATRIAL RATE: 122 BPM
BASOPHILS # BLD: 0 K/UL (ref 0–0.2)
BASOPHILS NFR BLD: 0 % (ref 0–2)
BILIRUB SERPL-MCNC: 0.5 MG/DL (ref 0.2–1.1)
BUN SERPL-MCNC: 15 MG/DL (ref 6–23)
CALCIUM SERPL-MCNC: 8.4 MG/DL (ref 8.3–10.4)
CALCULATED P AXIS, ECG09: 38 DEGREES
CALCULATED R AXIS, ECG10: -12 DEGREES
CALCULATED T AXIS, ECG11: 153 DEGREES
CHLORIDE SERPL-SCNC: 108 MMOL/L (ref 98–107)
CO2 SERPL-SCNC: 17 MMOL/L (ref 21–32)
CREAT SERPL-MCNC: 0.85 MG/DL (ref 0.8–1.5)
DIAGNOSIS, 93000: NORMAL
DIFFERENTIAL METHOD BLD: ABNORMAL
EOSINOPHIL # BLD: 0.2 K/UL (ref 0–0.8)
EOSINOPHIL NFR BLD: 3 % (ref 0.5–7.8)
ERYTHROCYTE [DISTWIDTH] IN BLOOD BY AUTOMATED COUNT: 13.1 % (ref 11.9–14.6)
GLOBULIN SER CALC-MCNC: 3.3 G/DL (ref 2.3–3.5)
GLUCOSE SERPL-MCNC: 136 MG/DL (ref 65–100)
HCT VFR BLD AUTO: 38.8 % (ref 41.1–50.3)
HGB BLD-MCNC: 12.6 G/DL (ref 13.6–17.2)
IMM GRANULOCYTES # BLD AUTO: 0.1 K/UL (ref 0–0.5)
IMM GRANULOCYTES NFR BLD AUTO: 2 % (ref 0–5)
LYMPHOCYTES # BLD: 1.7 K/UL (ref 0.5–4.6)
LYMPHOCYTES NFR BLD: 29 % (ref 13–44)
MCH RBC QN AUTO: 30.7 PG (ref 26.1–32.9)
MCHC RBC AUTO-ENTMCNC: 32.5 G/DL (ref 31.4–35)
MCV RBC AUTO: 94.6 FL (ref 79.6–97.8)
MONOCYTES # BLD: 0.6 K/UL (ref 0.1–1.3)
MONOCYTES NFR BLD: 10 % (ref 4–12)
NEUTS SEG # BLD: 3.2 K/UL (ref 1.7–8.2)
NEUTS SEG NFR BLD: 56 % (ref 43–78)
NRBC # BLD: 0.02 K/UL (ref 0–0.2)
P-R INTERVAL, ECG05: 154 MS
PLATELET # BLD AUTO: 384 K/UL (ref 150–450)
PMV BLD AUTO: 8.5 FL (ref 9.4–12.3)
POTASSIUM SERPL-SCNC: 4.2 MMOL/L (ref 3.5–5.1)
PROT SERPL-MCNC: 6.1 G/DL (ref 6.3–8.2)
Q-T INTERVAL, ECG07: 328 MS
QRS DURATION, ECG06: 98 MS
QTC CALCULATION (BEZET), ECG08: 467 MS
RBC # BLD AUTO: 4.1 M/UL (ref 4.23–5.6)
SODIUM SERPL-SCNC: 139 MMOL/L (ref 136–145)
TROPONIN-HIGH SENSITIVITY: 235.5 PG/ML (ref 0–14)
VENTRICULAR RATE, ECG03: 122 BPM
WBC # BLD AUTO: 5.8 K/UL (ref 4.3–11.1)

## 2021-09-02 PROCEDURE — 85025 COMPLETE CBC W/AUTO DIFF WBC: CPT

## 2021-09-02 PROCEDURE — 74011000250 HC RX REV CODE- 250: Performed by: EMERGENCY MEDICINE

## 2021-09-02 PROCEDURE — 94640 AIRWAY INHALATION TREATMENT: CPT

## 2021-09-02 PROCEDURE — 74011000636 HC RX REV CODE- 636: Performed by: EMERGENCY MEDICINE

## 2021-09-02 PROCEDURE — 84484 ASSAY OF TROPONIN QUANT: CPT

## 2021-09-02 PROCEDURE — 94664 DEMO&/EVAL PT USE INHALER: CPT

## 2021-09-02 PROCEDURE — 74011250636 HC RX REV CODE- 250/636: Performed by: EMERGENCY MEDICINE

## 2021-09-02 PROCEDURE — 74011000258 HC RX REV CODE- 258: Performed by: EMERGENCY MEDICINE

## 2021-09-02 PROCEDURE — 70491 CT SOFT TISSUE NECK W/DYE: CPT

## 2021-09-02 RX ORDER — IPRATROPIUM BROMIDE AND ALBUTEROL SULFATE 2.5; .5 MG/3ML; MG/3ML
3 SOLUTION RESPIRATORY (INHALATION)
Status: COMPLETED | OUTPATIENT
Start: 2021-09-02 | End: 2021-09-02

## 2021-09-02 RX ORDER — SODIUM CHLORIDE 0.9 % (FLUSH) 0.9 %
10 SYRINGE (ML) INJECTION
Status: COMPLETED | OUTPATIENT
Start: 2021-09-02 | End: 2021-09-02

## 2021-09-02 RX ORDER — LORAZEPAM 2 MG/ML
0.5 INJECTION INTRAMUSCULAR
Status: COMPLETED | OUTPATIENT
Start: 2021-09-02 | End: 2021-09-02

## 2021-09-02 RX ADMIN — IOPAMIDOL 80 ML: 755 INJECTION, SOLUTION INTRAVENOUS at 03:20

## 2021-09-02 RX ADMIN — IPRATROPIUM BROMIDE AND ALBUTEROL SULFATE 3 ML: .5; 3 SOLUTION RESPIRATORY (INHALATION) at 00:45

## 2021-09-02 RX ADMIN — Medication 10 ML: at 03:20

## 2021-09-02 RX ADMIN — SODIUM CHLORIDE 100 ML: 900 INJECTION, SOLUTION INTRAVENOUS at 03:20

## 2021-09-02 RX ADMIN — LORAZEPAM 0.5 MG: 2 INJECTION INTRAMUSCULAR; INTRAVENOUS at 03:15

## 2021-09-02 NOTE — DISCHARGE INSTRUCTIONS
Follow-up with Dr. Leonidas Salazar, call his office to be seen later today. Return to the emergency department if your symptoms worsen.
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
## 2021-09-02 NOTE — ED TRIAGE NOTES
Patient arrives via EMS with COVID. States has been short of breath all day. EMS noted wheezing upon arrival gave terbutaline. Room air O2 was 95 % but place on oxygen by EMS. Alert and oriented.

## 2021-09-02 NOTE — ED NOTES
I have reviewed discharge instructions with the patient. The patient verbalized understanding. Patient left ED via Discharge Method: ambulatory to Home with self. Opportunity for questions and clarification provided. Patient given 0 scripts. To continue your aftercare when you leave the hospital, you may receive an automated call from our care team to check in on how you are doing. This is a free service and part of our promise to provide the best care and service to meet your aftercare needs.  If you have questions, or wish to unsubscribe from this service please call [phone]. Thank you for Choosing our [facility] Emergency Department.

## 2021-09-14 PROBLEM — M79.10 MYALGIA: Status: ACTIVE | Noted: 2021-09-14

## 2021-09-15 NOTE — ADT AUTH CERT NOTES
Renal Failure, Acute - Care Day 6 (8/28/2021) by Farhat Alex RN       Review Status Review Entered   Completed 9/15/2021 11:07      Criteria Review      Care Day: 6 Care Date: 8/28/2021 Level of Care: Telemetry    Guideline Day 4    Level Of Care    (X) Floor to discharge    9/15/2021 11:07:42 EDT by Enma Mathews      floor    Clinical Status    ( ) * Hemodynamic stability    (X) * Mental status at baseline    9/15/2021 11:07:42 EDT by Enma Mathews      Alert, cooperative,    (X) * Tachypnea absent    9/15/2021 11:07:42 EDT by Enma Mathews      Resp rate 20    ( ) * Hypoxemia absent    ( ) * Etiology requiring inpatient treatment absent    ( ) * Electrolyte abnormalities absent or acceptable for next level of care    ( ) * Acid-base abnormalities absent    ( ) * Volume status at baseline or acceptable for next level of care    ( ) * Diet tolerated    ( ) * Dialysis not needed or access and plan established    ( ) * Discharge plans and education understood    Activity    ( ) * Ambulatory or acceptable for next level of care    Routes    ( ) * Oral hydration    ( ) * Oral medications or regimen acceptable for next level of care    ( ) * Oral diet or acceptable for next level of care    * Milestone   Additional Notes   F/U COVID, YAO, NSTEMI       \"63 yo male with PMH of HFrEF EF 20-25%, VFIB, s/p PPM, CAD, HTN, HLP admitted with COVID 19, YAO and elevated troponin. \" Hx of migraines, anxiety. Troponin as high as 453.5. Diagnosed with NSTEMI likely related to his sCHF and COVID infection. Cardiology recommended conservative management. Will need to follow up with cardiology outpatient. \"He is on room air. CXR shows patchy infiltrates. He is on course of decadron. Remdesivir contraindicated due to YAO. \" IV fluids given for his YAO which has resolved.  Creatine was 1.7 but now 1.1.        Diagnosed with COVID on 8/23       Edema noted to let upper extremity and no DVT seen.        On RA but with cough and wheezing. SOB the same as yesterday. Weakness. Drinking well. Not eating much. Asking for more pain medication. Also asking when he can go home.        Is/Os (+) 8L, but this is not accurate since output has not been monitored      Patient Vitals for the past 24 hrs:     Temp Pulse Resp BP SpO2   08/28/21 0737 97.2 °F (36.2 °C) 82 20 (!) 136/92 98 %   08/28/21 0426 97.6 °F (36.4 °C) 88 20 119/81 97 %   08/27/21 2335 98.5 °F (36.9 °C) 64 20 108/74 94 %   08/27/21 2035 - - - - 93 %   08/27/21 2012 98.5 °F (36.9 °C) 78 20 118/89 98 %   08/27/21 1530 97.8 °F (36.6 °C) 73 20 133/73 94 %   08/27/21 1220 - 89 - - -   08/27/21 1138 97.7 °F (36.5 °C) 75 20 137/83 95 %        On RA   Intake and Output:   Current Shift: No intake/output data recorded. Last three shifts: 08/26 1901 - 08/28 0700   In: 1440 [P.O.:1440]   Out: -        Physical Exam:    General: Alert, cooperative, mildly coughing and wheezing noted. No respiratory distress   Eyes: Conjunctivae/corneas clear. Ears: Normal TMs and external ear canals both ears. Nose: Nares normal. Septum midline. Mouth/Throat: No obvious deformity. Neck: no JVD. Back:   deferred. Lungs:   Diffuse wheezing but improved compared to yesterday. Wet/fluid like breath sounds   Heart: Regular rate and rhythm, S1, S2 normal   Abdomen:   Soft, tenderness to LUQ without rebound. Bowel sounds normal.    Extremities: Trace edema. Pulses: 2+ and symmetric all extremities. Skin: Skin color, texture, turgor normal. No rashes or lesions   Lymph nodes: Cervical, supraclavicular, and axillary nodes normal.   Neurologic: CNII-XII intact.  Limited ROM in bed.            Lab/Data Review:       Recent Results   Recent Results (from the past 24 hour(s))   CBC W/O DIFF     Collection Time: 08/28/21  8:08 AM   Result Value Ref Range     WBC 11.2 (H) 4.3 - 11.1 K/uL     RBC 4.51 4.23 - 5.6 M/uL     HGB 14.0 13.6 - 17.2 g/dL     HCT 42.2 41.1 - 50.3 %     MCV 93.6 79.6 - 97.8 FL     MCH 31.0 26.1 - 32.9 PG     MCHC 33.2 31.4 - 35.0 g/dL     RDW 12.8 11.9 - 14.6 %     PLATELET 142 171 - 434 K/uL     MPV 10.3 9.4 - 12.3 FL     ABSOLUTE NRBC 0.00 0.0 - 0.2 K/uL              Assessment/ Plan:       Principal Problem:     YAO (acute kidney injury) ([facility] (8/23/2021)       Active Problems:     Chronic systolic (congestive) heart failure ([address] (4/21/2011)         HTN (hypertension) (11/29/2011)         Non-ischemic cardiomyopathy ([facility] (8/47/3400)         Metabolic acidosis (2/1/2533)         Hypokalemia (7/3/2020)         Hyponatremia (8/24/2021)         Acute respiratory failure with hypoxia ([address] (8/24/2021)         Acute respiratory failure due to COVID-19 ([facility] (8/24/2021)         NSTEMI (non-ST elevated myocardial infarction) ([address] (8/24/2021)         Acute respiratory failure with hypoxia ([address] (8/24/2021)      Acute respiratory failure due to COVID-19 ([address] (8/24/2021)   · Currently on room air    · Decadron since 8/24 that I increased to BID on 8/27   · D-dimer 0.5 expected with Covid.  CRP 0.6. · Not remdesivir candidate as patient is not hypoxic and also acute kidney injury on admission. · Not a candidate for ACTEMRA- on room air   · Scheduled albuterol. · Wheezing significant on exam but improved from yesterday. · Ordered IS   · Fluid like sounds, will order chest x ray. Also restart his lasix but only once daily.        YAO (acute kidney injury) ([facility] (7/49/1567) with metabolic acidosis:   Prerenal POA   · Resolved       Active Problems:     Chronic systolic (congestive) heart failure ([address] (4/21/2011)   HTN (hypertension) (11/29/2011)   ·   Cardiology following    · Continued coreg, hydralazine    · ARB held due to YAO   · Add back lasix 40mg but only once daily. Seems to have fluid overload on exam today.     · Order strict Is and Os.          NSTEMI (non-ST elevated myocardial infarction) ([facility] (8/24/2021)   · appreciate cardiology   · Continue asa, lipitor        Anxiety:   · Continued xanax        Hyponatremia:   · Improving. Encourage good oral intake.        Elevated LFTS:   · improving       LUE edema:   Duplex ultrasound negative for DVT,        Hypermagnesemia - Hold Mg supplement.        Symptomatic with SOB and weakness. Not eating much. Do not think safe to discharge until respiratory physical exam improved. Hopeful dc in two days if wheezing, SOB, and oral intake improves. Will not increase pain medications since no obvious pain during exam and can increase likelihood of respiratory failure.        Medications   albuterol (PROVENTIL HFA, VENTOLIN HFA, PROAIR HFA) inhaler 2 Puff    Dose: 2 Puff   Freq: EVERY 4 HOURS RESP Route: IN   Start: 08/27/21 0900 End: 08/29/21 0033      ALPRAZolam (XANAX) tablet 1 mg    Dose: 1 mg   Freq: EVERY BEDTIME Route: PO   Indications of Use: anxiety      carvediloL (COREG) tablet 25 mg    Dose: 25 mg   Freq: 2 TIMES DAILY WITH MEALS Route: PO   Start: 08/24/21 0800 End: 08/29/21 1457      dexAMETHasone (DECADRON) tablet 6 mg    Dose: 6 mg   Freq: EVERY 12 HOURS Route: PO   Start: 08/27/21 0900 End: 08/29/21 1457      furosemide (LASIX) tablet 40 mg    Dose: 40 mg   Freq: DAILY Route: PO   Start: 08/28/21 1000 End: 08/29/21 1457      hydrALAZINE (APRESOLINE) tablet 25 mg    Dose: 25 mg   Freq: 4 TIMES DAILY Route: PO   Start: 08/24/21 1300 End: 08/28/21 1304      HYDROcodone-acetaminophen (NORCO)  mg tablet 1 Tablet    Dose: 1 Tablet   Freq: EVERY 6 HOURS AS NEEDED Route: PO x3   PRN Reason:  Moderate Pain      promethazine (PHENERGAN) with saline injection 25 mg    Dose: 25 mg   Freq: EVERY 4 HOURS AS NEEDED Route: IV x3   PRN Reason: Nausea or Vomiting   Start: 08/24/21 0330 End: 08/29/21 1457      Renal Failure, Acute - Care Day 5 (8/27/2021) by Connor Camarena RN       Review Status Review Entered   Completed 9/15/2021 11:00      Criteria Review      Care Day: 5 Care Date: 8/27/2021 Level of Care: Telemetry    Guideline Day 3    Level Of Care    (X) Floor    9/15/2021 11:00:39 EDT by Aquiles Petersen      Remote telemetry    Clinical Status    ( ) * Mental status at baseline or improved    ( ) * Respiratory status at baseline or improved    Routes    ( ) * Oral hydration    ( ) * Oral medications    ( ) * Oral diet    * Milestone   Additional Notes   U COVID, YAO, NSTEMI       \"63 yo male with PMH of HFrEF EF 20-25%, VFIB, s/p PPM, CAD, HTN, HLP admitted with COVID 19, YAO and elevated troponin. \" Hx of migraines, anxiety. Troponin as high as 453.5. Diagnosed with NSTEMI likely related to his sCHF and COVID infection. Cardiology recommended conservative management. Will need to follow up with cardiology outpatient. \"He is on room air. CXR shows patchy infiltrates. He is on course of decadron. Remdesivir contraindicated due to YAO. \" IV fluids given for his YAO which has resolved. Creatine was 1.7 but now 1.1.        Diagnosed with COVID on 8/23       Edema noted to let upper extremity and no DVT seen.        On RA but with significant cough and wheezing. Does stated his SOB has improved compared to yesterday. Weakness. Not eating or drinking well due to nausea.         Temp Pulse Resp BP SpO2   08/27/21 0741 98.2 °F (36.8 °C) 92 20 112/79 94 %   08/27/21 0400 - 87 - - -   08/27/21 0316 98.3 °F (36.8 °C) 87 20 110/78 96 %   08/27/21 0000 - 98 - - -   08/26/21 2231 98.6 °F (37 °C) 98 20 98/66 96 %   08/26/21 2000 - 88 - - -   08/26/21 1929 98 °F (36.7 °C) 88 21 118/83 98 %   08/26/21 1600 - 89 - - -   08/26/21 1528 97.9 °F (36.6 °C) 97 20 118/87 96 %   08/26/21 1200 - 78 - - -   08/26/21 1128 97.8 °F (36.6 °C) 92 20 109/68 96 %        On RA   Intake and Output:   Current Shift: No intake/output data recorded. Last three shifts: 08/25 1901 - 08/27 0700   In: 1560 [P.O.:1560]   Out: -        Physical Exam:    General: Alert, cooperative, constantly coughing and wheezing noted. Mild increase work of breathing noted. Eyes: Conjunctivae/corneas clear. Ears: Normal TMs and external ear canals both ears. Nose: Nares normal. Septum midline. Mouth/Throat: Dry tongue. Neck: no JVD. Back:   deferred. Lungs:   Diffuse wheezing with poor airway movement. Heart: Regular rate and rhythm, S1, S2 normal   Abdomen:   Soft, non-tender. Bowel sounds normal. No masses,  No organomegaly. Extremities: Extremities normal, atraumatic, no cyanosis or edema. Pulses: 2+ and symmetric all extremities. Skin: Skin color, texture, turgor normal. No rashes or lesions   Lymph nodes: Cervical, supraclavicular, and axillary nodes normal.   Neurologic: CNII-XII intact. Limited ROM in bed.            Lab/Data Review:       Recent Results   Recent Results (from the past 24 hour(s))   C REACTIVE PROTEIN, QT     Collection Time: 08/26/21  9:33 AM   Result Value Ref Range     C-Reactive protein 0.6 0.0 - 0.9 mg/dL   D DIMER     Collection Time: 08/26/21  9:33 AM   Result Value Ref Range     D DIMER 0.56 (H) <0.56 ug/ml(FEU)              Assessment/ Plan:       Principal Problem:     YAO (acute kidney injury) ([address] (8/23/2021)       Active Problems:     Chronic systolic (congestive) heart failure ([address] (4/21/2011)         HTN (hypertension) (11/29/2011)         Non-ischemic cardiomyopathy ([address] (3/54/5510)         Metabolic acidosis (3/5/3987)         Hypokalemia (7/3/2020)         Hyponatremia (8/24/2021)         Acute respiratory failure with hypoxia ([address] (8/24/2021)         Acute respiratory failure due to COVID-19 ([address] (8/24/2021)         NSTEMI (non-ST elevated myocardial infarction) ([address] (8/24/2021)         Acute respiratory failure with hypoxia ([address] (8/24/2021)      Acute respiratory failure due to COVID-19 ([address] (8/24/2021)   ·   currently on room air    · Decadron since 8/24   · D-dimer 0.5 expected with Covid.  CRP 0.6. · Not remdesivir candidate as patient is not hypoxic and also acute kidney injury on admission.    · Not a candidate for ACTEMRA- on room air · Add scheduled albuterol. · Wheezing significant on exam. Will increase decadron to BID. · Order IS       YAO (acute kidney injury) ([facility] (3/11/4296) with metabolic acidosis:   Prerenal POA   · Resolved       Active Problems:     Chronic systolic (congestive) heart failure ([facility] (4/21/2011)   HTN (hypertension) (11/29/2011)   ·   Cardiology following    · Continued coreg, hydralazine    · ARB held due to YAO         NSTEMI (non-ST elevated myocardial infarction) ([facility] (8/24/2021)   · appreciate cardiology   · Continue asa, lipitor        Anxiety:   · Continued xanax        Hyponatremia:   · Improving. Encourage good oral intake.        Elevated LFTS:   · improving       LUE edema:   Duplex ultrasound negative for DVT,        Hypermagnesemia - Hold Mg supplement.        Very symptomatic with SOB and weakness. Not eating much. Do not think safe to discharge. Hopeful dc in two days if wheezing, SOB, and oral intake improves.        Medications,   albuterol (PROVENTIL HFA, VENTOLIN HFA, PROAIR HFA) inhaler 2 Puff    Dose: 2 Puff   Freq: EVERY 4 HOURS RESP Route: IN   Start: 08/27/21 0900 End: 08/29/21 0033      atorvastatin (LIPITOR) tablet 80 mg    Dose: 80 mg   Freq: EVERY BEDTIME Route: PO   Start: 08/24/21 2200 End: 08/29/21 1457      carvediloL (COREG) tablet 25 mg    Dose: 25 mg   Freq: 2 TIMES DAILY WITH MEALS Route: PO   Start: 08/24/21 0800 End: 08/29/21 1457      dexAMETHasone (DECADRON) tablet 6 mg    Dose: 6 mg   Freq: EVERY 12 HOURS Route: PO   Start: 08/27/21 0900 End: 08/29/21 1457      enoxaparin (LOVENOX) injection 40 mg    Dose: 40 mg   Freq: EVERY 24 HOURS Route: SC   Start: 08/24/21 1000 End: 08/29/21 1457      hydrALAZINE (APRESOLINE) tablet 25 mg    Dose: 25 mg   Freq: 4 TIMES DAILY Route: PO   Start: 08/24/21 1300 End: 08/28/21 1304      HYDROcodone-acetaminophen (NORCO)  mg tablet 1 Tablet    Dose: 1 Tablet   Freq: EVERY 6 HOURS AS NEEDED Route: PO x3   PRN Reason:  Moderate Pain Start: 08/24/21 0700 End: 08/29/21 1457      morphine injection 2 mg    Dose: 2 mg   Freq: EVERY 4 HOURS AS NEEDED Route: IV x3   PRN Reason: Severe Pain   Start: 08/24/21 0752 End: 08/28/21 0912      promethazine (PHENERGAN) with saline injection 25 mg    Dose: 25 mg   Freq: EVERY 4 HOURS AS NEEDED Route: IV x4   PRN Reason: Nausea or Vomiting   Start: 08/24/21 0330 End: 08/29/21 1452

## 2021-12-27 ENCOUNTER — APPOINTMENT (OUTPATIENT)
Dept: GENERAL RADIOLOGY | Age: 56
End: 2021-12-27
Attending: STUDENT IN AN ORGANIZED HEALTH CARE EDUCATION/TRAINING PROGRAM
Payer: COMMERCIAL

## 2021-12-27 ENCOUNTER — HOSPITAL ENCOUNTER (EMERGENCY)
Age: 56
Discharge: LEFT BEFORE DISCHARGE | End: 2021-12-28
Attending: EMERGENCY MEDICINE
Payer: COMMERCIAL

## 2021-12-27 DIAGNOSIS — J42 CHRONIC BRONCHITIS, UNSPECIFIED CHRONIC BRONCHITIS TYPE (HCC): ICD-10-CM

## 2021-12-27 DIAGNOSIS — R06.02 SOB (SHORTNESS OF BREATH): Primary | ICD-10-CM

## 2021-12-27 DIAGNOSIS — G89.29 OTHER CHRONIC PAIN: ICD-10-CM

## 2021-12-27 LAB
ALBUMIN SERPL-MCNC: 3.2 G/DL (ref 3.5–5)
ALBUMIN/GLOB SERPL: 0.9 {RATIO} (ref 1.2–3.5)
ALP SERPL-CCNC: 204 U/L (ref 50–136)
ALT SERPL-CCNC: 280 U/L (ref 12–65)
ANION GAP SERPL CALC-SCNC: 10 MMOL/L (ref 7–16)
AST SERPL-CCNC: 639 U/L (ref 15–37)
BASOPHILS # BLD: 0 K/UL (ref 0–0.2)
BASOPHILS NFR BLD: 1 % (ref 0–2)
BILIRUB SERPL-MCNC: 0.9 MG/DL (ref 0.2–1.1)
BNP SERPL-MCNC: 1084 PG/ML (ref 5–125)
BUN SERPL-MCNC: 13 MG/DL (ref 6–23)
CALCIUM SERPL-MCNC: 8.6 MG/DL (ref 8.3–10.4)
CHLORIDE SERPL-SCNC: 107 MMOL/L (ref 98–107)
CO2 SERPL-SCNC: 23 MMOL/L (ref 21–32)
COVID-19 RAPID TEST, COVR: NOT DETECTED
CREAT SERPL-MCNC: 1.06 MG/DL (ref 0.8–1.5)
DIFFERENTIAL METHOD BLD: ABNORMAL
EOSINOPHIL # BLD: 0 K/UL (ref 0–0.8)
EOSINOPHIL NFR BLD: 1 % (ref 0.5–7.8)
ERYTHROCYTE [DISTWIDTH] IN BLOOD BY AUTOMATED COUNT: 16.9 % (ref 11.9–14.6)
GLOBULIN SER CALC-MCNC: 3.4 G/DL (ref 2.3–3.5)
GLUCOSE SERPL-MCNC: 114 MG/DL (ref 65–100)
HCT VFR BLD AUTO: 37.8 % (ref 41.1–50.3)
HGB BLD-MCNC: 12.3 G/DL (ref 13.6–17.2)
IMM GRANULOCYTES # BLD AUTO: 0 K/UL (ref 0–0.5)
IMM GRANULOCYTES NFR BLD AUTO: 0 % (ref 0–5)
LIPASE SERPL-CCNC: 112 U/L (ref 73–393)
LYMPHOCYTES # BLD: 1.4 K/UL (ref 0.5–4.6)
LYMPHOCYTES NFR BLD: 27 % (ref 13–44)
MAGNESIUM SERPL-MCNC: 2 MG/DL (ref 1.8–2.4)
MCH RBC QN AUTO: 30.4 PG (ref 26.1–32.9)
MCHC RBC AUTO-ENTMCNC: 32.5 G/DL (ref 31.4–35)
MCV RBC AUTO: 93.6 FL (ref 79.6–97.8)
MONOCYTES # BLD: 0.3 K/UL (ref 0.1–1.3)
MONOCYTES NFR BLD: 6 % (ref 4–12)
NEUTS SEG # BLD: 3.3 K/UL (ref 1.7–8.2)
NEUTS SEG NFR BLD: 65 % (ref 43–78)
NRBC # BLD: 0 K/UL (ref 0–0.2)
PLATELET # BLD AUTO: 180 K/UL (ref 150–450)
PMV BLD AUTO: 9.6 FL (ref 9.4–12.3)
POTASSIUM SERPL-SCNC: 4.1 MMOL/L (ref 3.5–5.1)
PROT SERPL-MCNC: 6.6 G/DL (ref 6.3–8.2)
RBC # BLD AUTO: 4.04 M/UL (ref 4.23–5.6)
SODIUM SERPL-SCNC: 140 MMOL/L (ref 138–145)
SOURCE, COVRS: NORMAL
TROPONIN-HIGH SENSITIVITY: 281.8 PG/ML (ref 0–14)
WBC # BLD AUTO: 5.1 K/UL (ref 4.3–11.1)

## 2021-12-27 PROCEDURE — 93005 ELECTROCARDIOGRAM TRACING: CPT | Performed by: STUDENT IN AN ORGANIZED HEALTH CARE EDUCATION/TRAINING PROGRAM

## 2021-12-27 PROCEDURE — 74011000250 HC RX REV CODE- 250: Performed by: STUDENT IN AN ORGANIZED HEALTH CARE EDUCATION/TRAINING PROGRAM

## 2021-12-27 PROCEDURE — 84484 ASSAY OF TROPONIN QUANT: CPT

## 2021-12-27 PROCEDURE — 71046 X-RAY EXAM CHEST 2 VIEWS: CPT

## 2021-12-27 PROCEDURE — 80053 COMPREHEN METABOLIC PANEL: CPT

## 2021-12-27 PROCEDURE — 83735 ASSAY OF MAGNESIUM: CPT

## 2021-12-27 PROCEDURE — 83690 ASSAY OF LIPASE: CPT

## 2021-12-27 PROCEDURE — 93005 ELECTROCARDIOGRAM TRACING: CPT | Performed by: EMERGENCY MEDICINE

## 2021-12-27 PROCEDURE — 85025 COMPLETE CBC W/AUTO DIFF WBC: CPT

## 2021-12-27 PROCEDURE — 87635 SARS-COV-2 COVID-19 AMP PRB: CPT

## 2021-12-27 PROCEDURE — 83880 ASSAY OF NATRIURETIC PEPTIDE: CPT

## 2021-12-27 PROCEDURE — 94640 AIRWAY INHALATION TREATMENT: CPT

## 2021-12-27 PROCEDURE — 99285 EMERGENCY DEPT VISIT HI MDM: CPT

## 2021-12-27 PROCEDURE — 94664 DEMO&/EVAL PT USE INHALER: CPT

## 2021-12-27 RX ORDER — IPRATROPIUM BROMIDE AND ALBUTEROL SULFATE 2.5; .5 MG/3ML; MG/3ML
3 SOLUTION RESPIRATORY (INHALATION)
Status: COMPLETED | OUTPATIENT
Start: 2021-12-27 | End: 2021-12-27

## 2021-12-27 RX ORDER — SODIUM CHLORIDE 0.9 % (FLUSH) 0.9 %
5-10 SYRINGE (ML) INJECTION AS NEEDED
Status: DISCONTINUED | OUTPATIENT
Start: 2021-12-27 | End: 2021-12-27 | Stop reason: HOSPADM

## 2021-12-27 RX ORDER — SODIUM CHLORIDE 0.9 % (FLUSH) 0.9 %
5-10 SYRINGE (ML) INJECTION EVERY 8 HOURS
Status: DISCONTINUED | OUTPATIENT
Start: 2021-12-27 | End: 2021-12-27 | Stop reason: HOSPADM

## 2021-12-27 RX ADMIN — IPRATROPIUM BROMIDE AND ALBUTEROL SULFATE 3 ML: .5; 3 SOLUTION RESPIRATORY (INHALATION) at 16:48

## 2021-12-28 VITALS
TEMPERATURE: 98.4 F | BODY MASS INDEX: 26.32 KG/M2 | OXYGEN SATURATION: 98 % | SYSTOLIC BLOOD PRESSURE: 148 MMHG | HEART RATE: 95 BPM | DIASTOLIC BLOOD PRESSURE: 114 MMHG | WEIGHT: 188 LBS | HEIGHT: 71 IN | RESPIRATION RATE: 20 BRPM

## 2021-12-28 LAB
ATRIAL RATE: 113 BPM
CALCULATED P AXIS, ECG09: 56 DEGREES
CALCULATED R AXIS, ECG10: -24 DEGREES
CALCULATED T AXIS, ECG11: 115 DEGREES
DIAGNOSIS, 93000: NORMAL
P-R INTERVAL, ECG05: 158 MS
Q-T INTERVAL, ECG07: 332 MS
QRS DURATION, ECG06: 108 MS
QTC CALCULATION (BEZET), ECG08: 455 MS
TROPONIN-HIGH SENSITIVITY: 297 PG/ML (ref 0–14)
VENTRICULAR RATE, ECG03: 113 BPM

## 2021-12-28 PROCEDURE — 94640 AIRWAY INHALATION TREATMENT: CPT

## 2021-12-28 PROCEDURE — 84484 ASSAY OF TROPONIN QUANT: CPT

## 2021-12-28 PROCEDURE — 94760 N-INVAS EAR/PLS OXIMETRY 1: CPT

## 2021-12-28 PROCEDURE — 74011000250 HC RX REV CODE- 250: Performed by: EMERGENCY MEDICINE

## 2021-12-28 RX ORDER — PROMETHAZINE HYDROCHLORIDE 25 MG/1
25 TABLET ORAL
Qty: 12 TABLET | Refills: 0 | Status: SHIPPED | OUTPATIENT
Start: 2021-12-28 | End: 2022-05-04 | Stop reason: SDUPTHER

## 2021-12-28 RX ORDER — ALBUTEROL SULFATE 0.83 MG/ML
5 SOLUTION RESPIRATORY (INHALATION)
Status: COMPLETED | OUTPATIENT
Start: 2021-12-28 | End: 2021-12-28

## 2021-12-28 RX ADMIN — ALBUTEROL SULFATE 5 MG: 2.5 SOLUTION RESPIRATORY (INHALATION) at 01:41

## 2021-12-28 NOTE — ED NOTES
I have reviewed discharge instructions with the patient. The patient verbalized understanding. Patient left ED via Discharge Method: ambulatory to Home with family member. Opportunity for questions and clarification provided. Patient given 2 scripts. Pt is not satisfied with the care provided and feels that all of his problems were not addressed. Pt encouraged to follow up with PCP and cardiologist today with concerns. To continue your aftercare when you leave the hospital, you may receive an automated call from our care team to check in on how you are doing. This is a free service and part of our promise to provide the best care and service to meet your aftercare needs.  If you have questions, or wish to unsubscribe from this service please call [phone]. Thank you for Choosing our [facility] Emergency Department.
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
## 2022-02-07 ENCOUNTER — APPOINTMENT (OUTPATIENT)
Dept: GENERAL RADIOLOGY | Age: 57
DRG: 309 | End: 2022-02-07
Attending: EMERGENCY MEDICINE
Payer: COMMERCIAL

## 2022-02-07 ENCOUNTER — HOSPITAL ENCOUNTER (INPATIENT)
Age: 57
LOS: 10 days | Discharge: HOME HEALTH | DRG: 309 | End: 2022-02-17
Attending: EMERGENCY MEDICINE | Admitting: INTERNAL MEDICINE
Payer: COMMERCIAL

## 2022-02-07 ENCOUNTER — APPOINTMENT (OUTPATIENT)
Dept: CT IMAGING | Age: 57
DRG: 309 | End: 2022-02-07
Attending: INTERNAL MEDICINE
Payer: COMMERCIAL

## 2022-02-07 DIAGNOSIS — K52.9 ACUTE GASTROENTERITIS: ICD-10-CM

## 2022-02-07 DIAGNOSIS — I50.22 CHRONIC SYSTOLIC (CONGESTIVE) HEART FAILURE (HCC): Chronic | ICD-10-CM

## 2022-02-07 DIAGNOSIS — I50.22 CHRONIC SYSTOLIC CONGESTIVE HEART FAILURE (HCC): Chronic | ICD-10-CM

## 2022-02-07 DIAGNOSIS — I47.1 ATRIAL TACHYCARDIA (HCC): Primary | ICD-10-CM

## 2022-02-07 DIAGNOSIS — R11.2 NON-INTRACTABLE VOMITING WITH NAUSEA, UNSPECIFIED VOMITING TYPE: ICD-10-CM

## 2022-02-07 DIAGNOSIS — I42.8 OTHER CARDIOMYOPATHY (HCC): ICD-10-CM

## 2022-02-07 DIAGNOSIS — I42.8 NON-ISCHEMIC CARDIOMYOPATHY (HCC): Chronic | ICD-10-CM

## 2022-02-07 DIAGNOSIS — R06.1 STRIDOR: ICD-10-CM

## 2022-02-07 DIAGNOSIS — R10.84 GENERALIZED ABDOMINAL PAIN: ICD-10-CM

## 2022-02-07 DIAGNOSIS — R06.1 INSPIRATORY STRIDOR: ICD-10-CM

## 2022-02-07 DIAGNOSIS — N17.9 ACUTE RENAL FAILURE, UNSPECIFIED ACUTE RENAL FAILURE TYPE (HCC): ICD-10-CM

## 2022-02-07 DIAGNOSIS — K22.4 ESOPHAGEAL DYSMOTILITY: ICD-10-CM

## 2022-02-07 DIAGNOSIS — K22.4 ESOPHAGEAL MOTILITY DISORDER: ICD-10-CM

## 2022-02-07 DIAGNOSIS — N17.9 AKI (ACUTE KIDNEY INJURY) (HCC): ICD-10-CM

## 2022-02-07 DIAGNOSIS — K74.69 OTHER CIRRHOSIS OF LIVER (HCC): ICD-10-CM

## 2022-02-07 DIAGNOSIS — R06.09 DOE (DYSPNEA ON EXERTION): ICD-10-CM

## 2022-02-07 DIAGNOSIS — K44.9 HIATAL HERNIA: ICD-10-CM

## 2022-02-07 PROBLEM — E80.6 HYPERBILIRUBINEMIA: Status: ACTIVE | Noted: 2022-02-07

## 2022-02-07 LAB
ALBUMIN SERPL-MCNC: 3.9 G/DL (ref 3.5–5)
ALBUMIN/GLOB SERPL: 1 {RATIO} (ref 1.2–3.5)
ALP SERPL-CCNC: 98 U/L (ref 50–136)
ALT SERPL-CCNC: 41 U/L (ref 12–65)
ANION GAP SERPL CALC-SCNC: 15 MMOL/L (ref 7–16)
AST SERPL-CCNC: 53 U/L (ref 15–37)
BASOPHILS # BLD: 0.1 K/UL (ref 0–0.2)
BASOPHILS NFR BLD: 0 % (ref 0–2)
BILIRUB SERPL-MCNC: 1.6 MG/DL (ref 0.2–1.1)
BNP SERPL-MCNC: 1690 PG/ML (ref 5–125)
BUN SERPL-MCNC: 9 MG/DL (ref 6–23)
CALCIUM SERPL-MCNC: 8.5 MG/DL (ref 8.3–10.4)
CHLORIDE SERPL-SCNC: 103 MMOL/L (ref 98–107)
CO2 SERPL-SCNC: 20 MMOL/L (ref 21–32)
COVID-19 RAPID TEST, COVR: NOT DETECTED
CREAT SERPL-MCNC: 2.4 MG/DL (ref 0.8–1.5)
DIFFERENTIAL METHOD BLD: ABNORMAL
EOSINOPHIL # BLD: 0 K/UL (ref 0–0.8)
EOSINOPHIL NFR BLD: 0 % (ref 0.5–7.8)
ERYTHROCYTE [DISTWIDTH] IN BLOOD BY AUTOMATED COUNT: 16.4 % (ref 11.9–14.6)
ETHANOL SERPL-MCNC: <3 MG/DL
FLUAV AG NPH QL IA: NEGATIVE
FLUBV AG NPH QL IA: NEGATIVE
GLOBULIN SER CALC-MCNC: 4 G/DL (ref 2.3–3.5)
GLUCOSE SERPL-MCNC: 113 MG/DL (ref 65–100)
HCT VFR BLD AUTO: 49.1 % (ref 41.1–50.3)
HGB BLD-MCNC: 16.1 G/DL (ref 13.6–17.2)
IMM GRANULOCYTES # BLD AUTO: 0.1 K/UL (ref 0–0.5)
IMM GRANULOCYTES NFR BLD AUTO: 1 % (ref 0–5)
LACTATE SERPL-SCNC: 1.7 MMOL/L (ref 0.4–2)
LIPASE SERPL-CCNC: 112 U/L (ref 73–393)
LYMPHOCYTES # BLD: 1.7 K/UL (ref 0.5–4.6)
LYMPHOCYTES NFR BLD: 13 % (ref 13–44)
MCH RBC QN AUTO: 30.7 PG (ref 26.1–32.9)
MCHC RBC AUTO-ENTMCNC: 32.8 G/DL (ref 31.4–35)
MCV RBC AUTO: 93.5 FL (ref 79.6–97.8)
MONOCYTES # BLD: 1.3 K/UL (ref 0.1–1.3)
MONOCYTES NFR BLD: 10 % (ref 4–12)
NEUTS SEG # BLD: 10.2 K/UL (ref 1.7–8.2)
NEUTS SEG NFR BLD: 77 % (ref 43–78)
NRBC # BLD: 0 K/UL (ref 0–0.2)
PLATELET # BLD AUTO: 279 K/UL (ref 150–450)
PMV BLD AUTO: 9.5 FL (ref 9.4–12.3)
POTASSIUM SERPL-SCNC: 3.4 MMOL/L (ref 3.5–5.1)
PROCALCITONIN SERPL-MCNC: 0.06 NG/ML (ref 0–0.49)
PROT SERPL-MCNC: 7.9 G/DL (ref 6.3–8.2)
RBC # BLD AUTO: 5.25 M/UL (ref 4.23–5.6)
SODIUM SERPL-SCNC: 138 MMOL/L (ref 136–145)
SOURCE, COVRS: NORMAL
SPECIMEN SOURCE: NORMAL
TROPONIN-HIGH SENSITIVITY: 245.7 PG/ML (ref 0–14)
TROPONIN-HIGH SENSITIVITY: 270.8 PG/ML (ref 0–14)
WBC # BLD AUTO: 13.3 K/UL (ref 4.3–11.1)

## 2022-02-07 PROCEDURE — 80053 COMPREHEN METABOLIC PANEL: CPT

## 2022-02-07 PROCEDURE — 83880 ASSAY OF NATRIURETIC PEPTIDE: CPT

## 2022-02-07 PROCEDURE — 74011250637 HC RX REV CODE- 250/637: Performed by: EMERGENCY MEDICINE

## 2022-02-07 PROCEDURE — 65270000029 HC RM PRIVATE

## 2022-02-07 PROCEDURE — 83690 ASSAY OF LIPASE: CPT

## 2022-02-07 PROCEDURE — 93005 ELECTROCARDIOGRAM TRACING: CPT | Performed by: EMERGENCY MEDICINE

## 2022-02-07 PROCEDURE — 99284 EMERGENCY DEPT VISIT MOD MDM: CPT

## 2022-02-07 PROCEDURE — 83605 ASSAY OF LACTIC ACID: CPT

## 2022-02-07 PROCEDURE — 83735 ASSAY OF MAGNESIUM: CPT

## 2022-02-07 PROCEDURE — 74011000250 HC RX REV CODE- 250: Performed by: EMERGENCY MEDICINE

## 2022-02-07 PROCEDURE — 96375 TX/PRO/DX INJ NEW DRUG ADDON: CPT

## 2022-02-07 PROCEDURE — 87804 INFLUENZA ASSAY W/OPTIC: CPT

## 2022-02-07 PROCEDURE — 74011000636 HC RX REV CODE- 636: Performed by: INTERNAL MEDICINE

## 2022-02-07 PROCEDURE — 84145 PROCALCITONIN (PCT): CPT

## 2022-02-07 PROCEDURE — 84484 ASSAY OF TROPONIN QUANT: CPT

## 2022-02-07 PROCEDURE — 96366 THER/PROPH/DIAG IV INF ADDON: CPT

## 2022-02-07 PROCEDURE — 82077 ASSAY SPEC XCP UR&BREATH IA: CPT

## 2022-02-07 PROCEDURE — 85025 COMPLETE CBC W/AUTO DIFF WBC: CPT

## 2022-02-07 PROCEDURE — 74011250636 HC RX REV CODE- 250/636: Performed by: INTERNAL MEDICINE

## 2022-02-07 PROCEDURE — 74176 CT ABD & PELVIS W/O CONTRAST: CPT

## 2022-02-07 PROCEDURE — 71046 X-RAY EXAM CHEST 2 VIEWS: CPT

## 2022-02-07 PROCEDURE — 87635 SARS-COV-2 COVID-19 AMP PRB: CPT

## 2022-02-07 PROCEDURE — 74011250636 HC RX REV CODE- 250/636: Performed by: EMERGENCY MEDICINE

## 2022-02-07 PROCEDURE — 74011000250 HC RX REV CODE- 250: Performed by: INTERNAL MEDICINE

## 2022-02-07 PROCEDURE — 74011000258 HC RX REV CODE- 258: Performed by: EMERGENCY MEDICINE

## 2022-02-07 PROCEDURE — 96365 THER/PROPH/DIAG IV INF INIT: CPT

## 2022-02-07 PROCEDURE — 74011000258 HC RX REV CODE- 258: Performed by: INTERNAL MEDICINE

## 2022-02-07 RX ORDER — SODIUM CHLORIDE 0.9 % (FLUSH) 0.9 %
5-10 SYRINGE (ML) INJECTION AS NEEDED
Status: DISCONTINUED | OUTPATIENT
Start: 2022-02-07 | End: 2022-02-17

## 2022-02-07 RX ORDER — SODIUM CHLORIDE 0.9 % (FLUSH) 0.9 %
5-40 SYRINGE (ML) INJECTION EVERY 8 HOURS
Status: DISCONTINUED | OUTPATIENT
Start: 2022-02-07 | End: 2022-02-09

## 2022-02-07 RX ORDER — ASPIRIN 81 MG/1
81 TABLET ORAL DAILY
Status: DISCONTINUED | OUTPATIENT
Start: 2022-02-08 | End: 2022-02-17 | Stop reason: HOSPADM

## 2022-02-07 RX ORDER — ACETAMINOPHEN 325 MG/1
650 TABLET ORAL
Status: DISCONTINUED | OUTPATIENT
Start: 2022-02-07 | End: 2022-02-17 | Stop reason: HOSPADM

## 2022-02-07 RX ORDER — HYDROMORPHONE HYDROCHLORIDE 2 MG/1
1 TABLET ORAL
Status: DISCONTINUED | OUTPATIENT
Start: 2022-02-07 | End: 2022-02-08

## 2022-02-07 RX ORDER — HYDROMORPHONE HYDROCHLORIDE 1 MG/ML
0.5 INJECTION, SOLUTION INTRAMUSCULAR; INTRAVENOUS; SUBCUTANEOUS ONCE
Status: COMPLETED | OUTPATIENT
Start: 2022-02-07 | End: 2022-02-07

## 2022-02-07 RX ORDER — LEVALBUTEROL INHALATION SOLUTION 0.63 MG/3ML
0.63 SOLUTION RESPIRATORY (INHALATION)
Status: DISCONTINUED | OUTPATIENT
Start: 2022-02-07 | End: 2022-02-08

## 2022-02-07 RX ORDER — ATORVASTATIN CALCIUM 80 MG/1
80 TABLET, FILM COATED ORAL DAILY
Status: DISCONTINUED | OUTPATIENT
Start: 2022-02-08 | End: 2022-02-17 | Stop reason: HOSPADM

## 2022-02-07 RX ORDER — ONDANSETRON 2 MG/ML
4 INJECTION INTRAMUSCULAR; INTRAVENOUS
Status: DISCONTINUED | OUTPATIENT
Start: 2022-02-07 | End: 2022-02-08

## 2022-02-07 RX ORDER — ALPRAZOLAM 0.5 MG/1
1 TABLET ORAL
Status: DISCONTINUED | OUTPATIENT
Start: 2022-02-07 | End: 2022-02-17 | Stop reason: HOSPADM

## 2022-02-07 RX ORDER — HYDROCODONE BITARTRATE AND ACETAMINOPHEN 10; 325 MG/1; MG/1
1 TABLET ORAL
Status: DISCONTINUED | OUTPATIENT
Start: 2022-02-07 | End: 2022-02-14

## 2022-02-07 RX ORDER — POTASSIUM CHLORIDE 20 MEQ/1
40 TABLET, EXTENDED RELEASE ORAL
Status: COMPLETED | OUTPATIENT
Start: 2022-02-07 | End: 2022-02-07

## 2022-02-07 RX ORDER — SODIUM CHLORIDE 0.9 % (FLUSH) 0.9 %
5-40 SYRINGE (ML) INJECTION AS NEEDED
Status: DISCONTINUED | OUTPATIENT
Start: 2022-02-07 | End: 2022-02-17 | Stop reason: HOSPADM

## 2022-02-07 RX ORDER — GABAPENTIN 300 MG/1
300 CAPSULE ORAL 2 TIMES DAILY
Status: DISCONTINUED | OUTPATIENT
Start: 2022-02-08 | End: 2022-02-17 | Stop reason: HOSPADM

## 2022-02-07 RX ORDER — MAGNESIUM SULFATE HEPTAHYDRATE 40 MG/ML
4 INJECTION, SOLUTION INTRAVENOUS ONCE
Status: COMPLETED | OUTPATIENT
Start: 2022-02-07 | End: 2022-02-07

## 2022-02-07 RX ORDER — ONDANSETRON 4 MG/1
4 TABLET, ORALLY DISINTEGRATING ORAL
Status: DISCONTINUED | OUTPATIENT
Start: 2022-02-07 | End: 2022-02-08

## 2022-02-07 RX ORDER — LANOLIN ALCOHOL/MO/W.PET/CERES
400 CREAM (GRAM) TOPICAL DAILY
Status: DISCONTINUED | OUTPATIENT
Start: 2022-02-08 | End: 2022-02-17 | Stop reason: HOSPADM

## 2022-02-07 RX ORDER — SODIUM CHLORIDE 0.9 % (FLUSH) 0.9 %
5-10 SYRINGE (ML) INJECTION EVERY 8 HOURS
Status: DISCONTINUED | OUTPATIENT
Start: 2022-02-07 | End: 2022-02-17 | Stop reason: HOSPADM

## 2022-02-07 RX ORDER — LORAZEPAM 2 MG/ML
1 INJECTION INTRAMUSCULAR
Status: COMPLETED | OUTPATIENT
Start: 2022-02-07 | End: 2022-02-07

## 2022-02-07 RX ORDER — DILTIAZEM HYDROCHLORIDE 5 MG/ML
20 INJECTION INTRAVENOUS
Status: COMPLETED | OUTPATIENT
Start: 2022-02-07 | End: 2022-02-07

## 2022-02-07 RX ORDER — PANTOPRAZOLE SODIUM 40 MG/1
40 TABLET, DELAYED RELEASE ORAL
Status: DISCONTINUED | OUTPATIENT
Start: 2022-02-08 | End: 2022-02-08

## 2022-02-07 RX ORDER — CARVEDILOL 25 MG/1
25 TABLET ORAL 2 TIMES DAILY WITH MEALS
Status: DISCONTINUED | OUTPATIENT
Start: 2022-02-08 | End: 2022-02-12

## 2022-02-07 RX ORDER — POLYETHYLENE GLYCOL 3350 17 G/17G
17 POWDER, FOR SOLUTION ORAL DAILY PRN
Status: DISCONTINUED | OUTPATIENT
Start: 2022-02-07 | End: 2022-02-17 | Stop reason: HOSPADM

## 2022-02-07 RX ORDER — SODIUM CHLORIDE 9 MG/ML
50 INJECTION, SOLUTION INTRAVENOUS CONTINUOUS
Status: DISCONTINUED | OUTPATIENT
Start: 2022-02-07 | End: 2022-02-17

## 2022-02-07 RX ORDER — ACETAMINOPHEN 650 MG/1
650 SUPPOSITORY RECTAL
Status: DISCONTINUED | OUTPATIENT
Start: 2022-02-07 | End: 2022-02-17 | Stop reason: HOSPADM

## 2022-02-07 RX ADMIN — PROCHLORPERAZINE EDISYLATE 5 MG: 5 INJECTION INTRAMUSCULAR; INTRAVENOUS at 22:16

## 2022-02-07 RX ADMIN — SODIUM CHLORIDE 12.5 MG: 9 INJECTION INTRAMUSCULAR; INTRAVENOUS; SUBCUTANEOUS at 17:49

## 2022-02-07 RX ADMIN — SODIUM CHLORIDE 500 ML: 900 INJECTION, SOLUTION INTRAVENOUS at 20:08

## 2022-02-07 RX ADMIN — AMIODARONE HYDROCHLORIDE 150 MG: 50 INJECTION, SOLUTION INTRAVENOUS at 23:14

## 2022-02-07 RX ADMIN — PIPERACILLIN AND TAZOBACTAM 4.5 G: 4; .5 INJECTION, POWDER, FOR SOLUTION INTRAVENOUS at 21:19

## 2022-02-07 RX ADMIN — HYDROMORPHONE HYDROCHLORIDE 0.5 MG: 1 INJECTION, SOLUTION INTRAMUSCULAR; INTRAVENOUS; SUBCUTANEOUS at 21:23

## 2022-02-07 RX ADMIN — LORAZEPAM 1 MG: 2 INJECTION INTRAMUSCULAR; INTRAVENOUS at 17:50

## 2022-02-07 RX ADMIN — DIATRIZOATE MEGLUMINE AND DIATRIZOATE SODIUM 15 ML: 660; 100 LIQUID ORAL; RECTAL at 21:20

## 2022-02-07 RX ADMIN — MAGNESIUM SULFATE HEPTAHYDRATE 4 G: 40 INJECTION, SOLUTION INTRAVENOUS at 18:52

## 2022-02-07 RX ADMIN — POTASSIUM CHLORIDE 40 MEQ: 20 TABLET, EXTENDED RELEASE ORAL at 23:17

## 2022-02-07 RX ADMIN — DILTIAZEM HYDROCHLORIDE 20 MG: 5 INJECTION INTRAVENOUS at 18:51
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
## 2022-02-07 NOTE — ED TRIAGE NOTES
Pt reports onset of left sided chest pain the morning and has continued to worsen. States \"it feels like my heart is going to pop out of my chest\". Pt of cardiology, states history of afib and VT. Has cramping over entire body. Also reports SOB with non productive cough. Denies fever, body aches.

## 2022-02-08 ENCOUNTER — APPOINTMENT (OUTPATIENT)
Dept: GENERAL RADIOLOGY | Age: 57
DRG: 309 | End: 2022-02-08
Attending: INTERNAL MEDICINE
Payer: COMMERCIAL

## 2022-02-08 ENCOUNTER — APPOINTMENT (OUTPATIENT)
Dept: ULTRASOUND IMAGING | Age: 57
DRG: 309 | End: 2022-02-08
Attending: INTERNAL MEDICINE
Payer: COMMERCIAL

## 2022-02-08 LAB
AMPHET UR QL SCN: NEGATIVE
ANION GAP SERPL CALC-SCNC: 10 MMOL/L (ref 7–16)
BARBITURATES UR QL SCN: NEGATIVE
BASOPHILS # BLD: 0 K/UL (ref 0–0.2)
BASOPHILS NFR BLD: 0 % (ref 0–2)
BENZODIAZ UR QL: POSITIVE
BUN SERPL-MCNC: 12 MG/DL (ref 6–23)
CALCIUM SERPL-MCNC: 8.3 MG/DL (ref 8.3–10.4)
CALCULATED R AXIS, ECG10: 60 DEGREES
CALCULATED T AXIS, ECG11: -30 DEGREES
CANNABINOIDS UR QL SCN: NEGATIVE
CHLORIDE SERPL-SCNC: 101 MMOL/L (ref 98–107)
CO2 SERPL-SCNC: 21 MMOL/L (ref 21–32)
COCAINE UR QL SCN: NEGATIVE
CREAT SERPL-MCNC: 2.1 MG/DL (ref 0.8–1.5)
DIAGNOSIS, 93000: NORMAL
DIFFERENTIAL METHOD BLD: ABNORMAL
EOSINOPHIL # BLD: 0 K/UL (ref 0–0.8)
EOSINOPHIL NFR BLD: 0 % (ref 0.5–7.8)
ERYTHROCYTE [DISTWIDTH] IN BLOOD BY AUTOMATED COUNT: 15.8 % (ref 11.9–14.6)
GLUCOSE SERPL-MCNC: 124 MG/DL (ref 65–100)
HCT VFR BLD AUTO: 43 % (ref 41.1–50.3)
HGB BLD-MCNC: 14.4 G/DL (ref 13.6–17.2)
IMM GRANULOCYTES # BLD AUTO: 0 K/UL (ref 0–0.5)
IMM GRANULOCYTES NFR BLD AUTO: 0 % (ref 0–5)
LYMPHOCYTES # BLD: 1.3 K/UL (ref 0.5–4.6)
LYMPHOCYTES NFR BLD: 11 % (ref 13–44)
MCH RBC QN AUTO: 30.6 PG (ref 26.1–32.9)
MCHC RBC AUTO-ENTMCNC: 33.5 G/DL (ref 31.4–35)
MCV RBC AUTO: 91.5 FL (ref 79.6–97.8)
METHADONE UR QL: NEGATIVE
MONOCYTES # BLD: 1.2 K/UL (ref 0.1–1.3)
MONOCYTES NFR BLD: 10 % (ref 4–12)
NEUTS SEG # BLD: 9.5 K/UL (ref 1.7–8.2)
NEUTS SEG NFR BLD: 79 % (ref 43–78)
NRBC # BLD: 0 K/UL (ref 0–0.2)
OPIATES UR QL: POSITIVE
PCP UR QL: NEGATIVE
PLATELET # BLD AUTO: 256 K/UL (ref 150–450)
PMV BLD AUTO: 9.8 FL (ref 9.4–12.3)
POTASSIUM SERPL-SCNC: 3.6 MMOL/L (ref 3.5–5.1)
Q-T INTERVAL, ECG07: 310 MS
QRS DURATION, ECG06: 92 MS
QTC CALCULATION (BEZET), ECG08: 507 MS
RBC # BLD AUTO: 4.7 M/UL (ref 4.23–5.6)
SODIUM SERPL-SCNC: 132 MMOL/L (ref 136–145)
TROPONIN-HIGH SENSITIVITY: 270.3 PG/ML (ref 0–14)
VENTRICULAR RATE, ECG03: 161 BPM
WBC # BLD AUTO: 12 K/UL (ref 4.3–11.1)

## 2022-02-08 PROCEDURE — 74011000250 HC RX REV CODE- 250: Performed by: INTERNAL MEDICINE

## 2022-02-08 PROCEDURE — 84484 ASSAY OF TROPONIN QUANT: CPT

## 2022-02-08 PROCEDURE — 94760 N-INVAS EAR/PLS OXIMETRY 1: CPT

## 2022-02-08 PROCEDURE — 87045 FECES CULTURE AEROBIC BACT: CPT

## 2022-02-08 PROCEDURE — 87046 STOOL CULTR AEROBIC BACT EA: CPT

## 2022-02-08 PROCEDURE — 74011250636 HC RX REV CODE- 250/636: Performed by: INTERNAL MEDICINE

## 2022-02-08 PROCEDURE — 80307 DRUG TEST PRSMV CHEM ANLYZR: CPT

## 2022-02-08 PROCEDURE — 80048 BASIC METABOLIC PNL TOTAL CA: CPT

## 2022-02-08 PROCEDURE — 74011000258 HC RX REV CODE- 258: Performed by: INTERNAL MEDICINE

## 2022-02-08 PROCEDURE — 65270000029 HC RM PRIVATE

## 2022-02-08 PROCEDURE — 85025 COMPLETE CBC W/AUTO DIFF WBC: CPT

## 2022-02-08 PROCEDURE — C9113 INJ PANTOPRAZOLE SODIUM, VIA: HCPCS | Performed by: INTERNAL MEDICINE

## 2022-02-08 PROCEDURE — 77010033678 HC OXYGEN DAILY

## 2022-02-08 PROCEDURE — 94664 DEMO&/EVAL PT USE INHALER: CPT

## 2022-02-08 PROCEDURE — 76705 ECHO EXAM OF ABDOMEN: CPT

## 2022-02-08 PROCEDURE — 71045 X-RAY EXAM CHEST 1 VIEW: CPT

## 2022-02-08 PROCEDURE — 74011250637 HC RX REV CODE- 250/637: Performed by: INTERNAL MEDICINE

## 2022-02-08 PROCEDURE — 74011250636 HC RX REV CODE- 250/636: Performed by: HOSPITALIST

## 2022-02-08 PROCEDURE — 36415 COLL VENOUS BLD VENIPUNCTURE: CPT

## 2022-02-08 PROCEDURE — 74018 RADEX ABDOMEN 1 VIEW: CPT

## 2022-02-08 PROCEDURE — 94640 AIRWAY INHALATION TREATMENT: CPT

## 2022-02-08 PROCEDURE — 74011000250 HC RX REV CODE- 250: Performed by: EMERGENCY MEDICINE

## 2022-02-08 PROCEDURE — 74011000250 HC RX REV CODE- 250: Performed by: HOSPITALIST

## 2022-02-08 PROCEDURE — 83631 LACTOFERRIN FECAL (QUANT): CPT

## 2022-02-08 RX ORDER — FUROSEMIDE 10 MG/ML
20 INJECTION INTRAMUSCULAR; INTRAVENOUS ONCE
Status: DISCONTINUED | OUTPATIENT
Start: 2022-02-08 | End: 2022-02-08

## 2022-02-08 RX ORDER — HYDROMORPHONE HYDROCHLORIDE 1 MG/ML
0.5 INJECTION, SOLUTION INTRAMUSCULAR; INTRAVENOUS; SUBCUTANEOUS
Status: DISCONTINUED | OUTPATIENT
Start: 2022-02-08 | End: 2022-02-10

## 2022-02-08 RX ORDER — ALBUTEROL SULFATE 0.83 MG/ML
2.5 SOLUTION RESPIRATORY (INHALATION)
Status: DISCONTINUED | OUTPATIENT
Start: 2022-02-08 | End: 2022-02-12

## 2022-02-08 RX ORDER — LEVALBUTEROL INHALATION SOLUTION 0.63 MG/3ML
0.63 SOLUTION RESPIRATORY (INHALATION)
Status: DISCONTINUED | OUTPATIENT
Start: 2022-02-08 | End: 2022-02-08 | Stop reason: CLARIF

## 2022-02-08 RX ORDER — METRONIDAZOLE 500 MG/100ML
500 INJECTION, SOLUTION INTRAVENOUS EVERY 12 HOURS
Status: COMPLETED | OUTPATIENT
Start: 2022-02-08 | End: 2022-02-15

## 2022-02-08 RX ORDER — LORAZEPAM 2 MG/ML
1 INJECTION INTRAMUSCULAR ONCE
Status: COMPLETED | OUTPATIENT
Start: 2022-02-08 | End: 2022-02-08

## 2022-02-08 RX ADMIN — SODIUM CHLORIDE, PRESERVATIVE FREE 10 ML: 5 INJECTION INTRAVENOUS at 13:03

## 2022-02-08 RX ADMIN — PIPERACILLIN SODIUM AND TAZOBACTAM SODIUM 3.38 G: 3; .375 INJECTION, POWDER, LYOPHILIZED, FOR SOLUTION INTRAVENOUS at 13:04

## 2022-02-08 RX ADMIN — HYDROMORPHONE HYDROCHLORIDE 0.5 MG: 1 INJECTION, SOLUTION INTRAMUSCULAR; INTRAVENOUS; SUBCUTANEOUS at 17:43

## 2022-02-08 RX ADMIN — SODIUM CHLORIDE, PRESERVATIVE FREE 10 ML: 5 INJECTION INTRAVENOUS at 01:46

## 2022-02-08 RX ADMIN — SODIUM CHLORIDE 75 ML/HR: 900 INJECTION, SOLUTION INTRAVENOUS at 00:57

## 2022-02-08 RX ADMIN — RACEPINEPHRINE HYDROCHLORIDE 0.5 ML: 11.25 SOLUTION RESPIRATORY (INHALATION) at 21:06

## 2022-02-08 RX ADMIN — SODIUM CHLORIDE, PRESERVATIVE FREE 10 ML: 5 INJECTION INTRAVENOUS at 05:46

## 2022-02-08 RX ADMIN — LORAZEPAM 1 MG: 2 INJECTION INTRAMUSCULAR; INTRAVENOUS at 05:43

## 2022-02-08 RX ADMIN — ALBUTEROL SULFATE 2.5 MG: 2.5 SOLUTION RESPIRATORY (INHALATION) at 20:58

## 2022-02-08 RX ADMIN — FAMOTIDINE 20 MG: 10 INJECTION INTRAVENOUS at 22:17

## 2022-02-08 RX ADMIN — METHYLPREDNISOLONE SODIUM SUCCINATE 40 MG: 40 INJECTION, POWDER, FOR SOLUTION INTRAMUSCULAR; INTRAVENOUS at 17:41

## 2022-02-08 RX ADMIN — ALPRAZOLAM 1 MG: 0.5 TABLET ORAL at 00:57

## 2022-02-08 RX ADMIN — RACEPINEPHRINE HYDROCHLORIDE 0.5 ML: 11.25 SOLUTION RESPIRATORY (INHALATION) at 14:11

## 2022-02-08 RX ADMIN — METHYLPREDNISOLONE SODIUM SUCCINATE 125 MG: 125 INJECTION, POWDER, FOR SOLUTION INTRAMUSCULAR; INTRAVENOUS at 23:31

## 2022-02-08 RX ADMIN — HYDROMORPHONE HYDROCHLORIDE 0.5 MG: 1 INJECTION, SOLUTION INTRAMUSCULAR; INTRAVENOUS; SUBCUTANEOUS at 23:31

## 2022-02-08 RX ADMIN — METHYLPREDNISOLONE SODIUM SUCCINATE 125 MG: 125 INJECTION, POWDER, FOR SOLUTION INTRAMUSCULAR; INTRAVENOUS at 13:54

## 2022-02-08 RX ADMIN — SODIUM CHLORIDE, PRESERVATIVE FREE 12.5 MG: 5 INJECTION INTRAVENOUS at 08:30

## 2022-02-08 RX ADMIN — GABAPENTIN 300 MG: 300 CAPSULE ORAL at 17:38

## 2022-02-08 RX ADMIN — PANTOPRAZOLE SODIUM 40 MG: 40 TABLET, DELAYED RELEASE ORAL at 08:28

## 2022-02-08 RX ADMIN — METRONIDAZOLE 500 MG: 500 INJECTION, SOLUTION INTRAVENOUS at 16:02

## 2022-02-08 RX ADMIN — SODIUM CHLORIDE, PRESERVATIVE FREE 12.5 MG: 5 INJECTION INTRAVENOUS at 22:34

## 2022-02-08 RX ADMIN — ASPIRIN 81 MG: 81 TABLET ORAL at 08:28

## 2022-02-08 RX ADMIN — CEFTRIAXONE 1 G: 1 INJECTION, POWDER, FOR SOLUTION INTRAMUSCULAR; INTRAVENOUS at 14:04

## 2022-02-08 RX ADMIN — ATORVASTATIN CALCIUM 80 MG: 80 TABLET, FILM COATED ORAL at 08:28

## 2022-02-08 RX ADMIN — FAMOTIDINE 20 MG: 10 INJECTION INTRAVENOUS at 16:01

## 2022-02-08 RX ADMIN — SODIUM CHLORIDE 75 ML/HR: 900 INJECTION, SOLUTION INTRAVENOUS at 11:50

## 2022-02-08 RX ADMIN — HYDROMORPHONE HYDROCHLORIDE 0.5 MG: 1 INJECTION, SOLUTION INTRAMUSCULAR; INTRAVENOUS; SUBCUTANEOUS at 02:04

## 2022-02-08 RX ADMIN — SODIUM CHLORIDE 40 MG: 9 INJECTION, SOLUTION INTRAMUSCULAR; INTRAVENOUS; SUBCUTANEOUS at 22:17

## 2022-02-08 RX ADMIN — ALPRAZOLAM 1 MG: 0.5 TABLET ORAL at 22:17

## 2022-02-08 RX ADMIN — CARVEDILOL 25 MG: 25 TABLET, FILM COATED ORAL at 17:38

## 2022-02-08 RX ADMIN — HYDROCODONE BITARTRATE AND ACETAMINOPHEN 1 TABLET: 10; 325 TABLET ORAL at 08:28

## 2022-02-08 RX ADMIN — Medication 400 MG: at 08:27

## 2022-02-08 RX ADMIN — CARVEDILOL 25 MG: 25 TABLET, FILM COATED ORAL at 08:28

## 2022-02-08 RX ADMIN — SODIUM CHLORIDE 75 ML/HR: 900 INJECTION, SOLUTION INTRAVENOUS at 23:41

## 2022-02-08 RX ADMIN — HYDROMORPHONE HYDROCHLORIDE 0.5 MG: 1 INJECTION, SOLUTION INTRAMUSCULAR; INTRAVENOUS; SUBCUTANEOUS at 11:49

## 2022-02-08 RX ADMIN — GABAPENTIN 300 MG: 300 CAPSULE ORAL at 08:28

## 2022-02-08 RX ADMIN — SODIUM CHLORIDE, PRESERVATIVE FREE 12.5 MG: 5 INJECTION INTRAVENOUS at 02:16

## 2022-02-08 RX ADMIN — SODIUM CHLORIDE, PRESERVATIVE FREE 12.5 MG: 5 INJECTION INTRAVENOUS at 14:00

## 2022-02-08 RX ADMIN — HYDROCODONE BITARTRATE AND ACETAMINOPHEN 1 TABLET: 10; 325 TABLET ORAL at 22:34

## 2022-02-08 RX ADMIN — LORAZEPAM 1 MG: 2 INJECTION INTRAMUSCULAR; INTRAVENOUS at 14:20
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
## 2022-02-08 NOTE — ROUTINE PROCESS
Patient is refusing the insertion of NG tube, he states \"I've had it before and I don't want it again unless you completely put me to sleep to insert it. \" MD notified

## 2022-02-08 NOTE — PROGRESS NOTES
Problem: Falls - Risk of  Goal: *Absence of Falls  Description: Document Lynda Modi Fall Risk and appropriate interventions in the flowsheet.   Outcome: Progressing Towards Goal  Note: Fall Risk Interventions:  Mobility Interventions: Patient to call before getting OOB         Medication Interventions: Bed/chair exit alarm,Patient to call before getting OOB    Elimination Interventions: Call light in reach

## 2022-02-08 NOTE — ED NOTES
TRANSFER - OUT REPORT:    Verbal report given to Lazara Herrmann RN on Elba Segura  being transferred to 9th floor for routine progression of care       Report consisted of patients Situation, Background, Assessment and   Recommendations(SBAR). Information from the following report(s) SBAR was reviewed with the receiving nurse. Lines:   Peripheral IV 02/07/22 Left;Posterior Forearm (Active)        Opportunity for questions and clarification was provided.       Patient transported with:   Monitor  O2 @ 2 liters  Registered Nurse
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
## 2022-02-09 ENCOUNTER — APPOINTMENT (OUTPATIENT)
Dept: CT IMAGING | Age: 57
DRG: 309 | End: 2022-02-09
Attending: HOSPITALIST
Payer: COMMERCIAL

## 2022-02-09 LAB
ALBUMIN SERPL-MCNC: 3.5 G/DL (ref 3.5–5)
ALBUMIN/GLOB SERPL: 1 {RATIO} (ref 1.2–3.5)
ALP SERPL-CCNC: 79 U/L (ref 50–136)
ALT SERPL-CCNC: 26 U/L (ref 12–65)
ANION GAP SERPL CALC-SCNC: 8 MMOL/L (ref 7–16)
AST SERPL-CCNC: 31 U/L (ref 15–37)
BASOPHILS # BLD: 0 K/UL (ref 0–0.2)
BASOPHILS NFR BLD: 0 % (ref 0–2)
BILIRUB SERPL-MCNC: 0.7 MG/DL (ref 0.2–1.1)
BUN SERPL-MCNC: 13 MG/DL (ref 6–23)
CALCIUM SERPL-MCNC: 7.8 MG/DL (ref 8.3–10.4)
CHLORIDE SERPL-SCNC: 103 MMOL/L (ref 98–107)
CO2 SERPL-SCNC: 21 MMOL/L (ref 21–32)
COVID-19 RAPID TEST, COVR: NOT DETECTED
CREAT SERPL-MCNC: 1.3 MG/DL (ref 0.8–1.5)
DIFFERENTIAL METHOD BLD: ABNORMAL
EOSINOPHIL # BLD: 0 K/UL (ref 0–0.8)
EOSINOPHIL NFR BLD: 0 % (ref 0.5–7.8)
ERYTHROCYTE [DISTWIDTH] IN BLOOD BY AUTOMATED COUNT: 15 % (ref 11.9–14.6)
GLOBULIN SER CALC-MCNC: 3.6 G/DL (ref 2.3–3.5)
GLUCOSE SERPL-MCNC: 137 MG/DL (ref 65–100)
HCT VFR BLD AUTO: 41.5 % (ref 41.1–50.3)
HGB BLD-MCNC: 13.4 G/DL (ref 13.6–17.2)
IMM GRANULOCYTES # BLD AUTO: 0 K/UL (ref 0–0.5)
IMM GRANULOCYTES NFR BLD AUTO: 0 % (ref 0–5)
LYMPHOCYTES # BLD: 0.7 K/UL (ref 0.5–4.6)
LYMPHOCYTES NFR BLD: 8 % (ref 13–44)
MAGNESIUM SERPL-MCNC: 1.3 MG/DL (ref 1.6–2.3)
MCH RBC QN AUTO: 30.3 PG (ref 26.1–32.9)
MCHC RBC AUTO-ENTMCNC: 32.3 G/DL (ref 31.4–35)
MCV RBC AUTO: 93.9 FL (ref 79.6–97.8)
MONOCYTES # BLD: 0.1 K/UL (ref 0.1–1.3)
MONOCYTES NFR BLD: 1 % (ref 4–12)
NEUTS SEG # BLD: 7.4 K/UL (ref 1.7–8.2)
NEUTS SEG NFR BLD: 91 % (ref 43–78)
NRBC # BLD: 0 K/UL (ref 0–0.2)
PLATELET # BLD AUTO: 285 K/UL (ref 150–450)
PMV BLD AUTO: 9.6 FL (ref 9.4–12.3)
POTASSIUM SERPL-SCNC: 4.4 MMOL/L (ref 3.5–5.1)
PROT SERPL-MCNC: 7.1 G/DL (ref 6.3–8.2)
RBC # BLD AUTO: 4.42 M/UL (ref 4.23–5.6)
SODIUM SERPL-SCNC: 132 MMOL/L (ref 136–145)
SOURCE, COVRS: NORMAL
WBC # BLD AUTO: 8.2 K/UL (ref 4.3–11.1)

## 2022-02-09 PROCEDURE — 94640 AIRWAY INHALATION TREATMENT: CPT

## 2022-02-09 PROCEDURE — 74011000250 HC RX REV CODE- 250

## 2022-02-09 PROCEDURE — 85025 COMPLETE CBC W/AUTO DIFF WBC: CPT

## 2022-02-09 PROCEDURE — 74011000250 HC RX REV CODE- 250: Performed by: HOSPITALIST

## 2022-02-09 PROCEDURE — C9113 INJ PANTOPRAZOLE SODIUM, VIA: HCPCS | Performed by: INTERNAL MEDICINE

## 2022-02-09 PROCEDURE — 74011000258 HC RX REV CODE- 258: Performed by: INTERNAL MEDICINE

## 2022-02-09 PROCEDURE — 74011000250 HC RX REV CODE- 250: Performed by: EMERGENCY MEDICINE

## 2022-02-09 PROCEDURE — 94644 CONT INHLJ TX 1ST HOUR: CPT

## 2022-02-09 PROCEDURE — 80053 COMPREHEN METABOLIC PANEL: CPT

## 2022-02-09 PROCEDURE — 87635 SARS-COV-2 COVID-19 AMP PRB: CPT

## 2022-02-09 PROCEDURE — 74011000250 HC RX REV CODE- 250: Performed by: INTERNAL MEDICINE

## 2022-02-09 PROCEDURE — 74011250637 HC RX REV CODE- 250/637: Performed by: INTERNAL MEDICINE

## 2022-02-09 PROCEDURE — 36415 COLL VENOUS BLD VENIPUNCTURE: CPT

## 2022-02-09 PROCEDURE — 77010033678 HC OXYGEN DAILY

## 2022-02-09 PROCEDURE — 94762 N-INVAS EAR/PLS OXIMTRY CONT: CPT

## 2022-02-09 PROCEDURE — 94645 CONT INHLJ TX EACH ADDL HOUR: CPT

## 2022-02-09 PROCEDURE — 94760 N-INVAS EAR/PLS OXIMETRY 1: CPT

## 2022-02-09 PROCEDURE — 74011250636 HC RX REV CODE- 250/636: Performed by: HOSPITALIST

## 2022-02-09 PROCEDURE — 70490 CT SOFT TISSUE NECK W/O DYE: CPT

## 2022-02-09 PROCEDURE — 65270000029 HC RM PRIVATE

## 2022-02-09 PROCEDURE — 77010033710 HC HELIOX THERAPY DAILY

## 2022-02-09 PROCEDURE — 74011250636 HC RX REV CODE- 250/636: Performed by: INTERNAL MEDICINE

## 2022-02-09 PROCEDURE — 99223 1ST HOSP IP/OBS HIGH 75: CPT | Performed by: INTERNAL MEDICINE

## 2022-02-09 RX ORDER — LORAZEPAM 2 MG/ML
1 INJECTION INTRAMUSCULAR
Status: DISCONTINUED | OUTPATIENT
Start: 2022-02-09 | End: 2022-02-14

## 2022-02-09 RX ORDER — DICYCLOMINE HYDROCHLORIDE 10 MG/1
10 CAPSULE ORAL
Status: DISCONTINUED | OUTPATIENT
Start: 2022-02-09 | End: 2022-02-10

## 2022-02-09 RX ORDER — ALBUTEROL SULFATE 0.83 MG/ML
10 SOLUTION RESPIRATORY (INHALATION)
Status: COMPLETED | OUTPATIENT
Start: 2022-02-09 | End: 2022-02-09

## 2022-02-09 RX ORDER — METOPROLOL TARTRATE 5 MG/5ML
5 INJECTION INTRAVENOUS
Status: DISCONTINUED | OUTPATIENT
Start: 2022-02-09 | End: 2022-02-17 | Stop reason: HOSPADM

## 2022-02-09 RX ADMIN — RACEPINEPHRINE HYDROCHLORIDE 0.5 ML: 11.25 SOLUTION RESPIRATORY (INHALATION) at 02:05

## 2022-02-09 RX ADMIN — METHYLPREDNISOLONE SODIUM SUCCINATE 125 MG: 125 INJECTION, POWDER, FOR SOLUTION INTRAMUSCULAR; INTRAVENOUS at 05:16

## 2022-02-09 RX ADMIN — METHYLPREDNISOLONE SODIUM SUCCINATE 40 MG: 40 INJECTION, POWDER, FOR SOLUTION INTRAMUSCULAR; INTRAVENOUS at 22:29

## 2022-02-09 RX ADMIN — SODIUM CHLORIDE 40 MG: 9 INJECTION, SOLUTION INTRAMUSCULAR; INTRAVENOUS; SUBCUTANEOUS at 10:00

## 2022-02-09 RX ADMIN — IVABRADINE 5 MG: 5 TABLET, FILM COATED ORAL at 17:43

## 2022-02-09 RX ADMIN — FAMOTIDINE 20 MG: 10 INJECTION INTRAVENOUS at 10:01

## 2022-02-09 RX ADMIN — HYDROMORPHONE HYDROCHLORIDE 0.5 MG: 1 INJECTION, SOLUTION INTRAMUSCULAR; INTRAVENOUS; SUBCUTANEOUS at 20:37

## 2022-02-09 RX ADMIN — METHYLPREDNISOLONE SODIUM SUCCINATE 125 MG: 125 INJECTION, POWDER, FOR SOLUTION INTRAMUSCULAR; INTRAVENOUS at 17:43

## 2022-02-09 RX ADMIN — LORAZEPAM 1 MG: 2 INJECTION, SOLUTION INTRAMUSCULAR; INTRAVENOUS at 03:20

## 2022-02-09 RX ADMIN — CARVEDILOL 25 MG: 25 TABLET, FILM COATED ORAL at 17:43

## 2022-02-09 RX ADMIN — SODIUM CHLORIDE, PRESERVATIVE FREE 12.5 MG: 5 INJECTION INTRAVENOUS at 20:37

## 2022-02-09 RX ADMIN — CEFTRIAXONE 1 G: 1 INJECTION, POWDER, FOR SOLUTION INTRAMUSCULAR; INTRAVENOUS at 13:24

## 2022-02-09 RX ADMIN — SODIUM CHLORIDE, PRESERVATIVE FREE 10 ML: 5 INJECTION INTRAVENOUS at 05:23

## 2022-02-09 RX ADMIN — ALBUTEROL SULFATE 2.5 MG: 2.5 SOLUTION RESPIRATORY (INHALATION) at 08:00

## 2022-02-09 RX ADMIN — SODIUM CHLORIDE, PRESERVATIVE FREE 10 ML: 5 INJECTION INTRAVENOUS at 05:24

## 2022-02-09 RX ADMIN — METRONIDAZOLE 500 MG: 500 INJECTION, SOLUTION INTRAVENOUS at 03:07

## 2022-02-09 RX ADMIN — HYDROMORPHONE HYDROCHLORIDE 0.5 MG: 1 INJECTION, SOLUTION INTRAMUSCULAR; INTRAVENOUS; SUBCUTANEOUS at 06:08

## 2022-02-09 RX ADMIN — HYDROMORPHONE HYDROCHLORIDE 0.5 MG: 1 INJECTION, SOLUTION INTRAMUSCULAR; INTRAVENOUS; SUBCUTANEOUS at 13:21

## 2022-02-09 RX ADMIN — SODIUM CHLORIDE, PRESERVATIVE FREE 10 ML: 5 INJECTION INTRAVENOUS at 05:22

## 2022-02-09 RX ADMIN — ALBUTEROL SULFATE 10 MG: 2.5 SOLUTION RESPIRATORY (INHALATION) at 02:00

## 2022-02-09 RX ADMIN — ALBUTEROL SULFATE 2.5 MG: 2.5 SOLUTION RESPIRATORY (INHALATION) at 13:31

## 2022-02-09 RX ADMIN — ALBUTEROL SULFATE 2.5 MG: 2.5 SOLUTION RESPIRATORY (INHALATION) at 20:10

## 2022-02-09 RX ADMIN — RACEPINEPHRINE HYDROCHLORIDE 1 ML: 11.25 SOLUTION RESPIRATORY (INHALATION) at 08:29

## 2022-02-09 RX ADMIN — SODIUM CHLORIDE, PRESERVATIVE FREE 12.5 MG: 5 INJECTION INTRAVENOUS at 06:08

## 2022-02-09 RX ADMIN — ALBUTEROL SULFATE 2.5 MG: 2.5 SOLUTION RESPIRATORY (INHALATION) at 01:45

## 2022-02-09 RX ADMIN — METHYLPREDNISOLONE SODIUM SUCCINATE 125 MG: 125 INJECTION, POWDER, FOR SOLUTION INTRAMUSCULAR; INTRAVENOUS at 12:18

## 2022-02-09 RX ADMIN — ALPRAZOLAM 1 MG: 0.5 TABLET ORAL at 22:29

## 2022-02-09 RX ADMIN — SODIUM CHLORIDE 40 MG: 9 INJECTION, SOLUTION INTRAMUSCULAR; INTRAVENOUS; SUBCUTANEOUS at 20:37

## 2022-02-09 RX ADMIN — SODIUM CHLORIDE, PRESERVATIVE FREE 10 ML: 5 INJECTION INTRAVENOUS at 13:24

## 2022-02-09 RX ADMIN — FAMOTIDINE 20 MG: 10 INJECTION INTRAVENOUS at 20:36

## 2022-02-09 RX ADMIN — SODIUM CHLORIDE 75 ML/HR: 900 INJECTION, SOLUTION INTRAVENOUS at 22:29

## 2022-02-09 RX ADMIN — METRONIDAZOLE 500 MG: 500 INJECTION, SOLUTION INTRAVENOUS at 14:29

## 2022-02-09 RX ADMIN — SODIUM CHLORIDE, PRESERVATIVE FREE 12.5 MG: 5 INJECTION INTRAVENOUS at 12:15

## 2022-02-09 RX ADMIN — GABAPENTIN 300 MG: 300 CAPSULE ORAL at 17:43

## 2022-02-09 NOTE — ADT AUTH CERT NOTES
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 Exam:     Blood pressure 110/87, pulse (!) 151, temperature 97.6 °F (36.4 °C), resp. rate 28, height 5' 11\" (1.803 m), weight 83.9 kg (185 lb), SpO2 98 %. General:          Well nourished. No overt distress  Head:               Normocephalic, atraumatic  Eyes:               Sclerae appear normal.  Pupils equally round. ENT:                Nares appear normal, no drainage. Moist oral mucosa  Neck:               No restricted ROM. Trachea midline   CV:                  Tachycardic rate, regular rhythm, no JVD, +S1, +S2  Lungs:             CTAB. No wheezing, rhonchi, or rales. Respirations even, unlabored  Abdomen: Bowel sounds present.  Mild TTP mostly epigastric without rebound tenderness, nondistended, no fluid waves  Extremities:     No cyanosis or clubbing. No edema  Skin:                No rashes and normal coloration. Warm and dry. Neuro:             CN II-XII grossly intact. Sensation intact. A&Ox3  Psych:             Normal mood and affect.       I have reviewed ordered lab tests and independently visualized imaging below:     Last 24hr Labs:  Recent Results          Recent Results (from the past 24 hour(s))   EKG, 12 LEAD, INITIAL     Collection Time: 02/07/22  3:51 PM   Result Value Ref Range     Ventricular Rate 161 BPM     QRS Duration 92 ms     Q-T Interval 310 ms     QTC Calculation (Bezet) 507 ms     Calculated R Axis 60 degrees     Calculated T Axis -30 degrees     Diagnosis           Supraventricular tachycardia  Anterior infarct (cited on or before 02-JUL-2020)  ST & T wave abnormality, consider inferior ischemia  Abnormal ECG  When compared with ECG of 27-DEC-2021 16:42,  Non-specific change in ST segment in Inferior leads  ST now depressed in Lateral leads  T wave inversion now evident in Inferior leads      CBC WITH AUTOMATED DIFF     Collection Time: 02/07/22  3:57 PM   Result Value Ref Range     WBC 13.3 (H) 4.3 - 11.1 K/uL     RBC 5.25 4.23 - 5.6 M/uL     HGB 16.1 13.6 - 17.2 g/dL     HCT 49.1 41.1 - 50.3 %     MCV 93.5 79.6 - 97.8 FL     MCH 30.7 26.1 - 32.9 PG     MCHC 32.8 31.4 - 35.0 g/dL     RDW 16.4 (H) 11.9 - 14.6 %     PLATELET 460 502 - 802 K/uL     MPV 9.5 9.4 - 12.3 FL     ABSOLUTE NRBC 0.00 0.0 - 0.2 K/uL     DF AUTOMATED       NEUTROPHILS 77 43 - 78 %     LYMPHOCYTES 13 13 - 44 %     MONOCYTES 10 4.0 - 12.0 %     EOSINOPHILS 0 (L) 0.5 - 7.8 %     BASOPHILS 0 0.0 - 2.0 %     IMMATURE GRANULOCYTES 1 0.0 - 5.0 %     ABS. NEUTROPHILS 10.2 (H) 1.7 - 8.2 K/UL     ABS. LYMPHOCYTES 1.7 0.5 - 4.6 K/UL     ABS. MONOCYTES 1.3 0.1 - 1.3 K/UL     ABS. EOSINOPHILS 0.0 0.0 - 0.8 K/UL     ABS. BASOPHILS 0.1 0.0 - 0.2 K/UL     ABS. IMM.  GRANS. 0.1 0.0 - 0.5 K/UL   COVID-19 RAPID TEST     Collection Time: 02/07/22  4:29 PM   Result Value Ref Range     Specimen source Nasopharyngeal       COVID-19 rapid test Not detected NOTD     LIPASE     Collection Time: 02/07/22  5:18 PM   Result Value Ref Range     Lipase 112 73 - 667 U/L   METABOLIC PANEL, COMPREHENSIVE     Collection Time: 02/07/22  5:18 PM   Result Value Ref Range     Sodium 138 136 - 145 mmol/L     Potassium 3.4 (L) 3.5 - 5.1 mmol/L     Chloride 103 98 - 107 mmol/L     CO2 20 (L) 21 - 32 mmol/L     Anion gap 15 7 - 16 mmol/L     Glucose 113 (H) 65 - 100 mg/dL     BUN 9 6 - 23 MG/DL     Creatinine 2.40 (H) 0.8 - 1.5 MG/DL     GFR est AA 36 (L) >60 ml/min/1.73m2     GFR est non-AA 30 (L) >60 ml/min/1.73m2     Calcium 8.5 8.3 - 10.4 MG/DL     Bilirubin, total 1.6 (H) 0.2 - 1.1 MG/DL     ALT (SGPT) 41 12 - 65 U/L     AST (SGOT) 53 (H) 15 - 37 U/L     Alk.  phosphatase 98 50 - 136 U/L     Protein, total 7.9 6.3 - 8.2 g/dL     Albumin 3.9 3.5 - 5.0 g/dL     Globulin 4.0 (H) 2.3 - 3.5 g/dL     A-G Ratio 1.0 (L) 1.2 - 3.5     NT-PRO BNP     Collection Time: 02/07/22  5:18 PM   Result Value Ref Range     NT pro-BNP 1,690 (H) 5 - 125 PG/ML   TROPONIN-HIGH SENSITIVITY     Collection Time: 02/07/22  5:18 PM   Result Value Ref Range     Troponin-High Sensitivity 245.7 (HH) 0 - 14 pg/mL   TROPONIN-HIGH SENSITIVITY     Collection Time: 02/07/22  6:51 PM   Result Value Ref Range     Troponin-High Sensitivity 270.8 (HH) 0 - 14 pg/mL   PROCALCITONIN     Collection Time: 02/07/22  6:51 PM   Result Value Ref Range     Procalcitonin 0.06 0.00 - 0.49 ng/mL   ETHYL ALCOHOL     Collection Time: 02/07/22  6:51 PM   Result Value Ref Range     ALCOHOL(ETHYL),SERUM <3 MG/DL   LACTIC ACID     Collection Time: 02/07/22 10:31 PM   Result Value Ref Range     Lactic acid 1.7 0.4 - 2.0 MMOL/L   INFLUENZA A & B AG (RAPID TEST)     Collection Time: 02/07/22 10:31 PM   Result Value Ref Range     Influenza A Ag Negative NEG       Influenza B Ag Negative NEG       Source Nasopharyngeal                       All Micro Results   Procedure Component Value Units Date/Time     INFLUENZA A & B AG (RAPID TEST) [[id number]] Collected: 02/07/22 2231     Order Status: Completed Specimen: Nasopharyngeal from Nasal washing Updated: 02/07/22 2302       Influenza A Ag Negative           Comment: NEGATIVE FOR THE PRESENCE OF INFLUENZA A ANTIGEN  INFECTION DUE TO INFLUENZA A CANNOT BE RULED OUT. BECAUSE THE ANTIGEN PRESENT IN THE SAMPLE MAY BE BELOW  THE DETECTION LIMIT OF THE TEST. A NEGATIVE TEST IS PRESUMPTIVE AND IT IS RECOMMENDED THAT THESE RESULTS BE CONFIRMED BY VIRAL CULTURE OR AN FDA-CLEARED INFLUENZA A AND B MOLECULAR ASSAY.             Influenza B Ag Negative           Comment: NEGATIVE FOR THE PRESENCE OF INFLUENZA B ANTIGEN  INFECTION DUE TO INFLUENZA B CANNOT BE RULED OUT. BECAUSE THE ANTIGEN PRESENT IN THE SAMPLE MAY BE BELOW  THE DETECTION LIMIT OF THE TEST. A NEGATIVE TEST IS PRESUMPTIVE AND IT IS RECOMMENDED THAT THESE RESULTS BE CONFIRMED BY VIRAL CULTURE OR AN FDA-CLEARED INFLUENZA A AND B MOLECULAR ASSAY.             Source Nasopharyngeal         COVID-19 RAPID TEST [[id number]] Collected: 02/07/22 1629     Order Status: Completed Specimen: Nasopharyngeal Updated: 02/07/22 1700       Specimen source Nasopharyngeal           COVID-19 rapid test Not detected           Comment:      The specimen is NEGATIVE for SARS-CoV-2, the novel coronavirus associated with COVID-19. A negative result does not rule out COVID-19. This test has been authorized by the FDA under an Emergency Use Authorization (EUA) for use by authorized laboratories. Fact sheet for Healthcare Providers: ConventionUpdate.co.nz  Fact sheet for Patients: ConventionUpdate.co.nz       Methodology: Isothermal Nucleic Acid Amplification                   Other Studies:  XR CHEST PA LAT     Result Date: 2/7/2022  History: Chest Pain Two views chest COMPARISON: 12/27/2021 Findings: The lungs are well expanded and clear. The cardiac silhouette, and mediastinal contour, and osseous structures are normal. There is a persistent hiatal hernia. No change in the appearance of the cardiac device overlying the left chest wall.      Stable two-view chest.             Medications Administered               dilTIAZem (CARDIZEM) injection 20 mg               Admin Date  02/07/2022 Action  Given Dose  20 mg Route  IntraVENous Administered By  [facility], RN                      LORazepam (ATIVAN) injection 1 mg               Admin Date  02/07/2022 Action  Given Dose  1 mg Route  IntraVENous Administered By  [facility], RN                       magnesium sulfate 4 g/100 mL IVPB                Admin Date  02/07/2022 Action  New Bag Dose  4 g Rate  100 mL/hr Route  IntraVENous Administered By  [facility] RN                      promethazine (PHENERGAN) with saline injection 12.5 mg               Admin Date  02/07/2022 Action  Given Dose  12.5 mg Route  IntraVENous Administered By  Sangeeta Albrecht, RN                       sodium chloride 0.9 % bolus infusion 500 mL                Admin Date  02/07/2022 Action  New Bag Dose  500 mL Rate  500 mL/hr Route  IntraVENous Administered By  Ivan Chau RN                     Signed:   Jesika Valdes DO

## 2022-02-09 NOTE — PROGRESS NOTES
[facility] Progress Note         Admit Date:  2/7/2022    Today's Date:  2/9/2022    CC:  Abdominal pain, nausea, vomiting, diarrhea    Subjective:     Patient still complaining of watery diarrhea--says he's having diarrhea every hour and that two of the episodes were \"blackish red. \" Still with epigastric pain which radiates around into the RUQ and down to the RLQ that is constsant and worsening. Also continues to have nausea and vomiting with last emesis this morning. NO blood in the emesis. DEveloped stridor yesterday and seen by pulmonary who suspected vocal cord irritation and gave racemic epinephrine. Also followed by cardiology for a. Tach on his ICD on telemetry. He is asking for more frequent phenergan--says zofran doesn't work and q 6 hour phenergan wears off. Also asking for something to control his abdominal pain.     Medications:   Current Facility-Administered Medications   Medication Dose Route Frequency    metoprolol (LOPRESSOR) injection 5 mg  5 mg IntraVENous Q4H PRN    LORazepam (ATIVAN) injection 1 mg  1 mg IntraVENous Q6H PRN    ivabradine (CORLANOR) tablet 5 mg  5 mg Oral BID WITH MEALS    HYDROmorphone (DILAUDID) injection 0.5 mg  0.5 mg IntraVENous Q6H PRN    promethazine (PHENERGAN) with saline injection 12.5 mg  12.5 mg IntraVENous Q6H PRN    famotidine (PF) (PEPCID) 20 mg in 0.9% sodium chloride 10 mL injection  20 mg IntraVENous Q12H    metroNIDAZOLE (FLAGYL) IVPB premix 500 mg  500 mg IntraVENous Q12H    cefTRIAXone (ROCEPHIN) 1 g in 0.9% sodium chloride (MBP/ADV) 50 mL MBP  1 g IntraVENous Q24H    methylPREDNISolone (PF) (Solu-MEDROL) injection 125 mg  125 mg IntraVENous Q6H    pantoprazole (PROTONIX) 40 mg in 0.9% sodium chloride 10 mL injection  40 mg IntraVENous Q12H    albuterol (PROVENTIL VENTOLIN) nebulizer solution 2.5 mg  2.5 mg Nebulization Q6H RT    sodium chloride (NS) flush 5-10 mL  5-10 mL IntraVENous Q8H    sodium chloride (NS) flush 5-10 mL  5-10 mL IntraVENous PRN    0.9% sodium chloride infusion  75 mL/hr IntraVENous CONTINUOUS    ALPRAZolam (XANAX) tablet 1 mg  1 mg Oral QHS    aspirin delayed-release tablet 81 mg  81 mg Oral DAILY    atorvastatin (LIPITOR) tablet 80 mg  80 mg Oral DAILY    carvediloL (COREG) tablet 25 mg  25 mg Oral BID WITH MEALS    gabapentin (NEURONTIN) capsule 300 mg  300 mg Oral BID    HYDROcodone-acetaminophen (NORCO)  mg tablet 1 Tablet  1 Tablet Oral Q6H PRN    sodium chloride (NS) flush 5-40 mL  5-40 mL IntraVENous PRN    [Held by provider] acetaminophen (TYLENOL) tablet 650 mg  650 mg Oral Q6H PRN    Or    [Held by provider] acetaminophen (TYLENOL) suppository 650 mg  650 mg Rectal Q6H PRN    polyethylene glycol (MIRALAX) packet 17 g  17 g Oral DAILY PRN    magnesium oxide (MAG-OX) tablet 400 mg  400 mg Oral DAILY       Review of Systems:  ROS was obtained, with pertinent positives as listed above. Diet:  NPO    Objective:   Vitals:  Visit Vitals  /81   Pulse 93   Temp 97.4 °F (36.3 °C)   Resp 18   Ht 5' 11\" (1.803 m)   Wt 83.8 kg (184 lb 12.8 oz)   SpO2 97%   BMI 25.77 kg/m²     Intake/Output:  No intake/output data recorded. 02/07 1901 - 02/09 0700  In: 600 [I.V.:600]  Out: 350 [Urine:350]  Exam:  General appearance: alert, cooperative, no distress  Lungs: inspiratory stridor; on 2L O2 via NC  Heart: regular rate and rhythm  Abdomen: soft, and hypersensitive to light palpation in RUQ.  Bowel sounds normal. No masses, no organomegaly  Extremities: extremities normal, atraumatic, no cyanosis or edema  Neuro:  alert and oriented    Data Review (Labs):    Recent Labs     02/09/22  0331 02/08/22  0222 02/07/22  1718 02/07/22  1557   WBC 8.2 12.0*  --  13.3*   HGB 13.4* 14.4  --  16.1   HCT 41.5 43.0  --  49.1    256  --  279   MCV 93.9 91.5  --  93.5   * 132* 138  --    K 4.4 3.6 3.4*  --     101 103  --    CO2 21 21 20*  --    BUN 13 12 9  --    CREA 1.30 2.10* 2.40*  -- CA 7.8* 8.3 8.5  --    * 124* 113*  --    AP 79  --  98  --    AST 31  --  53*  --    ALT 26  --  41  --    TBILI 0.7  --  1.6*  --    ALB 3.5  --  3.9  --    TP 7.1  --  7.9  --    LPSE  --   --  112  --      CT a/p without contrast 2/7/22: IMPRESSION     1. Moderate hiatal hernia. Suggestion of eccentric submucosal wall thickening of  the proximal stomach, although this may be artifactual secondary to volume  averaging of fluid in the stomach. Correlation with upper endoscopy may be  beneficial.     2. No evidence of colitis, diverticulitis, appendicitis or bowel obstruction.     3. No renal calculus or hydronephrosis.     4. Absence of IV contrast limits sensitivity. Assessment:     Principal Problem:    Acute gastroenteritis (2/26/2016)    Active Problems:    Chronic systolic (congestive) heart failure (HCC) (4/21/2011)      HTN (hypertension) (11/29/2011)      Non-ischemic cardiomyopathy (HCC) (3/24/2016)      Transaminitis (3/14/2017)      Inspiratory stridor (3/21/2017)      SVT (supraventricular tachycardia) ([facility] [address] (12/22/2018)      YAO (acute kidney injury) ([facility] [address] (8/23/2021)      Demand ischemia ([facility] [address] (8/24/2021)      Hyperbilirubinemia (2/7/2022)       62 y.o. male with past medical history of NICM with EF 20-25%, VT with ICD in place, GERD, chronic back/leg pain and HTN was admitted for acute gastroenteritis with YAO, seen by cardiology for a. Tach and by pulmonary for inspiratory stridor (thought to be secondary to vocal cord irritation). LFTs improved/normalized and creatinine has improved (1.3<--2.1)  Plan:   1) await results of stool studies  2) continue IV PPI q 12 hours  3) consider EGD at some point given questionable submucosal wall thickening of the proximal stomach on CT. Currently, no plans for scope given cardiopulmonary issues    Patient is seen and examined in collaboration with Dr. Marcelo Arroyo. Assessment and plan as per Dr. Amy Newell.   TIFFANY Sheikh

## 2022-02-09 NOTE — PROGRESS NOTES
Problem: Falls - Risk of  Goal: *Absence of Falls  Description: Document Phyllis Skill Fall Risk and appropriate interventions in the flowsheet.   Outcome: Progressing Towards Goal  Note: Fall Risk Interventions:  Mobility Interventions: Patient to call before getting OOB         Medication Interventions: Patient to call before getting OOB,Teach patient to arise slowly    Elimination Interventions: Call light in reach,Urinal in reach
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
## 2022-02-09 NOTE — PROGRESS NOTES
Pt had stridor at start of shift. Resp gave med that can be seen in [facility]. This relived stridor till around midnight. Pt stated he was feeling SOB and in CP again. Then a call came from tele that stated pt had 40 beat run for 30 sec's of A Tach. Cardio said okay. Called Missy to come look at pt, Resp. Updated Dr. Andrea Hudson, which updated Dr. Piotr Lance. Missy called Dr. Kenzie Vizcarra and an ER doctor also looked at pt.      New orders can be seen in chart

## 2022-02-09 NOTE — MANAGEMENT PLAN
Cards Note    62year old male with NICM admitted with N/V with known NICM and pAT. He was in 1:1 AT when admitted and HR now controlled. His arrhythmia is likely a consequence of cathecholamine responses from acute abdominal pain and infection/gastroenteritis. His troponin is elevated but is always elevated and roughly the saw as previous assessments. It is further elevated over baseline by a supply/demand mismatch from his RVR state. His ICD was interrogated yesterday which demonstrated a 1:1 pAT. -Continue BB. HRs improved today.   -Agree with abx and treatment of underlying medical problem/gastroenteritis.   -Ensure K/Mg 4.0/2.0, respectively. -YAO - Careful with IVF given history of reduced EF. Renal function improving. Prerenal azotemia likely cause of YAO. Encourage po hydration. Wan Pabon.  Neal Mishra MD, [address] [facility] Cardiac Electrophysiology  [facility]

## 2022-02-09 NOTE — PROGRESS NOTES
Problem: Falls - Risk of  Goal: *Absence of Falls  Description: Document Bennie Dav Fall Risk and appropriate interventions in the flowsheet.   Outcome: Progressing Towards Goal  Note: Fall Risk Interventions:  Mobility Interventions: Patient to call before getting OOB         Medication Interventions: Bed/chair exit alarm    Elimination Interventions: Call light in reach              Problem: Patient Education: Go to Patient Education Activity  Goal: Patient/Family Education  Outcome: Progressing Towards Goal

## 2022-02-09 NOTE — PROGRESS NOTES
Hospitalist Progress Note   Admit Date:  2022  4:06 PM   Name:  Jw Sargent   Age:  62 y.o. Sex:  male  :  1965   MRN:  [id number]   Room:  19/    Presenting Complaint: Chest Pain and Shortness of Breath    Reason(s) for Admission: YAO (acute kidney injury) [facility]) [N17.9]     Hospital Course & Interval History:   Jw Sargent is a 62 y.o. male with medical history of nonischemic cardiomyopathy with EF of 20-25%, HTN, HLD, and chronic back pain presents with heart palpitations, persistent nausea/vomiting, diarrhea, shortness of breath and generalized weakness for the past week. He says that he was called by his cardiologist and told that his HRs were high. He denies chest pain but says \"I just hurt all over. He denies recreational drug use and alcohol. He denies black/red stools. He does not know of any food that may have triggered his symptoms.      ED course: HRs in the 150s-160s, cardiology reviewed strips and it is most likely atrial tachycardia as opposed to VT. WBC count of 13k. Troponin of 245 with repeat of 270. Procalcitonin of 0.06. pBNP of 1690. SCr of 2.4. K of 3.4. Rapid COVID is negative. CXR unremarkable. Hospitalist consulted for admission. Subjective (22): Patient with audible stridor as soon as I entered room. Was not called on this prior. Denies drug allergy. No angioedema seen. Does report continued nausea/vomiting. Episode of vomiting while I was in the room. Reports epigastric and precordial pain. No reported events on telemetry. Given solumedrol/pepcid/ativan with minimal relief. Assessment & Plan:     # intractable nausea/vomiting  - CTAP shows large hiatal hernia and thickened gastric mucosa. - suspect vomiting secondary to hiatal hernia  - GI consulted - continue empiric antibiotics. Cirrhosis workup at later date  - atbx changed from zosyn to Rocephin/flagyl.   - May need surgery eval for hiatal hernia.    - continue supportive mgmt - antiemetics  - NPO for now.   - SLOW ivf    # Stridor  - Given solumedrol 125mg, will schedule  - trial of Pepcid, racemic epi x 1 - no change  - suspect etiology is vocal cord irritation s/p vomiting.   - monitor o2 sats and WOB closely  - supplemental o2 now for comfort although o2 sats RA in 90's. - have spoken with nocturnist this evening who will check on him later. Active Problems:   # Chronic systolic (congestive) heart failure ([facility] [address] (4/21/2011)   appears compensated  - hold eplerenone given YAO  - cannot use ACEi/ARB due to YAO     # YAO  - likely prerenal azotemia and poor po intake  - SCr of 2.4--2.0  - gentle fluid resuscitation given advanced heart failure  - avoid nephrotoxic meds, IV contrast, NSAIDs, morphine  - renally dose meds as appropriate  - strict I's/O's  - monitor      # Demand ischemia  - known history of nonischemic cardiomyopathy  - trend trops 200s and flat. - cont asa/statin  - telemetry  - consult cardiology  - pending. Recalled.     # SVT, appears to be Atach per cardiology on admission  - management as above  - on Coreg already  - cont asa/statin  - consult cardiology          Dispo/Discharge Planning:    [facility] clinical [facility]    Diet:  DIET NPO  DVT PPx: scd  Code status: Full Code    Hospital Problems as of 2/8/2022 Date Reviewed: 12/23/2021          Codes Class Noted - Resolved POA    Hyperbilirubinemia ICD-10-CM: E80.6  ICD-9-CM: 782.4  2/7/2022 - Present Unknown        Demand ischemia ([address] ICD-10-CM: I24.8  ICD-9-CM: 411.89  8/24/2021 - Present Yes        YAO (acute kidney injury) ([address] ICD-10-CM: N17.9  ICD-9-CM: 584.9  8/23/2021 - Present Unknown        SVT (supraventricular tachycardia) (HCC) ICD-10-CM: I47.1  ICD-9-CM: 427.89  12/22/2018 - Present Unknown        Transaminitis ICD-10-CM: R74.01  ICD-9-CM: 790.4  3/14/2017 - Present Unknown        Non-ischemic cardiomyopathy ([facility] [address] (Chronic) ICD-10-CM: I42.8  ICD-9-CM: 425.4  3/24/2016 - Present Yes        * (Principal) Acute gastroenteritis ICD-10-CM: K52.9  ICD-9-CM: 558.9  2/26/2016 - Present Yes        HTN (hypertension) (Chronic) ICD-10-CM: I10  ICD-9-CM: 401.9  11/29/2011 - Present Yes        Chronic systolic (congestive) heart failure (HCC) (Chronic) ICD-10-CM: I50.22  ICD-9-CM: 428.22, 428.0  4/21/2011 - Present Yes              Objective:     Patient Vitals for the past 24 hrs:   Temp Pulse Resp BP SpO2   02/08/22 1947 98.5 °F (36.9 °C) 93 18 103/83 97 %   02/08/22 1521 98.2 °F (36.8 °C) 98 24 (!) 111/90 98 %   02/08/22 1411     98 %   02/08/22 1130 98 °F (36.7 °C) (!) 101 24 102/74 96 %   02/08/22 0700 98.4 °F (36.9 °C) (!) 57 19 105/76 94 %   02/08/22 0131 98 °F (36.7 °C) (!) 118 20 (!) 120/91 98 %     Oxygen Therapy  O2 Sat (%): 97 % (02/08/22 1947)  Pulse via Oximetry: 103 beats per minute (02/08/22 1411)  O2 Device: Nasal cannula (02/08/22 1411)  O2 Flow Rate (L/min): 1 l/min (02/08/22 1411)    Estimated body mass index is 25.8 kg/m² as calculated from the following:    Height as of this encounter: 5' 11\" (1.803 m). Weight as of this encounter: 83.9 kg (185 lb). Intake/Output Summary (Last 24 hours) at 2/8/2022 2012  Last data filed at 2/8/2022 1517  Gross per 24 hour   Intake 600 ml   Output 350 ml   Net 250 ml         Physical Exam:     Blood pressure 103/83, pulse 93, temperature 98.5 °F (36.9 °C), resp. rate 18, height 5' 11\" (1.803 m), weight 83.9 kg (185 lb), SpO2 97 %. General:    Well nourished. Appears uncomfortable but not in distress  Head:  Normocephalic, atraumatic  Eyes:  Sclerae appear normal.  Pupils equally round. ENT:  Nares appear normal, no drainage. Moist oral mucosa  Neck:  No restricted ROM. Trachea midline   Inspiratory and expiratory stridor heard. CV:   RRR. No m/r/g. No jugular venous distension. Lungs:   diminished  No lower airway wheezing, rhonchi, or rales. Abdomen: Bowel sounds present. Soft, nontender, nondistended.   Extremities: No cyanosis or clubbing. No edema  Skin:     No rashes and normal coloration. Warm and dry. Neuro:  CN II-XII grossly intact. Sensation intact. A&Ox3  Psych:  Normal mood and affect. I have reviewed ordered lab tests and independently visualized imaging below:    Recent Labs:  Recent Results (from the past 48 hour(s))   EKG, 12 LEAD, INITIAL    Collection Time: 02/07/22  3:51 PM   Result Value Ref Range    Ventricular Rate 161 BPM    QRS Duration 92 ms    Q-T Interval 310 ms    QTC Calculation (Bezet) 507 ms    Calculated R Axis 60 degrees    Calculated T Axis -30 degrees    Diagnosis       !!! Poor data quality, interpretation may be adversely affected  Supraventricular tachycardia  Anterior infarct (cited on or before 02-JUL-2020)  ST & T wave abnormality, consider inferior ischemia  Abnormal ECG  When compared with ECG of 27-DEC-2021 16:42,  Non-specific change in ST segment in Inferior leads  ST now depressed in Lateral leads  T wave inversion now evident in Inferior leads  Confirmed by Eagle Nash MD (), GEORGINA HARRISON ([id number]) on 2/8/2022 5:13:23 PM     CBC WITH AUTOMATED DIFF    Collection Time: 02/07/22  3:57 PM   Result Value Ref Range    WBC 13.3 (H) 4.3 - 11.1 K/uL    RBC 5.25 4.23 - 5.6 M/uL    HGB 16.1 13.6 - 17.2 g/dL    HCT 49.1 41.1 - 50.3 %    MCV 93.5 79.6 - 97.8 FL    MCH 30.7 26.1 - 32.9 PG    MCHC 32.8 31.4 - 35.0 g/dL    RDW 16.4 (H) 11.9 - 14.6 %    PLATELET 922 042 - 747 K/uL    MPV 9.5 9.4 - 12.3 FL    ABSOLUTE NRBC 0.00 0.0 - 0.2 K/uL    DF AUTOMATED      NEUTROPHILS 77 43 - 78 %    LYMPHOCYTES 13 13 - 44 %    MONOCYTES 10 4.0 - 12.0 %    EOSINOPHILS 0 (L) 0.5 - 7.8 %    BASOPHILS 0 0.0 - 2.0 %    IMMATURE GRANULOCYTES 1 0.0 - 5.0 %    ABS. NEUTROPHILS 10.2 (H) 1.7 - 8.2 K/UL    ABS. LYMPHOCYTES 1.7 0.5 - 4.6 K/UL    ABS. MONOCYTES 1.3 0.1 - 1.3 K/UL    ABS. EOSINOPHILS 0.0 0.0 - 0.8 K/UL    ABS. BASOPHILS 0.1 0.0 - 0.2 K/UL    ABS. IMM.  GRANS. 0.1 0.0 - 0.5 K/UL   COVID-19 RAPID TEST    Collection Time: 02/07/22  4:29 PM   Result Value Ref Range    Specimen source Nasopharyngeal      COVID-19 rapid test Not detected NOTD     LIPASE    Collection Time: 02/07/22  5:18 PM   Result Value Ref Range    Lipase 112 73 - 184 U/L   METABOLIC PANEL, COMPREHENSIVE    Collection Time: 02/07/22  5:18 PM   Result Value Ref Range    Sodium 138 136 - 145 mmol/L    Potassium 3.4 (L) 3.5 - 5.1 mmol/L    Chloride 103 98 - 107 mmol/L    CO2 20 (L) 21 - 32 mmol/L    Anion gap 15 7 - 16 mmol/L    Glucose 113 (H) 65 - 100 mg/dL    BUN 9 6 - 23 MG/DL    Creatinine 2.40 (H) 0.8 - 1.5 MG/DL    GFR est AA 36 (L) >60 ml/min/1.73m2    GFR est non-AA 30 (L) >60 ml/min/1.73m2    Calcium 8.5 8.3 - 10.4 MG/DL    Bilirubin, total 1.6 (H) 0.2 - 1.1 MG/DL    ALT (SGPT) 41 12 - 65 U/L    AST (SGOT) 53 (H) 15 - 37 U/L    Alk.  phosphatase 98 50 - 136 U/L    Protein, total 7.9 6.3 - 8.2 g/dL    Albumin 3.9 3.5 - 5.0 g/dL    Globulin 4.0 (H) 2.3 - 3.5 g/dL    A-G Ratio 1.0 (L) 1.2 - 3.5     NT-PRO BNP    Collection Time: 02/07/22  5:18 PM   Result Value Ref Range    NT pro-BNP 1,690 (H) 5 - 125 PG/ML   TROPONIN-HIGH SENSITIVITY    Collection Time: 02/07/22  5:18 PM   Result Value Ref Range    Troponin-High Sensitivity 245.7 (HH) 0 - 14 pg/mL   TROPONIN-HIGH SENSITIVITY    Collection Time: 02/07/22  6:51 PM   Result Value Ref Range    Troponin-High Sensitivity 270.8 (HH) 0 - 14 pg/mL   PROCALCITONIN    Collection Time: 02/07/22  6:51 PM   Result Value Ref Range    Procalcitonin 0.06 0.00 - 0.49 ng/mL   ETHYL ALCOHOL    Collection Time: 02/07/22  6:51 PM   Result Value Ref Range    ALCOHOL(ETHYL),SERUM <3 MG/DL   LACTIC ACID    Collection Time: 02/07/22 10:31 PM   Result Value Ref Range    Lactic acid 1.7 0.4 - 2.0 MMOL/L   INFLUENZA A & B AG (RAPID TEST)    Collection Time: 02/07/22 10:31 PM   Result Value Ref Range    Influenza A Ag Negative NEG      Influenza B Ag Negative NEG      Source Nasopharyngeal     METABOLIC PANEL, BASIC    Collection Time: 02/08/22  2:22 AM   Result Value Ref Range    Sodium 132 (L) 136 - 145 mmol/L    Potassium 3.6 3.5 - 5.1 mmol/L    Chloride 101 98 - 107 mmol/L    CO2 21 21 - 32 mmol/L    Anion gap 10 7 - 16 mmol/L    Glucose 124 (H) 65 - 100 mg/dL    BUN 12 6 - 23 MG/DL    Creatinine 2.10 (H) 0.8 - 1.5 MG/DL    GFR est AA 42 (L) >60 ml/min/1.73m2    GFR est non-AA 35 (L) >60 ml/min/1.73m2    Calcium 8.3 8.3 - 10.4 MG/DL   CBC WITH AUTOMATED DIFF    Collection Time: 02/08/22  2:22 AM   Result Value Ref Range    WBC 12.0 (H) 4.3 - 11.1 K/uL    RBC 4.70 4.23 - 5.6 M/uL    HGB 14.4 13.6 - 17.2 g/dL    HCT 43.0 41.1 - 50.3 %    MCV 91.5 79.6 - 97.8 FL    MCH 30.6 26.1 - 32.9 PG    MCHC 33.5 31.4 - 35.0 g/dL    RDW 15.8 (H) 11.9 - 14.6 %    PLATELET 257 858 - 260 K/uL    MPV 9.8 9.4 - 12.3 FL    ABSOLUTE NRBC 0.00 0.0 - 0.2 K/uL    DF AUTOMATED      NEUTROPHILS 79 (H) 43 - 78 %    LYMPHOCYTES 11 (L) 13 - 44 %    MONOCYTES 10 4.0 - 12.0 %    EOSINOPHILS 0 (L) 0.5 - 7.8 %    BASOPHILS 0 0.0 - 2.0 %    IMMATURE GRANULOCYTES 0 0.0 - 5.0 %    ABS. NEUTROPHILS 9.5 (H) 1.7 - 8.2 K/UL    ABS. LYMPHOCYTES 1.3 0.5 - 4.6 K/UL    ABS. MONOCYTES 1.2 0.1 - 1.3 K/UL    ABS. EOSINOPHILS 0.0 0.0 - 0.8 K/UL    ABS. BASOPHILS 0.0 0.0 - 0.2 K/UL    ABS. IMM.  GRANS. 0.0 0.0 - 0.5 K/UL   TROPONIN-HIGH SENSITIVITY    Collection Time: 02/08/22  2:22 AM   Result Value Ref Range    Troponin-High Sensitivity 270.3 (HH) 0 - 14 pg/mL   DRUG SCREEN, URINE    Collection Time: 02/08/22  4:47 PM   Result Value Ref Range    PCP(PHENCYCLIDINE) Negative      BENZODIAZEPINES Positive      COCAINE Negative      AMPHETAMINES Negative      METHADONE Negative      THC (TH-CANNABINOL) Negative      OPIATES Positive      BARBITURATES Negative         All Micro Results     Procedure Component Value Units Date/Time    CULTURE, STOOL [[id number]]     Order Status: Sent Specimen: Stool     CAMPLYLOBACTER CULTURE [[id number]]     Order Status: Sent Specimen: Stool     INFLUENZA A & B AG (RAPID TEST) [[id number]] Collected: 02/07/22 2231    Order Status: Completed Specimen: Nasopharyngeal from Nasal washing Updated: 02/07/22 2302     Influenza A Ag Negative        Comment: NEGATIVE FOR THE PRESENCE OF INFLUENZA A ANTIGEN  INFECTION DUE TO INFLUENZA A CANNOT BE RULED OUT. BECAUSE THE ANTIGEN PRESENT IN THE SAMPLE MAY BE BELOW  THE DETECTION LIMIT OF THE TEST. A NEGATIVE TEST IS PRESUMPTIVE AND IT IS RECOMMENDED THAT THESE RESULTS BE CONFIRMED BY VIRAL CULTURE OR AN FDA-CLEARED INFLUENZA A AND B MOLECULAR ASSAY. Influenza B Ag Negative        Comment: NEGATIVE FOR THE PRESENCE OF INFLUENZA B ANTIGEN  INFECTION DUE TO INFLUENZA B CANNOT BE RULED OUT. BECAUSE THE ANTIGEN PRESENT IN THE SAMPLE MAY BE BELOW  THE DETECTION LIMIT OF THE TEST. A NEGATIVE TEST IS PRESUMPTIVE AND IT IS RECOMMENDED THAT THESE RESULTS BE CONFIRMED BY VIRAL CULTURE OR AN FDA-CLEARED INFLUENZA A AND B MOLECULAR ASSAY. Source Nasopharyngeal       COVID-19 RAPID TEST [[id number]] Collected: 02/07/22 1629    Order Status: Completed Specimen: Nasopharyngeal Updated: 02/07/22 1700     Specimen source Nasopharyngeal        COVID-19 rapid test Not detected        Comment:      The specimen is NEGATIVE for SARS-CoV-2, the novel coronavirus associated with COVID-19. A negative result does not rule out COVID-19. This test has been authorized by the FDA under an Emergency Use Authorization (EUA) for use by authorized laboratories. Fact sheet for Healthcare Providers: ConventionUpdate.co.nz  Fact sheet for Patients: ConventionUpdate.co.nz       Methodology: Isothermal Nucleic Acid Amplification               Other Studies:  XR CHEST SNGL V    Result Date: 2/8/2022  Portable chest: History: wheezing Comparison: 02/07/2022 Findings: A single view of the chest was obtained at 1345 hours. There is a dual-chamber cardiac pacer/AICD device.  The cardiac silhouette is mildly enlarged. Hiatal/left diaphragmatic hernia appears unchanged. The lungs and pleural spaces are grossly clear. The pulmonary vascularity is within normal limits. 1. Large hiatal/diaphragmatic hernia, unchanged. 2. Grossly clear lungs. XR ABD (KUB)    Result Date: 2/8/2022  Exam: Single view abdomen. Indication: Intractable vomiting Comparison: CT abdomen and pelvis, 2/7/2022 FINDINGS: Nonobstructive bowel gas pattern. Recently administered enteric contrast is present in the colon and rectum. Included portions of the lungs are clear. There are dual-chamber AICD leads. Right upper quadrant surgical clips. 1. Non-obstructive bowel gas pattern. CT ABD PELV WO CONT    Result Date: 2/8/2022  CT abdomen and pelvis without contrast COMPARISON: April 2021. INDICATION: Recurrent abdominal pain with nausea and vomiting. TECHNIQUE: CT imaging of the abdomen and pelvis was performed without intravenous contrast. Radiation dose reduction techniques were used for this study:  Our CT scanners use one or all of the following: Automated exposure control, adjustment of the mA and/or kVp according to patient's size, iterative reconstruction. FINDINGS: Mild dependent subsegmental atelectasis at the visualized lung bases. The heart is enlarged. Cardiac pacer wires are partially seen. Abdomen findings: Absence of IV contrast limits sensitivity. No renal calculus or hydronephrosis. Stable small left renal cysts. There is micronodular contour of the liver. Spleen is not enlarged. Gallbladder is surgically absent. No peripancreatic fluid collection. Unenhanced adrenal glands are unremarkable. Abdominal aorta is normal in course and caliber with atherosclerotic calcifications. No evidence of lymphadenopathy. There is moderate hiatal hernia. There is an apparent eccentric submucosal wall thickening of the proximal stomach. Small bowel loops are normal in caliber. No small bowel obstruction.  Pelvic findings: Prostate gland is prominent. There is fat-containing left inguinal hernia. No bowel herniation. Urinary bladder is normal in contour. Colon is normal in course and caliber. Appendix appears within normal limits. There is no free air or free fluid. Surrounding bones are intact. 1. Moderate hiatal hernia. Suggestion of eccentric submucosal wall thickening of the proximal stomach, although this may be artifactual secondary to volume averaging of fluid in the stomach. Correlation with upper endoscopy may be beneficial. 2. No evidence of colitis, diverticulitis, appendicitis or bowel obstruction. 3. No renal calculus or hydronephrosis. 4. Absence of IV contrast limits sensitivity. US ABD LTD    Result Date: 2/8/2022  Limited ultrasound abdomen INDICATION: Elevated AST and T bili COMPARISON: April 2021 TECHNIQUE: Sonographic grayscale imaging of the abdomen was performed. FINDINGS: Pancreas and abdominal aorta are not well seen due to overlying bowel gas. IVC is patent. There is micronodular contour of the liver. Liver is normal in size and measures 16 cm. Right kidney is echogenic, suggesting medical renal disease. It is normal in contour and size and measures 10 cm. No right hydronephrosis. Gallbladder is surgically absent. No evidence of biliary ductal dilatation. Common bile duct measures 5 mm. No evidence of ascites. 1. Micronodular contour of the liver, raising the question of cirrhosis. 2. Cholecystectomy. No evidence of biliary ductal dilatation.        Current Meds:  Current Facility-Administered Medications   Medication Dose Route Frequency    HYDROmorphone (DILAUDID) injection 0.5 mg  0.5 mg IntraVENous Q6H PRN    promethazine (PHENERGAN) with saline injection 12.5 mg  12.5 mg IntraVENous Q6H PRN    famotidine (PF) (PEPCID) 20 mg in 0.9% sodium chloride 10 mL injection  20 mg IntraVENous Q12H    metroNIDAZOLE (FLAGYL) IVPB premix 500 mg  500 mg IntraVENous Q12H    cefTRIAXone (ROCEPHIN) 1 g in 0.9% sodium chloride (MBP/ADV) 50 mL MBP  1 g IntraVENous Q24H    methylPREDNISolone (PF) (SOLU-MEDROL) injection 40 mg  40 mg IntraVENous Q6H    sodium chloride (NS) flush 5-10 mL  5-10 mL IntraVENous Q8H    sodium chloride (NS) flush 5-10 mL  5-10 mL IntraVENous PRN    [Held by provider] 0.9% sodium chloride infusion  75 mL/hr IntraVENous CONTINUOUS    levalbuterol (XOPENEX) nebulizer soln 0.63 mg/3 mL  0.63 mg Nebulization Q4H PRN    ALPRAZolam (XANAX) tablet 1 mg  1 mg Oral QHS    aspirin delayed-release tablet 81 mg  81 mg Oral DAILY    atorvastatin (LIPITOR) tablet 80 mg  80 mg Oral DAILY    carvediloL (COREG) tablet 25 mg  25 mg Oral BID WITH MEALS    gabapentin (NEURONTIN) capsule 300 mg  300 mg Oral BID    HYDROcodone-acetaminophen (NORCO)  mg tablet 1 Tablet  1 Tablet Oral Q6H PRN    sodium chloride (NS) flush 5-40 mL  5-40 mL IntraVENous Q8H    sodium chloride (NS) flush 5-40 mL  5-40 mL IntraVENous PRN    [Held by provider] acetaminophen (TYLENOL) tablet 650 mg  650 mg Oral Q6H PRN    Or    [Held by provider] acetaminophen (TYLENOL) suppository 650 mg  650 mg Rectal Q6H PRN    polyethylene glycol (MIRALAX) packet 17 g  17 g Oral DAILY PRN    magnesium oxide (MAG-OX) tablet 400 mg  400 mg Oral DAILY       Signed:  Raul Basilio DO    Part of this note may have been written by using a voice dictation software. The note has been proof read but may still contain some grammatical/other typographical errors.

## 2022-02-09 NOTE — PROGRESS NOTES
Patient placed on CAP 69 at this time for continuous pulse oximetry monitoring. Monitor room notified. No complications noted.      Robbie Sin, RRT

## 2022-02-10 ENCOUNTER — APPOINTMENT (OUTPATIENT)
Dept: CT IMAGING | Age: 57
DRG: 309 | End: 2022-02-10
Attending: NURSE PRACTITIONER
Payer: COMMERCIAL

## 2022-02-10 ENCOUNTER — APPOINTMENT (OUTPATIENT)
Dept: GENERAL RADIOLOGY | Age: 57
DRG: 309 | End: 2022-02-10
Attending: NURSE PRACTITIONER
Payer: COMMERCIAL

## 2022-02-10 ENCOUNTER — APPOINTMENT (OUTPATIENT)
Dept: NON INVASIVE DIAGNOSTICS | Age: 57
DRG: 309 | End: 2022-02-10
Attending: INTERNAL MEDICINE
Payer: COMMERCIAL

## 2022-02-10 PROBLEM — E87.1 HYPONATREMIA: Status: RESOLVED | Noted: 2021-08-24 | Resolved: 2022-02-10

## 2022-02-10 PROBLEM — R06.03 RESPIRATORY DISTRESS: Status: RESOLVED | Noted: 2020-03-19 | Resolved: 2022-02-10

## 2022-02-10 PROBLEM — K80.20 GALLSTONES: Chronic | Status: ACTIVE | Noted: 2021-04-16

## 2022-02-10 PROBLEM — E87.6 HYPOKALEMIA: Status: RESOLVED | Noted: 2020-07-03 | Resolved: 2022-02-10

## 2022-02-10 PROBLEM — Z20.822 COVID-19 RULED OUT: Status: RESOLVED | Noted: 2020-07-02 | Resolved: 2022-02-10

## 2022-02-10 PROBLEM — R07.9 CHEST PAIN: Status: RESOLVED | Noted: 2020-09-30 | Resolved: 2022-02-10

## 2022-02-10 PROBLEM — E87.20 METABOLIC ACIDOSIS: Status: RESOLVED | Noted: 2018-07-08 | Resolved: 2022-02-10

## 2022-02-10 PROBLEM — J96.01 ACUTE RESPIRATORY FAILURE WITH HYPOXIA (HCC): Status: RESOLVED | Noted: 2021-08-24 | Resolved: 2022-02-10

## 2022-02-10 PROBLEM — I50.9 CHF (CONGESTIVE HEART FAILURE) (HCC): Chronic | Status: ACTIVE | Noted: 2020-10-02

## 2022-02-10 LAB
ANION GAP SERPL CALC-SCNC: 8 MMOL/L (ref 7–16)
BASOPHILS # BLD: 0 K/UL (ref 0–0.2)
BASOPHILS NFR BLD: 0 % (ref 0–2)
BUN SERPL-MCNC: 11 MG/DL (ref 6–23)
CALCIUM SERPL-MCNC: 7.6 MG/DL (ref 8.3–10.4)
CHLORIDE SERPL-SCNC: 108 MMOL/L (ref 98–107)
CO2 SERPL-SCNC: 20 MMOL/L (ref 21–32)
CREAT SERPL-MCNC: 1.2 MG/DL (ref 0.8–1.5)
DIFFERENTIAL METHOD BLD: ABNORMAL
ECHO AO ASC DIAM: 3.8 CM
ECHO AO ASCENDING AORTA INDEX: 1.88 CM/M2
ECHO AO ROOT DIAM: 3.7 CM
ECHO AO ROOT INDEX: 1.83 CM/M2
ECHO AV AREA PEAK VELOCITY: 5.2 CM2
ECHO AV AREA VTI: 5.1 CM2
ECHO AV AREA/BSA PEAK VELOCITY: 2.6 CM2/M2
ECHO AV AREA/BSA VTI: 2.5 CM2/M2
ECHO AV MEAN GRADIENT: 3 MMHG
ECHO AV MEAN VELOCITY: 0.8 M/S
ECHO AV PEAK GRADIENT: 4 MMHG
ECHO AV PEAK VELOCITY: 1 M/S
ECHO AV VELOCITY RATIO: 0.8
ECHO AV VTI: 19.3 CM
ECHO LA AREA 2C: 23.4 CM2
ECHO LA AREA 4C: 20.7 CM2
ECHO LA DIAMETER INDEX: 2.57 CM/M2
ECHO LA DIAMETER: 5.2 CM
ECHO LA MAJOR AXIS: 6.2 CM
ECHO LA MINOR AXIS: 6.4 CM
ECHO LA TO AORTIC ROOT RATIO: 1.41
ECHO LA VOL BP: 62 ML (ref 18–58)
ECHO LA VOL/BSA BIPLANE: 31 ML/M2 (ref 16–34)
ECHO LV E' LATERAL VELOCITY: 4 CM/S
ECHO LV E' SEPTAL VELOCITY: 5 CM/S
ECHO LV EDV A2C: 288 ML
ECHO LV EDV A4C: 260 ML
ECHO LV EDV BP: 276 ML (ref 67–155)
ECHO LV EDV INDEX A4C: 129 ML/M2
ECHO LV EDV INDEX BP: 137 ML/M2
ECHO LV EDV NDEX A2C: 143 ML/M2
ECHO LV EJECTION FRACTION A2C: 22 %
ECHO LV EJECTION FRACTION A4C: 29 %
ECHO LV EJECTION FRACTION BIPLANE: 22 % (ref 55–100)
ECHO LV ESV A2C: 227 ML
ECHO LV ESV A4C: 184 ML
ECHO LV ESV INDEX A2C: 112 ML/M2
ECHO LV ESV INDEX A4C: 91 ML/M2
ECHO LV FRACTIONAL SHORTENING: 6 % (ref 28–44)
ECHO LV INTERNAL DIMENSION DIASTOLE INDEX: 2.57 CM/M2
ECHO LV INTERNAL DIMENSION DIASTOLIC: 5.2 CM (ref 4.2–5.9)
ECHO LV INTERNAL DIMENSION SYSTOLIC INDEX: 2.43 CM/M2
ECHO LV INTERNAL DIMENSION SYSTOLIC: 4.9 CM
ECHO LV IVSD: 0.6 CM (ref 0.6–1)
ECHO LV MASS 2D: 122.8 G (ref 88–224)
ECHO LV MASS INDEX 2D: 60.8 G/M2 (ref 49–115)
ECHO LV POSTERIOR WALL DIASTOLIC: 0.8 CM (ref 0.6–1)
ECHO LV RELATIVE WALL THICKNESS RATIO: 0.31
ECHO LVOT AREA: 6.6 CM2
ECHO LVOT AV VTI INDEX: 0.78
ECHO LVOT DIAM: 2.9 CM
ECHO LVOT MEAN GRADIENT: 1 MMHG
ECHO LVOT PEAK GRADIENT: 3 MMHG
ECHO LVOT PEAK VELOCITY: 0.8 M/S
ECHO LVOT STROKE VOLUME INDEX: 49 ML/M2
ECHO LVOT SV: 99 ML
ECHO LVOT VTI: 15 CM
ECHO MV A VELOCITY: 0.93 M/S
ECHO MV E VELOCITY: 0.55 M/S
ECHO MV E/A RATIO: 0.59
ECHO MV E/E' LATERAL: 13.75
ECHO MV E/E' RATIO (AVERAGED): 12.38
ECHO MV E/E' SEPTAL: 11
ECHO PV ACCELERATION TIME (AT): 120 MS
ECHO PV MAX VELOCITY: 0.9 M/S
ECHO PV PEAK GRADIENT: 3 MMHG
ECHO RV BASAL DIMENSION: 4.2 CM
ECHO RV INTERNAL DIMENSION: 3.6 CM
ECHO RV TAPSE: 1.8 CM (ref 1.5–2)
ECHO TV REGURGITANT MAX VELOCITY: 1.63 M/S
ECHO TV REGURGITANT PEAK GRADIENT: 11 MMHG
EOSINOPHIL # BLD: 0 K/UL (ref 0–0.8)
EOSINOPHIL NFR BLD: 0 % (ref 0.5–7.8)
ERYTHROCYTE [DISTWIDTH] IN BLOOD BY AUTOMATED COUNT: 15.2 % (ref 11.9–14.6)
GLUCOSE SERPL-MCNC: 125 MG/DL (ref 65–100)
HCT VFR BLD AUTO: 41.3 % (ref 41.1–50.3)
HGB BLD-MCNC: 13.2 G/DL (ref 13.6–17.2)
IMM GRANULOCYTES # BLD AUTO: 0.1 K/UL (ref 0–0.5)
IMM GRANULOCYTES NFR BLD AUTO: 1 % (ref 0–5)
LYMPHOCYTES # BLD: 0.8 K/UL (ref 0.5–4.6)
LYMPHOCYTES NFR BLD: 5 % (ref 13–44)
MCH RBC QN AUTO: 30.4 PG (ref 26.1–32.9)
MCHC RBC AUTO-ENTMCNC: 32 G/DL (ref 31.4–35)
MCV RBC AUTO: 95.2 FL (ref 79.6–97.8)
MONOCYTES # BLD: 0.6 K/UL (ref 0.1–1.3)
MONOCYTES NFR BLD: 4 % (ref 4–12)
NEUTS SEG # BLD: 14.2 K/UL (ref 1.7–8.2)
NEUTS SEG NFR BLD: 91 % (ref 43–78)
NRBC # BLD: 0 K/UL (ref 0–0.2)
PLATELET # BLD AUTO: 336 K/UL (ref 150–450)
PMV BLD AUTO: 9.6 FL (ref 9.4–12.3)
POTASSIUM SERPL-SCNC: 4.4 MMOL/L (ref 3.5–5.1)
RBC # BLD AUTO: 4.34 M/UL (ref 4.23–5.6)
SODIUM SERPL-SCNC: 136 MMOL/L (ref 136–145)
WBC # BLD AUTO: 15.7 K/UL (ref 4.3–11.1)

## 2022-02-10 PROCEDURE — 74011000250 HC RX REV CODE- 250: Performed by: EMERGENCY MEDICINE

## 2022-02-10 PROCEDURE — 74011250637 HC RX REV CODE- 250/637: Performed by: NURSE PRACTITIONER

## 2022-02-10 PROCEDURE — 74011000250 HC RX REV CODE- 250: Performed by: HOSPITALIST

## 2022-02-10 PROCEDURE — 31575 DIAGNOSTIC LARYNGOSCOPY: CPT | Performed by: STUDENT IN AN ORGANIZED HEALTH CARE EDUCATION/TRAINING PROGRAM

## 2022-02-10 PROCEDURE — 74011000250 HC RX REV CODE- 250: Performed by: FAMILY MEDICINE

## 2022-02-10 PROCEDURE — 74011000250 HC RX REV CODE- 250: Performed by: INTERNAL MEDICINE

## 2022-02-10 PROCEDURE — 74011000258 HC RX REV CODE- 258: Performed by: FAMILY MEDICINE

## 2022-02-10 PROCEDURE — 70491 CT SOFT TISSUE NECK W/DYE: CPT

## 2022-02-10 PROCEDURE — 94640 AIRWAY INHALATION TREATMENT: CPT

## 2022-02-10 PROCEDURE — 36415 COLL VENOUS BLD VENIPUNCTURE: CPT

## 2022-02-10 PROCEDURE — 99232 SBSQ HOSP IP/OBS MODERATE 35: CPT | Performed by: INTERNAL MEDICINE

## 2022-02-10 PROCEDURE — 74011250637 HC RX REV CODE- 250/637: Performed by: INTERNAL MEDICINE

## 2022-02-10 PROCEDURE — C8929 TTE W OR WO FOL WCON,DOPPLER: HCPCS

## 2022-02-10 PROCEDURE — 74011250636 HC RX REV CODE- 250/636: Performed by: INTERNAL MEDICINE

## 2022-02-10 PROCEDURE — 80048 BASIC METABOLIC PNL TOTAL CA: CPT

## 2022-02-10 PROCEDURE — 74011250636 HC RX REV CODE- 250/636: Performed by: EMERGENCY MEDICINE

## 2022-02-10 PROCEDURE — 74011000636 HC RX REV CODE- 636: Performed by: FAMILY MEDICINE

## 2022-02-10 PROCEDURE — 74011000258 HC RX REV CODE- 258: Performed by: INTERNAL MEDICINE

## 2022-02-10 PROCEDURE — 94762 N-INVAS EAR/PLS OXIMTRY CONT: CPT

## 2022-02-10 PROCEDURE — 74011250636 HC RX REV CODE- 250/636: Performed by: FAMILY MEDICINE

## 2022-02-10 PROCEDURE — 74011250637 HC RX REV CODE- 250/637: Performed by: EMERGENCY MEDICINE

## 2022-02-10 PROCEDURE — C9113 INJ PANTOPRAZOLE SODIUM, VIA: HCPCS | Performed by: INTERNAL MEDICINE

## 2022-02-10 PROCEDURE — 65270000029 HC RM PRIVATE

## 2022-02-10 PROCEDURE — 74011000250 HC RX REV CODE- 250: Performed by: NURSE PRACTITIONER

## 2022-02-10 PROCEDURE — 74011250637 HC RX REV CODE- 250/637: Performed by: STUDENT IN AN ORGANIZED HEALTH CARE EDUCATION/TRAINING PROGRAM

## 2022-02-10 PROCEDURE — 71045 X-RAY EXAM CHEST 1 VIEW: CPT

## 2022-02-10 PROCEDURE — 74011250636 HC RX REV CODE- 250/636: Performed by: NURSE PRACTITIONER

## 2022-02-10 PROCEDURE — 99254 IP/OBS CNSLTJ NEW/EST MOD 60: CPT | Performed by: STUDENT IN AN ORGANIZED HEALTH CARE EDUCATION/TRAINING PROGRAM

## 2022-02-10 PROCEDURE — 77010033678 HC OXYGEN DAILY

## 2022-02-10 PROCEDURE — 74011250636 HC RX REV CODE- 250/636: Performed by: HOSPITALIST

## 2022-02-10 PROCEDURE — 85025 COMPLETE CBC W/AUTO DIFF WBC: CPT

## 2022-02-10 RX ORDER — DICYCLOMINE HYDROCHLORIDE 10 MG/1
20 CAPSULE ORAL 3 TIMES DAILY
Status: DISCONTINUED | OUTPATIENT
Start: 2022-02-10 | End: 2022-02-17 | Stop reason: HOSPADM

## 2022-02-10 RX ORDER — LORAZEPAM 1 MG/1
1 TABLET ORAL
Status: DISCONTINUED | OUTPATIENT
Start: 2022-02-10 | End: 2022-02-17 | Stop reason: HOSPADM

## 2022-02-10 RX ORDER — FLUTICASONE PROPIONATE 50 MCG
2 SPRAY, SUSPENSION (ML) NASAL 2 TIMES DAILY
Status: DISCONTINUED | OUTPATIENT
Start: 2022-02-10 | End: 2022-02-17 | Stop reason: HOSPADM

## 2022-02-10 RX ORDER — KETOROLAC TROMETHAMINE 15 MG/ML
30 INJECTION, SOLUTION INTRAMUSCULAR; INTRAVENOUS
Status: COMPLETED | OUTPATIENT
Start: 2022-02-10 | End: 2022-02-10

## 2022-02-10 RX ORDER — ALBUTEROL SULFATE 0.83 MG/ML
2.5 SOLUTION RESPIRATORY (INHALATION)
Status: DISCONTINUED | OUTPATIENT
Start: 2022-02-10 | End: 2022-02-17 | Stop reason: HOSPADM

## 2022-02-10 RX ORDER — VALSARTAN 80 MG/1
40 TABLET ORAL EVERY 12 HOURS
Status: DISCONTINUED | OUTPATIENT
Start: 2022-02-10 | End: 2022-02-12

## 2022-02-10 RX ORDER — SODIUM CHLORIDE 0.9 % (FLUSH) 0.9 %
10 SYRINGE (ML) INJECTION
Status: COMPLETED | OUTPATIENT
Start: 2022-02-10 | End: 2022-02-10

## 2022-02-10 RX ADMIN — METHYLPREDNISOLONE SODIUM SUCCINATE 40 MG: 40 INJECTION, POWDER, FOR SOLUTION INTRAMUSCULAR; INTRAVENOUS at 09:07

## 2022-02-10 RX ADMIN — ALPRAZOLAM 1 MG: 0.5 TABLET ORAL at 23:33

## 2022-02-10 RX ADMIN — SODIUM CHLORIDE, PRESERVATIVE FREE 10 ML: 5 INJECTION INTRAVENOUS at 15:05

## 2022-02-10 RX ADMIN — HYDROCODONE BITARTRATE AND ACETAMINOPHEN 1 TABLET: 10; 325 TABLET ORAL at 23:33

## 2022-02-10 RX ADMIN — LORAZEPAM 1 MG: 1 TABLET ORAL at 15:05

## 2022-02-10 RX ADMIN — SODIUM CHLORIDE, PRESERVATIVE FREE 12.5 MG: 5 INJECTION INTRAVENOUS at 11:37

## 2022-02-10 RX ADMIN — FAMOTIDINE 20 MG: 10 INJECTION INTRAVENOUS at 21:25

## 2022-02-10 RX ADMIN — Medication 10 ML: at 13:51

## 2022-02-10 RX ADMIN — ALBUTEROL SULFATE 2.5 MG: 2.5 SOLUTION RESPIRATORY (INHALATION) at 20:40

## 2022-02-10 RX ADMIN — IVABRADINE 5 MG: 5 TABLET, FILM COATED ORAL at 09:06

## 2022-02-10 RX ADMIN — CEFTRIAXONE 1 G: 1 INJECTION, POWDER, FOR SOLUTION INTRAMUSCULAR; INTRAVENOUS at 15:00

## 2022-02-10 RX ADMIN — GABAPENTIN 300 MG: 300 CAPSULE ORAL at 17:22

## 2022-02-10 RX ADMIN — GABAPENTIN 300 MG: 300 CAPSULE ORAL at 09:07

## 2022-02-10 RX ADMIN — PERFLUTREN 3 ML: 6.52 INJECTION, SUSPENSION INTRAVENOUS at 14:50

## 2022-02-10 RX ADMIN — CARVEDILOL 25 MG: 25 TABLET, FILM COATED ORAL at 09:07

## 2022-02-10 RX ADMIN — ALBUTEROL SULFATE 2.5 MG: 2.5 SOLUTION RESPIRATORY (INHALATION) at 15:03

## 2022-02-10 RX ADMIN — DICYCLOMINE HYDROCHLORIDE 20 MG: 10 CAPSULE ORAL at 21:42

## 2022-02-10 RX ADMIN — ALBUTEROL SULFATE 2.5 MG: 2.5 SOLUTION RESPIRATORY (INHALATION) at 11:55

## 2022-02-10 RX ADMIN — Medication 400 MG: at 09:07

## 2022-02-10 RX ADMIN — SODIUM CHLORIDE 40 MG: 9 INJECTION, SOLUTION INTRAMUSCULAR; INTRAVENOUS; SUBCUTANEOUS at 09:07

## 2022-02-10 RX ADMIN — KETOROLAC TROMETHAMINE 30 MG: 15 INJECTION, SOLUTION INTRAMUSCULAR; INTRAVENOUS at 21:44

## 2022-02-10 RX ADMIN — ATORVASTATIN CALCIUM 80 MG: 80 TABLET, FILM COATED ORAL at 09:07

## 2022-02-10 RX ADMIN — RACEPINEPHRINE HYDROCHLORIDE 0.5 ML: 11.25 SOLUTION RESPIRATORY (INHALATION) at 12:03

## 2022-02-10 RX ADMIN — CARVEDILOL 25 MG: 25 TABLET, FILM COATED ORAL at 17:22

## 2022-02-10 RX ADMIN — HYDROMORPHONE HYDROCHLORIDE 0.5 MG: 1 INJECTION, SOLUTION INTRAMUSCULAR; INTRAVENOUS; SUBCUTANEOUS at 05:22

## 2022-02-10 RX ADMIN — DICYCLOMINE HYDROCHLORIDE 10 MG: 10 CAPSULE ORAL at 12:25

## 2022-02-10 RX ADMIN — SODIUM CHLORIDE, PRESERVATIVE FREE 12.5 MG: 5 INJECTION INTRAVENOUS at 17:21

## 2022-02-10 RX ADMIN — METRONIDAZOLE 500 MG: 500 INJECTION, SOLUTION INTRAVENOUS at 15:00

## 2022-02-10 RX ADMIN — SODIUM CHLORIDE, PRESERVATIVE FREE 12.5 MG: 5 INJECTION INTRAVENOUS at 05:22

## 2022-02-10 RX ADMIN — FLUTICASONE PROPIONATE 2 SPRAY: 50 SPRAY, METERED NASAL at 21:56

## 2022-02-10 RX ADMIN — LORAZEPAM 1 MG: 1 TABLET ORAL at 09:13

## 2022-02-10 RX ADMIN — SODIUM CHLORIDE, PRESERVATIVE FREE 12.5 MG: 5 INJECTION INTRAVENOUS at 21:41

## 2022-02-10 RX ADMIN — METHYLPREDNISOLONE SODIUM SUCCINATE 40 MG: 40 INJECTION, POWDER, FOR SOLUTION INTRAMUSCULAR; INTRAVENOUS at 17:21

## 2022-02-10 RX ADMIN — IOPAMIDOL 80 ML: 755 INJECTION, SOLUTION INTRAVENOUS at 13:52

## 2022-02-10 RX ADMIN — ASPIRIN 81 MG: 81 TABLET ORAL at 09:06

## 2022-02-10 RX ADMIN — SALINE NASAL SPRAY 2 SPRAY: 1.5 SOLUTION NASAL at 21:56

## 2022-02-10 RX ADMIN — METRONIDAZOLE 500 MG: 500 INJECTION, SOLUTION INTRAVENOUS at 04:17

## 2022-02-10 RX ADMIN — SODIUM CHLORIDE 40 MG: 9 INJECTION, SOLUTION INTRAMUSCULAR; INTRAVENOUS; SUBCUTANEOUS at 21:25

## 2022-02-10 RX ADMIN — IVABRADINE 5 MG: 5 TABLET, FILM COATED ORAL at 17:22

## 2022-02-10 RX ADMIN — SODIUM CHLORIDE 100 ML: 900 INJECTION, SOLUTION INTRAVENOUS at 13:52

## 2022-02-10 RX ADMIN — FAMOTIDINE 20 MG: 10 INJECTION INTRAVENOUS at 09:08

## 2022-02-10 RX ADMIN — SODIUM CHLORIDE, PRESERVATIVE FREE 10 ML: 5 INJECTION INTRAVENOUS at 21:25

## 2022-02-10 RX ADMIN — HYDROCODONE BITARTRATE AND ACETAMINOPHEN 1 TABLET: 10; 325 TABLET ORAL at 17:22

## 2022-02-10 RX ADMIN — ALBUTEROL SULFATE 2.5 MG: 2.5 SOLUTION RESPIRATORY (INHALATION) at 02:15

## 2022-02-10 RX ADMIN — ALBUTEROL SULFATE 2.5 MG: 2.5 SOLUTION RESPIRATORY (INHALATION) at 07:21

## 2022-02-10 RX ADMIN — HYDROCODONE BITARTRATE AND ACETAMINOPHEN 1 TABLET: 10; 325 TABLET ORAL at 09:13
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
## 2022-02-10 NOTE — PROGRESS NOTES
[facility] CARDIOLOGY PROGRESS NOTE    2/10/2022 11:46 AM    Admit Date: 2/7/2022        Subjective:   Stable overnight without angina, CHF, or palpitations but still with increased work of breathing, still with coarse expirations and possible stridor despite racemic epi, but sats 100% on nasal cannula. Sinus on telemetry with no tachypalpitations and no tachycardia by monitoring on Maureen Queta on max dose carvedilol. Vitals stable and controlled. No other complaints overnight. Tolerating meds well. Objective:      Vitals:    02/10/22 0722 02/10/22 0723 02/10/22 0904 02/10/22 1104   BP:  114/87  102/72   Pulse:  89  94   Resp:  19  20   Temp:  97.4 °F (36.3 °C)  97.4 °F (36.3 °C)   SpO2: 98% 99%  100%   Weight:   181 lb (82.1 kg)    Height:           Physical Exam:  Neck- supple, no JVD at 60 degrees  CV- regular rate and rhythm no MRG  Lung-coarse expirations diffusely, possible stridor versus intrapulmonary wheezing, difficult to evaluate  Abd- soft, tender, nondistended  Ext- no edema  Skin- warm and dry    Data Review:   Recent Labs     02/10/22  0451 02/09/22  0331 02/08/22  0222 02/07/22  1851    132*   < >  --    K 4.4 4.4   < >  --    MG  --   --   --  1.3*   BUN 11 13   < >  --    CREA 1.20 1.30   < >  --    * 137*   < >  --    WBC 15.7* 8.2   < >  --    HGB 13.2* 13.4*   < >  --    HCT 41.3 41.5   < >  --     285   < >  --     < > = values in this interval not displayed. Assessment and Plan:     Principal Problem:    Acute gastroenteritis (2/26/2016)-still symptomatic but improved over the past 24 hours. White blood count trending down. Continue supportive care, hydrate as needed, IV antibiotics and recheck CBC in the morning. Chronic nausea and vomiting has been an issue with chronic systolic heart failure.     Active Problems:    Chronic systolic (congestive) heart failure ([address] (4/21/2011)      HTN (hypertension) (11/29/2011) stable continue meds    Non-ischemic cardiomyopathy ([facility] [address] (3/24/2016)      Transaminitis (3/14/2017) recheck in the morning      Inspiratory stridor (3/21/2017) repeat epi racemic as needed- may need another dose today. SVT (supraventricular tachycardia) ([address] (12/22/2018) continue max dose carvedilol and started Corlin or with maintenance of sinus rhythm overnight with no significant palpitations or SVT. Monitor telemetry. Recheck in the morning, hydrate as needed       YAO (acute kidney injury) ([facility] [address] (8/23/2021) recheck BMP in the morning, hydration as needed      Demand ischemia ([address] (8/24/2021) conservative cardiac care. No plans for invasive evaluation this admission. Hyperbilirubinemia (2/7/2022)        ELVIA Cardoza MD  [facility]  Pager [phone]

## 2022-02-10 NOTE — PROGRESS NOTES
Hospitalist Progress Note   Admit Date:  2022  4:06 PM   Name:  Jw Sargent   Age:  62 y.o. Sex:  male  :  1965   MRN:  [id number]   Room:  19/    Presenting Complaint: Chest Pain and Shortness of Breath    Reason(s) for Admission: YAO (acute kidney injury) [facility]) [N17.9]     Hospital Course & Interval History:   Jw Sargent is a 62 y.o. male with medical history of nonischemic cardiomyopathy with EF of 20-25%, HTN, HLD, and chronic back pain presents with heart palpitations, persistent nausea/vomiting, diarrhea, shortness of breath and generalized weakness for the past week. He says that he was called by his cardiologist and told that his HRs were high. He denies chest pain but says \"I just hurt all over. He denies recreational drug use and alcohol. He denies black/red stools. He does not know of any food that may have triggered his symptoms.      ED course: HRs in the 150s-160s, cardiology reviewed strips and it is most likely atrial tachycardia as opposed to VT. WBC count of 13k. Troponin of 245 with repeat of 270. Procalcitonin of 0.06. pBNP of 1690. SCr of 2.4. K of 3.4. Rapid COVID is negative. CXR unremarkable. Hospitalist consulted for admission. Subjective (22): Pt recurrently asking for IV pain med from RN. Reports abdominal pain. Pt is not in distress at time of my eval but requesting IV px med. Assessment & Plan:     # intractable nausea/vomiting  - CTAP shows large hiatal hernia and thickened gastric mucosa. - suspect vomiting secondary to hiatal hernia  - GI consulted  - Cirrhosis workup at later date.   - EGD IP vs OP     - cont empiric  Rocephin/flagyl atbx D3  - continue supportive mgmt - antiemetic  - SLOW ivf  - advance to FLD      # Stridor     - clinically a little improved  - CT neck with mild thickening in subglottic region  - suspect etiology is vocal cord irritation s/p vomiting.   - monitor o2 sats and WOB closely  - wean Solumedrol      # Chronic systolic (congestive) heart failure ([facility] (4/21/2011)   appears compensated  - hold eplerenone given YAO  - cannot use ACEi/ARB due to YAO  - monitor volume status daily     # YAO  - likely prerenal azotemia and poor po intake  - avoid nephrotoxic meds, IV contrast, NSAIDs, morphine  - renally dose meds as appropriate  - strict I's/O's  - monitor   2/9 - Cr improving 2.1--1.3 today. Likely can DC IVF in AM if tolerating diet.      # Demand ischemia  - known history of nonischemic cardiomyopathy  - trend trops 200s and flat. - cont asa/statin  - telemetry  - cardiology has seen -      # SVT, appears to be Atach per cardiology on admission  - management as above  - on Coreg already  - cont asa/statin  - cardiology has seen  2/9 - rates with fair control. Continue on tele         Dispo/Discharge Planning:    Pending clinical improvement.  likley home +/- HHC in 1-2 days    Diet:  ADULT DIET Full Liquid  DVT PPx: scd  Code status: Full Code    Hospital Problems as of 2/9/2022 Date Reviewed: 12/23/2021          Codes Class Noted - Resolved POA    Hyperbilirubinemia ICD-10-CM: E80.6  ICD-9-CM: 782.4  2/7/2022 - Present Unknown        Demand ischemia ([facility] ICD-10-CM: I24.8  ICD-9-CM: 411.89  8/24/2021 - Present Yes        YAO (acute kidney injury) ([facility] ICD-10-CM: N17.9  ICD-9-CM: 584.9  8/23/2021 - Present Unknown        SVT (supraventricular tachycardia) ([facility] ICD-10-CM: I47.1  ICD-9-CM: 427.89  12/22/2018 - Present Unknown        Inspiratory stridor ICD-10-CM: R06.1  ICD-9-CM: 786.1  3/21/2017 - Present Unknown        Transaminitis ICD-10-CM: R74.01  ICD-9-CM: 790.4  3/14/2017 - Present Unknown        Non-ischemic cardiomyopathy ([facility] (Chronic) ICD-10-CM: I42.8  ICD-9-CM: 425.4  3/24/2016 - Present Yes        * (Principal) Acute gastroenteritis ICD-10-CM: K52.9  ICD-9-CM: 558.9  2/26/2016 - Present Yes        HTN (hypertension) (Chronic) ICD-10-CM: I10  ICD-9-CM: 401.9  11/29/2011 - Present Yes        Chronic systolic (congestive) heart failure ([facility]) (Chronic) ICD-10-CM: I50.22  ICD-9-CM: 428.22, 428.0  4/21/2011 - Present Yes              Objective:     Patient Vitals for the past 24 hrs:   Temp Pulse Resp BP SpO2   02/09/22 2010     98 %   02/09/22 2000  (!) 108      02/09/22 1822 97.9 °F (36.6 °C) (!) 114 22 120/77 98 %   02/09/22 1528 97.8 °F (36.6 °C) 95 18 108/86 95 %   02/09/22 1331     98 %   02/09/22 1117 97.8 °F (36.6 °C) 96 18 111/82 97 %   02/09/22 0829     97 %   02/09/22 0813     96 %   02/09/22 0751 97.4 °F (36.3 °C) 93 18 105/81 98 %   02/09/22 0303 97.8 °F (36.6 °C) (!) 106 18 121/89 98 %   02/09/22 0205     100 %   02/09/22 0145     99 %   02/09/22 0004 97.8 °F (36.6 °C) 98 16 99/75 98 %     Oxygen Therapy  O2 Sat (%): 98 % (02/09/22 2010)  Pulse via Oximetry: 113 beats per minute (02/09/22 2010)  O2 Device: Nasal cannula (02/09/22 2010)  O2 Flow Rate (L/min): 1 l/min (02/09/22 2010)  FIO2 (%): 24 % (02/09/22 2010)    Estimated body mass index is 25.77 kg/m² as calculated from the following:    Height as of this encounter: 5' 11\" (1.803 m). Weight as of this encounter: 83.8 kg (184 lb 12.8 oz). No intake or output data in the 24 hours ending 02/09/22 2202      Physical Exam:     Blood pressure 120/77, pulse (!) 108, temperature 97.9 °F (36.6 °C), resp. rate 22, height 5' 11\" (1.803 m), weight 83.8 kg (184 lb 12.8 oz), SpO2 98 %. General:    Well nourished. Appears uncomfortable but not in distress  Head:  Normocephalic, atraumatic  Eyes:  Sclerae appear normal.  Pupils equally round. ENT:  Nares appear normal, no drainage. Moist oral mucosa  Neck:  No restricted ROM. Trachea midline   Inspiratory and expiratory stridor much improved  CV:   RRR. No m/r/g. No jugular venous distension. Lungs:   diminished  No lower airway wheezing, rhonchi, or rales. Abdomen: Bowel sounds present. Soft, nontender, nondistended. Extremities: No cyanosis or clubbing.   No edema  Skin: No rashes and normal coloration. Warm and dry. Neuro:  CN II-XII grossly intact. Sensation intact. A&Ox3  Psych:  Normal mood and affect. I have reviewed ordered lab tests and independently visualized imaging below:    Recent Labs:  Recent Results (from the past 48 hour(s))   LACTIC ACID    Collection Time: 02/07/22 10:31 PM   Result Value Ref Range    Lactic acid 1.7 0.4 - 2.0 MMOL/L   INFLUENZA A & B AG (RAPID TEST)    Collection Time: 02/07/22 10:31 PM   Result Value Ref Range    Influenza A Ag Negative NEG      Influenza B Ag Negative NEG      Source Nasopharyngeal     METABOLIC PANEL, BASIC    Collection Time: 02/08/22  2:22 AM   Result Value Ref Range    Sodium 132 (L) 136 - 145 mmol/L    Potassium 3.6 3.5 - 5.1 mmol/L    Chloride 101 98 - 107 mmol/L    CO2 21 21 - 32 mmol/L    Anion gap 10 7 - 16 mmol/L    Glucose 124 (H) 65 - 100 mg/dL    BUN 12 6 - 23 MG/DL    Creatinine 2.10 (H) 0.8 - 1.5 MG/DL    GFR est AA 42 (L) >60 ml/min/1.73m2    GFR est non-AA 35 (L) >60 ml/min/1.73m2    Calcium 8.3 8.3 - 10.4 MG/DL   CBC WITH AUTOMATED DIFF    Collection Time: 02/08/22  2:22 AM   Result Value Ref Range    WBC 12.0 (H) 4.3 - 11.1 K/uL    RBC 4.70 4.23 - 5.6 M/uL    HGB 14.4 13.6 - 17.2 g/dL    HCT 43.0 41.1 - 50.3 %    MCV 91.5 79.6 - 97.8 FL    MCH 30.6 26.1 - 32.9 PG    MCHC 33.5 31.4 - 35.0 g/dL    RDW 15.8 (H) 11.9 - 14.6 %    PLATELET 502 616 - 692 K/uL    MPV 9.8 9.4 - 12.3 FL    ABSOLUTE NRBC 0.00 0.0 - 0.2 K/uL    DF AUTOMATED      NEUTROPHILS 79 (H) 43 - 78 %    LYMPHOCYTES 11 (L) 13 - 44 %    MONOCYTES 10 4.0 - 12.0 %    EOSINOPHILS 0 (L) 0.5 - 7.8 %    BASOPHILS 0 0.0 - 2.0 %    IMMATURE GRANULOCYTES 0 0.0 - 5.0 %    ABS. NEUTROPHILS 9.5 (H) 1.7 - 8.2 K/UL    ABS. LYMPHOCYTES 1.3 0.5 - 4.6 K/UL    ABS. MONOCYTES 1.2 0.1 - 1.3 K/UL    ABS. EOSINOPHILS 0.0 0.0 - 0.8 K/UL    ABS. BASOPHILS 0.0 0.0 - 0.2 K/UL    ABS. IMM.  GRANS. 0.0 0.0 - 0.5 K/UL   TROPONIN-HIGH SENSITIVITY    Collection Time: 02/08/22  2:22 AM   Result Value Ref Range    Troponin-High Sensitivity 270.3 (HH) 0 - 14 pg/mL   DRUG SCREEN, URINE    Collection Time: 02/08/22  4:47 PM   Result Value Ref Range    PCP(PHENCYCLIDINE) Negative      BENZODIAZEPINES Positive      COCAINE Negative      AMPHETAMINES Negative      METHADONE Negative      THC (TH-CANNABINOL) Negative      OPIATES Positive      BARBITURATES Negative     CULTURE, STOOL    Collection Time: 02/08/22  8:53 PM    Specimen: Stool   Result Value Ref Range    Special Requests: NO SPECIAL REQUESTS      Culture result:        NO GROWTH AFTER SHORT PERIOD OF INCUBATION. FURTHER RESULTS TO FOLLOW AFTER OVERNIGHT INCUBATION. COVID-19 RAPID TEST    Collection Time: 02/09/22  3:06 AM   Result Value Ref Range    Specimen source NOT SPECIFIED      COVID-19 rapid test Not detected NOTD     METABOLIC PANEL, COMPREHENSIVE    Collection Time: 02/09/22  3:31 AM   Result Value Ref Range    Sodium 132 (L) 136 - 145 mmol/L    Potassium 4.4 3.5 - 5.1 mmol/L    Chloride 103 98 - 107 mmol/L    CO2 21 21 - 32 mmol/L    Anion gap 8 7 - 16 mmol/L    Glucose 137 (H) 65 - 100 mg/dL    BUN 13 6 - 23 MG/DL    Creatinine 1.30 0.8 - 1.5 MG/DL    GFR est AA >60 >60 ml/min/1.73m2    GFR est non-AA >60 >60 ml/min/1.73m2    Calcium 7.8 (L) 8.3 - 10.4 MG/DL    Bilirubin, total 0.7 0.2 - 1.1 MG/DL    ALT (SGPT) 26 12 - 65 U/L    AST (SGOT) 31 15 - 37 U/L    Alk.  phosphatase 79 50 - 136 U/L    Protein, total 7.1 6.3 - 8.2 g/dL    Albumin 3.5 3.5 - 5.0 g/dL    Globulin 3.6 (H) 2.3 - 3.5 g/dL    A-G Ratio 1.0 (L) 1.2 - 3.5     CBC WITH AUTOMATED DIFF    Collection Time: 02/09/22  3:31 AM   Result Value Ref Range    WBC 8.2 4.3 - 11.1 K/uL    RBC 4.42 4.23 - 5.6 M/uL    HGB 13.4 (L) 13.6 - 17.2 g/dL    HCT 41.5 41.1 - 50.3 %    MCV 93.9 79.6 - 97.8 FL    MCH 30.3 26.1 - 32.9 PG    MCHC 32.3 31.4 - 35.0 g/dL    RDW 15.0 (H) 11.9 - 14.6 %    PLATELET 679 573 - 094 K/uL    MPV 9.6 9.4 - 12.3 FL    ABSOLUTE NRBC 0.00 0.0 - 0.2 K/uL    DF AUTOMATED      NEUTROPHILS 91 (H) 43 - 78 %    LYMPHOCYTES 8 (L) 13 - 44 %    MONOCYTES 1 (L) 4.0 - 12.0 %    EOSINOPHILS 0 (L) 0.5 - 7.8 %    BASOPHILS 0 0.0 - 2.0 %    IMMATURE GRANULOCYTES 0 0.0 - 5.0 %    ABS. NEUTROPHILS 7.4 1.7 - 8.2 K/UL    ABS. LYMPHOCYTES 0.7 0.5 - 4.6 K/UL    ABS. MONOCYTES 0.1 0.1 - 1.3 K/UL    ABS. EOSINOPHILS 0.0 0.0 - 0.8 K/UL    ABS. BASOPHILS 0.0 0.0 - 0.2 K/UL    ABS. IMM. GRANS. 0.0 0.0 - 0.5 K/UL       All Micro Results     Procedure Component Value Units Date/Time    CULTURE, STOOL [[id number]] Collected: 02/08/22 2053    Order Status: Completed Specimen: Stool Updated: 02/09/22 0914     Special Requests: NO SPECIAL REQUESTS        Culture result:       NO GROWTH AFTER SHORT PERIOD OF INCUBATION. FURTHER RESULTS TO FOLLOW AFTER OVERNIGHT INCUBATION. COVID-19 RAPID TEST [[id number]] Collected: 02/09/22 0306    Order Status: Completed Specimen: Nasopharyngeal Updated: 02/09/22 0347     Specimen source NOT SPECIFIED        COVID-19 rapid test Not detected        Comment:      The specimen is NEGATIVE for SARS-CoV-2, the novel coronavirus associated with COVID-19. A negative result does not rule out COVID-19. This test has been authorized by the FDA under an Emergency Use Authorization (EUA) for use by authorized laboratories.         Fact sheet for Healthcare Providers: ConventionUpdate.co.nz  Fact sheet for Patients: ConventionUpdate.co.nz       Methodology: Isothermal Nucleic Acid Amplification         CAMPLYLOBACTER CULTURE [[id number]] Collected: 02/08/22 2053    Order Status: Completed Specimen: Stool Updated: 02/08/22 2215    INFLUENZA A & B AG (RAPID TEST) [[id number]] Collected: 02/07/22 2231    Order Status: Completed Specimen: Nasopharyngeal from Nasal washing Updated: 02/07/22 2302     Influenza A Ag Negative        Comment: NEGATIVE FOR THE PRESENCE OF INFLUENZA A ANTIGEN  INFECTION DUE TO INFLUENZA A CANNOT BE RULED OUT. BECAUSE THE ANTIGEN PRESENT IN THE SAMPLE MAY BE BELOW  THE DETECTION LIMIT OF THE TEST. A NEGATIVE TEST IS PRESUMPTIVE AND IT IS RECOMMENDED THAT THESE RESULTS BE CONFIRMED BY VIRAL CULTURE OR AN FDA-CLEARED INFLUENZA A AND B MOLECULAR ASSAY. Influenza B Ag Negative        Comment: NEGATIVE FOR THE PRESENCE OF INFLUENZA B ANTIGEN  INFECTION DUE TO INFLUENZA B CANNOT BE RULED OUT. BECAUSE THE ANTIGEN PRESENT IN THE SAMPLE MAY BE BELOW  THE DETECTION LIMIT OF THE TEST. A NEGATIVE TEST IS PRESUMPTIVE AND IT IS RECOMMENDED THAT THESE RESULTS BE CONFIRMED BY VIRAL CULTURE OR AN FDA-CLEARED INFLUENZA A AND B MOLECULAR ASSAY. Source Nasopharyngeal       COVID-19 RAPID TEST [[id number]] Collected: 02/07/22 1629    Order Status: Completed Specimen: Nasopharyngeal Updated: 02/07/22 1700     Specimen source Nasopharyngeal        COVID-19 rapid test Not detected        Comment:      The specimen is NEGATIVE for SARS-CoV-2, the novel coronavirus associated with COVID-19. A negative result does not rule out COVID-19. This test has been authorized by the FDA under an Emergency Use Authorization (EUA) for use by authorized laboratories. Fact sheet for Healthcare Providers: ConventionUpdate.co.nz  Fact sheet for Patients: ConventionUpdate.co.nz       Methodology: Isothermal Nucleic Acid Amplification               Other Studies:  CT NECK CHEST WO CONT    Result Date: 2/9/2022  Clinical history: Wheezing. Inspiratory stridor TECHNIQUE: Axial, coronal and sagittal CT soft tissue of the neck without IV contrast. CT dose reduction was achieved through use of a standardized protocol tailored for this examination and automatic exposure control for dose modulation. COMPARISON: None. FINDINGS: Mild soft tissue thickening in the subglottic region. The nasopharynx and oropharynx are unremarkable. Epiglottis is within normal limits.  No retropharyngeal or peritonsillar fluid collection. No abnormally enlarged lymph nodes identified in the neck by size criterion. The thyroid, submandibular, and the parotid glands are unremarkable. Included globes appear intact. The paranasal sinuses and the mastoid air cells are aerated. Visualized lung apices are unremarkable. Cardiac pacer wire is partially seen. Degenerative changes at C5-C6. 1. Mild soft tissue thickening in the subglottic region, nonspecific. 2. Normal epiglottis. No retropharyngeal or peritonsillar fluid collection.       Current Meds:  Current Facility-Administered Medications   Medication Dose Route Frequency    metoprolol (LOPRESSOR) injection 5 mg  5 mg IntraVENous Q4H PRN    LORazepam (ATIVAN) injection 1 mg  1 mg IntraVENous Q6H PRN    ivabradine (CORLANOR) tablet 5 mg  5 mg Oral BID WITH MEALS    dicyclomine (BENTYL) capsule 10 mg  10 mg Oral QID PRN    HYDROmorphone (DILAUDID) injection 0.5 mg  0.5 mg IntraVENous Q6H PRN    promethazine (PHENERGAN) with saline injection 12.5 mg  12.5 mg IntraVENous Q6H PRN    famotidine (PF) (PEPCID) 20 mg in 0.9% sodium chloride 10 mL injection  20 mg IntraVENous Q12H    metroNIDAZOLE (FLAGYL) IVPB premix 500 mg  500 mg IntraVENous Q12H    cefTRIAXone (ROCEPHIN) 1 g in 0.9% sodium chloride (MBP/ADV) 50 mL MBP  1 g IntraVENous Q24H    methylPREDNISolone (PF) (Solu-MEDROL) injection 125 mg  125 mg IntraVENous Q6H    pantoprazole (PROTONIX) 40 mg in 0.9% sodium chloride 10 mL injection  40 mg IntraVENous Q12H    albuterol (PROVENTIL VENTOLIN) nebulizer solution 2.5 mg  2.5 mg Nebulization Q6H RT    sodium chloride (NS) flush 5-10 mL  5-10 mL IntraVENous Q8H    sodium chloride (NS) flush 5-10 mL  5-10 mL IntraVENous PRN    0.9% sodium chloride infusion  75 mL/hr IntraVENous CONTINUOUS    ALPRAZolam (XANAX) tablet 1 mg  1 mg Oral QHS    aspirin delayed-release tablet 81 mg  81 mg Oral DAILY    atorvastatin (LIPITOR) tablet 80 mg  80 mg Oral DAILY    carvediloL (COREG) tablet 25 mg  25 mg Oral BID WITH MEALS    gabapentin (NEURONTIN) capsule 300 mg  300 mg Oral BID    HYDROcodone-acetaminophen (NORCO)  mg tablet 1 Tablet  1 Tablet Oral Q6H PRN    sodium chloride (NS) flush 5-40 mL  5-40 mL IntraVENous PRN    [Held by provider] acetaminophen (TYLENOL) tablet 650 mg  650 mg Oral Q6H PRN    Or    [Held by provider] acetaminophen (TYLENOL) suppository 650 mg  650 mg Rectal Q6H PRN    polyethylene glycol (MIRALAX) packet 17 g  17 g Oral DAILY PRN    magnesium oxide (MAG-OX) tablet 400 mg  400 mg Oral DAILY       Signed:  Tish Todd DO    Part of this note may have been written by using a voice dictation software. The note has been proof read but may still contain some grammatical/other typographical errors.

## 2022-02-10 NOTE — ADVANCED PRACTICE NURSE
Received message from nursing, patient feels more SOB, states episodes of dropping in :50s\" but recovered. Will repeat CT soft tissue neck, remains on 2 liters heliox with sats currently in high 90s-100, check CXR. Also will give dose Recemic epi. Continue with H2 blocker, steroids increased to [facility]    Pulmonology following along.

## 2022-02-10 NOTE — PROGRESS NOTES
Was notified that patient's 02 levels were dropping down into 40's. Assessed patient and for about 15 minutes he was dropping into 60-70's and then sustaining back in upper 90's. Currently 99% on 2 L Dina-ox getting PRN treatment from respiratory. RR at 17, and audible stridor. Messaged Hospitalist and pulmonary about patient condition update.

## 2022-02-10 NOTE — PROGRESS NOTES
Hospitalist Progress Note   Admit Date:  2022  4:06 PM   Name:  Lucrecia Hughes   Age:  62 y.o. Sex:  male  :  1965   MRN:  [id number]   Room:  S919/    Presenting Complaint: Chest Pain and Shortness of Breath    Reason(s) for Admission: YAO (acute kidney injury) [facility]) [N17.9]     Hospital Course & Interval History:   62 y. o. male with medical history of nonischemic cardiomyopathy with EF of 20-25%, HTN, HLD, and chronic back pain presents with heart palpitations, persistent nausea/vomiting, diarrhea, shortness of breath and generalized weakness for the past week. He says that he was called by his cardiologist and told that his HRs were high. He denies chest pain but says \"I just hurt all over. He denies recreational drug use and alcohol. He denies black/red stools. He does not know of any food that may have triggered his symptoms.      ED course: HRs in the 150s-160s, cardiology reviewed strips and it is most likely atrial tachycardia as opposed to VT. WBC count of 13k. Troponin of 245 with repeat of 270. Procalcitonin of 0.06. pBNP of 1690. SCr of 2.4. K of 3.4. Rapid COVID is negative. CXR unremarkable. Subjective/24hr Events (02/10/22): States some relief with pain, stridor remains, no hypoxia. GI plans for EGD tomorrow. ROS:  10 systems reviewed and negative except as noted above. Assessment & Plan:     # intractable nausea/vomiting  - CTAP shows large hiatal hernia and thickened gastric mucosa. - suspect vomiting secondary to hiatal hernia  - GI consulted, plans for EGD tomorrow  - Cirrhosis workup at later date. 2/10  - cont empiric  Rocephin/flagyl atbx D4  - continue supportive mgmt - antiemetic  - advance to FLD  -increase Phenergan to Q4H, Dilaudid stopped, not helping and opiates should be avoided as they can make abdominal pain worse  Add Ativan for N/V, may help with abdominal discomfort.  Appears diaphragm is where pain is.        # Stridor  2/10  - clinically a little improved  - CT neck with mild thickening in subglottic region  - suspect etiology is vocal cord irritation s/p vomiting.   - monitor o2 sats and WOB closely  - wean Solumedrol       # Chronic systolic (congestive) heart failure ([facility] (4/21/2011)   appears compensated  - hold eplerenone given YAO  - cannot use ACEi/ARB due to YAO  - monitor volume status daily  2/10  Resuming Diovan and Eplerenone (pharmacy equal) today, YAO resolved       # YAO  Resolved 2/9    # Demand ischemia  - known history of nonischemic cardiomyopathy  - trend trops 200s and flat. - cont asa/statin  - telemetry  - cardiology has seen -      # SVT, appears to be Atach per cardiology on admission  - management as above  - on Coreg already  - cont asa/statin  - cardiology has seen  2/10 - rates with fair control. Continue on tele          Dispo/Discharge Planning:    Pending clinical improvement.  [facility] home +/- HHC in 1-2 days     Diet:  ADULT DIET Full Liquid  DVT PPx: scd  Code status: Full Code    Hospital Problems as of 2/10/2022 Date Reviewed: 12/23/2021          Codes Class Noted - Resolved POA    Hyperbilirubinemia ICD-10-CM: E80.6  ICD-9-CM: 782.4  2/7/2022 - Present Unknown        Demand ischemia ([facility] ICD-10-CM: I24.8  ICD-9-CM: 411.89  8/24/2021 - Present Yes        YAO (acute kidney injury) ([facility] ICD-10-CM: N17.9  ICD-9-CM: 584.9  8/23/2021 - Present Unknown        SVT (supraventricular tachycardia) ([facility] ICD-10-CM: I47.1  ICD-9-CM: 427.89  12/22/2018 - Present Unknown        Inspiratory stridor ICD-10-CM: R06.1  ICD-9-CM: 786.1  3/21/2017 - Present Unknown        Transaminitis ICD-10-CM: R74.01  ICD-9-CM: 790.4  3/14/2017 - Present Unknown        Non-ischemic cardiomyopathy (HCC) (Chronic) ICD-10-CM: I42.8  ICD-9-CM: 425.4  3/24/2016 - Present Yes        * (Principal) Acute gastroenteritis ICD-10-CM: K52.9  ICD-9-CM: 558.9  2/26/2016 - Present Yes        HTN (hypertension) (Chronic) ICD-10-CM: I10  ICD-9-CM: 401.9  11/29/2011 - Present Yes        Chronic systolic (congestive) heart failure (HCC) (Chronic) ICD-10-CM: I50.22  ICD-9-CM: 428.22, 428.0  4/21/2011 - Present Yes              Objective:     Patient Vitals for the past 24 hrs:   Temp Pulse Resp BP SpO2   02/10/22 1104 97.4 °F (36.3 °C) 94 20 102/72 100 %   02/10/22 0723 97.4 °F (36.3 °C) 89 19 114/87 99 %   02/10/22 0722     98 %   02/10/22 0400 97.5 °F (36.4 °C) 97 21 118/88 98 %   02/10/22 0215     96 %   02/10/22 0013 97.6 °F (36.4 °C) 92 18 113/76 100 %   02/10/22 0000  (!) 105      02/09/22 2015 98 °F (36.7 °C) (!) 108 20 107/77 99 %   02/09/22 2010     98 %   02/09/22 2000  (!) 108      02/09/22 1822 97.9 °F (36.6 °C) (!) 114 22 120/77 98 %   02/09/22 1528 97.8 °F (36.6 °C) 95 18 108/86 95 %   02/09/22 1331     98 %     Oxygen Therapy  O2 Sat (%): 100 % (02/10/22 1104)  Pulse via Oximetry: 89 beats per minute (02/10/22 0722)  O2 Device: None (Room air) (02/10/22 0722)  O2 Flow Rate (L/min): 4 l/min (02/10/22 0215)  FIO2 (%): 36 % (02/10/22 0215)    Estimated body mass index is 25.24 kg/m² as calculated from the following:    Height as of this encounter: 5' 11\" (1.803 m). Weight as of this encounter: 82.1 kg (181 lb). No intake or output data in the 24 hours ending 02/10/22 1128      Physical Exam:   Blood pressure 102/72, pulse 94, temperature 97.4 °F (36.3 °C), resp. rate 20, height 5' 11\" (1.803 m), weight 82.1 kg (181 lb), SpO2 100 %. General:    Well nourished. No overt distress  Head:  Normocephalic, atraumatic  Eyes:  Sclerae appear normal.  Pupils equally round. ENT:  Nares appear normal, no drainage. Moist oral mucosa  Neck:  No restricted ROM. Trachea midline, slight edema anterior neck, stridor noted, no hypoxia. CV:   RRR. No m/r/g. No jugular venous distension. Lungs:   CTAB. No wheezing, rhonchi, or rales. Respirations even, unlabored  Abdomen: Bowel sounds present. Soft, nontender, nondistended.   Extremities: No cyanosis or clubbing. No edema  Skin:     No rashes and normal coloration. Warm and dry. Neuro:  CN II-XII grossly intact. Sensation intact. A&Ox3  Psych:  Normal mood and affect. I have reviewed ordered lab tests and independently visualized imaging below:    Recent Labs:  Recent Results (from the past 48 hour(s))   DRUG SCREEN, URINE    Collection Time: 02/08/22  4:47 PM   Result Value Ref Range    PCP(PHENCYCLIDINE) Negative      BENZODIAZEPINES Positive      COCAINE Negative      AMPHETAMINES Negative      METHADONE Negative      THC (TH-CANNABINOL) Negative      OPIATES Positive      BARBITURATES Negative     CULTURE, STOOL    Collection Time: 02/08/22  8:53 PM    Specimen: Stool   Result Value Ref Range    Special Requests: NO SPECIAL REQUESTS      Culture result:        NO GROWTH OF SALMONELLA OR SHIGELLA IS EVIDENT ON FIRST READING, FINAL REPORT TO FOLLOW AFTER FURTHER INCUBATION OF CULTURE    Culture result: MODERATE YEAST SUBCULTURE IN PROGRESS (A)     COVID-19 RAPID TEST    Collection Time: 02/09/22  3:06 AM   Result Value Ref Range    Specimen source NOT SPECIFIED      COVID-19 rapid test Not detected NOTD     METABOLIC PANEL, COMPREHENSIVE    Collection Time: 02/09/22  3:31 AM   Result Value Ref Range    Sodium 132 (L) 136 - 145 mmol/L    Potassium 4.4 3.5 - 5.1 mmol/L    Chloride 103 98 - 107 mmol/L    CO2 21 21 - 32 mmol/L    Anion gap 8 7 - 16 mmol/L    Glucose 137 (H) 65 - 100 mg/dL    BUN 13 6 - 23 MG/DL    Creatinine 1.30 0.8 - 1.5 MG/DL    GFR est AA >60 >60 ml/min/1.73m2    GFR est non-AA >60 >60 ml/min/1.73m2    Calcium 7.8 (L) 8.3 - 10.4 MG/DL    Bilirubin, total 0.7 0.2 - 1.1 MG/DL    ALT (SGPT) 26 12 - 65 U/L    AST (SGOT) 31 15 - 37 U/L    Alk.  phosphatase 79 50 - 136 U/L    Protein, total 7.1 6.3 - 8.2 g/dL    Albumin 3.5 3.5 - 5.0 g/dL    Globulin 3.6 (H) 2.3 - 3.5 g/dL    A-G Ratio 1.0 (L) 1.2 - 3.5     CBC WITH AUTOMATED DIFF    Collection Time: 02/09/22  3:31 AM   Result Value Ref Range    WBC 8.2 4.3 - 11.1 K/uL    RBC 4.42 4.23 - 5.6 M/uL    HGB 13.4 (L) 13.6 - 17.2 g/dL    HCT 41.5 41.1 - 50.3 %    MCV 93.9 79.6 - 97.8 FL    MCH 30.3 26.1 - 32.9 PG    MCHC 32.3 31.4 - 35.0 g/dL    RDW 15.0 (H) 11.9 - 14.6 %    PLATELET 626 119 - 531 K/uL    MPV 9.6 9.4 - 12.3 FL    ABSOLUTE NRBC 0.00 0.0 - 0.2 K/uL    DF AUTOMATED      NEUTROPHILS 91 (H) 43 - 78 %    LYMPHOCYTES 8 (L) 13 - 44 %    MONOCYTES 1 (L) 4.0 - 12.0 %    EOSINOPHILS 0 (L) 0.5 - 7.8 %    BASOPHILS 0 0.0 - 2.0 %    IMMATURE GRANULOCYTES 0 0.0 - 5.0 %    ABS. NEUTROPHILS 7.4 1.7 - 8.2 K/UL    ABS. LYMPHOCYTES 0.7 0.5 - 4.6 K/UL    ABS. MONOCYTES 0.1 0.1 - 1.3 K/UL    ABS. EOSINOPHILS 0.0 0.0 - 0.8 K/UL    ABS. BASOPHILS 0.0 0.0 - 0.2 K/UL    ABS. IMM. GRANS. 0.0 0.0 - 0.5 K/UL   CBC WITH AUTOMATED DIFF    Collection Time: 02/10/22  4:51 AM   Result Value Ref Range    WBC 15.7 (H) 4.3 - 11.1 K/uL    RBC 4.34 4.23 - 5.6 M/uL    HGB 13.2 (L) 13.6 - 17.2 g/dL    HCT 41.3 41.1 - 50.3 %    MCV 95.2 79.6 - 97.8 FL    MCH 30.4 26.1 - 32.9 PG    MCHC 32.0 31.4 - 35.0 g/dL    RDW 15.2 (H) 11.9 - 14.6 %    PLATELET 450 716 - 669 K/uL    MPV 9.6 9.4 - 12.3 FL    ABSOLUTE NRBC 0.00 0.0 - 0.2 K/uL    DF AUTOMATED      NEUTROPHILS 91 (H) 43 - 78 %    LYMPHOCYTES 5 (L) 13 - 44 %    MONOCYTES 4 4.0 - 12.0 %    EOSINOPHILS 0 (L) 0.5 - 7.8 %    BASOPHILS 0 0.0 - 2.0 %    IMMATURE GRANULOCYTES 1 0.0 - 5.0 %    ABS. NEUTROPHILS 14.2 (H) 1.7 - 8.2 K/UL    ABS. LYMPHOCYTES 0.8 0.5 - 4.6 K/UL    ABS. MONOCYTES 0.6 0.1 - 1.3 K/UL    ABS. EOSINOPHILS 0.0 0.0 - 0.8 K/UL    ABS. BASOPHILS 0.0 0.0 - 0.2 K/UL    ABS. IMM.  GRANS. 0.1 0.0 - 0.5 K/UL   METABOLIC PANEL, BASIC    Collection Time: 02/10/22  4:51 AM   Result Value Ref Range    Sodium 136 136 - 145 mmol/L    Potassium 4.4 3.5 - 5.1 mmol/L    Chloride 108 (H) 98 - 107 mmol/L    CO2 20 (L) 21 - 32 mmol/L    Anion gap 8 7 - 16 mmol/L    Glucose 125 (H) 65 - 100 mg/dL    BUN 11 6 - 23 MG/DL    Creatinine 1.20 0.8 - 1.5 MG/DL    GFR est AA >60 >60 ml/min/1.73m2    GFR est non-AA >60 >60 ml/min/1.73m2    Calcium 7.6 (L) 8.3 - 10.4 MG/DL       All Micro Results     Procedure Component Value Units Date/Time    CULTURE, STOOL [[id number]]  (Abnormal) Collected: 02/08/22 2053    Order Status: Completed Specimen: Stool Updated: 02/10/22 0820     Special Requests: NO SPECIAL REQUESTS        Culture result:       NO GROWTH OF SALMONELLA OR SHIGELLA IS EVIDENT ON FIRST READING, FINAL REPORT TO FOLLOW AFTER FURTHER INCUBATION OF CULTURE                  MODERATE YEAST SUBCULTURE IN PROGRESS          COVID-19 RAPID TEST [[id number]] Collected: 02/09/22 0306    Order Status: Completed Specimen: Nasopharyngeal Updated: 02/09/22 0347     Specimen source NOT SPECIFIED        COVID-19 rapid test Not detected        Comment:      The specimen is NEGATIVE for SARS-CoV-2, the novel coronavirus associated with COVID-19. A negative result does not rule out COVID-19. This test has been authorized by the FDA under an Emergency Use Authorization (EUA) for use by authorized laboratories. Fact sheet for Healthcare Providers: ConventionUpdate.co.nz  Fact sheet for Patients: ConventionUpdate.co.nz       Methodology: Isothermal Nucleic Acid Amplification         CAMPLYLOBACTER CULTURE [[id number]] Collected: 02/08/22 2053    Order Status: Completed Specimen: Stool Updated: 02/08/22 2215    INFLUENZA A & B AG (RAPID TEST) [[id number]] Collected: 02/07/22 2231    Order Status: Completed Specimen: Nasopharyngeal from Nasal washing Updated: 02/07/22 2302     Influenza A Ag Negative        Comment: NEGATIVE FOR THE PRESENCE OF INFLUENZA A ANTIGEN  INFECTION DUE TO INFLUENZA A CANNOT BE RULED OUT. BECAUSE THE ANTIGEN PRESENT IN THE SAMPLE MAY BE BELOW  THE DETECTION LIMIT OF THE TEST.   A NEGATIVE TEST IS PRESUMPTIVE AND IT IS RECOMMENDED THAT THESE RESULTS BE CONFIRMED BY VIRAL CULTURE OR AN FDA-CLEARED INFLUENZA A AND B MOLECULAR ASSAY. Influenza B Ag Negative        Comment: NEGATIVE FOR THE PRESENCE OF INFLUENZA B ANTIGEN  INFECTION DUE TO INFLUENZA B CANNOT BE RULED OUT. BECAUSE THE ANTIGEN PRESENT IN THE SAMPLE MAY BE BELOW  THE DETECTION LIMIT OF THE TEST. A NEGATIVE TEST IS PRESUMPTIVE AND IT IS RECOMMENDED THAT THESE RESULTS BE CONFIRMED BY VIRAL CULTURE OR AN FDA-CLEARED INFLUENZA A AND B MOLECULAR ASSAY. Source Nasopharyngeal       COVID-19 RAPID TEST [[id number]] Collected: 02/07/22 1629    Order Status: Completed Specimen: Nasopharyngeal Updated: 02/07/22 1700     Specimen source Nasopharyngeal        COVID-19 rapid test Not detected        Comment:      The specimen is NEGATIVE for SARS-CoV-2, the novel coronavirus associated with COVID-19. A negative result does not rule out COVID-19. This test has been authorized by the FDA under an Emergency Use Authorization (EUA) for use by authorized laboratories. Fact sheet for Healthcare Providers: [employer].co.nz  Fact sheet for Patients: [employer].co.nz       Methodology: Isothermal Nucleic Acid Amplification               Other Studies:  No results found.     Current Meds:  Current Facility-Administered Medications   Medication Dose Route Frequency    LORazepam (ATIVAN) tablet 1 mg  1 mg Oral Q4H PRN    promethazine (PHENERGAN) with saline injection 12.5 mg  12.5 mg IntraVENous Q4H PRN    metoprolol (LOPRESSOR) injection 5 mg  5 mg IntraVENous Q4H PRN    LORazepam (ATIVAN) injection 1 mg  1 mg IntraVENous Q6H PRN    ivabradine (CORLANOR) tablet 5 mg  5 mg Oral BID WITH MEALS    dicyclomine (BENTYL) capsule 10 mg  10 mg Oral QID PRN    methylPREDNISolone (PF) (SOLU-MEDROL) injection 40 mg  40 mg IntraVENous Q12H    famotidine (PF) (PEPCID) 20 mg in 0.9% sodium chloride 10 mL injection  20 mg IntraVENous Q12H    metroNIDAZOLE (FLAGYL) IVPB premix 500 mg  500 mg IntraVENous Q12H    cefTRIAXone (ROCEPHIN) 1 g in 0.9% sodium chloride (MBP/ADV) 50 mL MBP  1 g IntraVENous Q24H    pantoprazole (PROTONIX) 40 mg in 0.9% sodium chloride 10 mL injection  40 mg IntraVENous Q12H    albuterol (PROVENTIL VENTOLIN) nebulizer solution 2.5 mg  2.5 mg Nebulization Q6H RT    sodium chloride (NS) flush 5-10 mL  5-10 mL IntraVENous Q8H    sodium chloride (NS) flush 5-10 mL  5-10 mL IntraVENous PRN    0.9% sodium chloride infusion  75 mL/hr IntraVENous CONTINUOUS    ALPRAZolam (XANAX) tablet 1 mg  1 mg Oral QHS    aspirin delayed-release tablet 81 mg  81 mg Oral DAILY    atorvastatin (LIPITOR) tablet 80 mg  80 mg Oral DAILY    carvediloL (COREG) tablet 25 mg  25 mg Oral BID WITH MEALS    gabapentin (NEURONTIN) capsule 300 mg  300 mg Oral BID    HYDROcodone-acetaminophen (NORCO)  mg tablet 1 Tablet  1 Tablet Oral Q6H PRN    sodium chloride (NS) flush 5-40 mL  5-40 mL IntraVENous PRN    [Held by provider] acetaminophen (TYLENOL) tablet 650 mg  650 mg Oral Q6H PRN    Or    [Held by provider] acetaminophen (TYLENOL) suppository 650 mg  650 mg Rectal Q6H PRN    polyethylene glycol (MIRALAX) packet 17 g  17 g Oral DAILY PRN    magnesium oxide (MAG-OX) tablet 400 mg  400 mg Oral DAILY       Signed:  Christie Campo NP

## 2022-02-10 NOTE — PROGRESS NOTES
[facility] [place]  Admission Date: 2/7/2022         Daily Progress Note: 2/10/2022    The patient's chart is reviewed and the patient is discussed with the staff. Background:  62 y.o.  male seen and evaluated at the request of Dr. Hebert Ramos. Medical history of nonischemic cardiomyopathy with EF of 20-25%, HTN, HLD, and chronic back pain. He presented with heart palpitations, persistent nausea/vomiting, diarrhea, shortness of breath and generalized weakness for the past week.       ED course: HRs in the 150s-160s, cardiology reviewed strips and it is most likely atrial tachycardia as opposed to VT. WBC count of 13k. Troponin of 245 with repeat of 270. Procalcitonin of 0.06. pBNP of 1690. SCr of 2.4. K of 3.4. Rapid COVID is negative. He developed stridor and we were asked to evaluate. Patient with N and V episodes apparently symptoms developed after one of the episodes. He was given racemic epinephrine with improvement, subsequently placed on systemic steroids H2 blockers. Had neck CT that showed some mild soft tissue thickening in the subglottic area. He had another episode of stridor with desaturation on 2/10 so CT repeated. CXR clear. Given racemic epi. Still on Solumedrol and Pepcid. Please note that we encountered the patient with similar symptoms in 2017, he had bronchoscopy at that time and no pathology was found.     Subjective:      Developed episode of stridor earlier today. Given racemic epi tx. Still on steroids/pepcid. He denies any recent vomiting but has gagged up small amounts. He denies any trouble swallowing and he does not feel like his tongue is swollen.      Current Facility-Administered Medications   Medication Dose Route Frequency    LORazepam (ATIVAN) tablet 1 mg  1 mg Oral Q4H PRN    promethazine (PHENERGAN) with saline injection 12.5 mg  12.5 mg IntraVENous Q4H PRN    valsartan (DIOVAN) tablet 40 mg  40 mg Oral Q12H    methylPREDNISolone (PF) (SOLU-MEDROL) injection 40 mg  40 mg IntraVENous Q8H    albuterol (PROVENTIL VENTOLIN) nebulizer solution 2.5 mg  2.5 mg Nebulization Q4H PRN    metoprolol (LOPRESSOR) injection 5 mg  5 mg IntraVENous Q4H PRN    LORazepam (ATIVAN) injection 1 mg  1 mg IntraVENous Q6H PRN    ivabradine (CORLANOR) tablet 5 mg  5 mg Oral BID WITH MEALS    dicyclomine (BENTYL) capsule 10 mg  10 mg Oral QID PRN    famotidine (PF) (PEPCID) 20 mg in 0.9% sodium chloride 10 mL injection  20 mg IntraVENous Q12H    metroNIDAZOLE (FLAGYL) IVPB premix 500 mg  500 mg IntraVENous Q12H    cefTRIAXone (ROCEPHIN) 1 g in 0.9% sodium chloride (MBP/ADV) 50 mL MBP  1 g IntraVENous Q24H    pantoprazole (PROTONIX) 40 mg in 0.9% sodium chloride 10 mL injection  40 mg IntraVENous Q12H    albuterol (PROVENTIL VENTOLIN) nebulizer solution 2.5 mg  2.5 mg Nebulization Q6H RT    sodium chloride (NS) flush 5-10 mL  5-10 mL IntraVENous Q8H    sodium chloride (NS) flush 5-10 mL  5-10 mL IntraVENous PRN    0.9% sodium chloride infusion  75 mL/hr IntraVENous CONTINUOUS    ALPRAZolam (XANAX) tablet 1 mg  1 mg Oral QHS    aspirin delayed-release tablet 81 mg  81 mg Oral DAILY    atorvastatin (LIPITOR) tablet 80 mg  80 mg Oral DAILY    carvediloL (COREG) tablet 25 mg  25 mg Oral BID WITH MEALS    gabapentin (NEURONTIN) capsule 300 mg  300 mg Oral BID    HYDROcodone-acetaminophen (NORCO)  mg tablet 1 Tablet  1 Tablet Oral Q6H PRN    sodium chloride (NS) flush 5-40 mL  5-40 mL IntraVENous PRN    [Held by provider] acetaminophen (TYLENOL) tablet 650 mg  650 mg Oral Q6H PRN    Or    [Held by provider] acetaminophen (TYLENOL) suppository 650 mg  650 mg Rectal Q6H PRN    polyethylene glycol (MIRALAX) packet 17 g  17 g Oral DAILY PRN    magnesium oxide (MAG-OX) tablet 400 mg  400 mg Oral DAILY     Review of Systems  + dyspnea, wheezing  Constitutional: negative for fever, chills, sweats  Cardiovascular: negative for chest pain, palpitations, syncope, edema  Gastrointestinal:  negative for dysphagia, reflux. + recent nausea, vomiting, and diarrhea  Neurologic:  negative for focal weakness, numbness, headache    Objective:     Vitals:    02/10/22 1104 02/10/22 1157 02/10/22 1212 02/10/22 1247   BP: 102/72      Pulse: 94   88   Resp: 20 17     Temp: 97.4 °F (36.3 °C)      SpO2: 100% 100% 99% 97%   Weight:       Height:         No intake or output data in the 24 hours ending 02/10/22 1408  Physical Exam:   Constitution:  the patient is well developed and in no acute distress  HEENT:  Sclera clear, pupils equal, oral mucosa moist, tongue is not swollen, nor lips. Speech is clear  Respiratory: + stridor specifically in the neck area noted on exam. Breath sounds are actually clear. He is on 2 liters oxygen with a sat 99%. Cardiovascular:  RRR and without M,G,R  Gastrointestinal: soft and non-tender; with positive bowel sounds. Musculoskeletal: warm without cyanosis. There is no lower extremity edema. Skin:  no jaundice or rashes, no wounds   Neurologic: no gross neuro deficits     Psychiatric:  alert and oriented, calm    CXR:       LAB:  Recent Labs     02/10/22  0451 02/09/22 0331 02/08/22 0222 02/07/22 2231 02/07/22  1851 02/07/22  1557   WBC 15.7* 8.2 12.0*  --   --    < >   HGB 13.2* 13.4* 14.4  --   --    < >   HCT 41.3 41.5 43.0  --   --    < >    285 256  --   --    < >   PCT  --   --   --   --  0.06  --    LAC  --   --   --  1.7  --   --     < > = values in this interval not displayed.      Recent Labs     02/10/22  0451 02/09/22 0331 02/08/22 0222 02/07/22  1851 02/07/22  1718 02/07/22  1718    132* 132*  --    < > 138   K 4.4 4.4 3.6  --    < > 3.4*   * 103 101  --    < > 103   CO2 20* 21 21  --    < > 20*   * 137* 124*  --    < > 113*   BUN 11 13 12  --    < > 9   CREA 1.20 1.30 2.10*  --    < > 2.40*   MG  --   --   --  1.3*  --   --    CA 7.6* 7.8* 8.3  --    < > 8.5   ALB  --  3.5  --   --   --  3.9   AST  --  31  -- --   --  53*   ALT  --  26  --   --   --  41   AP  --  79  --   --   --  98    < > = values in this interval not displayed. Recent Labs     02/08/22  0222 02/07/22  2231 02/07/22  1851 02/07/22  1718   LAC  --  1.7  --   --    TROPHS 270.3*  --  270.8* 245.7*   BNPNT  --   --   --  1,690*     No results for input(s): PH, PCO2, PO2, HCO3, PHI, PCO2I, PO2I, HCO3I in the last 72 hours. Recent Labs     02/08/22 2053   CULT NO GROWTH OF SALMONELLA OR SHIGELLA IS EVIDENT ON FIRST READING, FINAL REPORT TO FOLLOW AFTER FURTHER INCUBATION OF CULTURE  MODERATE YEAST SUBCULTURE IN PROGRESS*     Assessment and Plan:  (Medical Decision Making)   Principal Problem:    Acute gastroenteritis (2/26/2016)  Only 1 BM noted in computer. No recent vomiting but still with some gagging    Active Problems:    Chronic systolic (congestive) heart failure (HCC) (4/21/2011)  EF 20 to 25%. No recent lasix with nausea, vomiting and diarrhea. HTN (hypertension) (11/29/2011)  controlled      Non-ischemic cardiomyopathy ([address] (3/24/2016)   as above. ARB as outpatient - holding here with marginal blood pressure      Transaminitis (3/14/2017)  resolved      Inspiratory stridor (3/21/2017)   ? Secondary to airway inflammation with recent vomiting. On steroids and heliox and prn racemic epi. Had another episode today. Repeat CT of the neck pending but reviewed by Dr. Maggie Partida airways patent. CXR clear. ? Need to see ENT. MAR reviewed. Is on ARB - will place on hold for now in case of reaction. SVT (supraventricular tachycardia) ([address] (12/22/2018)  Resolved. Cardiology has seen      YAO (acute kidney injury) ([address] (8/23/2021)  Corrected with hydration.  Stop IV fluids when po intake adequate      Demand ischemia ([address] (8/24/2021)    Associated with tachycardia      Hyperbilirubinemia (2/7/2022)   corrected      More than 50% of the time documented was spent in face-to-face contact with the patient and in the care of the patient on the floor/unit where the patient is located. Crystal Sellers NP   I have spoken with and examined the patient. I agree with the above assessment and plan as documented. Gen:  stridor  Lungs:  Stridor heard best in the neck  Heart:  RRR with no Murmur/Rubs/Gallops  Abd:soft  Ext: no edema    Stop arb if ok with cardiology- not sure of cause of  Stridor, will ask ent to see,  Tongue is not swollen- do not think this is angioedema, ?  Irritation from previous nausea vomiting  Ct of neck negative per radiology  Yaya Kirkpatrick MD

## 2022-02-10 NOTE — PROGRESS NOTES
[facility] Progress Note         Admit Date:  2/7/2022    Today's Date:  2/10/2022    CC:  Abdominal pain    Subjective:     Patient continues to have abdominal pain. He is receiving IV Dilaudid frequently but questions if this is strong enough. He says he still has dry heaves and diarrhea. He also says his breathing is still bad.     Medications:   Current Facility-Administered Medications   Medication Dose Route Frequency    metoprolol (LOPRESSOR) injection 5 mg  5 mg IntraVENous Q4H PRN    LORazepam (ATIVAN) injection 1 mg  1 mg IntraVENous Q6H PRN    ivabradine (CORLANOR) tablet 5 mg  5 mg Oral BID WITH MEALS    dicyclomine (BENTYL) capsule 10 mg  10 mg Oral QID PRN    methylPREDNISolone (PF) (SOLU-MEDROL) injection 40 mg  40 mg IntraVENous Q12H    HYDROmorphone (DILAUDID) injection 0.5 mg  0.5 mg IntraVENous Q6H PRN    promethazine (PHENERGAN) with saline injection 12.5 mg  12.5 mg IntraVENous Q6H PRN    famotidine (PF) (PEPCID) 20 mg in 0.9% sodium chloride 10 mL injection  20 mg IntraVENous Q12H    metroNIDAZOLE (FLAGYL) IVPB premix 500 mg  500 mg IntraVENous Q12H    cefTRIAXone (ROCEPHIN) 1 g in 0.9% sodium chloride (MBP/ADV) 50 mL MBP  1 g IntraVENous Q24H    pantoprazole (PROTONIX) 40 mg in 0.9% sodium chloride 10 mL injection  40 mg IntraVENous Q12H    albuterol (PROVENTIL VENTOLIN) nebulizer solution 2.5 mg  2.5 mg Nebulization Q6H RT    sodium chloride (NS) flush 5-10 mL  5-10 mL IntraVENous Q8H    sodium chloride (NS) flush 5-10 mL  5-10 mL IntraVENous PRN    0.9% sodium chloride infusion  75 mL/hr IntraVENous CONTINUOUS    ALPRAZolam (XANAX) tablet 1 mg  1 mg Oral QHS    aspirin delayed-release tablet 81 mg  81 mg Oral DAILY    atorvastatin (LIPITOR) tablet 80 mg  80 mg Oral DAILY    carvediloL (COREG) tablet 25 mg  25 mg Oral BID WITH MEALS    gabapentin (NEURONTIN) capsule 300 mg  300 mg Oral BID    HYDROcodone-acetaminophen (NORCO)  mg tablet 1 Tablet 1 Tablet Oral Q6H PRN    sodium chloride (NS) flush 5-40 mL  5-40 mL IntraVENous PRN    [Held by provider] acetaminophen (TYLENOL) tablet 650 mg  650 mg Oral Q6H PRN    Or    [Held by provider] acetaminophen (TYLENOL) suppository 650 mg  650 mg Rectal Q6H PRN    polyethylene glycol (MIRALAX) packet 17 g  17 g Oral DAILY PRN    magnesium oxide (MAG-OX) tablet 400 mg  400 mg Oral DAILY       Review of Systems:  ROS was obtained, with pertinent positives as listed above. No chest pain or SOB. Diet:      Objective:   Vitals:  Visit Vitals  /87   Pulse 89   Temp 97.4 °F (36.3 °C)   Resp 19   Ht 5' 11\" (1.803 m)   Wt 83.8 kg (184 lb 12.8 oz)   SpO2 99%   BMI 25.77 kg/m²     Intake/Output:  No intake/output data recorded. No intake/output data recorded. Exam:  General appearance: alert, cooperative, no distress, positive stridor  Lungs: clear to auscultation bilaterally anteriorly  Heart: regular rate and rhythm  Abdomen: soft, tender in RUQ.  Bowel sounds normal. No masses, no organomegaly  Extremities: extremities normal, atraumatic, no cyanosis or edema  Neuro:  alert and oriented    Data Review (Labs):    Recent Labs     02/10/22  0451 02/09/22  0331 02/08/22  0222 02/07/22  1851 02/07/22  1718 02/07/22  1557   WBC 15.7* 8.2 12.0*  --   --  13.3*   HGB 13.2* 13.4* 14.4  --   --  16.1   HCT 41.3 41.5 43.0  --   --  49.1    285 256  --   --  279   MCV 95.2 93.9 91.5  --   --  93.5    132* 132*  --  138  --    K 4.4 4.4 3.6  --  3.4*  --    * 103 101  --  103  --    CO2 20* 21 21  --  20*  --    BUN 11 13 12  --  9  --    CREA 1.20 1.30 2.10*  --  2.40*  --    CA 7.6* 7.8* 8.3  --  8.5  --    MG  --   --   --  1.3*  --   --    * 137* 124*  --  113*  --    AP  --  79  --   --  98  --    AST  --  31  --   --  53*  --    ALT  --  26  --   --  41  --    TBILI  --  0.7  --   --  1.6*  --    ALB  --  3.5  --   --  3.9  --    TP  --  7.1  --   --  7.9  --    LPSE  --   --   --   --  112 --        Assessment:     Principal Problem:    Acute gastroenteritis (2/26/2016) Stool culture is negative-to-date. Lauroy pending. He still expresses severe pain and wants higher dose pain medication. I added Dicyclomine yesterday, but it has not been administered yet. There is nothing focal about his labs or CT that would indicate need for high dose opioids, so I would avoid escalation. I am contemplating EGD, but his upper airway obstruction would significantly increase his risks of sedation and EGD. It is hard to justify those risks right now. Active Problems:    Chronic systolic (congestive) heart failure ([address] (4/21/2011)      HTN (hypertension) (11/29/2011)      Non-ischemic cardiomyopathy ([address] (3/24/2016)      Transaminitis (3/14/2017)      Inspiratory stridor (3/21/2017)      SVT (supraventricular tachycardia) ([address] (12/22/2018)      YAO (acute kidney injury) ([address] (8/23/2021)      Demand ischemia ([address] (8/24/2021)      Hyperbilirubinemia (2/7/2022)        Plan:     NPO after midnight. If his breathing is better, will plan EGD for tomorrow. If not, will plan UGI series to evaluate for luminal pathology. Have discussed use and rationale of dicyclomine with him.

## 2022-02-11 ENCOUNTER — ANESTHESIA (OUTPATIENT)
Dept: ENDOSCOPY | Age: 57
DRG: 309 | End: 2022-02-11
Payer: COMMERCIAL

## 2022-02-11 ENCOUNTER — ANESTHESIA EVENT (OUTPATIENT)
Dept: ENDOSCOPY | Age: 57
DRG: 309 | End: 2022-02-11
Payer: COMMERCIAL

## 2022-02-11 LAB
BACTERIA SPEC CULT: ABNORMAL
BACTERIA SPEC CULT: ABNORMAL
LACTOFERRIN, LCTFLT: <1 UG/ML(G) (ref 0–7.24)
SERVICE CMNT-IMP: ABNORMAL

## 2022-02-11 PROCEDURE — 77010033710 HC HELIOX THERAPY DAILY

## 2022-02-11 PROCEDURE — 94640 AIRWAY INHALATION TREATMENT: CPT

## 2022-02-11 PROCEDURE — 74011250637 HC RX REV CODE- 250/637: Performed by: NURSE PRACTITIONER

## 2022-02-11 PROCEDURE — 77030020847 HC STATLOK BARD -A

## 2022-02-11 PROCEDURE — 74011250637 HC RX REV CODE- 250/637: Performed by: INTERNAL MEDICINE

## 2022-02-11 PROCEDURE — 76060000031 HC ANESTHESIA FIRST 0.5 HR: Performed by: INTERNAL MEDICINE

## 2022-02-11 PROCEDURE — 74011250637 HC RX REV CODE- 250/637: Performed by: EMERGENCY MEDICINE

## 2022-02-11 PROCEDURE — 99232 SBSQ HOSP IP/OBS MODERATE 35: CPT | Performed by: INTERNAL MEDICINE

## 2022-02-11 PROCEDURE — 2709999900 HC NON-CHARGEABLE SUPPLY: Performed by: INTERNAL MEDICINE

## 2022-02-11 PROCEDURE — 74011000258 HC RX REV CODE- 258: Performed by: INTERNAL MEDICINE

## 2022-02-11 PROCEDURE — 0DJ08ZZ INSPECTION OF UPPER INTESTINAL TRACT, VIA NATURAL OR ARTIFICIAL OPENING ENDOSCOPIC: ICD-10-PCS | Performed by: INTERNAL MEDICINE

## 2022-02-11 PROCEDURE — 74011250636 HC RX REV CODE- 250/636: Performed by: INTERNAL MEDICINE

## 2022-02-11 PROCEDURE — 74011000250 HC RX REV CODE- 250: Performed by: STUDENT IN AN ORGANIZED HEALTH CARE EDUCATION/TRAINING PROGRAM

## 2022-02-11 PROCEDURE — 74011000250 HC RX REV CODE- 250: Performed by: NURSE PRACTITIONER

## 2022-02-11 PROCEDURE — 77030041974 HC CATH SYS PERIPH TELE -B

## 2022-02-11 PROCEDURE — 74011250637 HC RX REV CODE- 250/637: Performed by: STUDENT IN AN ORGANIZED HEALTH CARE EDUCATION/TRAINING PROGRAM

## 2022-02-11 PROCEDURE — 74011000250 HC RX REV CODE- 250: Performed by: EMERGENCY MEDICINE

## 2022-02-11 PROCEDURE — 94762 N-INVAS EAR/PLS OXIMTRY CONT: CPT

## 2022-02-11 PROCEDURE — 76937 US GUIDE VASCULAR ACCESS: CPT

## 2022-02-11 PROCEDURE — 65270000029 HC RM PRIVATE

## 2022-02-11 PROCEDURE — 76040000025: Performed by: INTERNAL MEDICINE

## 2022-02-11 PROCEDURE — 74011000250 HC RX REV CODE- 250: Performed by: INTERNAL MEDICINE

## 2022-02-11 PROCEDURE — 74011250636 HC RX REV CODE- 250/636: Performed by: NURSE PRACTITIONER

## 2022-02-11 PROCEDURE — 99231 SBSQ HOSP IP/OBS SF/LOW 25: CPT | Performed by: INTERNAL MEDICINE

## 2022-02-11 PROCEDURE — 74011250636 HC RX REV CODE- 250/636: Performed by: STUDENT IN AN ORGANIZED HEALTH CARE EDUCATION/TRAINING PROGRAM

## 2022-02-11 PROCEDURE — C9113 INJ PANTOPRAZOLE SODIUM, VIA: HCPCS | Performed by: INTERNAL MEDICINE

## 2022-02-11 RX ORDER — SODIUM CHLORIDE, SODIUM LACTATE, POTASSIUM CHLORIDE, CALCIUM CHLORIDE 600; 310; 30; 20 MG/100ML; MG/100ML; MG/100ML; MG/100ML
INJECTION, SOLUTION INTRAVENOUS
Status: DISCONTINUED | OUTPATIENT
Start: 2022-02-11 | End: 2022-02-11 | Stop reason: HOSPADM

## 2022-02-11 RX ORDER — PROPOFOL 10 MG/ML
INJECTION, EMULSION INTRAVENOUS AS NEEDED
Status: DISCONTINUED | OUTPATIENT
Start: 2022-02-11 | End: 2022-02-11 | Stop reason: HOSPADM

## 2022-02-11 RX ORDER — LIDOCAINE HYDROCHLORIDE 20 MG/ML
INJECTION, SOLUTION EPIDURAL; INFILTRATION; INTRACAUDAL; PERINEURAL AS NEEDED
Status: DISCONTINUED | OUTPATIENT
Start: 2022-02-11 | End: 2022-02-11 | Stop reason: HOSPADM

## 2022-02-11 RX ORDER — SUCRALFATE 1 G/1
1 TABLET ORAL EVERY 6 HOURS
Status: DISCONTINUED | OUTPATIENT
Start: 2022-02-11 | End: 2022-02-17 | Stop reason: HOSPADM

## 2022-02-11 RX ADMIN — ALBUTEROL SULFATE 2.5 MG: 2.5 SOLUTION RESPIRATORY (INHALATION) at 07:39

## 2022-02-11 RX ADMIN — SODIUM CHLORIDE, PRESERVATIVE FREE 10 ML: 5 INJECTION INTRAVENOUS at 14:44

## 2022-02-11 RX ADMIN — SUCRALFATE 1 G: 1 TABLET ORAL at 12:37

## 2022-02-11 RX ADMIN — HYDROCODONE BITARTRATE AND ACETAMINOPHEN 1 TABLET: 10; 325 TABLET ORAL at 19:48

## 2022-02-11 RX ADMIN — SODIUM CHLORIDE, PRESERVATIVE FREE 12.5 MG: 5 INJECTION INTRAVENOUS at 05:17

## 2022-02-11 RX ADMIN — SODIUM CHLORIDE, PRESERVATIVE FREE 12.5 MG: 5 INJECTION INTRAVENOUS at 22:03

## 2022-02-11 RX ADMIN — FLUTICASONE PROPIONATE 2 SPRAY: 50 SPRAY, METERED NASAL at 18:29

## 2022-02-11 RX ADMIN — SODIUM CHLORIDE, SODIUM LACTATE, POTASSIUM CHLORIDE, AND CALCIUM CHLORIDE: 600; 310; 30; 20 INJECTION, SOLUTION INTRAVENOUS at 11:06

## 2022-02-11 RX ADMIN — ASPIRIN 81 MG: 81 TABLET ORAL at 12:37

## 2022-02-11 RX ADMIN — PHENYLEPHRINE HYDROCHLORIDE 150 MCG: 10 INJECTION INTRAVENOUS at 11:15

## 2022-02-11 RX ADMIN — METRONIDAZOLE 500 MG: 500 INJECTION, SOLUTION INTRAVENOUS at 19:49

## 2022-02-11 RX ADMIN — CARVEDILOL 25 MG: 25 TABLET, FILM COATED ORAL at 16:38

## 2022-02-11 RX ADMIN — GABAPENTIN 300 MG: 300 CAPSULE ORAL at 18:16

## 2022-02-11 RX ADMIN — METRONIDAZOLE 500 MG: 500 INJECTION, SOLUTION INTRAVENOUS at 02:11

## 2022-02-11 RX ADMIN — SALINE NASAL SPRAY 2 SPRAY: 1.5 SOLUTION NASAL at 16:38

## 2022-02-11 RX ADMIN — SALINE NASAL SPRAY 2 SPRAY: 1.5 SOLUTION NASAL at 05:07

## 2022-02-11 RX ADMIN — DICYCLOMINE HYDROCHLORIDE 20 MG: 10 CAPSULE ORAL at 20:56

## 2022-02-11 RX ADMIN — SUCRALFATE 1 G: 1 TABLET ORAL at 18:16

## 2022-02-11 RX ADMIN — HYDROCODONE BITARTRATE AND ACETAMINOPHEN 1 TABLET: 10; 325 TABLET ORAL at 12:53

## 2022-02-11 RX ADMIN — ALBUTEROL SULFATE 2.5 MG: 2.5 SOLUTION RESPIRATORY (INHALATION) at 02:03

## 2022-02-11 RX ADMIN — SALINE NASAL SPRAY 2 SPRAY: 1.5 SOLUTION NASAL at 18:29

## 2022-02-11 RX ADMIN — METHYLPREDNISOLONE SODIUM SUCCINATE 40 MG: 40 INJECTION, POWDER, FOR SOLUTION INTRAMUSCULAR; INTRAVENOUS at 00:13

## 2022-02-11 RX ADMIN — LIDOCAINE HYDROCHLORIDE 100 MG: 20 INJECTION, SOLUTION EPIDURAL; INFILTRATION; INTRACAUDAL; PERINEURAL at 11:13

## 2022-02-11 RX ADMIN — HYDROCODONE BITARTRATE AND ACETAMINOPHEN 1 TABLET: 10; 325 TABLET ORAL at 05:17

## 2022-02-11 RX ADMIN — DICYCLOMINE HYDROCHLORIDE 20 MG: 10 CAPSULE ORAL at 16:38

## 2022-02-11 RX ADMIN — PROPOFOL 20 MG: 10 INJECTION, EMULSION INTRAVENOUS at 11:16

## 2022-02-11 RX ADMIN — ATORVASTATIN CALCIUM 80 MG: 80 TABLET, FILM COATED ORAL at 12:37

## 2022-02-11 RX ADMIN — SALINE NASAL SPRAY 2 SPRAY: 1.5 SOLUTION NASAL at 12:44

## 2022-02-11 RX ADMIN — SODIUM CHLORIDE, PRESERVATIVE FREE 10 ML: 5 INJECTION INTRAVENOUS at 05:07

## 2022-02-11 RX ADMIN — SODIUM CHLORIDE, PRESERVATIVE FREE 12.5 MG: 5 INJECTION INTRAVENOUS at 12:51

## 2022-02-11 RX ADMIN — CEFTRIAXONE 1 G: 1 INJECTION, POWDER, FOR SOLUTION INTRAMUSCULAR; INTRAVENOUS at 14:43

## 2022-02-11 RX ADMIN — PROPOFOL 10 MG: 10 INJECTION, EMULSION INTRAVENOUS at 11:18

## 2022-02-11 RX ADMIN — SODIUM CHLORIDE 75 ML/HR: 900 INJECTION, SOLUTION INTRAVENOUS at 21:04

## 2022-02-11 RX ADMIN — ALBUTEROL SULFATE 2.5 MG: 2.5 SOLUTION RESPIRATORY (INHALATION) at 19:50

## 2022-02-11 RX ADMIN — SALINE NASAL SPRAY 2 SPRAY: 1.5 SOLUTION NASAL at 20:56

## 2022-02-11 RX ADMIN — SUCRALFATE 1 G: 1 TABLET ORAL at 23:03

## 2022-02-11 RX ADMIN — IVABRADINE 5 MG: 5 TABLET, FILM COATED ORAL at 16:38

## 2022-02-11 RX ADMIN — SODIUM CHLORIDE, PRESERVATIVE FREE 12.5 MG: 5 INJECTION INTRAVENOUS at 18:16

## 2022-02-11 RX ADMIN — SODIUM CHLORIDE 40 MG: 9 INJECTION, SOLUTION INTRAMUSCULAR; INTRAVENOUS; SUBCUTANEOUS at 20:56

## 2022-02-11 RX ADMIN — VALSARTAN 40 MG: 80 TABLET ORAL at 20:56

## 2022-02-11 RX ADMIN — ALPRAZOLAM 1 MG: 0.5 TABLET ORAL at 20:56

## 2022-02-11 RX ADMIN — SALINE NASAL SPRAY 2 SPRAY: 1.5 SOLUTION NASAL at 14:44

## 2022-02-11 RX ADMIN — Medication 400 MG: at 12:37

## 2022-02-11 RX ADMIN — SODIUM CHLORIDE 75 ML/HR: 900 INJECTION, SOLUTION INTRAVENOUS at 04:41

## 2022-02-11 RX ADMIN — PROPOFOL 40 MG: 10 INJECTION, EMULSION INTRAVENOUS at 11:13

## 2022-02-11 RX ADMIN — PHENYLEPHRINE HYDROCHLORIDE 150 MCG: 10 INJECTION INTRAVENOUS at 11:18
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
## 2022-02-11 NOTE — PROGRESS NOTES
END OF SHIFT NOTE:    Intake/Output  02/10 1901 - 02/11 0700  In: 2810 [P.O.:480; I.V.:2330]  Out: 0    Voiding: YES  Catheter: NO  Drain:              Stool:  2 occurrences. Stool Assessment  Stool Color: Green (02/10/22 1957)  Stool Appearance: Loose (02/10/22 1957)  Stool Amount: Medium (02/10/22 1957)  Stool Source/Status: Rectum (02/10/22 1957)    Emesis:  0 occurrences. VITAL SIGNS  Patient Vitals for the past 12 hrs:   Temp Pulse Resp BP SpO2   02/11/22 0252 97.3 °F (36.3 °C) 70 20 (!) 92/58 97 %   02/11/22 0204     97 %   02/10/22 2339 97.6 °F (36.4 °C) 91 18 96/67 95 %   02/10/22 2040     100 %   02/10/22 1956 97.5 °F (36.4 °C) 76 20 104/69 100 %   02/10/22 1727 97.5 °F (36.4 °C) 77  103/79    02/10/22 1722 97.5 °F (36.4 °C) 79  103/79 99 %       Pain Assessment  Pain 1  Pain Scale 1: Visual (02/11/22 0210)  Pain Intensity 1: 0 (02/11/22 0210)  Patient Stated Pain Goal: 0 (02/11/22 0210)  Pain Reassessment 1: Patient resting w/respiratory rate greater than 10 (02/11/22 0030)  Pain Onset 1: pta (02/10/22 2333)  Pain Location 1: Abdomen (02/10/22 2333)  Pain Orientation 1: Right;Mid (02/10/22 2333)  Pain Description 1: Aching; Sharp (02/10/22 2333)  Pain Intervention(s) 1: Medication (see MAR); Distraction (02/10/22 2333)    Ambulating  No    Additional Information:   Seen by ENT at [facility] w/ new orders. Started on nasal sprays. For speech eval.  C/O nausea but no vomiting noted;tolerates po;had Phenergan x2 IV. Asking for his IV Dilaudid w/c has been d/c,MD notified;x1 order IV Toradol given,x2 po Norco. Explained reason IV Dilaudid d/c'ed. Stridor noted on initial assessment but pt has been sleeping good throughout the night & none heard. On O2 2lpm/nc helium oxygen;pt does not keep it all the time but O2 sats on oxinet >92%. NPO fr MN instructed for possible EGD. Mostly NSR on the monitor. Shift report given to oncoming nurse at the bedside.     Paz Rico RN

## 2022-02-11 NOTE — ANESTHESIA PREPROCEDURE EVALUATION
Relevant Problems   RESPIRATORY SYSTEM   (+) [place]O (dyspnea on exertion)      CARDIOVASCULAR   (+) CHF (congestive heart failure) (HCC)   (+) HTN (hypertension)   (+) SVT (supraventricular tachycardia) (HCC)      RENAL FAILURE   (+) YAO (acute kidney injury) ([facility] [address]       Anesthetic History               Review of Systems / Medical History  Patient summary reviewed and pertinent labs reviewed    Pulmonary                   Neuro/Psych              Cardiovascular    Hypertension        Dysrhythmias : SVT  Pacemaker and past MI    Exercise tolerance: <4 METS  Comments: NSTEMI 8/21 at time of Covid infection  AICD/pacer  2/22 echo- 20% EF   GI/Hepatic/Renal     GERD          Comments: dysphagia Endo/Other        Arthritis     Other Findings   Comments: Chronic ETOH abuse  + opiates and benzo's on recent drug screen         Physical Exam    Airway  Mallampati: III  TM Distance: 4 - 6 cm  Neck ROM: normal range of motion   Mouth opening: Normal     Cardiovascular    Rhythm: regular           Dental  No notable dental hx       Pulmonary                 Abdominal  GI exam deferred       Other Findings            Anesthetic Plan    ASA: 3  Anesthesia type: total IV anesthesia          Induction: Intravenous  Anesthetic plan and risks discussed with: Patient

## 2022-02-11 NOTE — PROGRESS NOTES
SPEECH PATHOLOGY NOTE:      Speech therapy consult received and appreciated. Patient currently NPO for EGD that is scheduled for later today. Will follow up for evaluation at later time once cleared for po intake. ADDENDUM: Second speech therapy attempt this PM.Patient had just returned from EGD. He kindly requested to defer evaluation at this time due to fatigue and desire to rest. Will follow up tomorrow.      DORIAN Dowd, CCC-SLP  Speech Language Pathologist  [facility]  Contact: Scot

## 2022-02-11 NOTE — PROGRESS NOTES
Asked to start a PIV. Using US assistance, placed an 18g 8cm Endurance catheter in left upper arm basilic vein on first attempt. Primary RN aware.   Dieudonne Ozuna RN, VAT

## 2022-02-11 NOTE — PROGRESS NOTES
[facility] Progress Note         Admit Date:  2/7/2022    Today's Date:  2/11/2022    CC:  Abdominal pain, nausea, vomiting, diarrhea    Subjective:     Patient is the same. Sitting up in bed reading a book but when I ask how his abdominal pain is he says horrible. Dilaudid discontiued yesterday and hes getting norco 10. Also reports nausea with dry heaving. still complaining of watery diarrhea--says had 3 liuqid stools so far this am. Seen by pulmonary for stridor who suspected vocal cord irritation and gave racemic epinephrine. ENT consulted and commented taht \"Stridor more so expiratory, intermittent and seems to be voluntary rather than 2/2 fixed anatomic causes. 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 He has normal CT neck. \" ENT recommended ST consultation.     Medications:   Current Facility-Administered Medications   Medication Dose Route Frequency    LORazepam (ATIVAN) tablet 1 mg  1 mg Oral Q4H PRN    promethazine (PHENERGAN) with saline injection 12.5 mg  12.5 mg IntraVENous Q4H PRN    valsartan (DIOVAN) tablet 40 mg  40 mg Oral Q12H    methylPREDNISolone (PF) (SOLU-MEDROL) injection 40 mg  40 mg IntraVENous Q8H    albuterol (PROVENTIL VENTOLIN) nebulizer solution 2.5 mg  2.5 mg Nebulization Q4H PRN    dicyclomine (BENTYL) capsule 20 mg  20 mg Oral TID    sodium chloride (OCEAN) 0.65 % nasal squeeze bottle 2 Spray  2 [place] Both Nostrils Q2HWA    fluticasone propionate (FLONASE) 50 mcg/actuation nasal spray 2 Spray  2 Spray Both Nostrils BID    metoprolol (LOPRESSOR) injection 5 mg  5 mg IntraVENous Q4H PRN    LORazepam (ATIVAN) injection 1 mg  1 mg IntraVENous Q6H PRN    ivabradine (CORLANOR) tablet 5 mg  5 mg Oral BID WITH MEALS    famotidine (PF) (PEPCID) 20 mg in 0.9% sodium chloride 10 mL injection  20 mg IntraVENous Q12H    metroNIDAZOLE (FLAGYL) IVPB premix 500 mg  500 mg IntraVENous Q12H    cefTRIAXone (ROCEPHIN) 1 g in 0.9% sodium chloride (MBP/ADV) 50 mL MBP  1 g IntraVENous Q24H    pantoprazole (PROTONIX) 40 mg in 0.9% sodium chloride 10 mL injection  40 mg IntraVENous Q12H    albuterol (PROVENTIL VENTOLIN) nebulizer solution 2.5 mg  2.5 mg Nebulization Q6H RT    sodium chloride (NS) flush 5-10 mL  5-10 mL IntraVENous Q8H    sodium chloride (NS) flush 5-10 mL  5-10 mL IntraVENous PRN    0.9% sodium chloride infusion  75 mL/hr IntraVENous CONTINUOUS    ALPRAZolam (XANAX) tablet 1 mg  1 mg Oral QHS    aspirin delayed-release tablet 81 mg  81 mg Oral DAILY    atorvastatin (LIPITOR) tablet 80 mg  80 mg Oral DAILY    carvediloL (COREG) tablet 25 mg  25 mg Oral BID WITH MEALS    gabapentin (NEURONTIN) capsule 300 mg  300 mg Oral BID    HYDROcodone-acetaminophen (NORCO)  mg tablet 1 Tablet  1 Tablet Oral Q6H PRN    sodium chloride (NS) flush 5-40 mL  5-40 mL IntraVENous PRN    [Held by provider] acetaminophen (TYLENOL) tablet 650 mg  650 mg Oral Q6H PRN    Or    [Held by provider] acetaminophen (TYLENOL) suppository 650 mg  650 mg Rectal Q6H PRN    polyethylene glycol (MIRALAX) packet 17 g  17 g Oral DAILY PRN    magnesium oxide (MAG-OX) tablet 400 mg  400 mg Oral DAILY       Review of Systems:  ROS was obtained, with pertinent positives as listed above. Diet:  NPO    Objective:   Vitals:  Visit Vitals  /77 (BP 1 Location: Left upper arm, BP Patient Position: Sitting)   Pulse 67   Temp 97.5 °F (36.4 °C)   Resp 28   Ht 5' 11\" (1.803 m)   Wt 82.1 kg (181 lb)   SpO2 100%   BMI 25.24 kg/m²     Intake/Output:  No intake/output data recorded. 02/09 1901 - 02/11 0700  In: 3017 [P.O.:480; I.V.:2537]  Out: 1 [Urine:1]  Exam:  General appearance: alert, cooperative, no distress  Lungs: expiratory; on 2L O2 via NC  Heart: regular rate and rhythm  Abdomen: soft, and hypersensitive to light palpation in RUQ.  Bowel sounds normal. No masses, no organomegaly  Extremities: extremities normal, atraumatic, no cyanosis or edema  Neuro:  alert and oriented    Data Review (Labs):    Recent Labs     02/10/22  0451 02/09/22  0331   WBC 15.7* 8.2   HGB 13.2* 13.4*   HCT 41.3 41.5    285   MCV 95.2 93.9    132*   K 4.4 4.4   * 103   CO2 20* 21   BUN 11 13   CREA 1.20 1.30   CA 7.6* 7.8*   * 137*   AP  --  79   AST  --  31   ALT  --  26   TBILI  --  0.7   ALB  --  3.5   TP  --  7.1     CT a/p without contrast 2/7/22: IMPRESSION     1. Moderate hiatal hernia. Suggestion of eccentric submucosal wall thickening of  the proximal stomach, although this may be artifactual secondary to volume  averaging of fluid in the stomach. Correlation with upper endoscopy may be  beneficial.     2. No evidence of colitis, diverticulitis, appendicitis or bowel obstruction.     3. No renal calculus or hydronephrosis.     4. Absence of IV contrast limits sensitivity. Assessment:     Principal Problem:    Acute gastroenteritis (2/26/2016)    Active Problems:    Chronic systolic (congestive) heart failure (HCC) (4/21/2011)      HTN (hypertension) (11/29/2011)      Non-ischemic cardiomyopathy (HCC) (3/24/2016)      Transaminitis (3/14/2017)      Inspiratory stridor (3/21/2017)      SVT (supraventricular tachycardia) ([facility] [address] (12/22/2018)      YAO (acute kidney injury) ([facility] [address] (8/23/2021)      Demand ischemia ([facility] [address] (8/24/2021)      Hyperbilirubinemia (2/7/2022)       62 y.o. male with past medical history of NICM with EF 15-20, VT with ICD in place, GERD, chronic back/leg pain and HTN was admitted for acute gastroenteritis with YAO, seen by cardiology for a. Tach and by pulmonary for inspiratory stridor (thought to be secondary to vocal cord irritation. He had negative neck CT--ENT evaluation suggested voluntary stridor as opposed to fixed anastomotic source). Speech therapy evaluation pending. Stool culture only with yeast and campylobacter pending.  He is s/p cholecystectomy and ultrasound showed cirrhosis and no evidence of biliary obstruction  Plan:   1) await results of stool studies  2) continue IV PPI q 12 hours  3)EGD today. Dr. Corey Valentin discussed with anesthesia. This will be to evaluate questionable submucosal wall thickening of the proximal stomach on CT and look for other potential sources of abdominal pain. Patient is seen and examined in collaboration with Dr. Tharon Harada. Assessment and plan as per Dr. Corey Valentin.   4) po magnesium likely contributing to diarrhea at least to some degree  TIFFANY Nelson

## 2022-02-11 NOTE — PROGRESS NOTES
Fabrizio Contreras  Admission Date: 2/7/2022         Daily Progress Note: 2/11/2022    The patient's chart is reviewed and the patient is discussed with the staff. Background:  62 y.o.  male seen and evaluated at the request of Dr. Sera Llanos. Medical history of nonischemic cardiomyopathy with EF of 20-25%, HTN, HLD, and chronic back pain. He presented with heart palpitations, persistent nausea/vomiting, diarrhea, shortness of breath and generalized weakness for the past week.       ED course: HRs in the 150s-160s, cardiology reviewed strips and it is most likely atrial tachycardia as opposed to VT. WBC count of 13k. Troponin of 245 with repeat of 270. Procalcitonin of 0.06. pBNP of 1690. SCr of 2.4. K of 3.4. Rapid COVID is negative. He developed stridor and we were asked to evaluate. Patient with N and V episodes apparently symptoms developed after one of the episodes. He was given racemic epinephrine with improvement, subsequently placed on systemic steroids H2 blockers. Had neck CT that showed some mild soft tissue thickening in the subglottic area. He had another episode of stridor with desaturation on 2/10 so CT repeated and was unremarkable. CXR clear. Given racemic epi. Still on Solumedrol and Pepcid. Seen by   ENT who performed laryngoscopy and recommended ST evaluation. ENT note: Both aryepiglottic folds with minimal erythema and edema. Mild TVC erythema and edema. 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 No nodules or polyps and the cords were fully mobile. No concerning lesions seen along post-cricoid region or within either piriform sinus. The posterior pharyngeal was clear as well. Visible portion of subglottis appears patent. Please note that we encountered the patient with similar symptoms in 2017, he had bronchoscopy at that time and no pathology was found.     Subjective:      Stridor continues.  Getting respiratory treatment but he states that they don't help. He feels short of breath. Abdominal pain continues. Having about 5 loose stools per day.       Current Facility-Administered Medications   Medication Dose Route Frequency    LORazepam (ATIVAN) tablet 1 mg  1 mg Oral Q4H PRN    promethazine (PHENERGAN) with saline injection 12.5 mg  12.5 mg IntraVENous Q4H PRN    valsartan (DIOVAN) tablet 40 mg  40 mg Oral Q12H    methylPREDNISolone (PF) (SOLU-MEDROL) injection 40 mg  40 mg IntraVENous Q8H    albuterol (PROVENTIL VENTOLIN) nebulizer solution 2.5 mg  2.5 mg Nebulization Q4H PRN    dicyclomine (BENTYL) capsule 20 mg  20 mg Oral TID    sodium chloride (OCEAN) 0.65 % nasal squeeze bottle 2 Spray  2 [place] Both Nostrils Q2HWA    fluticasone propionate (FLONASE) 50 mcg/actuation nasal spray 2 Spray  2 Spray Both Nostrils BID    metoprolol (LOPRESSOR) injection 5 mg  5 mg IntraVENous Q4H PRN    LORazepam (ATIVAN) injection 1 mg  1 mg IntraVENous Q6H PRN    ivabradine (CORLANOR) tablet 5 mg  5 mg Oral BID WITH MEALS    famotidine (PF) (PEPCID) 20 mg in 0.9% sodium chloride 10 mL injection  20 mg IntraVENous Q12H    metroNIDAZOLE (FLAGYL) IVPB premix 500 mg  500 mg IntraVENous Q12H    cefTRIAXone (ROCEPHIN) 1 g in 0.9% sodium chloride (MBP/ADV) 50 mL MBP  1 g IntraVENous Q24H    pantoprazole (PROTONIX) 40 mg in 0.9% sodium chloride 10 mL injection  40 mg IntraVENous Q12H    albuterol (PROVENTIL VENTOLIN) nebulizer solution 2.5 mg  2.5 mg Nebulization Q6H RT    sodium chloride (NS) flush 5-10 mL  5-10 mL IntraVENous Q8H    sodium chloride (NS) flush 5-10 mL  5-10 mL IntraVENous PRN    0.9% sodium chloride infusion  75 mL/hr IntraVENous CONTINUOUS    ALPRAZolam (XANAX) tablet 1 mg  1 mg Oral QHS    aspirin delayed-release tablet 81 mg  81 mg Oral DAILY    atorvastatin (LIPITOR) tablet 80 mg  80 mg Oral DAILY    carvediloL (COREG) tablet 25 mg  25 mg Oral BID WITH MEALS    gabapentin (NEURONTIN) capsule 300 mg  300 mg Oral BID  HYDROcodone-acetaminophen (NORCO)  mg tablet 1 Tablet  1 Tablet Oral Q6H PRN    sodium chloride (NS) flush 5-40 mL  5-40 mL IntraVENous PRN    [Held by provider] acetaminophen (TYLENOL) tablet 650 mg  650 mg Oral Q6H PRN    Or    [Held by provider] acetaminophen (TYLENOL) suppository 650 mg  650 mg Rectal Q6H PRN    polyethylene glycol (MIRALAX) packet 17 g  17 g Oral DAILY PRN    magnesium oxide (MAG-OX) tablet 400 mg  400 mg Oral DAILY     Review of Systems  + dyspnea, stridor  Constitutional: negative for fever, chills, sweats  Cardiovascular: negative for chest pain, palpitations, syncope, edema  Gastrointestinal:  negative for dysphagia, reflux. + recent nausea, vomiting, and diarrhea  Neurologic:  negative for focal weakness, numbness, headache    Objective:     Vitals:    02/11/22 0252 02/11/22 0512 02/11/22 0723 02/11/22 0740   BP: (!) 92/58 106/70 106/77    Pulse: 70  67    Resp: 20  28    Temp: 97.3 °F (36.3 °C)  97.5 °F (36.4 °C)    SpO2: 97%  100% 100%   Weight:       Height:           Intake/Output Summary (Last 24 hours) at 2/11/2022 0747  Last data filed at 2/11/2022 0524  Gross per 24 hour   Intake 3017 ml   Output 1 ml   Net 3016 ml     Physical Exam:   Constitution:  the patient is well developed and in no acute distress but looks anxious  HEENT:  Sclera clear, pupils equal, oral mucosa moist, tongue is not swollen, nor lips. Speech is clear  Respiratory: + stridor specifically in the neck area noted on exam. Breath sounds are actually clear. He is on 2 liters oxygen with a sat 100%. Cardiovascular:  RRR and without M,G,R  Gastrointestinal: soft and tender especially on the right side of abdomen; with positive bowel sounds. Musculoskeletal: warm without cyanosis. There is no lower extremity edema.   Skin:  no jaundice or rashes, no wounds   Neurologic: no gross neuro deficits     Psychiatric:  alert and oriented, seems anxious    CXR:         CT neck:   No definite abnormality or visualized etiology for stridor and shortness of  Breath. LAB:  Recent Labs     02/10/22  0451 02/09/22  0331   WBC 15.7* 8.2   HGB 13.2* 13.4*   HCT 41.3 41.5    285     Recent Labs     02/10/22  0451 02/09/22  0331    132*   K 4.4 4.4   * 103   CO2 20* 21   * 137*   BUN 11 13   CREA 1.20 1.30   CA 7.6* 7.8*   ALB  --  3.5   AST  --  31   ALT  --  26   AP  --  79     No results for input(s): LAC, TROPHS, BNPNT, CRP in the last 72 hours. No lab exists for component: ESR  No results for input(s): PH, PCO2, PO2, HCO3, PHI, PCO2I, PO2I, HCO3I in the last 72 hours. Recent Labs     02/08/22 2053   CULT NO GROWTH OF SALMONELLA OR SHIGELLA IS EVIDENT ON FIRST READING, FINAL REPORT TO FOLLOW AFTER FURTHER INCUBATION OF CULTURE  MODERATE YEAST SUBCULTURE IN PROGRESS*     Assessment and Plan:  (Medical Decision Making)   Principal Problem:    Acute gastroenteritis (2/26/2016)  Reports about 5 loose stools per day. GI evaluating and considering EGD but with stridor may elect for UGI series. Continues with right sided abdominal pain. Active Problems:    Chronic systolic (congestive) heart failure (HCC) (4/21/2011)  EF 20 to 25%. No recent lasix with nausea, vomiting and diarrhea. Creat down with IV fluids. CXR yesterday clear. Last BNP on 2/7 was 1690. No edema      HTN (hypertension) (11/29/2011)  Actually hypotensive today. Continues on IV fluids. Holding home diuretics. On ARB and b blocker for heart failure. Cardiology following. Non-ischemic cardiomyopathy ([facility] [address] (3/24/2016)   as above. Holding diuretics with diarrhea and recently elevated creatinine. Still on IV fluids with + balance of 3.2 liters but CXR clear and no edema      Transaminitis (3/14/2017)  resolved      Inspiratory stridor (3/21/2017)   Repeat CT of neck is normal and has been seen by ENT. Laryngoscopy: Both aryepiglottic folds with minimal erythema and edema. Mild TVC erythema and edema.   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 No nodules or polyps and the cords were fully mobile. No concerning lesions seen along post-cricoid region or within either piriform sinus. The posterior pharyngeal was clear as well. Visible portion of subglottis appears patent. No evidence of angioedema so ARB continued. ST consult pending. Still on steroids and pepcid - mild edema noted with laryngoscopy. Will await ST input and then consider stopping steroids/pepcid. Has Racemic epi available as needed. SVT (supraventricular tachycardia) ([address] (12/22/2018)  Resolved. Cardiology has seen      YAO (acute kidney injury) ([address] (8/23/2021)  Corrected with hydration. Stop IV fluids when po intake adequate      Demand ischemia ([address] (8/24/2021)    Associated with tachycardia      Hyperbilirubinemia (2/7/2022)   corrected      Leukocytosis  ? Secondary to steroids. Still has diarrhea and stool cx negative. Currently on Flagyl and Rocephin. More than 50% of the time documented was spent in face-to-face contact with the patient and in the care of the patient on the floor/unit where the patient is located. Daniel Mak NP       I have spoken with and examined the patient. I agree with the above assessment and plan as documented.     Gen: sleepy   Lungs:  Clear but   Heart:  RRR with no Murmur/Rubs/Gallops  Abd:soft, Nt  Ext: no edema     Wean steroids as this is not angioedema , speech  To see ,     Tc Ashley MD

## 2022-02-11 NOTE — INTERVAL H&P NOTE
Update History & Physical    The Patient's History and Physical of February 8, 2022 was reviewed with the patient and I examined the patient. There was no change. The surgical site was confirmed by the patient and me. Plan:  The risk, benefits, expected outcome, and alternative to the recommended procedure have been discussed with the patient. Patient understands and wants to proceed with the procedure.     Electronically signed by Zhang Biggs MD on 2/11/2022 at 10:07 AM

## 2022-02-11 NOTE — PROCEDURES
PROCEDURE: Esophagogastroduodenoscopy    ENDOSCOPIST: Yajaira Jolly M.D. PREOPERATIVE DIAGNOSIS: Abdominal pain    POSTOPERATIVE DIAGNOSIS: Reflux esophagitis, hiatal hernia    INSTRUMENTS: [id number]    SEDATION: MAC    DESCRIPTION: After informed consent was obtained, the patient was taken to the endoscopy suite and placed in the left lateral decubitus position. Intravenous sedation was administered by the Anesthesia provider. After adequate sedation had been achieved the endoscope was inserted over the tongue and through the posterior pharynx under direct visualization down the esophagus to the stomach and into the second portion of the duodenum. The endoscopic was withdrawn from that point, performing a careful survey of the mucosa. Retroflexion was performed in the gastric fundus. FINDINGS:  Esophagus: LA grade D esophagitis    Stomach: Large hiatal hernia (50-60% of gastric volume) without Chad ulcers. Otherwise normal.    Duodenum: Normal    Estimated blood loss:  0 cc-minimal    IMPRESSION: In my opinion, his pain is out of proportion to findings. It is conceivable that his pain is referring from the severe esophagitis. PLAN:  Continue IV PPI. Add Carafate slurry q6 hours. Clear liquid diet.       Earl Quintana MD

## 2022-02-11 NOTE — ROUTINE PROCESS
TRANSFER - OUT REPORT:    Verbal report given to Hadley Muro  being transferred to  [phone] for routine progression of care       Report consisted of patients Situation, Background, Assessment and   Recommendations(SBAR). Information from the following report(s) SBAR and Procedure Summary was reviewed with the receiving nurse. Lines:   Peripheral IV 02/09/22 Right; Inner Wrist (Active)   Site Assessment Clean, dry, & intact 02/11/22 0956   Phlebitis Assessment 0 02/11/22 0956   Infiltration Assessment 0 02/11/22 0956   Dressing Status Clean, dry, & intact 02/11/22 0956   Dressing Type Tape 02/11/22 0956   Hub Color/Line Status Yellow; Infusing;Flushed;Patent 02/11/22 0210   Action Taken Other (comment) 02/11/22 0210        Opportunity for questions and clarification was provided.

## 2022-02-11 NOTE — PROGRESS NOTES
Hospitalist Progress Note   Admit Date:  2022  4:06 PM   Name:  Anand Vanegas   Age:  62 y.o. Sex:  male  :  1965   MRN:  [id number]   Room:  19/    Presenting Complaint: Chest Pain and Shortness of Breath    Reason(s) for Admission: YAO (acute kidney injury) [facility]) [N17.9]     Hospital Course & Interval History:   62 y. o. male with medical history of nonischemic cardiomyopathy with EF of 20-25%, HTN, HLD, and chronic back pain presents with heart palpitations, persistent nausea/vomiting, diarrhea, shortness of breath and generalized weakness for the past week. He says that he was called by his cardiologist and told that his HRs were high. He denies chest pain but says \"I just hurt all over. He denies recreational drug use and alcohol. He denies black/red stools. He does not know of any food that may have triggered his symptoms.      ED course: HRs in the 150s-160s, cardiology reviewed strips and it is most likely atrial tachycardia as opposed to VT. WBC count of 13k. Troponin of 245 with repeat of 270. Procalcitonin of 0.06. pBNP of 1690. SCr of 2.4. K of 3.4. Rapid COVID is negative. CXR unremarkable. Subjective/24hr Events (22):  EGD showing gastritis today, continues to endorse epigastric pain, no further N/V, no respiratory distress. ROS:  10 systems reviewed and negative except as noted above. Assessment & Plan:     # intractable nausea/vomiting  - CTAP shows large hiatal hernia and thickened gastric mucosa. - suspect vomiting secondary to hiatal hernia  - GI consulted, plans for EGD tomorrow  - Cirrhosis workup at later date. 2/10  - cont empiric  Rocephin/flagyl atbx D4  - continue supportive mgmt - antiemetic  - advance to FLD  -increase Phenergan to Q4H, Dilaudid stopped, not helping and opiates should be avoided as they can make abdominal pain worse  Add Ativan for N/V, may help with abdominal discomfort.  Appears diaphragm is where pain is.    No further N/V  EGD with gastritis    Gastritis    -Carafate to be added to regimen        # Stridor  2/10  - clinically a little improved  - CT neck with mild thickening in subglottic region  - suspect etiology is vocal cord irritation s/p vomiting.   - monitor o2 sats and WOB closely  - wean Solumedrol   2/11   improved today, no hypoxia, on 2 liters heliox sats mid to high 90s      # Chronic systolic (congestive) heart failure ([facility] (4/21/2011)   appears compensated  - hold eplerenone given YAO  - cannot use ACEi/ARB due to YAO  - monitor volume status daily  2/10  Resuming Diovan and Eplerenone (pharmacy equal) today, YAO resolved       # YAO  Resolved 2/9    # Demand ischemia  - known history of nonischemic cardiomyopathy  - trend trops 200s and flat. - cont asa/statin  - telemetry  - cardiology has seen -      # SVT, appears to be Atach per cardiology on admission  - management as above  - on Coreg already  - cont asa/statin  - cardiology has seen  2/10 - rates with fair control. Continue on tele    Hiatal hernia:    -noted    Per GI, no surgical need at this time, can follow up outpatient    Bentyl and Ativan continues              Dispo/Discharge Planning:    Pending clinical improvement.  [facility] home +/- HHC in 1-2 days     Diet:  ADULT DIET Full Liquid  DVT PPx: scd  Code status: Full Code    Hospital Problems as of 2/11/2022 Date Reviewed: 2/11/2022          Codes Class Noted - Resolved POA    Hyperbilirubinemia ICD-10-CM: E80.6  ICD-9-CM: 782.4  2/7/2022 - Present Yes        Demand ischemia ([facility] ICD-10-CM: I24.8  ICD-9-CM: 411.89  8/24/2021 - Present Yes        YAO (acute kidney injury) ([facility] [address] ICD-10-CM: N17.9  ICD-9-CM: 584.9  8/23/2021 - Present Yes        SVT (supraventricular tachycardia) ([facility] [address] ICD-10-CM: I47.1  ICD-9-CM: 427.89  12/22/2018 - Present Yes        Inspiratory stridor ICD-10-CM: R06.1  ICD-9-CM: 786.1  3/21/2017 - Present Yes        Transaminitis ICD-10-CM: R74.01  ICD-9-CM: 790.4  3/14/2017 - Present Yes Non-ischemic cardiomyopathy (HCC) (Chronic) ICD-10-CM: I42.8  ICD-9-CM: 425.4  3/24/2016 - Present Yes        * (Principal) Acute gastroenteritis ICD-10-CM: K52.9  ICD-9-CM: 558.9  2/26/2016 - Present Yes        HTN (hypertension) (Chronic) ICD-10-CM: I10  ICD-9-CM: 401.9  11/29/2011 - Present Yes        Chronic systolic (congestive) heart failure (HCC) (Chronic) ICD-10-CM: I50.22  ICD-9-CM: 428.22, 428.0  4/21/2011 - Present Yes              Objective:     Patient Vitals for the past 24 hrs:   Temp Pulse Resp BP SpO2   02/11/22 1152  82  100/70 98 %   02/11/22 1143  89 16 91/65 97 %   02/11/22 1133  75 16 91/63 99 %   02/11/22 1123 96.8 °F (36 °C) 77 10 (!) 95/59 97 %   02/11/22 0949 97.9 °F (36.6 °C) 72 22 108/72 100 %   02/11/22 0740     100 %   02/11/22 0723 97.5 °F (36.4 °C) 67 28 106/77 100 %   02/11/22 0512    106/70    02/11/22 0252 97.3 °F (36.3 °C) 70 20 (!) 92/58 97 %   02/11/22 0204     97 %   02/10/22 2339 97.6 °F (36.4 °C) 91 18 96/67 95 %   02/10/22 2040     100 %   02/10/22 1956 97.5 °F (36.4 °C) 76 20 104/69 100 %   02/10/22 1727 97.5 °F (36.4 °C) 77  103/79    02/10/22 1722 97.5 °F (36.4 °C) 79  103/79 99 %   02/10/22 1503     98 %   02/10/22 1247  88   97 %     Oxygen Therapy  O2 Sat (%): 98 % (02/11/22 1152)  Pulse via Oximetry: 80 beats per minute (02/11/22 1152)  O2 Device: None (Room air) (02/11/22 1143)  Skin Assessment: Clean, dry, & intact (02/11/22 0993)  O2 Flow Rate (L/min): 3 l/min (02/11/22 1123)  FIO2 (%): 36 % (02/10/22 0215)    Estimated body mass index is 25.24 kg/m² as calculated from the following:    Height as of this encounter: 5' 11\" (1.803 m). Weight as of this encounter: 82.1 kg (181 lb). Intake/Output Summary (Last 24 hours) at 2/11/2022 1244  Last data filed at 2/11/2022 1120  Gross per 24 hour   Intake 3417 ml   Output 1 ml   Net 3416 ml         Physical Exam:   Blood pressure 100/70, pulse 82, temperature 96.8 °F (36 °C), resp. rate 16, height 5' 11\" (1.803 m), weight 82.1 kg (181 lb), SpO2 98 %. General:    Well nourished. No overt distress  Head:  Normocephalic, atraumatic  Eyes:  Sclerae appear normal.  Pupils equally round. ENT:  Nares appear normal, no drainage. Moist oral mucosa  Neck:  No restricted ROM. Trachea midline, slight edema anterior neck, stridor noted, no hypoxia. CV:   RRR. No m/r/g. No jugular venous distension. Lungs:   CTAB. No wheezing, rhonchi, or rales. Respirations even, unlabored  Abdomen: Bowel sounds present. Soft, nontender, nondistended. Extremities: No cyanosis or clubbing. No edema  Skin:     No rashes and normal coloration. Warm and dry. Neuro:  CN II-XII grossly intact. Sensation intact. A&Ox3  Psych:  Normal mood and affect. I have reviewed ordered lab tests and independently visualized imaging below:    Recent Labs:  Recent Results (from the past 48 hour(s))   CBC WITH AUTOMATED DIFF    Collection Time: 02/10/22  4:51 AM   Result Value Ref Range    WBC 15.7 (H) 4.3 - 11.1 K/uL    RBC 4.34 4.23 - 5.6 M/uL    HGB 13.2 (L) 13.6 - 17.2 g/dL    HCT 41.3 41.1 - 50.3 %    MCV 95.2 79.6 - 97.8 FL    MCH 30.4 26.1 - 32.9 PG    MCHC 32.0 31.4 - 35.0 g/dL    RDW 15.2 (H) 11.9 - 14.6 %    PLATELET 956 829 - 477 K/uL    MPV 9.6 9.4 - 12.3 FL    ABSOLUTE NRBC 0.00 0.0 - 0.2 K/uL    DF AUTOMATED      NEUTROPHILS 91 (H) 43 - 78 %    LYMPHOCYTES 5 (L) 13 - 44 %    MONOCYTES 4 4.0 - 12.0 %    EOSINOPHILS 0 (L) 0.5 - 7.8 %    BASOPHILS 0 0.0 - 2.0 %    IMMATURE GRANULOCYTES 1 0.0 - 5.0 %    ABS. NEUTROPHILS 14.2 (H) 1.7 - 8.2 K/UL    ABS. LYMPHOCYTES 0.8 0.5 - 4.6 K/UL    ABS. MONOCYTES 0.6 0.1 - 1.3 K/UL    ABS. EOSINOPHILS 0.0 0.0 - 0.8 K/UL    ABS. BASOPHILS 0.0 0.0 - 0.2 K/UL    ABS. IMM.  GRANS. 0.1 0.0 - 0.5 K/UL   METABOLIC PANEL, BASIC    Collection Time: 02/10/22  4:51 AM   Result Value Ref Range    Sodium 136 136 - 145 mmol/L    Potassium 4.4 3.5 - 5.1 mmol/L    Chloride 108 (H) 98 - 107 mmol/L    CO2 20 (L) 21 - 32 mmol/L    Anion gap 8 7 - 16 mmol/L    Glucose 125 (H) 65 - 100 mg/dL    BUN 11 6 - 23 MG/DL    Creatinine 1.20 0.8 - 1.5 MG/DL    GFR est AA >60 >60 ml/min/1.73m2    GFR est non-AA >60 >60 ml/min/1.73m2    Calcium 7.6 (L) 8.3 - 10.4 MG/DL   ECHO ADULT COMPLETE    Collection Time: 02/10/22  2:58 PM   Result Value Ref Range    LV EDV A2C 288 mL    LV EDV A4C 260 mL    LV EDV  (A) 67 - 155 mL    LV ESV A2C 227 mL    LV ESV A4C 184 mL    IVSd 0.6 0.6 - 1.0 cm    LVIDd 5.2 4.2 - 5.9 cm    LVIDs 4.9 cm    LVOT Diameter 2.9 cm    LVOT Mean Gradient 1 mmHg    LVOT VTI 15.0 cm    LVOT Peak Velocity 0.8 m/s    LVOT Peak Gradient 3 mmHg    LVPWd 0.8 0.6 - 1.0 cm    LV E' Lateral Velocity 4 cm/s    LV E' Septal Velocity 5 cm/s    LV Ejection Fraction A2C 22 %    LV Ejection Fraction A4C 29 %    EF BP 22 (A) 55 - 100 %    LVOT Area 6.6 cm2    LVOT SV 99.0 ml    LA Minor Axis 6.4 cm    LA Major Axis 6.2 cm    LA Area 2C 23.4 cm2    LA Area 4C 20.7 cm2    LA Volume BP 62 (A) 18 - 58 mL    LA Diameter 5.2 cm    AV Mean Velocity 0.8 m/s    AV Mean Gradient 3 mmHg    AV VTI 19.3 cm    AV Peak Velocity 1.0 m/s    AV Peak Gradient 4 mmHg    AV Area by VTI 5.1 cm2    AV Area by Peak Velocity 5.2 cm2    Aortic Root 3.7 cm    Ascending Aorta 3.8 cm    MV A Velocity 0.93 m/s    MV E Velocity 0.55 m/s    PV .0 ms    PV Max Velocity 0.9 m/s    PV Peak Gradient 3 mmHg    RVIDd 3.6 cm    RV Basal Dimension 4.2 cm    TAPSE 1.8 1.5 - 2.0 cm    TR Max Velocity 1.63 m/s    TR Peak Gradient 11 mmHg    Fractional Shortening 2D 6 28 - 44 %    LV EDV Index  mL/m2    LV ESV Index A4C 91 mL/m2    LV EDV Index A4C 129 mL/m2    LV ESV Index A2C 112 mL/m2    LV EDV Index A2C 143 mL/m2    LVIDd Index 2.57 cm/m2    LVIDs Index 2.43 cm/m2    LV RWT Ratio 0.31     LV Mass 2D 122.8 88 - 224 g    LV Mass 2D Index 60.8 49 - 115 g/m2    MV E/A 0.59     E/E' Ratio (Averaged) 12.38     E/E' Lateral 13.75     E/E' Septal 11.00     LA Volume Index BP 31 16 - 34 ml/m2    LVOT Stroke Volume Index 49.0 mL/m2    LA Size Index 2.57 cm/m2    LA/AO Root Ratio 1.41     Ao Root Index 1.83 cm/m2    Ascending Aorta Index 1.88 cm/m2    AV Velocity Ratio 0.80     LVOT:AV VTI Index 0.78     YULIYA/BSA VTI 2.5 cm2/m2    YULIYA/BSA Peak Velocity 2.6 cm2/m2       All Micro Results     Procedure Component Value Units Date/Time    CULTURE, STOOL [[id number]]  (Abnormal) Collected: 02/08/22 2053    Order Status: Completed Specimen: Stool Updated: 02/11/22 7618     Special Requests: NO SPECIAL REQUESTS        Culture result:       No Salmonella, Shigella, or Ecoli 0157 isolated. MODERATE YEAST       COVID-19 RAPID TEST [[id number]] Collected: 02/09/22 0306    Order Status: Completed Specimen: Nasopharyngeal Updated: 02/09/22 0347     Specimen source NOT SPECIFIED        COVID-19 rapid test Not detected        Comment:      The specimen is NEGATIVE for SARS-CoV-2, the novel coronavirus associated with COVID-19. A negative result does not rule out COVID-19. This test has been authorized by the FDA under an Emergency Use Authorization (EUA) for use by authorized laboratories. Fact sheet for Healthcare Providers: ConventionUpdate.co.nz  Fact sheet for Patients: ConventionUpdate.co.nz       Methodology: Isothermal Nucleic Acid Amplification         CAMPLYLOBACTER CULTURE [[id number]] Collected: 02/08/22 2053    Order Status: Completed Specimen: Stool Updated: 02/08/22 2215    INFLUENZA A & B AG (RAPID TEST) [[id number]] Collected: 02/07/22 2231    Order Status: Completed Specimen: Nasopharyngeal from Nasal washing Updated: 02/07/22 2302     Influenza A Ag Negative        Comment: NEGATIVE FOR THE PRESENCE OF INFLUENZA A ANTIGEN  INFECTION DUE TO INFLUENZA A CANNOT BE RULED OUT. BECAUSE THE ANTIGEN PRESENT IN THE SAMPLE MAY BE BELOW  THE DETECTION LIMIT OF THE TEST.   A NEGATIVE TEST IS PRESUMPTIVE AND IT IS RECOMMENDED THAT THESE RESULTS BE CONFIRMED BY VIRAL CULTURE OR AN FDA-CLEARED INFLUENZA A AND B MOLECULAR ASSAY. Influenza B Ag Negative        Comment: NEGATIVE FOR THE PRESENCE OF INFLUENZA B ANTIGEN  INFECTION DUE TO INFLUENZA B CANNOT BE RULED OUT. BECAUSE THE ANTIGEN PRESENT IN THE SAMPLE MAY BE BELOW  THE DETECTION LIMIT OF THE TEST. A NEGATIVE TEST IS PRESUMPTIVE AND IT IS RECOMMENDED THAT THESE RESULTS BE CONFIRMED BY VIRAL CULTURE OR AN FDA-CLEARED INFLUENZA A AND B MOLECULAR ASSAY. Source Nasopharyngeal       COVID-19 RAPID TEST [[id number]] Collected: 02/07/22 1629    Order Status: Completed Specimen: Nasopharyngeal Updated: 02/07/22 1700     Specimen source Nasopharyngeal        COVID-19 rapid test Not detected        Comment:      The specimen is NEGATIVE for SARS-CoV-2, the novel coronavirus associated with COVID-19. A negative result does not rule out COVID-19. This test has been authorized by the FDA under an Emergency Use Authorization (EUA) for use by authorized laboratories. Fact sheet for Healthcare Providers: Udexte.co.nz  Fact sheet for Patients: Bitstamp.co.nz       Methodology: Isothermal Nucleic Acid Amplification               Other Studies:  CT NECK SOFT TISSUE W CONT    Result Date: 2/10/2022  CT OF THE NECK INDICATION: Stridor, worsening shortness of breath Multiple axial images were obtained through the neck. Oral contrast was used for bowel opacification. 100mL of Isovue 370 intravenous contrast was used for better evaluation of solid organs and vascular structures. Radiation dose reduction techniques were used for this study. All CT scans performed at this facility use one or all of the following: Automated exposure control, adjustment of the mA and/or kVp according to patient's size, iterative reconstruction.  COMPARISON: CT neck 2/9/2022 FINDINGS: Normal appearance of the visualized intracranial structures. Bilateral orbits appear normal. Both globes are intact. Paranasal sinuses and mastoid air cell complexes are patent. No osseous skull base and amounted. The nasopharynx, oropharynx, hypopharynx, laryngeal vestibule, true and false vocal cords appear normal. No parapharyngeal or retropharyngeal fluid collection or cellulitis evident. Normal palatine tonsils. Epiglottis and aryepiglottic folds appear normal. Oral cavity and base of tongue appear normal. No foreign body. No enlarged cervical lymph nodes. No radiopaque foreign body. The trachea is normal. Imaged portions of the lung fields are unremarkable. Imaged aortic arch and its major branches are patent. Normal thyroid gland. Superficial soft tissues are within normal limits. No definite abnormality or visualized etiology for stridor and shortness of breath. ECHO ADULT COMPLETE    Result Date: 2/10/2022    Left Ventricle: Left ventricle is mildly dilated. Normal wall thickness. Severe global hypokinesis present. The EF by visual approximation is 15 - 20%. Normal diastolic function.   Mitral Valve: Mildly thickened leaflets. Mild transvalvular regurgitation.   Tricuspid Valve: Trace transvalvular regurgitation.   Contrast used: Definity.        Current Meds:  Current Facility-Administered Medications   Medication Dose Route Frequency    sucralfate (CARAFATE) tablet 1 g  1 g Oral Q6H    LORazepam (ATIVAN) tablet 1 mg  1 mg Oral Q4H PRN    promethazine (PHENERGAN) with saline injection 12.5 mg  12.5 mg IntraVENous Q4H PRN    valsartan (DIOVAN) tablet 40 mg  40 mg Oral Q12H    methylPREDNISolone (PF) (SOLU-MEDROL) injection 40 mg  40 mg IntraVENous Q8H    albuterol (PROVENTIL VENTOLIN) nebulizer solution 2.5 mg  2.5 mg Nebulization Q4H PRN    dicyclomine (BENTYL) capsule 20 mg  20 mg Oral TID    sodium chloride (OCEAN) 0.65 % nasal squeeze bottle 2 Spray  2 [place] Both Nostrils Q2HWA    fluticasone propionate (FLONASE) 50 mcg/actuation nasal spray 2 Spray  2 Spray Both Nostrils BID    metoprolol (LOPRESSOR) injection 5 mg  5 mg IntraVENous Q4H PRN    LORazepam (ATIVAN) injection 1 mg  1 mg IntraVENous Q6H PRN    ivabradine (CORLANOR) tablet 5 mg  5 mg Oral BID WITH MEALS    metroNIDAZOLE (FLAGYL) IVPB premix 500 mg  500 mg IntraVENous Q12H    cefTRIAXone (ROCEPHIN) 1 g in 0.9% sodium chloride (MBP/ADV) 50 mL MBP  1 g IntraVENous Q24H    pantoprazole (PROTONIX) 40 mg in 0.9% sodium chloride 10 mL injection  40 mg IntraVENous Q12H    albuterol (PROVENTIL VENTOLIN) nebulizer solution 2.5 mg  2.5 mg Nebulization Q6H RT    sodium chloride (NS) flush 5-10 mL  5-10 mL IntraVENous Q8H    sodium chloride (NS) flush 5-10 mL  5-10 mL IntraVENous PRN    0.9% sodium chloride infusion  75 mL/hr IntraVENous CONTINUOUS    ALPRAZolam (XANAX) tablet 1 mg  1 mg Oral QHS    aspirin delayed-release tablet 81 mg  81 mg Oral DAILY    atorvastatin (LIPITOR) tablet 80 mg  80 mg Oral DAILY    carvediloL (COREG) tablet 25 mg  25 mg Oral BID WITH MEALS    gabapentin (NEURONTIN) capsule 300 mg  300 mg Oral BID    HYDROcodone-acetaminophen (NORCO)  mg tablet 1 Tablet  1 Tablet Oral Q6H PRN    sodium chloride (NS) flush 5-40 mL  5-40 mL IntraVENous PRN    [Held by provider] acetaminophen (TYLENOL) tablet 650 mg  650 mg Oral Q6H PRN    Or    [Held by provider] acetaminophen (TYLENOL) suppository 650 mg  650 mg Rectal Q6H PRN    polyethylene glycol (MIRALAX) packet 17 g  17 g Oral DAILY PRN    magnesium oxide (MAG-OX) tablet 400 mg  400 mg Oral DAILY       Signed:  Perri Feliz NP

## 2022-02-11 NOTE — PROGRESS NOTES
TRANSFER - IN REPORT:    Verbal report received from Irasema Payton RN(name) on Melquiadesothye Backers  being received from [id number](unit) for ordered procedure      Report consisted of patients Situation, Background, Assessment and   Recommendations(SBAR). Information from the following report(s) SBAR and Kardex was reviewed with the receiving nurse. Opportunity for questions and clarification was provided. Assessment completed upon patients arrival to unit and care assumed.

## 2022-02-11 NOTE — PROGRESS NOTES
[facility] CARDIOLOGY PROGRESS NOTE           2/11/2022 3:12 PM    Admit Date: 2/7/2022      Subjective:   OK after EGD. No cp or inc sob. Objective:      Vitals:    02/11/22 1123 02/11/22 1133 02/11/22 1143 02/11/22 1152   BP: (!) 95/59 91/63 91/65 100/70   Pulse: 77 75 89 82   Resp: 10 16 16    Temp: 96.8 °F (36 °C)      SpO2: 97% 99% 97% 98%   Weight:       Height:           Physical Exam:  General-No Acute Distress  Neck- supple, no JVD. + stridor  CV- regular rate and rhythm no MRG  Lung- clear bilaterally  Abd- soft, nontender, nondistended  Ext- no edema bilaterally. Skin- warm and dry    Data Review:   Recent Labs     02/10/22  0451 02/09/22  0331    132*   K 4.4 4.4   BUN 11 13   CREA 1.20 1.30   * 137*   WBC 15.7* 8.2   HGB 13.2* 13.4*   HCT 41.3 41.5    285       Assessment/Plan:     Principal Problem:    Acute gastroenteritis (2/26/2016)        Active Problems:    Chronic systolic (congestive) heart failure ([address] (4/21/2011)          HTN (hypertension) (11/29/2011)          Non-ischemic cardiomyopathy ([facility] [address] (3/24/2016)          Transaminitis (3/14/2017)          Inspiratory stridor (3/21/2017)          SVT (supraventricular tachycardia) ([address] (12/22/2018)          YAO (acute kidney injury) ([facility] [address] (8/23/2021)          Demand ischemia ([address] (8/24/2021)          Hyperbilirubinemia (2/7/2022)        Hiatal hernia      Esophagitis    /////    CV stable  On good rx for cardiomyopathy BB, ARB and Corlanor. His MRA was stopped due to renal issues.   No new thoughts  On standby            Kit Gabriel MD  2/11/2022 3:12 PM

## 2022-02-12 ENCOUNTER — APPOINTMENT (OUTPATIENT)
Dept: GENERAL RADIOLOGY | Age: 57
DRG: 309 | End: 2022-02-12
Attending: NURSE PRACTITIONER
Payer: COMMERCIAL

## 2022-02-12 ENCOUNTER — APPOINTMENT (OUTPATIENT)
Dept: ULTRASOUND IMAGING | Age: 57
DRG: 309 | End: 2022-02-12
Attending: NURSE PRACTITIONER
Payer: COMMERCIAL

## 2022-02-12 LAB
ANION GAP SERPL CALC-SCNC: 7 MMOL/L (ref 7–16)
BASOPHILS # BLD: 0 K/UL (ref 0–0.2)
BASOPHILS NFR BLD: 0 % (ref 0–2)
BUN SERPL-MCNC: 9 MG/DL (ref 6–23)
CALCIUM SERPL-MCNC: 7.2 MG/DL (ref 8.3–10.4)
CHLORIDE SERPL-SCNC: 110 MMOL/L (ref 98–107)
CO2 SERPL-SCNC: 20 MMOL/L (ref 21–32)
CREAT SERPL-MCNC: 1.3 MG/DL (ref 0.8–1.5)
DIFFERENTIAL METHOD BLD: ABNORMAL
EOSINOPHIL # BLD: 0 K/UL (ref 0–0.8)
EOSINOPHIL NFR BLD: 0 % (ref 0.5–7.8)
ERYTHROCYTE [DISTWIDTH] IN BLOOD BY AUTOMATED COUNT: 15.2 % (ref 11.9–14.6)
GLUCOSE SERPL-MCNC: 133 MG/DL (ref 65–100)
HCT VFR BLD AUTO: 38.2 % (ref 41.1–50.3)
HGB BLD-MCNC: 12 G/DL (ref 13.6–17.2)
IMM GRANULOCYTES # BLD AUTO: 0.1 K/UL (ref 0–0.5)
IMM GRANULOCYTES NFR BLD AUTO: 1 % (ref 0–5)
LYMPHOCYTES # BLD: 0.6 K/UL (ref 0.5–4.6)
LYMPHOCYTES NFR BLD: 5 % (ref 13–44)
MCH RBC QN AUTO: 30.4 PG (ref 26.1–32.9)
MCHC RBC AUTO-ENTMCNC: 31.4 G/DL (ref 31.4–35)
MCV RBC AUTO: 96.7 FL (ref 79.6–97.8)
MONOCYTES # BLD: 0.6 K/UL (ref 0.1–1.3)
MONOCYTES NFR BLD: 5 % (ref 4–12)
NEUTS SEG # BLD: 10.3 K/UL (ref 1.7–8.2)
NEUTS SEG NFR BLD: 89 % (ref 43–78)
NRBC # BLD: 0 K/UL (ref 0–0.2)
PLATELET # BLD AUTO: 348 K/UL (ref 150–450)
PMV BLD AUTO: 9.3 FL (ref 9.4–12.3)
POTASSIUM SERPL-SCNC: 3.3 MMOL/L (ref 3.5–5.1)
RBC # BLD AUTO: 3.95 M/UL (ref 4.23–5.6)
SODIUM SERPL-SCNC: 137 MMOL/L (ref 138–145)
WBC # BLD AUTO: 11.5 K/UL (ref 4.3–11.1)

## 2022-02-12 PROCEDURE — 74011000258 HC RX REV CODE- 258: Performed by: INTERNAL MEDICINE

## 2022-02-12 PROCEDURE — 74011000250 HC RX REV CODE- 250: Performed by: INTERNAL MEDICINE

## 2022-02-12 PROCEDURE — 80048 BASIC METABOLIC PNL TOTAL CA: CPT

## 2022-02-12 PROCEDURE — 92610 EVALUATE SWALLOWING FUNCTION: CPT

## 2022-02-12 PROCEDURE — 83735 ASSAY OF MAGNESIUM: CPT

## 2022-02-12 PROCEDURE — 74011250637 HC RX REV CODE- 250/637: Performed by: INTERNAL MEDICINE

## 2022-02-12 PROCEDURE — 74011000250 HC RX REV CODE- 250: Performed by: NURSE PRACTITIONER

## 2022-02-12 PROCEDURE — 74011000250 HC RX REV CODE- 250: Performed by: EMERGENCY MEDICINE

## 2022-02-12 PROCEDURE — 36573 INSJ PICC RS&I 5 YR+: CPT | Performed by: NURSE PRACTITIONER

## 2022-02-12 PROCEDURE — 74011250636 HC RX REV CODE- 250/636: Performed by: FAMILY MEDICINE

## 2022-02-12 PROCEDURE — 94762 N-INVAS EAR/PLS OXIMTRY CONT: CPT

## 2022-02-12 PROCEDURE — 74011250637 HC RX REV CODE- 250/637: Performed by: NURSE PRACTITIONER

## 2022-02-12 PROCEDURE — 74011250636 HC RX REV CODE- 250/636: Performed by: INTERNAL MEDICINE

## 2022-02-12 PROCEDURE — 94640 AIRWAY INHALATION TREATMENT: CPT

## 2022-02-12 PROCEDURE — C9113 INJ PANTOPRAZOLE SODIUM, VIA: HCPCS | Performed by: INTERNAL MEDICINE

## 2022-02-12 PROCEDURE — 65270000029 HC RM PRIVATE

## 2022-02-12 PROCEDURE — 74011250636 HC RX REV CODE- 250/636: Performed by: NURSE PRACTITIONER

## 2022-02-12 PROCEDURE — 74011250637 HC RX REV CODE- 250/637: Performed by: STUDENT IN AN ORGANIZED HEALTH CARE EDUCATION/TRAINING PROGRAM

## 2022-02-12 PROCEDURE — 93971 EXTREMITY STUDY: CPT

## 2022-02-12 PROCEDURE — 74011250637 HC RX REV CODE- 250/637: Performed by: EMERGENCY MEDICINE

## 2022-02-12 PROCEDURE — 36415 COLL VENOUS BLD VENIPUNCTURE: CPT

## 2022-02-12 PROCEDURE — C1751 CATH, INF, PER/CENT/MIDLINE: HCPCS

## 2022-02-12 PROCEDURE — 85025 COMPLETE CBC W/AUTO DIFF WBC: CPT

## 2022-02-12 RX ORDER — MIDODRINE HYDROCHLORIDE 5 MG/1
5 TABLET ORAL 2 TIMES DAILY WITH MEALS
Status: DISCONTINUED | OUTPATIENT
Start: 2022-02-12 | End: 2022-02-16

## 2022-02-12 RX ORDER — SODIUM CHLORIDE 0.9 % (FLUSH) 0.9 %
10 SYRINGE (ML) INJECTION AS NEEDED
Status: DISCONTINUED | OUTPATIENT
Start: 2022-02-12 | End: 2022-02-17 | Stop reason: HOSPADM

## 2022-02-12 RX ORDER — ALBUTEROL SULFATE 0.83 MG/ML
2.5 SOLUTION RESPIRATORY (INHALATION)
Status: DISCONTINUED | OUTPATIENT
Start: 2022-02-12 | End: 2022-02-17 | Stop reason: HOSPADM

## 2022-02-12 RX ORDER — CARVEDILOL 6.25 MG/1
12.5 TABLET ORAL 2 TIMES DAILY WITH MEALS
Status: DISCONTINUED | OUTPATIENT
Start: 2022-02-12 | End: 2022-02-17 | Stop reason: HOSPADM

## 2022-02-12 RX ORDER — SODIUM CHLORIDE 0.9 % (FLUSH) 0.9 %
10 SYRINGE (ML) INJECTION DAILY
Status: DISCONTINUED | OUTPATIENT
Start: 2022-02-13 | End: 2022-02-17 | Stop reason: HOSPADM

## 2022-02-12 RX ORDER — EPLERENONE 25 MG/1
25 TABLET, FILM COATED ORAL DAILY
Status: DISCONTINUED | OUTPATIENT
Start: 2022-02-12 | End: 2022-02-17 | Stop reason: HOSPADM

## 2022-02-12 RX ORDER — VALSARTAN 80 MG/1
40 TABLET ORAL DAILY
Status: DISCONTINUED | OUTPATIENT
Start: 2022-02-13 | End: 2022-02-17 | Stop reason: HOSPADM

## 2022-02-12 RX ADMIN — SALINE NASAL SPRAY 2 SPRAY: 1.5 SOLUTION NASAL at 13:07

## 2022-02-12 RX ADMIN — ALBUTEROL SULFATE 2.5 MG: 2.5 SOLUTION RESPIRATORY (INHALATION) at 14:51

## 2022-02-12 RX ADMIN — FLUTICASONE PROPIONATE 2 SPRAY: 50 SPRAY, METERED NASAL at 18:00

## 2022-02-12 RX ADMIN — SODIUM CHLORIDE 40 MG: 9 INJECTION, SOLUTION INTRAMUSCULAR; INTRAVENOUS; SUBCUTANEOUS at 21:21

## 2022-02-12 RX ADMIN — SALINE NASAL SPRAY 2 SPRAY: 1.5 SOLUTION NASAL at 10:00

## 2022-02-12 RX ADMIN — SODIUM CHLORIDE 1000 ML: 900 INJECTION, SOLUTION INTRAVENOUS at 04:41

## 2022-02-12 RX ADMIN — SALINE NASAL SPRAY 2 SPRAY: 1.5 SOLUTION NASAL at 08:30

## 2022-02-12 RX ADMIN — SALINE NASAL SPRAY 2 SPRAY: 1.5 SOLUTION NASAL at 19:06

## 2022-02-12 RX ADMIN — SALINE NASAL SPRAY 2 SPRAY: 1.5 SOLUTION NASAL at 11:58

## 2022-02-12 RX ADMIN — SODIUM CHLORIDE, PRESERVATIVE FREE 12.5 MG: 5 INJECTION INTRAVENOUS at 17:17

## 2022-02-12 RX ADMIN — ATORVASTATIN CALCIUM 80 MG: 80 TABLET, FILM COATED ORAL at 09:10

## 2022-02-12 RX ADMIN — METRONIDAZOLE 500 MG: 500 INJECTION, SOLUTION INTRAVENOUS at 09:21

## 2022-02-12 RX ADMIN — SUCRALFATE 1 G: 1 TABLET ORAL at 12:32

## 2022-02-12 RX ADMIN — HYDROCODONE BITARTRATE AND ACETAMINOPHEN 1 TABLET: 10; 325 TABLET ORAL at 09:07

## 2022-02-12 RX ADMIN — SUCRALFATE 1 G: 1 TABLET ORAL at 23:16

## 2022-02-12 RX ADMIN — SODIUM CHLORIDE, PRESERVATIVE FREE 12.5 MG: 5 INJECTION INTRAVENOUS at 01:55

## 2022-02-12 RX ADMIN — SODIUM CHLORIDE, PRESERVATIVE FREE 12.5 MG: 5 INJECTION INTRAVENOUS at 21:21

## 2022-02-12 RX ADMIN — DICYCLOMINE HYDROCHLORIDE 20 MG: 10 CAPSULE ORAL at 18:41

## 2022-02-12 RX ADMIN — SODIUM CHLORIDE, PRESERVATIVE FREE 10 ML: 5 INJECTION INTRAVENOUS at 21:22

## 2022-02-12 RX ADMIN — FLUTICASONE PROPIONATE 2 SPRAY: 50 SPRAY, METERED NASAL at 09:21

## 2022-02-12 RX ADMIN — DICYCLOMINE HYDROCHLORIDE 20 MG: 10 CAPSULE ORAL at 21:20

## 2022-02-12 RX ADMIN — SALINE NASAL SPRAY 2 SPRAY: 1.5 SOLUTION NASAL at 18:00

## 2022-02-12 RX ADMIN — METHYLPREDNISOLONE SODIUM SUCCINATE 40 MG: 40 INJECTION, POWDER, FOR SOLUTION INTRAMUSCULAR; INTRAVENOUS at 09:09

## 2022-02-12 RX ADMIN — SUCRALFATE 1 G: 1 TABLET ORAL at 06:10

## 2022-02-12 RX ADMIN — HYDROCODONE BITARTRATE AND ACETAMINOPHEN 1 TABLET: 10; 325 TABLET ORAL at 23:16

## 2022-02-12 RX ADMIN — ALPRAZOLAM 1 MG: 0.5 TABLET ORAL at 21:20

## 2022-02-12 RX ADMIN — SODIUM CHLORIDE, PRESERVATIVE FREE 12.5 MG: 5 INJECTION INTRAVENOUS at 06:10

## 2022-02-12 RX ADMIN — SODIUM CHLORIDE, PRESERVATIVE FREE 12.5 MG: 5 INJECTION INTRAVENOUS at 12:45

## 2022-02-12 RX ADMIN — ASPIRIN 81 MG: 81 TABLET ORAL at 09:08

## 2022-02-12 RX ADMIN — Medication 400 MG: at 09:11

## 2022-02-12 RX ADMIN — SODIUM CHLORIDE 40 MG: 9 INJECTION, SOLUTION INTRAMUSCULAR; INTRAVENOUS; SUBCUTANEOUS at 09:08

## 2022-02-12 RX ADMIN — HYDROCODONE BITARTRATE AND ACETAMINOPHEN 1 TABLET: 10; 325 TABLET ORAL at 01:55

## 2022-02-12 RX ADMIN — CEFTRIAXONE 1 G: 1 INJECTION, POWDER, FOR SOLUTION INTRAMUSCULAR; INTRAVENOUS at 18:41

## 2022-02-12 RX ADMIN — DICYCLOMINE HYDROCHLORIDE 20 MG: 10 CAPSULE ORAL at 09:08

## 2022-02-12 RX ADMIN — IVABRADINE 5 MG: 5 TABLET, FILM COATED ORAL at 09:08

## 2022-02-12 RX ADMIN — SODIUM CHLORIDE, PRESERVATIVE FREE 10 ML: 5 INJECTION INTRAVENOUS at 06:10

## 2022-02-12 RX ADMIN — GABAPENTIN 300 MG: 300 CAPSULE ORAL at 21:20

## 2022-02-12 RX ADMIN — SALINE NASAL SPRAY 2 SPRAY: 1.5 SOLUTION NASAL at 21:20

## 2022-02-12 RX ADMIN — ALBUTEROL SULFATE 2.5 MG: 2.5 SOLUTION RESPIRATORY (INHALATION) at 07:39

## 2022-02-12 RX ADMIN — METRONIDAZOLE 500 MG: 500 INJECTION, SOLUTION INTRAVENOUS at 20:31

## 2022-02-12 RX ADMIN — MIDODRINE HYDROCHLORIDE 5 MG: 5 TABLET ORAL at 16:42

## 2022-02-12 RX ADMIN — SALINE NASAL SPRAY 2 SPRAY: 1.5 SOLUTION NASAL at 21:59

## 2022-02-12 RX ADMIN — ALBUTEROL SULFATE 2.5 MG: 2.5 SOLUTION RESPIRATORY (INHALATION) at 19:30

## 2022-02-12 RX ADMIN — VALSARTAN 40 MG: 80 TABLET ORAL at 09:10

## 2022-02-12 RX ADMIN — HYDROCODONE BITARTRATE AND ACETAMINOPHEN 1 TABLET: 10; 325 TABLET ORAL at 17:17

## 2022-02-12 RX ADMIN — SODIUM CHLORIDE, PRESERVATIVE FREE 10 ML: 5 INJECTION INTRAVENOUS at 13:07

## 2022-02-12 RX ADMIN — GABAPENTIN 300 MG: 300 CAPSULE ORAL at 09:11

## 2022-02-12 NOTE — PROGRESS NOTES
Hospitalist Progress Note   Admit Date:  2022  4:06 PM   Name:  Ab Delgado   Age:  62 y.o. Sex:  male  :  1965   MRN:  [id number]   Room:  19/    Presenting Complaint: Chest Pain and Shortness of Breath    Reason(s) for Admission: YAO (acute kidney injury) [facility]) [N17.9]     Hospital Course & Interval History:   62 y. o. male with medical history of nonischemic cardiomyopathy with EF of 20-25%, HTN, HLD, and chronic back pain presents with heart palpitations, persistent nausea/vomiting, diarrhea, shortness of breath and generalized weakness for the past week. He says that he was called by his cardiologist and told that his HRs were high. He denies chest pain but says \"I just hurt all over. He denies recreational drug use and alcohol. He denies black/red stools. He does not know of any food that may have triggered his symptoms.      ED course: HRs in the 150s-160s, cardiology reviewed strips and it is most likely atrial tachycardia as opposed to VT. WBC count of 13k. Troponin of 245 with repeat of 270. Procalcitonin of 0.06. pBNP of 1690. SCr of 2.4. K of 3.4. Rapid COVID is negative. CXR unremarkable. Subjective/24hr Events (22): Alert and oriented x3. States vomited x 1 last night. Afebrile. ROS:  10 systems reviewed and negative except as noted above. Assessment & Plan:     # intractable nausea/vomiting  - CTAP shows large hiatal hernia and thickened gastric mucosa. - suspect vomiting secondary to hiatal hernia  - GI consulted, plans for EGD tomorrow  - Cirrhosis workup at later date. 2/10  - cont empiric  Rocephin/flagyl atbx D4  - continue supportive mgmt - antiemetic  - advance to FLD  -increase Phenergan to Q4H, Dilaudid stopped, not helping and opiates should be avoided as they can make abdominal pain worse  Add Ativan for N/V, may help with abdominal discomfort.  Appears diaphragm is where pain is.    No further N/V  EGD with gastritis    I spoke with surgery, no official consult at this time but recs to obtain upper GI series with gastrogaffin. MBS planned for Monday  IVF ongoing due to symptoms, NS 75 ml/h    Gastritis    -Carafate to be added to regimen        # Stridor  2/12  Now resolved, per ENT, felt to be voluntary.      # Chronic systolic (congestive) heart failure ([facility] [address] (4/21/2011)   appears compensated  - hold eplerenone given YAO  - cannot use ACEi/ARB due to YAO  - monitor volume status daily  2/10  Resuming Diovan and Eplerenone (pharmacy equal) today, YAO resolved  2/12  Cards now on stand by  BP soft, holding Coreg this am  Reduce Diovan and Coreg dosing       # YAO  Resolved 2/9    # Demand ischemia  - known history of nonischemic cardiomyopathy  - trend trops 200s and flat. - cont asa/statin  - telemetry  - cardiology has seen - now on stand by as of 2/11     # SVT, appears to be Atach per cardiology on admission  - management as above  - on Coreg already  - cont asa/statin  - cardiology has seen  2/10 - rates with fair control. Continue on tele    Hiatal hernia:    -noted    Per GI, no surgical need at this time, can follow up outpatient    Bentyl and Ativan continues  2/12  Obtain upper gi with gastrogaffin per surgery recs, if hernia unstable, can officially consult surgery    Essential HTN:  2/12  BP soft, holding Coreg this am  Reduce Diovan and Coreg dosing              Dispo/Discharge Planning:    Pending clinical improvement.  [facility] home +/- HHC in 1-2 days     Diet:  ADULT DIET Clear Liquid  DVT PPx: scd  Code status: Full Code    Hospital Problems as of 2/12/2022 Date Reviewed: 2/11/2022          Codes Class Noted - Resolved POA    Hyperbilirubinemia ICD-10-CM: E80.6  ICD-9-CM: 782.4  2/7/2022 - Present Yes        Demand ischemia ([facility] [address] ICD-10-CM: I24.8  ICD-9-CM: 411.89  8/24/2021 - Present Yes        YAO (acute kidney injury) ([facility] [address] ICD-10-CM: N17.9  ICD-9-CM: 584.9  8/23/2021 - Present Yes        SVT (supraventricular tachycardia) ([facility] [address] ICD-10-CM: I47.1  ICD-9-CM: 427.89  12/22/2018 - Present Yes        Inspiratory stridor ICD-10-CM: R06.1  ICD-9-CM: 786.1  3/21/2017 - Present Yes        Transaminitis ICD-10-CM: R74.01  ICD-9-CM: 790.4  3/14/2017 - Present Yes        Non-ischemic cardiomyopathy ([address] (Chronic) ICD-10-CM: I42.8  ICD-9-CM: 425.4  3/24/2016 - Present Yes        * (Principal) Acute gastroenteritis ICD-10-CM: K52.9  ICD-9-CM: 558.9  2/26/2016 - Present Yes        HTN (hypertension) (Chronic) ICD-10-CM: I10  ICD-9-CM: 401.9  11/29/2011 - Present Yes        Chronic systolic (congestive) heart failure (HCC) (Chronic) ICD-10-CM: I50.22  ICD-9-CM: 428.22, 428.0  4/21/2011 - Present Yes              Objective:     Patient Vitals for the past 24 hrs:   Temp Pulse Resp BP SpO2   02/12/22 0804 97.7 °F (36.5 °C) 83 18 99/72 100 %   02/12/22 0739     98 %   02/12/22 0510    (!) 74/61    02/12/22 0420 97.9 °F (36.6 °C) 87 16 (!) 74/45 99 %   02/11/22 2344 97.4 °F (36.3 °C) 87 16 (!) 81/57 100 %   02/11/22 2041 98.1 °F (36.7 °C) 80 16 102/71 96 %   02/11/22 2000  82      02/11/22 1950     100 %   02/11/22 1724 97.3 °F (36.3 °C) 74 16 105/71 100 %   02/11/22 1152  82  100/70 98 %   02/11/22 1143  89 16 91/65 97 %   02/11/22 1133  75 16 91/63 99 %   02/11/22 1123 96.8 °F (36 °C) 77 10 (!) 95/59 97 %   02/11/22 0949 97.9 °F (36.6 °C) 72 22 108/72 100 %     Oxygen Therapy  O2 Sat (%): 100 % (02/12/22 0804)  Pulse via Oximetry: 78 beats per minute (02/12/22 0739)  O2 Device: None (Room air) (02/12/22 0804)  Skin Assessment: Clean, dry, & intact (02/11/22 0949)  O2 Flow Rate (L/min): 0 l/min (02/12/22 0739)  FIO2 (%): 21 % (02/12/22 0739)    Estimated body mass index is 27.7 kg/m² as calculated from the following:    Height as of this encounter: 5' 11\" (1.803 m). Weight as of this encounter: 90.1 kg (198 lb 10.2 oz).     Intake/Output Summary (Last 24 hours) at 2/12/2022 0941  Last data filed at 2/12/2022 0804  Gross per 24 hour Intake 1290 ml   Output 150 ml   Net 1140 ml         Physical Exam:   Blood pressure 99/72, pulse 83, temperature 97.7 °F (36.5 °C), resp. rate 18, height 5' 11\" (1.803 m), weight 90.1 kg (198 lb 10.2 oz), SpO2 100 %. General:    Well nourished. No overt distress  Head:  Normocephalic, atraumatic  Eyes:  Sclerae appear normal.  Pupils equally round. ENT:  Nares appear normal, no drainage. Moist oral mucosa  Neck:  No restricted ROM. Trachea midline, slight edema anterior neck, stridor noted, no hypoxia. CV:   RRR. No m/r/g. No jugular venous distension. Lungs:   CTAB. No wheezing, rhonchi, or rales. Respirations even, unlabored  Abdomen: Bowel sounds present. Soft, nontender, nondistended. Extremities: No cyanosis or clubbing. No edema  Skin:     No rashes and normal coloration. Warm and dry. Neuro:  CN II-XII grossly intact. Sensation intact. A&Ox3  Psych:  Normal mood and affect.       I have reviewed ordered lab tests and independently visualized imaging below:    Recent Labs:  Recent Results (from the past 48 hour(s))   ECHO ADULT COMPLETE    Collection Time: 02/10/22  2:58 PM   Result Value Ref Range    LV EDV A2C 288 mL    LV EDV A4C 260 mL    LV EDV  (A) 67 - 155 mL    LV ESV A2C 227 mL    LV ESV A4C 184 mL    IVSd 0.6 0.6 - 1.0 cm    LVIDd 5.2 4.2 - 5.9 cm    LVIDs 4.9 cm    LVOT Diameter 2.9 cm    LVOT Mean Gradient 1 mmHg    LVOT VTI 15.0 cm    LVOT Peak Velocity 0.8 m/s    LVOT Peak Gradient 3 mmHg    LVPWd 0.8 0.6 - 1.0 cm    LV E' Lateral Velocity 4 cm/s    LV E' Septal Velocity 5 cm/s    LV Ejection Fraction A2C 22 %    LV Ejection Fraction A4C 29 %    EF BP 22 (A) 55 - 100 %    LVOT Area 6.6 cm2    LVOT SV 99.0 ml    LA Minor Axis 6.4 cm    LA Major Axis 6.2 cm    LA Area 2C 23.4 cm2    LA Area 4C 20.7 cm2    LA Volume BP 62 (A) 18 - 58 mL    LA Diameter 5.2 cm    AV Mean Velocity 0.8 m/s    AV Mean Gradient 3 mmHg    AV VTI 19.3 cm    AV Peak Velocity 1.0 m/s    AV Peak Gradient 4 mmHg    AV Area by VTI 5.1 cm2    AV Area by Peak Velocity 5.2 cm2    Aortic Root 3.7 cm    Ascending Aorta 3.8 cm    MV A Velocity 0.93 m/s    MV E Velocity 0.55 m/s    PV .0 ms    PV Max Velocity 0.9 m/s    PV Peak Gradient 3 mmHg    RVIDd 3.6 cm    RV Basal Dimension 4.2 cm    TAPSE 1.8 1.5 - 2.0 cm    TR Max Velocity 1.63 m/s    TR Peak Gradient 11 mmHg    Fractional Shortening 2D 6 28 - 44 %    LV EDV Index  mL/m2    LV ESV Index A4C 91 mL/m2    LV EDV Index A4C 129 mL/m2    LV ESV Index A2C 112 mL/m2    LV EDV Index A2C 143 mL/m2    LVIDd Index 2.57 cm/m2    LVIDs Index 2.43 cm/m2    LV RWT Ratio 0.31     LV Mass 2D 122.8 88 - 224 g    LV Mass 2D Index 60.8 49 - 115 g/m2    MV E/A 0.59     E/E' Ratio (Averaged) 12.38     E/E' Lateral 13.75     E/E' Septal 11.00     LA Volume Index BP 31 16 - 34 ml/m2    LVOT Stroke Volume Index 49.0 mL/m2    LA Size Index 2.57 cm/m2    LA/AO Root Ratio 1.41     Ao Root Index 1.83 cm/m2    Ascending Aorta Index 1.88 cm/m2    AV Velocity Ratio 0.80     LVOT:AV VTI Index 0.78     YULIYA/BSA VTI 2.5 cm2/m2    YULIYA/BSA Peak Velocity 2.6 cm2/m2       All Micro Results     Procedure Component Value Units Date/Time    CULTURE, STOOL [[id number]]  (Abnormal) Collected: 02/08/22 2053    Order Status: Completed Specimen: Stool Updated: 02/11/22 2766     Special Requests: NO SPECIAL REQUESTS        Culture result:       No Salmonella, Shigella, or Ecoli 0157 isolated. MODERATE YEAST       COVID-19 RAPID TEST [[id number]] Collected: 02/09/22 0306    Order Status: Completed Specimen: Nasopharyngeal Updated: 02/09/22 0347     Specimen source NOT SPECIFIED        COVID-19 rapid test Not detected        Comment:      The specimen is NEGATIVE for SARS-CoV-2, the novel coronavirus associated with COVID-19. A negative result does not rule out COVID-19.        This test has been authorized by the FDA under an Emergency Use Authorization (EUA) for use by authorized laboratories. Fact sheet for Healthcare Providers: Uboolydate.co.nz  Fact sheet for Patients: Uboolydate.co.nz       Methodology: Isothermal Nucleic Acid Amplification         CAMPLYLOBACTER CULTURE [[id number]] Collected: 02/08/22 2053    Order Status: Completed Specimen: Stool Updated: 02/08/22 2215    INFLUENZA A & B AG (RAPID TEST) [[id number]] Collected: 02/07/22 2231    Order Status: Completed Specimen: Nasopharyngeal from Nasal washing Updated: 02/07/22 2302     Influenza A Ag Negative        Comment: NEGATIVE FOR THE PRESENCE OF INFLUENZA A ANTIGEN  INFECTION DUE TO INFLUENZA A CANNOT BE RULED OUT. BECAUSE THE ANTIGEN PRESENT IN THE SAMPLE MAY BE BELOW  THE DETECTION LIMIT OF THE TEST. A NEGATIVE TEST IS PRESUMPTIVE AND IT IS RECOMMENDED THAT THESE RESULTS BE CONFIRMED BY VIRAL CULTURE OR AN FDA-CLEARED INFLUENZA A AND B MOLECULAR ASSAY. Influenza B Ag Negative        Comment: NEGATIVE FOR THE PRESENCE OF INFLUENZA B ANTIGEN  INFECTION DUE TO INFLUENZA B CANNOT BE RULED OUT. BECAUSE THE ANTIGEN PRESENT IN THE SAMPLE MAY BE BELOW  THE DETECTION LIMIT OF THE TEST. A NEGATIVE TEST IS PRESUMPTIVE AND IT IS RECOMMENDED THAT THESE RESULTS BE CONFIRMED BY VIRAL CULTURE OR AN FDA-CLEARED INFLUENZA A AND B MOLECULAR ASSAY. Source Nasopharyngeal       COVID-19 RAPID TEST [[id number]] Collected: 02/07/22 1629    Order Status: Completed Specimen: Nasopharyngeal Updated: 02/07/22 1700     Specimen source Nasopharyngeal        COVID-19 rapid test Not detected        Comment:      The specimen is NEGATIVE for SARS-CoV-2, the novel coronavirus associated with COVID-19. A negative result does not rule out COVID-19. This test has been authorized by the FDA under an Emergency Use Authorization (EUA) for use by authorized laboratories.         Fact sheet for Healthcare Providers: ConventionDoor 6date.co.nz  Fact sheet for Patients: [facility] Direct.[email or web address].[facility]       Methodology: Isothermal Nucleic Acid Amplification               Other Studies:  No results found.     Current Meds:  Current Facility-Administered Medications   Medication Dose Route Frequency    eplerenone (INSPRA) tablet 25 mg (Patient Supplied)  25 mg Oral DAILY    sucralfate (CARAFATE) tablet 1 g  1 g Oral Q6H    methylPREDNISolone (PF) (SOLU-MEDROL) injection 40 mg  40 mg IntraVENous DAILY    LORazepam (ATIVAN) tablet 1 mg  1 mg Oral Q4H PRN    promethazine (PHENERGAN) with saline injection 12.5 mg  12.5 mg IntraVENous Q4H PRN    valsartan (DIOVAN) tablet 40 mg  40 mg Oral Q12H    albuterol (PROVENTIL VENTOLIN) nebulizer solution 2.5 mg  2.5 mg Nebulization Q4H PRN    dicyclomine (BENTYL) capsule 20 mg  20 mg Oral TID    sodium chloride (OCEAN) 0.65 % nasal squeeze bottle 2 Spray  2 [place] Both Nostrils Q2HWA    fluticasone propionate (FLONASE) 50 mcg/actuation nasal spray 2 Spray  2 Spray Both Nostrils BID    metoprolol (LOPRESSOR) injection 5 mg  5 mg IntraVENous Q4H PRN    LORazepam (ATIVAN) injection 1 mg  1 mg IntraVENous Q6H PRN    ivabradine (CORLANOR) tablet 5 mg  5 mg Oral BID WITH MEALS    metroNIDAZOLE (FLAGYL) IVPB premix 500 mg  500 mg IntraVENous Q12H    cefTRIAXone (ROCEPHIN) 1 g in 0.9% sodium chloride (MBP/ADV) 50 mL MBP  1 g IntraVENous Q24H    pantoprazole (PROTONIX) 40 mg in 0.9% sodium chloride 10 mL injection  40 mg IntraVENous Q12H    albuterol (PROVENTIL VENTOLIN) nebulizer solution 2.5 mg  2.5 mg Nebulization Q6H RT    sodium chloride (NS) flush 5-10 mL  5-10 mL IntraVENous Q8H    sodium chloride (NS) flush 5-10 mL  5-10 mL IntraVENous PRN    0.9% sodium chloride infusion  75 mL/hr IntraVENous CONTINUOUS    ALPRAZolam (XANAX) tablet 1 mg  1 mg Oral QHS    aspirin delayed-release tablet 81 mg  81 mg Oral DAILY    atorvastatin (LIPITOR) tablet 80 mg  80 mg Oral DAILY    carvediloL (COREG) tablet 25 mg  25 mg Oral BID WITH MEALS    gabapentin (NEURONTIN) capsule 300 mg  300 mg Oral BID    HYDROcodone-acetaminophen (NORCO)  mg tablet 1 Tablet  1 Tablet Oral Q6H PRN    sodium chloride (NS) flush 5-40 mL  5-40 mL IntraVENous PRN    [Held by provider] acetaminophen (TYLENOL) tablet 650 mg  650 mg Oral Q6H PRN    Or    [Held by provider] acetaminophen (TYLENOL) suppository 650 mg  650 mg Rectal Q6H PRN    polyethylene glycol (MIRALAX) packet 17 g  17 g Oral DAILY PRN    magnesium oxide (MAG-OX) tablet 400 mg  400 mg Oral DAILY       Signed:  Ministerio Alfred NP

## 2022-02-12 NOTE — ROUTINE PROCESS
Bedside and Verbal shift change report given to Kanchan Franks RN (oncoming nurse) by Jefferson Harrell RN (offgoing nurse). Report included the following information SBAR, Kardex, Intake/Output and MAR, recent results.

## 2022-02-12 NOTE — PROGRESS NOTES
Chart reviewed and discussed with Dr Sarbjit Wynn. Will follow up with patient on Monday. Call pulmonary if needed over weekend. No charge for this note.      Alexandria Gongora NP

## 2022-02-12 NOTE — PROGRESS NOTES
Problem: Falls - Risk of  Goal: *Absence of Falls  Description: Document Rohit Blades Fall Risk and appropriate interventions in the flowsheet. Outcome: Progressing Towards Goal  Note: Fall Risk Interventions:  Mobility Interventions: Patient to call before getting OOB         Medication Interventions: Teach patient to arise slowly,Patient to call before getting OOB    Elimination Interventions: Call light in reach,Patient to call for help with toileting needs              Problem: Patient Education: Go to Patient Education Activity  Goal: Patient/Family Education  Outcome: Progressing Towards Goal     Problem: Pressure Injury - Risk of  Goal: *Prevention of pressure injury  Description: Document Abad Scale and appropriate interventions in the flowsheet.   Outcome: Progressing Towards Goal  Note: Pressure Injury Interventions:  Sensory Interventions: Assess changes in LOC    Moisture Interventions: Maintain skin hydration (lotion/cream),Minimize layers    Activity Interventions: PT/OT evaluation    Mobility Interventions: HOB 30 degrees or less    Nutrition Interventions: Document food/fluid/supplement intake    Friction and Shear Interventions: Minimize layers                Problem: Patient Education: Go to Patient Education Activity  Goal: Patient/Family Education  Outcome: Progressing Towards Goal     Problem: Pain  Goal: *Control of Pain  Outcome: Progressing Towards Goal     Problem: Patient Education: Go to Patient Education Activity  Goal: Patient/Family Education  Outcome: Progressing Towards Goal     Problem: Nausea/Vomiting (Adult)  Goal: *Absence of nausea/vomiting  Outcome: Progressing Towards Goal  Goal: *Palliation of nausea/vomiting (Palliative Care)  Outcome: Progressing Towards Goal     Problem: Patient Education: Go to Patient Education Activity  Goal: Patient/Family Education  Outcome: Progressing Towards Goal     Problem: Cardiac Output -  Decreased  Goal: *Vital signs within specified parameters  Outcome: Progressing Towards Goal  Goal: *Optimal cardiac output  Outcome: Progressing Towards Goal  Goal: *Absence of hypoxia  Outcome: Progressing Towards Goal  Goal: *Absence of peripheral edema  Outcome: Progressing Towards Goal  Goal: *Intravascular fluid volume and electrolyte balance  Outcome: Progressing Towards Goal     Problem: Patient Education: Go to Patient Education Activity  Goal: Patient/Family Education  Outcome: Progressing Towards Goal     Problem: Airway Clearance - Ineffective  Goal: *Patent airway  Outcome: Progressing Towards Goal  Goal: *Absence of airway secretions  Outcome: Progressing Towards Goal  Goal: *Able to cough effectively  Outcome: Progressing Towards Goal  Goal: *PALLIATIVE CARE:  Alleviation of secretions, cough and/or nasal congestion  Outcome: Progressing Towards Goal     Problem: Patient Education: Go to Patient Education Activity  Goal: Patient/Family Education  Outcome: Progressing Towards Goal

## 2022-02-12 NOTE — PROGRESS NOTES
[facility] Progress Note         Admit Date:  2/7/2022    Today's Date:  2/12/2022    CC:  Abdominal pain    Subjective:     Patient reports ongoing excruciating RUQ pain.   He vomited once    Medications:   Current Facility-Administered Medications   Medication Dose Route Frequency    eplerenone (INSPRA) tablet 25 mg (Patient Supplied)  25 mg Oral DAILY    sucralfate (CARAFATE) tablet 1 g  1 g Oral Q6H    methylPREDNISolone (PF) (SOLU-MEDROL) injection 40 mg  40 mg IntraVENous DAILY    LORazepam (ATIVAN) tablet 1 mg  1 mg Oral Q4H PRN    promethazine (PHENERGAN) with saline injection 12.5 mg  12.5 mg IntraVENous Q4H PRN    valsartan (DIOVAN) tablet 40 mg  40 mg Oral Q12H    albuterol (PROVENTIL VENTOLIN) nebulizer solution 2.5 mg  2.5 mg Nebulization Q4H PRN    dicyclomine (BENTYL) capsule 20 mg  20 mg Oral TID    sodium chloride (OCEAN) 0.65 % nasal squeeze bottle 2 Spray  2 [place] Both Nostrils Q2HWA    fluticasone propionate (FLONASE) 50 mcg/actuation nasal spray 2 Spray  2 Spray Both Nostrils BID    metoprolol (LOPRESSOR) injection 5 mg  5 mg IntraVENous Q4H PRN    LORazepam (ATIVAN) injection 1 mg  1 mg IntraVENous Q6H PRN    ivabradine (CORLANOR) tablet 5 mg  5 mg Oral BID WITH MEALS    metroNIDAZOLE (FLAGYL) IVPB premix 500 mg  500 mg IntraVENous Q12H    cefTRIAXone (ROCEPHIN) 1 g in 0.9% sodium chloride (MBP/ADV) 50 mL MBP  1 g IntraVENous Q24H    pantoprazole (PROTONIX) 40 mg in 0.9% sodium chloride 10 mL injection  40 mg IntraVENous Q12H    albuterol (PROVENTIL VENTOLIN) nebulizer solution 2.5 mg  2.5 mg Nebulization Q6H RT    sodium chloride (NS) flush 5-10 mL  5-10 mL IntraVENous Q8H    sodium chloride (NS) flush 5-10 mL  5-10 mL IntraVENous PRN    0.9% sodium chloride infusion  75 mL/hr IntraVENous CONTINUOUS    ALPRAZolam (XANAX) tablet 1 mg  1 mg Oral QHS    aspirin delayed-release tablet 81 mg  81 mg Oral DAILY    atorvastatin (LIPITOR) tablet 80 mg  80 mg Oral DAILY    carvediloL (COREG) tablet 25 mg  25 mg Oral BID WITH MEALS    gabapentin (NEURONTIN) capsule 300 mg  300 mg Oral BID    HYDROcodone-acetaminophen (NORCO)  mg tablet 1 Tablet  1 Tablet Oral Q6H PRN    sodium chloride (NS) flush 5-40 mL  5-40 mL IntraVENous PRN    [Held by provider] acetaminophen (TYLENOL) tablet 650 mg  650 mg Oral Q6H PRN    Or    [Held by provider] acetaminophen (TYLENOL) suppository 650 mg  650 mg Rectal Q6H PRN    polyethylene glycol (MIRALAX) packet 17 g  17 g Oral DAILY PRN    magnesium oxide (MAG-OX) tablet 400 mg  400 mg Oral DAILY       Review of Systems:  ROS was obtained, with pertinent positives as listed above. No chest pain or SOB. Diet:      Objective:   Vitals:  Visit Vitals  BP 99/72 (BP 1 Location: Left upper arm)   Pulse 83   Temp 97.7 °F (36.5 °C)   Resp 18   Ht 5' 11\" (1.803 m)   Wt 90.1 kg (198 lb 10.2 oz)   SpO2 100%   BMI 27.70 kg/m²     Intake/Output:  02/12 0701 - 02/12 1900  In: -   Out: 150 [Urine:150]  02/10 1901 - 02/12 0700  In: 3259 [P.O.:1370; I.V.:2937]  Out: 1 [Urine:1]  Exam:  General appearance: alert, cooperative, no distress  Lungs: clear to auscultation bilaterally anteriorly  Heart: regular rate and rhythm  Abdomen: soft, tender in RUQ. Bowel sounds normal. No masses, no organomegaly  Neuro:  alert and oriented    Data Review (Labs):    Recent Labs     02/10/22  0451   WBC 15.7*   HGB 13.2*   HCT 41.3      MCV 95.2      K 4.4   *   CO2 20*   BUN 11   CREA 1.20   CA 7.6*   *       Assessment:     Principal Problem:    Acute gastroenteritis (2/26/2016) Pain has persisted and is out of proportion to objective findings. Stool tests are negative to date.     Active Problems:    Chronic systolic (congestive) heart failure (HCC) (4/21/2011)      HTN (hypertension) (11/29/2011)      Non-ischemic cardiomyopathy ([facility] (3/24/2016)      Transaminitis (3/14/2017)      Inspiratory stridor (3/21/2017)      SVT (supraventricular tachycardia) ([facility] [address] (12/22/2018)      YAO (acute kidney injury) ([facility] (8/23/2021)      Demand [facility] (8/24/2021)      Hyperbilirubinemia (2/7/2022)        Plan:     Awaiting creatinine today with plan to update CT with contrast if creatinine allows.

## 2022-02-12 NOTE — RT PROTOCOL NOTE
RT Inhaler-Nebulizer Bronchodilator Protocol Note    There is a bronchodilator order in the chart from a provider indicating to follow the RT Bronchodilator Protocol and there is an Initiate RT Bronchodilator Protocol order as well (see protocol at bottom of note). CXR Findings:  No results found for this or any previous visit from the past 2 days. The findings from the last RT Protocol Assessment were as follows:  History of Pulmonary Disease: None or smoker <15 pack years  Respiratory Pattern: Regular pattern and RR 12-20 bpm  Breath Sounds: Slighly diminished and/or crackles  Cough: Strong, spontaneous, non-productive  Indication for Bronchodilator Therapy: None  Bronchodilator Assessment Score: 2    Aerosolized bronchodilator medication orders have been revised according to the RT Inhaler-Nebulizer Bronchodilator Protocol below. Respiratory Therapist to perform RT Therapy Protocol Assessment initially then follow the protocol. Repeat RT Therapy Protocol Assessment PRN for score 0-3 or on second treatment, BID, and PRN for scores above 3. No Indications  adjust the frequency to every 6 hours PRN wheezing or bronchospasm, if no treatments needed after 48 hours then discontinue using Per Protocol order mode. If indication present, adjust the RT bronchodilator orders based on the Bronchodilator Assessment Score as indicated below. Use Inhaler orders unless patient has one or more of the following: on home nebulizer, not able to hold breath for 10 seconds, is not alert and oriented, cannot activate and use MDI correctly, or respiratory rate 25 breaths per minute or more, then use the equivalent nebulizer order(s) with same Frequency and PRN reasons based on the score. If a patient is on this medication at home then do not decrease Frequency below that used at home.     0-3  enter or revise RT bronchodilator order(s) to equivalent RT Bronchodilator order with Frequency of every 4 hours PRN for wheezing or increased work of breathing using Per Protocol order mode. 4-6  enter or revise RT Bronchodilator order(s) to two equivalent RT bronchodilator orders with one order with BID Frequency and one order with Frequency of every 4 hours PRN wheezing or increased work of breathing using Per Protocol order mode. 7-10  enter or revise RT Bronchodilator order(s) to two equivalent RT bronchodilator orders with one order with TID Frequency and one order with Frequency of every 4 hours PRN wheezing or increased work of breathing using Per Protocol order mode. 11-13  enter or revise RT Bronchodilator order(s) to one equivalent RT bronchodilator order with QID Frequency and an Albuterol order with Frequency of every 4 hours PRN wheezing or increased work of breathing using Per Protocol order mode. Greater than 13  enter or revise RT Bronchodilator order(s) to one equivalent RT bronchodilator order with every 4 hours Frequency and an Albuterol order with Frequency of every 2 hours PRN wheezing or increased work of breathing using Per Protocol order mode. RT to enter RT Home Evaluation for COPD & MDI Assessment order using Per Protocol order mode.     Electronically signed by RT Nolan on 2/12/2022 at 6:03 PM

## 2022-02-12 NOTE — PROGRESS NOTES
PICC Placement Note    PRE-PROCEDURE VERIFICATION  Correct Procedure: yes. Time out completed with assistant Bradley Dacosta RN and all persons present in agreement with time out. Correct Site:  yes  Temperature: Temp: 97.7 °F (36.5 °C), Temperature Source: Temp Source: Oral  Recent Labs     02/10/22  0451   BUN 11   CREA 1.20      WBC 15.7*     Allergies: Patient has no known allergies. Education materials for [facility] given to patient or family. PROCEDURE DETAIL  A single lumen PICC line was started for vascular access. The following documentation is in addition to the PICC properties in the lines/airways flowsheet :  Lot #: [id number]  xylocaine used: yes  Mid-Arm Circumference: 32 (cm)  Internal Catheter Length: 38 (cm)  Internal Catheter Total Length: 39 (cm)  Vein Selection for PICC:right basilic  Central Line Bundle followed yes  Complication Related to Insertion: none  Both the insertion guidewire and ECG guidewire were removed intact all ports have positive blood return and were flush well with normal saline. The location of the tip of the PICC is verified using ECG technology. The tip is in the SVC per ECG reading. See image below.      Line is okay to use: yes

## 2022-02-12 NOTE — PROGRESS NOTES
@4000: BP 75/45  Clarence Strickland MD paged - 1L NS bolus ordered. Pt. Called out that IV was hurting. Entered room - pt. IV infiltrated. BP rechecked @0510: 74/61    Pt. Currently without IV access. Previous IV placed by US. MD aware. ICU contacted to help with IV placement.

## 2022-02-12 NOTE — PROGRESS NOTES
Problem: Dysphagia (Adult)  Goal: *Acute Goals and Plan of Care (Insert Text)  Note: STG: Pt will demonstrate zero signs/sx of aspiration with regular textures with thin liquids with 90% accuracy during all meals. STG: Pt will complete a full speech, language and cognitive evaluation without assistance with 100% accuracy during session. STG: Pt will complete a Modified barium swallow study with zero signs/sx of aspiration  with 100% accuracy during swallow study. LTG: Pt will demonstrate zero signs/sx of aspiration with least restrictive diet with 100% accuracy during all meals. SPEECH LANGUAGE PATHOLOGY: DYSPHAGIA- Initial Assessment    NAME/AGE/GENDER: Corinne Whitman is a 62 y.o. male  DATE: 2/12/2022  PRIMARY DIAGNOSIS: YAO (acute kidney injury) ([facility] [address] [N17.9]  Procedure(s) (LRB):  ESOPHAGOGASTRODUODENOSCOPY (EGD)/ 25 Room 919 (N/A) 1 Day Post-Op  ICD-10: Treatment Diagnosis: R13.13 Dysphagia, Pharyngeal Phase    RECOMMENDATIONS   DIET:   Thin Liquids    MEDICATIONS: Whole in puree  Crushed in puree     PRECAUTIONS, MODIFICATIONS, AND STRATEGIES  Slow rate of intake  Small bites/sips  Upright at 90 degrees during meal  Oral care every 3 hours       RECOMMENDATIONS FOR CONTINUED SPEECH THERAPY:   YES: Anticipate need for ongoing speech therapy during this hospitalization. ASSESSMENT   Patient presents with pharyngeal dysphagia characterized by pt reports it feels like it is getting stuck and hurts. No overt signs/sx of aspiration with limited PO trials. Pt refused or does not like. Recommend a Modified barium swallow study Monday to rule out aspiration and fully assess pharyngeal swallow.     Recommend full liquid when MD is ready to upgrade with medication whole/crushed in pureed      EDUCATION:  Recommendations discussed with Patient    REHABILITATION POTENTIAL FOR STATED GOALS: Good    PLAN    FREQUENCY/DURATION:   Continue to follow patient 2 times a week for duration of hospital stay to address above goals. Recommendations for next treatment session: Next treatment session will address Modified Barium Swallow Study    CONTINUATION OF SKILLED SERVICES/MEDICAL NECESSITY:  Patient continues to require skilled intervention due to dysphagia. SUBJECTIVE   alert    Oxygen Device: RA  Pain: Pain Scale 1: Numeric (0 - 10)  Pain Intensity 1: 0  Pain Location 1: Abdomen  Pain Intervention(s) 1: Medication (see MAR)    History of Present Injury/Illness: Mr. Mariah Watson  has a past medical history of Acute on chronic systolic heart failure ([address] (9/30/2020), Acute respiratory failure due to COVID-19 [facility]) (8/24/2021), AGE (acute gastroenteritis) (12/16/2019), Anxiety associated with depression (3/18/2017), Arthritis, CAD (coronary artery disease), CHF (congestive heart failure) ([address] Chronic alcoholism ([address] Chronic back pain, Chronic neck pain, Chronic systolic heart failure ([address] (4/21/2011), Dental caries (7/13/2017), Depression, Dizziness - light-headed, Elevated brain natriuretic peptide (BNP) level (4/14/2021), GERD (gastroesophageal reflux disease), Gynecomastia (2/22/2016), Heart failure ([address] Hypertension, ICD (implantable cardioverter-defibrillator) in place (4/22/2011), Leukopenia (5/7/2019), Macrocytic anemia (7/8/2018), NSTEMI (non-ST elevated myocardial infarction) ([address] (8/24/2021), Schatzki's ring (07/10/2018), Situational depression (2/22/2016), SVT (supraventricular tachycardia) ([address] (12/22/2018), and Ventricular tachycardia ([address] (2/22/2016). Elmer Kamara He also  has a past surgical history that includes hx heart catheterization (9/21/10); hx pacemaker; and egd (5/9/2019). PRECAUTIONS/ALLERGIES: Patient has no known allergies.      Problem List:  (Impairments causing functional limitations):  dysphagia    Previous Dysphagia: YES complication post COVID  Diet Prior to Evaluation: clears    Orientation:  Person  Place  Time  Situation    Cognitive-Linguistic Screening:  Alertness  Alert  Speech Production: Fully intelligible and Some dysarthria, but able to understand  Expressive Language:  Fluent speech  Receptive Language: Answers yes/no questions appropriately and Follows commands  Cognition:   Appears baseline  Prior Level of Function: home  Recommendations: Given results of screening, patient appears to be functioning at baseline. No acute assessment of speech, language, or cognition warranted. OBJECTIVE   Oral Motor:   Labial: No impairment  Dentition: Natural  Oral Hygiene: Adequate  Lingual: No impairment    Dysphagia evaluation:   Patient consumed limited trials of thin liquids via cup and straw, pureed, mixed and solid. Pt reports it feels like it gets stuck. Slow oral prep. Timely swallow. Pt reports it hurts. No overt signs/sx of aspiration. No oral residue. Recommend full liquids when MD deems appropriate. Recommend a Modified barium swallow study to rule out aspiration and fully assess pharyngeal swallow. Tool Used: Dysphagia Outcome and Severity Scale (DOUG)    Score Comments   Normal Diet  [] 7 With no strategies or extra time needed   Functional Swallow  [] 6 May have mild oral or pharyngeal delay   Mild Dysphagia  [] 5 Which may require one diet consistency restricted    Mild-Moderate Dysphagia  [] 4 With 1-2 diet consistencies restricted   Moderate Dysphagia  [x] 3 With 2 or more diet consistencies restricted   Moderate-Severe Dysphagia  [] 2 With partial PO strategies (trials with ST only)   Severe Dysphagia  [] 1 With inability to tolerate any PO safely      Score:  Initial: 3 Most Recent:  (Date 02/12/22 )   Interpretation of Tool: The Dysphagia Outcome and Severity Scale (DOUG) is a simple, easy-to-use, 7-point scale developed to systematically rate the functional severity of dysphagia based on objective assessment and make recommendations for diet level, independence level, and type of nutrition.        Current Medications:   No current facility-administered medications on file prior to encounter. Current Outpatient Medications on File Prior to Encounter   Medication Sig Dispense Refill    guaiFENesin 200 mg/5 mL liqd Take 5 mL by mouth every six (6) hours as needed for Cough. 120 mL 0    promethazine (PHENERGAN) 25 mg tablet Take 1 Tablet by mouth every six (6) hours as needed for Nausea. 12 Tablet 0    atorvastatin (LIPITOR) 80 mg tablet Take 1 Tablet by mouth daily. 90 Tablet 3    rizatriptan (MAXALT-MLT) 10 mg disintegrating tablet TAKE ONE TABLET BY MOUTH ONCE AS NEEDED 10 Tablet 6    azelastine (ASTELIN) 137 mcg (0.1 %) nasal spray 1 [place] by Both Nostrils route two (2) times a day. Use in each nostril as directed 3 Each 3    gabapentin (NEURONTIN) 300 mg capsule 1 po bid prn pain 60 Capsule 6    furosemide (LASIX) 40 mg tablet Take 1 Tablet by mouth daily as needed (sob, edema). Ok to stop Lasix and KCL when sob improves and repeat prn. 90 Tablet 3    potassium chloride (K-DUR, KLOR-CON M20) 20 mEq tablet Take 1 Tablet by mouth daily as needed (with lasix). Ok to stop when sob, edema resolves. Repeat, prn. 90 Tablet 3    ALPRAZolam (XANAX) 1 mg tablet Take 1 Tablet by mouth nightly. Max Daily Amount: 1 mg. Indications: anxious 30 Tablet 3    sildenafil citrate (Viagra) 100 mg tablet Take 1 Tablet by mouth as needed (as directed). 10 Tablet 3    clindamycin (CLEOCIN) 150 mg capsule Take 1 Capsule by mouth three (3) times daily. 21 Capsule 0    cyclobenzaprine (FLEXERIL) 10 mg tablet Take 1 Tablet by mouth three (3) times daily as needed for Muscle Spasm(s). 30 Tablet 1    carvediloL (COREG) 25 mg tablet Take 1 Tablet by mouth two (2) times daily (with meals). 180 Tablet 3    aspirin delayed-release 81 mg tablet Take 1 Tablet by mouth daily. 30 Tablet 0    albuterol (PROVENTIL HFA, VENTOLIN HFA, PROAIR HFA) 90 mcg/actuation inhaler Take 2 Puffs by inhalation every four (4) hours as needed for Wheezing or Shortness of Breath.  1 Inhaler 0    valsartan (DIOVAN) 40 mg tablet Take 1 Tablet by mouth two (2) times a day. 60 Tablet 0    dexAMETHasone (DECADRON) 6 mg tablet Take 1 Tablet by mouth daily. (Patient not taking: Reported on 12/23/2021) 7 Tablet 0    HYDROcodone-acetaminophen (NORCO)  mg tablet TAKE ONE TABLET BY MOUTH FOUR TIMES DAILY AS NEEDED  0    eplerenone (INSPRA) 25 mg tablet Take 1 Tab by mouth daily. 90 Tab 3    ESOMEPRAZOLE MAG TRIHYDRATE (NEXIUM PO) Take 1 Cap by mouth two (2) times a day.           INTERDISCIPLINARY COLLABORATION: RN and MD    After treatment position/precautions:  Upright in bed  Call light within reach  RN notified    Total Treatment Duration:   Time In: 0820  Time Out: SerjioLuis 24 MA/CCC/SLP

## 2022-02-12 NOTE — ADVANCED PRACTICE NURSE
Received message from nursing, BP 82/58. MAP 66. Midodrine ordered. HF ARB and BB meds reduced earlier, do not want to hold unless absolutely necessary due to poor EF. Also left arm with edema. Doppler ordered.

## 2022-02-13 LAB
ANION GAP SERPL CALC-SCNC: 7 MMOL/L (ref 7–16)
BACTERIA SPEC CULT: NORMAL
BASOPHILS # BLD: 0 K/UL (ref 0–0.2)
BASOPHILS NFR BLD: 0 % (ref 0–2)
BUN SERPL-MCNC: 7 MG/DL (ref 6–23)
CALCIUM SERPL-MCNC: 7.4 MG/DL (ref 8.3–10.4)
CAMPYLOBACTER STL CULT: NORMAL
CHLORIDE SERPL-SCNC: 109 MMOL/L (ref 98–107)
CO2 SERPL-SCNC: 20 MMOL/L (ref 21–32)
CREAT SERPL-MCNC: 1.1 MG/DL (ref 0.8–1.5)
DIFFERENTIAL METHOD BLD: ABNORMAL
EOSINOPHIL # BLD: 0 K/UL (ref 0–0.8)
EOSINOPHIL NFR BLD: 0 % (ref 0.5–7.8)
ERYTHROCYTE [DISTWIDTH] IN BLOOD BY AUTOMATED COUNT: 15 % (ref 11.9–14.6)
GLUCOSE SERPL-MCNC: 106 MG/DL (ref 65–100)
HCT VFR BLD AUTO: 36.5 % (ref 41.1–50.3)
HGB BLD-MCNC: 11.6 G/DL (ref 13.6–17.2)
IMM GRANULOCYTES # BLD AUTO: 0.1 K/UL (ref 0–0.5)
IMM GRANULOCYTES NFR BLD AUTO: 1 % (ref 0–5)
LYMPHOCYTES # BLD: 1.2 K/UL (ref 0.5–4.6)
LYMPHOCYTES NFR BLD: 9 % (ref 13–44)
MAGNESIUM SERPL-MCNC: 2.2 MG/DL (ref 1.6–2.3)
MCH RBC QN AUTO: 30.4 PG (ref 26.1–32.9)
MCHC RBC AUTO-ENTMCNC: 31.8 G/DL (ref 31.4–35)
MCV RBC AUTO: 95.8 FL (ref 79.6–97.8)
MONOCYTES # BLD: 1.4 K/UL (ref 0.1–1.3)
MONOCYTES NFR BLD: 11 % (ref 4–12)
NEUTS SEG # BLD: 10.7 K/UL (ref 1.7–8.2)
NEUTS SEG NFR BLD: 80 % (ref 43–78)
NRBC # BLD: 0 K/UL (ref 0–0.2)
PLATELET # BLD AUTO: 341 K/UL (ref 150–450)
PMV BLD AUTO: 9.3 FL (ref 9.4–12.3)
POTASSIUM SERPL-SCNC: 3.3 MMOL/L (ref 3.5–5.1)
RBC # BLD AUTO: 3.81 M/UL (ref 4.23–5.6)
SODIUM SERPL-SCNC: 136 MMOL/L (ref 138–145)
SPECIMEN SOURCE: NORMAL
WBC # BLD AUTO: 13.5 K/UL (ref 4.3–11.1)

## 2022-02-13 PROCEDURE — 74011250636 HC RX REV CODE- 250/636: Performed by: INTERNAL MEDICINE

## 2022-02-13 PROCEDURE — 74011000250 HC RX REV CODE- 250: Performed by: NURSE PRACTITIONER

## 2022-02-13 PROCEDURE — 85025 COMPLETE CBC W/AUTO DIFF WBC: CPT

## 2022-02-13 PROCEDURE — 74011250637 HC RX REV CODE- 250/637: Performed by: INTERNAL MEDICINE

## 2022-02-13 PROCEDURE — 74011000258 HC RX REV CODE- 258: Performed by: INTERNAL MEDICINE

## 2022-02-13 PROCEDURE — 74011250637 HC RX REV CODE- 250/637: Performed by: NURSE PRACTITIONER

## 2022-02-13 PROCEDURE — 74011000250 HC RX REV CODE- 250: Performed by: INTERNAL MEDICINE

## 2022-02-13 PROCEDURE — 94762 N-INVAS EAR/PLS OXIMTRY CONT: CPT

## 2022-02-13 PROCEDURE — 94640 AIRWAY INHALATION TREATMENT: CPT

## 2022-02-13 PROCEDURE — 65270000029 HC RM PRIVATE

## 2022-02-13 PROCEDURE — 80048 BASIC METABOLIC PNL TOTAL CA: CPT

## 2022-02-13 PROCEDURE — 74011250636 HC RX REV CODE- 250/636: Performed by: NURSE PRACTITIONER

## 2022-02-13 PROCEDURE — C9113 INJ PANTOPRAZOLE SODIUM, VIA: HCPCS | Performed by: INTERNAL MEDICINE

## 2022-02-13 PROCEDURE — 74011000250 HC RX REV CODE- 250: Performed by: EMERGENCY MEDICINE

## 2022-02-13 PROCEDURE — 74011250637 HC RX REV CODE- 250/637: Performed by: EMERGENCY MEDICINE

## 2022-02-13 RX ADMIN — SALINE NASAL SPRAY 2 SPRAY: 1.5 SOLUTION NASAL at 22:00

## 2022-02-13 RX ADMIN — HYDROCODONE BITARTRATE AND ACETAMINOPHEN 1 TABLET: 10; 325 TABLET ORAL at 13:28

## 2022-02-13 RX ADMIN — DICYCLOMINE HYDROCHLORIDE 20 MG: 10 CAPSULE ORAL at 22:02

## 2022-02-13 RX ADMIN — DICYCLOMINE HYDROCHLORIDE 20 MG: 10 CAPSULE ORAL at 17:27

## 2022-02-13 RX ADMIN — SALINE NASAL SPRAY 2 SPRAY: 1.5 SOLUTION NASAL at 14:28

## 2022-02-13 RX ADMIN — SUCRALFATE 1 G: 1 TABLET ORAL at 13:28

## 2022-02-13 RX ADMIN — SODIUM CHLORIDE, PRESERVATIVE FREE 10 ML: 5 INJECTION INTRAVENOUS at 06:40

## 2022-02-13 RX ADMIN — SALINE NASAL SPRAY 2 SPRAY: 1.5 SOLUTION NASAL at 16:21

## 2022-02-13 RX ADMIN — DICYCLOMINE HYDROCHLORIDE 20 MG: 10 CAPSULE ORAL at 08:33

## 2022-02-13 RX ADMIN — Medication 400 MG: at 08:33

## 2022-02-13 RX ADMIN — METRONIDAZOLE 500 MG: 500 INJECTION, SOLUTION INTRAVENOUS at 22:01

## 2022-02-13 RX ADMIN — SODIUM CHLORIDE, PRESERVATIVE FREE 5 ML: 5 INJECTION INTRAVENOUS at 13:29

## 2022-02-13 RX ADMIN — SALINE NASAL SPRAY 2 SPRAY: 1.5 SOLUTION NASAL at 10:00

## 2022-02-13 RX ADMIN — ATORVASTATIN CALCIUM 80 MG: 80 TABLET, FILM COATED ORAL at 08:33

## 2022-02-13 RX ADMIN — SODIUM CHLORIDE, PRESERVATIVE FREE 12.5 MG: 5 INJECTION INTRAVENOUS at 17:27

## 2022-02-13 RX ADMIN — SODIUM CHLORIDE 40 MG: 9 INJECTION, SOLUTION INTRAMUSCULAR; INTRAVENOUS; SUBCUTANEOUS at 22:01

## 2022-02-13 RX ADMIN — SODIUM CHLORIDE, PRESERVATIVE FREE 10 ML: 5 INJECTION INTRAVENOUS at 08:58

## 2022-02-13 RX ADMIN — SODIUM CHLORIDE, PRESERVATIVE FREE 12.5 MG: 5 INJECTION INTRAVENOUS at 08:33

## 2022-02-13 RX ADMIN — ALBUTEROL SULFATE 2.5 MG: 2.5 SOLUTION RESPIRATORY (INHALATION) at 07:50

## 2022-02-13 RX ADMIN — METRONIDAZOLE 500 MG: 500 INJECTION, SOLUTION INTRAVENOUS at 09:10

## 2022-02-13 RX ADMIN — SODIUM CHLORIDE, PRESERVATIVE FREE 12.5 MG: 5 INJECTION INTRAVENOUS at 13:28

## 2022-02-13 RX ADMIN — SALINE NASAL SPRAY 2 SPRAY: 1.5 SOLUTION NASAL at 12:15

## 2022-02-13 RX ADMIN — ALPRAZOLAM 1 MG: 0.5 TABLET ORAL at 22:01

## 2022-02-13 RX ADMIN — GABAPENTIN 300 MG: 300 CAPSULE ORAL at 22:02

## 2022-02-13 RX ADMIN — GABAPENTIN 300 MG: 300 CAPSULE ORAL at 08:33

## 2022-02-13 RX ADMIN — MIDODRINE HYDROCHLORIDE 5 MG: 5 TABLET ORAL at 17:27

## 2022-02-13 RX ADMIN — HYDROCODONE BITARTRATE AND ACETAMINOPHEN 1 TABLET: 10; 325 TABLET ORAL at 22:24

## 2022-02-13 RX ADMIN — SODIUM CHLORIDE, PRESERVATIVE FREE 10 ML: 5 INJECTION INTRAVENOUS at 22:00

## 2022-02-13 RX ADMIN — SALINE NASAL SPRAY 2 SPRAY: 1.5 SOLUTION NASAL at 17:28

## 2022-02-13 RX ADMIN — ASPIRIN 81 MG: 81 TABLET ORAL at 08:33

## 2022-02-13 RX ADMIN — METHYLPREDNISOLONE SODIUM SUCCINATE 40 MG: 40 INJECTION, POWDER, FOR SOLUTION INTRAMUSCULAR; INTRAVENOUS at 08:32

## 2022-02-13 RX ADMIN — IVABRADINE 5 MG: 5 TABLET, FILM COATED ORAL at 17:27

## 2022-02-13 RX ADMIN — CEFTRIAXONE 1 G: 1 INJECTION, POWDER, FOR SOLUTION INTRAMUSCULAR; INTRAVENOUS at 13:28

## 2022-02-13 RX ADMIN — FLUTICASONE PROPIONATE 2 SPRAY: 50 SPRAY, METERED NASAL at 17:28

## 2022-02-13 RX ADMIN — SODIUM CHLORIDE 40 MG: 9 INJECTION, SOLUTION INTRAMUSCULAR; INTRAVENOUS; SUBCUTANEOUS at 08:33

## 2022-02-13 RX ADMIN — FLUTICASONE PROPIONATE 2 SPRAY: 50 SPRAY, METERED NASAL at 08:57

## 2022-02-13 RX ADMIN — SUCRALFATE 1 G: 1 TABLET ORAL at 06:38

## 2022-02-13 RX ADMIN — IVABRADINE 5 MG: 5 TABLET, FILM COATED ORAL at 08:33

## 2022-02-13 RX ADMIN — SUCRALFATE 1 G: 1 TABLET ORAL at 17:27

## 2022-02-13 RX ADMIN — MIDODRINE HYDROCHLORIDE 5 MG: 5 TABLET ORAL at 08:33

## 2022-02-13 RX ADMIN — SODIUM CHLORIDE, PRESERVATIVE FREE 12.5 MG: 5 INJECTION INTRAVENOUS at 03:07

## 2022-02-13 RX ADMIN — ALBUTEROL SULFATE 2.5 MG: 2.5 SOLUTION RESPIRATORY (INHALATION) at 19:39

## 2022-02-13 RX ADMIN — SODIUM CHLORIDE, PRESERVATIVE FREE 12.5 MG: 5 INJECTION INTRAVENOUS at 22:25

## 2022-02-13 RX ADMIN — CARVEDILOL 12.5 MG: 6.25 TABLET, FILM COATED ORAL at 17:27

## 2022-02-13 RX ADMIN — SALINE NASAL SPRAY 2 SPRAY: 1.5 SOLUTION NASAL at 08:58

## 2022-02-13 RX ADMIN — SALINE NASAL SPRAY 2 SPRAY: 1.5 SOLUTION NASAL at 06:39

## 2022-02-13 RX ADMIN — HYDROCODONE BITARTRATE AND ACETAMINOPHEN 1 TABLET: 10; 325 TABLET ORAL at 06:38

## 2022-02-13 NOTE — ROUTINE PROCESS
Bedside and Verbal shift change report given to Maureen Escamilla RN (oncoming nurse) by Radha Marshall RN (offgoing nurse). Report included the following information SBAR, Kardex, Intake/Output, MAR and Recent Results.

## 2022-02-13 NOTE — PROGRESS NOTES
Hospitalist Progress Note   Admit Date:  2022  4:06 PM   Name:  Princess Jean   Age:  62 y.o. Sex:  male  :  1965   MRN:  [id number]   Room:  19/    Presenting Complaint: Chest Pain and Shortness of Breath    Reason(s) for Admission: YAO (acute kidney injury) [facility]) [N17.9]     Hospital Course & Interval History:   62 y. o. male with medical history of nonischemic cardiomyopathy with EF of 20-25%, HTN, HLD, and chronic back pain presents with heart palpitations, persistent nausea/vomiting, diarrhea, shortness of breath and generalized weakness for the past week. He says that he was called by his cardiologist and told that his HRs were high. He denies chest pain but says \"I just hurt all over\". He denies recreational drug use and alcohol. He denies black/red stools. He does not know of any food that may have triggered his symptoms.      ED course: HRs in the 150s-160s, cardiology reviewed strips and it is most likely atrial tachycardia as opposed to VT. WBC count of 13k. Troponin of 245 with repeat of 270. Procalcitonin of 0.06. pBNP of 1690. SCr of 2.4. K of 3.4. Rapid COVID is negative. CXR unremarkable. Subjective/24hr Events (22): Alert and oriented. Complains of continued n/d. States he \"chews lortabs like candy at home\" and he needs it for pain. States pain 7/10 epigastric pain radiating RUQ, RLQ on top of chronic pain all over. Assessment & Plan:     Acute Gastroenteritis  intractable nausea/vomiting  - CTAP shows large hiatal hernia and thickened gastric mucosa. - suspect vomiting secondary to hiatal hernia  - GI consulted  - Cirrhosis workup at later date. - continue supportive mgmt - antiemetic    EGD with gastritis    spoke with surgery, no official consult at this time but recs to obtain upper GI series with gastrogaffin.   MBS planned for   IVF ongoing due to symptoms, NS 75 ml/h    Awaiting further testing planned for in am.   Loose watery stool + yeast   Following cultures. Leukocytosis trending up. Continue to follow. Continue Rocpehin eot 2/14, flagyl eot 2/15    Gastritis  - continue Carafate     Stridor  2/12  Now resolved, per ENT, felt to be voluntary. 2/13  Resolves when in conversation, loud during exam.      Chronic systolic (congestive) heart failure (HCC)   appears compensated  - hold eplerenone given YAO  - cannot use ACEi/ARB due to YAO  - monitor volume status daily  2/10  Resuming Diovan and Eplerenone (pharmacy equal) today, YAO resolved  2/12  Cards now on stand by  BP soft, holding Coreg this am  Reduce Diovan and Coreg dosing   2/13  Stable and compensated. Continue current regimen      YAO  - Resolved 2/9    Demand ischemia  - known history of nonischemic cardiomyopathy  - trend trops 200s and flat. - cont asa/statin  - telemetry  - cardiology has seen - now on stand by as of 2/11     SVT, appears to be Atrial tach per cardiology on admission  - management as above  - continue Coreg  - cont asa/statin  - cardiology has seen  2/13 - rates up to 101. Continue to monitor on tele. Coreg recently reduced and may need to resume dose if bp can tolerate. Hiatal hernia:    Per GI, no surgical need at this time, can follow up outpatient    Bentyl and Ativan continues  2/12    Obtain upper gi with gastrogaffin per surgery recs, if hernia unstable, can officially consult surgery  2/13  Testing pending. No change    Essential HTN:  2/12 hypotension - held Coreg, Reduced Diovan and Coreg dosing. Started minodrin. 2/13 - continue regimen (holding diovan), controlled.        Hypokalemia  - Replace K  - Follow K levels  - mg 2.2     Acute on Chronic Pain  - likely has opioid tolerance   - continue lortab q6hr prn and other nonopioid modalities  - checked pdmp    LUE Edema  - US neg for dvt      Dispo/Discharge Planning:    Pending clinical improvement.  zackery home +/- C in 1-2 days     Diet:  ADULT DIET Clear Liquid  DVT PPx: scd  Code status: Full Code    Objective:     Patient Vitals for the past 24 hrs:   Temp Pulse Resp BP SpO2   02/13/22 1225 98.3 °F (36.8 °C) (!) 101 18 117/83 97 %   02/13/22 0833 97.7 °F (36.5 °C) 93  99/79 97 %   02/13/22 0750     99 %   02/13/22 0317 97.7 °F (36.5 °C) 94 16 112/65 95 %   02/12/22 2303 97.8 °F (36.6 °C) 93 17 (!) 93/57 97 %   02/12/22 2026 98 °F (36.7 °C) 88 17 (!) 96/58 97 %   02/12/22 2000  88      02/12/22 1930     99 %   02/12/22 1856  68  (!) 87/57    02/12/22 1717    100/67    02/12/22 1550    (!) 82/58    02/12/22 1540 97.2 °F (36.2 °C) 98 18 (!) 76/47 98 %     Oxygen Therapy  O2 Sat (%): 97 % (02/13/22 1225)  Pulse via Oximetry: 93 beats per minute (02/13/22 0750)  O2 Device: None (Room air) (02/13/22 1225)  Skin Assessment: Clean, dry, & intact (02/11/22 0949)  O2 Flow Rate (L/min): 0 l/min (02/12/22 0739)  FIO2 (%): 21 % (02/12/22 0739)    Estimated body mass index is 27.7 kg/m² as calculated from the following:    Height as of this encounter: 5' 11\" (1.803 m). Weight as of this encounter: 90.1 kg (198 lb 10.2 oz). No intake or output data in the 24 hours ending 02/13/22 1513      Physical Exam:   Blood pressure 117/83, pulse (!) 101, temperature 98.3 °F (36.8 °C), resp. rate 18, height 5' 11\" (1.803 m), weight 90.1 kg (198 lb 10.2 oz), SpO2 97 %. General:    Well nourished. No overt distress  Head:  Normocephalic, atraumatic  Eyes:  Sclerae appear normal.  Pupils equally round. ENT:  Nares appear normal, no drainage. Moist oral mucosa  Neck:  No restricted ROM. Trachea midline. Loud stridor disappears when in conversation. CV:   RRR. No m/r/g. No jugular venous distension. Lungs:   CTAB. No wheezing, rhonchi, or rales. Respirations even, unlabored. Abdomen: Bowel sounds present. Soft, nontender, nondistended. No rebound. No guarding. Extremities: No cyanosis or clubbing. Trace LUE edema  Skin:     No rashes and normal coloration. Warm and dry. Neuro:  CN II-XII grossly intact. Sensation intact. A&Ox3  Psych:  Normal mood and affect. Recent Labs:  Recent Results (from the past 48 hour(s))   MAGNESIUM    Collection Time: 02/12/22 12:54 AM   Result Value Ref Range    Magnesium 2.2 1.6 - 2.3 mg/dL   CBC WITH AUTOMATED DIFF    Collection Time: 02/12/22 11:24 AM   Result Value Ref Range    WBC 11.5 (H) 4.3 - 11.1 K/uL    RBC 3.95 (L) 4.23 - 5.6 M/uL    HGB 12.0 (L) 13.6 - 17.2 g/dL    HCT 38.2 (L) 41.1 - 50.3 %    MCV 96.7 79.6 - 97.8 FL    MCH 30.4 26.1 - 32.9 PG    MCHC 31.4 31.4 - 35.0 g/dL    RDW 15.2 (H) 11.9 - 14.6 %    PLATELET 381 146 - 166 K/uL    MPV 9.3 (L) 9.4 - 12.3 FL    ABSOLUTE NRBC 0.00 0.0 - 0.2 K/uL    DF AUTOMATED      NEUTROPHILS 89 (H) 43 - 78 %    LYMPHOCYTES 5 (L) 13 - 44 %    MONOCYTES 5 4.0 - 12.0 %    EOSINOPHILS 0 (L) 0.5 - 7.8 %    BASOPHILS 0 0.0 - 2.0 %    IMMATURE GRANULOCYTES 1 0.0 - 5.0 %    ABS. NEUTROPHILS 10.3 (H) 1.7 - 8.2 K/UL    ABS. LYMPHOCYTES 0.6 0.5 - 4.6 K/UL    ABS. MONOCYTES 0.6 0.1 - 1.3 K/UL    ABS. EOSINOPHILS 0.0 0.0 - 0.8 K/UL    ABS. BASOPHILS 0.0 0.0 - 0.2 K/UL    ABS. IMM.  GRANS. 0.1 0.0 - 0.5 K/UL   METABOLIC PANEL, BASIC    Collection Time: 02/12/22 11:24 AM   Result Value Ref Range    Sodium 137 (L) 138 - 145 mmol/L    Potassium 3.3 (L) 3.5 - 5.1 mmol/L    Chloride 110 (H) 98 - 107 mmol/L    CO2 20 (L) 21 - 32 mmol/L    Anion gap 7 7 - 16 mmol/L    Glucose 133 (H) 65 - 100 mg/dL    BUN 9 6 - 23 MG/DL    Creatinine 1.30 0.8 - 1.5 MG/DL    GFR est AA >60 >60 ml/min/1.73m2    GFR est non-AA >60 >60 ml/min/1.73m2    Calcium 7.2 (L) 8.3 - 10.4 MG/DL   CBC WITH AUTOMATED DIFF    Collection Time: 02/13/22  3:21 AM   Result Value Ref Range    WBC 13.5 (H) 4.3 - 11.1 K/uL    RBC 3.81 (L) 4.23 - 5.6 M/uL    HGB 11.6 (L) 13.6 - 17.2 g/dL    HCT 36.5 (L) 41.1 - 50.3 %    MCV 95.8 79.6 - 97.8 FL    MCH 30.4 26.1 - 32.9 PG    MCHC 31.8 31.4 - 35.0 g/dL    RDW 15.0 (H) 11.9 - 14.6 %    PLATELET 456 608 - 665 K/uL    MPV 9.3 (L) 9.4 - 12.3 FL    ABSOLUTE NRBC 0.00 0.0 - 0.2 K/uL    DF AUTOMATED      NEUTROPHILS 80 (H) 43 - 78 %    LYMPHOCYTES 9 (L) 13 - 44 %    MONOCYTES 11 4.0 - 12.0 %    EOSINOPHILS 0 (L) 0.5 - 7.8 %    BASOPHILS 0 0.0 - 2.0 %    IMMATURE GRANULOCYTES 1 0.0 - 5.0 %    ABS. NEUTROPHILS 10.7 (H) 1.7 - 8.2 K/UL    ABS. LYMPHOCYTES 1.2 0.5 - 4.6 K/UL    ABS. MONOCYTES 1.4 (H) 0.1 - 1.3 K/UL    ABS. EOSINOPHILS 0.0 0.0 - 0.8 K/UL    ABS. BASOPHILS 0.0 0.0 - 0.2 K/UL    ABS. IMM. GRANS. 0.1 0.0 - 0.5 K/UL   METABOLIC PANEL, BASIC    Collection Time: 02/13/22  3:21 AM   Result Value Ref Range    Sodium 136 (L) 138 - 145 mmol/L    Potassium 3.3 (L) 3.5 - 5.1 mmol/L    Chloride 109 (H) 98 - 107 mmol/L    CO2 20 (L) 21 - 32 mmol/L    Anion gap 7 7 - 16 mmol/L    Glucose 106 (H) 65 - 100 mg/dL    BUN 7 6 - 23 MG/DL    Creatinine 1.10 0.8 - 1.5 MG/DL    GFR est AA >60 >60 ml/min/1.73m2    GFR est non-AA >60 >60 ml/min/1.73m2    Calcium 7.4 (L) 8.3 - 10.4 MG/DL       All Micro Results     Procedure Component Value Units Date/Time    CULTURE, STOOL [[id number]]  (Abnormal) Collected: 02/08/22 2053    Order Status: Completed Specimen: Stool Updated: 02/11/22 0720     Special Requests: NO SPECIAL REQUESTS        Culture result:       No Salmonella, Shigella, or Ecoli 0157 isolated. MODERATE YEAST       COVID-19 RAPID TEST [[id number]] Collected: 02/09/22 0306    Order Status: Completed Specimen: Nasopharyngeal Updated: 02/09/22 0347     Specimen source NOT SPECIFIED        COVID-19 rapid test Not detected        Comment:      The specimen is NEGATIVE for SARS-CoV-2, the novel coronavirus associated with COVID-19. A negative result does not rule out COVID-19. This test has been authorized by the FDA under an Emergency Use Authorization (EUA) for use by authorized laboratories.         Fact sheet for Healthcare Providers: ConventionUpdate.co.nz  Fact sheet for Patients: [employer]Gr[employer].nz       Methodology: Isothermal Nucleic Acid Amplification         CAMPLYLOBACTER CULTURE [[id number]] Collected: 02/08/22 2053    Order Status: Completed Specimen: Stool Updated: 02/08/22 2215    INFLUENZA A & B AG (RAPID TEST) [[id number]] Collected: 02/07/22 2231    Order Status: Completed Specimen: Nasopharyngeal from Nasal washing Updated: 02/07/22 2302     Influenza A Ag Negative        Comment: NEGATIVE FOR THE PRESENCE OF INFLUENZA A ANTIGEN  INFECTION DUE TO INFLUENZA A CANNOT BE RULED OUT. BECAUSE THE ANTIGEN PRESENT IN THE SAMPLE MAY BE BELOW  THE DETECTION LIMIT OF THE TEST. A NEGATIVE TEST IS PRESUMPTIVE AND IT IS RECOMMENDED THAT THESE RESULTS BE CONFIRMED BY VIRAL CULTURE OR AN FDA-CLEARED INFLUENZA A AND B MOLECULAR ASSAY. Influenza B Ag Negative        Comment: NEGATIVE FOR THE PRESENCE OF INFLUENZA B ANTIGEN  INFECTION DUE TO INFLUENZA B CANNOT BE RULED OUT. BECAUSE THE ANTIGEN PRESENT IN THE SAMPLE MAY BE BELOW  THE DETECTION LIMIT OF THE TEST. A NEGATIVE TEST IS PRESUMPTIVE AND IT IS RECOMMENDED THAT THESE RESULTS BE CONFIRMED BY VIRAL CULTURE OR AN FDA-CLEARED INFLUENZA A AND B MOLECULAR ASSAY. Source Nasopharyngeal       COVID-19 RAPID TEST [[id number]] Collected: 02/07/22 1629    Order Status: Completed Specimen: Nasopharyngeal Updated: 02/07/22 1700     Specimen source Nasopharyngeal        COVID-19 rapid test Not detected        Comment:      The specimen is NEGATIVE for SARS-CoV-2, the novel coronavirus associated with COVID-19. A negative result does not rule out COVID-19. This test has been authorized by the FDA under an Emergency Use Authorization (EUA) for use by authorized laboratories.         Fact sheet for Healthcare Providers: [employer] Photonicsdate.co.nz  Fact sheet for Patients: [employer] Photonicsdate.co.nz       Methodology: Isothermal Nucleic Acid Amplification             Other Studies:  DUPLEX UPPER EXT VENOUS LEFT    Result Date: 2/12/2022  EXAMINATION: DUPLEX UPPER EXT VENOUS LEFT HISTORY: 62years-old Male with edema. TECHNIQUE: 2D, color flow doppler, and spectral waveform analysis along with sequential compression of the left arm were performed. COMPARISON: 8/25/2021 FINDINGS: The left jugular and subclavian veins are patent. The left axillary, brachial, cephalic, basilic, radial, and ulnar veins demonstrate normal compression, augmentation, waveform, and flow. No deep venous thrombosis is identified. No fluid collection is seen. This examination is negative for DVT in the left upper extremity.        Current Meds:  Current Facility-Administered Medications   Medication Dose Route Frequency    eplerenone (INSPRA) tablet 25 mg (Patient Supplied)  25 mg Oral DAILY    [Held by provider] valsartan (DIOVAN) tablet 40 mg  40 mg Oral DAILY    carvediloL (COREG) tablet 12.5 mg  12.5 mg Oral BID WITH MEALS    sodium chloride (NS) flush 10 mL  10 mL InterCATHeter DAILY    sodium chloride (NS) flush 10 mL  10 mL InterCATHeter PRN    midodrine (PROAMATINE) tablet 5 mg  5 mg Oral BID WITH MEALS    albuterol (PROVENTIL VENTOLIN) nebulizer solution 2.5 mg  2.5 mg Nebulization BID RT    sucralfate (CARAFATE) tablet 1 g  1 g Oral Q6H    methylPREDNISolone (PF) (SOLU-MEDROL) injection 40 mg  40 mg IntraVENous DAILY    LORazepam (ATIVAN) tablet 1 mg  1 mg Oral Q4H PRN    promethazine (PHENERGAN) with saline injection 12.5 mg  12.5 mg IntraVENous Q4H PRN    albuterol (PROVENTIL VENTOLIN) nebulizer solution 2.5 mg  2.5 mg Nebulization Q4H PRN    dicyclomine (BENTYL) capsule 20 mg  20 mg Oral TID    sodium chloride (OCEAN) 0.65 % nasal squeeze bottle 2 Spray  2 [place] Both Nostrils Q2HWA    fluticasone propionate (FLONASE) 50 mcg/actuation nasal spray 2 Spray  2 Spray Both Nostrils BID    metoprolol (LOPRESSOR) injection 5 mg  5 mg IntraVENous Q4H PRN    LORazepam (ATIVAN) injection 1 mg  1 mg IntraVENous Q6H PRN    ivabradine (CORLANOR) tablet 5 mg  5 mg Oral BID WITH MEALS    metroNIDAZOLE (FLAGYL) IVPB premix 500 mg  500 mg IntraVENous Q12H    cefTRIAXone (ROCEPHIN) 1 g in 0.9% sodium chloride (MBP/ADV) 50 mL MBP  1 g IntraVENous Q24H    pantoprazole (PROTONIX) 40 mg in 0.9% sodium chloride 10 mL injection  40 mg IntraVENous Q12H    sodium chloride (NS) flush 5-10 mL  5-10 mL IntraVENous Q8H    sodium chloride (NS) flush 5-10 mL  5-10 mL IntraVENous PRN    0.9% sodium chloride infusion  75 mL/hr IntraVENous CONTINUOUS    ALPRAZolam (XANAX) tablet 1 mg  1 mg Oral QHS    aspirin delayed-release tablet 81 mg  81 mg Oral DAILY    atorvastatin (LIPITOR) tablet 80 mg  80 mg Oral DAILY    gabapentin (NEURONTIN) capsule 300 mg  300 mg Oral BID    HYDROcodone-acetaminophen (NORCO)  mg tablet 1 Tablet  1 Tablet Oral Q6H PRN    sodium chloride (NS) flush 5-40 mL  5-40 mL IntraVENous PRN    [Held by provider] acetaminophen (TYLENOL) tablet 650 mg  650 mg Oral Q6H PRN    Or    [Held by provider] acetaminophen (TYLENOL) suppository 650 mg  650 mg Rectal Q6H PRN    polyethylene glycol (MIRALAX) packet 17 g  17 g Oral DAILY PRN    magnesium oxide (MAG-OX) tablet 400 mg  400 mg Oral DAILY       Signed:  Danley Castleman, PA-C

## 2022-02-13 NOTE — PROGRESS NOTES
[facility] Progress Note         Admit Date:  2/7/2022    Today's Date:  2/13/2022    CC:  Abdominal pain    Subjective:     Patient complains of ongoing, excruciating abdominal pain. He is asking for pain meds frequently. He complains of ongoing vomiting and diarrhea as well. The RN staff has not witnessed this.     Medications:   Current Facility-Administered Medications   Medication Dose Route Frequency    eplerenone (INSPRA) tablet 25 mg (Patient Supplied)  25 mg Oral DAILY    [Held by provider] valsartan (DIOVAN) tablet 40 mg  40 mg Oral DAILY    carvediloL (COREG) tablet 12.5 mg  12.5 mg Oral BID WITH MEALS    sodium chloride (NS) flush 10 mL  10 mL InterCATHeter DAILY    sodium chloride (NS) flush 10 mL  10 mL InterCATHeter PRN    midodrine (PROAMATINE) tablet 5 mg  5 mg Oral BID WITH MEALS    albuterol (PROVENTIL VENTOLIN) nebulizer solution 2.5 mg  2.5 mg Nebulization BID RT    sucralfate (CARAFATE) tablet 1 g  1 g Oral Q6H    methylPREDNISolone (PF) (SOLU-MEDROL) injection 40 mg  40 mg IntraVENous DAILY    LORazepam (ATIVAN) tablet 1 mg  1 mg Oral Q4H PRN    promethazine (PHENERGAN) with saline injection 12.5 mg  12.5 mg IntraVENous Q4H PRN    albuterol (PROVENTIL VENTOLIN) nebulizer solution 2.5 mg  2.5 mg Nebulization Q4H PRN    dicyclomine (BENTYL) capsule 20 mg  20 mg Oral TID    sodium chloride (OCEAN) 0.65 % nasal squeeze bottle 2 Spray  2 [place] Both Nostrils Q2HWA    fluticasone propionate (FLONASE) 50 mcg/actuation nasal spray 2 Spray  2 Spray Both Nostrils BID    metoprolol (LOPRESSOR) injection 5 mg  5 mg IntraVENous Q4H PRN    LORazepam (ATIVAN) injection 1 mg  1 mg IntraVENous Q6H PRN    ivabradine (CORLANOR) tablet 5 mg  5 mg Oral BID WITH MEALS    metroNIDAZOLE (FLAGYL) IVPB premix 500 mg  500 mg IntraVENous Q12H    cefTRIAXone (ROCEPHIN) 1 g in 0.9% sodium chloride (MBP/ADV) 50 mL MBP  1 g IntraVENous Q24H    pantoprazole (PROTONIX) 40 mg in 0.9% sodium chloride 10 mL injection  40 mg IntraVENous Q12H    sodium chloride (NS) flush 5-10 mL  5-10 mL IntraVENous Q8H    sodium chloride (NS) flush 5-10 mL  5-10 mL IntraVENous PRN    0.9% sodium chloride infusion  75 mL/hr IntraVENous CONTINUOUS    ALPRAZolam (XANAX) tablet 1 mg  1 mg Oral QHS    aspirin delayed-release tablet 81 mg  81 mg Oral DAILY    atorvastatin (LIPITOR) tablet 80 mg  80 mg Oral DAILY    gabapentin (NEURONTIN) capsule 300 mg  300 mg Oral BID    HYDROcodone-acetaminophen (NORCO)  mg tablet 1 Tablet  1 Tablet Oral Q6H PRN    sodium chloride (NS) flush 5-40 mL  5-40 mL IntraVENous PRN    [Held by provider] acetaminophen (TYLENOL) tablet 650 mg  650 mg Oral Q6H PRN    Or    [Held by provider] acetaminophen (TYLENOL) suppository 650 mg  650 mg Rectal Q6H PRN    polyethylene glycol (MIRALAX) packet 17 g  17 g Oral DAILY PRN    magnesium oxide (MAG-OX) tablet 400 mg  400 mg Oral DAILY       Objective:   Vitals:  Visit Vitals  BP 99/79 (BP 1 Location: Left upper arm, BP Patient Position: Lying)   Pulse 93   Temp 97.7 °F (36.5 °C)   Resp 16   Ht 5' 11\" (1.803 m)   Wt 90.1 kg (198 lb 10.2 oz)   SpO2 97%   BMI 27.70 kg/m²     Intake/Output:  No intake/output data recorded. 02/11 1901 - 02/13 0700  In: 56 [P.O.:890]  Out: 150 [Urine:150]  Exam:  General appearance: alert, cooperative, no distress  Abdomen: soft, tender in RUQ, no rash present. Bowel sounds normal. No masses, no organomegaly  Neuro:  alert and oriented    Data Review (Labs):    Recent Labs     02/13/22  0321 02/12/22  1124   WBC 13.5* 11.5*   HGB 11.6* 12.0*   HCT 36.5* 38.2*    348   MCV 95.8 96.7   * 137*   K 3.3* 3.3*   * 110*   CO2 20* 20*   BUN 7 9   CREA 1.10 1.30   CA 7.4* 7.2*   * 133*       Assessment:     Principal Problem:    Acute gastroenteritis (2/26/2016) Yesterday, the decision was to obtain UGI series. This was ordered but not performed yet.   Still no objective evidence of a source for his reported pain, and his other symptoms have not been seen by the RN staff. We will continue evaluation, but I'm concerned about the validity of his requests for more pain medication. Active Problems:    Chronic systolic (congestive) heart failure ([facility] [address] (4/21/2011)      HTN (hypertension) (11/29/2011)      Non-ischemic cardiomyopathy ([facility] [address] (3/24/2016)      Transaminitis (3/14/2017)      Inspiratory stridor (3/21/2017)      SVT (supraventricular tachycardia) ([facility] [address] (12/22/2018)      YAO (acute kidney injury) ([facility] [address] (8/23/2021)      [facility] (8/24/2021)      Hyperbilirubinemia (2/7/2022)        Plan:     Hopefully UGI series for the [facility] will be done today. We won't be able to obtain CT and UGI series on same day due to interference from the oral contrast agents. His creatinine has normalized, so IV contrasted CT can be performed.

## 2022-02-14 ENCOUNTER — APPOINTMENT (OUTPATIENT)
Dept: GENERAL RADIOLOGY | Age: 57
DRG: 309 | End: 2022-02-14
Attending: NURSE PRACTITIONER
Payer: COMMERCIAL

## 2022-02-14 PROBLEM — K44.9 HIATAL HERNIA: Status: ACTIVE | Noted: 2022-02-14

## 2022-02-14 PROBLEM — K22.4 ESOPHAGEAL DYSMOTILITY: Status: ACTIVE | Noted: 2022-02-14

## 2022-02-14 PROBLEM — R10.84 GENERALIZED ABDOMINAL PAIN: Status: ACTIVE | Noted: 2022-02-14

## 2022-02-14 LAB
ANION GAP SERPL CALC-SCNC: 6 MMOL/L (ref 7–16)
BASOPHILS # BLD: 0 K/UL (ref 0–0.2)
BASOPHILS NFR BLD: 0 % (ref 0–2)
BUN SERPL-MCNC: 4 MG/DL (ref 6–23)
CALCIUM SERPL-MCNC: 7.7 MG/DL (ref 8.3–10.4)
CHLORIDE SERPL-SCNC: 112 MMOL/L (ref 98–107)
CO2 SERPL-SCNC: 21 MMOL/L (ref 21–32)
CREAT SERPL-MCNC: 1 MG/DL (ref 0.8–1.5)
DIFFERENTIAL METHOD BLD: ABNORMAL
EOSINOPHIL # BLD: 0 K/UL (ref 0–0.8)
EOSINOPHIL NFR BLD: 0 % (ref 0.5–7.8)
ERYTHROCYTE [DISTWIDTH] IN BLOOD BY AUTOMATED COUNT: 14.9 % (ref 11.9–14.6)
GLUCOSE SERPL-MCNC: 106 MG/DL (ref 65–100)
HCT VFR BLD AUTO: 36 % (ref 41.1–50.3)
HGB BLD-MCNC: 11.4 G/DL (ref 13.6–17.2)
IMM GRANULOCYTES # BLD AUTO: 0.2 K/UL (ref 0–0.5)
IMM GRANULOCYTES NFR BLD AUTO: 2 % (ref 0–5)
LYMPHOCYTES # BLD: 1.3 K/UL (ref 0.5–4.6)
LYMPHOCYTES NFR BLD: 12 % (ref 13–44)
MCH RBC QN AUTO: 30.4 PG (ref 26.1–32.9)
MCHC RBC AUTO-ENTMCNC: 31.7 G/DL (ref 31.4–35)
MCV RBC AUTO: 96 FL (ref 79.6–97.8)
MONOCYTES # BLD: 1.1 K/UL (ref 0.1–1.3)
MONOCYTES NFR BLD: 10 % (ref 4–12)
NEUTS SEG # BLD: 8.4 K/UL (ref 1.7–8.2)
NEUTS SEG NFR BLD: 76 % (ref 43–78)
NRBC # BLD: 0 K/UL (ref 0–0.2)
PLATELET # BLD AUTO: 331 K/UL (ref 150–450)
PMV BLD AUTO: 9.2 FL (ref 9.4–12.3)
POTASSIUM SERPL-SCNC: 3.5 MMOL/L (ref 3.5–5.1)
RBC # BLD AUTO: 3.75 M/UL (ref 4.23–5.6)
SODIUM SERPL-SCNC: 139 MMOL/L (ref 138–145)
WBC # BLD AUTO: 11 K/UL (ref 4.3–11.1)

## 2022-02-14 PROCEDURE — 74011250637 HC RX REV CODE- 250/637: Performed by: EMERGENCY MEDICINE

## 2022-02-14 PROCEDURE — 74011250636 HC RX REV CODE- 250/636: Performed by: NURSE PRACTITIONER

## 2022-02-14 PROCEDURE — 74011000250 HC RX REV CODE- 250: Performed by: FAMILY MEDICINE

## 2022-02-14 PROCEDURE — 99255 IP/OBS CONSLTJ NEW/EST HI 80: CPT | Performed by: SURGERY

## 2022-02-14 PROCEDURE — 74011250637 HC RX REV CODE- 250/637: Performed by: PHYSICIAN ASSISTANT

## 2022-02-14 PROCEDURE — 74011250637 HC RX REV CODE- 250/637: Performed by: INTERNAL MEDICINE

## 2022-02-14 PROCEDURE — 99232 SBSQ HOSP IP/OBS MODERATE 35: CPT | Performed by: INTERNAL MEDICINE

## 2022-02-14 PROCEDURE — 74011250637 HC RX REV CODE- 250/637: Performed by: NURSE PRACTITIONER

## 2022-02-14 PROCEDURE — 80048 BASIC METABOLIC PNL TOTAL CA: CPT

## 2022-02-14 PROCEDURE — 74011000250 HC RX REV CODE- 250: Performed by: NURSE PRACTITIONER

## 2022-02-14 PROCEDURE — 85025 COMPLETE CBC W/AUTO DIFF WBC: CPT

## 2022-02-14 PROCEDURE — 74011250636 HC RX REV CODE- 250/636: Performed by: INTERNAL MEDICINE

## 2022-02-14 PROCEDURE — 74011000250 HC RX REV CODE- 250: Performed by: EMERGENCY MEDICINE

## 2022-02-14 PROCEDURE — 74240 X-RAY XM UPR GI TRC 1CNTRST: CPT

## 2022-02-14 PROCEDURE — 74011000258 HC RX REV CODE- 258: Performed by: INTERNAL MEDICINE

## 2022-02-14 PROCEDURE — 65270000029 HC RM PRIVATE

## 2022-02-14 PROCEDURE — C9113 INJ PANTOPRAZOLE SODIUM, VIA: HCPCS | Performed by: INTERNAL MEDICINE

## 2022-02-14 PROCEDURE — 74011000250 HC RX REV CODE- 250: Performed by: INTERNAL MEDICINE

## 2022-02-14 RX ORDER — HYDROCODONE BITARTRATE AND ACETAMINOPHEN 10; 325 MG/1; MG/1
1 TABLET ORAL
Status: DISCONTINUED | OUTPATIENT
Start: 2022-02-14 | End: 2022-02-17 | Stop reason: HOSPADM

## 2022-02-14 RX ADMIN — SALINE NASAL SPRAY 2 SPRAY: 1.5 SOLUTION NASAL at 16:00

## 2022-02-14 RX ADMIN — SODIUM CHLORIDE, PRESERVATIVE FREE 10 ML: 5 INJECTION INTRAVENOUS at 22:03

## 2022-02-14 RX ADMIN — BARIUM SULFATE 135 ML: 980 POWDER, FOR SUSPENSION ORAL at 10:38

## 2022-02-14 RX ADMIN — HYDROCODONE BITARTRATE AND ACETAMINOPHEN 1 TABLET: 10; 325 TABLET ORAL at 05:12

## 2022-02-14 RX ADMIN — MIDODRINE HYDROCHLORIDE 5 MG: 5 TABLET ORAL at 17:47

## 2022-02-14 RX ADMIN — SODIUM CHLORIDE, PRESERVATIVE FREE 12.5 MG: 5 INJECTION INTRAVENOUS at 11:10

## 2022-02-14 RX ADMIN — SODIUM CHLORIDE, PRESERVATIVE FREE 12.5 MG: 5 INJECTION INTRAVENOUS at 15:06

## 2022-02-14 RX ADMIN — HYDROCODONE BITARTRATE AND ACETAMINOPHEN 1 TABLET: 10; 325 TABLET ORAL at 11:09

## 2022-02-14 RX ADMIN — HYDROCODONE BITARTRATE AND ACETAMINOPHEN 1 TABLET: 10; 325 TABLET ORAL at 20:27

## 2022-02-14 RX ADMIN — HYDROCODONE BITARTRATE AND ACETAMINOPHEN 1 TABLET: 10; 325 TABLET ORAL at 15:02

## 2022-02-14 RX ADMIN — ATORVASTATIN CALCIUM 80 MG: 80 TABLET, FILM COATED ORAL at 10:57

## 2022-02-14 RX ADMIN — SALINE NASAL SPRAY 2 SPRAY: 1.5 SOLUTION NASAL at 18:00

## 2022-02-14 RX ADMIN — IVABRADINE 5 MG: 5 TABLET, FILM COATED ORAL at 11:05

## 2022-02-14 RX ADMIN — SODIUM CHLORIDE 40 MG: 9 INJECTION, SOLUTION INTRAMUSCULAR; INTRAVENOUS; SUBCUTANEOUS at 20:27

## 2022-02-14 RX ADMIN — SUCRALFATE 1 G: 1 TABLET ORAL at 17:47

## 2022-02-14 RX ADMIN — SODIUM CHLORIDE, PRESERVATIVE FREE 10 ML: 5 INJECTION INTRAVENOUS at 05:09

## 2022-02-14 RX ADMIN — METRONIDAZOLE 500 MG: 500 INJECTION, SOLUTION INTRAVENOUS at 11:02

## 2022-02-14 RX ADMIN — SODIUM CHLORIDE, PRESERVATIVE FREE 10 ML: 5 INJECTION INTRAVENOUS at 13:45

## 2022-02-14 RX ADMIN — CEFTRIAXONE 1 G: 1 INJECTION, POWDER, FOR SOLUTION INTRAMUSCULAR; INTRAVENOUS at 13:45

## 2022-02-14 RX ADMIN — SODIUM CHLORIDE, PRESERVATIVE FREE 10 ML: 5 INJECTION INTRAVENOUS at 10:59

## 2022-02-14 RX ADMIN — SODIUM CHLORIDE 40 MG: 9 INJECTION, SOLUTION INTRAMUSCULAR; INTRAVENOUS; SUBCUTANEOUS at 10:59

## 2022-02-14 RX ADMIN — CARVEDILOL 12.5 MG: 6.25 TABLET, FILM COATED ORAL at 17:47

## 2022-02-14 RX ADMIN — METRONIDAZOLE 500 MG: 500 INJECTION, SOLUTION INTRAVENOUS at 20:36

## 2022-02-14 RX ADMIN — SALINE NASAL SPRAY 2 SPRAY: 1.5 SOLUTION NASAL at 14:00

## 2022-02-14 RX ADMIN — ASPIRIN 81 MG: 81 TABLET ORAL at 11:03

## 2022-02-14 RX ADMIN — DICYCLOMINE HYDROCHLORIDE 20 MG: 10 CAPSULE ORAL at 21:58

## 2022-02-14 RX ADMIN — METHYLPREDNISOLONE SODIUM SUCCINATE 40 MG: 40 INJECTION, POWDER, FOR SOLUTION INTRAMUSCULAR; INTRAVENOUS at 10:59

## 2022-02-14 RX ADMIN — SODIUM CHLORIDE, PRESERVATIVE FREE 12.5 MG: 5 INJECTION INTRAVENOUS at 05:07

## 2022-02-14 RX ADMIN — CARVEDILOL 12.5 MG: 6.25 TABLET, FILM COATED ORAL at 11:05

## 2022-02-14 RX ADMIN — DICYCLOMINE HYDROCHLORIDE 20 MG: 10 CAPSULE ORAL at 15:01

## 2022-02-14 RX ADMIN — Medication 400 MG: at 10:58

## 2022-02-14 RX ADMIN — SUCRALFATE 1 G: 1 TABLET ORAL at 12:23

## 2022-02-14 RX ADMIN — MIDODRINE HYDROCHLORIDE 5 MG: 5 TABLET ORAL at 11:00

## 2022-02-14 RX ADMIN — GABAPENTIN 300 MG: 300 CAPSULE ORAL at 21:58

## 2022-02-14 RX ADMIN — SODIUM CHLORIDE 75 ML/HR: 900 INJECTION, SOLUTION INTRAVENOUS at 12:23

## 2022-02-14 RX ADMIN — ALPRAZOLAM 1 MG: 0.5 TABLET ORAL at 21:58

## 2022-02-14 RX ADMIN — SODIUM CHLORIDE, PRESERVATIVE FREE 12.5 MG: 5 INJECTION INTRAVENOUS at 20:25

## 2022-02-14 RX ADMIN — DICYCLOMINE HYDROCHLORIDE 20 MG: 10 CAPSULE ORAL at 10:56

## 2022-02-14 RX ADMIN — SALINE NASAL SPRAY 2 SPRAY: 1.5 SOLUTION NASAL at 05:10

## 2022-02-14 RX ADMIN — GABAPENTIN 300 MG: 300 CAPSULE ORAL at 10:58

## 2022-02-14 RX ADMIN — IVABRADINE 5 MG: 5 TABLET, FILM COATED ORAL at 17:46

## 2022-02-14 RX ADMIN — SALINE NASAL SPRAY 2 SPRAY: 1.5 SOLUTION NASAL at 12:00

## 2022-02-14 RX ADMIN — SUCRALFATE 1 G: 1 TABLET ORAL at 22:00

## 2022-02-14 NOTE — PROGRESS NOTES
Daily Progress Note: 2/14/2022    Malissa Carrillo  Admission Date: 2/7/2022    Length of Stay: 7 days      Background: Patient is a 62 y.o.  male seen and evaluated at the request of Dr. Corby Reid. Gama Amaya a 62 y. o. male with medical history of nonischemic cardiomyopathy with EF of 20-25%, HTN, HLD, and chronic back pain presents  HR in the 150s-160s, cardiology reviewed strips and it is most likely atrial tachycardia as opposed to VT. WBC count of 13k. Troponin of 245 with repeat of 270. Procalcitonin of 0.06. pBNP of 1690. SCr of 2.4. K of 3.4. Rapid COVID is negative. He developed stridorous breathing and we were asked to assess. He was given racemic epinephrine with improvement, subsequently placed on systemic steroids H2 blockers. He had some worsening later and was sent for neck CT with to my eye widely patent airways. Please note that we encountered the patient with similar symptoms in 2017, he had bronchoscopy at that time and no pathology was found. He was seen by ENT and Both aryepiglottic folds with minimal erythema and edema. Mild TVC erythema and edema.  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  No nodules or polyps and the cords were fully mobile. No concerning lesions seen along post-cricoid region or within either piriform sinus. The posterior pharyngeal was clear as well.  Visible portion of subglottis appears patent    Subjective:     Plans for upper GI series today  ST consulted but P  On RA    Review of Systems  Constitutional: negative for fever, chills, sweats  Cardiovascular: negative for chest pain, palpitations, syncope, edema  Gastrointestinal:  negative for dysphagia, reflux, vomiting, diarrhea, abdominal pain, or melena  Neurologic:  negative for focal weakness, numbness, headache    Current Facility-Administered Medications   Medication Dose Route Frequency    eplerenone (INSPRA) tablet 25 mg (Patient Supplied)  25 mg Oral DAILY    [Held by provider] valsartan (DIOVAN) tablet 40 mg  40 mg Oral DAILY    carvediloL (COREG) tablet 12.5 mg  12.5 mg Oral BID WITH MEALS    sodium chloride (NS) flush 10 mL  10 mL InterCATHeter DAILY    sodium chloride (NS) flush 10 mL  10 mL InterCATHeter PRN    midodrine (PROAMATINE) tablet 5 mg  5 mg Oral BID WITH MEALS    albuterol (PROVENTIL VENTOLIN) nebulizer solution 2.5 mg  2.5 mg Nebulization BID RT    sucralfate (CARAFATE) tablet 1 g  1 g Oral Q6H    methylPREDNISolone (PF) (SOLU-MEDROL) injection 40 mg  40 mg IntraVENous DAILY    LORazepam (ATIVAN) tablet 1 mg  1 mg Oral Q4H PRN    promethazine (PHENERGAN) with saline injection 12.5 mg  12.5 mg IntraVENous Q4H PRN    albuterol (PROVENTIL VENTOLIN) nebulizer solution 2.5 mg  2.5 mg Nebulization Q4H PRN    dicyclomine (BENTYL) capsule 20 mg  20 mg Oral TID    sodium chloride (OCEAN) 0.65 % nasal squeeze bottle 2 Spray  2 [place] Both Nostrils Q2HWA    fluticasone propionate (FLONASE) 50 mcg/actuation nasal spray 2 Spray  2 Spray Both Nostrils BID    metoprolol (LOPRESSOR) injection 5 mg  5 mg IntraVENous Q4H PRN    LORazepam (ATIVAN) injection 1 mg  1 mg IntraVENous Q6H PRN    ivabradine (CORLANOR) tablet 5 mg  5 mg Oral BID WITH MEALS    metroNIDAZOLE (FLAGYL) IVPB premix 500 mg  500 mg IntraVENous Q12H    cefTRIAXone (ROCEPHIN) 1 g in 0.9% sodium chloride (MBP/ADV) 50 mL MBP  1 g IntraVENous Q24H    pantoprazole (PROTONIX) 40 mg in 0.9% sodium chloride 10 mL injection  40 mg IntraVENous Q12H    sodium chloride (NS) flush 5-10 mL  5-10 mL IntraVENous Q8H    sodium chloride (NS) flush 5-10 mL  5-10 mL IntraVENous PRN    0.9% sodium chloride infusion  75 mL/hr IntraVENous CONTINUOUS    ALPRAZolam (XANAX) tablet 1 mg  1 mg Oral QHS    aspirin delayed-release tablet 81 mg  81 mg Oral DAILY    atorvastatin (LIPITOR) tablet 80 mg  80 mg Oral DAILY    gabapentin (NEURONTIN) capsule 300 mg  300 mg Oral BID    HYDROcodone-acetaminophen (NORCO)  mg tablet 1 Tablet  1 Tablet Oral Q6H PRN    sodium chloride (NS) flush 5-40 mL  5-40 mL IntraVENous PRN    [Held by provider] acetaminophen (TYLENOL) tablet 650 mg  650 mg Oral Q6H PRN    Or    [Held by provider] acetaminophen (TYLENOL) suppository 650 mg  650 mg Rectal Q6H PRN    polyethylene glycol (MIRALAX) packet 17 g  17 g Oral DAILY PRN    magnesium oxide (MAG-OX) tablet 400 mg  400 mg Oral DAILY         Objective:     Vitals:    02/13/22 2339 02/14/22 0324 02/14/22 0722 02/14/22 1110   BP: 121/85 110/85 105/80 (!) 136/92   Pulse: 80 72 78 86   Resp: 16 16 16 20   Temp: 98.2 °F (36.8 °C) 98.2 °F (36.8 °C) 97.6 °F (36.4 °C) 97.6 °F (36.4 °C)   SpO2: 98% 96% 97% 97%   Weight:       Height:           Intake/Output:    Physical Exam:          GEN: well developed and in no acute distress  HEENT:no alar flaring or epistaxis, oral mucosa moist without cyanosis,   NECK:  no JVD, no retractions, no thyromegaly or masses,   LUNGS:   On RA  HEART:  RRR with no M,G,R;  ABDOMEN:  soft with no tenderness; positive bowel sounds present  EXTREMITIES:  warm with no cyanosis, no lower leg edema  SKIN:  no jaundice or ecchymosis   NEURO: A & O X 3      ACTIVITY: as tolerated  NUTRITION: NPO for procedure    IMAGES: Results from Hospital Encounter encounter on 02/07/22    CT NECK SOFT TISSUE W CONT    Impression  No definite abnormality or visualized etiology for stridor and shortness of  breath. No results found for this or any previous visit. Results from Hospital Encounter encounter on 02/07/22    ECHO ADULT COMPLETE    Interpretation Summary    Left Ventricle: Left ventricle is mildly dilated. Normal wall thickness. Severe global hypokinesis present. The EF by visual approximation is 15 - 20%. Normal diastolic function.   Mitral Valve: Mildly thickened leaflets. Mild transvalvular regurgitation.   Tricuspid Valve: Trace transvalvular regurgitation.   Contrast used: Definity. LAB  Recent Labs     02/14/22  0438 02/13/22  0321 02/12/22  1124   WBC 11.0 13.5* 11.5*   HGB 11.4* 11.6* 12.0*   HCT 36.0* 36.5* 38.2*    341 348     Recent Labs     02/14/22  0438 02/13/22  0321 02/12/22  1124 02/12/22  0054    136* 137*  --    K 3.5 3.3* 3.3*  --    * 109* 110*  --    CO2 21 20* 20*  --    * 106* 133*  --    BUN 4* 7 9  --    CREA 1.00 1.10 1.30  --    MG  --   --   --  2.2     No results for input(s): LCAD, LAC in the last 72 hours. ABG:   No results for input(s): PH, PCO2, PO2, HCO3, PHI, PCO2I, PO2I, HCO3I, FIO2I in the last 72 hours. Cultures:   No results for input(s): SDES, CULT in the last 72 hours. Assessment/Plan:    Acute gastroenteritis (2/26/2016)  + stools, studies NTD. Bentyl added. GI evaluating and UGI series planned for today. Continues with right sided abdominal pain.        Chronic systolic (congestive) heart failure ([facility] [address] (4/21/2011)  EF 20 to 25%. No recent lasix with nausea, vomiting and diarrhea. Cr 1     Hypotensive-. On midodrine and on IV fluids. ARB held. On b blocker for heart failure. Cardiology following.        Non-ischemic cardiomyopathy ([facility] [address] (3/24/2016)   as above. Holding diuretics with diarrhea and hypotension. Still on IV fluids with + balance of 3.2 liters but CXR clear and no edema       Inspiratory stridor (3/21/2017)  Repeat CT of neck is normal and has been seen by ENT. On solumedrol and pepcid  On RA  Holding ARB. ST consult pending. Still on steroids and pepcid - mild edema noted with laryngoscopy. Will await ST input and then consider stopping steroids/pepcid. Has Racemic epi available as needed.        SVT (supraventricular tachycardia) ([facility] [address] (12/22/2018)  Resolved. Cardiology has seen. On coreg       Demand ischemia ([facility] [address] (8/24/2021)    Associated with tachycardia     Upper GI series today. On RA. Mild edema, recent N/V/dirrhea. Arb stopped. He is on steroids and pepcid awaiting ST evaluation. Supportive and Prophylactic Tx:   DVT PPx: SCDs, ambulating  GI (PUD) PPx:  PT: OOB  Full Code  Marianna Dunaway NP    The patient has been seen and examined by me personally, the chart,labs, and radiographic studies have been reviewed. Chest: CTA  Extremities: no edema  No stridor at time of my exam, his symptoms could be due to VCD and stridor seems for all intent and purposes volitional , ENT following    Nothing to add from Pulmonary stand point will be available as needed. I agree with the above assessment and plan.     Jia William MD.

## 2022-02-14 NOTE — PROGRESS NOTES
[facility] Progress Note         Admit Date:  2/7/2022  Today's Date:  2/14/2022    CC:  Abdominal pain    Subjective:     Patient seen in radiology. UGI series pending. No changes in patient's GI symptoms. Still with ongoing abdominal pain, nausea with dry heaving, and diarrhea.      Medications:   Current Facility-Administered Medications   Medication Dose Route Frequency    eplerenone (INSPRA) tablet 25 mg (Patient Supplied)  25 mg Oral DAILY    [Held by provider] valsartan (DIOVAN) tablet 40 mg  40 mg Oral DAILY    carvediloL (COREG) tablet 12.5 mg  12.5 mg Oral BID WITH MEALS    sodium chloride (NS) flush 10 mL  10 mL InterCATHeter DAILY    sodium chloride (NS) flush 10 mL  10 mL InterCATHeter PRN    midodrine (PROAMATINE) tablet 5 mg  5 mg Oral BID WITH MEALS    albuterol (PROVENTIL VENTOLIN) nebulizer solution 2.5 mg  2.5 mg Nebulization BID RT    sucralfate (CARAFATE) tablet 1 g  1 g Oral Q6H    methylPREDNISolone (PF) (SOLU-MEDROL) injection 40 mg  40 mg IntraVENous DAILY    LORazepam (ATIVAN) tablet 1 mg  1 mg Oral Q4H PRN    promethazine (PHENERGAN) with saline injection 12.5 mg  12.5 mg IntraVENous Q4H PRN    albuterol (PROVENTIL VENTOLIN) nebulizer solution 2.5 mg  2.5 mg Nebulization Q4H PRN    dicyclomine (BENTYL) capsule 20 mg  20 mg Oral TID    sodium chloride (OCEAN) 0.65 % nasal squeeze bottle 2 Spray  2 [place] Both Nostrils Q2HWA    fluticasone propionate (FLONASE) 50 mcg/actuation nasal spray 2 Spray  2 Spray Both Nostrils BID    metoprolol (LOPRESSOR) injection 5 mg  5 mg IntraVENous Q4H PRN    LORazepam (ATIVAN) injection 1 mg  1 mg IntraVENous Q6H PRN    ivabradine (CORLANOR) tablet 5 mg  5 mg Oral BID WITH MEALS    metroNIDAZOLE (FLAGYL) IVPB premix 500 mg  500 mg IntraVENous Q12H    cefTRIAXone (ROCEPHIN) 1 g in 0.9% sodium chloride (MBP/ADV) 50 mL MBP  1 g IntraVENous Q24H    pantoprazole (PROTONIX) 40 mg in 0.9% sodium chloride 10 mL injection  40 mg IntraVENous Q12H    sodium chloride (NS) flush 5-10 mL  5-10 mL IntraVENous Q8H    sodium chloride (NS) flush 5-10 mL  5-10 mL IntraVENous PRN    0.9% sodium chloride infusion  75 mL/hr IntraVENous CONTINUOUS    ALPRAZolam (XANAX) tablet 1 mg  1 mg Oral QHS    aspirin delayed-release tablet 81 mg  81 mg Oral DAILY    atorvastatin (LIPITOR) tablet 80 mg  80 mg Oral DAILY    gabapentin (NEURONTIN) capsule 300 mg  300 mg Oral BID    HYDROcodone-acetaminophen (NORCO)  mg tablet 1 Tablet  1 Tablet Oral Q6H PRN    sodium chloride (NS) flush 5-40 mL  5-40 mL IntraVENous PRN    [Held by provider] acetaminophen (TYLENOL) tablet 650 mg  650 mg Oral Q6H PRN    Or    [Held by provider] acetaminophen (TYLENOL) suppository 650 mg  650 mg Rectal Q6H PRN    polyethylene glycol (MIRALAX) packet 17 g  17 g Oral DAILY PRN    magnesium oxide (MAG-OX) tablet 400 mg  400 mg Oral DAILY       Objective:   Vitals:  Visit Vitals  /80 (BP 1 Location: Left upper arm, BP Patient Position: Supine)   Pulse 78   Temp 97.6 °F (36.4 °C)   Resp 16   Ht 5' 11\" (1.803 m)   Wt 90.1 kg (198 lb 10.2 oz)   SpO2 97%   BMI 27.70 kg/m²     Intake/Output:  No intake/output data recorded. No intake/output data recorded. Exam:  General appearance: alert, cooperative, NAD  Abdomen: soft, tender in mainly on right side of abdomen. NABS. Neuro:  A&O NAD    Data Review (Labs):    Recent Labs     02/14/22  0438 02/13/22  0321 02/12/22  1124 02/12/22  0054   WBC 11.0 13.5* 11.5*  --    HGB 11.4* 11.6* 12.0*  --    HCT 36.0* 36.5* 38.2*  --     341 348  --    MCV 96.0 95.8 96.7  --     136* 137*  --    K 3.5 3.3* 3.3*  --    * 109* 110*  --    CO2 21 20* 20*  --    BUN 4* 7 9  --    CREA 1.00 1.10 1.30  --    CA 7.7* 7.4* 7.2*  --    MG  --   --   --  2.2   * 106* 133*  --        Assessment:     Principal Problem:  Acute gastroenteritis (2/26/2016)  Still no objective evidence for patient's reported abdominal pain. UGI series result pending. Active Problems:  Chronic systolic (congestive) heart failure ([facility] [address] (4/21/2011)  HTN (hypertension) (11/29/2011)  Non-ischemic cardiomyopathy ([facility] [address] (3/24/2016)  Transaminitis (3/14/2017)  Inspiratory stridor (3/21/2017)  SVT (supraventricular tachycardia) ([facility] [address] (12/22/2018)  YAO (acute kidney injury) ([facility] [address] (8/23/2021)  Demand ischemia ([facility] [address] (8/24/2021)  Hyperbilirubinemia (2/7/2022)    EGD 2/11/22  Esophagus: LA grade D esophagitis  Stomach: Large hiatal hernia (50-60% of gastric volume) without Chad ulcers. Otherwise normal.  Duodenum: Normal    A/P CT scan 2/7 without contrast  IMPRESSION  1. Moderate hiatal hernia. Suggestion of eccentric submucosal wall thickening of  the proximal stomach, although this may be artifactual secondary to volume  averaging of fluid in the stomach. Correlation with upper endoscopy may be  beneficial.  2. No evidence of colitis, diverticulitis, appendicitis or bowel obstruction. 3. No renal calculus or hydronephrosis. 4. Absence of IV contrast limits sensitivity. Stool studies 2/8/22 was positive for yeast. C diff not obtained. Plan:     UGI series for the [facility] will be done today. Follow-up results. We won't be able to obtain CT and UGI series on same day due to interference from the oral contrast agents. His creatinine has normalized, so IV contrasted CT can be performed tomorrow. Pain management per primary. Anti-emetics prn. Further recs pending UGI series results.      Heather Mcintyre PA-C  [facility]

## 2022-02-14 NOTE — PROGRESS NOTES
Chart reviewed. Pt still with ongoing abdominal pain, nausea with dry heaving, and diarrhea per GI. CT planned for tomorrow with creatinine improvement. CM to continue to follow and monitor for any further needs.

## 2022-02-14 NOTE — PROGRESS NOTES
SPEECH PATHOLOGY NOTE:    Per chart, patient scheduled for upper GI today. Will tentatively re-schedule MBSS for tomorrow if still indicated pending results of study today. Addendum: given upper GI findings, will cancel modified barium swallow study for now as dysphagia symptoms appear to be more esophageal.   Per bedside swallow eval 2/12, patient tolerated thin liquids without s/sx aspiration, however he complained of pain and sticking sensation. Will follow up at bedside at later time/date pending GI recs.       Stan Pollard, [employer] [facility], [facility] ANGELA ELIDIA CARRIZALES, CCC-SLP

## 2022-02-14 NOTE — PROGRESS NOTES
Hospitalist Progress Note   Admit Date:  2022  4:06 PM   Name:  Candelaria Fox   Age:  62 y.o. Sex:  male  :  1965   MRN:  [id number]   Room:  [facility]/    Presenting Complaint: Chest Pain and Shortness of Breath    Reason(s) for Admission: YAO (acute kidney injury) [facility]) [N17.9]     Hospital Course & Interval History:   62 y. o. male with medical history of nonischemic cardiomyopathy with EF of 20-25%, HTN, HLD, and chronic back pain presents with heart palpitations, persistent nausea/vomiting, diarrhea, shortness of breath and generalized weakness for the past week. He says that he was called by his cardiologist and told that his HRs were high. He denies chest pain but says \"I just hurt all over\". He denies recreational drug use and alcohol. He denies black/red stools. He does not know of any food that may have triggered his symptoms.      ED course: HRs in the 150s-160s, cardiology reviewed strips and it is most likely atrial tachycardia as opposed to VT. WBC count of 13k. Troponin of 245 with repeat of 270. Procalcitonin of 0.06. pBNP of 1690. SCr of 2.4. K of 3.4. Rapid COVID is negative. CXR unremarkable. Subjective/24hr Events (22): Pt seen and examined s/p UGI series. C/o continued N/V, although not witnessed. Wants to try to eat food despite test results. Continues to ask for change in pain medication to IV. Endorses continued diarrhea. Also notes he feels like his hands are somewhat swollen.      Called later by nursing and informed that patient had beef jerky and bubble gum delivered to the hospital.     Assessment & Plan:     Acute Gastroenteritis  Intractable nausea/vomiting  GERD with large hiatal hernia type III  - GI consulted, s/p UGI series today with concern for type III hiatal hernia  - Cirrhosis workup at later date with GI  - s/p EGD on  - gastritis  - MBS cancelled by speech given UGI findings  - CT A/P with contrast ordered for AM 2/15  - Rocephin completed today, Flagyl EOT 2/15    Gastritis  - continue Carafate and PPI     Stridor  - only occurs during physical exam and not while conversing  - decrease Solumedrol and consider d/c altogether     Chronic systolic (congestive) heart failure -  - appears compensated, no LE edema  - Continue current regimen of Inspra, ivabradine and Coreg      YAO  - Resolved 2/9    Demand ischemia  - known history of nonischemic cardiomyopathy  - cont asa/statin  - telemetry  - cardiology has seen - now on stand by as of 2/11     SVT, appears to be Atrial tach per cardiology on admission  - continue Coreg  - cont asa/statin  - cardiology has seen    Hiatal hernia:  -  Pending CT A/P. Will consult surgery given patient continues to be symptomatic    Essential HTN:  - on midodrine for hypotension  - cont medications as for sCHF       Hypokalemia  - currently resolved, cont to monitor    Acute on Chronic Pain  - likely has opioid tolerance   - pt educated on no IV pain medication indicated  - will increase oral pain regimen to w2vuawa prn    LUE Edema  - US neg for dvt    Dispo/Discharge Planning:    Pending clinical improvement and CT A/P results.  Hopeful d/c to home vs HHC in 48 hours.      Diet:  ADULT DIET Clear Liquid  DVT PPx: SCDs  Code status: Full Code    Objective:     Patient Vitals for the past 24 hrs:   Temp Pulse Resp BP SpO2   02/14/22 1110 97.6 °F (36.4 °C) 86 20 (!) 136/92 97 %   02/14/22 0722 97.6 °F (36.4 °C) 78 16 105/80 97 %   02/14/22 0324 98.2 °F (36.8 °C) 72 16 110/85 96 %   02/13/22 2339 98.2 °F (36.8 °C) 80 16 121/85 98 %   02/13/22 2038 97.8 °F (36.6 °C) 83 18 109/69 95 %   02/13/22 1939     98 %   02/13/22 1632 98 °F (36.7 °C) 92 18 107/76 97 %     Oxygen Therapy  O2 Sat (%): 97 % (02/14/22 1110)  Pulse via Oximetry: 94 beats per minute (02/13/22 1939)  O2 Device: None (Room air) (02/14/22 0324)  Skin Assessment: Clean, dry, & intact (02/11/22 0949)  O2 Flow Rate (L/min): 0 l/min (02/12/22 0739)  FIO2 (%): 21 % (02/12/22 0739)    Estimated body mass index is 27.7 kg/m² as calculated from the following:    Height as of this encounter: 5' 11\" (1.803 m). Weight as of this encounter: 90.1 kg (198 lb 10.2 oz). Intake/Output Summary (Last 24 hours) at 2/14/2022 1319  Last data filed at 2/14/2022 0957  Gross per 24 hour   Intake 0 ml   Output    Net 0 ml         Physical Exam:   Blood pressure (!) 136/92, pulse 86, temperature 97.6 °F (36.4 °C), resp. rate 20, height 5' 11\" (1.803 m), weight 90.1 kg (198 lb 10.2 oz), SpO2 97 %. General:    Well nourished. No overt distress  Head:  Normocephalic, atraumatic  Eyes:  Sclerae appear normal.  Pupils equally round. ENT:  Nares appear normal, no drainage. Moist oral mucosa  Neck:  No restricted ROM. Trachea midline. Loud stridor disappears when in conversation. CV:   RRR. No m/r/g. No jugular venous distension. Lungs:   CTAB. No wheezing, rhonchi, or rales. Respirations even, unlabored. Abdomen: Bowel sounds present. Soft, nontender, nondistended. No rebound. No guarding. Extremities: No cyanosis or clubbing. Trace LUE edema  Skin:     No rashes and normal coloration. Warm and dry. Neuro:  CN II-XII grossly intact. Sensation intact. A&Ox3  Psych:  Normal mood and affect.       Recent Labs:  Recent Results (from the past 48 hour(s))   CBC WITH AUTOMATED DIFF    Collection Time: 02/13/22  3:21 AM   Result Value Ref Range    WBC 13.5 (H) 4.3 - 11.1 K/uL    RBC 3.81 (L) 4.23 - 5.6 M/uL    HGB 11.6 (L) 13.6 - 17.2 g/dL    HCT 36.5 (L) 41.1 - 50.3 %    MCV 95.8 79.6 - 97.8 FL    MCH 30.4 26.1 - 32.9 PG    MCHC 31.8 31.4 - 35.0 g/dL    RDW 15.0 (H) 11.9 - 14.6 %    PLATELET 025 760 - 900 K/uL    MPV 9.3 (L) 9.4 - 12.3 FL    ABSOLUTE NRBC 0.00 0.0 - 0.2 K/uL    DF AUTOMATED      NEUTROPHILS 80 (H) 43 - 78 %    LYMPHOCYTES 9 (L) 13 - 44 %    MONOCYTES 11 4.0 - 12.0 %    EOSINOPHILS 0 (L) 0.5 - 7.8 %    BASOPHILS 0 0.0 - 2.0 %    IMMATURE GRANULOCYTES 1 0.0 - 5.0 % ABS. NEUTROPHILS 10.7 (H) 1.7 - 8.2 K/UL    ABS. LYMPHOCYTES 1.2 0.5 - 4.6 K/UL    ABS. MONOCYTES 1.4 (H) 0.1 - 1.3 K/UL    ABS. EOSINOPHILS 0.0 0.0 - 0.8 K/UL    ABS. BASOPHILS 0.0 0.0 - 0.2 K/UL    ABS. IMM. GRANS. 0.1 0.0 - 0.5 K/UL   METABOLIC PANEL, BASIC    Collection Time: 02/13/22  3:21 AM   Result Value Ref Range    Sodium 136 (L) 138 - 145 mmol/L    Potassium 3.3 (L) 3.5 - 5.1 mmol/L    Chloride 109 (H) 98 - 107 mmol/L    CO2 20 (L) 21 - 32 mmol/L    Anion gap 7 7 - 16 mmol/L    Glucose 106 (H) 65 - 100 mg/dL    BUN 7 6 - 23 MG/DL    Creatinine 1.10 0.8 - 1.5 MG/DL    GFR est AA >60 >60 ml/min/1.73m2    GFR est non-AA >60 >60 ml/min/1.73m2    Calcium 7.4 (L) 8.3 - 10.4 MG/DL   CBC WITH AUTOMATED DIFF    Collection Time: 02/14/22  4:38 AM   Result Value Ref Range    WBC 11.0 4.3 - 11.1 K/uL    RBC 3.75 (L) 4.23 - 5.6 M/uL    HGB 11.4 (L) 13.6 - 17.2 g/dL    HCT 36.0 (L) 41.1 - 50.3 %    MCV 96.0 79.6 - 97.8 FL    MCH 30.4 26.1 - 32.9 PG    MCHC 31.7 31.4 - 35.0 g/dL    RDW 14.9 (H) 11.9 - 14.6 %    PLATELET 249 568 - 903 K/uL    MPV 9.2 (L) 9.4 - 12.3 FL    ABSOLUTE NRBC 0.00 0.0 - 0.2 K/uL    DF AUTOMATED      NEUTROPHILS 76 43 - 78 %    LYMPHOCYTES 12 (L) 13 - 44 %    MONOCYTES 10 4.0 - 12.0 %    EOSINOPHILS 0 (L) 0.5 - 7.8 %    BASOPHILS 0 0.0 - 2.0 %    IMMATURE GRANULOCYTES 2 0.0 - 5.0 %    ABS. NEUTROPHILS 8.4 (H) 1.7 - 8.2 K/UL    ABS. LYMPHOCYTES 1.3 0.5 - 4.6 K/UL    ABS. MONOCYTES 1.1 0.1 - 1.3 K/UL    ABS. EOSINOPHILS 0.0 0.0 - 0.8 K/UL    ABS. BASOPHILS 0.0 0.0 - 0.2 K/UL    ABS. IMM.  GRANS. 0.2 0.0 - 0.5 K/UL   METABOLIC PANEL, BASIC    Collection Time: 02/14/22  4:38 AM   Result Value Ref Range    Sodium 139 138 - 145 mmol/L    Potassium 3.5 3.5 - 5.1 mmol/L    Chloride 112 (H) 98 - 107 mmol/L    CO2 21 21 - 32 mmol/L    Anion gap 6 (L) 7 - 16 mmol/L    Glucose 106 (H) 65 - 100 mg/dL    BUN 4 (L) 6 - 23 MG/DL    Creatinine 1.00 0.8 - 1.5 MG/DL    GFR est AA >60 >60 ml/min/1.73m2    GFR est non-AA >60 >60 ml/min/1.73m2    Calcium 7.7 (L) 8.3 - 10.4 MG/DL       All Micro Results     Procedure Component Value Units Date/Time    CAMPYLOBACTER CULTURE IDENTIFICATION [[id number]] Collected: 02/08/22 2053    Order Status: Completed Specimen: Stool Updated: 02/13/22 1835     Result 1 Comment        Comment: (NOTE)  No Campylobacter species isolated. Performed At: [address] [id number]  Wayne Rodriguez MD EK:[id number]         Justo Peace [[id number]] Collected: 02/08/22 2053    Order Status: Completed Specimen: Stool Updated: 02/13/22 [address]  [address]     Source STOOL        Comment: STOOL        Campylobacter Culture Final Report Below        Comment: (NOTE)  Performed At: [address] [id number]  Wayne Rodriguez MD LN:[id number]         CULTURE, Isatu Waldron [[id number]]  (Abnormal) Collected: 02/08/22 2053    Order Status: Completed Specimen: Stool Updated: 02/11/22 4958     Special Requests: NO SPECIAL REQUESTS        Culture result:       No Salmonella, Shigella, or Ecoli 0157 isolated. MODERATE YEAST       COVID-19 RAPID TEST [[id number]] Collected: 02/09/22 0306    Order Status: Completed Specimen: Nasopharyngeal Updated: 02/09/22 0347     Specimen source NOT SPECIFIED        COVID-19 rapid test Not detected        Comment:      The specimen is NEGATIVE for SARS-CoV-2, the novel coronavirus associated with COVID-19. A negative result does not rule out COVID-19. This test has been authorized by the FDA under an Emergency Use Authorization (EUA) for use by authorized laboratories.         Fact sheet for Healthcare Providers: ConventionUpdate.co.nz  Fact sheet for Patients: ConventionUpdate.co.nz       Methodology: Isothermal Nucleic Acid Amplification         INFLUENZA A & B AG (RAPID TEST) [[id number]] Collected: 02/07/22 2231    Order Status: Completed Specimen: Nasopharyngeal from Nasal washing Updated: 02/07/22 2302     Influenza A Ag Negative        Comment: NEGATIVE FOR THE PRESENCE OF INFLUENZA A ANTIGEN  INFECTION DUE TO INFLUENZA A CANNOT BE RULED OUT. BECAUSE THE ANTIGEN PRESENT IN THE SAMPLE MAY BE BELOW  THE DETECTION LIMIT OF THE TEST. A NEGATIVE TEST IS PRESUMPTIVE AND IT IS RECOMMENDED THAT THESE RESULTS BE CONFIRMED BY VIRAL CULTURE OR AN FDA-CLEARED INFLUENZA A AND B MOLECULAR ASSAY. Influenza B Ag Negative        Comment: NEGATIVE FOR THE PRESENCE OF INFLUENZA B ANTIGEN  INFECTION DUE TO INFLUENZA B CANNOT BE RULED OUT. BECAUSE THE ANTIGEN PRESENT IN THE SAMPLE MAY BE BELOW  THE DETECTION LIMIT OF THE TEST. A NEGATIVE TEST IS PRESUMPTIVE AND IT IS RECOMMENDED THAT THESE RESULTS BE CONFIRMED BY VIRAL CULTURE OR AN FDA-CLEARED INFLUENZA A AND B MOLECULAR ASSAY. Source Nasopharyngeal       COVID-19 RAPID TEST [[id number]] Collected: 02/07/22 1629    Order Status: Completed Specimen: Nasopharyngeal Updated: 02/07/22 1700     Specimen source Nasopharyngeal        COVID-19 rapid test Not detected        Comment:      The specimen is NEGATIVE for SARS-CoV-2, the novel coronavirus associated with COVID-19. A negative result does not rule out COVID-19. This test has been authorized by the FDA under an Emergency Use Authorization (EUA) for use by authorized laboratories. Fact sheet for Healthcare Providers: ConventionUpdate.co.nz  Fact sheet for Patients: ConventionUpdate.co.nz       Methodology: Isothermal Nucleic Acid Amplification             Other Studies:  XR UPPER GI SERIES W KUB    Result Date: 2/14/2022  BARIUM UPPER GI STUDY: 2/14/2022 INDICATION: Hiatal hernia evaluation. FINDINGS: Difficult evaluation secondary to patient discomfort and nausea, which limited the volume of barium the patient was able to swallow. Severe esophageal dysmotility with extensive tertiary contractions present.  Limited evaluation of the esophagus demonstrating thickened folds suggestive of nonspecific esophagitis. No ulcer, stricture, or mass demonstrated. The gastroesophageal junction is located above the diaphragm. There appears to be a dynamic paraesophageal component to the hiatal hernia which occurs during inspiration and expiration. Total fluoroscopic time: 1.2 minutes. Total fluoro images: 13     1. Hiatal hernia, likely type III. The gastroesophageal junction, upper gastric body, and gastric fundus are located above the diaphragm. There is an apparent dynamic, paraesophageal component which occurs during inspiration and expiration. 2.  Severe esophageal dysmotility. 3.  Nonspecific esophagitis.       Current Meds:  Current Facility-Administered Medications   Medication Dose Route Frequency    eplerenone (INSPRA) tablet 25 mg (Patient Supplied)  25 mg Oral DAILY    [Held by provider] valsartan (DIOVAN) tablet 40 mg  40 mg Oral DAILY    carvediloL (COREG) tablet 12.5 mg  12.5 mg Oral BID WITH MEALS    sodium chloride (NS) flush 10 mL  10 mL InterCATHeter DAILY    sodium chloride (NS) flush 10 mL  10 mL InterCATHeter PRN    midodrine (PROAMATINE) tablet 5 mg  5 mg Oral BID WITH MEALS    albuterol (PROVENTIL VENTOLIN) nebulizer solution 2.5 mg  2.5 mg Nebulization BID RT    sucralfate (CARAFATE) tablet 1 g  1 g Oral Q6H    methylPREDNISolone (PF) (SOLU-MEDROL) injection 40 mg  40 mg IntraVENous DAILY    LORazepam (ATIVAN) tablet 1 mg  1 mg Oral Q4H PRN    promethazine (PHENERGAN) with saline injection 12.5 mg  12.5 mg IntraVENous Q4H PRN    albuterol (PROVENTIL VENTOLIN) nebulizer solution 2.5 mg  2.5 mg Nebulization Q4H PRN    dicyclomine (BENTYL) capsule 20 mg  20 mg Oral TID    sodium chloride (OCEAN) 0.65 % nasal squeeze bottle 2 Spray  2 [place] Both Nostrils Q2HWA    fluticasone propionate (FLONASE) 50 mcg/actuation nasal spray 2 Spray  2 Spray Both Nostrils BID    metoprolol (LOPRESSOR) injection 5 mg  5 mg IntraVENous Q4H PRN    LORazepam (ATIVAN) injection 1 mg  1 mg IntraVENous Q6H PRN    ivabradine (CORLANOR) tablet 5 mg  5 mg Oral BID WITH MEALS    metroNIDAZOLE (FLAGYL) IVPB premix 500 mg  500 mg IntraVENous Q12H    cefTRIAXone (ROCEPHIN) 1 g in 0.9% sodium chloride (MBP/ADV) 50 mL MBP  1 g IntraVENous Q24H    pantoprazole (PROTONIX) 40 mg in 0.9% sodium chloride 10 mL injection  40 mg IntraVENous Q12H    sodium chloride (NS) flush 5-10 mL  5-10 mL IntraVENous Q8H    sodium chloride (NS) flush 5-10 mL  5-10 mL IntraVENous PRN    0.9% sodium chloride infusion  75 mL/hr IntraVENous CONTINUOUS    ALPRAZolam (XANAX) tablet 1 mg  1 mg Oral QHS    aspirin delayed-release tablet 81 mg  81 mg Oral DAILY    atorvastatin (LIPITOR) tablet 80 mg  80 mg Oral DAILY    gabapentin (NEURONTIN) capsule 300 mg  300 mg Oral BID    HYDROcodone-acetaminophen (NORCO)  mg tablet 1 Tablet  1 Tablet Oral Q6H PRN    sodium chloride (NS) flush 5-40 mL  5-40 mL IntraVENous PRN    [Held by provider] acetaminophen (TYLENOL) tablet 650 mg  650 mg Oral Q6H PRN    Or    [Held by provider] acetaminophen (TYLENOL) suppository 650 mg  650 mg Rectal Q6H PRN    polyethylene glycol (MIRALAX) packet 17 g  17 g Oral DAILY PRN    magnesium oxide (MAG-OX) tablet 400 mg  400 mg Oral DAILY       Signed:  VERONICA Greco d/w Dr. Gissell Camacho

## 2022-02-15 ENCOUNTER — APPOINTMENT (OUTPATIENT)
Dept: CT IMAGING | Age: 57
DRG: 309 | End: 2022-02-15
Attending: PHYSICIAN ASSISTANT
Payer: COMMERCIAL

## 2022-02-15 ENCOUNTER — APPOINTMENT (OUTPATIENT)
Dept: GENERAL RADIOLOGY | Age: 57
DRG: 309 | End: 2022-02-15
Attending: PHYSICIAN ASSISTANT
Payer: COMMERCIAL

## 2022-02-15 PROCEDURE — 74011250636 HC RX REV CODE- 250/636: Performed by: INTERNAL MEDICINE

## 2022-02-15 PROCEDURE — 74011250636 HC RX REV CODE- 250/636: Performed by: PHYSICIAN ASSISTANT

## 2022-02-15 PROCEDURE — 94640 AIRWAY INHALATION TREATMENT: CPT

## 2022-02-15 PROCEDURE — C9113 INJ PANTOPRAZOLE SODIUM, VIA: HCPCS | Performed by: INTERNAL MEDICINE

## 2022-02-15 PROCEDURE — 74011250637 HC RX REV CODE- 250/637: Performed by: NURSE PRACTITIONER

## 2022-02-15 PROCEDURE — 65270000029 HC RM PRIVATE

## 2022-02-15 PROCEDURE — 74011000250 HC RX REV CODE- 250: Performed by: NURSE PRACTITIONER

## 2022-02-15 PROCEDURE — 74011250637 HC RX REV CODE- 250/637: Performed by: INTERNAL MEDICINE

## 2022-02-15 PROCEDURE — 74011250636 HC RX REV CODE- 250/636: Performed by: NURSE PRACTITIONER

## 2022-02-15 PROCEDURE — 87324 CLOSTRIDIUM AG IA: CPT

## 2022-02-15 PROCEDURE — 74018 RADEX ABDOMEN 1 VIEW: CPT

## 2022-02-15 PROCEDURE — 74011250637 HC RX REV CODE- 250/637: Performed by: PHYSICIAN ASSISTANT

## 2022-02-15 PROCEDURE — 74011250637 HC RX REV CODE- 250/637: Performed by: EMERGENCY MEDICINE

## 2022-02-15 PROCEDURE — 74011000250 HC RX REV CODE- 250: Performed by: EMERGENCY MEDICINE

## 2022-02-15 PROCEDURE — 94762 N-INVAS EAR/PLS OXIMTRY CONT: CPT

## 2022-02-15 PROCEDURE — 74011000250 HC RX REV CODE- 250: Performed by: INTERNAL MEDICINE

## 2022-02-15 PROCEDURE — 99232 SBSQ HOSP IP/OBS MODERATE 35: CPT | Performed by: SURGERY

## 2022-02-15 RX ORDER — SODIUM CHLORIDE 0.9 % (FLUSH) 0.9 %
10 SYRINGE (ML) INJECTION
Status: ACTIVE | OUTPATIENT
Start: 2022-02-15 | End: 2022-02-15

## 2022-02-15 RX ADMIN — HYDROCODONE BITARTRATE AND ACETAMINOPHEN 1 TABLET: 10; 325 TABLET ORAL at 21:28

## 2022-02-15 RX ADMIN — HYDROCODONE BITARTRATE AND ACETAMINOPHEN 1 TABLET: 10; 325 TABLET ORAL at 09:34

## 2022-02-15 RX ADMIN — SALINE NASAL SPRAY 2 SPRAY: 1.5 SOLUTION NASAL at 16:00

## 2022-02-15 RX ADMIN — MIDODRINE HYDROCHLORIDE 5 MG: 5 TABLET ORAL at 09:33

## 2022-02-15 RX ADMIN — SODIUM CHLORIDE, PRESERVATIVE FREE 12.5 MG: 5 INJECTION INTRAVENOUS at 09:28

## 2022-02-15 RX ADMIN — SODIUM CHLORIDE, PRESERVATIVE FREE 10 ML: 5 INJECTION INTRAVENOUS at 21:55

## 2022-02-15 RX ADMIN — SALINE NASAL SPRAY 2 SPRAY: 1.5 SOLUTION NASAL at 20:56

## 2022-02-15 RX ADMIN — ATORVASTATIN CALCIUM 80 MG: 80 TABLET, FILM COATED ORAL at 09:34

## 2022-02-15 RX ADMIN — CARVEDILOL 12.5 MG: 6.25 TABLET, FILM COATED ORAL at 09:33

## 2022-02-15 RX ADMIN — HYDROCODONE BITARTRATE AND ACETAMINOPHEN 1 TABLET: 10; 325 TABLET ORAL at 15:16

## 2022-02-15 RX ADMIN — SUCRALFATE 1 G: 1 TABLET ORAL at 17:40

## 2022-02-15 RX ADMIN — ALBUTEROL SULFATE 2.5 MG: 2.5 SOLUTION RESPIRATORY (INHALATION) at 19:39

## 2022-02-15 RX ADMIN — SALINE NASAL SPRAY 2 SPRAY: 1.5 SOLUTION NASAL at 09:38

## 2022-02-15 RX ADMIN — ALBUTEROL SULFATE 2.5 MG: 2.5 SOLUTION RESPIRATORY (INHALATION) at 09:13

## 2022-02-15 RX ADMIN — METRONIDAZOLE 500 MG: 500 INJECTION, SOLUTION INTRAVENOUS at 09:26

## 2022-02-15 RX ADMIN — CARVEDILOL 12.5 MG: 6.25 TABLET, FILM COATED ORAL at 17:40

## 2022-02-15 RX ADMIN — IVABRADINE 5 MG: 5 TABLET, FILM COATED ORAL at 17:40

## 2022-02-15 RX ADMIN — METHYLPREDNISOLONE SODIUM SUCCINATE 20 MG: 40 INJECTION, POWDER, FOR SOLUTION INTRAMUSCULAR; INTRAVENOUS at 09:35

## 2022-02-15 RX ADMIN — DICYCLOMINE HYDROCHLORIDE 20 MG: 10 CAPSULE ORAL at 21:00

## 2022-02-15 RX ADMIN — SUCRALFATE 1 G: 1 TABLET ORAL at 11:53

## 2022-02-15 RX ADMIN — SODIUM CHLORIDE, PRESERVATIVE FREE 10 ML: 5 INJECTION INTRAVENOUS at 14:00

## 2022-02-15 RX ADMIN — Medication 400 MG: at 09:33

## 2022-02-15 RX ADMIN — SODIUM CHLORIDE 75 ML/HR: 900 INJECTION, SOLUTION INTRAVENOUS at 20:52

## 2022-02-15 RX ADMIN — HYDROCODONE BITARTRATE AND ACETAMINOPHEN 1 TABLET: 10; 325 TABLET ORAL at 05:19

## 2022-02-15 RX ADMIN — IVABRADINE 5 MG: 5 TABLET, FILM COATED ORAL at 09:34

## 2022-02-15 RX ADMIN — SODIUM CHLORIDE, PRESERVATIVE FREE 12.5 MG: 5 INJECTION INTRAVENOUS at 20:47

## 2022-02-15 RX ADMIN — GABAPENTIN 300 MG: 300 CAPSULE ORAL at 20:49

## 2022-02-15 RX ADMIN — SODIUM CHLORIDE, PRESERVATIVE FREE 10 ML: 5 INJECTION INTRAVENOUS at 09:00

## 2022-02-15 RX ADMIN — SODIUM CHLORIDE, PRESERVATIVE FREE 12.5 MG: 5 INJECTION INTRAVENOUS at 05:20

## 2022-02-15 RX ADMIN — FLUTICASONE PROPIONATE 2 SPRAY: 50 SPRAY, METERED NASAL at 09:38

## 2022-02-15 RX ADMIN — SODIUM CHLORIDE 40 MG: 9 INJECTION, SOLUTION INTRAMUSCULAR; INTRAVENOUS; SUBCUTANEOUS at 20:49

## 2022-02-15 RX ADMIN — SODIUM CHLORIDE, PRESERVATIVE FREE 12.5 MG: 5 INJECTION INTRAVENOUS at 01:11

## 2022-02-15 RX ADMIN — GABAPENTIN 300 MG: 300 CAPSULE ORAL at 09:33

## 2022-02-15 RX ADMIN — SUCRALFATE 1 G: 1 TABLET ORAL at 05:19

## 2022-02-15 RX ADMIN — DICYCLOMINE HYDROCHLORIDE 20 MG: 10 CAPSULE ORAL at 09:34

## 2022-02-15 RX ADMIN — SODIUM CHLORIDE, PRESERVATIVE FREE 12.5 MG: 5 INJECTION INTRAVENOUS at 15:16

## 2022-02-15 RX ADMIN — SODIUM CHLORIDE 40 MG: 9 INJECTION, SOLUTION INTRAMUSCULAR; INTRAVENOUS; SUBCUTANEOUS at 09:27

## 2022-02-15 RX ADMIN — HYDROCODONE BITARTRATE AND ACETAMINOPHEN 1 TABLET: 10; 325 TABLET ORAL at 01:11

## 2022-02-15 RX ADMIN — ALPRAZOLAM 1 MG: 0.5 TABLET ORAL at 21:00

## 2022-02-15 RX ADMIN — SODIUM CHLORIDE, PRESERVATIVE FREE 10 ML: 5 INJECTION INTRAVENOUS at 06:00

## 2022-02-15 RX ADMIN — DICYCLOMINE HYDROCHLORIDE 20 MG: 10 CAPSULE ORAL at 15:16

## 2022-02-15 RX ADMIN — ASPIRIN 81 MG: 81 TABLET ORAL at 09:33

## 2022-02-15 NOTE — PROGRESS NOTES
[facility] Progress Note         Admit Date:  2/7/2022  Today's Date:  2/15/2022    CC:  Abdominal pain    Subjective:     Patient feels a little bit better, but still having nausea, dry heaving, and about 5 loose BMs a day including nocturnal diarrhea.      Medications:   Current Facility-Administered Medications   Medication Dose Route Frequency    HYDROcodone-acetaminophen (NORCO)  mg tablet 1 Tablet  1 Tablet Oral Q4H PRN    methylPREDNISolone (PF) (SOLU-MEDROL) injection 20 mg  20 mg IntraVENous DAILY    eplerenone (INSPRA) tablet 25 mg (Patient Supplied)  25 mg Oral DAILY    [Held by provider] valsartan (DIOVAN) tablet 40 mg  40 mg Oral DAILY    carvediloL (COREG) tablet 12.5 mg  12.5 mg Oral BID WITH MEALS    sodium chloride (NS) flush 10 mL  10 mL InterCATHeter DAILY    sodium chloride (NS) flush 10 mL  10 mL InterCATHeter PRN    midodrine (PROAMATINE) tablet 5 mg  5 mg Oral BID WITH MEALS    albuterol (PROVENTIL VENTOLIN) nebulizer solution 2.5 mg  2.5 mg Nebulization BID RT    sucralfate (CARAFATE) tablet 1 g  1 g Oral Q6H    LORazepam (ATIVAN) tablet 1 mg  1 mg Oral Q4H PRN    promethazine (PHENERGAN) with saline injection 12.5 mg  12.5 mg IntraVENous Q4H PRN    albuterol (PROVENTIL VENTOLIN) nebulizer solution 2.5 mg  2.5 mg Nebulization Q4H PRN    dicyclomine (BENTYL) capsule 20 mg  20 mg Oral TID    sodium chloride (OCEAN) 0.65 % nasal squeeze bottle 2 Spray  2 [place] Both Nostrils Q2HWA    fluticasone propionate (FLONASE) 50 mcg/actuation nasal spray 2 Spray  2 Spray Both Nostrils BID    metoprolol (LOPRESSOR) injection 5 mg  5 mg IntraVENous Q4H PRN    ivabradine (CORLANOR) tablet 5 mg  5 mg Oral BID WITH MEALS    metroNIDAZOLE (FLAGYL) IVPB premix 500 mg  500 mg IntraVENous Q12H    pantoprazole (PROTONIX) 40 mg in 0.9% sodium chloride 10 mL injection  40 mg IntraVENous Q12H    sodium chloride (NS) flush 5-10 mL  5-10 mL IntraVENous Q8H    sodium chloride (NS) flush 5-10 mL  5-10 mL IntraVENous PRN    0.9% sodium chloride infusion  75 mL/hr IntraVENous CONTINUOUS    ALPRAZolam (XANAX) tablet 1 mg  1 mg Oral QHS    aspirin delayed-release tablet 81 mg  81 mg Oral DAILY    atorvastatin (LIPITOR) tablet 80 mg  80 mg Oral DAILY    gabapentin (NEURONTIN) capsule 300 mg  300 mg Oral BID    sodium chloride (NS) flush 5-40 mL  5-40 mL IntraVENous PRN    [Held by provider] acetaminophen (TYLENOL) tablet 650 mg  650 mg Oral Q6H PRN    Or    [Held by provider] acetaminophen (TYLENOL) suppository 650 mg  650 mg Rectal Q6H PRN    polyethylene glycol (MIRALAX) packet 17 g  17 g Oral DAILY PRN    magnesium oxide (MAG-OX) tablet 400 mg  400 mg Oral DAILY       Objective:   Vitals:  Visit Vitals  /87 (BP 1 Location: Left upper arm, BP Patient Position: Sitting)   Pulse 91   Temp 97.9 °F (36.6 °C)   Resp 16   Ht 5' 11\" (1.803 m)   Wt 90.1 kg (198 lb 10.2 oz)   SpO2 96%   BMI 27.70 kg/m²     Intake/Output:  No intake/output data recorded. 02/13 1901 - 02/15 0700  In: 400 [P.O.:400]  Out: -   Exam:  General appearance: alert, cooperative, NAD  CTA  RRR  Abdomen: soft, tender in mainly on right side of abdomen. NABS. Neuro:  A&O NAD    Data Review (Labs):    Recent Labs     02/14/22  0438 02/13/22  0321 02/12/22  1124   WBC 11.0 13.5* 11.5*   HGB 11.4* 11.6* 12.0*   HCT 36.0* 36.5* 38.2*    341 348   MCV 96.0 95.8 96.7    136* 137*   K 3.5 3.3* 3.3*   * 109* 110*   CO2 21 20* 20*   BUN 4* 7 9   CREA 1.00 1.10 1.30   CA 7.7* 7.4* 7.2*   * 106* 133*       Assessment:     Principal Problem:  Acute gastroenteritis (2/26/2016)  Still no objective evidence for patient's reported abdominal pain. UGI series result pending.      Active Problems:  Chronic systolic (congestive) heart failure (HCC) (4/21/2011)  HTN (hypertension) (11/29/2011)  Non-ischemic cardiomyopathy ([facility] [address] (3/24/2016)  Transaminitis (3/14/2017)  Inspiratory stridor (3/21/2017)  SVT (supraventricular tachycardia) ([facility] [address] (12/22/2018)  YAO (acute kidney injury) ([facility] [address] (8/23/2021)  Demand ischemia ([facility] [address] (8/24/2021)  Hyperbilirubinemia (2/7/2022)    EGD 2/11/22  Esophagus: LA grade D esophagitis  Stomach: Large hiatal hernia (50-60% of gastric volume) without Chad ulcers. Otherwise normal.  Duodenum: Normal    A/P CT scan 2/7 without contrast  IMPRESSION  1. Moderate hiatal hernia. Suggestion of eccentric submucosal wall thickening of  the proximal stomach, although this may be artifactual secondary to volume  averaging of fluid in the stomach. Correlation with upper endoscopy may be  beneficial.  2. No evidence of colitis, diverticulitis, appendicitis or bowel obstruction. 3. No renal calculus or hydronephrosis. 4. Absence of IV contrast limits sensitivity. Stool studies 2/8/22 was positive for yeast. C diff not obtained. UGI series 2/14/22  IMPRESSION  1. Hiatal hernia, likely type III. The gastroesophageal junction, upper gastric  body, and gastric fundus are located above the diaphragm. There is an apparent  dynamic, paraesophageal component which occurs during inspiration and  expiration. 2.  Severe esophageal dysmotility. 3.  Nonspecific esophagitis. Plan:     Patient evaluated by Surgery yesterday. Poor candidate for surgical repair given his severe global cardiac hypokinesis and EF 15-20%. Encouraged him to follow-up as an outpatient with his surgeon Dr. Phan Christina for second opinion.     CTAP today with contrast to assess for other etiologies for patient's abdominal pain. Advance to full liquid diet (may have eggs). Check stools for C diff.     Manuel Murrieta PA-C  [facility]

## 2022-02-15 NOTE — PROGRESS NOTES
02/14/22 2355   Vital Signs   Temp 98.1 °F (36.7 °C)   Temp Source Oral   Pulse (Heart Rate) 72   Heart Rate Source Monitor   Resp Rate 18   O2 Sat (%) 94 %   Level of Consciousness Alert (0)   /79   MAP (Calculated) 93   BP 1 Location Left upper arm   BP Patient Position At rest   Patient alert and oriented. Skin warm and dry. No complaints verbalized at present.

## 2022-02-15 NOTE — PROGRESS NOTES
SPEECH PATHOLOGY NOTE:  CTAP pending today. Will follow up with reassessment of swallow with full liquid textures at bedside after GI workup complete. Recommend outpatient voice therapy upon discharge for stridor and MTD.   Filiberto Blue MA, CCC-SLP

## 2022-02-15 NOTE — PROGRESS NOTES
Pt refused Tx. Stated that he didn't need it. O2 saturation of 100% on room air. Breath sounds are clear and no signs of respiratory distress. Will continue to monitor.
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
## 2022-02-16 ENCOUNTER — APPOINTMENT (OUTPATIENT)
Dept: GENERAL RADIOLOGY | Age: 57
DRG: 309 | End: 2022-02-16
Attending: PHYSICIAN ASSISTANT
Payer: COMMERCIAL

## 2022-02-16 LAB
ANION GAP SERPL CALC-SCNC: 5 MMOL/L (ref 7–16)
BUN SERPL-MCNC: 5 MG/DL (ref 6–23)
C DIFF GDH STL QL: NORMAL
C DIFF TOX A+B STL QL IA: NORMAL
CALCIUM SERPL-MCNC: 7.9 MG/DL (ref 8.3–10.4)
CHLORIDE SERPL-SCNC: 113 MMOL/L (ref 98–107)
CLINICAL CONSIDERATION: NORMAL
CO2 SERPL-SCNC: 21 MMOL/L (ref 21–32)
CREAT SERPL-MCNC: 1.2 MG/DL (ref 0.8–1.5)
ERYTHROCYTE [DISTWIDTH] IN BLOOD BY AUTOMATED COUNT: 14.6 % (ref 11.9–14.6)
GLUCOSE SERPL-MCNC: 87 MG/DL (ref 65–100)
HCT VFR BLD AUTO: 35.5 % (ref 41.1–50.3)
HGB BLD-MCNC: 11.2 G/DL (ref 13.6–17.2)
INTERPRETATION: NORMAL
MCH RBC QN AUTO: 29.7 PG (ref 26.1–32.9)
MCHC RBC AUTO-ENTMCNC: 31.5 G/DL (ref 31.4–35)
MCV RBC AUTO: 94.2 FL (ref 79.6–97.8)
NRBC # BLD: 0 K/UL (ref 0–0.2)
PCR REFLEX: NORMAL
PLATELET # BLD AUTO: 288 K/UL (ref 150–450)
PMV BLD AUTO: 9.4 FL (ref 9.4–12.3)
POTASSIUM SERPL-SCNC: 3.4 MMOL/L (ref 3.5–5.1)
RBC # BLD AUTO: 3.77 M/UL (ref 4.23–5.6)
SODIUM SERPL-SCNC: 139 MMOL/L (ref 138–145)
WBC # BLD AUTO: 9.5 K/UL (ref 4.3–11.1)

## 2022-02-16 PROCEDURE — 74011250636 HC RX REV CODE- 250/636: Performed by: INTERNAL MEDICINE

## 2022-02-16 PROCEDURE — 74018 RADEX ABDOMEN 1 VIEW: CPT

## 2022-02-16 PROCEDURE — 74011250637 HC RX REV CODE- 250/637: Performed by: NURSE PRACTITIONER

## 2022-02-16 PROCEDURE — C9113 INJ PANTOPRAZOLE SODIUM, VIA: HCPCS | Performed by: INTERNAL MEDICINE

## 2022-02-16 PROCEDURE — 87329 GIARDIA AG IA: CPT

## 2022-02-16 PROCEDURE — 74011250637 HC RX REV CODE- 250/637: Performed by: INTERNAL MEDICINE

## 2022-02-16 PROCEDURE — 87045 FECES CULTURE AEROBIC BACT: CPT

## 2022-02-16 PROCEDURE — 74011000250 HC RX REV CODE- 250: Performed by: STUDENT IN AN ORGANIZED HEALTH CARE EDUCATION/TRAINING PROGRAM

## 2022-02-16 PROCEDURE — 74011000250 HC RX REV CODE- 250: Performed by: EMERGENCY MEDICINE

## 2022-02-16 PROCEDURE — 74011250636 HC RX REV CODE- 250/636: Performed by: PHYSICIAN ASSISTANT

## 2022-02-16 PROCEDURE — 87177 OVA AND PARASITES SMEARS: CPT

## 2022-02-16 PROCEDURE — 74011250637 HC RX REV CODE- 250/637: Performed by: EMERGENCY MEDICINE

## 2022-02-16 PROCEDURE — 82705 FATS/LIPIDS FECES QUAL: CPT

## 2022-02-16 PROCEDURE — 85027 COMPLETE CBC AUTOMATED: CPT

## 2022-02-16 PROCEDURE — 74011250636 HC RX REV CODE- 250/636: Performed by: NURSE PRACTITIONER

## 2022-02-16 PROCEDURE — 80048 BASIC METABOLIC PNL TOTAL CA: CPT

## 2022-02-16 PROCEDURE — 94640 AIRWAY INHALATION TREATMENT: CPT

## 2022-02-16 PROCEDURE — 74011250637 HC RX REV CODE- 250/637: Performed by: PHYSICIAN ASSISTANT

## 2022-02-16 PROCEDURE — 74011000250 HC RX REV CODE- 250: Performed by: INTERNAL MEDICINE

## 2022-02-16 PROCEDURE — 74011000250 HC RX REV CODE- 250: Performed by: NURSE PRACTITIONER

## 2022-02-16 PROCEDURE — 94762 N-INVAS EAR/PLS OXIMTRY CONT: CPT

## 2022-02-16 PROCEDURE — 74011250636 HC RX REV CODE- 250/636: Performed by: STUDENT IN AN ORGANIZED HEALTH CARE EDUCATION/TRAINING PROGRAM

## 2022-02-16 PROCEDURE — 74011250637 HC RX REV CODE- 250/637: Performed by: STUDENT IN AN ORGANIZED HEALTH CARE EDUCATION/TRAINING PROGRAM

## 2022-02-16 PROCEDURE — 65270000029 HC RM PRIVATE

## 2022-02-16 RX ORDER — POTASSIUM CHLORIDE 20 MEQ/1
20 TABLET, EXTENDED RELEASE ORAL 3 TIMES DAILY
Status: COMPLETED | OUTPATIENT
Start: 2022-02-16 | End: 2022-02-16

## 2022-02-16 RX ORDER — PROMETHAZINE HYDROCHLORIDE 25 MG/1
12.5 TABLET ORAL
Status: DISCONTINUED | OUTPATIENT
Start: 2022-02-16 | End: 2022-02-16

## 2022-02-16 RX ORDER — CHOLESTYRAMINE 4 G/4.8G
4 POWDER, FOR SUSPENSION ORAL DAILY
Status: DISCONTINUED | OUTPATIENT
Start: 2022-02-16 | End: 2022-02-17 | Stop reason: HOSPADM

## 2022-02-16 RX ADMIN — METHYLPREDNISOLONE SODIUM SUCCINATE 20 MG: 40 INJECTION, POWDER, FOR SOLUTION INTRAMUSCULAR; INTRAVENOUS at 10:04

## 2022-02-16 RX ADMIN — PROCHLORPERAZINE EDISYLATE 5 MG: 5 INJECTION INTRAMUSCULAR; INTRAVENOUS at 21:50

## 2022-02-16 RX ADMIN — SUCRALFATE 1 G: 1 TABLET ORAL at 05:29

## 2022-02-16 RX ADMIN — SUCRALFATE 1 G: 1 TABLET ORAL at 00:00

## 2022-02-16 RX ADMIN — CHOLESTYRAMINE 4 G: 4 POWDER, FOR SUSPENSION ORAL at 18:27

## 2022-02-16 RX ADMIN — IVABRADINE 5 MG: 5 TABLET, FILM COATED ORAL at 17:30

## 2022-02-16 RX ADMIN — SODIUM CHLORIDE, PRESERVATIVE FREE 12.5 MG: 5 INJECTION INTRAVENOUS at 09:58

## 2022-02-16 RX ADMIN — SUCRALFATE 1 G: 1 TABLET ORAL at 17:30

## 2022-02-16 RX ADMIN — IVABRADINE 5 MG: 5 TABLET, FILM COATED ORAL at 10:00

## 2022-02-16 RX ADMIN — CARVEDILOL 12.5 MG: 6.25 TABLET, FILM COATED ORAL at 17:30

## 2022-02-16 RX ADMIN — POTASSIUM CHLORIDE 20 MEQ: 20 TABLET, EXTENDED RELEASE ORAL at 15:43

## 2022-02-16 RX ADMIN — GABAPENTIN 300 MG: 300 CAPSULE ORAL at 21:37

## 2022-02-16 RX ADMIN — DICYCLOMINE HYDROCHLORIDE 20 MG: 10 CAPSULE ORAL at 10:00

## 2022-02-16 RX ADMIN — SUCRALFATE 1 G: 1 TABLET ORAL at 12:00

## 2022-02-16 RX ADMIN — HYDROCODONE BITARTRATE AND ACETAMINOPHEN 1 TABLET: 10; 325 TABLET ORAL at 01:26

## 2022-02-16 RX ADMIN — PROCHLORPERAZINE EDISYLATE 5 MG: 5 INJECTION INTRAMUSCULAR; INTRAVENOUS at 15:43

## 2022-02-16 RX ADMIN — CARVEDILOL 12.5 MG: 6.25 TABLET, FILM COATED ORAL at 10:00

## 2022-02-16 RX ADMIN — SODIUM CHLORIDE, PRESERVATIVE FREE 5 ML: 5 INJECTION INTRAVENOUS at 14:00

## 2022-02-16 RX ADMIN — Medication 400 MG: at 10:00

## 2022-02-16 RX ADMIN — SODIUM CHLORIDE 40 MG: 9 INJECTION, SOLUTION INTRAMUSCULAR; INTRAVENOUS; SUBCUTANEOUS at 21:38

## 2022-02-16 RX ADMIN — SUCRALFATE 1 G: 1 TABLET ORAL at 23:32

## 2022-02-16 RX ADMIN — ALBUTEROL SULFATE 2.5 MG: 2.5 SOLUTION RESPIRATORY (INHALATION) at 08:35

## 2022-02-16 RX ADMIN — SODIUM CHLORIDE, PRESERVATIVE FREE 10 ML: 5 INJECTION INTRAVENOUS at 10:04

## 2022-02-16 RX ADMIN — SODIUM CHLORIDE, PRESERVATIVE FREE 12.5 MG: 5 INJECTION INTRAVENOUS at 01:26

## 2022-02-16 RX ADMIN — DICYCLOMINE HYDROCHLORIDE 20 MG: 10 CAPSULE ORAL at 21:37

## 2022-02-16 RX ADMIN — SODIUM CHLORIDE, PRESERVATIVE FREE 12.5 MG: 5 INJECTION INTRAVENOUS at 05:30

## 2022-02-16 RX ADMIN — GABAPENTIN 300 MG: 300 CAPSULE ORAL at 10:00

## 2022-02-16 RX ADMIN — SODIUM CHLORIDE, PRESERVATIVE FREE 10 ML: 5 INJECTION INTRAVENOUS at 21:43

## 2022-02-16 RX ADMIN — SODIUM CHLORIDE 40 MG: 9 INJECTION, SOLUTION INTRAMUSCULAR; INTRAVENOUS; SUBCUTANEOUS at 10:04

## 2022-02-16 RX ADMIN — HYDROCODONE BITARTRATE AND ACETAMINOPHEN 1 TABLET: 10; 325 TABLET ORAL at 10:00

## 2022-02-16 RX ADMIN — HYDROCODONE BITARTRATE AND ACETAMINOPHEN 1 TABLET: 10; 325 TABLET ORAL at 05:29

## 2022-02-16 RX ADMIN — DICYCLOMINE HYDROCHLORIDE 20 MG: 10 CAPSULE ORAL at 15:43

## 2022-02-16 RX ADMIN — ATORVASTATIN CALCIUM 80 MG: 80 TABLET, FILM COATED ORAL at 10:00

## 2022-02-16 RX ADMIN — SODIUM CHLORIDE, PRESERVATIVE FREE 10 ML: 5 INJECTION INTRAVENOUS at 06:39

## 2022-02-16 RX ADMIN — HYDROCODONE BITARTRATE AND ACETAMINOPHEN 1 TABLET: 10; 325 TABLET ORAL at 14:36

## 2022-02-16 RX ADMIN — HYDROCODONE BITARTRATE AND ACETAMINOPHEN 1 TABLET: 10; 325 TABLET ORAL at 20:12

## 2022-02-16 RX ADMIN — POTASSIUM CHLORIDE 20 MEQ: 20 TABLET, EXTENDED RELEASE ORAL at 21:37

## 2022-02-16 RX ADMIN — ALPRAZOLAM 1 MG: 0.5 TABLET ORAL at 21:37

## 2022-02-16 RX ADMIN — POTASSIUM CHLORIDE 20 MEQ: 20 TABLET, EXTENDED RELEASE ORAL at 10:00

## 2022-02-16 RX ADMIN — ASPIRIN 81 MG: 81 TABLET ORAL at 10:00

## 2022-02-16 NOTE — ROUTINE PROCESS
Bedside and Verbal shift change report given to Rachele Vargas RN (oncoming nurse) by myself (offgoing nurse). Report included the following information SBAR, Kardex, Intake/Output and Recent Results.

## 2022-02-16 NOTE — ANESTHESIA POSTPROCEDURE EVALUATION
Procedure you had done: port placment  Your health care provider is:  Meghan Pearce  Your surgeon is Dr. Tresa Evangelista.   Please call your health care provider or surgeon at [phone] if:    - you feel you are getting worse or having an increase in problems    - fever greater than 101 degrees  - increasing shortness of breath or chest pain  - any signs of infection (increasing redness, swelling, tenderness, warmth, change in appearance, or  increased drainage)  - nausea (upset stomach) and vomiting and/or diarrhea that will not stop  - severe pain that is not relieved by medicine, rest or ice    Home care :  You will be discharged when you are safe to go home. Anesthesia can change judgment, reaction time and coordination for several hours after you seem back to normal. Therefore, do not operate any motorized vehicles or power tools for 24 hours after discharge.     Activity:  You should rest or do quiet activities for the rest of the day. The day after surgery you may be as active as you feel able. You may find that you require more rest than usual the first 3-4 days as your body heals. .      Diet:  Eat small amounts frequently after arriving home. Avoid foods that are hard to digest such as heavy, sweet, spicy, or fried foods until you are feeling well.   Vomiting can occur after general anesthesia. If vomiting lasts more than 5-7 times after arriving home, or if you have other difficulties, call your doctor.     Other:  1. Problems urinating can happen after surgery.  Please call your physician if you have any problems.  2. Some people have constipation after surgery-you can use over the counter stool softener or laxative as needed.  3. You can use ice on the area of the incision to help with pain and swelling. Apply an ice pack wrapped in a towel to the area for 10-15 minutes once an hour.   Dressing:  Your incision was covered with Steri-strips and a bandage. The bandage can be removed and you can shower 24  Procedure(s):  ESOPHAGOGASTRODUODENOSCOPY (EGD)/ 25 Room 919.     total IV anesthesia    Anesthesia Post Evaluation      Multimodal analgesia: multimodal analgesia not used between 6 hours prior to anesthesia start to PACU discharge  Patient location during evaluation: PACU  Patient participation: complete - patient participated  Level of consciousness: awake  Pain management: adequate  Airway patency: patent  Anesthetic complications: no  Cardiovascular status: acceptable  Respiratory status: acceptable  Hydration status: acceptable  Post anesthesia nausea and vomiting:  none  Final Post Anesthesia Temperature Assessment:  Normothermia (36.0-37.5 degrees C)      INITIAL Post-op Vital signs:   Vitals Value Taken Time   /90 02/16/22 1055   Temp 36.4 °C (97.5 °F) 02/16/22 1055   Pulse 92 02/16/22 1055   Resp 20 02/16/22 1055   SpO2 100 % 02/16/22 1055 hours or more after the surgery. After you shower dry your incision gently. You may apply a clean bandage if you want to. The Steri-strips will stay on for 5-7 days.   No soaking in a bath, hot tub, pool or lake for 5 days.      Drainage:   Bleeding or drainage should be minimal.  1. If bleeding soaks the dressings, cover with another sterile dressing.  2. If bleeding continues, apply gentle steady pressure over the incision for 5 minutes.  3. If bleeding persists or there is an increased swelling of the area, call your surgeon or go to the Emergency Room.      [facility]  Adult Discharge Orders & Instructions      For 24 hours after surgery:  1. Get plenty of rest.  A responsible adult must stay with you for at least 24 hours after you leave the hospital.   2. You may feel lightheaded.  IF so, sit for a few minutes before standing.  Have someone help you get up.   3. You may have a slight fever. Call the doctor if your fever is over 101 F (38.3 C) (taken under the tongue) or lasts longer than 24 hours.  4. You may have a dry mouth, a sore throat, muscle aches or trouble sleeping.  These should go away after 24 hours.  5. Do not make important or legal decisions.  6.   Do not drive or use heavy equipment.  If you have weakness or tingling, don't drive or use heavy equipment until this feeling goes away.                                                                                                                                                                         To contact a doctor, call    [phone]______________

## 2022-02-16 NOTE — PROGRESS NOTES
Problem: Falls - Risk of  Goal: *Absence of Falls  Description: Document Trace Blight Fall Risk and appropriate interventions in the flowsheet. Outcome: Progressing Towards Goal  Note: Fall Risk Interventions:  Mobility Interventions: Communicate number of staff needed for ambulation/transfer,Patient to call before getting OOB,Strengthening exercises (ROM-active/passive)         Medication Interventions: Assess postural VS orthostatic hypotension,Patient to call before getting OOB,Teach patient to arise slowly    Elimination Interventions: Call light in reach,Patient to call for help with toileting needs,Toilet paper/wipes in reach              Problem: Pressure Injury - Risk of  Goal: *Prevention of pressure injury  Description: Document Abad Scale and appropriate interventions in the flowsheet.   Outcome: Progressing Towards Goal  Note: Pressure Injury Interventions:  Sensory Interventions: Assess changes in LOC,Chair cushion,Keep linens dry and wrinkle-free,Minimize linen layers    Moisture Interventions: Minimize layers,Apply protective barrier, creams and emollients    Activity Interventions: Pressure redistribution bed/mattress(bed type)    Mobility Interventions: Pressure redistribution bed/mattress (bed type),HOB 30 degrees or less    Nutrition Interventions: Discuss nutritional consult with provider    Friction and Shear Interventions: HOB 30 degrees or less,Lift sheet,Minimize layers                Problem: Airway Clearance - Ineffective  Goal: *Patent airway  Outcome: Progressing Towards Goal     Problem: Pain  Goal: *Control of Pain  Outcome: Not Progressing Towards Goal     Problem: Nausea/Vomiting (Adult)  Goal: *Absence of nausea/vomiting  Outcome: Not Progressing Towards Goal

## 2022-02-16 NOTE — PROGRESS NOTES
SPEECH PATHOLOGY NOTE:    Speech therapy attempt this PM. Patient now with regular diet orders and an empty bag of beef jerky noted in trash can beside his bed. He endorses consuming beef jerky, eggs, and puree textures today without difficulty. Sensation of \"sticking\". No indicated for ongoing speech therapy for oropharyngeal dysphagia as symptoms were related to GI issues. In regards to voice, patient reports his voice is \"normal\". Mild gravely vocal quality which he states is baseline. No SOB or difficulty with phonation reported. Will defer voice evaluation per his request. 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 Should he experience difficulty with voice or breathing at later time, encouraged him to request outpatient speech therapy referral. Speech to sign off. Please re-consult if new concerns arise.      DORIAN Oconnor, CCC-SLP  Speech Language Pathologist  [facility]  Contact: Scot

## 2022-02-16 NOTE — PROGRESS NOTES
Hospitalist Progress Note   Admit Date:  2022  4:06 PM   Name:  Candelaria Fox   Age:  62 y.o. Sex:  male  :  1965   MRN:  [id number]   Room:  19/    Presenting Complaint: Chest Pain and Shortness of Breath    Reason(s) for Admission: YAO (acute kidney injury) [facility]) [N17.9]     Hospital Course & Interval History:   62 y. o. male with medical history of nonischemic cardiomyopathy with EF of 20-25% s/p ICD, HTN, HLD, and chronic back pain presents with heart palpitations, persistent nausea/vomiting, diarrhea, shortness of breath and generalized weakness for the past week. He says that he was called by his cardiologist and told that his HRs were high. He denies chest pain but says \"I just hurt all over\". He denies recreational drug use and alcohol. He denies black/red stools. He does not know of any food that may have triggered his symptoms. ED course: HRs in the 150s-160s, cardiology reviewed strips and it is most likely atrial tachycardia as opposed to VT. WBC count of 13k. Troponin of 245 with repeat of 270. Procalcitonin of 0.06. pBNP of 1690. SCr of 2.4. K of 3.4. Rapid COVID is negative. CXR unremarkable. Patient with episode of stridor s/p Racemic Epi with improvement, placed on Solumedrol. He currently remains stable on RA without hypoxia. Solumedrol discontinued. GI has been following for persistent nausea/vomiting. Underwent EGD  with findings of esophagitis and large hiatal hernia. Underwent Upper GI series  withconcern for type III hiatal hernia, severe esophageal dysmotility. General Surgery evaluated hernia, poor surgical repair candidate given cardiac function, recommend o/p follow-up with Dr. Nuvia Bacon for second opinion. Stool tests including CDiff pending. Generalized abdominal pain is out of proportion to findings. Patient has been requesting IV pain medications which are not indicated. GI ordered CT A/P with contrast to further evaluate cause of abdominal pain. Patient has decided this afternoon to not get this test and he wishes to discharge home tomorrow. He is requesting an increase in his Phenergan outpatient dose (already maxed) and an increase in pain medication. GI prefers Ondensatron at discharge due to EGD findings. I discussed that outpatient pain management needs handled by PCP or with PCP referral to pain management. Subjective/24hr Events (02/16/22): Patient resting in bed, alert and oriented. Endorses dry heaves and persistent abdominal pain. Requesting increased Phenergan and pain medication at discharge. Denies chest pain and SOB. He would like to cancel CT scan ordered. Diet progressed to solid for dinner; he would like to see how he tolerates. If tolerates well, he can discharge in am    Assessment & Plan:     Acute Gastroenteritis  Intractable nausea/vomiting  GERD with large hiatal hernia type III  -GI consulted and following  -s/p EGD on 2/11 with findings of esophagitis  -s/p UGI series 2/14 with concern for type III hiatal hernia, severe esophageal dysmotility  -Cirrhosis workup at later date with GI  -MBS cancelled by speech given UGI findings, sx likely esophageal  -s/p Rocephin and Flagyl courses  -CT A/P with contrast ordered - discontinued per patient request  -Per GI, advance to solid diet  -Stool studies / C. Diff pending   -Changed to IV Compazine today due to pharmacy's request for no further IV Phenergan. Patient is on home dose Phenergan PO 25 mg Q6H prn. He is requesting an increase in this but this is maxed. Discussed with GI who prefers Ondensatron at discharge due to EGD findings.     Hiatal Hernia present on imaging since 2010  -General Surgery evaluated, poor surgical repair candidate given cardiac function, recommend o/p follow-up with Dr. Laureano Ribeiro for second opinion     Gastritis/Esophagitis  -Continue Carafate and PPI     Stridor, resolved  -s/p Racemic Epi with improvement, now on Solumedrol  -CT neck wnl  -ENT evaluated 2/10, ordered SLP for voice and swallow therapy, can follow outpatient  -Pulmonary evaluated, likely VC irritation and signed off 2/15  -D/C steroids, currently stable on RA with O2 saturation 100%, no stridor or wheezing on exam, D/C pulse oximetry     Chronic systolic (congestive) heart failure  -Repeat Echo 2/22 with LVEF 15-20%  -Appears compensated and euvolemic  -Continue current regimen of Inspra, Ivabradine and Coreg      YAO, resolved     Demand ischemia  -Known history of nonischemic cardiomyopathy  -Cardiology evaluated and signed off 2/11  -Cont ASA/Statin  -Discontinue telemetry, remains NSR without events     SVT, appears to be Atrial tach per cardiology on admission, resolved  -Likely secondary to acute dehydration with acute illness  -Cardiology evaluated, continue Coreg     Essential HTN  -Placed on midodrine for hypotension, ARB held, continue meds for sCHF  -Midodrine discontinued, BP currently stable 119/78, restart ARB as needed      Hypokalemia, resolved     Acute on Chronic Pain  -Likely has opioid tolerance, no IV pain medication indicated  -Continue oral pain regimen [id number] prn  -I discussed pain management outpatient needs done by PCP with possible referral to pain management     LUE Edema  -US neg for dvt     Dispo/Discharge Planning:  Anticipate d/c home with HH in am    Diet:  ADULT DIET Regular; Low Sodium (2 gm); Low Fat (Less than or Equal to 50 gm/day);  May have eggs  DVT PPx: SCD's  Code status: Full Code    Hospital Problems as of 2/16/2022 Date Reviewed: 2/11/2022          Codes Class Noted - Resolved POA    Severe Esophageal Dysmotility Disorder by UGI ICD-10-CM: K22.4  ICD-9-CM: 530.5  2/14/2022 - Present Yes        Generalized abdominal pain ICD-10-CM: R10.84  ICD-9-CM: 789.07  2/14/2022 - Present Yes        Hiatal hernia without obstruction (present since at least 2010) ICD-10-CM: K44.9  ICD-9-CM: 553.3  2/14/2022 - Present Yes        Hyperbilirubinemia ICD-10-CM: E80.6  ICD-9-CM: 782.4  2/7/2022 - Present Yes        Demand ischemia ([facility] ICD-10-CM: I24.8  ICD-9-CM: 411.89  8/24/2021 - Present Yes        YAO (acute kidney injury) [facility]) ICD-10-CM: N17.9  ICD-9-CM: 584.9  8/23/2021 - Present Yes        SVT (supraventricular tachycardia) (HCC) ICD-10-CM: I47.1  ICD-9-CM: 427.89  12/22/2018 - Present Yes        Inspiratory stridor ICD-10-CM: R06.1  ICD-9-CM: 786.1  3/21/2017 - Present Yes        Transaminitis ICD-10-CM: R74.01  ICD-9-CM: 790.4  3/14/2017 - Present Yes        Non-ischemic cardiomyopathy ([facility] (Chronic) ICD-10-CM: I42.8  ICD-9-CM: 425.4  3/24/2016 - Present Yes        * (Principal) Acute gastroenteritis ICD-10-CM: K52.9  ICD-9-CM: 558.9  2/26/2016 - Present Yes        HTN (hypertension) (Chronic) ICD-10-CM: I10  ICD-9-CM: 401.9  11/29/2011 - Present Yes        Chronic systolic (congestive) heart failure (HCC) (Chronic) ICD-10-CM: I50.22  ICD-9-CM: 428.22, 428.0  4/21/2011 - Present Yes              Objective:     Patient Vitals for the past 24 hrs:   Temp Pulse Resp BP SpO2   02/16/22 1428 98.2 °F (36.8 °C) 80 20 119/78 98 %   02/16/22 1055 97.5 °F (36.4 °C) 92 20 (!) 140/90 100 %   02/16/22 0836     97 %   02/16/22 0823 97.7 °F (36.5 °C) 80 20 (!) 145/102 97 %   02/16/22 0430 97.7 °F (36.5 °C) 80 20 130/83 96 %   02/16/22 0004 97.4 °F (36.3 °C) 80 22 130/81 97 %   02/15/22 2010 98 °F (36.7 °C) 78 16 127/80 98 %   02/15/22 1939     98 %   02/15/22 1600 97.8 °F (36.6 °C) 76  125/87 97 %     Oxygen Therapy  O2 Sat (%): 98 % (02/16/22 1428)  Pulse via Oximetry: 85 beats per minute (02/16/22 0836)  O2 Device: None (Room air) (02/16/22 1428)  Skin Assessment: Clean, dry, & intact (02/11/22 0949)  O2 Flow Rate (L/min): 0 l/min (02/12/22 0739)  FIO2 (%): 21 % (02/12/22 0739)    Estimated body mass index is 28.93 kg/m² as calculated from the following:    Height as of this encounter: 5' 11\" (1.803 m). Weight as of this encounter: 94.1 kg (207 lb 6.4 oz).     Intake/Output Summary (Last 24 hours) at 2/16/2022 1547  Last data filed at 2/16/2022 1438  Gross per 24 hour   Intake 1026 ml   Output 600 ml   Net 426 ml         Physical Exam:   Blood pressure 119/78, pulse 80, temperature 98.2 °F (36.8 °C), resp. rate 20, height 5' 11\" (1.803 m), weight 94.1 kg (207 lb 6.4 oz), SpO2 98 %. General:    Well nourished. No overt distress. Alert. Calm. Head:  Normocephalic, atraumatic  Eyes:  Sclerae appear normal.  Pupils equally round. ENT:  Nares appear normal, no drainage. Moist oral mucosa  Neck:  No restricted ROM. Trachea midline   CV:   RRR. No m/r/g. No jugular venous distension. Lungs:   CTAB, no wheezing/no rhonchi, respirations even and unlabored on RA  Abdomen: Bowel sounds present. Soft, nondistended. TTP generalized throughout all quadrants, worse RUQ  Extremities: No cyanosis or clubbing. No edema  Skin:     No rashes and normal coloration. Warm and dry. Neuro:  CN II-XII grossly intact. Sensation intact. A&Ox3  Psych:  Normal mood and affect. I have reviewed ordered lab tests and independently visualized imaging below:    Recent Labs:  Recent Results (from the past 48 hour(s))   C.  DIFFICILE AG & TOXIN A/B    Collection Time: 02/15/22  3:30 PM   Result Value Ref Range    GDH ANTIGEN C. DIFFICILE GDH ANTIGEN-NEGATIVE      C. difficile toxin C. DIFFICILE TOXIN-NEGATIVE      PCR Reflex NOT APPLICABLE      INTERPRETATION NEGATIVE FOR TOXIGENIC C. DIFFICILE NTXCD      Clinical Consideration NEGATIVE FOR TOXIGENIC C. DIFFICILE     CBC W/O DIFF    Collection Time: 02/16/22  4:44 AM   Result Value Ref Range    WBC 9.5 4.3 - 11.1 K/uL    RBC 3.77 (L) 4.23 - 5.6 M/uL    HGB 11.2 (L) 13.6 - 17.2 g/dL    HCT 35.5 (L) 41.1 - 50.3 %    MCV 94.2 79.6 - 97.8 FL    MCH 29.7 26.1 - 32.9 PG    MCHC 31.5 31.4 - 35.0 g/dL    RDW 14.6 11.9 - 14.6 %    PLATELET 406 381 - 959 K/uL    MPV 9.4 9.4 - 12.3 FL    ABSOLUTE NRBC 0.00 0.0 - 0.2 K/uL   METABOLIC PANEL, BASIC Collection Time: 02/16/22  4:44 AM   Result Value Ref Range    Sodium 139 138 - 145 mmol/L    Potassium 3.4 (L) 3.5 - 5.1 mmol/L    Chloride 113 (H) 98 - 107 mmol/L    CO2 21 21 - 32 mmol/L    Anion gap 5 (L) 7 - 16 mmol/L    Glucose 87 65 - 100 mg/dL    BUN 5 (L) 6 - 23 MG/DL    Creatinine 1.20 0.8 - 1.5 MG/DL    GFR est AA >60 >60 ml/min/1.73m2    GFR est non-AA >60 >60 ml/min/1.73m2    Calcium 7.9 (L) 8.3 - 10.4 MG/DL       All Micro Results     Procedure Component Value Units Date/Time    CULTURE, STOOL [[id number]] Collected: 02/16/22 0600    Order Status: Completed Specimen: Stool Updated: 02/16/22 1157    C. DIFFICILE AG & TOXIN A/B [[id number]] Collected: 02/15/22 1530    Order Status: Completed Specimen: Stool Updated: 02/16/22 1130     [address] ANTIGEN       C. DIFFICILE GDH ANTIGEN-NEGATIVE           C. difficile toxin       C. DIFFICILE TOXIN-NEGATIVE           PCR Reflex NOT APPLICABLE        INTERPRETATION       NEGATIVE FOR TOXIGENIC C. DIFFICILE           Clinical Consideration       NEGATIVE FOR TOXIGENIC C. DIFFICILE          OVA & PARASITES, STOOL [[id number]] Collected: 02/16/22 0600    Order Status: Completed Specimen: Feces from Stool Updated: 02/16/22 0754    CAMPYLOBACTER CULTURE IDENTIFICATION [[id number]] Collected: 02/08/22 2053    Order Status: Completed Specimen: Stool Updated: 02/13/22 1835     Result 1 Comment        Comment: (NOTE)  No Campylobacter species isolated.   Performed At: [address] [id number]  Tim Davis MD QV:[id number]         [place]  [[id number]] Collected: 02/08/22 2053    Order Status: Completed Specimen: Stool Updated: 02/13/22 [address]     Source STOOL        Comment: STOOL        Campylobacter Culture Final Report Below        Comment: (NOTE)  Performed At: [address] [id number]  Tim Davis MD WN:[id number]         CULTURE, STOOL [[id number]]  (Abnormal) Collected: 02/08/22 2053    Order Status: Completed Specimen: Stool Updated: 02/11/22 5980     Special Requests: NO SPECIAL REQUESTS        Culture result:       No Salmonella, Shigella, or Ecoli 0157 isolated. MODERATE YEAST       COVID-19 RAPID TEST [[id number]] Collected: 02/09/22 0306    Order Status: Completed Specimen: Nasopharyngeal Updated: 02/09/22 0347     Specimen source NOT SPECIFIED        COVID-19 rapid test Not detected        Comment:      The specimen is NEGATIVE for SARS-CoV-2, the novel coronavirus associated with COVID-19. A negative result does not rule out COVID-19. This test has been authorized by the FDA under an Emergency Use Authorization (EUA) for use by authorized laboratories. Fact sheet for Healthcare Providers: ConventionCoolSystemsdate.co.nz  Fact sheet for Patients: ParStreamdate.co.nz       Methodology: Isothermal Nucleic Acid Amplification         INFLUENZA A & B AG (RAPID TEST) [[id number]] Collected: 02/07/22 2231    Order Status: Completed Specimen: Nasopharyngeal from Nasal washing Updated: 02/07/22 2302     Influenza A Ag Negative        Comment: NEGATIVE FOR THE PRESENCE OF INFLUENZA A ANTIGEN  INFECTION DUE TO INFLUENZA A CANNOT BE RULED OUT. BECAUSE THE ANTIGEN PRESENT IN THE SAMPLE MAY BE BELOW  THE DETECTION LIMIT OF THE TEST. A NEGATIVE TEST IS PRESUMPTIVE AND IT IS RECOMMENDED THAT THESE RESULTS BE CONFIRMED BY VIRAL CULTURE OR AN FDA-CLEARED INFLUENZA A AND B MOLECULAR ASSAY. Influenza B Ag Negative        Comment: NEGATIVE FOR THE PRESENCE OF INFLUENZA B ANTIGEN  INFECTION DUE TO INFLUENZA B CANNOT BE RULED OUT. BECAUSE THE ANTIGEN PRESENT IN THE SAMPLE MAY BE BELOW  THE DETECTION LIMIT OF THE TEST. A NEGATIVE TEST IS PRESUMPTIVE AND IT IS RECOMMENDED THAT THESE RESULTS BE CONFIRMED BY VIRAL CULTURE OR AN FDA-CLEARED INFLUENZA A AND B MOLECULAR ASSAY.           Source Nasopharyngeal       COVID-19 RAPID TEST [[id number]] Collected: 02/07/22 1629    Order Status: Completed Specimen: Nasopharyngeal Updated: 02/07/22 1700     Specimen source Nasopharyngeal        COVID-19 rapid test Not detected        Comment:      The specimen is NEGATIVE for SARS-CoV-2, the novel coronavirus associated with COVID-19. A negative result does not rule out COVID-19. This test has been authorized by the FDA under an Emergency Use Authorization (EUA) for use by authorized laboratories. Fact sheet for Healthcare Providers: PathoQuest.co.nz  Fact sheet for Patients: PathoQuest.co.nz       Methodology: Isothermal Nucleic Acid Amplification               Other Studies:  XR ABD (KUB)    Result Date: 2/16/2022  EXAM: ABDOMINAL RADIOGRAPH, ONE VIEW INDICATION: abdominal pain, location of contrast so that CTAP scan can be obtained. COMPARISON: Abdominal x-ray from yesterday TECHNIQUE: Frontal abdominal radiography was performed. FINDINGS: Contrast has largely been evacuated from the colon. Only a small amount contrast remains in the right colon. The bowel gas pattern is nonobstructive. No intraperitoneal free air. Contrast has largely been evacuated from the colon.       Current Meds:  Current Facility-Administered Medications   Medication Dose Route Frequency    potassium chloride (K-DUR, KLOR-CON M20) SR tablet 20 mEq  20 mEq Oral TID    prochlorperazine (COMPAZINE) with saline injection 5 mg  5 mg IntraVENous Q6H PRN    HYDROcodone-acetaminophen (NORCO)  mg tablet 1 Tablet  1 Tablet Oral Q4H PRN    eplerenone (INSPRA) tablet 25 mg (Patient Supplied)  25 mg Oral DAILY    [Held by provider] valsartan (DIOVAN) tablet 40 mg  40 mg Oral DAILY    carvediloL (COREG) tablet 12.5 mg  12.5 mg Oral BID WITH MEALS    sodium chloride (NS) flush 10 mL  10 mL InterCATHeter DAILY    sodium chloride (NS) flush 10 mL  10 mL InterCATHeter PRN    albuterol (PROVENTIL VENTOLIN) nebulizer solution 2.5 mg  2.5 mg Nebulization BID RT    sucralfate (CARAFATE) tablet 1 g  1 g Oral Q6H    LORazepam (ATIVAN) tablet 1 mg  1 mg Oral Q4H PRN    albuterol (PROVENTIL VENTOLIN) nebulizer solution 2.5 mg  2.5 mg Nebulization Q4H PRN    dicyclomine (BENTYL) capsule 20 mg  20 mg Oral TID    sodium chloride (OCEAN) 0.65 % nasal squeeze bottle 2 Spray  2 [place] Both Nostrils Q2HWA    fluticasone propionate (FLONASE) 50 mcg/actuation nasal spray 2 Spray  2 Spray Both Nostrils BID    metoprolol (LOPRESSOR) injection 5 mg  5 mg IntraVENous Q4H PRN    ivabradine (CORLANOR) tablet 5 mg  5 mg Oral BID WITH MEALS    pantoprazole (PROTONIX) 40 mg in 0.9% sodium chloride 10 mL injection  40 mg IntraVENous Q12H    sodium chloride (NS) flush 5-10 mL  5-10 mL IntraVENous Q8H    sodium chloride (NS) flush 5-10 mL  5-10 mL IntraVENous PRN    0.9% sodium chloride infusion  50 mL/hr IntraVENous CONTINUOUS    ALPRAZolam (XANAX) tablet 1 mg  1 mg Oral QHS    aspirin delayed-release tablet 81 mg  81 mg Oral DAILY    atorvastatin (LIPITOR) tablet 80 mg  80 mg Oral DAILY    gabapentin (NEURONTIN) capsule 300 mg  300 mg Oral BID    sodium chloride (NS) flush 5-40 mL  5-40 mL IntraVENous PRN    [Held by provider] acetaminophen (TYLENOL) tablet 650 mg  650 mg Oral Q6H PRN    Or    [Held by provider] acetaminophen (TYLENOL) suppository 650 mg  650 mg Rectal Q6H PRN    polyethylene glycol (MIRALAX) packet 17 g  17 g Oral DAILY PRN    magnesium oxide (MAG-OX) tablet 400 mg  400 mg Oral DAILY     Labs, vital signs, diagnostic testing reviewed. Case discussed with patient, care team, Dr. Reanna Mauricio.    Signed:  Mauricio Crawley NP    Part of this note may have been written by using a voice dictation software. The note has been proof read but may still contain some grammatical/other typographical errors.

## 2022-02-16 NOTE — PROGRESS NOTES
[facility] Progress Note         Admit Date:  2/7/2022  Today's Date:  2/16/2022    CC:  Abdominal pain    Subjective:     Symptoms unchanged. Continues to have nausea and dry heaving. Per patient, nausea and dry heaving increases when pain is not adequately controlled. However, he is tolerating diet (full liquid, eggs). Reports 3 loose BMs this morning. Stool hat contains dark brown liquid stools. No blood. Also noted - a lot of empty Stanton Vences and different empty candy wrappers/bags in the trash can.       Medications:   Current Facility-Administered Medications   Medication Dose Route Frequency    potassium chloride (K-DUR, KLOR-CON M20) SR tablet 20 mEq  20 mEq Oral TID    HYDROcodone-acetaminophen (NORCO)  mg tablet 1 Tablet  1 Tablet Oral Q4H PRN    methylPREDNISolone (PF) (SOLU-MEDROL) injection 20 mg  20 mg IntraVENous DAILY    eplerenone (INSPRA) tablet 25 mg (Patient Supplied)  25 mg Oral DAILY    [Held by provider] valsartan (DIOVAN) tablet 40 mg  40 mg Oral DAILY    carvediloL (COREG) tablet 12.5 mg  12.5 mg Oral BID WITH MEALS    sodium chloride (NS) flush 10 mL  10 mL InterCATHeter DAILY    sodium chloride (NS) flush 10 mL  10 mL InterCATHeter PRN    albuterol (PROVENTIL VENTOLIN) nebulizer solution 2.5 mg  2.5 mg Nebulization BID RT    sucralfate (CARAFATE) tablet 1 g  1 g Oral Q6H    LORazepam (ATIVAN) tablet 1 mg  1 mg Oral Q4H PRN    promethazine (PHENERGAN) with saline injection 12.5 mg  12.5 mg IntraVENous Q4H PRN    albuterol (PROVENTIL VENTOLIN) nebulizer solution 2.5 mg  2.5 mg Nebulization Q4H PRN    dicyclomine (BENTYL) capsule 20 mg  20 mg Oral TID    sodium chloride (OCEAN) 0.65 % nasal squeeze bottle 2 Spray  2 [place] Both Nostrils Q2HWA    fluticasone propionate (FLONASE) 50 mcg/actuation nasal spray 2 Spray  2 Spray Both Nostrils BID    metoprolol (LOPRESSOR) injection 5 mg  5 mg IntraVENous Q4H PRN    ivabradine (CORLANOR) tablet 5 mg  5 mg Oral BID WITH MEALS    pantoprazole (PROTONIX) 40 mg in 0.9% sodium chloride 10 mL injection  40 mg IntraVENous Q12H    sodium chloride (NS) flush 5-10 mL  5-10 mL IntraVENous Q8H    sodium chloride (NS) flush 5-10 mL  5-10 mL IntraVENous PRN    0.9% sodium chloride infusion  50 mL/hr IntraVENous CONTINUOUS    ALPRAZolam (XANAX) tablet 1 mg  1 mg Oral QHS    aspirin delayed-release tablet 81 mg  81 mg Oral DAILY    atorvastatin (LIPITOR) tablet 80 mg  80 mg Oral DAILY    gabapentin (NEURONTIN) capsule 300 mg  300 mg Oral BID    sodium chloride (NS) flush 5-40 mL  5-40 mL IntraVENous PRN    [Held by provider] acetaminophen (TYLENOL) tablet 650 mg  650 mg Oral Q6H PRN    Or    [Held by provider] acetaminophen (TYLENOL) suppository 650 mg  650 mg Rectal Q6H PRN    polyethylene glycol (MIRALAX) packet 17 g  17 g Oral DAILY PRN    magnesium oxide (MAG-OX) tablet 400 mg  400 mg Oral DAILY       Objective:   Vitals:  Visit Vitals  BP (!) 145/102 (BP 1 Location: Left arm)   Pulse 80   Temp 97.7 °F (36.5 °C)   Resp 20   Ht 5' 11\" (1.803 m)   Wt 94.1 kg (207 lb 6.4 oz)   SpO2 97%   BMI 28.93 kg/m²     Intake/Output:  No intake/output data recorded. 02/14 1901 - 02/16 0700  In: 0 [P.O.:666]  Out: -   Exam:  General appearance: alert, cooperative, NAD  CTA  RRR  Abdomen: soft, tender in mainly on right side of abdomen. HYPERACTIVE BOWEL SOUNDS. Neuro:  A&O NAD    Data Review (Labs):    Recent Labs     02/16/22  0444 02/14/22  0438   WBC 9.5 11.0   HGB 11.2* 11.4*   HCT 35.5* 36.0*    331   MCV 94.2 96.0    139   K 3.4* 3.5   * 112*   CO2 21 21   BUN 5* 4*   CREA 1.20 1.00   CA 7.9* 7.7*   GLU 87 106*       Assessment:     Principal Problem:  Acute gastroenteritis (2/26/2016)  Still no objective evidence for patient's reported abdominal pain. UGI series result pending.      Active Problems:  Chronic systolic (congestive) heart failure ([facility] (4/21/2011)  HTN (hypertension) (11/29/2011)  Non-ischemic cardiomyopathy ([facility] (3/24/2016)  Transaminitis (3/14/2017)  Inspiratory stridor (3/21/2017)  SVT (supraventricular tachycardia) ([facility] (12/22/2018)  YAO (acute kidney injury) ([facility] (8/23/2021)  Demand ischemia ([facility] (8/24/2021)  Hyperbilirubinemia (2/7/2022)    EGD 2/11/22  Esophagus: LA grade D esophagitis  Stomach: Large hiatal hernia (50-60% of gastric volume) without Chad ulcers. Otherwise normal.  Duodenum: Normal    A/P CT scan 2/7 without contrast  IMPRESSION  1. Moderate hiatal hernia. Suggestion of eccentric submucosal wall thickening of  the proximal stomach, although this may be artifactual secondary to volume  averaging of fluid in the stomach. Correlation with upper endoscopy may be  beneficial.  2. No evidence of colitis, diverticulitis, appendicitis or bowel obstruction. 3. No renal calculus or hydronephrosis. 4. Absence of IV contrast limits sensitivity. Stool studies 2/8/22 was positive for yeast. C diff not obtained. UGI series 2/14/22  IMPRESSION  1. Hiatal hernia, likely type III. The gastroesophageal junction, upper gastric  body, and gastric fundus are located above the diaphragm. There is an apparent  dynamic, paraesophageal component which occurs during inspiration and  expiration. 2.  Severe esophageal dysmotility. 3.  Nonspecific esophagitis. Given cardiac history, surgery is not recommending HH repair for type III hiatal hernia and EGD on 2/11/22 with LA Class D esophagitis and large HH. He continues to have loose to watery BM multiple times per day. Suspect high sugar consumption contributing. Infectious work-up pending. Fecal lactoferrin on 2/8/22 was negative. He states diarrhea is new in the last year. Of note, he had CCX on 4/2021. Bile salt diarrhea is a possibility as well. Generalized abdominal pain and tenderness on exam is out of proportion to work-up to date.  Last colonoscopy performed in 2016 (Dr. Humphrey Olea) with essentially normal exam. However, bowel prep was suboptimal.        Plan:     Follow-up stools studies. C diff pending. Treat if positive. KUB to be done on 2/16/22 to confirm passing of PO contrast from upper GI series. Will then obtain CT abd/pelvis with contrast to rule out other etiologies for patient's pain. YAO resolved. Surgery signed-off. Encouraged patient to follow-up as an outpatient with his surgeon Dr. Deneen Landaverde for second opinion. Continue Protonix 40 mg IV BID and Carafate slurry  Continue anti-emeitics. Further recs pending CT scan results.         Marko Valdez PA-C  [facility]

## 2022-02-16 NOTE — PROGRESS NOTES
Patient asked me about signing AMA forms, I told him the process and that I would not recommend him doing that. Unsure if this is a real consideration or not.

## 2022-02-17 VITALS
TEMPERATURE: 99.3 F | OXYGEN SATURATION: 96 % | BODY MASS INDEX: 31.5 KG/M2 | HEIGHT: 71 IN | DIASTOLIC BLOOD PRESSURE: 76 MMHG | SYSTOLIC BLOOD PRESSURE: 127 MMHG | HEART RATE: 99 BPM | WEIGHT: 225 LBS | RESPIRATION RATE: 18 BRPM

## 2022-02-17 LAB
ANION GAP SERPL CALC-SCNC: 6 MMOL/L (ref 7–16)
BUN SERPL-MCNC: 12 MG/DL (ref 6–23)
CALCIUM SERPL-MCNC: 7.8 MG/DL (ref 8.3–10.4)
CHLORIDE SERPL-SCNC: 115 MMOL/L (ref 98–107)
CO2 SERPL-SCNC: 21 MMOL/L (ref 21–32)
CREAT SERPL-MCNC: 1.1 MG/DL (ref 0.8–1.5)
G LAMBLIA AG STL QL IA: NEGATIVE
GLUCOSE SERPL-MCNC: 92 MG/DL (ref 65–100)
POTASSIUM SERPL-SCNC: 3.8 MMOL/L (ref 3.5–5.1)
SODIUM SERPL-SCNC: 142 MMOL/L (ref 138–145)
SPECIMEN SOURCE: NORMAL

## 2022-02-17 PROCEDURE — 74011250637 HC RX REV CODE- 250/637: Performed by: INTERNAL MEDICINE

## 2022-02-17 PROCEDURE — 74011250637 HC RX REV CODE- 250/637: Performed by: PHYSICIAN ASSISTANT

## 2022-02-17 PROCEDURE — C9113 INJ PANTOPRAZOLE SODIUM, VIA: HCPCS | Performed by: INTERNAL MEDICINE

## 2022-02-17 PROCEDURE — 80048 BASIC METABOLIC PNL TOTAL CA: CPT

## 2022-02-17 PROCEDURE — 74011000250 HC RX REV CODE- 250: Performed by: EMERGENCY MEDICINE

## 2022-02-17 PROCEDURE — 74011000250 HC RX REV CODE- 250: Performed by: NURSE PRACTITIONER

## 2022-02-17 PROCEDURE — 74011250637 HC RX REV CODE- 250/637: Performed by: EMERGENCY MEDICINE

## 2022-02-17 PROCEDURE — 74011000250 HC RX REV CODE- 250: Performed by: INTERNAL MEDICINE

## 2022-02-17 PROCEDURE — 74011000250 HC RX REV CODE- 250: Performed by: STUDENT IN AN ORGANIZED HEALTH CARE EDUCATION/TRAINING PROGRAM

## 2022-02-17 PROCEDURE — 74011250637 HC RX REV CODE- 250/637: Performed by: STUDENT IN AN ORGANIZED HEALTH CARE EDUCATION/TRAINING PROGRAM

## 2022-02-17 PROCEDURE — 74011250636 HC RX REV CODE- 250/636: Performed by: INTERNAL MEDICINE

## 2022-02-17 PROCEDURE — 74011250637 HC RX REV CODE- 250/637: Performed by: NURSE PRACTITIONER

## 2022-02-17 PROCEDURE — 74011250636 HC RX REV CODE- 250/636: Performed by: STUDENT IN AN ORGANIZED HEALTH CARE EDUCATION/TRAINING PROGRAM

## 2022-02-17 RX ORDER — SUCRALFATE 1 G/1
1 TABLET ORAL 4 TIMES DAILY
Qty: 120 TABLET | Refills: 0 | Status: SHIPPED | OUTPATIENT
Start: 2022-02-17 | End: 2022-03-19

## 2022-02-17 RX ORDER — HYDROCODONE BITARTRATE AND ACETAMINOPHEN 10; 325 MG/1; MG/1
1 TABLET ORAL
Qty: 12 TABLET | Refills: 0 | Status: SHIPPED | OUTPATIENT
Start: 2022-02-17 | End: 2022-02-20

## 2022-02-17 RX ORDER — VALSARTAN 40 MG/1
40 TABLET ORAL DAILY
Qty: 30 TABLET | Refills: 0 | Status: SHIPPED | OUTPATIENT
Start: 2022-02-18 | End: 2022-03-20

## 2022-02-17 RX ORDER — PANTOPRAZOLE SODIUM 40 MG/1
40 TABLET, DELAYED RELEASE ORAL 2 TIMES DAILY
Qty: 60 TABLET | Refills: 0 | Status: SHIPPED | OUTPATIENT
Start: 2022-02-17 | End: 2022-03-19

## 2022-02-17 RX ORDER — ONDANSETRON 4 MG/1
4 TABLET, ORALLY DISINTEGRATING ORAL
Qty: 21 TABLET | Refills: 0 | Status: SHIPPED | OUTPATIENT
Start: 2022-02-17 | End: 2022-03-04

## 2022-02-17 RX ORDER — CHOLESTYRAMINE 4 G/4.8G
4 POWDER, FOR SUSPENSION ORAL DAILY
Qty: 15 PACKET | Refills: 0 | Status: SHIPPED | OUTPATIENT
Start: 2022-02-18 | End: 2022-03-05

## 2022-02-17 RX ORDER — GABAPENTIN 300 MG/1
300 CAPSULE ORAL 2 TIMES DAILY
Qty: 60 CAPSULE | Refills: 0 | Status: SHIPPED | OUTPATIENT
Start: 2022-02-17 | End: 2022-03-19

## 2022-02-17 RX ORDER — NALOXONE HYDROCHLORIDE 4 MG/.1ML
SPRAY NASAL
Qty: 1 EACH | Refills: 0 | Status: SHIPPED | OUTPATIENT
Start: 2022-02-17

## 2022-02-17 RX ORDER — DICYCLOMINE HYDROCHLORIDE 10 MG/1
20 CAPSULE ORAL 3 TIMES DAILY
Qty: 180 CAPSULE | Refills: 0 | Status: SHIPPED | OUTPATIENT
Start: 2022-02-18 | End: 2022-03-20

## 2022-02-17 RX ORDER — CARVEDILOL 12.5 MG/1
12.5 TABLET ORAL 2 TIMES DAILY WITH MEALS
Qty: 60 TABLET | Refills: 0 | Status: SHIPPED | OUTPATIENT
Start: 2022-02-17 | End: 2022-03-19

## 2022-02-17 RX ADMIN — PROCHLORPERAZINE EDISYLATE 5 MG: 5 INJECTION INTRAMUSCULAR; INTRAVENOUS at 04:18

## 2022-02-17 RX ADMIN — HYDROCODONE BITARTRATE AND ACETAMINOPHEN 1 TABLET: 10; 325 TABLET ORAL at 04:18

## 2022-02-17 RX ADMIN — IVABRADINE 5 MG: 5 TABLET, FILM COATED ORAL at 09:12

## 2022-02-17 RX ADMIN — DICYCLOMINE HYDROCHLORIDE 20 MG: 10 CAPSULE ORAL at 09:11

## 2022-02-17 RX ADMIN — GABAPENTIN 300 MG: 300 CAPSULE ORAL at 09:12

## 2022-02-17 RX ADMIN — HYDROCODONE BITARTRATE AND ACETAMINOPHEN 1 TABLET: 10; 325 TABLET ORAL at 09:12

## 2022-02-17 RX ADMIN — FLUTICASONE PROPIONATE 2 SPRAY: 50 SPRAY, METERED NASAL at 09:13

## 2022-02-17 RX ADMIN — Medication 400 MG: at 09:12

## 2022-02-17 RX ADMIN — SALINE NASAL SPRAY 2 SPRAY: 1.5 SOLUTION NASAL at 08:00

## 2022-02-17 RX ADMIN — SUCRALFATE 1 G: 1 TABLET ORAL at 06:13

## 2022-02-17 RX ADMIN — ASPIRIN 81 MG: 81 TABLET ORAL at 09:11

## 2022-02-17 RX ADMIN — ATORVASTATIN CALCIUM 80 MG: 80 TABLET, FILM COATED ORAL at 09:11

## 2022-02-17 RX ADMIN — SODIUM CHLORIDE 40 MG: 9 INJECTION, SOLUTION INTRAMUSCULAR; INTRAVENOUS; SUBCUTANEOUS at 09:14

## 2022-02-17 RX ADMIN — SODIUM CHLORIDE, PRESERVATIVE FREE 10 ML: 5 INJECTION INTRAVENOUS at 09:15

## 2022-02-17 RX ADMIN — SODIUM CHLORIDE, PRESERVATIVE FREE 10 ML: 5 INJECTION INTRAVENOUS at 04:21

## 2022-02-17 RX ADMIN — SALINE NASAL SPRAY 2 SPRAY: 1.5 SOLUTION NASAL at 09:15

## 2022-02-17 RX ADMIN — CARVEDILOL 12.5 MG: 6.25 TABLET, FILM COATED ORAL at 09:11

## 2022-02-17 RX ADMIN — CHOLESTYRAMINE 4 G: 4 POWDER, FOR SUSPENSION ORAL at 09:12

## 2022-02-17 RX ADMIN — PROCHLORPERAZINE EDISYLATE 5 MG: 5 INJECTION INTRAMUSCULAR; INTRAVENOUS at 09:14

## 2022-02-17 NOTE — PROGRESS NOTES
Pt to discharge home today. Family to transport pt home. Spoke with pt and pt agreeable to d/chome. No CM need noted. All needs met. CM available if any new needs arise. Care Management Interventions  PCP Verified by CM: Yes (Dr. Jeronimo Gutierrez)  Mode of Transport at Discharge:  Other (see comment) (family )  Transition of Care Consult (CM Consult): Discharge Planning  Discharge Durable Medical Equipment: No  Physical Therapy Consult: No  Occupational Therapy Consult: No  Speech Therapy Consult: No  Support Systems: Spouse/Significant Other,Parent(s)  Confirm Follow Up Transport: Family  The Plan for Transition of Care is Related to the Following Treatment Goals : Return to baseline  [employer] Provided?: Yes  Discharge Location  Patient Expects to be Discharged to[de-identified] Home

## 2022-02-17 NOTE — PROGRESS NOTES
Discharge instructions reviewed with patient. Hard scripts for Norco and Narcan given to patient. All questions asked and answered. PICC removed per NP order with tip intact. Discharged to personal vehicle with brother.

## 2022-02-17 NOTE — PROGRESS NOTES
[facility] Progress Note         Admit Date:  2/7/2022  Today's Date:  2/17/2022    CC:  Abdominal pain    Subjective:     Continues to have nausea, dry heaving, and abdominal pain - \"It will always be there. \" Tolerates regular low sodium diet without increase in nausea and vomiting. Reports 2 loose BMs this morning.      Medications:   Current Facility-Administered Medications   Medication Dose Route Frequency    prochlorperazine (COMPAZINE) with saline injection 5 mg  5 mg IntraVENous Q6H PRN    cholestyramine-aspartame (QUESTRAN LIGHT) packet 4 g  4 g Oral DAILY    HYDROcodone-acetaminophen (NORCO)  mg tablet 1 Tablet  1 Tablet Oral Q4H PRN    eplerenone (INSPRA) tablet 25 mg (Patient Supplied)  25 mg Oral DAILY    [Held by provider] valsartan (DIOVAN) tablet 40 mg  40 mg Oral DAILY    carvediloL (COREG) tablet 12.5 mg  12.5 mg Oral BID WITH MEALS    sodium chloride (NS) flush 10 mL  10 mL InterCATHeter DAILY    sodium chloride (NS) flush 10 mL  10 mL InterCATHeter PRN    albuterol (PROVENTIL VENTOLIN) nebulizer solution 2.5 mg  2.5 mg Nebulization BID RT    sucralfate (CARAFATE) tablet 1 g  1 g Oral Q6H    LORazepam (ATIVAN) tablet 1 mg  1 mg Oral Q4H PRN    albuterol (PROVENTIL VENTOLIN) nebulizer solution 2.5 mg  2.5 mg Nebulization Q4H PRN    dicyclomine (BENTYL) capsule 20 mg  20 mg Oral TID    sodium chloride (OCEAN) 0.65 % nasal squeeze bottle 2 Spray  2 [place] Both Nostrils Q2HWA    fluticasone propionate (FLONASE) 50 mcg/actuation nasal spray 2 Spray  2 Spray Both Nostrils BID    metoprolol (LOPRESSOR) injection 5 mg  5 mg IntraVENous Q4H PRN    ivabradine (CORLANOR) tablet 5 mg  5 mg Oral BID WITH MEALS    pantoprazole (PROTONIX) 40 mg in 0.9% sodium chloride 10 mL injection  40 mg IntraVENous Q12H    sodium chloride (NS) flush 5-10 mL  5-10 mL IntraVENous Q8H    sodium chloride (NS) flush 5-10 mL  5-10 mL IntraVENous PRN    0.9% sodium chloride infusion  50 mL/hr IntraVENous CONTINUOUS    ALPRAZolam (XANAX) tablet 1 mg  1 mg Oral QHS    aspirin delayed-release tablet 81 mg  81 mg Oral DAILY    atorvastatin (LIPITOR) tablet 80 mg  80 mg Oral DAILY    gabapentin (NEURONTIN) capsule 300 mg  300 mg Oral BID    sodium chloride (NS) flush 5-40 mL  5-40 mL IntraVENous PRN    [Held by provider] acetaminophen (TYLENOL) tablet 650 mg  650 mg Oral Q6H PRN    Or    [Held by provider] acetaminophen (TYLENOL) suppository 650 mg  650 mg Rectal Q6H PRN    polyethylene glycol (MIRALAX) packet 17 g  17 g Oral DAILY PRN    magnesium oxide (MAG-OX) tablet 400 mg  400 mg Oral DAILY       Objective:   Vitals:  Visit Vitals  BP (!) 134/92 (BP 1 Location: Left upper arm, BP Patient Position: Supine)   Pulse 92 Comment: recheck   Temp 98.3 °F (36.8 °C)   Resp 20   Ht 5' 11\" (1.803 m)   Wt 102.1 kg (225 lb)   SpO2 96%   BMI 31.38 kg/m²     Intake/Output:  No intake/output data recorded. 02/15 1901 - 02/17 0700  In: 2236 [P.O.:1686; I.V.:550]  Out: 1250 [Urine:1250]  Exam:  General appearance: alert, cooperative, NAD  CTA  RRR  Abdomen: protuberant, soft, tender in mainly on right side of abdomen. HYPERACTIVE BOWEL SOUNDS. Neuro:  A&O NAD    Data Review (Labs):    Recent Labs     02/17/22  0308 02/16/22  0444   WBC  --  9.5   HGB  --  11.2*   HCT  --  35.5*   PLT  --  288   MCV  --  94.2    139   K 3.8 3.4*   * 113*   CO2 21 21   BUN 12 5*   CREA 1.10 1.20   CA 7.8* 7.9*   GLU 92 87       Assessment:     Principal Problem:  Acute gastroenteritis (2/26/2016)  Still no objective evidence for patient's reported abdominal pain.      Active Problems:  Chronic systolic (congestive) heart failure ([facility] [address] (4/21/2011)  HTN (hypertension) (11/29/2011)  Non-ischemic cardiomyopathy ([facility] [address] (3/24/2016)  Transaminitis (3/14/2017)  Inspiratory stridor (3/21/2017)  SVT (supraventricular tachycardia) ([facility] [address] (12/22/2018)  YAO (acute kidney injury) ([facility] (8/23/2021)  Demand ischemia ([facility] (8/24/2021)  Hyperbilirubinemia (2/7/2022)    EGD 2/11/22  Esophagus: LA grade D esophagitis  Stomach: Large hiatal hernia (50-60% of gastric volume) without Chad ulcers. Otherwise normal.  Duodenum: Normal    A/P CT scan 2/7 without contrast  IMPRESSION  1. Moderate hiatal hernia. Suggestion of eccentric submucosal wall thickening of  the proximal stomach, although this may be artifactual secondary to volume  averaging of fluid in the stomach. Correlation with upper endoscopy may be  beneficial.  2. No evidence of colitis, diverticulitis, appendicitis or bowel obstruction. 3. No renal calculus or hydronephrosis. 4. Absence of IV contrast limits sensitivity. Stool studies 2/8/22 was positive for yeast. C diff not obtained. UGI series 2/14/22  IMPRESSION  1. Hiatal hernia, likely type III. The gastroesophageal junction, upper gastric  body, and gastric fundus are located above the diaphragm. There is an apparent  dynamic, paraesophageal component which occurs during inspiration and  expiration. 2.  Severe esophageal dysmotility. 3.  Nonspecific esophagitis. Given cardiac history, surgery is not recommending HH repair for type III hiatal hernia and EGD on 2/11/22 with LA Class D esophagitis and large HH. He continues to have loose to watery BM multiple times per day. Suspect high sugar consumption contributing. Infectious work-up pending. Fecal lactoferrin on 2/8/22 was negative. He states diarrhea is new in the last year. Of note, he had CCX on 4/2021. Bile salt diarrhea is a possibility as well. Generalized abdominal pain and tenderness on exam is out of proportion to work-up to date. Last colonoscopy performed in 2016 (Dr. Duarte Springer) with essentially normal exam. However, bowel prep was suboptimal.        Plan:     C diff negative. Other stools studies pending. Monitor response to cholestyramine. Reconsider A/P CT scan with contrast if patient desires. Inpatient vs outpatient. Surgery signed-off. Encouraged patient to follow-up as an outpatient with his surgeon Dr. Carolann Robin for second opinion. Continue Protonix 40 mg IV BID and Carafate slurry  Continue anti-emeitics.       Lynn Ospina PA-C  [facility]

## 2022-02-17 NOTE — PROGRESS NOTES
Problem: Falls - Risk of  Goal: *Absence of Falls  Description: Document [place] Fall Risk and appropriate interventions in the flowsheet.   Outcome: Progressing Towards Goal  Note: Fall Risk Interventions:  Mobility Interventions: Communicate number of staff needed for ambulation/transfer,Patient to call before getting OOB         Medication Interventions: Assess postural VS orthostatic hypotension,Evaluate medications/consider consulting pharmacy,Patient to call before getting OOB    Elimination Interventions: Call light in reach

## 2022-02-17 NOTE — PROGRESS NOTES
Chart reviewed. Pt requested cancelling CT scan. Diet progressed. Pt to discharge tomorrow pending tolerance. C. Diff negative. No CM needs noted. CM to continue to follow and monitor for any further needs.

## 2022-02-17 NOTE — DISCHARGE INSTRUCTIONS
Follow-up with your surgeon Dr Christina Erazo for a second opinion about hiatal hernia surgery   [phone] [facility] ([place]      Patient Education        Gastroenteritis: Care Instructions  Your Care Instructions     Gastroenteritis is an illness that may cause nausea, vomiting, and diarrhea. It is sometimes called \"stomach flu. \" It can be caused by bacteria or a virus. You will probably begin to feel better in 1 to 2 days. In the meantime, get plenty of rest and make sure you do not become dehydrated. Dehydration occurs when your body loses too much fluid. Follow-up care is a key part of your treatment and safety. Be sure to make and go to all appointments, and call your doctor if you are having problems. It's also a good idea to know your test results and keep a list of the medicines you take. How can you care for yourself at home? · If your doctor prescribed antibiotics, take them as directed. Do not stop taking them just because you feel better. You need to take the full course of antibiotics. · Drink plenty of fluids to prevent dehydration. Choose water and other clear liquids until you feel better. If you have kidney, heart, or liver disease and have to limit fluids, talk with your doctor before you increase your fluid intake. · Drink fluids slowly, in frequent, small amounts, because drinking too much too fast can cause vomiting. · Begin eating mild foods, such as dry toast, yogurt, applesauce, bananas, and rice. Avoid spicy, hot, or high-fat foods, and do not drink alcohol or caffeine for a day or two. Do not drink milk or eat ice cream until you are feeling better. How to prevent gastroenteritis  · Keep hot foods hot and cold foods cold. · Do not eat meats, dressings, salads, or other foods that have been kept at room temperature for more than 2 hours. · Use a thermometer to check your refrigerator.  It should be between 34°F and 40°F.  · Defrost meats in the refrigerator or microwave, not on the kitchen counter. · Keep your hands and your kitchen clean. Wash your hands, cutting boards, and countertops with hot soapy water frequently. · Cook meat until it is well done. · Do not eat raw eggs or uncooked sauces made with raw eggs. · Do not take chances. If food looks or tastes spoiled, throw it out. When should you call for help? Call 911 anytime you think you may need emergency care. For example, call if:    · You vomit blood or what looks like coffee grounds.     · You passed out (lost consciousness).     · You pass maroon or very bloody stools. Call your doctor now or seek immediate medical care if:    · You have severe belly pain.     · You have signs of needing more fluids. You have sunken eyes, a dry mouth, and pass only a little urine.     · You feel like you are going to faint.     · You have increased belly pain that does not go away in 1 to 2 days.     · You have new or increased nausea, or you are vomiting.     · You have a new or higher fever.     · Your stools are black and tarlike or have streaks of blood. Watch closely for changes in your health, and be sure to contact your doctor if:    · You are dizzy or lightheaded.     · You urinate less than usual, or your urine is dark yellow or brown.     · You do not feel better with each day that goes by. Where can you learn more? Go to http://www.[employer].com/  Enter N142 in the search box to learn more about \"Gastroenteritis: Care Instructions. \"  Current as of: July 1, 2021               Content Version: 13.0  © 1618-1157 [employer]. Care instructions adapted under license by [employer] (which disclaims liability or warranty for this information). If you have questions about a medical condition or this instruction, always ask your healthcare professional. [address] any warranty or liability for your use of this information.

## 2022-02-17 NOTE — DISCHARGE SUMMARY
Hospitalist Discharge Summary   Admit Date:  2022  4:06 PM   DC Note date: 2022  Name:  Ab Delgado   Age:  62 y.o. Sex:  male  :  1965   MRN:  [id number]   Room:  [facility]  PCP:  Vernell Lemus MD    Presenting Complaint: Chest Pain and Shortness of Breath    Initial Admission Diagnosis: YAO (acute kidney injury) ([facility] [N17.9]     Problem List for this Hospitalization:  Hospital Problems as of 2022 Date Reviewed: 2022          Codes Class Noted - Resolved POA    Severe Esophageal Dysmotility Disorder by UGI ICD-10-CM: K22.4  ICD-9-CM: 530.5  2022 - Present Yes        Generalized abdominal pain ICD-10-CM: R10.84  ICD-9-CM: 789.07  2022 - Present Yes        Hiatal hernia without obstruction (present since at least ) ICD-10-CM: K44.9  ICD-9-CM: 553.3  2022 - Present Yes        Hyperbilirubinemia ICD-10-CM: E80.6  ICD-9-CM: 782.4  2022 - Present Yes        Demand ischemia ([facility] ICD-10-CM: I24.8  ICD-9-CM: 411.89  2021 - Present Yes        YAO (acute kidney injury) ([facility] ICD-10-CM: N17.9  ICD-9-CM: 584.9  2021 - Present Yes        SVT (supraventricular tachycardia) ([facility] ICD-10-CM: I47.1  ICD-9-CM: 427.89  2018 - Present Yes        Inspiratory stridor ICD-10-CM: R06.1  ICD-9-CM: 786.1  3/21/2017 - Present Yes        Transaminitis ICD-10-CM: R74.01  ICD-9-CM: 790.4  3/14/2017 - Present Yes        Non-ischemic cardiomyopathy ([facility] (Chronic) ICD-10-CM: I42.8  ICD-9-CM: 425.4  3/24/2016 - Present Yes        * (Principal) Acute gastroenteritis ICD-10-CM: K52.9  ICD-9-CM: 558.9  2016 - Present Yes        HTN (hypertension) (Chronic) ICD-10-CM: I10  ICD-9-CM: 401.9  2011 - Present Yes        Chronic systolic (congestive) heart failure (HCC) (Chronic) ICD-10-CM: I50.22  ICD-9-CM: 428.22, 428.0  2011 - Present Yes            Did Patient have Sepsis (YES OR NO): No    Hospital Course:  Mr. Nead Pavon is a 62 y. o. male with a medical history of nonischemic cardiomyopathy with EF of 20-25% s/p ICD, HTN, HLD, and chronic back pain presents with heart palpitations, persistent nausea/vomiting, diarrhea, shortness of breath and generalized weakness for the past week. He says that he was called by his cardiologist and told that his HRs were high. He denies chest pain but says \"I just hurt all over\". He denies recreational drug use and alcohol. He denies black/red stools. He does not know of any food that may have triggered his symptoms.      ER course: HRs in the 150s-160s, cardiology reviewed strips and it is most likely atrial tachycardia as opposed to VT. WBC count of 13k. Troponin of 245 with repeat of 270. Procalcitonin of 0.06. pBNP of 1690. SCr of 2.4. K of 3.4. Rapid COVID is negative. CXR unremarkable.     Patient with episode of stridor s/p racemic epi with improvement, placed on Solumedrol. He currently remains stable on RA without hypoxia. Solumedrol discontinued.     GI has been following for persistent nausea/vomiting. Underwent EGD 2/11 with findings of esophagitis and large hiatal hernia. Underwent Upper GI series 2/14 with concern for type III hiatal hernia, severe esophageal dysmotility. General Surgery evaluated hernia, poor surgical repair candidate given cardiac function, recommend outpatient follow-up with Dr. Denia Hill for second opinion. C Diff negative. Generalized abdominal pain is out of proportion to findings. Patient has been requesting IV pain medications which are not indicated. GI ordered CT A/P with contrast to further evaluate cause of abdominal pain but Mr. Joo Yoo refused this scan. He requested an increase in his Phenergan outpatient dose (already maxed) and an increase in pain medication. GI prefers ondensatron at discharge due to EGD findings. I discussed that outpatient pain management needs to be handled by PCP or with PCP referral to pain management. Mr. Joo Yoo is at baseline.   His VS are stable and controlled and BMP is unremarkable on day of discharge. We encourage him to keep his follow up appts with PCP, GI, Surgery, and Cardiology. Medications optimized and Rx have been provided. Mr. Alexa Fallon is eager to leave the hospital and may be discharged home on Feb/17/2022. A&P  Acute Gastroenteritis  Intractable nausea/vomiting  GERD with large hiatal hernia type III  -GI consulted  -s/p EGD on 2/11 with findings of esophagitis  -s/p UGI series 2/14 with concern for type III hiatal hernia, severe esophageal dysmotility  -Cirrhosis workup at later date with GI  -MBS cancelled by speech given UGI findings, sx likely esophageal  -s/p Rocephin and Flagyl courses  -CT A/P with contrast ordered - discontinued per patient request  -Per GI, advance to solid diet  -C. Diff negative; other stool studies pending  -Changed to IV Compazine due to pharmacy's request for no further IV Phenergan. Patient is on home dose Phenergan PO 25 mg Q6H prn. He is requesting an increase in this but this is maxed.  Discussed with GI who prefers ondensatron at discharge due to EGD findings.     Hiatal Hernia present on imaging since 2010  -General Surgery evaluated, poor surgical repair candidate given cardiac function, recommend o/p follow-up with Dr. Rory Gallegos for second opinion     Gastritis/Esophagitis  -Continue Carafate and PPI     Stridor, resolved  -s/p Racemic Epi with improvement, now on Solumedrol  -CT neck wnl  -ENT evaluated 2/10, ordered SLP for voice and swallow therapy, can follow outpatient  -Pulmonary evaluated, likely VC irritation and signed off 2/15  -D/C steroids, currently stable on RA with O2 saturation 100%, no stridor or wheezing on exam, D/C pulse oximetry     Chronic systolic (congestive) heart failure  -Repeat Echo 2/22 with LVEF 15-20%  -Appears compensated and euvolemic  -Continue current regimen of Inspra, Ivabradine and Coreg      YAO, resolved     Demand ischemia  -Known history of nonischemic cardiomyopathy  -Cardiology evaluated and signed off 2/11  -Cont ASA/Statin  -Discontinue telemetry, remains NSR without events     SVT, appears to be Atrial tach per cardiology on admission, resolved  -Likely secondary to acute dehydration with acute illness  -Cardiology evaluated, continue Coreg     Essential HTN  -Placed on midodrine for hypotension, ARB held, continue meds for sCHF  -Midodrine discontinued, BP currently stable 119/78, restart ARB       Hypokalemia, resolved     Acute on Chronic Pain  -Likely has opioid tolerance, no IV pain medication indicated  -Continue oral pain regimen prn  -I discussed pain management outpatient needs done by PCP with possible referral to pain management     LUE Edema  -US negative for DVT      Disposition: Home Health Care Svc  Diet: ADULT DIET Regular; Low Sodium (2 gm); Low Fat (Less than or Equal to 50 gm/day); May have eggs  Code Status: Full Code    Follow Up Orders: Follow-up Appointments   Procedures    FOLLOW UP VISIT Appointment in: Other (Specify)     Standing Status:   Standing     Number of Occurrences:   1     Order Specific Question:   Appointment in     Answer: Other (Specify)       Follow-up Information     Follow up With Specialties Details Why Contact Info Additional Information    Casey Yeboah MD Otolaryngology In 2 weeks  [address]  [phone]       Sole Connolly MD Family Medicine In 1 week post discharge check up and medication review [address]  [address] [facility]       Balta Reza MD General Surgery In 2 weeks encouraged to follow up with Dr. Deneen Landaverde for 2nd opinion per GI and Gen Surgery [address]  [address]  [address] [facility]       Ryne Douglas MD Gastroenterology In 1 month post discharge follow up [address]  [address]  [address] [facility] In 2 weeks patient declined CT a/p with contrast while inpatient; recommend outpatient scan and follow up with GI and his General surgeon [address] [place] [address]  [phone] [address] [facility], [facility] [address] [facility]- 2nd floor of Outpatient Medical Office Building          Follow up labs/diagnostics (ultimately defer to outpatient provider): CT a/p w/ contrast with [facility]    Time spent in patient discharge and coordination 37 minutes. Plan was discussed with . Binh Tillman. All questions answered. Patient was stable at time of discharge. Instructions given to call a physician or return if any concerns. Discharge Info:   Current Discharge Medication List      START taking these medications    Details   naloxone (Narcan) 4 mg/actuation nasal spray Use 1 spray intranasally, then discard. Repeat with new spray every 2 min as needed for opioid overdose symptoms, alternating nostrils. Qty: 1 Each, Refills: 0  Start date: 2/17/2022      ondansetron (ZOFRAN ODT) 4 mg disintegrating tablet Take 1 Tablet by mouth every eight (8) hours as needed for Nausea or Vomiting for up to 7 days. Qty: 21 Tablet, Refills: 0  Start date: 2/17/2022, End date: 2/24/2022      ivabradine (CORLANOR) 5 mg tablet Take 1 Tablet by mouth two (2) times daily (with meals) for 30 days. Qty: 60 Tablet, Refills: 0  Start date: 2/17/2022, End date: 3/19/2022      dicyclomine (BENTYL) 10 mg capsule Take 2 Capsules by mouth three (3) times daily for 30 days. Qty: 180 Capsule, Refills: 0  Start date: 2/18/2022, End date: 3/20/2022      sucralfate (CARAFATE) 1 gram tablet Take 1 Tablet by mouth four (4) times daily for 30 days. Qty: 120 Tablet, Refills: 0  Start date: 2/17/2022, End date: 3/19/2022      pantoprazole (PROTONIX) 40 mg tablet Take 1 Tablet by mouth two (2) times a day for 30 days.   Qty: 60 Tablet, Refills: 0  Start date: 2/17/2022, End date: 3/19/2022      cholestyramine-aspartame (QUESTRAN LIGHT) 4 gram packet Take 1 Packet by mouth daily for 15 days.  Alonso Kin: 15 Packet, Refills: 0  Start date: 2/18/2022, End date: 3/5/2022         CONTINUE these medications which have CHANGED    Details   gabapentin (NEURONTIN) 300 mg capsule Take 1 Capsule by mouth two (2) times a day for 30 days. Qty: 60 Capsule, Refills: 0  Start date: 2/17/2022, End date: 3/19/2022      HYDROcodone-acetaminophen (NORCO)  mg tablet Take 1 Tablet by mouth every six (6) hours as needed for Pain for up to 3 days. Max Daily Amount: 4 Tablets. Qty: 12 Tablet, Refills: 0  Start date: 2/17/2022, End date: 2/20/2022    Associated Diagnoses: Generalized abdominal pain      valsartan (DIOVAN) 40 mg tablet Take 1 Tablet by mouth daily for 30 days. Qty: 30 Tablet, Refills: 0  Start date: 2/18/2022, End date: 3/20/2022      carvediloL (COREG) 12.5 mg tablet Take 1 Tablet by mouth two (2) times daily (with meals) for 30 days. Qty: 60 Tablet, Refills: 0  Start date: 2/17/2022, End date: 3/19/2022         CONTINUE these medications which have NOT CHANGED    Details   guaiFENesin 200 mg/5 mL liqd Take 5 mL by mouth every six (6) hours as needed for Cough. Qty: 120 mL, Refills: 0      promethazine (PHENERGAN) 25 mg tablet Take 1 Tablet by mouth every six (6) hours as needed for Nausea. Qty: 12 Tablet, Refills: 0      atorvastatin (LIPITOR) 80 mg tablet Take 1 Tablet by mouth daily. Qty: 90 Tablet, Refills: 3    Associated Diagnoses: High cholesterol      rizatriptan (MAXALT-MLT) 10 mg disintegrating tablet TAKE ONE TABLET BY MOUTH ONCE AS NEEDED  Qty: 10 Tablet, Refills: 6    Associated Diagnoses: Periodic headache syndrome, not intractable      azelastine (ASTELIN) 137 mcg (0.1 %) nasal spray 1 Powers by Both Nostrils route two (2) times a day. Use in each nostril as directed  Qty: 3 Each, Refills: 3    Associated Diagnoses: Seasonal allergic rhinitis due to pollen      ALPRAZolam (XANAX) 1 mg tablet Take 1 Tablet by mouth nightly. Max Daily Amount: 1 mg.  Indications: anxious  Qty: 30 Tablet, Refills: 3    Associated Diagnoses: Anxiety      sildenafil citrate (Viagra) 100 mg tablet Take 1 Tablet by mouth as needed (as directed). Qty: 10 Tablet, Refills: 3      cyclobenzaprine (FLEXERIL) 10 mg tablet Take 1 Tablet by mouth three (3) times daily as needed for Muscle Spasm(s). Qty: 30 Tablet, Refills: 1      aspirin delayed-release 81 mg tablet Take 1 Tablet by mouth daily. Qty: 30 Tablet, Refills: 0      albuterol (PROVENTIL HFA, VENTOLIN HFA, PROAIR HFA) 90 mcg/actuation inhaler Take 2 Puffs by inhalation every four (4) hours as needed for Wheezing or Shortness of Breath. Qty: 1 Inhaler, Refills: 0      eplerenone (INSPRA) 25 mg tablet Take 1 Tab by mouth daily. Qty: 90 Tab, Refills: 3         STOP taking these medications       furosemide (LASIX) 40 mg tablet Comments:   Reason for Stopping:         potassium chloride (K-DUR, KLOR-CON M20) 20 mEq tablet Comments:   Reason for Stopping:         clindamycin (CLEOCIN) 150 mg capsule Comments:   Reason for Stopping:         dexAMETHasone (DECADRON) 6 mg tablet Comments:   Reason for Stopping:         ESOMEPRAZOLE MAG TRIHYDRATE (NEXIUM PO) Comments:   Reason for Stopping:               Procedures done this admission:  Procedure(s):  ESOPHAGOGASTRODUODENOSCOPY (EGD)/ 25 Room 919    Consults this admission:  IP CONSULT TO CARDIOLOGY  IP CONSULT TO GASTROENTEROLOGY  IP CONSULT TO OTOLARYNGOLOGY  IP CONSULT TO GENERAL SURGERY    Echocardiogram/EKG results:  Results from Hospital Encounter encounter on 02/07/22    ECHO ADULT COMPLETE    Interpretation Summary    Left Ventricle: Left ventricle is mildly dilated. Normal wall thickness. Severe global hypokinesis present. The EF by visual approximation is 15 - 20%. Normal diastolic function.   Mitral Valve: Mildly thickened leaflets. Mild transvalvular regurgitation.   Tricuspid Valve: Trace transvalvular regurgitation.   Contrast used: Definity.        EKG Results     Procedure 720 Value Units Date/Time EKG [[id number]] Collected: 02/07/22 1551    Order Status: Completed Updated: 02/08/22 1713     Ventricular Rate 161 BPM      QRS Duration 92 ms      Q-T Interval 310 ms      QTC Calculation (Bezet) 507 ms      Calculated R Axis 60 degrees      Calculated T Axis -30 degrees      Diagnosis --     !!! Poor data quality, interpretation may be adversely affected  Supraventricular tachycardia  Anterior infarct (cited on or before 02-JUL-2020)  ST & T wave abnormality, consider inferior ischemia  Abnormal ECG  When compared with ECG of 27-DEC-2021 16:42,  Non-specific change in ST segment in Inferior leads  ST now depressed in Lateral leads  T wave inversion now evident in Inferior leads  Confirmed by Cassi Mendoza MD (), GEORGINA HARRISON ([id number]) on 2/8/2022 5:13:23 PM            Diagnostic Imaging/Tests:   XR CHEST SNGL V    Result Date: 2/8/2022  Portable chest: History: wheezing Comparison: 02/07/2022 Findings: A single view of the chest was obtained at 1345 hours. There is a dual-chamber cardiac pacer/AICD device. The cardiac silhouette is mildly enlarged. Hiatal/left diaphragmatic hernia appears unchanged. The lungs and pleural spaces are grossly clear. The pulmonary vascularity is within normal limits. 1. Large hiatal/diaphragmatic hernia, unchanged. 2. Grossly clear lungs. XR CHEST PA LAT    Result Date: 2/7/2022  History: Chest Pain Two views chest COMPARISON: 12/27/2021 Findings: The lungs are well expanded and clear. The cardiac silhouette, and mediastinal contour, and osseous structures are normal. There is a persistent hiatal hernia. No change in the appearance of the cardiac device overlying the left chest wall. Stable two-view chest.     XR ABD (KUB)    Result Date: 2/8/2022  Exam: Single view abdomen. Indication: Intractable vomiting Comparison: CT abdomen and pelvis, 2/7/2022 FINDINGS: Nonobstructive bowel gas pattern. Recently administered enteric contrast is present in the colon and rectum.  Included portions of the lungs are clear. There are dual-chamber AICD leads. Right upper quadrant surgical clips. 1. Non-obstructive bowel gas pattern. CT ABD PELV WO CONT    Result Date: 2/8/2022  CT abdomen and pelvis without contrast COMPARISON: April 2021. INDICATION: Recurrent abdominal pain with nausea and vomiting. TECHNIQUE: CT imaging of the abdomen and pelvis was performed without intravenous contrast. Radiation dose reduction techniques were used for this study:  Our CT scanners use one or all of the following: Automated exposure control, adjustment of the mA and/or kVp according to patient's size, iterative reconstruction. FINDINGS: Mild dependent subsegmental atelectasis at the visualized lung bases. The heart is enlarged. Cardiac pacer wires are partially seen. Abdomen findings: Absence of IV contrast limits sensitivity. No renal calculus or hydronephrosis. Stable small left renal cysts. There is micronodular contour of the liver. Spleen is not enlarged. Gallbladder is surgically absent. No peripancreatic fluid collection. Unenhanced adrenal glands are unremarkable. Abdominal aorta is normal in course and caliber with atherosclerotic calcifications. No evidence of lymphadenopathy. There is moderate hiatal hernia. There is an apparent eccentric submucosal wall thickening of the proximal stomach. Small bowel loops are normal in caliber. No small bowel obstruction. Pelvic findings: Prostate gland is prominent. There is fat-containing left inguinal hernia. No bowel herniation. Urinary bladder is normal in contour. Colon is normal in course and caliber. Appendix appears within normal limits. There is no free air or free fluid. Surrounding bones are intact. 1. Moderate hiatal hernia. Suggestion of eccentric submucosal wall thickening of the proximal stomach, although this may be artifactual secondary to volume averaging of fluid in the stomach.  Correlation with upper endoscopy may be beneficial. 2. No evidence of colitis, diverticulitis, appendicitis or bowel obstruction. 3. No renal calculus or hydronephrosis. 4. Absence of IV contrast limits sensitivity. US ABD LTD    Result Date: 2/8/2022  Limited ultrasound abdomen INDICATION: Elevated AST and T bili COMPARISON: April 2021 TECHNIQUE: Sonographic grayscale imaging of the abdomen was performed. FINDINGS: Pancreas and abdominal aorta are not well seen due to overlying bowel gas. IVC is patent. There is micronodular contour of the liver. Liver is normal in size and measures 16 cm. Right kidney is echogenic, suggesting medical renal disease. It is normal in contour and size and measures 10 cm. No right hydronephrosis. Gallbladder is surgically absent. No evidence of biliary ductal dilatation. Common bile duct measures 5 mm. No evidence of ascites. 1. Micronodular contour of the liver, raising the question of cirrhosis. 2. Cholecystectomy. No evidence of biliary ductal dilatation.        All Micro Results     Procedure Component Value Units Date/Time    CULTURE, STOOL [[id number]] Collected: 02/16/22 0600    Order Status: Completed Specimen: Stool Updated: 02/17/22 0933     Special Requests: NO SPECIAL REQUESTS        Culture result:       NO GROWTH OF SALMONELLA OR SHIGELLA IS EVIDENT ON FIRST READING, FINAL REPORT TO FOLLOW AFTER FURTHER INCUBATION OF CULTURE          C. DIFFICILE AG & TOXIN A/B [[id number]] Collected: 02/15/22 1530    Order Status: Completed Specimen: Stool Updated: 02/16/22 1130     [address] ANTIGEN       C. DIFFICILE GDH ANTIGEN-NEGATIVE           C. difficile toxin       C. DIFFICILE TOXIN-NEGATIVE           PCR Reflex NOT APPLICABLE        INTERPRETATION       NEGATIVE FOR TOXIGENIC C. DIFFICILE           Clinical Consideration       NEGATIVE FOR TOXIGENIC C. DIFFICILE          OVA & PARASITES, STOOL [[id number]] Collected: 02/16/22 0600    Order Status: Completed Specimen: Feces from Stool Updated: 02/16/22 0754    CAMPYLOBACTER CULTURE IDENTIFICATION [[id number]] Collected: 22    Order Status: Completed Specimen: Stool Updated: 22 1835     Result 1 Comment        Comment: (NOTE)  No Campylobacter species isolated. Performed At: [facility] & [address] [id number]  Kaltyn Mckenna MD X         Lukasz Barbosa [[id number]] Collected: 22    Order Status: Completed Specimen: Stool Updated: 22 [address]     Source STOOL        Comment: STOOL        Campylobacter Culture Final Report Below        Comment: (NOTE)  Performed At: [facility] & [facility] [address] [place], [place] [id number]  Katlyn Mckenna MD ND:[id number]         CULTURE, Vonne Goldberg [[id number]]  (Abnormal) Collected: 22    Order Status: Completed Specimen: Stool Updated: 22 7849     Special Requests: NO SPECIAL REQUESTS        Culture result:       No Salmonella, Shigella, or Ecoli 0157 isolated. MODERATE YEAST       COVID-19 RAPID TEST [[id number]] Collected: 22 0306    Order Status: Completed Specimen: Nasopharyngeal Updated: 22 0347     Specimen source NOT SPECIFIED        COVID-19 rapid test Not detected        Comment:      The specimen is NEGATIVE for SARS-CoV-2, the novel coronavirus associated with COVID-19. A negative result does not rule out COVID-19. This test has been authorized by the FDA under an Emergency Use Authorization (EUA) for use by authorized laboratories.         Fact sheet for Healthcare Providers: ConventionUpdate.co.nz  Fact sheet for Patients: ConventionUpdate.co.nz       Methodology: Isothermal Nucleic Acid Amplification         INFLUENZA A & B AG (RAPID TEST) [[id number]] Collected: 22 2231    Order Status: Completed Specimen: Nasopharyngeal from Nasal washing Updated: 22 2302     Influenza A Ag Negative        Comment: NEGATIVE FOR THE PRESENCE OF INFLUENZA A ANTIGEN  INFECTION DUE TO INFLUENZA A CANNOT BE RULED OUT. BECAUSE THE ANTIGEN PRESENT IN THE SAMPLE MAY BE BELOW  THE DETECTION LIMIT OF THE TEST. A NEGATIVE TEST IS PRESUMPTIVE AND IT IS RECOMMENDED THAT THESE RESULTS BE CONFIRMED BY VIRAL CULTURE OR AN FDA-CLEARED INFLUENZA A AND B MOLECULAR ASSAY. Influenza B Ag Negative        Comment: NEGATIVE FOR THE PRESENCE OF INFLUENZA B ANTIGEN  INFECTION DUE TO INFLUENZA B CANNOT BE RULED OUT. BECAUSE THE ANTIGEN PRESENT IN THE SAMPLE MAY BE BELOW  THE DETECTION LIMIT OF THE TEST. A NEGATIVE TEST IS PRESUMPTIVE AND IT IS RECOMMENDED THAT THESE RESULTS BE CONFIRMED BY VIRAL CULTURE OR AN FDA-CLEARED INFLUENZA A AND B MOLECULAR ASSAY. Source Nasopharyngeal       COVID-19 RAPID TEST [[id number]] Collected: 02/07/22 1629    Order Status: Completed Specimen: Nasopharyngeal Updated: 02/07/22 1700     Specimen source Nasopharyngeal        COVID-19 rapid test Not detected        Comment:      The specimen is NEGATIVE for SARS-CoV-2, the novel coronavirus associated with COVID-19. A negative result does not rule out COVID-19. This test has been authorized by the FDA under an Emergency Use Authorization (EUA) for use by authorized laboratories. Fact sheet for Healthcare Providers: ConventionUpdate.co.nz  Fact sheet for Patients: ConventionUpdate.co.nz       Methodology: Isothermal Nucleic Acid Amplification               Labs: Results:       BMP, Mg, Phos Recent Labs     02/17/22  0308 02/16/22  0444    139   K 3.8 3.4*   * 113*   CO2 21 21   AGAP 6* 5*   BUN 12 5*   CREA 1.10 1.20   CA 7.8* 7.9*   GLU 92 87      CBC Recent Labs     02/16/22  0444   WBC 9.5   RBC 3.77*   HGB 11.2*   HCT 35.5*         LFT No results for input(s): ALT, TBIL, AP, TP, ALB, GLOB, AGRAT in the last 72 hours.     No lab exists for component: SGOT, GPT   Cardiac Testing Lab Results   Component Value Date/Time     (H) 05/07/2019 03:53 PM    BNP 117 03/08/2019 01:10 PM    BNP 76 08/15/2018 04:48 PM     01/05/2017 03:51 AM    BNP 27 10/13/2016 08:10 PM    BNP 69 03/29/2016 05:20 AM    B-type Natriuretic Peptide 244.6 (H) 05/06/2019 12:06 PM     04/20/2021 05:25 AM     07/09/2018 06:02 AM    CK - MB 1.2 (L) 04/20/2021 05:25 AM    CK-MB Index 0.7 04/20/2021 05:25 AM    Troponin-I, Qt. 0.11 (HH) 03/19/2020 04:14 PM    Troponin-I, Qt. 0.09 (H) 03/08/2019 01:10 PM    Troponin-I, Qt. 0.07 (H) 08/15/2018 06:48 PM      Coagulation Tests Lab Results   Component Value Date/Time    Prothrombin time 11.7 (L) 07/07/2020 04:15 AM    Prothrombin time 12.4 07/06/2020 09:33 AM    Prothrombin time 12.3 07/05/2020 03:15 AM    INR 0.8 07/07/2020 04:15 AM    INR 0.9 07/06/2020 09:33 AM    INR 0.9 07/05/2020 03:15 AM    aPTT 28.6 08/24/2021 09:33 AM    aPTT 25.9 07/07/2020 04:15 AM    aPTT 29.0 07/06/2020 09:33 AM      A1c Lab Results   Component Value Date/Time    Hemoglobin A1c 5.8 (H) 12/23/2021 11:08 AM    Hemoglobin A1c 4.7 (L) 12/06/2017 12:36 PM      Lipid Panel Lab Results   Component Value Date/Time    Cholesterol, total 116 12/23/2021 11:08 AM    HDL Cholesterol 75 12/23/2021 11:08 AM    LDL,Direct 71 03/25/2016 04:35 AM    LDL, calculated Comment (A) 12/23/2021 11:08 AM    LDL, calculated 73.2 12/23/2018 04:03 AM    VLDL, calculated Comment (A) 12/23/2021 11:08 AM    VLDL, calculated 17.8 12/23/2018 04:03 AM    Triglyceride 1,273 (HH) 12/23/2021 11:08 AM    CHOL/HDL Ratio 2.0 12/23/2018 04:03 AM      Thyroid Panel Lab Results   Component Value Date/Time    TSH 1.010 12/06/2017 12:36 PM    TSH 0.901 03/18/2017 11:25 AM        Most Recent UA Lab Results   Component Value Date/Time    Color YELLOW 03/28/2016 04:00 PM    Appearance CLEAR 03/28/2016 04:00 PM    Specific gravity 1.003 03/28/2016 04:00 PM    pH (UA) 5.5 03/28/2016 04:00 PM    Protein NEGATIVE  03/28/2016 04:00 PM    Glucose NEGATIVE  03/28/2016 04:00 PM    Ketone NEGATIVE  03/28/2016 04:00 PM    Bilirubin NEGATIVE  03/28/2016 04:00 PM    Blood NEGATIVE  03/28/2016 04:00 PM    Urobilinogen 0.2 03/28/2016 04:00 PM    Nitrites NEGATIVE  03/28/2016 04:00 PM    Leukocyte Esterase NEGATIVE  03/28/2016 04:00 PM    WBC 0 07/02/2020 11:58 PM    RBC 0-3 07/02/2020 11:58 PM    Epithelial cells 0 07/02/2020 11:58 PM    Bacteria 0 07/02/2020 11:58 PM    Casts 0 07/02/2020 11:58 PM          All Labs from Last 24 Hrs:  Recent Results (from the past 24 hour(s))   METABOLIC PANEL, BASIC    Collection Time: 02/17/22  3:08 AM   Result Value Ref Range    Sodium 142 138 - 145 mmol/L    Potassium 3.8 3.5 - 5.1 mmol/L    Chloride 115 (H) 98 - 107 mmol/L    CO2 21 21 - 32 mmol/L    Anion gap 6 (L) 7 - 16 mmol/L    Glucose 92 65 - 100 mg/dL    BUN 12 6 - 23 MG/DL    Creatinine 1.10 0.8 - 1.5 MG/DL    GFR est AA >60 >60 ml/min/1.73m2    GFR est non-AA >60 >60 ml/min/1.73m2    Calcium 7.8 (L) 8.3 - 10.4 MG/DL       Current Med List in Hospital:   Current Facility-Administered Medications   Medication Dose Route Frequency    prochlorperazine (COMPAZINE) with saline injection 5 mg  5 mg IntraVENous Q6H PRN    cholestyramine-aspartame (QUESTRAN LIGHT) packet 4 g  4 g Oral DAILY    HYDROcodone-acetaminophen (NORCO)  mg tablet 1 Tablet  1 Tablet Oral Q4H PRN    eplerenone (INSPRA) tablet 25 mg (Patient Supplied)  25 mg Oral DAILY    [Held by provider] valsartan (DIOVAN) tablet 40 mg  40 mg Oral DAILY    carvediloL (COREG) tablet 12.5 mg  12.5 mg Oral BID WITH MEALS    sodium chloride (NS) flush 10 mL  10 mL InterCATHeter DAILY    sodium chloride (NS) flush 10 mL  10 mL InterCATHeter PRN    albuterol (PROVENTIL VENTOLIN) nebulizer solution 2.5 mg  2.5 mg Nebulization BID RT    sucralfate (CARAFATE) tablet 1 g  1 g Oral Q6H    LORazepam (ATIVAN) tablet 1 mg  1 mg Oral Q4H PRN    albuterol (PROVENTIL VENTOLIN) nebulizer solution 2.5 mg  2.5 mg Nebulization Q4H PRN    dicyclomine (BENTYL) capsule 20 mg  20 mg Oral TID  sodium chloride (OCEAN) 0.65 % nasal squeeze bottle 2 Spray  2 [place] Both Nostrils Q2HWA    fluticasone propionate (FLONASE) 50 mcg/actuation nasal spray 2 Spray  2 Spray Both Nostrils BID    metoprolol (LOPRESSOR) injection 5 mg  5 mg IntraVENous Q4H PRN    ivabradine (CORLANOR) tablet 5 mg  5 mg Oral BID WITH MEALS    pantoprazole (PROTONIX) 40 mg in 0.9% sodium chloride 10 mL injection  40 mg IntraVENous Q12H    sodium chloride (NS) flush 5-10 mL  5-10 mL IntraVENous Q8H    ALPRAZolam (XANAX) tablet 1 mg  1 mg Oral QHS    aspirin delayed-release tablet 81 mg  81 mg Oral DAILY    atorvastatin (LIPITOR) tablet 80 mg  80 mg Oral DAILY    gabapentin (NEURONTIN) capsule 300 mg  300 mg Oral BID    sodium chloride (NS) flush 5-40 mL  5-40 mL IntraVENous PRN    [Held by provider] acetaminophen (TYLENOL) tablet 650 mg  650 mg Oral Q6H PRN    Or    [Held by provider] acetaminophen (TYLENOL) suppository 650 mg  650 mg Rectal Q6H PRN    polyethylene glycol (MIRALAX) packet 17 g  17 g Oral DAILY PRN    magnesium oxide (MAG-OX) tablet 400 mg  400 mg Oral DAILY       No Known Allergies  Immunization History   Administered Date(s) Administered    TB Skin Test (PPD) Intradermal 03/14/2017       Recent Vital Data:  Patient Vitals for the past 24 hrs:   Temp Pulse Resp BP SpO2   02/17/22 0903 99.3 °F (37.4 °C) 99 18 127/76 96 %   02/17/22 0403 98.3 °F (36.8 °C) 92 20 (!) 134/92 96 %   02/16/22 2334 97.5 °F (36.4 °C) 83 18 137/84 96 %   02/16/22 1945 98.7 °F (37.1 °C) 78 18 125/81 98 %   02/16/22 1428 98.2 °F (36.8 °C) 80 20 119/78 98 %     Oxygen Therapy  O2 Sat (%): 96 % (02/17/22 0903)  Pulse via Oximetry: 85 beats per minute (02/16/22 0836)  O2 Device: None (Room air) (02/17/22 0403)  Skin Assessment: Clean, dry, & intact (02/11/22 0949)  O2 Flow Rate (L/min): 0 l/min (02/12/22 0739)  FIO2 (%): 21 % (02/12/22 0739)    Estimated body mass index is 31.38 kg/m² as calculated from the following:    Height as of this encounter: 5' 11\" (1.803 m). Weight as of this encounter: 102.1 kg (225 lb). Intake/Output Summary (Last 24 hours) at 2/17/2022 1104  Last data filed at 2/17/2022 0633  Gross per 24 hour   Intake 1210 ml   Output 1250 ml   Net -40 ml         Physical Exam:  General:    No overt distress  Head:  Normocephalic, atraumatic  Eyes:  Sclerae appear normal.  Pupils equally round. HENT:  Nares appear normal, no drainage. Moist mucous membranes  Neck:  No restricted ROM. Trachea midline  CV:   RRR. No m/r/g. Lungs: No wheezing. Even, unlabored on room air. Abdomen:   Soft, tender, nondistended. Extremities: Warm and dry. No cyanosis or clubbing. Skin:     No rashes. Normal coloration  Neuro:  CN II-XII grossly intact. Psych:  Normal mood and affect. Signed:  Lisa Morrell NP    Part of this note may have been written by using a voice dictation software. The note has been proof read but may still contain some grammatical/other typographical errors.

## 2022-02-18 LAB
BACTERIA SPEC CULT: NORMAL
BACTERIA SPEC CULT: NORMAL
FAT STL QL: NORMAL
NEUTRAL FAT STL QL: NORMAL
O+P SPEC MICRO: NORMAL
O+P STL CONC: NORMAL
SERVICE CMNT-IMP: NORMAL
SPECIMEN SOURCE: NORMAL

## 2022-02-24 ENCOUNTER — HOSPITAL ENCOUNTER (INPATIENT)
Age: 57
LOS: 8 days | Discharge: HOME | DRG: 291 | End: 2022-03-04
Attending: INTERNAL MEDICINE | Admitting: INTERNAL MEDICINE
Payer: COMMERCIAL

## 2022-02-24 ENCOUNTER — APPOINTMENT (OUTPATIENT)
Dept: GENERAL RADIOLOGY | Age: 57
DRG: 291 | End: 2022-02-24
Attending: EMERGENCY MEDICINE
Payer: COMMERCIAL

## 2022-02-24 DIAGNOSIS — R06.09 DOE (DYSPNEA ON EXERTION): ICD-10-CM

## 2022-02-24 DIAGNOSIS — E83.42 HYPOMAGNESEMIA: ICD-10-CM

## 2022-02-24 DIAGNOSIS — R52 PAIN: Primary | ICD-10-CM

## 2022-02-24 DIAGNOSIS — I50.21 ACUTE SYSTOLIC CONGESTIVE HEART FAILURE (HCC): ICD-10-CM

## 2022-02-24 DIAGNOSIS — I10 PRIMARY HYPERTENSION: Chronic | ICD-10-CM

## 2022-02-24 DIAGNOSIS — I42.9 CHF WITH CARDIOMYOPATHY (HCC): ICD-10-CM

## 2022-02-24 DIAGNOSIS — Z45.02 ICD (IMPLANTABLE CARDIOVERTER-DEFIBRILLATOR) DISCHARGE: ICD-10-CM

## 2022-02-24 DIAGNOSIS — I47.20 VENTRICULAR TACHYCARDIA: ICD-10-CM

## 2022-02-24 DIAGNOSIS — I50.22 CHRONIC SYSTOLIC (CONGESTIVE) HEART FAILURE (HCC): Chronic | ICD-10-CM

## 2022-02-24 DIAGNOSIS — K22.4 ESOPHAGEAL DYSMOTILITY: ICD-10-CM

## 2022-02-24 DIAGNOSIS — I50.9 CHF WITH CARDIOMYOPATHY (HCC): ICD-10-CM

## 2022-02-24 DIAGNOSIS — E87.6 HYPOKALEMIA: ICD-10-CM

## 2022-02-24 DIAGNOSIS — R00.0 TACHYCARDIA: ICD-10-CM

## 2022-02-24 LAB
ALBUMIN SERPL-MCNC: 3.1 G/DL (ref 3.5–5)
ALBUMIN/GLOB SERPL: 0.8 {RATIO} (ref 1.2–3.5)
ALP SERPL-CCNC: 78 U/L (ref 50–136)
ALT SERPL-CCNC: 31 U/L (ref 12–65)
ANION GAP SERPL CALC-SCNC: 10 MMOL/L (ref 7–16)
AST SERPL-CCNC: 47 U/L (ref 15–37)
BASOPHILS # BLD: 0.1 K/UL (ref 0–0.2)
BASOPHILS NFR BLD: 1 % (ref 0–2)
BILIRUB SERPL-MCNC: 0.6 MG/DL (ref 0.2–1.1)
BNP SERPL-MCNC: 874 PG/ML (ref 5–125)
BUN SERPL-MCNC: 9 MG/DL (ref 6–23)
CALCIUM SERPL-MCNC: 8.8 MG/DL (ref 8.3–10.4)
CHLORIDE SERPL-SCNC: 105 MMOL/L (ref 98–107)
CO2 SERPL-SCNC: 25 MMOL/L (ref 21–32)
CREAT SERPL-MCNC: 0.8 MG/DL (ref 0.8–1.5)
DIFFERENTIAL METHOD BLD: ABNORMAL
EOSINOPHIL # BLD: 0.3 K/UL (ref 0–0.8)
EOSINOPHIL NFR BLD: 3 % (ref 0.5–7.8)
ERYTHROCYTE [DISTWIDTH] IN BLOOD BY AUTOMATED COUNT: 15.2 % (ref 11.9–14.6)
GLOBULIN SER CALC-MCNC: 3.7 G/DL (ref 2.3–3.5)
GLUCOSE SERPL-MCNC: 96 MG/DL (ref 65–100)
HCT VFR BLD AUTO: 37.6 % (ref 41.1–50.3)
HGB BLD-MCNC: 11.7 G/DL (ref 13.6–17.2)
IMM GRANULOCYTES # BLD AUTO: 0 K/UL (ref 0–0.5)
IMM GRANULOCYTES NFR BLD AUTO: 0 % (ref 0–5)
LYMPHOCYTES # BLD: 1.2 K/UL (ref 0.5–4.6)
LYMPHOCYTES NFR BLD: 13 % (ref 13–44)
MAGNESIUM SERPL-MCNC: 1.7 MG/DL (ref 1.8–2.4)
MCH RBC QN AUTO: 29.8 PG (ref 26.1–32.9)
MCHC RBC AUTO-ENTMCNC: 31.1 G/DL (ref 31.4–35)
MCV RBC AUTO: 95.7 FL (ref 79.6–97.8)
MONOCYTES # BLD: 0.4 K/UL (ref 0.1–1.3)
MONOCYTES NFR BLD: 4 % (ref 4–12)
NEUTS SEG # BLD: 7.6 K/UL (ref 1.7–8.2)
NEUTS SEG NFR BLD: 80 % (ref 43–78)
NRBC # BLD: 0 K/UL (ref 0–0.2)
PLATELET # BLD AUTO: 165 K/UL (ref 150–450)
PMV BLD AUTO: 10.1 FL (ref 9.4–12.3)
POTASSIUM SERPL-SCNC: 5 MMOL/L (ref 3.5–5.1)
PROT SERPL-MCNC: 6.8 G/DL (ref 6.3–8.2)
RBC # BLD AUTO: 3.93 M/UL (ref 4.23–5.6)
SODIUM SERPL-SCNC: 140 MMOL/L (ref 138–145)
TROPONIN-HIGH SENSITIVITY: 380.3 PG/ML (ref 0–14)
TROPONIN-HIGH SENSITIVITY: 421.5 PG/ML (ref 0–14)
WBC # BLD AUTO: 9.6 K/UL (ref 4.3–11.1)

## 2022-02-24 PROCEDURE — 74011250637 HC RX REV CODE- 250/637: Performed by: NURSE PRACTITIONER

## 2022-02-24 PROCEDURE — 74011000250 HC RX REV CODE- 250: Performed by: INTERNAL MEDICINE

## 2022-02-24 PROCEDURE — 93005 ELECTROCARDIOGRAM TRACING: CPT | Performed by: EMERGENCY MEDICINE

## 2022-02-24 PROCEDURE — 83880 ASSAY OF NATRIURETIC PEPTIDE: CPT

## 2022-02-24 PROCEDURE — 71045 X-RAY EXAM CHEST 1 VIEW: CPT

## 2022-02-24 PROCEDURE — 74011250636 HC RX REV CODE- 250/636: Performed by: NURSE PRACTITIONER

## 2022-02-24 PROCEDURE — 85025 COMPLETE CBC W/AUTO DIFF WBC: CPT

## 2022-02-24 PROCEDURE — 83735 ASSAY OF MAGNESIUM: CPT

## 2022-02-24 PROCEDURE — 99285 EMERGENCY DEPT VISIT HI MDM: CPT

## 2022-02-24 PROCEDURE — 80053 COMPREHEN METABOLIC PANEL: CPT

## 2022-02-24 PROCEDURE — 84484 ASSAY OF TROPONIN QUANT: CPT

## 2022-02-24 PROCEDURE — 93005 ELECTROCARDIOGRAM TRACING: CPT | Performed by: INTERNAL MEDICINE

## 2022-02-24 PROCEDURE — 65660000000 HC RM CCU STEPDOWN

## 2022-02-24 PROCEDURE — 74011000258 HC RX REV CODE- 258: Performed by: NURSE PRACTITIONER

## 2022-02-24 RX ORDER — ALPRAZOLAM 0.5 MG/1
1 TABLET ORAL
Status: DISCONTINUED | OUTPATIENT
Start: 2022-02-24 | End: 2022-02-25

## 2022-02-24 RX ORDER — GABAPENTIN 300 MG/1
300 CAPSULE ORAL 2 TIMES DAILY
Status: DISCONTINUED | OUTPATIENT
Start: 2022-02-24 | End: 2022-03-04 | Stop reason: HOSPADM

## 2022-02-24 RX ORDER — VALSARTAN 40 MG/1
40 TABLET ORAL DAILY
Status: DISCONTINUED | OUTPATIENT
Start: 2022-02-25 | End: 2022-03-04 | Stop reason: HOSPADM

## 2022-02-24 RX ORDER — SODIUM CHLORIDE 0.9 % (FLUSH) 0.9 %
5-10 SYRINGE (ML) INJECTION AS NEEDED
Status: DISCONTINUED | OUTPATIENT
Start: 2022-02-24 | End: 2022-03-04 | Stop reason: HOSPADM

## 2022-02-24 RX ORDER — CARVEDILOL 12.5 MG/1
12.5 TABLET ORAL 2 TIMES DAILY WITH MEALS
Status: DISCONTINUED | OUTPATIENT
Start: 2022-02-24 | End: 2022-02-25

## 2022-02-24 RX ORDER — SPIRONOLACTONE 25 MG/1
25 TABLET ORAL DAILY
Status: DISCONTINUED | OUTPATIENT
Start: 2022-02-25 | End: 2022-02-26

## 2022-02-24 RX ORDER — MORPHINE SULFATE 4 MG/ML
2 INJECTION INTRAVENOUS
Status: DISCONTINUED | OUTPATIENT
Start: 2022-02-24 | End: 2022-03-04 | Stop reason: HOSPADM

## 2022-02-24 RX ORDER — ASPIRIN 81 MG/1
81 TABLET ORAL DAILY
Status: DISCONTINUED | OUTPATIENT
Start: 2022-02-25 | End: 2022-03-04 | Stop reason: HOSPADM

## 2022-02-24 RX ORDER — ATORVASTATIN CALCIUM 80 MG/1
80 TABLET, FILM COATED ORAL DAILY
Status: DISCONTINUED | OUTPATIENT
Start: 2022-02-25 | End: 2022-03-04 | Stop reason: HOSPADM

## 2022-02-24 RX ORDER — HYDROCODONE BITARTRATE AND ACETAMINOPHEN 10; 325 MG/1; MG/1
1 TABLET ORAL
Status: ON HOLD | COMMUNITY
End: 2022-03-04 | Stop reason: SDUPTHER

## 2022-02-24 RX ORDER — PROMETHAZINE HYDROCHLORIDE 25 MG/1
25 TABLET ORAL
Status: DISCONTINUED | OUTPATIENT
Start: 2022-02-24 | End: 2022-03-04 | Stop reason: HOSPADM

## 2022-02-24 RX ORDER — FUROSEMIDE 10 MG/ML
40 INJECTION INTRAMUSCULAR; INTRAVENOUS ONCE
Status: COMPLETED | OUTPATIENT
Start: 2022-02-24 | End: 2022-02-24

## 2022-02-24 RX ORDER — HYDROCODONE BITARTRATE AND ACETAMINOPHEN 5; 325 MG/1; MG/1
1 TABLET ORAL
Status: DISCONTINUED | OUTPATIENT
Start: 2022-02-24 | End: 2022-03-04 | Stop reason: HOSPADM

## 2022-02-24 RX ORDER — SUCRALFATE 1 G/1
1 TABLET ORAL 4 TIMES DAILY
Status: DISCONTINUED | OUTPATIENT
Start: 2022-02-24 | End: 2022-03-04 | Stop reason: HOSPADM

## 2022-02-24 RX ORDER — METOPROLOL TARTRATE 5 MG/5ML
5 INJECTION INTRAVENOUS
Status: DISCONTINUED | OUTPATIENT
Start: 2022-02-24 | End: 2022-03-04 | Stop reason: HOSPADM

## 2022-02-24 RX ORDER — CYCLOBENZAPRINE HCL 10 MG
10 TABLET ORAL
Status: DISCONTINUED | OUTPATIENT
Start: 2022-02-24 | End: 2022-03-04 | Stop reason: HOSPADM

## 2022-02-24 RX ORDER — SODIUM CHLORIDE 0.9 % (FLUSH) 0.9 %
5-10 SYRINGE (ML) INJECTION EVERY 8 HOURS
Status: DISCONTINUED | OUTPATIENT
Start: 2022-02-24 | End: 2022-03-04 | Stop reason: HOSPADM

## 2022-02-24 RX ORDER — PANTOPRAZOLE SODIUM 40 MG/1
40 TABLET, DELAYED RELEASE ORAL 2 TIMES DAILY
Status: DISCONTINUED | OUTPATIENT
Start: 2022-02-24 | End: 2022-03-04 | Stop reason: HOSPADM

## 2022-02-24 RX ORDER — ONDANSETRON 8 MG/1
4 TABLET, ORALLY DISINTEGRATING ORAL
Status: DISCONTINUED | OUTPATIENT
Start: 2022-02-24 | End: 2022-03-04 | Stop reason: HOSPADM

## 2022-02-24 RX ORDER — DICYCLOMINE HYDROCHLORIDE 10 MG/1
20 CAPSULE ORAL 3 TIMES DAILY
Status: DISCONTINUED | OUTPATIENT
Start: 2022-02-24 | End: 2022-03-04 | Stop reason: HOSPADM

## 2022-02-24 RX ADMIN — GABAPENTIN 300 MG: 300 CAPSULE ORAL at 21:13

## 2022-02-24 RX ADMIN — SODIUM CHLORIDE, PRESERVATIVE FREE 10 ML: 5 INJECTION INTRAVENOUS at 22:00

## 2022-02-24 RX ADMIN — FUROSEMIDE 40 MG: 10 INJECTION, SOLUTION INTRAMUSCULAR; INTRAVENOUS at 20:40

## 2022-02-24 RX ADMIN — SUCRALFATE 1 G: 1 TABLET ORAL at 21:13

## 2022-02-24 RX ADMIN — MORPHINE SULFATE 2 MG: 4 INJECTION INTRAVENOUS at 22:12

## 2022-02-24 RX ADMIN — FUROSEMIDE 10 MG/HR: 10 INJECTION, SOLUTION INTRAMUSCULAR; INTRAVENOUS at 21:06

## 2022-02-24 RX ADMIN — PANTOPRAZOLE SODIUM 40 MG: 40 TABLET, DELAYED RELEASE ORAL at 21:13

## 2022-02-24 RX ADMIN — CARVEDILOL 12.5 MG: 25 TABLET, FILM COATED ORAL at 21:13

## 2022-02-24 RX ADMIN — PROMETHAZINE HYDROCHLORIDE 25 MG: 25 TABLET ORAL at 21:13

## 2022-02-24 NOTE — MANAGEMENT PLAN
Pt known to our practice, seen in the office today referred to ER for exacerbation of known heart failure. See note from Dr Weston Farr from today. Will admit, start lasix gtt, continue home HF medications. Will need once again to discuss hospice options as he has end stage heart failure.     Praveena Jimenez MD

## 2022-02-24 NOTE — ED TRIAGE NOTES
Pt arrives by wheelchair. Pt states was at cardiologist and pt was sent over here due to elevated HR. Pt c\o SOB and cp. Pt has pacemaker/debrillator. Hx of CHF. Was in hospital for 2wks for high HR. Pt has BLE  Edema 3+ pitting. . Stated he was on lasix for 40mg twice but doubled b/c of his swelling.

## 2022-02-25 ENCOUNTER — APPOINTMENT (OUTPATIENT)
Dept: GENERAL RADIOLOGY | Age: 57
DRG: 291 | End: 2022-02-25
Attending: INTERNAL MEDICINE
Payer: COMMERCIAL

## 2022-02-25 LAB
ANION GAP SERPL CALC-SCNC: 12 MMOL/L (ref 7–16)
ANION GAP SERPL CALC-SCNC: 9 MMOL/L (ref 7–16)
ATRIAL RATE: 129 BPM
BASE EXCESS BLDV CALC-SCNC: 7 MMOL/L
BDY SITE: ABNORMAL
BNP SERPL-MCNC: 1343 PG/ML (ref 5–125)
BUN SERPL-MCNC: 6 MG/DL (ref 6–23)
BUN SERPL-MCNC: 8 MG/DL (ref 6–23)
CALCIUM SERPL-MCNC: 8 MG/DL (ref 8.3–10.4)
CALCIUM SERPL-MCNC: 9.2 MG/DL (ref 8.3–10.4)
CALCULATED P AXIS, ECG09: 66 DEGREES
CALCULATED R AXIS, ECG10: 78 DEGREES
CALCULATED T AXIS, ECG11: -28 DEGREES
CHLORIDE SERPL-SCNC: 100 MMOL/L (ref 98–107)
CHLORIDE SERPL-SCNC: 93 MMOL/L (ref 98–107)
CO2 BLD-SCNC: 31 MMOL/L (ref 13–23)
CO2 SERPL-SCNC: 27 MMOL/L (ref 21–32)
CO2 SERPL-SCNC: 31 MMOL/L (ref 21–32)
CREAT SERPL-MCNC: 0.8 MG/DL (ref 0.8–1.5)
CREAT SERPL-MCNC: 1 MG/DL (ref 0.8–1.5)
DIAGNOSIS, 93000: NORMAL
ERYTHROCYTE [DISTWIDTH] IN BLOOD BY AUTOMATED COUNT: 15.4 % (ref 11.9–14.6)
FIO2, L/MIN - FIO2P: 3
GAS FLOW.O2 O2 DELIVERY SYS: ABNORMAL L/MIN
GLUCOSE SERPL-MCNC: 199 MG/DL (ref 65–100)
GLUCOSE SERPL-MCNC: 80 MG/DL (ref 65–100)
HCO3 BLDV-SCNC: 30.4 MMOL/L (ref 23–28)
HCT VFR BLD AUTO: 38.6 % (ref 41.1–50.3)
HGB BLD-MCNC: 12.2 G/DL (ref 13.6–17.2)
MAGNESIUM SERPL-MCNC: 3.3 MG/DL (ref 1.8–2.4)
MCH RBC QN AUTO: 30.1 PG (ref 26.1–32.9)
MCHC RBC AUTO-ENTMCNC: 31.6 G/DL (ref 31.4–35)
MCV RBC AUTO: 95.3 FL (ref 79.6–97.8)
NRBC # BLD: 0 K/UL (ref 0–0.2)
P-R INTERVAL, ECG05: 154 MS
PCO2 BLDV: 38.1 MMHG (ref 41–51)
PH BLDV: 7.51 [PH] (ref 7.32–7.42)
PLATELET # BLD AUTO: 122 K/UL (ref 150–450)
PMV BLD AUTO: 10.8 FL (ref 9.4–12.3)
PO2 BLDV: 30 MMHG
POTASSIUM SERPL-SCNC: 2.7 MMOL/L (ref 3.5–5.1)
POTASSIUM SERPL-SCNC: 4 MMOL/L (ref 3.5–5.1)
Q-T INTERVAL, ECG07: 318 MS
QRS DURATION, ECG06: 98 MS
QTC CALCULATION (BEZET), ECG08: 465 MS
RBC # BLD AUTO: 4.05 M/UL (ref 4.23–5.6)
SAO2 % BLDV: 64.9 % (ref 65–88)
SERVICE CMNT-IMP: ABNORMAL
SERVICE CMNT-IMP: ABNORMAL
SODIUM SERPL-SCNC: 133 MMOL/L (ref 138–145)
SODIUM SERPL-SCNC: 139 MMOL/L (ref 138–145)
SPECIMEN TYPE: ABNORMAL
VENTRICULAR RATE, ECG03: 129 BPM
WBC # BLD AUTO: 10.4 K/UL (ref 4.3–11.1)

## 2022-02-25 PROCEDURE — 71045 X-RAY EXAM CHEST 1 VIEW: CPT

## 2022-02-25 PROCEDURE — 85027 COMPLETE CBC AUTOMATED: CPT

## 2022-02-25 PROCEDURE — 74011000250 HC RX REV CODE- 250: Performed by: INTERNAL MEDICINE

## 2022-02-25 PROCEDURE — 74011250636 HC RX REV CODE- 250/636: Performed by: INTERNAL MEDICINE

## 2022-02-25 PROCEDURE — 77030041974 HC CATH SYS PERIPH TELE -B

## 2022-02-25 PROCEDURE — 74011000258 HC RX REV CODE- 258: Performed by: NURSE PRACTITIONER

## 2022-02-25 PROCEDURE — 36573 INSJ PICC RS&I 5 YR+: CPT | Performed by: NURSE PRACTITIONER

## 2022-02-25 PROCEDURE — 94760 N-INVAS EAR/PLS OXIMETRY 1: CPT

## 2022-02-25 PROCEDURE — 83880 ASSAY OF NATRIURETIC PEPTIDE: CPT

## 2022-02-25 PROCEDURE — 74011000258 HC RX REV CODE- 258: Performed by: INTERNAL MEDICINE

## 2022-02-25 PROCEDURE — 2709999900 HC NON-CHARGEABLE SUPPLY

## 2022-02-25 PROCEDURE — 36415 COLL VENOUS BLD VENIPUNCTURE: CPT

## 2022-02-25 PROCEDURE — 74011250637 HC RX REV CODE- 250/637: Performed by: NURSE PRACTITIONER

## 2022-02-25 PROCEDURE — 99232 SBSQ HOSP IP/OBS MODERATE 35: CPT | Performed by: INTERNAL MEDICINE

## 2022-02-25 PROCEDURE — 36556 INSERT NON-TUNNEL CV CATH: CPT | Performed by: INTERNAL MEDICINE

## 2022-02-25 PROCEDURE — 99291 CRITICAL CARE FIRST HOUR: CPT | Performed by: INTERNAL MEDICINE

## 2022-02-25 PROCEDURE — 77030018786 HC NDL GD F/USND BARD -B

## 2022-02-25 PROCEDURE — 83735 ASSAY OF MAGNESIUM: CPT

## 2022-02-25 PROCEDURE — 82803 BLOOD GASES ANY COMBINATION: CPT

## 2022-02-25 PROCEDURE — 74011250636 HC RX REV CODE- 250/636: Performed by: NURSE PRACTITIONER

## 2022-02-25 PROCEDURE — 02HV33Z INSERTION OF INFUSION DEVICE INTO SUPERIOR VENA CAVA, PERCUTANEOUS APPROACH: ICD-10-PCS | Performed by: INTERNAL MEDICINE

## 2022-02-25 PROCEDURE — C1751 CATH, INF, PER/CENT/MIDLINE: HCPCS

## 2022-02-25 PROCEDURE — 74011000250 HC RX REV CODE- 250: Performed by: NURSE PRACTITIONER

## 2022-02-25 PROCEDURE — 65660000000 HC RM CCU STEPDOWN

## 2022-02-25 PROCEDURE — 77010033678 HC OXYGEN DAILY

## 2022-02-25 PROCEDURE — 80048 BASIC METABOLIC PNL TOTAL CA: CPT

## 2022-02-25 PROCEDURE — C1894 INTRO/SHEATH, NON-LASER: HCPCS

## 2022-02-25 PROCEDURE — 74011250636 HC RX REV CODE- 250/636

## 2022-02-25 PROCEDURE — 74011250637 HC RX REV CODE- 250/637: Performed by: INTERNAL MEDICINE

## 2022-02-25 RX ORDER — POTASSIUM CHLORIDE 14.9 MG/ML
20 INJECTION INTRAVENOUS
Status: COMPLETED | OUTPATIENT
Start: 2022-02-25 | End: 2022-02-26

## 2022-02-25 RX ORDER — ALPRAZOLAM 0.5 MG/1
1 TABLET ORAL
Status: DISCONTINUED | OUTPATIENT
Start: 2022-02-25 | End: 2022-03-03

## 2022-02-25 RX ORDER — CARVEDILOL 12.5 MG/1
12.5 TABLET ORAL 2 TIMES DAILY WITH MEALS
Status: DISCONTINUED | OUTPATIENT
Start: 2022-02-26 | End: 2022-03-04 | Stop reason: HOSPADM

## 2022-02-25 RX ORDER — FUROSEMIDE 10 MG/ML
40 INJECTION INTRAMUSCULAR; INTRAVENOUS 2 TIMES DAILY
Status: DISPENSED | OUTPATIENT
Start: 2022-02-26 | End: 2022-03-01

## 2022-02-25 RX ORDER — CARVEDILOL 25 MG/1
25 TABLET ORAL 2 TIMES DAILY WITH MEALS
Status: DISCONTINUED | OUTPATIENT
Start: 2022-02-25 | End: 2022-02-25

## 2022-02-25 RX ORDER — MAGNESIUM SULFATE HEPTAHYDRATE 40 MG/ML
2 INJECTION, SOLUTION INTRAVENOUS ONCE
Status: COMPLETED | OUTPATIENT
Start: 2022-02-25 | End: 2022-02-26

## 2022-02-25 RX ORDER — FENTANYL CITRATE 50 UG/ML
INJECTION, SOLUTION INTRAMUSCULAR; INTRAVENOUS
Status: DISCONTINUED
Start: 2022-02-25 | End: 2022-02-25 | Stop reason: SDUPTHER

## 2022-02-25 RX ORDER — FENTANYL CITRATE 50 UG/ML
50 INJECTION, SOLUTION INTRAMUSCULAR; INTRAVENOUS ONCE
Status: COMPLETED | OUTPATIENT
Start: 2022-02-25 | End: 2022-02-25

## 2022-02-25 RX ORDER — FENTANYL CITRATE 50 UG/ML
50 INJECTION, SOLUTION INTRAMUSCULAR; INTRAVENOUS ONCE
Status: DISCONTINUED | OUTPATIENT
Start: 2022-02-25 | End: 2022-02-25 | Stop reason: SDUPTHER

## 2022-02-25 RX ADMIN — FENTANYL CITRATE 50 MCG: 50 INJECTION INTRAMUSCULAR; INTRAVENOUS at 20:39

## 2022-02-25 RX ADMIN — GABAPENTIN 300 MG: 300 CAPSULE ORAL at 17:23

## 2022-02-25 RX ADMIN — POTASSIUM CHLORIDE 20 MEQ: 14.9 INJECTION, SOLUTION INTRAVENOUS at 23:06

## 2022-02-25 RX ADMIN — DICYCLOMINE HYDROCHLORIDE 20 MG: 10 CAPSULE ORAL at 08:15

## 2022-02-25 RX ADMIN — SODIUM CHLORIDE, PRESERVATIVE FREE 10 ML: 5 INJECTION INTRAVENOUS at 22:06

## 2022-02-25 RX ADMIN — SUCRALFATE 1 G: 1 TABLET ORAL at 17:24

## 2022-02-25 RX ADMIN — FUROSEMIDE 10 MG/HR: 10 INJECTION, SOLUTION INTRAMUSCULAR; INTRAVENOUS at 12:18

## 2022-02-25 RX ADMIN — SODIUM CHLORIDE, PRESERVATIVE FREE 5 MG: 5 INJECTION INTRAVENOUS at 19:50

## 2022-02-25 RX ADMIN — PANTOPRAZOLE SODIUM 40 MG: 40 TABLET, DELAYED RELEASE ORAL at 08:16

## 2022-02-25 RX ADMIN — SODIUM CHLORIDE, PRESERVATIVE FREE 5 MG: 5 INJECTION INTRAVENOUS at 08:30

## 2022-02-25 RX ADMIN — SODIUM CHLORIDE, PRESERVATIVE FREE 5 MG: 5 INJECTION INTRAVENOUS at 05:13

## 2022-02-25 RX ADMIN — AMIODARONE HYDROCHLORIDE 1 MG/MIN: 50 INJECTION, SOLUTION INTRAVENOUS at 20:45

## 2022-02-25 RX ADMIN — SODIUM CHLORIDE, PRESERVATIVE FREE 10 ML: 5 INJECTION INTRAVENOUS at 05:21

## 2022-02-25 RX ADMIN — DICYCLOMINE HYDROCHLORIDE 20 MG: 10 CAPSULE ORAL at 21:20

## 2022-02-25 RX ADMIN — MORPHINE SULFATE 2 MG: 4 INJECTION INTRAVENOUS at 19:50

## 2022-02-25 RX ADMIN — HYDROCODONE BITARTRATE AND ACETAMINOPHEN 1 TABLET: 5; 325 TABLET ORAL at 12:23

## 2022-02-25 RX ADMIN — HYDROCODONE BITARTRATE AND ACETAMINOPHEN 1 TABLET: 5; 325 TABLET ORAL at 01:56

## 2022-02-25 RX ADMIN — POTASSIUM CHLORIDE 20 MEQ: 14.9 INJECTION, SOLUTION INTRAVENOUS at 21:12

## 2022-02-25 RX ADMIN — SUCRALFATE 1 G: 1 TABLET ORAL at 12:23

## 2022-02-25 RX ADMIN — ASPIRIN 81 MG: 81 TABLET ORAL at 08:15

## 2022-02-25 RX ADMIN — POTASSIUM BICARBONATE 40 MEQ: 782 TABLET, EFFERVESCENT ORAL at 21:12

## 2022-02-25 RX ADMIN — GABAPENTIN 300 MG: 300 CAPSULE ORAL at 08:15

## 2022-02-25 RX ADMIN — PANTOPRAZOLE SODIUM 40 MG: 40 TABLET, DELAYED RELEASE ORAL at 21:20

## 2022-02-25 RX ADMIN — DICYCLOMINE HYDROCHLORIDE 20 MG: 10 CAPSULE ORAL at 16:24

## 2022-02-25 RX ADMIN — VALSARTAN 40 MG: 40 TABLET, FILM COATED ORAL at 08:15

## 2022-02-25 RX ADMIN — MAGNESIUM SULFATE HEPTAHYDRATE 2 G: 40 INJECTION, SOLUTION INTRAVENOUS at 14:07

## 2022-02-25 RX ADMIN — ATORVASTATIN CALCIUM 80 MG: 80 TABLET, FILM COATED ORAL at 08:15

## 2022-02-25 RX ADMIN — CARVEDILOL 25 MG: 25 TABLET, FILM COATED ORAL at 10:04

## 2022-02-25 RX ADMIN — SUCRALFATE 1 G: 1 TABLET ORAL at 21:20

## 2022-02-25 RX ADMIN — SODIUM CHLORIDE, PRESERVATIVE FREE 5 MG: 5 INJECTION INTRAVENOUS at 14:07

## 2022-02-25 RX ADMIN — IVABRADINE 7.5 MG: 5 TABLET, FILM COATED ORAL at 10:04

## 2022-02-25 RX ADMIN — SUCRALFATE 1 G: 1 TABLET ORAL at 08:16

## 2022-02-25 RX ADMIN — AMIODARONE HYDROCHLORIDE 150 MG: 50 INJECTION, SOLUTION INTRAVENOUS at 20:39

## 2022-02-25 RX ADMIN — SODIUM CHLORIDE, PRESERVATIVE FREE 5 ML: 5 INJECTION INTRAVENOUS at 14:10

## 2022-02-25 RX ADMIN — SPIRONOLACTONE 25 MG: 25 TABLET ORAL at 08:16

## 2022-02-25 RX ADMIN — METOPROLOL TARTRATE 5 MG: 5 INJECTION, SOLUTION INTRAVENOUS at 20:30

## 2022-02-25 NOTE — ROUTINE PROCESS
Patients SBP dropped to the 80s. Cardiology NP notified, orders to hold evening dose of careg & corlanor.

## 2022-02-25 NOTE — PROGRESS NOTES
Pt admitted with CHF/CM 1 week after discharge from same diagnosis. CM completed readmission assessment. Pt is currently on room air. Pt is on Lasix drip. Pt is awake & alert X 4. Pt verified PCP, demographic, insurance. Pt states he was independent of ADLs but requiring assistance after last discharge. DME: None  Pt is able to afford medications with medicare and supplement benefit. Pt is transported by his spouse. No PT/OT evaluations ordered. CM will continue to follow pt's status to update DCP.

## 2022-02-25 NOTE — PROGRESS NOTES
[facility] CARDIOLOGY PROGRESS NOTE    2/25/2022 8:10 AM    Admit Date: 2/24/2022        Subjective:   No angina or palpitations but still with sinus tachycardia/compensatory tachycardia with very severe left ventricular systolic dysfunction and worsening volume overload consistent with acute on chronic systolic heart failure exacerbation. Short of breath at rest and significantly volume overloaded, on Lasix drip. Blood pressure high enough to uptitrate medications. Objective:      Vitals:    02/24/22 2252 02/25/22 0015 02/25/22 0509 02/25/22 0745   BP:  (!) 129/91 (!) 120/92 (!) 147/92   Pulse: (!) 114 (!) 111 (!) 113 (!) 118   Resp:   18 18   Temp:   100.1 °F (37.8 °C) 100.1 °F (37.8 °C)   SpO2:   94% 96%   Weight:       Height:           Physical Exam:  Neck- supple, difficult to assess JVD  CV-tachycardic but regular rate and rhythm, 2/6 TR murmur, no S3  Lung- clear bilaterally apically, coarse bibasilar, left greater than right   abd- soft, chronically diffusely tender, nondistended  Ext- 2-3+ anterior tibial pitting edema  Skin- warm and dry    Data Review:   Recent Labs     02/25/22  0704 02/24/22 2031 02/24/22  1821   NA  --   --  140   K  --   --  5.0   MG  --   --  1.7*   BUN  --   --  9   CREA  --   --  0.80   GLU  --   --  96   WBC 10.4 9.6  --    HGB 12.2* 11.7*  --    HCT 38.6* 37.6*  --    * 165  --        Assessment and Plan:     Acute on chronic systolic heart failure (HCC)-EF severely depressed and 10 to 20% chronically. Volume overloaded with no recent Lasix after hospital discharge (on hospitalist service). Lasix drip, elevate legs, recheck daily BMP and magnesium and replete electrolytes as tolerated/needed. Will need several days of aggressive diuresis of his blood pressure will tolerate. Augmenting heart failure medication regimen as well.   Titrate Corlanor over the weekend as well given sinus tachycardia with very severe left ventricular systolic dysfunction.     Non-ischemic cardiomyopathy (HCC)-see above-acute decompensation over the past few days to weeks. Lasix drip for now. Recheck labs in the morning.     Ventricular tachycardia ([address]- ICD in place, see below- no VT/shocks on recent interrogation.     Essential hypertension with goal blood pressure less than 130/80- see above, continue meds, minimize salt, increase heart failure regimen as tolerated over the next few days.     ICD (implantable cardioverter-defibrillator) in place- AT vs NSVT, 9 shocks, resolved now- stopped amio in past- no recurrence.        Nausea and vomiting- recurrent on daily basis. Referred [facility] for evaluation at his request.  No improvement in symptoms despite complete cessation of all medications for the past several weeks and with serial cessation of each cardiac drug last year. Per KOREY.       Natasha Morillo MD  [facility] Cardiology  Pager [phone]

## 2022-02-25 NOTE — ED NOTES
TRANSFER - OUT REPORT:    Verbal report given to KADEN Macias on Akanksha Husbands  being transferred to [facility] for routine progression of care       Report consisted of patients Situation, Background, Assessment and   Recommendations(SBAR). Information from the following report(s) SBAR, ED Summary and MAR was reviewed with the receiving nurse. Lines:   PICC Single Lumen 73/01/15 Right;Basilic (Active)       Peripheral IV 02/24/22 Right Antecubital (Active)        Opportunity for questions and clarification was provided.       Patient transported with:   Monitor  Registered Nurse

## 2022-02-25 NOTE — PROGRESS NOTES
Problem: Hypertension  Goal: *Blood pressure within specified parameters  Outcome: Progressing Towards Goal  Goal: *Fluid volume balance  Outcome: Progressing Towards Goal  Goal: *Labs within defined limits  Outcome: Progressing Towards Goal     Problem: Patient Education: Go to Patient Education Activity  Goal: Patient/Family Education  Outcome: Progressing Towards Goal     Problem: Anxiety  Goal: *Alleviation of anxiety  Outcome: Progressing Towards Goal  Goal: *Alleviation of anxiety (Palliative Care)  Outcome: Progressing Towards Goal     Problem: Patient Education: Go to Patient Education Activity  Goal: Patient/Family Education  Outcome: Progressing Towards Goal

## 2022-02-25 NOTE — PROGRESS NOTES
No DVT prophylaxis orders for pt. NP Geo Fitzgerald notified. No new orders received. Will continue to monitor.

## 2022-02-25 NOTE — PROGRESS NOTES
TRANSFER - IN REPORT:    Verbal report received from Leland talley RN on Fabrizio Contreras being received from ED for routine progression of care. Report consisted of patients Situation, Background, Assessment and Recommendations(SBAR). Information from the following report(s) SBAR, Kardex, ED Summary, Procedure Summary, Intake/Output, MAR and Recent Results was reviewed. Opportunity for questions and clarification was provided. Assessment completed upon patients arrival to unit and care assumed. Patient received to room 324. Patient connected to monitor and assessment completed. Plan of care reviewed. Patient oriented to room and call light. Patient aware to use call light to communicate any chest pain or needs. Admission skin assessment completed with second RN and reveals the following:     Sacrum and heels are clean, dry, and intact. +4 lower extremity edema noted bilaterally. Skin tears noted on R foot. Tattoos present.

## 2022-02-25 NOTE — ED NOTES
Pt calling needing to urinate and have a BM at this time. Comode provided at his request. Call light within reach, patient instructed to call once he is finished.

## 2022-02-25 NOTE — PROGRESS NOTES
Unsuccessful PICC Placement Note  Correct Procedure: yes time out completed assistant Genevieve Merrill RN all persons present in agreement with time out. PRE-PROCEDURE VERIFICATION  Correct Site:  Yes  Allergies verfied:  Yes  Temperature: Temp: 100.1 °F (37.8 °C), Temperature Source: Temp Source: Oral  Recent Labs     02/25/22  0704   BUN 8   CREA 0.80   *   WBC 10.4     Allergies: NKA    PROCEDURE DETAIL  A double lumen PICC line was attempted for vascular access. Complication related to insertion: accessed right brachial vein with good blood return however unable to thread catheter past axilla. Basilic vein not compressible and cephalic vein too small for further attempts. Lot #: [id number]  Xylocaine used: yes  Mid-Arm Circumference: 30 (cm)    Also attempted 18g 8cm Endurance lot [id number] in left brachial however unable to thread catheter past 5cm.

## 2022-02-25 NOTE — ED NOTES
Pt does not have any veins, stated last time he got a PICC line. Charge RN aware pt will need US IV to get ordered meds.

## 2022-02-25 NOTE — PROGRESS NOTES
Patient remains A&Ox4, complains of bilateral foot pain/tightness. Received PRN medications. Patient also complained of nausea, PRN medications administered. Ambulated to bedside commode. Lasix drip discontinued. Intermittent dizziness. Call light within reach, safety maintained. Problem: Falls - Risk of  Goal: *Absence of Falls  Description: Document Remigio Bhandari Fall Risk and appropriate interventions in the flowsheet. Outcome: Progressing Towards Goal  Note: Fall Risk Interventions:  Mobility Interventions: Patient to call before getting OOB         Medication Interventions: Bed/chair exit alarm,Patient to call before getting OOB,Teach patient to arise slowly    Elimination Interventions: Call light in reach,Urinal in reach    History of Falls Interventions: Bed/chair exit alarm         Problem: Patient Education: Go to Patient Education Activity  Goal: Patient/Family Education  Outcome: Progressing Towards Goal     Problem: Impaired Skin Integrity/Pressure Injury Treatment  Goal: *Improvement of Existing Pressure Injury  Outcome: Progressing Towards Goal  Goal: *Prevention of pressure injury  Description: Document Abad Scale and appropriate interventions in the flowsheet.   Outcome: Progressing Towards Goal  Note: Pressure Injury Interventions:  Sensory Interventions: Keep linens dry and wrinkle-free,Minimize linen layers    Moisture Interventions: Absorbent underpads,Minimize layers    Activity Interventions: Pressure redistribution bed/mattress(bed type),Increase time out of bed    Mobility Interventions: PT/OT evaluation,Pressure redistribution bed/mattress (bed type)    Nutrition Interventions: Document food/fluid/supplement intake    Friction and Shear Interventions: Minimize layers                Problem: Patient Education: Go to Patient Education Activity  Goal: Patient/Family Education  Outcome: Progressing Towards Goal     Problem: Patient Education: Go to Patient Education Activity  Goal: Patient/Family Education  Outcome: Progressing Towards Goal     Problem: Heart Failure: Day 1  Goal: Off Pathway (Use only if patient is Off Pathway)  Outcome: Progressing Towards Goal  Goal: Activity/Safety  Outcome: Progressing Towards Goal  Goal: Consults, if ordered  Outcome: Progressing Towards Goal  Goal: Diagnostic Test/Procedures  Outcome: Progressing Towards Goal  Goal: Nutrition/Diet  Outcome: Progressing Towards Goal  Goal: Discharge Planning  Outcome: Progressing Towards Goal  Goal: Medications  Outcome: Progressing Towards Goal  Goal: Respiratory  Outcome: Progressing Towards Goal  Goal: Treatments/Interventions/Procedures  Outcome: Progressing Towards Goal  Goal: Psychosocial  Outcome: Progressing Towards Goal  Goal: *Oxygen saturation within defined limits  Outcome: Progressing Towards Goal  Goal: *Hemodynamically stable  Outcome: Progressing Towards Goal  Goal: *Optimal pain control at patient's stated goal  Outcome: Progressing Towards Goal  Goal: *Anxiety reduced or absent  Outcome: Progressing Towards Goal     Problem: Heart Failure: Day 2  Goal: Off Pathway (Use only if patient is Off Pathway)  Outcome: Progressing Towards Goal  Goal: Activity/Safety  Outcome: Progressing Towards Goal  Goal: Consults, if ordered  Outcome: Progressing Towards Goal  Goal: Diagnostic Test/Procedures  Outcome: Progressing Towards Goal  Goal: Nutrition/Diet  Outcome: Progressing Towards Goal  Goal: Discharge Planning  Outcome: Progressing Towards Goal  Goal: Medications  Outcome: Progressing Towards Goal  Goal: Respiratory  Outcome: Progressing Towards Goal  Goal: Treatments/Interventions/Procedures  Outcome: Progressing Towards Goal  Goal: Psychosocial  Outcome: Progressing Towards Goal  Goal: *Oxygen saturation within defined limits  Outcome: Progressing Towards Goal  Goal: *Hemodynamically stable  Outcome: Progressing Towards Goal  Goal: *Optimal pain control at patient's stated goal  Outcome: Progressing Towards Goal  Goal: *Anxiety reduced or absent  Outcome: Progressing Towards Goal  Goal: *Demonstrates progressive activity  Outcome: Progressing Towards Goal     Problem: Heart Failure: Day 3  Goal: Off Pathway (Use only if patient is Off Pathway)  Outcome: Progressing Towards Goal  Goal: Activity/Safety  Outcome: Progressing Towards Goal  Goal: Diagnostic Test/Procedures  Outcome: Progressing Towards Goal  Goal: Nutrition/Diet  Outcome: Progressing Towards Goal  Goal: Discharge Planning  Outcome: Progressing Towards Goal  Goal: Medications  Outcome: Progressing Towards Goal  Goal: Respiratory  Outcome: Progressing Towards Goal  Goal: Treatments/Interventions/Procedures  Outcome: Progressing Towards Goal  Goal: Psychosocial  Outcome: Progressing Towards Goal  Goal: *Oxygen saturation within defined limits  Outcome: Progressing Towards Goal  Goal: *Hemodynamically stable  Outcome: Progressing Towards Goal  Goal: *Optimal pain control at patient's stated goal  Outcome: Progressing Towards Goal  Goal: *Anxiety reduced or absent  Outcome: Progressing Towards Goal  Goal: *Demonstrates progressive activity  Outcome: Progressing Towards Goal     Problem: Heart Failure: Day 4  Goal: Off Pathway (Use only if patient is Off Pathway)  Outcome: Progressing Towards Goal  Goal: Activity/Safety  Outcome: Progressing Towards Goal  Goal: Diagnostic Test/Procedures  Outcome: Progressing Towards Goal  Goal: Nutrition/Diet  Outcome: Progressing Towards Goal  Goal: Discharge Planning  Outcome: Progressing Towards Goal  Goal: Medications  Outcome: Progressing Towards Goal  Goal: Respiratory  Outcome: Progressing Towards Goal  Goal: Treatments/Interventions/Procedures  Outcome: Progressing Towards Goal  Goal: Psychosocial  Outcome: Progressing Towards Goal  Goal: *Oxygen saturation within defined limits  Outcome: Progressing Towards Goal  Goal: *Hemodynamically stable  Outcome: Progressing Towards Goal  Goal: *Optimal pain control at patient's stated goal  Outcome: Progressing Towards Goal  Goal: *Anxiety reduced or absent  Outcome: Progressing Towards Goal  Goal: *Demonstrates progressive activity  Outcome: Progressing Towards Goal     Problem: Heart Failure: Day 5  Goal: Off Pathway (Use only if patient is Off Pathway)  Outcome: Progressing Towards Goal  Goal: Activity/Safety  Outcome: Progressing Towards Goal  Goal: Diagnostic Test/Procedures  Outcome: Progressing Towards Goal  Goal: Nutrition/Diet  Outcome: Progressing Towards Goal  Goal: Discharge Planning  Outcome: Progressing Towards Goal  Goal: Medications  Outcome: Progressing Towards Goal  Goal: Respiratory  Outcome: Progressing Towards Goal  Goal: Treatments/Interventions/Procedures  Outcome: Progressing Towards Goal  Goal: Psychosocial  Outcome: Progressing Towards Goal     Problem: Heart Failure: Discharge Outcomes  Goal: *Demonstrates ability to perform prescribed activity without shortness of breath or discomfort  Outcome: Progressing Towards Goal  Goal: *Left ventricular function assessment completed prior to or during stay, or planned for post-discharge  Outcome: Progressing Towards Goal  Goal: *ACEI prescribed if LVEF less than 40% and no contraindications or ARB prescribed  Outcome: Progressing Towards Goal  Goal: *Verbalizes understanding and describes prescribed diet  Outcome: Progressing Towards Goal  Goal: *Verbalizes understanding/describes prescribed medications  Outcome: Progressing Towards Goal  Goal: *Describes available resources and support systems  Description: (eg: Home Health, Palliative Care, Advanced Medical Directive)  Outcome: Progressing Towards Goal  Goal: *Describes smoking cessation resources  Outcome: Progressing Towards Goal  Goal: *Understands and describes signs and symptoms to report to providers(Stroke Metric)  Outcome: Progressing Towards Goal  Goal: *Describes/verbalizes understanding of follow-up/return appt  Description: (eg: to physicians, diabetes treatment coordinator, and other resources  Outcome: Progressing Towards Goal  Goal: *Describes importance of continuing daily weights and changes to report to physician  Outcome: Progressing Towards Goal

## 2022-02-25 NOTE — PROGRESS NOTES
MD Guajardo messaged for potential PICC placement due to pt only having 24 gauge IV access that was US guided in ED, but MD Guajardo unable to come. ICU [place] unable to gain IV access after several attempts.

## 2022-02-26 PROBLEM — E83.42 HYPOMAGNESEMIA: Status: ACTIVE | Noted: 2022-02-26

## 2022-02-26 PROBLEM — Z45.02 ICD (IMPLANTABLE CARDIOVERTER-DEFIBRILLATOR) DISCHARGE: Status: ACTIVE | Noted: 2022-02-26

## 2022-02-26 LAB
ANION GAP SERPL CALC-SCNC: 9 MMOL/L (ref 7–16)
BUN SERPL-MCNC: 5 MG/DL (ref 6–23)
CALCIUM SERPL-MCNC: 7.7 MG/DL (ref 8.3–10.4)
CHLORIDE SERPL-SCNC: 97 MMOL/L (ref 98–107)
CO2 SERPL-SCNC: 29 MMOL/L (ref 21–32)
CREAT SERPL-MCNC: 1.1 MG/DL (ref 0.8–1.5)
ERYTHROCYTE [DISTWIDTH] IN BLOOD BY AUTOMATED COUNT: 15.1 % (ref 11.9–14.6)
GLUCOSE SERPL-MCNC: 130 MG/DL (ref 65–100)
HCT VFR BLD AUTO: 30.7 % (ref 41.1–50.3)
HGB BLD-MCNC: 10.2 G/DL (ref 13.6–17.2)
MAGNESIUM SERPL-MCNC: 2.3 MG/DL (ref 1.8–2.4)
MCH RBC QN AUTO: 30.2 PG (ref 26.1–32.9)
MCHC RBC AUTO-ENTMCNC: 33.2 G/DL (ref 31.4–35)
MCV RBC AUTO: 90.8 FL (ref 79.6–97.8)
NRBC # BLD: 0.04 K/UL (ref 0–0.2)
PLATELET # BLD AUTO: 113 K/UL (ref 150–450)
PMV BLD AUTO: 10.4 FL (ref 9.4–12.3)
POTASSIUM SERPL-SCNC: 3.3 MMOL/L (ref 3.5–5.1)
POTASSIUM SERPL-SCNC: 3.7 MMOL/L (ref 3.5–5.1)
RBC # BLD AUTO: 3.38 M/UL (ref 4.23–5.6)
SODIUM SERPL-SCNC: 135 MMOL/L (ref 136–145)
WBC # BLD AUTO: 8.5 K/UL (ref 4.3–11.1)

## 2022-02-26 PROCEDURE — 65660000000 HC RM CCU STEPDOWN

## 2022-02-26 PROCEDURE — 74011250636 HC RX REV CODE- 250/636: Performed by: NURSE PRACTITIONER

## 2022-02-26 PROCEDURE — 74011250637 HC RX REV CODE- 250/637: Performed by: NURSE PRACTITIONER

## 2022-02-26 PROCEDURE — 94760 N-INVAS EAR/PLS OXIMETRY 1: CPT

## 2022-02-26 PROCEDURE — 2709999900 HC NON-CHARGEABLE SUPPLY

## 2022-02-26 PROCEDURE — 83735 ASSAY OF MAGNESIUM: CPT

## 2022-02-26 PROCEDURE — 84132 ASSAY OF SERUM POTASSIUM: CPT

## 2022-02-26 PROCEDURE — 74011250636 HC RX REV CODE- 250/636: Performed by: INTERNAL MEDICINE

## 2022-02-26 PROCEDURE — 94664 DEMO&/EVAL PT USE INHALER: CPT

## 2022-02-26 PROCEDURE — 80048 BASIC METABOLIC PNL TOTAL CA: CPT

## 2022-02-26 PROCEDURE — 74011000250 HC RX REV CODE- 250: Performed by: NURSE PRACTITIONER

## 2022-02-26 PROCEDURE — 85027 COMPLETE CBC AUTOMATED: CPT

## 2022-02-26 PROCEDURE — 74011000250 HC RX REV CODE- 250: Performed by: INTERNAL MEDICINE

## 2022-02-26 PROCEDURE — 94640 AIRWAY INHALATION TREATMENT: CPT

## 2022-02-26 PROCEDURE — 74011000258 HC RX REV CODE- 258: Performed by: INTERNAL MEDICINE

## 2022-02-26 PROCEDURE — 99232 SBSQ HOSP IP/OBS MODERATE 35: CPT | Performed by: INTERNAL MEDICINE

## 2022-02-26 RX ORDER — POTASSIUM CHLORIDE 20 MEQ/1
40 TABLET, EXTENDED RELEASE ORAL
Status: COMPLETED | OUTPATIENT
Start: 2022-02-26 | End: 2022-02-26

## 2022-02-26 RX ORDER — IPRATROPIUM BROMIDE AND ALBUTEROL SULFATE 2.5; .5 MG/3ML; MG/3ML
3 SOLUTION RESPIRATORY (INHALATION)
Status: DISCONTINUED | OUTPATIENT
Start: 2022-02-26 | End: 2022-02-28

## 2022-02-26 RX ORDER — SPIRONOLACTONE 25 MG/1
50 TABLET ORAL DAILY
Status: DISCONTINUED | OUTPATIENT
Start: 2022-02-27 | End: 2022-03-04 | Stop reason: HOSPADM

## 2022-02-26 RX ORDER — POTASSIUM CHLORIDE 14.9 MG/ML
20 INJECTION INTRAVENOUS
Status: COMPLETED | OUTPATIENT
Start: 2022-02-26 | End: 2022-02-26

## 2022-02-26 RX ADMIN — POTASSIUM CHLORIDE 20 MEQ: 14.9 INJECTION, SOLUTION INTRAVENOUS at 08:47

## 2022-02-26 RX ADMIN — DICYCLOMINE HYDROCHLORIDE 20 MG: 10 CAPSULE ORAL at 08:21

## 2022-02-26 RX ADMIN — ATORVASTATIN CALCIUM 80 MG: 80 TABLET, FILM COATED ORAL at 08:20

## 2022-02-26 RX ADMIN — DICYCLOMINE HYDROCHLORIDE 20 MG: 10 CAPSULE ORAL at 17:42

## 2022-02-26 RX ADMIN — IVABRADINE 7.5 MG: 5 TABLET, FILM COATED ORAL at 08:21

## 2022-02-26 RX ADMIN — DICYCLOMINE HYDROCHLORIDE 20 MG: 10 CAPSULE ORAL at 21:51

## 2022-02-26 RX ADMIN — MORPHINE SULFATE 2 MG: 4 INJECTION INTRAVENOUS at 08:42

## 2022-02-26 RX ADMIN — CARVEDILOL 12.5 MG: 12.5 TABLET, FILM COATED ORAL at 08:21

## 2022-02-26 RX ADMIN — ASPIRIN 81 MG: 81 TABLET ORAL at 08:21

## 2022-02-26 RX ADMIN — SUCRALFATE 1 G: 1 TABLET ORAL at 17:42

## 2022-02-26 RX ADMIN — SODIUM CHLORIDE, PRESERVATIVE FREE 10 ML: 5 INJECTION INTRAVENOUS at 22:36

## 2022-02-26 RX ADMIN — AMIODARONE HYDROCHLORIDE 1 MG/MIN: 50 INJECTION, SOLUTION INTRAVENOUS at 21:50

## 2022-02-26 RX ADMIN — CARVEDILOL 12.5 MG: 12.5 TABLET, FILM COATED ORAL at 17:42

## 2022-02-26 RX ADMIN — MORPHINE SULFATE 2 MG: 4 INJECTION INTRAVENOUS at 22:04

## 2022-02-26 RX ADMIN — PANTOPRAZOLE SODIUM 40 MG: 40 TABLET, DELAYED RELEASE ORAL at 21:51

## 2022-02-26 RX ADMIN — IPRATROPIUM BROMIDE AND ALBUTEROL SULFATE 3 ML: .5; 3 SOLUTION RESPIRATORY (INHALATION) at 22:58

## 2022-02-26 RX ADMIN — VALSARTAN 40 MG: 40 TABLET, FILM COATED ORAL at 08:21

## 2022-02-26 RX ADMIN — PANTOPRAZOLE SODIUM 40 MG: 40 TABLET, DELAYED RELEASE ORAL at 08:21

## 2022-02-26 RX ADMIN — SPIRONOLACTONE 25 MG: 25 TABLET ORAL at 08:21

## 2022-02-26 RX ADMIN — FUROSEMIDE 40 MG: 10 INJECTION, SOLUTION INTRAMUSCULAR; INTRAVENOUS at 17:42

## 2022-02-26 RX ADMIN — POTASSIUM CHLORIDE 20 MEQ: 14.9 INJECTION, SOLUTION INTRAVENOUS at 06:59

## 2022-02-26 RX ADMIN — IVABRADINE 7.5 MG: 5 TABLET, FILM COATED ORAL at 17:41

## 2022-02-26 RX ADMIN — SUCRALFATE 1 G: 1 TABLET ORAL at 12:27

## 2022-02-26 RX ADMIN — ALPRAZOLAM 1 MG: 0.5 TABLET ORAL at 22:35

## 2022-02-26 RX ADMIN — AMIODARONE HYDROCHLORIDE 1 MG/MIN: 50 INJECTION, SOLUTION INTRAVENOUS at 12:27

## 2022-02-26 RX ADMIN — AMIODARONE HYDROCHLORIDE 1 MG/MIN: 50 INJECTION, SOLUTION INTRAVENOUS at 04:37

## 2022-02-26 RX ADMIN — GABAPENTIN 300 MG: 300 CAPSULE ORAL at 17:42

## 2022-02-26 RX ADMIN — POTASSIUM CHLORIDE 40 MEQ: 20 TABLET, EXTENDED RELEASE ORAL at 06:59

## 2022-02-26 RX ADMIN — SUCRALFATE 1 G: 1 TABLET ORAL at 21:51

## 2022-02-26 RX ADMIN — SUCRALFATE 1 G: 1 TABLET ORAL at 08:21

## 2022-02-26 RX ADMIN — FUROSEMIDE 40 MG: 10 INJECTION, SOLUTION INTRAMUSCULAR; INTRAVENOUS at 08:21

## 2022-02-26 RX ADMIN — GABAPENTIN 300 MG: 300 CAPSULE ORAL at 08:21

## 2022-02-26 RX ADMIN — SODIUM CHLORIDE, PRESERVATIVE FREE 10 ML: 5 INJECTION INTRAVENOUS at 05:37

## 2022-02-26 NOTE — ROUTINE PROCESS
Bedside and Verbal shift change report given to 18[address] Darell Prasad (oncoming nurse) by self (offgoing nurse). Report included the following information SBAR, Intake/Output, MAR and Recent Results.

## 2022-02-26 NOTE — PROGRESS NOTES
Patient had episode of VT that resulted in multiple shocks from his ICD. Rapid response called by monitor room. Dr. Anselmo Diallo at bedside. Amio bolus x2 and drip ordered and given. Amio drip to stay at 1mg/min. Dr. Yuli Swain at bedside to insert central line. Lasix gtt discontinued by Dr. Anselmo Diallo. Respiratory therapy at bedside to run mixed venous blood gas. Patient received 50 mcg fentanyl. No further ICD fires after amiodarone administration. Charge RN (self) called patient's wife Jose Hall to update her. Patient will remain on telemetry in [facility] for now. Will closely monitor.

## 2022-02-26 NOTE — PROGRESS NOTES
Problem: Falls - Risk of  Goal: *Absence of Falls  Description: Document Alnaa Marking Fall Risk and appropriate interventions in the flowsheet. Outcome: Progressing Towards Goal  Note: Fall Risk Interventions:  Mobility Interventions: Patient to call before getting OOB         Medication Interventions: Patient to call before getting OOB,Teach patient to arise slowly    Elimination Interventions: Call light in reach,Patient to call for help with toileting needs,Urinal in reach    History of Falls Interventions: Bed/chair exit alarm         Problem: Impaired Skin Integrity/Pressure Injury Treatment  Goal: *Improvement of Existing Pressure Injury  Outcome: Progressing Towards Goal  Goal: *Prevention of pressure injury  Description: Document Abad Scale and appropriate interventions in the flowsheet.   Outcome: Progressing Towards Goal  Note: Pressure Injury Interventions:  Sensory Interventions: Keep linens dry and wrinkle-free,Minimize linen layers    Moisture Interventions: Absorbent underpads,Minimize layers    Activity Interventions: Pressure redistribution bed/mattress(bed type)    Mobility Interventions: HOB 30 degrees or less,Pressure redistribution bed/mattress (bed type)    Nutrition Interventions: Offer support with meals,snacks and hydration,Document food/fluid/supplement intake    Friction and Shear Interventions: Minimize layers

## 2022-02-26 NOTE — PROGRESS NOTES
Critical Care Note 2/25/22 8:45AM      Called emergently to room 324 for recurrent V. tach/V. fib with recurrent ICD shocks and recurrent loss of consciousness. Patient was in his usual state of health when he had acute onset recurrent ventricular tachycardia with intermittent defibrillator shocks, at least four visualized by me. Intermittently appears to be torsades with a magnesium of 1.7 this morning but potassium was greater than 4. Given recurrent tachyarrhythmias 300 mg intravenous amiodarone was bolused through a left arm peripheral vein and 2 mg magnesium pushed rapidly. The patient had subsequent resolution of his tachyarrhythmias and was awake and alert. IV access is an issue, critical care will place a subclavian line this evening for central access. Amiodarone drip was started at 1 mg/min, rebolus as needed. Stat BMP and magnesium and replete electrolytes to keep potassium greater than 4 and magnesium greater than 2. Administering nighttime carvedilol now. Remains stable but with very severe left ventricular systolic dysfunction with a chronic nonischemic cardiomyopathy and ejection fraction 10 to 20% historically. Continue diuresis as tolerated. We will follow closely overnight. A total of critical care 25 minutes spent at the bedside.     Radha Gardner MD

## 2022-02-26 NOTE — ROUTINE PROCESS
Bedside and Verbal shift change report given to self (oncoming nurse) by Jaqueline Parkinson (offgoing nurse).  Report included the following information SBAR, Kardex, Intake/Output, Recent Results and Cardiac Rhythm SR.

## 2022-02-26 NOTE — ROUTINE PROCESS
BP 86/60 HR 75 on amio gtt. Gtt held and provider notified. Per provider ok to restart amio. Current BP 91/65 HR 77. Provider also notified of Mag and K levels.

## 2022-02-26 NOTE — PROGRESS NOTES
[facility] CARDIOLOGY PROGRESS NOTE    2/26/2022 8:27 AM    Admit Date: 2/24/2022        Subjective:   Stable this morning without recurrent arrhythmias after acute therapy delivered last evening for recurrent VT/torsades. Currently without angina, CHF, or palpitations. Vitals stable and controlled. No other complaints overnight. Tolerating meds well. Potassium very low last night in the setting of recurrent VT with multiple ICD shocks. Potassium repleted last night and again this morning. Rechecking later this afternoon      Objective:      Vitals:    02/26/22 0545 02/26/22 0620 02/26/22 0707 02/26/22 0808   BP: 94/62 109/68 114/77 111/69   Pulse: 77 74 78 81   Resp:    18   Temp:    97.1 °F (36.2 °C)   SpO2:    95%   Weight:       Height:           Physical Exam:  Neck- supple, 10 cm JVD  CV- regular rate and rhythm no MRG  Lung- clear bilaterally anteroapical, decreased/coarse bibasilar  Abd- soft, nontender, nondistended  Ext-1-2+ anterior tibial pitting edema  Skin- warm and dry    Data Review:   Recent Labs     02/26/22  0205 02/25/22 2048 02/25/22  0704 02/25/22  0704   * 133*   < > 139   K 3.3* 2.7*   < > 4.0   MG 2.3 3.3*  --   --    BUN 5* 6   < > 8   CREA 1.10 1.00   < > 0.80   * 199*   < > 80   WBC 8.5  --   --  10.4   HGB 10.2*  --   --  12.2*   HCT 30.7*  --   --  38.6*   *  --   --  122*    < > = values in this interval not displayed. Assessment and Plan:       Acute on chronic systolic heart failure (HCC)-EF severely depressed and 10 to 20% chronically. Volume overloaded with no recent Lasix after hospital discharge (on hospitalist service). Continue IV twice daily Lasix, elevate legs, recheck daily BMP and magnesium and replete electrolytes as tolerated/needed. Will need several days of aggressive diuresis of his blood pressure will tolerate. Augmenting heart failure medication regimen as well.   Titrate Corlanor over the weekend as well given sinus tachycardia with very severe left ventricular systolic dysfunction.     Non-ischemic cardiomyopathy (HCC)-see above-acute decompensation over the past few days to weeks. Lasix to resume this morning but need to keep a close eye on potassium and magnesium given recurrent VT last night.     Ventricular tachycardia ([address]- ICD in place, see below- no VT/shocks on recent interrogation. Recurrent VT/torsades overnight with at least 4 shocks. This was in the setting of hypokalemia and hypomagnesemia. Repleted both. Keep a close eye on electrolytes and replete aggressively.     Essential hypertension with goal blood pressure less than 130/80- see above, continue meds, minimize salt, increase heart failure regimen as tolerated over the next few days.     ICD (implantable cardioverter-defibrillator) in place-stopped amnio in the past but on amnio drip now given multiple shocks last night in the setting of VT/torsades. See above. Continue amnio and convert to oral dosing tomorrow if remains stable today.     Nausea and vomiting- recurrent on daily basis.  Referred at [facility] visit to [facility] for evaluation at his request.  No improvement in symptoms despite complete cessation of all medications for the past several weeks and with serial cessation of each cardiac drug last year.  Per CURTIS Valenzuela MD  [facility]  Pager [phone]

## 2022-02-26 NOTE — PROGRESS NOTES
Patient remains A&Ox4, anxious at times due to the events overnight. Complains of generalized pain but refusing most pain medication. Also refusing nausea medication. Both offered several times throughout shift. Call light within reach, safety maintained. Problem: Falls - Risk of  Goal: *Absence of Falls  Description: Document [place] Fall Risk and appropriate interventions in the flowsheet. Outcome: Progressing Towards Goal  Note: Fall Risk Interventions:  Mobility Interventions: Patient to call before getting OOB         Medication Interventions: Patient to call before getting OOB,Teach patient to arise slowly    Elimination Interventions: Call light in reach,Urinal in reach    History of Falls Interventions: Investigate reason for fall         Problem: Patient Education: Go to Patient Education Activity  Goal: Patient/Family Education  Outcome: Progressing Towards Goal     Problem: Impaired Skin Integrity/Pressure Injury Treatment  Goal: *Improvement of Existing Pressure Injury  Outcome: Progressing Towards Goal  Goal: *Prevention of pressure injury  Description: Document Abad Scale and appropriate interventions in the flowsheet.   Outcome: Progressing Towards Goal  Note: Pressure Injury Interventions:  Sensory Interventions: Keep linens dry and wrinkle-free,Minimize linen layers    Moisture Interventions: Absorbent underpads,Minimize layers    Activity Interventions: Pressure redistribution bed/mattress(bed type)    Mobility Interventions: HOB 30 degrees or less,Pressure redistribution bed/mattress (bed type)    Nutrition Interventions: Document food/fluid/supplement intake    Friction and Shear Interventions: Minimize layers,Transferring/repositioning devices                Problem: Patient Education: Go to Patient Education Activity  Goal: Patient/Family Education  Outcome: Progressing Towards Goal     Problem: Patient Education: Go to Patient Education Activity  Goal: Patient/Family Education  Outcome: Progressing Towards Goal     Problem: Heart Failure: Day 1  Goal: Off Pathway (Use only if patient is Off Pathway)  Outcome: Progressing Towards Goal  Goal: Activity/Safety  Outcome: Progressing Towards Goal  Goal: Consults, if ordered  Outcome: Progressing Towards Goal  Goal: Diagnostic Test/Procedures  Outcome: Progressing Towards Goal  Goal: Nutrition/Diet  Outcome: Progressing Towards Goal  Goal: Discharge Planning  Outcome: Progressing Towards Goal  Goal: Medications  Outcome: Progressing Towards Goal  Goal: Respiratory  Outcome: Progressing Towards Goal  Goal: Treatments/Interventions/Procedures  Outcome: Progressing Towards Goal  Goal: Psychosocial  Outcome: Progressing Towards Goal  Goal: *Oxygen saturation within defined limits  Outcome: Progressing Towards Goal  Goal: *Hemodynamically stable  Outcome: Progressing Towards Goal  Goal: *Optimal pain control at patient's stated goal  Outcome: Progressing Towards Goal  Goal: *Anxiety reduced or absent  Outcome: Progressing Towards Goal     Problem: Heart Failure: Day 2  Goal: Off Pathway (Use only if patient is Off Pathway)  Outcome: Progressing Towards Goal  Goal: Activity/Safety  Outcome: Progressing Towards Goal  Goal: Consults, if ordered  Outcome: Progressing Towards Goal  Goal: Diagnostic Test/Procedures  Outcome: Progressing Towards Goal  Goal: Nutrition/Diet  Outcome: Progressing Towards Goal  Goal: Discharge Planning  Outcome: Progressing Towards Goal  Goal: Medications  Outcome: Progressing Towards Goal  Goal: Respiratory  Outcome: Progressing Towards Goal  Goal: Treatments/Interventions/Procedures  Outcome: Progressing Towards Goal  Goal: Psychosocial  Outcome: Progressing Towards Goal  Goal: *Oxygen saturation within defined limits  Outcome: Progressing Towards Goal  Goal: *Hemodynamically stable  Outcome: Progressing Towards Goal  Goal: *Optimal pain control at patient's stated goal  Outcome: Progressing Towards Goal  Goal: *Anxiety reduced or absent  Outcome: Progressing Towards Goal  Goal: *Demonstrates progressive activity  Outcome: Progressing Towards Goal     Problem: Heart Failure: Day 3  Goal: Off Pathway (Use only if patient is Off Pathway)  Outcome: Progressing Towards Goal  Goal: Activity/Safety  Outcome: Progressing Towards Goal  Goal: Diagnostic Test/Procedures  Outcome: Progressing Towards Goal  Goal: Nutrition/Diet  Outcome: Progressing Towards Goal  Goal: Discharge Planning  Outcome: Progressing Towards Goal  Goal: Medications  Outcome: Progressing Towards Goal  Goal: Respiratory  Outcome: Progressing Towards Goal  Goal: Treatments/Interventions/Procedures  Outcome: Progressing Towards Goal  Goal: Psychosocial  Outcome: Progressing Towards Goal  Goal: *Oxygen saturation within defined limits  Outcome: Progressing Towards Goal  Goal: *Hemodynamically stable  Outcome: Progressing Towards Goal  Goal: *Optimal pain control at patient's stated goal  Outcome: Progressing Towards Goal  Goal: *Anxiety reduced or absent  Outcome: Progressing Towards Goal  Goal: *Demonstrates progressive activity  Outcome: Progressing Towards Goal     Problem: Heart Failure: Day 4  Goal: Off Pathway (Use only if patient is Off Pathway)  Outcome: Progressing Towards Goal  Goal: Activity/Safety  Outcome: Progressing Towards Goal  Goal: Diagnostic Test/Procedures  Outcome: Progressing Towards Goal  Goal: Nutrition/Diet  Outcome: Progressing Towards Goal  Goal: Discharge Planning  Outcome: Progressing Towards Goal  Goal: Medications  Outcome: Progressing Towards Goal  Goal: Respiratory  Outcome: Progressing Towards Goal  Goal: Treatments/Interventions/Procedures  Outcome: Progressing Towards Goal  Goal: Psychosocial  Outcome: Progressing Towards Goal  Goal: *Oxygen saturation within defined limits  Outcome: Progressing Towards Goal  Goal: *Hemodynamically stable  Outcome: Progressing Towards Goal  Goal: *Optimal pain control at patient's stated goal  Outcome: Progressing Towards Goal  Goal: *Anxiety reduced or absent  Outcome: Progressing Towards Goal  Goal: *Demonstrates progressive activity  Outcome: Progressing Towards Goal     Problem: Heart Failure: Day 5  Goal: Off Pathway (Use only if patient is Off Pathway)  Outcome: Progressing Towards Goal  Goal: Activity/Safety  Outcome: Progressing Towards Goal  Goal: Diagnostic Test/Procedures  Outcome: Progressing Towards Goal  Goal: Nutrition/Diet  Outcome: Progressing Towards Goal  Goal: Discharge Planning  Outcome: Progressing Towards Goal  Goal: Medications  Outcome: Progressing Towards Goal  Goal: Respiratory  Outcome: Progressing Towards Goal  Goal: Treatments/Interventions/Procedures  Outcome: Progressing Towards Goal  Goal: Psychosocial  Outcome: Progressing Towards Goal     Problem: Heart Failure: Discharge Outcomes  Goal: *Demonstrates ability to perform prescribed activity without shortness of breath or discomfort  Outcome: Progressing Towards Goal  Goal: *Left ventricular function assessment completed prior to or during stay, or planned for post-discharge  Outcome: Progressing Towards Goal  Goal: *ACEI prescribed if LVEF less than 40% and no contraindications or ARB prescribed  Outcome: Progressing Towards Goal  Goal: *Verbalizes understanding and describes prescribed diet  Outcome: Progressing Towards Goal  Goal: *Verbalizes understanding/describes prescribed medications  Outcome: Progressing Towards Goal  Goal: *Describes available resources and support systems  Description: (eg: Home Health, Palliative Care, Advanced Medical Directive)  Outcome: Progressing Towards Goal  Goal: *Describes smoking cessation resources  Outcome: Progressing Towards Goal  Goal: *Understands and describes signs and symptoms to report to providers(Stroke Metric)  Outcome: Progressing Towards Goal  Goal: *Describes/verbalizes understanding of follow-up/return appt  Description: (eg: to physicians, diabetes treatment coordinator, and other resources  Outcome: Progressing Towards Goal  Goal: *Describes importance of continuing daily weights and changes to report to physician  Outcome: Progressing Towards Goal

## 2022-02-26 NOTE — ROUTINE PROCESS
Bedside and Verbal shift change report given to F[address] (oncoming nurse) by myself (offgoing nurse).  Report included the following information SBAR, Kardex, Intake/Output, MAR, Recent Results and Cardiac Rhythm SR.

## 2022-02-26 NOTE — ROUTINE PROCESS
Patient complaining of significant cramping to BLE and hands. Pulses still present. No temperature change in any extremities. Cardiology PA made aware. No orders placed at this time.

## 2022-02-26 NOTE — PROCEDURES
Indication:Need of IV access      Time Out done and Correct patient for procedure. Using Ultrasound Large right Internal Subclavian Located. Area prepped and sterilized with chlorhexedine. Sterile drapes applied. Sterile sheath place on ultrasound probe. Patient given local lidocane for anesthesia. Using Seldinger Technique with realtime ultrasound guidance TRIPLE Lumen Catheter inserted. Guidewire removed. All ports with blood return and lines flushed. Line Sutured in [facility]. Patient tolerated procedure well, no complications. CXR ordered. Estimated Blood loss: Less than 5 cc      Radha Jama MD    Electronically signed.

## 2022-02-27 ENCOUNTER — APPOINTMENT (OUTPATIENT)
Dept: GENERAL RADIOLOGY | Age: 57
DRG: 291 | End: 2022-02-27
Attending: NURSE PRACTITIONER
Payer: COMMERCIAL

## 2022-02-27 LAB
ANION GAP SERPL CALC-SCNC: 11 MMOL/L (ref 7–16)
ANION GAP SERPL CALC-SCNC: 6 MMOL/L (ref 7–16)
BUN SERPL-MCNC: 3 MG/DL (ref 6–23)
BUN SERPL-MCNC: 4 MG/DL (ref 6–23)
CALCIUM SERPL-MCNC: 7.5 MG/DL (ref 8.3–10.4)
CALCIUM SERPL-MCNC: 8 MG/DL (ref 8.3–10.4)
CHLORIDE SERPL-SCNC: 102 MMOL/L (ref 98–107)
CHLORIDE SERPL-SCNC: 103 MMOL/L (ref 98–107)
CO2 SERPL-SCNC: 25 MMOL/L (ref 21–32)
CO2 SERPL-SCNC: 26 MMOL/L (ref 21–32)
CREAT SERPL-MCNC: 1 MG/DL (ref 0.8–1.5)
CREAT SERPL-MCNC: 1 MG/DL (ref 0.8–1.5)
ERYTHROCYTE [DISTWIDTH] IN BLOOD BY AUTOMATED COUNT: 15.2 % (ref 11.9–14.6)
GLUCOSE SERPL-MCNC: 118 MG/DL (ref 65–100)
GLUCOSE SERPL-MCNC: 148 MG/DL (ref 65–100)
HCT VFR BLD AUTO: 31.4 % (ref 41.1–50.3)
HGB BLD-MCNC: 10.2 G/DL (ref 13.6–17.2)
MAGNESIUM SERPL-MCNC: 1.6 MG/DL (ref 1.8–2.4)
MAGNESIUM SERPL-MCNC: 2.5 MG/DL (ref 1.8–2.4)
MCH RBC QN AUTO: 30.4 PG (ref 26.1–32.9)
MCHC RBC AUTO-ENTMCNC: 32.5 G/DL (ref 31.4–35)
MCV RBC AUTO: 93.5 FL (ref 79.6–97.8)
NRBC # BLD: 0.02 K/UL (ref 0–0.2)
PLATELET # BLD AUTO: 116 K/UL (ref 150–450)
PMV BLD AUTO: 10.4 FL (ref 9.4–12.3)
POTASSIUM SERPL-SCNC: 3.1 MMOL/L (ref 3.5–5.1)
POTASSIUM SERPL-SCNC: 3.9 MMOL/L (ref 3.5–5.1)
RBC # BLD AUTO: 3.36 M/UL (ref 4.23–5.6)
SODIUM SERPL-SCNC: 135 MMOL/L (ref 138–145)
SODIUM SERPL-SCNC: 138 MMOL/L (ref 136–145)
WBC # BLD AUTO: 6.9 K/UL (ref 4.3–11.1)

## 2022-02-27 PROCEDURE — 74011000258 HC RX REV CODE- 258: Performed by: INTERNAL MEDICINE

## 2022-02-27 PROCEDURE — 74011000250 HC RX REV CODE- 250: Performed by: NURSE PRACTITIONER

## 2022-02-27 PROCEDURE — 2709999900 HC NON-CHARGEABLE SUPPLY

## 2022-02-27 PROCEDURE — 36592 COLLECT BLOOD FROM PICC: CPT

## 2022-02-27 PROCEDURE — 74011250637 HC RX REV CODE- 250/637: Performed by: INTERNAL MEDICINE

## 2022-02-27 PROCEDURE — 74011250636 HC RX REV CODE- 250/636: Performed by: NURSE PRACTITIONER

## 2022-02-27 PROCEDURE — 74011250637 HC RX REV CODE- 250/637: Performed by: NURSE PRACTITIONER

## 2022-02-27 PROCEDURE — 74011250636 HC RX REV CODE- 250/636: Performed by: INTERNAL MEDICINE

## 2022-02-27 PROCEDURE — 65660000000 HC RM CCU STEPDOWN

## 2022-02-27 PROCEDURE — 83735 ASSAY OF MAGNESIUM: CPT

## 2022-02-27 PROCEDURE — 94640 AIRWAY INHALATION TREATMENT: CPT

## 2022-02-27 PROCEDURE — 94760 N-INVAS EAR/PLS OXIMETRY 1: CPT

## 2022-02-27 PROCEDURE — 71045 X-RAY EXAM CHEST 1 VIEW: CPT

## 2022-02-27 PROCEDURE — 80048 BASIC METABOLIC PNL TOTAL CA: CPT

## 2022-02-27 PROCEDURE — 85027 COMPLETE CBC AUTOMATED: CPT

## 2022-02-27 PROCEDURE — 99232 SBSQ HOSP IP/OBS MODERATE 35: CPT | Performed by: INTERNAL MEDICINE

## 2022-02-27 PROCEDURE — 74011000250 HC RX REV CODE- 250: Performed by: INTERNAL MEDICINE

## 2022-02-27 RX ORDER — POTASSIUM CHLORIDE 14.9 MG/ML
20 INJECTION INTRAVENOUS
Status: COMPLETED | OUTPATIENT
Start: 2022-02-27 | End: 2022-02-27

## 2022-02-27 RX ORDER — MAGNESIUM SULFATE HEPTAHYDRATE 40 MG/ML
4 INJECTION, SOLUTION INTRAVENOUS ONCE
Status: COMPLETED | OUTPATIENT
Start: 2022-02-27 | End: 2022-02-27

## 2022-02-27 RX ORDER — CALCIUM GLUCONATE 20 MG/ML
1 INJECTION, SOLUTION INTRAVENOUS ONCE
Status: COMPLETED | OUTPATIENT
Start: 2022-02-27 | End: 2022-02-27

## 2022-02-27 RX ORDER — AMIODARONE HYDROCHLORIDE 200 MG/1
200 TABLET ORAL 2 TIMES DAILY
Status: DISCONTINUED | OUTPATIENT
Start: 2022-02-27 | End: 2022-03-04 | Stop reason: HOSPADM

## 2022-02-27 RX ADMIN — MAGNESIUM SULFATE HEPTAHYDRATE 4 G: 40 INJECTION, SOLUTION INTRAVENOUS at 01:46

## 2022-02-27 RX ADMIN — ATORVASTATIN CALCIUM 80 MG: 80 TABLET, FILM COATED ORAL at 10:23

## 2022-02-27 RX ADMIN — SODIUM CHLORIDE, PRESERVATIVE FREE 5 MG: 5 INJECTION INTRAVENOUS at 16:11

## 2022-02-27 RX ADMIN — CALCIUM GLUCONATE 1000 MG: 20 INJECTION, SOLUTION INTRAVENOUS at 02:05

## 2022-02-27 RX ADMIN — PANTOPRAZOLE SODIUM 40 MG: 40 TABLET, DELAYED RELEASE ORAL at 10:24

## 2022-02-27 RX ADMIN — SPIRONOLACTONE 50 MG: 25 TABLET ORAL at 10:23

## 2022-02-27 RX ADMIN — ASPIRIN 81 MG: 81 TABLET ORAL at 10:22

## 2022-02-27 RX ADMIN — SUCRALFATE 1 G: 1 TABLET ORAL at 22:00

## 2022-02-27 RX ADMIN — PANTOPRAZOLE SODIUM 40 MG: 40 TABLET, DELAYED RELEASE ORAL at 02:10

## 2022-02-27 RX ADMIN — SUCRALFATE 1 G: 1 TABLET ORAL at 17:10

## 2022-02-27 RX ADMIN — SUCRALFATE 1 G: 1 TABLET ORAL at 10:24

## 2022-02-27 RX ADMIN — SODIUM CHLORIDE, PRESERVATIVE FREE 10 ML: 5 INJECTION INTRAVENOUS at 22:00

## 2022-02-27 RX ADMIN — IVABRADINE 7.5 MG: 5 TABLET, FILM COATED ORAL at 17:10

## 2022-02-27 RX ADMIN — SODIUM CHLORIDE, PRESERVATIVE FREE 10 ML: 5 INJECTION INTRAVENOUS at 06:31

## 2022-02-27 RX ADMIN — DICYCLOMINE HYDROCHLORIDE 20 MG: 10 CAPSULE ORAL at 10:23

## 2022-02-27 RX ADMIN — CARVEDILOL 12.5 MG: 12.5 TABLET, FILM COATED ORAL at 10:24

## 2022-02-27 RX ADMIN — POTASSIUM CHLORIDE 20 MEQ: 14.9 INJECTION, SOLUTION INTRAVENOUS at 03:23

## 2022-02-27 RX ADMIN — DICYCLOMINE HYDROCHLORIDE 20 MG: 10 CAPSULE ORAL at 16:12

## 2022-02-27 RX ADMIN — SUCRALFATE 1 G: 1 TABLET ORAL at 14:45

## 2022-02-27 RX ADMIN — GABAPENTIN 300 MG: 300 CAPSULE ORAL at 17:10

## 2022-02-27 RX ADMIN — POTASSIUM CHLORIDE 20 MEQ: 14.9 INJECTION, SOLUTION INTRAVENOUS at 05:08

## 2022-02-27 RX ADMIN — VALSARTAN 40 MG: 40 TABLET, FILM COATED ORAL at 10:22

## 2022-02-27 RX ADMIN — AMIODARONE HYDROCHLORIDE 200 MG: 200 TABLET ORAL at 17:11

## 2022-02-27 RX ADMIN — FUROSEMIDE 40 MG: 10 INJECTION, SOLUTION INTRAMUSCULAR; INTRAVENOUS at 10:23

## 2022-02-27 RX ADMIN — CARVEDILOL 12.5 MG: 12.5 TABLET, FILM COATED ORAL at 17:11

## 2022-02-27 RX ADMIN — MORPHINE SULFATE 2 MG: 4 INJECTION INTRAVENOUS at 14:45

## 2022-02-27 RX ADMIN — AMIODARONE HYDROCHLORIDE 1 MG/MIN: 50 INJECTION, SOLUTION INTRAVENOUS at 04:42

## 2022-02-27 RX ADMIN — MORPHINE SULFATE 2 MG: 4 INJECTION INTRAVENOUS at 10:39

## 2022-02-27 RX ADMIN — POTASSIUM CHLORIDE 20 MEQ: 14.9 INJECTION, SOLUTION INTRAVENOUS at 01:33

## 2022-02-27 RX ADMIN — MORPHINE SULFATE 2 MG: 4 INJECTION INTRAVENOUS at 03:09

## 2022-02-27 RX ADMIN — SODIUM CHLORIDE, PRESERVATIVE FREE 5 MG: 5 INJECTION INTRAVENOUS at 01:32

## 2022-02-27 RX ADMIN — HYDROCODONE BITARTRATE AND ACETAMINOPHEN 1 TABLET: 5; 325 TABLET ORAL at 01:32

## 2022-02-27 RX ADMIN — AMIODARONE HYDROCHLORIDE 200 MG: 200 TABLET ORAL at 10:24

## 2022-02-27 RX ADMIN — DICYCLOMINE HYDROCHLORIDE 20 MG: 10 CAPSULE ORAL at 21:05

## 2022-02-27 RX ADMIN — GABAPENTIN 300 MG: 300 CAPSULE ORAL at 10:23

## 2022-02-27 RX ADMIN — IVABRADINE 7.5 MG: 5 TABLET, FILM COATED ORAL at 10:22

## 2022-02-27 RX ADMIN — IPRATROPIUM BROMIDE AND ALBUTEROL SULFATE 3 ML: .5; 3 SOLUTION RESPIRATORY (INHALATION) at 05:25

## 2022-02-27 RX ADMIN — FUROSEMIDE 40 MG: 10 INJECTION, SOLUTION INTRAMUSCULAR; INTRAVENOUS at 17:10

## 2022-02-27 RX ADMIN — SODIUM CHLORIDE, PRESERVATIVE FREE 30 ML: 5 INJECTION INTRAVENOUS at 14:00

## 2022-02-27 NOTE — ROUTINE PROCESS
Verbal bedside report given to KT, oncoming RN. Patient's situation, background, assessment and recommendations provided. Opportunity for questions provided. Oncoming RN assumed care of patient.

## 2022-02-27 NOTE — PROGRESS NOTES
Problem: Heart Failure: Day 4  Goal: Off Pathway (Use only if patient is Off Pathway)  Outcome: Progressing Towards Goal  Goal: Activity/Safety  Outcome: Progressing Towards Goal  Goal: Diagnostic Test/Procedures  Outcome: Progressing Towards Goal  Goal: Medications  Outcome: Progressing Towards Goal  Goal: Respiratory  Outcome: Progressing Towards Goal  Goal: Psychosocial  Outcome: Progressing Towards Goal  Goal: *Oxygen saturation within defined limits  Outcome: Progressing Towards Goal  Goal: *Optimal pain control at patient's stated goal  Outcome: Progressing Towards Goal     Problem: Hypertension  Goal: *Fluid volume balance  Outcome: Progressing Towards Goal

## 2022-02-27 NOTE — PROGRESS NOTES
[facility] CARDIOLOGY PROGRESS NOTE    2/27/2022 9:46 AM    Admit Date: 2/24/2022        Subjective:   Stable overnight without angina, CHF, or palpitations but clinically remains mildly to moderately volume overloaded. Vitals stable and controlled. No other complaints overnight. Tolerating meds well. No VT overnight but potassium and magnesium low again due to diuresis. Objective:      Vitals:    02/27/22 0051 02/27/22 0431 02/27/22 0525 02/27/22 0810   BP: 103/76 102/61  106/71   Pulse: 76 73  82   Resp: 16 18  17   Temp: 98.2 °F (36.8 °C) 98.2 °F (36.8 °C)  97.1 °F (36.2 °C)   SpO2: 97% 97% 99% 100%   Weight:  202 lb (91.6 kg)     Height:           Physical Exam:  Neck- supple, and to 12 cm JVD  CV- regular rate and rhythm with soft TR murmur, no S3  Lung-mild expiratory wheeze, but no basilar crackles or rhonchi  Abd- soft, nontender, nondistended  Ext-1+ right lower, trace left lower pitting edema  Skin- warm and dry    Data Review:   Recent Labs     02/26/22  2323 02/26/22  1601 02/26/22  0205 02/26/22  0205 02/25/22  2048 02/25/22  0704     --   --  135*   < > 139   K 3.1* 3.7   < > 3.3*   < > 4.0   MG 1.6*  --   --  2.3   < >  --    BUN 4*  --   --  5*   < > 8   CREA 1.00  --   --  1.10   < > 0.80   *  --   --  130*   < > 80   WBC  --   --   --  8.5  --  10.4   HGB  --   --   --  10.2*  --  12.2*   HCT  --   --   --  30.7*  --  38.6*   PLT  --   --   --  113*  --  122*    < > = values in this interval not displayed.        Assessment and Plan:       Acute on chronic systolic heart failure (HCC)-EF severely depressed and 10 to 20% chronically.  Volume overloaded with no recent Lasix after hospital discharge (on hospitalist service).    Continue IV twice daily Lasix once again today, elevate legs, recheck daily BMP and magnesium and replete electrolytes as tolerated/needed. Elton Munoz need several days of aggressive diuresis of his blood pressure will tolerate.  Augmenting heart failure medication regimen as well. Blanca Addy the weekend as well given sinus tachycardia with very severe left ventricular systolic dysfunction.     Non-ischemic cardiomyopathy (HCC)-see above-acute decompensation over the past few days to weeks.  Lasix to continue this morning but need to keep a close eye on potassium and magnesium given recurrent VT last night.     Ventricular tachycardia ([address]- ICD in place, see below- no VT/shocks on recent interrogation. Recurrent VT/torsades overnight Friday night with at least 4 shocks. This was in the setting of hypokalemia and hypomagnesemia. Repleted both. Keep a close eye on electrolytes and replete aggressively. Rechecking this a.m. after repletion at midnight last night     Essential hypertension with goal blood pressure less than 130/80- see above, continue meds, minimize salt, increase heart failure regimen as tolerated over the next few days. Medically maximized     ICD (implantable cardioverter-defibrillator) in place-stopped amio in the past but on amnio drip now given multiple shocks Friday night in the setting of VT/torsades. See above. Continue amnio and convert to oral dosing today, follow telemetry closely     Nausea and vomiting- recurrent on daily basis.  Referred [facility] visit to [facility] for evaluation at his request.  No improvement in symptoms despite complete cessation of all medications for the past several weeks and with serial cessation of each cardiac drug last year.  Per CURTIS Thomas MD  [address] Cardiology  Pager [phone]

## 2022-02-27 NOTE — PROGRESS NOTES
Bedside and Verbal shift change report given to KADEN Khan (oncoming nurse) by self (offgoing nurse). Report included the following information SBAR, Kardex, Intake/Output, MAR and Recent Results.

## 2022-02-27 NOTE — MANAGEMENT PLAN
Spoke with Allen Whitfield in Lab. Still no results for the recollect BMP/Mg (1st sample hemolyzed). States equipment issues and part of labs had to be re-run. Allen Whitfield reports available values as follows:  Na+ 138  Cl- 102  K+ 3.1  Co2 25  Ca+ 7.5  Mag+ 1.6    Will order replacement therapy now.        Prerna Colbert, VALERIANO-C

## 2022-02-28 LAB
ALBUMIN SERPL-MCNC: 2.6 G/DL (ref 3.5–5)
ALBUMIN/GLOB SERPL: 0.8 {RATIO} (ref 1.2–3.5)
ALP SERPL-CCNC: 71 U/L (ref 50–136)
ALT SERPL-CCNC: 16 U/L (ref 12–65)
ANION GAP SERPL CALC-SCNC: 5 MMOL/L (ref 7–16)
ANION GAP SERPL CALC-SCNC: 7 MMOL/L (ref 7–16)
AST SERPL-CCNC: 14 U/L (ref 15–37)
BILIRUB SERPL-MCNC: 0.6 MG/DL (ref 0.2–1.1)
BUN SERPL-MCNC: 2 MG/DL (ref 6–23)
BUN SERPL-MCNC: 4 MG/DL (ref 6–23)
CALCIUM SERPL-MCNC: 7.8 MG/DL (ref 8.3–10.4)
CALCIUM SERPL-MCNC: 8.3 MG/DL (ref 8.3–10.4)
CHLORIDE SERPL-SCNC: 103 MMOL/L (ref 98–107)
CHLORIDE SERPL-SCNC: 104 MMOL/L (ref 98–107)
CO2 SERPL-SCNC: 24 MMOL/L (ref 21–32)
CO2 SERPL-SCNC: 26 MMOL/L (ref 21–32)
CREAT SERPL-MCNC: 1 MG/DL (ref 0.8–1.5)
CREAT SERPL-MCNC: 1.1 MG/DL (ref 0.8–1.5)
ERYTHROCYTE [DISTWIDTH] IN BLOOD BY AUTOMATED COUNT: 15 % (ref 11.9–14.6)
GLOBULIN SER CALC-MCNC: 3.3 G/DL (ref 2.3–3.5)
GLUCOSE SERPL-MCNC: 104 MG/DL (ref 65–100)
GLUCOSE SERPL-MCNC: 115 MG/DL (ref 65–100)
HCT VFR BLD AUTO: 31.8 % (ref 41.1–50.3)
HGB BLD-MCNC: 10.1 G/DL (ref 13.6–17.2)
MAGNESIUM SERPL-MCNC: 2 MG/DL (ref 1.8–2.4)
MAGNESIUM SERPL-MCNC: 2.1 MG/DL (ref 1.8–2.4)
MAGNESIUM SERPL-MCNC: 2.2 MG/DL (ref 1.8–2.4)
MCH RBC QN AUTO: 29.9 PG (ref 26.1–32.9)
MCHC RBC AUTO-ENTMCNC: 31.8 G/DL (ref 31.4–35)
MCV RBC AUTO: 94.1 FL (ref 79.6–97.8)
NRBC # BLD: 0 K/UL (ref 0–0.2)
PLATELET # BLD AUTO: 125 K/UL (ref 150–450)
PMV BLD AUTO: 10.4 FL (ref 9.4–12.3)
POTASSIUM SERPL-SCNC: 3.9 MMOL/L (ref 3.5–5.1)
POTASSIUM SERPL-SCNC: 4 MMOL/L (ref 3.5–5.1)
POTASSIUM SERPL-SCNC: 4.5 MMOL/L (ref 3.5–5.1)
PROT SERPL-MCNC: 5.9 G/DL (ref 6.3–8.2)
RBC # BLD AUTO: 3.38 M/UL (ref 4.23–5.6)
SODIUM SERPL-SCNC: 134 MMOL/L (ref 138–145)
SODIUM SERPL-SCNC: 135 MMOL/L (ref 138–145)
WBC # BLD AUTO: 5.9 K/UL (ref 4.3–11.1)

## 2022-02-28 PROCEDURE — 74011250637 HC RX REV CODE- 250/637: Performed by: INTERNAL MEDICINE

## 2022-02-28 PROCEDURE — 83735 ASSAY OF MAGNESIUM: CPT

## 2022-02-28 PROCEDURE — 74011250636 HC RX REV CODE- 250/636: Performed by: NURSE PRACTITIONER

## 2022-02-28 PROCEDURE — 94640 AIRWAY INHALATION TREATMENT: CPT

## 2022-02-28 PROCEDURE — 74011000250 HC RX REV CODE- 250: Performed by: INTERNAL MEDICINE

## 2022-02-28 PROCEDURE — 85027 COMPLETE CBC AUTOMATED: CPT

## 2022-02-28 PROCEDURE — 80053 COMPREHEN METABOLIC PANEL: CPT

## 2022-02-28 PROCEDURE — 99232 SBSQ HOSP IP/OBS MODERATE 35: CPT | Performed by: INTERNAL MEDICINE

## 2022-02-28 PROCEDURE — 65660000000 HC RM CCU STEPDOWN

## 2022-02-28 PROCEDURE — 74011000250 HC RX REV CODE- 250: Performed by: NURSE PRACTITIONER

## 2022-02-28 PROCEDURE — 74011250637 HC RX REV CODE- 250/637: Performed by: NURSE PRACTITIONER

## 2022-02-28 PROCEDURE — 94760 N-INVAS EAR/PLS OXIMETRY 1: CPT

## 2022-02-28 PROCEDURE — 84132 ASSAY OF SERUM POTASSIUM: CPT

## 2022-02-28 RX ORDER — MAGNESIUM SULFATE HEPTAHYDRATE 40 MG/ML
2 INJECTION, SOLUTION INTRAVENOUS ONCE
Status: COMPLETED | OUTPATIENT
Start: 2022-02-28 | End: 2022-02-28

## 2022-02-28 RX ORDER — CALCIUM GLUCONATE 20 MG/ML
1 INJECTION, SOLUTION INTRAVENOUS ONCE
Status: COMPLETED | OUTPATIENT
Start: 2022-02-28 | End: 2022-02-28

## 2022-02-28 RX ORDER — IPRATROPIUM BROMIDE AND ALBUTEROL SULFATE 2.5; .5 MG/3ML; MG/3ML
3 SOLUTION RESPIRATORY (INHALATION)
Status: DISCONTINUED | OUTPATIENT
Start: 2022-02-28 | End: 2022-03-01

## 2022-02-28 RX ORDER — SODIUM CHLORIDE 9 MG/ML
250 INJECTION, SOLUTION INTRAVENOUS ONCE
Status: COMPLETED | OUTPATIENT
Start: 2022-02-28 | End: 2022-02-28

## 2022-02-28 RX ORDER — POTASSIUM CHLORIDE 20 MEQ/1
40 TABLET, EXTENDED RELEASE ORAL
Status: COMPLETED | OUTPATIENT
Start: 2022-02-28 | End: 2022-02-28

## 2022-02-28 RX ADMIN — IPRATROPIUM BROMIDE AND ALBUTEROL SULFATE 3 ML: .5; 3 SOLUTION RESPIRATORY (INHALATION) at 07:52

## 2022-02-28 RX ADMIN — SODIUM CHLORIDE, PRESERVATIVE FREE 10 ML: 5 INJECTION INTRAVENOUS at 05:43

## 2022-02-28 RX ADMIN — IPRATROPIUM BROMIDE AND ALBUTEROL SULFATE 3 ML: .5; 3 SOLUTION RESPIRATORY (INHALATION) at 15:21

## 2022-02-28 RX ADMIN — DICYCLOMINE HYDROCHLORIDE 20 MG: 10 CAPSULE ORAL at 21:58

## 2022-02-28 RX ADMIN — ATORVASTATIN CALCIUM 80 MG: 80 TABLET, FILM COATED ORAL at 08:48

## 2022-02-28 RX ADMIN — IPRATROPIUM BROMIDE AND ALBUTEROL SULFATE 3 ML: .5; 3 SOLUTION RESPIRATORY (INHALATION) at 11:03

## 2022-02-28 RX ADMIN — HYDROCODONE BITARTRATE AND ACETAMINOPHEN 1 TABLET: 5; 325 TABLET ORAL at 19:40

## 2022-02-28 RX ADMIN — IVABRADINE 7.5 MG: 5 TABLET, FILM COATED ORAL at 08:49

## 2022-02-28 RX ADMIN — SUCRALFATE 1 G: 1 TABLET ORAL at 13:03

## 2022-02-28 RX ADMIN — SODIUM CHLORIDE, PRESERVATIVE FREE 10 ML: 5 INJECTION INTRAVENOUS at 00:19

## 2022-02-28 RX ADMIN — SUCRALFATE 1 G: 1 TABLET ORAL at 17:10

## 2022-02-28 RX ADMIN — MORPHINE SULFATE 2 MG: 4 INJECTION INTRAVENOUS at 00:15

## 2022-02-28 RX ADMIN — ALPRAZOLAM 1 MG: 0.5 TABLET ORAL at 21:58

## 2022-02-28 RX ADMIN — SODIUM CHLORIDE, PRESERVATIVE FREE 5 MG: 5 INJECTION INTRAVENOUS at 09:05

## 2022-02-28 RX ADMIN — SODIUM CHLORIDE, PRESERVATIVE FREE 10 ML: 5 INJECTION INTRAVENOUS at 19:53

## 2022-02-28 RX ADMIN — SODIUM CHLORIDE, PRESERVATIVE FREE 5 MG: 5 INJECTION INTRAVENOUS at 19:33

## 2022-02-28 RX ADMIN — VALSARTAN 40 MG: 40 TABLET, FILM COATED ORAL at 08:48

## 2022-02-28 RX ADMIN — DICYCLOMINE HYDROCHLORIDE 20 MG: 10 CAPSULE ORAL at 17:10

## 2022-02-28 RX ADMIN — SUCRALFATE 1 G: 1 TABLET ORAL at 08:48

## 2022-02-28 RX ADMIN — IVABRADINE 7.5 MG: 5 TABLET, FILM COATED ORAL at 17:09

## 2022-02-28 RX ADMIN — CARVEDILOL 12.5 MG: 12.5 TABLET, FILM COATED ORAL at 08:49

## 2022-02-28 RX ADMIN — IPRATROPIUM BROMIDE AND ALBUTEROL SULFATE 3 ML: .5; 3 SOLUTION RESPIRATORY (INHALATION) at 20:02

## 2022-02-28 RX ADMIN — SPIRONOLACTONE 50 MG: 25 TABLET ORAL at 08:50

## 2022-02-28 RX ADMIN — POTASSIUM CHLORIDE 40 MEQ: 20 TABLET, EXTENDED RELEASE ORAL at 13:00

## 2022-02-28 RX ADMIN — ALPRAZOLAM 1 MG: 0.5 TABLET ORAL at 00:14

## 2022-02-28 RX ADMIN — PANTOPRAZOLE SODIUM 40 MG: 40 TABLET, DELAYED RELEASE ORAL at 19:52

## 2022-02-28 RX ADMIN — SODIUM CHLORIDE 250 ML: 900 INJECTION, SOLUTION INTRAVENOUS at 16:33

## 2022-02-28 RX ADMIN — MORPHINE SULFATE 2 MG: 4 INJECTION INTRAVENOUS at 05:22

## 2022-02-28 RX ADMIN — SODIUM CHLORIDE, PRESERVATIVE FREE 10 ML: 5 INJECTION INTRAVENOUS at 14:00

## 2022-02-28 RX ADMIN — PANTOPRAZOLE SODIUM 40 MG: 40 TABLET, DELAYED RELEASE ORAL at 08:49

## 2022-02-28 RX ADMIN — SODIUM CHLORIDE, PRESERVATIVE FREE 10 ML: 5 INJECTION INTRAVENOUS at 22:08

## 2022-02-28 RX ADMIN — GABAPENTIN 300 MG: 300 CAPSULE ORAL at 17:10

## 2022-02-28 RX ADMIN — MAGNESIUM SULFATE HEPTAHYDRATE 2 G: 40 INJECTION, SOLUTION INTRAVENOUS at 04:30

## 2022-02-28 RX ADMIN — ASPIRIN 81 MG: 81 TABLET ORAL at 08:49

## 2022-02-28 RX ADMIN — DICYCLOMINE HYDROCHLORIDE 20 MG: 10 CAPSULE ORAL at 08:49

## 2022-02-28 RX ADMIN — CALCIUM GLUCONATE 1000 MG: 20 INJECTION, SOLUTION INTRAVENOUS at 05:57

## 2022-02-28 RX ADMIN — GABAPENTIN 300 MG: 300 CAPSULE ORAL at 08:49

## 2022-02-28 RX ADMIN — AMIODARONE HYDROCHLORIDE 200 MG: 200 TABLET ORAL at 17:10

## 2022-02-28 RX ADMIN — SUCRALFATE 1 G: 1 TABLET ORAL at 22:10

## 2022-02-28 RX ADMIN — AMIODARONE HYDROCHLORIDE 200 MG: 200 TABLET ORAL at 08:49

## 2022-02-28 RX ADMIN — FUROSEMIDE 40 MG: 10 INJECTION, SOLUTION INTRAMUSCULAR; INTRAVENOUS at 08:48

## 2022-02-28 NOTE — PROGRESS NOTES
CM met with pt for discharge needs. Pt states he's feeling better, \"just low blood pressure\". CM offered home health nurse for assessment and medication management at discharge. Pt declined. CM will remain available should any discharge needs be identified.

## 2022-02-28 NOTE — PROGRESS NOTES
[facility] CARDIOLOGY PROGRESS NOTE    2/28/2022 7:41 AM    Admit Date: 2/24/2022        Subjective:   Stable overnight without angina, CHF, or palpitations but increased wheeze and cough this morning. Vitals stable and controlled. No other complaints overnight. Tolerating meds well. Objective:      Vitals:    02/27/22 2043 02/28/22 0037 02/28/22 0436 02/28/22 0506   BP: 94/67 113/75 105/69    Pulse: 69 93 80 82   Resp: 18 18 18    Temp: 98.2 °F (36.8 °C) 99 °F (37.2 °C) 97.8 °F (36.6 °C)    SpO2: 94% 97% 91%    Weight:   203 lb (92.1 kg)    Height:           Physical Exam:  Neck- supple, 8 cm JVD  CV- regular rate and rhythm no MRG  Lung-coarse expirations diffusely, worst in the bases, expiratory wheeze/rhonchi  Abd- soft, nontender, nondistended  Ext-trace bilateral lower extremity anterior tibial pitting edema  Skin- warm and dry    Data Review:   Recent Labs     02/27/22  2355 02/27/22  0950 02/26/22  1601 02/26/22  0205   * 135*   < > 135*   K 4.0 3.9   < > 3.3*   MG 2.0 2.5*   < > 2.3   BUN 2* 3*   < > 5*   CREA 1.00 1.00   < > 1.10   * 118*   < > 130*   WBC  --  6.9  --  8.5   HGB  --  10.2*  --  10.2*   HCT  --  31.4*  --  30.7*   PLT  --  116*  --  113*    < > = values in this interval not displayed. Assessment and Plan:       Acute on chronic systolic heart failure (HCC)-EF severely depressed and 10 to 20% chronically.  Volume overloaded with no recent Lasix after hospital discharge (on hospitalist service).     Continue IV twice daily Lasix once again today, elevate legs, recheck daily BMP and magnesium and replete electrolytes as tolerated/needed.  Net 6.5 L out since admission, convert to oral Lasix tomorrow if continues to improve.  Augmented heart failure medication regimen and medically maximized at present. [address] discharge given sinus tachycardia with very severe left ventricular systolic dysfunction.     Non-ischemic cardiomyopathy (HCC)-see above-acute decompensation over the past few days to weeks.  Lasix to continue this morning but need to keep a close eye on potassium and magnesium given recurrent VT over the weekend. Keep K+ greater than 4, mag greater than 2     Ventricular tachycardia ([address]- ICD in place, see below- no VT/shocks on recent interrogation but then recurrent VT/torsades overnight Friday night with at least 4 shocks.  This was in the setting of hypokalemia and hypomagnesemia.  Repleted both.  Keep a close eye on electrolytes and replete aggressively. Rechecking this PM after repletion.     Essential hypertension with goal blood pressure less than 130/80- see above, continue meds, minimize salt, increase heart failure regimen as tolerated over the next few days. Medically maximized     ICD (implantable cardioverter-defibrillator) in place-stopped amio in the past but on amio  PO BID now given multiple shocks Friday night in the setting of VT/torsades.  See above.  Continue amnio and convert to oral dosing today, follow telemetry closely     Nausea and vomiting- recurrent on daily basis.  Referred at last visit to [facility] for evaluation at his request.  No improvement in symptoms despite complete cessation of all medications for the past several weeks and with serial cessation of each cardiac drug last year.  Per GI.          Recheck potassium and magnesium this afternoon and replete aggressively as needed  Continue IV Lasix today but probably convert to oral Lasix tomorrow  Mobilize with hopes of discharge in the next day or 2  We will need diuretics at discharge but close monitoring of electrolytes in the outpatient setting  Transitional care follow-up with danielle Tesfaye MD  [facility] Cardiology  Pager [phone]

## 2022-03-01 PROBLEM — I50.21 ACUTE SYSTOLIC CONGESTIVE HEART FAILURE (HCC): Status: ACTIVE | Noted: 2022-02-24

## 2022-03-01 LAB
ANION GAP SERPL CALC-SCNC: 7 MMOL/L (ref 7–16)
BUN SERPL-MCNC: 2 MG/DL (ref 6–23)
CALCIUM SERPL-MCNC: 8.1 MG/DL (ref 8.3–10.4)
CHLORIDE SERPL-SCNC: 109 MMOL/L (ref 98–107)
CO2 SERPL-SCNC: 23 MMOL/L (ref 21–32)
CREAT SERPL-MCNC: 0.9 MG/DL (ref 0.8–1.5)
ERYTHROCYTE [DISTWIDTH] IN BLOOD BY AUTOMATED COUNT: 15.1 % (ref 11.9–14.6)
GLUCOSE SERPL-MCNC: 96 MG/DL (ref 65–100)
HCT VFR BLD AUTO: 32.9 % (ref 41.1–50.3)
HGB BLD-MCNC: 10.6 G/DL (ref 13.6–17.2)
MAGNESIUM SERPL-MCNC: 1.9 MG/DL (ref 1.8–2.4)
MCH RBC QN AUTO: 30.5 PG (ref 26.1–32.9)
MCHC RBC AUTO-ENTMCNC: 32.2 G/DL (ref 31.4–35)
MCV RBC AUTO: 94.8 FL (ref 79.6–97.8)
NRBC # BLD: 0 K/UL (ref 0–0.2)
PLATELET # BLD AUTO: 147 K/UL (ref 150–450)
PMV BLD AUTO: 10 FL (ref 9.4–12.3)
POTASSIUM SERPL-SCNC: 4.2 MMOL/L (ref 3.5–5.1)
RBC # BLD AUTO: 3.47 M/UL (ref 4.23–5.6)
SODIUM SERPL-SCNC: 139 MMOL/L (ref 136–145)
WBC # BLD AUTO: 5.6 K/UL (ref 4.3–11.1)

## 2022-03-01 PROCEDURE — 74011250637 HC RX REV CODE- 250/637: Performed by: INTERNAL MEDICINE

## 2022-03-01 PROCEDURE — 74011000250 HC RX REV CODE- 250: Performed by: INTERNAL MEDICINE

## 2022-03-01 PROCEDURE — 85027 COMPLETE CBC AUTOMATED: CPT

## 2022-03-01 PROCEDURE — 99232 SBSQ HOSP IP/OBS MODERATE 35: CPT | Performed by: INTERNAL MEDICINE

## 2022-03-01 PROCEDURE — 94760 N-INVAS EAR/PLS OXIMETRY 1: CPT

## 2022-03-01 PROCEDURE — 74011250637 HC RX REV CODE- 250/637: Performed by: NURSE PRACTITIONER

## 2022-03-01 PROCEDURE — 74011250636 HC RX REV CODE- 250/636: Performed by: INTERNAL MEDICINE

## 2022-03-01 PROCEDURE — 74011000250 HC RX REV CODE- 250: Performed by: NURSE PRACTITIONER

## 2022-03-01 PROCEDURE — 94640 AIRWAY INHALATION TREATMENT: CPT

## 2022-03-01 PROCEDURE — 74011250636 HC RX REV CODE- 250/636: Performed by: NURSE PRACTITIONER

## 2022-03-01 PROCEDURE — 83735 ASSAY OF MAGNESIUM: CPT

## 2022-03-01 PROCEDURE — 2709999900 HC NON-CHARGEABLE SUPPLY

## 2022-03-01 PROCEDURE — 80048 BASIC METABOLIC PNL TOTAL CA: CPT

## 2022-03-01 PROCEDURE — 65660000000 HC RM CCU STEPDOWN

## 2022-03-01 PROCEDURE — 36591 DRAW BLOOD OFF VENOUS DEVICE: CPT

## 2022-03-01 RX ORDER — IPRATROPIUM BROMIDE AND ALBUTEROL SULFATE 2.5; .5 MG/3ML; MG/3ML
3 SOLUTION RESPIRATORY (INHALATION)
Status: DISCONTINUED | OUTPATIENT
Start: 2022-03-01 | End: 2022-03-04 | Stop reason: HOSPADM

## 2022-03-01 RX ORDER — MAGNESIUM SULFATE HEPTAHYDRATE 40 MG/ML
2 INJECTION, SOLUTION INTRAVENOUS ONCE
Status: COMPLETED | OUTPATIENT
Start: 2022-03-01 | End: 2022-03-01

## 2022-03-01 RX ORDER — FUROSEMIDE 40 MG/1
40 TABLET ORAL 2 TIMES DAILY
Status: DISCONTINUED | OUTPATIENT
Start: 2022-03-01 | End: 2022-03-04 | Stop reason: HOSPADM

## 2022-03-01 RX ADMIN — MORPHINE SULFATE 2 MG: 4 INJECTION INTRAVENOUS at 01:34

## 2022-03-01 RX ADMIN — CARVEDILOL 12.5 MG: 12.5 TABLET, FILM COATED ORAL at 16:51

## 2022-03-01 RX ADMIN — SODIUM CHLORIDE, PRESERVATIVE FREE 10 ML: 5 INJECTION INTRAVENOUS at 06:10

## 2022-03-01 RX ADMIN — IVABRADINE 7.5 MG: 5 TABLET, FILM COATED ORAL at 16:51

## 2022-03-01 RX ADMIN — SUCRALFATE 1 G: 1 TABLET ORAL at 21:14

## 2022-03-01 RX ADMIN — IVABRADINE 7.5 MG: 5 TABLET, FILM COATED ORAL at 08:33

## 2022-03-01 RX ADMIN — SUCRALFATE 1 G: 1 TABLET ORAL at 13:06

## 2022-03-01 RX ADMIN — GABAPENTIN 300 MG: 300 CAPSULE ORAL at 18:25

## 2022-03-01 RX ADMIN — HYDROCODONE BITARTRATE AND ACETAMINOPHEN 1 TABLET: 5; 325 TABLET ORAL at 17:07

## 2022-03-01 RX ADMIN — SODIUM CHLORIDE, PRESERVATIVE FREE 10 ML: 5 INJECTION INTRAVENOUS at 13:07

## 2022-03-01 RX ADMIN — AMIODARONE HYDROCHLORIDE 200 MG: 200 TABLET ORAL at 18:25

## 2022-03-01 RX ADMIN — DICYCLOMINE HYDROCHLORIDE 20 MG: 10 CAPSULE ORAL at 16:51

## 2022-03-01 RX ADMIN — SODIUM CHLORIDE, PRESERVATIVE FREE 5 ML: 5 INJECTION INTRAVENOUS at 01:34

## 2022-03-01 RX ADMIN — SODIUM CHLORIDE, PRESERVATIVE FREE 5 MG: 5 INJECTION INTRAVENOUS at 01:33

## 2022-03-01 RX ADMIN — VALSARTAN 40 MG: 40 TABLET, FILM COATED ORAL at 08:33

## 2022-03-01 RX ADMIN — MORPHINE SULFATE 2 MG: 4 INJECTION INTRAVENOUS at 06:18

## 2022-03-01 RX ADMIN — DICYCLOMINE HYDROCHLORIDE 20 MG: 10 CAPSULE ORAL at 21:14

## 2022-03-01 RX ADMIN — SODIUM CHLORIDE, PRESERVATIVE FREE 5 MG: 5 INJECTION INTRAVENOUS at 21:14

## 2022-03-01 RX ADMIN — CARVEDILOL 12.5 MG: 12.5 TABLET, FILM COATED ORAL at 08:33

## 2022-03-01 RX ADMIN — SUCRALFATE 1 G: 1 TABLET ORAL at 18:25

## 2022-03-01 RX ADMIN — FUROSEMIDE 40 MG: 10 INJECTION, SOLUTION INTRAMUSCULAR; INTRAVENOUS at 08:36

## 2022-03-01 RX ADMIN — GABAPENTIN 300 MG: 300 CAPSULE ORAL at 08:33

## 2022-03-01 RX ADMIN — ALPRAZOLAM 1 MG: 0.5 TABLET ORAL at 23:45

## 2022-03-01 RX ADMIN — SPIRONOLACTONE 50 MG: 25 TABLET ORAL at 08:33

## 2022-03-01 RX ADMIN — MAGNESIUM SULFATE HEPTAHYDRATE 2 G: 40 INJECTION, SOLUTION INTRAVENOUS at 09:03

## 2022-03-01 RX ADMIN — SODIUM CHLORIDE, PRESERVATIVE FREE 5 MG: 5 INJECTION INTRAVENOUS at 06:10

## 2022-03-01 RX ADMIN — IPRATROPIUM BROMIDE AND ALBUTEROL SULFATE 3 ML: .5; 3 SOLUTION RESPIRATORY (INHALATION) at 07:22

## 2022-03-01 RX ADMIN — HYDROCODONE BITARTRATE AND ACETAMINOPHEN 1 TABLET: 5; 325 TABLET ORAL at 21:27

## 2022-03-01 RX ADMIN — SODIUM CHLORIDE, PRESERVATIVE FREE 10 ML: 5 INJECTION INTRAVENOUS at 06:18

## 2022-03-01 RX ADMIN — ASPIRIN 81 MG: 81 TABLET ORAL at 08:33

## 2022-03-01 RX ADMIN — IPRATROPIUM BROMIDE AND ALBUTEROL SULFATE 3 ML: .5; 3 SOLUTION RESPIRATORY (INHALATION) at 13:37

## 2022-03-01 RX ADMIN — PANTOPRAZOLE SODIUM 40 MG: 40 TABLET, DELAYED RELEASE ORAL at 21:14

## 2022-03-01 RX ADMIN — FUROSEMIDE 40 MG: 40 TABLET ORAL at 18:25

## 2022-03-01 RX ADMIN — SUCRALFATE 1 G: 1 TABLET ORAL at 08:34

## 2022-03-01 RX ADMIN — AMIODARONE HYDROCHLORIDE 200 MG: 200 TABLET ORAL at 08:33

## 2022-03-01 RX ADMIN — SODIUM CHLORIDE, PRESERVATIVE FREE 10 ML: 5 INJECTION INTRAVENOUS at 21:22

## 2022-03-01 RX ADMIN — DICYCLOMINE HYDROCHLORIDE 20 MG: 10 CAPSULE ORAL at 08:33

## 2022-03-01 RX ADMIN — PANTOPRAZOLE SODIUM 40 MG: 40 TABLET, DELAYED RELEASE ORAL at 08:33

## 2022-03-01 RX ADMIN — ATORVASTATIN CALCIUM 80 MG: 80 TABLET, FILM COATED ORAL at 08:34

## 2022-03-01 RX ADMIN — IPRATROPIUM BROMIDE AND ALBUTEROL SULFATE 3 ML: .5; 3 SOLUTION RESPIRATORY (INHALATION) at 19:46

## 2022-03-01 RX ADMIN — SODIUM CHLORIDE, PRESERVATIVE FREE 5 MG: 5 INJECTION INTRAVENOUS at 14:59

## 2022-03-01 NOTE — PROGRESS NOTES
[facility] CARDIOLOGY PROGRESS NOTE           3/1/2022 7:58 AM    Admit Date: 2/24/2022      Subjective:   Patient denies active dyspnea or chest pain. Hemodynamics appear stable. Remains anxious about recurrent ventricular arrhythmias. ROS:  Cardiovascular:  As noted above    Objective:      Vitals:    03/01/22 0400 03/01/22 0501 03/01/22 0723 03/01/22 0735   BP:  107/79  122/79   Pulse: 88 84  88   Resp:  18  18   Temp:  97.9 °F (36.6 °C)  97.5 °F (36.4 °C)   SpO2:  96% 98% 99%   Weight:  197 lb 9.6 oz (89.6 kg)     Height:           Physical Exam:  General-No Acute Distress  Neck- supple, no JVD  CV- regular rate and rhythm Grade I/Vi IRVIN  Lung- clear bilaterally  Abd- soft, nontender, nondistended  Ext- trivial left and 1+ right edema  Skin- warm and dry      Data Review:   Recent Labs     03/01/22  0617 02/28/22  1712 02/28/22  0914 02/28/22  0914     --   --  135*   K 4.2 4.5   < > 3.9   MG 1.9 2.1   < > 2.2   BUN 2*  --   --  4*   CREA 0.90  --   --  1.10   GLU 96  --   --  115*   WBC 5.6  --   --  5.9   HGB 10.6*  --   --  10.1*   HCT 32.9*  --   --  31.8*   *  --   --  125*    < > = values in this interval not displayed. No results found for: KRISHNA Causey    Assessment/Plan:     Principal Problem:    Acute systolic congestive heart failure ([facility] [address] (2/24/2022)  Severe LV dysfunction. EF 15 to 20%. Volume appears improved. We will give IV Lasix this morning and then transition to oral Lasix. Currently on Coreg, Diovan and Aldactone. Active Problems:    HTN (hypertension) (11/29/2011)  Blood pressure control. Hypokalemia (7/3/2020)    K for 4. Tachycardia (2/24/2022)  Rate improved. Continue carvedilol and Corlanor. Hypomagnesemia (2/26/2022)    Replete > 2. ICD (implantable cardioverter-defibrillator) discharge (2/26/2022)    On amiodarone. Episode of nonsustained ventricular tachycardia last night.   We will continue to monitor. Patient does not feel comfortable going home today. Hopefully in the next 48 hours she will be able to be discharged.                   Rosa Isela Rosado MD  3/1/2022 7:58 AM

## 2022-03-01 NOTE — ROUTINE PROCESS
Requesting Compazine for nausea, no emesis. Also, requested Ginger-Brenda. Reinforced education regarding Fluid restrictions, 2 liter over 24 hours, voiced understanding. Compazine given IVP, will monitor for relief.

## 2022-03-01 NOTE — PROGRESS NOTES
Requesting Compazine for nausea, no emesis. Compazine 5 mg given IVP slowly, will monitor for relief.

## 2022-03-01 NOTE — PROGRESS NOTES
Respiratory Care Services Policy Number: [id number]    Title: Aerosolized Medication Protocol    Effective Date: 10/1998    Revised Date: 06/13, 03/16, 11/17, 07/19     Reviewed Date: 05/14/ 03/15 , 06/17, 5/18, 11/2020   I. Policy: The Aerosolized Medication Protocol shall by implemented by Respiratory Care Practitioners (RCP) for patients with orders to receive aerosol therapy with medication. II. Purpose: To open and maintain obstructed airways, the RCP, will utilize the following   protocol to select the indicated aerosolized medication(s) and determine the most effective method of delivery to the patient. III. Patient Type: All patients who are determined to meet aerosolized medication criteria as          outlined in this protocol. IV. Responsibility: Director, [address], registered Respiratory Care Practitioners (RCP's) with documented competency in the performance of                                     respiratory therapeutic techniques. V. Equipment needed:  A. Stethoscope  B. Pulse oximeter  C. AeroEclipse nebulizer  D. Meter Dose Inhaler (MDI)    VI. Protocol:   A. The following conditions are accepted indications for aerosolized medication therapy. 1. Bronchospasm/wheezing  2. Impaired mucociliary clearance  3. Tracheobronchial mucosal congestion/and laryngeal stridor  4. Diseases which commonly require aerosolized medication therapy include, but are not limited to:  a. Asthma/reactive airway disease  b. Bronchitis/emphysema (COPD)  c. Cystic fibrosis  d. Severe laryngitis/tracheitis  e. Bronchiectasis  f. Smoke inhalation or chemical trauma to the lung or upper airway  g. Physical trauma to the upper airway  h. Laryngotracheobronchitis  i. Bronchiolitis  j. Non-specific wheezing              B. Indications for bronchodilator medications will include:  a. Bronchospasm/ wheezing  b. Asthma/reactive airway disease  c.  Chronic obstructive pulmonary disease  d. Obstructive defect on pulmonary function testing  C. Administration of medications  1. If a bronchodilator or any other type of respiratory medication is needed, a physician order must be indicated in the medication section in the patient's EMR. 2. When the physician specifies the medication and dosage at the time of request, the ordered medication will be used as part of the care plan. D. The following guidelines will be utilized in the evaluation and selection of the appropriate delivery device for indicated medication(s):  1. MDI is the preferred method of medication delivery via common canister. a. Patient should be alert/cooperative  b. Able to perform 3 second breath hold. c. Patient may be changed to self-administer as appropriate. d. Patients in isolation will be provided an individual inhaler. 2. Unassisted aerosol (UA) is indicated if  a. Ventilation is inadequate  b. Patient is unable to perform MDI appropriately     VII. Guidelines:   Monitor patient's vital signs and evaluate patient's clinical status. The need to change medication and/or modality may be indicated by:  1. A pulse greater than 120 bpm, or if a pulse increase of 20 bpm occurs with bronchodilator medications. 2. Significant worsening of dyspnea or wheezing occurring during or within 30 minutes of discontinuing therapy. 3. Worsening of patient's sensorium (e.g. patient becomes confused or obtunded, and unable to follow directions). 4. Worsening of patient's chest x-ray. 5. Change in sputum (e.g. increased pulmonary infiltrate, which might indicate need for volume expansion therapy). 6. Patient has difficulty coughing up secretions, which might indicate need for acetylcysteine and/or bronchial hygiene therapy. 7. Call physician immediately if dyspnea worsens and is not responsive to modifications allowed by protocol. VIII. Clinical Responsibility:  A.  The therapy assessment guidelines will be used to evaluate all patients receiving aerosolized medications with the exception of critical care areas. 1. RCP's will perform changes in therapy per protocol. 1. It will be the responsibility of RCP to provide instruction regarding respiratory medications, possible side effects, aerosol therapy and proper MDI technique, as well as, spacer usage to patients ordered MDI therapy. 1. Current therapy that is part of a patient's home regimen will not be discontinued. a. Provide spacer and educate patient on proper inhaler technique if needed. IX. Documentation  A. Document assessment findings in the respiratory assessment section of the patient's EMR. B. Document changes in therapy per protocol in the respiratory orders section and in the care plan section of the patient's EMR. C. Document patient education in the patient education section of the patient's EMR. X. Outcome Criteria:  A. Relief of wheezes and obstruction  B. Improved cough and sputum color and consistency  C. Improved chest x-ray  D. Improved arterial oxygen tension and or SaO2  E. Improved Peak Flow on asthmatic patients        XI. Related Policy/Protocols:  A. Respiratory Patient Care Protocols  B. Bronchial Hygiene Therapy  C. Oxygen Protocol  D. [facility] Administration of Metered Dose Inhalers via a Common Canister  E. [address] Generating Procedures  Reference:  L - Respiratory Care Department Policy, Procedure and Protocol Guideline Manual, 1995, AUSTIN Brewer. L -  Therapist Driven Respiratory Care Protocols  A Practitioner's Guide for Criteria-Based Respiratory Care by Pratima Rahman M.D., and AUSTIN Peter, CHAPIN. L - The rationale for therapist-driven protocols: an update. Respiratory Care 1998; [id number].  -[facility] Clinical Practice Guidelines.

## 2022-03-02 ENCOUNTER — APPOINTMENT (OUTPATIENT)
Dept: ULTRASOUND IMAGING | Age: 57
DRG: 291 | End: 2022-03-02
Attending: NURSE PRACTITIONER
Payer: COMMERCIAL

## 2022-03-02 LAB
ANION GAP SERPL CALC-SCNC: 6 MMOL/L (ref 7–16)
BUN SERPL-MCNC: 6 MG/DL (ref 6–23)
CALCIUM SERPL-MCNC: 8.6 MG/DL (ref 8.3–10.4)
CHLORIDE SERPL-SCNC: 106 MMOL/L (ref 98–107)
CO2 SERPL-SCNC: 23 MMOL/L (ref 21–32)
CREAT SERPL-MCNC: 1.2 MG/DL (ref 0.8–1.5)
ERYTHROCYTE [DISTWIDTH] IN BLOOD BY AUTOMATED COUNT: 14.8 % (ref 11.9–14.6)
GLUCOSE SERPL-MCNC: 112 MG/DL (ref 65–100)
HCT VFR BLD AUTO: 33.2 % (ref 41.1–50.3)
HGB BLD-MCNC: 10.3 G/DL (ref 13.6–17.2)
MCH RBC QN AUTO: 29.8 PG (ref 26.1–32.9)
MCHC RBC AUTO-ENTMCNC: 31 G/DL (ref 31.4–35)
MCV RBC AUTO: 96 FL (ref 79.6–97.8)
NRBC # BLD: 0 K/UL (ref 0–0.2)
PLATELET # BLD AUTO: 177 K/UL (ref 150–450)
PMV BLD AUTO: 10.5 FL (ref 9.4–12.3)
POTASSIUM SERPL-SCNC: 4 MMOL/L (ref 3.5–5.1)
RBC # BLD AUTO: 3.46 M/UL (ref 4.23–5.6)
SODIUM SERPL-SCNC: 135 MMOL/L (ref 138–145)
WBC # BLD AUTO: 5.6 K/UL (ref 4.3–11.1)

## 2022-03-02 PROCEDURE — 94640 AIRWAY INHALATION TREATMENT: CPT

## 2022-03-02 PROCEDURE — 85027 COMPLETE CBC AUTOMATED: CPT

## 2022-03-02 PROCEDURE — 80048 BASIC METABOLIC PNL TOTAL CA: CPT

## 2022-03-02 PROCEDURE — 74011250637 HC RX REV CODE- 250/637: Performed by: NURSE PRACTITIONER

## 2022-03-02 PROCEDURE — 74011000250 HC RX REV CODE- 250: Performed by: NURSE PRACTITIONER

## 2022-03-02 PROCEDURE — 93971 EXTREMITY STUDY: CPT

## 2022-03-02 PROCEDURE — 74011250636 HC RX REV CODE- 250/636: Performed by: NURSE PRACTITIONER

## 2022-03-02 PROCEDURE — 65660000000 HC RM CCU STEPDOWN

## 2022-03-02 PROCEDURE — 74011250637 HC RX REV CODE- 250/637: Performed by: INTERNAL MEDICINE

## 2022-03-02 PROCEDURE — 99232 SBSQ HOSP IP/OBS MODERATE 35: CPT | Performed by: INTERNAL MEDICINE

## 2022-03-02 PROCEDURE — 74011000250 HC RX REV CODE- 250: Performed by: INTERNAL MEDICINE

## 2022-03-02 PROCEDURE — 94760 N-INVAS EAR/PLS OXIMETRY 1: CPT

## 2022-03-02 RX ADMIN — IVABRADINE 7.5 MG: 5 TABLET, FILM COATED ORAL at 08:12

## 2022-03-02 RX ADMIN — SODIUM CHLORIDE, PRESERVATIVE FREE 10 ML: 5 INJECTION INTRAVENOUS at 21:33

## 2022-03-02 RX ADMIN — SODIUM CHLORIDE, PRESERVATIVE FREE 10 ML: 5 INJECTION INTRAVENOUS at 06:07

## 2022-03-02 RX ADMIN — FUROSEMIDE 40 MG: 40 TABLET ORAL at 17:24

## 2022-03-02 RX ADMIN — DICYCLOMINE HYDROCHLORIDE 20 MG: 10 CAPSULE ORAL at 08:13

## 2022-03-02 RX ADMIN — SODIUM CHLORIDE, PRESERVATIVE FREE 5 MG: 5 INJECTION INTRAVENOUS at 06:16

## 2022-03-02 RX ADMIN — IPRATROPIUM BROMIDE AND ALBUTEROL SULFATE 3 ML: .5; 3 SOLUTION RESPIRATORY (INHALATION) at 08:08

## 2022-03-02 RX ADMIN — VALSARTAN 40 MG: 40 TABLET, FILM COATED ORAL at 08:13

## 2022-03-02 RX ADMIN — FUROSEMIDE 40 MG: 40 TABLET ORAL at 08:13

## 2022-03-02 RX ADMIN — SUCRALFATE 1 G: 1 TABLET ORAL at 21:26

## 2022-03-02 RX ADMIN — PANTOPRAZOLE SODIUM 40 MG: 40 TABLET, DELAYED RELEASE ORAL at 21:26

## 2022-03-02 RX ADMIN — PANTOPRAZOLE SODIUM 40 MG: 40 TABLET, DELAYED RELEASE ORAL at 08:12

## 2022-03-02 RX ADMIN — DICYCLOMINE HYDROCHLORIDE 20 MG: 10 CAPSULE ORAL at 21:26

## 2022-03-02 RX ADMIN — ASPIRIN 81 MG: 81 TABLET ORAL at 08:12

## 2022-03-02 RX ADMIN — AMIODARONE HYDROCHLORIDE 200 MG: 200 TABLET ORAL at 17:24

## 2022-03-02 RX ADMIN — SUCRALFATE 1 G: 1 TABLET ORAL at 08:12

## 2022-03-02 RX ADMIN — HYDROCODONE BITARTRATE AND ACETAMINOPHEN 1 TABLET: 5; 325 TABLET ORAL at 22:51

## 2022-03-02 RX ADMIN — ALPRAZOLAM 1 MG: 0.5 TABLET ORAL at 22:51

## 2022-03-02 RX ADMIN — GABAPENTIN 300 MG: 300 CAPSULE ORAL at 17:28

## 2022-03-02 RX ADMIN — CARVEDILOL 12.5 MG: 12.5 TABLET, FILM COATED ORAL at 16:47

## 2022-03-02 RX ADMIN — HYDROCODONE BITARTRATE AND ACETAMINOPHEN 1 TABLET: 5; 325 TABLET ORAL at 02:13

## 2022-03-02 RX ADMIN — IVABRADINE 7.5 MG: 5 TABLET, FILM COATED ORAL at 16:47

## 2022-03-02 RX ADMIN — AMIODARONE HYDROCHLORIDE 200 MG: 200 TABLET ORAL at 08:13

## 2022-03-02 RX ADMIN — SUCRALFATE 1 G: 1 TABLET ORAL at 12:09

## 2022-03-02 RX ADMIN — SUCRALFATE 1 G: 1 TABLET ORAL at 17:24

## 2022-03-02 RX ADMIN — MORPHINE SULFATE 2 MG: 4 INJECTION INTRAVENOUS at 21:26

## 2022-03-02 RX ADMIN — SODIUM CHLORIDE, PRESERVATIVE FREE 10 ML: 5 INJECTION INTRAVENOUS at 14:22

## 2022-03-02 RX ADMIN — HYDROCODONE BITARTRATE AND ACETAMINOPHEN 1 TABLET: 5; 325 TABLET ORAL at 12:14

## 2022-03-02 RX ADMIN — SODIUM CHLORIDE, PRESERVATIVE FREE 5 MG: 5 INJECTION INTRAVENOUS at 12:15

## 2022-03-02 RX ADMIN — ATORVASTATIN CALCIUM 80 MG: 80 TABLET, FILM COATED ORAL at 08:13

## 2022-03-02 RX ADMIN — SODIUM CHLORIDE, PRESERVATIVE FREE 5 MG: 5 INJECTION INTRAVENOUS at 02:13

## 2022-03-02 RX ADMIN — SPIRONOLACTONE 50 MG: 25 TABLET ORAL at 08:13

## 2022-03-02 RX ADMIN — GABAPENTIN 300 MG: 300 CAPSULE ORAL at 08:13

## 2022-03-02 RX ADMIN — CARVEDILOL 12.5 MG: 12.5 TABLET, FILM COATED ORAL at 08:13

## 2022-03-02 RX ADMIN — HYDROCODONE BITARTRATE AND ACETAMINOPHEN 1 TABLET: 5; 325 TABLET ORAL at 06:16

## 2022-03-02 RX ADMIN — DICYCLOMINE HYDROCHLORIDE 20 MG: 10 CAPSULE ORAL at 16:47

## 2022-03-02 RX ADMIN — IPRATROPIUM BROMIDE AND ALBUTEROL SULFATE 3 ML: .5; 3 SOLUTION RESPIRATORY (INHALATION) at 20:55

## 2022-03-02 RX ADMIN — IPRATROPIUM BROMIDE AND ALBUTEROL SULFATE 3 ML: .5; 3 SOLUTION RESPIRATORY (INHALATION) at 13:15

## 2022-03-02 NOTE — PROGRESS NOTES
[facility] CARDIOLOGY PROGRESS NOTE           3/2/2022 7:58 AM    Admit Date: 2/24/2022      Subjective:   Patient denies active dyspnea or chest pain. Hemodynamics appear stable. Remains anxious about recurrent ventricular arrhythmias. 03/02/22  Patient expresses interest but anxiety about pending discharge from the hospital he says that he feels as though he needs to be up and about and possibly be evaluated by PT OT to make sure that he would be able to handle ADLs after discharge    ROS:  Cardiovascular:  As noted above    Objective:      Vitals:    03/01/22 1948 03/01/22 2018 03/02/22 0026 03/02/22 0435   BP:  95/61 96/62 97/66   Pulse:  67 75 75   Resp:  18 18 17   Temp:  98.3 °F (36.8 °C) 97.5 °F (36.4 °C) 97.9 °F (36.6 °C)   SpO2: 97% 98% 98% 98%   Weight:    189 lb 4.8 oz (85.9 kg)   Height:           Physical Exam:  General-No Acute Distress  Neck- supple, no JVD  CV- regular rate and rhythm Grade I/Vi IRVIN  Lung- clear bilaterally  Abd- soft, nontender, nondistended  Ext- trivial left and 1+ right edema  Skin- warm and dry      Data Review:   Recent Labs     03/02/22  0404 03/01/22  0617 02/28/22  1712 02/28/22  0914   * 139  --    < >   K 4.0 4.2 4.5   < >   MG  --  1.9 2.1  --    BUN 6 2*  --    < >   CREA 1.20 0.90  --    < >   * 96  --    < >   WBC 5.6 5.6  --    < >   HGB 10.3* 10.6*  --    < >   HCT 33.2* 32.9*  --    < >    147*  --    < >    < > = values in this interval not displayed. No results found for: KRISHNA Bah    Assessment/Plan:     Principal Problem:    Acute systolic congestive heart failure ([facility] [address] (2/24/2022)  Severe LV dysfunction. EF 15 to 20%. Volume appears improved. We will give IV Lasix this morning and then transition to oral Lasix. Currently on Coreg, Diovan and Aldactone. Active Problems:    HTN (hypertension) (11/29/2011)  Blood pressure control. Hypokalemia (7/3/2020)    K for 4.        Tachycardia (2/24/2022)  Rate improved. Continue carvedilol and Corlanor. Hypomagnesemia (2/26/2022)    Replete > 2. ICD (implantable cardioverter-defibrillator) discharge (2/26/2022)    On amiodarone. Episode of nonsustained ventricular tachycardia last night. We will continue to monitor. Patient does not feel comfortable going home today. Hopefully in the next 48 hours he will be able to be discharged.       It is possible there is a significant anxiety overlay to the patient's impending discharge and that he apparently repeated to me almost verbatim what was said to my partner yesterday concerning anxiety about discharge            Jorge Luis Conti MD  3/2/2022 7:58 AM

## 2022-03-02 NOTE — PROGRESS NOTES
Bedside shift change report given to self (oncoming nurse) by Naomi Berumen RN (offgoing nurse).  Report included the following information SBAR, Kardex, Procedure Summary, Intake/Output, MAR and Cardiac Rhythm SR, ST.

## 2022-03-02 NOTE — PROGRESS NOTES
Problem: Falls - Risk of  Goal: *Absence of Falls  Description: Document Lynda Modi Fall Risk and appropriate interventions in the flowsheet. Outcome: Progressing Towards Goal  Note: Fall Risk Interventions:  Mobility Interventions: Bed/chair exit alarm         Medication Interventions: Teach patient to arise slowly,Bed/chair exit alarm    Elimination Interventions: Call light in reach,Bed/chair exit alarm    History of Falls Interventions: Bed/chair exit alarm         Problem: Patient Education: Go to Patient Education Activity  Goal: Patient/Family Education  Outcome: Progressing Towards Goal     Problem: Impaired Skin Integrity/Pressure Injury Treatment  Goal: *Improvement of Existing Pressure Injury  Outcome: Progressing Towards Goal  Goal: *Prevention of pressure injury  Description: Document Abad Scale and appropriate interventions in the flowsheet.   Outcome: Progressing Towards Goal  Note: Pressure Injury Interventions:  Sensory Interventions: Assess changes in LOC,Check visual cues for pain,Float heels,Keep linens dry and wrinkle-free,Maintain/enhance activity level,Minimize linen layers,Pressure redistribution bed/mattress (bed type),Sit a 90-degree angle/use footstool if needed    Moisture Interventions: Absorbent underpads,Minimize layers    Activity Interventions: Increase time out of bed    Mobility Interventions: Pressure redistribution bed/mattress (bed type)    Nutrition Interventions: Document food/fluid/supplement intake    Friction and Shear Interventions: Foam dressings/transparent film/skin sealants,HOB 30 degrees or less,Lift sheet,Minimize layers                Problem: Patient Education: Go to Patient Education Activity  Goal: Patient/Family Education  Outcome: Progressing Towards Goal     Problem: Patient Education: Go to Patient Education Activity  Goal: Patient/Family Education  Outcome: Progressing Towards Goal     Problem: Heart Failure: Day 1  Goal: Off Pathway (Use only if patient is Off Pathway)  Outcome: Progressing Towards Goal  Goal: Activity/Safety  Outcome: Progressing Towards Goal  Goal: Consults, if ordered  Outcome: Progressing Towards Goal  Goal: Diagnostic Test/Procedures  Outcome: Progressing Towards Goal  Goal: Nutrition/Diet  Outcome: Progressing Towards Goal  Goal: Discharge Planning  Outcome: Progressing Towards Goal  Goal: Medications  Outcome: Progressing Towards Goal  Goal: Respiratory  Outcome: Progressing Towards Goal  Goal: Treatments/Interventions/Procedures  Outcome: Progressing Towards Goal  Goal: Psychosocial  Outcome: Progressing Towards Goal  Goal: *Oxygen saturation within defined limits  Outcome: Progressing Towards Goal  Goal: *Hemodynamically stable  Outcome: Progressing Towards Goal  Goal: *Optimal pain control at patient's stated goal  Outcome: Progressing Towards Goal  Goal: *Anxiety reduced or absent  Outcome: Progressing Towards Goal     Problem: Heart Failure: Day 2  Goal: Off Pathway (Use only if patient is Off Pathway)  Outcome: Progressing Towards Goal  Goal: Activity/Safety  Outcome: Progressing Towards Goal  Goal: Consults, if ordered  Outcome: Progressing Towards Goal  Goal: Diagnostic Test/Procedures  Outcome: Progressing Towards Goal  Goal: Nutrition/Diet  Outcome: Progressing Towards Goal  Goal: Discharge Planning  Outcome: Progressing Towards Goal  Goal: Medications  Outcome: Progressing Towards Goal  Goal: Respiratory  Outcome: Progressing Towards Goal  Goal: Treatments/Interventions/Procedures  Outcome: Progressing Towards Goal  Goal: Psychosocial  Outcome: Progressing Towards Goal  Goal: *Oxygen saturation within defined limits  Outcome: Progressing Towards Goal  Goal: *Hemodynamically stable  Outcome: Progressing Towards Goal  Goal: *Optimal pain control at patient's stated goal  Outcome: Progressing Towards Goal  Goal: *Anxiety reduced or absent  Outcome: Progressing Towards Goal  Goal: *Demonstrates progressive activity  Outcome: Progressing Towards Goal     Problem: Heart Failure: Day 3  Goal: Off Pathway (Use only if patient is Off Pathway)  Outcome: Progressing Towards Goal  Goal: Activity/Safety  Outcome: Progressing Towards Goal  Goal: Diagnostic Test/Procedures  Outcome: Progressing Towards Goal  Goal: Nutrition/Diet  Outcome: Progressing Towards Goal  Goal: Discharge Planning  Outcome: Progressing Towards Goal  Goal: Medications  Outcome: Progressing Towards Goal  Goal: Respiratory  Outcome: Progressing Towards Goal  Goal: Treatments/Interventions/Procedures  Outcome: Progressing Towards Goal  Goal: Psychosocial  Outcome: Progressing Towards Goal  Goal: *Oxygen saturation within defined limits  Outcome: Progressing Towards Goal  Goal: *Hemodynamically stable  Outcome: Progressing Towards Goal  Goal: *Optimal pain control at patient's stated goal  Outcome: Progressing Towards Goal  Goal: *Anxiety reduced or absent  Outcome: Progressing Towards Goal  Goal: *Demonstrates progressive activity  Outcome: Progressing Towards Goal     Problem: Heart Failure: Day 4  Goal: Off Pathway (Use only if patient is Off Pathway)  Outcome: Progressing Towards Goal  Goal: Activity/Safety  Outcome: Progressing Towards Goal  Goal: Diagnostic Test/Procedures  Outcome: Progressing Towards Goal  Goal: Nutrition/Diet  Outcome: Progressing Towards Goal  Goal: Discharge Planning  Outcome: Progressing Towards Goal  Goal: Medications  Outcome: Progressing Towards Goal  Goal: Respiratory  Outcome: Progressing Towards Goal  Goal: Treatments/Interventions/Procedures  Outcome: Progressing Towards Goal  Goal: Psychosocial  Outcome: Progressing Towards Goal  Goal: *Oxygen saturation within defined limits  Outcome: Progressing Towards Goal  Goal: *Hemodynamically stable  Outcome: Progressing Towards Goal  Goal: *Optimal pain control at patient's stated goal  Outcome: Progressing Towards Goal  Goal: *Anxiety reduced or absent  Outcome: Progressing Towards Goal  Goal: *Demonstrates progressive activity  Outcome: Progressing Towards Goal     Problem: Heart Failure: Day 5  Goal: Off Pathway (Use only if patient is Off Pathway)  Outcome: Progressing Towards Goal  Goal: Activity/Safety  Outcome: Progressing Towards Goal  Goal: Diagnostic Test/Procedures  Outcome: Progressing Towards Goal  Goal: Nutrition/Diet  Outcome: Progressing Towards Goal  Goal: Discharge Planning  Outcome: Progressing Towards Goal  Goal: Medications  Outcome: Progressing Towards Goal  Goal: Respiratory  Outcome: Progressing Towards Goal  Goal: Treatments/Interventions/Procedures  Outcome: Progressing Towards Goal  Goal: Psychosocial  Outcome: Progressing Towards Goal     Problem: Heart Failure: Discharge Outcomes  Goal: *Demonstrates ability to perform prescribed activity without shortness of breath or discomfort  Outcome: Progressing Towards Goal  Goal: *Left ventricular function assessment completed prior to or during stay, or planned for post-discharge  Outcome: Progressing Towards Goal  Goal: *ACEI prescribed if LVEF less than 40% and no contraindications or ARB prescribed  Outcome: Progressing Towards Goal  Goal: *Verbalizes understanding and describes prescribed diet  Outcome: Progressing Towards Goal  Goal: *Verbalizes understanding/describes prescribed medications  Outcome: Progressing Towards Goal  Goal: *Describes available resources and support systems  Description: (eg: Home Health, Palliative Care, Advanced Medical Directive)  Outcome: Progressing Towards Goal  Goal: *Describes smoking cessation resources  Outcome: Progressing Towards Goal  Goal: *Understands and describes signs and symptoms to report to providers(Stroke Metric)  Outcome: Progressing Towards Goal  Goal: *Describes/verbalizes understanding of follow-up/return appt  Description: (eg: to physicians, diabetes treatment coordinator, and other resources  Outcome: Progressing Towards Goal  Goal: *Describes importance of continuing daily weights and changes to report to physician  Outcome: Progressing Towards Goal

## 2022-03-02 NOTE — PROGRESS NOTES
Bedside shift change report given to [place] (oncoming nurse) by self (offgoing nurse).  Report included the following information SBAR, Kardex, Procedure Summary, Intake/Output, MAR and Cardiac Rhythm SR.

## 2022-03-03 LAB
ANION GAP SERPL CALC-SCNC: 6 MMOL/L (ref 7–16)
BUN SERPL-MCNC: 8 MG/DL (ref 6–23)
CALCIUM SERPL-MCNC: 8.6 MG/DL (ref 8.3–10.4)
CHLORIDE SERPL-SCNC: 104 MMOL/L (ref 98–107)
CO2 SERPL-SCNC: 25 MMOL/L (ref 21–32)
CREAT SERPL-MCNC: 1.3 MG/DL (ref 0.8–1.5)
ERYTHROCYTE [DISTWIDTH] IN BLOOD BY AUTOMATED COUNT: 14.7 % (ref 11.9–14.6)
GLUCOSE SERPL-MCNC: 115 MG/DL (ref 65–100)
HCT VFR BLD AUTO: 33.4 % (ref 41.1–50.3)
HGB BLD-MCNC: 10.6 G/DL (ref 13.6–17.2)
MAGNESIUM SERPL-MCNC: 1.6 MG/DL (ref 1.8–2.4)
MCH RBC QN AUTO: 29.9 PG (ref 26.1–32.9)
MCHC RBC AUTO-ENTMCNC: 31.7 G/DL (ref 31.4–35)
MCV RBC AUTO: 94.1 FL (ref 79.6–97.8)
NRBC # BLD: 0 K/UL (ref 0–0.2)
PLATELET # BLD AUTO: 217 K/UL (ref 150–450)
PMV BLD AUTO: 10 FL (ref 9.4–12.3)
POTASSIUM SERPL-SCNC: 4 MMOL/L (ref 3.5–5.1)
RBC # BLD AUTO: 3.55 M/UL (ref 4.23–5.6)
SODIUM SERPL-SCNC: 135 MMOL/L (ref 138–145)
WBC # BLD AUTO: 5.6 K/UL (ref 4.3–11.1)

## 2022-03-03 PROCEDURE — 83735 ASSAY OF MAGNESIUM: CPT

## 2022-03-03 PROCEDURE — 85027 COMPLETE CBC AUTOMATED: CPT

## 2022-03-03 PROCEDURE — 36592 COLLECT BLOOD FROM PICC: CPT

## 2022-03-03 PROCEDURE — 97161 PT EVAL LOW COMPLEX 20 MIN: CPT

## 2022-03-03 PROCEDURE — 74011250637 HC RX REV CODE- 250/637: Performed by: NURSE PRACTITIONER

## 2022-03-03 PROCEDURE — 74011000250 HC RX REV CODE- 250: Performed by: NURSE PRACTITIONER

## 2022-03-03 PROCEDURE — 97165 OT EVAL LOW COMPLEX 30 MIN: CPT

## 2022-03-03 PROCEDURE — 65660000000 HC RM CCU STEPDOWN

## 2022-03-03 PROCEDURE — 74011250637 HC RX REV CODE- 250/637: Performed by: INTERNAL MEDICINE

## 2022-03-03 PROCEDURE — 94760 N-INVAS EAR/PLS OXIMETRY 1: CPT

## 2022-03-03 PROCEDURE — 97110 THERAPEUTIC EXERCISES: CPT

## 2022-03-03 PROCEDURE — 80048 BASIC METABOLIC PNL TOTAL CA: CPT

## 2022-03-03 PROCEDURE — 74011250637 HC RX REV CODE- 250/637: Performed by: PHYSICIAN ASSISTANT

## 2022-03-03 PROCEDURE — 97112 NEUROMUSCULAR REEDUCATION: CPT

## 2022-03-03 PROCEDURE — 94640 AIRWAY INHALATION TREATMENT: CPT

## 2022-03-03 PROCEDURE — 74011000250 HC RX REV CODE- 250: Performed by: INTERNAL MEDICINE

## 2022-03-03 PROCEDURE — 99232 SBSQ HOSP IP/OBS MODERATE 35: CPT | Performed by: INTERNAL MEDICINE

## 2022-03-03 PROCEDURE — 74011250636 HC RX REV CODE- 250/636: Performed by: NURSE PRACTITIONER

## 2022-03-03 PROCEDURE — 97530 THERAPEUTIC ACTIVITIES: CPT

## 2022-03-03 RX ORDER — HYDROCODONE BITARTRATE AND ACETAMINOPHEN 5; 325 MG/1; MG/1
1 TABLET ORAL ONCE
Status: COMPLETED | OUTPATIENT
Start: 2022-03-03 | End: 2022-03-03

## 2022-03-03 RX ORDER — CALCIUM CARBONATE 200(500)MG
200 TABLET,CHEWABLE ORAL
Status: DISCONTINUED | OUTPATIENT
Start: 2022-03-03 | End: 2022-03-04 | Stop reason: HOSPADM

## 2022-03-03 RX ORDER — LORAZEPAM 1 MG/1
1 TABLET ORAL
Status: DISCONTINUED | OUTPATIENT
Start: 2022-03-03 | End: 2022-03-04 | Stop reason: HOSPADM

## 2022-03-03 RX ADMIN — FUROSEMIDE 40 MG: 40 TABLET ORAL at 09:07

## 2022-03-03 RX ADMIN — ASPIRIN 81 MG: 81 TABLET ORAL at 09:07

## 2022-03-03 RX ADMIN — SODIUM CHLORIDE, PRESERVATIVE FREE 5 MG: 5 INJECTION INTRAVENOUS at 03:19

## 2022-03-03 RX ADMIN — SODIUM CHLORIDE, PRESERVATIVE FREE 5 MG: 5 INJECTION INTRAVENOUS at 12:38

## 2022-03-03 RX ADMIN — VALSARTAN 40 MG: 40 TABLET, FILM COATED ORAL at 09:07

## 2022-03-03 RX ADMIN — HYDROCODONE BITARTRATE AND ACETAMINOPHEN 1 TABLET: 5; 325 TABLET ORAL at 09:04

## 2022-03-03 RX ADMIN — MORPHINE SULFATE 2 MG: 4 INJECTION INTRAVENOUS at 12:36

## 2022-03-03 RX ADMIN — IPRATROPIUM BROMIDE AND ALBUTEROL SULFATE 3 ML: .5; 3 SOLUTION RESPIRATORY (INHALATION) at 14:53

## 2022-03-03 RX ADMIN — GABAPENTIN 300 MG: 300 CAPSULE ORAL at 18:05

## 2022-03-03 RX ADMIN — SUCRALFATE 1 G: 1 TABLET ORAL at 08:55

## 2022-03-03 RX ADMIN — IVABRADINE 7.5 MG: 5 TABLET, FILM COATED ORAL at 09:08

## 2022-03-03 RX ADMIN — SUCRALFATE 1 G: 1 TABLET ORAL at 13:00

## 2022-03-03 RX ADMIN — IPRATROPIUM BROMIDE AND ALBUTEROL SULFATE 3 ML: .5; 3 SOLUTION RESPIRATORY (INHALATION) at 20:48

## 2022-03-03 RX ADMIN — PANTOPRAZOLE SODIUM 40 MG: 40 TABLET, DELAYED RELEASE ORAL at 20:20

## 2022-03-03 RX ADMIN — SODIUM CHLORIDE, PRESERVATIVE FREE 10 ML: 5 INJECTION INTRAVENOUS at 22:00

## 2022-03-03 RX ADMIN — SODIUM CHLORIDE, PRESERVATIVE FREE 10 ML: 5 INJECTION INTRAVENOUS at 12:37

## 2022-03-03 RX ADMIN — PANTOPRAZOLE SODIUM 40 MG: 40 TABLET, DELAYED RELEASE ORAL at 09:08

## 2022-03-03 RX ADMIN — LORAZEPAM 1 MG: 1 TABLET ORAL at 21:38

## 2022-03-03 RX ADMIN — SUCRALFATE 1 G: 1 TABLET ORAL at 18:17

## 2022-03-03 RX ADMIN — DICYCLOMINE HYDROCHLORIDE 20 MG: 10 CAPSULE ORAL at 09:07

## 2022-03-03 RX ADMIN — ONDANSETRON 4 MG: 8 TABLET, ORALLY DISINTEGRATING ORAL at 09:07

## 2022-03-03 RX ADMIN — CARVEDILOL 12.5 MG: 12.5 TABLET, FILM COATED ORAL at 09:08

## 2022-03-03 RX ADMIN — ATORVASTATIN CALCIUM 80 MG: 80 TABLET, FILM COATED ORAL at 09:07

## 2022-03-03 RX ADMIN — HYDROCODONE BITARTRATE AND ACETAMINOPHEN 1 TABLET: 5; 325 TABLET ORAL at 20:20

## 2022-03-03 RX ADMIN — SODIUM CHLORIDE, PRESERVATIVE FREE 10 ML: 5 INJECTION INTRAVENOUS at 06:00

## 2022-03-03 RX ADMIN — SODIUM CHLORIDE, PRESERVATIVE FREE 10 ML: 5 INJECTION INTRAVENOUS at 14:00

## 2022-03-03 RX ADMIN — DICYCLOMINE HYDROCHLORIDE 20 MG: 10 CAPSULE ORAL at 15:59

## 2022-03-03 RX ADMIN — HYDROCODONE BITARTRATE AND ACETAMINOPHEN 1 TABLET: 5; 325 TABLET ORAL at 18:34

## 2022-03-03 RX ADMIN — SUCRALFATE 1 G: 1 TABLET ORAL at 21:38

## 2022-03-03 RX ADMIN — SODIUM CHLORIDE, PRESERVATIVE FREE 5 MG: 5 INJECTION INTRAVENOUS at 20:20

## 2022-03-03 RX ADMIN — IVABRADINE 7.5 MG: 5 TABLET, FILM COATED ORAL at 16:46

## 2022-03-03 RX ADMIN — DICYCLOMINE HYDROCHLORIDE 20 MG: 10 CAPSULE ORAL at 21:38

## 2022-03-03 RX ADMIN — AMIODARONE HYDROCHLORIDE 200 MG: 200 TABLET ORAL at 16:45

## 2022-03-03 RX ADMIN — IPRATROPIUM BROMIDE AND ALBUTEROL SULFATE 3 ML: .5; 3 SOLUTION RESPIRATORY (INHALATION) at 08:00

## 2022-03-03 RX ADMIN — SPIRONOLACTONE 50 MG: 25 TABLET ORAL at 09:08

## 2022-03-03 RX ADMIN — AMIODARONE HYDROCHLORIDE 200 MG: 200 TABLET ORAL at 09:08

## 2022-03-03 RX ADMIN — SODIUM CHLORIDE, PRESERVATIVE FREE 5 MG: 5 INJECTION INTRAVENOUS at 09:04

## 2022-03-03 RX ADMIN — GABAPENTIN 300 MG: 300 CAPSULE ORAL at 09:04

## 2022-03-03 RX ADMIN — HYDROCODONE BITARTRATE AND ACETAMINOPHEN 1 TABLET: 5; 325 TABLET ORAL at 15:59

## 2022-03-03 NOTE — PROGRESS NOTES
ACUTE OT GOALS:  (Developed with and agreed upon by patient and/or caregiver.)  1. Patient will complete lower body bathing and dressing with MOD I and adaptive equipment as needed. 2. Patient will complete toileting with MOD I.   3. Patient will tolerate 30 minutes of OT treatment with 1-2 rest breaks to increase activity tolerance for ADLs. 4. Patient will complete functional transfers with MOD I and adaptive equipment as needed. 5. Patient will complete functional mobility for household distances with MOD I and adaptive equipment as needed. 6. Patient will complete self-grooming while standing edge of sink with MOD I and adaptive equipment as needed. Timeframe: 7 visits         OCCUPATIONAL THERAPY ASSESSMENT: Initial Assessment and Daily Note OT Treatment Day # 1    Elvi May is a 62 y.o. male   PRIMARY DIAGNOSIS: Acute systolic congestive heart failure (HCC)  CHF with cardiomyopathy (HCC) [I50.9, I42.9]  Tachycardia [R00.0]       Reason for Referral:   ICD-10: Treatment Diagnosis: Generalized Muscle Weakness (M62.81)  INPATIENT: Payor: BLUE CROSS / Plan: [place] [employer] [employer] / Product Type: PPO /   ASSESSMENT:     REHAB RECOMMENDATIONS:   Recommendation to date pending progress:  [address] [place] [address] Therapy  Equipment:    3 in 1 Bedside Commode     PRIOR LEVEL OF FUNCTION:  (Prior to Hospitalization)  INITIAL/CURRENT LEVEL OF FUNCTION:  (Based on today's evaluation)   Bathing:   Independent  Dressing:   Independent  Feeding/Grooming:   Independent  Toileting:   Independent  Functional Mobility:   Independent Bathing:   Minimal Assistance  Dressing:   Minimal Assistance  Feeding/Grooming:   Set Up  Toileting:   Minimal Assistance  Functional Mobility:   Minimal Assistance x 2     ASSESSMENT:  Mr. Nathan May presents with deficits in overall strength, activity tolerance, ADL performance and functional mobility. Admitted for CHF with noted ongoing weakness/limited activity tolerance.  BUE AROM and strength (4/5) are generally decreased but WFL. CGA for functional bed mobility/transfers. Limited activity tolerance and generalized weakness limiting functional ability and mobility today. Pt notes feeling weaker than anticipated. Initially min A required for mobility however requiring min A x 2 by end of mobility d/t decompensation. Min A for donning socks; set-up for donning gown. At this time, Ab Delgado is functioning below baseline for ADLs and functional mobility. Pt would benefit from skilled OT services to address OT goals and and plan of care. .     SUBJECTIVE:   Mr. Neda Pavon states, \"I wasn't anticipating this. \"    SOCIAL HISTORY/LIVING ENVIRONMENT: Lives wit wife in [facility] home. Independent at baseline but notes ongoing weakness that has been limiting his ability to sustain ADL performance. Notes increase difficulty with showering. Drives but not working.  Independently with mobility   Home Environment: Private residence  One/Two Story Residence: One story  Living Alone: No  Support Systems: Spouse/Significant Other,Child(charlene),Other Family Member(s)    OBJECTIVE:     PAIN: VITAL SIGNS: LINES/DRAINS:   Pre Treatment: Pain Screen  Pain Scale 1: Numeric (0 - 10)  Pain Intensity 1: 0  Post Treatment: 0   None  O2 Device: None (Room air)     GROSS EVALUATION:  BUEs Within Functional Limits Abnormal/ Functional Abnormal/ Non-Functional (see comments) Not Tested Comments:   AROM [] [x] [] []    PROM [] [x] [] []    Strength [] [x] [] [] Generally weak   Balance [] [x] [] [] Intact sitting; fair standing balance   Posture [x] [] [] []    Sensation [x] [] [] []    Coordination [x] [] [] []    Tone [x] [] [] []    Edema [x] [] [] []    Activity Tolerance [] [x] [] [] Decompensates quickly    [] [] [] []      COGNITION/  PERCEPTION: Intact Impaired   (see comments) Comments:   Orientation [x] []    Vision [x] []    Hearing [x] []    Judgment/ Insight [] [x] Decreased awareness   Attention [x] []    Memory [x] [] Command Following [x] []    Emotional Regulation [x] []     [] []      ACTIVITIES OF DAILY LIVING: I Mod I S SBA CGA Min Mod Max Total NT Comments   BASIC ADLs:              Bathing/ Showering [] [] [] [] [] [] [] [] [] []    Toileting [] [] [] [] [] [] [] [] [] [x]    Dressing [] [] [x] [] [] [x] [] [] [] [] S for donning gown;   Min A for  socks   Feeding [] [] [] [] [] [] [] [] [] [x]    Grooming [] [] [] [] [] [] [] [] [] [x]    Personal Device Care [] [] [] [] [] [] [] [] [] [x]    Functional Mobility [] [] [] [] [] [x] [] [] [] [] Min A regressing to min A x 2 for mobility by end of session   I=Independent, Mod I=Modified Independent, S=Supervision, SBA=Standby Assistance, CGA=Contact Guard Assistance,   Min=Minimal Assistance, Mod=Moderate Assistance, Max=Maximal Assistance, Total=Total Assistance, NT=Not Tested    MOBILITY: I Mod I S SBA CGA Min Mod Max Total  NT x2 Comments:   Supine to sit [] [] [] [] [x] [x] [] [] [] [] []    Sit to supine [] [] [] [] [x] [x] [] [] [] [] []    Sit to stand [] [] [] [] [] [x] [] [] [] [] []    Bed to chair [] [] [] [] [] [] [] [] [] [x] []    I=Independent, Mod I=Modified Independent, S=Supervision, SBA=Standby Assistance, CGA=Contact Guard Assistance,   Min=Minimal Assistance, Mod=Moderate Assistance, Max=Maximal Assistance, Total=Total Assistance, NT=Not Tested    [facility] [address] [id number] AM-PAC 6 Clicks   Daily Activity Inpatient Short Form        How much help from another person does the patient currently need. .. Total A Lot A Little None   1. Putting on and taking off regular lower body clothing? [] 1   [] 2   [x] 3   [] 4   2. Bathing (including washing, rinsing, drying)? [] 1   [] 2   [x] 3   [] 4   3. Toileting, which includes using toilet, bedpan or urinal?   [] 1   [] 2   [x] 3   [] 4   4. Putting on and taking off regular upper body clothing? [] 1   [] 2   [x] 3   [] 4   5. Taking care of personal grooming such as brushing teeth?    [] 1   [] 2   [x] 3 [] 4   6. Eating meals? [] 1   [] 2   [x] 3   [] 4   © 2007, Trustees of [address] [place] [address] [address] [id number], under license to [employer]. All rights reserved     Score:  Initial: 18 Most Recent: X (Date: -- )   Interpretation of Tool:  Represents activities that are increasingly more difficult (i.e. Bed mobility, Transfers, Gait). PLAN:   FREQUENCY/DURATION: OT Plan of Care: 3 times/week for duration of hospital stay or until stated goals are met, whichever comes first.    PROBLEM LIST:   (Skilled intervention is medically necessary to address:)  1. Decreased ADL/Functional Activities  2. Decreased Activity Tolerance  3. Decreased AROM/PROM  4. Decreased Balance  5. Decreased Coordination  6. Decreased Gait Ability  7. Decreased Strength  8. Decreased Transfer Abilities   INTERVENTIONS PLANNED:   (Benefits and precautions of occupational therapy have been discussed with the patient.)  1. Self Care Training  2. Therapeutic Activity  3. Therapeutic Exercise/HEP  4. Neuromuscular Re-education  5. Education     TREATMENT:     EVALUATION: Low Complexity : (Untimed Charge)    TREATMENT:   ( $$ Neuromuscular Re-Education: 8-22 mins   )  Neuromuscular Re-education (10 Minutes): Neuromuscular Re-education included Balance Training, Coordination training, Postural training, Sitting balance training and Standing balance training to improve Balance, Coordination, Functional Mobility and Postural Control.     TREATMENT GRID:  N/A    AFTER TREATMENT POSITION/PRECAUTIONS:  Bed, Needs within reach and RN notified    INTERDISCIPLINARY COLLABORATION:  RN/PCT, PT/PTA and OT/HERNANDEZ    TOTAL TREATMENT DURATION:  OT Patient Time In/Time Out  Time In: 0901  Time [facility]: [facility]

## 2022-03-03 NOTE — ROUTINE PROCESS
CHF teaching reinfoerced postre introduction to pt. From recent admit; aware of diagnosis. Planner/scale @ BS and will follow. Smoking/ ETOH/Illicit drug use cessation and maintain a healthy weight covered. Pt. aware that I can not prescribe nor adjust  medications: 15mins  Palliative Care score:  Refused ACP on admission  Start 2L/D Fluid restriction/ cardiac diet  CHF teaching continues to pt. . Emphasis on taking prescription meds as ordered, to keep F/U appts and to call MD STAT.     CHF teaching completed, verbalize emphasis on monitoring self and report to MD:  · If you gain 2 lbs in one day or 5 lbs in a week, and short of breath. · If you can not lay flat without developing short of breath or rapid breathing at night; or if it wakes you up. Develop a cough or wheezing. · If you notice swollen hands/feet/ankles or stomach with a bloated/ full feeling. · If you are  more confused or mentally fuzzy or dizzy. · If you notice a rapid or change in your heart rate. · If you become more exhausted all the time and unable to do the same level of activity without stopping to catch your breath. Drink no more than 8 cups a day in 8 oz. cups. Limit Cola Drinks. Your Heart can not handle any more. Stay away from salt (limit anything with salt or sodium in it). Limit to 250mg per serving. Exercise needs to be started with your Doctors approval.  Reduce stress; Call myself or Provider if assistance is needed. Pass post test via teach back, will make self available post DC ,if an questions arise. Diabetic teaching completed.  Planner/scale(home) @ BS:  60 mins total    Report worsening dizziness/ rapid HR quickly

## 2022-03-03 NOTE — PROGRESS NOTES
Patient evaluated by physical therapy. Weak and debilitated now after being in bed for over a week. He ambulated 30 feet but was extremely tired. They recommend 1 more day of physical therapy and rest.  We will cancel discharge today but plan for discharge tomorrow with meds and plan as noted in my note today. Out of bed to chair for all meals, ambulate with nursing assistance twice today. Physical therapy tomorrow and hopefully home tomorrow afternoon.     Nelida Galdamez MD

## 2022-03-03 NOTE — PROGRESS NOTES
[facility] CARDIOLOGY PROGRESS NOTE    3/3/2022 7:27 AM    Admit Date: 2/24/2022        Subjective:   Stable overnight without angina, CHF, palpitations, and no arrhythmias on telemetry. Medically maximized and euvolemic clinically with net 13 L output since admission. Ready to go home. Objective:      Vitals:    03/03/22 0117 03/03/22 0335 03/03/22 0444 03/03/22 0506   BP: 96/63 100/70 107/73 107/66   Pulse: 79 95 96 73   Resp: 18   18   Temp: 97 °F (36.1 °C)   96.8 °F (36 °C)   SpO2: 93%   94%   Weight:    187 lb 9.6 oz (85.1 kg)   Height:           Physical Exam:  Neck- supple, no JVD at 60°  CV- regular rate and rhythm no MRG  Lung- clear bilaterally, coarse bilateral bases, chronic  Abd- soft, nontender, nondistended  Ext-trace ankle pitting at most bilaterally, no severe edema  Skin- warm and dry    Data Review:   Recent Labs     03/03/22  0310 03/02/22  0404 03/01/22  0617 03/01/22  0617 02/28/22  1712 02/28/22  0914   * 135*   < > 139  --    < >   K 4.0 4.0   < > 4.2 4.5   < >   MG  --   --   --  1.9 2.1  --    BUN 8 6   < > 2*  --    < >   CREA 1.30 1.20   < > 0.90  --    < >   * 112*   < > 96  --    < >   WBC 5.6 5.6   < > 5.6  --    < >   HGB 10.6* 10.3*   < > 10.6*  --    < >   HCT 33.4* 33.2*   < > 32.9*  --    < >    177   < > 147*  --    < >    < > = values in this interval not displayed. Assessment and Plan:     Principal Problem:    Acute on chronic systolic congestive heart failure (HCC) (2/24/2022)-net 13 L out since admission, euvolemic today. Labs stable and blood pressure/heart rate/rhythm stable. Ready to go home with transitional care 14 follow-up with me in the office. Okay to overbook.  See below    Active Problems:      HTN (hypertension) (11/29/2011)-stable, controlled, continue meds      Hypokalemia (7/3/2020)-repleted, recheck BMP and magnesium in 5 days on current medications as noted below      Ventricular Tachycardia (2/24/2022)-recurrent V. tach resulting in multiple defibrillator shocks on this admission due to very low potassium after Lasix drip. Arrhythmias have completely resolved after repletion of electrolytes and amiodarone. See below. Hypomagnesemia (2/26/2022)-repleted, recheck BMP and magnesium in 5 days on current meds as noted below      ICD (implantable cardioverter-defibrillator) discharge (2/26/2022)-appropriate function, continue outpatient surveillance per routine      Home later today if can ambulate without presyncope or weakness. Follow-up with me as a TC 14 visit, okay to overbook me either early in the morning, late in the day, or over lunch  Continue meds as taking at home prior to admission  Make sure has prescription for Corlanor 5 mg twice daily  Discharge on amiodarone 200 mg daily for 1 month with no refills  Change Proventil inhaler to a Xopenex inhaler 2 puffs every 4 hours as needed for wheezing or shortness of breath (to prevent tachycardia with very severe LV function)  Continue home dose of valsartan, carvedilol, eplerenone  Give 5 days of Norco as taking prior to admission  BMP, magnesium in 5 days after discharge  Cardiac rehab referral and nutrition/CHF counseling as outpatient    ELVIA Trujillo MD  [facility] Cardiology  Pager [phone]

## 2022-03-03 NOTE — PROGRESS NOTES
ACUTE PHYSICAL THERAPY GOALS:  (Developed with and agreed upon by patient and/or caregiver. )  LTG:  (1.)Mr. Rui Escamilla will move from supine to sit and sit to supine , scoot up and down and roll side to side in bed with MODIFIED INDEPENDENCE within 7 treatment day(s). (2.)Mr. Rui Escamilla will transfer from bed to chair and chair to bed with MODIFIED INDEPENDENCE using the least restrictive device within 7 treatment day(s). (3.)Mr. Rui Escamilla will ambulate with SUPERVISION for 250 feet with the least restrictive device within 7 treatment day(s). (4.)Mr. Rui Escamilla will participate in therapeutic activity/exercises x 25 minutes for increased strength within 7 treatment days. PHYSICAL THERAPY: Daily Note and PM Treatment Day # 1    Remedios Barr is a 62 y.o. male   PRIMARY DIAGNOSIS: Acute systolic congestive heart failure ([facility] [address]  CHF with cardiomyopathy ([facility] [address] [I50.9, I42.9]  Tachycardia [R00.0]         ASSESSMENT:     REHAB RECOMMENDATIONS: CURRENT LEVEL OF FUNCTION:  (Most Recently Demonstrated)   Recommendation to date pending progress:  [address] [place] [address] Therapy  Equipment:    Rolling Walker Bed Mobility:   Modified Independent  Sit to Stand:   Minimal Assistance  Transfers:   Minimal Assistance  Gait/Mobility:   Minimal Assistance     ASSESSMENT:  Mr. Rui Escamilla was supine in bed and agreeable to treatment. He was given Jean for STS transfers this afternoon and ambulated both with and without RW. Pt appeared much more steady using RW but still fatigued quickly with knee buckling observed. Pt performed seated exercises and then ambulated in morrell with chair follow. Slight progress in ambulation since AM session.      SUBJECTIVE:   Mr. Rui Escamilla states, \"That's only going to set me back\"    SOCIAL HISTORY/ LIVING ENVIRONMENT: see eval  Home Environment: Private residence  One/Two Story Residence: One story  Living Alone: No  Support Systems: Spouse/Significant Other,Child(charlene),Other Family Member(s)  OBJECTIVE:     PAIN: VITAL SIGNS: LINES/DRAINS:   Pre Treatment: Pain Screen  Pain Scale 1: Numeric (0 - 10)  Pain Intensity 1: 8  Pain Onset 1: acute  Pain Location 1: Foot  Pain Orientation 1: Left;Right  Post Treatment: no change reported   none  O2 Device: None (Room air)     MOBILITY: I Mod I S SBA CGA Min Mod Max Total  NT x2 Comments:   Bed Mobility    Rolling [] [] [x] [] [] [] [] [] [] [] []    Supine to Sit [] [] [x] [] [] [] [] [] [] [] []    Scooting [] [] [] [] [] [] [] [] [] [] []    Sit to Supine [] [] [] [] [] [] [] [] [] [] []    Transfers    Sit to Stand [] [] [] [] [] [x] [] [] [] [] []    Bed to Chair [] [] [] [] [] [x] [x] [] [] [] []    Stand to Sit [] [] [] [] [] [x] [] [] [] [] []    I=Independent, Mod I=Modified Independent, S=Supervision, SBA=Standby Assistance, CGA=Contact Guard Assistance,   Min=Minimal Assistance, Mod=Moderate Assistance, Max=Maximal Assistance, Total=Total Assistance, NT=Not Tested    BALANCE: Good Fair+ Fair Fair- Poor NT Comments   Sitting Static [x] [] [] [] [] []    Sitting Dynamic [x] [] [] [] [] []              Standing Static [] [] [x] [] [] []    Standing Dynamic [] [] [] [x] [] []      GAIT: I Mod I S SBA CGA Min Mod Max Total  NT x2 Comments:   Level of Assistance [] [] [] [] [] [x] [] [] [] [] []    Distance 30 ft + 12 ft    DME Rolling Walker and Gait Belt    Gait Quality Slow gait speed and increased buckling with ambulationg    Weightbearing  Status N/A     I=Independent, Mod I=Modified Independent, S=Supervision, SBA=Standby Assistance, CGA=Contact Guard Assistance,   Min=Minimal Assistance, Mod=Moderate Assistance, Max=Maximal Assistance, Total=Total Assistance, NT=Not Tested    PLAN:   FREQUENCY/DURATION: PT Plan of Care: 3 times/week for duration of hospital stay or until stated goals are met, whichever comes first.  TREATMENT:     TREATMENT:   ($$ Therapeutic Activity: 8-22 mins  $$ Therapeutic Exercises: 8-22 mins    )  Therapeutic Activity (15 Minutes):  Therapeutic activity included Supine to Sit, Transfer Training, Ambulation on level ground and Standing balance to improve functional Mobility, Strength and Activity tolerance. Therapeutic Exercise (8 Minutes): Therapeutic exercises noted below to improve functional activity tolerance, strength and mobility.      TREATMENT GRID:   Date:  3/3/22 Date:   Date:     Activity/Exercise Parameters Parameters Parameters   LAQ 1 x 20 B     Seated marching 1 x 15 B     Hip ABD/ADD 1 x 10 B                                   AFTER TREATMENT POSITION/PRECAUTIONS:  Bed, Needs within reach and RN notified    INTERDISCIPLINARY COLLABORATION:  RN/PCT and PT/PTA    TOTAL TREATMENT DURATION:  PT Patient Time In/Time Out  Time In: 1322  Time Out: [address], PT, DPT

## 2022-03-03 NOTE — PROGRESS NOTES
CM met with pt to discuss discharge needs. CM offered home health referral a second time. Pt continues to decline. Tentative discharge 3/3.

## 2022-03-03 NOTE — DISCHARGE SUMMARY
[facility] Cardiology Discharge Summary     Patient ID:  Corinne Whitman  [id number]  62 y.o.  1965    Admit date: 2/24/2022    Discharge date:  3/4/22    Admitting Physician: Zach De Leon MD     Discharge Physician: TIFFANY Ferguson/Dr. Alanis    Admission Diagnoses: CHF with cardiomyopathy ([facility] [I50.9, I42.9]  Tachycardia [R00.0]    Discharge Diagnoses:    Diagnosis    NSVT (nonsustained ventricular tachycardia) ([facility]    Hypomagnesemia    ICD (implantable cardioverter-defibrillator) discharge    Tachycardia    Acute systolic congestive heart failure (HCC)    Severe Esophageal Dysmotility Disorder by UGI    Generalized abdominal pain    Hiatal hernia without obstruction (present since at least 2010)    Hyperbilirubinemia    Myalgia    Demand ischemia ([facility]    YAO (acute kidney injury) ([facility]    Gallstones    CHF (congestive heart failure) ([facility])    Hypokalemia    Esophageal dysphagia    SVT (supraventricular tachycardia) ([facility])    Inspiratory stridor    Transaminitis    CAMARILLO (dyspnea on exertion)    Non-ischemic cardiomyopathy (HCC)    Acute gastroenteritis    HTN (hypertension)    Chronic systolic (congestive) heart failure (HCC)    Acute systolic heart failure ([facility]     Transitional care follow up with [facility] Cardiology Dr. Jeanna Bender 3-14-22 at [address] [place] [address] office    Cardiology Procedures this admission:  None  Consults: None    Hospital Course: Patient presented to the office of [facility] cardiology and was seen by Dr Darryle Ruths for complaints of progressive shortness of breath and lower extremity edema. Noted to be volume overloaded and admitted to [address] [place] [address] for further management. Known EF of 15% s/p ICD. Lasix had been stopped after prior admission. The patient was started on IV Lasix drip for diuresis. He diuresed well and felt better. Pt was noted to have SVT vs VT on ICD interrogation.      On 2-25 pt went into VT/V fib with LOC requiring defibrillation per ICD, intermittent torsades, a subclavian line was placed and IV amiodarone started and PM coreg given. Pt rhythm returned to NS. He was continued on diuresis. K low and repleted. Diuresed well and changed to bolus IV lasix. Pt anxious about recurrent ventricular arrhythmias as well as debility- was evaluated by PT/OT. Pt had RUE pain- ultrasound negative for DVT- treated for musculoskeletal pain. The morning of 3/4/22 patient was up feeling well without any complaints shortness of breath. Diuresed - 15 L. LE edema was much improved. Patient's labs were stable with creatinine of 1.2. Patient was seen and examined by Dr. Cuauhtemoc Morgan and determined stable and ready for discharge. Patient was instructed on the importance of medication compliance, low sodium diet, 2 liter per day fluid restriction and daily weights. For maximized medical therapy of congestive heart failure, patient will continue use of BB and ACE-I. The patient will have close transitional care follow up with [facility] Cardiology Dr. Iwona Wang 3-14-22 at [address] [place] [address] office . Pt has been referred to cardiac rehab. Check BMP and mag in 5 days. 30 minute discussion w pt re hospital course and d/c instructions. All questions answered. Multiple questions about meds, all answered. Discussed need to monitor electrolytes and renal function closely. Pt requesting norco x 5 days as taking prior to admission. Discussed with the patient importance of diet and exercise to prevent further cardiovascular disease. Pt to f/u w PCP re pain medicine. DISPOSITION: The patient is being discharged home in stable condition on a low saturated fat, low cholesterol and low salt diet. The patient is instructed to advance activities as tolerated to the limit of fatigue or shortness of breath. The patient is informed to monitor daily weights and maintain a 2 liter per day fluid restriction.   The patient is instructed to call the office for any shortness of breath, weight gain, or increased peripheral edema. Discharge Exam:   Visit Vitals  /85 (BP 1 Location: Left upper arm, BP Patient Position: At rest)   Pulse 87   Temp 97.6 °F (36.4 °C)   Resp 16   Ht 5' 11\" (1.803 m)   Wt 84.9 kg (187 lb 3.2 oz)   SpO2 96%   BMI 26.11 kg/m²     Patient has been seen by Dr. Tylor Meredith: see his progress note for exam details. Recent Results (from the past 24 hour(s))   METABOLIC PANEL, BASIC    Collection Time: 03/04/22  4:06 AM   Result Value Ref Range    Sodium 133 (L) 138 - 145 mmol/L    Potassium 4.0 3.5 - 5.1 mmol/L    Chloride 103 98 - 107 mmol/L    CO2 25 21 - 32 mmol/L    Anion gap 5 (L) 7 - 16 mmol/L    Glucose 101 (H) 65 - 100 mg/dL    BUN 10 6 - 23 MG/DL    Creatinine 1.20 0.8 - 1.5 MG/DL    GFR est AA >60 >60 ml/min/1.73m2    GFR est non-AA >60 >60 ml/min/1.73m2    Calcium 9.0 8.3 - 10.4 MG/DL   CBC W/O DIFF    Collection Time: 03/04/22  4:06 AM   Result Value Ref Range    WBC 4.9 4.3 - 11.1 K/uL    RBC 3.67 (L) 4.23 - 5.6 M/uL    HGB 11.0 (L) 13.6 - 17.2 g/dL    HCT 33.8 (L) 41.1 - 50.3 %    MCV 92.1 79.6 - 97.8 FL    MCH 30.0 26.1 - 32.9 PG    MCHC 32.5 31.4 - 35.0 g/dL    RDW 14.9 (H) 11.9 - 14.6 %    PLATELET 347 597 - 514 K/uL    MPV 9.9 9.4 - 12.3 FL    ABSOLUTE NRBC 0.00 0.0 - 0.2 K/uL   MAGNESIUM    Collection Time: 03/04/22  4:06 AM   Result Value Ref Range    Magnesium 1.5 (L) 1.8 - 2.4 mg/dL         Patient Instructions:   Current Discharge Medication List      START taking these medications    Details   amiodarone (CORDARONE) 200 mg tablet Take 1 Tablet by mouth daily. Qty: 30 Tablet, Refills: 0  Start date: 3/4/2022      levalbuterol tartrate (XOPENEX) 45 mcg/actuation inhaler Take 2 Puffs by inhalation every four (4) hours as needed for Wheezing or Shortness of Breath. Qty: 15 g, Refills: 1  Start date: 3/4/2022      spironolactone (ALDACTONE) 50 mg tablet Take 1 Tablet by mouth daily.   Qty: 90 Tablet, Refills: 3  Start date: 3/4/2022 CONTINUE these medications which have CHANGED    Details   ivabradine (CORLANOR) 7.5 mg tablet Take 1 Tablet by mouth two (2) times daily (with meals) for 30 days. Qty: 60 Tablet, Refills: 0  Start date: 3/4/2022, End date: 4/3/2022      cyclobenzaprine (FLEXERIL) 10 mg tablet Take 1 Tablet by mouth three (3) times daily as needed for Muscle Spasm(s). Qty: 12 Tablet, Refills: 0  Start date: 3/4/2022      HYDROcodone-acetaminophen (Norco)  mg tablet Take 1 Tablet by mouth every six (6) hours as needed for Pain for up to 5 days. Max Daily Amount: 4 Tablets. Qty: 20 Tablet, Refills: 0  Start date: 3/4/2022, End date: 3/9/2022    Associated Diagnoses: Pain         CONTINUE these medications which have NOT CHANGED    Details   furosemide (Lasix) 40 mg tablet Take  by mouth two (2) times a day.      gabapentin (NEURONTIN) 300 mg capsule Take 1 Capsule by mouth two (2) times a day for 30 days. Qty: 60 Capsule, Refills: 0      carvediloL (COREG) 12.5 mg tablet Take 1 Tablet by mouth two (2) times daily (with meals) for 30 days. Qty: 60 Tablet, Refills: 0      promethazine (PHENERGAN) 25 mg tablet Take 1 Tablet by mouth every six (6) hours as needed for Nausea. Qty: 12 Tablet, Refills: 0      atorvastatin (LIPITOR) 80 mg tablet Take 1 Tablet by mouth daily. Qty: 90 Tablet, Refills: 3    Associated Diagnoses: High cholesterol      azelastine (ASTELIN) 137 mcg (0.1 %) nasal spray 1 [place] by Both Nostrils route two (2) times a day. Use in each nostril as directed  Qty: 3 Each, Refills: 3    Associated Diagnoses: Seasonal allergic rhinitis due to pollen      ALPRAZolam (XANAX) 1 mg tablet Take 1 Tablet by mouth nightly. Max Daily Amount: 1 mg. Indications: anxious  Qty: 30 Tablet, Refills: 3    Associated Diagnoses: Anxiety      potassium chloride (K-DUR, KLOR-CON M20) 20 mEq tablet Take 20 mEq by mouth daily. naloxone (Narcan) 4 mg/actuation nasal spray Use 1 spray intranasally, then discard.  Repeat with new spray every 2 min as needed for opioid overdose symptoms, alternating nostrils. Qty: 1 Each, Refills: 0      valsartan (DIOVAN) 40 mg tablet Take 1 Tablet by mouth daily for 30 days. Qty: 30 Tablet, Refills: 0      dicyclomine (BENTYL) 10 mg capsule Take 2 Capsules by mouth three (3) times daily for 30 days. Qty: 180 Capsule, Refills: 0      sucralfate (CARAFATE) 1 gram tablet Take 1 Tablet by mouth four (4) times daily for 30 days. Qty: 120 Tablet, Refills: 0      pantoprazole (PROTONIX) 40 mg tablet Take 1 Tablet by mouth two (2) times a day for 30 days. Qty: 60 Tablet, Refills: 0      cholestyramine-aspartame (QUESTRAN LIGHT) 4 gram packet Take 1 Packet by mouth daily for 15 days. Qty: 15 Packet, Refills: 0      guaiFENesin 200 mg/5 mL liqd Take 5 mL by mouth every six (6) hours as needed for Cough. Qty: 120 mL, Refills: 0      rizatriptan (MAXALT-MLT) 10 mg disintegrating tablet TAKE ONE TABLET BY MOUTH ONCE AS NEEDED  Qty: 10 Tablet, Refills: 6    Associated Diagnoses: Periodic headache syndrome, not intractable      sildenafil citrate (Viagra) 100 mg tablet Take 1 Tablet by mouth as needed (as directed). Qty: 10 Tablet, Refills: 3      aspirin delayed-release 81 mg tablet Take 1 Tablet by mouth daily. Qty: 30 Tablet, Refills: 0      albuterol (PROVENTIL HFA, VENTOLIN HFA, PROAIR HFA) 90 mcg/actuation inhaler Take 2 Puffs by inhalation every four (4) hours as needed for Wheezing or Shortness of Breath.   Qty: 1 Inhaler, Refills: 0         STOP taking these medications       ondansetron (ZOFRAN ODT) 4 mg disintegrating tablet Comments:   Reason for Stopping:         eplerenone (INSPRA) 25 mg tablet Comments:   Reason for Stopping:                 Signed:  Reina High PA-C  3/3/2022  7:30 AM

## 2022-03-03 NOTE — PROGRESS NOTES
ACUTE PHYSICAL THERAPY GOALS:  (Developed with and agreed upon by patient and/or caregiver. )  LTG:  (1.)Mr. Neda Pavon will move from supine to sit and sit to supine , scoot up and down and roll side to side in bed with MODIFIED INDEPENDENCE within 7 treatment day(s). (2.)Mr. Neda Pavon will transfer from bed to chair and chair to bed with MODIFIED INDEPENDENCE using the least restrictive device within 7 treatment day(s). (3.)Mr. Neda Pavon will ambulate with SUPERVISION for 250 feet with the least restrictive device within 7 treatment day(s). (4.)Mr. Neda Pavon will participate in therapeutic activity/exercises x 25 minutes for increased strength within 7 treatment days. ________________________________________________________________________________________________          PHYSICAL THERAPY ASSESSMENT: Initial Assessment and AM PT Treatment Day # 1      Ab Delgado is a 62 y.o. male   PRIMARY DIAGNOSIS: Acute systolic congestive heart failure (HCC)  [facility] ([facility] [I50.9, I42.9]  Tachycardia [R00.0]       Reason for Referral:    ICD-10: Treatment Diagnosis: Generalized Muscle Weakness (M62.81)  Difficulty in walking, Not elsewhere classified (R26.2)  INPATIENT: Payor: BLUE CROSS / Plan: [place] / Product Type: PPO /     ASSESSMENT:     REHAB RECOMMENDATIONS:   Recommendation to date pending progress:  [address] [place] [address]  Equipment:    Rolling Walker     PRIOR LEVEL OF FUNCTION:  (Prior to Hospitalization) INITIAL/CURRENT LEVEL OF FUNCTION:  (Most Recently Demonstrated)   Bed Mobility:   Independent  Sit to Stand:   Independent  Transfers:   Independent  Gait/Mobility:   Independent Bed Mobility:   Standby Assistance  Sit to Stand:  [facility] Assistance  Transfers:   Minimal Assistance  Gait/Mobility:   min-modA x 2     ASSESSMENT:  [facility]. Neda Pav[facility] [facility] and decreased strength in B LEs. Pt performed bed mobility today with SBA and good sitting balance.   He stood with SBA-CGA and fair standing balance. Pt ambulated using handrail for short distance with increased trunk sway. He became progressively unsteady with decreased insight in to deficits. Pt given min-modA x 2 for transfer back. Mr. Dagoberto Bacon could benefit from skilled PT as he is currently functioning below his baseline.         SUBJECTIVE:   Mr. Dagoberto Bacon states, \"I don't know how that happened\"    SOCIAL HISTORY/LIVING ENVIRONMENT: Lives with spouse and independent with ambulation PTA; recent falls  Home Environment: Private residence  One/Two Story Residence: One story  Living Alone: No  Support Systems: Spouse/Significant Other,Child(charlene),Other Family Member(s)  OBJECTIVE:     PAIN: VITAL SIGNS: LINES/DRAINS:   Pre Treatment: Pain Screen  Pain Scale 1: Numeric (0 - 10)  Pain Intensity 1: 0  Post Treatment: 0   none  O2 Device: None (Room air)     GROSS EVALUATION:  B LE Within Functional Limits Abnormal/ Functional Abnormal/ Non-Functional (see comments) Not Tested Comments:   AROM [x] [] [] []    PROM [] [] [] []    Strength [] [x] [] []    Balance [] [x] [x] [] Fair but progressed to poor with walking   Posture [] [] [] []    Sensation [] [] [] []    Coordination [] [] [] []    Tone [] [] [] []    Edema [] [] [] []    Activity Tolerance [] [] [x] [] Increased dizziness with ambulating    [] [] [] []      COGNITION/  PERCEPTION: Intact Impaired   (see comments) Comments:   Orientation [x] []    Vision [] []    Hearing [] []    Command Following [x] []    Safety Awareness [] [x] Decreased insight in to deficits    [] []      MOBILITY: I Mod I S SBA CGA Min Mod Max Total  NT x2 Comments:   Bed Mobility    Rolling [] [] [] [x] [] [] [] [] [] [] []    Supine to Sit [] [] [] [x] [] [] [] [] [] [] []    Scooting [] [] [] [] [] [] [] [] [] [] []    Sit to Supine [] [] [] [] [] [] [] [] [] [] []    Transfers    Sit to Stand [] [] [] [x] [x] [] [] [] [] [] []    Bed to Chair [] [] [] [] [] [x] [x] [] [] [] [x]    Stand to Sit [] [] [] [] [] [x] [] [] [] [] [x]    I=Independent, Mod I=Modified Independent, S=Supervision, SBA=Standby Assistance, CGA=Contact Guard Assistance,   Min=Minimal Assistance, Mod=Moderate Assistance, Max=Maximal Assistance, Total=Total Assistance, NT=Not Tested  GAIT: I Mod I S SBA CGA Min Mod Max Total  NT x2 Comments:   Level of Assistance [] [] [] [] [] [x] [x] [] [] [] [x]    Distance 30 ft    DME handrail    Gait Quality Increasing unsteadiness    Weightbearing Status N/A     I=Independent, Mod I=Modified Independent, S=Supervision, SBA=Standby Assistance, CGA=Contact Guard Assistance,   Min=Minimal Assistance, Mod=Moderate Assistance, Max=Maximal Assistance, Total=Total Assistance, NT=Not [address] Form       How much difficulty does the patient currently have. .. Unable A Lot A Little None   1. Turning over in bed (including adjusting bedclothes, sheets and blankets)? [] 1   [] 2   [] 3   [x] 4   2. Sitting down on and standing up from a chair with arms ( e.g., wheelchair, bedside commode, etc.)   [] 1   [] 2   [x] 3   [] 4   3. Moving from lying on back to sitting on the side of the bed? [] 1   [] 2   [] 3   [x] 4   How much help from another person does the patient currently need. .. Total A Lot A Little None   4. Moving to and from a bed to a chair (including a wheelchair)? [] 1   [] 2   [x] 3   [] 4   5. Need to walk in hospital room? [] 1   [x] 2   [] 3   [] 4   6. Climbing 3-5 steps with a railing? [] 1   [x] 2   [] 3   [] 4   © 2007, Trustees of [facility] [employer], under license to [employer]. All rights reserved     Score:  Initial: 18 Most Recent: X (Date: -- )    Interpretation of Tool:  Represents activities that are increasingly more difficult (i.e. Bed mobility, Transfers, Gait).     PLAN:   FREQUENCY/DURATION: PT Plan of Care: 3 times/week for duration of hospital stay or until stated goals are met, whichever comes first.    PROBLEM LIST:   (Skilled intervention is medically necessary to address:)  1. Decreased ADL/Functional Activities  2. Decreased Activity Tolerance  3. Decreased Balance  4. Decreased Coordination  5. Decreased Gait Ability  6. Decreased Strength  7. Decreased Transfer Abilities   INTERVENTIONS PLANNED:   (Benefits and precautions of physical therapy have been discussed with the patient.)  1. Therapeutic Activity  2. Therapeutic Exercise/HEP  3. Neuromuscular Re-education  4. Gait Training  5. Manual Therapy  6. Education     TREATMENT:     EVALUATION: Low Complexity : (Untimed Charge)    TREATMENT:   ($$ Therapeutic Activity: 8-22 mins    )  Co-Treatment PT/OT necessary due to patient's decreased overall endurance/tolerance levels, as well as need for high level skilled assistance to complete functional transfers/mobility and functional tasks  Therapeutic Activity (12 Minutes): Therapeutic activity included Rolling, Supine to Sit, Transfer Training, Ambulation on level ground, Sitting balance  and Standing balance to improve functional Mobility, Strength and Activity tolerance.     TREATMENT GRID:  N/A    AFTER TREATMENT POSITION/PRECAUTIONS:  Chair, Needs within reach and RN notified    INTERDISCIPLINARY COLLABORATION:  RN/PCT, PT/PTA and OT/HERNANDEZ    TOTAL TREATMENT DURATION:  PT Patient Time In/Time Out  Time In: 0901  Time Out: [address], PT, DPT

## 2022-03-03 NOTE — PROGRESS NOTES
Pt c/o R arm pain, area of tenderness w light palpiation over bicep. Present for several days. No erythema or swelling, worse w flexion and extension. No better w morphine. Ultrasound negative for DVT 3-2-22    Exam:   Tenderness w light palpation over biceps muscle, no nodules noted, no erythema or drainage, no swelling    Possibly musculoskeletal- discussed w nursing- will try tylenol. Pt with significant anxiety, will try ativan po.     Arelia Bloch, PA

## 2022-03-04 VITALS
SYSTOLIC BLOOD PRESSURE: 113 MMHG | WEIGHT: 187.2 LBS | BODY MASS INDEX: 26.21 KG/M2 | HEIGHT: 71 IN | OXYGEN SATURATION: 98 % | TEMPERATURE: 98.1 F | DIASTOLIC BLOOD PRESSURE: 97 MMHG | RESPIRATION RATE: 17 BRPM | HEART RATE: 80 BPM

## 2022-03-04 PROBLEM — I47.29 NSVT (NONSUSTAINED VENTRICULAR TACHYCARDIA): Status: ACTIVE | Noted: 2022-03-04

## 2022-03-04 LAB
ANION GAP SERPL CALC-SCNC: 5 MMOL/L (ref 7–16)
BUN SERPL-MCNC: 10 MG/DL (ref 6–23)
CALCIUM SERPL-MCNC: 9 MG/DL (ref 8.3–10.4)
CHLORIDE SERPL-SCNC: 103 MMOL/L (ref 98–107)
CO2 SERPL-SCNC: 25 MMOL/L (ref 21–32)
CREAT SERPL-MCNC: 1.2 MG/DL (ref 0.8–1.5)
ERYTHROCYTE [DISTWIDTH] IN BLOOD BY AUTOMATED COUNT: 14.9 % (ref 11.9–14.6)
GLUCOSE SERPL-MCNC: 101 MG/DL (ref 65–100)
HCT VFR BLD AUTO: 33.8 % (ref 41.1–50.3)
HGB BLD-MCNC: 11 G/DL (ref 13.6–17.2)
MAGNESIUM SERPL-MCNC: 1.5 MG/DL (ref 1.8–2.4)
MCH RBC QN AUTO: 30 PG (ref 26.1–32.9)
MCHC RBC AUTO-ENTMCNC: 32.5 G/DL (ref 31.4–35)
MCV RBC AUTO: 92.1 FL (ref 79.6–97.8)
NRBC # BLD: 0 K/UL (ref 0–0.2)
PLATELET # BLD AUTO: 277 K/UL (ref 150–450)
PMV BLD AUTO: 9.9 FL (ref 9.4–12.3)
POTASSIUM SERPL-SCNC: 4 MMOL/L (ref 3.5–5.1)
RBC # BLD AUTO: 3.67 M/UL (ref 4.23–5.6)
SODIUM SERPL-SCNC: 133 MMOL/L (ref 138–145)
WBC # BLD AUTO: 4.9 K/UL (ref 4.3–11.1)

## 2022-03-04 PROCEDURE — 36592 COLLECT BLOOD FROM PICC: CPT

## 2022-03-04 PROCEDURE — 80048 BASIC METABOLIC PNL TOTAL CA: CPT

## 2022-03-04 PROCEDURE — 74011250637 HC RX REV CODE- 250/637: Performed by: NURSE PRACTITIONER

## 2022-03-04 PROCEDURE — 74011250637 HC RX REV CODE- 250/637: Performed by: INTERNAL MEDICINE

## 2022-03-04 PROCEDURE — 74011000250 HC RX REV CODE- 250: Performed by: INTERNAL MEDICINE

## 2022-03-04 PROCEDURE — 99238 HOSP IP/OBS DSCHRG MGMT 30/<: CPT | Performed by: INTERNAL MEDICINE

## 2022-03-04 PROCEDURE — 74011250636 HC RX REV CODE- 250/636: Performed by: NURSE PRACTITIONER

## 2022-03-04 PROCEDURE — 83735 ASSAY OF MAGNESIUM: CPT

## 2022-03-04 PROCEDURE — 74011000250 HC RX REV CODE- 250: Performed by: NURSE PRACTITIONER

## 2022-03-04 PROCEDURE — 85027 COMPLETE CBC AUTOMATED: CPT

## 2022-03-04 RX ORDER — LEVALBUTEROL TARTRATE 45 UG/1
2 AEROSOL, METERED ORAL
Qty: 15 G | Refills: 1 | Status: SHIPPED | OUTPATIENT
Start: 2022-03-04

## 2022-03-04 RX ORDER — LEVALBUTEROL TARTRATE 45 UG/1
2 AEROSOL, METERED ORAL
Qty: 15 G | Refills: 1 | Status: SHIPPED | OUTPATIENT
Start: 2022-03-04 | End: 2022-03-04 | Stop reason: SDUPTHER

## 2022-03-04 RX ORDER — SPIRONOLACTONE 50 MG/1
50 TABLET, FILM COATED ORAL DAILY
Qty: 90 TABLET | Refills: 3 | Status: SHIPPED | OUTPATIENT
Start: 2022-03-04 | End: 2022-03-04 | Stop reason: SDUPTHER

## 2022-03-04 RX ORDER — AMIODARONE HYDROCHLORIDE 200 MG/1
200 TABLET ORAL DAILY
Qty: 30 TABLET | Refills: 0 | Status: SHIPPED | OUTPATIENT
Start: 2022-03-04 | End: 2022-03-04 | Stop reason: SDUPTHER

## 2022-03-04 RX ORDER — AMIODARONE HYDROCHLORIDE 200 MG/1
200 TABLET ORAL DAILY
Qty: 30 TABLET | Refills: 0 | Status: SHIPPED | OUTPATIENT
Start: 2022-03-04 | End: 2022-04-25 | Stop reason: SDUPTHER

## 2022-03-04 RX ORDER — HYDROCODONE BITARTRATE AND ACETAMINOPHEN 10; 325 MG/1; MG/1
1 TABLET ORAL
Qty: 20 TABLET | Refills: 0 | Status: SHIPPED | OUTPATIENT
Start: 2022-03-04 | End: 2022-03-09

## 2022-03-04 RX ORDER — CYCLOBENZAPRINE HCL 10 MG
10 TABLET ORAL
Qty: 12 TABLET | Refills: 0 | Status: SHIPPED | OUTPATIENT
Start: 2022-03-04 | End: 2022-05-08

## 2022-03-04 RX ORDER — SPIRONOLACTONE 50 MG/1
50 TABLET, FILM COATED ORAL DAILY
Qty: 90 TABLET | Refills: 3 | Status: SHIPPED | OUTPATIENT
Start: 2022-03-04

## 2022-03-04 RX ORDER — CYCLOBENZAPRINE HCL 10 MG
10 TABLET ORAL
Qty: 12 TABLET | Refills: 0 | Status: SHIPPED | OUTPATIENT
Start: 2022-03-04 | End: 2022-03-04 | Stop reason: SDUPTHER

## 2022-03-04 RX ADMIN — GABAPENTIN 300 MG: 300 CAPSULE ORAL at 08:16

## 2022-03-04 RX ADMIN — SODIUM CHLORIDE, PRESERVATIVE FREE 5 MG: 5 INJECTION INTRAVENOUS at 08:16

## 2022-03-04 RX ADMIN — VALSARTAN 40 MG: 40 TABLET, FILM COATED ORAL at 08:15

## 2022-03-04 RX ADMIN — SPIRONOLACTONE 50 MG: 25 TABLET ORAL at 08:16

## 2022-03-04 RX ADMIN — ATORVASTATIN CALCIUM 80 MG: 80 TABLET, FILM COATED ORAL at 08:16

## 2022-03-04 RX ADMIN — SODIUM CHLORIDE, PRESERVATIVE FREE 5 MG: 5 INJECTION INTRAVENOUS at 04:22

## 2022-03-04 RX ADMIN — HYDROCODONE BITARTRATE AND ACETAMINOPHEN 1 TABLET: 5; 325 TABLET ORAL at 04:22

## 2022-03-04 RX ADMIN — PANTOPRAZOLE SODIUM 40 MG: 40 TABLET, DELAYED RELEASE ORAL at 08:16

## 2022-03-04 RX ADMIN — SODIUM CHLORIDE, PRESERVATIVE FREE 5 MG: 5 INJECTION INTRAVENOUS at 00:41

## 2022-03-04 RX ADMIN — HYDROCODONE BITARTRATE AND ACETAMINOPHEN 1 TABLET: 5; 325 TABLET ORAL at 10:33

## 2022-03-04 RX ADMIN — DICYCLOMINE HYDROCHLORIDE 20 MG: 10 CAPSULE ORAL at 08:16

## 2022-03-04 RX ADMIN — FUROSEMIDE 40 MG: 40 TABLET ORAL at 08:16

## 2022-03-04 RX ADMIN — SUCRALFATE 1 G: 1 TABLET ORAL at 08:16

## 2022-03-04 RX ADMIN — IVABRADINE 7.5 MG: 5 TABLET, FILM COATED ORAL at 08:35

## 2022-03-04 RX ADMIN — HYDROCODONE BITARTRATE AND ACETAMINOPHEN 1 TABLET: 5; 325 TABLET ORAL at 00:23

## 2022-03-04 RX ADMIN — ASPIRIN 81 MG: 81 TABLET ORAL at 08:16

## 2022-03-04 RX ADMIN — CARVEDILOL 12.5 MG: 12.5 TABLET, FILM COATED ORAL at 08:16

## 2022-03-04 RX ADMIN — SODIUM CHLORIDE, PRESERVATIVE FREE 10 ML: 5 INJECTION INTRAVENOUS at 06:24

## 2022-03-04 RX ADMIN — AMIODARONE HYDROCHLORIDE 200 MG: 200 TABLET ORAL at 09:00

## 2022-03-04 NOTE — PROGRESS NOTES
Problem: Falls - Risk of  Goal: *Absence of Falls  Description: Document Miriam Manzanares Fall Risk and appropriate interventions in the flowsheet. Outcome: Progressing Towards Goal  Note: Fall Risk Interventions:  Mobility Interventions: Patient to call before getting OOB,Communicate number of staff needed for ambulation/transfer         Medication Interventions: Teach patient to arise slowly,Patient to call before getting OOB    Elimination Interventions: Call light in reach,Patient to call for help with toileting needs    History of Falls Interventions: Bed/chair exit alarm         Problem: Patient Education: Go to Patient Education Activity  Goal: Patient/Family Education  Outcome: Progressing Towards Goal     Problem: Impaired Skin Integrity/Pressure Injury Treatment  Goal: *Improvement of Existing Pressure Injury  Outcome: Progressing Towards Goal  Goal: *Prevention of pressure injury  Description: Document Abad Scale and appropriate interventions in the flowsheet.   Outcome: Progressing Towards Goal  Note: Pressure Injury Interventions:  Sensory Interventions: Discuss PT/OT consult with provider,Float heels    Moisture Interventions: Absorbent underpads,Minimize layers    Activity Interventions: Increase time out of bed,Pressure redistribution bed/mattress(bed type),PT/OT evaluation    Mobility Interventions: HOB 30 degrees or less,Pressure redistribution bed/mattress (bed type),PT/OT evaluation    Nutrition Interventions: Document food/fluid/supplement intake,Discuss nutritional consult with provider,Offer support with meals,snacks and hydration    Friction and Shear Interventions: Lift sheet,HOB 30 degrees or less                Problem: Patient Education: Go to Patient Education Activity  Goal: Patient/Family Education  Outcome: Progressing Towards Goal

## 2022-03-04 NOTE — PROGRESS NOTES
Care Management Interventions  PCP Verified by CM: Yes (Dr Pickard Said. Patient is seen every 3 months)  Mode of Transport at Discharge: Other (see comment) (Spouse)  Discharge Durable Medical Equipment: No  Physical Therapy Consult: No  Occupational Therapy Consult: No  Support Systems: Spouse/Significant Other,Child(charlene),Other Family Member(s)  Confirm Follow Up Transport: Family  Discharge Location  Patient Expects to be Discharged to[de-identified] Home  CM met with pt to discuss discharge needs. CM offered home health PT. PT declined. CM informed pt that PT recommended RW. Pt is agreeable. CM placed RW from [place] [id number] in pt's room. Pt's spouse to transport pt home.

## 2022-03-04 NOTE — DISCHARGE INSTRUCTIONS
Patient Education   Patient Education   Patient Education   DISPOSITION: The patient is being discharged home in stable condition on a low saturated fat, low cholesterol and low salt diet. The patient is instructed to advance activities as tolerated to the limit of fatigue or shortness of breath. The patient is informed to monitor daily weights and maintain a 2 liter per day fluid restriction. The patient is instructed to call the office for any shortness of breath, weight gain, or increased peripheral edema. Amiodarone (By mouth)   Amiodarone Hydrochloride (h-cdf-MZ-da-wayne wesley-droe-KLOR-alex)  Treats heart rhythm problems. Brand Name(s): Cordarone, Pacerone   There may be other brand names for this medicine. When This Medicine Should Not Be Used: This medicine is not right for everyone. Do not use it if you had an allergic reaction to amiodarone or iodine, or you are pregnant or breastfeeding. How to Use This Medicine:   Tablet  · Take your medicine as directed. Your dose may need to be changed several times to find what works best for you. · Take this medicine the same way every day. This means take it at the same time and take it consistently with or without food. · This medicine should come with a Medication Guide. Ask your pharmacist for a copy if you do not have one. · Missed dose: Take a dose as soon as you remember. If it is almost time for your next dose, wait until then and take a regular dose. Do not take extra medicine to make up for a missed dose. · Store the medicine in a closed container at room temperature, away from heat, moisture, and direct light. Drugs and Foods to Avoid:   Ask your doctor or pharmacist before using any other medicine, including over-the-counter medicines, vitamins, and herbal products. · Some foods and medicines can affect how amiodarone works.  Tell your doctor if you are using any of the following:  ¨ Cimetidine, clopidogrel, cyclosporine, dabigatran, dextromethorphan, digoxin, fentanyl, ledipasvir/sofosbuvir, lithium, loratadine, phenytoin, rifampin, simeprevir, sofosbuvir, or Violeta's wort  ¨ Blood pressure medicines (including diltiazem, verapamil)  ¨ Blood thinner (including warfarin)  ¨ Medicine to lower cholesterol (including atorvastatin, cholestyramine, lovastatin, simvastatin)  ¨ Medicine to treat depression (including trazodone)  ¨ Medicine to treat heart rhythm problems (including flecainide, lidocaine, procainamide, quinidine)  ¨ Medicine to treat HIV/AIDS  ¨ Medicine to treat an infection  ¨ Phenothiazine medicine (including chlorpromazine, perphenazine, promethazine, thioridazine)  · Do not eat grapefruit or drink grapefruit juice while you are using this medicine. Warnings While Using This Medicine:   · It is not safe to take this medicine during pregnancy. It could harm an unborn baby. Tell your doctor right away if you become pregnant. · Tell your doctor if you have liver problems, heart disease, heart failure, thyroid problems, or lung disease or breathing problems. Tell your doctor if you have a pacemaker or another implanted heart device. · This medicine may cause the following problems:  ¨ Lung problems  ¨ Worsening heart rhythm problems  ¨ Thyroid problems  ¨ Liver problems  ¨ Changes in vision  · Do not stop using this medicine suddenly. Your doctor will need to slowly decrease your dose before you stop it completely. · This medicine may make your skin more sensitive to sunlight. Wear sunscreen. Do not use sunlamps or tanning beds. Your skin may become discolored if you use this medicine for a long time. · Your doctor will do lab tests at regular visits to check on the effects of this medicine. Keep all appointments. · Keep all medicine out of the reach of children. Never share your medicine with anyone.   Possible Side Effects While Using This Medicine:   Call your doctor right away if you notice any of these side effects:  · Allergic reaction: Itching or hives, swelling in your face or hands, swelling or tingling in your mouth or throat, chest tightness, trouble breathing  · Blistering, peeling, or red skin rash  · Blurred vision or other vision changes, eye pain  · Chest pain, cough, trouble breathing  · Dark urine or pale stools, nausea, vomiting, loss of appetite, stomach pain, yellow skin or eyes  · Fast, slow, pounding, or uneven heartbeat (new or worsening)  · Lightheadedness, dizziness, or fainting  · Numbness, tingling, or burning pain in your hands, arms, legs, or feet  · Weight gain or loss, nervousness, tremors, trouble sleeping, unusual tiredness, hair loss  If you notice these less serious side effects, talk with your doctor:   · Mild nausea, vomiting, or constipation  If you notice other side effects that you think are caused by this medicine, tell your doctor. Call your doctor for medical advice about side effects. You may report side effects to FDA at 4-452-RLC-4563  © 2017 [facility] Information is for End User's use only and may not be sold, redistributed or otherwise used for commercial purposes. The above information is an  only. It is not intended as medical advice for individual conditions or treatments. Talk to your doctor, nurse or pharmacist before following any medical regimen to see if it is safe and effective for you. Learning About Ventricular Tachycardia  What is ventricular tachycardia? Ventricular tachycardia (say \"barbi-TRICK-yuh-ler vxdo-bw-QFU-albert-uh\"), or VT, is a type of fast heart rhythm. It starts in the lower part of the heart (ventricles). Some forms of VT may get worse and lead to ventricular fibrillation. Both conditions can be life-threatening. What causes it? VT may be caused by a heart problem that affects the structure of the heart or the heart muscle.  These problems may include a heart attack, a heart defect that you were born with, or inflammation in the heart. Sometimes VT happens after heart surgery. Other heart rhythm problems can also lead to it. Some people with normal hearts have VT. This tends to be less serious. Some medicines, such as those used to treat some types of abnormal heart rhythms, can cause VT. Other causes include electrolyte imbalances and using illegal drugs such as stimulants like cocaine. In some cases, the cause isn't known. What are the symptoms? Symptoms of VT include:  · Palpitations. This is an uncomfortable awareness of the heart beating very fast or not in a regular way. · Feeling dizzy or lightheaded. · Shortness of breath. · Chest pain or pressure. · Near-fainting or fainting. Symptoms happen because the heart beats too fast. It can't pump enough blood to the rest of the body. How is VT diagnosed? Your doctor will do an exam and ask about your health history. Your doctor will also do an electrocardiogram (EKG, ECG). This is a tracing of the electrical activity of your heart. VT can come and go. It may be hard to capture with an EKG at your doctor's office. So the doctor may want you to wear a heart monitor. It records your heart rhythm over a few days. You may have lab tests and a chest X-ray. Your doctor may also recommend other tests. · An echocardiogram looks at the structure of your heart. · A stress test can show if the heart muscle is getting enough blood or if there are any blockages in the arteries of the heart. · An electrophysiology (EP) study can find specific areas of your heart that may be causing the VT. The results of these tests can help your doctor decide what treatment options you have. How is it treated? Goals of treatment are to:  · Prevent an abnormal heartbeat. · Improve your symptoms. · Prevent cardiac arrest (the heart stops pumping blood) and sudden death. To prevent VT and relieve symptoms, you may take heart rhythm medicines.   Some people may have a catheter ablation. This procedure destroys small areas of heart tissue that cause the irregular heartbeat. It may make VT happen less often. Or it may stop VT from happening again. Your doctor may recommend a device that can detect a life-threatening abnormal heartbeat and help restore a normal rhythm. This device might be implanted (ICD, or implantable cardioverter-defibrillator) or worn as a vest.  If you have VT that is not stopping, it is a medical emergency. You may need a shock to try to get your heart back into a normal rhythm. This can be from an automated external defibrillator (AED), by paramedics, or through treatment in an emergency room. A doctor may give you medicines if your condition is stable. Follow-up care is a key part of your treatment and safety. Be sure to make and go to all appointments, and call your doctor if you are having problems. It's also a good idea to know your test results and keep a list of the medicines you take. Where can you learn more? Go to http://www.gray.com/  Enter V110 in the search box to learn more about \"Learning About Ventricular Tachycardia. \"  Current as of: April 29, 2021               Content Version: 13.0  © 8989-5769 [employer]. Care instructions adapted under license by [employer] (which disclaims liability or warranty for this information). If you have questions about a medical condition or this instruction, always ask your healthcare professional. [place] [address] any warranty or liability for your use of this information. Heart Failure: Care Instructions  Your Care Instructions     Heart failure occurs when your heart does not pump as much blood as the body needs. Failure does not mean that the heart has stopped pumping but rather that it is not pumping as well as it should. Over time, this causes fluid buildup in your lungs and other parts of your body.  Fluid buildup can cause shortness of breath, fatigue, swollen ankles, and other problems. By taking medicines regularly, reducing sodium (salt) in your diet, checking your weight every day, and making lifestyle changes, you can feel better and live longer. Follow-up care is a key part of your treatment and safety. Be sure to make and go to all appointments, and call your doctor if you are having problems. It's also a good idea to know your test results and keep a list of the medicines you take. How can you care for yourself at home? Medicines    · Be safe with medicines. Take your medicines exactly as prescribed. Call your doctor if you think you are having a problem with your medicine.     · Do not take any vitamins, over-the-counter medicine, or herbal products without talking to your doctor first. Chel Townsend not take ibuprofen (Advil or Motrin) and naproxen (Aleve) without talking to your doctor first. They could make your heart failure worse.     · You may take some of the following medicine. ? Angiotensin-converting enzyme inhibitors (ACEIs) or angiotensin II receptor blockers (ARBs) reduce the heart's workload, lower blood pressure, and reduce swelling. Taking an ACEI or ARB may lower your chance of needing to be hospitalized. ? Beta-blockers can slow heart rate, decrease blood pressure, and improve your condition. Taking a beta-blocker may lower your chance of needing to be hospitalized. ? Diuretics, also called water pills, reduce swelling. You will get more details on the specific medicines your doctor prescribes. Diet    · Your doctor may suggest that you limit sodium. Your doctor can tell you how much sodium is right for you. An example is less than 3,000 mg a day. This includes all the salt you eat in cooking or in packaged foods. People get most of their sodium from processed foods. Fast food and restaurant meals also tend to be very high in sodium.     · Ask your doctor how much liquid you can drink each day.  You may have to limit liquids. Weight    · Weigh yourself without clothing at the same time each day. Record your weight. Call your doctor if you have a sudden weight gain, such as more than 2 to 3 pounds in a day or 5 pounds in a week. (Your doctor may suggest a different range of weight gain.) A sudden weight gain may mean that your heart failure is getting worse. Activity level    · Start light exercise (if your doctor says it is okay). Even if you can only do a small amount, exercise will help you get stronger, have more energy, and manage your weight and your stress. Walking is an easy way to get exercise. Start out by walking a little more than you did before. Bit by bit, increase the amount you walk.     · When you exercise, watch for signs that your heart is working too hard. You are pushing yourself too hard if you cannot talk while you are exercising. If you become short of breath or dizzy or have chest pain, stop, sit down, and rest.     · If you feel \"wiped out\" the day after you exercise, walk slower or for a shorter distance until you can work up to a better pace.     · Get enough rest at night. Sleeping with 1 or 2 pillows under your upper body and head may help you breathe easier. Lifestyle changes    · Do not smoke. Smoking can make a heart condition worse. If you need help quitting, talk to your doctor about stop-smoking programs and medicines. These can increase your chances of quitting for good. Quitting smoking may be the most important step you can take to protect your heart.     · Limit alcohol to 2 drinks a day for men and 1 drink a day for women. Too much alcohol can cause health problems.     · Avoid getting sick from colds and the flu. Get a pneumococcal vaccine shot. If you have had one before, ask your doctor whether you need another dose. Get a flu shot each year. If you must be around people with colds or the flu, wash your hands often. When should you call for help?    Call 911  if you have symptoms of sudden heart failure such as:    · You have severe trouble breathing.     · You cough up pink, foamy mucus.     · You have a new irregular or rapid heartbeat. Call your doctor now or seek immediate medical care if:    · You have new or increased shortness of breath.     · You are dizzy or lightheaded, or you feel like you may faint.     · You have sudden weight gain, such as more than 2 to 3 pounds in a day or 5 pounds in a week. (Your doctor may suggest a different range of weight gain.)     · You have increased swelling in your legs, ankles, or feet.     · You are suddenly so tired or weak that you cannot do your usual activities. Watch closely for changes in your health, and be sure to contact your doctor if you develop new symptoms. Where can you learn more? Go to http://www.gray.com/  Enter Q113 in the search box to learn more about \"Heart Failure: Care Instructions. \"  Current as of: April 29, 2021               Content Version: 13.0  © 2006-2021 [employer]. Care instructions adapted under license by [employer] (which disclaims liability or warranty for this information). If you have questions about a medical condition or this instruction, always ask your healthcare professional. [address] any warranty or liability for your use of this information.

## 2022-03-04 NOTE — PROGRESS NOTES
Problem: Falls - Risk of  Goal: *Absence of Falls  Description: Document Trace Blight Fall Risk and appropriate interventions in the flowsheet. Outcome: Resolved/Met     Problem: Patient Education: Go to Patient Education Activity  Goal: Patient/Family Education  Outcome: Resolved/Met     Problem: Impaired Skin Integrity/Pressure Injury Treatment  Goal: *Improvement of Existing Pressure Injury  Outcome: Resolved/Met  Goal: *Prevention of pressure injury  Description: Document Abad Scale and appropriate interventions in the flowsheet.   Outcome: Resolved/Met     Problem: Patient Education: Go to Patient Education Activity  Goal: Patient/Family Education  Outcome: Resolved/Met     Problem: Patient Education: Go to Patient Education Activity  Goal: Patient/Family Education  Outcome: Resolved/Met     Problem: Heart Failure: Day 1  Goal: Off Pathway (Use only if patient is Off Pathway)  Outcome: Resolved/Met  Goal: Activity/Safety  Outcome: Resolved/Met  Goal: Consults, if ordered  Outcome: Resolved/Met  Goal: Diagnostic Test/Procedures  Outcome: Resolved/Met  Goal: Nutrition/Diet  Outcome: Resolved/Met  Goal: Discharge Planning  Outcome: Resolved/Met  Goal: Medications  Outcome: Resolved/Met  Goal: Respiratory  Outcome: Resolved/Met  Goal: Treatments/Interventions/Procedures  Outcome: Resolved/Met  Goal: Psychosocial  Outcome: Resolved/Met  Goal: *Oxygen saturation within defined limits  Outcome: Resolved/Met  Goal: *Hemodynamically stable  Outcome: Resolved/Met  Goal: *Optimal pain control at patient's stated goal  Outcome: Resolved/Met  Goal: *Anxiety reduced or absent  Outcome: Resolved/Met     Problem: Heart Failure: Day 2  Goal: Off Pathway (Use only if patient is Off Pathway)  Outcome: Resolved/Met  Goal: Activity/Safety  Outcome: Resolved/Met  Goal: Consults, if ordered  Outcome: Resolved/Met  Goal: Diagnostic Test/Procedures  Outcome: Resolved/Met  Goal: Nutrition/Diet  Outcome: Resolved/Met  Goal: Discharge Planning  Outcome: Resolved/Met  Goal: Medications  Outcome: Resolved/Met  Goal: Respiratory  Outcome: Resolved/Met  Goal: Treatments/Interventions/Procedures  Outcome: Resolved/Met  Goal: Psychosocial  Outcome: Resolved/Met  Goal: *Oxygen saturation within defined limits  Outcome: Resolved/Met  Goal: *Hemodynamically stable  Outcome: Resolved/Met  Goal: *Optimal pain control at patient's stated goal  Outcome: Resolved/Met  Goal: *Anxiety reduced or absent  Outcome: Resolved/Met  Goal: *Demonstrates progressive activity  Outcome: Resolved/Met     Problem: Heart Failure: Day 3  Goal: Off Pathway (Use only if patient is Off Pathway)  Outcome: Resolved/Met  Goal: Activity/Safety  Outcome: Resolved/Met  Goal: Diagnostic Test/Procedures  Outcome: Resolved/Met  Goal: Nutrition/Diet  Outcome: Resolved/Met  Goal: Discharge Planning  Outcome: Resolved/Met  Goal: Medications  Outcome: Resolved/Met  Goal: Respiratory  Outcome: Resolved/Met  Goal: Treatments/Interventions/Procedures  Outcome: Resolved/Met  Goal: Psychosocial  Outcome: Resolved/Met  Goal: *Oxygen saturation within defined limits  Outcome: Resolved/Met  Goal: *Hemodynamically stable  Outcome: Resolved/Met  Goal: *Optimal pain control at patient's stated goal  Outcome: Resolved/Met  Goal: *Anxiety reduced or absent  Outcome: Resolved/Met  Goal: *Demonstrates progressive activity  Outcome: Resolved/Met     Problem: Heart Failure: Day 4  Goal: Off Pathway (Use only if patient is Off Pathway)  Outcome: Resolved/Met  Goal: Activity/Safety  Outcome: Resolved/Met  Goal: Diagnostic Test/Procedures  Outcome: Resolved/Met  Goal: Nutrition/Diet  Outcome: Resolved/Met  Goal: Discharge Planning  Outcome: Resolved/Met  Goal: Medications  Outcome: Resolved/Met  Goal: Respiratory  Outcome: Resolved/Met  Goal: Treatments/Interventions/Procedures  Outcome: Resolved/Met  Goal: Psychosocial  Outcome: Resolved/Met  Goal: *Oxygen saturation within defined limits  Outcome: Resolved/Met  Goal: *Hemodynamically stable  Outcome: Resolved/Met  Goal: *Optimal pain control at patient's stated goal  Outcome: Resolved/Met  Goal: *Anxiety reduced or absent  Outcome: Resolved/Met  Goal: *Demonstrates progressive activity  Outcome: Resolved/Met     Problem: Heart Failure: Day 5  Goal: Off Pathway (Use only if patient is Off Pathway)  Outcome: Resolved/Met  Goal: Activity/Safety  Outcome: Resolved/Met  Goal: Diagnostic Test/Procedures  Outcome: Resolved/Met  Goal: Nutrition/Diet  Outcome: Resolved/Met  Goal: Discharge Planning  Outcome: Resolved/Met  Goal: Medications  Outcome: Resolved/Met  Goal: Respiratory  Outcome: Resolved/Met  Goal: Treatments/Interventions/Procedures  Outcome: Resolved/Met  Goal: Psychosocial  Outcome: Resolved/Met     Problem: Heart Failure: Discharge Outcomes  Goal: *Demonstrates ability to perform prescribed activity without shortness of breath or discomfort  Outcome: Resolved/Met  Goal: *Left ventricular function assessment completed prior to or during stay, or planned for post-discharge  Outcome: Resolved/Met  Goal: *ACEI prescribed if LVEF less than 40% and no contraindications or ARB prescribed  Outcome: Resolved/Met  Goal: *Verbalizes understanding and describes prescribed diet  Outcome: Resolved/Met  Goal: *Verbalizes understanding/describes prescribed medications  Outcome: Resolved/Met  Goal: *Describes available resources and support systems  Description: (eg: Home Health, Palliative Care, Advanced Medical Directive)  Outcome: Resolved/Met  Goal: *Describes smoking cessation resources  Outcome: Resolved/Met  Goal: *Understands and describes signs and symptoms to report to providers(Stroke Metric)  Outcome: Resolved/Met  Goal: *Describes/verbalizes understanding of follow-up/return appt  Description: (eg: to physicians, diabetes treatment coordinator, and other resources  Outcome: Resolved/Met  Goal: *Describes importance of continuing daily weights and changes to report to physician  Outcome: Resolved/Met     Problem: Hypertension  Goal: *Blood pressure within specified parameters  Outcome: Resolved/Met  Goal: *Fluid volume balance  Outcome: Resolved/Met  Goal: *Labs within defined limits  Outcome: Resolved/Met     Problem: Patient Education: Go to Patient Education Activity  Goal: Patient/Family Education  Outcome: Resolved/Met     Problem: Anxiety  Goal: *Alleviation of anxiety  Outcome: Resolved/Met  Goal: *Alleviation of anxiety (Palliative Care)  Outcome: Resolved/Met     Problem: Patient Education: Go to Patient Education Activity  Goal: Patient/Family Education  Outcome: Resolved/Met     Problem: Patient Education: Go to Patient Education Activity  Goal: Patient/Family Education  Outcome: Resolved/Met     Problem: Patient Education: Go to Patient Education Activity  Goal: Patient/Family Education  Outcome: Resolved/Met

## 2022-03-14 NOTE — ROUTINE PROCESS
Scanned dose of [place] at [address] on [facility]. This was a one time dose order given by MD.   Oncoming nurse scanned another norco after 2000, which would have been the correct time for a q4h dose. Pt was given orders to be able to receive the q4h Norco and the one time dose tablet in order to avoid giving morphine IV. Witnessed scenario by my self, Satish Del Rio RN along with Mable Mckinney RN. Resolved on the [facility] for proper Narc documentation on 3/14/22.

## 2022-03-18 PROBLEM — I24.89 DEMAND ISCHEMIA: Status: ACTIVE | Noted: 2021-08-24

## 2022-03-18 PROBLEM — R06.1 INSPIRATORY STRIDOR: Status: ACTIVE | Noted: 2017-03-21

## 2022-03-18 PROBLEM — R74.01 TRANSAMINITIS: Status: ACTIVE | Noted: 2017-03-14

## 2022-03-18 PROBLEM — I24.8 DEMAND ISCHEMIA (HCC): Status: ACTIVE | Noted: 2021-08-24

## 2022-03-18 PROBLEM — E83.42 HYPOMAGNESEMIA: Status: ACTIVE | Noted: 2022-02-26

## 2022-03-18 PROBLEM — N17.9 AKI (ACUTE KIDNEY INJURY) (HCC): Status: ACTIVE | Noted: 2021-08-23

## 2022-03-18 PROBLEM — R10.84 GENERALIZED ABDOMINAL PAIN: Status: ACTIVE | Noted: 2022-02-14

## 2022-03-19 PROBLEM — I50.21 ACUTE SYSTOLIC CONGESTIVE HEART FAILURE (HCC): Status: ACTIVE | Noted: 2022-02-24

## 2022-03-19 PROBLEM — I47.29 NSVT (NONSUSTAINED VENTRICULAR TACHYCARDIA) (HCC): Status: ACTIVE | Noted: 2022-03-04

## 2022-03-19 PROBLEM — M79.10 MYALGIA: Status: ACTIVE | Noted: 2021-09-14

## 2022-03-19 PROBLEM — E80.6 HYPERBILIRUBINEMIA: Status: ACTIVE | Noted: 2022-02-07

## 2022-03-19 PROBLEM — I47.10 SVT (SUPRAVENTRICULAR TACHYCARDIA): Status: ACTIVE | Noted: 2018-12-22

## 2022-03-19 PROBLEM — I47.1 SVT (SUPRAVENTRICULAR TACHYCARDIA) (HCC): Status: ACTIVE | Noted: 2018-12-22

## 2022-03-19 PROBLEM — K22.4 ESOPHAGEAL DYSMOTILITY: Status: ACTIVE | Noted: 2022-02-14

## 2022-03-19 PROBLEM — K44.9 HIATAL HERNIA: Status: ACTIVE | Noted: 2022-02-14

## 2022-03-19 PROBLEM — E87.6 HYPOKALEMIA: Status: ACTIVE | Noted: 2020-07-03

## 2022-03-19 PROBLEM — Z45.02 ICD (IMPLANTABLE CARDIOVERTER-DEFIBRILLATOR) DISCHARGE: Status: ACTIVE | Noted: 2022-02-26

## 2022-03-19 PROBLEM — K80.20 GALLSTONES: Status: ACTIVE | Noted: 2021-04-16

## 2022-03-20 PROBLEM — I50.9 CHF (CONGESTIVE HEART FAILURE) (HCC): Status: ACTIVE | Noted: 2020-10-02

## 2022-03-20 PROBLEM — R00.0 TACHYCARDIA: Status: ACTIVE | Noted: 2022-02-24

## 2022-03-20 PROBLEM — R13.19 ESOPHAGEAL DYSPHAGIA: Status: ACTIVE | Noted: 2019-05-10

## 2022-05-05 ENCOUNTER — HOSPITAL ENCOUNTER (OUTPATIENT)
Age: 57
Setting detail: OBSERVATION
Discharge: HOME HEALTH | End: 2022-05-08
Attending: EMERGENCY MEDICINE | Admitting: FAMILY MEDICINE
Payer: COMMERCIAL

## 2022-05-05 ENCOUNTER — APPOINTMENT (OUTPATIENT)
Dept: CT IMAGING | Age: 57
End: 2022-05-05
Attending: EMERGENCY MEDICINE
Payer: COMMERCIAL

## 2022-05-05 ENCOUNTER — APPOINTMENT (OUTPATIENT)
Dept: GENERAL RADIOLOGY | Age: 57
End: 2022-05-05
Attending: EMERGENCY MEDICINE
Payer: COMMERCIAL

## 2022-05-05 ENCOUNTER — APPOINTMENT (OUTPATIENT)
Dept: NON INVASIVE DIAGNOSTICS | Age: 57
End: 2022-05-05
Attending: FAMILY MEDICINE
Payer: COMMERCIAL

## 2022-05-05 DIAGNOSIS — K22.4 ESOPHAGEAL DYSMOTILITY: ICD-10-CM

## 2022-05-05 DIAGNOSIS — I42.9 CARDIOMYOPATHY, UNSPECIFIED TYPE (HCC): ICD-10-CM

## 2022-05-05 DIAGNOSIS — R29.90 STROKE-LIKE SYMPTOMS: Primary | ICD-10-CM

## 2022-05-05 DIAGNOSIS — G89.4 CHRONIC PAIN SYNDROME: Chronic | ICD-10-CM

## 2022-05-05 DIAGNOSIS — R07.89 ATYPICAL CHEST PAIN: ICD-10-CM

## 2022-05-05 DIAGNOSIS — M79.10 MYALGIA: ICD-10-CM

## 2022-05-05 DIAGNOSIS — F51.8 HYPNIC JERKS: ICD-10-CM

## 2022-05-05 PROBLEM — N17.9 AKI (ACUTE KIDNEY INJURY) (HCC): Status: RESOLVED | Noted: 2021-08-23 | Resolved: 2022-05-05

## 2022-05-05 PROBLEM — E83.42 HYPOMAGNESEMIA: Status: RESOLVED | Noted: 2022-02-26 | Resolved: 2022-05-05

## 2022-05-05 PROBLEM — F10.20 ALCOHOL DEPENDENCE (HCC): Status: ACTIVE | Noted: 2018-09-05

## 2022-05-05 PROBLEM — E87.6 HYPOKALEMIA: Status: RESOLVED | Noted: 2020-07-03 | Resolved: 2022-05-05

## 2022-05-05 PROBLEM — E80.6 HYPERBILIRUBINEMIA: Status: RESOLVED | Noted: 2022-02-07 | Resolved: 2022-05-05

## 2022-05-05 PROBLEM — I50.21 ACUTE SYSTOLIC CONGESTIVE HEART FAILURE (HCC): Status: RESOLVED | Noted: 2022-02-24 | Resolved: 2022-05-05

## 2022-05-05 PROBLEM — G45.9 TRANSIENT ISCHEMIC ATTACK: Status: ACTIVE | Noted: 2022-05-05

## 2022-05-05 PROBLEM — F10.10 ALCOHOL ABUSE: Status: ACTIVE | Noted: 2018-09-05

## 2022-05-05 PROBLEM — R10.84 GENERALIZED ABDOMINAL PAIN: Status: RESOLVED | Noted: 2022-02-14 | Resolved: 2022-05-05

## 2022-05-05 PROBLEM — R00.0 TACHYCARDIA: Status: RESOLVED | Noted: 2022-02-24 | Resolved: 2022-05-05

## 2022-05-05 LAB
ALBUMIN SERPL-MCNC: 3.3 G/DL (ref 3.5–5)
ALBUMIN/GLOB SERPL: 0.9 {RATIO} (ref 1.2–3.5)
ALP SERPL-CCNC: 70 U/L (ref 50–136)
ALT SERPL-CCNC: 31 U/L (ref 12–65)
ANION GAP SERPL CALC-SCNC: 13 MMOL/L (ref 7–16)
AST SERPL-CCNC: 38 U/L (ref 15–37)
ATRIAL RATE: 90 BPM
BASOPHILS # BLD: 0 K/UL (ref 0–0.2)
BASOPHILS NFR BLD: 1 % (ref 0–2)
BILIRUB SERPL-MCNC: 1.1 MG/DL (ref 0.2–1.1)
BNP SERPL-MCNC: 267 PG/ML (ref 5–125)
BUN SERPL-MCNC: 14 MG/DL (ref 6–23)
CALCIUM SERPL-MCNC: 8.4 MG/DL (ref 8.3–10.4)
CALCULATED P AXIS, ECG09: 44 DEGREES
CALCULATED R AXIS, ECG10: -24 DEGREES
CALCULATED T AXIS, ECG11: 115 DEGREES
CHLORIDE SERPL-SCNC: 102 MMOL/L (ref 98–107)
CO2 SERPL-SCNC: 20 MMOL/L (ref 21–32)
CREAT SERPL-MCNC: 1.24 MG/DL (ref 0.8–1.5)
DIAGNOSIS, 93000: NORMAL
DIFFERENTIAL METHOD BLD: ABNORMAL
ECHO AO ASC DIAM: 3.7 CM
ECHO AO ASCENDING AORTA INDEX: 1.83 CM/M2
ECHO AO ROOT DIAM: 4.1 CM
ECHO AO ROOT INDEX: 2.03 CM/M2
ECHO AV AREA PEAK VELOCITY: 4 CM2
ECHO AV AREA VTI: 4.3 CM2
ECHO AV AREA/BSA PEAK VELOCITY: 2 CM2/M2
ECHO AV AREA/BSA VTI: 2.1 CM2/M2
ECHO AV MEAN GRADIENT: 1 MMHG
ECHO AV MEAN VELOCITY: 0.6 M/S
ECHO AV PEAK GRADIENT: 2 MMHG
ECHO AV PEAK VELOCITY: 0.8 M/S
ECHO AV VELOCITY RATIO: 0.63
ECHO AV VTI: 9.9 CM
ECHO LA AREA 2C: 20.7 CM2
ECHO LA AREA 4C: 20.3 CM2
ECHO LA DIAMETER INDEX: 2.38 CM/M2
ECHO LA DIAMETER: 4.8 CM
ECHO LA MAJOR AXIS: 6.2 CM
ECHO LA MINOR AXIS: 5.8 CM
ECHO LA TO AORTIC ROOT RATIO: 1.17
ECHO LA VOL BP: 58 ML (ref 18–58)
ECHO LA VOL/BSA BIPLANE: 29 ML/M2 (ref 16–34)
ECHO LV EDV A2C: 284 ML
ECHO LV EDV A4C: 248 ML
ECHO LV EDV INDEX A4C: 123 ML/M2
ECHO LV EDV NDEX A2C: 141 ML/M2
ECHO LV EJECTION FRACTION A2C: 23 %
ECHO LV EJECTION FRACTION A4C: 12 %
ECHO LV EJECTION FRACTION BIPLANE: 18 % (ref 55–100)
ECHO LV ESV A2C: 218 ML
ECHO LV ESV A4C: 218 ML
ECHO LV ESV INDEX A2C: 108 ML/M2
ECHO LV ESV INDEX A4C: 108 ML/M2
ECHO LV FRACTIONAL SHORTENING: 7 % (ref 28–44)
ECHO LV INTERNAL DIMENSION DIASTOLE INDEX: 2.82 CM/M2
ECHO LV INTERNAL DIMENSION DIASTOLIC: 5.7 CM (ref 4.2–5.9)
ECHO LV INTERNAL DIMENSION SYSTOLIC INDEX: 2.62 CM/M2
ECHO LV INTERNAL DIMENSION SYSTOLIC: 5.3 CM
ECHO LV IVSD: 0.9 CM (ref 0.6–1)
ECHO LV MASS 2D: 226.4 G (ref 88–224)
ECHO LV MASS INDEX 2D: 112.1 G/M2 (ref 49–115)
ECHO LV POSTERIOR WALL DIASTOLIC: 1.1 CM (ref 0.6–1)
ECHO LV RELATIVE WALL THICKNESS RATIO: 0.39
ECHO LVOT AREA: 6.6 CM2
ECHO LVOT AV VTI INDEX: 0.66
ECHO LVOT DIAM: 2.9 CM
ECHO LVOT MEAN GRADIENT: 0 MMHG
ECHO LVOT PEAK GRADIENT: 1 MMHG
ECHO LVOT PEAK VELOCITY: 0.5 M/S
ECHO LVOT STROKE VOLUME INDEX: 21.2 ML/M2
ECHO LVOT SV: 42.9 ML
ECHO LVOT VTI: 6.5 CM
ECHO MV AREA VTI: 3.8 CM2
ECHO MV LVOT VTI INDEX: 1.72
ECHO MV MAX VELOCITY: 0.9 M/S
ECHO MV MEAN GRADIENT: 1 MMHG
ECHO MV MEAN VELOCITY: 0.5 M/S
ECHO MV PEAK GRADIENT: 3 MMHG
ECHO MV VTI: 11.2 CM
ECHO PV ACCELERATION TIME (AT): 100 MS
ECHO PV MAX VELOCITY: 0.8 M/S
ECHO PV PEAK GRADIENT: 3 MMHG
ECHO RV BASAL DIMENSION: 2.9 CM
ECHO RV TAPSE: 1.2 CM (ref 1.7–?)
ECHO TV REGURGITANT MAX VELOCITY: 1.97 M/S
ECHO TV REGURGITANT PEAK GRADIENT: 16 MMHG
EOSINOPHIL # BLD: 0.1 K/UL (ref 0–0.8)
EOSINOPHIL NFR BLD: 3 % (ref 0.5–7.8)
ERYTHROCYTE [DISTWIDTH] IN BLOOD BY AUTOMATED COUNT: 19.7 % (ref 11.9–14.6)
GLOBULIN SER CALC-MCNC: 3.5 G/DL (ref 2.3–3.5)
GLUCOSE BLD STRIP.AUTO-MCNC: 101 MG/DL (ref 65–100)
GLUCOSE SERPL-MCNC: 95 MG/DL (ref 65–100)
HCT VFR BLD AUTO: 38.9 % (ref 41.1–50.3)
HGB BLD-MCNC: 12.8 G/DL (ref 13.6–17.2)
IMM GRANULOCYTES # BLD AUTO: 0 K/UL (ref 0–0.5)
IMM GRANULOCYTES NFR BLD AUTO: 0 % (ref 0–5)
INR BLD: 1.1 (ref 0.9–1.2)
INR PPP: 1.1
LYMPHOCYTES # BLD: 1.3 K/UL (ref 0.5–4.6)
LYMPHOCYTES NFR BLD: 32 % (ref 13–44)
MAGNESIUM SERPL-MCNC: 1.9 MG/DL (ref 1.8–2.4)
MCH RBC QN AUTO: 28.4 PG (ref 26.1–32.9)
MCHC RBC AUTO-ENTMCNC: 32.9 G/DL (ref 31.4–35)
MCV RBC AUTO: 86.3 FL (ref 79.6–97.8)
MONOCYTES # BLD: 0.5 K/UL (ref 0.1–1.3)
MONOCYTES NFR BLD: 14 % (ref 4–12)
NEUTS SEG # BLD: 2 K/UL (ref 1.7–8.2)
NEUTS SEG NFR BLD: 51 % (ref 43–78)
NRBC # BLD: 0 K/UL (ref 0–0.2)
P-R INTERVAL, ECG05: 192 MS
PLATELET # BLD AUTO: 249 K/UL (ref 150–450)
PMV BLD AUTO: 8.3 FL (ref 9.4–12.3)
POTASSIUM SERPL-SCNC: 4.7 MMOL/L (ref 3.5–5.1)
PROT SERPL-MCNC: 6.8 G/DL (ref 6.3–8.2)
PROTHROMBIN TIME: 14.2 SEC (ref 12.6–14.5)
PT BLD: 13.5 SECS (ref 9.6–11.6)
Q-T INTERVAL, ECG07: 382 MS
QRS DURATION, ECG06: 114 MS
QTC CALCULATION (BEZET), ECG08: 467 MS
RBC # BLD AUTO: 4.51 M/UL (ref 4.23–5.6)
SERVICE CMNT-IMP: ABNORMAL
SODIUM SERPL-SCNC: 135 MMOL/L (ref 136–145)
TROPONIN-HIGH SENSITIVITY: 139 PG/ML (ref 0–14)
TROPONIN-HIGH SENSITIVITY: 143.8 PG/ML (ref 0–14)
VENTRICULAR RATE, ECG03: 90 BPM
WBC # BLD AUTO: 4 K/UL (ref 4.3–11.1)

## 2022-05-05 PROCEDURE — 96374 THER/PROPH/DIAG INJ IV PUSH: CPT

## 2022-05-05 PROCEDURE — 85025 COMPLETE CBC W/AUTO DIFF WBC: CPT

## 2022-05-05 PROCEDURE — 84484 ASSAY OF TROPONIN QUANT: CPT

## 2022-05-05 PROCEDURE — 96372 THER/PROPH/DIAG INJ SC/IM: CPT

## 2022-05-05 PROCEDURE — 82962 GLUCOSE BLOOD TEST: CPT

## 2022-05-05 PROCEDURE — 99204 OFFICE O/P NEW MOD 45 MIN: CPT | Performed by: PSYCHIATRY & NEUROLOGY

## 2022-05-05 PROCEDURE — 92610 EVALUATE SWALLOWING FUNCTION: CPT

## 2022-05-05 PROCEDURE — G0378 HOSPITAL OBSERVATION PER HR: HCPCS

## 2022-05-05 PROCEDURE — 74011000250 HC RX REV CODE- 250: Performed by: FAMILY MEDICINE

## 2022-05-05 PROCEDURE — 97161 PT EVAL LOW COMPLEX 20 MIN: CPT

## 2022-05-05 PROCEDURE — 93005 ELECTROCARDIOGRAM TRACING: CPT | Performed by: EMERGENCY MEDICINE

## 2022-05-05 PROCEDURE — 74011000636 HC RX REV CODE- 636: Performed by: EMERGENCY MEDICINE

## 2022-05-05 PROCEDURE — 70450 CT HEAD/BRAIN W/O DYE: CPT

## 2022-05-05 PROCEDURE — 83880 ASSAY OF NATRIURETIC PEPTIDE: CPT

## 2022-05-05 PROCEDURE — 96375 TX/PRO/DX INJ NEW DRUG ADDON: CPT

## 2022-05-05 PROCEDURE — 85610 PROTHROMBIN TIME: CPT

## 2022-05-05 PROCEDURE — 71045 X-RAY EXAM CHEST 1 VIEW: CPT

## 2022-05-05 PROCEDURE — 96361 HYDRATE IV INFUSION ADD-ON: CPT

## 2022-05-05 PROCEDURE — 99285 EMERGENCY DEPT VISIT HI MDM: CPT

## 2022-05-05 PROCEDURE — 74011250636 HC RX REV CODE- 250/636: Performed by: EMERGENCY MEDICINE

## 2022-05-05 PROCEDURE — 96376 TX/PRO/DX INJ SAME DRUG ADON: CPT

## 2022-05-05 PROCEDURE — 74011000258 HC RX REV CODE- 258: Performed by: EMERGENCY MEDICINE

## 2022-05-05 PROCEDURE — 74011250636 HC RX REV CODE- 250/636: Performed by: FAMILY MEDICINE

## 2022-05-05 PROCEDURE — 70496 CT ANGIOGRAPHY HEAD: CPT

## 2022-05-05 PROCEDURE — 0042T CT PERF W CBF: CPT

## 2022-05-05 PROCEDURE — 97530 THERAPEUTIC ACTIVITIES: CPT

## 2022-05-05 PROCEDURE — 83735 ASSAY OF MAGNESIUM: CPT

## 2022-05-05 PROCEDURE — 74011250637 HC RX REV CODE- 250/637: Performed by: FAMILY MEDICINE

## 2022-05-05 PROCEDURE — 36415 COLL VENOUS BLD VENIPUNCTURE: CPT

## 2022-05-05 PROCEDURE — 80053 COMPREHEN METABOLIC PANEL: CPT

## 2022-05-05 RX ORDER — SODIUM CHLORIDE 0.9 % (FLUSH) 0.9 %
5-40 SYRINGE (ML) INJECTION EVERY 8 HOURS
Status: DISCONTINUED | OUTPATIENT
Start: 2022-05-05 | End: 2022-05-08 | Stop reason: HOSPADM

## 2022-05-05 RX ORDER — AMIODARONE HYDROCHLORIDE 200 MG/1
200 TABLET ORAL DAILY
Status: DISCONTINUED | OUTPATIENT
Start: 2022-05-05 | End: 2022-05-08 | Stop reason: HOSPADM

## 2022-05-05 RX ORDER — CLONAZEPAM 1 MG/1
0.5 TABLET ORAL
Status: DISCONTINUED | OUTPATIENT
Start: 2022-05-05 | End: 2022-05-08 | Stop reason: HOSPADM

## 2022-05-05 RX ORDER — GUAIFENESIN 100 MG/5ML
81 LIQUID (ML) ORAL DAILY
Status: DISCONTINUED | OUTPATIENT
Start: 2022-05-05 | End: 2022-05-08 | Stop reason: HOSPADM

## 2022-05-05 RX ORDER — ATORVASTATIN CALCIUM 40 MG/1
80 TABLET, FILM COATED ORAL DAILY
Status: DISCONTINUED | OUTPATIENT
Start: 2022-05-05 | End: 2022-05-08 | Stop reason: HOSPADM

## 2022-05-05 RX ORDER — MORPHINE SULFATE 2 MG/ML
2 INJECTION, SOLUTION INTRAMUSCULAR; INTRAVENOUS ONCE
Status: COMPLETED | OUTPATIENT
Start: 2022-05-05 | End: 2022-05-05

## 2022-05-05 RX ORDER — METOCLOPRAMIDE HYDROCHLORIDE 5 MG/ML
10 INJECTION INTRAMUSCULAR; INTRAVENOUS
Status: COMPLETED | OUTPATIENT
Start: 2022-05-05 | End: 2022-05-05

## 2022-05-05 RX ORDER — SPIRONOLACTONE 25 MG/1
50 TABLET ORAL DAILY
Status: DISCONTINUED | OUTPATIENT
Start: 2022-05-05 | End: 2022-05-08 | Stop reason: HOSPADM

## 2022-05-05 RX ORDER — ONDANSETRON 2 MG/ML
4 INJECTION INTRAMUSCULAR; INTRAVENOUS
Status: DISCONTINUED | OUTPATIENT
Start: 2022-05-05 | End: 2022-05-08 | Stop reason: HOSPADM

## 2022-05-05 RX ORDER — HYDROCODONE BITARTRATE AND ACETAMINOPHEN 10; 325 MG/1; MG/1
1 TABLET ORAL
Status: DISCONTINUED | OUTPATIENT
Start: 2022-05-05 | End: 2022-05-08 | Stop reason: HOSPADM

## 2022-05-05 RX ORDER — FENTANYL 12.5 UG/1
1 PATCH TRANSDERMAL
Status: DISCONTINUED | OUTPATIENT
Start: 2022-05-05 | End: 2022-05-08 | Stop reason: HOSPADM

## 2022-05-05 RX ORDER — HEPARIN SODIUM 5000 [USP'U]/ML
5000 INJECTION, SOLUTION INTRAVENOUS; SUBCUTANEOUS EVERY 8 HOURS
Status: DISCONTINUED | OUTPATIENT
Start: 2022-05-05 | End: 2022-05-08 | Stop reason: HOSPADM

## 2022-05-05 RX ORDER — HYDROCODONE BITARTRATE AND ACETAMINOPHEN 10; 325 MG/1; MG/1
1 TABLET ORAL
COMMUNITY
Start: 2022-04-21

## 2022-05-05 RX ORDER — MORPHINE SULFATE 4 MG/ML
4 INJECTION INTRAVENOUS ONCE
Status: COMPLETED | OUTPATIENT
Start: 2022-05-05 | End: 2022-05-05

## 2022-05-05 RX ORDER — SODIUM CHLORIDE 0.9 % (FLUSH) 0.9 %
5-40 SYRINGE (ML) INJECTION AS NEEDED
Status: DISCONTINUED | OUTPATIENT
Start: 2022-05-05 | End: 2022-05-08 | Stop reason: HOSPADM

## 2022-05-05 RX ORDER — SODIUM CHLORIDE 0.9 % (FLUSH) 0.9 %
10 SYRINGE (ML) INJECTION
Status: COMPLETED | OUTPATIENT
Start: 2022-05-05 | End: 2022-05-05

## 2022-05-05 RX ORDER — PROMETHAZINE HYDROCHLORIDE 25 MG/1
25 TABLET ORAL
Status: DISCONTINUED | OUTPATIENT
Start: 2022-05-05 | End: 2022-05-05

## 2022-05-05 RX ADMIN — METOCLOPRAMIDE 10 MG: 5 INJECTION, SOLUTION INTRAMUSCULAR; INTRAVENOUS at 06:26

## 2022-05-05 RX ADMIN — ONDANSETRON 4 MG: 2 INJECTION INTRAMUSCULAR; INTRAVENOUS at 22:37

## 2022-05-05 RX ADMIN — HEPARIN SODIUM 5000 UNITS: 5000 INJECTION INTRAVENOUS; SUBCUTANEOUS at 17:26

## 2022-05-05 RX ADMIN — MORPHINE SULFATE 2 MG: 2 INJECTION, SOLUTION INTRAMUSCULAR; INTRAVENOUS at 21:38

## 2022-05-05 RX ADMIN — PERFLUTREN 3 ML: 6.52 INJECTION, SUSPENSION INTRAVENOUS at 11:00

## 2022-05-05 RX ADMIN — ONDANSETRON 4 MG: 2 INJECTION INTRAMUSCULAR; INTRAVENOUS at 10:12

## 2022-05-05 RX ADMIN — ONDANSETRON 4 MG: 2 INJECTION INTRAMUSCULAR; INTRAVENOUS at 15:04

## 2022-05-05 RX ADMIN — SODIUM CHLORIDE, PRESERVATIVE FREE 10 ML: 5 INJECTION INTRAVENOUS at 21:39

## 2022-05-05 RX ADMIN — IOPAMIDOL 100 ML: 755 INJECTION, SOLUTION INTRAVENOUS at 05:56

## 2022-05-05 RX ADMIN — MORPHINE SULFATE 4 MG: 4 INJECTION INTRAVENOUS at 09:24

## 2022-05-05 RX ADMIN — SODIUM CHLORIDE 100 ML: 900 INJECTION, SOLUTION INTRAVENOUS at 05:57

## 2022-05-05 RX ADMIN — SODIUM CHLORIDE, PRESERVATIVE FREE 10 ML: 5 INJECTION INTRAVENOUS at 15:05

## 2022-05-05 RX ADMIN — Medication 10 ML: at 05:57

## 2022-05-05 RX ADMIN — HEPARIN SODIUM 5000 UNITS: 5000 INJECTION INTRAVENOUS; SUBCUTANEOUS at 09:25
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
## 2022-05-05 NOTE — ASSESSMENT & PLAN NOTE
Recent weight loss  -NPO  -consult gastroenterology    5/7: Patient trying to decide regarding PEG tube
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
## 2022-05-05 NOTE — PROGRESS NOTES
TRANSFER - IN REPORT:    Verbal report received from PhilipRN(name) on Jonathon Javier  being received from ED(unit) for routine progression of care      Report consisted of patients Situation, Background, Assessment and   Recommendations(SBAR). Information from the following report(s) SBAR and ED Summary was reviewed with the receiving nurse. Opportunity for questions and clarification was provided. Assessment completed upon patients arrival to unit and care assumed.

## 2022-05-05 NOTE — PROGRESS NOTES
05/05/22 0820   Dual Skin Pressure Injury Assessment   Dual Skin Pressure Injury Assessment WDL   Second Care Provider (Based on [address] [place] [address]) KADEN Art   Skin Integumentary   Skin Integumentary (WDL) WDL    Pressure  Injury Documentation No Pressure Injury Noted-Pressure Ulcer Prevention Initiated

## 2022-05-05 NOTE — PROGRESS NOTES
LTG: Patient will tolerate least restrictive diet without overt signs or symptoms of airway compromise. STG: Patient will participate in full bedside swallow evaluation. SPEECH LANGUAGE PATHOLOGY: DYSPHAGIA  Initial Assessment    NAME/AGE/GENDER: Gualberto Raygoza is a 62 y.o. male  DATE: 5/5/2022  PRIMARY DIAGNOSIS: Transient ischemic attack [G45.9]      ICD-10: Treatment Diagnosis: R13.14 Dysphagia, Pharyngoesophageal Phase    RECOMMENDATIONS   DIET:    NPO    Ice chips and sips of water for pleasure    MEDICATIONS: Non-oral- defer to MD for meds     ASPIRATION PRECAUTIONS  · Oral care every 3 hours     COMPENSATORY STRATEGIES/MODIFICATIONS  · None     EDUCATION:  · Recommendations discussed with Dr. Leela Myrick  · Nursing  · Patient     CONTINUATION OF SKILLED SERVICES/MEDICAL NECESSITY:   Patient is expected to demonstrate progress in  diet tolerance in order to  work toward diet advancement.  Patient continues to require skilled intervention due to dysphagia. RECOMMENDATIONS for CONTINUED SPEECH THERAPY: YES: Anticipate need for ongoing speech therapy during this hospitalization. ASSESSMENT   Patient with limited acceptance of po trials as patient reports significant stasis at sternal notch and distal esophagus with water by teaspoon and cup. Patient repeatedly belching post swallow followed by delayed cough with minimal trials. Politely declined further trials. Per chart review, patient with~ 18 pound weight loss since February 2022. Patient reports at home he has been gagging when he tries to eat. \"Some days I just don't eat. It's too hard\"      Recommend NPO except ice chips and sips of water for pleasure. Per chart review patient has significant history of esophageal dysphagia with Upper GI series completed in February 2022 -\"1.  Hiatal hernia, likely type III. The gastroesophageal junction, upper gastric  body, and gastric fundus are located above the diaphragm.  There is an apparent  dynamic, paraesophageal component which occurs during inspiration and  expiration. 2.  Severe esophageal dysmotility. 3.  Nonspecific esophagitis. \"      Recommend GI workup per MD discretion to further assess patient's complaints. Patient noted to have mild dysfluencies in speech with reports of transient word finding diffiuclties. Will continue to follow for further assessment. REHABILITATION POTENTIAL FOR STATED GOALS: Fair    PLAN    FREQUENCY/DURATION: Will follow up for diet tolerance after seen by GI      SUBJECTIVE   Patient alert upright in bed for assessment. Pleasant and friend.    History of Present Injury/Illness: Mr. Juan José Styles  has a past medical history of Acute gastroenteritis (2/26/2016), Acute on chronic systolic heart failure ([address] (9/30/2020), Acute respiratory failure due to COVID-19 [facility]) (4/37/2149), Acute systolic congestive heart failure ([address] (5/42/9903), Acute systolic heart failure ([address] (9/14/2010), AGE (acute gastroenteritis) (12/16/2019), YAO (acute kidney injury) ([address] (8/23/2021), Anxiety associated with depression (3/18/2017), Arthritis, CAD (coronary artery disease), CHF (congestive heart failure) ([address] Chronic alcoholism ([address] Chronic back pain, Chronic neck pain, Chronic systolic heart failure ([address] (4/21/2011), Dental caries (7/13/2017), Depression, Dizziness - light-headed, Elevated brain natriuretic peptide (BNP) level (4/14/2021), Generalized abdominal pain (2/14/2022), GERD (gastroesophageal reflux disease), Gynecomastia (2/22/2016), Heart failure ([address] Hyperbilirubinemia (2/7/2022), Hypertension, Hypokalemia (7/3/2020), Hypomagnesemia (2/26/2022), ICD (implantable cardioverter-defibrillator) in place (4/22/2011), Leukopenia (5/7/2019), Macrocytic anemia (7/8/2018), NSTEMI (non-ST elevated myocardial infarction) ([facility] (8/24/2021), Schatzki's ring (07/10/2018), Situational depression (2/22/2016), SVT (supraventricular tachycardia) ([facility] (12/22/2018), Tachycardia (2/24/2022), and Ventricular tachycardia ([facility] (2/22/2016). Honorio Renee He also  has a past surgical history that includes hx heart catheterization (9/21/10); hx pacemaker; and egd (5/9/2019). Problem List:  (Impairments causing functional limitations):  1. ?pharyngeal dysphagia  2. Suspected esophageal dysphagia    Previous Dysphagia: YES History of esophageal dysphagia. - Upper GI results listed above  Diet Prior to Evaluation: Regular/thin    Orientation:   Person  Place  Time  Situation    Cognitive-Linguistic Screen:  Prior Level of Function: Lives at home with wife   Speech Observations: Speech is clear, but some stuttering noted   Language Observations: Follows command and answers questions appropriately. No word finding deficits noted, but was reported by patient.  Cognitive-Linguistics Observations:Some difficulty providing medical history. Recommend further assessment of speech and cognitive linguistic abilities if symptoms persist      Pain: Pain Scale 1: Numeric (0 - 10)  Pain Intensity 1: 0  Pain Location 1: Back;Leg  Pain Intervention(s) 1: Ambulation/Increased Activity; Emotional support;Repositioned    OBJECTIVE   Oral Motor:   · Labial: No impairment  · Oral Hygiene: Adequate  · Lingual: does not protrude tongue upon command? Swallow evaluation:   Patient consumed trials of thin by teaspoon x2 and thin by cup x2. Timely swallow palpated with thin liquids. Repeated belching after each trials with patient reporting esophageal stasis at sternal notch and distal esophagus. After repeated belching patient with delayed cough on 1 out 2 trials thin by teaspoon and 1 out of 2 trials thin by cup. Patient declined further po trials. INTERDISCIPLINARY COLLABORATION: Registered Nurse and Physician  PRECAUTIONS/ALLERGIES: Patient has no known allergies.      Tool Used: Dysphagia Outcome and Severity Scale (DOUG)    Score Comments   Normal Diet  [] 7 With no strategies or extra time needed   Functional Swallow  [] 6 May have mild oral or pharyngeal delay   Mild Dysphagia  [] 5 Which may require one diet consistency restricted    Mild-Moderate Dysphagia  [] 4 With 1-2 diet consistencies restricted   Moderate Dysphagia  [] 3 With 2 or more diet consistencies restricted   Moderate-Severe Dysphagia  [x] 2 With partial PO strategies (trials with ST only)   Severe Dysphagia  [] 1 With inability to tolerate any PO safely      Score:  Initial: 2 Most Recent: x (Date 05/05/22 )   Interpretation of Tool: The Dysphagia Outcome and Severity Scale (DOUG) is a simple, easy-to-use, 7-point scale developed to systematically rate the functional severity of dysphagia based on objective assessment and make recommendations for diet level, independence level, and type of nutrition. Current Medications:   No current facility-administered medications on file prior to encounter. Current Outpatient Medications on File Prior to Encounter   Medication Sig Dispense Refill    HYDROcodone-acetaminophen (NORCO)  mg tablet Take 1 Tablet by mouth every six (6) hours as needed for Pain.  promethazine (PHENERGAN) 25 mg tablet Take 1 Tablet by mouth every six (6) hours as needed for Nausea. 6 Tablet 0    amiodarone (CORDARONE) 200 mg tablet Take 1 Tablet by mouth daily. 30 Tablet 4    ALPRAZolam (XANAX) 1 mg tablet Take 1 Tablet by mouth nightly. Max Daily Amount: 1 mg. Indications: anxious 30 Tablet 0    levalbuterol tartrate (XOPENEX) 45 mcg/actuation inhaler Take 2 Puffs by inhalation every four (4) hours as needed for Wheezing or Shortness of Breath. 15 g 1    spironolactone (ALDACTONE) 50 mg tablet Take 1 Tablet by mouth daily. 90 Tablet 3    cyclobenzaprine (FLEXERIL) 10 mg tablet Take 1 Tablet by mouth three (3) times daily as needed for Muscle Spasm(s). 12 Tablet 0    furosemide (Lasix) 40 mg tablet Take  by mouth two (2) times a day.  potassium chloride (K-DUR, KLOR-CON M20) 20 mEq tablet Take 20 mEq by mouth daily.       naloxone (Narcan) 4 mg/actuation nasal spray Use 1 spray intranasally, then discard. Repeat with new spray every 2 min as needed for opioid overdose symptoms, alternating nostrils. 1 Each 0    [DISCONTINUED] guaiFENesin 200 mg/5 mL liqd Take 5 mL by mouth every six (6) hours as needed for Cough. (Patient not taking: Reported on 2/24/2022) 120 mL 0    atorvastatin (LIPITOR) 80 mg tablet Take 1 Tablet by mouth daily. 90 Tablet 3    rizatriptan (MAXALT-MLT) 10 mg disintegrating tablet TAKE ONE TABLET BY MOUTH ONCE AS NEEDED 10 Tablet 6    azelastine (ASTELIN) 137 mcg (0.1 %) nasal spray 1 [place] by Both Nostrils route two (2) times a day. Use in each nostril as directed 3 Each 3    [DISCONTINUED] sildenafil citrate (Viagra) 100 mg tablet Take 1 Tablet by mouth as needed (as directed). (Patient not taking: Reported on 2/24/2022) 10 Tablet 3    albuterol (PROVENTIL HFA, VENTOLIN HFA, PROAIR HFA) 90 mcg/actuation inhaler Take 2 Puffs by inhalation every four (4) hours as needed for Wheezing or Shortness of Breath. 1 Inhaler 0    [DISCONTINUED] aspirin delayed-release 81 mg tablet Take 1 Tablet by mouth daily.  (Patient not taking: Reported on 2/24/2022) 30 Tablet 0       SAFETY:  After treatment position/precautions:  · Upright in bed  · RN notified  · Call light within reach    Total Treatment Duration:   Time In: 4971  Time Out: India Perez [facility] [address], [address] [facility], [place], operated by [facility]

## 2022-05-05 NOTE — PROGRESS NOTES
Problem: Mobility Impaired (Adult and Pediatric)  Goal: *Acute Goals and Plan of Care (Insert Text)  Note: STG:  (1.)Mr. Yoselyn Walden will move from supine to sit and sit to supine  with STAND BY ASSIST within 5 treatment day(s). (2.)Mr. Yoselyn Walden will transfer from bed to chair and chair to bed with STAND BY ASSIST using the least restrictive device within 5 treatment day(s). (3.)Mr. Yoselyn Walden will ambulate with CONTACT GUARD ASSIST for 100 feet with the least restrictive device within 5 treatment day(s). (4.)Pt. will climb up/down 5 steps with rail and CGA within 5 days  (5.)Pt. will increase B LE strength and coordination 1/2 grade within 5 days      ________________________________________________________________________________________________      PHYSICAL THERAPY: Initial Assessment and AM 5/5/2022  OBSERVATION: PT Visit Days : 1  Payor: Rigo Navarro / Plan[place]ER[place] / Product Type: PPO /       NAME/AGE/GENDER: Jaylen Nguyen is a 62 y.o. male   PRIMARY DIAGNOSIS: Transient ischemic attack [G45.9] Transient ischemic attack Transient ischemic attack        ICD-10: Treatment Diagnosis:    Generalized Muscle Weakness (M62.81)  Other lack of cordination (R27.8)  Other abnormalities of gait and mobility (R26.89)   Precaution/Allergies:  Patient has no known allergies. ASSESSMENT:     Mr. Yoselyn Walden presents with onset of R sided numbness, pain and spasms. He is experiencing a decline in his premorbid level of functional independence. He would benefit from further PT while here to address these deficits: decreased B LE strength and coordination, standing balance, decreased functional mobility and increased pain in LEs. Inconsistent findings with LE strength with MMT and mobility. His plan is to go home with his spouse's support and I would recommend [place] [facility] to see him. This am, he asks to use the urinal. He is able to transfer with min assist and then stands on his own with close CGA.  He performs bed mobility with min assist. Doesn't want to get out of bed, but is willing to sit and then ambulate beside bed. He complains of pain in his low back and burning and tingling sensation from his forehead to toe on his R side. Echo tech is now in to see him. This section established at most recent assessment   PROBLEM LIST (Impairments causing functional limitations):  Decreased Strength  Decreased ADL/Functional Activities  Decreased Transfer Abilities  Decreased Ambulation Ability/Technique  Decreased Balance  Increased Pain  Decreased Activity Tolerance  Increased Fatigue  Decreased Flexibility/Joint Mobility  Decreased [place] with Home Exercise Program   INTERVENTIONS PLANNED: (Benefits and precautions of physical therapy have been discussed with the patient.)  Balance Exercise  Bed Mobility  Gait Training  Neuromuscular Re-education/Strengthening  Therapeutic Activites  Transfer Training     TREATMENT PLAN: Frequency/Duration: daily for duration of hospital stay  Rehabilitation Potential For Stated Goals: [facility] (at time of discharge pending progress):    Placement: It is my opinion, based on this patient's performance to date, that Mr. Dominique Tineo may benefit from [address] after discharge due to the functional deficits listed above that are likely to improve with skilled rehabilitation because he/she has multiple medical issues that affect his/her functional mobility in the community. Equipment:   None at this time              HISTORY:   History of Present Injury/Illness (Reason for Referral):   Admitted with R sided numbness, pain and spasm  Past Medical History/Comorbidities:   Mr. Dominique Tineo  has a past medical history of Acute gastroenteritis (2/26/2016), Acute on chronic systolic heart failure ([facility] (9/30/2020), Acute respiratory failure due to COVID-19 [facility]) (8/05/3908), Acute systolic congestive heart failure ([address] (8/53/5472), Acute systolic heart failure ([facility] (9/14/2010), AGE (acute gastroenteritis) (12/16/2019), YAO (acute kidney injury) ([facility] (8/23/2021), Anxiety associated with depression (3/18/2017), Arthritis, CAD (coronary artery disease), CHF (congestive heart failure) ([facility] Chronic alcoholism ([facility] Chronic back pain, Chronic neck pain, Chronic systolic heart failure ([facility] (4/21/2011), Dental caries (7/13/2017), Depression, Dizziness - light-headed, Elevated brain natriuretic peptide (BNP) level (4/14/2021), Generalized abdominal pain (2/14/2022), GERD (gastroesophageal reflux disease), Gynecomastia (2/22/2016), Heart failure ([facility] Hyperbilirubinemia (2/7/2022), Hypertension, Hypokalemia (7/3/2020), Hypomagnesemia (2/26/2022), ICD (implantable cardioverter-defibrillator) in place (4/22/2011), Leukopenia (5/7/2019), Macrocytic anemia (7/8/2018), NSTEMI (non-ST elevated myocardial infarction) ([address] (8/24/2021), Schatzki's ring (07/10/2018), Situational depression (2/22/2016), SVT (supraventricular tachycardia) ([facility] (12/22/2018), Tachycardia (2/24/2022), and Ventricular tachycardia ([facility] (2/22/2016). Mr. Tegan Lyons  has a past surgical history that includes hx heart catheterization (9/21/10); hx pacemaker; and egd (5/9/2019).   Social History/Living Environment:   Home Environment: Private residence  # Steps to Enter: 5  Hand Rails : Right  One/Two Story Residence: One story  Living Alone: No  Support Systems: Spouse/Significant Other  Patient Expects to be Discharged to[de-identified] Home with family assistance  Current DME Used/Available at Home: Walker, rolling  Tub or Shower Type: Tub/Shower combination  Prior Level of Function/Work/Activity:  Functionally I with ADLs, ambulation, driving and is retired  Dominant Side:         RIGHT   Number of Personal Factors/Comorbidities that affect the Plan of Care: 1-2: MODERATE COMPLEXITY   EXAMINATION:   Most Recent Physical Functioning:   Gross Assessment:  AROM: Generally decreased, functional (B LEs)  Strength: Generally decreased, functional (difficult to MMT: appears 3- with mobility, but when MMT 2?)  Coordination: Generally decreased, functional (B LEs)  Tone: Abnormal (extensor tone B LEs)  Sensation: Impaired (burning, tingling sensation B LEs)               Posture:     Balance:  Sitting: Intact  Standing: Pull to stand; With support Bed Mobility:  Rolling: Contact guard assistance  Supine to Sit: Minimum assistance  Sit to Supine: Minimum assistance  Scooting: Contact guard assistance  Wheelchair Mobility:     Transfers:  Sit to Stand: Minimum assistance  Stand to Sit: Minimum assistance  Stand Pivot Transfers: Minimal assistance  Duration: 10 Minutes  Gait:     Base of Support: Narrowed  Speed/Treasure: Slow  Gait Abnormalities: Decreased step clearance (keeps knees locked in extension)  Distance (ft): 5 Feet (ft) (sidestepping at bedside)  Assistive Device:  (HHA. Can try RW next session)  Ambulation - Level of Assistance: Minimal assistance  Interventions: Safety awareness training;Verbal cues; Visual/Demos  Home Environment: Private residence  [address]: Private residence  # Steps to Enter: 5  Hand Rails : Right  One/Two Story Residence: One story  Living Alone: No  Support Systems: Spouse/Significant Other  Patient Expects to be Discharged to[de-identified] Home with family assistance  Current DME Used/Available at Home: Walker, rolling  Tub or Shower Type: Tub/Shower combination      Body Structures Involved:  Nerves  Muscles Body Functions Affected:  Sensory/Pain  Neuromusculoskeletal  Movement Related Activities and Participation Affected:  General Tasks and Demands  Mobility  Self Care   Number of elements that affect the Plan of Care: 4+: HIGH COMPLEXITY   CLINICAL PRESENTATION:   Presentation: Stable and uncomplicated: LOW COMPLEXITY   CLINICAL DECISION MAKING:   Yahir Olivo AM-SUNG [facility] Form  How much difficulty does the patient currently have. .. Unable A Lot A Little None   1.   Turning over in bed (including adjusting bedclothes, sheets and blankets)? [] 1   [] 2   [x] 3   [] 4   2. Sitting down on and standing up from a chair with arms ( e.g., wheelchair, bedside commode, etc.)   [] 1   [] 2   [x] 3   [] 4   3. Moving from lying on back to sitting on the side of the bed? [] 1   [] 2   [x] 3   [] 4   How much help from another person does the patient currently need. .. Total A Lot A Little None   4. Moving to and from a bed to a chair (including a wheelchair)? [] 1   [] 2   [x] 3   [] 4   5. Need to walk in hospital room? [] 1   [x] 2   [] 3   [] 4   6. Climbing 3-5 steps with a railing? [] 1   [x] 2   [] 3   [] 4   © 2007, Trustees of [address] [place] [address], under license to [employer]. All rights reserved      Score:  Initial: 16 Most Recent: X (Date: -- )    Interpretation of Tool:  Represents activities that are increasingly more difficult (i.e. Bed mobility, Transfers, Gait). Medical Necessity:     Patient demonstrates   fair   rehab potential due to higher previous functional level. Reason for Services/Other Comments:  Patient   continues to require present interventions due to patient's inability to perform functional mobility at a safe SBA level  .    Use of outcome tool(s) and clinical judgement create a POC that gives a: Questionable prediction of patient's progress: MODERATE COMPLEXITY            TREATMENT:   (In addition to Assessment/Re-Assessment sessions the following treatments were rendered)   Pre-treatment Symptoms/Complaints:  tired, pain in LEs when touched or moved  Pain: Initial:   Pain Intensity 1: 5  Pain Location 1: Back,Leg  Pain Orientation 1: Right  Pain Intervention(s) 1: Ambulation/Increased Activity,Emotional support,Repositioned  Post Session:  1, supine in bed     Therapeutic Activity: (  10 Minutes ):  Therapeutic activities including Bed transfers, sit to stand, standing weight shifting and tolerance, Ambulation on level ground, and toileting in standing using urinal to improve mobility, strength, balance, and coordination. Required minimal Safety awareness training;Verbal cues; Visual/Demos to  insure safe technique . Assessment provided today    Braces/Orthotics/Lines/Etc:   O2 Device: None (Room air)  Treatment/Session Assessment:    Response to Treatment:  inconsistent MMT findings vs.automatic transitional mvmts. Interdisciplinary Collaboration:   Physical Therapist  Registered Nurse  After treatment position/precautions:   Supine in bed  Bed/Chair-wheels locked  Bed in low position  Call light within reach  Side rails x 2  Echo tech in room    Compliance with Program/Exercises: Will assess as treatment progresses  Recommendations/Intent for next treatment session: \"Next visit will focus on advancements to more challenging activities and reduction in assistance provided\".   Total Treatment Duration:  PT Patient Time In/Time Out  Time In: 1015  Time Out: [address]
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
## 2022-05-05 NOTE — PROGRESS NOTES
Care Management Interventions  PCP Verified by CM: Yes  Last Visit to PCP: 03/15/22  Mode of Transport at Discharge: Self  Transition of Care Consult (CM Consult): Discharge Planning  Discharge Durable Medical Equipment: No  Physical Therapy Consult: Yes  Occupational Therapy Consult: Yes  Speech Therapy Consult: Yes  Support Systems: Spouse/Significant Other  Confirm Follow Up Transport: Self  The Plan for Transition of Care is Related to the Following Treatment Goals : discharge home with family  The Patient and/or Patient Representative was Provided with a Choice of Provider and Agrees with the Discharge Plan?:  (TBD)  Freedom of Choice List was Provided with Basic Dialogue that Supports the Patient's Individualized Plan of Care/Goals, Treatment Preferences and Shares the Quality Data Associated with the Providers?:  (TBD)  Discharge Location  Patient Expects to be Discharged to[de-identified] Home with family assistance     Medical record reviewed. Pt is a 63 y/o male admitted for TIA evaluation. CM met with patient to introduce self and explain role in planning. Prior to admission, pt was living independently in his home with his wife. Pt is on disability and no longer works. Pt reported that is independent with ambulation, although he walks slowly. He is able to complete ADL's independently, but is unable to get in/out of the shower on his own. Most of the time, he \"washes up\" while sitting in the bathroom. Patient has a rolling walker at home, but does not use it regularly. Therapy evaluations are pending. CM will continue to follow to assist with planning.

## 2022-05-05 NOTE — ED NOTES
TRANSFER - OUT REPORT:    Verbal report given to KADEN Parks on Jaylen Nguyen  being transferred to [facility]  for routine progression of care       Report consisted of patients Situation, Background, Assessment and   Recommendations(SBAR). Information from the following report(s) SBAR, ED Summary and Dual Neuro Assessment was reviewed with the receiving nurse. Lines:   Peripheral IV 05/05/22 Left Antecubital (Active)       Peripheral IV 05/05/22 Left Arm (Active)   Site Assessment Clean, dry, & intact 05/05/22 0558       Peripheral IV 05/05/22 Anterior; Left External jugular (Active)        Opportunity for questions and clarification was provided.       Patient transported with:   Monitor  Registered Nurse

## 2022-05-05 NOTE — PROGRESS NOTES
Initial visit by  to convey care and concern and to explore spiritual needs. MrLuis Amaya shared about his recent medical health. He expressed his feelings about the uncertainty of his condition. He is looking forward to leaving the hospital, but seems to understand the need for further testing. I actively listened and offered emotional support. Chaplains remain available for follow-up care.      Dante Perea 68  Board Certified

## 2022-05-05 NOTE — PROGRESS NOTES
There was concerns for an ICD shock with recent history of ICD shocks. Interrogation 05/05/22 from device shows no new arrhythmia and ICD activation since last known ICD shocks in February which are documented in the chart. Chart reviewed by Dr Keri Mcdowell.   Consult canceled at this time but we will be available for any other cardiac concerns      Darcey Collet, NP  05/05/22  10:28 AM

## 2022-05-05 NOTE — ASSESSMENT & PLAN NOTE
- Hold amiodarone, spironolactone, furosemide   -Triggered labetalol    5/7: Held for n.p.o. we will reevaluate pending decision on PEG tube

## 2022-05-05 NOTE — ASSESSMENT & PLAN NOTE
CT, CTA/P negative, echo negative, cannot get MRI due to incompatible pacer  -consult neurology  -Neurochecks   -ASA, consider clopidogrel   -permissive HTN to 220/120  -labetalol prn  -PT, OT, SLP, PPD    5/7: Symptoms improved but not resolved.   Further assessment not possible

## 2022-05-06 PROBLEM — R63.0 ANOREXIA: Status: ACTIVE | Noted: 2022-05-06

## 2022-05-06 PROBLEM — G25.3 MYOCLONIC JERKING WHILE SLEEPING: Chronic | Status: ACTIVE | Noted: 2022-05-06

## 2022-05-06 PROBLEM — G25.3 MYOCLONIC JERKING WHILE SLEEPING: Status: ACTIVE | Noted: 2022-05-06

## 2022-05-06 LAB
ALBUMIN SERPL-MCNC: 3.3 G/DL (ref 3.5–5)
ALBUMIN/GLOB SERPL: 1.1 {RATIO} (ref 1.2–3.5)
ALP SERPL-CCNC: 64 U/L (ref 50–136)
ALT SERPL-CCNC: 23 U/L (ref 12–65)
ANION GAP SERPL CALC-SCNC: 10 MMOL/L (ref 7–16)
AST SERPL-CCNC: 25 U/L (ref 15–37)
BILIRUB SERPL-MCNC: 1.2 MG/DL (ref 0.2–1.1)
BUN SERPL-MCNC: 10 MG/DL (ref 6–23)
CALCIUM SERPL-MCNC: 8.7 MG/DL (ref 8.3–10.4)
CHLORIDE SERPL-SCNC: 102 MMOL/L (ref 98–107)
CHOLEST SERPL-MCNC: 125 MG/DL
CO2 SERPL-SCNC: 22 MMOL/L (ref 21–32)
CREAT SERPL-MCNC: 1.23 MG/DL (ref 0.8–1.5)
ERYTHROCYTE [DISTWIDTH] IN BLOOD BY AUTOMATED COUNT: 19.9 % (ref 11.9–14.6)
EST. AVERAGE GLUCOSE BLD GHB EST-MCNC: 117 MG/DL
GLOBULIN SER CALC-MCNC: 2.9 G/DL (ref 2.3–3.5)
GLUCOSE SERPL-MCNC: 105 MG/DL (ref 65–100)
HBA1C MFR BLD: 5.7 % (ref 4.2–6.3)
HCT VFR BLD AUTO: 35.4 % (ref 41.1–50.3)
HDLC SERPL-MCNC: 48 MG/DL (ref 40–60)
HDLC SERPL: 2.6 {RATIO}
HGB BLD-MCNC: 11.8 G/DL (ref 13.6–17.2)
LDLC SERPL CALC-MCNC: 26.8 MG/DL
MAGNESIUM SERPL-MCNC: 2 MG/DL (ref 1.8–2.4)
MCH RBC QN AUTO: 28.9 PG (ref 26.1–32.9)
MCHC RBC AUTO-ENTMCNC: 33.3 G/DL (ref 31.4–35)
MCV RBC AUTO: 86.6 FL (ref 79.6–97.8)
NRBC # BLD: 0 K/UL (ref 0–0.2)
PHOSPHATE SERPL-MCNC: 3.6 MG/DL (ref 2.5–4.5)
PLATELET # BLD AUTO: 220 K/UL (ref 150–450)
PMV BLD AUTO: 9 FL (ref 9.4–12.3)
POTASSIUM SERPL-SCNC: 3.9 MMOL/L (ref 3.5–5.1)
PROT SERPL-MCNC: 6.2 G/DL (ref 6.3–8.2)
RBC # BLD AUTO: 4.09 M/UL (ref 4.23–5.6)
SODIUM SERPL-SCNC: 134 MMOL/L (ref 136–145)
TRIGL SERPL-MCNC: 251 MG/DL (ref 35–150)
VLDLC SERPL CALC-MCNC: 50.2 MG/DL (ref 6–23)
WBC # BLD AUTO: 4.9 K/UL (ref 4.3–11.1)

## 2022-05-06 PROCEDURE — 85027 COMPLETE CBC AUTOMATED: CPT

## 2022-05-06 PROCEDURE — G0378 HOSPITAL OBSERVATION PER HR: HCPCS

## 2022-05-06 PROCEDURE — 96372 THER/PROPH/DIAG INJ SC/IM: CPT

## 2022-05-06 PROCEDURE — 74011250636 HC RX REV CODE- 250/636: Performed by: FAMILY MEDICINE

## 2022-05-06 PROCEDURE — 97165 OT EVAL LOW COMPLEX 30 MIN: CPT

## 2022-05-06 PROCEDURE — 96375 TX/PRO/DX INJ NEW DRUG ADDON: CPT

## 2022-05-06 PROCEDURE — 83036 HEMOGLOBIN GLYCOSYLATED A1C: CPT

## 2022-05-06 PROCEDURE — 74011250636 HC RX REV CODE- 250/636: Performed by: INTERNAL MEDICINE

## 2022-05-06 PROCEDURE — C9113 INJ PANTOPRAZOLE SODIUM, VIA: HCPCS | Performed by: INTERNAL MEDICINE

## 2022-05-06 PROCEDURE — 97535 SELF CARE MNGMENT TRAINING: CPT

## 2022-05-06 PROCEDURE — 36415 COLL VENOUS BLD VENIPUNCTURE: CPT

## 2022-05-06 PROCEDURE — 83735 ASSAY OF MAGNESIUM: CPT

## 2022-05-06 PROCEDURE — 97530 THERAPEUTIC ACTIVITIES: CPT

## 2022-05-06 PROCEDURE — 84100 ASSAY OF PHOSPHORUS: CPT

## 2022-05-06 PROCEDURE — 74011000250 HC RX REV CODE- 250: Performed by: INTERNAL MEDICINE

## 2022-05-06 PROCEDURE — 80053 COMPREHEN METABOLIC PANEL: CPT

## 2022-05-06 PROCEDURE — 96376 TX/PRO/DX INJ SAME DRUG ADON: CPT

## 2022-05-06 PROCEDURE — 80061 LIPID PANEL: CPT

## 2022-05-06 PROCEDURE — 74011000250 HC RX REV CODE- 250: Performed by: FAMILY MEDICINE

## 2022-05-06 RX ORDER — MORPHINE SULFATE 4 MG/ML
4 INJECTION INTRAVENOUS
Status: DISCONTINUED | OUTPATIENT
Start: 2022-05-06 | End: 2022-05-08 | Stop reason: HOSPADM

## 2022-05-06 RX ADMIN — HEPARIN SODIUM 5000 UNITS: 5000 INJECTION INTRAVENOUS; SUBCUTANEOUS at 09:35

## 2022-05-06 RX ADMIN — ONDANSETRON 4 MG: 2 INJECTION INTRAMUSCULAR; INTRAVENOUS at 12:00

## 2022-05-06 RX ADMIN — SODIUM CHLORIDE, PRESERVATIVE FREE 10 ML: 5 INJECTION INTRAVENOUS at 16:41

## 2022-05-06 RX ADMIN — HEPARIN SODIUM 5000 UNITS: 5000 INJECTION INTRAVENOUS; SUBCUTANEOUS at 16:41

## 2022-05-06 RX ADMIN — MORPHINE SULFATE 4 MG: 4 INJECTION INTRAVENOUS at 07:41

## 2022-05-06 RX ADMIN — MORPHINE SULFATE 4 MG: 4 INJECTION INTRAVENOUS at 20:34

## 2022-05-06 RX ADMIN — SODIUM CHLORIDE 5 MG: 9 INJECTION INTRAMUSCULAR; INTRAVENOUS; SUBCUTANEOUS at 22:37

## 2022-05-06 RX ADMIN — SODIUM CHLORIDE, PRESERVATIVE FREE 10 ML: 5 INJECTION INTRAVENOUS at 21:06

## 2022-05-06 RX ADMIN — MORPHINE SULFATE 4 MG: 4 INJECTION INTRAVENOUS at 12:00

## 2022-05-06 RX ADMIN — ONDANSETRON 4 MG: 2 INJECTION INTRAMUSCULAR; INTRAVENOUS at 02:33

## 2022-05-06 RX ADMIN — SODIUM CHLORIDE 5 MG: 9 INJECTION INTRAMUSCULAR; INTRAVENOUS; SUBCUTANEOUS at 19:37

## 2022-05-06 RX ADMIN — SODIUM CHLORIDE, PRESERVATIVE FREE 10 ML: 5 INJECTION INTRAVENOUS at 06:34

## 2022-05-06 RX ADMIN — SODIUM CHLORIDE 40 MG: 9 INJECTION INTRAMUSCULAR; INTRAVENOUS; SUBCUTANEOUS at 17:56

## 2022-05-06 RX ADMIN — MORPHINE SULFATE 4 MG: 4 INJECTION INTRAVENOUS at 15:53

## 2022-05-06 RX ADMIN — SODIUM CHLORIDE 5 MG: 9 INJECTION INTRAMUSCULAR; INTRAVENOUS; SUBCUTANEOUS at 13:09

## 2022-05-06 RX ADMIN — HEPARIN SODIUM 5000 UNITS: 5000 INJECTION INTRAVENOUS; SUBCUTANEOUS at 00:13

## 2022-05-06 RX ADMIN — ONDANSETRON 4 MG: 2 INJECTION INTRAMUSCULAR; INTRAVENOUS at 06:34

## 2022-05-06 RX ADMIN — SODIUM CHLORIDE 5 MG: 9 INJECTION INTRAMUSCULAR; INTRAVENOUS; SUBCUTANEOUS at 15:55

## 2022-05-06 NOTE — PROGRESS NOTES
Comprehensive Nutrition Assessment    Type and Reason for Visit: Initial (RD discretion per IDT rounds)      Nutrition Recommendations/Interventions:   Food and/or Nutrient Delivery: Continue NPO   If placement of GT pursued, please order nutrition consult for TF management and orders. Order placed for daily wt. Coordination of Nutrition Care: Continue to monitor while inpatient,Interdisciplinary rounds    Discharge Planning: Too soon to determine     Malnutrition Assessment:  Malnutrition Status: At risk for malnutrition (specify) (chronic inadequate po intake, subjective wt loss)  Nutrition Assessment:   Nutrition History:      5/6: Pt reports decreased po intake since he's sick or \"off and on\" for several months. On a good day he'll eat a whole sandwich. On a bad day he'll just drink powerade and soda. He has had more bad days than good days in the last several months. Lack of appetite, nausea and difficulty swallowing both solids and liquids have been his main barriers. He does weigh at home intermittently but does not recall how much he weighed the last time which he thinks was probably last week. He does endorse a wide range (180's to low 200's) and says the last time he was in the 200's was before they took fluid off his heart. He endorses loose clothes fit and global wt loss but time frame is unclear, but possibly in th past several months as well  Nutrition Background:   H/O: severe CHF (EF 15-20%), VT with ICD,HTN, CAD, GERD with dysphagia( Schatzki's ring, large HH, previous dilation, severe esophageal dysmotlilty), ETOH use, anxiety, depression. Presented with severe pain, right and left leg numbness. Nutrition Interval:  Per GI consult 5/5-\"I feel he would benefit from a feeding tube; He wishes to discuss this with his wife. If he elects to have PEG placed it would likely require an IR approach secondary to his large H/H.\"  Pt seen sitting in bed.  He continues with no interest in trying po intake. Given large HH, suspect pt would tolerate jejunal feedings better than gastric feedings, if pt opts for feeding tube placement. Pt would be at increased risk for refeeding and expect need for volume restricted formula d/t CHF  Nutrition Related Findings:   No visible fat or muscle wasting      Current Nutrition Therapies:  DIET NPO Ice Chips    Current Intake:   Average Meal Intake: NPO        Anthropometric Measures:  Anthropometrics:  Height: 5' 11\" (180.3 cm), Weight: 81.1 kg (178 lb 13.5 oz), @[facility](5044)  Current Body Wt: 80.7 kg (178 lb) (5/6), Weight source: Not specified  BMI: 24.8, Normal weight (BMI 18.5-24. 9)  Admission Body Weight: 178 lb (stated)  Ideal Body Weight (lbs) (Calculated): 172 lbs (78 kg),    Usual Body Wt:  (wide weight variances per EMR, h/o CHF), Percent weight change:  Unable to accurately assess given fluid impact on weights.            WT / BMI WEIGHT   5/6/2022 178 lb 13.5 oz   3/4/2022 187 lb 3.2 oz   2/17/2022 225 lb   12/27/2021 188 lb   12/23/2021 188 lb   9/14/2021 183 lb   9/1/2021 191 lb   8/29/2021 191 lb 1.6 oz   7/15/2021 191 lb   4/20/2021 193 lb 1.6 oz   3/2/2021 191 lb   1/13/2021 195 lb 12.8 oz   10/5/2020 184 lb 8 oz   9/29/2020 195 lb   8/20/2020 198 lb   7/7/2020 203 lb 1.6 oz   3/23/2020 187 lb 11.2 oz   3/11/2020 184 lb   12/16/2019 186 lb   12/6/2019 163 lb   5/11/2019 162 lb 3.2 oz   5/6/2019 151 lb   4/11/2019 150 lb   3/8/2019 150 lb 9.6 oz   2/15/2019 152 lb   1/2/2019 156 lb     Edema: No data recorded   Estimated Daily Nutrient Needs:  EER: 2642-8816 kcal/day (25-30 kcal/kg)(using IBW (Ideal body weight) 78.2 kg)-d/t fluid fluctuations and no actual current wt  EPR:  g/day (20% of kcals)  Fluid : 1 ml/kcal/day    Nutrition Diagnosis:   · Inadequate oral intake related to swallowing difficulty,altered GI structure (decreased appetite) as evidenced by nausea,poor intake prior to admission,weight loss,GI abnormality (pt reported barriers as above)      Goals: Active Goal:  (Await pt decision in regards to GT placement)       Nutrition Monitoring and Evaluation:      Food/Nutrient Intake Outcomes: Other (specify).  Await decision on feeding tube placement  Physical Signs/Symptoms Outcomes: GI status      Amando BrownAlvin J. Sitem[facility], [address], [address] [place] [address], [address] [place] [address]

## 2022-05-06 NOTE — PROGRESS NOTES
Problem: Self Care Deficits Care Plan (Adult)  Goal: *Acute Goals and Plan of Care (Insert Text)  Outcome: Progressing Towards Goal  Note: 1. Patient will perform grooming with min assist.  2. Patient will perform Upper body dressing with mod assist.  3. Patient will perform lower body dressing with mod assist.  4. Patient will perform upper body bathing with min assist  5. Patient will perform toilet transfers with min assist.  6. Patient will participate in 30 + minutes of ADL/ therapeutic exercise/therapeutic activity with min rest breaks to increase activity tolerance for self care and increase B UE fine and gross motor coordination. 7. Patient will perform ADL functional mobility in room with CGA. Goals to be achieved in 7 days. OCCUPATIONAL THERAPY: Initial Assessment and AM 5/6/2022  OBSERVATION: OT Visit Days: 2  Payor: Kathi Singh / Plan: [place]UE [place]OSS [facility] / Product Type: PPO /      NAME/AGE/GENDER: Janett Rubin is a 62 y.o. male   PRIMARY DIAGNOSIS:  Transient ischemic attack [G45.9] Transient ischemic attack Transient ischemic attack        ICD-10: Treatment Diagnosis:    Generalized Muscle Weakness (M62.81)  Other lack of cordination (R27.8)  Difficulty in walking, Not elsewhere classified (R26.2)  Other abnormalities of gait and mobility (R26.89)   Precautions/Allergies:     Patient has no known allergies. ASSESSMENT:   855am  Patient has EF of 10%  Mr. Juice Wynn presents supine in bed s/p having pain  meds earlier. He is complaining of pain in all 4 extremities. His  is weaker in R hand verses Left hand. He is unable to flex elbows  and able to raise B UE slightly off bed. He reported he is having some word finding issues. He reported he cant swallow and GI had recommended a peg tube. He lives with his wife and she works first shift. He is usually mod I with mobility at home. He hasn't been able to step over the tub for  a shower. He has a shower chair but he usually sponge bathes. 1115am OT returned with PT and patient agreeable to participate. He was SBA supine to sit. He is max to total for ADLs as he is having pain every where and unable to use his UEs. OT donned his shoes and  he was CGA/Min sit to stand with RW. He was able to stand and hold the walker with B UE. He took steps with pt and was SBa sit to supine. He would benefit from skilled OT services, will follow. Nursing aware of patient status. This section established at most recent assessment   PROBLEM LIST (Impairments causing functional limitations):  Decreased Strength  Decreased ADL/Functional Activities  Decreased Transfer Abilities  Decreased Ambulation Ability/Technique  Decreased Balance  Increased Pain  Decreased Activity Tolerance   INTERVENTIONS PLANNED: (Benefits and precautions of occupational therapy have been discussed with the patient.)  Activities of daily living training  Adaptive equipment training  Balance training  Clothing management  Donning&doffing training  Hygiene training  Neuromuscular re-eduation  Therapeutic activity  Therapeutic exercise     TREATMENT PLAN: Frequency/Duration: Follow patient 3 times to address above goals. Rehabilitation Potential For Stated Goals: Good     REHAB RECOMMENDATIONS (at time of discharge pending progress):    Placement: It is my opinion, based on this patient's performance to date, that Mr. Swati Block may benefit from participating in 1-2 additional therapy sessions in order to continue to assess for rehab potential and then make recommendation for disposition at discharge. home with hh vs SNF pending progress  Equipment:   None at this time              Amanda [address]:   History of Present Injury/Illness (Reason for Referral):  See h and P  Past Medical History/Comorbidities:   Mr. Swati Block  has a past medical history of Acute gastroenteritis (2/26/2016), Acute on chronic systolic heart failure ([facility] (9/30/2020), Acute respiratory failure due to COVID-19 ([facility] (6/46/5498), Acute systolic congestive heart failure ([facility] (0/96/0063), Acute systolic heart failure ([address] (9/14/2010), AGE (acute gastroenteritis) (12/16/2019), YAO (acute kidney injury) ([address] (8/23/2021), Anxiety associated with depression (3/18/2017), Arthritis, CAD (coronary artery disease), CHF (congestive heart failure) ([address] Chronic alcoholism ([address] Chronic back pain, Chronic neck pain, Chronic systolic heart failure ([address] (4/21/2011), Dental caries (7/13/2017), Depression, Dizziness - light-headed, Elevated brain natriuretic peptide (BNP) level (4/14/2021), Generalized abdominal pain (2/14/2022), GERD (gastroesophageal reflux disease), Gynecomastia (2/22/2016), Heart failure ([address] Hyperbilirubinemia (2/7/2022), Hypertension, Hypokalemia (7/3/2020), Hypomagnesemia (2/26/2022), ICD (implantable cardioverter-defibrillator) in place (4/22/2011), Leukopenia (5/7/2019), Macrocytic anemia (7/8/2018), NSTEMI (non-ST elevated myocardial infarction) ([address] (8/24/2021), Schatzki's ring (07/10/2018), Situational depression (2/22/2016), SVT (supraventricular tachycardia) ([address] (12/22/2018), Tachycardia (2/24/2022), and Ventricular tachycardia ([address] (2/22/2016). Mr. Juanito East  has a past surgical history that includes hx heart catheterization (9/21/10); hx pacemaker; and egd (5/9/2019).   Social History/Living Environment:   Home Environment: Private residence  # Steps to Enter: 5  Rails to Enter: Yes  Hand Rails : Left  One/Two Story Residence: One story  Living Alone: No  Support Systems: Spouse/Significant Other  Patient Expects to be Discharged to[de-identified] Unable to determine at this time  Current DME Used/Available at Home: Shower chair  Tub or Shower Type: Tub/Shower combination  Prior Level of Function/Work/Activity:  Min with ADLS, mod I for mobility     Number of Personal Factors/Comorbidities that affect the Plan of Care: Brief history (0):  LOW COMPLEXITY   ASSESSMENT OF OCCUPATIONAL PERFORMANCE[de-identified]   Activities of Daily Living:   Basic ADLs (From Assessment) Complex ADLs (From Assessment)   Feeding:  (NPO)  Oral Facial Hygiene/Grooming: Maximum assistance  Bathing: Maximum assistance  Upper Body Dressing: Maximum assistance  Lower Body Dressing: Total assistance  Toileting: Total assistance     Grooming/Bathing/Dressing Activities of Daily Living     Cognitive Retraining  Safety/Judgement: Awareness of environment; Fall prevention                       Bed/Mat Mobility  Rolling: Stand-by assistance  Supine to Sit: Stand-by assistance  Sit to Supine: Stand-by assistance  Sit to Stand: Contact guard assistance  Stand to Sit: Contact guard assistance  Bed to Chair: Contact guard assistance (with walker)  Scooting: Stand-by assistance     Most Recent Physical Functioning:   Gross Assessment:                  Posture:     Balance:  Sitting: Intact; Without support  Standing: Impaired; With support (walker) Bed Mobility:  Rolling: Stand-by assistance  Supine to Sit: Stand-by assistance  Sit to Supine: Stand-by assistance  Scooting: Stand-by assistance  Wheelchair Mobility:     Transfers:  Sit to Stand: Contact guard assistance  Stand to Sit: Contact guard assistance  Bed to Chair: Contact guard assistance (with walker)  Duration: 30 Minutes (extra time to work through activity noted)            Patient Vitals for the past 6 hrs:   BP BP Patient Position SpO2 Pulse   05/06/22 0800 -- -- -- 90   05/06/22 1100 (!) 141/98 Sitting 96 % (!) 108       Mental Status  Neurologic State: Alert,Appropriate for age  Orientation Level: Oriented X4  Cognition: Follows commands  Perception: Appears intact  Perseveration: No perseveration noted  Safety/Judgement: Awareness of environment,Fall prevention            LLE Assessment  LLE Assessment (WDL): Exception to WDL RLE Assessment  RLE Assessment (WDL): Exceptions to [facility]           Physical Skills Involved:  Range of Motion  Balance  Strength  Activity Tolerance  Fine Motor Control  Gross Motor Control  Pain (acute) Cognitive Skills Affected (resulting in the inability to perform in a timely and safe manner):  Expression Psychosocial Skills Affected:  Habits/Routines  Environmental Adaptation  Self-Awareness   Number of elements that affect the Plan of Care: 3-5:  MODERATE COMPLEXITY   CLINICAL DECISION MAKING:   [employer] -PAC 6 Clicks   Daily Activity Inpatient Short Form  How much help from another person does the patient currently need. .. Total A Lot A Little None   1. Putting on and taking off regular lower body clothing? [x] 1   [] 2   [] 3   [] 4   2. Bathing (including washing, rinsing, drying)? [] 1   [x] 2   [] 3   [] 4   3. Toileting, which includes using toilet, bedpan or urinal?   [x] 1   [] 2   [] 3   [] 4   4. Putting on and taking off regular upper body clothing? [] 1   [x] 2   [] 3   [] 4   5. Taking care of personal grooming such as brushing teeth? [] 1   [x] 2   [] 3   [] 4   6. Eating meals? [x] 1   [] 2   [] 3   [] 4   © 2007, Trustees of [employer], under license to [employer]. All rights reserved      Score:  Initial:9 Most Recent: X (Date: -- )    Interpretation of Tool:  Represents activities that are increasingly more difficult (i.e. Bed mobility, Transfers, Gait).    Medical Necessity:     · Patient is expected to demonstrate progress in   · Self care skills and functional mobility  ·     Reason for Services/Other Comments:  · Patient continues to require skilled intervention due to   · Above listed deficits     Use of outcome tool(s) and clinical judgement create a POC that gives a: LOW COMPLEXITY         TREATMENT:   (In addition to Assessment/Re-Assessment sessions the following treatments were rendered)     Pre-treatment Symptoms/Complaints:    Pain: Initial:    5/10 all 4 extremities Post Session:  5/10 had pain meds prior, rest     Self Care: (25): Procedure(s) (per grid) utilized to improve and/or restore self-care/home management as related to dressing and bed mobility . Required maximal visual, verbal, manual, and tactile cueing to facilitate activities of daily living skills, compensatory activities, and functional mobitly and discharge planning . Assessment complete    Braces/Orthotics/Lines/Etc:   O2 Device: None (Room air)  Treatment/Session Assessment:    Response to Treatment:  tolerated fair  Interdisciplinary Collaboration:   Physical Therapist  Occupational Therapist  Registered Nurse  Physician    After treatment position/precautions:   Supine in bed  Bed/Chair-wheels locked  Bed in low position  Call light within reach  RN notified  Side rails x 2   Compliance with Program/Exercises: Will assess as treatment progresses. Recommendations/Intent for next treatment session: \"Next visit will focus on advancements to more challenging activities and reduction in assistance provided\".   Total Treatment Duration:25  Time in 565   Time out 503    OT Patient Time In/Time Out  Time In: 1115  Time Out: 100 [facility],
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
## 2022-05-06 NOTE — PROGRESS NOTES
Problem: Aspiration - Risk of  Goal: *Absence of aspiration  Outcome: Progressing Towards Goal     Problem: Patient Education: Go to Patient Education Activity  Goal: Patient/Family Education  Outcome: Progressing Towards Goal     Problem: Patient Education: Go to Patient Education Activity  Goal: Patient/Family Education  Outcome: Progressing Towards Goal     Problem: TIA/CVA Stroke: 0-24 hours  Goal: Off Pathway (Use only if patient is Off Pathway)  Outcome: Progressing Towards Goal  Goal: Consults, if ordered  Outcome: Progressing Towards Goal  Goal: Diagnostic Test/Procedures  Outcome: Progressing Towards Goal  Goal: Nutrition/Diet  Outcome: Progressing Towards Goal  Goal: Discharge Planning  Outcome: Progressing Towards Goal  Goal: Medications  Outcome: Progressing Towards Goal  Goal: Respiratory  Outcome: Progressing Towards Goal  Goal: Treatments/Interventions/Procedures  Outcome: Progressing Towards Goal  Goal: Minimize risk of bleeding post-thrombolytic infusion  Outcome: Progressing Towards Goal  Goal: Monitor for complications post-thrombolytic infusion  Outcome: Progressing Towards Goal  Goal: Psychosocial  Outcome: Progressing Towards Goal  Goal: *Hemodynamically stable  Outcome: Progressing Towards Goal  Goal: *Neurologically stable  Description: Absence of additional neurological deficits    Outcome: Progressing Towards Goal  Goal: *Verbalizes anxiety and depression are reduced or absent  Outcome: Progressing Towards Goal  Goal: *Absence of Signs of Aspiration on Current Diet  Outcome: Progressing Towards Goal  Goal: *Absence of deep venous thrombosis signs and symptoms(Stroke Metric)  Outcome: Progressing Towards Goal  Goal: *Ability to perform ADLs and demonstrates progressive mobility and function  Outcome: Progressing Towards Goal  Goal: *Stroke education started(Stroke Metric)  Outcome: Progressing Towards Goal  Goal: *Dysphagia screen performed(Stroke Metric)  Outcome: Progressing Towards Goal  Goal: *Rehab consulted(Stroke Metric)  Outcome: Progressing Towards Goal     Problem: Patient Education: Go to Patient Education Activity  Goal: Patient/Family Education  Outcome: Progressing Towards Goal     Problem: Falls - Risk of  Goal: *Absence of Falls  Description: Document Kailee Blood Fall Risk and appropriate interventions in the flowsheet. Outcome: Progressing Towards Goal  Note: Fall Risk Interventions:  Mobility Interventions: Patient to call before getting OOB,Bed/chair exit alarm         Medication Interventions: Patient to call before getting OOB,Bed/chair exit alarm    Elimination Interventions: Bed/chair exit alarm,Call light in reach,Patient to call for help with toileting needs              Problem: Patient Education: Go to Patient Education Activity  Goal: Patient/Family Education  Outcome: Progressing Towards Goal     Problem: Patient Education: Go to Patient Education Activity  Goal: Patient/Family Education  Outcome: Progressing Towards Goal     Problem: Patient Education: Go to Patient Education Activity  Goal: Patient/Family Education  Outcome: Progressing Towards Goal     Problem: Pressure Injury - Risk of  Goal: *Prevention of pressure injury  Description: Document Abad Scale and appropriate interventions in the flowsheet.   Outcome: Progressing Towards Goal

## 2022-05-06 NOTE — PROGRESS NOTES
Problem: Aspiration - Risk of  Goal: *Absence of aspiration  Outcome: Progressing Towards Goal     Problem: Patient Education: Go to Patient Education Activity  Goal: Patient/Family Education  Outcome: Progressing Towards Goal     Problem: Patient Education: Go to Patient Education Activity  Goal: Patient/Family Education  Outcome: Progressing Towards Goal     Problem: TIA/CVA Stroke: 0-24 hours  Goal: Off Pathway (Use only if patient is Off Pathway)  Outcome: Progressing Towards Goal  Goal: Consults, if ordered  Outcome: Progressing Towards Goal  Goal: Diagnostic Test/Procedures  Outcome: Progressing Towards Goal  Goal: Nutrition/Diet  Outcome: Progressing Towards Goal  Goal: Discharge Planning  Outcome: Progressing Towards Goal  Goal: Medications  Outcome: Progressing Towards Goal  Goal: Respiratory  Outcome: Progressing Towards Goal  Goal: Treatments/Interventions/Procedures  Outcome: Progressing Towards Goal  Goal: Minimize risk of bleeding post-thrombolytic infusion  Outcome: Progressing Towards Goal  Goal: Monitor for complications post-thrombolytic infusion  Outcome: Progressing Towards Goal  Goal: Psychosocial  Outcome: Progressing Towards Goal  Goal: *Hemodynamically stable  Outcome: Progressing Towards Goal  Goal: *Neurologically stable  Description: Absence of additional neurological deficits    Outcome: Progressing Towards Goal  Goal: *Verbalizes anxiety and depression are reduced or absent  Outcome: Progressing Towards Goal  Goal: *Absence of Signs of Aspiration on Current Diet  Outcome: Progressing Towards Goal  Goal: *Absence of deep venous thrombosis signs and symptoms(Stroke Metric)  Outcome: Progressing Towards Goal  Goal: *Ability to perform ADLs and demonstrates progressive mobility and function  Outcome: Progressing Towards Goal  Goal: *Stroke education started(Stroke Metric)  Outcome: Progressing Towards Goal  Goal: *Dysphagia screen performed(Stroke Metric)  Outcome: Progressing Towards Goal  Goal: *Rehab consulted(Stroke Metric)  Outcome: Progressing Towards Goal     Problem: TIA/CVA Stroke: Day 2 Until Discharge  Goal: Off Pathway (Use only if patient is Off Pathway)  Outcome: Progressing Towards Goal  Goal: Activity/Safety  Outcome: Progressing Towards Goal  Goal: Diagnostic Test/Procedures  Outcome: Progressing Towards Goal  Goal: Nutrition/Diet  Outcome: Progressing Towards Goal  Goal: Discharge Planning  Outcome: Progressing Towards Goal  Goal: Medications  Outcome: Progressing Towards Goal  Goal: Respiratory  Outcome: Progressing Towards Goal  Goal: Treatments/Interventions/Procedures  Outcome: Progressing Towards Goal  Goal: Psychosocial  Outcome: Progressing Towards Goal  Goal: *Verbalizes anxiety and depression are reduced or absent  Outcome: Progressing Towards Goal  Goal: *Absence of aspiration  Outcome: Progressing Towards Goal  Goal: *Absence of deep venous thrombosis signs and symptoms(Stroke Metric)  Outcome: Progressing Towards Goal  Goal: *Optimal pain control at patient's stated goal  Outcome: Progressing Towards Goal  Goal: *Tolerating diet  Outcome: Progressing Towards Goal  Goal: *Ability to perform ADLs and demonstrates progressive mobility and function  Outcome: Progressing Towards Goal  Goal: *Stroke education continued(Stroke Metric)  Outcome: Progressing Towards Goal     Problem: Ischemic Stroke: Discharge Outcomes  Goal: *Verbalizes anxiety and depression are reduced or absent  Outcome: Progressing Towards Goal  Goal: *Verbalize understanding of risk factor modification(Stroke Metric)  Outcome: Progressing Towards Goal  Goal: *Hemodynamically stable  Outcome: Progressing Towards Goal  Goal: *Absence of aspiration pneumonia  Outcome: Progressing Towards Goal  Goal: *Aware of needed dietary changes  Outcome: Progressing Towards Goal  Goal: *Verbalize understanding of prescribed medications including anti-coagulants, anti-lipid, and/or anti-platelets(Stroke Metric)  Outcome: Progressing Towards Goal  Goal: *Tolerating diet  Outcome: Progressing Towards Goal  Goal: *Aware of follow-up diagnostics related to anticoagulants  Outcome: Progressing Towards Goal  Goal: *Ability to perform ADLs and demonstrates progressive mobility and function  Outcome: Progressing Towards Goal  Goal: *Absence of DVT(Stroke Metric)  Outcome: Progressing Towards Goal  Goal: *Absence of aspiration  Outcome: Progressing Towards Goal  Goal: *Optimal pain control at patient's stated goal  Outcome: Progressing Towards Goal  Goal: *Home safety concerns addressed  Outcome: Progressing Towards Goal  Goal: *Describes available resources and support systems  Outcome: Progressing Towards Goal  Goal: *Verbalizes understanding of activation of EMS(911) for stroke symptoms(Stroke Metric)  Outcome: Progressing Towards Goal  Goal: *Understands and describes signs and symptoms to report to providers(Stroke Metric)  Outcome: Progressing Towards Goal  Goal: *Neurolgocially stable (absence of additional neurological deficits)  Outcome: Progressing Towards Goal  Goal: *Verbalizes importance of follow-up with primary care physician(Stroke Metric)  Outcome: Progressing Towards Goal  Goal: *Smoking cessation discussed,if applicable(Stroke Metric)  Outcome: Progressing Towards Goal  Goal: *Depression screening completed(Stroke Metric)  Outcome: Progressing Towards Goal     Problem: Patient Education: Go to Patient Education Activity  Goal: Patient/Family Education  Outcome: Progressing Towards Goal     Problem: Falls - Risk of  Goal: *Absence of Falls  Description: Document Chery Fall Risk and appropriate interventions in the flowsheet.   Outcome: Progressing Towards Goal  Note: Fall Risk Interventions:  Mobility Interventions: PT Consult for mobility concerns,OT consult for ADLs         Medication Interventions: Bed/chair exit alarm                   Problem: Patient Education: Go to Patient Education Activity  Goal: Patient/Family Education  Outcome: Progressing Towards Goal     Problem: Patient Education: Go to Patient Education Activity  Goal: Patient/Family Education  Outcome: Progressing Towards Goal

## 2022-05-06 NOTE — PROGRESS NOTES
Problem: Mobility Impaired (Adult and Pediatric)  Goal: *Acute Goals and Plan of Care (Insert Text)  Note: STG:  (1.)Mr. Juanito East will move from supine to sit and sit to supine  with STAND BY ASSIST within 5 treatment day(s). (2.)Mr. Juanito East will transfer from bed to chair and chair to bed with STAND BY ASSIST using the least restrictive device within 5 treatment day(s). (3.)Mr. Juanito East will ambulate with CONTACT GUARD ASSIST for 100 feet with the least restrictive device within 5 treatment day(s). (4.)Pt. will climb up/down 5 steps with rail and CGA within 5 days  (5.)Pt. will increase B LE strength and coordination 1/2 grade within 5 days      ________________________________________________________________________________________________      PHYSICAL THERAPY: Daily Note and AM 5/6/2022  OBSERVATION: PT Visit Days : 2  Payor: [employer] Paul / Plan: [place] [employer]UE [place]OSS FEDER[place] / Product Type: PPO /       NAME/AGE/GENDER: Marko Goldmann is a 62 y.o. male   PRIMARY DIAGNOSIS: Transient ischemic attack [G45.9] Transient ischemic attack Transient ischemic attack       ICD-10: Treatment Diagnosis:    · Generalized Muscle Weakness (M62.81)  · Other lack of cordination (R27.8)  · Other abnormalities of gait and mobility (R26.89)   Precaution/Allergies:  Patient has no known allergies. ASSESSMENT:     Mr. Juanito East needed strong encouragement to participate. Pt would stay in bed 24/7 if given the choice. Pt demonstrated inconsistencies with muscle activity & functional movement that strongly indicated lack of full effort. This pt has a complex medical history but there is a clear behavioral component that is significantly limiting pt's function. This pt could not return home at his current functional level & would not be safe to be left alone at home. Pt could potentially do more functionally that he is willing. MD & MSW aware of DC concerns.     This section established at most recent assessment   PROBLEM LIST (Impairments causing functional limitations):  1. Decreased Strength  2. Decreased ADL/Functional Activities  3. Decreased Transfer Abilities  4. Decreased Ambulation Ability/Technique  5. Decreased Balance  6. Increased Pain  7. Decreased Activity Tolerance  8. Increased Fatigue  9. Decreased Flexibility/Joint Mobility  10. Decreased [place] with Home Exercise Program   INTERVENTIONS PLANNED: (Benefits and precautions of physical therapy have been discussed with the patient.)  1. Balance Exercise  2. Bed Mobility  3. Gait Training  4. Neuromuscular Re-education/Strengthening  5. Therapeutic Activites  6. Transfer Training     TREATMENT PLAN: Frequency/Duration: daily for duration of hospital stay  Rehabilitation Potential For Stated Goals: [facility] (at time of discharge pending progress):    Placement: It is my opinion, based on this patient's performance to date, that Mr. Dominique Tineo may benefit from [address] after discharge due to the functional deficits listed above that are likely to improve with skilled rehabilitation because he/she has multiple medical issues that affect his/her functional mobility in the community. Equipment:    None at this time              HISTORY:   History of Present Injury/Illness (Reason for Referral):   Admitted with R sided numbness, pain and spasm  Past Medical History/Comorbidities:   Mr. Dominique Tineo  has a past medical history of Acute gastroenteritis (2/26/2016), Acute on chronic systolic heart failure ([address] (9/30/2020), Acute respiratory failure due to COVID-19 [facility]) (6/80/5163), Acute systolic congestive heart failure ([address] (8/14/4408), Acute systolic heart failure ([address] (9/14/2010), AGE (acute gastroenteritis) (12/16/2019), YAO (acute kidney injury) ([address] (8/23/2021), Anxiety associated with depression (3/18/2017), Arthritis, CAD (coronary artery disease), CHF (congestive heart failure) ([address] Chronic alcoholism ([address] Chronic back pain, Chronic neck pain, Chronic systolic heart failure ([address] (4/21/2011), Dental caries (7/13/2017), Depression, Dizziness - light-headed, Elevated brain natriuretic peptide (BNP) level (4/14/2021), Generalized abdominal pain (2/14/2022), GERD (gastroesophageal reflux disease), Gynecomastia (2/22/2016), Heart failure ([address] Hyperbilirubinemia (2/7/2022), Hypertension, Hypokalemia (7/3/2020), Hypomagnesemia (2/26/2022), ICD (implantable cardioverter-defibrillator) in place (4/22/2011), Leukopenia (5/7/2019), Macrocytic anemia (7/8/2018), NSTEMI (non-ST elevated myocardial infarction) ([address] (8/24/2021), Schatzki's ring (07/10/2018), Situational depression (2/22/2016), SVT (supraventricular tachycardia) ([address] (12/22/2018), Tachycardia (2/24/2022), and Ventricular tachycardia ([address] (2/22/2016). Mr. Solomon Person  has a past surgical history that includes hx heart catheterization (9/21/10); hx pacemaker; and egd (5/9/2019). Social History/Living Environment:   Home Environment: Private residence  # Steps to Enter: 5  Hand Rails : Right  One/Two Story Residence: One story  Living Alone: No  Support Systems: Spouse/Significant Other  Patient Expects to be Discharged to[de-identified] Home with family assistance  Current DME Used/Available at Home: Walker, rolling  Tub or Shower Type: Tub/Shower combination  Prior Level of Function/Work/Activity:  Functionally I with ADLs, ambulation, driving and is retired  Dominant Side:         RIGHT   Number of Personal Factors/Comorbidities that affect the Plan of Care: 1-2: MODERATE COMPLEXITY   EXAMINATION:   Most Recent Physical Functioning:   Gross Assessment:                     Balance:  Sitting: Intact; Without support  Standing: Impaired; With support (walker) Bed Mobility:  Rolling: Stand-by assistance  Supine to Sit: Stand-by assistance  Sit to Supine: Stand-by assistance  Scooting: Stand-by assistance       Transfers:  Sit to Stand: Contact guard assistance  Stand to Sit: Contact guard assistance  Bed to Chair: Contact guard assistance (with walker)  Duration: 30 Minutes (extra time to work through activity noted)  Gait:     Speed/Treasure: Shuffled; Slow  Gait Abnormalities: Decreased step clearance  Distance (ft): 10 Feet (ft)  Assistive Device: Walker, rolling  Ambulation - Level of Assistance: Contact guard assistance  Interventions: Safety awareness training;Verbal cues         Body Structures Involved:  1. Nerves  2. Muscles Body Functions Affected:  1. Sensory/Pain  2. Neuromusculoskeletal  3. Movement Related Activities and Participation Affected:  1. General Tasks and Demands  2. Mobility  3. Self Care   Number of elements that affect the Plan of Care: 4+: HIGH COMPLEXITY   CLINICAL PRESENTATION:   Presentation: Stable and uncomplicated: LOW COMPLEXITY   CLINICAL DECISION MAKING:   [employer] AM-PAC 6 Clicks   Basic Mobility Inpatient Short Form  How much difficulty does the patient currently have. .. Unable A Lot A Little None   1. Turning over in bed (including adjusting bedclothes, sheets and blankets)? [] 1   [] 2   [x] 3   [] 4   2. Sitting down on and standing up from a chair with arms ( e.g., wheelchair, bedside commode, etc.)   [] 1   [] 2   [x] 3   [] 4   3. Moving from lying on back to sitting on the side of the bed? [] 1   [] 2   [x] 3   [] 4   How much help from another person does the patient currently need. .. Total A Lot A Little None   4. Moving to and from a bed to a chair (including a wheelchair)? [] 1   [] 2   [x] 3   [] 4   5. Need to walk in hospital room? [] 1   [x] 2   [] 3   [] 4   6. Climbing 3-5 steps with a railing? [] 1   [x] 2   [] 3   [] 4   © 2007, Trustees of [employer], under license to [employer]. All rights reserved      Score:  Initial: 16 Most Recent: X (Date: -- )    Interpretation of Tool:  Represents activities that are increasingly more difficult (i.e. Bed mobility, Transfers, Gait).     Medical Necessity:     · Patient demonstrates   · fair  ·  rehab potential due to higher previous functional level. Reason for Services/Other Comments:  · Patient   · continues to require present interventions due to patient's inability to perform functional mobility at a safe SBA level  · . Use of outcome tool(s) and clinical judgement create a POC that gives a: Questionable prediction of patient's progress: MODERATE COMPLEXITY            TREATMENT:   (In addition to Assessment/Re-Assessment sessions the following treatments were rendered)   Pre-treatment Symptoms/Complaints:  Weak & painful everywhere  Pain: Initial: numeric scale  Pain Intensity 1: 3  Pain Location 1:  (general non specific)  Pain Intervention(s) 1: Ambulation/Increased Activity  Post Session:  3/10           Braces/Orthotics/Lines/Etc:   · O2 Device: None (Room air)  Treatment/Session Assessment:    · Response to Treatment: pt needs to be allowed to work through activity, pt needs to be encouraged to be out of bed  & increase out of bed activity if progress is expected  · Interdisciplinary Collaboration:   o Registered Nurse  · After treatment position/precautions:   o Supine in bed  o Bed/Chair-wheels locked  o Bed in low position  o Call light within reach  o RN notified   · Compliance with Program/Exercises: Will assess as treatment progresses  · Recommendations/Intent for next treatment session: \"Next visit will focus on advancements to more challenging activities\".   Total Treatment Duration:  PT Patient Time In/Time Out  Time In: 1115  Time Out: [address] [place], PT

## 2022-05-06 NOTE — PROGRESS NOTES
Hospitalist Progress Note   Admit Date:  2022  4:45 AM   Name:  Maryan Rodriguez   Age:  62 y.o. Sex:  male  :  1965   MRN:  [id number]   Room:  [facility]/    Presenting Complaint: Chest Pain    Reason(s) for Admission: Transient ischemic attack [G45.9]     Hospital Course & Interval History:   62 y.o. male with medical history of heart failure with reduced ejection fraction status post pacemaker placement, severe esophageal dysmotility, weight loss who presented with he awakened this morning due to overwhelming sense of pain around 3 AM.  He noted numbness in his right and left leg. He reported feeling very weak. He had approximately 3 shots of alcohol and is not a daily drinker. He has never had DTs. 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 They are not normally as severe as the episode that prompted him to come in. All of the neurologic symptoms resolve rapidly and past episodes. Subjective/24hr Events (22): Improvement in mobility, control of pain. Esophageal dysmotility is pushing gastroenterology toward PEG placement. Will need to be done by IR if patient decides this is appropriate. Inconsistent performance reports from physical therapy. Patient encouraged to participate maximally with PT, OT. Patient remains n.p.o.    ROS:  10 systems reviewed and negative except as noted above. Assessment & Plan:   * Transient ischemic attack  CT, CTA/P negative, echo negative, cannot get MRI due to incompatible pacer  -consult neurology  -Neurochecks   -ASA, consider clopidogrel   -permissive HTN to 220/120  -labetalol prn  -PT, OT, SLP, PPD    6: Symptoms improved but not resolved.   Further assessment not possible    Severe Esophageal Dysmotility Disorder by UGI  Recent weight loss  -NPO  -consult gastroenterology    6: Patient trying to decide regarding PEG tube    Anorexia  Secondary to extreme esophageal dysmotility  -consider PEG, ask IR to evaluate    Primary hypertension  - Hold amiodarone, spironolactone, furosemide   -Triggered labetalol    5/6: Held for n.p.o. we will reevaluate pending decision on PEG tube    Chronic pain syndrome  On 40 morphine equivalents daily, currently n.p.o.  -Fentanyl patch    Myoclonic jerking while sleeping  - Follow-up outpatient with sleep study    Alcohol dependence ([facility]  - Monitor for signs of withdrawal        Discharge Planning:    Home in 2 to 4 days    Diet:  DIET NPO Ice Chips  DVT PPx: Heparin  Code status: Full Code    Hospital Problems as of 5/6/2022 Date Reviewed: 5/5/2022          Codes Class Noted - Resolved POA    * (Principal) Transient ischemic attack ICD-10-CM: G45.9  ICD-9-CM: 435.9  5/5/2022 - Present Yes        Severe Esophageal Dysmotility Disorder by UGI ICD-10-CM: K22.4  ICD-9-CM: 530.5  2/14/2022 - Present Yes        Anorexia ICD-10-CM: R63.0  ICD-9-CM: 783.0  5/6/2022 - Present Yes        Demand ischemia ([facility] ICD-10-CM: I24.8  ICD-9-CM: 411.89  8/24/2021 - Present Yes        Primary hypertension (Chronic) ICD-10-CM: I10  ICD-9-CM: 401.9  11/29/2011 - Present Yes        Chronic pain syndrome (Chronic) ICD-10-CM: G89.4  ICD-9-CM: 338.4  5/5/2022 - Present Yes        Myoclonic jerking while sleeping (Chronic) ICD-10-CM: G25.3  ICD-9-CM: 333.2  5/6/2022 - Present Yes        ICD (implantable cardioverter-defibrillator) discharge ICD-10-CM: Z45.02  ICD-9-CM: V71.89  2/26/2022 - Present Unknown        Alcohol dependence ([facility] ICD-10-CM: F10.20  ICD-9-CM: 303.90  9/5/2018 - Present Yes              Objective:     Patient Vitals for the past 24 hrs:   Temp Pulse Resp BP SpO2   05/06/22 1100 97.7 °F (36.5 °C) (!) 108 18 (!) 141/98 96 %   05/06/22 0800 -- 90 -- -- --   05/06/22 0700 98.5 °F (36.9 °C) 89 18 (!) 142/98 97 %   05/06/22 0411 98.5 °F (36.9 °C) 94 16 121/87 96 %   05/06/22 0400 -- 92 -- -- --   05/06/22 0000 -- 88 -- -- --   05/05/22 2359 97.7 °F (36.5 °C) 90 12 121/88 98 %   05/05/22 1956 97.4 °F (36.3 °C) 95 16 (!) 115/90 96 %   05/05/22 1600 98.1 °F (36.7 °C) 92 18 111/86 99 %     Oxygen Therapy  O2 Sat (%): 96 % (05/06/22 1100)  O2 Device: None (Room air) (05/06/22 0701)    Estimated body mass index is 25.24 kg/m² as calculated from the following:    Height as of this encounter: 5' 11\" (1.803 m). Weight as of this encounter: 82.1 kg (181 lb). Intake/Output Summary (Last 24 hours) at 5/6/2022 1525  Last data filed at 5/6/2022 0512  Gross per 24 hour   Intake 240 ml   Output 360 ml   Net -120 ml       Blood pressure (!) 141/98, pulse (!) 108, temperature 97.7 °F (36.5 °C), resp. rate 18, height 5' 11\" (1.803 m), weight 82.1 kg (181 lb), SpO2 96 %. Physical Exam  Vitals and nursing note reviewed. Constitutional:       General: He is not in acute distress. Appearance: Normal appearance. He is normal weight. He is not ill-appearing. HENT:      Head: Normocephalic. Eyes:      Extraocular Movements: Extraocular movements intact. Cardiovascular:      Rate and Rhythm: Normal rate. Pulses:           Radial pulses are 2+ on the left side. Heart sounds: Murmur heard. No friction rub. No gallop. Pulmonary:      Effort: Pulmonary effort is normal. No respiratory distress. Abdominal:      General: There is no distension. Palpations: Abdomen is soft. Tenderness: There is no abdominal tenderness. Musculoskeletal:         General: No deformity. Cervical back: No rigidity. Skin:     General: Skin is warm and dry. Neurological:      General: No focal deficit present. Mental Status: He is alert and oriented to person, place, and time. Comments: RUE 2/5 with coordinated hand movements  RLE 4/5   LUE 3/5 normal coordination  LLE 4/5     Psychiatric:         Mood and Affect: Mood is anxious and depressed. Behavior: Behavior is slowed. Behavior is cooperative.          I have reviewed ordered lab tests and independently visualized imaging below:    Recent Labs:  Recent Results (from the past 48 hour(s))   EKG, 12 LEAD, INITIAL    Collection Time: 05/05/22  4:50 AM   Result Value Ref Range    Ventricular Rate 90 BPM    Atrial Rate 90 BPM    P-R Interval 192 ms    QRS Duration 114 ms    Q-T Interval 382 ms    QTC Calculation (Bezet) 467 ms    Calculated P Axis 44 degrees    Calculated R Axis -24 degrees    Calculated T Axis 115 degrees    Diagnosis       !! AGE AND GENDER SPECIFIC ECG ANALYSIS !! Normal sinus rhythm  Anterolateral infarct (cited on or before 02-JUL-2020)  Abnormal ECG  When compared with ECG of 24-FEB-2022 18:11,  Aberrant conduction is no longer Present  QRS duration has increased  T wave inversion no longer evident in Inferior leads  T wave inversion more evident in Lateral leads  Confirmed by [facility]  MD (), NELLY ADLER ([id number]) on 5/5/2022 6:30:12 AM     GLUCOSE, POC    Collection Time: 05/05/22  5:38 AM   Result Value Ref Range    Glucose (POC) 101 (H) 65 - 100 mg/dL    Performed by Nohemy    POC PT/INR    Collection Time: 05/05/22  5:42 AM   Result Value Ref Range    Prothrombin time (POC) 13.5 (H) 9.6 - 11.6 SECS    INR (POC) 1.1 0.9 - 1.2     CBC WITH AUTOMATED DIFF    Collection Time: 05/05/22  5:46 AM   Result Value Ref Range    WBC 4.0 (L) 4.3 - 11.1 K/uL    RBC 4.51 4.23 - 5.6 M/uL    HGB 12.8 (L) 13.6 - 17.2 g/dL    HCT 38.9 (L) 41.1 - 50.3 %    MCV 86.3 79.6 - 97.8 FL    MCH 28.4 26.1 - 32.9 PG    MCHC 32.9 31.4 - 35.0 g/dL    RDW 19.7 (H) 11.9 - 14.6 %    PLATELET 029 194 - 423 K/uL    MPV 8.3 (L) 9.4 - 12.3 FL    ABSOLUTE NRBC 0.00 0.0 - 0.2 K/uL    DF AUTOMATED      NEUTROPHILS 51 43 - 78 %    LYMPHOCYTES 32 13 - 44 %    MONOCYTES 14 (H) 4.0 - 12.0 %    EOSINOPHILS 3 0.5 - 7.8 %    BASOPHILS 1 0.0 - 2.0 %    IMMATURE GRANULOCYTES 0 0.0 - 5.0 %    ABS. NEUTROPHILS 2.0 1.7 - 8.2 K/UL    ABS. LYMPHOCYTES 1.3 0.5 - 4.6 K/UL    ABS. MONOCYTES 0.5 0.1 - 1.3 K/UL    ABS. EOSINOPHILS 0.1 0.0 - 0.8 K/UL    ABS.  BASOPHILS 0.0 0.0 - 0.2 K/UL    ABS. IMM. GRANS. 0.0 0.0 - 0.5 K/UL   MAGNESIUM    Collection Time: 05/05/22  5:46 AM   Result Value Ref Range    Magnesium 1.9 1.8 - 2.4 mg/dL   METABOLIC PANEL, COMPREHENSIVE    Collection Time: 05/05/22  5:46 AM   Result Value Ref Range    Sodium 135 (L) 136 - 145 mmol/L    Potassium 4.7 3.5 - 5.1 mmol/L    Chloride 102 98 - 107 mmol/L    CO2 20 (L) 21 - 32 mmol/L    Anion gap 13 7 - 16 mmol/L    Glucose 95 65 - 100 mg/dL    BUN 14 6 - 23 MG/DL    Creatinine 1.24 0.8 - 1.5 MG/DL    GFR est AA >60 >60 ml/min/1.73m2    GFR est non-AA >60 >60 ml/min/1.73m2    Calcium 8.4 8.3 - 10.4 MG/DL    Bilirubin, total 1.1 0.2 - 1.1 MG/DL    ALT (SGPT) 31 12 - 65 U/L    AST (SGOT) 38 (H) 15 - 37 U/L    Alk.  phosphatase 70 50 - 136 U/L    Protein, total 6.8 6.3 - 8.2 g/dL    Albumin 3.3 (L) 3.5 - 5.0 g/dL    Globulin 3.5 2.3 - 3.5 g/dL    A-G Ratio 0.9 (L) 1.2 - 3.5     NT-PRO BNP    Collection Time: 05/05/22  5:46 AM   Result Value Ref Range    NT pro- (H) 5 - 125 PG/ML   TROPONIN-HIGH SENSITIVITY    Collection Time: 05/05/22  5:46 AM   Result Value Ref Range    Troponin-High Sensitivity 143.8 (HH) 0 - 14 pg/mL   PROTHROMBIN TIME + INR    Collection Time: 05/05/22  5:58 AM   Result Value Ref Range    Prothrombin time 14.2 12.6 - 14.5 sec    INR 1.1     TROPONIN-HIGH SENSITIVITY    Collection Time: 05/05/22  9:14 AM   Result Value Ref Range    Troponin-High Sensitivity 139.0 (HH) 0 - 14 pg/mL   ECHO ADULT COMPLETE    Collection Time: 05/05/22 11:09 AM   Result Value Ref Range    LV EDV A2C 284 mL    LV EDV A4C 248 mL    LV ESV A2C 218 mL    LV ESV A4C 218 mL    IVSd 0.9 0.6 - 1.0 cm    LVIDd 5.7 4.2 - 5.9 cm    LVIDs 5.3 cm    LVOT Diameter 2.9 cm    LVOT Mean Gradient 0 mmHg    LVOT VTI 6.5 cm    LVOT Peak Velocity 0.5 m/s    LVOT Peak Gradient 1 mmHg    LVPWd 1.1 (A) 0.6 - 1.0 cm    LV Ejection Fraction A2C 23 %    LV Ejection Fraction A4C 12 %    EF BP 18 (A) 55 - 100 %    LVOT Area 6.6 cm2    LVOT SV 42.9 ml    LA Minor Axis 5.8 cm    LA Major Axis 6.2 cm    LA Area 2C 20.7 cm2    LA Area 4C 20.3 cm2    LA Volume BP 58 18 - 58 mL    LA Diameter 4.8 cm    AV Mean Gradient 1 mmHg    AV VTI 9.9 cm    AV Mean Velocity 0.6 m/s    AV Peak Velocity 0.8 m/s    AV Peak Gradient 2 mmHg    AV Area by VTI 4.3 cm2    AV Area by Peak Velocity 4.0 cm2    Aortic Root 4.1 cm    Ascending Aorta 3.7 cm    MV Mean Gradient 1 mmHg    MV VTI 11.2 cm    MV Mean Velocity 0.5 m/s    MV Max Velocity 0.9 m/s    MV Peak Gradient 3 mmHg    MV Area by VTI 3.8 cm2    PV .0 ms    PV Max Velocity 0.8 m/s    PV Peak Gradient 3 mmHg    RV Basal Dimension 2.9 cm    TAPSE 1.2 (A) 1.7 cm    TR Max Velocity 1.97 m/s    TR Peak Gradient 16 mmHg    Fractional Shortening 2D 7 28 - 44 %    LV ESV Index A4C 108 mL/m2    LV EDV Index A4C 123 mL/m2    LV ESV Index A2C 108 mL/m2    LV EDV Index A2C 141 mL/m2    LVIDd Index 2.82 cm/m2    LVIDs Index 2.62 cm/m2    LV RWT Ratio 0.39     LV Mass 2D 226.4 (A) 88 - 224 g    LV Mass 2D Index 112.1 49 - 115 g/m2    LA Volume Index BP 29 16 - 34 ml/m2    LVOT Stroke Volume Index 21.2 mL/m2    LA Size Index 2.38 cm/m2    LA/AO Root Ratio 1.17     Ao Root Index 2.03 cm/m2    Ascending Aorta Index 1.83 cm/m2    AV Velocity Ratio 0.63     LVOT:AV VTI Index 0.66     YULIYA/BSA VTI 2.1 cm2/m2    YULIYA/BSA Peak Velocity 2.0 cm2/m2    MV:LVOT VTI Index 1.72    LIPID PANEL    Collection Time: 05/06/22  5:47 AM   Result Value Ref Range    Cholesterol, total 125 <200 MG/DL    Triglyceride 251 (H) 35 - 150 MG/DL    HDL Cholesterol 48 40 - 60 MG/DL    LDL, calculated 26.8 <100 MG/DL    VLDL, calculated 50.2 (H) 6.0 - 23.0 MG/DL    CHOL/HDL Ratio 2.6     HEMOGLOBIN A1C WITH EAG    Collection Time: 05/06/22  5:47 AM   Result Value Ref Range    Hemoglobin A1c 5.7 4.20 - 6.30 %    Est. average glucose 117 mg/dL   CBC W/O DIFF    Collection Time: 05/06/22  5:47 AM   Result Value Ref Range    WBC 4.9 4.3 - 11.1 K/uL    RBC 4.09 (L) 4.23 - 5.6 M/uL    HGB 11.8 (L) 13.6 - 17.2 g/dL    HCT 35.4 (L) 41.1 - 50.3 %    MCV 86.6 79.6 - 97.8 FL    MCH 28.9 26.1 - 32.9 PG    MCHC 33.3 31.4 - 35.0 g/dL    RDW 19.9 (H) 11.9 - 14.6 %    PLATELET 332 054 - 694 K/uL    MPV 9.0 (L) 9.4 - 12.3 FL    ABSOLUTE NRBC 0.00 0.0 - 0.2 K/uL   MAGNESIUM    Collection Time: 05/06/22  5:47 AM   Result Value Ref Range    Magnesium 2.0 1.8 - 2.4 mg/dL   METABOLIC PANEL, COMPREHENSIVE    Collection Time: 05/06/22  5:47 AM   Result Value Ref Range    Sodium 134 (L) 136 - 145 mmol/L    Potassium 3.9 3.5 - 5.1 mmol/L    Chloride 102 98 - 107 mmol/L    CO2 22 21 - 32 mmol/L    Anion gap 10 7 - 16 mmol/L    Glucose 105 (H) 65 - 100 mg/dL    BUN 10 6 - 23 MG/DL    Creatinine 1.23 0.8 - 1.5 MG/DL    GFR est AA >60 >60 ml/min/1.73m2    GFR est non-AA >60 >60 ml/min/1.73m2    Calcium 8.7 8.3 - 10.4 MG/DL    Bilirubin, total 1.2 (H) 0.2 - 1.1 MG/DL    ALT (SGPT) 23 12 - 65 U/L    AST (SGOT) 25 15 - 37 U/L    Alk. phosphatase 64 50 - 136 U/L    Protein, total 6.2 (L) 6.3 - 8.2 g/dL    Albumin 3.3 (L) 3.5 - 5.0 g/dL    Globulin 2.9 2.3 - 3.5 g/dL    A-G Ratio 1.1 (L) 1.2 - 3.5     PHOSPHORUS    Collection Time: 05/06/22  5:47 AM   Result Value Ref Range    Phosphorus 3.6 2.5 - 4.5 MG/DL       All Micro Results     None          Other Studies:  No results found.     Current Meds:  Current Facility-Administered Medications   Medication Dose Route Frequency    morphine injection 4 mg  4 mg IntraVENous Q4H PRN    prochlorperazine (COMPAZINE) with saline injection 5 mg  5 mg IntraVENous Q3H PRN    [Held by provider] amiodarone (CORDARONE) tablet 200 mg  200 mg Oral DAILY    [Held by provider] atorvastatin (LIPITOR) tablet 80 mg  80 mg Oral DAILY    [Held by provider] HYDROcodone-acetaminophen (NORCO)  mg tablet 1 Tablet  1 Tablet Oral Q6H PRN    [Held by provider] spironolactone (ALDACTONE) tablet 50 mg  50 mg Oral DAILY    sodium chloride (NS) flush 5-40 mL  5-40 mL IntraVENous Q8H    sodium chloride (NS) flush 5-40 mL  5-40 mL IntraVENous PRN    [Held by provider] aspirin chewable tablet 81 mg  81 mg Oral DAILY    heparin (porcine) injection 5,000 Units  5,000 Units SubCUTAneous Q8H    tuberculin injection 5 Units  5 Units IntraDERMal ONCE    [Held by provider] ondansetron (ZOFRAN) injection 4 mg  4 mg IntraVENous Q4H PRN    fentaNYL (DURAGESIC) 12 mcg/hr patch 1 Patch  1 Patch TransDERmal Q72H    clonazePAM (KlonoPIN) tablet 0.5 mg  0.5 mg Oral QHS       Signed:  Lux Cardoza MD

## 2022-05-06 NOTE — PROGRESS NOTES
[facility] Progress Note         Admit Date:  5/5/2022    Today's Date:  5/6/2022    CC:  Severe esophageal dysmotility    Subjective:     Patient feels somewhat better, he is hungry and would like to try po    Medications:   Current Facility-Administered Medications   Medication Dose Route Frequency    morphine injection 4 mg  4 mg IntraVENous Q4H PRN    [Held by provider] amiodarone (CORDARONE) tablet 200 mg  200 mg Oral DAILY    [Held by provider] atorvastatin (LIPITOR) tablet 80 mg  80 mg Oral DAILY    [Held by provider] HYDROcodone-acetaminophen (NORCO)  mg tablet 1 Tablet  1 Tablet Oral Q6H PRN    [Held by provider] spironolactone (ALDACTONE) tablet 50 mg  50 mg Oral DAILY    sodium chloride (NS) flush 5-40 mL  5-40 mL IntraVENous Q8H    sodium chloride (NS) flush 5-40 mL  5-40 mL IntraVENous PRN    [Held by provider] aspirin chewable tablet 81 mg  81 mg Oral DAILY    heparin (porcine) injection 5,000 Units  5,000 Units SubCUTAneous Q8H    tuberculin injection 5 Units  5 Units IntraDERMal ONCE    ondansetron (ZOFRAN) injection 4 mg  4 mg IntraVENous Q4H PRN    fentaNYL (DURAGESIC) 12 mcg/hr patch 1 Patch  1 Patch TransDERmal Q72H    clonazePAM (KlonoPIN) tablet 0.5 mg  0.5 mg Oral QHS       Review of Systems:  ROS was obtained, with pertinent positives as listed above. No chest pain or SOB. Diet:  NPO. Ice chips. Objective:   Vitals:  Visit Vitals  BP (!) 142/98 (BP 1 Location: Left upper arm, BP Patient Position: Supine)   Pulse 90   Temp 98.5 °F (36.9 °C)   Resp 18   Ht 5' 11\" (1.803 m)   Wt 81.1 kg (178 lb 13.5 oz)   SpO2 97%   BMI 24.94 kg/m²     Intake/Output:  No intake/output data recorded. 05/04 1901 - 05/06 0700  In: 340 [P.O.:240; I.V.:100]  Out: 660 [Urine:660]  Exam:  General appearance: alert, cooperative, no distress  Lungs: clear to auscultation bilaterally anteriorly  Heart: regular rate and rhythm  Abdomen: soft, non-tender.  Bowel sounds normal. No masses, no organomegaly  Extremities: extremities normal, atraumatic, no cyanosis or edema  Neuro:  alert and oriented    Data Review (Labs):    Recent Labs     05/06/22  0547 05/05/22  0558 05/05/22  0546 05/05/22  0542   WBC 4.9  --  4.0*  --    HGB 11.8*  --  12.8*  --    HCT 35.4*  --  38.9*  --      --  249  --    MCV 86.6  --  86.3  --    *  --  135*  --    K 3.9  --  4.7  --      --  102  --    CO2 22  --  20*  --    BUN 10  --  14  --    CREA 1.23  --  1.24  --    CA 8.7  --  8.4  --    MG 2.0  --  1.9  --    *  --  95  --    AP 64  --  70  --    AST 25  --  38*  --    ALT 23  --  31  --    TBILI 1.2*  --  1.1  --    ALB 3.3*  --  3.3*  --    TP 6.2*  --  6.8  --    PTP  --  14.2  --   --    INR  --  1.1  --  1.1     UGI series Feb 2022  Impression  1.  Hiatal hernia, likely type III. The gastroesophageal junction, upper gastric body, and gastric fundus are located above the diaphragm. There is an apparent dynamic, paraesophageal component which occurs during inspiration and expiration. 2.  Severe esophageal dysmotility. 3.  Nonspecific esophagitis.     EGD 2/11/22  Esophagus: LA grade D esophagitis  Stomach: Large HH (50-60% of gastric volume) without Chad ulcers.  Otherwise normal.  Duodenum: Normal    Assessment:     Principal Problem:    Transient ischemic attack (5/5/2022)    Active Problems:    Chronic pain syndrome (5/5/2022)      Primary hypertension (11/29/2011)      Demand ischemia ([facility] (8/24/2021)      Severe Esophageal Dysmotility Disorder by UGI (2/14/2022)      Alcohol dependence ([facility] (9/5/2018)      62 y.o. 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 Pt noted 18# weight loss.  SLP evaluated, patient declined further SLP trials. Issues with Dysphagia predate recent acute issues. Last EGD 2/11/22 revealed LA grade D esophagitis & large HH. Patient was deemed a poor surgical candidate for [facility] surgery/repair due to his severe global cardiac hypokinesis EF 15-20%. Unclear how much of his symptom complex is related to acute CVA, vs chronic dysmotility and Hiatal hernia. Plan:   -Dysphagia-  Proceed with bedside swallow, if he passes po is reasonable. His eso dysmotility is largely related to his H/H and intrathoracic stomach. His cardiac issues make him a poor surgical candidate. We can help his esophagitis with PPI, Carafate additional may be added if he passes swallow. If PEG is needed, it will need to be placed by IR  -At time of consult 5/5, discussed consideration for PEG feeding tube with patient. Pt planned to discuss this with his wife.   If he elects to have PEG placed it would likely require an IR approach secondary to his large H/H.   -  We will sign off  [facility]

## 2022-05-06 NOTE — PROGRESS NOTES
Care Management Interventions  PCP Verified by CM: Yes  Last Visit to PCP: 03/15/22  Mode of Transport at Discharge: Self  Transition of Care Consult (CM Consult): Discharge Planning  Discharge Durable Medical Equipment: No  Physical Therapy Consult: Yes  Occupational Therapy Consult: Yes  Speech Therapy Consult: Yes  Support Systems: Spouse/Significant Other  Confirm Follow Up Transport: Self  The Plan for Transition of Care is Related to the Following Treatment Goals : discharge home with family  The Patient and/or Patient Representative was Provided with a Choice of Provider and Agrees with the Discharge Plan?:  (TBD)  Freedom of Choice List was Provided with Basic Dialogue that Supports the Patient's Individualized Plan of Care/Goals, Treatment Preferences and Shares the Quality Data Associated with the Providers?:  (TBD)  Discharge Location  Patient Expects to be Discharged to[de-identified] Unable to determine at this time  Interdisciplinary team meeting with Dr. Sanchez Nguyen, CM nursing, PT and nutrition for plan of care and goals. CM received a call from [place] [phone] RN stating that they could accept patient to [facility] if patient wanted to go to [facility] and they would need to obtain insurance approval. CM spoke with Dr. Sanchez Nguyen and patient is not medically stable at this time to consider [facility] due to continued work up needed and f/u for his dysphagia. CM will continue to follow for d/c plan IRC vs home with home health pending clinical progress.

## 2022-05-07 LAB
ALBUMIN SERPL-MCNC: 3.3 G/DL (ref 3.5–5)
ALBUMIN/GLOB SERPL: 1 {RATIO} (ref 1.2–3.5)
ALP SERPL-CCNC: 64 U/L (ref 50–136)
ALT SERPL-CCNC: 20 U/L (ref 12–65)
ANION GAP SERPL CALC-SCNC: 11 MMOL/L (ref 7–16)
AST SERPL-CCNC: 16 U/L (ref 15–37)
BILIRUB SERPL-MCNC: 1 MG/DL (ref 0.2–1.1)
BUN SERPL-MCNC: 10 MG/DL (ref 6–23)
CALCIUM SERPL-MCNC: 9 MG/DL (ref 8.3–10.4)
CHLORIDE SERPL-SCNC: 103 MMOL/L (ref 98–107)
CO2 SERPL-SCNC: 23 MMOL/L (ref 21–32)
CREAT SERPL-MCNC: 1.22 MG/DL (ref 0.8–1.5)
ERYTHROCYTE [DISTWIDTH] IN BLOOD BY AUTOMATED COUNT: 20.7 % (ref 11.9–14.6)
GLOBULIN SER CALC-MCNC: 3.3 G/DL (ref 2.3–3.5)
GLUCOSE SERPL-MCNC: 93 MG/DL (ref 65–100)
HCT VFR BLD AUTO: 36.2 % (ref 41.1–50.3)
HGB BLD-MCNC: 11.8 G/DL (ref 13.6–17.2)
MAGNESIUM SERPL-MCNC: 1.9 MG/DL (ref 1.8–2.4)
MCH RBC QN AUTO: 28.7 PG (ref 26.1–32.9)
MCHC RBC AUTO-ENTMCNC: 32.6 G/DL (ref 31.4–35)
MCV RBC AUTO: 88.1 FL (ref 79.6–97.8)
NRBC # BLD: 0 K/UL (ref 0–0.2)
PHOSPHATE SERPL-MCNC: 4 MG/DL (ref 2.5–4.5)
PLATELET # BLD AUTO: 187 K/UL (ref 150–450)
PMV BLD AUTO: 8.9 FL (ref 9.4–12.3)
POTASSIUM SERPL-SCNC: 4.2 MMOL/L (ref 3.5–5.1)
PROT SERPL-MCNC: 6.6 G/DL (ref 6.3–8.2)
RBC # BLD AUTO: 4.11 M/UL (ref 4.23–5.6)
SODIUM SERPL-SCNC: 137 MMOL/L (ref 136–145)
WBC # BLD AUTO: 4.3 K/UL (ref 4.3–11.1)

## 2022-05-07 PROCEDURE — 74011000250 HC RX REV CODE- 250: Performed by: FAMILY MEDICINE

## 2022-05-07 PROCEDURE — 83735 ASSAY OF MAGNESIUM: CPT

## 2022-05-07 PROCEDURE — 74011000250 HC RX REV CODE- 250: Performed by: INTERNAL MEDICINE

## 2022-05-07 PROCEDURE — 85027 COMPLETE CBC AUTOMATED: CPT

## 2022-05-07 PROCEDURE — 84100 ASSAY OF PHOSPHORUS: CPT

## 2022-05-07 PROCEDURE — 74011250636 HC RX REV CODE- 250/636: Performed by: INTERNAL MEDICINE

## 2022-05-07 PROCEDURE — 97530 THERAPEUTIC ACTIVITIES: CPT

## 2022-05-07 PROCEDURE — 92526 ORAL FUNCTION THERAPY: CPT

## 2022-05-07 PROCEDURE — 36415 COLL VENOUS BLD VENIPUNCTURE: CPT

## 2022-05-07 PROCEDURE — 80053 COMPREHEN METABOLIC PANEL: CPT

## 2022-05-07 PROCEDURE — G0378 HOSPITAL OBSERVATION PER HR: HCPCS

## 2022-05-07 PROCEDURE — 74011250637 HC RX REV CODE- 250/637: Performed by: PSYCHIATRY & NEUROLOGY

## 2022-05-07 PROCEDURE — 74011250636 HC RX REV CODE- 250/636: Performed by: FAMILY MEDICINE

## 2022-05-07 PROCEDURE — 96372 THER/PROPH/DIAG INJ SC/IM: CPT

## 2022-05-07 PROCEDURE — C9113 INJ PANTOPRAZOLE SODIUM, VIA: HCPCS | Performed by: INTERNAL MEDICINE

## 2022-05-07 PROCEDURE — 96376 TX/PRO/DX INJ SAME DRUG ADON: CPT

## 2022-05-07 RX ADMIN — SODIUM CHLORIDE, PRESERVATIVE FREE 10 ML: 5 INJECTION INTRAVENOUS at 23:56

## 2022-05-07 RX ADMIN — SODIUM CHLORIDE 40 MG: 9 INJECTION INTRAMUSCULAR; INTRAVENOUS; SUBCUTANEOUS at 17:43

## 2022-05-07 RX ADMIN — MORPHINE SULFATE 4 MG: 4 INJECTION INTRAVENOUS at 00:27

## 2022-05-07 RX ADMIN — SODIUM CHLORIDE, PRESERVATIVE FREE 10 ML: 5 INJECTION INTRAVENOUS at 23:59

## 2022-05-07 RX ADMIN — SODIUM CHLORIDE 5 MG: 9 INJECTION INTRAMUSCULAR; INTRAVENOUS; SUBCUTANEOUS at 08:46

## 2022-05-07 RX ADMIN — SODIUM CHLORIDE 5 MG: 9 INJECTION INTRAMUSCULAR; INTRAVENOUS; SUBCUTANEOUS at 01:32

## 2022-05-07 RX ADMIN — SODIUM CHLORIDE 5 MG: 9 INJECTION INTRAMUSCULAR; INTRAVENOUS; SUBCUTANEOUS at 04:34

## 2022-05-07 RX ADMIN — MORPHINE SULFATE 4 MG: 4 INJECTION INTRAVENOUS at 08:46

## 2022-05-07 RX ADMIN — MORPHINE SULFATE 4 MG: 4 INJECTION INTRAVENOUS at 04:34

## 2022-05-07 RX ADMIN — SODIUM CHLORIDE 5 MG: 9 INJECTION INTRAMUSCULAR; INTRAVENOUS; SUBCUTANEOUS at 19:53

## 2022-05-07 RX ADMIN — SODIUM CHLORIDE 5 MG: 9 INJECTION INTRAMUSCULAR; INTRAVENOUS; SUBCUTANEOUS at 23:52

## 2022-05-07 RX ADMIN — HEPARIN SODIUM 5000 UNITS: 5000 INJECTION INTRAVENOUS; SUBCUTANEOUS at 17:43

## 2022-05-07 RX ADMIN — MORPHINE SULFATE 4 MG: 4 INJECTION INTRAVENOUS at 19:53

## 2022-05-07 RX ADMIN — SODIUM CHLORIDE, PRESERVATIVE FREE 10 ML: 5 INJECTION INTRAVENOUS at 20:01

## 2022-05-07 RX ADMIN — CLONAZEPAM 0.5 MG: 1 TABLET ORAL at 23:01

## 2022-05-07 RX ADMIN — HEPARIN SODIUM 5000 UNITS: 5000 INJECTION INTRAVENOUS; SUBCUTANEOUS at 00:28

## 2022-05-07 RX ADMIN — MORPHINE SULFATE 4 MG: 4 INJECTION INTRAVENOUS at 23:57

## 2022-05-07 RX ADMIN — SODIUM CHLORIDE 5 MG: 9 INJECTION INTRAMUSCULAR; INTRAVENOUS; SUBCUTANEOUS at 14:14

## 2022-05-07 RX ADMIN — SODIUM CHLORIDE, PRESERVATIVE FREE 10 ML: 5 INJECTION INTRAVENOUS at 17:49

## 2022-05-07 RX ADMIN — MORPHINE SULFATE 4 MG: 4 INJECTION INTRAVENOUS at 14:14

## 2022-05-07 RX ADMIN — HEPARIN SODIUM 5000 UNITS: 5000 INJECTION INTRAVENOUS; SUBCUTANEOUS at 08:46

## 2022-05-07 RX ADMIN — SODIUM CHLORIDE, PRESERVATIVE FREE 10 ML: 5 INJECTION INTRAVENOUS at 19:53

## 2022-05-07 RX ADMIN — SODIUM CHLORIDE, PRESERVATIVE FREE 10 ML: 5 INJECTION INTRAVENOUS at 05:27

## 2022-05-07 NOTE — PROGRESS NOTES
LTG: Patient will tolerate least restrictive diet without overt signs or symptoms of airway compromise. STG: Patient will participate in full bedside swallow evaluation. Goal Met 5/7/2022  STG: Patient will consume thin liquids and purees/soft solids without overt signs of aspiration/adverse pulmonary events. Goal Added 5/7/2022  SPEECH LANGUAGE PATHOLOGY: DYSPHAGIA  Daily Note    NAME/AGE/GENDER: Mercedes Webb is a 62 y.o. male  DATE: 5/7/2022  PRIMARY DIAGNOSIS: Transient ischemic attack [G45.9]      ICD-10: Treatment Diagnosis: R13.14 Dysphagia, Pharyngoesophageal Phase    RECOMMENDATIONS   DIET:    Full Liquid Diet (if OK with medical team) presents as most appropriate given patient's swallowing/digestive function.  Patient appropriate for softer foods and/or foods brought in by family (patient states he enjoys sandwiches from his wife). Speech therapy OK with outside food being provided to patient as long as medical team in agreement.  Patient does continues to present with poor potential for meeting nutrition needs via PO modality. PEG placement has been suggested and patient reports he is considering the option given his weight loss and continued difficulty with PO consumption. MEDICATIONS: As tolerated; patient reports taking medications 1 at a time at home. On this date, patient requesting to hold PO medications and states he will discuss his PO medication status with his physician. ASPIRATION PRECAUTIONS  · Oral care every 3 hours  · Upright during and after all PO intake due to dysmotility. COMPENSATORY STRATEGIES/MODIFICATIONS  · None     EDUCATION:  · Nursing  · Patient     CONTINUATION OF SKILLED SERVICES/MEDICAL NECESSITY:   Patient is expected to demonstrate progress in  diet tolerance in order to  work toward diet advancement.  Patient continues to require skilled intervention due to dysphagia.       RECOMMENDATIONS for CONTINUED SPEECH THERAPY: YES: Anticipate need for ongoing speech therapy during this hospitalization. ASSESSMENT   Patient consumed ~1 oz of ice cream and x's 2 trials of thin liquids(Pepsi). Belching and occasional throat clearing noted with PO presentations which patient reports is consist current digestive abilities. Patient states he would like to consume liquids and softer foods (mash potatoes, sandwiches) when he feels hungry. Discussed diet options and patient agreeable to full liquid diet with freedom to have softer foods/foods brought from outside the facility as he would like. Given patient's limited intake today, discussed that obtaining primary nutrition/hydration does continues to present as a barrier for wellness moving forward. Patient acknowledged difficulty and reports he is considering feeding tube with his medical team.     Recommend full liquid diet post discussion with patient regarding diet options. If patient desires softer foods, like sandwiches, speech therapy OK with patient having softer foods. If patient wants foods brought in from outside the facility, speech therapy OK with patient having outside foods. Per GI from February 2022. 1.  Hiatal hernia, likely type III. The gastroesophageal junction, upper gastric  body, and gastric fundus are located above the diaphragm. There is an apparent  dynamic, paraesophageal component which occurs during inspiration and  expiration. 2.  Severe esophageal dysmotility  3.  Nonspecific esophagitis. REHABILITATION POTENTIAL FOR STATED GOALS: Fair    PLAN    FREQUENCY/DURATION: Will follow up for diet tolerance after seen by GI - of note speech therapy treated patient today following ST services being reconsulted as patient informing medical team he would like to try eating. SUBJECTIVE   Patient awake, alert, and agreeable to speech therapy services when engaged at bedside. Patient agreeable to PO trials when given options available from pantry.      History of Present Injury/Illness:  Swati Block  has a past medical history of Acute gastroenteritis (2/26/2016), Acute on chronic systolic heart failure ([address] (9/30/2020), Acute respiratory failure due to COVID-19 [facility]) (1/63/1415), Acute systolic congestive heart failure ([address] (9/49/7910), Acute systolic heart failure ([address] (9/14/2010), AGE (acute gastroenteritis) (12/16/2019), YAO (acute kidney injury) ([address] (8/23/2021), Anxiety associated with depression (3/18/2017), Arthritis, CAD (coronary artery disease), CHF (congestive heart failure) ([address] Chronic alcoholism ([address] Chronic back pain, Chronic neck pain, Chronic systolic heart failure ([address] (4/21/2011), Dental caries (7/13/2017), Depression, Dizziness - light-headed, Elevated brain natriuretic peptide (BNP) level (4/14/2021), Generalized abdominal pain (2/14/2022), GERD (gastroesophageal reflux disease), Gynecomastia (2/22/2016), Heart failure ([address] Hyperbilirubinemia (2/7/2022), Hypertension, Hypokalemia (7/3/2020), Hypomagnesemia (2/26/2022), ICD (implantable cardioverter-defibrillator) in place (4/22/2011), Leukopenia (5/7/2019), Macrocytic anemia (7/8/2018), NSTEMI (non-ST elevated myocardial infarction) ([address] (8/24/2021), Schatzki's ring (07/10/2018), Situational depression (2/22/2016), SVT (supraventricular tachycardia) ([address] (12/22/2018), Tachycardia (2/24/2022), and Ventricular tachycardia ([address] (2/22/2016). Janey Mendes Alex also  has a past surgical history that includes hx heart catheterization (9/21/10); hx pacemaker; and egd (5/9/2019). Problem List:  (Impairments causing functional limitations):  1. ?pharyngeal dysphagia  2. Suspected esophageal dysphagia    Previous Dysphagia: YES History of esophageal dysphagia. - Upper GI results listed above  Diet Prior to Evaluation: Regular/thin    Orientation:   Person  Place  Time  Situation    Pain: Pain Scale 1: Numeric (0 - 10)  Pain Intensity 1: 8  Pain Location 1: Abdomen  Pain Intervention(s) 1: Medication (see MAR)    OBJECTIVE   PO Trials: Patient consumed the following: ~1 oz of ice cream and x's 2 trials of thin liquids (Pepsi, as requested by the patient). Patient able to feed self on all PO trials. Patient's oral phase of swallow was unremarkable. Pharyngeal phase of swallow required multiple swallows per trial. Patient exhibited occasional throat clearing and frequent belching was also noted with all PO presentations. After aforementioned amount of PO intake, patient reported feeling full and deferred additional PO trials. INTERDISCIPLINARY COLLABORATION: Registered Nurse and Physician  PRECAUTIONS/ALLERGIES: Patient has no known allergies. Tool Used: Dysphagia Outcome and Severity Scale (DOUG)    Score Comments   Normal Diet  [] 7 With no strategies or extra time needed   Functional Swallow  [] 6 May have mild oral or pharyngeal delay   Mild Dysphagia  [] 5 Which may require one diet consistency restricted    Mild-Moderate Dysphagia  [] 4 With 1-2 diet consistencies restricted   Moderate Dysphagia  [] 3 With 2 or more diet consistencies restricted   Moderate-Severe Dysphagia  [x] 2 With partial PO strategies (trials with ST only)   Severe Dysphagia  [] 1 With inability to tolerate any PO safely      Score:  Initial: 2 Most Recent: x (Date 05/07/22 )   Interpretation of Tool: The Dysphagia Outcome and Severity Scale (DOUG) is a simple, easy-to-use, 7-point scale developed to systematically rate the functional severity of dysphagia based on objective assessment and make recommendations for diet level, independence level, and type of nutrition. Current Medications:   No current facility-administered medications on file prior to encounter. Current Outpatient Medications on File Prior to Encounter   Medication Sig Dispense Refill    HYDROcodone-acetaminophen (NORCO)  mg tablet Take 1 Tablet by mouth every six (6) hours as needed for Pain.       promethazine (PHENERGAN) 25 mg tablet Take 1 Tablet by mouth every six (6) hours as needed for Nausea. 6 Tablet 0    amiodarone (CORDARONE) 200 mg tablet Take 1 Tablet by mouth daily. 30 Tablet 4    ALPRAZolam (XANAX) 1 mg tablet Take 1 Tablet by mouth nightly. Max Daily Amount: 1 mg. Indications: anxious 30 Tablet 0    levalbuterol tartrate (XOPENEX) 45 mcg/actuation inhaler Take 2 Puffs by inhalation every four (4) hours as needed for Wheezing or Shortness of Breath. 15 g 1    spironolactone (ALDACTONE) 50 mg tablet Take 1 Tablet by mouth daily. 90 Tablet 3    cyclobenzaprine (FLEXERIL) 10 mg tablet Take 1 Tablet by mouth three (3) times daily as needed for Muscle Spasm(s). 12 Tablet 0    furosemide (Lasix) 40 mg tablet Take  by mouth two (2) times a day.  potassium chloride (K-DUR, KLOR-CON M20) 20 mEq tablet Take 20 mEq by mouth daily.  naloxone (Narcan) 4 mg/actuation nasal spray Use 1 spray intranasally, then discard. Repeat with new spray every 2 min as needed for opioid overdose symptoms, alternating nostrils. 1 Each 0    atorvastatin (LIPITOR) 80 mg tablet Take 1 Tablet by mouth daily. 90 Tablet 3    rizatriptan (MAXALT-MLT) 10 mg disintegrating tablet TAKE ONE TABLET BY MOUTH ONCE AS NEEDED 10 Tablet 6    azelastine (ASTELIN) 137 mcg (0.1 %) nasal spray 1 [place] by Both Nostrils route two (2) times a day. Use in each nostril as directed 3 Each 3    albuterol (PROVENTIL HFA, VENTOLIN HFA, PROAIR HFA) 90 mcg/actuation inhaler Take 2 Puffs by inhalation every four (4) hours as needed for Wheezing or Shortness of Breath.  1 Inhaler 0       SAFETY:  After treatment position/precautions:  · Upright in bed  · RN notified  · Call light within reach    Total Treatment Duration:   Time In: 1700  Time Out: [address], M.S., [facility]

## 2022-05-07 NOTE — PROGRESS NOTES
Problem: Self Care Deficits Care Plan (Adult)  Goal: *Acute Goals and Plan of Care (Insert Text)  Outcome: Progressing Towards Goal  Note: 1. Patient will perform grooming with min assist.  2. Patient will perform Upper body dressing with mod assist.  3. Patient will perform lower body dressing with mod assist.  4. Patient will perform upper body bathing with min assist  5. Patient will perform toilet transfers with min assist.  6. Patient will participate in 30 + minutes of ADL/ therapeutic exercise/therapeutic activity with min rest breaks to increase activity tolerance for self care and increase B UE fine and gross motor coordination. 7. Patient will perform ADL functional mobility in room with CGA. Goals to be achieved in 7 days. OCCUPATIONAL THERAPY: AM 5/7/2022  OBSERVATION: OT Visit Days: 3  Payor: Ana Holliday / Plan: [place] [employer] [employer] / Product Type: PPO /      NAME/AGE/GENDER: Jonathon Javier is a 62 y.o. male   PRIMARY DIAGNOSIS:  Transient ischemic attack [G45.9] Transient ischemic attack Transient ischemic attack       ICD-10: Treatment Diagnosis:    · Generalized Muscle Weakness (M62.81)  · Other lack of cordination (R27.8)  · Difficulty in walking, Not elsewhere classified (R26.2)  · Other abnormalities of gait and mobility (R26.89)   Precautions/Allergies:     Patient has no known allergies. ASSESSMENT:   855am  Patient has EF of 10%  Mr. Katty Do presents supine in bed s/p having pain  meds earlier. He is complaining of pain in all 4 extremities. His  is weaker in R hand verses Left hand. He is unable to flex elbows  and able to raise B UE slightly off bed. He reported he is having some word finding issues. He reported he cant swallow and GI had recommended a peg tube. He lives with his wife and she works first shift. He is usually mod I with mobility at home. He hasn't been able to step over the tub for  a shower. He has a shower chair but he usually sponge bathes.    1115am OT returned with PT and patient agreeable to participate. He was SBA supine to sit. He is max to total for ADLs as he is having pain every where and unable to use his UEs. OT donned his shoes and  he was CGA/Min sit to stand with RW. He was able to stand and hold the walker with B UE. He took steps with pt and was SBa sit to supine. He would benefit from skilled OT services, will follow. Nursing aware of patient status. 5/7/22 Pt was supine in bed upon arrival. Pt completed bed mobility with supervision. Pt completed functional transfer with SBA. Continue POC. This section established at most recent assessment   PROBLEM LIST (Impairments causing functional limitations):  1. Decreased Strength  2. Decreased ADL/Functional Activities  3. Decreased Transfer Abilities  4. Decreased Ambulation Ability/Technique  5. Decreased Balance  6. Increased Pain  7. Decreased Activity Tolerance   INTERVENTIONS PLANNED: (Benefits and precautions of occupational therapy have been discussed with the patient.)  1. Activities of daily living training  2. Adaptive equipment training  3. Balance training  4. Clothing management  5. Donning&doffing training  6. Hygiene training  7. Neuromuscular re-eduation  8. Therapeutic activity  9. Therapeutic exercise     TREATMENT PLAN: Frequency/Duration: Follow patient 3 times to address above goals. Rehabilitation Potential For Stated Goals: Good     REHAB RECOMMENDATIONS (at time of discharge pending progress):    Placement: It is my opinion, based on this patient's performance to date, that Mr. Kristian Cosme may benefit from participating in 1-2 additional therapy sessions in order to continue to assess for rehab potential and then make recommendation for disposition at discharge. home with hh vs SNF pending progress  Equipment:    None at this time              OCCUPATIONAL PROFILE AND HISTORY:   History of Present Injury/Illness (Reason for Referral):  See h and P  Past Medical History/Comorbidities:   Mr. Carlito Dubois  has a past medical history of Acute gastroenteritis (2/26/2016), Acute on chronic systolic heart failure ([address] (9/30/2020), Acute respiratory failure due to COVID-19 [facility]) (1/30/1571), Acute systolic congestive heart failure ([address] (5/33/9996), Acute systolic heart failure ([address] (9/14/2010), AGE (acute gastroenteritis) (12/16/2019), YAO (acute kidney injury) ([address] (8/23/2021), Anxiety associated with depression (3/18/2017), Arthritis, CAD (coronary artery disease), CHF (congestive heart failure) ([address] Chronic alcoholism ([address] Chronic back pain, Chronic neck pain, Chronic systolic heart failure ([address] (4/21/2011), Dental caries (7/13/2017), Depression, Dizziness - light-headed, Elevated brain natriuretic peptide (BNP) level (4/14/2021), Generalized abdominal pain (2/14/2022), GERD (gastroesophageal reflux disease), Gynecomastia (2/22/2016), Heart failure ([address] Hyperbilirubinemia (2/7/2022), Hypertension, Hypokalemia (7/3/2020), Hypomagnesemia (2/26/2022), ICD (implantable cardioverter-defibrillator) in place (4/22/2011), Leukopenia (5/7/2019), Macrocytic anemia (7/8/2018), NSTEMI (non-ST elevated myocardial infarction) ([address] (8/24/2021), Schatzki's ring (07/10/2018), Situational depression (2/22/2016), SVT (supraventricular tachycardia) ([address] (12/22/2018), Tachycardia (2/24/2022), and Ventricular tachycardia ([address] (2/22/2016). Mr. Carlito Dubois  has a past surgical history that includes hx heart catheterization (9/21/10); hx pacemaker; and egd (5/9/2019).   Social History/Living Environment:   Home Environment: Private residence  # Steps to Enter: 5  Rails to Enter: Yes  Hand Rails : Left  One/Two Story Residence: One story  Living Alone: No  Support Systems: Spouse/Significant Other  Patient Expects to be Discharged to[de-identified] Unable to determine at this time  Current DME Used/Available at Home: Shower chair  Tub or Shower Type: Tub/Shower combination  Prior Level of Function/Work/Activity:  Min with ADLS, mod I for mobility     Number of Personal Factors/Comorbidities that affect the Plan of Care: Brief history (0):  LOW COMPLEXITY   ASSESSMENT OF OCCUPATIONAL PERFORMANCE[de-identified]   Activities of Daily Living:   Basic ADLs (From Assessment) Complex ADLs (From Assessment)   Feeding:  (NPO)  Oral Facial Hygiene/Grooming: Maximum assistance  Bathing: Maximum assistance  Upper Body Dressing: Maximum assistance  Lower Body Dressing: Total assistance  Toileting: Total assistance     Grooming/Bathing/Dressing Activities of Daily Living     Cognitive Retraining  Safety/Judgement: Fall prevention                       Bed/Mat Mobility  Supine to Sit: Supervision  Sit to Stand: Supervision  Stand to Sit: Supervision  Bed to Chair: Stand-by assistance     Most Recent Physical Functioning:   Gross Assessment:                  Posture:     Balance:  Sitting: Intact  Standing: Intact Bed Mobility:  Supine to Sit: Supervision  Wheelchair Mobility:     Transfers:  Sit to Stand: Supervision  Stand to Sit: Supervision  Bed to Chair: Stand-by assistance            Patient Vitals for the past 6 hrs:   BP BP Patient Position SpO2 Pulse   05/07/22 0743 116/85 Supine 95 % 98       Mental Status  Neurologic State: Alert  Orientation Level: Oriented X4  Cognition: Appropriate decision making,Appropriate for age attention/concentration  Perception: Appears intact  Perseveration: No perseveration noted  Safety/Judgement: Fall prevention                          Physical Skills Involved:  1. Range of Motion  2. Balance  3. Strength  4. Activity Tolerance  5. Fine Motor Control  6. Gross Motor Control  7. Pain (acute) Cognitive Skills Affected (resulting in the inability to perform in a timely and safe manner):  1. Expression Psychosocial Skills Affected:  1. Habits/Routines  2. Environmental Adaptation  3.  Self-Awareness   Number of elements that affect the Plan of Care: 3-5:  MODERATE COMPLEXITY   CLINICAL DECISION MAKING:   [employer] AM-PAC 6 Clicks   Daily Activity Inpatient Short Form  How much help from another person does the patient currently need. .. Total A Lot A Little None   1. Putting on and taking off regular lower body clothing? [x] 1   [] 2   [] 3   [] 4   2. Bathing (including washing, rinsing, drying)? [] 1   [x] 2   [] 3   [] 4   3. Toileting, which includes using toilet, bedpan or urinal?   [x] 1   [] 2   [] 3   [] 4   4. Putting on and taking off regular upper body clothing? [] 1   [x] 2   [] 3   [] 4   5. Taking care of personal grooming such as brushing teeth? [] 1   [x] 2   [] 3   [] 4   6. Eating meals? [x] 1   [] 2   [] 3   [] 4   © 2007, Trustees of [address] [place] [id number], under license to [employer]. All rights reserved      Score:  Initial:9 Most Recent: X (Date: -- )    Interpretation of Tool:  Represents activities that are increasingly more difficult (i.e. Bed mobility, Transfers, Gait). Medical Necessity:     · Patient is expected to demonstrate progress in   · Self care skills and functional mobility  ·     Reason for Services/Other Comments:  · Patient continues to require skilled intervention due to   · Above listed deficits     Use of outcome tool(s) and clinical judgement create a POC that gives a: LOW COMPLEXITY         TREATMENT:   (In addition to Assessment/Re-Assessment sessions the following treatments were rendered)     Pre-treatment Symptoms/Complaints:    Pain: Initial:   Pain Intensity 1: 05/10 all 4 extremities Post Session:  5/10 had pain meds prior, rest   Therapeutic Activity: (    15): Therapeutic activities including bed mobility and functional transfer to improve mobility, strength, balance and coordination. Required SBA to promote motor control of bilateral, upper extremity(s), lower extremity(s).      Braces/Orthotics/Lines/Etc:   · O2 Device: None (Room air)  Treatment/Session Assessment:    · Response to Treatment:  tolerated fair  · Interdisciplinary Collaboration:   o Certified Occupational Therapy Assistant  o Registered Nurse  · After treatment position/precautions:   o Up in chair  o Bed/Chair-wheels locked  o Bed in low position  o Call light within reach  o RN notified   · Compliance with Program/Exercises: Will assess as treatment progresses. · Recommendations/Intent for next treatment session: \"Next visit will focus on advancements to more challenging activities and reduction in assistance provided\".   Total Treatment Duration:   OT Patient Time In/Time Out  Time In: 1005  Time Out: [address] [place] Daphine Snellen

## 2022-05-07 NOTE — PROGRESS NOTES
Hospitalist Progress Note   Admit Date:  2022  4:45 AM   Name:  Ruchi Carr   Age:  62 y.o. Sex:  male  :  1965   MRN:  [id number]   Room:  [facility]/    Presenting Complaint: Chest Pain    Reason(s) for Admission: Transient ischemic attack [G45.9]     Hospital Course & Interval History:   62 y.o. male with medical history of heart failure with reduced ejection fraction status post pacemaker placement, severe esophageal dysmotility, weight loss who presented with he awakened this morning due to overwhelming sense of pain around 3 AM.  He noted numbness in his right and left leg. He reported feeling very weak. He had approximately 3 shots of alcohol and is not a daily drinker. He has never had DTs. 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 They are not normally as severe as the episode that prompted him to come in. All of the neurologic symptoms resolve rapidly and past episodes. Subjective/24hr Events (22): Improvement in mobility, control of pain. Patient remains n.p.o. has not decided about PEG placement. Answered all questions. Told him tomorrow we will either proceed with PEG placement or discharge. He endorsed understanding. ROS:  10 systems reviewed and negative except as noted above. Assessment & Plan:   * Transient ischemic attack  CT, CTA/P negative, echo negative, cannot get MRI due to incompatible pacer  -consult neurology  -Neurochecks   -ASA, consider clopidogrel   -permissive HTN to 220/120  -labetalol prn  -PT, OT, SLP, PPD    : Symptoms improved but not resolved.   Further assessment not possible    Severe Esophageal Dysmotility Disorder by UGI  Recent weight loss  -NPO  -consult gastroenterology    : Patient trying to decide regarding PEG tube    Anorexia  Secondary to extreme esophageal dysmotility  -consider PEG, ask IR to evaluate    Primary hypertension  - Hold amiodarone, spironolactone, furosemide   -Triggered labetalol    5/7: Held for n.p.o. we will reevaluate pending decision on PEG tube    Chronic pain syndrome  On 40 morphine equivalents daily, currently n.p.o.  -Fentanyl patch    Myoclonic jerking while sleeping  - Follow-up outpatient with sleep study    Alcohol dependence ([facility]  - Monitor for signs of withdrawal        Discharge Planning:    Home in 1 to 3 days    Diet:  DIET NPO Ice Chips  DVT PPx: Heparin  Code status: Full Code    Hospital Problems as of 5/7/2022 Date Reviewed: 5/5/2022          Codes Class Noted - Resolved POA    * (Principal) Transient ischemic attack ICD-10-CM: G45.9  ICD-9-CM: 435.9  5/5/2022 - Present Yes        Severe Esophageal Dysmotility Disorder by UGI ICD-10-CM: K22.4  ICD-9-CM: 530.5  2/14/2022 - Present Yes        Anorexia ICD-10-CM: R63.0  ICD-9-CM: 783.0  5/6/2022 - Present Yes        Demand ischemia ([facility] ICD-10-CM: I24.8  ICD-9-CM: 411.89  8/24/2021 - Present Yes        Primary hypertension (Chronic) ICD-10-CM: I10  ICD-9-CM: 401.9  11/29/2011 - Present Yes        Chronic pain syndrome (Chronic) ICD-10-CM: G89.4  ICD-9-CM: 338.4  5/5/2022 - Present Yes        Myoclonic jerking while sleeping (Chronic) ICD-10-CM: G25.3  ICD-9-CM: 333.2  5/6/2022 - Present Yes        ICD (implantable cardioverter-defibrillator) discharge ICD-10-CM: Z45.02  ICD-9-CM: V71.89  2/26/2022 - Present Unknown        Alcohol dependence ([facility] ICD-10-CM: F10.20  ICD-9-CM: 303.90  9/5/2018 - Present Yes              Objective:     Patient Vitals for the past 24 hrs:   Temp Pulse Resp BP SpO2   05/07/22 1635 97.9 °F (36.6 °C) (!) 105 16 120/85 98 %   05/07/22 0743 97.9 °F (36.6 °C) 98 18 116/85 95 %   05/07/22 0400 -- 87 -- -- --   05/07/22 0300 98.1 °F (36.7 °C) 93 18 119/81 96 %   05/07/22 0000 -- 91 -- -- --   05/06/22 2300 97.8 °F (36.6 °C) 93 18 118/87 97 %   05/06/22 2000 -- 81 -- -- --   05/06/22 1900 97.6 °F (36.4 °C) 85 16 116/86 97 %     Oxygen Therapy  O2 Sat (%): 98 % (05/07/22 1635)  O2 Device: None (Room air) (05/07/22 1635)    Estimated body mass index is 25.21 kg/m² as calculated from the following:    Height as of this encounter: 5' 11\" (1.803 m). Weight as of this encounter: 82 kg (180 lb 12.5 oz). Intake/Output Summary (Last 24 hours) at 5/7/2022 1732  Last data filed at 5/7/2022 0743  Gross per 24 hour   Intake 0 ml   Output --   Net 0 ml       Blood pressure 120/85, pulse (!) 105, temperature 97.9 °F (36.6 °C), resp. rate 16, height 5' 11\" (1.803 m), weight 82 kg (180 lb 12.5 oz), SpO2 98 %. Physical Exam  Vitals and nursing note reviewed. Constitutional:       General: He is not in acute distress. Appearance: Normal appearance. He is normal weight. He is not ill-appearing. HENT:      Head: Normocephalic. Eyes:      Extraocular Movements: Extraocular movements intact. Cardiovascular:      Rate and Rhythm: Normal rate. Pulses:           Radial pulses are 2+ on the left side. Heart sounds: Murmur heard. No friction rub. No gallop. Pulmonary:      Effort: Pulmonary effort is normal. No respiratory distress. Abdominal:      General: There is no distension. Palpations: Abdomen is soft. Tenderness: There is no abdominal tenderness. Musculoskeletal:         General: No deformity. Cervical back: No rigidity. Skin:     General: Skin is warm and dry. Neurological:      General: No focal deficit present. Mental Status: He is alert and oriented to person, place, and time. Comments: RUE 2/5 with coordinated hand movements  RLE 4/5   LUE 3/5 normal coordination  LLE 4/5     Psychiatric:         Mood and Affect: Mood is anxious and depressed. Behavior: Behavior is slowed. Behavior is cooperative.          I have reviewed ordered lab tests and independently visualized imaging below:    Recent Labs:  Recent Results (from the past 48 hour(s))   LIPID PANEL    Collection Time: 05/06/22  5:47 AM   Result Value Ref Range    Cholesterol, total 125 <200 MG/DL    Triglyceride 251 (H) 35 - 150 MG/DL    HDL Cholesterol 48 40 - 60 MG/DL    LDL, calculated 26.8 <100 MG/DL    VLDL, calculated 50.2 (H) 6.0 - 23.0 MG/DL    CHOL/HDL Ratio 2.6     HEMOGLOBIN A1C WITH EAG    Collection Time: 05/06/22  5:47 AM   Result Value Ref Range    Hemoglobin A1c 5.7 4.20 - 6.30 %    Est. average glucose 117 mg/dL   CBC W/O DIFF    Collection Time: 05/06/22  5:47 AM   Result Value Ref Range    WBC 4.9 4.3 - 11.1 K/uL    RBC 4.09 (L) 4.23 - 5.6 M/uL    HGB 11.8 (L) 13.6 - 17.2 g/dL    HCT 35.4 (L) 41.1 - 50.3 %    MCV 86.6 79.6 - 97.8 FL    MCH 28.9 26.1 - 32.9 PG    MCHC 33.3 31.4 - 35.0 g/dL    RDW 19.9 (H) 11.9 - 14.6 %    PLATELET 063 268 - 580 K/uL    MPV 9.0 (L) 9.4 - 12.3 FL    ABSOLUTE NRBC 0.00 0.0 - 0.2 K/uL   MAGNESIUM    Collection Time: 05/06/22  5:47 AM   Result Value Ref Range    Magnesium 2.0 1.8 - 2.4 mg/dL   METABOLIC PANEL, COMPREHENSIVE    Collection Time: 05/06/22  5:47 AM   Result Value Ref Range    Sodium 134 (L) 136 - 145 mmol/L    Potassium 3.9 3.5 - 5.1 mmol/L    Chloride 102 98 - 107 mmol/L    CO2 22 21 - 32 mmol/L    Anion gap 10 7 - 16 mmol/L    Glucose 105 (H) 65 - 100 mg/dL    BUN 10 6 - 23 MG/DL    Creatinine 1.23 0.8 - 1.5 MG/DL    GFR est AA >60 >60 ml/min/1.73m2    GFR est non-AA >60 >60 ml/min/1.73m2    Calcium 8.7 8.3 - 10.4 MG/DL    Bilirubin, total 1.2 (H) 0.2 - 1.1 MG/DL    ALT (SGPT) 23 12 - 65 U/L    AST (SGOT) 25 15 - 37 U/L    Alk.  phosphatase 64 50 - 136 U/L    Protein, total 6.2 (L) 6.3 - 8.2 g/dL    Albumin 3.3 (L) 3.5 - 5.0 g/dL    Globulin 2.9 2.3 - 3.5 g/dL    A-G Ratio 1.1 (L) 1.2 - 3.5     PHOSPHORUS    Collection Time: 05/06/22  5:47 AM   Result Value Ref Range    Phosphorus 3.6 2.5 - 4.5 MG/DL   CBC W/O DIFF    Collection Time: 05/07/22  5:36 AM   Result Value Ref Range    WBC 4.3 4.3 - 11.1 K/uL    RBC 4.11 (L) 4.23 - 5.6 M/uL    HGB 11.8 (L) 13.6 - 17.2 g/dL    HCT 36.2 (L) 41.1 - 50.3 %    MCV 88.1 79.6 - 97.8 FL    MCH 28.7 26.1 - 32.9 PG    MCHC 32.6 31.4 - 35.0 g/dL    RDW 20.7 (H) 11.9 - 14.6 %    PLATELET 476 112 - 035 K/uL    MPV 8.9 (L) 9.4 - 12.3 FL    ABSOLUTE NRBC 0.00 0.0 - 0.2 K/uL   MAGNESIUM    Collection Time: 05/07/22  5:36 AM   Result Value Ref Range    Magnesium 1.9 1.8 - 2.4 mg/dL   METABOLIC PANEL, COMPREHENSIVE    Collection Time: 05/07/22  5:36 AM   Result Value Ref Range    Sodium 137 136 - 145 mmol/L    Potassium 4.2 3.5 - 5.1 mmol/L    Chloride 103 98 - 107 mmol/L    CO2 23 21 - 32 mmol/L    Anion gap 11 7 - 16 mmol/L    Glucose 93 65 - 100 mg/dL    BUN 10 6 - 23 MG/DL    Creatinine 1.22 0.8 - 1.5 MG/DL    GFR est AA >60 >60 ml/min/1.73m2    GFR est non-AA >60 >60 ml/min/1.73m2    Calcium 9.0 8.3 - 10.4 MG/DL    Bilirubin, total 1.0 0.2 - 1.1 MG/DL    ALT (SGPT) 20 12 - 65 U/L    AST (SGOT) 16 15 - 37 U/L    Alk. phosphatase 64 50 - 136 U/L    Protein, total 6.6 6.3 - 8.2 g/dL    Albumin 3.3 (L) 3.5 - 5.0 g/dL    Globulin 3.3 2.3 - 3.5 g/dL    A-G Ratio 1.0 (L) 1.2 - 3.5     PHOSPHORUS    Collection Time: 05/07/22  5:36 AM   Result Value Ref Range    Phosphorus 4.0 2.5 - 4.5 MG/DL       All Micro Results     None          Other Studies:  No results found.     Current Meds:  Current Facility-Administered Medications   Medication Dose Route Frequency    morphine injection 4 mg  4 mg IntraVENous Q4H PRN    prochlorperazine (COMPAZINE) with saline injection 5 mg  5 mg IntraVENous Q3H PRN    pantoprazole (PROTONIX) 40 mg in 0.9% sodium chloride 10 mL injection  40 mg IntraVENous Q24H    [Held by provider] amiodarone (CORDARONE) tablet 200 mg  200 mg Oral DAILY    [Held by provider] atorvastatin (LIPITOR) tablet 80 mg  80 mg Oral DAILY    [Held by provider] HYDROcodone-acetaminophen (NORCO)  mg tablet 1 Tablet  1 Tablet Oral Q6H PRN    [Held by provider] spironolactone (ALDACTONE) tablet 50 mg  50 mg Oral DAILY    sodium chloride (NS) flush 5-40 mL  5-40 mL IntraVENous Q8H    sodium chloride (NS) flush 5-40 mL  5-40 mL IntraVENous PRN    [Held by provider] aspirin chewable tablet 81 mg  81 mg Oral DAILY    heparin (porcine) injection 5,000 Units  5,000 Units SubCUTAneous Q8H    [Held by provider] ondansetron (ZOFRAN) injection 4 mg  4 mg IntraVENous Q4H PRN    fentaNYL (DURAGESIC) 12 mcg/hr patch 1 Patch  1 Patch TransDERmal Q72H    clonazePAM (KlonoPIN) tablet 0.5 mg  0.5 mg Oral QHS       Signed:  Qamar Flores MD

## 2022-05-07 NOTE — PROGRESS NOTES
Problem: Mobility Impaired (Adult and Pediatric)  Goal: *Acute Goals and Plan of Care (Insert Text)  Note: STG:  (1.)Mr. Dominique Tineo will move from supine to sit and sit to supine  with STAND BY ASSIST within 5 treatment day(s). GOAL MET  (2.)Mr. Dominique Tineo will transfer from bed to chair and chair to bed with STAND BY ASSIST using the least restrictive device within 5 treatment day(s). GOAL MET  (3.)Mr. Dominique Tineo will ambulate with CONTACT GUARD ASSIST for 100 feet with the least restrictive device within 5 treatment day(s). (4.)Pt. will climb up/down 5 steps with rail and CGA within 5 days  (5.)Pt. will increase B LE strength and coordination 1/2 grade within 5 days      ________________________________________________________________________________________________      PHYSICAL THERAPY: Daily Note and AM 5/7/2022  OBSERVATION: PT Visit Days : 3  Payor: [employer]line [employer] / Plan: [place] BLUE CROSS [place]ER[place] / Product Type: PPO /       NAME/AGE/GENDER: Diana Styles is a 62 y.o. male   PRIMARY DIAGNOSIS: Transient ischemic attack [G45.9] Transient ischemic attack Transient ischemic attack       ICD-10: Treatment Diagnosis:    · Generalized Muscle Weakness (M62.81)  · Other lack of cordination (R27.8)  · Other abnormalities of gait and mobility (R26.89)   Precaution/Allergies:  Patient has no known allergies. ASSESSMENT:     Mr. Dominique Tineo needed strong encouragement to participate. Pt would stay in bed 24/7 if given the choice. Pt demonstrated inconsistencies with muscle activity & functional movement that strongly indicated lack of full effort. This pt has a complex medical history but there is a clear behavioral component that is significantly limiting pt's function. This pt could not return home at his current functional level & would not be safe to be left alone at home. Pt could potentially do more functionally that he is willing. MD & MSW aware of DC concerns. 5/7- supine in bed and agreeable to therapy.  He transferred out of bed and ambulated in room 30 ft without assistive device with SBA. He returned supine following therapy. Per RN patient needed to go light on activity since any significant activity sends him into SVT's. Cardiac issues limited further activity this treatment session but patient was willing to participate. This section established at most recent assessment   PROBLEM LIST (Impairments causing functional limitations):  1. Decreased Strength  2. Decreased ADL/Functional Activities  3. Decreased Transfer Abilities  4. Decreased Ambulation Ability/Technique  5. Decreased Balance  6. Increased Pain  7. Decreased Activity Tolerance  8. Increased Fatigue  9. Decreased Flexibility/Joint Mobility  10. Decreased [place] with Home Exercise Program   INTERVENTIONS PLANNED: (Benefits and precautions of physical therapy have been discussed with the patient.)  1. Balance Exercise  2. Bed Mobility  3. Gait Training  4. Neuromuscular Re-education/Strengthening  5. Therapeutic Activites  6. Transfer Training     TREATMENT PLAN: Frequency/Duration: daily for duration of hospital stay  Rehabilitation Potential For Stated Goals: [facility] (at time of discharge pending progress):    Placement: It is my opinion, based on this patient's performance to date, that Mr. Sathya Rollins may benefit from [address] after discharge due to the functional deficits listed above that are likely to improve with skilled rehabilitation because he/she has multiple medical issues that affect his/her functional mobility in the community. Equipment:    None at this time              HISTORY:   History of Present Injury/Illness (Reason for Referral):   Admitted with R sided numbness, pain and spasm  Past Medical History/Comorbidities:   Mr. Sathya Rollins  has a past medical history of Acute gastroenteritis (2/26/2016), Acute on chronic systolic heart failure ([facility] (9/30/2020), Acute respiratory failure due to COVID-19 [facility]) (8/24/2021), Acute systolic congestive heart failure ([facility] (0/60/7708), Acute systolic heart failure ([facility] (9/14/2010), AGE (acute gastroenteritis) (12/16/2019), YAO (acute kidney injury) ([facility] (8/23/2021), Anxiety associated with depression (3/18/2017), Arthritis, CAD (coronary artery disease), CHF (congestive heart failure) ([facility] Chronic alcoholism ([facility] Chronic back pain, Chronic neck pain, Chronic systolic heart failure ([facility] (4/21/2011), Dental caries (7/13/2017), Depression, Dizziness - light-headed, Elevated brain natriuretic peptide (BNP) level (4/14/2021), Generalized abdominal pain (2/14/2022), GERD (gastroesophageal reflux disease), Gynecomastia (2/22/2016), Heart failure ([facility] Hyperbilirubinemia (2/7/2022), Hypertension, Hypokalemia (7/3/2020), Hypomagnesemia (2/26/2022), ICD (implantable cardioverter-defibrillator) in place (4/22/2011), Leukopenia (5/7/2019), Macrocytic anemia (7/8/2018), NSTEMI (non-ST elevated myocardial infarction) ([facility] (8/24/2021), Schatzki's ring (07/10/2018), Situational depression (2/22/2016), SVT (supraventricular tachycardia) ([facility] (12/22/2018), Tachycardia (2/24/2022), and Ventricular tachycardia ([facility] (2/22/2016). Mr. Juice Wynn  has a past surgical history that includes hx heart catheterization (9/21/10); hx pacemaker; and egd (5/9/2019).   Social History/Living Environment:   Home Environment: Private residence  # Steps to Enter: 5  Rails to Enter: Yes  Hand Rails : Left  One/Two Story Residence: One story  Living Alone: No  Support Systems: Spouse/Significant Other  Patient Expects to be Discharged to[de-identified] Unable to determine at this time  Current DME Used/Available at Home: Shower chair  Tub or Shower Type: Tub/Shower combination  Prior Level of Function/Work/Activity:  Functionally I with ADLs, ambulation, driving and is retired  Dominant Side:         RIGHT   Number of Personal Factors/Comorbidities that affect the Plan of Care: 1-2: MODERATE COMPLEXITY   EXAMINATION:   Most Recent Physical Functioning:   Gross Assessment:                     Balance:  Sitting: Intact  Standing: Pull to stand; Without support  Standing - Static: Good  Standing - Dynamic : Good Bed Mobility:  Supine to Sit: Supervision  Sit to Supine: Supervision  Scooting: Independent       Transfers:  Sit to Stand: Supervision  Stand to Sit: Supervision  Gait:     Speed/Treasure: Shuffled; Slow  Gait Abnormalities: Decreased step clearance;Shuffling gait  Distance (ft): 30 Feet (ft)  Assistive Device:  (none)  Ambulation - Level of Assistance: Stand-by assistance  Interventions: Safety awareness training         Body Structures Involved:  1. Nerves  2. Muscles Body Functions Affected:  1. Sensory/Pain  2. Neuromusculoskeletal  3. Movement Related Activities and Participation Affected:  1. General Tasks and Demands  2. Mobility  3. Self Care   Number of elements that affect the Plan of Care: 4+: HIGH COMPLEXITY   CLINICAL PRESENTATION:   Presentation: Stable and uncomplicated: LOW COMPLEXITY   CLINICAL DECISION MAKIN[address] [place] [id number] AM-PAC 6 Clicks   Basic Mobility Inpatient Short Form  How much difficulty does the patient currently have. .. Unable A Lot A Little None   1. Turning over in bed (including adjusting bedclothes, sheets and blankets)? [] 1   [] 2   [x] 3   [] 4   2. Sitting down on and standing up from a chair with arms ( e.g., wheelchair, bedside commode, etc.)   [] 1   [] 2   [x] 3   [] 4   3. Moving from lying on back to sitting on the side of the bed? [] 1   [] 2   [x] 3   [] 4   How much help from another person does the patient currently need. .. Total A Lot A Little None   4. Moving to and from a bed to a chair (including a wheelchair)? [] 1   [] 2   [x] 3   [] 4   5. Need to walk in hospital room? [] 1   [x] 2   [] 3   [] 4   6. Climbing 3-5 steps with a railing? [] 1   [x] 2   [] 3   [] 4   © , Trustees of [address] [place] [id number], under license to [employer].  All rights reserved      Score: Initial: 16 Most Recent: X (Date: -- )    Interpretation of Tool:  Represents activities that are increasingly more difficult (i.e. Bed mobility, Transfers, Gait). Medical Necessity:     · Patient demonstrates   · fair  ·  rehab potential due to higher previous functional level. Reason for Services/Other Comments:  · Patient   · continues to require present interventions due to patient's inability to perform functional mobility at a safe SBA level  · . Use of outcome tool(s) and clinical judgement create a POC that gives a: Questionable prediction of patient's progress: MODERATE COMPLEXITY            TREATMENT:   (In addition to Assessment/Re-Assessment sessions the following treatments were rendered)   Pre-treatment Symptoms/Complaints: no complaints today  Pain: Initial: numeric scale     Post Session: 0           Braces/Orthotics/Lines/Etc:   · O2 Device: None (Room air)  Treatment/Session Assessment:    · Response to Treatment: better participation today and willing to participate but RN requesting to go light due to cardiac issues. · Interdisciplinary Collaboration:   o Physical Therapist  o Registered Nurse  · After treatment position/precautions:   o Supine in bed  o Bed/Chair-wheels locked  o Bed in low position  o Call light within reach  o RN notified   · Compliance with Program/Exercises: Will assess as treatment progresses  · Recommendations/Intent for next treatment session: \"Next visit will focus on advancements to more challenging activities\".   Total Treatment Duration:  PT Patient Time In/Time Out  Time In: 1130  Time Out: [address] [facility]

## 2022-05-07 NOTE — PROGRESS NOTES
Problem: Aspiration - Risk of  Goal: *Absence of aspiration  Outcome: Progressing Towards Goal     Problem: Patient Education: Go to Patient Education Activity  Goal: Patient/Family Education  Outcome: Progressing Towards Goal     Problem: Patient Education: Go to Patient Education Activity  Goal: Patient/Family Education  Outcome: Progressing Towards Goal     Problem: TIA/CVA Stroke: 0-24 hours  Goal: Off Pathway (Use only if patient is Off Pathway)  Outcome: Progressing Towards Goal  Goal: Consults, if ordered  Outcome: Progressing Towards Goal  Goal: Diagnostic Test/Procedures  Outcome: Progressing Towards Goal  Goal: Nutrition/Diet  Outcome: Progressing Towards Goal  Goal: Discharge Planning  Outcome: Progressing Towards Goal  Goal: Medications  Outcome: Progressing Towards Goal  Goal: Respiratory  Outcome: Progressing Towards Goal  Goal: Treatments/Interventions/Procedures  Outcome: Progressing Towards Goal  Goal: Minimize risk of bleeding post-thrombolytic infusion  Outcome: Progressing Towards Goal  Goal: Monitor for complications post-thrombolytic infusion  Outcome: Progressing Towards Goal  Goal: Psychosocial  Outcome: Progressing Towards Goal  Goal: *Hemodynamically stable  Outcome: Progressing Towards Goal  Goal: *Neurologically stable  Description: Absence of additional neurological deficits    Outcome: Progressing Towards Goal  Goal: *Verbalizes anxiety and depression are reduced or absent  Outcome: Progressing Towards Goal  Goal: *Absence of Signs of Aspiration on Current Diet  Outcome: Progressing Towards Goal  Goal: *Absence of deep venous thrombosis signs and symptoms(Stroke Metric)  Outcome: Progressing Towards Goal  Goal: *Ability to perform ADLs and demonstrates progressive mobility and function  Outcome: Progressing Towards Goal  Goal: *Stroke education started(Stroke Metric)  Outcome: Progressing Towards Goal  Goal: *Dysphagia screen performed(Stroke Metric)  Outcome: Progressing Towards Goal  Goal: *Rehab consulted(Stroke Metric)  Outcome: Progressing Towards Goal

## 2022-05-08 VITALS
HEART RATE: 93 BPM | TEMPERATURE: 97.5 F | HEIGHT: 71 IN | BODY MASS INDEX: 25.21 KG/M2 | WEIGHT: 180.1 LBS | OXYGEN SATURATION: 96 % | RESPIRATION RATE: 18 BRPM | SYSTOLIC BLOOD PRESSURE: 136 MMHG | DIASTOLIC BLOOD PRESSURE: 102 MMHG

## 2022-05-08 PROBLEM — I24.8 DEMAND ISCHEMIA (HCC): Status: RESOLVED | Noted: 2021-08-24 | Resolved: 2022-05-08

## 2022-05-08 PROBLEM — R63.0 ANOREXIA: Status: RESOLVED | Noted: 2022-05-06 | Resolved: 2022-05-08

## 2022-05-08 PROBLEM — I24.89 DEMAND ISCHEMIA: Status: RESOLVED | Noted: 2021-08-24 | Resolved: 2022-05-08

## 2022-05-08 LAB
ALBUMIN SERPL-MCNC: 3.4 G/DL (ref 3.5–5)
ALBUMIN/GLOB SERPL: 1 {RATIO} (ref 1.2–3.5)
ALP SERPL-CCNC: 65 U/L (ref 50–136)
ALT SERPL-CCNC: 16 U/L (ref 12–65)
ANION GAP SERPL CALC-SCNC: 11 MMOL/L (ref 7–16)
AST SERPL-CCNC: 16 U/L (ref 15–37)
BILIRUB SERPL-MCNC: 1 MG/DL (ref 0.2–1.1)
BUN SERPL-MCNC: 10 MG/DL (ref 6–23)
CALCIUM SERPL-MCNC: 9.2 MG/DL (ref 8.3–10.4)
CHLORIDE SERPL-SCNC: 102 MMOL/L (ref 98–107)
CO2 SERPL-SCNC: 21 MMOL/L (ref 21–32)
CREAT SERPL-MCNC: 1.2 MG/DL (ref 0.8–1.5)
ERYTHROCYTE [DISTWIDTH] IN BLOOD BY AUTOMATED COUNT: 20.5 % (ref 11.9–14.6)
GLOBULIN SER CALC-MCNC: 3.5 G/DL (ref 2.3–3.5)
GLUCOSE SERPL-MCNC: 80 MG/DL (ref 65–100)
HCT VFR BLD AUTO: 37.1 % (ref 41.1–50.3)
HGB BLD-MCNC: 12.1 G/DL (ref 13.6–17.2)
MAGNESIUM SERPL-MCNC: 1.9 MG/DL (ref 1.8–2.4)
MCH RBC QN AUTO: 28.9 PG (ref 26.1–32.9)
MCHC RBC AUTO-ENTMCNC: 32.6 G/DL (ref 31.4–35)
MCV RBC AUTO: 88.5 FL (ref 79.6–97.8)
NRBC # BLD: 0 K/UL (ref 0–0.2)
PHOSPHATE SERPL-MCNC: 3.8 MG/DL (ref 2.5–4.5)
PLATELET # BLD AUTO: 197 K/UL (ref 150–450)
PMV BLD AUTO: 9.2 FL (ref 9.4–12.3)
POTASSIUM SERPL-SCNC: 4.1 MMOL/L (ref 3.5–5.1)
PROT SERPL-MCNC: 6.9 G/DL (ref 6.3–8.2)
RBC # BLD AUTO: 4.19 M/UL (ref 4.23–5.6)
SODIUM SERPL-SCNC: 134 MMOL/L (ref 136–145)
WBC # BLD AUTO: 4.7 K/UL (ref 4.3–11.1)

## 2022-05-08 PROCEDURE — 36415 COLL VENOUS BLD VENIPUNCTURE: CPT

## 2022-05-08 PROCEDURE — 85027 COMPLETE CBC AUTOMATED: CPT

## 2022-05-08 PROCEDURE — 80053 COMPREHEN METABOLIC PANEL: CPT

## 2022-05-08 PROCEDURE — 96376 TX/PRO/DX INJ SAME DRUG ADON: CPT

## 2022-05-08 PROCEDURE — 96372 THER/PROPH/DIAG INJ SC/IM: CPT

## 2022-05-08 PROCEDURE — 74011250636 HC RX REV CODE- 250/636: Performed by: FAMILY MEDICINE

## 2022-05-08 PROCEDURE — G0378 HOSPITAL OBSERVATION PER HR: HCPCS

## 2022-05-08 PROCEDURE — 83735 ASSAY OF MAGNESIUM: CPT

## 2022-05-08 PROCEDURE — 74011250637 HC RX REV CODE- 250/637: Performed by: FAMILY MEDICINE

## 2022-05-08 PROCEDURE — 97530 THERAPEUTIC ACTIVITIES: CPT

## 2022-05-08 PROCEDURE — 74011000250 HC RX REV CODE- 250: Performed by: FAMILY MEDICINE

## 2022-05-08 PROCEDURE — 84100 ASSAY OF PHOSPHORUS: CPT

## 2022-05-08 RX ORDER — HYDROCODONE BITARTRATE AND ACETAMINOPHEN 5; 325 MG/1; MG/1
1 TABLET ORAL
Qty: 10 TABLET | Refills: 0 | Status: SHIPPED | OUTPATIENT
Start: 2022-05-08 | End: 2022-05-09 | Stop reason: SDUPTHER

## 2022-05-08 RX ORDER — CLOPIDOGREL BISULFATE 75 MG/1
75 TABLET ORAL DAILY
Qty: 21 TABLET | Refills: 0 | Status: SHIPPED | OUTPATIENT
Start: 2022-05-08 | End: 2022-05-08 | Stop reason: SDUPTHER

## 2022-05-08 RX ORDER — CLOPIDOGREL BISULFATE 75 MG/1
75 TABLET ORAL DAILY
Qty: 21 TABLET | Refills: 0 | Status: SHIPPED | OUTPATIENT
Start: 2022-05-08 | End: 2022-05-09 | Stop reason: SDUPTHER

## 2022-05-08 RX ORDER — BACLOFEN 10 MG/1
10 TABLET ORAL 3 TIMES DAILY
Qty: 10 TABLET | Refills: 0 | Status: SHIPPED | OUTPATIENT
Start: 2022-05-08 | End: 2022-05-09 | Stop reason: SDUPTHER

## 2022-05-08 RX ADMIN — SODIUM CHLORIDE 5 MG: 9 INJECTION INTRAMUSCULAR; INTRAVENOUS; SUBCUTANEOUS at 03:48

## 2022-05-08 RX ADMIN — HEPARIN SODIUM 5000 UNITS: 5000 INJECTION INTRAVENOUS; SUBCUTANEOUS at 00:47

## 2022-05-08 RX ADMIN — SODIUM CHLORIDE, PRESERVATIVE FREE 10 ML: 5 INJECTION INTRAVENOUS at 03:54

## 2022-05-08 RX ADMIN — HEPARIN SODIUM 5000 UNITS: 5000 INJECTION INTRAVENOUS; SUBCUTANEOUS at 08:08

## 2022-05-08 RX ADMIN — MORPHINE SULFATE 4 MG: 4 INJECTION INTRAVENOUS at 08:17

## 2022-05-08 RX ADMIN — SODIUM CHLORIDE 5 MG: 9 INJECTION INTRAMUSCULAR; INTRAVENOUS; SUBCUTANEOUS at 08:17

## 2022-05-08 RX ADMIN — ATORVASTATIN CALCIUM 80 MG: 40 TABLET, FILM COATED ORAL at 10:35

## 2022-05-08 RX ADMIN — ASPIRIN 81 MG 81 MG: 81 TABLET ORAL at 10:35

## 2022-05-08 RX ADMIN — MORPHINE SULFATE 4 MG: 4 INJECTION INTRAVENOUS at 03:55

## 2022-05-08 RX ADMIN — AMIODARONE HYDROCHLORIDE 200 MG: 200 TABLET ORAL at 10:35

## 2022-05-08 NOTE — PROGRESS NOTES
I have reviewed discharge instructions with the patient. The patient verbalized understanding. PIV removed. Pt has stroke/TIA packet and understands new medications. To follow up with PCP as instructed.

## 2022-05-08 NOTE — PROGRESS NOTES
Resting quietly, awaking easily at intervals during the night, voiding without difficulty, unassisted out of bed with reminder to call for asst to be out of bed. Tolerating ice chips, swallowed scheduled Klonopin without difficulty. Reports he hasdecreased sensation/numbness to RUE and RLE, not new; receiving pain med for RUE and RLE pain, and nausea every four hours. No emesis. NIH score varying with RUE drift at times, and mild sensory loss. Remains NPO except for ice chips and the one med, Klonopin, approved by Md during the night. No distress at this time. Report given to Hemalatha Jay RN.

## 2022-05-08 NOTE — PROGRESS NOTES
Problem: Mobility Impaired (Adult and Pediatric)  Goal: *Acute Goals and Plan of Care (Insert Text)  Note: GOALS MODIFIED BASED ON PROGRESS & POTENTIAL 5/8/22 :  (1.)Mr. Cate Mark will move from supine to sit and sit to supine with HOB 20 deg & SBA (consistently 2 more sessions)  (2.)Mr. Cate Mark will transfer from bed to chair and chair to bed with STAND BY ASSIST (consistently 2 more sessions, with a device PRN)  (3.)Mr. Cate Mark will ambulate with CONTACT GUARD ASSIST for 40 -50 feet (consistently 2 more sessions, with device PRN)   (4.)Pt. will climb up/down 5 steps with rail and CGA within 5 days  (5.)Pt safe with mild HEP with written guidelines      ________________________________________________________________________________________________      PHYSICAL THERAPY: Re-evaluation and AM 5/8/2022  OBSERVATION: PT Visit Days : 4  Payor: Cisco Flores / Plan: [place] [facility] / Product Type: PPO /       NAME/AGE/GENDER: Rufina Calderon is a 62 y.o. male   PRIMARY DIAGNOSIS: Transient ischemic attack [G45.9] <principal problem not specified> <principal problem not specified>       ICD-10: Treatment Diagnosis:    · Generalized Muscle Weakness (M62.81)  · Other lack of cordination (R27.8)  · Other abnormalities of gait and mobility (R26.89)   Precaution/Allergies:  Patient has no known allergies. ASSESSMENT:     Mr. Cate Mark pt participated well with encouragement, currently functioning at a SBA level based mostly on pt's complex medical Hx & risk factors chronically present. It would not be reasonable to expect more from this pt or a higher level of function based on his complex cardiac Hx & his response to activity. Pt needs to perform at a SBA level at least 2 more sessions to determine that is his functional level prior to DC. This section established at most recent assessment   PROBLEM LIST (Impairments causing functional limitations):  1. Decreased Strength  2. Decreased ADL/Functional Activities  3.  Decreased Transfer Abilities  4. Decreased Ambulation Ability/Technique  5. Decreased Balance  6. Increased Pain  7. Decreased Activity Tolerance  8. Increased Fatigue  9. Decreased Flexibility/Joint Mobility  10. Decreased [place] with Home Exercise Program   INTERVENTIONS PLANNED: (Benefits and precautions of physical therapy have been discussed with the patient.)  1. Balance Exercise  2. Bed Mobility  3. Gait Training  4. Neuromuscular Re-education/Strengthening  5. Therapeutic Activites  6. Transfer Training     TREATMENT PLAN: Frequency/Duration: daily for duration of hospital stay  Rehabilitation Potential For Stated Goals: [facility] (at time of discharge pending progress):    Placement: It is my opinion, based on this patient's performance to date, that Mr. William Menard may benefit from [address] after discharge due to the functional deficits listed above that are likely to improve with skilled rehabilitation because he/she has multiple medical issues that affect his/her functional mobility in the community. Equipment:    None at this time              HISTORY:   History of Present Injury/Illness (Reason for Referral):   Admitted with R sided numbness, pain and spasm  Past Medical History/Comorbidities:   Mr. William Menard  has a past medical history of Acute gastroenteritis (2/26/2016), Acute on chronic systolic heart failure ([address] (9/30/2020), Acute respiratory failure due to COVID-19 [facility]) (2/51/2114), Acute systolic congestive heart failure ([address] (3/38/3381), Acute systolic heart failure ([address] (9/14/2010), AGE (acute gastroenteritis) (12/16/2019), YAO (acute kidney injury) ([address] (8/23/2021), Anorexia (5/6/2022), Anxiety associated with depression (3/18/2017), Arthritis, CAD (coronary artery disease), CHF (congestive heart failure) ([address] Chronic alcoholism ([address] Chronic back pain, Chronic neck pain, Chronic systolic heart failure ([address] (4/21/2011), Demand ischemia ([address] (8/24/2021), Dental caries (7/13/2017), Depression, Dizziness - light-headed, Elevated brain natriuretic peptide (BNP) level (4/14/2021), Generalized abdominal pain (2/14/2022), GERD (gastroesophageal reflux disease), Gynecomastia (2/22/2016), Heart failure ([facility] Hyperbilirubinemia (2/7/2022), Hypertension, Hypokalemia (7/3/2020), Hypomagnesemia (2/26/2022), ICD (implantable cardioverter-defibrillator) in place (4/22/2011), Leukopenia (5/7/2019), Macrocytic anemia (7/8/2018), NSTEMI (non-ST elevated myocardial infarction) ([facility] (8/24/2021), Schatzki's ring (07/10/2018), Situational depression (2/22/2016), SVT (supraventricular tachycardia) ([facility] (12/22/2018), Tachycardia (2/24/2022), and Ventricular tachycardia ([facility] (2/22/2016). Mr. Nasreen Shafer  has a past surgical history that includes hx heart catheterization (9/21/10); hx pacemaker; and egd (5/9/2019). Social History/Living Environment:   Home Environment: Private residence  # Steps to Enter: 5  Rails to Enter: Yes  Hand Rails : Left  One/Two Story Residence: One story  Living Alone: No  Support Systems: Spouse/Significant Other  Patient Expects to be Discharged to[de-identified] Home with family assistance  Current DME Used/Available at Home: Shower chair  Tub or Shower Type: Tub/Shower combination  Prior Level of Function/Work/Activity:  Functionally I with ADLs, ambulation, driving and is retired  Dominant Side:         RIGHT   Number of Personal Factors/Comorbidities that affect the Plan of Care: 1-2: MODERATE COMPLEXITY   EXAMINATION:   Most Recent Physical Functioning:   Gross Assessment: 3-/5 throughout                     Balance:  Sitting: Intact; Without support  Standing: Impaired; Without support  Standing - Static:  (fair)  Standing - Dynamic :  (fair) Bed Mobility: HOB 20 deg  Supine to Sit: Stand-by assistance  Sit to Supine: Stand-by assistance  Scooting: Stand-by assistance       Transfers:  Sit to Stand: Stand-by assistance  Stand to Sit: Stand-by assistance  Bed to Chair: Stand-by assistance  Duration: 30 Minutes (extra time to work through activity noted)  Gait:     Speed/Treasure: Delayed  Gait Abnormalities: Decreased step clearance  Distance (ft): 40 Feet (ft) (x 3)  Assistive Device:  (none)  Ambulation - Level of Assistance: Stand-by assistance  Interventions: Safety awareness training;Verbal cues   HR 1st & 2nd walk was 110-112 & 3rd walk HR was 128-130 which recovered to 83bpm.         Body Structures Involved:  1. Nerves  2. Muscles Body Functions Affected:  1. Sensory/Pain  2. Neuromusculoskeletal  3. Movement Related Activities and Participation Affected:  1. General Tasks and Demands  2. Mobility  3. Self Care   Number of elements that affect the Plan of Care: 4+: HIGH COMPLEXITY   CLINICAL PRESENTATION:   Presentation: Stable and uncomplicated: LOW COMPLEXITY   CLINICAL DECISION MAKIN[address] [place] [id number] AM-PAC 6 Clicks   Basic Mobility Inpatient Short Form  How much difficulty does the patient currently have. .. Unable A Lot A Little None   1. Turning over in bed (including adjusting bedclothes, sheets and blankets)? [] 1   [] 2   [x] 3   [] 4   2. Sitting down on and standing up from a chair with arms ( e.g., wheelchair, bedside commode, etc.)   [] 1   [] 2   [x] 3   [] 4   3. Moving from lying on back to sitting on the side of the bed? [] 1   [] 2   [x] 3   [] 4   How much help from another person does the patient currently need. .. Total A Lot A Little None   4. Moving to and from a bed to a chair (including a wheelchair)? [] 1   [] 2   [x] 3   [] 4   5. Need to walk in hospital room? [] 1   [x] 2   [] 3   [] 4   6. Climbing 3-5 steps with a railing? [] 1   [x] 2   [] 3   [] 4   © , Trustees of [address] [place] [address], under license to [employer]. All rights reserved      Score:  Initial: 16 Most Recent: X (Date: -- )    Interpretation of Tool:  Represents activities that are increasingly more difficult (i.e. Bed mobility, Transfers, Gait).     Medical Necessity:     · Patient demonstrates   · fair  ·  rehab potential due to higher previous functional level. Reason for Services/Other Comments:  · Patient   · continues to require present interventions due to patient's inability to perform functional mobility at a safe SBA level  · . Use of outcome tool(s) and clinical judgement create a POC that gives a: Questionable prediction of patient's progress: MODERATE COMPLEXITY            TREATMENT:   (In addition to Assessment/Re-Assessment sessions the following treatments were rendered)   Pre-treatment Symptoms/Complaints: pt pleased with his function, wants to return home  Pain: Initial: numeric scale  Pain Intensity 1: 3  Pain Location 1: Flank  Pain Orientation 1: Right  Pain Intervention(s) 1: Ambulation/Increased Activity,Repositioned  Post Session: 0/10     Therapeutic Activity: (  30 Minutes (extra time to work through activity noted) ):  Therapeutic activities including LE AROM as a warm up, standing balance activity (repeated reaching in front, overhead & trunk rotation), repeated bed mobility, sitting balance with wt shifting, repeated sit<>stand, repeated gait x 3 attempts review of a mild HEP with written guidelines provided to improve mobility, strength, balance, coordination and dynamic movement of arm - bilateral, leg - bilateral and core to improve functional endurance & stability. Braces/Orthotics/Lines/Etc:   · O2 Device: None (Room air)  Treatment/Session Assessment:    · Response to Treatment: pt appears to be at his baseline function but needs to perform at a SBA level consistently at least 2 more sessions to determine this as his functional level. · Interdisciplinary Collaboration:   o Registered Nurse  · After treatment position/precautions:   o Supine in bed  o Bed/Chair-wheels locked  o Bed in low position  o Call light within reach  o RN notified   · Compliance with Program/Exercises:  Will assess as treatment progresses  · Recommendations/Intent for next treatment session: \"Next visit will focus on establihing baseline function\".   Total Treatment Duration:  PT Patient Time In/Time Out  Time In: 0819  Time Out: [address], PT

## 2022-05-08 NOTE — PROGRESS NOTES
Patient discussed in rounds. MD anticipates discharge home today per patient's request. Chart reviewed, recommendation for home health services. SW discussed recommendations with the patient who is not interested and declined a referral. SW advised patient that if he changes his mind he can reach out to his primary care provider. Care Management Interventions  PCP Verified by CM:  Yes  Last Visit to PCP: 03/15/22  Mode of Transport at Discharge: Self  Transition of Care Consult (CM Consult): Discharge Planning  Discharge Durable Medical Equipment: No  Physical Therapy Consult: Yes  Occupational Therapy Consult: Yes  Speech Therapy Consult: Yes  Support Systems: Spouse/Significant Other  Confirm Follow Up Transport: Family  The Plan for Transition of Care is Related to the Following Treatment Goals : discharge home with family  The Patient and/or Patient Representative was Provided with a Choice of Provider and Agrees with the Discharge Plan?:  (TBD)  Freedom of Choice List was Provided with Basic Dialogue that Supports the Patient's Individualized Plan of Care/Goals, Treatment Preferences and Shares the Quality Data Associated with the Providers?:  (TBD)  Discharge Location  Patient Expects to be Discharged to[de-identified] Home with family assistance      Beth Villegas, [address] [facility]    [address] [facility]    * [email or web address]

## 2022-05-08 NOTE — DISCHARGE INSTRUCTIONS
Patient Education        Transient Ischemic Attack: Care Instructions  Overview     A transient ischemic attack (TIA) is when blood flow to a part of your brain is blocked for a short time. A TIA causes stroke symptoms that can last for at least a few minutes. Stroke symptoms include sudden weakness or loss of movement in a part of your body, confusion, vision changes, trouble speaking, and trouble walking or balancing. But unlike a stroke, a TIA doesn't cause lasting brain damage. TIAs are often warning signs of a stroke. Some people who have a TIA may have a stroke in the future. If you have symptoms of a stroke, call for emergency help right away. Quick treatment can help limit damage to the brain and increase the chance of recovery. You can take steps to help prevent a stroke. These steps include managing health problems that raise your risk, taking medicine that prevents blood clots, and having a heart-healthy lifestyle. This lifestyle includes being active, eating healthy foods, staying at a healthy weight, and not smoking. Follow-up care is a key part of your treatment and safety. Be sure to make and go to all appointments, and call your doctor if you are having problems. It's also a good idea to know your test results and keep a list of the medicines you take. How can you care for yourself at home? Medicines    · Be safe with medicines. Take your medicines exactly as prescribed. Call your doctor if you think you are having a problem with your medicine.     · If you take a blood thinner, such as aspirin, be sure you get instructions about how to take your medicine safely. Blood thinners can cause serious bleeding problems.     · Call your doctor if you are not able to take your medicines for any reason.     · Do not take any over-the-counter medicines or herbal products without talking to your doctor first.     · If you use hormonal birth control or hormone therapy, talk to your doctor.  Ask if these are right for you. They may raise the risk of stroke in some people. Heart-healthy lifestyle    · Do not smoke. If you need help quitting, talk to your doctor about stop-smoking programs and medicines.     · Be active. If your doctor recommends it, get more exercise. Walking is a good choice. Bit by bit, increase the amount you walk every day. Try for at least 30 minutes on most days of the week. You also may want to swim, bike, or do other activities.     · Eat heart-healthy foods. These include vegetables, fruits, nuts, beans, lean meat, fish, and whole grains. Limit sodium and sugar.     · Stay at a healthy weight. Lose weight if you need to.     · Limit alcohol to 2 drinks a day for men and 1 drink a day for women. Staying healthy    · Manage other health problems that raise your risk of stroke. These include atrial fibrillation, diabetes, high blood pressure, and high cholesterol.     · If you think you may have a problem with alcohol or drug use, talk to your doctor.     · Get the flu vaccine every year. When should you call for help? Call 911 anytime you think you may need emergency care. For example, call if:    · You have symptoms of a stroke. These may include:  ? Sudden numbness, tingling, weakness, or loss of movement in your face, arm, or leg, especially on only one side of your body. ? Sudden vision changes. ? Sudden trouble speaking. ? Sudden confusion or trouble understanding simple statements. ? Sudden problems with walking or balance. ? A sudden, severe headache that is different from past headaches. Call 911 even if these symptoms go away in a few minutes.     · You feel like you are having another TIA. Watch closely for changes in your health, and be sure to contact your doctor if you have any problems. Where can you learn more?   Go to http://www.gray.com/  Enter I231 in the search box to learn more about \"Transient Ischemic Attack: Care Instructions. \"  Current as of: July 6, 2021               Content Version: 13.2  © 0527-3513 [employer], [facility]. Care instructions adapted under license by [employer] (which disclaims liability or warranty for this information). If you have questions about a medical condition or this instruction, always ask your healthcare professional. Feng Graff any warranty or liability for your use of this information.

## 2022-05-08 NOTE — DISCHARGE SUMMARY
Hospitalist Discharge Summary   Admit Date:  2022  4:45 AM   DC Note date: 2022  Name:  Katy Santos   Age:  62 y.o. Sex:  male  :  1965   MRN:  [id number]   [facility]  PCP:  Rylan Rose MD    Presenting Complaint: Chest Pain    Initial Admission Diagnosis: Transient ischemic attack [G45.9]     Problem List for this Hospitalization:  Hospital Problems as of 2022 Date Reviewed: 2022          Codes Class Noted - Resolved POA    Severe Esophageal Dysmotility Disorder by UGI ICD-10-CM: K22.4  ICD-9-CM: 530.5  2022 - Present Yes        RESOLVED: Anorexia ICD-10-CM: R63.0  ICD-9-CM: 783.0  2022 - 2022 Yes        RESOLVED: Demand ischemia ([facility] ICD-10-CM: I24.8  ICD-9-CM: 411.89  2021 - 2022 Yes        Primary hypertension (Chronic) ICD-10-CM: I10  ICD-9-CM: 401.9  2011 - Present Yes        Chronic pain syndrome (Chronic) ICD-10-CM: G89.4  ICD-9-CM: 338.4  2022 - Present Yes        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  He noted numbness in his right and left leg.  He reported feeling very weak.  He had approximately 3 shots of alcohol and was not a daily drinker. Emilie Oliveira had never had DTs. He had similar episodes of being awoken by myoclonus before. Marla Semen are not normally as severe as the episode that prompted him to come in.  All of the neurologic symptoms resolved rapidly in past episodes.     His pacemaker was interrogated, finding no events. Code stroke was initiated he was admitted observation. CT, CTA perfusion negative. Echocardiogram without changes. MRI cannot be performed due to incompatibility with pacemaker. Neurology was consulted. He was evaluated by physical therapy, Occupational Therapy, speech therapy. He was confirmed to have severe esophageal dysphagia. Gastroenterology was consulted. They recommended consideration of feeding tube. Patient declined at this time and will consider outpatient follow-up. He was determined to be appropriate for discharge on 5/8. He should follow-up with his primary care provider within 1 week. Primary care provider may consider the following:  -Clopidogrel for 21 days  -Need for outpatient sleep study to evaluate myoclonus  -Consideration contraindication for benzodiazepine with chronic opioid use versus need for treatment of sleep-related myoclonus  -Follow-up plan with specialists    Disposition: [facility]  Diet: ADULT DIET Dysphagia - Soft & Bite Sized  Code Status: Full Code    Follow Up Orders: Follow-up Appointments   Procedures    FOLLOW UP VISIT Appointment in: One Week Follow up with primary care provider within one week. Follow up with gastroenterology within one week. Follow up with neurology within one month. Schedule a sleep study within one month. Follow up with primary care provider within one week. Follow up with gastroenterology within one week. Follow up with neurology within one month. Schedule a sleep study within one month. Standing Status:   Standing     Number of Occurrences:   1     Order Specific Question:   Appointment in     Answer: One Week       Follow-up Information     Follow up With Specialties Details Why Contact Berta Ayala, [address] [place] [address]  [phone]          Time spent in patient discharge and coordination 37 minutes.     Plan was discussed with patient, nurse, . All questions answered. Patient was stable at time of discharge. Instructions given to call a physician or return if any concerns. Discharge Info:   Current Discharge Medication List      START taking these medications    Details   baclofen (LIORESAL) 10 mg tablet Take 1 Tablet by mouth three (3) times daily. Qty: 10 Tablet, Refills: 0  Start date: 5/8/2022      HYDROcodone-acetaminophen (Norco) 5-325 mg per tablet Take 1 Tablet by mouth every six (6) hours as needed for Pain for up to 3 days. Max Daily Amount: 4 Tablets. Qty: 10 Tablet, Refills: 0  Start date: 5/8/2022, End date: 5/11/2022    Comments: In addition to other outpatient therapy  Associated Diagnoses: Myalgia      clopidogreL (PLAVIX) 75 mg tab Take 1 Tablet by mouth daily for 21 days. Indications: prevention for a blood clot going to the brain  Qty: 21 Tablet, Refills: 0  Start date: 5/8/2022, End date: 5/29/2022         CONTINUE these medications which have NOT CHANGED    Details   HYDROcodone-acetaminophen (NORCO)  mg tablet Take 1 Tablet by mouth every six (6) hours as needed for Pain. promethazine (PHENERGAN) 25 mg tablet Take 1 Tablet by mouth every six (6) hours as needed for Nausea. Qty: 6 Tablet, Refills: 0      amiodarone (CORDARONE) 200 mg tablet Take 1 Tablet by mouth daily. Qty: 30 Tablet, Refills: 4      levalbuterol tartrate (XOPENEX) 45 mcg/actuation inhaler Take 2 Puffs by inhalation every four (4) hours as needed for Wheezing or Shortness of Breath. Qty: 15 g, Refills: 1      spironolactone (ALDACTONE) 50 mg tablet Take 1 Tablet by mouth daily. Qty: 90 Tablet, Refills: 3      furosemide (Lasix) 40 mg tablet Take  by mouth two (2) times a day. potassium chloride (K-DUR, KLOR-CON M20) 20 mEq tablet Take 20 mEq by mouth daily. naloxone (Narcan) 4 mg/actuation nasal spray Use 1 spray intranasally, then discard.  Repeat with new spray every 2 min as needed for opioid overdose symptoms, alternating nostrils. Qty: 1 Each, Refills: 0      atorvastatin (LIPITOR) 80 mg tablet Take 1 Tablet by mouth daily. Qty: 90 Tablet, Refills: 3    Associated Diagnoses: High cholesterol      rizatriptan (MAXALT-MLT) 10 mg disintegrating tablet TAKE ONE TABLET BY MOUTH ONCE AS NEEDED  Qty: 10 Tablet, Refills: 6    Associated Diagnoses: Periodic headache syndrome, not intractable      azelastine (ASTELIN) 137 mcg (0.1 %) nasal spray 1 [place] by Both Nostrils route two (2) times a day. Use in each nostril as directed  Qty: 3 Each, Refills: 3    Associated Diagnoses: Seasonal allergic rhinitis due to pollen      albuterol (PROVENTIL HFA, VENTOLIN HFA, PROAIR HFA) 90 mcg/actuation inhaler Take 2 Puffs by inhalation every four (4) hours as needed for Wheezing or Shortness of Breath. Qty: 1 Inhaler, Refills: 0         STOP taking these medications       ALPRAZolam (XANAX) 1 mg tablet Comments:   Reason for Stopping:         cyclobenzaprine (FLEXERIL) 10 mg tablet Comments:   Reason for Stopping:               Procedures done this admission:  * No surgery found *    Consults this admission:  IP CONSULT TO PHYSIATRIST(REHAB MEDICINE)  IP CONSULT TO NEUROLOGY  IP CONSULT TO GASTROENTEROLOGY    Echocardiogram/EKG results:  Results from Hospital Encounter encounter on 05/05/22    ECHO ADULT COMPLETE    Interpretation Summary    Left Ventricle: Severely reduced left ventricular systolic function with a visually estimated EF of 15 - 20%. Left ventricle is mildly dilated. Normal wall thickness. Severe global hypokinesis present.   Interatrial Septum: Agitated saline study was negative with and without provocation.   Aorta: Mildly dilated aortic root (4.1cm).   Contrast used: Definity.        EKG Results     Procedure 720 Value Units Date/Time    EKG, 12 LEAD, INITIAL [[id number]] Collected: 05/05/22 0450    Order Status: Completed Updated: 05/05/22 0630     Ventricular Rate 90 BPM      Atrial Rate 90 BPM      P-R Interval 192 ms      QRS Duration 114 ms      Q-T Interval 382 ms      QTC Calculation (Bezet) 467 ms      Calculated P Axis 44 degrees      Calculated R Axis -24 degrees      Calculated T Axis 115 degrees      Diagnosis --     !! AGE AND GENDER SPECIFIC ECG ANALYSIS !! Normal sinus rhythm  Anterolateral infarct (cited on or before 02-JUL-2020)  Abnormal ECG  When compared with ECG of 24-FEB-2022 18:11,  Aberrant conduction is no longer Present  QRS duration has increased  T wave inversion no longer evident in Inferior leads  T wave inversion more evident in Lateral leads  Confirmed by [facility]  MD ()Elizabeth ([id number]) on 5/5/2022 6:30:12 AM      Initial ECG [[id number]]     Order Status: Completed           Diagnostic Imaging/Tests:   CT PERF W CBF    Result Date: 5/5/2022  CT Perfusion Imaging INDICATION:  Right arm numbness. Chest pain. CT perfusion imaging of the brain was performed after the administration of intravenous contrast.  Perfusion maps and perfusion analysis output were generated using the vis ai perfusion processing software algorithm. Radiation dose reduction techniques were used for this study: All CT scans performed at this facility use one or all of the following: Automated exposure control, adjustment of the mA and/or kVp according to patient's size, iterative reconstruction. FINDINGS: vis ai Output Values: CBF < 30% volume:  0 ml   (core infarction volume greater than 50 cc associated with poor outcomes) Tmax > 6 seconds: 0 ml Tmax/CBF Mismatch Volume: 0 ml Tmax/CBF Mismatch Ratio: N/A Hypoperfusion Intensity Ratio: 0   (values greater than 0.5 associated with poor outcome) Tmax > 10 seconds Volume: 0 ml   (volume greater than 100 mL is associated with poor outcome)     No evidence of core infarct or ischemic penumbra. XR CHEST PORT    Result Date: 5/5/2022  Portable chest xray  COMPARISON: February 27, 2022 CLINICAL HISTORY: Chest pain.  FINDINGS: Stable left-sided cardiac pacer. Heart is enlarged. Mediastinal contour is within normal limits. There is mild left lung base opacity, likely atelectasis. Right lung is clear. No pneumothorax or pulmonary edema. No large pleural effusion. Stable large hiatal hernia. 1. Mild left lung base opacity, likely atelectasis. Negative for pulmonary edema. 2. Stable large hiatal hernia. CTA CODE NEURO HEAD AND NECK W CONT    Result Date: 5/5/2022  Title:  CT arteriogram of the neck and head. Indication: Chest pain and right arm numbness. Technique: Axial images of the neck and head were obtained after the uneventful administration of intravenous iodinated contrast media. Contrast was used to best identify the arterial structures. Images were reviewed on a separate, free standing, three-dimensional workstation as per the referring physicians request.  All stenosis percentages derived by comparing the narrowest segment with the distal Internal Carotid Artery luminal diameter, as described in the [place] [employer] Symptomatic Carotid Endarterectomy Trial (NASCET) criteria. The study was analyzed by the [employer]. ai algorithm. All CT scans at this facility are performed using dose reduction/dose modulation techniques, as appropriate the performed exam, including the following: Automated Exposure Control; Adjustment of the mA and/or kV according to patient size (this includes techniques or standardized protocols for targeted exams where dose is matched to indication/reason for exam); and Use of Iterative Reconstruction Technique. Comparison: None.  Findings: Lungs: Normal Soft Tissues: Normal Cervical Spine: Degenerative changes Aorta: Conventional 3 vessel arch Great Vessels: Patent Right ICA: Patent % Stenosis: 0 Right MCA: Patent Right TREASURE: Patent Left ICA: Patent % Stenosis: 0 Left MCA: Patent Left TREASURE: Patent Right Vertebral: Patent Left Vertebral: Patent Dominance: Left Basilar: Patent Right PCA: Patent Left PCA: Patent Other Vascular: Negative No evidence of large vessel occlusion. CT CODE NEURO HEAD WO CONTRAST    Result Date: 5/5/2022  Clinical history: Chest pain. Right arm numbness. TECHNIQUE: Axial, coronal and sagittal CT of the head without IV contrast. CT dose reduction was achieved through use of a standardized protocol tailored for this examination and automatic exposure control for dose modulation. COMPARISON: August 2021 FINDINGS: There is no acute intracranial hemorrhage or CT evidence of acute territorial infarction. There is no mass effect, midline shift or hydrocephalus. No extra-axial fluid collection. Cerebellum on the brainstem are grossly unremarkable. Periventricular hypodensities, nonspecific and likely due to chronic small vessel changes. There is small old lacunar infarct in the left basal ganglia. Included globes appear intact. There is mild mucosal thickening of the maxillary sinuses. Rest of the paranasal sinuses and the mastoid air cells are aerated. There is no skull fracture. 1. No acute intracranial hemorrhage or CT evidence of acute territorial infarction. Note that MRI is more sensitive for detection of acute/subacute infarct. 2. Small old lacunar infarct in the left basal ganglia. ECHO ADULT COMPLETE    Result Date: 5/5/2022    Left Ventricle: Severely reduced left ventricular systolic function with a visually estimated EF of 15 - 20%. Left ventricle is mildly dilated. Normal wall thickness. Severe global hypokinesis present.   Interatrial Septum: Agitated saline study was negative with and without provocation.   Aorta: Mildly dilated aortic root (4.1cm).   Contrast used: Definity.        All Micro Results     None          Labs: Results:       BMP, Mg, Phos Recent Labs     05/08/22  0558 05/07/22  0536 05/06/22  0547   * 137 134*   K 4.1 4.2 3.9    103 102   CO2 21 23 22   AGAP 11 11 10   BUN 10 10 10   CREA 1.20 1.22 1.23   CA 9.2 9.0 8.7   GLU 80 93 105*   MG 1.9 1.9 2.0   PHOS 3.8 4.0 3.6 CBC Recent Labs     05/08/22  0558 05/07/22  0536 05/06/22  0547   WBC 4.7 4.3 4.9   RBC 4.19* 4.11* 4.09*   HGB 12.1* 11.8* 11.8*   HCT 37.1* 36.2* 35.4*    187 220      LFT Recent Labs     05/08/22  0558 05/07/22  0536 05/06/22  0547   ALT 16 20 23   AP 65 64 64   TP 6.9 6.6 6.2*   ALB 3.4* 3.3* 3.3*   GLOB 3.5 3.3 2.9   AGRAT 1.0* 1.0* 1.1*      Cardiac Testing Lab Results   Component Value Date/Time     (H) 05/07/2019 03:53 PM     03/08/2019 01:10 PM    BNP 76 08/15/2018 04:48 PM     01/05/2017 03:51 AM    BNP 27 10/13/2016 08:10 PM    BNP 69 03/29/2016 05:20 AM    B-type Natriuretic Peptide 244.6 (H) 05/06/2019 12:06 PM     04/20/2021 05:25 AM     07/09/2018 06:02 AM    CK - MB 1.2 (L) 04/20/2021 05:25 AM    CK-MB Index 0.7 04/20/2021 05:25 AM    Troponin-I, Qt. 0.11 (HH) 03/19/2020 04:14 PM    Troponin-I, Qt. 0.09 (H) 03/08/2019 01:10 PM    Troponin-I, Qt. 0.07 (H) 08/15/2018 06:48 PM      Coagulation Tests Lab Results   Component Value Date/Time    Prothrombin time 14.2 05/05/2022 05:58 AM    Prothrombin time 11.7 (L) 07/07/2020 04:15 AM    Prothrombin time 12.4 07/06/2020 09:33 AM    INR 1.1 05/05/2022 05:58 AM    INR 0.8 07/07/2020 04:15 AM    INR 0.9 07/06/2020 09:33 AM    INR (POC) 1.1 05/05/2022 05:42 AM    aPTT 28.6 08/24/2021 09:33 AM    aPTT 25.9 07/07/2020 04:15 AM    aPTT 29.0 07/06/2020 09:33 AM      A1c Lab Results   Component Value Date/Time    Hemoglobin A1c 5.7 05/06/2022 05:47 AM    Hemoglobin A1c 5.8 (H) 12/23/2021 11:08 AM    Hemoglobin A1c 4.7 (L) 12/06/2017 12:36 PM      Lipid Panel Lab Results   Component Value Date/Time    Cholesterol, total 125 05/06/2022 05:47 AM    HDL Cholesterol 48 05/06/2022 05:47 AM    LDL,Direct 71 03/25/2016 04:35 AM    LDL, calculated 26.8 05/06/2022 05:47 AM    VLDL, calculated 50.2 (H) 05/06/2022 05:47 AM    Triglyceride 251 (H) 05/06/2022 05:47 AM    CHOL/HDL Ratio 2.6 05/06/2022 05:47 AM      Thyroid Panel Lab Results   Component Value Date/Time    TSH 1.010 12/06/2017 12:36 PM    TSH 0.901 03/18/2017 11:25 AM        Most Recent UA Lab Results   Component Value Date/Time    Color YELLOW 03/28/2016 04:00 PM    Appearance CLEAR 03/28/2016 04:00 PM    Specific gravity 1.003 03/28/2016 04:00 PM    pH (UA) 5.5 03/28/2016 04:00 PM    Protein NEGATIVE  03/28/2016 04:00 PM    Glucose NEGATIVE  03/28/2016 04:00 PM    Ketone NEGATIVE  03/28/2016 04:00 PM    Bilirubin NEGATIVE  03/28/2016 04:00 PM    Blood NEGATIVE  03/28/2016 04:00 PM    Urobilinogen 0.2 03/28/2016 04:00 PM    Nitrites NEGATIVE  03/28/2016 04:00 PM    Leukocyte Esterase NEGATIVE  03/28/2016 04:00 PM    WBC 0 07/02/2020 11:58 PM    RBC 0-3 07/02/2020 11:58 PM    Epithelial cells 0 07/02/2020 11:58 PM    Bacteria 0 07/02/2020 11:58 PM    Casts 0 07/02/2020 11:58 PM          All Labs from Last 24 Hrs:  Recent Results (from the past 24 hour(s))   CBC W/O DIFF    Collection Time: 05/08/22  5:58 AM   Result Value Ref Range    WBC 4.7 4.3 - 11.1 K/uL    RBC 4.19 (L) 4.23 - 5.6 M/uL    HGB 12.1 (L) 13.6 - 17.2 g/dL    HCT 37.1 (L) 41.1 - 50.3 %    MCV 88.5 79.6 - 97.8 FL    MCH 28.9 26.1 - 32.9 PG    MCHC 32.6 31.4 - 35.0 g/dL    RDW 20.5 (H) 11.9 - 14.6 %    PLATELET 370 199 - 674 K/uL    MPV 9.2 (L) 9.4 - 12.3 FL    ABSOLUTE NRBC 0.00 0.0 - 0.2 K/uL   MAGNESIUM    Collection Time: 05/08/22  5:58 AM   Result Value Ref Range    Magnesium 1.9 1.8 - 2.4 mg/dL   METABOLIC PANEL, COMPREHENSIVE    Collection Time: 05/08/22  5:58 AM   Result Value Ref Range    Sodium 134 (L) 136 - 145 mmol/L    Potassium 4.1 3.5 - 5.1 mmol/L    Chloride 102 98 - 107 mmol/L    CO2 21 21 - 32 mmol/L    Anion gap 11 7 - 16 mmol/L    Glucose 80 65 - 100 mg/dL    BUN 10 6 - 23 MG/DL    Creatinine 1.20 0.8 - 1.5 MG/DL    GFR est AA >60 >60 ml/min/1.73m2    GFR est non-AA >60 >60 ml/min/1.73m2    Calcium 9.2 8.3 - 10.4 MG/DL    Bilirubin, total 1.0 0.2 - 1.1 MG/DL    ALT (SGPT) 16 12 - 65 U/L    AST (SGOT) 16 15 - 37 U/L    Alk.  phosphatase 65 50 - 136 U/L    Protein, total 6.9 6.3 - 8.2 g/dL    Albumin 3.4 (L) 3.5 - 5.0 g/dL    Globulin 3.5 2.3 - 3.5 g/dL    A-G Ratio 1.0 (L) 1.2 - 3.5     PHOSPHORUS    Collection Time: 05/08/22  5:58 AM   Result Value Ref Range    Phosphorus 3.8 2.5 - 4.5 MG/DL       Current Med List in Hospital:   Current Facility-Administered Medications   Medication Dose Route Frequency    [Held by provider] morphine injection 4 mg  4 mg IntraVENous Q4H PRN    prochlorperazine (COMPAZINE) with saline injection 5 mg  5 mg IntraVENous Q3H PRN    pantoprazole (PROTONIX) 40 mg in 0.9% sodium chloride 10 mL injection  40 mg IntraVENous Q24H    amiodarone (CORDARONE) tablet 200 mg  200 mg Oral DAILY    atorvastatin (LIPITOR) tablet 80 mg  80 mg Oral DAILY    HYDROcodone-acetaminophen (NORCO)  mg tablet 1 Tablet  1 Tablet Oral Q6H PRN    spironolactone (ALDACTONE) tablet 50 mg  50 mg Oral DAILY    sodium chloride (NS) flush 5-40 mL  5-40 mL IntraVENous Q8H    sodium chloride (NS) flush 5-40 mL  5-40 mL IntraVENous PRN    aspirin chewable tablet 81 mg  81 mg Oral DAILY    heparin (porcine) injection 5,000 Units  5,000 Units SubCUTAneous Q8H    [Held by provider] ondansetron (ZOFRAN) injection 4 mg  4 mg IntraVENous Q4H PRN    fentaNYL (DURAGESIC) 12 mcg/hr patch 1 Patch  1 Patch TransDERmal Q72H    clonazePAM (KlonoPIN) tablet 0.5 mg  0.5 mg Oral QHS       No Known Allergies  Immunization History   Administered Date(s) Administered    TB Skin Test (PPD) Intradermal 03/14/2017       Recent Vital Data:  Patient Vitals for the past 24 hrs:   Temp Pulse Resp BP SpO2   05/08/22 0800 97.5 °F (36.4 °C) 93 18 (!) 136/102 96 %   05/08/22 0400 98.8 °F (37.1 °C) 88 16 (!) 119/92 96 %   05/08/22 0000 98.3 °F (36.8 °C) 90 16 132/89 99 %   05/07/22 2000 98.5 °F (36.9 °C) 91 17 (!) 123/90 99 %   05/07/22 1600 97.9 °F (36.6 °C) (!) 105 16 120/85 98 %   05/07/22 1200 97.3 °F (36.3 °C) 100 18 112/78 97 %     Oxygen Therapy  O2 Sat (%): 96 % (05/08/22 0800)  O2 Device: None (Room air) (05/07/22 1635)    Estimated body mass index is 25.12 kg/m² as calculated from the following:    Height as of this encounter: 5' 11\" (1.803 m). Weight as of this encounter: 81.7 kg (180 lb 1.6 oz). Intake/Output Summary (Last 24 hours) at 5/8/2022 1046  Last data filed at 5/8/2022 0854  Gross per 24 hour   Intake 120 ml   Output --   Net 120 ml       Physical Exam  Vitals and nursing note reviewed. Constitutional:       General: He is not in acute distress. Appearance: Normal appearance. He is normal weight. He is not ill-appearing. HENT:      Head: Normocephalic. Eyes:      Extraocular Movements: Extraocular movements intact. Cardiovascular:      Rate and Rhythm: Normal rate. Pulses:           Radial pulses are 2+ on the left side. Pulmonary:      Effort: Pulmonary effort is normal. No respiratory distress. Abdominal:      General: There is no distension. Palpations: Abdomen is soft. Tenderness: There is no abdominal tenderness. Musculoskeletal:         General: No deformity. Cervical back: No rigidity. Skin:     General: Skin is warm and dry. Neurological:      General: No focal deficit present. Mental Status: He is alert and oriented to person, place, and time. Gait: Gait normal.   Psychiatric:         Mood and Affect: Affect normal. Mood is depressed. Mood is not anxious. Behavior: Behavior is not slowed. Behavior is cooperative.          Cognition and Memory: Cognition normal.         Signed:  Qamar Flores MD

## 2022-05-09 PROBLEM — J42 CHRONIC BRONCHITIS, UNSPECIFIED CHRONIC BRONCHITIS TYPE (HCC): Status: ACTIVE | Noted: 2022-05-09

## 2022-05-09 PROBLEM — Z76.5 DRUG-SEEKING BEHAVIOR: Status: ACTIVE | Noted: 2022-05-09

## 2022-05-09 PROBLEM — Z76.5 DRUG-SEEKING BEHAVIOR: Chronic | Status: ACTIVE | Noted: 2022-05-09

## 2022-05-09 RX ORDER — CLOPIDOGREL BISULFATE 75 MG/1
75 TABLET ORAL DAILY
Qty: 21 TABLET | Refills: 0 | Status: SHIPPED | OUTPATIENT
Start: 2022-05-09 | End: 2022-05-30

## 2022-05-09 RX ORDER — BACLOFEN 10 MG/1
10 TABLET ORAL 3 TIMES DAILY
Qty: 10 TABLET | Refills: 0 | Status: SHIPPED | OUTPATIENT
Start: 2022-05-09

## 2022-05-09 RX ORDER — HYDROCODONE BITARTRATE AND ACETAMINOPHEN 5; 325 MG/1; MG/1
1 TABLET ORAL
Qty: 10 TABLET | Refills: 0 | Status: SHIPPED | OUTPATIENT
Start: 2022-05-09 | End: 2022-05-12

## 2022-07-06 ENCOUNTER — TELEPHONE (OUTPATIENT)
Dept: FAMILY MEDICINE CLINIC | Facility: CLINIC | Age: 57
End: 2022-07-06

## 2022-07-06 ENCOUNTER — HOSPITAL ENCOUNTER (EMERGENCY)
Dept: GENERAL RADIOLOGY | Age: 57
Discharge: HOME | End: 2022-07-09
Payer: COMMERCIAL

## 2022-07-06 ENCOUNTER — HOSPITAL ENCOUNTER (EMERGENCY)
Age: 57
Discharge: HOME | End: 2022-07-06
Attending: EMERGENCY MEDICINE
Payer: COMMERCIAL

## 2022-07-06 ENCOUNTER — APPOINTMENT (OUTPATIENT)
Dept: GENERAL RADIOLOGY | Age: 57
End: 2022-07-06
Payer: COMMERCIAL

## 2022-07-06 VITALS
SYSTOLIC BLOOD PRESSURE: 105 MMHG | WEIGHT: 186 LBS | HEART RATE: 99 BPM | HEIGHT: 71 IN | OXYGEN SATURATION: 97 % | RESPIRATION RATE: 16 BRPM | BODY MASS INDEX: 26.04 KG/M2 | TEMPERATURE: 99.2 F | DIASTOLIC BLOOD PRESSURE: 84 MMHG

## 2022-07-06 DIAGNOSIS — M25.561 ACUTE PAIN OF RIGHT KNEE: Primary | ICD-10-CM

## 2022-07-06 DIAGNOSIS — I50.9 CONGESTIVE HEART FAILURE, UNSPECIFIED HF CHRONICITY, UNSPECIFIED HEART FAILURE TYPE (HCC): ICD-10-CM

## 2022-07-06 LAB
ALBUMIN SERPL-MCNC: 3 G/DL (ref 3.5–5)
ALBUMIN/GLOB SERPL: 1 {RATIO} (ref 1.2–3.5)
ALP SERPL-CCNC: 78 U/L (ref 50–136)
ALT SERPL-CCNC: 20 U/L (ref 12–65)
ANION GAP SERPL CALC-SCNC: 6 MMOL/L (ref 7–16)
AST SERPL-CCNC: 23 U/L (ref 15–37)
BASOPHILS # BLD: 0 K/UL (ref 0–0.2)
BASOPHILS NFR BLD: 0 % (ref 0–2)
BILIRUB SERPL-MCNC: 1.7 MG/DL (ref 0.2–1.1)
BUN SERPL-MCNC: 7 MG/DL (ref 6–23)
CALCIUM SERPL-MCNC: 7.8 MG/DL (ref 8.3–10.4)
CHLORIDE SERPL-SCNC: 106 MMOL/L (ref 98–107)
CO2 SERPL-SCNC: 25 MMOL/L (ref 21–32)
CREAT SERPL-MCNC: 1 MG/DL (ref 0.8–1.5)
DIFFERENTIAL METHOD BLD: ABNORMAL
EOSINOPHIL # BLD: 0.1 K/UL (ref 0–0.8)
EOSINOPHIL NFR BLD: 1 % (ref 0.5–7.8)
ERYTHROCYTE [DISTWIDTH] IN BLOOD BY AUTOMATED COUNT: 18.2 % (ref 11.9–14.6)
GLOBULIN SER CALC-MCNC: 2.9 G/DL (ref 2.3–3.5)
GLUCOSE SERPL-MCNC: 97 MG/DL (ref 65–100)
HCT VFR BLD AUTO: 31.3 % (ref 41.1–50.3)
HGB BLD-MCNC: 10.4 G/DL (ref 13.6–17.2)
IMM GRANULOCYTES # BLD AUTO: 0 K/UL (ref 0–0.5)
IMM GRANULOCYTES NFR BLD AUTO: 0 % (ref 0–5)
LYMPHOCYTES # BLD: 1.1 K/UL (ref 0.5–4.6)
LYMPHOCYTES NFR BLD: 15 % (ref 13–44)
MAGNESIUM SERPL-MCNC: 1.2 MG/DL (ref 1.8–2.4)
MCH RBC QN AUTO: 31.3 PG (ref 26.1–32.9)
MCHC RBC AUTO-ENTMCNC: 33.2 G/DL (ref 31.4–35)
MCV RBC AUTO: 94.3 FL (ref 79.6–97.8)
MONOCYTES # BLD: 0.4 K/UL (ref 0.1–1.3)
MONOCYTES NFR BLD: 6 % (ref 4–12)
NEUTS SEG # BLD: 5.7 K/UL (ref 1.7–8.2)
NEUTS SEG NFR BLD: 78 % (ref 43–78)
NRBC # BLD: 0 K/UL (ref 0–0.2)
NT PRO BNP: 874 PG/ML (ref 5–125)
PLATELET # BLD AUTO: 158 K/UL (ref 150–450)
PMV BLD AUTO: 9.3 FL (ref 9.4–12.3)
POTASSIUM SERPL-SCNC: 3.8 MMOL/L (ref 3.5–5.1)
PROT SERPL-MCNC: 5.9 G/DL (ref 6.3–8.2)
RBC # BLD AUTO: 3.32 M/UL (ref 4.23–5.6)
SODIUM SERPL-SCNC: 137 MMOL/L (ref 138–145)
TROPONIN I SERPL HS-MCNC: 97.6 PG/ML (ref 0–14)
URATE SERPL-MCNC: 3.1 MG/DL (ref 2.6–6)
WBC # BLD AUTO: 7.3 K/UL (ref 4.3–11.1)

## 2022-07-06 PROCEDURE — 80053 COMPREHEN METABOLIC PANEL: CPT

## 2022-07-06 PROCEDURE — 84484 ASSAY OF TROPONIN QUANT: CPT

## 2022-07-06 PROCEDURE — 83880 ASSAY OF NATRIURETIC PEPTIDE: CPT

## 2022-07-06 PROCEDURE — 73562 X-RAY EXAM OF KNEE 3: CPT

## 2022-07-06 PROCEDURE — 99285 EMERGENCY DEPT VISIT HI MDM: CPT

## 2022-07-06 PROCEDURE — 6370000000 HC RX 637 (ALT 250 FOR IP): Performed by: PHYSICIAN ASSISTANT

## 2022-07-06 PROCEDURE — 83735 ASSAY OF MAGNESIUM: CPT

## 2022-07-06 PROCEDURE — 85025 COMPLETE CBC W/AUTO DIFF WBC: CPT

## 2022-07-06 PROCEDURE — 71046 X-RAY EXAM CHEST 2 VIEWS: CPT

## 2022-07-06 PROCEDURE — 93005 ELECTROCARDIOGRAM TRACING: CPT | Performed by: PHYSICIAN ASSISTANT

## 2022-07-06 PROCEDURE — 84550 ASSAY OF BLOOD/URIC ACID: CPT

## 2022-07-06 RX ORDER — KETOROLAC TROMETHAMINE 15 MG/ML
15 INJECTION, SOLUTION INTRAMUSCULAR; INTRAVENOUS
Status: DISCONTINUED | OUTPATIENT
Start: 2022-07-06 | End: 2022-07-06

## 2022-07-06 RX ORDER — IBUPROFEN 600 MG/1
600 TABLET ORAL
Status: COMPLETED | OUTPATIENT
Start: 2022-07-06 | End: 2022-07-06

## 2022-07-06 RX ORDER — HYDROCODONE BITARTRATE AND ACETAMINOPHEN 5; 325 MG/1; MG/1
1 TABLET ORAL EVERY 8 HOURS PRN
Qty: 9 TABLET | Refills: 0 | Status: SHIPPED | OUTPATIENT
Start: 2022-07-06 | End: 2022-07-09

## 2022-07-06 RX ORDER — HYDROCODONE BITARTRATE AND ACETAMINOPHEN 5; 325 MG/1; MG/1
1 TABLET ORAL
Status: COMPLETED | OUTPATIENT
Start: 2022-07-06 | End: 2022-07-06

## 2022-07-06 RX ORDER — HYDROCODONE BITARTRATE AND ACETAMINOPHEN 7.5; 325 MG/1; MG/1
1 TABLET ORAL
Status: COMPLETED | OUTPATIENT
Start: 2022-07-06 | End: 2022-07-06

## 2022-07-06 RX ORDER — PREDNISONE 50 MG/1
50 TABLET ORAL DAILY
Qty: 5 TABLET | Refills: 0 | Status: SHIPPED | OUTPATIENT
Start: 2022-07-06 | End: 2022-07-11

## 2022-07-06 RX ADMIN — HYDROCODONE BITARTRATE AND ACETAMINOPHEN 1 TABLET: 7.5; 325 TABLET ORAL at 19:15

## 2022-07-06 RX ADMIN — IBUPROFEN 600 MG: 600 TABLET ORAL at 22:04

## 2022-07-06 RX ADMIN — HYDROCODONE BITARTRATE AND ACETAMINOPHEN 1 TABLET: 5; 325 TABLET ORAL at 22:04

## 2022-07-06 ASSESSMENT — PAIN SCALES - GENERAL
PAINLEVEL_OUTOF10: 9
PAINLEVEL_OUTOF10: 10
PAINLEVEL_OUTOF10: 9

## 2022-07-06 NOTE — PROGRESS NOTES
Is a 80-year-old male with history of congestive heart failure who presents with complaint of right knee pain and chest pain. He states that yesterday he was standing in line at [facility] when he felt a pop and had sudden onset of pain in the right knee, felt like his knee was going to give out on him. He has not been able to bear weight on the right leg due to the pain in his knee. Additionally he is having pain in his chest associated with nausea and shortness of breath. He is unsure if this is because of the pain he is experiencing in his knee. He is afebrile, tachycardic with a heart rate of 118, otherwise vital signs stable. Tenderness to palpation of the medial aspect of the right knee, soft tissue swelling and warmth to touch when compared to the left. Patient evaluated initially in triage. Rapid Medical Evaluation was conducted and necessary orders have been placed. I have performed a medical screening exam.  Care will now be transferred to the provider in the back of the emergency department.   LIVIA BERNARDO 5:50 PM

## 2022-07-06 NOTE — TELEPHONE ENCOUNTER
Mr Lissy Serrano called and it was sent to nurse triage. His knee just gave out . He heard a Pop and now he can't walk and the pain is causing his heart to race. I told him to go to the ER .  He said he was waiting on his wife to pick him up and he understood and will go to the ER

## 2022-07-06 NOTE — ED TRIAGE NOTES
Patient arrives in wheelchair to triage with mask in place. Reports right knee pain when he was standing. Reports standing in line at [employer] and right knee gave out.

## 2022-07-07 LAB
EKG ATRIAL RATE: 115 BPM
EKG DIAGNOSIS: NORMAL
EKG P AXIS: 30 DEGREES
EKG P-R INTERVAL: 158 MS
EKG Q-T INTERVAL: 354 MS
EKG QRS DURATION: 114 MS
EKG QTC CALCULATION (BAZETT): 489 MS
EKG R AXIS: -26 DEGREES
EKG T AXIS: 134 DEGREES
EKG VENTRICULAR RATE: 115 BPM

## 2022-07-07 NOTE — ED NOTES
Called lab pertaining to troponin results. Was told they would work on it right now.      Antwon Olsen RN  07/06/22 2306
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
## 2022-07-13 ENCOUNTER — OFFICE VISIT (OUTPATIENT)
Dept: ORTHOPEDIC SURGERY | Age: 57
End: 2022-07-13
Payer: COMMERCIAL

## 2022-07-13 VITALS — WEIGHT: 186 LBS | BODY MASS INDEX: 26.04 KG/M2 | HEIGHT: 71 IN

## 2022-07-13 DIAGNOSIS — M25.562 PAIN IN BOTH KNEES, UNSPECIFIED CHRONICITY: Primary | ICD-10-CM

## 2022-07-13 DIAGNOSIS — M25.561 PAIN IN BOTH KNEES, UNSPECIFIED CHRONICITY: Primary | ICD-10-CM

## 2022-07-13 DIAGNOSIS — M76.52 PATELLAR TENDINITIS OF LEFT KNEE: ICD-10-CM

## 2022-07-13 PROCEDURE — G8419 CALC BMI OUT NRM PARAM NOF/U: HCPCS | Performed by: ORTHOPAEDIC SURGERY

## 2022-07-13 PROCEDURE — G8427 DOCREV CUR MEDS BY ELIG CLIN: HCPCS | Performed by: ORTHOPAEDIC SURGERY

## 2022-07-13 PROCEDURE — 1036F TOBACCO NON-USER: CPT | Performed by: ORTHOPAEDIC SURGERY

## 2022-07-13 PROCEDURE — 99204 OFFICE O/P NEW MOD 45 MIN: CPT | Performed by: ORTHOPAEDIC SURGERY

## 2022-07-13 PROCEDURE — 3017F COLORECTAL CA SCREEN DOC REV: CPT | Performed by: ORTHOPAEDIC SURGERY

## 2022-07-13 RX ORDER — PREDNISONE 10 MG/1
TABLET ORAL
Qty: 1 EACH | Refills: 2 | Status: ON HOLD | OUTPATIENT
Start: 2022-07-13 | End: 2022-07-24 | Stop reason: HOSPADM

## 2022-07-13 RX ORDER — HYDROCODONE BITARTRATE AND ACETAMINOPHEN 10; 325 MG/1; MG/1
TABLET ORAL
Status: ON HOLD | COMMUNITY
Start: 2022-06-18 | End: 2022-07-24 | Stop reason: SDUPTHER

## 2022-07-15 ENCOUNTER — TELEPHONE (OUTPATIENT)
Dept: ORTHOPEDIC SURGERY | Age: 57
End: 2022-07-15

## 2022-07-15 ENCOUNTER — CLINICAL DOCUMENTATION (OUTPATIENT)
Dept: ORTHOPEDIC SURGERY | Age: 57
End: 2022-07-15

## 2022-07-15 DIAGNOSIS — M76.52 PATELLAR TENDINITIS OF LEFT KNEE: ICD-10-CM

## 2022-07-15 DIAGNOSIS — M25.561 PAIN IN BOTH KNEES, UNSPECIFIED CHRONICITY: ICD-10-CM

## 2022-07-15 DIAGNOSIS — M25.562 PAIN IN BOTH KNEES, UNSPECIFIED CHRONICITY: ICD-10-CM

## 2022-07-15 NOTE — TELEPHONE ENCOUNTER
Returned pt's call and answered his questions per MET's instructions. Told pt if he starts to feel worse, make sure and go the the ER.

## 2022-07-15 NOTE — TELEPHONE ENCOUNTER
Called pt per MET's instructions to let him know the he has a FX in his knee, it should heal in time but he must stay off of it; no weight on it, use a wheel chair or crutches.

## 2022-07-18 ENCOUNTER — TELEPHONE (OUTPATIENT)
Dept: CARDIOLOGY CLINIC | Age: 57
End: 2022-07-18

## 2022-07-18 ENCOUNTER — TELEPHONE (OUTPATIENT)
Dept: ORTHOPEDIC SURGERY | Age: 57
End: 2022-07-18

## 2022-07-18 NOTE — TELEPHONE ENCOUNTER
Called back and said he spoke with someone that told him tp find out his weight and call back. It is 249 lbs.  Please call

## 2022-07-19 ENCOUNTER — APPOINTMENT (OUTPATIENT)
Dept: CT IMAGING | Age: 57
DRG: 291 | End: 2022-07-19
Payer: COMMERCIAL

## 2022-07-19 ENCOUNTER — TELEPHONE (OUTPATIENT)
Dept: ORTHOPEDIC SURGERY | Age: 57
End: 2022-07-19

## 2022-07-19 ENCOUNTER — APPOINTMENT (OUTPATIENT)
Dept: GENERAL RADIOLOGY | Age: 57
DRG: 291 | End: 2022-07-19
Payer: COMMERCIAL

## 2022-07-19 ENCOUNTER — HOSPITAL ENCOUNTER (INPATIENT)
Age: 57
LOS: 5 days | Discharge: HOME | DRG: 291 | End: 2022-07-24
Attending: FAMILY MEDICINE
Payer: COMMERCIAL

## 2022-07-19 ENCOUNTER — HOSPITAL ENCOUNTER (EMERGENCY)
Age: 57
Discharge: ANOTHER HOSPITAL | DRG: 291 | End: 2022-07-19
Attending: EMERGENCY MEDICINE
Payer: COMMERCIAL

## 2022-07-19 VITALS
OXYGEN SATURATION: 94 % | DIASTOLIC BLOOD PRESSURE: 99 MMHG | HEART RATE: 89 BPM | BODY MASS INDEX: 28.04 KG/M2 | HEIGHT: 71 IN | RESPIRATION RATE: 19 BRPM | TEMPERATURE: 98.4 F | SYSTOLIC BLOOD PRESSURE: 133 MMHG | WEIGHT: 200.3 LBS

## 2022-07-19 DIAGNOSIS — I20.8 STABLE ANGINA PECTORIS (HCC): ICD-10-CM

## 2022-07-19 DIAGNOSIS — R06.02 SOB (SHORTNESS OF BREATH): ICD-10-CM

## 2022-07-19 DIAGNOSIS — I16.1 HYPERTENSIVE EMERGENCY: ICD-10-CM

## 2022-07-19 DIAGNOSIS — R07.9 ACUTE CHEST PAIN: ICD-10-CM

## 2022-07-19 DIAGNOSIS — R06.02 SHORTNESS OF BREATH: Primary | ICD-10-CM

## 2022-07-19 DIAGNOSIS — O22.30 DVT (DEEP VEIN THROMBOSIS) IN PREGNANCY: ICD-10-CM

## 2022-07-19 DIAGNOSIS — I50.9 ACUTE CONGESTIVE HEART FAILURE, UNSPECIFIED HEART FAILURE TYPE (HCC): ICD-10-CM

## 2022-07-19 DIAGNOSIS — R07.9 CHEST PAIN, UNSPECIFIED TYPE: ICD-10-CM

## 2022-07-19 DIAGNOSIS — S72.416D CLOSED NONDISPLACED FRACTURE OF CONDYLE OF FEMUR WITH ROUTINE HEALING, UNSPECIFIED LATERALITY, SUBSEQUENT ENCOUNTER: Primary | ICD-10-CM

## 2022-07-19 LAB
ALBUMIN SERPL-MCNC: 4 G/DL (ref 3.5–5)
ALBUMIN/GLOB SERPL: 1.7 {RATIO}
ALP SERPL-CCNC: 83 U/L (ref 45–117)
ALT SERPL-CCNC: 26 U/L (ref 13–61)
ANION GAP SERPL CALC-SCNC: 13 MMOL/L (ref 7–16)
AST SERPL-CCNC: 45 U/L (ref 15–37)
BILIRUB SERPL-MCNC: 1.5 MG/DL (ref 0.2–1.1)
BUN SERPL-MCNC: 15 MG/DL (ref 7–18)
CALCIUM SERPL-MCNC: 9.2 MG/DL (ref 8.3–10.4)
CHLORIDE SERPL-SCNC: 101 MMOL/L (ref 98–107)
CO2 SERPL-SCNC: 27 MMOL/L (ref 21–32)
CREAT SERPL-MCNC: 0.85 MG/DL (ref 0.8–1.5)
D DIMER PPP FEU-MCNC: 2.46 UG/ML(FEU)
ERYTHROCYTE [DISTWIDTH] IN BLOOD BY AUTOMATED COUNT: 17.9 % (ref 11.9–14.6)
GLOBULIN SER CALC-MCNC: 2.3 G/DL (ref 2.3–3.5)
GLUCOSE SERPL-MCNC: 119 MG/DL (ref 65–100)
HCT VFR BLD AUTO: 32.4 % (ref 41.1–50.3)
HGB BLD-MCNC: 11 G/DL (ref 13.6–17.2)
MAGNESIUM SERPL-MCNC: 1.6 MG/DL (ref 1.2–2.6)
MCH RBC QN AUTO: 32.8 PG (ref 26.1–32.9)
MCHC RBC AUTO-ENTMCNC: 34 G/DL (ref 31.4–35)
MCV RBC AUTO: 96.7 FL (ref 79.6–97.8)
NRBC # BLD: 0.13 K/UL (ref 0–0.2)
NT PRO BNP: 9408 PG/ML (ref 0–450)
PLATELET # BLD AUTO: 256 K/UL (ref 150–450)
PMV BLD AUTO: 9.1 FL (ref 9.4–12.3)
POTASSIUM SERPL-SCNC: 3.7 MMOL/L (ref 3.5–5.1)
PROT SERPL-MCNC: 6.3 G/DL (ref 6.4–8.2)
RBC # BLD AUTO: 3.35 M/UL (ref 4.23–5.6)
SODIUM SERPL-SCNC: 141 MMOL/L (ref 136–145)
TROPONIN T SERPL HS-MCNC: 33 NG/L (ref 0–22)
WBC # BLD AUTO: 12 K/UL (ref 4.3–11.1)

## 2022-07-19 PROCEDURE — 84484 ASSAY OF TROPONIN QUANT: CPT

## 2022-07-19 PROCEDURE — 94761 N-INVAS EAR/PLS OXIMETRY MLT: CPT

## 2022-07-19 PROCEDURE — 83880 ASSAY OF NATRIURETIC PEPTIDE: CPT

## 2022-07-19 PROCEDURE — 6360000002 HC RX W HCPCS: Performed by: EMERGENCY MEDICINE

## 2022-07-19 PROCEDURE — 94640 AIRWAY INHALATION TREATMENT: CPT

## 2022-07-19 PROCEDURE — 99285 EMERGENCY DEPT VISIT HI MDM: CPT

## 2022-07-19 PROCEDURE — 85379 FIBRIN DEGRADATION QUANT: CPT

## 2022-07-19 PROCEDURE — 1100000003 HC PRIVATE W/ TELEMETRY

## 2022-07-19 PROCEDURE — 6370000000 HC RX 637 (ALT 250 FOR IP): Performed by: EMERGENCY MEDICINE

## 2022-07-19 PROCEDURE — 83735 ASSAY OF MAGNESIUM: CPT

## 2022-07-19 PROCEDURE — 2500000003 HC RX 250 WO HCPCS: Performed by: EMERGENCY MEDICINE

## 2022-07-19 PROCEDURE — 80053 COMPREHEN METABOLIC PANEL: CPT

## 2022-07-19 PROCEDURE — 94760 N-INVAS EAR/PLS OXIMETRY 1: CPT

## 2022-07-19 PROCEDURE — 96374 THER/PROPH/DIAG INJ IV PUSH: CPT

## 2022-07-19 PROCEDURE — 71045 X-RAY EXAM CHEST 1 VIEW: CPT

## 2022-07-19 PROCEDURE — 85027 COMPLETE CBC AUTOMATED: CPT

## 2022-07-19 PROCEDURE — 6360000002 HC RX W HCPCS: Performed by: FAMILY MEDICINE

## 2022-07-19 PROCEDURE — 96375 TX/PRO/DX INJ NEW DRUG ADDON: CPT

## 2022-07-19 RX ORDER — ACETAMINOPHEN 650 MG/1
650 SUPPOSITORY RECTAL EVERY 6 HOURS PRN
Status: DISCONTINUED | OUTPATIENT
Start: 2022-07-19 | End: 2022-07-24 | Stop reason: HOSPADM

## 2022-07-19 RX ORDER — ENOXAPARIN SODIUM 100 MG/ML
1 INJECTION SUBCUTANEOUS 2 TIMES DAILY
Status: CANCELLED | OUTPATIENT
Start: 2022-07-19

## 2022-07-19 RX ORDER — PROCHLORPERAZINE EDISYLATE 5 MG/ML
10 INJECTION INTRAMUSCULAR; INTRAVENOUS
Status: COMPLETED | OUTPATIENT
Start: 2022-07-19 | End: 2022-07-19

## 2022-07-19 RX ORDER — HYDROCODONE BITARTRATE AND ACETAMINOPHEN 10; 325 MG/1; MG/1
1 TABLET ORAL EVERY 6 HOURS PRN
Status: DISCONTINUED | OUTPATIENT
Start: 2022-07-19 | End: 2022-07-24 | Stop reason: HOSPADM

## 2022-07-19 RX ORDER — 0.9 % SODIUM CHLORIDE 0.9 %
100 INTRAVENOUS SOLUTION INTRAVENOUS ONCE
Status: DISCONTINUED | OUTPATIENT
Start: 2022-07-19 | End: 2022-07-19 | Stop reason: HOSPADM

## 2022-07-19 RX ORDER — SODIUM CHLORIDE 0.9 % (FLUSH) 0.9 %
10 SYRINGE (ML) INJECTION 2 TIMES DAILY
Status: DISCONTINUED | OUTPATIENT
Start: 2022-07-19 | End: 2022-07-19 | Stop reason: HOSPADM

## 2022-07-19 RX ORDER — MORPHINE SULFATE 4 MG/ML
4 INJECTION INTRAVENOUS
Status: COMPLETED | OUTPATIENT
Start: 2022-07-19 | End: 2022-07-19

## 2022-07-19 RX ORDER — ONDANSETRON 2 MG/ML
4 INJECTION INTRAMUSCULAR; INTRAVENOUS
Status: COMPLETED | OUTPATIENT
Start: 2022-07-19 | End: 2022-07-19

## 2022-07-19 RX ORDER — SPIRONOLACTONE 25 MG/1
50 TABLET ORAL DAILY
Status: CANCELLED | OUTPATIENT
Start: 2022-07-20

## 2022-07-19 RX ORDER — ALPRAZOLAM 0.5 MG/1
1 TABLET ORAL 3 TIMES DAILY PRN
Status: DISCONTINUED | OUTPATIENT
Start: 2022-07-19 | End: 2022-07-24 | Stop reason: HOSPADM

## 2022-07-19 RX ORDER — SODIUM CHLORIDE 0.9 % (FLUSH) 0.9 %
5-40 SYRINGE (ML) INJECTION EVERY 12 HOURS SCHEDULED
Status: DISCONTINUED | OUTPATIENT
Start: 2022-07-19 | End: 2022-07-24 | Stop reason: HOSPADM

## 2022-07-19 RX ORDER — LEVALBUTEROL TARTRATE 45 UG/1
2 AEROSOL, METERED ORAL EVERY 4 HOURS PRN
Status: CANCELLED | OUTPATIENT
Start: 2022-07-19

## 2022-07-19 RX ORDER — LEVALBUTEROL INHALATION SOLUTION 0.63 MG/3ML
0.63 SOLUTION RESPIRATORY (INHALATION) EVERY 4 HOURS PRN
Status: DISCONTINUED | OUTPATIENT
Start: 2022-07-19 | End: 2022-07-24 | Stop reason: HOSPADM

## 2022-07-19 RX ORDER — LABETALOL HYDROCHLORIDE 5 MG/ML
20 INJECTION, SOLUTION INTRAVENOUS
Status: COMPLETED | OUTPATIENT
Start: 2022-07-19 | End: 2022-07-19

## 2022-07-19 RX ORDER — ONDANSETRON 4 MG/1
4 TABLET, ORALLY DISINTEGRATING ORAL EVERY 8 HOURS PRN
Status: DISCONTINUED | OUTPATIENT
Start: 2022-07-19 | End: 2022-07-24 | Stop reason: HOSPADM

## 2022-07-19 RX ORDER — LANOLIN ALCOHOL/MO/W.PET/CERES
400 CREAM (GRAM) TOPICAL
Status: COMPLETED | OUTPATIENT
Start: 2022-07-19 | End: 2022-07-19

## 2022-07-19 RX ORDER — ASPIRIN 81 MG/1
81 TABLET ORAL DAILY
Status: DISCONTINUED | OUTPATIENT
Start: 2022-07-20 | End: 2022-07-24 | Stop reason: HOSPADM

## 2022-07-19 RX ORDER — POLYETHYLENE GLYCOL 3350 17 G/17G
17 POWDER, FOR SOLUTION ORAL DAILY PRN
Status: CANCELLED | OUTPATIENT
Start: 2022-07-19

## 2022-07-19 RX ORDER — ALPRAZOLAM 0.5 MG/1
1 TABLET ORAL 3 TIMES DAILY PRN
Status: CANCELLED | OUTPATIENT
Start: 2022-07-19

## 2022-07-19 RX ORDER — SODIUM CHLORIDE 0.9 % (FLUSH) 0.9 %
5-40 SYRINGE (ML) INJECTION PRN
Status: CANCELLED | OUTPATIENT
Start: 2022-07-19

## 2022-07-19 RX ORDER — AMIODARONE HYDROCHLORIDE 200 MG/1
200 TABLET ORAL DAILY
Status: DISCONTINUED | OUTPATIENT
Start: 2022-07-20 | End: 2022-07-24 | Stop reason: HOSPADM

## 2022-07-19 RX ORDER — ATORVASTATIN CALCIUM 80 MG/1
80 TABLET, FILM COATED ORAL DAILY
Status: DISCONTINUED | OUTPATIENT
Start: 2022-07-20 | End: 2022-07-24 | Stop reason: HOSPADM

## 2022-07-19 RX ORDER — FUROSEMIDE 10 MG/ML
40 INJECTION INTRAMUSCULAR; INTRAVENOUS 2 TIMES DAILY
Status: CANCELLED | OUTPATIENT
Start: 2022-07-20

## 2022-07-19 RX ORDER — AMIODARONE HYDROCHLORIDE 200 MG/1
200 TABLET ORAL DAILY
Status: CANCELLED | OUTPATIENT
Start: 2022-07-20

## 2022-07-19 RX ORDER — ACETAMINOPHEN 650 MG/1
650 SUPPOSITORY RECTAL EVERY 6 HOURS PRN
Status: CANCELLED | OUTPATIENT
Start: 2022-07-19

## 2022-07-19 RX ORDER — POLYETHYLENE GLYCOL 3350 17 G/17G
17 POWDER, FOR SOLUTION ORAL DAILY PRN
Status: DISCONTINUED | OUTPATIENT
Start: 2022-07-19 | End: 2022-07-24 | Stop reason: HOSPADM

## 2022-07-19 RX ORDER — POTASSIUM CHLORIDE 20 MEQ/1
20 TABLET, EXTENDED RELEASE ORAL DAILY
Status: CANCELLED | OUTPATIENT
Start: 2022-07-20

## 2022-07-19 RX ORDER — ACETAMINOPHEN 325 MG/1
650 TABLET ORAL EVERY 6 HOURS PRN
Status: DISCONTINUED | OUTPATIENT
Start: 2022-07-19 | End: 2022-07-24 | Stop reason: HOSPADM

## 2022-07-19 RX ORDER — POTASSIUM CHLORIDE 20 MEQ/1
20 TABLET, EXTENDED RELEASE ORAL DAILY
Status: DISCONTINUED | OUTPATIENT
Start: 2022-07-20 | End: 2022-07-20

## 2022-07-19 RX ORDER — ONDANSETRON 2 MG/ML
4 INJECTION INTRAMUSCULAR; INTRAVENOUS EVERY 6 HOURS PRN
Status: CANCELLED | OUTPATIENT
Start: 2022-07-19

## 2022-07-19 RX ORDER — SODIUM CHLORIDE 9 MG/ML
INJECTION, SOLUTION INTRAVENOUS PRN
Status: CANCELLED | OUTPATIENT
Start: 2022-07-19

## 2022-07-19 RX ORDER — FUROSEMIDE 10 MG/ML
40 INJECTION INTRAMUSCULAR; INTRAVENOUS 2 TIMES DAILY
Status: DISCONTINUED | OUTPATIENT
Start: 2022-07-20 | End: 2022-07-23

## 2022-07-19 RX ORDER — SODIUM CHLORIDE 0.9 % (FLUSH) 0.9 %
5-40 SYRINGE (ML) INJECTION PRN
Status: DISCONTINUED | OUTPATIENT
Start: 2022-07-19 | End: 2022-07-24 | Stop reason: HOSPADM

## 2022-07-19 RX ORDER — SODIUM CHLORIDE 0.9 % (FLUSH) 0.9 %
5-40 SYRINGE (ML) INJECTION EVERY 12 HOURS SCHEDULED
Status: CANCELLED | OUTPATIENT
Start: 2022-07-19

## 2022-07-19 RX ORDER — SODIUM CHLORIDE 9 MG/ML
INJECTION, SOLUTION INTRAVENOUS PRN
Status: DISCONTINUED | OUTPATIENT
Start: 2022-07-19 | End: 2022-07-24 | Stop reason: HOSPADM

## 2022-07-19 RX ORDER — DROPERIDOL 2.5 MG/ML
0.62 INJECTION, SOLUTION INTRAMUSCULAR; INTRAVENOUS EVERY 6 HOURS PRN
Status: DISCONTINUED | OUTPATIENT
Start: 2022-07-19 | End: 2022-07-19 | Stop reason: HOSPADM

## 2022-07-19 RX ORDER — ONDANSETRON 4 MG/1
4 TABLET, ORALLY DISINTEGRATING ORAL EVERY 8 HOURS PRN
Status: CANCELLED | OUTPATIENT
Start: 2022-07-19

## 2022-07-19 RX ORDER — DIPHENHYDRAMINE HYDROCHLORIDE 50 MG/ML
25 INJECTION INTRAMUSCULAR; INTRAVENOUS
Status: COMPLETED | OUTPATIENT
Start: 2022-07-19 | End: 2022-07-19

## 2022-07-19 RX ORDER — ASPIRIN 81 MG/1
81 TABLET ORAL DAILY
Status: CANCELLED | OUTPATIENT
Start: 2022-07-20

## 2022-07-19 RX ORDER — SPIRONOLACTONE 25 MG/1
50 TABLET ORAL DAILY
Status: DISCONTINUED | OUTPATIENT
Start: 2022-07-20 | End: 2022-07-23

## 2022-07-19 RX ORDER — MORPHINE SULFATE 2 MG/ML
1 INJECTION, SOLUTION INTRAMUSCULAR; INTRAVENOUS EVERY 4 HOURS PRN
Status: DISCONTINUED | OUTPATIENT
Start: 2022-07-19 | End: 2022-07-21

## 2022-07-19 RX ORDER — FUROSEMIDE 10 MG/ML
40 INJECTION INTRAMUSCULAR; INTRAVENOUS
Status: COMPLETED | OUTPATIENT
Start: 2022-07-19 | End: 2022-07-19

## 2022-07-19 RX ORDER — ONDANSETRON 2 MG/ML
4 INJECTION INTRAMUSCULAR; INTRAVENOUS EVERY 6 HOURS PRN
Status: DISCONTINUED | OUTPATIENT
Start: 2022-07-19 | End: 2022-07-24 | Stop reason: HOSPADM

## 2022-07-19 RX ORDER — ATORVASTATIN CALCIUM 40 MG/1
80 TABLET, FILM COATED ORAL DAILY
Status: CANCELLED | OUTPATIENT
Start: 2022-07-20

## 2022-07-19 RX ORDER — ACETAMINOPHEN 325 MG/1
650 TABLET ORAL EVERY 6 HOURS PRN
Status: CANCELLED | OUTPATIENT
Start: 2022-07-19

## 2022-07-19 RX ORDER — HYDROCODONE BITARTRATE AND ACETAMINOPHEN 10; 325 MG/1; MG/1
1 TABLET ORAL EVERY 4 HOURS PRN
Status: CANCELLED | OUTPATIENT
Start: 2022-07-19

## 2022-07-19 RX ADMIN — NITROGLYCERIN 2 INCH: 20 OINTMENT TOPICAL at 22:09

## 2022-07-19 RX ADMIN — DIPHENHYDRAMINE HYDROCHLORIDE 25 MG: 50 INJECTION, SOLUTION INTRAMUSCULAR; INTRAVENOUS at 19:33

## 2022-07-19 RX ADMIN — FUROSEMIDE 40 MG: 10 INJECTION, SOLUTION INTRAMUSCULAR; INTRAVENOUS at 18:42

## 2022-07-19 RX ADMIN — DIPHENHYDRAMINE HYDROCHLORIDE 25 MG: 50 INJECTION, SOLUTION INTRAMUSCULAR; INTRAVENOUS at 21:41

## 2022-07-19 RX ADMIN — POTASSIUM BICARBONATE 20 MEQ: 391 TABLET, EFFERVESCENT ORAL at 19:33

## 2022-07-19 RX ADMIN — DROPERIDOL 0.62 MG: 2.5 INJECTION, SOLUTION INTRAMUSCULAR; INTRAVENOUS at 21:51

## 2022-07-19 RX ADMIN — ONDANSETRON 4 MG: 2 INJECTION INTRAMUSCULAR; INTRAVENOUS at 18:49

## 2022-07-19 RX ADMIN — PROCHLORPERAZINE EDISYLATE 10 MG: 5 INJECTION INTRAMUSCULAR; INTRAVENOUS at 19:36

## 2022-07-19 RX ADMIN — MORPHINE SULFATE 4 MG: 4 INJECTION INTRAVENOUS at 18:52

## 2022-07-19 RX ADMIN — LEVALBUTEROL HYDROCHLORIDE 0.63 MG: 0.63 SOLUTION RESPIRATORY (INHALATION) at 23:53

## 2022-07-19 RX ADMIN — MORPHINE SULFATE 4 MG: 4 INJECTION INTRAVENOUS at 21:46

## 2022-07-19 RX ADMIN — LABETALOL HYDROCHLORIDE 20 MG: 5 INJECTION INTRAVENOUS at 18:46

## 2022-07-19 RX ADMIN — Medication 400 MG: at 19:33

## 2022-07-19 ASSESSMENT — PAIN SCALES - GENERAL
PAINLEVEL_OUTOF10: 6
PAINLEVEL_OUTOF10: 6
PAINLEVEL_OUTOF10: 7
PAINLEVEL_OUTOF10: 8
PAINLEVEL_OUTOF10: 8

## 2022-07-19 ASSESSMENT — PAIN DESCRIPTION - ORIENTATION
ORIENTATION: MID
ORIENTATION: RIGHT;LEFT

## 2022-07-19 ASSESSMENT — ENCOUNTER SYMPTOMS
ABDOMINAL PAIN: 0
DIARRHEA: 0
VOMITING: 0
COUGH: 0
WHEEZING: 0
COLOR CHANGE: 0
SHORTNESS OF BREATH: 1

## 2022-07-19 ASSESSMENT — PAIN DESCRIPTION - LOCATION
LOCATION: LEG
LOCATION: CHEST

## 2022-07-19 ASSESSMENT — PAIN - FUNCTIONAL ASSESSMENT: PAIN_FUNCTIONAL_ASSESSMENT: 0-10

## 2022-07-19 NOTE — ED TRIAGE NOTES
Pt is alert to self alone. Admissions completed. Pt is actively bleeding from inner thigh and the around vagina. Will continue to monitor.   Pt to ED c/o chest pain, bilateral pedal edema and SOB

## 2022-07-19 NOTE — TELEPHONE ENCOUNTER
LM for pt to call the office back as I wanted to check on pt as he was a no show for his apt today. Wanted to see if he would like to reschedule.
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
## 2022-07-20 ENCOUNTER — APPOINTMENT (OUTPATIENT)
Dept: NUCLEAR MEDICINE | Age: 57
DRG: 291 | End: 2022-07-20
Attending: FAMILY MEDICINE
Payer: COMMERCIAL

## 2022-07-20 PROBLEM — G89.4 CHRONIC PAIN SYNDROME: Status: ACTIVE | Noted: 2022-05-05

## 2022-07-20 PROBLEM — S72.414A CLOSED NONDISPLACED FRACTURE OF CONDYLE OF RIGHT FEMUR (HCC): Status: ACTIVE | Noted: 2022-07-20

## 2022-07-20 PROBLEM — E83.42 HYPOMAGNESEMIA: Status: ACTIVE | Noted: 2022-02-26

## 2022-07-20 PROBLEM — E87.6 HYPOKALEMIA: Status: ACTIVE | Noted: 2020-07-03

## 2022-07-20 PROBLEM — F10.20 ALCOHOL DEPENDENCE (HCC): Status: ACTIVE | Noted: 2018-09-05

## 2022-07-20 PROBLEM — I50.9 CHF (CONGESTIVE HEART FAILURE) (HCC): Status: ACTIVE | Noted: 2020-10-02

## 2022-07-20 PROBLEM — J42 CHRONIC BRONCHITIS, UNSPECIFIED CHRONIC BRONCHITIS TYPE (HCC): Status: ACTIVE | Noted: 2022-05-09

## 2022-07-20 PROBLEM — Z45.02 ICD (IMPLANTABLE CARDIOVERTER-DEFIBRILLATOR) DISCHARGE: Status: ACTIVE | Noted: 2022-02-26

## 2022-07-20 LAB
ANION GAP SERPL CALC-SCNC: 8 MMOL/L (ref 7–16)
BASOPHILS # BLD: 0 K/UL (ref 0–0.2)
BASOPHILS NFR BLD: 0 % (ref 0–2)
BUN SERPL-MCNC: 14 MG/DL (ref 6–23)
CALCIUM SERPL-MCNC: 8.3 MG/DL (ref 8.3–10.4)
CHLORIDE SERPL-SCNC: 103 MMOL/L (ref 98–107)
CO2 SERPL-SCNC: 26 MMOL/L (ref 21–32)
CREAT SERPL-MCNC: 1.1 MG/DL (ref 0.8–1.5)
DIFFERENTIAL METHOD BLD: ABNORMAL
EKG ATRIAL RATE: 86 BPM
EKG DIAGNOSIS: NORMAL
EKG P AXIS: 14 DEGREES
EKG P-R INTERVAL: 176 MS
EKG Q-T INTERVAL: 474 MS
EKG QRS DURATION: 116 MS
EKG QTC CALCULATION (BAZETT): 567 MS
EKG R AXIS: -38 DEGREES
EKG T AXIS: 159 DEGREES
EKG VENTRICULAR RATE: 86 BPM
EOSINOPHIL # BLD: 0 K/UL (ref 0–0.8)
EOSINOPHIL NFR BLD: 0 % (ref 0.5–7.8)
ERYTHROCYTE [DISTWIDTH] IN BLOOD BY AUTOMATED COUNT: 18.6 % (ref 11.9–14.6)
GLUCOSE SERPL-MCNC: 119 MG/DL (ref 65–100)
HCT VFR BLD AUTO: 33 % (ref 41.1–50.3)
HGB BLD-MCNC: 10.8 G/DL (ref 13.6–17.2)
IMM GRANULOCYTES # BLD AUTO: 0.1 K/UL (ref 0–0.5)
IMM GRANULOCYTES NFR BLD AUTO: 1 % (ref 0–5)
LYMPHOCYTES # BLD: 1.2 K/UL (ref 0.5–4.6)
LYMPHOCYTES NFR BLD: 17 % (ref 13–44)
MAGNESIUM SERPL-MCNC: 1.7 MG/DL (ref 1.8–2.4)
MCH RBC QN AUTO: 32.3 PG (ref 26.1–32.9)
MCHC RBC AUTO-ENTMCNC: 32.7 G/DL (ref 31.4–35)
MCV RBC AUTO: 98.8 FL (ref 79.6–97.8)
MONOCYTES # BLD: 0.4 K/UL (ref 0.1–1.3)
MONOCYTES NFR BLD: 5 % (ref 4–12)
NEUTS SEG # BLD: 5.6 K/UL (ref 1.7–8.2)
NEUTS SEG NFR BLD: 77 % (ref 43–78)
NRBC # BLD: 0.05 K/UL (ref 0–0.2)
PLATELET # BLD AUTO: 188 K/UL (ref 150–450)
PMV BLD AUTO: 9.2 FL (ref 9.4–12.3)
POTASSIUM SERPL-SCNC: 2.8 MMOL/L (ref 3.5–5.1)
RBC # BLD AUTO: 3.34 M/UL (ref 4.23–5.6)
SODIUM SERPL-SCNC: 137 MMOL/L (ref 138–145)
TROPONIN I SERPL HS-MCNC: 161.5 PG/ML (ref 0–14)
WBC # BLD AUTO: 7.3 K/UL (ref 4.3–11.1)

## 2022-07-20 PROCEDURE — 6360000002 HC RX W HCPCS: Performed by: FAMILY MEDICINE

## 2022-07-20 PROCEDURE — 6370000000 HC RX 637 (ALT 250 FOR IP): Performed by: FAMILY MEDICINE

## 2022-07-20 PROCEDURE — 85025 COMPLETE CBC W/AUTO DIFF WBC: CPT

## 2022-07-20 PROCEDURE — 78580 LUNG PERFUSION IMAGING: CPT

## 2022-07-20 PROCEDURE — 6370000000 HC RX 637 (ALT 250 FOR IP): Performed by: PHYSICIAN ASSISTANT

## 2022-07-20 PROCEDURE — 3430000000 HC RX DIAGNOSTIC RADIOPHARMACEUTICAL: Performed by: INTERNAL MEDICINE

## 2022-07-20 PROCEDURE — 6360000002 HC RX W HCPCS: Performed by: INTERNAL MEDICINE

## 2022-07-20 PROCEDURE — 99222 1ST HOSP IP/OBS MODERATE 55: CPT | Performed by: INTERNAL MEDICINE

## 2022-07-20 PROCEDURE — 6370000000 HC RX 637 (ALT 250 FOR IP): Performed by: INTERNAL MEDICINE

## 2022-07-20 PROCEDURE — 2580000003 HC RX 258: Performed by: FAMILY MEDICINE

## 2022-07-20 PROCEDURE — 83735 ASSAY OF MAGNESIUM: CPT

## 2022-07-20 PROCEDURE — C1751 CATH, INF, PER/CENT/MIDLINE: HCPCS

## 2022-07-20 PROCEDURE — 02HV33Z INSERTION OF INFUSION DEVICE INTO SUPERIOR VENA CAVA, PERCUTANEOUS APPROACH: ICD-10-PCS | Performed by: INTERNAL MEDICINE

## 2022-07-20 PROCEDURE — 94760 N-INVAS EAR/PLS OXIMETRY 1: CPT

## 2022-07-20 PROCEDURE — A9540 TC99M MAA: HCPCS | Performed by: INTERNAL MEDICINE

## 2022-07-20 PROCEDURE — 84484 ASSAY OF TROPONIN QUANT: CPT

## 2022-07-20 PROCEDURE — 36415 COLL VENOUS BLD VENIPUNCTURE: CPT

## 2022-07-20 PROCEDURE — 2580000003 HC RX 258: Performed by: INTERNAL MEDICINE

## 2022-07-20 PROCEDURE — 94640 AIRWAY INHALATION TREATMENT: CPT

## 2022-07-20 PROCEDURE — 80048 BASIC METABOLIC PNL TOTAL CA: CPT

## 2022-07-20 PROCEDURE — 36573 INSJ PICC RS&I 5 YR+: CPT

## 2022-07-20 PROCEDURE — 1100000003 HC PRIVATE W/ TELEMETRY

## 2022-07-20 PROCEDURE — 93005 ELECTROCARDIOGRAM TRACING: CPT | Performed by: INTERNAL MEDICINE

## 2022-07-20 RX ORDER — LANOLIN ALCOHOL/MO/W.PET/CERES
100 CREAM (GRAM) TOPICAL DAILY
Status: DISCONTINUED | OUTPATIENT
Start: 2022-07-20 | End: 2022-07-24 | Stop reason: HOSPADM

## 2022-07-20 RX ORDER — SODIUM CHLORIDE 0.9 % (FLUSH) 0.9 %
5-40 SYRINGE (ML) INJECTION EVERY 12 HOURS SCHEDULED
Status: DISCONTINUED | OUTPATIENT
Start: 2022-07-20 | End: 2022-07-24 | Stop reason: HOSPADM

## 2022-07-20 RX ORDER — ALBUTEROL SULFATE 90 UG/1
2 AEROSOL, METERED RESPIRATORY (INHALATION) EVERY 4 HOURS PRN
Status: DISCONTINUED | OUTPATIENT
Start: 2022-07-20 | End: 2022-07-24 | Stop reason: HOSPADM

## 2022-07-20 RX ORDER — POTASSIUM CHLORIDE 20 MEQ/1
20 TABLET, EXTENDED RELEASE ORAL 2 TIMES DAILY WITH MEALS
Status: DISCONTINUED | OUTPATIENT
Start: 2022-07-21 | End: 2022-07-24 | Stop reason: HOSPADM

## 2022-07-20 RX ORDER — CARVEDILOL 25 MG/1
25 TABLET ORAL 2 TIMES DAILY
Status: DISCONTINUED | OUTPATIENT
Start: 2022-07-20 | End: 2022-07-23

## 2022-07-20 RX ORDER — SODIUM CHLORIDE 0.9 % (FLUSH) 0.9 %
5-40 SYRINGE (ML) INJECTION PRN
Status: DISCONTINUED | OUTPATIENT
Start: 2022-07-20 | End: 2022-07-24 | Stop reason: HOSPADM

## 2022-07-20 RX ORDER — SODIUM CHLORIDE 9 MG/ML
25 INJECTION, SOLUTION INTRAVENOUS PRN
Status: DISCONTINUED | OUTPATIENT
Start: 2022-07-20 | End: 2022-07-24 | Stop reason: HOSPADM

## 2022-07-20 RX ORDER — ENOXAPARIN SODIUM 100 MG/ML
1 INJECTION SUBCUTANEOUS 2 TIMES DAILY
Status: DISCONTINUED | OUTPATIENT
Start: 2022-07-20 | End: 2022-07-22

## 2022-07-20 RX ORDER — PROCHLORPERAZINE EDISYLATE 5 MG/ML
10 INJECTION INTRAMUSCULAR; INTRAVENOUS ONCE
Status: COMPLETED | OUTPATIENT
Start: 2022-07-20 | End: 2022-07-20

## 2022-07-20 RX ORDER — CARVEDILOL 25 MG/1
25 TABLET ORAL 2 TIMES DAILY
Status: ON HOLD | COMMUNITY
End: 2022-07-24 | Stop reason: HOSPADM

## 2022-07-20 RX ORDER — PROCHLORPERAZINE MALEATE 5 MG/1
5 TABLET ORAL EVERY 6 HOURS PRN
Status: DISCONTINUED | OUTPATIENT
Start: 2022-07-20 | End: 2022-07-24 | Stop reason: HOSPADM

## 2022-07-20 RX ORDER — POTASSIUM CHLORIDE 20 MEQ/1
40 TABLET, EXTENDED RELEASE ORAL EVERY 4 HOURS
Status: COMPLETED | OUTPATIENT
Start: 2022-07-20 | End: 2022-07-20

## 2022-07-20 RX ORDER — MAGNESIUM SULFATE IN WATER 40 MG/ML
4000 INJECTION, SOLUTION INTRAVENOUS ONCE
Status: COMPLETED | OUTPATIENT
Start: 2022-07-20 | End: 2022-07-20

## 2022-07-20 RX ORDER — LOSARTAN POTASSIUM 50 MG/1
25 TABLET ORAL DAILY
Status: DISCONTINUED | OUTPATIENT
Start: 2022-07-20 | End: 2022-07-24

## 2022-07-20 RX ORDER — LANOLIN ALCOHOL/MO/W.PET/CERES
400 CREAM (GRAM) TOPICAL DAILY
Status: DISCONTINUED | OUTPATIENT
Start: 2022-07-20 | End: 2022-07-24 | Stop reason: HOSPADM

## 2022-07-20 RX ADMIN — CARVEDILOL 25 MG: 25 TABLET, FILM COATED ORAL at 09:29

## 2022-07-20 RX ADMIN — ALPRAZOLAM 1 MG: 0.5 TABLET ORAL at 21:18

## 2022-07-20 RX ADMIN — PROCHLORPERAZINE EDISYLATE 10 MG: 5 INJECTION INTRAMUSCULAR; INTRAVENOUS at 02:49

## 2022-07-20 RX ADMIN — SODIUM CHLORIDE, PRESERVATIVE FREE 10 ML: 5 INJECTION INTRAVENOUS at 19:59

## 2022-07-20 RX ADMIN — FUROSEMIDE 40 MG: 10 INJECTION, SOLUTION INTRAMUSCULAR; INTRAVENOUS at 09:28

## 2022-07-20 RX ADMIN — KIT FOR THE PREPARATION OF TECHNETIUM TC 99M ALBUMIN AGGREGATED 6 MILLICURIE: 2.5 INJECTION, POWDER, FOR SOLUTION INTRAVENOUS at 14:27

## 2022-07-20 RX ADMIN — ATORVASTATIN CALCIUM 80 MG: 80 TABLET, FILM COATED ORAL at 09:29

## 2022-07-20 RX ADMIN — POTASSIUM CHLORIDE 40 MEQ: 20 TABLET, EXTENDED RELEASE ORAL at 18:44

## 2022-07-20 RX ADMIN — SODIUM CHLORIDE, PRESERVATIVE FREE 10 ML: 5 INJECTION INTRAVENOUS at 20:00

## 2022-07-20 RX ADMIN — PROCHLORPERAZINE MALEATE 5 MG: 5 TABLET ORAL at 19:33

## 2022-07-20 RX ADMIN — HYDROCODONE BITARTRATE AND ACETAMINOPHEN 1 TABLET: 10; 325 TABLET ORAL at 09:41

## 2022-07-20 RX ADMIN — ALPRAZOLAM 1 MG: 0.5 TABLET ORAL at 00:16

## 2022-07-20 RX ADMIN — ENOXAPARIN SODIUM 90 MG: 100 INJECTION SUBCUTANEOUS at 19:33

## 2022-07-20 RX ADMIN — POTASSIUM CHLORIDE 20 MEQ: 20 TABLET, EXTENDED RELEASE ORAL at 09:29

## 2022-07-20 RX ADMIN — CARVEDILOL 25 MG: 25 TABLET, FILM COATED ORAL at 20:02

## 2022-07-20 RX ADMIN — SODIUM CHLORIDE, PRESERVATIVE FREE 10 ML: 5 INJECTION INTRAVENOUS at 00:17

## 2022-07-20 RX ADMIN — SODIUM CHLORIDE, PRESERVATIVE FREE 10 ML: 5 INJECTION INTRAVENOUS at 09:29

## 2022-07-20 RX ADMIN — POTASSIUM CHLORIDE 40 MEQ: 20 TABLET, EXTENDED RELEASE ORAL at 15:29

## 2022-07-20 RX ADMIN — LOSARTAN POTASSIUM 25 MG: 50 TABLET, FILM COATED ORAL at 18:44

## 2022-07-20 RX ADMIN — Medication 100 MG: at 15:29

## 2022-07-20 RX ADMIN — FUROSEMIDE 40 MG: 10 INJECTION, SOLUTION INTRAMUSCULAR; INTRAVENOUS at 18:44

## 2022-07-20 RX ADMIN — AMIODARONE HYDROCHLORIDE 200 MG: 200 TABLET ORAL at 09:29

## 2022-07-20 RX ADMIN — HYDROCODONE BITARTRATE AND ACETAMINOPHEN 1 TABLET: 10; 325 TABLET ORAL at 21:19

## 2022-07-20 RX ADMIN — LEVALBUTEROL HYDROCHLORIDE 0.63 MG: 0.63 SOLUTION RESPIRATORY (INHALATION) at 07:58

## 2022-07-20 RX ADMIN — POTASSIUM CHLORIDE 40 MEQ: 20 TABLET, EXTENDED RELEASE ORAL at 23:41

## 2022-07-20 RX ADMIN — SPIRONOLACTONE 50 MG: 25 TABLET ORAL at 09:29

## 2022-07-20 RX ADMIN — ASPIRIN 81 MG: 81 TABLET ORAL at 09:29

## 2022-07-20 RX ADMIN — PROCHLORPERAZINE MALEATE 5 MG: 5 TABLET ORAL at 11:01

## 2022-07-20 RX ADMIN — MORPHINE SULFATE 1 MG: 2 INJECTION, SOLUTION INTRAMUSCULAR; INTRAVENOUS at 14:46

## 2022-07-20 RX ADMIN — MAGNESIUM SULFATE HEPTAHYDRATE 4000 MG: 40 INJECTION, SOLUTION INTRAVENOUS at 16:00

## 2022-07-20 RX ADMIN — MORPHINE SULFATE 1 MG: 2 INJECTION, SOLUTION INTRAMUSCULAR; INTRAVENOUS at 19:33

## 2022-07-20 RX ADMIN — ENOXAPARIN SODIUM 90 MG: 100 INJECTION SUBCUTANEOUS at 15:59

## 2022-07-20 RX ADMIN — HYDROCODONE BITARTRATE AND ACETAMINOPHEN 1 TABLET: 10; 325 TABLET ORAL at 00:15

## 2022-07-20 ASSESSMENT — PAIN SCALES - GENERAL
PAINLEVEL_OUTOF10: 4
PAINLEVEL_OUTOF10: 0
PAINLEVEL_OUTOF10: 4
PAINLEVEL_OUTOF10: 7
PAINLEVEL_OUTOF10: 8
PAINLEVEL_OUTOF10: 6
PAINLEVEL_OUTOF10: 0
PAINLEVEL_OUTOF10: 6
PAINLEVEL_OUTOF10: 6
PAINLEVEL_OUTOF10: 1

## 2022-07-20 ASSESSMENT — PAIN DESCRIPTION - DESCRIPTORS
DESCRIPTORS: ACHING
DESCRIPTORS: ACHING;SQUEEZING
DESCRIPTORS: ACHING

## 2022-07-20 ASSESSMENT — PAIN DESCRIPTION - LOCATION
LOCATION: CHEST
LOCATION: CHEST
LOCATION: CHEST;KNEE
LOCATION: CHEST
LOCATION: BACK
LOCATION: KNEE

## 2022-07-20 ASSESSMENT — PAIN DESCRIPTION - ORIENTATION
ORIENTATION: ANTERIOR
ORIENTATION: MID
ORIENTATION: RIGHT
ORIENTATION: MID
ORIENTATION: MID
ORIENTATION: ANTERIOR
ORIENTATION: MID

## 2022-07-20 ASSESSMENT — PAIN - FUNCTIONAL ASSESSMENT
PAIN_FUNCTIONAL_ASSESSMENT: ACTIVITIES ARE NOT PREVENTED

## 2022-07-20 ASSESSMENT — PAIN DESCRIPTION - ONSET: ONSET: OTHER (COMMENT)

## 2022-07-20 NOTE — ED NOTES
TRANSFER - OUT REPORT:    Verbal report given to Haley Min RN on Elton Goodman  being transferred to [facility] DT for routine progression of patient care       Report consisted of patient's Situation, Background, Assessment and   Recommendations(SBAR). Information from the following report(s) ED SBAR, Intake/Output, MAR, Recent Results, Cardiac Rhythm Vetricularly paced and Neuro Assessment was reviewed with the receiving nurse. Lines:   Peripheral IV 07/19/22 Left External Jugular (Active)        Opportunity for questions and clarification was provided.       Patient transported with:  Monitor and Tech     Isabela Hernandez RN  07/19/22 7794

## 2022-07-20 NOTE — PROGRESS NOTES
Physician Progress Note      PATIENTWeeloy Whalen  CSN #:                  [id number]  :                       1965  ADMIT DATE:       2022 11:24 PM  DISCH DATE:  RESPONDING  PROVIDER #:        JYOTSNA GUILLEN DO          QUERY TEXT:    Pt admitted with CHF exacerbation and has chronic systolic CHF documented. If   possible, please document in progress notes and discharge summary further   specificity regarding the type and acuity of CHF:    The medical record reflects the following:  Risk Factors: 62 YOM, HTN, CAD, sCHF, old MI, Hx ETOH use  Clinical Indicators: CP, worsening SOB, tachycardia HR , tachypnea RR   18-20 94--97% RA, lungs diminished and coarse, 1+ BLE edema, BNP 9,408, Trop   33.0, 97.7 88 16 147/94 97% BMI 27.64   HP: CXR shows evidence of CHF. Additionally, a d-dimer was obtained   which was 2.46. unable to obtain CTA d/t IV access  Echo 22 EF 15-20%  Treatment: Monitoring, IV Lasix 40mg BID, Aldactone PO, Strict I%O, daily   weights,    Thank you,  Talon Doyle RN,C BSN  Clinical   [email or web address]  Options provided:  -- Acute on Chronic Systolic CHF/HFrEF  -- Acute Systolic CHF/HFrEF  -- Chronic Systolic CHF/HFrEF  -- Other - I will add my own diagnosis  -- Disagree - Not applicable / Not valid  -- Disagree - Clinically unable to determine / Unknown  -- Refer to Clinical Documentation Reviewer    PROVIDER RESPONSE TEXT:    This patient is in acute on chronic systolic CHF/HFrEF. Query created by:  Avis Cook on 2022 2:00 PM      Electronically signed by:  Fantasma Clifton DO 2022 2:53 PM

## 2022-07-20 NOTE — PROGRESS NOTES
HPI.  Assessment & Plan:   CHF Exacerbation  - pBNP is 9408  - CXR with volume overload  - Worsening SOB  - Not currently hypoxic, but is dyspneic  - IV lasix  - Strict I//Os  - Echo from 5/5/22 showed EF of 15-20%  7/20--- continue aggressive IV diuresis with Lasix monitor I's and O's. Late stage cardiomyopathy with LVEF 15-20%. May need cardiology consult if does not improve. Continue home medications including Coreg     Elevated D-Dimer  - D-dimer is 2.46  - Unable to obtain CT chest with contrast due to losing multiple Ivs  - Will start treatment dose lovenox given high d-dimer and dyspnea  - V/Q scan in AM  7/20--continue Lovenox full anticoagulation pending ventilation/perfusion result-difficulty with IV access. Right knee medial femoral condylar fracture 5 cm  7/20-orthopedic consult-patient reports he was to have intervention? HTN  - Home meds    History of alcohol ongoing use/abuse  7/20-thiamine daily continue home alprazolam for now-monitor for alcohol withdrawal symptoms. Admission p.m. of Thursday July 19    Hypokalemia, hypomagnesemia:  7/20-see IV and oral supplementation orders. Disposition: inpatient      4:50 PM--addendum-patient with atypical chest pain right-sided. VQ scan low probability. Does not appear to be consistent with ischemic chest pain and EKG without definite ischemic findings, no ST elevation to suggest myocardial injury. Cardiology consult performed. Troponin I pending-given his acute on chronic congestive heart failure with severely reduced LVEF expect troponin I may be elevated but will need to trend. Continue current meds including Lovenox full anticoagulation dose, correct electrolytes and continue aggressive IV diuresis. May continue morphine but avoid accelerating dose regarding cardiovascular status. LVEF less than 20%.   Expected prognosis intermediate/long-term poor given degree of cardiomyopathy and will consult palliative care for discussion with patient regarding opinion about goals of care. Diet:  ADULT DIET; Regular; Low Sodium (2 gm)  DVT PPx: Lovenox  Code status: Full Code    Hospital Problems:  Principal Problem:    Chest pain  Active Problems:    Closed nondisplaced fracture of condyle of right femur (HCC)    Hypomagnesemia    Hypokalemia    ICD (implantable cardioverter-defibrillator) discharge    Primary hypertension    Chronic systolic heart failure (HCC)    Alcohol dependence (HCC)    Chronic pain syndrome    Chronic bronchitis, unspecified chronic bronchitis type ([facility] [address]  Resolved Problems:    * No resolved hospital problems. *      Objective:   Patient Vitals for the past 24 hrs:   Temp Pulse Resp BP SpO2   07/20/22 1108 97 °F (36.1 °C) 84 16 121/85 98 %   07/20/22 0929 -- (!) 109 -- -- --   07/20/22 0758 -- 91 18 -- 97 %   07/20/22 0702 97.6 °F (36.4 °C) 95 20 (!) 155/95 94 %   07/20/22 0254 98 °F (36.7 °C) 93 20 123/71 96 %   07/20/22 0027 -- 85 -- (!) 147/94 --   07/19/22 2353 -- 85 -- -- --   07/19/22 2330 97.7 °F (36.5 °C) 88 18 (!) 147/94 97 %       Oxygen Therapy  SpO2: 98 %  Pulse via Oximetry: 96 beats per minute  Pulse Oximeter Device Mode: Intermittent  O2 Device: None (Room air)    Estimated body mass index is 26.64 kg/m² as calculated from the following:    Height as of this encounter: 5' 11\" (1.803 m). Weight as of this encounter: 191 lb (86.6 kg). Intake/Output Summary (Last 24 hours) at 7/20/2022 1445  Last data filed at 7/20/2022 1402  Gross per 24 hour   Intake 970 ml   Output --   Net 970 ml         Physical Exam:     Blood pressure 121/85, pulse 84, temperature 97 °F (36.1 °C), temperature source Temporal, resp. rate 16, height 5' 11\" (1.803 m), weight 191 lb (86.6 kg), SpO2 98 %. General:    Malaise fatigue dyspnea minimally improved since admission  Head:  Normocephalic, atraumatic  Eyes:  Sclerae appear normal.  Pupils equally round. ENT:  Nares appear normal, no drainage.   Moist oral mucosa  Neck:  No restricted ROM. Trachea midline   CV:   Regular at time of exam rate controlled. Positive JVD and HJR.? Summation gallop  Lungs:   Wheezing bilateral.  Fair air movement. Inspiratory rales noted bilateral.  Abdomen: Bowel sounds present. Soft, nontender, nondistended. Positive pulsatile liver. Extremities: Edematous and distal extremities cool. Moves all extremities. Neuro:  CN II-XII grossly intact. Sensation intact. A&Ox3  Psych:  Denies suicidal or homicidal thoughts or ideations.     I have reviewed ordered lab tests and independently visualized imaging below:    Recent Labs:  Recent Results (from the past 48 hour(s))   CBC    Collection Time: 07/19/22  6:36 PM   Result Value Ref Range    WBC 12.0 (H) 4.3 - 11.1 K/uL    RBC 3.35 (L) 4.23 - 5.60 M/uL    Hemoglobin 11.0 (L) 13.6 - 17.2 g/dL    Hematocrit 32.4 (L) 41.1 - 50.3 %    MCV 96.7 79.6 - 97.8 FL    MCH 32.8 26.1 - 32.9 PG    MCHC 34.0 31.4 - 35.0 g/dL    RDW 17.9 (H) 11.9 - 14.6 %    Platelets 106 553 - 540 K/uL    MPV 9.1 (L) 9.4 - 12.3 FL    nRBC 0.13 0.0 - 0.2 K/uL   Comprehensive Metabolic Panel    Collection Time: 07/19/22  6:36 PM   Result Value Ref Range    Sodium 141 136 - 145 mmol/L    Potassium 3.7 3.5 - 5.1 mmol/L    Chloride 101 98 - 107 mmol/L    CO2 27 21 - 32 mmol/L    Anion Gap 13 7.0 - 16.0 mmol/L    Glucose 119 (H) 65 - 100 mg/dL    BUN 15 7.0 - 18.0 MG/DL    Creatinine 0.85 0.8 - 1.5 MG/DL    GFR African American >120 >60 ml/min/1.73m2    GFR Non- >60 >60 ml/min/1.73m2    Calcium 9.2 8.3 - 10.4 MG/DL    Total Bilirubin 1.5 (H) 0.2 - 1.1 MG/DL    ALT 26 13.0 - 61.0 U/L    AST 45 (H) 15 - 37 U/L    Alk Phosphatase 83 45.0 - 117.0 U/L    Total Protein 6.3 (L) 6.4 - 8.2 g/dL    Albumin 4.0 3.5 - 5.0 g/dL    Globulin 2.3 2.3 - 3.5 g/dL    Albumin/Globulin Ratio 1.7     Troponin T    Collection Time: 07/19/22  6:36 PM   Result Value Ref Range    Troponin T 33.0 (H) 0 - 22 ng/L   D-Dimer, Quantitative    Collection Time: 07/19/22  6:36 PM   Result Value Ref Range    D-Dimer, Quant 2.46 (H) <0.56 ug/ml(FEU)   proBNP, N-TERMINAL    Collection Time: 07/19/22  6:36 PM   Result Value Ref Range    NT Pro-BNP 9,408 (H) 0 - 450 PG/ML   Magnesium    Collection Time: 07/19/22  6:36 PM   Result Value Ref Range    Magnesium 1.6 1.2 - 2.6 mg/dL   Basic Metabolic Panel w/ Reflex to MG    Collection Time: 07/20/22  8:38 AM   Result Value Ref Range    Sodium 137 (L) 138 - 145 mmol/L    Potassium 2.8 (L) 3.5 - 5.1 mmol/L    Chloride 103 98 - 107 mmol/L    CO2 26 21 - 32 mmol/L    Anion Gap 8 7 - 16 mmol/L    Glucose 119 (H) 65 - 100 mg/dL    BUN 14 6 - 23 MG/DL    Creatinine 1.10 0.8 - 1.5 MG/DL    GFR African American >60 >60 ml/min/1.73m2    GFR Non- >60 >60 ml/min/1.73m2    Calcium 8.3 8.3 - 10.4 MG/DL   CBC with Auto Differential    Collection Time: 07/20/22  8:38 AM   Result Value Ref Range    WBC 7.3 4.3 - 11.1 K/uL    RBC 3.34 (L) 4.23 - 5.6 M/uL    Hemoglobin 10.8 (L) 13.6 - 17.2 g/dL    Hematocrit 33.0 (L) 41.1 - 50.3 %    MCV 98.8 (H) 79.6 - 97.8 FL    MCH 32.3 26.1 - 32.9 PG    MCHC 32.7 31.4 - 35.0 g/dL    RDW 18.6 (H) 11.9 - 14.6 %    Platelets 613 887 - 004 K/uL    MPV 9.2 (L) 9.4 - 12.3 FL    nRBC 0.05 0.0 - 0.2 K/uL    Differential Type AUTOMATED      Seg Neutrophils 77 43 - 78 %    Lymphocytes 17 13 - 44 %    Monocytes 5 4.0 - 12.0 %    Eosinophils % 0 (L) 0.5 - 7.8 %    Basophils 0 0.0 - 2.0 %    Immature Granulocytes 1 0.0 - 5.0 %    Segs Absolute 5.6 1.7 - 8.2 K/UL    Absolute Lymph # 1.2 0.5 - 4.6 K/UL    Absolute Mono # 0.4 0.1 - 1.3 K/UL    Absolute Eos # 0.0 0.0 - 0.8 K/UL    Basophils Absolute 0.0 0.0 - 0.2 K/UL    Absolute Immature Granulocyte 0.1 0.0 - 0.5 K/UL   Magnesium    Collection Time: 07/20/22  8:38 AM   Result Value Ref Range    Magnesium 1.7 (L) 1.8 - 2.4 mg/dL       Other Studies:  NM LUNG SCAN PERFUSION ONLY    (Results Pending)       Current Meds:  Current Facility-Administered Medications   Medication Dose Route Frequency    carvedilol (COREG) tablet 25 mg  25 mg Oral BID    prochlorperazine (COMPAZINE) tablet 5 mg  5 mg Oral Q6H PRN    albuterol sulfate HFA (PROVENTIL;VENTOLIN;PROAIR) 108 (90 Base) MCG/ACT inhaler 2 puff  2 puff Inhalation Q4H PRN    ALPRAZolam (XANAX) tablet 1 mg  1 mg Oral TID PRN    amiodarone (CORDARONE) tablet 200 mg  200 mg Oral Daily    aspirin EC tablet 81 mg  81 mg Oral Daily    atorvastatin (LIPITOR) tablet 80 mg  80 mg Oral Daily    levalbuterol (XOPENEX) nebulization 0.63 mg  0.63 mg Nebulization Q4H PRN    potassium chloride (KLOR-CON M) extended release tablet 20 mEq  20 mEq Oral Daily    spironolactone (ALDACTONE) tablet 50 mg  50 mg Oral Daily    HYDROcodone-acetaminophen (NORCO)  MG per tablet 1 tablet  1 tablet Oral Q6H PRN    sodium chloride flush 0.9 % injection 5-40 mL  5-40 mL IntraVENous 2 times per day    sodium chloride flush 0.9 % injection 5-40 mL  5-40 mL IntraVENous PRN    0.9 % sodium chloride infusion   IntraVENous PRN    ondansetron (ZOFRAN-ODT) disintegrating tablet 4 mg  4 mg Oral Q8H PRN    Or    ondansetron (ZOFRAN) injection 4 mg  4 mg IntraVENous Q6H PRN    polyethylene glycol (GLYCOLAX) packet 17 g  17 g Oral Daily PRN    acetaminophen (TYLENOL) tablet 650 mg  650 mg Oral Q6H PRN    Or    acetaminophen (TYLENOL) suppository 650 mg  650 mg Rectal Q6H PRN    furosemide (LASIX) injection 40 mg  40 mg IntraVENous BID    morphine injection 1 mg  1 mg IntraVENous Q4H PRN       Signed:  Maine Ross DO    Part of this note may have been written by using a voice dictation software. The note has been proof read but may still contain some grammatical/other typographical errors.

## 2022-07-20 NOTE — NURSE NAVIGATOR
TRANSFER - IN REPORT:    Verbal report received from [address] [place] [address] on Amandeep Gannon  being received from [facility] ED for routine progression of patient care      Report consisted of patient's Situation, Background, Assessment and   Recommendations(SBAR). Information from the following report(s) ED SBAR, Intake/Output, MAR, Recent Results, Med Rec Status, and Cardiac Rhythm V paced  was reviewed with the receiving nurse. Opportunity for questions and clarification was provided. Assessment completed upon patient's arrival to unit and care assumed.
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
## 2022-07-20 NOTE — ED NOTES
Patient IV infiltrated during PE scan. Dr Tim Meyer and nurse notified. Incomplete study, couldn't scan again due to IV access.

## 2022-07-20 NOTE — ED NOTES
Patient's IV is infiltrated per CT scan tech. Dr Pereyra Plan notified. Tobin Barrera RN will try to obtain IV. Patient resting quietly at this time with urinal at bedside and warm blankets. Patient is watching television in no acute distress.      Corey West RN  07/19/22 [id number]       Corey West RN  07/19/22 8520
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
## 2022-07-20 NOTE — PROGRESS NOTES
Spiritual Care Visit, initial visit. Visited with patient at bedside. Prayed for patient's healing and health. Visit by Nixon Petersen, Staff .  Vandana, Noe.B., B.A.

## 2022-07-20 NOTE — PROGRESS NOTES
PICC Placement Note    PRE-PROCEDURE VERIFICATION    Correct Procedure: yes  Time out completed with assistant Ricardo Díaz RN, PCCN and all persons present in agreement with time out. Risks and benefits reviewed with patient and informed consent obtained prior to assessment and procedure. Correct Site: yes  Temperature: Temp: 97.7 °F (36.5 °C), Temperature Source:    Recent Labs     07/20/22  0838   BUN 14      WBC 7.3     Allergies: Patient has no known allergies. Education materials for [facility] given to patient or family. PROCEDURE DETAIL  A double lumen PICC line was started for Limited/no vessel suitable for conventional peripheral access. The following documentation is in addition to the PICC properties in the lines/airways flowsheet:    Lot #: [id number]  Xylocaine used: Yes  Mid-Arm Circumference: 30 (cm)  Internal Catheter Length: 39 (cm)  External Catheter Length: 1 (cm)  Total Catheter Length: 40 (cm)  Vein Selection for PICC: right brachial  Central Line Insertion Bundle followed: Yes  Complication Related to Insertion: none    Both the insertion guidewire and ECG guidewire were removed intact all ports have positive blood return and were flush well with normal saline. The location of the tip of the PICC is verified using ECG technology. The tip is in the SVC per ECG reading. See image below.      Line is okay to use: yes        Meek Coronado RN, [employer]

## 2022-07-20 NOTE — PLAN OF CARE
Problem: Discharge Planning  Goal: Discharge to home or other facility with appropriate resources  Outcome: Progressing  Flowsheets (Taken 7/19/2022 0943)  Discharge to home or other facility with appropriate resources:   Identify barriers to discharge with patient and caregiver   Identify discharge learning needs (meds, wound care, etc)   Refer to discharge planning if patient needs post-hospital services based on physician order or complex needs related to functional status, cognitive ability or social support system   Arrange for needed discharge resources and transportation as appropriate     Problem: Safety - Adult  Goal: Free from fall injury  Outcome: Progressing     Problem: ABCDS Injury Assessment  Goal: Absence of physical injury  Outcome: Progressing     Problem: Pain  Goal: Verbalizes/displays adequate comfort level or baseline comfort level  Outcome: Progressing

## 2022-07-21 ENCOUNTER — APPOINTMENT (OUTPATIENT)
Dept: ULTRASOUND IMAGING | Age: 57
DRG: 291 | End: 2022-07-21
Attending: FAMILY MEDICINE
Payer: COMMERCIAL

## 2022-07-21 PROBLEM — R06.00 DYSPNEA: Status: ACTIVE | Noted: 2022-07-21

## 2022-07-21 PROBLEM — S72.434D: Status: ACTIVE | Noted: 2022-07-20

## 2022-07-21 PROBLEM — Z71.89 ADVANCED CARE PLANNING/COUNSELING DISCUSSION: Status: ACTIVE | Noted: 2022-07-21

## 2022-07-21 PROBLEM — I82.432 ACUTE DEEP VEIN THROMBOSIS (DVT) OF POPLITEAL VEIN OF LEFT LOWER EXTREMITY (HCC): Status: ACTIVE | Noted: 2022-07-21

## 2022-07-21 PROBLEM — I82.462 ACUTE DEEP VEIN THROMBOSIS (DVT) OF CALF MUSCLE VEIN OF LEFT LOWER EXTREMITY (HCC): Status: ACTIVE | Noted: 2022-07-21

## 2022-07-21 PROBLEM — Z51.5 ENCOUNTER FOR PALLIATIVE CARE: Status: ACTIVE | Noted: 2022-07-21

## 2022-07-21 LAB
ALBUMIN SERPL-MCNC: 2.5 G/DL (ref 3.5–5)
ALBUMIN/GLOB SERPL: 1.1 {RATIO} (ref 1.2–3.5)
ALP SERPL-CCNC: 67 U/L (ref 50–136)
ALT SERPL-CCNC: 25 U/L (ref 12–65)
ANION GAP SERPL CALC-SCNC: 7 MMOL/L (ref 7–16)
AST SERPL-CCNC: 21 U/L (ref 15–37)
BASOPHILS # BLD: 0 K/UL (ref 0–0.2)
BASOPHILS NFR BLD: 0 % (ref 0–2)
BILIRUB SERPL-MCNC: 0.7 MG/DL (ref 0.2–1.1)
BUN SERPL-MCNC: 15 MG/DL (ref 6–23)
CALCIUM SERPL-MCNC: 7.9 MG/DL (ref 8.3–10.4)
CHLORIDE SERPL-SCNC: 102 MMOL/L (ref 98–107)
CO2 SERPL-SCNC: 27 MMOL/L (ref 21–32)
CREAT SERPL-MCNC: 1 MG/DL (ref 0.8–1.5)
DIFFERENTIAL METHOD BLD: ABNORMAL
EOSINOPHIL # BLD: 0.1 K/UL (ref 0–0.8)
EOSINOPHIL NFR BLD: 1 % (ref 0.5–7.8)
ERYTHROCYTE [DISTWIDTH] IN BLOOD BY AUTOMATED COUNT: 17.9 % (ref 11.9–14.6)
GLOBULIN SER CALC-MCNC: 2.2 G/DL (ref 2.3–3.5)
GLUCOSE SERPL-MCNC: 110 MG/DL (ref 65–100)
HCT VFR BLD AUTO: 32.9 % (ref 41.1–50.3)
HGB BLD-MCNC: 10.6 G/DL (ref 13.6–17.2)
IMM GRANULOCYTES # BLD AUTO: 0.1 K/UL (ref 0–0.5)
IMM GRANULOCYTES NFR BLD AUTO: 2 % (ref 0–5)
LYMPHOCYTES # BLD: 1.3 K/UL (ref 0.5–4.6)
LYMPHOCYTES NFR BLD: 22 % (ref 13–44)
MAGNESIUM SERPL-MCNC: 2.2 MG/DL (ref 1.8–2.4)
MCH RBC QN AUTO: 32 PG (ref 26.1–32.9)
MCHC RBC AUTO-ENTMCNC: 32.2 G/DL (ref 31.4–35)
MCV RBC AUTO: 99.4 FL (ref 79.6–97.8)
MONOCYTES # BLD: 0.3 K/UL (ref 0.1–1.3)
MONOCYTES NFR BLD: 4 % (ref 4–12)
NEUTS SEG # BLD: 4.4 K/UL (ref 1.7–8.2)
NEUTS SEG NFR BLD: 71 % (ref 43–78)
NRBC # BLD: 0.02 K/UL (ref 0–0.2)
NT PRO BNP: 1415 PG/ML (ref 5–125)
PLATELET # BLD AUTO: 163 K/UL (ref 150–450)
PMV BLD AUTO: 10 FL (ref 9.4–12.3)
POTASSIUM SERPL-SCNC: 3.9 MMOL/L (ref 3.5–5.1)
PROT SERPL-MCNC: 4.7 G/DL (ref 6.3–8.2)
RBC # BLD AUTO: 3.31 M/UL (ref 4.23–5.6)
SODIUM SERPL-SCNC: 136 MMOL/L (ref 136–145)
TROPONIN I SERPL HS-MCNC: 105.6 PG/ML (ref 0–14)
TROPONIN I SERPL HS-MCNC: 108.3 PG/ML (ref 0–14)
TROPONIN I SERPL HS-MCNC: 118.7 PG/ML (ref 0–14)
TROPONIN I SERPL HS-MCNC: 127.4 PG/ML (ref 0–14)
TROPONIN I SERPL HS-MCNC: 148.7 PG/ML (ref 0–14)
WBC # BLD AUTO: 6.2 K/UL (ref 4.3–11.1)

## 2022-07-21 PROCEDURE — 93970 EXTREMITY STUDY: CPT

## 2022-07-21 PROCEDURE — 83880 ASSAY OF NATRIURETIC PEPTIDE: CPT

## 2022-07-21 PROCEDURE — 6370000000 HC RX 637 (ALT 250 FOR IP): Performed by: PHYSICIAN ASSISTANT

## 2022-07-21 PROCEDURE — 99223 1ST HOSP IP/OBS HIGH 75: CPT | Performed by: INTERNAL MEDICINE

## 2022-07-21 PROCEDURE — 6360000002 HC RX W HCPCS: Performed by: FAMILY MEDICINE

## 2022-07-21 PROCEDURE — 84484 ASSAY OF TROPONIN QUANT: CPT

## 2022-07-21 PROCEDURE — 6360000002 HC RX W HCPCS: Performed by: INTERNAL MEDICINE

## 2022-07-21 PROCEDURE — 6370000000 HC RX 637 (ALT 250 FOR IP): Performed by: FAMILY MEDICINE

## 2022-07-21 PROCEDURE — 99232 SBSQ HOSP IP/OBS MODERATE 35: CPT | Performed by: PHYSICIAN ASSISTANT

## 2022-07-21 PROCEDURE — 80053 COMPREHEN METABOLIC PANEL: CPT

## 2022-07-21 PROCEDURE — 6370000000 HC RX 637 (ALT 250 FOR IP): Performed by: INTERNAL MEDICINE

## 2022-07-21 PROCEDURE — 99497 ADVNCD CARE PLAN 30 MIN: CPT | Performed by: INTERNAL MEDICINE

## 2022-07-21 PROCEDURE — 36592 COLLECT BLOOD FROM PICC: CPT

## 2022-07-21 PROCEDURE — 93971 EXTREMITY STUDY: CPT

## 2022-07-21 PROCEDURE — 99232 SBSQ HOSP IP/OBS MODERATE 35: CPT | Performed by: INTERNAL MEDICINE

## 2022-07-21 PROCEDURE — 2580000003 HC RX 258: Performed by: INTERNAL MEDICINE

## 2022-07-21 PROCEDURE — 83735 ASSAY OF MAGNESIUM: CPT

## 2022-07-21 PROCEDURE — 2580000003 HC RX 258: Performed by: FAMILY MEDICINE

## 2022-07-21 PROCEDURE — 85025 COMPLETE CBC W/AUTO DIFF WBC: CPT

## 2022-07-21 PROCEDURE — 1100000003 HC PRIVATE W/ TELEMETRY

## 2022-07-21 RX ORDER — MORPHINE SULFATE 2 MG/ML
2 INJECTION, SOLUTION INTRAMUSCULAR; INTRAVENOUS EVERY 4 HOURS PRN
Status: DISCONTINUED | OUTPATIENT
Start: 2022-07-21 | End: 2022-07-22

## 2022-07-21 RX ORDER — MORPHINE SULFATE 15 MG/1
15 TABLET ORAL EVERY 4 HOURS PRN
Status: DISCONTINUED | OUTPATIENT
Start: 2022-07-21 | End: 2022-07-22

## 2022-07-21 RX ORDER — MAGNESIUM HYDROXIDE/ALUMINUM HYDROXICE/SIMETHICONE 120; 1200; 1200 MG/30ML; MG/30ML; MG/30ML
30 SUSPENSION ORAL EVERY 6 HOURS PRN
Status: DISCONTINUED | OUTPATIENT
Start: 2022-07-21 | End: 2022-07-24 | Stop reason: HOSPADM

## 2022-07-21 RX ORDER — PANTOPRAZOLE SODIUM 40 MG/1
40 TABLET, DELAYED RELEASE ORAL
Status: DISCONTINUED | OUTPATIENT
Start: 2022-07-21 | End: 2022-07-24 | Stop reason: HOSPADM

## 2022-07-21 RX ADMIN — CARVEDILOL 25 MG: 25 TABLET, FILM COATED ORAL at 20:48

## 2022-07-21 RX ADMIN — ATORVASTATIN CALCIUM 80 MG: 80 TABLET, FILM COATED ORAL at 08:43

## 2022-07-21 RX ADMIN — SODIUM CHLORIDE, PRESERVATIVE FREE 20 ML: 5 INJECTION INTRAVENOUS at 08:45

## 2022-07-21 RX ADMIN — ALUMINUM HYDROXIDE, MAGNESIUM HYDROXIDE, AND SIMETHICONE 30 ML: 200; 200; 20 SUSPENSION ORAL at 17:51

## 2022-07-21 RX ADMIN — MORPHINE SULFATE 1 MG: 2 INJECTION, SOLUTION INTRAMUSCULAR; INTRAVENOUS at 00:45

## 2022-07-21 RX ADMIN — SODIUM CHLORIDE, PRESERVATIVE FREE 10 ML: 5 INJECTION INTRAVENOUS at 19:59

## 2022-07-21 RX ADMIN — SPIRONOLACTONE 50 MG: 25 TABLET ORAL at 08:43

## 2022-07-21 RX ADMIN — PROCHLORPERAZINE MALEATE 5 MG: 5 TABLET ORAL at 03:28

## 2022-07-21 RX ADMIN — ONDANSETRON 4 MG: 2 INJECTION INTRAMUSCULAR; INTRAVENOUS at 00:47

## 2022-07-21 RX ADMIN — POTASSIUM CHLORIDE 20 MEQ: 20 TABLET, EXTENDED RELEASE ORAL at 08:43

## 2022-07-21 RX ADMIN — MORPHINE SULFATE 1 MG: 2 INJECTION, SOLUTION INTRAMUSCULAR; INTRAVENOUS at 19:58

## 2022-07-21 RX ADMIN — MORPHINE SULFATE 1 MG: 2 INJECTION, SOLUTION INTRAMUSCULAR; INTRAVENOUS at 05:15

## 2022-07-21 RX ADMIN — PROCHLORPERAZINE MALEATE 5 MG: 5 TABLET ORAL at 13:15

## 2022-07-21 RX ADMIN — ALPRAZOLAM 1 MG: 0.5 TABLET ORAL at 22:25

## 2022-07-21 RX ADMIN — MORPHINE SULFATE 1 MG: 2 INJECTION, SOLUTION INTRAMUSCULAR; INTRAVENOUS at 13:19

## 2022-07-21 RX ADMIN — Medication 10 ML: at 19:59

## 2022-07-21 RX ADMIN — Medication 100 MG: at 08:43

## 2022-07-21 RX ADMIN — HYDROCODONE BITARTRATE AND ACETAMINOPHEN 1 TABLET: 10; 325 TABLET ORAL at 23:00

## 2022-07-21 RX ADMIN — ENOXAPARIN SODIUM 90 MG: 100 INJECTION SUBCUTANEOUS at 17:04

## 2022-07-21 RX ADMIN — HYDROCODONE BITARTRATE AND ACETAMINOPHEN 1 TABLET: 10; 325 TABLET ORAL at 10:47

## 2022-07-21 RX ADMIN — AMIODARONE HYDROCHLORIDE 200 MG: 200 TABLET ORAL at 08:43

## 2022-07-21 RX ADMIN — LOSARTAN POTASSIUM 25 MG: 50 TABLET, FILM COATED ORAL at 08:44

## 2022-07-21 RX ADMIN — FUROSEMIDE 40 MG: 10 INJECTION, SOLUTION INTRAMUSCULAR; INTRAVENOUS at 08:43

## 2022-07-21 RX ADMIN — ONDANSETRON 4 MG: 2 INJECTION INTRAMUSCULAR; INTRAVENOUS at 05:19

## 2022-07-21 RX ADMIN — FUROSEMIDE 40 MG: 10 INJECTION, SOLUTION INTRAMUSCULAR; INTRAVENOUS at 17:05

## 2022-07-21 RX ADMIN — HYDROCODONE BITARTRATE AND ACETAMINOPHEN 1 TABLET: 10; 325 TABLET ORAL at 03:29

## 2022-07-21 RX ADMIN — Medication 400 MG: at 08:44

## 2022-07-21 RX ADMIN — CARVEDILOL 25 MG: 25 TABLET, FILM COATED ORAL at 08:43

## 2022-07-21 RX ADMIN — HYDROCODONE BITARTRATE AND ACETAMINOPHEN 1 TABLET: 10; 325 TABLET ORAL at 17:05

## 2022-07-21 RX ADMIN — ASPIRIN 81 MG: 81 TABLET ORAL at 08:43

## 2022-07-21 RX ADMIN — ENOXAPARIN SODIUM 90 MG: 100 INJECTION SUBCUTANEOUS at 05:16

## 2022-07-21 RX ADMIN — POTASSIUM CHLORIDE 20 MEQ: 20 TABLET, EXTENDED RELEASE ORAL at 17:05

## 2022-07-21 ASSESSMENT — PAIN DESCRIPTION - ORIENTATION
ORIENTATION: RIGHT;LEFT
ORIENTATION: MID
ORIENTATION: RIGHT;LEFT
ORIENTATION: MID
ORIENTATION: MID
ORIENTATION: LEFT;RIGHT
ORIENTATION: RIGHT;LEFT
ORIENTATION: MID

## 2022-07-21 ASSESSMENT — PAIN SCALES - GENERAL
PAINLEVEL_OUTOF10: 0
PAINLEVEL_OUTOF10: 2
PAINLEVEL_OUTOF10: 7
PAINLEVEL_OUTOF10: 5
PAINLEVEL_OUTOF10: 4
PAINLEVEL_OUTOF10: 7
PAINLEVEL_OUTOF10: 6
PAINLEVEL_OUTOF10: 0
PAINLEVEL_OUTOF10: 7
PAINLEVEL_OUTOF10: 4
PAINLEVEL_OUTOF10: 0
PAINLEVEL_OUTOF10: 0
PAINLEVEL_OUTOF10: 5
PAINLEVEL_OUTOF10: 7

## 2022-07-21 ASSESSMENT — PAIN DESCRIPTION - PAIN TYPE
TYPE: ACUTE PAIN

## 2022-07-21 ASSESSMENT — PAIN - FUNCTIONAL ASSESSMENT
PAIN_FUNCTIONAL_ASSESSMENT: ACTIVITIES ARE NOT PREVENTED

## 2022-07-21 ASSESSMENT — PAIN DESCRIPTION - DESCRIPTORS
DESCRIPTORS: ACHING
DESCRIPTORS: BURNING
DESCRIPTORS: ACHING
DESCRIPTORS: ACHING
DESCRIPTORS: CRUSHING
DESCRIPTORS: ACHING

## 2022-07-21 ASSESSMENT — PAIN DESCRIPTION - LOCATION
LOCATION: CHEST
LOCATION: CHEST;LEG
LOCATION: CHEST
LOCATION: CHEST
LOCATION: ABDOMEN
LOCATION: CHEST;LEG
LOCATION: CHEST
LOCATION: CHEST
LOCATION: CHEST;LEG
LOCATION: CHEST;LEG
LOCATION: ABDOMEN

## 2022-07-21 ASSESSMENT — PAIN DESCRIPTION - FREQUENCY
FREQUENCY: CONTINUOUS

## 2022-07-21 ASSESSMENT — PAIN DESCRIPTION - ONSET: ONSET: ON-GOING

## 2022-07-21 NOTE — PROGRESS NOTES
[facility] CARDIOLOGY PROGRESS NOTE           7/21/2022 10:53 AM    Admit Date: 7/19/2022         Subjective: net out about 1.5 L yesterday. Breathing is better. ROS:  Cardiovascular:  As noted above    Objective:      Vitals:    07/21/22 0329 07/21/22 0515 07/21/22 0545 07/21/22 0730   BP:    117/81   Pulse:    85   Resp: 16 18  19   Temp:    97.8 °F (36.6 °C)   TempSrc:    Temporal   SpO2:    97%   Weight:   203 lb 14.8 oz (92.5 kg)    Height:             Physical Exam:  General: Well Developed, Well Nourished, No Acute Distress, Alert & Oriented x 3, Appropriate mood  Neck: supple, no JVD  Heart: S1S2 with RRR without murmurs or gallops  Lungs: Clear throughout auscultation bilaterally without adventitious sounds  Abd: soft, nontender, nondistended, with good bowel sounds  Ext: + edema bilaterally  Skin: warm and dry      Data Review:   Recent Labs     07/20/22  0838 07/21/22  0339   * 136   K 2.8* 3.9   MG 1.7* 2.2   BUN 14 15   WBC 7.3 6.2   HGB 10.8* 10.6*   HCT 33.0* 32.9*    163       No results for input(s): TNIPOC in the last 72 hours. Invalid input(s): TROIQ      Assessment/Plan:     Principal Problem:    Chest pain  Active Problems:    Closed nondisplaced fracture of condyle of right femur (HCC)    Acute deep vein thrombosis (DVT) of popliteal vein of left lower extremity (HCC)    Acute deep vein thrombosis (DVT) of calf muscle vein of left lower extremity (HCC)    Hypomagnesemia    Hypokalemia    ICD (implantable cardioverter-defibrillator) discharge    Primary hypertension    Chronic systolic heart failure (HCC)    Alcohol dependence (HCC)    Chronic pain syndrome    Chronic bronchitis, unspecified chronic bronchitis type ([address]  Resolved Problems:    * No resolved hospital problems. *    A/p  1) sCHF in acute exacerbation. Continue lasix IV daily BMP.   2) HTN continue current meds  3) Lipids - continue statin    Simi Umana MD  7/21/2022 10:53 AM
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
## 2022-07-21 NOTE — PROGRESS NOTES
Hospitalist Progress Note   Admit Date:  2022 11:24 PM   Name:  Nancy Betts   Age:  62 y.o. Sex:  male  :  1965   MRN:  [id number]   Room:      Presenting Complaint: No chief complaint on file. Reason(s) for Admission: Chest pain [R07.9]     Hospital Course & Interval History:   Nancy Betts is a 62 y.o. male with medical history of sCHF, HTN,  who presented to [facility] ED with chest pain and shortness of breath. Reports symptoms started yesterday. Reports associated worsening BLE edema. Denies fevers. Upon ER evaluation, patient is not hypoxic, however he is tachycardic and tachypneic. EKG is stable. Troponin while mildly elevated, is lower that 2 weeks ago. pBNP is elevated at 9,408. CXR shows evidence of CHF. Additionally, a d-dimer was obtained which was 2.46. CTA chest was ordered for evaluation for PE, however patient lost IV access multiple times. Multiple attempts at re-stick failed. Hospitalist to admit to [facility] for further workup and treatment. Subjective/24hr Events (22): Respiratory status improved today-still with pleuritic pain. Finding of left calf and popliteal DVT. Despite low probability VQ scan pleuritic pain likely secondary to occult pulmonary emboli. Troponin I elevated secondary to acute congestive heart failure and trend is down. Chest pain improved. Tolerating full anticoagulation dose Lovenox. Orthopedic consult pending regarding medial femoral condylar fracture-we will continue with Lovenox prior to initiating any long-acting oral anticoagulant pending surgery review. Will need 6 months at least of anticoagulation. He denies recent alcohol abuse electrolytes improved today. We had a discussion regarding end-of-life care and he remains full code for now but is to discuss this with palliative care regarding establish goals of care. Late stage cardiomyopathy nonischemic.   He wishes to continue with medical therapy and function as well as possible. We were able to obtain right arm PICC line-now has adequate IV access. Denies change in bowel or bladder habits. Denies worsening chest pain. Pleuritic chest pain persist.  Primarily right-sided. Overall dyspnea improved. Review of Systems:  10 systems reviewed and negative except as noted in HPI. Assessment & Plan:   CHF Exacerbation  - pBNP is 9408  - CXR with volume overload  - Worsening SOB  - Not currently hypoxic, but is dyspneic  - IV lasix  - Strict I//Os  - Echo from 5/5/22 showed EF of 15-20%  7/20--- continue aggressive IV diuresis with Lasix monitor I's and O's. Late stage cardiomyopathy with LVEF 15-20%. May need cardiology consult if does not improve. Continue home medications including Coreg  7/21-appreciate cardiology recommendations addition of ARB. Patient thinks he may have taken lisinopril at home and home medication reconciliation not accurate. Continuing with IV diuresis for now. Elevated D-Dimer/DVT left lower extremity peroneal and calf muscle  - D-dimer is 2.46  - Unable to obtain CT chest with contrast due to losing multiple Ivs  - Will start treatment dose lovenox given high d-dimer and dyspnea  - V/Q scan in AM  7/20--continue Lovenox full anticoagulation pending ventilation/perfusion result-difficulty with IV access. 7/21--continue Lovenox for now pending orthopedic consult but candidate for  oral anticoagulant if no surgery planned. May need cardiology opinion about surgical risk if any surgery is being considered given his LVEF 15-20%    Right knee medial femoral condylar fracture 5 cm  7/21--orthopedic consult-pending     HTN  - Home meds    History of alcohol ongoing use/abuse  7/20-thiamine daily continue home alprazolam for now-monitor for alcohol withdrawal symptoms. Admission p.m. of Thursday July 19 7/21-no recent use admitted    Hypokalemia, hypomagnesemia:  7/20-see IV and oral supplementation orders.   7/21--improved continue monitoring closely and fixed dose supplement     Disposition: inpatient    Diet:  ADULT DIET; Regular; Low Sodium (2 gm)  DVT PPx: Lovenox  Code status: Full Code    Hospital Problems:  Principal Problem:    Chest pain  Active Problems:    Closed nondisplaced fracture of condyle of right femur (HCC)    Acute deep vein thrombosis (DVT) of popliteal vein of left lower extremity (HCC)    Acute deep vein thrombosis (DVT) of calf muscle vein of left lower extremity (HCC)    Hypomagnesemia    Hypokalemia    ICD (implantable cardioverter-defibrillator) discharge    Primary hypertension    Chronic systolic heart failure (HCC)    Alcohol dependence (HCC)    Chronic pain syndrome    Chronic bronchitis, unspecified chronic bronchitis type ([facility] [address]  Resolved Problems:    * No resolved hospital problems. *      Objective:   Patient Vitals for the past 24 hrs:   Temp Pulse Resp BP SpO2   07/21/22 0730 97.8 °F (36.6 °C) 85 19 117/81 97 %   07/21/22 0515 -- -- 18 -- --   07/21/22 0329 -- -- 16 -- --   07/21/22 0300 98.4 °F (36.9 °C) 81 18 137/66 93 %   07/21/22 0045 -- -- 18 -- --   07/20/22 2300 97.8 °F (36.6 °C) 92 15 112/87 100 %   07/20/22 2119 -- -- 18 -- --   07/20/22 1950 97.8 °F (36.6 °C) 87 20 (!) 135/90 98 %   07/20/22 1933 -- -- 16 -- --   07/20/22 1534 97.7 °F (36.5 °C) 86 20 101/67 100 %   07/20/22 1500 -- 91 -- 110/85 100 %   07/20/22 1108 97 °F (36.1 °C) 84 16 121/85 98 %         Oxygen Therapy  SpO2: 97 %  Pulse via Oximetry: 91 beats per minute  Pulse Oximeter Device Mode: Intermittent  O2 Device: None (Room air)  O2 Flow Rate (L/min): 2 L/min    Estimated body mass index is 28.44 kg/m² as calculated from the following:    Height as of this encounter: 5' 11\" (1.803 m). Weight as of this encounter: 203 lb 14.8 oz (92.5 kg).     Intake/Output Summary (Last 24 hours) at 7/21/2022 0956  Last data filed at 7/21/2022 0920  Gross per 24 hour   Intake 1055 ml   Output 2675 ml   Net -1620 ml           Physical Exam: mg/dL    BUN 15 7.0 - 18.0 MG/DL    Creatinine 0.85 0.8 - 1.5 MG/DL    GFR African American >120 >60 ml/min/1.73m2    GFR Non- >60 >60 ml/min/1.73m2    Calcium 9.2 8.3 - 10.4 MG/DL    Total Bilirubin 1.5 (H) 0.2 - 1.1 MG/DL    ALT 26 13.0 - 61.0 U/L    AST 45 (H) 15 - 37 U/L    Alk Phosphatase 83 45.0 - 117.0 U/L    Total Protein 6.3 (L) 6.4 - 8.2 g/dL    Albumin 4.0 3.5 - 5.0 g/dL    Globulin 2.3 2.3 - 3.5 g/dL    Albumin/Globulin Ratio 1.7     Troponin T    Collection Time: 07/19/22  6:36 PM   Result Value Ref Range    Troponin T 33.0 (H) 0 - 22 ng/L   D-Dimer, Quantitative    Collection Time: 07/19/22  6:36 PM   Result Value Ref Range    D-Dimer, Quant 2.46 (H) <0.56 ug/ml(FEU)   proBNP, N-TERMINAL    Collection Time: 07/19/22  6:36 PM   Result Value Ref Range    NT Pro-BNP 9,408 (H) 0 - 450 PG/ML   Magnesium    Collection Time: 07/19/22  6:36 PM   Result Value Ref Range    Magnesium 1.6 1.2 - 2.6 mg/dL   Basic Metabolic Panel w/ Reflex to MG    Collection Time: 07/20/22  8:38 AM   Result Value Ref Range    Sodium 137 (L) 138 - 145 mmol/L    Potassium 2.8 (L) 3.5 - 5.1 mmol/L    Chloride 103 98 - 107 mmol/L    CO2 26 21 - 32 mmol/L    Anion Gap 8 7 - 16 mmol/L    Glucose 119 (H) 65 - 100 mg/dL    BUN 14 6 - 23 MG/DL    Creatinine 1.10 0.8 - 1.5 MG/DL    GFR African American >60 >60 ml/min/1.73m2    GFR Non- >60 >60 ml/min/1.73m2    Calcium 8.3 8.3 - 10.4 MG/DL   CBC with Auto Differential    Collection Time: 07/20/22  8:38 AM   Result Value Ref Range    WBC 7.3 4.3 - 11.1 K/uL    RBC 3.34 (L) 4.23 - 5.6 M/uL    Hemoglobin 10.8 (L) 13.6 - 17.2 g/dL    Hematocrit 33.0 (L) 41.1 - 50.3 %    MCV 98.8 (H) 79.6 - 97.8 FL    MCH 32.3 26.1 - 32.9 PG    MCHC 32.7 31.4 - 35.0 g/dL    RDW 18.6 (H) 11.9 - 14.6 %    Platelets 959 895 - 452 K/uL    MPV 9.2 (L) 9.4 - 12.3 FL    nRBC 0.05 0.0 - 0.2 K/uL    Differential Type AUTOMATED      Seg Neutrophils 77 43 - 78 %    Lymphocytes 17 13 - 44 % Monocytes 5 4.0 - 12.0 %    Eosinophils % 0 (L) 0.5 - 7.8 %    Basophils 0 0.0 - 2.0 %    Immature Granulocytes 1 0.0 - 5.0 %    Segs Absolute 5.6 1.7 - 8.2 K/UL    Absolute Lymph # 1.2 0.5 - 4.6 K/UL    Absolute Mono # 0.4 0.1 - 1.3 K/UL    Absolute Eos # 0.0 0.0 - 0.8 K/UL    Basophils Absolute 0.0 0.0 - 0.2 K/UL    Absolute Immature Granulocyte 0.1 0.0 - 0.5 K/UL   Magnesium    Collection Time: 07/20/22  8:38 AM   Result Value Ref Range    Magnesium 1.7 (L) 1.8 - 2.4 mg/dL   EKG 12 Lead    Collection Time: 07/20/22  3:40 PM   Result Value Ref Range    Ventricular Rate 86 BPM    Atrial Rate 86 BPM    P-R Interval 176 ms    QRS Duration 116 ms    Q-T Interval 474 ms    QTc Calculation (Bazett) 567 ms    P Axis 14 degrees    R Axis -38 degrees    T Axis 159 degrees    Diagnosis Normal sinus rhythm    Troponin    Collection Time: 07/20/22  4:25 PM   Result Value Ref Range    Troponin, High Sensitivity 161.5 (HH) 0 - 14 pg/mL   Troponin    Collection Time: 07/20/22 10:00 PM   Result Value Ref Range    Troponin, High Sensitivity 148.7 (HH) 0 - 14 pg/mL   Troponin    Collection Time: 07/21/22  3:33 AM   Result Value Ref Range    Troponin, High Sensitivity 127.4 (HH) 0 - 14 pg/mL   proBNP, N-TERMINAL    Collection Time: 07/21/22  3:33 AM   Result Value Ref Range    NT Pro-BNP 1,415 (H) 5 - 125 PG/ML   Comprehensive Metabolic Panel    Collection Time: 07/21/22  3:39 AM   Result Value Ref Range    Sodium 136 136 - 145 mmol/L    Potassium 3.9 3.5 - 5.1 mmol/L    Chloride 102 98 - 107 mmol/L    CO2 27 21 - 32 mmol/L    Anion Gap 7 7 - 16 mmol/L    Glucose 110 (H) 65 - 100 mg/dL    BUN 15 6 - 23 MG/DL    Creatinine 1.00 0.8 - 1.5 MG/DL    GFR African American >60 >60 ml/min/1.73m2    GFR Non- >60 >60 ml/min/1.73m2    Calcium 7.9 (L) 8.3 - 10.4 MG/DL    Total Bilirubin 0.7 0.2 - 1.1 MG/DL    ALT 25 12 - 65 U/L    AST 21 15 - 37 U/L    Alk Phosphatase 67 50 - 136 U/L    Total Protein 4.7 (L) 6.3 - 8.2 g/dL thiamine tablet 100 mg  100 mg Oral Daily    enoxaparin (LOVENOX) injection 90 mg  1 mg/kg SubCUTAneous BID    losartan (COZAAR) tablet 25 mg  25 mg Oral Daily    sodium chloride flush 0.9 % injection 5-40 mL  5-40 mL IntraVENous 2 times per day    sodium chloride flush 0.9 % injection 5-40 mL  5-40 mL IntraVENous PRN    0.9 % sodium chloride infusion  25 mL IntraVENous PRN    ALPRAZolam (XANAX) tablet 1 mg  1 mg Oral TID PRN    amiodarone (CORDARONE) tablet 200 mg  200 mg Oral Daily    aspirin EC tablet 81 mg  81 mg Oral Daily    atorvastatin (LIPITOR) tablet 80 mg  80 mg Oral Daily    levalbuterol (XOPENEX) nebulization 0.63 mg  0.63 mg Nebulization Q4H PRN    spironolactone (ALDACTONE) tablet 50 mg  50 mg Oral Daily    HYDROcodone-acetaminophen (NORCO)  MG per tablet 1 tablet  1 tablet Oral Q6H PRN    sodium chloride flush 0.9 % injection 5-40 mL  5-40 mL IntraVENous 2 times per day    sodium chloride flush 0.9 % injection 5-40 mL  5-40 mL IntraVENous PRN    0.9 % sodium chloride infusion   IntraVENous PRN    ondansetron (ZOFRAN-ODT) disintegrating tablet 4 mg  4 mg Oral Q8H PRN    Or    ondansetron (ZOFRAN) injection 4 mg  4 mg IntraVENous Q6H PRN    polyethylene glycol (GLYCOLAX) packet 17 g  17 g Oral Daily PRN    acetaminophen (TYLENOL) tablet 650 mg  650 mg Oral Q6H PRN    Or    acetaminophen (TYLENOL) suppository 650 mg  650 mg Rectal Q6H PRN    furosemide (LASIX) injection 40 mg  40 mg IntraVENous BID    morphine injection 1 mg  1 mg IntraVENous Q4H PRN       Signed:  Phyllis Sexton DO    Part of this note may have been written by using a voice dictation software. The note has been proof read but may still contain some grammatical/other typographical errors.
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
## 2022-07-21 NOTE — CARE COORDINATION
Chart screened by  for discharge planning. No needs identified at this time. Please consult  if any new issues arise. 07/19/22 7980   Service Assessment   Support Systems Spouse/Significant Other;Children   Discharge Planning   Type of Residence House   Living Arrangements Spouse/Significant Other;Children   Current Services Prior To Admission None   Potential Assistance Needed N/A   DME Ordered? No   Type of Home Care Services None   Patient expects to be discharged to: House   History of falls?  1   Condition of Participation: Discharge Planning   The Plan for Transition of Care is related to the following treatment goals: Dispo pending clinical progress

## 2022-07-22 LAB
ANION GAP SERPL CALC-SCNC: 4 MMOL/L (ref 7–16)
ANION GAP SERPL CALC-SCNC: 4 MMOL/L (ref 7–16)
BASOPHILS # BLD: 0 K/UL (ref 0–0.2)
BASOPHILS NFR BLD: 0 % (ref 0–2)
BUN SERPL-MCNC: 14 MG/DL (ref 6–23)
BUN SERPL-MCNC: 15 MG/DL (ref 6–23)
CALCIUM SERPL-MCNC: 8 MG/DL (ref 8.3–10.4)
CALCIUM SERPL-MCNC: 8.1 MG/DL (ref 8.3–10.4)
CHLORIDE SERPL-SCNC: 101 MMOL/L (ref 98–107)
CHLORIDE SERPL-SCNC: 103 MMOL/L (ref 98–107)
CO2 SERPL-SCNC: 29 MMOL/L (ref 21–32)
CO2 SERPL-SCNC: 31 MMOL/L (ref 21–32)
CREAT SERPL-MCNC: 1.2 MG/DL (ref 0.8–1.5)
CREAT SERPL-MCNC: 1.2 MG/DL (ref 0.8–1.5)
DIFFERENTIAL METHOD BLD: ABNORMAL
EOSINOPHIL # BLD: 0.2 K/UL (ref 0–0.8)
EOSINOPHIL NFR BLD: 4 % (ref 0.5–7.8)
ERYTHROCYTE [DISTWIDTH] IN BLOOD BY AUTOMATED COUNT: 18.6 % (ref 11.9–14.6)
GLUCOSE SERPL-MCNC: 113 MG/DL (ref 65–100)
GLUCOSE SERPL-MCNC: 116 MG/DL (ref 65–100)
HCT VFR BLD AUTO: 31.7 % (ref 41.1–50.3)
HGB BLD-MCNC: 10.4 G/DL (ref 13.6–17.2)
IMM GRANULOCYTES # BLD AUTO: 0.1 K/UL (ref 0–0.5)
IMM GRANULOCYTES NFR BLD AUTO: 2 % (ref 0–5)
LYMPHOCYTES # BLD: 1 K/UL (ref 0.5–4.6)
LYMPHOCYTES NFR BLD: 20 % (ref 13–44)
MAGNESIUM SERPL-MCNC: 2.1 MG/DL (ref 1.8–2.4)
MCH RBC QN AUTO: 33.2 PG (ref 26.1–32.9)
MCHC RBC AUTO-ENTMCNC: 32.8 G/DL (ref 31.4–35)
MCV RBC AUTO: 101.3 FL (ref 79.6–97.8)
MONOCYTES # BLD: 0.2 K/UL (ref 0.1–1.3)
MONOCYTES NFR BLD: 5 % (ref 4–12)
NEUTS SEG # BLD: 3.6 K/UL (ref 1.7–8.2)
NEUTS SEG NFR BLD: 70 % (ref 43–78)
NRBC # BLD: 0.03 K/UL (ref 0–0.2)
PLATELET # BLD AUTO: 140 K/UL (ref 150–450)
PMV BLD AUTO: 10 FL (ref 9.4–12.3)
POTASSIUM SERPL-SCNC: 3.8 MMOL/L (ref 3.5–5.1)
POTASSIUM SERPL-SCNC: 4 MMOL/L (ref 3.5–5.1)
RBC # BLD AUTO: 3.13 M/UL (ref 4.23–5.6)
SODIUM SERPL-SCNC: 136 MMOL/L (ref 136–145)
SODIUM SERPL-SCNC: 136 MMOL/L (ref 136–145)
TROPONIN I SERPL HS-MCNC: 96.9 PG/ML (ref 0–14)
WBC # BLD AUTO: 5.2 K/UL (ref 4.3–11.1)

## 2022-07-22 PROCEDURE — 6360000002 HC RX W HCPCS: Performed by: HOSPITALIST

## 2022-07-22 PROCEDURE — 80048 BASIC METABOLIC PNL TOTAL CA: CPT

## 2022-07-22 PROCEDURE — 1100000003 HC PRIVATE W/ TELEMETRY

## 2022-07-22 PROCEDURE — 6360000002 HC RX W HCPCS: Performed by: INTERNAL MEDICINE

## 2022-07-22 PROCEDURE — 6360000002 HC RX W HCPCS: Performed by: FAMILY MEDICINE

## 2022-07-22 PROCEDURE — 6370000000 HC RX 637 (ALT 250 FOR IP): Performed by: PHYSICIAN ASSISTANT

## 2022-07-22 PROCEDURE — 2580000003 HC RX 258: Performed by: FAMILY MEDICINE

## 2022-07-22 PROCEDURE — 83735 ASSAY OF MAGNESIUM: CPT

## 2022-07-22 PROCEDURE — 99232 SBSQ HOSP IP/OBS MODERATE 35: CPT | Performed by: INTERNAL MEDICINE

## 2022-07-22 PROCEDURE — 36592 COLLECT BLOOD FROM PICC: CPT

## 2022-07-22 PROCEDURE — 6370000000 HC RX 637 (ALT 250 FOR IP): Performed by: FAMILY MEDICINE

## 2022-07-22 PROCEDURE — 84484 ASSAY OF TROPONIN QUANT: CPT

## 2022-07-22 PROCEDURE — 2580000003 HC RX 258: Performed by: INTERNAL MEDICINE

## 2022-07-22 PROCEDURE — 6370000000 HC RX 637 (ALT 250 FOR IP): Performed by: INTERNAL MEDICINE

## 2022-07-22 PROCEDURE — 85025 COMPLETE CBC W/AUTO DIFF WBC: CPT

## 2022-07-22 RX ORDER — MORPHINE SULFATE 2 MG/ML
4 INJECTION, SOLUTION INTRAMUSCULAR; INTRAVENOUS EVERY 4 HOURS PRN
Status: DISCONTINUED | OUTPATIENT
Start: 2022-07-22 | End: 2022-07-23

## 2022-07-22 RX ADMIN — Medication 400 MG: at 09:30

## 2022-07-22 RX ADMIN — ENOXAPARIN SODIUM 90 MG: 100 INJECTION SUBCUTANEOUS at 06:11

## 2022-07-22 RX ADMIN — SODIUM CHLORIDE, PRESERVATIVE FREE 10 ML: 5 INJECTION INTRAVENOUS at 19:56

## 2022-07-22 RX ADMIN — FUROSEMIDE 40 MG: 10 INJECTION, SOLUTION INTRAMUSCULAR; INTRAVENOUS at 09:31

## 2022-07-22 RX ADMIN — HYDROCODONE BITARTRATE AND ACETAMINOPHEN 1 TABLET: 10; 325 TABLET ORAL at 09:44

## 2022-07-22 RX ADMIN — MORPHINE SULFATE 2 MG: 2 INJECTION, SOLUTION INTRAMUSCULAR; INTRAVENOUS at 14:43

## 2022-07-22 RX ADMIN — Medication 100 MG: at 09:30

## 2022-07-22 RX ADMIN — PANTOPRAZOLE SODIUM 40 MG: 40 TABLET, DELAYED RELEASE ORAL at 06:11

## 2022-07-22 RX ADMIN — PROCHLORPERAZINE MALEATE 5 MG: 5 TABLET ORAL at 16:17

## 2022-07-22 RX ADMIN — ATORVASTATIN CALCIUM 80 MG: 80 TABLET, FILM COATED ORAL at 09:30

## 2022-07-22 RX ADMIN — PROCHLORPERAZINE MALEATE 5 MG: 5 TABLET ORAL at 23:49

## 2022-07-22 RX ADMIN — POTASSIUM CHLORIDE 20 MEQ: 20 TABLET, EXTENDED RELEASE ORAL at 09:30

## 2022-07-22 RX ADMIN — POTASSIUM CHLORIDE 20 MEQ: 20 TABLET, EXTENDED RELEASE ORAL at 17:44

## 2022-07-22 RX ADMIN — MORPHINE SULFATE 2 MG: 2 INJECTION, SOLUTION INTRAMUSCULAR; INTRAVENOUS at 04:50

## 2022-07-22 RX ADMIN — SPIRONOLACTONE 50 MG: 25 TABLET ORAL at 09:30

## 2022-07-22 RX ADMIN — CARVEDILOL 25 MG: 25 TABLET, FILM COATED ORAL at 09:30

## 2022-07-22 RX ADMIN — MORPHINE SULFATE 4 MG: 2 INJECTION, SOLUTION INTRAMUSCULAR; INTRAVENOUS at 23:40

## 2022-07-22 RX ADMIN — SODIUM CHLORIDE, PRESERVATIVE FREE 5 ML: 5 INJECTION INTRAVENOUS at 09:38

## 2022-07-22 RX ADMIN — APIXABAN 10 MG: 5 TABLET, FILM COATED ORAL at 17:44

## 2022-07-22 RX ADMIN — CARVEDILOL 25 MG: 25 TABLET, FILM COATED ORAL at 20:54

## 2022-07-22 RX ADMIN — SODIUM CHLORIDE, PRESERVATIVE FREE 5 ML: 5 INJECTION INTRAVENOUS at 09:37

## 2022-07-22 RX ADMIN — AMIODARONE HYDROCHLORIDE 200 MG: 200 TABLET ORAL at 09:30

## 2022-07-22 RX ADMIN — FUROSEMIDE 40 MG: 10 INJECTION, SOLUTION INTRAMUSCULAR; INTRAVENOUS at 17:44

## 2022-07-22 RX ADMIN — ALPRAZOLAM 1 MG: 0.5 TABLET ORAL at 23:40

## 2022-07-22 RX ADMIN — LOSARTAN POTASSIUM 25 MG: 50 TABLET, FILM COATED ORAL at 09:31

## 2022-07-22 RX ADMIN — SODIUM CHLORIDE, PRESERVATIVE FREE 5 ML: 5 INJECTION INTRAVENOUS at 09:31

## 2022-07-22 RX ADMIN — HYDROCODONE BITARTRATE AND ACETAMINOPHEN 1 TABLET: 10; 325 TABLET ORAL at 17:45

## 2022-07-22 RX ADMIN — MORPHINE SULFATE 4 MG: 2 INJECTION, SOLUTION INTRAMUSCULAR; INTRAVENOUS at 19:56

## 2022-07-22 RX ADMIN — MORPHINE SULFATE 2 MG: 2 INJECTION, SOLUTION INTRAMUSCULAR; INTRAVENOUS at 01:03

## 2022-07-22 RX ADMIN — ASPIRIN 81 MG: 81 TABLET ORAL at 09:30

## 2022-07-22 ASSESSMENT — PAIN DESCRIPTION - FREQUENCY
FREQUENCY: CONTINUOUS

## 2022-07-22 ASSESSMENT — PAIN DESCRIPTION - ORIENTATION
ORIENTATION: RIGHT;LEFT
ORIENTATION: RIGHT;LEFT
ORIENTATION: LEFT;RIGHT
ORIENTATION: RIGHT;LEFT
ORIENTATION: LEFT;RIGHT
ORIENTATION: RIGHT;LEFT

## 2022-07-22 ASSESSMENT — PAIN SCALES - GENERAL
PAINLEVEL_OUTOF10: 4
PAINLEVEL_OUTOF10: 6
PAINLEVEL_OUTOF10: 8
PAINLEVEL_OUTOF10: 7
PAINLEVEL_OUTOF10: 4
PAINLEVEL_OUTOF10: 4
PAINLEVEL_OUTOF10: 7
PAINLEVEL_OUTOF10: 6
PAINLEVEL_OUTOF10: 6
PAINLEVEL_OUTOF10: 5
PAINLEVEL_OUTOF10: 5
PAINLEVEL_OUTOF10: 7
PAINLEVEL_OUTOF10: 5
PAINLEVEL_OUTOF10: 7
PAINLEVEL_OUTOF10: 4
PAINLEVEL_OUTOF10: 3
PAINLEVEL_OUTOF10: 2

## 2022-07-22 ASSESSMENT — PAIN DESCRIPTION - LOCATION
LOCATION: CHEST;LEG
LOCATION: ARM;LEG
LOCATION: CHEST;LEG

## 2022-07-22 ASSESSMENT — PAIN DESCRIPTION - DESCRIPTORS
DESCRIPTORS: ACHING

## 2022-07-22 ASSESSMENT — PAIN - FUNCTIONAL ASSESSMENT
PAIN_FUNCTIONAL_ASSESSMENT: ACTIVITIES ARE NOT PREVENTED
PAIN_FUNCTIONAL_ASSESSMENT: PREVENTS OR INTERFERES SOME ACTIVE ACTIVITIES AND ADLS
PAIN_FUNCTIONAL_ASSESSMENT: ACTIVITIES ARE NOT PREVENTED
PAIN_FUNCTIONAL_ASSESSMENT: PREVENTS OR INTERFERES WITH MANY ACTIVE NOT PASSIVE ACTIVITIES
PAIN_FUNCTIONAL_ASSESSMENT: ACTIVITIES ARE NOT PREVENTED
PAIN_FUNCTIONAL_ASSESSMENT: PREVENTS OR INTERFERES SOME ACTIVE ACTIVITIES AND ADLS
PAIN_FUNCTIONAL_ASSESSMENT: ACTIVITIES ARE NOT PREVENTED
PAIN_FUNCTIONAL_ASSESSMENT: ACTIVITIES ARE NOT PREVENTED

## 2022-07-22 ASSESSMENT — PAIN DESCRIPTION - PAIN TYPE
TYPE: CHRONIC PAIN;ACUTE PAIN
TYPE: ACUTE PAIN
TYPE: CHRONIC PAIN;ACUTE PAIN
TYPE: ACUTE PAIN
TYPE: ACUTE PAIN
TYPE: ACUTE PAIN;CHRONIC PAIN
TYPE: ACUTE PAIN;CHRONIC PAIN
TYPE: ACUTE PAIN

## 2022-07-22 ASSESSMENT — PAIN DESCRIPTION - ONSET
ONSET: ON-GOING
ONSET: ON-GOING
ONSET: PROGRESSIVE
ONSET: ON-GOING

## 2022-07-22 NOTE — PROGRESS NOTES
Palliative Care Progress Note    Patient: Adilene Monzon MRN: [id number]  SSN: [id number]    YOB: 1965  Age: 62 y.o. Sex: male       Assessment/Plan:     Chief Complaint/Interval History: still with some dyspnea and chest pain    Principal Diagnosis:    Pain, chest  R07.9    Additional Diagnoses:   Dyspnea  R06.00  Counseling, Encounter for Medical Advice  Z71.9  Encounter for Palliative Care  Z51.5    Palliative Performance Scale (PPS)       Medical Decision Making:   Reviewed and summarized labs and imaging from past 24 hours. Pt still with chest pain and dyspnea. Denies any nausea. Will increase morphine to 4 mg iv q 4 hr prn pain, dyspnea. Stop msIR dosing. Pt follows with pain management as outpatient. Would not advise discharging with additional pain meds. Pt wishes to remain FULL code at this time. Will discuss findings with members of the interdisciplinary team.      More than 50% of this 25 minute visit was spent counseling and coordination of care as outlined above. Subjective:     Comprehensive Review of Systems completed and negative with the exception of as noted above     Objective:     Visit Vitals  /61   Pulse 91   Temp 98 °F (36.7 °C) (Temporal)   Resp 19   Ht 5' 11\" (1.803 m)   Wt 195 lb 1.7 oz (88.5 kg)   SpO2 95%   BMI 27.21 kg/m²       Physical Exam:    General:  Cooperative. No acute distress. Eyes:  Conjunctivae/corneas clear    Nose: Nares normal. Septum midline. Neck: Supple, symmetrical, trachea midline, no JVD   Lungs:   Clear to auscultation bilaterally, unlabored   Heart:  Regular rate and rhythm, no murmur    Abdomen:   Soft, non-tender, non-distended   Extremities: Normal, atraumatic, no cyanosis or edema   Skin: Skin color, texture, turgor normal. No rash or lesions.    Neurologic: Nonfocal   Psych: Alert and oriented     Signed By: Solo Rosales MD     July 22, 2022

## 2022-07-22 NOTE — PROGRESS NOTES
Hospitalist Progress Note   Admit Date:  2022 11:24 PM   Name:  Leo Quiles   Age:  62 y.o. Sex:  male  :  1965   MRN:  [id number]   Room:      Presenting Complaint: No chief complaint on file. Reason(s) for Admission: Chest pain [R07.9]     Hospital Course & Interval History:   Leo Quiles is a 62 y.o. male with medical history of sCHF, HTN,  who presented to [facility] ED with chest pain and shortness of breath. Reports symptoms started yesterday. Reports associated worsening BLE edema. Denies fevers. Upon ER evaluation, patient is not hypoxic, however he is tachycardic and tachypneic. EKG is stable. Troponin while mildly elevated, is lower that 2 weeks ago. pBNP is elevated at 9,408. CXR shows evidence of CHF. Additionally, a d-dimer was obtained which was 2.46. CTA chest was ordered for evaluation for PE, however patient lost IV access multiple times. Multiple attempts at re-stick failed. Hospitalist to admit to [address] [place] [address] for further workup and treatment. Subjective/24hr Events (22): No new complaints-still complaining of pleuritic chest pain and avoiding titration of pain meds-narcotic dependent follows with pain management. No surgery recommended for condylar fracture-has treatment plan outlined by orthopedics. Converting from Lovenox to Eliquis for discharge planning as he may be able to be discharged home tomorrow. Denies any increased dyspnea. Lower extremity edema improving. Denies change in bowel or bladder habits. Cardiology recommends 24 more hours of IV diuresis prior to discharge if clinically stable tomorrow. Review of Systems:  10 systems reviewed and negative except as noted in HPI.   Assessment & Plan:   CHF Exacerbation  - pBNP is 9408  - CXR with volume overload  - Worsening SOB  - Not currently hypoxic, but is dyspneic  - IV lasix  - Strict I//Os  - Echo from 22 showed EF of 15-20%  --- continue aggressive IV diuresis with Lasix monitor I's and O's. Late stage cardiomyopathy with LVEF 15-20%. May need cardiology consult if does not improve. Continue home medications including Coreg  7/21-appreciate cardiology recommendations addition of ARB. Patient thinks he may have taken lisinopril at home and home medication reconciliation not accurate. Continuing with IV diuresis for now.  7/22--plan discharge in a.m. with oral loop diuretic and new med Eliquis regarding DVT     Elevated D-Dimer/DVT left lower extremity peroneal and calf muscle  - D-dimer is 2.46  - Unable to obtain CT chest with contrast due to losing multiple Ivs  - Will start treatment dose lovenox given high d-dimer and dyspnea  - V/Q scan in AM  7/22--left popliteal and calf vein DVT-Eliquis 10 mg twice daily 7 days then 5 mg twice daily indefinite for at least 6 months. Discontinue Lovenox-received dosage this morning. Will need Eliquis on discharge      Right knee medial femoral condylar fracture 5 cm  7/21--orthopedic consult-pending     HTN  7/22--borderline low to low at times-late stage cardiomyopathy-this is to be expected avoid holding meds --as needed for treatment chronic systolic late stage heart failure. History of alcohol ongoing use/abuse  7/20-thiamine daily continue home alprazolam for now-monitor for alcohol withdrawal symptoms. Admission p.m. of Thursday July 19 7/21-no recent use admitted    Hypokalemia, hypomagnesemia:  7/20-see IV and oral supplementation orders. 7/21--improved continue monitoring closely and fixed dose supplement     Disposition: inpatient    Diet:  ADULT DIET; Regular;  Low Sodium (2 gm)  DVT PPx: Lovenox  Code status: Full Code    Hospital Problems:  Principal Problem:    Chest pain  Active Problems:    Nondisplaced fracture of medial condyle of right femur, subsequent encounter for closed fracture with routine healing    Acute deep vein thrombosis (DVT) of popliteal vein of left lower extremity (HCC)    Acute deep vein thrombosis (DVT) of calf muscle vein of left lower extremity ([facility] [address]    Encounter for palliative care    Advanced care planning/counseling discussion    Dyspnea    Hypomagnesemia    Hypokalemia    ICD (implantable cardioverter-defibrillator) discharge    Primary hypertension    Chronic systolic heart failure (HCC)    Alcohol dependence (HCC)    Chronic pain syndrome    Chronic bronchitis, unspecified chronic bronchitis type ([facility] [address]  Resolved Problems:    * No resolved hospital problems. *      Objective:   Patient Vitals for the past 24 hrs:   Temp Pulse Resp BP SpO2   07/22/22 1130 -- -- -- (!) 84/55 --   07/22/22 1055 98.5 °F (36.9 °C) 89 22 (!) 91/54 97 %   07/22/22 0656 98 °F (36.7 °C) 91 19 112/61 95 %   07/22/22 0520 -- -- 18 -- --   07/22/22 0435 98.4 °F (36.9 °C) 87 18 122/70 94 %   07/22/22 0243 -- (!) 101 -- -- --   07/22/22 0133 -- -- 20 -- --   07/21/22 2330 -- -- 20 -- --   07/21/22 2255 97.7 °F (36.5 °C) 93 18 115/67 96 %   07/21/22 2028 -- -- 20 -- --   07/21/22 1958 -- 97 -- -- --   07/21/22 1903 98.4 °F (36.9 °C) 89 20 101/66 97 %   07/21/22 1511 97.1 °F (36.2 °C) 87 18 (!) 94/57 97 %         Oxygen Therapy  SpO2: 97 %  Pulse via Oximetry: 91 beats per minute  Pulse Oximeter Device Mode: Intermittent  O2 Device: None (Room air)  O2 Flow Rate (L/min): 2 L/min    Estimated body mass index is 27.21 kg/m² as calculated from the following:    Height as of this encounter: 5' 11\" (1.803 m). Weight as of this encounter: 195 lb 1.7 oz (88.5 kg). Intake/Output Summary (Last 24 hours) at 7/22/2022 1235  Last data filed at 7/22/2022 1155  Gross per 24 hour   Intake 1260 ml   Output 3845 ml   Net -2585 ml           Physical Exam:     Blood pressure (!) 84/55, pulse 89, temperature 98.5 °F (36.9 °C), temperature source Temporal, resp. rate 22, height 5' 11\" (1.803 m), weight 195 lb 1.7 oz (88.5 kg), SpO2 97 %.   Physical Exam:   General:                      Alert, cooperative, no distress, appears stated age.  Eyes:                                           Conjunctivae/corneas clear. Pupils equally round and reactive to light, extraocular movements intact. Back:                                            Symmetric, no curvature. Range of motion normal. No costovertebral angle  tenderness. Lungs:                       Clear to auscultation bilaterally. No gross pulmonary rales or rhonchi. Heart:                     Regular rate and rhythm, S1, S2 normal, no murmur, click, rub or gallop. Abdomen:                       Soft, non-tender. Bowel sounds normal. No masses,  No organomegaly. Extremities:                     Extremities normal, atraumatic, no cyanosis-no increased lower extremity edema. Neurologic:                     CNII-XII intact. Normal strength, sensation and reflexes throughout. Lab/Data Review: All lab results for the last 24 hours reviewed.         I have reviewed ordered lab tests and independently visualized imaging below:    Recent Labs:  Recent Results (from the past 48 hour(s))   EKG 12 Lead    Collection Time: 07/20/22  3:40 PM   Result Value Ref Range    Ventricular Rate 86 BPM    Atrial Rate 86 BPM    P-R Interval 176 ms    QRS Duration 116 ms    Q-T Interval 474 ms    QTc Calculation (Bazett) 567 ms    P Axis 14 degrees    R Axis -38 degrees    T Axis 159 degrees    Diagnosis Normal sinus rhythm    Troponin    Collection Time: 07/20/22  4:25 PM   Result Value Ref Range    Troponin, High Sensitivity 161.5 (HH) 0 - 14 pg/mL   Troponin    Collection Time: 07/20/22 10:00 PM   Result Value Ref Range    Troponin, High Sensitivity 148.7 (HH) 0 - 14 pg/mL   Troponin    Collection Time: 07/21/22  3:33 AM   Result Value Ref Range    Troponin, High Sensitivity 127.4 (HH) 0 - 14 pg/mL   proBNP, N-TERMINAL    Collection Time: 07/21/22  3:33 AM   Result Value Ref Range    NT Pro-BNP 1,415 (H) 5 - 125 PG/ML   Comprehensive Metabolic Panel    Collection Time: 07/21/22  3:39 AM   Result Value Ref Range    Sodium 136 136 - 145 mmol/L    Potassium 3.9 3.5 - 5.1 mmol/L    Chloride 102 98 - 107 mmol/L    CO2 27 21 - 32 mmol/L    Anion Gap 7 7 - 16 mmol/L    Glucose 110 (H) 65 - 100 mg/dL    BUN 15 6 - 23 MG/DL    Creatinine 1.00 0.8 - 1.5 MG/DL    GFR African American >60 >60 ml/min/1.73m2    GFR Non- >60 >60 ml/min/1.73m2    Calcium 7.9 (L) 8.3 - 10.4 MG/DL    Total Bilirubin 0.7 0.2 - 1.1 MG/DL    ALT 25 12 - 65 U/L    AST 21 15 - 37 U/L    Alk Phosphatase 67 50 - 136 U/L    Total Protein 4.7 (L) 6.3 - 8.2 g/dL    Albumin 2.5 (L) 3.5 - 5.0 g/dL    Globulin 2.2 (L) 2.3 - 3.5 g/dL    Albumin/Globulin Ratio 1.1 (L) 1.2 - 3.5     CBC with Auto Differential    Collection Time: 07/21/22  3:39 AM   Result Value Ref Range    WBC 6.2 4.3 - 11.1 K/uL    RBC 3.31 (L) 4.23 - 5.6 M/uL    Hemoglobin 10.6 (L) 13.6 - 17.2 g/dL    Hematocrit 32.9 (L) 41.1 - 50.3 %    MCV 99.4 (H) 79.6 - 97.8 FL    MCH 32.0 26.1 - 32.9 PG    MCHC 32.2 31.4 - 35.0 g/dL    RDW 17.9 (H) 11.9 - 14.6 %    Platelets 452 872 - 971 K/uL    MPV 10.0 9.4 - 12.3 FL    nRBC 0.02 0.0 - 0.2 K/uL    Differential Type AUTOMATED      Seg Neutrophils 71 43 - 78 %    Lymphocytes 22 13 - 44 %    Monocytes 4 4.0 - 12.0 %    Eosinophils % 1 0.5 - 7.8 %    Basophils 0 0.0 - 2.0 %    Immature Granulocytes 2 0.0 - 5.0 %    Segs Absolute 4.4 1.7 - 8.2 K/UL    Absolute Lymph # 1.3 0.5 - 4.6 K/UL    Absolute Mono # 0.3 0.1 - 1.3 K/UL    Absolute Eos # 0.1 0.0 - 0.8 K/UL    Basophils Absolute 0.0 0.0 - 0.2 K/UL    Absolute Immature Granulocyte 0.1 0.0 - 0.5 K/UL   Magnesium    Collection Time: 07/21/22  3:39 AM   Result Value Ref Range    Magnesium 2.2 1.8 - 2.4 mg/dL   Troponin    Collection Time: 07/21/22 10:56 AM   Result Value Ref Range    Troponin, High Sensitivity 118.7 (HH) 0 - 14 pg/mL   Troponin    Collection Time: 07/21/22  3:15 PM   Result Value Ref Range    Troponin, High Sensitivity 108.3 (HH) 0 - 14 pg/mL   Troponin    Collection Time: 07/21/22  9:27 PM   Result Value Ref Range    Troponin, High Sensitivity 105.6 (HH) 0 - 14 pg/mL   Basic Metabolic Panel    Collection Time: 07/21/22  9:27 PM   Result Value Ref Range    Sodium 136 136 - 145 mmol/L    Potassium 4.0 3.5 - 5.1 mmol/L    Chloride 103 98 - 107 mmol/L    CO2 29 21 - 32 mmol/L    Anion Gap 4 (L) 7 - 16 mmol/L    Glucose 113 (H) 65 - 100 mg/dL    BUN 15 6 - 23 MG/DL    Creatinine 1.20 0.8 - 1.5 MG/DL    GFR African American >60 >60 ml/min/1.73m2    GFR Non- >60 >60 ml/min/1.73m2    Calcium 8.1 (L) 8.3 - 10.4 MG/DL   Basic Metabolic Panel w/ Reflex to MG    Collection Time: 07/22/22  2:56 AM   Result Value Ref Range    Sodium 136 136 - 145 mmol/L    Potassium 3.8 3.5 - 5.1 mmol/L    Chloride 101 98 - 107 mmol/L    CO2 31 21 - 32 mmol/L    Anion Gap 4 (L) 7 - 16 mmol/L    Glucose 116 (H) 65 - 100 mg/dL    BUN 14 6 - 23 MG/DL    Creatinine 1.20 0.8 - 1.5 MG/DL    GFR African American >60 >60 ml/min/1.73m2    GFR Non- >60 >60 ml/min/1.73m2    Calcium 8.0 (L) 8.3 - 10.4 MG/DL   CBC with Auto Differential    Collection Time: 07/22/22  2:56 AM   Result Value Ref Range    WBC 5.2 4.3 - 11.1 K/uL    RBC 3.13 (L) 4.23 - 5.6 M/uL    Hemoglobin 10.4 (L) 13.6 - 17.2 g/dL    Hematocrit 31.7 (L) 41.1 - 50.3 %    .3 (H) 79.6 - 97.8 FL    MCH 33.2 (H) 26.1 - 32.9 PG    MCHC 32.8 31.4 - 35.0 g/dL    RDW 18.6 (H) 11.9 - 14.6 %    Platelets 683 (L) 837 - 450 K/uL    MPV 10.0 9.4 - 12.3 FL    nRBC 0.03 0.0 - 0.2 K/uL    Differential Type AUTOMATED      Seg Neutrophils 70 43 - 78 %    Lymphocytes 20 13 - 44 %    Monocytes 5 4.0 - 12.0 %    Eosinophils % 4 0.5 - 7.8 %    Basophils 0 0.0 - 2.0 %    Immature Granulocytes 2 0.0 - 5.0 %    Segs Absolute 3.6 1.7 - 8.2 K/UL    Absolute Lymph # 1.0 0.5 - 4.6 K/UL    Absolute Mono # 0.2 0.1 - 1.3 K/UL    Absolute Eos # 0.2 0.0 - 0.8 K/UL    Basophils Absolute 0.0 0.0 - 0.2 K/UL    Absolute Immature Granulocyte 0.1 0.0 - 0.5 K/UL Magnesium    Collection Time: 07/22/22  2:56 AM   Result Value Ref Range    Magnesium 2.1 1.8 - 2.4 mg/dL   Troponin    Collection Time: 07/22/22  2:56 AM   Result Value Ref Range    Troponin, High Sensitivity 96.9 (H) 0 - 14 pg/mL       Other Studies:  Vascular duplex lower extremity venous bilateral   Final Result   1. No evidence of right leg DVT. 2. DVT in the left popliteal and calf veins. Vascular duplex upper extremity venous left   Final Result   No evidence of left arm DVT. NM LUNG SCAN PERFUSION ONLY   Final Result   Low probability for pulmonary embolism.           Current Meds:  Current Facility-Administered Medications   Medication Dose Route Frequency    morphine injection 4 mg  4 mg IntraVENous Q4H PRN    pantoprazole (PROTONIX) tablet 40 mg  40 mg Oral QAM AC    aluminum & magnesium hydroxide-simethicone (MAALOX) 200-200-20 MG/5ML suspension 30 mL  30 mL Oral Q6H PRN    carvedilol (COREG) tablet 25 mg  25 mg Oral BID    prochlorperazine (COMPAZINE) tablet 5 mg  5 mg Oral Q6H PRN    albuterol sulfate HFA (PROVENTIL;VENTOLIN;PROAIR) 108 (90 Base) MCG/ACT inhaler 2 puff  2 puff Inhalation Q4H PRN    magnesium oxide (MAG-OX) tablet 400 mg  400 mg Oral Daily    potassium chloride (KLOR-CON M) extended release tablet 20 mEq  20 mEq Oral BID WC    thiamine tablet 100 mg  100 mg Oral Daily    enoxaparin (LOVENOX) injection 90 mg  1 mg/kg SubCUTAneous BID    losartan (COZAAR) tablet 25 mg  25 mg Oral Daily    sodium chloride flush 0.9 % injection 5-40 mL  5-40 mL IntraVENous 2 times per day    sodium chloride flush 0.9 % injection 5-40 mL  5-40 mL IntraVENous PRN    0.9 % sodium chloride infusion  25 mL IntraVENous PRN    ALPRAZolam (XANAX) tablet 1 mg  1 mg Oral TID PRN    amiodarone (CORDARONE) tablet 200 mg  200 mg Oral Daily    aspirin EC tablet 81 mg  81 mg Oral Daily    atorvastatin (LIPITOR) tablet 80 mg  80 mg Oral Daily    levalbuterol (XOPENEX) nebulization 0.63 mg  0.63 mg Nebulization Q4H PRN    spironolactone (ALDACTONE) tablet 50 mg  50 mg Oral Daily    HYDROcodone-acetaminophen (NORCO)  MG per tablet 1 tablet  1 tablet Oral Q6H PRN    sodium chloride flush 0.9 % injection 5-40 mL  5-40 mL IntraVENous 2 times per day    sodium chloride flush 0.9 % injection 5-40 mL  5-40 mL IntraVENous PRN    0.9 % sodium chloride infusion   IntraVENous PRN    ondansetron (ZOFRAN-ODT) disintegrating tablet 4 mg  4 mg Oral Q8H PRN    Or    ondansetron (ZOFRAN) injection 4 mg  4 mg IntraVENous Q6H PRN    polyethylene glycol (GLYCOLAX) packet 17 g  17 g Oral Daily PRN    acetaminophen (TYLENOL) tablet 650 mg  650 mg Oral Q6H PRN    Or    acetaminophen (TYLENOL) suppository 650 mg  650 mg Rectal Q6H PRN    furosemide (LASIX) injection 40 mg  40 mg IntraVENous BID       Signed:  Sharrell Ganser, DO    Part of this note may have been written by using a voice dictation software. The note has been proof read but may still contain some grammatical/other typographical errors.

## 2022-07-22 NOTE — PROGRESS NOTES
Pt called me to the room saying that he was a little dizzy after standing to urinate BP 84/55   This morning he got 40 IVP Lasix, 25 Coreg, 25 Cozaar, 200 Amio, and 50 Aldactone. HIs BP was 112/61 when I gave morning medication. ALIE Man. Notified no changes at this time. PT had a 42 beat run of v tach, he is asymptomatic NP Sandoval Man notified and she is reviewing the chart.

## 2022-07-22 NOTE — PROGRESS NOTES
[facility] CARDIOLOGY PROGRESS NOTE           7/22/2022 10:03 AM    Admit Date: 7/19/2022         Subjective: Sat'ing well on room air. ROS:  Cardiovascular:  As noted above    Objective:      Vitals:    07/22/22 0243 07/22/22 0435 07/22/22 0520 07/22/22 0656   BP:  122/70  112/61   Pulse: (!) 101 87  91   Resp:  18 18 19   Temp:  98.4 °F (36.9 °C)  98 °F (36.7 °C)   TempSrc:  Oral  Temporal   SpO2:  94%  95%   Weight:  195 lb 1.7 oz (88.5 kg)     Height:             Physical Exam:  General: Well Developed, Well Nourished, No Acute Distress, Alert & Oriented x 3, Appropriate mood  Neck: supple, no JVD  Heart: S1S2 with RRR without murmurs or gallops  Lungs: Clear throughout auscultation bilaterally without adventitious sounds  Abd: soft, nontender, nondistended, with good bowel sounds  Ext: no edema bilaterally  Skin: warm and dry      Data Review:   Recent Labs     07/21/22  0339 07/21/22 2127 07/22/22  0256    136 136   K 3.9 4.0 3.8   MG 2.2  --  2.1   BUN 15 15 14   WBC 6.2  --  5.2   HGB 10.6*  --  10.4*   HCT 32.9*  --  31.7*     --  140*       No results for input(s): TNIPOC in the last 72 hours.     Invalid input(s): TROIQ        Assessment/Plan:     Principal Problem:    Chest pain  Active Problems:    Nondisplaced fracture of medial condyle of right femur, subsequent encounter for closed fracture with routine healing    Acute deep vein thrombosis (DVT) of popliteal vein of left lower extremity (HCC)    Acute deep vein thrombosis (DVT) of calf muscle vein of left lower extremity ([address]    Encounter for palliative care    Advanced care planning/counseling discussion    Dyspnea    Hypomagnesemia    Hypokalemia    ICD (implantable cardioverter-defibrillator) discharge    Primary hypertension    Chronic systolic heart failure (HCC)    Alcohol dependence (HCC)    Chronic pain syndrome    Chronic bronchitis, unspecified chronic bronchitis type ([address]  Resolved Problems:    * No resolved hospital problems. *    A/p  1) sCHF by my exam near euvolemic, on more day of diuresis likely home tomorrow.   BMP tomorrow  2) HTN continue current meds  3) Lipids - continue statin    Aristeo Garza MD  7/22/2022 10:03 AM

## 2022-07-23 LAB
ANION GAP SERPL CALC-SCNC: 4 MMOL/L (ref 7–16)
BASOPHILS # BLD: 0 K/UL (ref 0–0.2)
BASOPHILS NFR BLD: 0 % (ref 0–2)
BUN SERPL-MCNC: 11 MG/DL (ref 6–23)
CALCIUM SERPL-MCNC: 8.3 MG/DL (ref 8.3–10.4)
CHLORIDE SERPL-SCNC: 102 MMOL/L (ref 98–107)
CO2 SERPL-SCNC: 30 MMOL/L (ref 21–32)
CREAT SERPL-MCNC: 1.2 MG/DL (ref 0.8–1.5)
DIFFERENTIAL METHOD BLD: ABNORMAL
EOSINOPHIL # BLD: 0.3 K/UL (ref 0–0.8)
EOSINOPHIL NFR BLD: 4 % (ref 0.5–7.8)
ERYTHROCYTE [DISTWIDTH] IN BLOOD BY AUTOMATED COUNT: 18.3 % (ref 11.9–14.6)
GLUCOSE SERPL-MCNC: 112 MG/DL (ref 65–100)
HCT VFR BLD AUTO: 31.8 % (ref 41.1–50.3)
HGB BLD-MCNC: 10.2 G/DL (ref 13.6–17.2)
IMM GRANULOCYTES # BLD AUTO: 0.1 K/UL (ref 0–0.5)
IMM GRANULOCYTES NFR BLD AUTO: 1 % (ref 0–5)
LYMPHOCYTES # BLD: 0.8 K/UL (ref 0.5–4.6)
LYMPHOCYTES NFR BLD: 11 % (ref 13–44)
MAGNESIUM SERPL-MCNC: 2 MG/DL (ref 1.8–2.4)
MCH RBC QN AUTO: 32.4 PG (ref 26.1–32.9)
MCHC RBC AUTO-ENTMCNC: 32.1 G/DL (ref 31.4–35)
MCV RBC AUTO: 101 FL (ref 79.6–97.8)
MONOCYTES # BLD: 0.4 K/UL (ref 0.1–1.3)
MONOCYTES NFR BLD: 6 % (ref 4–12)
NEUTS SEG # BLD: 5.3 K/UL (ref 1.7–8.2)
NEUTS SEG NFR BLD: 78 % (ref 43–78)
NRBC # BLD: 0 K/UL (ref 0–0.2)
PLATELET # BLD AUTO: 133 K/UL (ref 150–450)
PMV BLD AUTO: 10.5 FL (ref 9.4–12.3)
POTASSIUM SERPL-SCNC: 4.2 MMOL/L (ref 3.5–5.1)
RBC # BLD AUTO: 3.15 M/UL (ref 4.23–5.6)
SODIUM SERPL-SCNC: 136 MMOL/L (ref 136–145)
WBC # BLD AUTO: 6.9 K/UL (ref 4.3–11.1)

## 2022-07-23 PROCEDURE — 80048 BASIC METABOLIC PNL TOTAL CA: CPT

## 2022-07-23 PROCEDURE — 2580000003 HC RX 258: Performed by: INTERNAL MEDICINE

## 2022-07-23 PROCEDURE — 6370000000 HC RX 637 (ALT 250 FOR IP): Performed by: FAMILY MEDICINE

## 2022-07-23 PROCEDURE — 2580000003 HC RX 258: Performed by: FAMILY MEDICINE

## 2022-07-23 PROCEDURE — 6360000002 HC RX W HCPCS: Performed by: INTERNAL MEDICINE

## 2022-07-23 PROCEDURE — 99233 SBSQ HOSP IP/OBS HIGH 50: CPT | Performed by: INTERNAL MEDICINE

## 2022-07-23 PROCEDURE — 6370000000 HC RX 637 (ALT 250 FOR IP): Performed by: INTERNAL MEDICINE

## 2022-07-23 PROCEDURE — 83735 ASSAY OF MAGNESIUM: CPT

## 2022-07-23 PROCEDURE — 85025 COMPLETE CBC W/AUTO DIFF WBC: CPT

## 2022-07-23 PROCEDURE — 6370000000 HC RX 637 (ALT 250 FOR IP): Performed by: PHYSICIAN ASSISTANT

## 2022-07-23 PROCEDURE — 1100000003 HC PRIVATE W/ TELEMETRY

## 2022-07-23 RX ORDER — CARVEDILOL 12.5 MG/1
12.5 TABLET ORAL 2 TIMES DAILY
Status: DISCONTINUED | OUTPATIENT
Start: 2022-07-24 | End: 2022-07-24 | Stop reason: HOSPADM

## 2022-07-23 RX ORDER — SPIRONOLACTONE 25 MG/1
25 TABLET ORAL DAILY
Status: DISCONTINUED | OUTPATIENT
Start: 2022-07-23 | End: 2022-07-24 | Stop reason: HOSPADM

## 2022-07-23 RX ORDER — MORPHINE SULFATE 2 MG/ML
2 INJECTION, SOLUTION INTRAMUSCULAR; INTRAVENOUS EVERY 4 HOURS PRN
Status: DISCONTINUED | OUTPATIENT
Start: 2022-07-23 | End: 2022-07-24 | Stop reason: HOSPADM

## 2022-07-23 RX ORDER — FUROSEMIDE 40 MG/1
40 TABLET ORAL 2 TIMES DAILY
Status: DISCONTINUED | OUTPATIENT
Start: 2022-07-23 | End: 2022-07-24 | Stop reason: HOSPADM

## 2022-07-23 RX ADMIN — PANTOPRAZOLE SODIUM 40 MG: 40 TABLET, DELAYED RELEASE ORAL at 06:22

## 2022-07-23 RX ADMIN — Medication 100 MG: at 09:01

## 2022-07-23 RX ADMIN — SODIUM CHLORIDE, PRESERVATIVE FREE 10 ML: 5 INJECTION INTRAVENOUS at 21:00

## 2022-07-23 RX ADMIN — ATORVASTATIN CALCIUM 80 MG: 80 TABLET, FILM COATED ORAL at 09:00

## 2022-07-23 RX ADMIN — MORPHINE SULFATE 2 MG: 2 INJECTION, SOLUTION INTRAMUSCULAR; INTRAVENOUS at 23:01

## 2022-07-23 RX ADMIN — CARVEDILOL 25 MG: 25 TABLET, FILM COATED ORAL at 09:01

## 2022-07-23 RX ADMIN — MORPHINE SULFATE 2 MG: 2 INJECTION, SOLUTION INTRAMUSCULAR; INTRAVENOUS at 17:46

## 2022-07-23 RX ADMIN — MORPHINE SULFATE 2 MG: 2 INJECTION, SOLUTION INTRAMUSCULAR; INTRAVENOUS at 13:39

## 2022-07-23 RX ADMIN — SODIUM CHLORIDE, PRESERVATIVE FREE 10 ML: 5 INJECTION INTRAVENOUS at 21:01

## 2022-07-23 RX ADMIN — POTASSIUM CHLORIDE 20 MEQ: 20 TABLET, EXTENDED RELEASE ORAL at 09:00

## 2022-07-23 RX ADMIN — FUROSEMIDE 40 MG: 40 TABLET ORAL at 09:01

## 2022-07-23 RX ADMIN — SODIUM CHLORIDE, PRESERVATIVE FREE 5 ML: 5 INJECTION INTRAVENOUS at 09:02

## 2022-07-23 RX ADMIN — ALPRAZOLAM 1 MG: 0.5 TABLET ORAL at 23:09

## 2022-07-23 RX ADMIN — HYDROCODONE BITARTRATE AND ACETAMINOPHEN 1 TABLET: 10; 325 TABLET ORAL at 19:51

## 2022-07-23 RX ADMIN — HYDROCODONE BITARTRATE AND ACETAMINOPHEN 1 TABLET: 10; 325 TABLET ORAL at 11:00

## 2022-07-23 RX ADMIN — ASPIRIN 81 MG: 81 TABLET ORAL at 09:01

## 2022-07-23 RX ADMIN — APIXABAN 10 MG: 5 TABLET, FILM COATED ORAL at 21:02

## 2022-07-23 RX ADMIN — PROCHLORPERAZINE MALEATE 5 MG: 5 TABLET ORAL at 09:01

## 2022-07-23 RX ADMIN — SPIRONOLACTONE 25 MG: 25 TABLET ORAL at 09:01

## 2022-07-23 RX ADMIN — AMIODARONE HYDROCHLORIDE 200 MG: 200 TABLET ORAL at 09:01

## 2022-07-23 RX ADMIN — APIXABAN 10 MG: 5 TABLET, FILM COATED ORAL at 09:00

## 2022-07-23 RX ADMIN — MORPHINE SULFATE 4 MG: 2 INJECTION, SOLUTION INTRAMUSCULAR; INTRAVENOUS at 04:33

## 2022-07-23 RX ADMIN — POTASSIUM CHLORIDE 20 MEQ: 20 TABLET, EXTENDED RELEASE ORAL at 17:42

## 2022-07-23 RX ADMIN — Medication 400 MG: at 09:01

## 2022-07-23 RX ADMIN — MORPHINE SULFATE 4 MG: 2 INJECTION, SOLUTION INTRAMUSCULAR; INTRAVENOUS at 09:02

## 2022-07-23 RX ADMIN — LOSARTAN POTASSIUM 25 MG: 50 TABLET, FILM COATED ORAL at 09:02

## 2022-07-23 ASSESSMENT — PAIN DESCRIPTION - DESCRIPTORS
DESCRIPTORS: ACHING

## 2022-07-23 ASSESSMENT — PAIN DESCRIPTION - LOCATION
LOCATION: KNEE;LEG
LOCATION: KNEE;LEG
LOCATION: LEG
LOCATION: LEG
LOCATION: KNEE;LEG
LOCATION: KNEE
LOCATION: LEG
LOCATION: LEG
LOCATION: KNEE
LOCATION: LEG
LOCATION: KNEE;LEG
LOCATION: KNEE;LEG

## 2022-07-23 ASSESSMENT — PAIN DESCRIPTION - FREQUENCY
FREQUENCY: CONTINUOUS

## 2022-07-23 ASSESSMENT — PAIN SCALES - GENERAL
PAINLEVEL_OUTOF10: 8
PAINLEVEL_OUTOF10: 4
PAINLEVEL_OUTOF10: 3
PAINLEVEL_OUTOF10: 7
PAINLEVEL_OUTOF10: 4
PAINLEVEL_OUTOF10: 8
PAINLEVEL_OUTOF10: 7
PAINLEVEL_OUTOF10: 4
PAINLEVEL_OUTOF10: 4
PAINLEVEL_OUTOF10: 2
PAINLEVEL_OUTOF10: 4
PAINLEVEL_OUTOF10: 7
PAINLEVEL_OUTOF10: 5
PAINLEVEL_OUTOF10: 7
PAINLEVEL_OUTOF10: 7
PAINLEVEL_OUTOF10: 4

## 2022-07-23 ASSESSMENT — PAIN DESCRIPTION - ONSET
ONSET: PROGRESSIVE
ONSET: ON-GOING
ONSET: ON-GOING

## 2022-07-23 ASSESSMENT — PAIN DESCRIPTION - PAIN TYPE
TYPE: ACUTE PAIN
TYPE: CHRONIC PAIN;ACUTE PAIN
TYPE: CHRONIC PAIN;ACUTE PAIN
TYPE: ACUTE PAIN
TYPE: CHRONIC PAIN;ACUTE PAIN
TYPE: ACUTE PAIN
TYPE: CHRONIC PAIN
TYPE: ACUTE PAIN
TYPE: ACUTE PAIN

## 2022-07-23 ASSESSMENT — PAIN DESCRIPTION - ORIENTATION
ORIENTATION: RIGHT;LEFT
ORIENTATION: RIGHT;LEFT
ORIENTATION: LEFT;RIGHT
ORIENTATION: RIGHT;LEFT
ORIENTATION: LEFT
ORIENTATION: RIGHT;LEFT
ORIENTATION: RIGHT;LEFT
ORIENTATION: LEFT
ORIENTATION: RIGHT;LEFT
ORIENTATION: RIGHT;LEFT

## 2022-07-23 ASSESSMENT — PAIN - FUNCTIONAL ASSESSMENT
PAIN_FUNCTIONAL_ASSESSMENT: ACTIVITIES ARE NOT PREVENTED
PAIN_FUNCTIONAL_ASSESSMENT: ACTIVITIES ARE NOT PREVENTED
PAIN_FUNCTIONAL_ASSESSMENT: PREVENTS OR INTERFERES SOME ACTIVE ACTIVITIES AND ADLS
PAIN_FUNCTIONAL_ASSESSMENT: PREVENTS OR INTERFERES SOME ACTIVE ACTIVITIES AND ADLS
PAIN_FUNCTIONAL_ASSESSMENT: ACTIVITIES ARE NOT PREVENTED
PAIN_FUNCTIONAL_ASSESSMENT: PREVENTS OR INTERFERES SOME ACTIVE ACTIVITIES AND ADLS
PAIN_FUNCTIONAL_ASSESSMENT: ACTIVITIES ARE NOT PREVENTED
PAIN_FUNCTIONAL_ASSESSMENT: ACTIVITIES ARE NOT PREVENTED
PAIN_FUNCTIONAL_ASSESSMENT: PREVENTS OR INTERFERES WITH MANY ACTIVE NOT PASSIVE ACTIVITIES
PAIN_FUNCTIONAL_ASSESSMENT: PREVENTS OR INTERFERES SOME ACTIVE ACTIVITIES AND ADLS
PAIN_FUNCTIONAL_ASSESSMENT: ACTIVITIES ARE NOT PREVENTED
PAIN_FUNCTIONAL_ASSESSMENT: ACTIVITIES ARE NOT PREVENTED

## 2022-07-23 NOTE — PROGRESS NOTES
Hospitalist Progress Note   Admit Date:  2022 11:24 PM   Name:  Leo Quiles   Age:  62 y.o. Sex:  male  :  1965   MRN:  [id number]   Room:      Presenting Complaint: No chief complaint on file. Reason(s) for Admission: Chest pain [R07.9]     Hospital Course & Interval History:   Leo Quiles is a 62 y.o. male with medical history of sCHF, HTN,  who presented to [facility] ED with chest pain and shortness of breath. Reports symptoms started yesterday. Reports associated worsening BLE edema. Denies fevers. Upon ER evaluation, patient is not hypoxic, however he is tachycardic and tachypneic. EKG is stable. Troponin while mildly elevated, is lower that 2 weeks ago. pBNP is elevated at 9,408. CXR shows evidence of CHF. Additionally, a d-dimer was obtained which was 2.46. CTA chest was ordered for evaluation for PE, however patient lost IV access multiple times. Multiple attempts at re-stick failed. Hospitalist to admit to [address] [place] [address] for further workup and treatment. Subjective/24hr Events (22): Patient tolerating Eliquis. Still complains of significant knee pain and discussed need to attenuate narcotics as likely contributing some to his hypotension. Late stage cardiomyopathy. May need to attenuate ARB-newest addition to medication regimen. Effective diuresis and respiratory status improved. Discussed possible discharge home tomorrow. Serum creatinine 1.2 BUN 11. Had wide-complex tachycardia per nursing note review and cardiology made aware-he was reluctant to decrease pain medicines but agreed. He does have pain management as outpatient. Pain with weightbearing. Discussed crutches until outpatient follow-up orthopedics. Seen here by Ortho and no surgical intervention planned and he is a poor surgical candidate. Chest pain on deep inhalation only improving. No change in bowel habits. No dysuria.     Review of Systems:  10 systems reviewed and negative except as noted in HPI. Assessment & Plan:   CHF Exacerbation  - pBNP is 9408  - CXR with volume overload  - Worsening SOB  - Not currently hypoxic, but is dyspneic  - IV lasix  - Strict I//Os  - Echo from 5/5/22 showed EF of 15-20%  7/20--- continue aggressive IV diuresis with Lasix monitor I's and O's. Late stage cardiomyopathy with LVEF 15-20%. May need cardiology consult if does not improve. Continue home medications including Coreg  7/21-appreciate cardiology recommendations addition of ARB. Patient thinks he may have taken lisinopril at home and home medication reconciliation not accurate. Continuing with IV diuresis for now.  7/22--plan discharge in a.m. with oral loop diuretic and new med Eliquis regarding [place] /23--- attenuate ARB and wean narcotics regarding [id number] has late stage cardiomyopathy     Elevated D-Dimer/DVT left lower extremity peroneal and calf muscle  - D-dimer is 2.46  - Unable to obtain CT chest with contrast due to losing multiple Ivs  - Will start treatment dose lovenox given high d-dimer and dyspnea  - V/Q scan in AM  7/22--left popliteal and calf vein DVT-Eliquis 10 mg twice daily 7 days then 5 mg twice daily indefinite for at least 6 months. Discontinue Lovenox-received dosage this morning. Will need Eliquis on discharge  7/23--this is unchanged-at least 6 months Eliquis      Right knee medial femoral condylar fracture 5 cm  7/23--outpatient orthopedic [id number] surgical intervention planned. History of alcohol ongoing use/abuse  7/20-thiamine daily continue home alprazolam for now-monitor for alcohol withdrawal symptoms. Admission p.m. of Thursday July 19 7/21-no recent use admitted7/23--no change. Counseled regarding alcohol use and cardiomyopathy. Hypokalemia, hypomagnesemia:  7/20-see IV and oral supplementation orders. 7/21--improved continue monitoring closely and fixed dose supplement  7/23--stable-continue same.      Disposition: inpatient    Diet:  ADULT DIET; Regular; Low Sodium (2 gm)  DVT PPx: Lovenox  Code status: Full Code    Hospital Problems:  Principal Problem:    Chest pain  Active Problems:    Nondisplaced fracture of medial condyle of right femur, subsequent encounter for closed fracture with routine healing    Acute deep vein thrombosis (DVT) of popliteal vein of left lower extremity (HCC)    Acute deep vein thrombosis (DVT) of calf muscle vein of left lower extremity ([address]    Encounter for palliative care    Advanced care planning/counseling discussion    Dyspnea    Hypomagnesemia    Hypokalemia    ICD (implantable cardioverter-defibrillator) discharge    Primary hypertension    Chronic systolic heart failure (HCC)    Alcohol dependence (HCC)    Chronic pain syndrome    Chronic bronchitis, unspecified chronic bronchitis type ([address]  Resolved Problems:    * No resolved hospital problems. *      Objective:   Patient Vitals for the past 24 hrs:   Temp Pulse Resp BP SpO2   07/23/22 1045 97.2 °F (36.2 °C) 94 18 89/62 99 %   07/23/22 0715 98 °F (36.7 °C) 99 20 100/70 --   07/23/22 0503 -- -- 18 -- --   07/23/22 0342 98.7 °F (37.1 °C) 93 16 101/62 95 %   07/23/22 0338 -- 93 -- 96/72 95 %   07/23/22 0010 -- -- 18 -- --   07/22/22 2258 98.4 °F (36.9 °C) 93 16 104/81 96 %   07/22/22 2054 -- 87 -- -- --   07/22/22 2026 -- -- 20 -- --   07/22/22 1926 98 °F (36.7 °C) 87 18 111/78 98 %   07/22/22 1729 -- 79 -- 122/77 --   07/22/22 1509 97.2 °F (36.2 °C) 85 19 (!) 88/55 97 %         Oxygen Therapy  SpO2: 99 %  Pulse via Oximetry: 91 beats per minute  Pulse Oximeter Device Mode: Intermittent  O2 Device: None (Room air)  O2 Flow Rate (L/min): 2 L/min    Estimated body mass index is 27.21 kg/m² as calculated from the following:    Height as of this encounter: 5' 11\" (1.803 m). Weight as of this encounter: 195 lb 1.7 oz (88.5 kg).     Intake/Output Summary (Last 24 hours) at 7/23/2022 1333  Last data filed at 7/23/2022 1102  Gross per 24 hour Intake 1190 ml   Output 4800 ml   Net -3610 ml           Physical Exam:     Blood pressure 89/62, pulse 94, temperature 97.2 °F (36.2 °C), temperature source Temporal, resp. rate 18, height 5' 11\" (1.803 m), weight 195 lb 1.7 oz (88.5 kg), SpO2 99 %. Physical Exam:   General-alert and oriented x3, cooperative and calm  Eyes-conjunctive are clear pupils equal round react to light extraocular muscles intact    Ears-no deformity-no drainage  Nares-no nasal deformity-no discharge-turbinates not significantly enlarged  Throat-oral mucosa moist, no erythema or exudate  Heart-no gross S3 gallop. No obvious HJR. No increased lower extremity edema. Lungs-clear auscultation palpation and percussion, symmetric excursion of the chest wall. No wheezing    Abdomen-soft, nontender, no gross percussible organomegaly. No gross distention. Bowel sounds present all 4 quadrants    Extremities-no significant edema, moves all extremities symmetrically. Skin-no obvious breakdown or significant rash  Neurologic-cranial nerves II through XII grossly intact, no focal motor deficits grossly. No tremor    Psychiatric-no suicidal ideations or homicidal ideations. Denies depressed thoughts. Lab/Data Review: All lab results for the last 24 hours reviewed.         I have reviewed ordered lab tests and independently visualized imaging below:    Recent Labs:  Recent Results (from the past 48 hour(s))   Troponin    Collection Time: 07/21/22  3:15 PM   Result Value Ref Range    Troponin, High Sensitivity 108.3 (HH) 0 - 14 pg/mL   Troponin    Collection Time: 07/21/22  9:27 PM   Result Value Ref Range    Troponin, High Sensitivity 105.6 (HH) 0 - 14 pg/mL   Basic Metabolic Panel    Collection Time: 07/21/22  9:27 PM   Result Value Ref Range    Sodium 136 136 - 145 mmol/L    Potassium 4.0 3.5 - 5.1 mmol/L    Chloride 103 98 - 107 mmol/L    CO2 29 21 - 32 mmol/L    Anion Gap 4 (L) 7 - 16 mmol/L    Glucose 113 (H) 65 - 100 mg/dL    BUN 15 6 - 23 MG/DL    Creatinine 1.20 0.8 - 1.5 MG/DL    GFR African American >60 >60 ml/min/1.73m2    GFR Non- >60 >60 ml/min/1.73m2    Calcium 8.1 (L) 8.3 - 10.4 MG/DL   Basic Metabolic Panel w/ Reflex to MG    Collection Time: 07/22/22  2:56 AM   Result Value Ref Range    Sodium 136 136 - 145 mmol/L    Potassium 3.8 3.5 - 5.1 mmol/L    Chloride 101 98 - 107 mmol/L    CO2 31 21 - 32 mmol/L    Anion Gap 4 (L) 7 - 16 mmol/L    Glucose 116 (H) 65 - 100 mg/dL    BUN 14 6 - 23 MG/DL    Creatinine 1.20 0.8 - 1.5 MG/DL    GFR African American >60 >60 ml/min/1.73m2    GFR Non- >60 >60 ml/min/1.73m2    Calcium 8.0 (L) 8.3 - 10.4 MG/DL   CBC with Auto Differential    Collection Time: 07/22/22  2:56 AM   Result Value Ref Range    WBC 5.2 4.3 - 11.1 K/uL    RBC 3.13 (L) 4.23 - 5.6 M/uL    Hemoglobin 10.4 (L) 13.6 - 17.2 g/dL    Hematocrit 31.7 (L) 41.1 - 50.3 %    .3 (H) 79.6 - 97.8 FL    MCH 33.2 (H) 26.1 - 32.9 PG    MCHC 32.8 31.4 - 35.0 g/dL    RDW 18.6 (H) 11.9 - 14.6 %    Platelets 097 (L) 423 - 450 K/uL    MPV 10.0 9.4 - 12.3 FL    nRBC 0.03 0.0 - 0.2 K/uL    Differential Type AUTOMATED      Seg Neutrophils 70 43 - 78 %    Lymphocytes 20 13 - 44 %    Monocytes 5 4.0 - 12.0 %    Eosinophils % 4 0.5 - 7.8 %    Basophils 0 0.0 - 2.0 %    Immature Granulocytes 2 0.0 - 5.0 %    Segs Absolute 3.6 1.7 - 8.2 K/UL    Absolute Lymph # 1.0 0.5 - 4.6 K/UL    Absolute Mono # 0.2 0.1 - 1.3 K/UL    Absolute Eos # 0.2 0.0 - 0.8 K/UL    Basophils Absolute 0.0 0.0 - 0.2 K/UL    Absolute Immature Granulocyte 0.1 0.0 - 0.5 K/UL   Magnesium    Collection Time: 07/22/22  2:56 AM   Result Value Ref Range    Magnesium 2.1 1.8 - 2.4 mg/dL   Troponin    Collection Time: 07/22/22  2:56 AM   Result Value Ref Range    Troponin, High Sensitivity 96.9 (H) 0 - 14 pg/mL   Basic Metabolic Panel    Collection Time: 07/23/22  3:34 AM   Result Value Ref Range    Sodium 136 136 - 145 mmol/L    Potassium 4.2 3.5 - 5.1 mmol/L    Chloride 102 98 - 107 mmol/L    CO2 30 21 - 32 mmol/L    Anion Gap 4 (L) 7 - 16 mmol/L    Glucose 112 (H) 65 - 100 mg/dL    BUN 11 6 - 23 MG/DL    Creatinine 1.20 0.8 - 1.5 MG/DL    GFR African American >60 >60 ml/min/1.73m2    GFR Non- >60 >60 ml/min/1.73m2    Calcium 8.3 8.3 - 10.4 MG/DL   CBC with Auto Differential    Collection Time: 07/23/22  3:34 AM   Result Value Ref Range    WBC 6.9 4.3 - 11.1 K/uL    RBC 3.15 (L) 4.23 - 5.6 M/uL    Hemoglobin 10.2 (L) 13.6 - 17.2 g/dL    Hematocrit 31.8 (L) 41.1 - 50.3 %    .0 (H) 79.6 - 97.8 FL    MCH 32.4 26.1 - 32.9 PG    MCHC 32.1 31.4 - 35.0 g/dL    RDW 18.3 (H) 11.9 - 14.6 %    Platelets 734 (L) 415 - 450 K/uL    MPV 10.5 9.4 - 12.3 FL    nRBC 0.00 0.0 - 0.2 K/uL    Differential Type AUTOMATED      Seg Neutrophils 78 43 - 78 %    Lymphocytes 11 (L) 13 - 44 %    Monocytes 6 4.0 - 12.0 %    Eosinophils % 4 0.5 - 7.8 %    Basophils 0 0.0 - 2.0 %    Immature Granulocytes 1 0.0 - 5.0 %    Segs Absolute 5.3 1.7 - 8.2 K/UL    Absolute Lymph # 0.8 0.5 - 4.6 K/UL    Absolute Mono # 0.4 0.1 - 1.3 K/UL    Absolute Eos # 0.3 0.0 - 0.8 K/UL    Basophils Absolute 0.0 0.0 - 0.2 K/UL    Absolute Immature Granulocyte 0.1 0.0 - 0.5 K/UL   Magnesium    Collection Time: 07/23/22  3:34 AM   Result Value Ref Range    Magnesium 2.0 1.8 - 2.4 mg/dL       Other Studies:  Vascular duplex lower extremity venous bilateral   Final Result   1. No evidence of right leg DVT. 2. DVT in the left popliteal and calf veins. Vascular duplex upper extremity venous left   Final Result   No evidence of left arm DVT. NM LUNG SCAN PERFUSION ONLY   Final Result   Low probability for pulmonary embolism.           Current Meds:  Current Facility-Administered Medications   Medication Dose Route Frequency    furosemide (LASIX) tablet 40 mg  40 mg Oral BID    spironolactone (ALDACTONE) tablet 25 mg  25 mg Oral Daily    morphine injection 2 mg  2 mg IntraVENous Q4H PRN apixaban (ELIQUIS) tablet 10 mg  10 mg Oral BID    [START ON 7/29/2022] apixaban (ELIQUIS) tablet 5 mg  5 mg Oral BID    pantoprazole (PROTONIX) tablet 40 mg  40 mg Oral QAM AC    aluminum & magnesium hydroxide-simethicone (MAALOX) 200-200-20 MG/5ML suspension 30 mL  30 mL Oral Q6H PRN    carvedilol (COREG) tablet 25 mg  25 mg Oral BID    prochlorperazine (COMPAZINE) tablet 5 mg  5 mg Oral Q6H PRN    albuterol sulfate HFA (PROVENTIL;VENTOLIN;PROAIR) 108 (90 Base) MCG/ACT inhaler 2 puff  2 puff Inhalation Q4H PRN    magnesium oxide (MAG-OX) tablet 400 mg  400 mg Oral Daily    potassium chloride (KLOR-CON M) extended release tablet 20 mEq  20 mEq Oral BID WC    thiamine tablet 100 mg  100 mg Oral Daily    losartan (COZAAR) tablet 25 mg  25 mg Oral Daily    sodium chloride flush 0.9 % injection 5-40 mL  5-40 mL IntraVENous 2 times per day    sodium chloride flush 0.9 % injection 5-40 mL  5-40 mL IntraVENous PRN    0.9 % sodium chloride infusion  25 mL IntraVENous PRN    ALPRAZolam (XANAX) tablet 1 mg  1 mg Oral TID PRN    amiodarone (CORDARONE) tablet 200 mg  200 mg Oral Daily    aspirin EC tablet 81 mg  81 mg Oral Daily    atorvastatin (LIPITOR) tablet 80 mg  80 mg Oral Daily    levalbuterol (XOPENEX) nebulization 0.63 mg  0.63 mg Nebulization Q4H PRN    HYDROcodone-acetaminophen (NORCO)  MG per tablet 1 tablet  1 tablet Oral Q6H PRN    sodium chloride flush 0.9 % injection 5-40 mL  5-40 mL IntraVENous 2 times per day    sodium chloride flush 0.9 % injection 5-40 mL  5-40 mL IntraVENous PRN    0.9 % sodium chloride infusion   IntraVENous PRN    ondansetron (ZOFRAN-ODT) disintegrating tablet 4 mg  4 mg Oral Q8H PRN    Or    ondansetron (ZOFRAN) injection 4 mg  4 mg IntraVENous Q6H PRN    polyethylene glycol (GLYCOLAX) packet 17 g  17 g Oral Daily PRN    acetaminophen (TYLENOL) tablet 650 mg  650 mg Oral Q6H PRN    Or    acetaminophen (TYLENOL) suppository 650 mg  650 mg Rectal Q6H PRN       Signed:  Radha Hopkins DO [place]    Part of this note may have been written by using a voice dictation software. The note has been proof read but may still contain some grammatical/other typographical errors.

## 2022-07-23 NOTE — PLAN OF CARE
Problem: Discharge Planning  Goal: Discharge to home or other facility with appropriate resources  Outcome: Progressing  Flowsheets (Taken 7/22/2022 0944 by Genevieve Crowley, RN)  Discharge to home or other facility with appropriate resources: Identify barriers to discharge with patient and caregiver     Problem: Safety - Adult  Goal: Free from fall injury  Outcome: Progressing  Flowsheets (Taken 7/22/2022 0950 by Genevieve Crowley, RN)  Free From Fall Injury: Instruct family/caregiver on patient safety     Problem: ABCDS Injury Assessment  Goal: Absence of physical injury  Outcome: Progressing  Flowsheets (Taken 7/22/2022 0950 by Genevieve Crowley RN)  Absence of Physical Injury: Implement safety measures based on patient assessment     Problem: Pain  Goal: Verbalizes/displays adequate comfort level or baseline comfort level  Outcome: Progressing  Flowsheets (Taken 7/22/2022 0944 by Genevieve Crowley, RN)  Verbalizes/displays adequate comfort level or baseline comfort level: Encourage patient to monitor pain and request assistance     Problem: Chronic Conditions and Co-morbidities  Goal: Patient's chronic conditions and co-morbidity symptoms are monitored and maintained or improved  Outcome: Progressing  Flowsheets (Taken 7/22/2022 0944 by Genevieve Crowley RN)  Care Plan - Patient's Chronic Conditions and Co-Morbidity Symptoms are Monitored and Maintained or Improved: Monitor and assess patient's chronic conditions and comorbid symptoms for stability, deterioration, or improvement

## 2022-07-23 NOTE — PROGRESS NOTES
This pt has a EF of 15% and has lasix 40 PO ordered BID, however his BP at 1615 was 78/56 and it is 83/59 now. Spoke with MD Irwin Ferrell and orders to hold PM Lasix dose.

## 2022-07-24 VITALS
DIASTOLIC BLOOD PRESSURE: 75 MMHG | TEMPERATURE: 97 F | HEIGHT: 71 IN | RESPIRATION RATE: 18 BRPM | SYSTOLIC BLOOD PRESSURE: 99 MMHG | HEART RATE: 90 BPM | BODY MASS INDEX: 27.31 KG/M2 | WEIGHT: 195.11 LBS | OXYGEN SATURATION: 98 %

## 2022-07-24 LAB
ANION GAP SERPL CALC-SCNC: 4 MMOL/L (ref 7–16)
BASOPHILS # BLD: 0 K/UL (ref 0–0.2)
BASOPHILS NFR BLD: 0 % (ref 0–2)
BUN SERPL-MCNC: 10 MG/DL (ref 6–23)
CALCIUM SERPL-MCNC: 8.5 MG/DL (ref 8.3–10.4)
CHLORIDE SERPL-SCNC: 104 MMOL/L (ref 98–107)
CO2 SERPL-SCNC: 29 MMOL/L (ref 21–32)
CREAT SERPL-MCNC: 1.2 MG/DL (ref 0.8–1.5)
DIFFERENTIAL METHOD BLD: ABNORMAL
EOSINOPHIL # BLD: 0.5 K/UL (ref 0–0.8)
EOSINOPHIL NFR BLD: 8 % (ref 0.5–7.8)
ERYTHROCYTE [DISTWIDTH] IN BLOOD BY AUTOMATED COUNT: 18.3 % (ref 11.9–14.6)
GLUCOSE SERPL-MCNC: 101 MG/DL (ref 65–100)
HCT VFR BLD AUTO: 31.7 % (ref 41.1–50.3)
HGB BLD-MCNC: 10.2 G/DL (ref 13.6–17.2)
IMM GRANULOCYTES # BLD AUTO: 0.1 K/UL (ref 0–0.5)
IMM GRANULOCYTES NFR BLD AUTO: 1 % (ref 0–5)
LYMPHOCYTES # BLD: 1 K/UL (ref 0.5–4.6)
LYMPHOCYTES NFR BLD: 15 % (ref 13–44)
MAGNESIUM SERPL-MCNC: 2.2 MG/DL (ref 1.8–2.4)
MCH RBC QN AUTO: 32.7 PG (ref 26.1–32.9)
MCHC RBC AUTO-ENTMCNC: 32.2 G/DL (ref 31.4–35)
MCV RBC AUTO: 101.6 FL (ref 79.6–97.8)
MONOCYTES # BLD: 0.6 K/UL (ref 0.1–1.3)
MONOCYTES NFR BLD: 10 % (ref 4–12)
NEUTS SEG # BLD: 4.4 K/UL (ref 1.7–8.2)
NEUTS SEG NFR BLD: 66 % (ref 43–78)
NRBC # BLD: 0 K/UL (ref 0–0.2)
PLATELET # BLD AUTO: 125 K/UL (ref 150–450)
PMV BLD AUTO: 10.2 FL (ref 9.4–12.3)
POTASSIUM SERPL-SCNC: 4.3 MMOL/L (ref 3.5–5.1)
RBC # BLD AUTO: 3.12 M/UL (ref 4.23–5.6)
SODIUM SERPL-SCNC: 137 MMOL/L (ref 136–145)
WBC # BLD AUTO: 6.6 K/UL (ref 4.3–11.1)

## 2022-07-24 PROCEDURE — 6370000000 HC RX 637 (ALT 250 FOR IP): Performed by: INTERNAL MEDICINE

## 2022-07-24 PROCEDURE — 6370000000 HC RX 637 (ALT 250 FOR IP): Performed by: HOSPITALIST

## 2022-07-24 PROCEDURE — 85025 COMPLETE CBC W/AUTO DIFF WBC: CPT

## 2022-07-24 PROCEDURE — 83735 ASSAY OF MAGNESIUM: CPT

## 2022-07-24 PROCEDURE — 6360000002 HC RX W HCPCS: Performed by: INTERNAL MEDICINE

## 2022-07-24 PROCEDURE — 6370000000 HC RX 637 (ALT 250 FOR IP): Performed by: FAMILY MEDICINE

## 2022-07-24 PROCEDURE — 80048 BASIC METABOLIC PNL TOTAL CA: CPT

## 2022-07-24 PROCEDURE — 2580000003 HC RX 258: Performed by: FAMILY MEDICINE

## 2022-07-24 PROCEDURE — 2580000003 HC RX 258: Performed by: INTERNAL MEDICINE

## 2022-07-24 RX ORDER — CARVEDILOL 12.5 MG/1
12.5 TABLET ORAL 2 TIMES DAILY
Qty: 60 TABLET | Refills: 0 | Status: SHIPPED | OUTPATIENT
Start: 2022-07-24

## 2022-07-24 RX ORDER — LOSARTAN POTASSIUM 25 MG/1
12.5 TABLET ORAL DAILY
Qty: 30 TABLET | Refills: 0 | Status: SHIPPED | OUTPATIENT
Start: 2022-07-25

## 2022-07-24 RX ORDER — LOSARTAN POTASSIUM 25 MG/1
12.5 TABLET ORAL DAILY
Status: DISCONTINUED | OUTPATIENT
Start: 2022-07-24 | End: 2022-07-24 | Stop reason: HOSPADM

## 2022-07-24 RX ORDER — SPIRONOLACTONE 25 MG/1
25 TABLET ORAL DAILY
Qty: 30 TABLET | Refills: 0 | Status: SHIPPED | OUTPATIENT
Start: 2022-07-25

## 2022-07-24 RX ORDER — HYDROCODONE BITARTRATE AND ACETAMINOPHEN 10; 325 MG/1; MG/1
1 TABLET ORAL EVERY 6 HOURS PRN
Qty: 16 TABLET | Refills: 0 | Status: SHIPPED | OUTPATIENT
Start: 2022-07-24 | End: 2022-07-28

## 2022-07-24 RX ORDER — POTASSIUM CHLORIDE 20 MEQ/1
20 TABLET, EXTENDED RELEASE ORAL 2 TIMES DAILY
Qty: 60 TABLET | Refills: 0 | Status: SHIPPED | OUTPATIENT
Start: 2022-07-24

## 2022-07-24 RX ORDER — LANOLIN ALCOHOL/MO/W.PET/CERES
400 CREAM (GRAM) TOPICAL DAILY
Qty: 30 TABLET | Refills: 0 | Status: SHIPPED | OUTPATIENT
Start: 2022-07-25

## 2022-07-24 RX ADMIN — ATORVASTATIN CALCIUM 80 MG: 80 TABLET, FILM COATED ORAL at 08:23

## 2022-07-24 RX ADMIN — LOSARTAN POTASSIUM 12.5 MG: 25 TABLET, FILM COATED ORAL at 10:57

## 2022-07-24 RX ADMIN — FUROSEMIDE 40 MG: 40 TABLET ORAL at 08:25

## 2022-07-24 RX ADMIN — POTASSIUM CHLORIDE 20 MEQ: 20 TABLET, EXTENDED RELEASE ORAL at 08:23

## 2022-07-24 RX ADMIN — HYDROCODONE BITARTRATE AND ACETAMINOPHEN 1 TABLET: 10; 325 TABLET ORAL at 11:01

## 2022-07-24 RX ADMIN — AMIODARONE HYDROCHLORIDE 200 MG: 200 TABLET ORAL at 08:23

## 2022-07-24 RX ADMIN — APIXABAN 10 MG: 5 TABLET, FILM COATED ORAL at 08:24

## 2022-07-24 RX ADMIN — PANTOPRAZOLE SODIUM 40 MG: 40 TABLET, DELAYED RELEASE ORAL at 06:12

## 2022-07-24 RX ADMIN — Medication 100 MG: at 08:24

## 2022-07-24 RX ADMIN — HYDROCODONE BITARTRATE AND ACETAMINOPHEN 1 TABLET: 10; 325 TABLET ORAL at 05:15

## 2022-07-24 RX ADMIN — MORPHINE SULFATE 2 MG: 2 INJECTION, SOLUTION INTRAMUSCULAR; INTRAVENOUS at 12:28

## 2022-07-24 RX ADMIN — SODIUM CHLORIDE, PRESERVATIVE FREE 5 ML: 5 INJECTION INTRAVENOUS at 08:28

## 2022-07-24 RX ADMIN — ASPIRIN 81 MG: 81 TABLET ORAL at 08:23

## 2022-07-24 RX ADMIN — SODIUM CHLORIDE, PRESERVATIVE FREE 5 ML: 5 INJECTION INTRAVENOUS at 08:27

## 2022-07-24 RX ADMIN — Medication 400 MG: at 08:25

## 2022-07-24 RX ADMIN — MORPHINE SULFATE 2 MG: 2 INJECTION, SOLUTION INTRAMUSCULAR; INTRAVENOUS at 08:25

## 2022-07-24 RX ADMIN — SPIRONOLACTONE 25 MG: 25 TABLET ORAL at 08:22

## 2022-07-24 RX ADMIN — CARVEDILOL 12.5 MG: 12.5 TABLET, FILM COATED ORAL at 08:24

## 2022-07-24 RX ADMIN — MORPHINE SULFATE 2 MG: 2 INJECTION, SOLUTION INTRAMUSCULAR; INTRAVENOUS at 03:13

## 2022-07-24 ASSESSMENT — PAIN DESCRIPTION - ORIENTATION
ORIENTATION: RIGHT;LEFT

## 2022-07-24 ASSESSMENT — PAIN DESCRIPTION - LOCATION
LOCATION: KNEE;LEG
LOCATION: LEG;CHEST;RIB CAGE
LOCATION: CHEST;LEG;RIB CAGE
LOCATION: CHEST;LEG;RIB CAGE
LOCATION: KNEE;LEG

## 2022-07-24 ASSESSMENT — PAIN - FUNCTIONAL ASSESSMENT
PAIN_FUNCTIONAL_ASSESSMENT: ACTIVITIES ARE NOT PREVENTED

## 2022-07-24 ASSESSMENT — PAIN DESCRIPTION - DESCRIPTORS
DESCRIPTORS: ACHING

## 2022-07-24 ASSESSMENT — PAIN DESCRIPTION - PAIN TYPE
TYPE: ACUTE PAIN

## 2022-07-24 ASSESSMENT — PAIN DESCRIPTION - FREQUENCY
FREQUENCY: CONTINUOUS

## 2022-07-24 ASSESSMENT — PAIN SCALES - GENERAL
PAINLEVEL_OUTOF10: 4
PAINLEVEL_OUTOF10: 4
PAINLEVEL_OUTOF10: 7
PAINLEVEL_OUTOF10: 4
PAINLEVEL_OUTOF10: 7
PAINLEVEL_OUTOF10: 4
PAINLEVEL_OUTOF10: 7
PAINLEVEL_OUTOF10: 6
PAINLEVEL_OUTOF10: 8
PAINLEVEL_OUTOF10: 7
PAINLEVEL_OUTOF10: 6

## 2022-07-24 ASSESSMENT — PAIN DESCRIPTION - ONSET
ONSET: ON-GOING

## 2022-07-24 NOTE — DISCHARGE SUMMARY
Hospitalist Discharge Summary   Admit Date:  2022 11:24 PM   DC Note date: 2022  Name:  Zenobia Hernandez   Age:  62 y.o. Sex:  male  :  1965   MRN:  [id number]   Room:  [facility]  PCP:  Adan Ferreira MD    Presenting Complaint: No chief complaint on file. Initial Admission Diagnosis: Chest pain [R07.9]     Problem List for this Hospitalization (present on admission):    Principal Problem:    Chest pain  Active Problems:    Nondisplaced fracture of medial condyle of right femur, subsequent encounter for closed fracture with routine healing    Acute deep vein thrombosis (DVT) of popliteal vein of left lower extremity (HCC)    Acute deep vein thrombosis (DVT) of calf muscle vein of left lower extremity ([address]    Encounter for palliative care    Advanced care planning/counseling discussion    Dyspnea    Hypomagnesemia    Hypokalemia    ICD (implantable cardioverter-defibrillator) discharge    Primary hypertension    Chronic systolic heart failure (HCC)    Alcohol dependence (HCC)    Chronic pain syndrome    Chronic bronchitis, unspecified chronic bronchitis type ([address]  Resolved Problems:    * No resolved hospital problems. *    Did Patient have Sepsis (YES OR NO): No    Hospital Course:  Mr. Rosalie Mary is a 54-year-old male history of chronic heart failure significantly reduced ejection fraction 15% estimate, hypertension presented with dyspnea worsening bilateral lower extremity edema elevated D-dimer. Unable to perform CTA on admission due to poor IV access and patient did require PICC line during hospital stay. He had a low probability VQ scan but was found to have deep vein thrombosis lower extremity left peroneal and calf muscle. Converted from Lovenox to Eliquis therapy during hospital stay. He has a known right knee medial femoral condylar fracture and is having significant amount of pain. Seen by orthopedics during hospital stay and no surgical intervention planned.   Seen and followed by cardiology during hospital stay and medications for systolic dysfunction adjusted including attenuated carvedilol and new ARB. He has a new prescription for magnesium oxide and increased potassium chloride supplementation regarding electrolyte disturbance. Past history of regular alcohol use but none recent and this was not an issue during his hospital stay. He did have acute on chronic congestive heart failure which resolved with aggressive IV diuresis. Some difficulty with borderline hypotension and meds required significant adjustment. He is to follow-up with primary care next 1 week, orthopedics as directed, and cardiology as scheduled. Note this patient follows with pain management and he was concerned about additional prescription for narcotic medication. He has required additional narcotic use secondary to his femoral condylar fracture. He was given a 4-day supply of home dosage of Norco at time of discharge and this is in addition to his maintenance therapy due to his condylar fracture. He appears stable for discharge. S  Follow Ups:   Follow-up Information     Dasia Rivero MD Follow up in 1 week(s). Specialty: Family Medicine  Contact information:  Vinny Bruno MD .    Specialty: Family Medicine  Contact information:  [address]  [phone]                       He is to follow-up with cardiology as scheduled he will call for appointment, he is to follow-up with orthopedics as scheduled and plans on calling for appointment tomorrow. Time spent in patient discharge and coordination 41 minutes minutes. Follow up labs/diagnostics (ultimately defer to outpatient provider):  Recommend close monitoring renal function and electrolytes    Plan was discussed with patient and staff. All questions answered. Patient was stable at time of discharge.   Instructions given to call a physician or return if any concerns. Current Discharge Medication List        START taking these medications    Details   apixaban (ELIQUIS) 5 MG TABS tablet Take two twice daily through 7/29/22 and on 7/30/22 start one by mouth twice daily. Qty: 72 tablet, Refills: 0      losartan (COZAAR) 25 MG tablet Take 0.5 tablets by mouth in the morning. Qty: 30 tablet, Refills: 0      magnesium oxide (MAG-OX) 400 (240 Mg) MG tablet Take 1 tablet by mouth in the morning. Qty: 30 tablet, Refills: 0           CONTINUE these medications which have CHANGED    Details   HYDROcodone-acetaminophen (NORCO)  MG per tablet Take 1 tablet by mouth every 6 hours as needed for Pain for up to 4 days. Qty: 16 tablet, Refills: 0    Comments: Reduce doses taken as pain becomes manageable  Associated Diagnoses: Closed nondisplaced fracture of condyle of femur with routine healing, unspecified laterality, subsequent encounter      carvedilol (COREG) 12.5 MG tablet Take 1 tablet by mouth in the morning and 1 tablet before bedtime. Qty: 60 tablet, Refills: 0      spironolactone (ALDACTONE) 25 MG tablet Take 1 tablet by mouth in the morning. Qty: 30 tablet, Refills: 0      potassium chloride (KLOR-CON M) 20 MEQ extended release tablet Take 1 tablet by mouth in the morning and 1 tablet before bedtime. Qty: 60 tablet, Refills: 0           CONTINUE these medications which have NOT CHANGED    Details   albuterol sulfate  (90 Base) MCG/ACT inhaler Inhale 2 puffs into the lungs every 4 hours as needed      ALPRAZolam (XANAX) 1 MG tablet Take 1 mg by mouth.       amiodarone (CORDARONE) 200 MG tablet Take 200 mg by mouth daily      aspirin 81 MG EC tablet Take 81 mg by mouth daily      atorvastatin (LIPITOR) 80 MG tablet Take 80 mg by mouth daily      azelastine (ASTELIN) 0.1 % nasal spray 1 spray by Nasal route 2 times daily      furosemide (LASIX) 40 MG tablet Take by mouth 2 times daily      levalbuterol (XOPENEX HFA) 45 MCG/ACT inhaler Inhale 2 puffs into the lungs every 4 hours as needed      naloxone 4 MG/0.1ML LIQD nasal spray Use 1 spray intranasally, then discard. Repeat with new spray every 2 min as needed for opioid overdose symptoms, alternating nostrils. promethazine (PHENERGAN) 25 MG tablet Take 25 mg by mouth every 6 hours as needed      rizatriptan (MAXALT-MLT) 10 MG disintegrating tablet TAKE ONE TABLET BY MOUTH ONCE AS NEEDED           STOP taking these medications       predniSONE 10 MG (48) TBPK Comments:   Reason for Stopping:         cyclobenzaprine (FLEXERIL) 10 MG tablet Comments:   Reason for Stopping:         guaiFENesin 200 MG/5ML LIQD Comments:   Reason for Stopping:         sildenafil (VIAGRA) 100 MG tablet Comments:   Reason for Stopping:               Procedures done this admission:  * No surgery found *    Consults this admission:  [address] [place] TO CARDIOLOGY  IP CONSULT TO PALLIATIVE CARE  FOLLOW UP VISIT    Echocardiogram results:  No results found for this or any previous visit. Diagnostic Imaging/Tests:   XR CHEST (2 VW)    Result Date: 7/6/2022  Hiatal hernia and retrocardiac atelectasis. XR KNEE RIGHT (3 VIEWS)    Result Date: 7/6/2022  1. No acute osseous or joint abnormalities. NM LUNG SCAN PERFUSION ONLY    Result Date: 7/20/2022  Low probability for pulmonary embolism. XR Knee Bilateral Standard    Result Date: 7/13/2022  See Progress note in the chart. XR CHEST PORTABLE    Result Date: 7/19/2022  Findings suggest worsening CHF. Short-term follow-up may be helpful      Vascular duplex lower extremity venous bilateral    Result Date: 7/21/2022  1. No evidence of right leg DVT. 2. DVT in the left popliteal and calf veins. Vascular duplex upper extremity venous left    Result Date: 7/21/2022  No evidence of left arm DVT. No results for input(s): CULTURE in the last 720 hours.     Labs: Results:       BMP, Mg, Phos Recent Labs 07/22/22  0256 07/23/22  0334 07/24/22  0500    136 137   K 3.8 4.2 4.3    102 104   CO2 31 30 29   ANIONGAP 4* 4* 4*   BUN 14 11 10   CREATININE 1.20 1.20 1.20   LABGLOM >60 >60 >60   GFRAA >60 >60 >60   CALCIUM 8.0* 8.3 8.5   GLUCOSE 116* 112* 101*   MG 2.1 2.0 2.2      CBC Recent Labs     07/22/22  0256 07/23/22  0334 07/24/22  0500   WBC 5.2 6.9 6.6   RBC 3.13* 3.15* 3.12*   HGB 10.4* 10.2* 10.2*   HCT 31.7* 31.8* 31.7*   .3* 101.0* 101.6*   MCH 33.2* 32.4 32.7   MCHC 32.8 32.1 32.2   RDW 18.6* 18.3* 18.3*   * 133* 125*   MPV 10.0 10.5 10.2   NRBC 0.03 0.00 0.00   SEGS 70 78 66   LYMPHOPCT 20 11* 15   EOSRELPCT 4 4 8*   MONOPCT 5 6 10   BASOPCT 0 0 0   IMMGRAN 2 1 1   SEGSABS 3.6 5.3 4.4   LYMPHSABS 1.0 0.8 1.0   EOSABS 0.2 0.3 0.5   MONOSABS 0.2 0.4 0.6   BASOSABS 0.0 0.0 0.0   ABSIMMGRAN 0.1 0.1 0.1      LFT No results for input(s): BILITOT, BILIDIR, ALKPHOS, AST, ALT, PROT, LABALBU, GLOB in the last 72 hours.    Cardiac  Lab Results   Component Value Date/Time    NTPROBNP 1,415 07/21/2022 03:33 AM    NTPROBNP 9,408 07/19/2022 06:36 PM    NTPROBNP 874 07/06/2022 08:18 PM    TROPHS 96.9 07/22/2022 02:56 AM    TROPHS 105.6 07/21/2022 09:27 PM    TROPHS 108.3 07/21/2022 03:15 PM      Coags Lab Results   Component Value Date/Time    PROTIME 14.2 05/05/2022 05:58 AM    PROTIME 13.5 05/05/2022 05:42 AM    PROTIME 11.7 07/07/2020 04:15 AM    PROTIME 12.4 07/06/2020 09:33 AM    INR 1.1 05/05/2022 05:58 AM    INR 1.1 05/05/2022 05:42 AM    INR 0.8 07/07/2020 04:15 AM    INR 0.9 07/06/2020 09:33 AM    APTT 28.6 08/24/2021 09:33 AM    APTT 25.9 07/07/2020 04:15 AM    APTT 29.0 07/06/2020 09:33 AM      A1c Lab Results   Component Value Date/Time    LABA1C 5.7 05/06/2022 05:47 AM    LABA1C 5.8 12/23/2021 11:08 AM     05/06/2022 05:47 AM     12/23/2021 11:08 AM      Lipids Lab Results   Component Value Date/Time    CHOL 125 05/06/2022 05:47 AM    LDLCALC 26.8 05/06/2022 05:47 AM LABVLDL 50.2 05/06/2022 05:47 AM    HDL 48 05/06/2022 05:47 AM    CHOLHDLRATIO 2.6 05/06/2022 05:47 AM    TRIG 251 05/06/2022 05:47 AM      Thyroid  No results found for: Clau Toscano     Most Recent UA Lab Results   Component Value Date/Time    WBCUA 0 07/02/2020 11:58 PM    RBCUA 0-3 07/02/2020 11:58 PM    BACTERIA 0 07/02/2020 11:58 PM          All Labs from Last 24 Hrs:  Recent Results (from the past 24 hour(s))   Basic Metabolic Panel    Collection Time: 07/24/22  5:00 AM   Result Value Ref Range    Sodium 137 136 - 145 mmol/L    Potassium 4.3 3.5 - 5.1 mmol/L    Chloride 104 98 - 107 mmol/L    CO2 29 21 - 32 mmol/L    Anion Gap 4 (L) 7 - 16 mmol/L    Glucose 101 (H) 65 - 100 mg/dL    BUN 10 6 - 23 MG/DL    Creatinine 1.20 0.8 - 1.5 MG/DL    GFR African American >60 >60 ml/min/1.73m2    GFR Non- >60 >60 ml/min/1.73m2    Calcium 8.5 8.3 - 10.4 MG/DL   Magnesium    Collection Time: 07/24/22  5:00 AM   Result Value Ref Range    Magnesium 2.2 1.8 - 2.4 mg/dL   CBC with Auto Differential    Collection Time: 07/24/22  5:00 AM   Result Value Ref Range    WBC 6.6 4.3 - 11.1 K/uL    RBC 3.12 (L) 4.23 - 5.6 M/uL    Hemoglobin 10.2 (L) 13.6 - 17.2 g/dL    Hematocrit 31.7 (L) 41.1 - 50.3 %    .6 (H) 79.6 - 97.8 FL    MCH 32.7 26.1 - 32.9 PG    MCHC 32.2 31.4 - 35.0 g/dL    RDW 18.3 (H) 11.9 - 14.6 %    Platelets 461 (L) 545 - 450 K/uL    MPV 10.2 9.4 - 12.3 FL    nRBC 0.00 0.0 - 0.2 K/uL    Differential Type AUTOMATED      Seg Neutrophils 66 43 - 78 %    Lymphocytes 15 13 - 44 %    Monocytes 10 4.0 - 12.0 %    Eosinophils % 8 (H) 0.5 - 7.8 %    Basophils 0 0.0 - 2.0 %    Immature Granulocytes 1 0.0 - 5.0 %    Segs Absolute 4.4 1.7 - 8.2 K/UL    Absolute Lymph # 1.0 0.5 - 4.6 K/UL    Absolute Mono # 0.6 0.1 - 1.3 K/UL    Absolute Eos # 0.5 0.0 - 0.8 K/UL    Basophils Absolute 0.0 0.0 - 0.2 K/UL    Absolute Immature Granulocyte 0.1 0.0 - 0.5 K/UL       No Known Allergies  Immunization History Administered Date(s) Administered    PPD Test 03/14/2017       Recent Vital Data:  Patient Vitals for the past 24 hrs:   Temp Pulse Resp BP SpO2   07/24/22 1228 -- -- 16 -- --   07/24/22 1120 97.6 °F (36.4 °C) 86 14 101/72 98 %   07/24/22 1118 -- 88 -- 87/75 98 %   07/24/22 1101 -- -- 16 -- --   07/24/22 0824 -- 90 -- -- --   07/24/22 0820 -- 94 -- (!) 135/93 --   07/24/22 0655 97.6 °F (36.4 °C) 87 16 119/89 96 %   07/24/22 0545 -- -- 18 -- --   07/24/22 0343 -- -- 20 -- --   07/24/22 0308 98.3 °F (36.8 °C) 98 20 101/70 96 %   07/23/22 2331 -- -- 18 -- --   07/23/22 2256 98.6 °F (37 °C) 90 18 107/65 96 %   07/23/22 2102 -- 91 -- -- --   07/23/22 1945 -- 91 -- (!) 91/59 --   07/23/22 1930 98.9 °F (37.2 °C) 90 18 (!) 80/59 96 %   07/23/22 1816 -- -- 18 -- --   07/23/22 1715 -- -- -- (!) 83/59 --   07/23/22 1615 98.2 °F (36.8 °C) 89 16 (!) 78/51 99 %       Oxygen Therapy  SpO2: 98 %  Pulse via Oximetry: 96 beats per minute  Pulse Oximeter Device Mode: Intermittent  O2 Device: None (Room air)  O2 Flow Rate (L/min): 2 L/min    Estimated body mass index is 27.21 kg/m² as calculated from the following:    Height as of this encounter: 5' 11\" (1.803 m). Weight as of this encounter: 195 lb 1.7 oz (88.5 kg). Intake/Output Summary (Last 24 hours) at 7/24/2022 1450  Last data filed at 7/24/2022 1335  Gross per 24 hour   Intake 701 ml   Output 3650 ml   Net -2949 ml         Physical Exam:    General:    Alert oriented cooperative no acute distress or new complaints. Head:  Normocephalic, atraumatic  Eyes:  Sclerae appear normal.  Pupils equally round. HENT:  Nares appear normal, no drainage. Moist mucous membranes  Neck:  No restricted ROM. Trachea midline  CV:   No gross changes. No HJR at time of exam.  No JVD at rest.  Possible chronic summation gallop. Regular rate and rhythm at time of exam  Lungs:   CTAB. No wheezing, rhonchi, or rales.   Respirations even, unlabored  Abdomen:   Soft, nontender, nondistended. Extremities: Warm and dry. No cyanosis or clubbing. No edema. Skin:     No rashes. Normal coloration  Neuro:  CN II-XII grossly intact. Psych:  Normal mood and affect. Signed:  Tonya Livingston DO    Part of this note may have been written by using a voice dictation software. The note has been proof read but may still contain some grammatical/other typographical errors.

## 2022-07-24 NOTE — PROGRESS NOTES
Discharge instructions reviewed with patient. Prescriptions given for meds  and med info sheets provided for all new medications. Opportunity for questions provided. patient voiced understanding of all discharge instructions.      PICC line removed

## 2022-07-24 NOTE — CARE COORDINATION
Patient is medically cleared for dc to home today. Chart screened by  for potential discharge needs or concerns. None identified or reported. Please notify/consult  if discharge needs arise. 07/24/22 1510   Service Assessment   History Provided By Medical Record   Primary Caregiver Self   Support Systems Spouse/Significant Other;Children   PCP Verified by CM Yes   Last Visit to PCP Within last 3 months   Prior Functional Level Independent in ADLs/IADLs   Current Functional Level Independent in ADLs/IADLs   Can patient return to prior living arrangement Yes   Ability to make needs known: Good   Family able to assist with home care needs: Yes   Social/Functional History   Lives With [address]   Ambulation Assistance Independent   Transfer Assistance Independent   Occupation On disability   Discharge Planning   Type of Residence House   Living Arrangements Spouse/Significant Other;Children   Current Services Prior To Admission None   Potential Assistance Needed N/A   DME Ordered? No   Potential Assistance Purchasing Medications No   Type of Home Care Services None   Patient expects to be discharged to: House   History of falls? [address] Discharge None   [employer] Information Provided? No   Mode of Transport at Discharge Other (see comment)  (family)   Confirm Follow Up Transport Family   Condition of Participation: Discharge Planning   The Plan for Transition of Care is related to the following treatment goals: Return home and back to baseline functioning.

## 2022-07-24 NOTE — PLAN OF CARE
Problem: Discharge Planning  Goal: Discharge to home or other facility with appropriate resources  7/24/2022 1006 by Liyah Myles RN  Outcome: Progressing  Flowsheets (Taken 7/24/2022 4451)  Discharge to home or other facility with appropriate resources:   Identify barriers to discharge with patient and caregiver   Arrange for needed discharge resources and transportation as appropriate   Identify discharge learning needs (meds, wound care, etc)   Refer to discharge planning if patient needs post-hospital services based on physician order or complex needs related to functional status, cognitive ability or social support system  7/23/2022 2228 by Wilma Stanley RN  Outcome: Progressing  Flowsheets (Taken 7/23/2022 0901 by Joshua Grover RN)  Discharge to home or other facility with appropriate resources: Identify barriers to discharge with patient and caregiver     Problem: Safety - Adult  Goal: Free from fall injury  7/24/2022 1006 by Liyah Myles RN  Outcome: Progressing  Flowsheets (Taken 7/24/2022 0824)  Free From Fall Injury:   Instruct family/caregiver on patient safety   Based on caregiver fall risk screen, instruct family/caregiver to ask for assistance with transferring infant if caregiver noted to have fall risk factors  7/23/2022 2228 by Wilma Stanley RN  Outcome: Progressing  [address] (Taken 7/23/2022 0914 by Joshua Grover RN)  Free From Fall Injury: Instruct family/caregiver on patient safety     Problem: ABCDS Injury Assessment  Goal: Absence of physical injury  7/24/2022 1006 by Liyah Myles RN  Outcome: Progressing  7/23/2022 2228 by Wilma Stanley RN  Outcome: Progressing  Flowsheets (Taken 7/23/2022 0914 by Joshua Grover RN)  Absence of Physical Injury: Implement safety measures based on patient assessment     Problem: Pain  Goal: Verbalizes/displays adequate comfort level or baseline comfort level  7/24/2022 1006 by Liyah Myles RN  Outcome: Progressing  Flowsheets (Taken 7/24/2022 0824)  Verbalizes/displays adequate comfort level or baseline comfort level:   Encourage patient to monitor pain and request assistance   Assess pain using appropriate pain scale   Administer analgesics based on type and severity of pain and evaluate response  7/23/2022 2228 by Alan Kyle RN  Outcome: Progressing  Flowsheets (Taken 7/23/2022 0901 by Lili Garcia, RN)  Verbalizes/displays adequate comfort level or baseline comfort level: Encourage patient to monitor pain and request assistance     Problem: Chronic Conditions and Co-morbidities  Goal: Patient's chronic conditions and co-morbidity symptoms are monitored and maintained or improved  7/24/2022 1006 by Alfonso Rodriguez RN  Outcome: Progressing  [address] (Taken 7/24/2022 0824)  Care Plan - Patient's Chronic Conditions and Co-Morbidity Symptoms are Monitored and Maintained or Improved:   Monitor and assess patient's chronic conditions and comorbid symptoms for stability, deterioration, or improvement   Collaborate with multidisciplinary team to address chronic and comorbid conditions and prevent exacerbation or deterioration   Update acute care plan with appropriate goals if chronic or comorbid symptoms are exacerbated and prevent overall improvement and discharge  7/23/2022 2228 by Alan Kyle RN  Outcome: Progressing  Flowsheets (Taken 7/23/2022 0901 by Lili Garcia, RN)  Care Plan - Patient's Chronic Conditions and Co-Morbidity Symptoms are Monitored and Maintained or Improved: Monitor and assess patient's chronic conditions and comorbid symptoms for stability, deterioration, or improvement

## 2022-07-24 NOTE — PLAN OF CARE
Problem: Discharge Planning  Goal: Discharge to home or other facility with appropriate resources  Outcome: Progressing  Flowsheets (Taken 7/23/2022 0901 by Emile Hoffmann, RN)  Discharge to home or other facility with appropriate resources: Identify barriers to discharge with patient and caregiver     Problem: Safety - Adult  Goal: Free from fall injury  Outcome: Progressing  Flowsheets (Taken 7/23/2022 0914 by Emile Hoffmann RN)  Free From Fall Injury: Instruct family/caregiver on patient safety     Problem: ABCDS Injury Assessment  Goal: Absence of physical injury  Outcome: Progressing  Flowsheets (Taken 7/23/2022 0914 by Emile Hoffmann RN)  Absence of Physical Injury: Implement safety measures based on patient assessment     Problem: Pain  Goal: Verbalizes/displays adequate comfort level or baseline comfort level  Outcome: Progressing  Flowsheets (Taken 7/23/2022 0901 by Emile Hoffmann RN)  Verbalizes/displays adequate comfort level or baseline comfort level: Encourage patient to monitor pain and request assistance     Problem: Chronic Conditions and Co-morbidities  Goal: Patient's chronic conditions and co-morbidity symptoms are monitored and maintained or improved  Outcome: Progressing  Flowsheets (Taken 7/23/2022 0901 by Emile Hoffmann RN)  Care Plan - Patient's Chronic Conditions and Co-Morbidity Symptoms are Monitored and Maintained or Improved: Monitor and assess patient's chronic conditions and comorbid symptoms for stability, deterioration, or improvement

## 2022-07-25 ENCOUNTER — CARE COORDINATION (OUTPATIENT)
Dept: CARE COORDINATION | Facility: CLINIC | Age: 57
End: 2022-07-25

## 2022-07-25 NOTE — CARE COORDINATION
Patient declined participation in [facility] outreach program.  Provided contact information for future needs, encouraged to call if decides to participate in [facility] outreach. Alma Verdugo MYCHAL [address] [place] [address] [phone].

## 2022-07-26 ENCOUNTER — TELEPHONE (OUTPATIENT)
Dept: CARDIOLOGY CLINIC | Age: 57
End: 2022-07-26

## 2022-07-26 NOTE — TELEPHONE ENCOUNTER
Pt discharged from [facility] 7/24/2022 following admission for CP and SOB. Chest pain resolved while in hospital.  On Eliquis for DVT of LLE. Medications:  Eliquis 5 mg bid until 7/30; then qd  Losartan 12.5 mg qd  Carvedilol 12.5 mg bid  Spirinolactone 25 mg qd  Klor-Con 20 mEq bid  Amiodarone 200 mg qd  ASA 91 mg qd  Lipitor 80 mg qd  Lasix 40  mg bid    Follow up with Dr. Kajal Altamirano 8/29/2022    Pt states he feels some chest discomfort in middle of chest, does not call it chest pain or pressure. Able to speak on telephone in full sentences. States sob no better. Reviewed all medications as above. Patient advised to continue to monitor symptoms and to call back if symptoms worsen.

## 2022-07-27 ENCOUNTER — TELEPHONE (OUTPATIENT)
Dept: ORTHOPEDIC SURGERY | Age: 57
End: 2022-07-27

## 2022-07-27 DIAGNOSIS — M25.561 PAIN IN BOTH KNEES, UNSPECIFIED CHRONICITY: Primary | ICD-10-CM

## 2022-07-27 DIAGNOSIS — M76.52 PATELLAR TENDINITIS OF LEFT KNEE: ICD-10-CM

## 2022-07-27 DIAGNOSIS — M25.562 PAIN IN BOTH KNEES, UNSPECIFIED CHRONICITY: Primary | ICD-10-CM

## 2022-07-27 NOTE — TELEPHONE ENCOUNTER
LM for pt to call the office back. Wanted to let pt know that [facility] was unable to order any pain meds for him. [facility] is also referring pt to Dr. Ramírez Moore. Samy Augustin is working on getting pt an apt with him ASAP.
Patient calling to talk with someone about his pain
No

## 2022-07-28 ENCOUNTER — TELEPHONE (OUTPATIENT)
Dept: ORTHOPEDIC SURGERY | Age: 57
End: 2022-07-28

## 2022-07-28 NOTE — TELEPHONE ENCOUNTER
This  pt  missed a couple  of  appts  from  the  ER  with  [facility]    I  have  spoken  with this  pt  and  left  him a  very  detailed  message  that  we are  scheduling  him  with  [facility]  ( per  dr Rick Armenta and  Pricila Martínez)  He  is  non surgical in a  knee brace and needed to  see malik/jay  not  tollison. For a  4 week  followup   I left details on  pts  voicemail  because  he  could  not  write down  the  new  appt  date  and  gave  him  the  phone  number  to  the  [facility]  office in case he  needed  directions  the  day  of  the  appt.     DANDRE bryant

## 2022-08-10 ENCOUNTER — OFFICE VISIT (OUTPATIENT)
Dept: ORTHOPEDIC SURGERY | Age: 57
End: 2022-08-10
Payer: MEDICARE

## 2022-08-10 ENCOUNTER — HOSPITAL ENCOUNTER (OUTPATIENT)
Dept: GENERAL RADIOLOGY | Age: 57
Discharge: HOME | End: 2022-08-12
Payer: MEDICARE

## 2022-08-10 DIAGNOSIS — M25.561 RIGHT KNEE PAIN, UNSPECIFIED CHRONICITY: Primary | ICD-10-CM

## 2022-08-10 DIAGNOSIS — M25.561 RIGHT KNEE PAIN, UNSPECIFIED CHRONICITY: ICD-10-CM

## 2022-08-10 PROCEDURE — G8419 CALC BMI OUT NRM PARAM NOF/U: HCPCS | Performed by: PHYSICIAN ASSISTANT

## 2022-08-10 PROCEDURE — 73564 X-RAY EXAM KNEE 4 OR MORE: CPT | Performed by: PHYSICIAN ASSISTANT

## 2022-08-10 PROCEDURE — 1036F TOBACCO NON-USER: CPT | Performed by: PHYSICIAN ASSISTANT

## 2022-08-10 PROCEDURE — G8428 CUR MEDS NOT DOCUMENT: HCPCS | Performed by: PHYSICIAN ASSISTANT

## 2022-08-10 PROCEDURE — 1111F DSCHRG MED/CURRENT MED MERGE: CPT | Performed by: PHYSICIAN ASSISTANT

## 2022-08-10 PROCEDURE — 3017F COLORECTAL CA SCREEN DOC REV: CPT | Performed by: PHYSICIAN ASSISTANT

## 2022-08-10 PROCEDURE — 99213 OFFICE O/P EST LOW 20 MIN: CPT | Performed by: PHYSICIAN ASSISTANT

## 2022-08-10 RX ORDER — CELECOXIB 200 MG/1
200 CAPSULE ORAL 2 TIMES DAILY
Qty: 60 CAPSULE | Refills: 3 | Status: SHIPPED | OUTPATIENT
Start: 2022-08-10 | End: 2022-09-09
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
## 2022-08-11 ENCOUNTER — TELEPHONE (OUTPATIENT)
Dept: ORTHOPEDIC SURGERY | Age: 57
End: 2022-08-11

## 2022-08-11 NOTE — TELEPHONE ENCOUNTER
Pt L/M on nurse line requesting a call back to discuss Prednisone script. States when he was released from the hospital it was CNXL and not sure why. He was asking for Dr Maddy George but I saw pt did see Froylan Titus on 8/10.  Please call him to discuss medication

## 2022-08-12 ENCOUNTER — TELEPHONE (OUTPATIENT)
Dept: ORTHOPEDIC SURGERY | Age: 57
End: 2022-08-12

## 2022-08-12 NOTE — TELEPHONE ENCOUNTER
Pt called was informed that we could see him back in office if pain was continuing at the expressed rate pt was describing. Pt was notified about [place] regulations in regards to pain medication. Pt currently in pain management and taking prescription narcotics. Pt upset in regards to him pain management care with us. Pt will call back if needing apt to follow up.

## 2022-08-15 ENCOUNTER — NURSE ONLY (OUTPATIENT)
Dept: CARDIOLOGY CLINIC | Age: 57
End: 2022-08-15

## 2022-08-15 ENCOUNTER — TELEPHONE (OUTPATIENT)
Dept: ORTHOPEDIC SURGERY | Age: 57
End: 2022-08-15

## 2022-08-15 RX ORDER — HYDROCODONE BITARTRATE AND ACETAMINOPHEN 10; 325 MG/1; MG/1
1 TABLET ORAL 4 TIMES DAILY
COMMUNITY
Start: 2022-04-21

## 2022-08-15 RX ORDER — PREDNISONE 1 MG/1
5 TABLET ORAL DAILY
COMMUNITY

## 2022-08-15 RX ORDER — CHOLESTYRAMINE 4 G/9G
1 POWDER, FOR SUSPENSION ORAL DAILY
COMMUNITY
Start: 2022-08-11

## 2022-08-15 RX ORDER — SILDENAFIL 100 MG/1
1 TABLET, FILM COATED ORAL AS NEEDED
COMMUNITY
Start: 2022-08-11

## 2022-08-15 RX ORDER — BACLOFEN 10 MG/1
10 TABLET ORAL 3 TIMES DAILY
COMMUNITY
Start: 2022-05-09

## 2022-08-15 NOTE — PROGRESS NOTES
tablet, Take 1 mg by mouth., Disp: , Rfl:     amiodarone (CORDARONE) 200 MG tablet, Take 200 mg by mouth daily, Disp: , Rfl:     aspirin 81 MG EC tablet, Take 81 mg by mouth daily, Disp: , Rfl:     atorvastatin (LIPITOR) 80 MG tablet, Take 80 mg by mouth daily, Disp: , Rfl:     azelastine (ASTELIN) 0.1 % nasal spray, 1 spray by Nasal route 2 times daily, Disp: , Rfl:     furosemide (LASIX) 40 MG tablet, Take by mouth 2 times daily, Disp: , Rfl:     levalbuterol (XOPENEX HFA) 45 MCG/ACT inhaler, Inhale 2 puffs into the lungs every 4 hours as needed, Disp: , Rfl:     naloxone 4 MG/0.1ML LIQD nasal spray, Use 1 spray intranasally, then discard. Repeat with new spray every 2 min as needed for opioid overdose symptoms, alternating nostrils. , Disp: , Rfl:     promethazine (PHENERGAN) 25 MG tablet, Take 25 mg by mouth every 6 hours as needed, Disp: , Rfl:     rizatriptan (MAXALT-MLT) 10 MG disintegrating tablet, TAKE ONE TABLET BY MOUTH ONCE AS NEEDED, Disp: , Rfl:

## 2022-08-16 ENCOUNTER — TELEPHONE (OUTPATIENT)
Dept: ORTHOPEDIC SURGERY | Age: 57
End: 2022-08-16

## 2022-08-16 RX ORDER — METHYLPREDNISOLONE 4 MG/1
TABLET ORAL
Qty: 1 KIT | Refills: 0 | Status: SHIPPED | OUTPATIENT
Start: 2022-08-16 | End: 2022-08-27

## 2022-08-19 ENCOUNTER — TELEPHONE (OUTPATIENT)
Dept: CARDIOLOGY CLINIC | Age: 57
End: 2022-08-19

## 2022-08-19 NOTE — TELEPHONE ENCOUNTER
Spoke with patient, per Dr. Asher Forrester, recommended he take 129 mg of Lasix bid x two days and report to the emergency room if no better. Patient verbalized understanding of instructions and states he will increase the Lasix.

## 2022-08-19 NOTE — TELEPHONE ENCOUNTER
Patient called and stated he is having shortness of breath, weak, dizzy side hurts. Patient said he has gained 12 pounds in 2 days.  Please call patient at [phone]

## 2022-08-19 NOTE — TELEPHONE ENCOUNTER
Bilateral swelling from ankles to knees, both legs x two days. Has gained 12 pounds in 2 days. Has taken 160  mg of Lasix today and yesterday with no change in symptoms. Obvious shortness of breath while talking on phone. States he is short of breath lying down and with rest.  Describes weakness. In the setting of weight gain and significant shortness of breath, I recommend patient proceed to emergency room which patient refuses. Has an appointment 8/29/2022 at 4:15 P.M. Medications:  Eliquis 5 mg qd  losartan 12.5 mg qd  carvedilol 12.5 mg bid  Spironolactone 25 mg qd  Amiodarone 200 m qd  Lasix 40 mg bid    Any recommendations in lieu of patient presenting to emergency room?

## 2022-08-19 NOTE — TELEPHONE ENCOUNTER
CORRECTION:  Dr. Drew Galdamez recommends patient take 120 mg bid of Lasix. Patient informed to take 120 mg of Lasix bid.

## 2022-08-25 ENCOUNTER — TELEPHONE (OUTPATIENT)
Dept: CARDIOLOGY CLINIC | Age: 57
End: 2022-08-25

## 2022-08-25 NOTE — TELEPHONE ENCOUNTER
Pt c/o very sob, cp, leg pain, swelling. States was up all night. Last time had these sx had clots in leg and lungs. Advise pt to proceed to ER, call EMS or get ride. Pt voiced understanding and agreement with POC. Has ride.  cgh

## 2022-08-28 ASSESSMENT — ENCOUNTER SYMPTOMS
PHOTOPHOBIA: 0
DIARRHEA: 0
CONSTIPATION: 0
ABDOMINAL PAIN: 0
ABDOMINAL DISTENTION: 0
SORE THROAT: 0

## 2022-08-28 NOTE — PROGRESS NOTES
[facility] CARDIOLOGY  [address], [address]  [facility], [address] [place] [address]  PHONE: [phone]        NAME:  Ute Abreu  : 1965  MRN: [id number]     PCP:  Reginaldo Tinoco MD      SUBJECTIVE:   Ute Abreu is a 62 y.o. male seen for a follow up visit regarding the following:     Chief Complaint   Patient presents with    Congestive Heart Failure    Shortness of Breath    Chest Pain       HPI:    He called recently with worsening shortness of breath, swelling, orthopnea, and chest pain. He was referred to the ER but decided not to go. He fell and fractured his patella recently and has been on a Medrol Dosepak and a prescription for prednisone 10 mg daily preceding the Dosepak. He presents today with severe hypertension, is obviously volume overloaded, with a compensatory sinus tachycardia. He states medication compliance but did not bring any medications in today. He states that he is taking 25 mg losartan and 80 mg twice daily Lasix. He is increased to 120 mg twice daily for the past few days without much improvement in diuresis. He is volume overloaded with uncontrolled blood pressure and heart rate. Interrogation shows sinus tachycardia but no malignant ventricular arrhythmias and no ICD shocks. I encouraged him to go to the ER but he does not want to. We will try to manage him medically, monitoring him closely with labs and quick follow-up. Past Medical History, Past Surgical History, Family history, Social History, and Medications were all reviewed with the patient today and updated as necessary. Current Outpatient Medications   Medication Sig Dispense Refill    HYDROcodone-acetaminophen (NORCO)  MG per tablet Take 1 tablet by mouth in the morning, at noon, in the evening, and at bedtime.       sildenafil (VIAGRA) 100 MG tablet Take 1 tablet by mouth as needed      baclofen (LIORESAL) 10 MG tablet Take 10 mg by mouth in the morning and 10 mg at noon and 10 mg before bedtime. cholestyramine (QUESTRAN) 4 g packet Take 1 packet by mouth in the morning. predniSONE (DELTASONE) 5 MG tablet Take 5 mg by mouth in the morning. Take as directed. celecoxib (CELEBREX) 200 MG capsule Take 1 capsule by mouth in the morning and 1 capsule before bedtime. 60 capsule 3    apixaban (ELIQUIS) 5 MG TABS tablet Take two twice daily through 7/29/22 and on 7/30/22 start one by mouth twice daily. 72 tablet 0    losartan (COZAAR) 25 MG tablet Take 0.5 tablets by mouth in the morning. 30 tablet 0    carvedilol (COREG) 12.5 MG tablet Take 1 tablet by mouth in the morning and 1 tablet before bedtime. 60 tablet 0    spironolactone (ALDACTONE) 25 MG tablet Take 1 tablet by mouth in the morning. 30 tablet 0    magnesium oxide (MAG-OX) 400 (240 Mg) MG tablet Take 1 tablet by mouth in the morning. 30 tablet 0    potassium chloride (KLOR-CON M) 20 MEQ extended release tablet Take 1 tablet by mouth in the morning and 1 tablet before bedtime. 60 tablet 0    albuterol sulfate  (90 Base) MCG/ACT inhaler Inhale 2 puffs into the lungs every 4 hours as needed      ALPRAZolam (XANAX) 1 MG tablet Take 1 mg by mouth daily. amiodarone (CORDARONE) 200 MG tablet Take 200 mg by mouth daily      aspirin 81 MG EC tablet Take 81 mg by mouth daily      atorvastatin (LIPITOR) 80 MG tablet Take 80 mg by mouth daily      azelastine (ASTELIN) 0.1 % nasal spray 1 spray by Nasal route as needed      furosemide (LASIX) 40 MG tablet Take by mouth 2 times daily      levalbuterol (XOPENEX HFA) 45 MCG/ACT inhaler Inhale 2 puffs into the lungs every 4 hours as needed      naloxone 4 MG/0.1ML LIQD nasal spray Use 1 spray intranasally, then discard. Repeat with new spray every 2 min as needed for opioid overdose symptoms, alternating nostrils.       promethazine (PHENERGAN) 25 MG tablet Take 25 mg by mouth every 6 hours as needed      rizatriptan (MAXALT-MLT) 10 MG disintegrating tablet TAKE ONE TABLET BY MOUTH ONCE AS NEEDED       No current facility-administered medications for this visit.             No Known Allergies    Patient Active Problem List    Diagnosis Date Noted    Acute deep vein thrombosis (DVT) of popliteal vein of left lower extremity ([facility] 07/21/2022     Priority: Medium    Acute deep vein thrombosis (DVT) of calf muscle vein of left lower extremity ([facility] 07/21/2022     Priority: Medium    Encounter for palliative care 07/21/2022     Priority: Medium    Advanced care planning/counseling discussion 07/21/2022     Priority: Medium    Dyspnea 07/21/2022     Priority: Medium    Nondisplaced fracture of medial condyle of right femur, subsequent encounter for closed fracture with routine healing 07/20/2022     Priority: Medium    Chest pain 07/19/2022     Priority: Medium    Drug-seeking behavior 05/09/2022    Chronic bronchitis, unspecified chronic bronchitis type ([address] 05/09/2022    Myoclonic jerking while sleeping 05/06/2022    Chronic pain syndrome 05/05/2022    NSVT (nonsustained ventricular tachycardia) ([facility] 03/04/2022    Hypomagnesemia 02/26/2022    ICD (implantable cardioverter-defibrillator) discharge 02/26/2022    Esophageal dysmotility 02/14/2022    Hiatal hernia 02/14/2022    Myalgia 09/14/2021    Gallstones 04/16/2021    CHF (congestive heart failure) ([facility] 10/02/2020    Hypokalemia 07/03/2020    Esophageal dysphagia 05/10/2019    SVT (supraventricular tachycardia) ([address] 12/22/2018    Alcohol dependence ([facility] 09/05/2018    Inspiratory stridor 03/21/2017    VIGIL (dyspnea on exertion) 03/25/2016    Non-ischemic cardiomyopathy ([address] 03/24/2016    Primary hypertension 11/29/2011    Chronic systolic heart failure ([address] 04/21/2011     Overview Note:     Last Assessment & Plan:   Formatting of this note might be different from the original.  -Appears euvolemic on exam, would use IVF cautiously  -Continue BB, ARB with hold parameters  -ECHO with EF 20-25%            Past Surgical History:   Procedure Laterality Date    CARDIAC CATHETERIZATION  9/21/10    PACEMAKER      defibrillator    UPPER GASTROINTESTINAL ENDOSCOPY  5/9/2019            Family History   Problem Relation Age of Onset    Rheum Arthritis Mother     Diabetes Father     Heart Disease Father         CABG        Social History     Tobacco Use    Smoking status: Never    Smokeless tobacco: Never   Substance Use Topics    Alcohol use: Yes       ROS:    Review of Systems   Constitutional:  Positive for fatigue. Negative for appetite change, chills and diaphoresis. HENT:  Negative for congestion, mouth sores, nosebleeds, sore throat and tinnitus. Eyes:  Negative for photophobia and visual disturbance. Respiratory:  Positive for cough, chest tightness, shortness of breath and wheezing. Cardiovascular:  Positive for leg swelling. Negative for chest pain and palpitations. Orthopnea   Gastrointestinal:  Negative for abdominal distention, abdominal pain, constipation and diarrhea. Endocrine: Negative for cold intolerance, heat intolerance, polydipsia and polyuria. Genitourinary:  Negative for dysuria and hematuria. Musculoskeletal:  Negative for arthralgias, joint swelling and myalgias. Skin:  Negative for rash. Allergic/Immunologic: Negative for environmental allergies and food allergies. Neurological:  Negative for dizziness, seizures, syncope and light-headedness. Hematological:  Negative for adenopathy. Does not bruise/bleed easily. Psychiatric/Behavioral:  Negative for agitation, behavioral problems, dysphoric mood and hallucinations. The patient is not nervous/anxious.        PHYSICAL EXAM:     Vitals:    08/29/22 1616 08/29/22 1621   BP: (!) 170/130 (!) 160/130   Pulse: (!) 110    Weight: 187 lb (84.8 kg)    Height: 5' 11\" (1.803 m)       Wt Readings from Last 3 Encounters:   08/29/22 187 lb (84.8 kg)   07/22/22 195 lb 1.7 oz (88.5 kg)   07/19/22 200 lb 4.8 oz (90.9 kg)      BP Readings from Last 3 Encounters:   08/29/22 (!) 160/130   07/24/22 99/75   07/19/22 (!) 133/99        Physical Exam  Constitutional:       Appearance: Normal appearance. He is normal weight. HENT:      Head: Normocephalic and atraumatic. Nose: Nose normal.      Mouth/Throat:      Mouth: Mucous membranes are moist.      Pharynx: Oropharynx is clear. Eyes:      Extraocular Movements: Extraocular movements intact. Pupils: Pupils are equal, round, and reactive to light. Neck:      Vascular: No carotid bruit or JVD. Comments: JVD 12 cm sitting bolt upright  Cardiovascular:      Rate and Rhythm: Normal rate and regular rhythm. Heart sounds: No murmur heard. No friction rub. No gallop. Comments: Tachycardic but regular  Pulmonary:      Effort: Pulmonary effort is normal.      Breath sounds: No wheezing or rhonchi. Comments: Coarse rhonchi bibasilarly  Abdominal:      General: Abdomen is flat. Bowel sounds are normal. There is no distension. Palpations: Abdomen is soft. Tenderness: There is no abdominal tenderness. Musculoskeletal:         General: No swelling. Normal range of motion. Cervical back: Normal range of motion and neck supple. No tenderness. Comments: 2+ pitting above the ankles, 2+ pedal pitting   Skin:     General: Skin is warm and dry. Neurological:      General: No focal deficit present. Mental Status: He is alert and oriented to person, place, and time. Mental status is at baseline. Psychiatric:         Mood and Affect: Mood normal.         Behavior: Behavior normal.        Medical problems and test results were reviewed with the patient today.        Lab Results   Component Value Date    CHOL 125 05/06/2022    CHOL 116 12/23/2021     Lab Results   Component Value Date    TRIG 251 (H) 05/06/2022    TRIG 1,273 (HH) 12/23/2021     Lab Results   Component Value Date    HDL 48 05/06/2022    HDL 75 12/23/2021     Lab Results   Component Value Date    LDLCALC 26.8 05/06/2022    [address] ([employer]) Comment (A) 12/23/2021     Lab Results   Component Value Date    LABVLDL 50.2 (H) 05/06/2022    VLDL Comment (A) 12/23/2021     Lab Results   Component Value Date    CHOLHDLRATIO 2.6 05/06/2022        Lab Results   Component Value Date/Time     07/24/2022 05:00 AM    K 4.3 07/24/2022 05:00 AM     07/24/2022 05:00 AM    CO2 29 07/24/2022 05:00 AM    BUN 10 07/24/2022 05:00 AM    CREATININE 1.20 07/24/2022 05:00 AM    GLUCOSE 101 07/24/2022 05:00 AM    CALCIUM 8.5 07/24/2022 05:00 AM         No results for input(s): WBC, HGB, HCT, MCV, PLT in the last 720 hours. Lab Results   Component Value Date    LABA1C 5.7 05/06/2022     Lab Results   Component Value Date     05/06/2022        Lab Results   Component Value Date     (H) 05/05/2022        No results found for: TSHFT4, TSH     No results found for any visits on 08/29/22. ASSESSMENT and PLAN     Diagnoses and all orders for this visit:      Chronic systolic heart failure (HCC)-worsening recently, multifactorial- EF chronically 20-25% -   dietary and medication compliance emphasized today. Check echo surveillance sometime soon on current medications. Emphasize compliance with meds as noted above. Non-ischemic cardiomyopathy (HCC)-acute on chronic systolic heart failure exacerbation, multifactorial, see below. Ventricular tachycardia ([address]- ICD in place, see below- no VT/shocks on recent interrogation. Essential hypertension with goal blood pressure less than 130/80-uncontrolled-volume overloaded and multiple steroid rounds recently. See below. ICD (implantable cardioverter-defibrillator) in place-compensatory sinus tachycardia today but no recent arrhythmias (no A. fib and no V. tach, no ICD shocks). Nausea and vomiting- recurrent on daily basis.   Referred at [facility] visit to [facility] for evaluation at his request.  No improvement in symptoms despite complete cessation of all medications for the past several weeks and with serial cessation of each  cardiac drug last year. Per GI. Cough/musculoskeletal chest wall pain- exacerbated by cough, can't sleep. Try muscle relaxer/flexeril. If excessive sedation with this, convert to robaxin. He will call. Plan:  Increase carvedilol to 25 mg twice daily  Increase losartan 25 mg twice daily  Continue Lasix 80 mg twice daily but add 3 days of Zaroxolyn 5 mg every morning, 30 minutes prior to morning Lasix  Continue low-dose Aldactone  Check BMP and magnesium today and in 2 weeks  Stay off prednisone/steroids for now  Minimize sodium, 64 ounce fluid restriction  Echo soon  Follow-up with me in 2 weeks after labs  Encouraged him to go to the ER with worsening symptoms despite the above plan, at which time we will admit him for intravenous diuretic and medical maximization. He is resistant but promises that he will call and go to the ER if outpatient therapy as noted above does not improve his symptoms rapidly    Return in about 2 weeks (around 9/12/2022).          Obi Siddiqui MD  8/29/2022  4:41 PM

## 2022-08-29 ENCOUNTER — OFFICE VISIT (OUTPATIENT)
Dept: CARDIOLOGY CLINIC | Age: 57
End: 2022-08-29
Payer: COMMERCIAL

## 2022-08-29 VITALS
WEIGHT: 187 LBS | HEART RATE: 110 BPM | DIASTOLIC BLOOD PRESSURE: 130 MMHG | HEIGHT: 71 IN | SYSTOLIC BLOOD PRESSURE: 160 MMHG | BODY MASS INDEX: 26.18 KG/M2

## 2022-08-29 DIAGNOSIS — I42.8 NON-ISCHEMIC CARDIOMYOPATHY (HCC): ICD-10-CM

## 2022-08-29 DIAGNOSIS — I47.29 NSVT (NONSUSTAINED VENTRICULAR TACHYCARDIA): ICD-10-CM

## 2022-08-29 DIAGNOSIS — I10 PRIMARY HYPERTENSION: ICD-10-CM

## 2022-08-29 DIAGNOSIS — I47.1 SVT (SUPRAVENTRICULAR TACHYCARDIA) (HCC): ICD-10-CM

## 2022-08-29 DIAGNOSIS — I50.22 CHRONIC SYSTOLIC HEART FAILURE (HCC): Primary | ICD-10-CM

## 2022-08-29 PROCEDURE — G8419 CALC BMI OUT NRM PARAM NOF/U: HCPCS | Performed by: INTERNAL MEDICINE

## 2022-08-29 PROCEDURE — 1036F TOBACCO NON-USER: CPT | Performed by: INTERNAL MEDICINE

## 2022-08-29 PROCEDURE — 3017F COLORECTAL CA SCREEN DOC REV: CPT | Performed by: INTERNAL MEDICINE

## 2022-08-29 PROCEDURE — G8427 DOCREV CUR MEDS BY ELIG CLIN: HCPCS | Performed by: INTERNAL MEDICINE

## 2022-08-29 PROCEDURE — 99214 OFFICE O/P EST MOD 30 MIN: CPT | Performed by: INTERNAL MEDICINE

## 2022-08-29 ASSESSMENT — ENCOUNTER SYMPTOMS
CHEST TIGHTNESS: 1
WHEEZING: 1
SHORTNESS OF BREATH: 1
COUGH: 1

## 2022-08-30 RX ORDER — METOLAZONE 5 MG/1
TABLET ORAL
Qty: 15 TABLET | Refills: 0 | Status: SHIPPED | OUTPATIENT
Start: 2022-08-30

## 2022-09-19 NOTE — TELEPHONE ENCOUNTER
Patient called stating he would like a refill for the following:    Cindy Mcmahon) 5 MG TABS tablet  #90 day [facility] [place] [address]  [address]    [phone]

## 2022-09-19 NOTE — TELEPHONE ENCOUNTER
Eliquis 5mg BID verified in 8/29/22 office note.  Medication pended & sent to Dr. Master Richardson for approval. //pg

## 2022-10-11 ENCOUNTER — TELEPHONE (OUTPATIENT)
Dept: CARDIOLOGY CLINIC | Age: 57
End: 2022-10-11

## 2022-10-11 NOTE — TELEPHONE ENCOUNTER
Pt c/o worsening sob. Near syncope after shower on Sat. Pt asking for Eliquis samples and pt assistance. States $100. Confirmed will check for samples and Tyrell CARVER MA has a note about getting help with Eliquis costs. Pt winded and sob on phone. Encourage pt to rest, elevate feet. Pt denies LE edema. Taking Lasix 2-3x/day. \"Whatever is written on the paper\".  cgh

## 2022-10-11 NOTE — TELEPHONE ENCOUNTER
Pt cannot afford his Eliquis and would like to know if there is a coupon we could give him to make the medication more affordable.

## 2022-10-11 NOTE — TELEPHONE ENCOUNTER
Informed pt of Eliquis samples. Will place at [facility] [place]. Pt voiced understanding. States has been out of Eliquis for awhile. Advise ER for worsening sob. Informed pt clots in lungs, not taking Eliquis can be serious/critical situation that needs to be addressed in ER. Pt voiced understanding. He states Dr. Cornel Harrell is probably mad at him for not coming to FU appt. Informed pt Dr. Cornel Harrell not angry but difficult to treat pt if he doesn't show up. Pt states it's his fault. Advise  samples, monitor sx, to ER, prn and keep 11/2/22 FU Dr. Cornel Harrell. Pt voiced understanding and agreement with POC. [facility]    X4 boxes Eliquis 5mg placed at . Lot [id number]  Exp Jan 2025.  [facility]

## 2022-10-11 NOTE — TELEPHONE ENCOUNTER
Pt has been having labored breathing, weakness, fatigue and dizziness for about 3 days. States he almost passed out when he got out of the shower either Saturday or Sunday. Pt also having intermittent hour-long CP. States it feels like pressure and stinging in his chest and he has to sit up in order to catch his breath. Pt also states he had a \"major\" headache the other day and that he hasn't had a headache in a while. Pt also called about Eliquis. States he cannot afford the medication and needs either a coupon or samples because he has blood clots in his lungs.

## 2022-10-14 ENCOUNTER — TELEPHONE (OUTPATIENT)
Dept: CARDIOLOGY CLINIC | Age: 57
End: 2022-10-14

## 2022-10-14 RX ORDER — SPIRONOLACTONE 25 MG/1
25 TABLET ORAL DAILY
Qty: 90 TABLET | Refills: 3 | Status: CANCELLED | OUTPATIENT
Start: 2022-10-14

## 2022-10-14 RX ORDER — POTASSIUM CHLORIDE 20 MEQ/1
20 TABLET, EXTENDED RELEASE ORAL 2 TIMES DAILY
Qty: 180 TABLET | Refills: 3 | Status: CANCELLED | OUTPATIENT
Start: 2022-10-14

## 2022-10-23 ENCOUNTER — APPOINTMENT (OUTPATIENT)
Dept: GENERAL RADIOLOGY | Age: 57
End: 2022-10-23
Payer: COMMERCIAL

## 2022-10-23 ENCOUNTER — HOSPITAL ENCOUNTER (EMERGENCY)
Age: 57
Discharge: HOME | End: 2022-10-24
Attending: EMERGENCY MEDICINE
Payer: COMMERCIAL

## 2022-10-23 DIAGNOSIS — R07.9 CHEST PAIN, UNSPECIFIED TYPE: Primary | ICD-10-CM

## 2022-10-23 DIAGNOSIS — K29.00 ACUTE GASTRITIS WITHOUT HEMORRHAGE, UNSPECIFIED GASTRITIS TYPE: ICD-10-CM

## 2022-10-23 LAB
ALBUMIN SERPL-MCNC: 3.8 G/DL (ref 3.5–5)
ALBUMIN/GLOB SERPL: 1.7 {RATIO} (ref 0.4–1.6)
ALP SERPL-CCNC: 83 U/L (ref 45–117)
ALT SERPL-CCNC: 32 U/L (ref 13–61)
ANION GAP SERPL CALC-SCNC: 15 MMOL/L (ref 2–11)
AST SERPL-CCNC: 62 U/L (ref 15–37)
BILIRUB SERPL-MCNC: 0.6 MG/DL (ref 0.2–1.1)
BUN SERPL-MCNC: 17 MG/DL (ref 7–18)
CALCIUM SERPL-MCNC: 8.5 MG/DL (ref 8.3–10.4)
CHLORIDE SERPL-SCNC: 102 MMOL/L (ref 98–107)
CO2 SERPL-SCNC: 25 MMOL/L (ref 21–32)
CREAT SERPL-MCNC: 0.98 MG/DL (ref 0.8–1.5)
ERYTHROCYTE [DISTWIDTH] IN BLOOD BY AUTOMATED COUNT: 18.7 % (ref 11.9–14.6)
GLOBULIN SER CALC-MCNC: 2.3 G/DL (ref 2.8–4.5)
GLUCOSE SERPL-MCNC: 110 MG/DL (ref 65–100)
HCT VFR BLD AUTO: 34.9 % (ref 41.1–50.3)
HGB BLD-MCNC: 11.7 G/DL (ref 13.6–17.2)
LIPASE SERPL-CCNC: 15 U/L (ref 13–60)
MCH RBC QN AUTO: 28.6 PG (ref 26.1–32.9)
MCHC RBC AUTO-ENTMCNC: 33.5 G/DL (ref 31.4–35)
MCV RBC AUTO: 85.3 FL (ref 82–102)
NRBC # BLD: 0.04 K/UL (ref 0–0.2)
PLATELET # BLD AUTO: 303 K/UL (ref 150–450)
PMV BLD AUTO: 9.1 FL (ref 9.4–12.3)
POTASSIUM SERPL-SCNC: 4.3 MMOL/L (ref 3.5–5.1)
PROT SERPL-MCNC: 6.1 G/DL (ref 6.4–8.2)
RBC # BLD AUTO: 4.09 M/UL (ref 4.23–5.6)
SODIUM SERPL-SCNC: 142 MMOL/L (ref 133–143)
TROPONIN T SERPL HS-MCNC: 16.1 NG/L (ref 0–22)
WBC # BLD AUTO: 8 K/UL (ref 4.3–11.1)

## 2022-10-23 PROCEDURE — 85027 COMPLETE CBC AUTOMATED: CPT

## 2022-10-23 PROCEDURE — 99284 EMERGENCY DEPT VISIT MOD MDM: CPT

## 2022-10-23 PROCEDURE — 6360000002 HC RX W HCPCS: Performed by: EMERGENCY MEDICINE

## 2022-10-23 PROCEDURE — 96374 THER/PROPH/DIAG INJ IV PUSH: CPT

## 2022-10-23 PROCEDURE — 80053 COMPREHEN METABOLIC PANEL: CPT

## 2022-10-23 PROCEDURE — 83690 ASSAY OF LIPASE: CPT

## 2022-10-23 PROCEDURE — 71046 X-RAY EXAM CHEST 2 VIEWS: CPT

## 2022-10-23 PROCEDURE — 84484 ASSAY OF TROPONIN QUANT: CPT

## 2022-10-23 RX ORDER — PROCHLORPERAZINE EDISYLATE 5 MG/ML
5 INJECTION INTRAMUSCULAR; INTRAVENOUS ONCE
Status: COMPLETED | OUTPATIENT
Start: 2022-10-23 | End: 2022-10-23

## 2022-10-23 RX ADMIN — PROCHLORPERAZINE EDISYLATE 5 MG: 5 INJECTION INTRAMUSCULAR; INTRAVENOUS at 23:15

## 2022-10-23 ASSESSMENT — ENCOUNTER SYMPTOMS
COUGH: 1
NAUSEA: 1
ABDOMINAL PAIN: 0
VOMITING: 0
ABDOMINAL DISTENTION: 1
DIARRHEA: 1
FACIAL SWELLING: 0
SHORTNESS OF BREATH: 1

## 2022-10-23 ASSESSMENT — PAIN DESCRIPTION - DESCRIPTORS: DESCRIPTORS: SHARP;TIGHTNESS

## 2022-10-23 ASSESSMENT — PAIN DESCRIPTION - PAIN TYPE: TYPE: ACUTE PAIN

## 2022-10-23 ASSESSMENT — PAIN - FUNCTIONAL ASSESSMENT: PAIN_FUNCTIONAL_ASSESSMENT: 0-10

## 2022-10-23 ASSESSMENT — PAIN SCALES - GENERAL: PAINLEVEL_OUTOF10: 8

## 2022-10-23 ASSESSMENT — PAIN DESCRIPTION - ORIENTATION: ORIENTATION: LEFT

## 2022-10-23 ASSESSMENT — PAIN DESCRIPTION - FREQUENCY: FREQUENCY: INTERMITTENT

## 2022-10-23 ASSESSMENT — PAIN DESCRIPTION - LOCATION: LOCATION: CHEST

## 2022-10-24 VITALS
WEIGHT: 185 LBS | HEART RATE: 88 BPM | BODY MASS INDEX: 25.9 KG/M2 | SYSTOLIC BLOOD PRESSURE: 133 MMHG | HEIGHT: 71 IN | RESPIRATION RATE: 20 BRPM | TEMPERATURE: 98.4 F | DIASTOLIC BLOOD PRESSURE: 106 MMHG | OXYGEN SATURATION: 97 %

## 2022-10-24 LAB — TROPONIN T SERPL HS-MCNC: 15 NG/L (ref 0–22)

## 2022-10-24 PROCEDURE — 84484 ASSAY OF TROPONIN QUANT: CPT

## 2022-10-24 PROCEDURE — 6370000000 HC RX 637 (ALT 250 FOR IP): Performed by: EMERGENCY MEDICINE

## 2022-10-24 RX ORDER — LIDOCAINE HYDROCHLORIDE 20 MG/ML
15 SOLUTION OROPHARYNGEAL
Status: COMPLETED | OUTPATIENT
Start: 2022-10-24 | End: 2022-10-24

## 2022-10-24 RX ORDER — MAGNESIUM HYDROXIDE/ALUMINUM HYDROXICE/SIMETHICONE 120; 1200; 1200 MG/30ML; MG/30ML; MG/30ML
30 SUSPENSION ORAL
Status: COMPLETED | OUTPATIENT
Start: 2022-10-24 | End: 2022-10-24

## 2022-10-24 RX ADMIN — ALUMINUM HYDROXIDE, MAGNESIUM HYDROXIDE, AND SIMETHICONE 30 ML: 200; 200; 20 SUSPENSION ORAL at 01:14

## 2022-10-24 RX ADMIN — LIDOCAINE HYDROCHLORIDE 15 ML: 20 SOLUTION ORAL at 01:14
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
## 2022-10-24 NOTE — ED NOTES
I have reviewed discharge instructions with the patient. The patient verbalized understanding. Patient left ED via Discharge Method: ambulatory to Home with home. Opportunity for questions and clarification provided. Patient given 0 scripts. To continue your aftercare when you leave the hospital, you may receive an automated call from our care team to check in on how you are doing. This is a free service and part of our promise to provide the best care and service to meet your aftercare needs.  If you have questions, or wish to unsubscribe from this service please call [phone]. Thank you for Choosing our [address] [place] [address] Emergency Department.         Francheska Peña RN  10/24/22 [id number]

## 2022-10-24 NOTE — ED TRIAGE NOTES
Arrives ambulatory with steady gait to room 2. Reports intermittent left sided chest pain non-radiating. Onset approx 1.5 hours pta. Describes pain as \"sharp and tight\". Associated shortness of breath, slight cough, nausea, diarrhea, diaphoresis. Followed by dr David Dickerson.

## 2022-11-03 ENCOUNTER — OFFICE VISIT (OUTPATIENT)
Dept: FAMILY MEDICINE CLINIC | Facility: CLINIC | Age: 57
End: 2022-11-03
Payer: COMMERCIAL

## 2022-11-03 VITALS
BODY MASS INDEX: 25.06 KG/M2 | WEIGHT: 179 LBS | SYSTOLIC BLOOD PRESSURE: 130 MMHG | DIASTOLIC BLOOD PRESSURE: 80 MMHG | HEIGHT: 71 IN

## 2022-11-03 DIAGNOSIS — I42.8 NON-ISCHEMIC CARDIOMYOPATHY (HCC): Primary | ICD-10-CM

## 2022-11-03 DIAGNOSIS — I50.42 CHRONIC COMBINED SYSTOLIC AND DIASTOLIC CONGESTIVE HEART FAILURE (HCC): ICD-10-CM

## 2022-11-03 DIAGNOSIS — J20.9 ACUTE BRONCHITIS, UNSPECIFIED ORGANISM: ICD-10-CM

## 2022-11-03 DIAGNOSIS — Z45.02 ICD (IMPLANTABLE CARDIOVERTER-DEFIBRILLATOR) DISCHARGE: ICD-10-CM

## 2022-11-03 DIAGNOSIS — F41.9 ANXIETY: ICD-10-CM

## 2022-11-03 DIAGNOSIS — I10 PRIMARY HYPERTENSION: ICD-10-CM

## 2022-11-03 DIAGNOSIS — I82.432 ACUTE DEEP VEIN THROMBOSIS (DVT) OF POPLITEAL VEIN OF LEFT LOWER EXTREMITY (HCC): Primary | ICD-10-CM

## 2022-11-03 DIAGNOSIS — I50.22 CHRONIC SYSTOLIC HEART FAILURE (HCC): ICD-10-CM

## 2022-11-03 DIAGNOSIS — E87.6 HYPOKALEMIA: ICD-10-CM

## 2022-11-03 DIAGNOSIS — M62.838 MUSCLE SPASM: ICD-10-CM

## 2022-11-03 DIAGNOSIS — R11.0 NAUSEA: ICD-10-CM

## 2022-11-03 DIAGNOSIS — E83.42 HYPOMAGNESEMIA: ICD-10-CM

## 2022-11-03 PROCEDURE — G8420 CALC BMI NORM PARAMETERS: HCPCS | Performed by: FAMILY MEDICINE

## 2022-11-03 PROCEDURE — 3078F DIAST BP <80 MM HG: CPT | Performed by: FAMILY MEDICINE

## 2022-11-03 PROCEDURE — G8427 DOCREV CUR MEDS BY ELIG CLIN: HCPCS | Performed by: FAMILY MEDICINE

## 2022-11-03 PROCEDURE — G8484 FLU IMMUNIZE NO ADMIN: HCPCS | Performed by: FAMILY MEDICINE

## 2022-11-03 PROCEDURE — 3017F COLORECTAL CA SCREEN DOC REV: CPT | Performed by: FAMILY MEDICINE

## 2022-11-03 PROCEDURE — 99214 OFFICE O/P EST MOD 30 MIN: CPT | Performed by: FAMILY MEDICINE

## 2022-11-03 PROCEDURE — 1036F TOBACCO NON-USER: CPT | Performed by: FAMILY MEDICINE

## 2022-11-03 PROCEDURE — 3074F SYST BP LT 130 MM HG: CPT | Performed by: FAMILY MEDICINE

## 2022-11-03 RX ORDER — BACLOFEN 10 MG/1
10 TABLET ORAL 2 TIMES DAILY
Qty: 60 TABLET | Refills: 0 | Status: SHIPPED | OUTPATIENT
Start: 2022-11-03

## 2022-11-03 RX ORDER — DOXYCYCLINE HYCLATE 100 MG
100 TABLET ORAL 2 TIMES DAILY
Qty: 14 TABLET | Refills: 0 | Status: SHIPPED | OUTPATIENT
Start: 2022-11-03 | End: 2022-11-10

## 2022-11-03 RX ORDER — PROMETHAZINE HYDROCHLORIDE 25 MG/1
25 TABLET ORAL EVERY 6 HOURS PRN
Qty: 120 TABLET | Refills: 1 | Status: SHIPPED | OUTPATIENT
Start: 2022-11-03

## 2022-11-03 RX ORDER — ALPRAZOLAM 1 MG/1
1 TABLET ORAL DAILY
Qty: 90 TABLET | Refills: 1 | Status: SHIPPED | OUTPATIENT
Start: 2022-11-03 | End: 2023-02-01

## 2022-11-03 ASSESSMENT — ENCOUNTER SYMPTOMS
COUGH: 1
BLURRED VISION: 0
EYE DISCHARGE: 0
ORTHOPNEA: 0
EYE REDNESS: 0

## 2022-11-03 ASSESSMENT — PATIENT HEALTH QUESTIONNAIRE - PHQ9
SUM OF ALL RESPONSES TO PHQ QUESTIONS 1-9: 0
SUM OF ALL RESPONSES TO PHQ9 QUESTIONS 1 & 2: 0
SUM OF ALL RESPONSES TO PHQ QUESTIONS 1-9: 0
2. FEELING DOWN, DEPRESSED OR HOPELESS: 0
1. LITTLE INTEREST OR PLEASURE IN DOING THINGS: 0
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
## 2022-11-21 NOTE — ED TRIAGE NOTES
Patient arrives ambulatory to triage with mask in place. Patient reports coughing for 1.5 weeks. Reports lower extemity swelling and has recently double his diuretic. Now having chest pain. Pacemaker/defibrillator present. Vaccinated for covid.
no

## 2022-12-05 ENCOUNTER — TELEPHONE (OUTPATIENT)
Dept: CARDIOLOGY CLINIC | Age: 57
End: 2022-12-05

## 2022-12-06 PROCEDURE — 93296 REM INTERROG EVL PM/IDS: CPT | Performed by: INTERNAL MEDICINE

## 2022-12-06 PROCEDURE — 93295 DEV INTERROG REMOTE 1/2/MLT: CPT | Performed by: INTERNAL MEDICINE
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
## 2022-12-08 ENCOUNTER — TELEPHONE (OUTPATIENT)
Dept: CARDIOLOGY CLINIC | Age: 57
End: 2022-12-08

## 2022-12-08 DIAGNOSIS — I50.9 CHF (CONGESTIVE HEART FAILURE) (HCC): Primary | ICD-10-CM

## 2022-12-08 NOTE — TELEPHONE ENCOUNTER
MEDICATION REFILL REQUEST      Name of Medication:  Amiodarone  Dose:  200 mg  Frequency:  1 tab daily  Quantity:  ?  Days' supply:  ?       Pharmacy Name/Location:  [facility] in [facility]

## 2022-12-09 RX ORDER — AMIODARONE HYDROCHLORIDE 200 MG/1
200 TABLET ORAL DAILY
Qty: 30 TABLET | Refills: 1 | Status: SHIPPED | OUTPATIENT
Start: 2022-12-09 | End: 2022-12-09 | Stop reason: SDUPTHER

## 2022-12-09 RX ORDER — AMIODARONE HYDROCHLORIDE 200 MG/1
200 TABLET ORAL DAILY
Qty: 30 TABLET | Refills: 1 | Status: SHIPPED | OUTPATIENT
Start: 2022-12-09

## 2022-12-09 NOTE — TELEPHONE ENCOUNTER
Called pt and he stated that he has an appt with Dr Anshul White on Tuesday and will bring in the paperwork.

## 2022-12-09 NOTE — TELEPHONE ENCOUNTER
He needs this drug because he had recurrent life-threatening V. tach. However if he has not had recent TSH, LFTs, chest x-ray, and PFTs in the past 6 months, go ahead and order those for amiodarone surveillance and emphasized that he get these checked. I cannot continue amiodarone unless he continues with compliance and getting labs and imaging. Emphasized this to him.

## 2022-12-11 ASSESSMENT — ENCOUNTER SYMPTOMS
WHEEZING: 1
SORE THROAT: 0
ABDOMINAL DISTENTION: 0
ABDOMINAL PAIN: 0
PHOTOPHOBIA: 0
DIARRHEA: 0
CHEST TIGHTNESS: 1
SHORTNESS OF BREATH: 1
COUGH: 1
CONSTIPATION: 0
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
## 2022-12-13 ENCOUNTER — HOSPITAL ENCOUNTER (OUTPATIENT)
Dept: GENERAL RADIOLOGY | Age: 57
Discharge: HOME | End: 2022-12-16

## 2022-12-13 ENCOUNTER — OFFICE VISIT (OUTPATIENT)
Dept: CARDIOLOGY CLINIC | Age: 57
End: 2022-12-13
Payer: MEDICARE

## 2022-12-13 VITALS
HEIGHT: 71 IN | BODY MASS INDEX: 16.62 KG/M2 | DIASTOLIC BLOOD PRESSURE: 100 MMHG | WEIGHT: 118.7 LBS | HEART RATE: 88 BPM | SYSTOLIC BLOOD PRESSURE: 144 MMHG

## 2022-12-13 DIAGNOSIS — I50.22 CHRONIC SYSTOLIC HEART FAILURE (HCC): ICD-10-CM

## 2022-12-13 DIAGNOSIS — I10 PRIMARY HYPERTENSION: ICD-10-CM

## 2022-12-13 DIAGNOSIS — I47.29 NSVT (NONSUSTAINED VENTRICULAR TACHYCARDIA): ICD-10-CM

## 2022-12-13 DIAGNOSIS — I50.9 CHF (CONGESTIVE HEART FAILURE) (HCC): ICD-10-CM

## 2022-12-13 DIAGNOSIS — Z45.02 ICD (IMPLANTABLE CARDIOVERTER-DEFIBRILLATOR) DISCHARGE: ICD-10-CM

## 2022-12-13 DIAGNOSIS — I50.22 CHRONIC SYSTOLIC HEART FAILURE (HCC): Primary | ICD-10-CM

## 2022-12-13 DIAGNOSIS — I42.8 NON-ISCHEMIC CARDIOMYOPATHY (HCC): ICD-10-CM

## 2022-12-13 DIAGNOSIS — I47.1 SVT (SUPRAVENTRICULAR TACHYCARDIA) (HCC): ICD-10-CM

## 2022-12-13 LAB
ALBUMIN SERPL-MCNC: 3.2 G/DL (ref 3.5–5)
ALBUMIN/GLOB SERPL: 1.2 (ref 0.4–1.6)
ALP SERPL-CCNC: 94 U/L (ref 50–136)
ALT SERPL-CCNC: 18 U/L (ref 12–65)
ANION GAP SERPL CALC-SCNC: 11 MMOL/L (ref 2–11)
AST SERPL-CCNC: 25 U/L (ref 15–37)
BILIRUB SERPL-MCNC: 0.8 MG/DL (ref 0.2–1.1)
BUN SERPL-MCNC: 4 MG/DL (ref 6–23)
CALCIUM SERPL-MCNC: 8.2 MG/DL (ref 8.3–10.4)
CHLORIDE SERPL-SCNC: 108 MMOL/L (ref 101–110)
CO2 SERPL-SCNC: 23 MMOL/L (ref 21–32)
CREAT SERPL-MCNC: 1 MG/DL (ref 0.8–1.5)
ERYTHROCYTE [DISTWIDTH] IN BLOOD BY AUTOMATED COUNT: 19.7 % (ref 11.9–14.6)
GLOBULIN SER CALC-MCNC: 2.6 G/DL (ref 2.8–4.5)
GLUCOSE SERPL-MCNC: 81 MG/DL (ref 65–100)
HCT VFR BLD AUTO: 34.4 % (ref 41.1–50.3)
HGB BLD-MCNC: 10.9 G/DL (ref 13.6–17.2)
MAGNESIUM SERPL-MCNC: 1.7 MG/DL (ref 1.8–2.4)
MCH RBC QN AUTO: 30.7 PG (ref 26.1–32.9)
MCHC RBC AUTO-ENTMCNC: 31.7 G/DL (ref 31.4–35)
MCV RBC AUTO: 96.9 FL (ref 82–102)
NRBC # BLD: 0 K/UL (ref 0–0.2)
PLATELET # BLD AUTO: 275 K/UL (ref 150–450)
PMV BLD AUTO: 9.8 FL (ref 9.4–12.3)
POTASSIUM SERPL-SCNC: 3.7 MMOL/L (ref 3.5–5.1)
PROT SERPL-MCNC: 5.8 G/DL (ref 6.3–8.2)
RBC # BLD AUTO: 3.55 M/UL (ref 4.23–5.6)
SODIUM SERPL-SCNC: 142 MMOL/L (ref 133–143)
TSH, 3RD GENERATION: 3.34 UIU/ML (ref 0.36–3.74)
WBC # BLD AUTO: 4.2 K/UL (ref 4.3–11.1)

## 2022-12-13 PROCEDURE — 3017F COLORECTAL CA SCREEN DOC REV: CPT | Performed by: INTERNAL MEDICINE

## 2022-12-13 PROCEDURE — 3078F DIAST BP <80 MM HG: CPT | Performed by: INTERNAL MEDICINE

## 2022-12-13 PROCEDURE — G8484 FLU IMMUNIZE NO ADMIN: HCPCS | Performed by: INTERNAL MEDICINE

## 2022-12-13 PROCEDURE — G8427 DOCREV CUR MEDS BY ELIG CLIN: HCPCS | Performed by: INTERNAL MEDICINE

## 2022-12-13 PROCEDURE — 1036F TOBACCO NON-USER: CPT | Performed by: INTERNAL MEDICINE

## 2022-12-13 PROCEDURE — 3074F SYST BP LT 130 MM HG: CPT | Performed by: INTERNAL MEDICINE

## 2022-12-13 PROCEDURE — 99214 OFFICE O/P EST MOD 30 MIN: CPT | Performed by: INTERNAL MEDICINE

## 2022-12-13 PROCEDURE — G8419 CALC BMI OUT NRM PARAM NOF/U: HCPCS | Performed by: INTERNAL MEDICINE

## 2022-12-13 RX ORDER — LOSARTAN POTASSIUM 25 MG/1
25 TABLET ORAL DAILY
Qty: 90 TABLET | Refills: 3 | Status: SHIPPED | OUTPATIENT
Start: 2022-12-13

## 2022-12-13 RX ORDER — CARVEDILOL 25 MG/1
25 TABLET ORAL 2 TIMES DAILY
Qty: 180 TABLET | Refills: 3 | Status: SHIPPED | OUTPATIENT
Start: 2022-12-13

## 2022-12-22 ENCOUNTER — TELEPHONE (OUTPATIENT)
Dept: CARDIOLOGY CLINIC | Age: 57
End: 2022-12-22

## 2022-12-22 NOTE — TELEPHONE ENCOUNTER
Pt states the swelling in his hands and legs is getting worse. Pt states that it has been going on for over a week. Pt also states that he as a cough. Pt would appreciate a call back.

## 2022-12-22 NOTE — TELEPHONE ENCOUNTER
Pt.is calling reporting he has a  dry cough and is swollen in hands and BLE's to about mid calf. Has not been weighing daily. He is taking Lasix 80mg bid ,Spironolactone 50mg qday,and he took a Metolazone 5mg prior to am Lasix the last few days (had a previous prescription and had a few left)He has 4 of these left. He just is not getting better please advise. Abigail Iqbal

## 2023-01-09 ENCOUNTER — TELEPHONE (OUTPATIENT)
Dept: CARDIOLOGY CLINIC | Age: 58
End: 2023-01-09

## 2023-01-09 DIAGNOSIS — I47.29 NSVT (NONSUSTAINED VENTRICULAR TACHYCARDIA): ICD-10-CM

## 2023-01-09 DIAGNOSIS — I50.9 CHF (CONGESTIVE HEART FAILURE) (HCC): ICD-10-CM

## 2023-01-09 DIAGNOSIS — I42.8 NON-ISCHEMIC CARDIOMYOPATHY (HCC): Primary | ICD-10-CM

## 2023-01-09 DIAGNOSIS — Z45.02 ICD (IMPLANTABLE CARDIOVERTER-DEFIBRILLATOR) DISCHARGE: ICD-10-CM

## 2023-01-09 NOTE — TELEPHONE ENCOUNTER
Patient called stating he would appreciate samples of and a coupon for :    (ELIQUIS) 5 MG TABS tablet      Patient states he would like to pick these up at the [facility] ([facility]) office. Please call when these are placed at the front check in desk.

## 2023-01-10 NOTE — TELEPHONE ENCOUNTER
Samples of Eliquis 2.5 mg 2 bid, shay, patient assistance, left at the  of the [employer] by [employer]. Called patient and left voice mail to return call to Vitaliy Owen.

## 2023-01-11 DIAGNOSIS — I47.29 NSVT (NONSUSTAINED VENTRICULAR TACHYCARDIA): ICD-10-CM

## 2023-01-11 DIAGNOSIS — I42.8 NON-ISCHEMIC CARDIOMYOPATHY (HCC): ICD-10-CM

## 2023-01-11 DIAGNOSIS — Z45.02 ICD (IMPLANTABLE CARDIOVERTER-DEFIBRILLATOR) DISCHARGE: Primary | ICD-10-CM

## 2023-01-11 DIAGNOSIS — I50.9 CHF (CONGESTIVE HEART FAILURE) (HCC): ICD-10-CM

## 2023-01-11 DIAGNOSIS — Z45.02 ICD (IMPLANTABLE CARDIOVERTER-DEFIBRILLATOR) DISCHARGE: ICD-10-CM

## 2023-01-17 ENCOUNTER — TELEPHONE (OUTPATIENT)
Dept: CARDIOLOGY CLINIC | Age: 58
End: 2023-01-17

## 2023-01-17 NOTE — TELEPHONE ENCOUNTER
I would probably avoid given his cardiac issues. That is not FDA monitored and I have no clue what is in that bottle. Ultimately it is up to him but if it were me I would avoid. His testosterone is low, I would prefer he take prescription medication which is monitored and prescribed by physician.

## 2023-01-20 ASSESSMENT — ENCOUNTER SYMPTOMS
COUGH: 1
PHOTOPHOBIA: 0
CONSTIPATION: 0
SHORTNESS OF BREATH: 1
WHEEZING: 1
ABDOMINAL PAIN: 0
DIARRHEA: 0
ABDOMINAL DISTENTION: 0
CHEST TIGHTNESS: 1
SORE THROAT: 0
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
## 2023-01-23 ENCOUNTER — OFFICE VISIT (OUTPATIENT)
Dept: CARDIOLOGY CLINIC | Age: 58
End: 2023-01-23
Payer: MEDICARE

## 2023-01-23 VITALS
WEIGHT: 178 LBS | HEART RATE: 75 BPM | BODY MASS INDEX: 24.83 KG/M2 | DIASTOLIC BLOOD PRESSURE: 78 MMHG | SYSTOLIC BLOOD PRESSURE: 120 MMHG

## 2023-01-23 DIAGNOSIS — I42.8 NON-ISCHEMIC CARDIOMYOPATHY (HCC): ICD-10-CM

## 2023-01-23 DIAGNOSIS — I47.1 SVT (SUPRAVENTRICULAR TACHYCARDIA) (HCC): ICD-10-CM

## 2023-01-23 DIAGNOSIS — I47.29 NSVT (NONSUSTAINED VENTRICULAR TACHYCARDIA): ICD-10-CM

## 2023-01-23 DIAGNOSIS — I50.22 CHRONIC SYSTOLIC HEART FAILURE (HCC): Primary | ICD-10-CM

## 2023-01-23 DIAGNOSIS — I10 PRIMARY HYPERTENSION: ICD-10-CM

## 2023-01-23 PROCEDURE — 3078F DIAST BP <80 MM HG: CPT | Performed by: INTERNAL MEDICINE

## 2023-01-23 PROCEDURE — 3017F COLORECTAL CA SCREEN DOC REV: CPT | Performed by: INTERNAL MEDICINE

## 2023-01-23 PROCEDURE — 99214 OFFICE O/P EST MOD 30 MIN: CPT | Performed by: INTERNAL MEDICINE

## 2023-01-23 PROCEDURE — 1036F TOBACCO NON-USER: CPT | Performed by: INTERNAL MEDICINE

## 2023-01-23 PROCEDURE — G8420 CALC BMI NORM PARAMETERS: HCPCS | Performed by: INTERNAL MEDICINE

## 2023-01-23 PROCEDURE — G8428 CUR MEDS NOT DOCUMENT: HCPCS | Performed by: INTERNAL MEDICINE

## 2023-01-23 PROCEDURE — G8484 FLU IMMUNIZE NO ADMIN: HCPCS | Performed by: INTERNAL MEDICINE

## 2023-01-23 PROCEDURE — 3074F SYST BP LT 130 MM HG: CPT | Performed by: INTERNAL MEDICINE

## 2023-01-23 PROCEDURE — 93000 ELECTROCARDIOGRAM COMPLETE: CPT | Performed by: INTERNAL MEDICINE

## 2023-01-23 RX ORDER — SIMVASTATIN 20 MG
TABLET ORAL
COMMUNITY
Start: 2023-01-08

## 2023-01-24 ENCOUNTER — TELEPHONE (OUTPATIENT)
Dept: CARDIOLOGY CLINIC | Age: 58
End: 2023-01-24

## 2023-01-24 ENCOUNTER — OFFICE VISIT (OUTPATIENT)
Dept: FAMILY MEDICINE CLINIC | Facility: CLINIC | Age: 58
End: 2023-01-24
Payer: MEDICARE

## 2023-01-24 VITALS
WEIGHT: 180 LBS | SYSTOLIC BLOOD PRESSURE: 132 MMHG | BODY MASS INDEX: 25.2 KG/M2 | HEIGHT: 71 IN | DIASTOLIC BLOOD PRESSURE: 88 MMHG

## 2023-01-24 DIAGNOSIS — F10.20 UNCOMPLICATED ALCOHOL DEPENDENCE (HCC): ICD-10-CM

## 2023-01-24 DIAGNOSIS — I20.8 STABLE ANGINA PECTORIS (HCC): ICD-10-CM

## 2023-01-24 DIAGNOSIS — E29.1 HYPOGONADISM IN MALE: Primary | ICD-10-CM

## 2023-01-24 DIAGNOSIS — J42 CHRONIC BRONCHITIS, UNSPECIFIED CHRONIC BRONCHITIS TYPE (HCC): ICD-10-CM

## 2023-01-24 DIAGNOSIS — I82.432 ACUTE DEEP VEIN THROMBOSIS (DVT) OF POPLITEAL VEIN OF LEFT LOWER EXTREMITY (HCC): ICD-10-CM

## 2023-01-24 DIAGNOSIS — K74.69 OTHER CIRRHOSIS OF LIVER (HCC): ICD-10-CM

## 2023-01-24 DIAGNOSIS — R11.0 NAUSEA: ICD-10-CM

## 2023-01-24 PROBLEM — I20.89 STABLE ANGINA PECTORIS: Status: ACTIVE | Noted: 2023-01-24

## 2023-01-24 PROCEDURE — 1036F TOBACCO NON-USER: CPT | Performed by: FAMILY MEDICINE

## 2023-01-24 PROCEDURE — G8484 FLU IMMUNIZE NO ADMIN: HCPCS | Performed by: FAMILY MEDICINE

## 2023-01-24 PROCEDURE — 3075F SYST BP GE 130 - 139MM HG: CPT | Performed by: FAMILY MEDICINE

## 2023-01-24 PROCEDURE — G8427 DOCREV CUR MEDS BY ELIG CLIN: HCPCS | Performed by: FAMILY MEDICINE

## 2023-01-24 PROCEDURE — 3023F SPIROM DOC REV: CPT | Performed by: FAMILY MEDICINE

## 2023-01-24 PROCEDURE — 99214 OFFICE O/P EST MOD 30 MIN: CPT | Performed by: FAMILY MEDICINE

## 2023-01-24 PROCEDURE — 3079F DIAST BP 80-89 MM HG: CPT | Performed by: FAMILY MEDICINE

## 2023-01-24 PROCEDURE — 3017F COLORECTAL CA SCREEN DOC REV: CPT | Performed by: FAMILY MEDICINE

## 2023-01-24 PROCEDURE — G8419 CALC BMI OUT NRM PARAM NOF/U: HCPCS | Performed by: FAMILY MEDICINE

## 2023-01-24 RX ORDER — PROMETHAZINE HYDROCHLORIDE 25 MG/1
25 TABLET ORAL EVERY 6 HOURS PRN
Qty: 120 TABLET | Refills: 1 | Status: SHIPPED | OUTPATIENT
Start: 2023-01-24

## 2023-01-24 RX ORDER — TESTOSTERONE CYPIONATE 200 MG/ML
200 INJECTION INTRAMUSCULAR
Status: DISCONTINUED | OUTPATIENT
Start: 2023-01-24 | End: 2023-01-24

## 2023-01-24 RX ORDER — TESTOSTERONE CYPIONATE 200 MG/ML
200 INJECTION INTRAMUSCULAR
Qty: 10 ML | Refills: 0 | Status: SHIPPED | OUTPATIENT
Start: 2023-01-24 | End: 2025-07-03

## 2023-01-24 ASSESSMENT — ENCOUNTER SYMPTOMS
EYES NEGATIVE: 1
NAUSEA: 1
EYE DISCHARGE: 0
RESPIRATORY NEGATIVE: 1
EYE PAIN: 0

## 2023-01-24 ASSESSMENT — PATIENT HEALTH QUESTIONNAIRE - PHQ9
SUM OF ALL RESPONSES TO PHQ QUESTIONS 1-9: 0
SUM OF ALL RESPONSES TO PHQ9 QUESTIONS 1 & 2: 0
2. FEELING DOWN, DEPRESSED OR HOPELESS: 0
SUM OF ALL RESPONSES TO PHQ QUESTIONS 1-9: 0
SUM OF ALL RESPONSES TO PHQ QUESTIONS 1-9: 0
1. LITTLE INTEREST OR PLEASURE IN DOING THINGS: 0
SUM OF ALL RESPONSES TO PHQ QUESTIONS 1-9: 0
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
## 2023-01-26 LAB — TESTOST SERPL-MCNC: 1128 NG/DL (ref 264–916)

## 2023-02-08 ENCOUNTER — TELEPHONE (OUTPATIENT)
Dept: CARDIOLOGY CLINIC | Age: 58
End: 2023-02-08

## 2023-02-08 NOTE — TELEPHONE ENCOUNTER
Pt called back stating that the pt has checked the monitor and its on his bedside.  Everything is hocked up and every seems normal.

## 2023-02-09 RX ORDER — AMIODARONE HYDROCHLORIDE 200 MG/1
200 TABLET ORAL DAILY
Qty: 30 TABLET | Refills: 11 | Status: SHIPPED | OUTPATIENT
Start: 2023-02-09

## 2023-02-09 NOTE — TELEPHONE ENCOUNTER
[employer] ICD report from today noting no episodes. NO lead issues. Stable ICD findings. Would recommend ER.

## 2023-02-09 NOTE — TELEPHONE ENCOUNTER
Pt reports chest pain on the left side of his chest near the pacemaker insertion site x 2 days. Reports it radiates down the left arm and upwards to his neck. Pain is rated 7/10 and lasts for 10-15 seconds. May recur within one-half hour. Experiences SOB with pain. Advised he present to ER, patient refuses. Will ask device clinic to interrogate pacemaker.

## 2023-02-09 NOTE — TELEPHONE ENCOUNTER
Spoke with patient - advised him of findings from Device. Advised pt to report to ER for chest pain. Pt refuses to proceed to ER.

## 2023-02-21 RX ORDER — METOLAZONE 5 MG/1
TABLET ORAL
Qty: 15 TABLET | Refills: 6 | Status: SHIPPED | OUTPATIENT
Start: 2023-02-21

## 2023-02-21 RX ORDER — SILDENAFIL 100 MG/1
100 TABLET, FILM COATED ORAL AS NEEDED
Qty: 10 TABLET | Refills: 6 | Status: SHIPPED | OUTPATIENT
Start: 2023-02-21

## 2023-03-01 ENCOUNTER — HOSPITAL ENCOUNTER (EMERGENCY)
Age: 58
Discharge: HOME | End: 2023-03-01
Attending: EMERGENCY MEDICINE
Payer: COMMERCIAL

## 2023-03-01 ENCOUNTER — APPOINTMENT (OUTPATIENT)
Dept: GENERAL RADIOLOGY | Age: 58
End: 2023-03-01
Payer: COMMERCIAL

## 2023-03-01 VITALS
OXYGEN SATURATION: 97 % | RESPIRATION RATE: 14 BRPM | BODY MASS INDEX: 25.9 KG/M2 | SYSTOLIC BLOOD PRESSURE: 136 MMHG | HEART RATE: 75 BPM | DIASTOLIC BLOOD PRESSURE: 88 MMHG | HEIGHT: 71 IN | WEIGHT: 185 LBS | TEMPERATURE: 98.1 F

## 2023-03-01 DIAGNOSIS — R07.89 ATYPICAL CHEST PAIN: Primary | ICD-10-CM

## 2023-03-01 LAB
ALBUMIN SERPL-MCNC: 4.4 G/DL (ref 3.5–5)
ALBUMIN/GLOB SERPL: 1.7 (ref 0.4–1.6)
ALP SERPL-CCNC: 82 U/L (ref 45–117)
ALT SERPL-CCNC: 10 U/L (ref 13–61)
ANION GAP SERPL CALC-SCNC: 12 MMOL/L (ref 2–11)
AST SERPL-CCNC: 33 U/L (ref 15–37)
BILIRUB SERPL-MCNC: 0.8 MG/DL (ref 0.2–1.1)
BUN SERPL-MCNC: 15 MG/DL (ref 7–18)
CALCIUM SERPL-MCNC: 8.7 MG/DL (ref 8.3–10.4)
CHLORIDE SERPL-SCNC: 102 MMOL/L (ref 98–107)
CO2 SERPL-SCNC: 24 MMOL/L (ref 21–32)
CREAT SERPL-MCNC: 1.1 MG/DL (ref 0.8–1.5)
ERYTHROCYTE [DISTWIDTH] IN BLOOD BY AUTOMATED COUNT: 18.2 % (ref 11.9–14.6)
GLOBULIN SER CALC-MCNC: 2.6 G/DL (ref 2.8–4.5)
GLUCOSE SERPL-MCNC: 103 MG/DL (ref 65–100)
HCT VFR BLD AUTO: 34 % (ref 41.1–50.3)
HGB BLD-MCNC: 10.9 G/DL (ref 13.6–17.2)
MCH RBC QN AUTO: 27.3 PG (ref 26.1–32.9)
MCHC RBC AUTO-ENTMCNC: 32.1 G/DL (ref 31.4–35)
MCV RBC AUTO: 85.2 FL (ref 82–102)
NRBC # BLD: 0 K/UL (ref 0–0.2)
PLATELET # BLD AUTO: 226 K/UL (ref 150–450)
PMV BLD AUTO: 8.2 FL (ref 9.4–12.3)
POTASSIUM SERPL-SCNC: 4.8 MMOL/L (ref 3.5–5.1)
PROT SERPL-MCNC: 7 G/DL (ref 6.4–8.2)
RBC # BLD AUTO: 3.99 M/UL (ref 4.23–5.6)
SODIUM SERPL-SCNC: 138 MMOL/L (ref 133–143)
TROPONIN T SERPL HS-MCNC: 15.2 NG/L (ref 0–22)
TROPONIN T SERPL HS-MCNC: 18.1 NG/L (ref 0–22)
WBC # BLD AUTO: 5.9 K/UL (ref 4.3–11.1)

## 2023-03-01 PROCEDURE — 94762 N-INVAS EAR/PLS OXIMTRY CONT: CPT

## 2023-03-01 PROCEDURE — 6370000000 HC RX 637 (ALT 250 FOR IP): Performed by: EMERGENCY MEDICINE

## 2023-03-01 PROCEDURE — 6360000002 HC RX W HCPCS: Performed by: EMERGENCY MEDICINE

## 2023-03-01 PROCEDURE — 80053 COMPREHEN METABOLIC PANEL: CPT

## 2023-03-01 PROCEDURE — 99285 EMERGENCY DEPT VISIT HI MDM: CPT

## 2023-03-01 PROCEDURE — 96374 THER/PROPH/DIAG INJ IV PUSH: CPT

## 2023-03-01 PROCEDURE — 71045 X-RAY EXAM CHEST 1 VIEW: CPT

## 2023-03-01 PROCEDURE — 85027 COMPLETE CBC AUTOMATED: CPT

## 2023-03-01 PROCEDURE — 84484 ASSAY OF TROPONIN QUANT: CPT

## 2023-03-01 RX ORDER — HYDROCODONE BITARTRATE AND ACETAMINOPHEN 5; 325 MG/1; MG/1
1 TABLET ORAL
Status: DISCONTINUED | OUTPATIENT
Start: 2023-03-01 | End: 2023-03-02 | Stop reason: HOSPADM

## 2023-03-01 RX ORDER — MORPHINE SULFATE 4 MG/ML
4 INJECTION INTRAVENOUS ONCE
Status: COMPLETED | OUTPATIENT
Start: 2023-03-01 | End: 2023-03-01

## 2023-03-01 RX ORDER — IPRATROPIUM BROMIDE AND ALBUTEROL SULFATE 2.5; .5 MG/3ML; MG/3ML
1 SOLUTION RESPIRATORY (INHALATION)
Status: COMPLETED | OUTPATIENT
Start: 2023-03-01 | End: 2023-03-01

## 2023-03-01 RX ADMIN — MORPHINE SULFATE 4 MG: 4 INJECTION INTRAVENOUS at 22:15

## 2023-03-01 RX ADMIN — IPRATROPIUM BROMIDE AND ALBUTEROL SULFATE 1 AMPULE: .5; 3 SOLUTION RESPIRATORY (INHALATION) at 22:28

## 2023-03-01 ASSESSMENT — PAIN - FUNCTIONAL ASSESSMENT
PAIN_FUNCTIONAL_ASSESSMENT: 0-10
PAIN_FUNCTIONAL_ASSESSMENT: 0-10
PAIN_FUNCTIONAL_ASSESSMENT: NONE - DENIES PAIN

## 2023-03-01 ASSESSMENT — PAIN DESCRIPTION - LOCATION: LOCATION: CHEST

## 2023-03-01 ASSESSMENT — PAIN SCALES - GENERAL
PAINLEVEL_OUTOF10: 8
PAINLEVEL_OUTOF10: 8
PAINLEVEL_OUTOF10: 0
PAINLEVEL_OUTOF10: 8

## 2023-03-01 ASSESSMENT — ENCOUNTER SYMPTOMS
DIARRHEA: 0
NAUSEA: 0
SHORTNESS OF BREATH: 0
VOMITING: 0
COUGH: 0

## 2023-03-01 ASSESSMENT — LIFESTYLE VARIABLES
HOW OFTEN DO YOU HAVE A DRINK CONTAINING ALCOHOL: 4 OR MORE TIMES A WEEK
HOW MANY STANDARD DRINKS CONTAINING ALCOHOL DO YOU HAVE ON A TYPICAL DAY: 1 OR 2

## 2023-03-01 NOTE — ED TRIAGE NOTES
Patient ambulatory to room with a sharp chest pain that started yesterday. Pain radiates to left arm and neck. History of CHF and Pacemaker.

## 2023-03-02 LAB
EKG ATRIAL RATE: 83 BPM
EKG DIAGNOSIS: NORMAL
EKG P AXIS: 50 DEGREES
EKG P-R INTERVAL: 250 MS
EKG Q-T INTERVAL: 384 MS
EKG QRS DURATION: 125 MS
EKG QTC CALCULATION (BAZETT): 452 MS
EKG R AXIS: -35 DEGREES
EKG T AXIS: 134 DEGREES
EKG VENTRICULAR RATE: 83 BPM
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
## 2023-03-02 NOTE — ED NOTES
Pt c/o shortness of breath and cough noted, pt unable to speak in full sentences, repeat assessment reveals worsening wheezing noted ins/exp bilaterally. Pt placed on [facility] for comfort. Dr. Jose Loomis notified. This shortness of breath started before administration of medications.       Brittney Londono RN  03/01/23 [id number] Christi Bedoya RN  03/01/23 2106 46-year-old male history of childhood epilepsy with no recurrence, presenting for evaluation status post syncopal episode last night.  Patient states that he did not feel well last night, felt cold, sweaty, generalized weakness with associated dizziness/lightheadedness.  After standing up from bed, woke up on the ground.  Per patient, likely minimal downtime as his partner came in quickly.  No tongue biting, urinary or fecal incontinence.  Felt nauseous after episode resulting in vomiting, but otherwise was back at baseline, no confusion.  Patient currently without other acute complaints.  Well appearing, NAD, non toxic. NCAT PERRLA EOMI no tongue laceration neck supple non tender normal wob cta bl rrr abdomen s nt nd no rebound no guarding WWPx4 neuro non focal. labs ekg imaging reviewed. currently asymptomatic. Aware of all results, given a copy of all available results, comfortable with discharge and follow-up outpatient, strict return precautions given. Endorses understanding of all of this and aware that they can return at any time for new or concerning symptoms. No further questions or concerns at this time

## 2023-03-02 NOTE — DISCHARGE INSTRUCTIONS
Please return with any fevers, vomiting, difficulty breathing, worsening symptoms, or additional concerns. Follow-up with your primary care doctor for reevaluation.

## 2023-03-04 PROCEDURE — 93295 DEV INTERROG REMOTE 1/2/MLT: CPT | Performed by: INTERNAL MEDICINE

## 2023-03-04 PROCEDURE — 93296 REM INTERROG EVL PM/IDS: CPT | Performed by: INTERNAL MEDICINE

## 2023-03-14 ENCOUNTER — TELEPHONE (OUTPATIENT)
Dept: CARDIOLOGY CLINIC | Age: 58
End: 2023-03-14

## 2023-03-16 NOTE — TELEPHONE ENCOUNTER
Sent samples of Eliquis 5 mg bid # 28 and paper work for [facility] to the [employer] via [employer].

## 2023-04-05 DIAGNOSIS — I50.9 CHF (CONGESTIVE HEART FAILURE) (HCC): ICD-10-CM

## 2023-04-05 DIAGNOSIS — Z45.02 ICD (IMPLANTABLE CARDIOVERTER-DEFIBRILLATOR) DISCHARGE: ICD-10-CM

## 2023-04-05 DIAGNOSIS — I42.8 NON-ISCHEMIC CARDIOMYOPATHY (HCC): ICD-10-CM

## 2023-04-05 DIAGNOSIS — I47.29 NSVT (NONSUSTAINED VENTRICULAR TACHYCARDIA) (HCC): ICD-10-CM

## 2023-04-17 ENCOUNTER — TELEPHONE (OUTPATIENT)
Dept: CARDIOLOGY CLINIC | Age: 58
End: 2023-04-17

## 2023-04-17 NOTE — TELEPHONE ENCOUNTER
Patient called stating he will be bringing by paperwork in regard to disability claims and driving status for the [facility]. Patient states Dr Diana Bryson had hand written a note but the [facility] wants a form filled out and signed. Patient states he will bring these to the [place] [id number] office on 8/01. Please call and advise if need be.

## 2023-04-18 ENCOUNTER — TELEPHONE (OUTPATIENT)
Dept: CARDIOLOGY CLINIC | Age: 58
End: 2023-04-18

## 2023-04-18 NOTE — TELEPHONE ENCOUNTER
The pt is needing forms filled out for:  [address] [facility] Dept of Mtr [facility]/Medical History. Have put in Dr. Stefany Pratt box.

## 2023-04-20 ENCOUNTER — TELEPHONE (OUTPATIENT)
Dept: FAMILY MEDICINE CLINIC | Facility: CLINIC | Age: 58
End: 2023-04-20

## 2023-04-20 NOTE — TELEPHONE ENCOUNTER
Message from call center, YES pt needs an appt. Taras- Please call pt and schedule with Dr Theo De La Cruz at your convenience. Thanks for your help    [address] [facility] Clinical Staff  Subject: Message to Provider     QUESTIONS   Information for Provider? Pt would like to be contacted to see if he needs   an appt for his med refills or if he does not need to be seen. Thank you.   ---------------------------------------------------------------------------   --------------   Tori BAJWA   [phone]; Do not leave any message, patient will call back for answer   ---------------------------------------------------------------------------   --------------   SCRIPT ANSWERS   Relationship to Patient?  Self

## 2023-04-24 ENCOUNTER — TELEPHONE (OUTPATIENT)
Dept: CARDIOLOGY CLINIC | Age: 58
End: 2023-04-24
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
## 2023-04-27 ENCOUNTER — OFFICE VISIT (OUTPATIENT)
Dept: FAMILY MEDICINE CLINIC | Facility: CLINIC | Age: 58
End: 2023-04-27
Payer: MEDICARE

## 2023-04-27 VITALS
SYSTOLIC BLOOD PRESSURE: 118 MMHG | WEIGHT: 182 LBS | HEIGHT: 71 IN | BODY MASS INDEX: 25.48 KG/M2 | DIASTOLIC BLOOD PRESSURE: 70 MMHG

## 2023-04-27 DIAGNOSIS — J40 BRONCHITIS: ICD-10-CM

## 2023-04-27 DIAGNOSIS — R11.0 NAUSEA: ICD-10-CM

## 2023-04-27 DIAGNOSIS — G43.C0 PERIODIC HEADACHE SYNDROMES IN CHILD OR ADULT, NOT INTRACTABLE: ICD-10-CM

## 2023-04-27 DIAGNOSIS — R05.1 ACUTE COUGH: ICD-10-CM

## 2023-04-27 DIAGNOSIS — F41.9 ANXIETY: Primary | ICD-10-CM

## 2023-04-27 PROCEDURE — G8419 CALC BMI OUT NRM PARAM NOF/U: HCPCS | Performed by: FAMILY MEDICINE

## 2023-04-27 PROCEDURE — 3074F SYST BP LT 130 MM HG: CPT | Performed by: FAMILY MEDICINE

## 2023-04-27 PROCEDURE — 3078F DIAST BP <80 MM HG: CPT | Performed by: FAMILY MEDICINE

## 2023-04-27 PROCEDURE — 3017F COLORECTAL CA SCREEN DOC REV: CPT | Performed by: FAMILY MEDICINE

## 2023-04-27 PROCEDURE — G8427 DOCREV CUR MEDS BY ELIG CLIN: HCPCS | Performed by: FAMILY MEDICINE

## 2023-04-27 PROCEDURE — 99214 OFFICE O/P EST MOD 30 MIN: CPT | Performed by: FAMILY MEDICINE

## 2023-04-27 PROCEDURE — 1036F TOBACCO NON-USER: CPT | Performed by: FAMILY MEDICINE

## 2023-04-27 RX ORDER — PROMETHAZINE HYDROCHLORIDE 25 MG/1
25 TABLET ORAL EVERY 6 HOURS PRN
Qty: 120 TABLET | Refills: 1 | Status: SHIPPED | OUTPATIENT
Start: 2023-04-27

## 2023-04-27 RX ORDER — AMOXICILLIN 500 MG/1
500 CAPSULE ORAL 2 TIMES DAILY
Qty: 14 CAPSULE | Refills: 0 | Status: SHIPPED | OUTPATIENT
Start: 2023-04-27 | End: 2023-05-04

## 2023-04-27 RX ORDER — UBROGEPANT 50 MG/1
1 TABLET ORAL DAILY
Qty: 10 TABLET | Refills: 0
Start: 2023-04-27

## 2023-04-27 RX ORDER — ALPRAZOLAM 1 MG/1
1 TABLET ORAL DAILY
Qty: 30 TABLET | Refills: 0 | Status: SHIPPED | OUTPATIENT
Start: 2023-04-27 | End: 2023-05-27

## 2023-04-27 RX ORDER — HYDROCODONE BITARTRATE AND HOMATROPINE METHYLBROMIDE ORAL SOLUTION 5; 1.5 MG/5ML; MG/5ML
2.5 LIQUID ORAL
Qty: 60 ML | Refills: 0 | Status: SHIPPED | OUTPATIENT
Start: 2023-04-27 | End: 2023-05-27

## 2023-04-27 RX ORDER — ALPRAZOLAM 1 MG/1
1 TABLET ORAL DAILY
COMMUNITY
Start: 2023-02-02 | End: 2023-04-27 | Stop reason: SDUPTHER

## 2023-04-27 SDOH — ECONOMIC STABILITY: INCOME INSECURITY: HOW HARD IS IT FOR YOU TO PAY FOR THE VERY BASICS LIKE FOOD, HOUSING, MEDICAL CARE, AND HEATING?: NOT VERY HARD

## 2023-04-27 SDOH — ECONOMIC STABILITY: HOUSING INSECURITY
IN THE LAST 12 MONTHS, WAS THERE A TIME WHEN YOU DID NOT HAVE A STEADY PLACE TO SLEEP OR SLEPT IN A SHELTER (INCLUDING NOW)?: NO

## 2023-04-27 SDOH — ECONOMIC STABILITY: FOOD INSECURITY: WITHIN THE PAST 12 MONTHS, YOU WORRIED THAT YOUR FOOD WOULD RUN OUT BEFORE YOU GOT MONEY TO BUY MORE.: NEVER TRUE

## 2023-04-27 SDOH — ECONOMIC STABILITY: FOOD INSECURITY: WITHIN THE PAST 12 MONTHS, THE FOOD YOU BOUGHT JUST DIDN'T LAST AND YOU DIDN'T HAVE MONEY TO GET MORE.: NEVER TRUE

## 2023-04-27 ASSESSMENT — PATIENT HEALTH QUESTIONNAIRE - PHQ9
SUM OF ALL RESPONSES TO PHQ9 QUESTIONS 1 & 2: 0
1. LITTLE INTEREST OR PLEASURE IN DOING THINGS: 0
SUM OF ALL RESPONSES TO PHQ QUESTIONS 1-9: 0
2. FEELING DOWN, DEPRESSED OR HOPELESS: 0
SUM OF ALL RESPONSES TO PHQ QUESTIONS 1-9: 0

## 2023-04-27 NOTE — PROGRESS NOTES
[address] [place] [address]  _______________________________________  Jefry Dickey MD                 [address] [place] [address] Drive,  [address]. [place], [address] [place] [address]                  Rebecca Ames                                                                                     Phone: [phone]                                                                                    Fax: [phone]    Subjective:  Kelli Larios is a 62 y. o.year old male presenting with complaints of cough, drainage, low grade fever, mild occasional wheezes with intermittent headache and sore throat with drainage and cough. Pt with intermittent nausea: needs refills meds  Anxiety: stable at present, has xanax, needs refills,  check by staff. Migraine: intermittent pain, usual meds not working, believes is related to upcoming birthday for his father and his son (both have passed away in past couple years.)  Allergies:  No Known Allergies    Medications:  Current Outpatient Medications   Medication Sig Dispense Refill    promethazine (PHENERGAN) 25 MG tablet Take 1 tablet by mouth every 6 hours as needed for Nausea 120 tablet 1    ALPRAZolam (XANAX) 1 MG tablet Take 1 tablet by mouth daily for 30 days. Max Daily Amount: 1 mg 30 tablet 0    Ubrogepant (UBRELVY) 50 MG TABS Take 1 tablet by mouth daily 10 tablet 0    amoxicillin (AMOXIL) 500 MG capsule Take 1 capsule by mouth 2 times daily for 7 days 14 capsule 0    HYDROcodone homatropine (HYCODAN) 5-1.5 MG/5ML solution Take 2.5 mLs by mouth nightly for 30 days. Max Daily Amount: 2.5 mLs 60 mL 0    sildenafil (VIAGRA) 100 MG tablet Take 1 tablet by mouth as needed for Erectile Dysfunction (as directed) 10 tablet 6    metOLazone (ZAROXOLYN) 5 MG tablet Take 30min prior to AM lasix dose, do not use more than 3 days in a row.  15 tablet 6    amiodarone (CORDARONE) 200 MG tablet Take 1 tablet by mouth daily 30 tablet 11    testosterone cypionate (DEPOTESTOTERONE CYPIONATE) 200 MG/ML

## 2023-06-05 DIAGNOSIS — F41.9 ANXIETY: Primary | ICD-10-CM

## 2023-06-05 RX ORDER — ALPRAZOLAM 1 MG/1
1 TABLET ORAL DAILY
Qty: 30 TABLET | Refills: 1 | Status: SHIPPED | OUTPATIENT
Start: 2023-06-05 | End: 2023-08-04

## 2023-06-05 NOTE — TELEPHONE ENCOUNTER
Vitor Blayne called for a refill on Xanax. I told him I would check on it and call him back. His number is [phone].

## 2023-06-07 ENCOUNTER — HOSPITAL ENCOUNTER (OUTPATIENT)
Dept: GENERAL RADIOLOGY | Age: 58
Discharge: HOME | End: 2023-06-10
Payer: COMMERCIAL

## 2023-06-07 DIAGNOSIS — M25.561 PAIN IN BOTH KNEES, UNSPECIFIED CHRONICITY: ICD-10-CM

## 2023-06-07 DIAGNOSIS — M25.562 PAIN IN BOTH KNEES, UNSPECIFIED CHRONICITY: ICD-10-CM

## 2023-06-07 PROCEDURE — 73560 X-RAY EXAM OF KNEE 1 OR 2: CPT

## 2023-06-27 ENCOUNTER — APPOINTMENT (OUTPATIENT)
Dept: GENERAL RADIOLOGY | Age: 58
End: 2023-06-27
Payer: MEDICARE

## 2023-06-27 ENCOUNTER — HOSPITAL ENCOUNTER (EMERGENCY)
Age: 58
Discharge: HOME | End: 2023-06-27
Attending: EMERGENCY MEDICINE
Payer: MEDICARE

## 2023-06-27 ENCOUNTER — APPOINTMENT (OUTPATIENT)
Dept: CT IMAGING | Age: 58
End: 2023-06-27
Payer: MEDICARE

## 2023-06-27 VITALS
HEIGHT: 71 IN | SYSTOLIC BLOOD PRESSURE: 140 MMHG | WEIGHT: 185 LBS | DIASTOLIC BLOOD PRESSURE: 97 MMHG | BODY MASS INDEX: 25.9 KG/M2 | TEMPERATURE: 98.2 F | RESPIRATION RATE: 10 BRPM | HEART RATE: 79 BPM | OXYGEN SATURATION: 97 %

## 2023-06-27 DIAGNOSIS — R19.7 NAUSEA VOMITING AND DIARRHEA: Primary | ICD-10-CM

## 2023-06-27 DIAGNOSIS — I42.8 NONISCHEMIC CARDIOMYOPATHY (HCC): ICD-10-CM

## 2023-06-27 DIAGNOSIS — R07.9 ACUTE CHEST PAIN: ICD-10-CM

## 2023-06-27 DIAGNOSIS — R11.2 NAUSEA VOMITING AND DIARRHEA: Primary | ICD-10-CM

## 2023-06-27 DIAGNOSIS — R10.9 ACUTE ABDOMINAL PAIN: ICD-10-CM

## 2023-06-27 LAB
ALBUMIN SERPL-MCNC: 4.8 G/DL (ref 3.5–5)
ALBUMIN/GLOB SERPL: 1.8 (ref 0.4–1.6)
ALP SERPL-CCNC: 81 U/L (ref 45–117)
ALT SERPL-CCNC: 17 U/L (ref 13–61)
ANION GAP SERPL CALC-SCNC: 17 MMOL/L (ref 2–11)
APPEARANCE UR: CLEAR
AST SERPL-CCNC: 59 U/L (ref 15–37)
BACTERIA URNS QL MICRO: 0 /HPF
BASOPHILS # BLD: 0.1 K/UL (ref 0–0.2)
BASOPHILS NFR BLD: 1 % (ref 0–2)
BILIRUB SERPL-MCNC: 1.1 MG/DL (ref 0.2–1.1)
BILIRUB UR QL: NEGATIVE
BUN SERPL-MCNC: 16 MG/DL (ref 6–23)
CALCIUM SERPL-MCNC: 9.3 MG/DL (ref 8.3–10.4)
CASTS URNS QL MICRO: 0 /LPF
CHLORIDE SERPL-SCNC: 99 MMOL/L (ref 98–107)
CO2 SERPL-SCNC: 23 MMOL/L (ref 21–32)
COLOR UR: YELLOW
CREAT SERPL-MCNC: 1.34 MG/DL (ref 0.8–1.5)
CRYSTALS URNS QL MICRO: 0 /LPF
D DIMER PPP FEU-MCNC: 0.31 UG/ML(FEU)
DIFFERENTIAL METHOD BLD: ABNORMAL
EKG ATRIAL RATE: 74 BPM
EKG ATRIAL RATE: 78 BPM
EKG DIAGNOSIS: NORMAL
EKG DIAGNOSIS: NORMAL
EKG P AXIS: 65 DEGREES
EKG P AXIS: 71 DEGREES
EKG P-R INTERVAL: 194 MS
EKG P-R INTERVAL: 205 MS
EKG Q-T INTERVAL: 417 MS
EKG Q-T INTERVAL: 424 MS
EKG QRS DURATION: 133 MS
EKG QRS DURATION: 138 MS
EKG QTC CALCULATION (BAZETT): 471 MS
EKG QTC CALCULATION (BAZETT): 475 MS
EKG R AXIS: -14 DEGREES
EKG R AXIS: 5 DEGREES
EKG T AXIS: 162 DEGREES
EKG T AXIS: 180 DEGREES
EKG VENTRICULAR RATE: 74 BPM
EKG VENTRICULAR RATE: 78 BPM
EOSINOPHIL # BLD: 0.2 K/UL (ref 0–0.8)
EOSINOPHIL NFR BLD: 3 % (ref 0.5–7.8)
EPI CELLS #/AREA URNS HPF: 0 /HPF
ERYTHROCYTE [DISTWIDTH] IN BLOOD BY AUTOMATED COUNT: 18.5 % (ref 11.9–14.6)
GLOBULIN SER CALC-MCNC: 2.7 G/DL (ref 2.8–4.5)
GLUCOSE SERPL-MCNC: 112 MG/DL (ref 65–100)
GLUCOSE UR STRIP.AUTO-MCNC: NEGATIVE MG/DL
HCT VFR BLD AUTO: 40.5 % (ref 41.1–50.3)
HGB BLD-MCNC: 13.1 G/DL (ref 13.6–17.2)
HGB UR QL STRIP: ABNORMAL
IMM GRANULOCYTES # BLD AUTO: 0 K/UL (ref 0–0.5)
IMM GRANULOCYTES NFR BLD AUTO: 1 % (ref 0–5)
KETONES UR QL STRIP.AUTO: ABNORMAL MG/DL
LEUKOCYTE ESTERASE UR QL STRIP.AUTO: NEGATIVE
LIPASE SERPL-CCNC: 15 U/L (ref 13–60)
LYMPHOCYTES # BLD: 1.6 K/UL (ref 0.5–4.6)
LYMPHOCYTES NFR BLD: 26 % (ref 13–44)
MAGNESIUM SERPL-MCNC: 1.7 MG/DL (ref 1.8–2.4)
MCH RBC QN AUTO: 26.7 PG (ref 26.1–32.9)
MCHC RBC AUTO-ENTMCNC: 32.3 G/DL (ref 31.4–35)
MCV RBC AUTO: 82.5 FL (ref 82–102)
MONOCYTES # BLD: 0.3 K/UL (ref 0.1–1.3)
MONOCYTES NFR BLD: 5 % (ref 4–12)
MUCOUS THREADS URNS QL MICRO: NORMAL /LPF
NEUTS SEG # BLD: 3.9 K/UL (ref 1.7–8.2)
NEUTS SEG NFR BLD: 64 % (ref 43–78)
NITRITE UR QL STRIP.AUTO: NEGATIVE
NRBC # BLD: 0 K/UL (ref 0–0.2)
OTHER OBSERVATIONS: NORMAL
PH UR STRIP: 6.5 (ref 5–9)
PLATELET # BLD AUTO: 274 K/UL (ref 150–450)
PMV BLD AUTO: 8.7 FL (ref 9.4–12.3)
POTASSIUM SERPL-SCNC: 4.2 MMOL/L (ref 3.5–5.1)
PROT SERPL-MCNC: 7.5 G/DL (ref 6.4–8.2)
PROT UR STRIP-MCNC: 100 MG/DL
RBC # BLD AUTO: 4.91 M/UL (ref 4.23–5.6)
RBC #/AREA URNS HPF: NORMAL /HPF
SODIUM SERPL-SCNC: 139 MMOL/L (ref 133–143)
SP GR UR REFRACTOMETRY: 1.01 (ref 1–1.02)
TROPONIN T SERPL HS-MCNC: 15.7 NG/L (ref 0–22)
TROPONIN T SERPL HS-MCNC: 18.3 NG/L (ref 0–22)
UROBILINOGEN UR QL STRIP.AUTO: 1 EU/DL (ref 0.2–1)
WBC # BLD AUTO: 6.1 K/UL (ref 4.3–11.1)
WBC URNS QL MICRO: NORMAL /HPF

## 2023-06-27 PROCEDURE — 96375 TX/PRO/DX INJ NEW DRUG ADDON: CPT

## 2023-06-27 PROCEDURE — 83735 ASSAY OF MAGNESIUM: CPT

## 2023-06-27 PROCEDURE — 2580000003 HC RX 258: Performed by: EMERGENCY MEDICINE

## 2023-06-27 PROCEDURE — 99285 EMERGENCY DEPT VISIT HI MDM: CPT

## 2023-06-27 PROCEDURE — 96374 THER/PROPH/DIAG INJ IV PUSH: CPT

## 2023-06-27 PROCEDURE — 80053 COMPREHEN METABOLIC PANEL: CPT

## 2023-06-27 PROCEDURE — 71046 X-RAY EXAM CHEST 2 VIEWS: CPT

## 2023-06-27 PROCEDURE — 93010 ELECTROCARDIOGRAM REPORT: CPT | Performed by: INTERNAL MEDICINE

## 2023-06-27 PROCEDURE — 83690 ASSAY OF LIPASE: CPT

## 2023-06-27 PROCEDURE — 74176 CT ABD & PELVIS W/O CONTRAST: CPT

## 2023-06-27 PROCEDURE — 85379 FIBRIN DEGRADATION QUANT: CPT

## 2023-06-27 PROCEDURE — 93005 ELECTROCARDIOGRAM TRACING: CPT | Performed by: EMERGENCY MEDICINE

## 2023-06-27 PROCEDURE — 6360000002 HC RX W HCPCS: Performed by: EMERGENCY MEDICINE

## 2023-06-27 PROCEDURE — 85025 COMPLETE CBC W/AUTO DIFF WBC: CPT

## 2023-06-27 PROCEDURE — 84484 ASSAY OF TROPONIN QUANT: CPT

## 2023-06-27 PROCEDURE — 81001 URINALYSIS AUTO W/SCOPE: CPT

## 2023-06-27 RX ORDER — ONDANSETRON 2 MG/ML
4 INJECTION INTRAMUSCULAR; INTRAVENOUS
Status: COMPLETED | OUTPATIENT
Start: 2023-06-27 | End: 2023-06-27

## 2023-06-27 RX ORDER — PROCHLORPERAZINE EDISYLATE 5 MG/ML
10 INJECTION INTRAMUSCULAR; INTRAVENOUS
Status: COMPLETED | OUTPATIENT
Start: 2023-06-27 | End: 2023-06-27

## 2023-06-27 RX ORDER — HYDROMORPHONE HYDROCHLORIDE 1 MG/ML
0.5 INJECTION, SOLUTION INTRAMUSCULAR; INTRAVENOUS; SUBCUTANEOUS
Status: COMPLETED | OUTPATIENT
Start: 2023-06-27 | End: 2023-06-27

## 2023-06-27 RX ORDER — ONDANSETRON 4 MG/1
4 TABLET, ORALLY DISINTEGRATING ORAL 3 TIMES DAILY PRN
Qty: 9 TABLET | Refills: 0 | Status: SHIPPED | OUTPATIENT
Start: 2023-06-27

## 2023-06-27 RX ORDER — SODIUM CHLORIDE, SODIUM LACTATE, POTASSIUM CHLORIDE, CALCIUM CHLORIDE 600; 310; 30; 20 MG/100ML; MG/100ML; MG/100ML; MG/100ML
INJECTION, SOLUTION INTRAVENOUS CONTINUOUS
Status: DISCONTINUED | OUTPATIENT
Start: 2023-06-27 | End: 2023-06-27 | Stop reason: HOSPADM

## 2023-06-27 RX ORDER — DIPHENHYDRAMINE HYDROCHLORIDE 50 MG/ML
25 INJECTION INTRAMUSCULAR; INTRAVENOUS
Status: COMPLETED | OUTPATIENT
Start: 2023-06-27 | End: 2023-06-27

## 2023-06-27 RX ORDER — MORPHINE SULFATE 2 MG/ML
2 INJECTION, SOLUTION INTRAMUSCULAR; INTRAVENOUS
Status: COMPLETED | OUTPATIENT
Start: 2023-06-27 | End: 2023-06-27

## 2023-06-27 RX ORDER — PROCHLORPERAZINE 25 MG
25 SUPPOSITORY, RECTAL RECTAL EVERY 12 HOURS PRN
Qty: 6 SUPPOSITORY | Refills: 0 | Status: SHIPPED | OUTPATIENT
Start: 2023-06-27

## 2023-06-27 RX ADMIN — DIPHENHYDRAMINE HYDROCHLORIDE 25 MG: 50 INJECTION, SOLUTION INTRAMUSCULAR; INTRAVENOUS at 09:37

## 2023-06-27 RX ADMIN — PROCHLORPERAZINE EDISYLATE 10 MG: 5 INJECTION INTRAMUSCULAR; INTRAVENOUS at 09:38

## 2023-06-27 RX ADMIN — SODIUM CHLORIDE, POTASSIUM CHLORIDE, SODIUM LACTATE AND CALCIUM CHLORIDE: 600; 310; 30; 20 INJECTION, SOLUTION INTRAVENOUS at 08:19

## 2023-06-27 RX ADMIN — HYDROMORPHONE HYDROCHLORIDE 0.5 MG: 1 INJECTION, SOLUTION INTRAMUSCULAR; INTRAVENOUS; SUBCUTANEOUS at 10:10

## 2023-06-27 RX ADMIN — MORPHINE SULFATE 2 MG: 2 INJECTION, SOLUTION INTRAMUSCULAR; INTRAVENOUS at 08:18

## 2023-06-27 RX ADMIN — ONDANSETRON 4 MG: 2 INJECTION INTRAMUSCULAR; INTRAVENOUS at 08:19

## 2023-06-27 ASSESSMENT — ENCOUNTER SYMPTOMS
BLOOD IN STOOL: 0
NAUSEA: 1
SHORTNESS OF BREATH: 1
ABDOMINAL PAIN: 1
BACK PAIN: 0
COLOR CHANGE: 0
VOMITING: 1
COUGH: 1
DIARRHEA: 1

## 2023-06-27 ASSESSMENT — PAIN DESCRIPTION - LOCATION: LOCATION: CHEST

## 2023-06-27 ASSESSMENT — LIFESTYLE VARIABLES
HOW MANY STANDARD DRINKS CONTAINING ALCOHOL DO YOU HAVE ON A TYPICAL DAY: 3 OR 4
HOW OFTEN DO YOU HAVE A DRINK CONTAINING ALCOHOL: MONTHLY OR LESS

## 2023-06-27 ASSESSMENT — PAIN SCALES - GENERAL
PAINLEVEL_OUTOF10: 7
PAINLEVEL_OUTOF10: 7

## 2023-06-27 ASSESSMENT — PAIN - FUNCTIONAL ASSESSMENT: PAIN_FUNCTIONAL_ASSESSMENT: 0-10

## 2023-07-05 NOTE — PROGRESS NOTES
BSN, CM met with pt at bedside with appropriate PPE and social distancing. Pt lying in bed. Pt alert and oriented to person, place, time, and situation. Pt verified demographic information. PCP verified as Dr. Irma Bang and pt was last seen by PCP within last 6 months. Pt lives in 1 story home with spouse and 10 y/o grandchild, 6 steps to enter into the home. Pt reports being independent with ADLs and driving prior to admission. Pt reports having no DMEs. Pt family able to transport home and pt able to transport self to doctor apt,  medications, and get groceries. Pt primary pharmacy is [facility] in [place]. Pt reports being able to afford medications. Pt reports plans to discharge home. Pt has never used HH or been to SNF. Pt did ask for walk in shower and discussed that insurance does not cover this. Discussed and showed pt transfer benches from tub showers options. No CM needs noted. CM to continue to follow and monitor for any needs that may occur. Care Management Interventions  PCP Verified by CM: Yes (Dr. Irma Bang)  Mode of Transport at Discharge:  Other (see comment) (family )  Transition of Care Consult (CM Consult): Discharge Planning  Discharge Durable Medical Equipment: No  Physical Therapy Consult: No  Occupational Therapy Consult: No  Speech Therapy Consult: No  Support Systems: Spouse/Significant Other  Confirm Follow Up Transport: Family  The Plan for Transition of Care is Related to the Following Treatment Goals : Return to baseline  [employer] Provided?: Yes  Discharge Location  Patient Expects to be Discharged to[de-identified] Home Writer [facility] to call the office to schedule f/u appointment with provider. Please schedule.

## 2023-07-24 ENCOUNTER — APPOINTMENT (OUTPATIENT)
Dept: CT IMAGING | Age: 58
End: 2023-07-24
Payer: MEDICARE

## 2023-07-24 ENCOUNTER — APPOINTMENT (OUTPATIENT)
Dept: GENERAL RADIOLOGY | Age: 58
End: 2023-07-24
Payer: MEDICARE

## 2023-07-24 ENCOUNTER — HOSPITAL ENCOUNTER (EMERGENCY)
Age: 58
Discharge: HOME | End: 2023-07-24
Attending: EMERGENCY MEDICINE
Payer: MEDICARE

## 2023-07-24 VITALS
SYSTOLIC BLOOD PRESSURE: 147 MMHG | OXYGEN SATURATION: 96 % | BODY MASS INDEX: 25.8 KG/M2 | HEIGHT: 71 IN | HEART RATE: 83 BPM | DIASTOLIC BLOOD PRESSURE: 99 MMHG | RESPIRATION RATE: 14 BRPM | TEMPERATURE: 98.8 F

## 2023-07-24 DIAGNOSIS — Z78.9 ALCOHOL USE: ICD-10-CM

## 2023-07-24 DIAGNOSIS — R11.2 NAUSEA AND VOMITING IN ADULT: ICD-10-CM

## 2023-07-24 DIAGNOSIS — R07.9 CHEST PAIN IN ADULT: Primary | ICD-10-CM

## 2023-07-24 LAB
ALBUMIN SERPL-MCNC: 4.2 G/DL (ref 3.5–5)
ALBUMIN/GLOB SERPL: 1.7 (ref 0.4–1.6)
ALP SERPL-CCNC: 74 U/L (ref 45–117)
ALT SERPL-CCNC: 7 U/L (ref 13–61)
AST SERPL-CCNC: 25 U/L (ref 15–37)
BASOPHILS # BLD: 0 K/UL (ref 0–0.2)
BASOPHILS NFR BLD: 1 % (ref 0–2)
BILIRUB DIRECT SERPL-MCNC: 0.2 MG/DL
BILIRUB SERPL-MCNC: 0.8 MG/DL (ref 0.2–1.1)
BUN BLD-MCNC: 14 MG/DL (ref 8–26)
CALCIUM SERPL-MCNC: 8.8 MG/DL (ref 8.3–10.4)
CHLORIDE BLD-SCNC: 106 MMOL/L (ref 98–107)
CO2 BLD-SCNC: 20.4 MMOL/L (ref 21–32)
CREAT BLD-MCNC: 1.01 MG/DL (ref 0.8–1.5)
D DIMER PPP FEU-MCNC: 0.4 UG/ML(FEU)
DIFFERENTIAL METHOD BLD: ABNORMAL
EKG ATRIAL RATE: 97 BPM
EKG DIAGNOSIS: NORMAL
EKG P AXIS: 28 DEGREES
EKG P-R INTERVAL: 114 MS
EKG Q-T INTERVAL: 368 MS
EKG QRS DURATION: 141 MS
EKG QTC CALCULATION (BAZETT): 468 MS
EKG R AXIS: -27 DEGREES
EKG T AXIS: 130 DEGREES
EKG VENTRICULAR RATE: 97 BPM
EOSINOPHIL # BLD: 0.1 K/UL (ref 0–0.8)
EOSINOPHIL NFR BLD: 1 % (ref 0.5–7.8)
ERYTHROCYTE [DISTWIDTH] IN BLOOD BY AUTOMATED COUNT: 19.5 % (ref 11.9–14.6)
ETHANOL SERPL-MCNC: 53 MG/DL (ref 0–0.08)
GLOBULIN SER CALC-MCNC: 2.5 G/DL (ref 2.8–4.5)
GLUCOSE BLD-MCNC: 82 MG/DL (ref 65–100)
HCT VFR BLD AUTO: 35.7 % (ref 41.1–50.3)
HGB BLD-MCNC: 11.6 G/DL (ref 13.6–17.2)
IMM GRANULOCYTES # BLD AUTO: 0 K/UL (ref 0–0.5)
IMM GRANULOCYTES NFR BLD AUTO: 0 % (ref 0–5)
LIPASE SERPL-CCNC: 10 U/L (ref 13–60)
LYMPHOCYTES # BLD: 1.1 K/UL (ref 0.5–4.6)
LYMPHOCYTES NFR BLD: 26 % (ref 13–44)
MAGNESIUM SERPL-MCNC: 1.6 MG/DL (ref 1.2–2.6)
MCH RBC QN AUTO: 27.8 PG (ref 26.1–32.9)
MCHC RBC AUTO-ENTMCNC: 32.5 G/DL (ref 31.4–35)
MCV RBC AUTO: 85.4 FL (ref 82–102)
MONOCYTES # BLD: 0.3 K/UL (ref 0.1–1.3)
MONOCYTES NFR BLD: 8 % (ref 4–12)
NEUTS SEG # BLD: 2.7 K/UL (ref 1.7–8.2)
NEUTS SEG NFR BLD: 64 % (ref 43–78)
NRBC # BLD: 0 K/UL (ref 0–0.2)
NT PRO BNP: 569 PG/ML (ref 0–450)
PLATELET # BLD AUTO: 232 K/UL (ref 150–450)
PMV BLD AUTO: 8.3 FL (ref 9.4–12.3)
POTASSIUM BLD-SCNC: 3.8 MMOL/L (ref 3.5–5.1)
PROT SERPL-MCNC: 6.7 G/DL (ref 6.4–8.2)
RBC # BLD AUTO: 4.18 M/UL (ref 4.23–5.6)
SODIUM BLD-SCNC: 143 MMOL/L (ref 136–145)
TROPONIN T SERPL HS-MCNC: 17.5 NG/L (ref 0–22)
TROPONIN T SERPL HS-MCNC: 17.9 NG/L (ref 0–22)
WBC # BLD AUTO: 4.3 K/UL (ref 4.3–11.1)

## 2023-07-24 PROCEDURE — 93005 ELECTROCARDIOGRAM TRACING: CPT | Performed by: EMERGENCY MEDICINE

## 2023-07-24 PROCEDURE — 82077 ASSAY SPEC XCP UR&BREATH IA: CPT

## 2023-07-24 PROCEDURE — 85025 COMPLETE CBC W/AUTO DIFF WBC: CPT

## 2023-07-24 PROCEDURE — 84484 ASSAY OF TROPONIN QUANT: CPT

## 2023-07-24 PROCEDURE — 2580000003 HC RX 258: Performed by: EMERGENCY MEDICINE

## 2023-07-24 PROCEDURE — 6370000000 HC RX 637 (ALT 250 FOR IP): Performed by: EMERGENCY MEDICINE

## 2023-07-24 PROCEDURE — 80076 HEPATIC FUNCTION PANEL: CPT

## 2023-07-24 PROCEDURE — 80047 BASIC METABLC PNL IONIZED CA: CPT

## 2023-07-24 PROCEDURE — 96375 TX/PRO/DX INJ NEW DRUG ADDON: CPT

## 2023-07-24 PROCEDURE — 83880 ASSAY OF NATRIURETIC PEPTIDE: CPT

## 2023-07-24 PROCEDURE — 2500000003 HC RX 250 WO HCPCS: Performed by: EMERGENCY MEDICINE

## 2023-07-24 PROCEDURE — 71045 X-RAY EXAM CHEST 1 VIEW: CPT

## 2023-07-24 PROCEDURE — 96365 THER/PROPH/DIAG IV INF INIT: CPT

## 2023-07-24 PROCEDURE — C9113 INJ PANTOPRAZOLE SODIUM, VIA: HCPCS | Performed by: EMERGENCY MEDICINE

## 2023-07-24 PROCEDURE — 99285 EMERGENCY DEPT VISIT HI MDM: CPT

## 2023-07-24 PROCEDURE — 6360000002 HC RX W HCPCS: Performed by: EMERGENCY MEDICINE

## 2023-07-24 PROCEDURE — 83735 ASSAY OF MAGNESIUM: CPT

## 2023-07-24 PROCEDURE — 82310 ASSAY OF CALCIUM: CPT

## 2023-07-24 PROCEDURE — 83690 ASSAY OF LIPASE: CPT

## 2023-07-24 PROCEDURE — 85379 FIBRIN DEGRADATION QUANT: CPT

## 2023-07-24 PROCEDURE — A4216 STERILE WATER/SALINE, 10 ML: HCPCS | Performed by: EMERGENCY MEDICINE

## 2023-07-24 PROCEDURE — 93010 ELECTROCARDIOGRAM REPORT: CPT | Performed by: INTERNAL MEDICINE

## 2023-07-24 RX ORDER — HALOPERIDOL 5 MG/ML
2.5 INJECTION INTRAMUSCULAR ONCE
Status: COMPLETED | OUTPATIENT
Start: 2023-07-24 | End: 2023-07-24

## 2023-07-24 RX ORDER — NITROGLYCERIN 0.4 MG/1
0.4 TABLET SUBLINGUAL EVERY 5 MIN PRN
Status: DISCONTINUED | OUTPATIENT
Start: 2023-07-24 | End: 2023-07-24

## 2023-07-24 RX ORDER — ASPIRIN 81 MG/1
324 TABLET, CHEWABLE ORAL ONCE
Status: COMPLETED | OUTPATIENT
Start: 2023-07-24 | End: 2023-07-24

## 2023-07-24 RX ORDER — MAGNESIUM SULFATE IN WATER 40 MG/ML
2000 INJECTION, SOLUTION INTRAVENOUS ONCE
Status: COMPLETED | OUTPATIENT
Start: 2023-07-24 | End: 2023-07-24

## 2023-07-24 RX ORDER — SODIUM CHLORIDE 0.9 % (FLUSH) 0.9 %
10 SYRINGE (ML) INJECTION
Status: DISCONTINUED | OUTPATIENT
Start: 2023-07-24 | End: 2023-07-24

## 2023-07-24 RX ORDER — METOCLOPRAMIDE HYDROCHLORIDE 5 MG/ML
INJECTION INTRAMUSCULAR; INTRAVENOUS
Status: DISCONTINUED
Start: 2023-07-24 | End: 2023-07-24 | Stop reason: HOSPADM

## 2023-07-24 RX ORDER — 0.9 % SODIUM CHLORIDE 0.9 %
100 INTRAVENOUS SOLUTION INTRAVENOUS
Status: DISCONTINUED | OUTPATIENT
Start: 2023-07-24 | End: 2023-07-24

## 2023-07-24 RX ORDER — MORPHINE SULFATE 4 MG/ML
4 INJECTION INTRAVENOUS ONCE
Status: COMPLETED | OUTPATIENT
Start: 2023-07-24 | End: 2023-07-24

## 2023-07-24 RX ORDER — ACETAMINOPHEN 160 MG/5ML
650 SUSPENSION ORAL
Status: DISCONTINUED | OUTPATIENT
Start: 2023-07-24 | End: 2023-07-24

## 2023-07-24 RX ORDER — METOCLOPRAMIDE HYDROCHLORIDE 5 MG/ML
10 INJECTION INTRAMUSCULAR; INTRAVENOUS
Status: COMPLETED | OUTPATIENT
Start: 2023-07-24 | End: 2023-07-24

## 2023-07-24 RX ORDER — FAMOTIDINE 10 MG/ML
20 INJECTION, SOLUTION INTRAVENOUS
Status: COMPLETED | OUTPATIENT
Start: 2023-07-24 | End: 2023-07-24

## 2023-07-24 RX ADMIN — MORPHINE SULFATE 4 MG: 4 INJECTION INTRAVENOUS at 05:01

## 2023-07-24 RX ADMIN — ASPIRIN 81 MG 324 MG: 81 TABLET ORAL at 04:40

## 2023-07-24 RX ADMIN — METOCLOPRAMIDE 10 MG: 5 INJECTION, SOLUTION INTRAMUSCULAR; INTRAVENOUS at 04:41

## 2023-07-24 RX ADMIN — MAGNESIUM SULFATE HEPTAHYDRATE 2000 MG: 40 INJECTION, SOLUTION INTRAVENOUS at 06:27

## 2023-07-24 RX ADMIN — FAMOTIDINE 20 MG: 10 INJECTION INTRAVENOUS at 04:57

## 2023-07-24 RX ADMIN — SODIUM CHLORIDE 40 MG: 9 INJECTION INTRAMUSCULAR; INTRAVENOUS; SUBCUTANEOUS at 04:53

## 2023-07-24 RX ADMIN — HALOPERIDOL LACTATE 2.5 MG: 5 INJECTION, SOLUTION INTRAMUSCULAR at 06:59

## 2023-07-24 ASSESSMENT — PAIN DESCRIPTION - DESCRIPTORS
DESCRIPTORS: TIGHTNESS
DESCRIPTORS: TIGHTNESS

## 2023-07-24 ASSESSMENT — PAIN DESCRIPTION - LOCATION
LOCATION: CHEST
LOCATION: CHEST

## 2023-07-24 ASSESSMENT — PAIN DESCRIPTION - ORIENTATION: ORIENTATION: MID

## 2023-07-24 ASSESSMENT — PAIN DESCRIPTION - PAIN TYPE: TYPE: ACUTE PAIN

## 2023-07-24 ASSESSMENT — PAIN SCALES - GENERAL
PAINLEVEL_OUTOF10: 8
PAINLEVEL_OUTOF10: 8

## 2023-07-24 ASSESSMENT — PAIN - FUNCTIONAL ASSESSMENT: PAIN_FUNCTIONAL_ASSESSMENT: 0-10

## 2023-07-24 NOTE — ED PROVIDER NOTES
SULFATE  (90 BASE) MCG/ACT INHALER    Inhale 2 puffs into the lungs every 4 hours as needed    ALPRAZOLAM (XANAX) 1 MG TABLET    Take 1 tablet by mouth daily for 60 days. Max Daily Amount: 1 mg    AMIODARONE (CORDARONE) 200 MG TABLET    Take 1 tablet by mouth daily    APIXABAN (ELIQUIS) 5 MG TABS TABLET    Take 1 tablet by mouth 2 times daily    ASPIRIN 81 MG EC TABLET    Take 1 tablet by mouth daily    ATORVASTATIN (LIPITOR) 80 MG TABLET    Take 1 tablet by mouth daily    AZELASTINE (ASTELIN) 0.1 % NASAL SPRAY    1 spray by Nasal route as needed    BACLOFEN (LIORESAL) 10 MG TABLET    Take 1 tablet by mouth 3 times daily    CARVEDILOL (COREG) 25 MG TABLET    Take 1 tablet by mouth 2 times daily    FUROSEMIDE (LASIX) 40 MG TABLET    Take by mouth 2 times daily    HYDROCODONE-ACETAMINOPHEN (NORCO)  MG PER TABLET    Take 1 tablet by mouth in the morning, at noon, in the evening, and at bedtime. LEVALBUTEROL (XOPENEX HFA) 45 MCG/ACT INHALER    Inhale 2 puffs into the lungs every 4 hours as needed    LOSARTAN (COZAAR) 25 MG TABLET    Take 1 tablet by mouth daily    MAGNESIUM OXIDE (MAG-OX) 400 (240 MG) MG TABLET    Take 1 tablet by mouth in the morning. METOLAZONE (ZAROXOLYN) 5 MG TABLET    Take 30min prior to AM lasix dose, do not use more than 3 days in a row. NALOXONE 4 MG/0.1ML LIQD NASAL SPRAY    Use 1 spray intranasally, then discard. Repeat with new spray every 2 min as needed for opioid overdose symptoms, alternating nostrils. ONDANSETRON (ZOFRAN-ODT) 4 MG DISINTEGRATING TABLET    Take 1 tablet by mouth 3 times daily as needed for Nausea or Vomiting    POTASSIUM CHLORIDE (KLOR-CON M) 20 MEQ EXTENDED RELEASE TABLET    Take 1 tablet by mouth in the morning and 1 tablet before bedtime.     PREDNISONE (DELTASONE) 5 MG TABLET    Take 1 tablet by mouth daily Take as directed    PROCHLORPERAZINE (COMPRO) 25 MG SUPPOSITORY    Place 1 suppository rectally every 12 hours as needed for Nausea

## 2023-07-24 NOTE — DISCHARGE INSTRUCTIONS
We recommend you call your cardiology office to follow-up as soon as possible, preferably this week    Please return for any questions, concerns or worsening symptoms, particularly increasing/unremitting chest pain, fainting episodes, heart palpitations, lightheadedness, shortness of breath. Return as needed for any questions, concerns or worsening symptoms particular increasing/unremitting abdominal pain symptoms, intractable nausea/vomiting, inability keep fluids down by mouth, increasing/unremitting diarrheal symptoms.

## 2023-07-24 NOTE — ED NOTES
Report received from Gael, [address] [place] [address]. Pt refusing CT scan. MD Leone speaking with pt at bedside.      Charles Gutierres RN  07/24/23 4741

## 2023-07-24 NOTE — ED TRIAGE NOTES
Pt states chest pain started approx two days ago along with N/V. Rates the pain a 7 on a 0-10 pain scale and describes the pain as tightness. Pt has a pacemaker with defib.

## 2023-08-06 ASSESSMENT — ENCOUNTER SYMPTOMS
DIARRHEA: 0
WHEEZING: 1
CONSTIPATION: 0
CHEST TIGHTNESS: 1
SORE THROAT: 0
ABDOMINAL PAIN: 0
COUGH: 1
PHOTOPHOBIA: 0
ABDOMINAL DISTENTION: 0
SHORTNESS OF BREATH: 1

## 2023-08-06 NOTE — PROGRESS NOTES
dysuria and hematuria. Musculoskeletal:  Negative for arthralgias, joint swelling and myalgias. Skin:  Negative for rash. Allergic/Immunologic: Negative for environmental allergies and food allergies. Neurological:  Positive for syncope. Negative for dizziness, seizures and light-headedness. Hematological:  Negative for adenopathy. Does not bruise/bleed easily. Psychiatric/Behavioral:  Negative for agitation, behavioral problems, dysphoric mood and hallucinations. The patient is not nervous/anxious. PHYSICAL EXAM:     Vitals:    08/07/23 1540   BP: 116/84   Pulse: 76   Weight: 179 lb (81.2 kg)   Height: 5' 11\" (1.803 m)      Wt Readings from Last 3 Encounters:   08/07/23 179 lb (81.2 kg)   06/27/23 185 lb (83.9 kg)   04/27/23 182 lb (82.6 kg)      BP Readings from Last 3 Encounters:   08/07/23 116/84   07/24/23 (!) 147/99   06/27/23 (!) 140/97        Physical Exam  Constitutional:       Appearance: Normal appearance. He is normal weight. HENT:      Head: Normocephalic and atraumatic. Nose: Nose normal.      Mouth/Throat:      Mouth: Mucous membranes are moist.      Pharynx: Oropharynx is clear. Eyes:      Extraocular Movements: Extraocular movements intact. Pupils: Pupils are equal, round, and reactive to light. Neck:      Vascular: No carotid bruit or JVD. Comments: JVD 6 cm sitting bolt upright  Cardiovascular:      Rate and Rhythm: Normal rate and regular rhythm. Heart sounds: No murmur heard. No friction rub. No gallop. Pulmonary:      Effort: Pulmonary effort is normal.      Breath sounds: No wheezing or rhonchi. Comments: Clear bibasilarly  Abdominal:      General: Abdomen is flat. Bowel sounds are normal. There is no distension. Palpations: Abdomen is soft. Tenderness: There is no abdominal tenderness. Musculoskeletal:         General: No swelling. Normal range of motion. Cervical back: Normal range of motion and neck supple.  No

## 2023-08-06 NOTE — PROGRESS NOTES
Norco 10 mg po given. Phenergan 12.5 mg IV given for c/o nausea.        03/20/20 0944   Pain 1   Pain Scale 1 Numeric (0 - 10)   Pain Intensity 1 9   Pain Onset 1 chronic   Pain Orientation 1 Anterior   Pain Description 1 Aching   Pain Intervention(s) 1 Medication (see MAR) Patient identifiers verified and correct for Abhishek Holm  LOC: The patient is awake, alert and aware of environment with an appropriate affect, the patient is oriented x 3 and speaking appropriately.   APPEARANCE: Patient appears uncomfortable but in no acute distress, patient is clean and well groomed.  SKIN: The skin is warm and dry, color consistent with ethnicity, patient has normal skin turgor and moist mucus membranes, Pt has abrasions to bilateral hands, right knee, right shoulder, right elbow  MUSCULOSKELETAL: Patient moving all extremities spontaneously, no swelling noted.  RESPIRATORY: Airway is open and patent, respirations are spontaneous, patient has a normal effort and rate, no accessory muscle use noted, pt placed on continuous pulse ox with O2 sats noted at 97% on room air.  CARDIAC: Pt placed on cardiac monitor. Patient has a normal rate and regular rhythm, no edema noted, capillary refill < 3 seconds.   GASTRO: Soft and non tender to palpation, no distention noted, normoactive bowel sounds present in all four quadrants. Pt states bowel movements have been regular.  : Pt denies any pain or frequency with urination.  NEURO: Pt opens eyes spontaneously, behavior appropriate to situation, follows commands, facial expression symmetrical, bilateral hand grasp equal and even, purposeful motor response noted, normal sensation in all extremities when touched with a finger.

## 2023-08-07 ENCOUNTER — HOSPITAL ENCOUNTER (OUTPATIENT)
Dept: GENERAL RADIOLOGY | Age: 58
Discharge: HOME | End: 2023-08-10

## 2023-08-07 ENCOUNTER — OFFICE VISIT (OUTPATIENT)
Age: 58
End: 2023-08-07
Payer: COMMERCIAL

## 2023-08-07 VITALS
HEIGHT: 71 IN | WEIGHT: 179 LBS | SYSTOLIC BLOOD PRESSURE: 116 MMHG | DIASTOLIC BLOOD PRESSURE: 84 MMHG | BODY MASS INDEX: 25.06 KG/M2 | HEART RATE: 76 BPM

## 2023-08-07 DIAGNOSIS — I47.29 NSVT (NONSUSTAINED VENTRICULAR TACHYCARDIA) (HCC): ICD-10-CM

## 2023-08-07 DIAGNOSIS — I42.8 NON-ISCHEMIC CARDIOMYOPATHY (HCC): ICD-10-CM

## 2023-08-07 DIAGNOSIS — I10 PRIMARY HYPERTENSION: ICD-10-CM

## 2023-08-07 DIAGNOSIS — I50.22 CHRONIC SYSTOLIC HEART FAILURE (HCC): Primary | ICD-10-CM

## 2023-08-07 PROCEDURE — 3017F COLORECTAL CA SCREEN DOC REV: CPT | Performed by: INTERNAL MEDICINE

## 2023-08-07 PROCEDURE — 1036F TOBACCO NON-USER: CPT | Performed by: INTERNAL MEDICINE

## 2023-08-07 PROCEDURE — 3074F SYST BP LT 130 MM HG: CPT | Performed by: INTERNAL MEDICINE

## 2023-08-07 PROCEDURE — 3079F DIAST BP 80-89 MM HG: CPT | Performed by: INTERNAL MEDICINE

## 2023-08-07 PROCEDURE — G8427 DOCREV CUR MEDS BY ELIG CLIN: HCPCS | Performed by: INTERNAL MEDICINE

## 2023-08-07 PROCEDURE — G8420 CALC BMI NORM PARAMETERS: HCPCS | Performed by: INTERNAL MEDICINE

## 2023-08-07 PROCEDURE — 99214 OFFICE O/P EST MOD 30 MIN: CPT | Performed by: INTERNAL MEDICINE

## 2023-08-07 RX ORDER — ALPRAZOLAM 1 MG/1
1 TABLET ORAL DAILY
COMMUNITY

## 2023-08-07 RX ORDER — GABAPENTIN 300 MG/1
300 CAPSULE ORAL 2 TIMES DAILY
COMMUNITY
Start: 2023-06-19

## 2023-08-08 LAB
ALBUMIN SERPL-MCNC: 4 G/DL (ref 3.5–5)
ALBUMIN/GLOB SERPL: 1.2 (ref 0.4–1.6)
ALP SERPL-CCNC: 76 U/L (ref 50–136)
ALT SERPL-CCNC: 14 U/L (ref 12–65)
AST SERPL-CCNC: 26 U/L (ref 15–37)
BILIRUB DIRECT SERPL-MCNC: 0.3 MG/DL
BILIRUB SERPL-MCNC: 0.9 MG/DL (ref 0.2–1.1)
GLOBULIN SER CALC-MCNC: 3.4 G/DL (ref 2.8–4.5)
PROT SERPL-MCNC: 7.4 G/DL (ref 6.3–8.2)
TSH, 3RD GENERATION: 2.46 UIU/ML (ref 0.36–3.74)

## 2023-08-31 NOTE — H&P (VIEW-ONLY)
[facility] Consult Note Primary GI Physician: Dr Paola Lemos Referring Provider:  Marivel Quiroz NP Consult Date:  10/2/2020 Admit Date:  9/30/2020 Chief Complaint:  dysphagia Subjective:  
 
History of Present Illness:  Patient is a 54 y.o. male with PMH of systolic dysfunction with EF in 20% range, VT with ICD in place,  chronic alcoholism, CAD, anxiety, depression, GERD, hx of Schatzki's ring, who is seen in consultation at the request of Marivel Quiroz NP for dysphagia. Pt presented to ED 9/30/2020 with worsening SOB. He increased his Lasix from 40mg to 80mg with no improvement. He also reported weight gain of 15-20 pounds. He was seen at cardiology office and admission recommended for volume removal and rate control. He also complained of abdominal pain, headache, and chest pain. EKG without acute ST/T wave changes. Labs included hs-trop elevated at 199 and pro-BNP elevated 1902. He was admitted to cardiology and started on IV Lasix in addition to home cardiac meds. He began complaining of worsening dysphagia 10/1/2020. He reports choking on solids and liquids. Pt requested GI consult. He has had improvement of SOB. He complains of dysphagia with both liquids and solids. Nursing documentation today states that pt has been observed at all meals and med passes and swallowing was ok. He does have nausea and had 1 episode of clear emesis this morning. He has a long hx of dysphagia for >1 year. He does feel like it is getting worse. He has difficulty with solids, liquids, and sometimes his own saliva. He has occasional regurgitation of clear emesis. He has heartburn and GERD daily despite taking daily PPI. He tends towards looser stool. He denies any signs of GIB. He has some improvement of SOB. He does complain of chest pain. He says he has had 2 episodes like this in his life, describes as severe sharp pain.  After pain resolves, he has chest soreness for a long time. This has not improved with previous dilation with 60Fr Gandhi in 2019. Dysphagia was felt to be secondary to [facility]. He was seen during hospitalization 3/2020 for dysphagia. GI team deferred EGD as he had no improvement with previous dilation and recommended outpatient surgery referral to discuss possible Nissen. PMH: 
Past Medical History:  
Diagnosis Date  Anxiety associated with depression 3/18/2017  Arthritis  CAD (coronary artery disease)  CHF (congestive heart failure) ([address]  Chronic alcoholism ([address]  Chronic back pain   
 from [facility]  Chronic neck pain   
 from [facility]  Chronic systolic heart failure ([address] 4/21/2011  Dental caries 7/13/2017  Depression  Dizziness - light-headed  GERD (gastroesophageal reflux disease)   
 under control with nexium  Gynecomastia 2/22/2016  Heart failure ([address]  History of implantable cardioverter-defibrillator (ICD) placement 2/22/2016  Hypertension  ICD (implantable cardioverter-defibrillator) in place 4/22/2011  Leukopenia 5/7/2019  Macrocytic anemia 7/8/2018  Psychiatric disorder   
 anxiety  Schatzki's ring 07/10/2018  Situational depression 2/22/2016  SVT (supraventricular tachycardia) ([address] 12/22/2018  Ventricular tachycardia ([address] 2/22/2016 PSH: 
Past Surgical History:  
Procedure Laterality Date  EGD  5/9/2019  HX HEART CATHETERIZATION  9/21/10  
 HX PACEMAKER    
 defibrillator Allergies: 
No Known Allergies Home Medications: 
Prior to Admission medications Medication Sig Start Date End Date Taking? Authorizing Provider  
gabapentin (NEURONTIN) 300 mg capsule 1 po bid prn pain 8/20/20  Yes Edwin Marrufo MD  
promethazine (PHENERGAN) 25 mg tablet Take 1 Tab by mouth every six (6) hours as needed for Nausea.  8/20/20  Yes Edwin Marrufo MD  
sildenafil citrate (Viagra) 100 mg tablet Take 1 Tab by mouth as needed (as directed). 8/18/20  Yes Pramod Hernández MD  
HYDROcodone-acetaminophen (NORCO)  mg tablet TAKE ONE TABLET BY MOUTH FOUR TIMES DAILY AS NEEDED 11/9/19  Yes Provider, Historical  
carvedilol (COREG) 25 mg tablet Take 1 Tab by mouth two (2) times daily (with meals). 12/6/19  Yes Pramod Hernández MD  
losartan (COZAAR) 50 mg tablet Take 1 Tab by mouth daily. 12/6/19  Yes Pramod Hernández MD  
furosemide (LASIX) 40 mg tablet Take 1 Tab by mouth two (2) times a day. Patient taking differently: Take 40 mg by mouth two (2) times a day. 80mg bid 12/6/19  Yes Pramod Hernández MD  
atorvastatin (LIPITOR) 80 mg tablet Take 1 Tab by mouth daily. 12/6/19  Yes Pramod Hernández MD  
eplerenone (INSPRA) 25 mg tablet Take 1 Tab by mouth daily. 12/6/19  Yes Pramod Hernández MD  
magnesium oxide (MAG-OX) 400 mg tablet Take 1 Tab by mouth every twelve (12) hours. 12/26/18  Yes Roberta Grider NP  
ESOMEPRAZOLE MAG TRIHYDRATE (NEXIUM PO) Take 1 Cap by mouth two (2) times a day. 4/14/11  Yes Adielne, MD Mary  
ALPRAZolam Garzon Cardinal) 1 mg tablet Take 0.5 Tabs by mouth nightly. Max Daily Amount: 0.5 mg. Indications: anxious 9/14/20   Nuvia Combs MD  
 
 
Hospital Medications: 
Current Facility-Administered Medications Medication Dose Route Frequency  potassium chloride (K-DUR, KLOR-CON) SR tablet 40 mEq  40 mEq Oral BID  furosemide (LASIX) tablet 80 mg  80 mg Oral Q12H  ALPRAZolam (XANAX) tablet 0.5 mg  0.5 mg Oral QHS  atorvastatin (LIPITOR) tablet 80 mg  80 mg Oral DAILY  carvediloL (COREG) tablet 25 mg  25 mg Oral BID WITH MEALS  eplerenone (INSPRA) tablet 25 mg (Patient Supplied)  25 mg Oral DAILY  pantoprazole (PROTONIX) tablet 40 mg  40 mg Oral ACB&D  
 gabapentin (NEURONTIN) capsule 300 mg  300 mg Oral BID PRN  
 HYDROcodone-acetaminophen (NORCO)  mg tablet 1 Tab  1 Tab Oral Q4H PRN  
 losartan (COZAAR) tablet 50 mg  50 mg Oral DAILY  sodium chloride (NS) flush 5-40 mL  5-40 mL IntraVENous Q8H  
 sodium chloride (NS) flush 5-40 mL  5-40 mL IntraVENous PRN  
 nitroglycerin (NITROSTAT) tablet 0.4 mg  0.4 mg SubLINGual Q5MIN PRN  
 acetaminophen (TYLENOL) tablet 650 mg  650 mg Oral Q4H PRN  
 heparin (porcine) injection 5,000 Units  5,000 Units SubCUTAneous Q8H  
 albuterol (PROVENTIL VENTOLIN) nebulizer solution 2.5 mg  2.5 mg Nebulization Q4H PRN  promethazine (PHENERGAN) with saline injection 12.5 mg  12.5 mg IntraVENous Q4H PRN  
 HYDROmorphone (PF) (DILAUDID) injection 0.5 mg  0.5 mg IntraVENous Q4H PRN  
 magnesium oxide (MAG-OX) tablet 800 mg  800 mg Oral Q12H Social History: 
Social History Tobacco Use  Smoking status: Never Smoker  Smokeless tobacco: Never Used Substance Use Topics  Alcohol use: Yes Comment: occasi Pt denies any history of drug use, blood transfusions, or tattoos. Family History: 
Family History Problem Relation Age of Onset  Heart Disease Father CABG  
 Diabetes Father  Arthritis-rheumatoid Mother Review of Systems: A detailed 10 system ROS is obtained, with pertinent positives as listed above. All others are negative. Diet:   
 
Objective:  
 
Physical Exam: 
Vitals: 
Visit Vitals /79 (BP Patient Position: At rest) Pulse 88 Temp 98.5 °F (36.9 °C) Resp 16 Ht 5' 11\" (1.803 m) Wt 86.8 kg (191 lb 4.8 oz) SpO2 94% BMI 26.68 kg/m² Gen:  Pt is alert, cooperative, no acute distress Skin:  Extremities and face reveal no rashes. HEENT: Sclerae anicteric. Extra-occular muscles are intact. No oral ulcers. No abnormal pigmentation of the lips. The neck is supple. Cardiovascular: Regular rate and rhythm. No murmurs, gallops, or rubs. Respiratory:  Comfortable breathing with no accessory muscle use. Clear breath sounds anteriorly with no wheezes, rales, or rhonchi. GI:  Abdomen nondistended, soft, and nontender. Normal active bowel sounds. No enlargement of the liver or spleen. No masses palpable. Rectal:  Deferred Musculoskeletal:  No pitting edema of the lower legs. Neurological:  Gross memory appears intact. Patient is alert and oriented. Psychiatric:  Mood appears appropriate with judgement intact. Lymphatic:  No cervical or supraclavicular adenopathy. Laboratory:   
Recent Labs 10/02/20 
3621 10/01/20 
4608 09/30/20 
2302 09/30/20 
9331 WBC  --   --   --  5.3 HGB  --   --   --  11.8* HCT  --   --   --  35.7* PLT  --   --   --  195 MCV  --   --   --  97.8  139  --  140  
K 3.2* 3.3*  --  3.3*  
 105  --  109* CO2 29 27  --  24 BUN 8 7  --  7  
CREA 1.28 1.26  --  1.03  
CA 7.7* 7.7*  --  7.4* MG 1.7* 2.6* 1.3* 1.4*  
* 125*  --  99  
AP  --   --   --  85  
ALT  --   --   --  32  
TBILI  --   --   --  1.0 ALB  --   --   --  3.8 TP  --   --   --  7.1 Assessment:  
 
Principal Problem: 
  Acute on chronic systolic heart failure ([facility] (9/30/2020) Active Problems: 
  HTN (hypertension) (11/29/2011) VT [facility]ricular tachycardia[facility] (2/22/2016) Chest pain (9/30/2020) CHF (congestive heart failure) ([facility] (10/2/2020) Patient is a 54 y.o. male with PMH of systolic dysfunction with EF in 20% range, VT with ICD in place,  chronic alcoholism, CAD, anxiety, depression, GERD, hx of Schatzki's ring, who is seen in consultation at the request of Reyes Grace NP for dysphagia. He has had dysphagia for more than 1 year. He had no improvement with dilation in 5/2019. Previously thought that dysphagia may be related to [facility]. He has dysphagia to liquids and solids. He has chronic nausea. He is still having SOB and now complains of chest pain. Plan:  
 
-Continue PPI BID 
-Will order barium swallow for further evaluation 
-Further recommendations pending results.   
 
MILA Galicia 
 
 
 Patient is seen and examined in collaboration with Dr. Marlo Tatum. Assessment and plan as per Dr. Marlo Tatum. Path Notes (To The Dermatopathologist): Please check margins.

## 2023-09-12 DIAGNOSIS — Z45.02 ICD (IMPLANTABLE CARDIOVERTER-DEFIBRILLATOR) DISCHARGE: ICD-10-CM

## 2023-09-12 DIAGNOSIS — I42.8 NON-ISCHEMIC CARDIOMYOPATHY (HCC): ICD-10-CM

## 2023-09-12 DIAGNOSIS — I47.29 NSVT (NONSUSTAINED VENTRICULAR TACHYCARDIA) (HCC): ICD-10-CM

## 2023-09-12 DIAGNOSIS — I50.9 CHF (CONGESTIVE HEART FAILURE) (HCC): ICD-10-CM

## 2023-09-27 NOTE — TELEPHONE ENCOUNTER
MEDICATION REFILL REQUEST      Name of Medication:  Potassium Chloride  Dose:  20 MEQ  Frequency:  BID  Quantity:  ?  Days' supply:  ?       Pharmacy Name/Location:  Call [phone]    MEDICATION REFILL REQUEST     Name of medication: Magnesium oxide  Dose:  400 mg  Frequency:  QD  Quantity:  ?  Days' supply:  /      Pharmacy Name/Location:  call pt

## 2023-09-27 NOTE — TELEPHONE ENCOUNTER
Requested Prescriptions     Pending Prescriptions Disp Refills    potassium chloride (KLOR-CON M) 20 MEQ extended release tablet 180 tablet 3     Sig: Take 1 tablet by mouth 2 times daily    magnesium oxide (MAG-OX) 400 (240 Mg) MG tablet 90 tablet 0     Sig: Take 1 tablet by mouth daily

## 2023-09-28 RX ORDER — POTASSIUM CHLORIDE 20 MEQ/1
20 TABLET, EXTENDED RELEASE ORAL 2 TIMES DAILY
Qty: 180 TABLET | Refills: 3 | Status: SHIPPED | OUTPATIENT
Start: 2023-09-28

## 2023-09-28 RX ORDER — LANOLIN ALCOHOL/MO/W.PET/CERES
400 CREAM (GRAM) TOPICAL DAILY
Qty: 90 TABLET | Refills: 0 | Status: SHIPPED | OUTPATIENT
Start: 2023-09-28

## 2023-11-01 ENCOUNTER — HOSPITAL ENCOUNTER (INPATIENT)
Age: 58
LOS: 7 days | Discharge: HOME | DRG: 602 | End: 2023-11-08
Attending: FAMILY MEDICINE | Admitting: FAMILY MEDICINE
Payer: MEDICARE

## 2023-11-01 ENCOUNTER — APPOINTMENT (OUTPATIENT)
Dept: CT IMAGING | Age: 58
End: 2023-11-01
Payer: COMMERCIAL

## 2023-11-01 ENCOUNTER — HOSPITAL ENCOUNTER (EMERGENCY)
Age: 58
Discharge: ANOTHER HOSPITAL | End: 2023-11-01
Attending: EMERGENCY MEDICINE
Payer: COMMERCIAL

## 2023-11-01 VITALS
SYSTOLIC BLOOD PRESSURE: 138 MMHG | WEIGHT: 180 LBS | TEMPERATURE: 99 F | DIASTOLIC BLOOD PRESSURE: 89 MMHG | HEIGHT: 71 IN | RESPIRATION RATE: 17 BRPM | OXYGEN SATURATION: 94 % | HEART RATE: 77 BPM | BODY MASS INDEX: 25.2 KG/M2

## 2023-11-01 DIAGNOSIS — F41.9 ANXIETY: ICD-10-CM

## 2023-11-01 DIAGNOSIS — L03.211 FACIAL CELLULITIS: Primary | ICD-10-CM

## 2023-11-01 DIAGNOSIS — L03.211 CELLULITIS OF FACE: Primary | ICD-10-CM

## 2023-11-01 PROBLEM — R13.19 ESOPHAGEAL DYSPHAGIA: Status: ACTIVE | Noted: 2019-05-10

## 2023-11-01 PROBLEM — E87.20 LACTIC ACIDOSIS: Status: ACTIVE | Noted: 2023-11-01

## 2023-11-01 LAB
ALBUMIN SERPL-MCNC: 3.7 G/DL (ref 3.5–5)
ALBUMIN/GLOB SERPL: 1.5 (ref 0.4–1.6)
ALP SERPL-CCNC: 98 U/L (ref 45–117)
ALT SERPL-CCNC: 10 U/L (ref 13–61)
ANION GAP SERPL CALC-SCNC: 16 MMOL/L (ref 2–11)
AST SERPL-CCNC: 27 U/L (ref 15–37)
BASOPHILS # BLD: 0 K/UL (ref 0–0.2)
BASOPHILS NFR BLD: 0 % (ref 0–2)
BILIRUB SERPL-MCNC: 0.9 MG/DL (ref 0.2–1.1)
BUN SERPL-MCNC: 6 MG/DL (ref 6–23)
CALCIUM SERPL-MCNC: 8.8 MG/DL (ref 8.3–10.4)
CHLORIDE SERPL-SCNC: 101 MMOL/L (ref 98–107)
CO2 SERPL-SCNC: 21 MMOL/L (ref 21–32)
CREAT SERPL-MCNC: 0.9 MG/DL (ref 0.8–1.5)
DIFFERENTIAL METHOD BLD: ABNORMAL
EOSINOPHIL # BLD: 0.2 K/UL (ref 0–0.8)
EOSINOPHIL NFR BLD: 3 % (ref 0.5–7.8)
ERYTHROCYTE [DISTWIDTH] IN BLOOD BY AUTOMATED COUNT: 19.3 % (ref 11.9–14.6)
GLOBULIN SER CALC-MCNC: 2.5 G/DL (ref 2.8–4.5)
GLUCOSE SERPL-MCNC: 97 MG/DL (ref 65–100)
HCT VFR BLD AUTO: 34.6 % (ref 41.1–50.3)
HGB BLD-MCNC: 11.9 G/DL (ref 13.6–17.2)
IMM GRANULOCYTES # BLD AUTO: 0 K/UL (ref 0–0.5)
IMM GRANULOCYTES NFR BLD AUTO: 0 % (ref 0–5)
LACTATE SERPL-SCNC: 3.4 MMOL/L (ref 0.4–2)
LACTATE SERPL-SCNC: 3.8 MMOL/L (ref 0.4–2)
LYMPHOCYTES # BLD: 0.9 K/UL (ref 0.5–4.6)
LYMPHOCYTES NFR BLD: 12 % (ref 13–44)
MCH RBC QN AUTO: 31.1 PG (ref 26.1–32.9)
MCHC RBC AUTO-ENTMCNC: 34.4 G/DL (ref 31.4–35)
MCV RBC AUTO: 90.3 FL (ref 82–102)
MONOCYTES # BLD: 0.4 K/UL (ref 0.1–1.3)
MONOCYTES NFR BLD: 6 % (ref 4–12)
NEUTS SEG # BLD: 5.8 K/UL (ref 1.7–8.2)
NEUTS SEG NFR BLD: 78 % (ref 43–78)
NRBC # BLD: 0 K/UL (ref 0–0.2)
PLATELET # BLD AUTO: 231 K/UL (ref 150–450)
PMV BLD AUTO: 9.2 FL (ref 9.4–12.3)
POTASSIUM SERPL-SCNC: 3.5 MMOL/L (ref 3.5–5.1)
PROT SERPL-MCNC: 6.2 G/DL (ref 6.4–8.2)
RBC # BLD AUTO: 3.83 M/UL (ref 4.23–5.6)
SODIUM SERPL-SCNC: 138 MMOL/L (ref 133–143)
TROPONIN T SERPL HS-MCNC: 15.4 NG/L (ref 0–22)
WBC # BLD AUTO: 7.4 K/UL (ref 4.3–11.1)

## 2023-11-01 PROCEDURE — 87040 BLOOD CULTURE FOR BACTERIA: CPT

## 2023-11-01 PROCEDURE — 70491 CT SOFT TISSUE NECK W/DYE: CPT

## 2023-11-01 PROCEDURE — 6360000004 HC RX CONTRAST MEDICATION: Performed by: EMERGENCY MEDICINE

## 2023-11-01 PROCEDURE — 85025 COMPLETE CBC W/AUTO DIFF WBC: CPT

## 2023-11-01 PROCEDURE — 71260 CT THORAX DX C+: CPT

## 2023-11-01 PROCEDURE — 96376 TX/PRO/DX INJ SAME DRUG ADON: CPT

## 2023-11-01 PROCEDURE — 83605 ASSAY OF LACTIC ACID: CPT

## 2023-11-01 PROCEDURE — 2580000003 HC RX 258: Performed by: EMERGENCY MEDICINE

## 2023-11-01 PROCEDURE — 96375 TX/PRO/DX INJ NEW DRUG ADDON: CPT

## 2023-11-01 PROCEDURE — 1100000003 HC PRIVATE W/ TELEMETRY

## 2023-11-01 PROCEDURE — 96361 HYDRATE IV INFUSION ADD-ON: CPT

## 2023-11-01 PROCEDURE — 96374 THER/PROPH/DIAG INJ IV PUSH: CPT

## 2023-11-01 PROCEDURE — 96365 THER/PROPH/DIAG IV INF INIT: CPT

## 2023-11-01 PROCEDURE — 6360000002 HC RX W HCPCS: Performed by: EMERGENCY MEDICINE

## 2023-11-01 PROCEDURE — 96367 TX/PROPH/DG ADDL SEQ IV INF: CPT

## 2023-11-01 PROCEDURE — 80053 COMPREHEN METABOLIC PANEL: CPT

## 2023-11-01 PROCEDURE — 84484 ASSAY OF TROPONIN QUANT: CPT

## 2023-11-01 PROCEDURE — 99285 EMERGENCY DEPT VISIT HI MDM: CPT

## 2023-11-01 PROCEDURE — 93005 ELECTROCARDIOGRAM TRACING: CPT | Performed by: EMERGENCY MEDICINE

## 2023-11-01 RX ORDER — MULTIVITAMIN WITH IRON
1 TABLET ORAL DAILY
Status: DISCONTINUED | OUTPATIENT
Start: 2023-11-02 | End: 2023-11-08 | Stop reason: HOSPADM

## 2023-11-01 RX ORDER — LORAZEPAM 2 MG/ML
2 INJECTION INTRAMUSCULAR
Status: DISCONTINUED | OUTPATIENT
Start: 2023-11-01 | End: 2023-11-02

## 2023-11-01 RX ORDER — ONDANSETRON 2 MG/ML
4 INJECTION INTRAMUSCULAR; INTRAVENOUS ONCE
Status: COMPLETED | OUTPATIENT
Start: 2023-11-01 | End: 2023-11-01

## 2023-11-01 RX ORDER — LANOLIN ALCOHOL/MO/W.PET/CERES
3 CREAM (GRAM) TOPICAL NIGHTLY
Status: DISCONTINUED | OUTPATIENT
Start: 2023-11-02 | End: 2023-11-08 | Stop reason: HOSPADM

## 2023-11-01 RX ORDER — ACETAMINOPHEN 325 MG/1
650 TABLET ORAL EVERY 6 HOURS PRN
Status: DISCONTINUED | OUTPATIENT
Start: 2023-11-01 | End: 2023-11-08 | Stop reason: HOSPADM

## 2023-11-01 RX ORDER — 0.9 % SODIUM CHLORIDE 0.9 %
1000 INTRAVENOUS SOLUTION INTRAVENOUS ONCE
Status: COMPLETED | OUTPATIENT
Start: 2023-11-01 | End: 2023-11-01

## 2023-11-01 RX ORDER — ENOXAPARIN SODIUM 100 MG/ML
40 INJECTION SUBCUTANEOUS DAILY
Status: DISCONTINUED | OUTPATIENT
Start: 2023-11-02 | End: 2023-11-08 | Stop reason: HOSPADM

## 2023-11-01 RX ORDER — SODIUM CHLORIDE, SODIUM LACTATE, POTASSIUM CHLORIDE, CALCIUM CHLORIDE 600; 310; 30; 20 MG/100ML; MG/100ML; MG/100ML; MG/100ML
INJECTION, SOLUTION INTRAVENOUS CONTINUOUS
Status: ACTIVE | OUTPATIENT
Start: 2023-11-02 | End: 2023-11-03

## 2023-11-01 RX ORDER — SODIUM CHLORIDE 0.9 % (FLUSH) 0.9 %
5-40 SYRINGE (ML) INJECTION EVERY 12 HOURS SCHEDULED
Status: DISCONTINUED | OUTPATIENT
Start: 2023-11-02 | End: 2023-11-08 | Stop reason: HOSPADM

## 2023-11-01 RX ORDER — LORAZEPAM 0.5 MG/1
1 TABLET ORAL
Status: DISCONTINUED | OUTPATIENT
Start: 2023-11-01 | End: 2023-11-02

## 2023-11-01 RX ORDER — LORAZEPAM 2 MG/ML
4 INJECTION INTRAMUSCULAR
Status: DISCONTINUED | OUTPATIENT
Start: 2023-11-01 | End: 2023-11-02

## 2023-11-01 RX ORDER — MORPHINE SULFATE 4 MG/ML
4 INJECTION, SOLUTION INTRAMUSCULAR; INTRAVENOUS
Status: COMPLETED | OUTPATIENT
Start: 2023-11-01 | End: 2023-11-01

## 2023-11-01 RX ORDER — LORAZEPAM 1 MG/1
3 TABLET ORAL
Status: DISCONTINUED | OUTPATIENT
Start: 2023-11-01 | End: 2023-11-02

## 2023-11-01 RX ORDER — LORAZEPAM 2 MG/ML
3 INJECTION INTRAMUSCULAR
Status: DISCONTINUED | OUTPATIENT
Start: 2023-11-01 | End: 2023-11-02

## 2023-11-01 RX ORDER — LORAZEPAM 0.5 MG/1
2 TABLET ORAL
Status: DISCONTINUED | OUTPATIENT
Start: 2023-11-01 | End: 2023-11-02

## 2023-11-01 RX ORDER — CLINDAMYCIN PHOSPHATE 600 MG/50ML
600 INJECTION INTRAVENOUS
Status: COMPLETED | OUTPATIENT
Start: 2023-11-01 | End: 2023-11-01

## 2023-11-01 RX ORDER — SODIUM CHLORIDE 9 MG/ML
INJECTION, SOLUTION INTRAVENOUS PRN
Status: DISCONTINUED | OUTPATIENT
Start: 2023-11-01 | End: 2023-11-08 | Stop reason: HOSPADM

## 2023-11-01 RX ORDER — LORAZEPAM 1 MG/1
4 TABLET ORAL
Status: DISCONTINUED | OUTPATIENT
Start: 2023-11-01 | End: 2023-11-02

## 2023-11-01 RX ORDER — LORAZEPAM 2 MG/ML
1 INJECTION INTRAMUSCULAR
Status: DISCONTINUED | OUTPATIENT
Start: 2023-11-01 | End: 2023-11-02

## 2023-11-01 RX ORDER — SODIUM CHLORIDE 0.9 % (FLUSH) 0.9 %
5-40 SYRINGE (ML) INJECTION PRN
Status: DISCONTINUED | OUTPATIENT
Start: 2023-11-01 | End: 2023-11-08 | Stop reason: HOSPADM

## 2023-11-01 RX ORDER — LANOLIN ALCOHOL/MO/W.PET/CERES
100 CREAM (GRAM) TOPICAL DAILY
Status: DISCONTINUED | OUTPATIENT
Start: 2023-11-02 | End: 2023-11-08 | Stop reason: HOSPADM

## 2023-11-01 RX ORDER — POLYETHYLENE GLYCOL 3350 17 G/17G
17 POWDER, FOR SOLUTION ORAL DAILY PRN
Status: DISCONTINUED | OUTPATIENT
Start: 2023-11-01 | End: 2023-11-08 | Stop reason: HOSPADM

## 2023-11-01 RX ORDER — ACETAMINOPHEN 650 MG/1
650 SUPPOSITORY RECTAL EVERY 6 HOURS PRN
Status: DISCONTINUED | OUTPATIENT
Start: 2023-11-01 | End: 2023-11-08 | Stop reason: HOSPADM

## 2023-11-01 RX ORDER — ONDANSETRON 4 MG/1
4 TABLET, ORALLY DISINTEGRATING ORAL EVERY 8 HOURS PRN
Status: DISCONTINUED | OUTPATIENT
Start: 2023-11-01 | End: 2023-11-08 | Stop reason: HOSPADM

## 2023-11-01 RX ORDER — ONDANSETRON 2 MG/ML
4 INJECTION INTRAMUSCULAR; INTRAVENOUS EVERY 6 HOURS PRN
Status: DISCONTINUED | OUTPATIENT
Start: 2023-11-01 | End: 2023-11-08 | Stop reason: HOSPADM

## 2023-11-01 RX ORDER — FAMOTIDINE 20 MG/1
20 TABLET, FILM COATED ORAL 2 TIMES DAILY
Status: DISCONTINUED | OUTPATIENT
Start: 2023-11-02 | End: 2023-11-08 | Stop reason: HOSPADM

## 2023-11-01 RX ADMIN — MORPHINE SULFATE 4 MG: 4 INJECTION INTRAVENOUS at 21:54

## 2023-11-01 RX ADMIN — ONDANSETRON 4 MG: 2 INJECTION INTRAMUSCULAR; INTRAVENOUS at 21:54

## 2023-11-01 RX ADMIN — SODIUM CHLORIDE 1000 ML: 9 INJECTION, SOLUTION INTRAVENOUS at 21:39

## 2023-11-01 RX ADMIN — CLINDAMYCIN PHOSPHATE 600 MG: 600 INJECTION, SOLUTION INTRAVENOUS at 18:00

## 2023-11-01 RX ADMIN — CEFTRIAXONE 1000 MG: 1 INJECTION, POWDER, FOR SOLUTION INTRAMUSCULAR; INTRAVENOUS at 21:59

## 2023-11-01 RX ADMIN — IOPAMIDOL 100 ML: 755 INJECTION, SOLUTION INTRAVENOUS at 17:46

## 2023-11-01 RX ADMIN — ONDANSETRON 4 MG: 2 INJECTION INTRAMUSCULAR; INTRAVENOUS at 17:00

## 2023-11-01 RX ADMIN — SODIUM CHLORIDE 1000 ML: 9 INJECTION, SOLUTION INTRAVENOUS at 17:56

## 2023-11-01 RX ADMIN — MORPHINE SULFATE 4 MG: 4 INJECTION INTRAVENOUS at 17:01

## 2023-11-01 ASSESSMENT — ENCOUNTER SYMPTOMS
GASTROINTESTINAL NEGATIVE: 1
RESPIRATORY NEGATIVE: 1
BACK PAIN: 0

## 2023-11-01 ASSESSMENT — PAIN - FUNCTIONAL ASSESSMENT: PAIN_FUNCTIONAL_ASSESSMENT: 0-10

## 2023-11-01 ASSESSMENT — PAIN SCALES - GENERAL
PAINLEVEL_OUTOF10: 7
PAINLEVEL_OUTOF10: 5
PAINLEVEL_OUTOF10: 8
PAINLEVEL_OUTOF10: 10

## 2023-11-01 ASSESSMENT — PAIN DESCRIPTION - DESCRIPTORS: DESCRIPTORS: DISCOMFORT

## 2023-11-01 ASSESSMENT — PAIN DESCRIPTION - ORIENTATION
ORIENTATION: RIGHT
ORIENTATION: RIGHT

## 2023-11-01 ASSESSMENT — PAIN DESCRIPTION - LOCATION
LOCATION: FACE

## 2023-11-01 NOTE — ED TRIAGE NOTES
Pt ambulatory to triage with steady gait. Pt c/o abscess to R side of his face that he first noticed about a week ago. Pt states now due to the pain he can't fully close his mouth. Pt noted to have redness to R side of face. Pt in NAD during triage.

## 2023-11-01 NOTE — ED NOTES
IV Pump alarming and showing message of \"Occlusion at fluid side\" I took tubing out of the pump and it would flow perfectly. Saline is flowing at a fast rate unassisted. I placed tubing and attempted to run Cleocin with the pump, but it continued to give he same alarm. I discontinued the pump and infused the Cleocin manually with no complications.      Gallo López RN  11/01/23 1879

## 2023-11-01 NOTE — ED PROVIDER NOTES
Emergency Department Provider Note       PCP: None, None   Age: 62 y.o. Sex: male     DISPOSITION Decision To Transfer 11/01/2023 07:08:58 PM       ICD-10-CM    1. Cellulitis of face  L03.211           Medical Decision Making     Complexity of Problems Addressed:  1 or more acute illnesses that pose a threat to life or bodily function. Data Reviewed and Analyzed:   I independently ordered and reviewed each unique test.  I reviewed external records: provider visit note from PCP. I reviewed external records: provider visit note from outside specialist.       I independently ordered and interpreted the ED EKG in the absence of a Cardiologist.    Rate of 77. Sinus rhythm. Prolonged MI interval with left bundle branch block. No STEMI criteria noted. No Sgarbossa criteria noted. I interpreted the CT Scan agree with radiologist interpretation. I interpreted the labs. Discussion of management or test interpretation. Very painful right face with significant trismus. Swelling fairly significant to the inferior right orbit. No obvious sublingual swelling. Posterior pharynx unremarkable. Lungs are clear with no bronchospastic cough. CT soft tissue neck face and chest obtained. No abscess however cellulitis of the right face noted. Lactic acid of 3.8. Consistent with sepsis criteria with source of infection. Clindamycin IV started. 1 L of normal saline started due to history of CHF. Concern for pulmonary edema. Do not feel he needs excessive IV fluid treatment at this time. CT chest without signs of extension of cellulitis or abscess. There is questionable infiltrate on the lungs however does not quite fit clinical picture. Given findings, symptoms recommend admission with the hospitalist service. Patient accepted to [facility] by Dr. Juan Ramon Grant  The patient was admitted and I have discussed patient management with the admitting provider.     Risk of Complications and/or Morbidity of OF EXAM: 11/1/2023 5:34 PM    HISTORY: Right face/neck swelling, evaluate intrathoracic extent     COMPARISON: 2/9/2022. TECHNIQUE: CT examination of the chest was performed following the intravenous   administration of 100 mL  Isovue-370. CT dose lowering techniques were used, to   include: automated exposure control, adjustment for patient size, and/or use of   iterative reconstruction. FINDINGS:    CHEST: There is motion limitation. Lungs:  Small patchy opacity left upper lobe posteriorly along the fissure   (image #11 series 3). Bibasilar atelectasis overlying effusions. Pleura: Trace bilateral pleural effusions dependently. Mediastinum And Gabriela: Lower neck planes are clear. No mediastinal stranding or   lymphadenopathy. Cardiovascular: Mild cardiomegaly similar to prior exams. No pericardial   effusion. Left intraventricular leads noted. There is coronary artery   calcification. Thoracic aorta normal caliber. Chest Wall:  Mild suggested gynecomastia. No axillary lymphadenopathy. Upper Abdomen: Intrathoracic stomach is similar to 2/9/2022, no significant wall   thickening or stranding. No significant biliary dilation postcholecystectomy,   partially visualized. MUSCULOSKELETAL: No suspicious osseous abnormalities identified. Impression    1. Small patchy opacity left upper lobe posteriorly, possible focal infiltrate. 2.  Trace bilateral pleural effusions. Overlying linear probable atelectasis. 3.  Unchanged intrathoracic stomach compared to 2/9/2022. Thank you for the referral of this patient. This exam was interpreted by an   [employer] certified diagnostic radiologist with   fellowship training. If there are any questions regarding this exam please feel   free to contact us directly at [phone]. Thank you for the referral of this patient.  This exam was interpreted by an   [employer] certified diagnostic right

## 2023-11-02 PROBLEM — J18.9 PNEUMONIA: Status: ACTIVE | Noted: 2023-11-02

## 2023-11-02 LAB
ANION GAP SERPL CALC-SCNC: 6 MMOL/L (ref 2–11)
B PERT DNA SPEC QL NAA+PROBE: NOT DETECTED
BASOPHILS # BLD: 0 K/UL (ref 0–0.2)
BASOPHILS NFR BLD: 1 % (ref 0–2)
BORDETELLA PARAPERTUSSIS BY PCR: NOT DETECTED
BUN SERPL-MCNC: 5 MG/DL (ref 6–23)
C PNEUM DNA SPEC QL NAA+PROBE: NOT DETECTED
CALCIUM SERPL-MCNC: 7.3 MG/DL (ref 8.3–10.4)
CHLORIDE SERPL-SCNC: 108 MMOL/L (ref 101–110)
CO2 SERPL-SCNC: 25 MMOL/L (ref 21–32)
CREAT SERPL-MCNC: 0.8 MG/DL (ref 0.8–1.5)
DIFFERENTIAL METHOD BLD: ABNORMAL
EKG ATRIAL RATE: 77 BPM
EKG DIAGNOSIS: NORMAL
EKG P AXIS: 35 DEGREES
EKG P-R INTERVAL: 219 MS
EKG Q-T INTERVAL: 415 MS
EKG QRS DURATION: 131 MS
EKG QTC CALCULATION (BAZETT): 470 MS
EKG R AXIS: -16 DEGREES
EKG T AXIS: 154 DEGREES
EKG VENTRICULAR RATE: 77 BPM
EOSINOPHIL # BLD: 0.2 K/UL (ref 0–0.8)
EOSINOPHIL NFR BLD: 5 % (ref 0.5–7.8)
ERYTHROCYTE [DISTWIDTH] IN BLOOD BY AUTOMATED COUNT: 19.4 % (ref 11.9–14.6)
FLUAV SUBTYP SPEC NAA+PROBE: NOT DETECTED
FLUBV RNA SPEC QL NAA+PROBE: NOT DETECTED
GLUCOSE SERPL-MCNC: 92 MG/DL (ref 65–100)
HADV DNA SPEC QL NAA+PROBE: NOT DETECTED
HCOV 229E RNA SPEC QL NAA+PROBE: NOT DETECTED
HCOV HKU1 RNA SPEC QL NAA+PROBE: NOT DETECTED
HCOV NL63 RNA SPEC QL NAA+PROBE: NOT DETECTED
HCOV OC43 RNA SPEC QL NAA+PROBE: NOT DETECTED
HCT VFR BLD AUTO: 29.9 % (ref 41.1–50.3)
HGB BLD-MCNC: 9.8 G/DL (ref 13.6–17.2)
HMPV RNA SPEC QL NAA+PROBE: NOT DETECTED
HPIV1 RNA SPEC QL NAA+PROBE: NOT DETECTED
HPIV2 RNA SPEC QL NAA+PROBE: NOT DETECTED
HPIV3 RNA SPEC QL NAA+PROBE: NOT DETECTED
HPIV4 RNA SPEC QL NAA+PROBE: NOT DETECTED
IMM GRANULOCYTES # BLD AUTO: 0 K/UL (ref 0–0.5)
IMM GRANULOCYTES NFR BLD AUTO: 0 % (ref 0–5)
LACTATE SERPL-SCNC: 1.4 MMOL/L (ref 0.4–2)
LACTATE SERPL-SCNC: 2 MMOL/L (ref 0.4–2)
LACTATE SERPL-SCNC: 3.2 MMOL/L (ref 0.4–2)
LYMPHOCYTES # BLD: 0.8 K/UL (ref 0.5–4.6)
LYMPHOCYTES NFR BLD: 18 % (ref 13–44)
M PNEUMO DNA SPEC QL NAA+PROBE: NOT DETECTED
MAGNESIUM SERPL-MCNC: 1.5 MG/DL (ref 1.8–2.4)
MCH RBC QN AUTO: 30.4 PG (ref 26.1–32.9)
MCHC RBC AUTO-ENTMCNC: 32.8 G/DL (ref 31.4–35)
MCV RBC AUTO: 92.9 FL (ref 82–102)
MONOCYTES # BLD: 0.3 K/UL (ref 0.1–1.3)
MONOCYTES NFR BLD: 6 % (ref 4–12)
NEUTS SEG # BLD: 3.3 K/UL (ref 1.7–8.2)
NEUTS SEG NFR BLD: 70 % (ref 43–78)
NRBC # BLD: 0 K/UL (ref 0–0.2)
PLATELET # BLD AUTO: 199 K/UL (ref 150–450)
PMV BLD AUTO: 9.7 FL (ref 9.4–12.3)
POTASSIUM SERPL-SCNC: 3.4 MMOL/L (ref 3.5–5.1)
PROCALCITONIN SERPL-MCNC: <0.05 NG/ML (ref 0–0.49)
RBC # BLD AUTO: 3.22 M/UL (ref 4.23–5.6)
RSV RNA SPEC QL NAA+PROBE: NOT DETECTED
RV+EV RNA SPEC QL NAA+PROBE: NOT DETECTED
SARS-COV-2 RNA RESP QL NAA+PROBE: NOT DETECTED
SODIUM SERPL-SCNC: 139 MMOL/L (ref 133–143)
WBC # BLD AUTO: 4.7 K/UL (ref 4.3–11.1)

## 2023-11-02 PROCEDURE — 0202U NFCT DS 22 TRGT SARS-COV-2: CPT

## 2023-11-02 PROCEDURE — 85025 COMPLETE CBC W/AUTO DIFF WBC: CPT

## 2023-11-02 PROCEDURE — 6370000000 HC RX 637 (ALT 250 FOR IP): Performed by: INTERNAL MEDICINE

## 2023-11-02 PROCEDURE — 93010 ELECTROCARDIOGRAM REPORT: CPT | Performed by: INTERNAL MEDICINE

## 2023-11-02 PROCEDURE — 6360000002 HC RX W HCPCS: Performed by: NURSE PRACTITIONER

## 2023-11-02 PROCEDURE — 1100000003 HC PRIVATE W/ TELEMETRY

## 2023-11-02 PROCEDURE — 6360000002 HC RX W HCPCS: Performed by: FAMILY MEDICINE

## 2023-11-02 PROCEDURE — 6370000000 HC RX 637 (ALT 250 FOR IP): Performed by: NURSE PRACTITIONER

## 2023-11-02 PROCEDURE — 84145 PROCALCITONIN (PCT): CPT

## 2023-11-02 PROCEDURE — 99222 1ST HOSP IP/OBS MODERATE 55: CPT | Performed by: STUDENT IN AN ORGANIZED HEALTH CARE EDUCATION/TRAINING PROGRAM

## 2023-11-02 PROCEDURE — 6360000002 HC RX W HCPCS: Performed by: INTERNAL MEDICINE

## 2023-11-02 PROCEDURE — 80048 BASIC METABOLIC PNL TOTAL CA: CPT

## 2023-11-02 PROCEDURE — 2580000003 HC RX 258: Performed by: NURSE PRACTITIONER

## 2023-11-02 PROCEDURE — 83605 ASSAY OF LACTIC ACID: CPT

## 2023-11-02 PROCEDURE — 36415 COLL VENOUS BLD VENIPUNCTURE: CPT

## 2023-11-02 PROCEDURE — 83735 ASSAY OF MAGNESIUM: CPT

## 2023-11-02 PROCEDURE — 92610 EVALUATE SWALLOWING FUNCTION: CPT

## 2023-11-02 RX ORDER — ALPRAZOLAM 0.5 MG/1
1 TABLET ORAL DAILY PRN
Status: DISCONTINUED | OUTPATIENT
Start: 2023-11-02 | End: 2023-11-04 | Stop reason: SDUPTHER

## 2023-11-02 RX ORDER — MAGNESIUM SULFATE IN WATER 40 MG/ML
2000 INJECTION, SOLUTION INTRAVENOUS ONCE
Status: COMPLETED | OUTPATIENT
Start: 2023-11-02 | End: 2023-11-02

## 2023-11-02 RX ORDER — AMIODARONE HYDROCHLORIDE 200 MG/1
200 TABLET ORAL DAILY
Status: DISCONTINUED | OUTPATIENT
Start: 2023-11-02 | End: 2023-11-08 | Stop reason: HOSPADM

## 2023-11-02 RX ORDER — POTASSIUM CHLORIDE 20 MEQ/1
40 TABLET, EXTENDED RELEASE ORAL ONCE
Status: COMPLETED | OUTPATIENT
Start: 2023-11-02 | End: 2023-11-02

## 2023-11-02 RX ORDER — PROCHLORPERAZINE EDISYLATE 5 MG/ML
10 INJECTION INTRAMUSCULAR; INTRAVENOUS EVERY 6 HOURS PRN
Status: DISCONTINUED | OUTPATIENT
Start: 2023-11-02 | End: 2023-11-08 | Stop reason: HOSPADM

## 2023-11-02 RX ORDER — ALBUTEROL SULFATE 90 UG/1
2 AEROSOL, METERED RESPIRATORY (INHALATION) EVERY 4 HOURS PRN
Status: DISCONTINUED | OUTPATIENT
Start: 2023-11-02 | End: 2023-11-08 | Stop reason: HOSPADM

## 2023-11-02 RX ORDER — CARVEDILOL 25 MG/1
25 TABLET ORAL 2 TIMES DAILY
Status: DISCONTINUED | OUTPATIENT
Start: 2023-11-02 | End: 2023-11-05

## 2023-11-02 RX ORDER — FUROSEMIDE 40 MG/1
40 TABLET ORAL 2 TIMES DAILY
Status: DISCONTINUED | OUTPATIENT
Start: 2023-11-02 | End: 2023-11-03

## 2023-11-02 RX ORDER — ASPIRIN 81 MG/1
81 TABLET ORAL DAILY
Status: DISCONTINUED | OUTPATIENT
Start: 2023-11-02 | End: 2023-11-08 | Stop reason: HOSPADM

## 2023-11-02 RX ORDER — OXYCODONE HYDROCHLORIDE 5 MG/1
5 TABLET ORAL EVERY 4 HOURS PRN
Status: DISCONTINUED | OUTPATIENT
Start: 2023-11-02 | End: 2023-11-02

## 2023-11-02 RX ORDER — PROCHLORPERAZINE EDISYLATE 5 MG/ML
10 INJECTION INTRAMUSCULAR; INTRAVENOUS ONCE
Status: COMPLETED | OUTPATIENT
Start: 2023-11-02 | End: 2023-11-02

## 2023-11-02 RX ORDER — GUAIFENESIN/DEXTROMETHORPHAN 100-10MG/5
10 SYRUP ORAL EVERY 4 HOURS PRN
Status: DISCONTINUED | OUTPATIENT
Start: 2023-11-02 | End: 2023-11-08 | Stop reason: HOSPADM

## 2023-11-02 RX ORDER — SPIRONOLACTONE 25 MG/1
25 TABLET ORAL DAILY
Status: DISCONTINUED | OUTPATIENT
Start: 2023-11-02 | End: 2023-11-08 | Stop reason: HOSPADM

## 2023-11-02 RX ORDER — BENZONATATE 100 MG/1
100 CAPSULE ORAL 3 TIMES DAILY PRN
Status: DISCONTINUED | OUTPATIENT
Start: 2023-11-02 | End: 2023-11-08 | Stop reason: HOSPADM

## 2023-11-02 RX ORDER — LANOLIN ALCOHOL/MO/W.PET/CERES
400 CREAM (GRAM) TOPICAL DAILY
Status: DISCONTINUED | OUTPATIENT
Start: 2023-11-02 | End: 2023-11-02

## 2023-11-02 RX ORDER — POTASSIUM CHLORIDE 20 MEQ/1
20 TABLET, EXTENDED RELEASE ORAL ONCE
Status: COMPLETED | OUTPATIENT
Start: 2023-11-02 | End: 2023-11-02

## 2023-11-02 RX ORDER — GABAPENTIN 300 MG/1
300 CAPSULE ORAL 2 TIMES DAILY
Status: DISCONTINUED | OUTPATIENT
Start: 2023-11-02 | End: 2023-11-08 | Stop reason: HOSPADM

## 2023-11-02 RX ORDER — POTASSIUM CHLORIDE 20 MEQ/1
20 TABLET, EXTENDED RELEASE ORAL 2 TIMES DAILY WITH MEALS
Status: DISCONTINUED | OUTPATIENT
Start: 2023-11-02 | End: 2023-11-08 | Stop reason: HOSPADM

## 2023-11-02 RX ORDER — ATORVASTATIN CALCIUM 80 MG/1
80 TABLET, FILM COATED ORAL DAILY
Status: DISCONTINUED | OUTPATIENT
Start: 2023-11-02 | End: 2023-11-08 | Stop reason: HOSPADM

## 2023-11-02 RX ORDER — HYDROCODONE BITARTRATE AND ACETAMINOPHEN 10; 325 MG/1; MG/1
1 TABLET ORAL EVERY 6 HOURS PRN
Status: DISCONTINUED | OUTPATIENT
Start: 2023-11-02 | End: 2023-11-04

## 2023-11-02 RX ORDER — SUCRALFATE 1 G/1
1 TABLET ORAL
Status: DISCONTINUED | OUTPATIENT
Start: 2023-11-02 | End: 2023-11-04

## 2023-11-02 RX ORDER — LANOLIN ALCOHOL/MO/W.PET/CERES
400 CREAM (GRAM) TOPICAL 2 TIMES DAILY
Status: DISCONTINUED | OUTPATIENT
Start: 2023-11-02 | End: 2023-11-08 | Stop reason: HOSPADM

## 2023-11-02 RX ORDER — LOSARTAN POTASSIUM 25 MG/1
25 TABLET ORAL DAILY
Status: DISCONTINUED | OUTPATIENT
Start: 2023-11-02 | End: 2023-11-08 | Stop reason: HOSPADM

## 2023-11-02 RX ADMIN — PROCHLORPERAZINE EDISYLATE 10 MG: 5 INJECTION INTRAMUSCULAR; INTRAVENOUS at 05:23

## 2023-11-02 RX ADMIN — HYDROCODONE BITARTRATE AND ACETAMINOPHEN 1 TABLET: 10; 325 TABLET ORAL at 11:14

## 2023-11-02 RX ADMIN — ASPIRIN 81 MG: 81 TABLET ORAL at 08:55

## 2023-11-02 RX ADMIN — SUCRALFATE 1 G: 1 TABLET ORAL at 11:38

## 2023-11-02 RX ADMIN — GABAPENTIN 300 MG: 300 CAPSULE ORAL at 08:58

## 2023-11-02 RX ADMIN — ONDANSETRON 4 MG: 2 INJECTION INTRAMUSCULAR; INTRAVENOUS at 00:52

## 2023-11-02 RX ADMIN — HYDROCODONE BITARTRATE AND ACETAMINOPHEN 1 TABLET: 10; 325 TABLET ORAL at 02:20

## 2023-11-02 RX ADMIN — POTASSIUM CHLORIDE 40 MEQ: 1500 TABLET, EXTENDED RELEASE ORAL at 08:57

## 2023-11-02 RX ADMIN — SUCRALFATE 1 G: 1 TABLET ORAL at 22:05

## 2023-11-02 RX ADMIN — MAGNESIUM SULFATE HEPTAHYDRATE 2000 MG: 40 INJECTION, SOLUTION INTRAVENOUS at 09:09

## 2023-11-02 RX ADMIN — ATORVASTATIN CALCIUM 80 MG: 80 TABLET, FILM COATED ORAL at 08:56

## 2023-11-02 RX ADMIN — CARVEDILOL 25 MG: 25 TABLET, FILM COATED ORAL at 00:33

## 2023-11-02 RX ADMIN — BENZONATATE 100 MG: 100 CAPSULE ORAL at 00:33

## 2023-11-02 RX ADMIN — ENOXAPARIN SODIUM 40 MG: 100 INJECTION SUBCUTANEOUS at 08:55

## 2023-11-02 RX ADMIN — GABAPENTIN 300 MG: 300 CAPSULE ORAL at 22:06

## 2023-11-02 RX ADMIN — SUCRALFATE 1 G: 1 TABLET ORAL at 16:31

## 2023-11-02 RX ADMIN — AZITHROMYCIN MONOHYDRATE 500 MG: 500 INJECTION, POWDER, LYOPHILIZED, FOR SOLUTION INTRAVENOUS at 00:47

## 2023-11-02 RX ADMIN — B-COMPLEX W/ C & FOLIC ACID TAB 1 TABLET: TAB at 08:55

## 2023-11-02 RX ADMIN — SODIUM CHLORIDE, POTASSIUM CHLORIDE, SODIUM LACTATE AND CALCIUM CHLORIDE: 600; 310; 30; 20 INJECTION, SOLUTION INTRAVENOUS at 00:15

## 2023-11-02 RX ADMIN — FAMOTIDINE 20 MG: 20 TABLET, FILM COATED ORAL at 00:33

## 2023-11-02 RX ADMIN — CARVEDILOL 25 MG: 25 TABLET, FILM COATED ORAL at 08:55

## 2023-11-02 RX ADMIN — FAMOTIDINE 20 MG: 20 TABLET, FILM COATED ORAL at 08:54

## 2023-11-02 RX ADMIN — Medication 100 MG: at 08:57

## 2023-11-02 RX ADMIN — BENZONATATE 100 MG: 100 CAPSULE ORAL at 11:05

## 2023-11-02 RX ADMIN — HYDROCODONE BITARTRATE AND ACETAMINOPHEN 1 TABLET: 10; 325 TABLET ORAL at 17:38

## 2023-11-02 RX ADMIN — GABAPENTIN 300 MG: 300 CAPSULE ORAL at 00:33

## 2023-11-02 RX ADMIN — SODIUM CHLORIDE, PRESERVATIVE FREE 5 ML: 5 INJECTION INTRAVENOUS at 08:59

## 2023-11-02 RX ADMIN — CARVEDILOL 25 MG: 25 TABLET, FILM COATED ORAL at 22:05

## 2023-11-02 RX ADMIN — LOSARTAN POTASSIUM 25 MG: 25 TABLET, FILM COATED ORAL at 08:54

## 2023-11-02 RX ADMIN — POTASSIUM CHLORIDE 20 MEQ: 1500 TABLET, EXTENDED RELEASE ORAL at 16:31

## 2023-11-02 RX ADMIN — ALPRAZOLAM 1 MG: 0.5 TABLET ORAL at 00:43

## 2023-11-02 RX ADMIN — POTASSIUM CHLORIDE 20 MEQ: 1500 TABLET, EXTENDED RELEASE ORAL at 11:05

## 2023-11-02 RX ADMIN — Medication 3 MG: at 22:06

## 2023-11-02 RX ADMIN — MAGNESIUM GLUCONATE 500 MG ORAL TABLET 400 MG: 500 TABLET ORAL at 22:05

## 2023-11-02 RX ADMIN — CEFTRIAXONE 1000 MG: 1 INJECTION, POWDER, FOR SOLUTION INTRAMUSCULAR; INTRAVENOUS at 22:14

## 2023-11-02 RX ADMIN — FAMOTIDINE 20 MG: 20 TABLET, FILM COATED ORAL at 22:05

## 2023-11-02 RX ADMIN — MAGNESIUM GLUCONATE 500 MG ORAL TABLET 400 MG: 500 TABLET ORAL at 08:56

## 2023-11-02 RX ADMIN — FUROSEMIDE 40 MG: 40 TABLET ORAL at 22:06

## 2023-11-02 RX ADMIN — AMIODARONE HYDROCHLORIDE 200 MG: 200 TABLET ORAL at 08:58

## 2023-11-02 RX ADMIN — GUAIFENESIN SYRUP AND DEXTROMETHORPHAN 10 ML: 100; 10 SYRUP ORAL at 16:38

## 2023-11-02 ASSESSMENT — ENCOUNTER SYMPTOMS
EYE REDNESS: 0
COLOR CHANGE: 0
EYE PAIN: 0
CHOKING: 0
ABDOMINAL DISTENTION: 0
CONSTIPATION: 0
SINUS PAIN: 0
ABDOMINAL PAIN: 0
EYE DISCHARGE: 0
FACIAL SWELLING: 1
COUGH: 0
STRIDOR: 0

## 2023-11-02 ASSESSMENT — PAIN DESCRIPTION - ONSET
ONSET: ON-GOING
ONSET: ON-GOING
ONSET: AWAKENED FROM SLEEP

## 2023-11-02 ASSESSMENT — PAIN DESCRIPTION - ORIENTATION
ORIENTATION: RIGHT
ORIENTATION: RIGHT
ORIENTATION: LOWER;UPPER

## 2023-11-02 ASSESSMENT — PAIN DESCRIPTION - PAIN TYPE
TYPE: ACUTE PAIN
TYPE: CHRONIC PAIN
TYPE: ACUTE PAIN

## 2023-11-02 ASSESSMENT — PAIN DESCRIPTION - FREQUENCY
FREQUENCY: CONTINUOUS
FREQUENCY: INTERMITTENT
FREQUENCY: CONTINUOUS

## 2023-11-02 ASSESSMENT — PAIN DESCRIPTION - DESCRIPTORS
DESCRIPTORS: PRESSURE;THROBBING
DESCRIPTORS: ACHING
DESCRIPTORS: ACHING;TINGLING

## 2023-11-02 ASSESSMENT — PAIN SCALES - GENERAL
PAINLEVEL_OUTOF10: 8
PAINLEVEL_OUTOF10: 0
PAINLEVEL_OUTOF10: 0
PAINLEVEL_OUTOF10: 7
PAINLEVEL_OUTOF10: 0
PAINLEVEL_OUTOF10: 7

## 2023-11-02 ASSESSMENT — PAIN DESCRIPTION - LOCATION
LOCATION: BACK;NECK
LOCATION: CHEST;BACK;NECK
LOCATION: FACE

## 2023-11-02 ASSESSMENT — PAIN - FUNCTIONAL ASSESSMENT: PAIN_FUNCTIONAL_ASSESSMENT: PREVENTS OR INTERFERES SOME ACTIVE ACTIVITIES AND ADLS

## 2023-11-02 NOTE — ED NOTES
Pt left via [facility] stretcher in NAD en route to [facility] room 717 accompanied by [facility] personnel.       Carroll Vick RN  11/01/23 2122

## 2023-11-02 NOTE — PLAN OF CARE
Problem: Discharge Planning  Goal: Discharge to home or other facility with appropriate resources  Outcome: Progressing  Flowsheets (Taken 11/1/2023 6678)  Discharge to home or other facility with appropriate resources: Identify barriers to discharge with patient and caregiver

## 2023-11-02 NOTE — CARE COORDINATION
Pt is a 61 yo male admitted due to facial cellulitis. SW consult received \"For Consideration of Rehab\". CM is unsure if this consult is for physical rehab or alcohol/drug rehab. Chart reviewed. Pt is insured with pharmacy benefits and is established with a PCP and cardiologist.  Pt does have a history of ETOH use/abuse but records indicate pt has without a drink since August.  If the MD has spoken with the pt about his ETOH use and pt is requesting ETOH treatment/supportive resources, CM will gladly assist.  PT/OT evals not ordered. ST eval ordered due to pt's difficulty eating/swallowing due to cellulitis. There are no apparent dc needs or concerns at present but CM will continue to follow to assist as needed. 11/02/23 5901   Service Assessment   Patient Orientation Alert and Oriented   Cognition Alert   History Provided By Medical Record   Primary Caregiver Self   Support Systems Spouse/Significant Other;Children;Family Members   Patient's Healthcare Decision Maker is: Legal Next of [address] [place] [address]   PCP Verified by CM Yes  (pt is showing as not having a PCP but he last saw Dr. Jaylen Thakur on 4/7/2023. CM will confirm pt still sees this PCP.)   Last Visit to PCP Within last year   Prior Functional Level Independent in ADLs/IADLs   Current Functional Level Independent in ADLs/IADLs   Can patient return to prior living arrangement Yes   Ability to make needs known: Good   Family able to assist with home care needs: Yes   Would you like for me to discuss the discharge plan with any other family members/significant others, and if so, who?  No   Financial Resources Medicare  ([facility])   Community Resources None   Social/Functional History   Lives With Spouse   ADL Assistance Independent   Homemaking Assistance Independent   Ambulation Assistance Independent   Transfer Assistance Independent   Occupation On disability   Discharge Planning   Type of Residence House   Living Arrangements Spouse/Significant no

## 2023-11-02 NOTE — PLAN OF CARE
Problem: Discharge Planning  Goal: Discharge to home or other facility with appropriate resources  11/2/2023 1005 by Barron De Oliveira RN  Outcome: Progressing  11/2/2023 0023 by Rob Mills RN  Outcome: Progressing  Flowsheets (Taken 11/1/2023 2336)  Discharge to home or other facility with appropriate resources: Identify barriers to discharge with patient and caregiver     Problem: Pain  Goal: Verbalizes/displays adequate comfort level or baseline comfort level  Outcome: Progressing     Problem: Safety - Adult  Goal: Free from fall injury  Outcome: Progressing

## 2023-11-02 NOTE — ED NOTES
TRANSFER - OUT REPORT:    Verbal report given to EMERY Martins on Ella Peterson  being transferred to [address] [place] [address] for routine progression of patient care       Report consisted of patient's Situation, Background, Assessment and   Recommendations(SBAR). Information from the following report(s) ED SBAR, MAR, and Recent Results was reviewed with the receiving nurse. Lines:   Peripheral IV 11/01/23 Right Forearm (Active)       Peripheral IV 11/01/23 Left Antecubital (Active)        Opportunity for questions and clarification was provided.       Patient transported with:  Chloe Anand RN  11/01/23 0211

## 2023-11-02 NOTE — ACP (ADVANCE CARE PLANNING)
[employer] Hospitalist Service  At the heart of better care     Advance Care Planning   Admit Date:  2023 11:32 PM   Name:  Rissa Padilla   Age:  62 y.o. Sex:  male  :  1965   MRN:  [id number]   Room:  [facility]    Rissa Padilla has capacity to make his own decisions:   Yes    If pt unable to make decisions, POA/surrogate decision maker:  Spouse    Other people present:   None    Patient / surrogate decision-maker directed code status:  Full Code    Other ACP topics discussed, if applicable:   ABT, ENT consult     Patient or surrogate consented to discussion of the current conditions, workup, management plans, prognosis, and the risk for further deterioration. Time spent: 30 minutes in direct discussion.       Signed:  DAVINA Kline CNP

## 2023-11-02 NOTE — H&P
Hospitalist History and Physical   Admit Date:  2023 11:32 PM   Name:  Gurmeet York   Age:  62 y.o. Sex:  male  :  1965   MRN:  [id number]   Room:  [facility]/    Presenting/Chief Complaint: No chief complaint on file. Reason(s) for Admission: Facial cellulitis [L03.211]     History of Present Illness:   Gurmeet York is a 62 y.o. male with medical history of cirrhosis of liver related to ETOH use, chronic systolic heart failure, HTN, esophageal dysphagia, chronic bronchitis admitted from [facility] ED for facial cellulitis. LA 3.8 then 3.4, cannot bolus fluid given EF of 15%. CT neck soft tissue:    Bilateral and relatively diffuse subcutaneous soft tissue reticulation and   fascial thickening involves the bilateral zygomatic arches, infraorbital region,   malar eminence and chin. The findings extend inferiorly to the subhyoid level   it may reflect cellulitis in the absence of trauma. No peripherally enhancing   collection to indicate an abscess. CT chest:     1. Small patchy opacity left upper lobe posteriorly, possible focal infiltrate. 2.  Trace bilateral pleural effusions. Overlying linear probable atelectasis. 3.  Unchanged intrathoracic stomach compared to 2022. Afebrile, he does have a non productive cough present since yesterday. BC drawn in ED, he received Clindamycin. Assessment & Plan:     Principal Problem:    Facial cellulitis  Plan: will consult ENT, no abscess noted but does have carious teeth.    Clindamycin changed to Rocephin/Zithromax for PNA coverage  Check procal    Active Problems:    Other cirrhosis of liver (HCC)  Plan: stable  Monitor      Non-ischemic cardiomyopathy ([address])  Plan: noted  Seen cards in August  Home meds resumed      ICD (implantable cardioverter-defibrillator) discharge  Plan: noted      VIGIL (dyspnea on exertion)  Plan: noted      CHF (congestive heart failure) ([address])  Plan: not in exacerbation  Last echo    Left Ventricle:

## 2023-11-03 LAB
ALBUMIN SERPL-MCNC: 2.5 G/DL (ref 3.5–5)
ALBUMIN/GLOB SERPL: 0.9 (ref 0.4–1.6)
ALP SERPL-CCNC: 78 U/L (ref 50–136)
ALT SERPL-CCNC: 11 U/L (ref 12–65)
ANION GAP SERPL CALC-SCNC: 3 MMOL/L (ref 2–11)
AST SERPL-CCNC: 19 U/L (ref 15–37)
BILIRUB SERPL-MCNC: 0.2 MG/DL (ref 0.2–1.1)
BUN SERPL-MCNC: 7 MG/DL (ref 6–23)
CALCIUM SERPL-MCNC: 7.7 MG/DL (ref 8.3–10.4)
CHLORIDE SERPL-SCNC: 110 MMOL/L (ref 101–110)
CO2 SERPL-SCNC: 26 MMOL/L (ref 21–32)
CREAT SERPL-MCNC: 1.1 MG/DL (ref 0.8–1.5)
ERYTHROCYTE [DISTWIDTH] IN BLOOD BY AUTOMATED COUNT: 20.7 % (ref 11.9–14.6)
FERRITIN SERPL-MCNC: 161 NG/ML (ref 8–388)
FOLATE SERPL-MCNC: 2.2 NG/ML (ref 3.1–17.5)
GLOBULIN SER CALC-MCNC: 2.7 G/DL (ref 2.8–4.5)
GLUCOSE SERPL-MCNC: 97 MG/DL (ref 65–100)
HCT VFR BLD AUTO: 33.7 % (ref 41.1–50.3)
HGB BLD-MCNC: 10.7 G/DL (ref 13.6–17.2)
IRON SATN MFR SERPL: 14 %
IRON SERPL-MCNC: 26 UG/DL (ref 35–150)
LACTATE SERPL-SCNC: 0.9 MMOL/L (ref 0.4–2)
LACTATE SERPL-SCNC: 1.9 MMOL/L (ref 0.4–2)
LACTATE SERPL-SCNC: 2.1 MMOL/L (ref 0.4–2)
LACTATE SERPL-SCNC: 2.2 MMOL/L (ref 0.4–2)
MAGNESIUM SERPL-MCNC: 2 MG/DL (ref 1.8–2.4)
MCH RBC QN AUTO: 30.7 PG (ref 26.1–32.9)
MCHC RBC AUTO-ENTMCNC: 31.8 G/DL (ref 31.4–35)
MCV RBC AUTO: 96.6 FL (ref 82–102)
NRBC # BLD: 0 K/UL (ref 0–0.2)
PLATELET # BLD AUTO: 190 K/UL (ref 150–450)
PMV BLD AUTO: 9.4 FL (ref 9.4–12.3)
POTASSIUM SERPL-SCNC: 4.5 MMOL/L (ref 3.5–5.1)
PROT SERPL-MCNC: 5.2 G/DL (ref 6.3–8.2)
RBC # BLD AUTO: 3.49 M/UL (ref 4.23–5.6)
SODIUM SERPL-SCNC: 139 MMOL/L (ref 133–143)
TIBC SERPL-MCNC: 184 UG/DL (ref 250–450)
VIT B12 SERPL-MCNC: 414 PG/ML (ref 193–986)
WBC # BLD AUTO: 4 K/UL (ref 4.3–11.1)

## 2023-11-03 PROCEDURE — 6370000000 HC RX 637 (ALT 250 FOR IP): Performed by: NURSE PRACTITIONER

## 2023-11-03 PROCEDURE — 82728 ASSAY OF FERRITIN: CPT

## 2023-11-03 PROCEDURE — 82607 VITAMIN B-12: CPT

## 2023-11-03 PROCEDURE — 6360000002 HC RX W HCPCS: Performed by: FAMILY MEDICINE

## 2023-11-03 PROCEDURE — 83735 ASSAY OF MAGNESIUM: CPT

## 2023-11-03 PROCEDURE — 82746 ASSAY OF FOLIC ACID SERUM: CPT

## 2023-11-03 PROCEDURE — 83550 IRON BINDING TEST: CPT

## 2023-11-03 PROCEDURE — 2580000003 HC RX 258: Performed by: NURSE PRACTITIONER

## 2023-11-03 PROCEDURE — 80053 COMPREHEN METABOLIC PANEL: CPT

## 2023-11-03 PROCEDURE — 83605 ASSAY OF LACTIC ACID: CPT

## 2023-11-03 PROCEDURE — 85027 COMPLETE CBC AUTOMATED: CPT

## 2023-11-03 PROCEDURE — 83540 ASSAY OF IRON: CPT

## 2023-11-03 PROCEDURE — 6360000002 HC RX W HCPCS: Performed by: NURSE PRACTITIONER

## 2023-11-03 PROCEDURE — 76937 US GUIDE VASCULAR ACCESS: CPT

## 2023-11-03 PROCEDURE — 36415 COLL VENOUS BLD VENIPUNCTURE: CPT

## 2023-11-03 PROCEDURE — 6370000000 HC RX 637 (ALT 250 FOR IP): Performed by: STUDENT IN AN ORGANIZED HEALTH CARE EDUCATION/TRAINING PROGRAM

## 2023-11-03 PROCEDURE — 6370000000 HC RX 637 (ALT 250 FOR IP): Performed by: INTERNAL MEDICINE

## 2023-11-03 PROCEDURE — 1100000003 HC PRIVATE W/ TELEMETRY

## 2023-11-03 RX ORDER — MORPHINE SULFATE 2 MG/ML
2 INJECTION, SOLUTION INTRAMUSCULAR; INTRAVENOUS ONCE
Status: DISCONTINUED | OUTPATIENT
Start: 2023-11-03 | End: 2023-11-04

## 2023-11-03 RX ORDER — LANOLIN ALCOHOL/MO/W.PET/CERES
1000 CREAM (GRAM) TOPICAL DAILY
Status: DISCONTINUED | OUTPATIENT
Start: 2023-11-03 | End: 2023-11-03 | Stop reason: SDUPTHER

## 2023-11-03 RX ORDER — FERROUS SULFATE 325(65) MG
325 TABLET ORAL
Status: DISCONTINUED | OUTPATIENT
Start: 2023-11-04 | End: 2023-11-08 | Stop reason: HOSPADM

## 2023-11-03 RX ORDER — FUROSEMIDE 40 MG/1
40 TABLET ORAL DAILY
Status: DISCONTINUED | OUTPATIENT
Start: 2023-11-04 | End: 2023-11-08 | Stop reason: HOSPADM

## 2023-11-03 RX ADMIN — HYDROCODONE BITARTRATE AND ACETAMINOPHEN 1 TABLET: 10; 325 TABLET ORAL at 00:35

## 2023-11-03 RX ADMIN — GUAIFENESIN SYRUP AND DEXTROMETHORPHAN 10 ML: 100; 10 SYRUP ORAL at 22:39

## 2023-11-03 RX ADMIN — BENZONATATE 100 MG: 100 CAPSULE ORAL at 14:29

## 2023-11-03 RX ADMIN — FAMOTIDINE 20 MG: 20 TABLET, FILM COATED ORAL at 20:39

## 2023-11-03 RX ADMIN — Medication 100 MG: at 08:46

## 2023-11-03 RX ADMIN — SUCRALFATE 1 G: 1 TABLET ORAL at 15:49

## 2023-11-03 RX ADMIN — GABAPENTIN 300 MG: 300 CAPSULE ORAL at 08:46

## 2023-11-03 RX ADMIN — SUCRALFATE 1 G: 1 TABLET ORAL at 06:00

## 2023-11-03 RX ADMIN — PROCHLORPERAZINE EDISYLATE 10 MG: 5 INJECTION INTRAMUSCULAR; INTRAVENOUS at 08:59

## 2023-11-03 RX ADMIN — Medication 3 MG: at 20:40

## 2023-11-03 RX ADMIN — AZITHROMYCIN MONOHYDRATE 500 MG: 500 INJECTION, POWDER, LYOPHILIZED, FOR SOLUTION INTRAVENOUS at 01:36

## 2023-11-03 RX ADMIN — SODIUM CHLORIDE: 9 INJECTION, SOLUTION INTRAVENOUS at 01:35

## 2023-11-03 RX ADMIN — FAMOTIDINE 20 MG: 20 TABLET, FILM COATED ORAL at 08:46

## 2023-11-03 RX ADMIN — HYDROCODONE BITARTRATE AND ACETAMINOPHEN 1 TABLET: 10; 325 TABLET ORAL at 19:57

## 2023-11-03 RX ADMIN — MAGNESIUM GLUCONATE 500 MG ORAL TABLET 400 MG: 500 TABLET ORAL at 08:46

## 2023-11-03 RX ADMIN — ATORVASTATIN CALCIUM 80 MG: 80 TABLET, FILM COATED ORAL at 08:46

## 2023-11-03 RX ADMIN — SODIUM CHLORIDE, POTASSIUM CHLORIDE, SODIUM LACTATE AND CALCIUM CHLORIDE: 600; 310; 30; 20 INJECTION, SOLUTION INTRAVENOUS at 02:26

## 2023-11-03 RX ADMIN — GABAPENTIN 300 MG: 300 CAPSULE ORAL at 20:39

## 2023-11-03 RX ADMIN — MAGNESIUM GLUCONATE 500 MG ORAL TABLET 400 MG: 500 TABLET ORAL at 20:40

## 2023-11-03 RX ADMIN — SODIUM CHLORIDE, PRESERVATIVE FREE 10 ML: 5 INJECTION INTRAVENOUS at 20:40

## 2023-11-03 RX ADMIN — GUAIFENESIN SYRUP AND DEXTROMETHORPHAN 10 ML: 100; 10 SYRUP ORAL at 08:43

## 2023-11-03 RX ADMIN — AMIODARONE HYDROCHLORIDE 200 MG: 200 TABLET ORAL at 08:46

## 2023-11-03 RX ADMIN — B-COMPLEX W/ C & FOLIC ACID TAB 1 TABLET: TAB at 08:46

## 2023-11-03 RX ADMIN — CEFTRIAXONE 1000 MG: 1 INJECTION, POWDER, FOR SOLUTION INTRAMUSCULAR; INTRAVENOUS at 21:35

## 2023-11-03 RX ADMIN — PROCHLORPERAZINE EDISYLATE 10 MG: 5 INJECTION INTRAMUSCULAR; INTRAVENOUS at 00:35

## 2023-11-03 RX ADMIN — SUCRALFATE 1 G: 1 TABLET ORAL at 12:08

## 2023-11-03 RX ADMIN — POTASSIUM CHLORIDE 20 MEQ: 1500 TABLET, EXTENDED RELEASE ORAL at 08:46

## 2023-11-03 RX ADMIN — PROCHLORPERAZINE EDISYLATE 10 MG: 5 INJECTION INTRAMUSCULAR; INTRAVENOUS at 14:29

## 2023-11-03 RX ADMIN — HYDROCODONE BITARTRATE AND ACETAMINOPHEN 1 TABLET: 10; 325 TABLET ORAL at 12:08

## 2023-11-03 RX ADMIN — ENOXAPARIN SODIUM 40 MG: 100 INJECTION SUBCUTANEOUS at 08:46

## 2023-11-03 RX ADMIN — GUAIFENESIN SYRUP AND DEXTROMETHORPHAN 10 ML: 100; 10 SYRUP ORAL at 17:33

## 2023-11-03 RX ADMIN — BENZONATATE 100 MG: 100 CAPSULE ORAL at 08:43

## 2023-11-03 RX ADMIN — PROCHLORPERAZINE EDISYLATE 10 MG: 5 INJECTION INTRAMUSCULAR; INTRAVENOUS at 22:39

## 2023-11-03 RX ADMIN — ASPIRIN 81 MG: 81 TABLET ORAL at 08:46

## 2023-11-03 RX ADMIN — SUCRALFATE 1 G: 1 TABLET ORAL at 20:40

## 2023-11-03 RX ADMIN — SODIUM CHLORIDE, PRESERVATIVE FREE 10 ML: 5 INJECTION INTRAVENOUS at 08:47

## 2023-11-03 RX ADMIN — FOLIC ACID-PYRIDOXINE-CYANOCOBALAMIN TAB 2.5-25-2 MG 1 TABLET: 2.5-25-2 TAB at 15:49

## 2023-11-03 RX ADMIN — ALPRAZOLAM 1 MG: 0.5 TABLET ORAL at 22:39

## 2023-11-03 ASSESSMENT — PAIN SCALES - GENERAL
PAINLEVEL_OUTOF10: 0
PAINLEVEL_OUTOF10: 8
PAINLEVEL_OUTOF10: 2
PAINLEVEL_OUTOF10: 2
PAINLEVEL_OUTOF10: 8
PAINLEVEL_OUTOF10: 6

## 2023-11-03 ASSESSMENT — PAIN DESCRIPTION - DIRECTION: RADIATING_TOWARDS: NECK/CHEST

## 2023-11-03 ASSESSMENT — PAIN DESCRIPTION - PAIN TYPE
TYPE: ACUTE PAIN

## 2023-11-03 ASSESSMENT — PAIN DESCRIPTION - ONSET
ONSET: ON-GOING
ONSET: ON-GOING
ONSET: PROGRESSIVE

## 2023-11-03 ASSESSMENT — PAIN DESCRIPTION - DESCRIPTORS
DESCRIPTORS: ACHING;DISCOMFORT
DESCRIPTORS: THROBBING
DESCRIPTORS: ACHING;GNAWING

## 2023-11-03 ASSESSMENT — PAIN DESCRIPTION - FREQUENCY
FREQUENCY: CONTINUOUS

## 2023-11-03 ASSESSMENT — PAIN - FUNCTIONAL ASSESSMENT
PAIN_FUNCTIONAL_ASSESSMENT: ACTIVITIES ARE NOT PREVENTED
PAIN_FUNCTIONAL_ASSESSMENT: PREVENTS OR INTERFERES SOME ACTIVE ACTIVITIES AND ADLS
PAIN_FUNCTIONAL_ASSESSMENT: ACTIVITIES ARE NOT PREVENTED

## 2023-11-03 ASSESSMENT — PAIN DESCRIPTION - LOCATION
LOCATION: JAW
LOCATION: FACE
LOCATION: JAW;MOUTH

## 2023-11-03 ASSESSMENT — PAIN DESCRIPTION - ORIENTATION
ORIENTATION: RIGHT
ORIENTATION: LEFT
ORIENTATION: RIGHT

## 2023-11-03 NOTE — PLAN OF CARE
Problem: Discharge Planning  Goal: Discharge to home or other facility with appropriate resources  Outcome: Progressing     Problem: Pain  Goal: Verbalizes/displays adequate comfort level or baseline comfort level  Outcome: Progressing     Problem: Safety - Adult  Goal: Free from fall injury  Outcome: Progressing     Problem: Discharge Planning  Goal: Discharge to home or other facility with appropriate resources  Outcome: Progressing     Problem: Pain  Goal: Verbalizes/displays adequate comfort level or baseline comfort level  Outcome: Progressing     Problem: Safety - Adult  Goal: Free from fall injury  Outcome: Progressing

## 2023-11-03 NOTE — PLAN OF CARE
Problem: Discharge Planning  Goal: Discharge to home or other facility with appropriate resources  11/3/2023 0502 by Pita Plascencia RN  Outcome: Progressing  11/3/2023 0213 by Loly Barrett RN  Outcome: Progressing     Problem: Pain  Goal: Verbalizes/displays adequate comfort level or baseline comfort level  11/3/2023 0502 by Pita Plascencia RN  Outcome: Progressing  11/3/2023 0213 by Loly Barrett RN  Outcome: Progressing     Problem: Safety - Adult  Goal: Free from fall injury  11/3/2023 0502 by Pita Plascencia RN  Outcome: Progressing  11/3/2023 0213 by Loly Barrett RN  Outcome: Progressing     Problem: Neurosensory - Adult  Goal: Achieves maximal functionality and self care  Outcome: Progressing     Problem: Musculoskeletal - Adult  Goal: Return mobility to safest level of function  Outcome: Progressing  Goal: Maintain proper alignment of affected body part  Outcome: Progressing  Goal: Return ADL status to a safe level of function  Outcome: Progressing     Problem: Metabolic/Fluid and Electrolytes - Adult  Goal: Electrolytes maintained within normal limits  Outcome: Progressing  Goal: Hemodynamic stability and optimal renal function maintained  Outcome: Progressing  Goal: Glucose maintained within prescribed range  Outcome: Progressing

## 2023-11-04 LAB
ANION GAP SERPL CALC-SCNC: 6 MMOL/L (ref 2–11)
BUN SERPL-MCNC: 5 MG/DL (ref 6–23)
CALCIUM SERPL-MCNC: 7.5 MG/DL (ref 8.3–10.4)
CHLORIDE SERPL-SCNC: 114 MMOL/L (ref 101–110)
CO2 SERPL-SCNC: 22 MMOL/L (ref 21–32)
CREAT SERPL-MCNC: 0.8 MG/DL (ref 0.8–1.5)
ERYTHROCYTE [DISTWIDTH] IN BLOOD BY AUTOMATED COUNT: 20.1 % (ref 11.9–14.6)
GLUCOSE SERPL-MCNC: 87 MG/DL (ref 65–100)
HCT VFR BLD AUTO: 29.1 % (ref 41.1–50.3)
HGB BLD-MCNC: 9.4 G/DL (ref 13.6–17.2)
MCH RBC QN AUTO: 30.9 PG (ref 26.1–32.9)
MCHC RBC AUTO-ENTMCNC: 32.3 G/DL (ref 31.4–35)
MCV RBC AUTO: 95.7 FL (ref 82–102)
MRSA DNA SPEC QL NAA+PROBE: NOT DETECTED
NRBC # BLD: 0 K/UL (ref 0–0.2)
PLATELET # BLD AUTO: 185 K/UL (ref 150–450)
PMV BLD AUTO: 9.4 FL (ref 9.4–12.3)
POTASSIUM SERPL-SCNC: 4.1 MMOL/L (ref 3.5–5.1)
RBC # BLD AUTO: 3.04 M/UL (ref 4.23–5.6)
S AUREUS CPE NOSE QL NAA+PROBE: NOT DETECTED
SODIUM SERPL-SCNC: 142 MMOL/L (ref 133–143)
WBC # BLD AUTO: 3.4 K/UL (ref 4.3–11.1)

## 2023-11-04 PROCEDURE — 6370000000 HC RX 637 (ALT 250 FOR IP): Performed by: STUDENT IN AN ORGANIZED HEALTH CARE EDUCATION/TRAINING PROGRAM

## 2023-11-04 PROCEDURE — 6370000000 HC RX 637 (ALT 250 FOR IP): Performed by: NURSE PRACTITIONER

## 2023-11-04 PROCEDURE — 76937 US GUIDE VASCULAR ACCESS: CPT

## 2023-11-04 PROCEDURE — 92526 ORAL FUNCTION THERAPY: CPT

## 2023-11-04 PROCEDURE — 80048 BASIC METABOLIC PNL TOTAL CA: CPT

## 2023-11-04 PROCEDURE — 6360000002 HC RX W HCPCS: Performed by: NURSE PRACTITIONER

## 2023-11-04 PROCEDURE — 85027 COMPLETE CBC AUTOMATED: CPT

## 2023-11-04 PROCEDURE — 2580000003 HC RX 258: Performed by: STUDENT IN AN ORGANIZED HEALTH CARE EDUCATION/TRAINING PROGRAM

## 2023-11-04 PROCEDURE — 1100000003 HC PRIVATE W/ TELEMETRY

## 2023-11-04 PROCEDURE — 87641 MR-STAPH DNA AMP PROBE: CPT

## 2023-11-04 PROCEDURE — 6360000002 HC RX W HCPCS: Performed by: FAMILY MEDICINE

## 2023-11-04 PROCEDURE — 2580000003 HC RX 258: Performed by: NURSE PRACTITIONER

## 2023-11-04 PROCEDURE — 6370000000 HC RX 637 (ALT 250 FOR IP): Performed by: INTERNAL MEDICINE

## 2023-11-04 PROCEDURE — 36415 COLL VENOUS BLD VENIPUNCTURE: CPT

## 2023-11-04 PROCEDURE — 6360000002 HC RX W HCPCS: Performed by: STUDENT IN AN ORGANIZED HEALTH CARE EDUCATION/TRAINING PROGRAM

## 2023-11-04 RX ORDER — AZITHROMYCIN 250 MG/1
500 TABLET, FILM COATED ORAL EVERY EVENING
Status: COMPLETED | OUTPATIENT
Start: 2023-11-04 | End: 2023-11-05

## 2023-11-04 RX ORDER — ALPRAZOLAM 0.5 MG/1
1 TABLET ORAL 2 TIMES DAILY PRN
Status: DISCONTINUED | OUTPATIENT
Start: 2023-11-04 | End: 2023-11-08 | Stop reason: HOSPADM

## 2023-11-04 RX ORDER — HYDROCODONE BITARTRATE AND ACETAMINOPHEN 10; 325 MG/1; MG/1
1 TABLET ORAL EVERY 4 HOURS PRN
Status: DISCONTINUED | OUTPATIENT
Start: 2023-11-04 | End: 2023-11-08 | Stop reason: HOSPADM

## 2023-11-04 RX ORDER — AZITHROMYCIN 250 MG/1
500 TABLET, FILM COATED ORAL DAILY
Status: DISCONTINUED | OUTPATIENT
Start: 2023-11-04 | End: 2023-11-04

## 2023-11-04 RX ADMIN — MAGNESIUM GLUCONATE 500 MG ORAL TABLET 400 MG: 500 TABLET ORAL at 21:31

## 2023-11-04 RX ADMIN — PROCHLORPERAZINE EDISYLATE 10 MG: 5 INJECTION INTRAMUSCULAR; INTRAVENOUS at 21:44

## 2023-11-04 RX ADMIN — FAMOTIDINE 20 MG: 20 TABLET, FILM COATED ORAL at 10:07

## 2023-11-04 RX ADMIN — Medication 100 MG: at 10:06

## 2023-11-04 RX ADMIN — ATORVASTATIN CALCIUM 80 MG: 80 TABLET, FILM COATED ORAL at 10:07

## 2023-11-04 RX ADMIN — AZITHROMYCIN DIHYDRATE 500 MG: 250 TABLET ORAL at 17:23

## 2023-11-04 RX ADMIN — GUAIFENESIN SYRUP AND DEXTROMETHORPHAN 10 ML: 100; 10 SYRUP ORAL at 10:07

## 2023-11-04 RX ADMIN — ASPIRIN 81 MG: 81 TABLET ORAL at 10:06

## 2023-11-04 RX ADMIN — PROCHLORPERAZINE EDISYLATE 10 MG: 5 INJECTION INTRAMUSCULAR; INTRAVENOUS at 04:28

## 2023-11-04 RX ADMIN — POTASSIUM CHLORIDE 20 MEQ: 1500 TABLET, EXTENDED RELEASE ORAL at 10:06

## 2023-11-04 RX ADMIN — ALPRAZOLAM 1 MG: 0.5 TABLET ORAL at 22:48

## 2023-11-04 RX ADMIN — AZITHROMYCIN MONOHYDRATE 500 MG: 500 INJECTION, POWDER, LYOPHILIZED, FOR SOLUTION INTRAVENOUS at 00:02

## 2023-11-04 RX ADMIN — FAMOTIDINE 20 MG: 20 TABLET, FILM COATED ORAL at 21:31

## 2023-11-04 RX ADMIN — GABAPENTIN 300 MG: 300 CAPSULE ORAL at 10:06

## 2023-11-04 RX ADMIN — SODIUM CHLORIDE, PRESERVATIVE FREE 10 ML: 5 INJECTION INTRAVENOUS at 10:09

## 2023-11-04 RX ADMIN — MAGNESIUM GLUCONATE 500 MG ORAL TABLET 400 MG: 500 TABLET ORAL at 10:07

## 2023-11-04 RX ADMIN — SUCRALFATE 1 G: 1 TABLET ORAL at 10:06

## 2023-11-04 RX ADMIN — FOLIC ACID-PYRIDOXINE-CYANOCOBALAMIN TAB 2.5-25-2 MG 1 TABLET: 2.5-25-2 TAB at 10:07

## 2023-11-04 RX ADMIN — HYDROCODONE BITARTRATE AND ACETAMINOPHEN 1 TABLET: 10; 325 TABLET ORAL at 15:49

## 2023-11-04 RX ADMIN — HYDROCODONE BITARTRATE AND ACETAMINOPHEN 1 TABLET: 10; 325 TABLET ORAL at 10:49

## 2023-11-04 RX ADMIN — GUAIFENESIN SYRUP AND DEXTROMETHORPHAN 10 ML: 100; 10 SYRUP ORAL at 04:23

## 2023-11-04 RX ADMIN — WATER 40 MG: 1 INJECTION INTRAMUSCULAR; INTRAVENOUS; SUBCUTANEOUS at 21:46

## 2023-11-04 RX ADMIN — GABAPENTIN 300 MG: 300 CAPSULE ORAL at 21:31

## 2023-11-04 RX ADMIN — WATER 40 MG: 1 INJECTION INTRAMUSCULAR; INTRAVENOUS; SUBCUTANEOUS at 10:07

## 2023-11-04 RX ADMIN — FERROUS SULFATE TAB 325 MG (65 MG ELEMENTAL FE) 325 MG: 325 (65 FE) TAB at 10:07

## 2023-11-04 RX ADMIN — SODIUM CHLORIDE, PRESERVATIVE FREE 10 ML: 5 INJECTION INTRAVENOUS at 21:47

## 2023-11-04 RX ADMIN — GUAIFENESIN SYRUP AND DEXTROMETHORPHAN 10 ML: 100; 10 SYRUP ORAL at 14:40

## 2023-11-04 RX ADMIN — ENOXAPARIN SODIUM 40 MG: 100 INJECTION SUBCUTANEOUS at 10:09

## 2023-11-04 RX ADMIN — HYDROCODONE BITARTRATE AND ACETAMINOPHEN 1 TABLET: 10; 325 TABLET ORAL at 21:31

## 2023-11-04 RX ADMIN — Medication 3 MG: at 21:31

## 2023-11-04 RX ADMIN — HYDROCODONE BITARTRATE AND ACETAMINOPHEN 1 TABLET: 10; 325 TABLET ORAL at 04:23

## 2023-11-04 RX ADMIN — BENZONATATE 100 MG: 100 CAPSULE ORAL at 10:49

## 2023-11-04 RX ADMIN — SUCRALFATE 1 G: 1 TABLET ORAL at 05:10

## 2023-11-04 RX ADMIN — PROCHLORPERAZINE EDISYLATE 10 MG: 5 INJECTION INTRAMUSCULAR; INTRAVENOUS at 14:30

## 2023-11-04 RX ADMIN — SODIUM CHLORIDE: 9 INJECTION, SOLUTION INTRAVENOUS at 21:39

## 2023-11-04 RX ADMIN — AMIODARONE HYDROCHLORIDE 200 MG: 200 TABLET ORAL at 10:07

## 2023-11-04 RX ADMIN — B-COMPLEX W/ C & FOLIC ACID TAB 1 TABLET: TAB at 10:06

## 2023-11-04 RX ADMIN — CEFTRIAXONE SODIUM 2000 MG: 2 INJECTION, POWDER, FOR SOLUTION INTRAMUSCULAR; INTRAVENOUS at 21:37

## 2023-11-04 RX ADMIN — POTASSIUM CHLORIDE 20 MEQ: 1500 TABLET, EXTENDED RELEASE ORAL at 15:49

## 2023-11-04 ASSESSMENT — PAIN SCALES - GENERAL
PAINLEVEL_OUTOF10: 7
PAINLEVEL_OUTOF10: 3
PAINLEVEL_OUTOF10: 3
PAINLEVEL_OUTOF10: 8
PAINLEVEL_OUTOF10: 8
PAINLEVEL_OUTOF10: 3

## 2023-11-04 ASSESSMENT — PAIN DESCRIPTION - FREQUENCY
FREQUENCY: CONTINUOUS
FREQUENCY: CONTINUOUS

## 2023-11-04 ASSESSMENT — PAIN DESCRIPTION - DESCRIPTORS
DESCRIPTORS: ACHING;THROBBING
DESCRIPTORS: DISCOMFORT;SORE;TIGHTNESS
DESCRIPTORS: ACHING;THROBBING

## 2023-11-04 ASSESSMENT — PAIN - FUNCTIONAL ASSESSMENT
PAIN_FUNCTIONAL_ASSESSMENT: PREVENTS OR INTERFERES SOME ACTIVE ACTIVITIES AND ADLS

## 2023-11-04 ASSESSMENT — PAIN DESCRIPTION - PAIN TYPE
TYPE: ACUTE PAIN
TYPE: ACUTE PAIN

## 2023-11-04 ASSESSMENT — PAIN DESCRIPTION - LOCATION
LOCATION: MOUTH;FACE
LOCATION: FACE;JAW;MOUTH
LOCATION: FACE;JAW;MOUTH

## 2023-11-04 ASSESSMENT — PAIN DESCRIPTION - ORIENTATION
ORIENTATION: RIGHT
ORIENTATION: LEFT;RIGHT
ORIENTATION: RIGHT

## 2023-11-04 ASSESSMENT — PAIN DESCRIPTION - ONSET
ONSET: ON-GOING
ONSET: ON-GOING

## 2023-11-05 PROCEDURE — 6360000002 HC RX W HCPCS: Performed by: NURSE PRACTITIONER

## 2023-11-05 PROCEDURE — 1100000003 HC PRIVATE W/ TELEMETRY

## 2023-11-05 PROCEDURE — 6370000000 HC RX 637 (ALT 250 FOR IP): Performed by: STUDENT IN AN ORGANIZED HEALTH CARE EDUCATION/TRAINING PROGRAM

## 2023-11-05 PROCEDURE — 6360000002 HC RX W HCPCS: Performed by: FAMILY MEDICINE

## 2023-11-05 PROCEDURE — 2580000003 HC RX 258: Performed by: NURSE PRACTITIONER

## 2023-11-05 PROCEDURE — 2580000003 HC RX 258: Performed by: STUDENT IN AN ORGANIZED HEALTH CARE EDUCATION/TRAINING PROGRAM

## 2023-11-05 PROCEDURE — 6360000002 HC RX W HCPCS: Performed by: STUDENT IN AN ORGANIZED HEALTH CARE EDUCATION/TRAINING PROGRAM

## 2023-11-05 PROCEDURE — 6370000000 HC RX 637 (ALT 250 FOR IP): Performed by: NURSE PRACTITIONER

## 2023-11-05 PROCEDURE — 6370000000 HC RX 637 (ALT 250 FOR IP): Performed by: INTERNAL MEDICINE

## 2023-11-05 RX ORDER — METOCLOPRAMIDE HYDROCHLORIDE 5 MG/ML
10 INJECTION INTRAMUSCULAR; INTRAVENOUS
Status: DISCONTINUED | OUTPATIENT
Start: 2023-11-05 | End: 2023-11-07

## 2023-11-05 RX ORDER — CARVEDILOL 6.25 MG/1
6.25 TABLET ORAL 2 TIMES DAILY
Status: DISCONTINUED | OUTPATIENT
Start: 2023-11-05 | End: 2023-11-08 | Stop reason: HOSPADM

## 2023-11-05 RX ORDER — IPRATROPIUM BROMIDE AND ALBUTEROL SULFATE 2.5; .5 MG/3ML; MG/3ML
1 SOLUTION RESPIRATORY (INHALATION) EVERY 4 HOURS PRN
Status: DISCONTINUED | OUTPATIENT
Start: 2023-11-05 | End: 2023-11-08 | Stop reason: HOSPADM

## 2023-11-05 RX ADMIN — ASPIRIN 81 MG: 81 TABLET ORAL at 08:33

## 2023-11-05 RX ADMIN — SODIUM CHLORIDE, PRESERVATIVE FREE 10 ML: 5 INJECTION INTRAVENOUS at 20:26

## 2023-11-05 RX ADMIN — MAGNESIUM GLUCONATE 500 MG ORAL TABLET 400 MG: 500 TABLET ORAL at 08:33

## 2023-11-05 RX ADMIN — GUAIFENESIN SYRUP AND DEXTROMETHORPHAN 10 ML: 100; 10 SYRUP ORAL at 15:40

## 2023-11-05 RX ADMIN — MAGNESIUM GLUCONATE 500 MG ORAL TABLET 400 MG: 500 TABLET ORAL at 20:27

## 2023-11-05 RX ADMIN — FAMOTIDINE 20 MG: 20 TABLET, FILM COATED ORAL at 08:33

## 2023-11-05 RX ADMIN — PROCHLORPERAZINE EDISYLATE 10 MG: 5 INJECTION INTRAMUSCULAR; INTRAVENOUS at 08:34

## 2023-11-05 RX ADMIN — B-COMPLEX W/ C & FOLIC ACID TAB 1 TABLET: TAB at 08:33

## 2023-11-05 RX ADMIN — HYDROCODONE BITARTRATE AND ACETAMINOPHEN 1 TABLET: 10; 325 TABLET ORAL at 02:18

## 2023-11-05 RX ADMIN — GUAIFENESIN SYRUP AND DEXTROMETHORPHAN 10 ML: 100; 10 SYRUP ORAL at 02:18

## 2023-11-05 RX ADMIN — AZITHROMYCIN DIHYDRATE 500 MG: 250 TABLET ORAL at 17:49

## 2023-11-05 RX ADMIN — BENZONATATE 100 MG: 100 CAPSULE ORAL at 15:40

## 2023-11-05 RX ADMIN — Medication 100 MG: at 08:33

## 2023-11-05 RX ADMIN — HYDROCODONE BITARTRATE AND ACETAMINOPHEN 1 TABLET: 10; 325 TABLET ORAL at 22:43

## 2023-11-05 RX ADMIN — CEFTRIAXONE SODIUM 2000 MG: 2 INJECTION, POWDER, FOR SOLUTION INTRAMUSCULAR; INTRAVENOUS at 20:32

## 2023-11-05 RX ADMIN — ALPRAZOLAM 1 MG: 0.5 TABLET ORAL at 20:26

## 2023-11-05 RX ADMIN — Medication 3 MG: at 20:27

## 2023-11-05 RX ADMIN — GABAPENTIN 300 MG: 300 CAPSULE ORAL at 20:26

## 2023-11-05 RX ADMIN — CARVEDILOL 6.25 MG: 6.25 TABLET, FILM COATED ORAL at 20:26

## 2023-11-05 RX ADMIN — CARVEDILOL 6.25 MG: 6.25 TABLET, FILM COATED ORAL at 08:33

## 2023-11-05 RX ADMIN — HYDROCODONE BITARTRATE AND ACETAMINOPHEN 1 TABLET: 10; 325 TABLET ORAL at 18:26

## 2023-11-05 RX ADMIN — PROCHLORPERAZINE EDISYLATE 10 MG: 5 INJECTION INTRAMUSCULAR; INTRAVENOUS at 15:40

## 2023-11-05 RX ADMIN — METOCLOPRAMIDE 10 MG: 5 INJECTION, SOLUTION INTRAMUSCULAR; INTRAVENOUS at 12:40

## 2023-11-05 RX ADMIN — GUAIFENESIN SYRUP AND DEXTROMETHORPHAN 10 ML: 100; 10 SYRUP ORAL at 09:49

## 2023-11-05 RX ADMIN — FAMOTIDINE 20 MG: 20 TABLET, FILM COATED ORAL at 20:27

## 2023-11-05 RX ADMIN — SODIUM CHLORIDE, PRESERVATIVE FREE 10 ML: 5 INJECTION INTRAVENOUS at 21:42

## 2023-11-05 RX ADMIN — PROCHLORPERAZINE EDISYLATE 10 MG: 5 INJECTION INTRAMUSCULAR; INTRAVENOUS at 21:42

## 2023-11-05 RX ADMIN — HYDROCODONE BITARTRATE AND ACETAMINOPHEN 1 TABLET: 10; 325 TABLET ORAL at 08:32

## 2023-11-05 RX ADMIN — FOLIC ACID-PYRIDOXINE-CYANOCOBALAMIN TAB 2.5-25-2 MG 1 TABLET: 2.5-25-2 TAB at 08:33

## 2023-11-05 RX ADMIN — METOCLOPRAMIDE 10 MG: 5 INJECTION, SOLUTION INTRAMUSCULAR; INTRAVENOUS at 17:48

## 2023-11-05 RX ADMIN — FERROUS SULFATE TAB 325 MG (65 MG ELEMENTAL FE) 325 MG: 325 (65 FE) TAB at 08:33

## 2023-11-05 RX ADMIN — WATER 40 MG: 1 INJECTION INTRAMUSCULAR; INTRAVENOUS; SUBCUTANEOUS at 20:27

## 2023-11-05 RX ADMIN — ENOXAPARIN SODIUM 40 MG: 100 INJECTION SUBCUTANEOUS at 08:34

## 2023-11-05 RX ADMIN — BENZONATATE 100 MG: 100 CAPSULE ORAL at 08:32

## 2023-11-05 RX ADMIN — WATER 40 MG: 1 INJECTION INTRAMUSCULAR; INTRAVENOUS; SUBCUTANEOUS at 08:34

## 2023-11-05 RX ADMIN — GABAPENTIN 300 MG: 300 CAPSULE ORAL at 08:33

## 2023-11-05 RX ADMIN — METOCLOPRAMIDE 10 MG: 5 INJECTION, SOLUTION INTRAMUSCULAR; INTRAVENOUS at 20:27

## 2023-11-05 RX ADMIN — HYDROCODONE BITARTRATE AND ACETAMINOPHEN 1 TABLET: 10; 325 TABLET ORAL at 14:19

## 2023-11-05 RX ADMIN — ATORVASTATIN CALCIUM 80 MG: 80 TABLET, FILM COATED ORAL at 08:33

## 2023-11-05 RX ADMIN — AMIODARONE HYDROCHLORIDE 200 MG: 200 TABLET ORAL at 08:33

## 2023-11-05 RX ADMIN — SODIUM CHLORIDE, PRESERVATIVE FREE 10 ML: 5 INJECTION INTRAVENOUS at 08:37

## 2023-11-05 ASSESSMENT — PAIN DESCRIPTION - DESCRIPTORS
DESCRIPTORS: ACHING
DESCRIPTORS: ACHING;THROBBING

## 2023-11-05 ASSESSMENT — PAIN DESCRIPTION - ONSET
ONSET: ON-GOING

## 2023-11-05 ASSESSMENT — PAIN DESCRIPTION - LOCATION
LOCATION: FACE;JAW
LOCATION: FACE;JAW;MOUTH
LOCATION: JAW
LOCATION: FACE;JAW

## 2023-11-05 ASSESSMENT — PAIN SCALES - GENERAL
PAINLEVEL_OUTOF10: 0
PAINLEVEL_OUTOF10: 8
PAINLEVEL_OUTOF10: 8
PAINLEVEL_OUTOF10: 3
PAINLEVEL_OUTOF10: 3
PAINLEVEL_OUTOF10: 8
PAINLEVEL_OUTOF10: 0
PAINLEVEL_OUTOF10: 8

## 2023-11-05 ASSESSMENT — PAIN DESCRIPTION - FREQUENCY
FREQUENCY: CONTINUOUS

## 2023-11-05 ASSESSMENT — PAIN DESCRIPTION - ORIENTATION
ORIENTATION: RIGHT
ORIENTATION: RIGHT;INNER
ORIENTATION: RIGHT;INNER
ORIENTATION: RIGHT

## 2023-11-05 ASSESSMENT — PAIN DESCRIPTION - PAIN TYPE
TYPE: ACUTE PAIN

## 2023-11-05 ASSESSMENT — PAIN - FUNCTIONAL ASSESSMENT
PAIN_FUNCTIONAL_ASSESSMENT: PREVENTS OR INTERFERES SOME ACTIVE ACTIVITIES AND ADLS

## 2023-11-06 ENCOUNTER — APPOINTMENT (OUTPATIENT)
Dept: CT IMAGING | Age: 58
DRG: 602 | End: 2023-11-06
Attending: FAMILY MEDICINE
Payer: MEDICARE

## 2023-11-06 LAB
ANION GAP SERPL CALC-SCNC: 7 MMOL/L (ref 2–11)
BACTERIA SPEC CULT: NORMAL
BACTERIA SPEC CULT: NORMAL
BUN SERPL-MCNC: 6 MG/DL (ref 6–23)
CALCIUM SERPL-MCNC: 8.4 MG/DL (ref 8.3–10.4)
CHLORIDE SERPL-SCNC: 112 MMOL/L (ref 101–110)
CO2 SERPL-SCNC: 22 MMOL/L (ref 21–32)
CREAT SERPL-MCNC: 1 MG/DL (ref 0.8–1.5)
GLUCOSE SERPL-MCNC: 130 MG/DL (ref 65–100)
POTASSIUM SERPL-SCNC: 4.2 MMOL/L (ref 3.5–5.1)
SERVICE CMNT-IMP: NORMAL
SERVICE CMNT-IMP: NORMAL
SODIUM SERPL-SCNC: 141 MMOL/L (ref 133–143)

## 2023-11-06 PROCEDURE — 6370000000 HC RX 637 (ALT 250 FOR IP): Performed by: INTERNAL MEDICINE

## 2023-11-06 PROCEDURE — 94760 N-INVAS EAR/PLS OXIMETRY 1: CPT

## 2023-11-06 PROCEDURE — 6360000002 HC RX W HCPCS: Performed by: FAMILY MEDICINE

## 2023-11-06 PROCEDURE — 6370000000 HC RX 637 (ALT 250 FOR IP): Performed by: NURSE PRACTITIONER

## 2023-11-06 PROCEDURE — 2580000003 HC RX 258: Performed by: NURSE PRACTITIONER

## 2023-11-06 PROCEDURE — 2580000003 HC RX 258: Performed by: STUDENT IN AN ORGANIZED HEALTH CARE EDUCATION/TRAINING PROGRAM

## 2023-11-06 PROCEDURE — 6360000002 HC RX W HCPCS: Performed by: NURSE PRACTITIONER

## 2023-11-06 PROCEDURE — 1100000003 HC PRIVATE W/ TELEMETRY

## 2023-11-06 PROCEDURE — 6370000000 HC RX 637 (ALT 250 FOR IP): Performed by: STUDENT IN AN ORGANIZED HEALTH CARE EDUCATION/TRAINING PROGRAM

## 2023-11-06 PROCEDURE — 94640 AIRWAY INHALATION TREATMENT: CPT

## 2023-11-06 PROCEDURE — 6360000002 HC RX W HCPCS: Performed by: STUDENT IN AN ORGANIZED HEALTH CARE EDUCATION/TRAINING PROGRAM

## 2023-11-06 PROCEDURE — 80048 BASIC METABOLIC PNL TOTAL CA: CPT

## 2023-11-06 PROCEDURE — 76937 US GUIDE VASCULAR ACCESS: CPT

## 2023-11-06 PROCEDURE — 6360000004 HC RX CONTRAST MEDICATION: Performed by: STUDENT IN AN ORGANIZED HEALTH CARE EDUCATION/TRAINING PROGRAM

## 2023-11-06 PROCEDURE — 36415 COLL VENOUS BLD VENIPUNCTURE: CPT

## 2023-11-06 PROCEDURE — 70491 CT SOFT TISSUE NECK W/DYE: CPT

## 2023-11-06 RX ORDER — SODIUM CHLORIDE 0.9 % (FLUSH) 0.9 %
10 SYRINGE (ML) INJECTION
Status: COMPLETED | OUTPATIENT
Start: 2023-11-06 | End: 2023-11-06

## 2023-11-06 RX ORDER — MORPHINE SULFATE 2 MG/ML
2 INJECTION, SOLUTION INTRAMUSCULAR; INTRAVENOUS EVERY 4 HOURS PRN
Status: DISCONTINUED | OUTPATIENT
Start: 2023-11-06 | End: 2023-11-08 | Stop reason: HOSPADM

## 2023-11-06 RX ADMIN — FAMOTIDINE 20 MG: 20 TABLET, FILM COATED ORAL at 20:39

## 2023-11-06 RX ADMIN — FUROSEMIDE 40 MG: 40 TABLET ORAL at 08:23

## 2023-11-06 RX ADMIN — AMIODARONE HYDROCHLORIDE 200 MG: 200 TABLET ORAL at 08:10

## 2023-11-06 RX ADMIN — CEFTRIAXONE SODIUM 2000 MG: 2 INJECTION, POWDER, FOR SOLUTION INTRAMUSCULAR; INTRAVENOUS at 20:56

## 2023-11-06 RX ADMIN — GABAPENTIN 300 MG: 300 CAPSULE ORAL at 08:15

## 2023-11-06 RX ADMIN — SODIUM CHLORIDE, PRESERVATIVE FREE 10 ML: 5 INJECTION INTRAVENOUS at 08:23

## 2023-11-06 RX ADMIN — MORPHINE SULFATE 2 MG: 2 INJECTION, SOLUTION INTRAMUSCULAR; INTRAVENOUS at 20:40

## 2023-11-06 RX ADMIN — PROCHLORPERAZINE EDISYLATE 10 MG: 5 INJECTION INTRAMUSCULAR; INTRAVENOUS at 20:50

## 2023-11-06 RX ADMIN — ASPIRIN 81 MG: 81 TABLET ORAL at 08:11

## 2023-11-06 RX ADMIN — FERROUS SULFATE TAB 325 MG (65 MG ELEMENTAL FE) 325 MG: 325 (65 FE) TAB at 08:14

## 2023-11-06 RX ADMIN — B-COMPLEX W/ C & FOLIC ACID TAB 1 TABLET: TAB at 08:14

## 2023-11-06 RX ADMIN — HYDROCODONE BITARTRATE AND ACETAMINOPHEN 1 TABLET: 10; 325 TABLET ORAL at 16:41

## 2023-11-06 RX ADMIN — METOCLOPRAMIDE 10 MG: 5 INJECTION, SOLUTION INTRAMUSCULAR; INTRAVENOUS at 11:25

## 2023-11-06 RX ADMIN — ALPRAZOLAM 1 MG: 0.5 TABLET ORAL at 01:47

## 2023-11-06 RX ADMIN — SODIUM CHLORIDE, PRESERVATIVE FREE 10 ML: 5 INJECTION INTRAVENOUS at 20:45

## 2023-11-06 RX ADMIN — ATORVASTATIN CALCIUM 80 MG: 80 TABLET, FILM COATED ORAL at 08:09

## 2023-11-06 RX ADMIN — CARVEDILOL 6.25 MG: 6.25 TABLET, FILM COATED ORAL at 20:39

## 2023-11-06 RX ADMIN — SODIUM CHLORIDE: 9 INJECTION, SOLUTION INTRAVENOUS at 20:54

## 2023-11-06 RX ADMIN — HYDROCODONE BITARTRATE AND ACETAMINOPHEN 1 TABLET: 10; 325 TABLET ORAL at 05:18

## 2023-11-06 RX ADMIN — WATER 40 MG: 1 INJECTION INTRAMUSCULAR; INTRAVENOUS; SUBCUTANEOUS at 20:40

## 2023-11-06 RX ADMIN — MORPHINE SULFATE 2 MG: 2 INJECTION, SOLUTION INTRAMUSCULAR; INTRAVENOUS at 10:10

## 2023-11-06 RX ADMIN — METOCLOPRAMIDE 10 MG: 5 INJECTION, SOLUTION INTRAMUSCULAR; INTRAVENOUS at 05:18

## 2023-11-06 RX ADMIN — SODIUM CHLORIDE, PRESERVATIVE FREE 10 ML: 5 INJECTION INTRAVENOUS at 12:13

## 2023-11-06 RX ADMIN — HYDROCODONE BITARTRATE AND ACETAMINOPHEN 1 TABLET: 10; 325 TABLET ORAL at 11:27

## 2023-11-06 RX ADMIN — CARVEDILOL 6.25 MG: 6.25 TABLET, FILM COATED ORAL at 08:12

## 2023-11-06 RX ADMIN — IOPAMIDOL 80 ML: 755 INJECTION, SOLUTION INTRAVENOUS at 12:12

## 2023-11-06 RX ADMIN — FOLIC ACID-PYRIDOXINE-CYANOCOBALAMIN TAB 2.5-25-2 MG 1 TABLET: 2.5-25-2 TAB at 08:13

## 2023-11-06 RX ADMIN — MAGNESIUM GLUCONATE 500 MG ORAL TABLET 400 MG: 500 TABLET ORAL at 08:11

## 2023-11-06 RX ADMIN — PROCHLORPERAZINE EDISYLATE 10 MG: 5 INJECTION INTRAMUSCULAR; INTRAVENOUS at 08:04

## 2023-11-06 RX ADMIN — WATER 40 MG: 1 INJECTION INTRAMUSCULAR; INTRAVENOUS; SUBCUTANEOUS at 08:18

## 2023-11-06 RX ADMIN — IPRATROPIUM BROMIDE AND ALBUTEROL SULFATE 1 DOSE: .5; 3 SOLUTION RESPIRATORY (INHALATION) at 09:24

## 2023-11-06 RX ADMIN — MAGNESIUM GLUCONATE 500 MG ORAL TABLET 400 MG: 500 TABLET ORAL at 20:39

## 2023-11-06 RX ADMIN — FAMOTIDINE 20 MG: 20 TABLET, FILM COATED ORAL at 08:10

## 2023-11-06 RX ADMIN — Medication 100 MG: at 08:13

## 2023-11-06 RX ADMIN — LOSARTAN POTASSIUM 25 MG: 25 TABLET, FILM COATED ORAL at 08:11

## 2023-11-06 RX ADMIN — GABAPENTIN 300 MG: 300 CAPSULE ORAL at 20:39

## 2023-11-06 RX ADMIN — ENOXAPARIN SODIUM 40 MG: 100 INJECTION SUBCUTANEOUS at 08:15

## 2023-11-06 RX ADMIN — METOCLOPRAMIDE 10 MG: 5 INJECTION, SOLUTION INTRAMUSCULAR; INTRAVENOUS at 16:42

## 2023-11-06 ASSESSMENT — PAIN DESCRIPTION - DESCRIPTORS
DESCRIPTORS: SORE
DESCRIPTORS: ACHING;GNAWING
DESCRIPTORS: ACHING;THROBBING;PRESSURE
DESCRIPTORS: ACHING;GNAWING
DESCRIPTORS: ACHING;GNAWING

## 2023-11-06 ASSESSMENT — PAIN DESCRIPTION - LOCATION
LOCATION: JAW;NECK;CHEST
LOCATION: FACE
LOCATION: JAW
LOCATION: KNEE;CHEST

## 2023-11-06 ASSESSMENT — PAIN SCALES - GENERAL
PAINLEVEL_OUTOF10: 8
PAINLEVEL_OUTOF10: 0
PAINLEVEL_OUTOF10: 2
PAINLEVEL_OUTOF10: 8
PAINLEVEL_OUTOF10: 2
PAINLEVEL_OUTOF10: 10
PAINLEVEL_OUTOF10: 2
PAINLEVEL_OUTOF10: 7
PAINLEVEL_OUTOF10: 8

## 2023-11-06 ASSESSMENT — PAIN - FUNCTIONAL ASSESSMENT: PAIN_FUNCTIONAL_ASSESSMENT: PREVENTS OR INTERFERES SOME ACTIVE ACTIVITIES AND ADLS

## 2023-11-06 ASSESSMENT — PAIN DESCRIPTION - PAIN TYPE: TYPE: ACUTE PAIN

## 2023-11-06 ASSESSMENT — PAIN DESCRIPTION - ORIENTATION: ORIENTATION: RIGHT

## 2023-11-06 ASSESSMENT — PAIN DESCRIPTION - ONSET: ONSET: ON-GOING

## 2023-11-06 ASSESSMENT — PAIN DESCRIPTION - FREQUENCY: FREQUENCY: CONTINUOUS

## 2023-11-06 NOTE — PLAN OF CARE
Problem: Discharge Planning  Goal: Discharge to home or other facility with appropriate resources  Outcome: Progressing     Problem: Pain  Goal: Verbalizes/displays adequate comfort level or baseline comfort level  Outcome: Progressing  Flowsheets (Taken 11/5/2023 2215 by Jean-Paul Monzon RN)  Verbalizes/displays adequate comfort level or baseline comfort level: Encourage patient to monitor pain and request assistance     Problem: Safety - Adult  Goal: Free from fall injury  Outcome: Progressing  Flowsheets  Taken 11/6/2023 0810 by Aaron Houser RN  Free From Fall Injury: Instruct family/caregiver on patient safety  Taken 11/5/2023 2217 by Jean-Paul Monzon RN  Free From Fall Injury: Instruct family/caregiver on patient safety     Problem: Neurosensory - Adult  Goal: Achieves maximal functionality and self care  Outcome: Progressing  Goal: Achieves stable or improved neurological status  Outcome: Progressing  Goal: Absence of seizures  Outcome: Progressing  Goal: Remains free of injury related to seizures activity  Outcome: Progressing     Problem: Musculoskeletal - Adult  Goal: Return mobility to safest level of function  Outcome: Progressing  Goal: Maintain proper alignment of affected body part  Outcome: Progressing  Goal: Return ADL status to a safe level of function  Outcome: Progressing     Problem: Metabolic/Fluid and Electrolytes - Adult  Goal: Electrolytes maintained within normal limits  Outcome: Progressing  Goal: Hemodynamic stability and optimal renal function maintained  Outcome: Progressing  Goal: Glucose maintained within prescribed range  Outcome: Progressing     Problem: Respiratory - Adult  Goal: Achieves optimal ventilation and oxygenation  Outcome: Progressing     Problem: Chronic Conditions and Co-morbidities  Goal: Patient's chronic conditions and co-morbidity symptoms are monitored and maintained or improved  Outcome: Progressing     Problem: Gastrointestinal - Adult  Goal: Minimal or absence of

## 2023-11-07 ENCOUNTER — TELEPHONE (OUTPATIENT)
Age: 58
End: 2023-11-07

## 2023-11-07 DIAGNOSIS — I10 PRIMARY HYPERTENSION: Primary | ICD-10-CM

## 2023-11-07 PROCEDURE — 6360000002 HC RX W HCPCS: Performed by: STUDENT IN AN ORGANIZED HEALTH CARE EDUCATION/TRAINING PROGRAM

## 2023-11-07 PROCEDURE — 2580000003 HC RX 258: Performed by: STUDENT IN AN ORGANIZED HEALTH CARE EDUCATION/TRAINING PROGRAM

## 2023-11-07 PROCEDURE — 6360000002 HC RX W HCPCS: Performed by: NURSE PRACTITIONER

## 2023-11-07 PROCEDURE — 6370000000 HC RX 637 (ALT 250 FOR IP): Performed by: STUDENT IN AN ORGANIZED HEALTH CARE EDUCATION/TRAINING PROGRAM

## 2023-11-07 PROCEDURE — 6370000000 HC RX 637 (ALT 250 FOR IP): Performed by: INTERNAL MEDICINE

## 2023-11-07 PROCEDURE — 6360000002 HC RX W HCPCS: Performed by: FAMILY MEDICINE

## 2023-11-07 PROCEDURE — 2580000003 HC RX 258: Performed by: NURSE PRACTITIONER

## 2023-11-07 PROCEDURE — 6370000000 HC RX 637 (ALT 250 FOR IP): Performed by: NURSE PRACTITIONER

## 2023-11-07 PROCEDURE — 1100000003 HC PRIVATE W/ TELEMETRY

## 2023-11-07 RX ORDER — PREDNISONE 20 MG/1
20 TABLET ORAL DAILY
Status: COMPLETED | OUTPATIENT
Start: 2023-11-07 | End: 2023-11-07

## 2023-11-07 RX ORDER — PREDNISONE 20 MG/1
20 TABLET ORAL DAILY
Status: DISCONTINUED | OUTPATIENT
Start: 2023-11-07 | End: 2023-11-07

## 2023-11-07 RX ORDER — METOCLOPRAMIDE HYDROCHLORIDE 5 MG/ML
10 INJECTION INTRAMUSCULAR; INTRAVENOUS EVERY 4 HOURS PRN
Status: DISCONTINUED | OUTPATIENT
Start: 2023-11-07 | End: 2023-11-08 | Stop reason: HOSPADM

## 2023-11-07 RX ADMIN — PROCHLORPERAZINE EDISYLATE 10 MG: 5 INJECTION INTRAMUSCULAR; INTRAVENOUS at 04:24

## 2023-11-07 RX ADMIN — HYDROCODONE BITARTRATE AND ACETAMINOPHEN 1 TABLET: 10; 325 TABLET ORAL at 05:14

## 2023-11-07 RX ADMIN — PROCHLORPERAZINE EDISYLATE 10 MG: 5 INJECTION INTRAMUSCULAR; INTRAVENOUS at 11:49

## 2023-11-07 RX ADMIN — SODIUM CHLORIDE: 9 INJECTION, SOLUTION INTRAVENOUS at 21:50

## 2023-11-07 RX ADMIN — ONDANSETRON 4 MG: 2 INJECTION INTRAMUSCULAR; INTRAVENOUS at 15:21

## 2023-11-07 RX ADMIN — CEFTRIAXONE SODIUM 2000 MG: 2 INJECTION, POWDER, FOR SOLUTION INTRAMUSCULAR; INTRAVENOUS at 21:51

## 2023-11-07 RX ADMIN — LOSARTAN POTASSIUM 25 MG: 25 TABLET, FILM COATED ORAL at 08:19

## 2023-11-07 RX ADMIN — ATORVASTATIN CALCIUM 80 MG: 80 TABLET, FILM COATED ORAL at 08:20

## 2023-11-07 RX ADMIN — MAGNESIUM GLUCONATE 500 MG ORAL TABLET 400 MG: 500 TABLET ORAL at 08:19

## 2023-11-07 RX ADMIN — SODIUM CHLORIDE, PRESERVATIVE FREE 10 ML: 5 INJECTION INTRAVENOUS at 08:21

## 2023-11-07 RX ADMIN — ENOXAPARIN SODIUM 40 MG: 100 INJECTION SUBCUTANEOUS at 08:18

## 2023-11-07 RX ADMIN — MORPHINE SULFATE 2 MG: 2 INJECTION, SOLUTION INTRAMUSCULAR; INTRAVENOUS at 09:40

## 2023-11-07 RX ADMIN — MAGNESIUM GLUCONATE 500 MG ORAL TABLET 400 MG: 500 TABLET ORAL at 22:24

## 2023-11-07 RX ADMIN — HYDROCODONE BITARTRATE AND ACETAMINOPHEN 1 TABLET: 10; 325 TABLET ORAL at 14:00

## 2023-11-07 RX ADMIN — FAMOTIDINE 20 MG: 20 TABLET, FILM COATED ORAL at 21:41

## 2023-11-07 RX ADMIN — FERROUS SULFATE TAB 325 MG (65 MG ELEMENTAL FE) 325 MG: 325 (65 FE) TAB at 08:19

## 2023-11-07 RX ADMIN — CARVEDILOL 6.25 MG: 6.25 TABLET, FILM COATED ORAL at 08:20

## 2023-11-07 RX ADMIN — HYDROCODONE BITARTRATE AND ACETAMINOPHEN 1 TABLET: 10; 325 TABLET ORAL at 18:03

## 2023-11-07 RX ADMIN — ALPRAZOLAM 1 MG: 0.5 TABLET ORAL at 23:42

## 2023-11-07 RX ADMIN — Medication 100 MG: at 08:20

## 2023-11-07 RX ADMIN — FUROSEMIDE 40 MG: 40 TABLET ORAL at 08:20

## 2023-11-07 RX ADMIN — FOLIC ACID-PYRIDOXINE-CYANOCOBALAMIN TAB 2.5-25-2 MG 1 TABLET: 2.5-25-2 TAB at 08:20

## 2023-11-07 RX ADMIN — HYDROCODONE BITARTRATE AND ACETAMINOPHEN 1 TABLET: 10; 325 TABLET ORAL at 23:42

## 2023-11-07 RX ADMIN — PREDNISONE 20 MG: 20 TABLET ORAL at 11:49

## 2023-11-07 RX ADMIN — GABAPENTIN 300 MG: 300 CAPSULE ORAL at 21:41

## 2023-11-07 RX ADMIN — WATER 40 MG: 1 INJECTION INTRAMUSCULAR; INTRAVENOUS; SUBCUTANEOUS at 08:19

## 2023-11-07 RX ADMIN — CARVEDILOL 6.25 MG: 6.25 TABLET, FILM COATED ORAL at 21:41

## 2023-11-07 RX ADMIN — FAMOTIDINE 20 MG: 20 TABLET, FILM COATED ORAL at 08:19

## 2023-11-07 RX ADMIN — MORPHINE SULFATE 2 MG: 2 INJECTION, SOLUTION INTRAMUSCULAR; INTRAVENOUS at 21:41

## 2023-11-07 RX ADMIN — B-COMPLEX W/ C & FOLIC ACID TAB 1 TABLET: TAB at 08:19

## 2023-11-07 RX ADMIN — AMIODARONE HYDROCHLORIDE 200 MG: 200 TABLET ORAL at 08:20

## 2023-11-07 RX ADMIN — GABAPENTIN 300 MG: 300 CAPSULE ORAL at 08:20

## 2023-11-07 RX ADMIN — ASPIRIN 81 MG: 81 TABLET ORAL at 08:20

## 2023-11-07 RX ADMIN — SODIUM CHLORIDE, PRESERVATIVE FREE 10 ML: 5 INJECTION INTRAVENOUS at 21:41

## 2023-11-07 RX ADMIN — PROCHLORPERAZINE EDISYLATE 10 MG: 5 INJECTION INTRAMUSCULAR; INTRAVENOUS at 18:03

## 2023-11-07 ASSESSMENT — PAIN DESCRIPTION - FREQUENCY
FREQUENCY: INTERMITTENT

## 2023-11-07 ASSESSMENT — PAIN DESCRIPTION - DESCRIPTORS
DESCRIPTORS: ACHING;SORE;TENDER
DESCRIPTORS: DISCOMFORT

## 2023-11-07 ASSESSMENT — PAIN DESCRIPTION - ONSET
ONSET: GRADUAL

## 2023-11-07 ASSESSMENT — PAIN SCALES - GENERAL
PAINLEVEL_OUTOF10: 2
PAINLEVEL_OUTOF10: 7
PAINLEVEL_OUTOF10: 8
PAINLEVEL_OUTOF10: 7
PAINLEVEL_OUTOF10: 8
PAINLEVEL_OUTOF10: 6
PAINLEVEL_OUTOF10: 2
PAINLEVEL_OUTOF10: 7
PAINLEVEL_OUTOF10: 8
PAINLEVEL_OUTOF10: 9

## 2023-11-07 ASSESSMENT — PAIN - FUNCTIONAL ASSESSMENT
PAIN_FUNCTIONAL_ASSESSMENT: PREVENTS OR INTERFERES SOME ACTIVE ACTIVITIES AND ADLS
PAIN_FUNCTIONAL_ASSESSMENT: PREVENTS OR INTERFERES SOME ACTIVE ACTIVITIES AND ADLS

## 2023-11-07 ASSESSMENT — PAIN DESCRIPTION - ORIENTATION
ORIENTATION: ANTERIOR
ORIENTATION: ANTERIOR
ORIENTATION: RIGHT

## 2023-11-07 ASSESSMENT — PAIN DESCRIPTION - PAIN TYPE
TYPE: ACUTE PAIN

## 2023-11-07 ASSESSMENT — PAIN DESCRIPTION - LOCATION
LOCATION: JAW;FACE
LOCATION: FACE

## 2023-11-07 NOTE — TELEPHONE ENCOUNTER
Patient called stating he is currently hospitalized and is needing a referral from Dr Florentino to a PCP before he is discharged. Patient states Dr Florentino had given him a referral previously but it is at his home.    Please call and advise.

## 2023-11-08 VITALS
HEART RATE: 65 BPM | TEMPERATURE: 98.1 F | BODY MASS INDEX: 25.06 KG/M2 | OXYGEN SATURATION: 96 % | RESPIRATION RATE: 14 BRPM | WEIGHT: 179.01 LBS | SYSTOLIC BLOOD PRESSURE: 125 MMHG | DIASTOLIC BLOOD PRESSURE: 91 MMHG | HEIGHT: 71 IN

## 2023-11-08 PROCEDURE — 2580000003 HC RX 258: Performed by: NURSE PRACTITIONER

## 2023-11-08 PROCEDURE — 6360000002 HC RX W HCPCS: Performed by: INTERNAL MEDICINE

## 2023-11-08 PROCEDURE — 6370000000 HC RX 637 (ALT 250 FOR IP): Performed by: STUDENT IN AN ORGANIZED HEALTH CARE EDUCATION/TRAINING PROGRAM

## 2023-11-08 PROCEDURE — 6360000002 HC RX W HCPCS: Performed by: NURSE PRACTITIONER

## 2023-11-08 PROCEDURE — 6370000000 HC RX 637 (ALT 250 FOR IP): Performed by: NURSE PRACTITIONER

## 2023-11-08 PROCEDURE — 6370000000 HC RX 637 (ALT 250 FOR IP): Performed by: INTERNAL MEDICINE

## 2023-11-08 RX ORDER — DOXYCYCLINE HYCLATE 100 MG
100 TABLET ORAL 2 TIMES DAILY
Qty: 14 TABLET | Refills: 0 | Status: SHIPPED | OUTPATIENT
Start: 2023-11-08 | End: 2023-11-15

## 2023-11-08 RX ORDER — KETOROLAC TROMETHAMINE 30 MG/ML
15 INJECTION, SOLUTION INTRAMUSCULAR; INTRAVENOUS ONCE
Status: COMPLETED | OUTPATIENT
Start: 2023-11-08 | End: 2023-11-08

## 2023-11-08 RX ORDER — CEFPODOXIME PROXETIL 200 MG/1
200 TABLET, FILM COATED ORAL 2 TIMES DAILY
Qty: 14 TABLET | Refills: 0 | Status: SHIPPED | OUTPATIENT
Start: 2023-11-08 | End: 2023-11-15

## 2023-11-08 RX ORDER — HYDROCODONE BITARTRATE AND ACETAMINOPHEN 10; 325 MG/1; MG/1
1 TABLET ORAL EVERY 8 HOURS PRN
Qty: 9 TABLET | Refills: 0 | Status: SHIPPED | OUTPATIENT
Start: 2023-11-08 | End: 2023-11-11

## 2023-11-08 RX ORDER — ALPRAZOLAM 1 MG/1
1 TABLET ORAL 2 TIMES DAILY PRN
Qty: 6 TABLET | Refills: 0 | Status: SHIPPED | OUTPATIENT
Start: 2023-11-08 | End: 2023-11-11

## 2023-11-08 RX ADMIN — LOSARTAN POTASSIUM 25 MG: 25 TABLET, FILM COATED ORAL at 08:14

## 2023-11-08 RX ADMIN — SODIUM CHLORIDE, PRESERVATIVE FREE 5 ML: 5 INJECTION INTRAVENOUS at 09:28

## 2023-11-08 RX ADMIN — GABAPENTIN 300 MG: 300 CAPSULE ORAL at 08:13

## 2023-11-08 RX ADMIN — ASPIRIN 81 MG: 81 TABLET ORAL at 08:13

## 2023-11-08 RX ADMIN — AMIODARONE HYDROCHLORIDE 200 MG: 200 TABLET ORAL at 08:14

## 2023-11-08 RX ADMIN — FAMOTIDINE 20 MG: 20 TABLET, FILM COATED ORAL at 08:14

## 2023-11-08 RX ADMIN — ENOXAPARIN SODIUM 40 MG: 100 INJECTION SUBCUTANEOUS at 08:14

## 2023-11-08 RX ADMIN — Medication 100 MG: at 08:13

## 2023-11-08 RX ADMIN — FUROSEMIDE 40 MG: 40 TABLET ORAL at 08:13

## 2023-11-08 RX ADMIN — MAGNESIUM GLUCONATE 500 MG ORAL TABLET 400 MG: 500 TABLET ORAL at 08:13

## 2023-11-08 RX ADMIN — CARVEDILOL 6.25 MG: 6.25 TABLET, FILM COATED ORAL at 08:13

## 2023-11-08 RX ADMIN — B-COMPLEX W/ C & FOLIC ACID TAB 1 TABLET: TAB at 08:13

## 2023-11-08 RX ADMIN — FERROUS SULFATE TAB 325 MG (65 MG ELEMENTAL FE) 325 MG: 325 (65 FE) TAB at 08:13

## 2023-11-08 RX ADMIN — KETOROLAC TROMETHAMINE 15 MG: 30 INJECTION, SOLUTION INTRAMUSCULAR; INTRAVENOUS at 09:28

## 2023-11-08 RX ADMIN — HYDROCODONE BITARTRATE AND ACETAMINOPHEN 1 TABLET: 10; 325 TABLET ORAL at 08:13

## 2023-11-08 RX ADMIN — ATORVASTATIN CALCIUM 80 MG: 80 TABLET, FILM COATED ORAL at 08:14

## 2023-11-08 RX ADMIN — FOLIC ACID-PYRIDOXINE-CYANOCOBALAMIN TAB 2.5-25-2 MG 1 TABLET: 2.5-25-2 TAB at 08:13

## 2023-11-08 ASSESSMENT — PAIN SCALES - GENERAL
PAINLEVEL_OUTOF10: 1
PAINLEVEL_OUTOF10: 7
PAINLEVEL_OUTOF10: 0
PAINLEVEL_OUTOF10: 6

## 2023-11-08 ASSESSMENT — PAIN DESCRIPTION - LOCATION
LOCATION: FACE
LOCATION: FACE

## 2023-11-08 ASSESSMENT — PAIN DESCRIPTION - DIRECTION: RADIATING_TOWARDS: NECK/CHEST

## 2023-11-08 ASSESSMENT — PAIN DESCRIPTION - ONSET
ONSET: GRADUAL
ONSET: GRADUAL

## 2023-11-08 ASSESSMENT — PAIN DESCRIPTION - DESCRIPTORS
DESCRIPTORS: DISCOMFORT
DESCRIPTORS: DISCOMFORT

## 2023-11-08 ASSESSMENT — PAIN - FUNCTIONAL ASSESSMENT: PAIN_FUNCTIONAL_ASSESSMENT: PREVENTS OR INTERFERES SOME ACTIVE ACTIVITIES AND ADLS

## 2023-11-08 ASSESSMENT — PAIN DESCRIPTION - ORIENTATION
ORIENTATION: ANTERIOR
ORIENTATION: ANTERIOR

## 2023-11-08 ASSESSMENT — PAIN DESCRIPTION - PAIN TYPE
TYPE: ACUTE PAIN
TYPE: ACUTE PAIN

## 2023-11-08 ASSESSMENT — PAIN DESCRIPTION - FREQUENCY
FREQUENCY: INTERMITTENT
FREQUENCY: INTERMITTENT

## 2023-11-08 NOTE — CARE COORDINATION
Pt was medically cleared for dc to home today. Pt confirmed he has a new PCP appointment with Dr. Maurilio Oneill of [facility] on 12/5/2023 @ [phone]. No other dc needs or concerns identified or reported. 11/02/23 8662   Service Assessment   Patient Orientation Alert and Oriented   Cognition Alert   History Provided By Medical Record; Patient   Primary Caregiver Self   Support Systems Spouse/Significant Other;Children;Family Members   Patient's Healthcare Decision Maker is: Legal Next of [address] [place] [address]   PCP Verified by CM Yes  (pt is showing as not having a PCP but he last saw Dr. Luis E Rebolledo on 4/7/2023. CM will confirm pt still sees this PCP.)   Last Visit to PCP Within last year   Prior Functional Level Independent in ADLs/IADLs   Current Functional Level Independent in ADLs/IADLs   Can patient return to prior living arrangement Yes   Ability to make needs known: Good   Family able to assist with home care needs: Yes   Would you like for me to discuss the discharge plan with any other family members/significant others, and if so, who? No   Financial Resources Medicare  ([address] [place] [address])   Community Resources None   Social/Functional History   Lives With Spouse   ADL Assistance Independent   Homemaking Assistance Independent   Ambulation Assistance Independent   Transfer Assistance Independent   Occupation On disability   Discharge Planning   Type of Residence House   Living Arrangements Spouse/Significant Other   Current Services Prior To Admission None   Potential Assistance Needed N/A   DME Ordered? No   Potential Assistance Purchasing Medications No   Type of Home Care Services None   Patient expects to be discharged to: [facility]   Transition of Care Consult (CM Consult) Discharge Planning  (\"for consideration of rehab\")   Services At/After Discharge None   [employer] Information Provided?  No   Mode of Transport at Discharge Other (see comment)  (family)

## 2023-11-08 NOTE — DISCHARGE SUMMARY
YELLOW 06/27/2023 09:18 AM    APPEARANCE CLEAR 06/27/2023 09:18 AM    SPECGRAV 1.015 06/27/2023 09:18 AM    LABPH 6.5 06/27/2023 09:18 AM    PROTEINU 100 06/27/2023 09:18 AM    GLUCOSEU Negative 06/27/2023 09:18 AM    KETUA TRACE 06/27/2023 09:18 AM    BILIRUBINUR Negative 06/27/2023 09:18 AM    BLOODU Trace Intact 06/27/2023 09:18 AM    UROBILINOGEN 1.0 06/27/2023 09:18 AM    NITRU Negative 06/27/2023 09:18 AM    LEUKOCYTESUR Negative 06/27/2023 09:18 AM    WBCUA 0-3 06/27/2023 09:18 AM    RBCUA 0-3 06/27/2023 09:18 AM    EPITHUA 0 06/27/2023 09:18 AM    BACTERIA 0 06/27/2023 09:18 AM    LABCAST 0 06/27/2023 09:18 AM    MUCUS TRACE 06/27/2023 09:18 AM        Microbiology:  Results       Procedure Component Value Units Date/Time    MSSA/MRSA Screen BY PCR [[id number]] Collected: 11/04/23 1204    Order Status: Canceled Specimen: Swab from Nares     MRSA/Staph Aureas DNA [[id number]] Collected: 11/04/23 1203    Order Status: Completed Specimen: Nasal Swab Updated: 11/04/23 1711     MRSA by PCR Not detected        Comment: A positive test result does not necessarily indicate the presence of a viable organism. A positive result is indicative of the presence of SA or MRSA DNA. The BD Max StaphSR assay is intended to aid in the prevention and control of MRSA and SA infections in healthcare settings. It is not intended to diagnose MRSA or SA infections nor guide or monitor treatment for MRSA/SA infections. A negative result does not preclude nasal colonization.           SA by PCR Not detected       Respiratory Panel, Molecular, with COVID-19 (Restricted: peds pts or suitable admitted adults) [[id number]] Collected: 11/02/23 0036    Order Status: Completed Specimen: Nasopharyngeal Updated: 11/02/23 0208     Adenovirus by PCR NOT DETECTED        Coronavirus 229E by PCR NOT DETECTED        Coronavirus HKU1 by PCR NOT DETECTED        Coronavirus NL63 by PCR NOT DETECTED        Coronavirus OC43 by PCR NOT DETECTED

## 2023-11-08 NOTE — NURSE NAVIGATOR
CHF teaching completed. CHF background completed. Teaching emphasis on Call Cardiology STAT ,if any of the following are noted:  Short of Breath; with activity or without/ while reclined, Generalize weakness, edema are noted individually or grouped. Cardiac Diet  and salt/fluid restrictions covered. Daily WTs emphasized. Planner with summarization sheet provided with cardiology service and phone number and bathroom scale offered. Total time @ BS is 1 hour and pt verbalize understanding/past post test.  Pt instructed to call myself, if any questions occur.  States he is familiar with these teachings from his cardiologist.

## 2023-11-09 ENCOUNTER — TELEPHONE (OUTPATIENT)
Dept: INTERNAL MEDICINE CLINIC | Facility: CLINIC | Age: 58
End: 2023-11-09

## 2023-11-09 ENCOUNTER — CARE COORDINATION (OUTPATIENT)
Dept: CARE COORDINATION | Facility: CLINIC | Age: 58
End: 2023-11-09

## 2023-11-09 NOTE — CARE COORDINATION
Care Transitions Outreach Attempt    Call within 2 business days of discharge: Yes   Attempted to reach patient for transitions of care follow up. Unable to reach patient. Patient: Satish Lyn Patient : 1965 MRN: [id number]    Last Discharge Facility       Date Complaint Diagnosis Description Type Department Provider    23  Facial cellulitis . .. Admission (Discharged) [id number] Jade Mares MD    23 Abscess Cellulitis of face ED (TRANSFER) DANAESED Lella Fabry, MD              Was this an external facility discharge?  No Discharge Facility Name: sfd    Noted following upcoming appointments from discharge chart review:   [facility] follow up appointment(s):   Future Appointments   Date Time Provider [address]   2023  1:40 PM Macy Nuno DO FVP [facility] AMB   2024  3:15 PM Rita Florentino MD UCDG [facility] AMB     Non-[facility]  follow up appointment(s): na

## 2023-11-09 NOTE — CARE COORDINATION
Care Transitions Outreach Attempt    Call within 2 business days of discharge: Yes   Attempted to reach patient for transitions of care follow up. Unable to reach patient. Patient: Satish Lyn Patient : 1965 MRN: [id number]    Last Discharge Facility       Date Complaint Diagnosis Description Type Department Provider    23  Facial cellulitis . .. Admission (Discharged) [id number] Jade Mares MD    23 Abscess Cellulitis of face ED (TRANSFER) SFSED Lella Fabry, MD              Was this an external facility discharge?  No Discharge Facility Name: sfd    Noted following upcoming appointments from discharge chart review:   [facility] follow up appointment(s):   Future Appointments   Date Time Provider [address]   2023  1:40 PM Macy Nuno DO [facility] AMB   2024  3:15 PM Rita Florentino MD [facility] AMB     Non-[facility]  follow up appointment(s): na

## 2023-11-09 NOTE — TELEPHONE ENCOUNTER
1st attempt to contact patient using phone number listed in chart following discharge from [facility] 11/8/2023. Patient states he filled his medications given and has appt with Dr Larry Boothe 12/05/2023.  States will call back if he needs further assistance         Per discharge patient has appt 12/05/2023 to est with Dr Larry Boothe

## 2023-11-10 ENCOUNTER — CARE COORDINATION (OUTPATIENT)
Dept: CARE COORDINATION | Facility: CLINIC | Age: 58
End: 2023-11-10

## 2023-11-10 NOTE — CARE COORDINATION
Care Transitions Initial Follow Up Call    Call within 2 business days of discharge: Yes    Patient Current Location:  Home: [address] [place] [id number]    LPN Care Coordinator contacted the patient by telephone to perform post hospital discharge assessment. Verified name and  with patient as identifiers. Provided introduction to self, and explanation of the LPN Care Coordinator role. Patient: Satish Lyn Patient : 1965   MRN: [id number]  Reason for Admission: Facial cellulitis  Discharge Date: 23 RARS: Readmission Risk Score: 9.8      Last Discharge Facility       Date Complaint Diagnosis Description Type Department Provider    23  Facial cellulitis . .. Admission (Discharged) FAUSTINO Mares MD    23 Abscess Cellulitis of face ED (TRANSFER) SFSED Lella Fabry, MD            Was this an external facility discharge? No Discharge Facility: sfd    Challenges to be reviewed by the provider   Additional needs identified to be addressed with provider: No  none               Method of communication with provider: none. Patient reports persistent cough, discussed adequate fluids and medications as ordered. Patient states he will reach out to PCP if this worsens or has no improvement. LPN Care Coordinator reviewed discharge instructions, medical action plan, and red flags with patient who verbalized understanding. The patient was given an opportunity to ask questions and does not have any further questions or concerns at this time. Were discharge instructions available to patient? Yes. Reviewed appropriate site of care based on symptoms and resources available to patient including: PCP  When to call 911. The patient agrees to contact the PCP office for questions related to their healthcare. Advance Care Planning:   Does patient have an Advance Directive:  decision maker verified .     Medication reconciliation was performed with patient, who verbalizes

## 2023-11-12 ENCOUNTER — APPOINTMENT (OUTPATIENT)
Dept: GENERAL RADIOLOGY | Age: 58
DRG: 917 | End: 2023-11-12
Payer: COMMERCIAL

## 2023-11-12 ENCOUNTER — HOSPITAL ENCOUNTER (INPATIENT)
Age: 58
LOS: 2 days | Discharge: HOME | DRG: 917 | End: 2023-11-16
Attending: EMERGENCY MEDICINE | Admitting: INTERNAL MEDICINE
Payer: COMMERCIAL

## 2023-11-12 DIAGNOSIS — R07.9 ACUTE CHEST PAIN: Primary | ICD-10-CM

## 2023-11-12 DIAGNOSIS — G92.9 TOXIC ENCEPHALOPATHY: ICD-10-CM

## 2023-11-12 DIAGNOSIS — R55 SYNCOPE AND COLLAPSE: ICD-10-CM

## 2023-11-12 DIAGNOSIS — I50.22 CHRONIC SYSTOLIC CONGESTIVE HEART FAILURE (HCC): ICD-10-CM

## 2023-11-12 DIAGNOSIS — G43.C0 PERIODIC HEADACHE SYNDROMES IN CHILD OR ADULT, NOT INTRACTABLE: ICD-10-CM

## 2023-11-12 LAB
ALBUMIN SERPL-MCNC: 3.2 G/DL (ref 3.5–5)
ALBUMIN/GLOB SERPL: 1.2 (ref 0.4–1.6)
ALP SERPL-CCNC: 101 U/L (ref 50–136)
ALT SERPL-CCNC: 71 U/L (ref 12–65)
ANION GAP SERPL CALC-SCNC: 7 MMOL/L (ref 2–11)
AST SERPL-CCNC: 96 U/L (ref 15–37)
BASOPHILS # BLD: 0 K/UL (ref 0–0.2)
BASOPHILS NFR BLD: 0 % (ref 0–2)
BILIRUB SERPL-MCNC: 0.9 MG/DL (ref 0.2–1.1)
BUN SERPL-MCNC: 17 MG/DL (ref 6–23)
CALCIUM SERPL-MCNC: 7.7 MG/DL (ref 8.3–10.4)
CHLORIDE SERPL-SCNC: 110 MMOL/L (ref 101–110)
CO2 SERPL-SCNC: 25 MMOL/L (ref 21–32)
CREAT SERPL-MCNC: 1 MG/DL (ref 0.8–1.5)
DIFFERENTIAL METHOD BLD: ABNORMAL
EOSINOPHIL # BLD: 0.4 K/UL (ref 0–0.8)
EOSINOPHIL NFR BLD: 6 % (ref 0.5–7.8)
ERYTHROCYTE [DISTWIDTH] IN BLOOD BY AUTOMATED COUNT: 19.7 % (ref 11.9–14.6)
ETHANOL SERPL-MCNC: 270 MG/DL (ref 0–0.08)
GLOBULIN SER CALC-MCNC: 2.6 G/DL (ref 2.8–4.5)
GLUCOSE SERPL-MCNC: 97 MG/DL (ref 65–100)
HCT VFR BLD AUTO: 32.1 % (ref 41.1–50.3)
HGB BLD-MCNC: 10.4 G/DL (ref 13.6–17.2)
IMM GRANULOCYTES # BLD AUTO: 0 K/UL (ref 0–0.5)
IMM GRANULOCYTES NFR BLD AUTO: 0 % (ref 0–5)
LYMPHOCYTES # BLD: 1.6 K/UL (ref 0.5–4.6)
LYMPHOCYTES NFR BLD: 27 % (ref 13–44)
MAGNESIUM SERPL-MCNC: 2.3 MG/DL (ref 1.8–2.4)
MCH RBC QN AUTO: 30.6 PG (ref 26.1–32.9)
MCHC RBC AUTO-ENTMCNC: 32.4 G/DL (ref 31.4–35)
MCV RBC AUTO: 94.4 FL (ref 82–102)
MONOCYTES # BLD: 0.5 K/UL (ref 0.1–1.3)
MONOCYTES NFR BLD: 8 % (ref 4–12)
NEUTS SEG # BLD: 3.6 K/UL (ref 1.7–8.2)
NEUTS SEG NFR BLD: 59 % (ref 43–78)
NRBC # BLD: 0 K/UL (ref 0–0.2)
PLATELET # BLD AUTO: 381 K/UL (ref 150–450)
PMV BLD AUTO: 8.8 FL (ref 9.4–12.3)
POTASSIUM SERPL-SCNC: 4.1 MMOL/L (ref 3.5–5.1)
PROT SERPL-MCNC: 5.8 G/DL (ref 6.3–8.2)
RBC # BLD AUTO: 3.4 M/UL (ref 4.23–5.6)
SODIUM SERPL-SCNC: 142 MMOL/L (ref 133–143)
TROPONIN I SERPL HS-MCNC: 151.2 PG/ML (ref 0–14)
WBC # BLD AUTO: 6.2 K/UL (ref 4.3–11.1)

## 2023-11-12 PROCEDURE — 85025 COMPLETE CBC W/AUTO DIFF WBC: CPT

## 2023-11-12 PROCEDURE — 84484 ASSAY OF TROPONIN QUANT: CPT

## 2023-11-12 PROCEDURE — 2700000000 HC OXYGEN THERAPY PER DAY

## 2023-11-12 PROCEDURE — 83735 ASSAY OF MAGNESIUM: CPT

## 2023-11-12 PROCEDURE — 94761 N-INVAS EAR/PLS OXIMETRY MLT: CPT

## 2023-11-12 PROCEDURE — 96374 THER/PROPH/DIAG INJ IV PUSH: CPT

## 2023-11-12 PROCEDURE — 80053 COMPREHEN METABOLIC PANEL: CPT

## 2023-11-12 PROCEDURE — 99285 EMERGENCY DEPT VISIT HI MDM: CPT

## 2023-11-12 PROCEDURE — 71045 X-RAY EXAM CHEST 1 VIEW: CPT

## 2023-11-12 PROCEDURE — 96375 TX/PRO/DX INJ NEW DRUG ADDON: CPT

## 2023-11-12 PROCEDURE — 6360000002 HC RX W HCPCS: Performed by: EMERGENCY MEDICINE

## 2023-11-12 PROCEDURE — 82077 ASSAY SPEC XCP UR&BREATH IA: CPT

## 2023-11-12 RX ORDER — ONDANSETRON 2 MG/ML
4 INJECTION INTRAMUSCULAR; INTRAVENOUS
Status: DISCONTINUED | OUTPATIENT
Start: 2023-11-13 | End: 2023-11-13

## 2023-11-12 RX ORDER — PROCHLORPERAZINE EDISYLATE 5 MG/ML
5 INJECTION INTRAMUSCULAR; INTRAVENOUS ONCE
Status: COMPLETED | OUTPATIENT
Start: 2023-11-13 | End: 2023-11-12

## 2023-11-12 RX ORDER — DIPHENHYDRAMINE HYDROCHLORIDE 50 MG/ML
25 INJECTION INTRAMUSCULAR; INTRAVENOUS ONCE
Status: COMPLETED | OUTPATIENT
Start: 2023-11-13 | End: 2023-11-12

## 2023-11-12 RX ADMIN — DIPHENHYDRAMINE HYDROCHLORIDE 25 MG: 50 INJECTION INTRAMUSCULAR; INTRAVENOUS at 23:59

## 2023-11-12 RX ADMIN — PROCHLORPERAZINE EDISYLATE 5 MG: 5 INJECTION INTRAMUSCULAR; INTRAVENOUS at 23:59

## 2023-11-12 ASSESSMENT — ENCOUNTER SYMPTOMS
SHORTNESS OF BREATH: 0
COUGH: 1

## 2023-11-12 ASSESSMENT — LIFESTYLE VARIABLES
HOW OFTEN DO YOU HAVE A DRINK CONTAINING ALCOHOL: 4 OR MORE TIMES A WEEK
HOW MANY STANDARD DRINKS CONTAINING ALCOHOL DO YOU HAVE ON A TYPICAL DAY: 3 OR 4

## 2023-11-12 ASSESSMENT — PAIN - FUNCTIONAL ASSESSMENT: PAIN_FUNCTIONAL_ASSESSMENT: 0-10

## 2023-11-12 ASSESSMENT — PAIN DESCRIPTION - LOCATION: LOCATION: CHEST

## 2023-11-12 ASSESSMENT — PAIN SCALES - GENERAL: PAINLEVEL_OUTOF10: 8

## 2023-11-13 ENCOUNTER — APPOINTMENT (OUTPATIENT)
Dept: CT IMAGING | Age: 58
DRG: 917 | End: 2023-11-13
Payer: COMMERCIAL

## 2023-11-13 ENCOUNTER — CARE COORDINATION (OUTPATIENT)
Dept: CARE COORDINATION | Facility: CLINIC | Age: 58
End: 2023-11-13

## 2023-11-13 ENCOUNTER — APPOINTMENT (OUTPATIENT)
Dept: GENERAL RADIOLOGY | Age: 58
DRG: 917 | End: 2023-11-13
Payer: COMMERCIAL

## 2023-11-13 ENCOUNTER — APPOINTMENT (OUTPATIENT)
Dept: NON INVASIVE DIAGNOSTICS | Age: 58
DRG: 917 | End: 2023-11-13
Attending: INTERNAL MEDICINE
Payer: COMMERCIAL

## 2023-11-13 PROBLEM — G92.9 TOXIC ENCEPHALOPATHY: Status: ACTIVE | Noted: 2023-11-13

## 2023-11-13 PROBLEM — Z95.810 ICD (IMPLANTABLE CARDIOVERTER-DEFIBRILLATOR) IN PLACE: Chronic | Status: ACTIVE | Noted: 2022-02-26

## 2023-11-13 PROBLEM — F10.20 ALCOHOL DEPENDENCE (HCC): Chronic | Status: ACTIVE | Noted: 2018-09-05

## 2023-11-13 PROBLEM — K74.69 OTHER CIRRHOSIS OF LIVER (HCC): Chronic | Status: ACTIVE | Noted: 2023-01-24

## 2023-11-13 LAB
AMMONIA PLAS-SCNC: 59 UMOL/L (ref 11–32)
AMPHET UR QL SCN: NEGATIVE
BARBITURATES UR QL SCN: NEGATIVE
BENZODIAZ UR QL: NEGATIVE
CANNABINOIDS UR QL SCN: NEGATIVE
COCAINE UR QL SCN: NEGATIVE
ECHO AO ASC DIAM: 4 CM
ECHO AO ASCENDING AORTA INDEX: 1.99 CM/M2
ECHO AO ROOT DIAM: 3.6 CM
ECHO AO ROOT INDEX: 1.79 CM/M2
ECHO AV AREA PEAK VELOCITY: 1.7 CM2
ECHO AV AREA VTI: 2.1 CM2
ECHO AV AREA/BSA PEAK VELOCITY: 0.8 CM2/M2
ECHO AV AREA/BSA VTI: 1 CM2/M2
ECHO AV MEAN GRADIENT: 5 MMHG
ECHO AV MEAN VELOCITY: 1 M/S
ECHO AV PEAK GRADIENT: 8 MMHG
ECHO AV PEAK VELOCITY: 1.4 M/S
ECHO AV VELOCITY RATIO: 0.29
ECHO AV VTI: 26.1 CM
ECHO BSA: 2.02 M2
ECHO LA AREA 2C: 28.3 CM2
ECHO LA AREA 4C: 28.3 CM2
ECHO LA DIAMETER INDEX: 2.29 CM/M2
ECHO LA DIAMETER: 4.6 CM
ECHO LA MAJOR AXIS: 7.7 CM
ECHO LA MINOR AXIS: 7.4 CM
ECHO LA TO AORTIC ROOT RATIO: 1.28
ECHO LA VOL BP: 89 ML (ref 18–58)
ECHO LA VOL MOD A2C: 90 ML (ref 18–58)
ECHO LA VOL MOD A4C: 87 ML (ref 18–58)
ECHO LA VOL/BSA BIPLANE: 44 ML/M2 (ref 16–34)
ECHO LA VOLUME INDEX MOD A2C: 45 ML/M2 (ref 16–34)
ECHO LA VOLUME INDEX MOD A4C: 43 ML/M2 (ref 16–34)
ECHO LV E' LATERAL VELOCITY: 8 CM/S
ECHO LV E' SEPTAL VELOCITY: 4 CM/S
ECHO LV EDV A2C: 193 ML
ECHO LV EDV A4C: 211 ML
ECHO LV EDV INDEX A4C: 105 ML/M2
ECHO LV EDV NDEX A2C: 96 ML/M2
ECHO LV EJECTION FRACTION A2C: 21 %
ECHO LV EJECTION FRACTION A4C: 24 %
ECHO LV EJECTION FRACTION BIPLANE: 20 % (ref 55–100)
ECHO LV ESV A2C: 153 ML
ECHO LV ESV A4C: 161 ML
ECHO LV ESV INDEX A2C: 76 ML/M2
ECHO LV ESV INDEX A4C: 80 ML/M2
ECHO LV FRACTIONAL SHORTENING: 10 % (ref 28–44)
ECHO LV INTERNAL DIMENSION DIASTOLE INDEX: 3.33 CM/M2
ECHO LV INTERNAL DIMENSION DIASTOLIC: 6.7 CM (ref 4.2–5.9)
ECHO LV INTERNAL DIMENSION SYSTOLIC INDEX: 2.99 CM/M2
ECHO LV INTERNAL DIMENSION SYSTOLIC: 6 CM
ECHO LV IVSD: 0.7 CM (ref 0.6–1)
ECHO LV MASS 2D: 243.5 G (ref 88–224)
ECHO LV MASS INDEX 2D: 121.1 G/M2 (ref 49–115)
ECHO LV POSTERIOR WALL DIASTOLIC: 1 CM (ref 0.6–1)
ECHO LV RELATIVE WALL THICKNESS RATIO: 0.3
ECHO LVOT AREA: 5.3 CM2
ECHO LVOT AV VTI INDEX: 0.39
ECHO LVOT DIAM: 2.6 CM
ECHO LVOT MEAN GRADIENT: 1 MMHG
ECHO LVOT PEAK GRADIENT: 1 MMHG
ECHO LVOT PEAK VELOCITY: 0.4 M/S
ECHO LVOT STROKE VOLUME INDEX: 26.7 ML/M2
ECHO LVOT SV: 53.6 ML
ECHO LVOT VTI: 10.1 CM
ECHO MV A VELOCITY: 0.84 M/S
ECHO MV E DECELERATION TIME (DT): 123 MS
ECHO MV E VELOCITY: 0.56 M/S
ECHO MV E/A RATIO: 0.67
ECHO MV E/E' LATERAL: 7
ECHO MV E/E' RATIO (AVERAGED): 10.5
ECHO PV ACCELERATION TIME (AT): 100 MS
ECHO PV MAX VELOCITY: 1 M/S
ECHO PV PEAK GRADIENT: 4 MMHG
ECHO RV INTERNAL DIMENSION: 3.4 CM
ECHO RV TAPSE: 2.2 CM (ref 1.7–?)
EKG ATRIAL RATE: 70 BPM
EKG DIAGNOSIS: NORMAL
EKG P AXIS: 43 DEGREES
EKG P-R INTERVAL: 258 MS
EKG Q-T INTERVAL: 470 MS
EKG QRS DURATION: 136 MS
EKG QTC CALCULATION (BAZETT): 508 MS
EKG R AXIS: -29 DEGREES
EKG T AXIS: 144 DEGREES
EKG VENTRICULAR RATE: 70 BPM
GLUCOSE BLD STRIP.AUTO-MCNC: 107 MG/DL (ref 65–100)
LIPASE SERPL-CCNC: 64 U/L (ref 73–393)
METHADONE UR QL: NEGATIVE
OPIATES UR QL: POSITIVE
PCP UR QL: NEGATIVE
SERVICE CMNT-IMP: ABNORMAL
TROPONIN I SERPL HS-MCNC: 138.6 PG/ML (ref 0–14)

## 2023-11-13 PROCEDURE — 83690 ASSAY OF LIPASE: CPT

## 2023-11-13 PROCEDURE — 6370000000 HC RX 637 (ALT 250 FOR IP): Performed by: INTERNAL MEDICINE

## 2023-11-13 PROCEDURE — 96375 TX/PRO/DX INJ NEW DRUG ADDON: CPT

## 2023-11-13 PROCEDURE — 82962 GLUCOSE BLOOD TEST: CPT

## 2023-11-13 PROCEDURE — 94760 N-INVAS EAR/PLS OXIMETRY 1: CPT

## 2023-11-13 PROCEDURE — 70450 CT HEAD/BRAIN W/O DYE: CPT

## 2023-11-13 PROCEDURE — 6360000002 HC RX W HCPCS: Performed by: NURSE PRACTITIONER

## 2023-11-13 PROCEDURE — 82140 ASSAY OF AMMONIA: CPT

## 2023-11-13 PROCEDURE — 94640 AIRWAY INHALATION TREATMENT: CPT

## 2023-11-13 PROCEDURE — 6360000002 HC RX W HCPCS: Performed by: INTERNAL MEDICINE

## 2023-11-13 PROCEDURE — 93306 TTE W/DOPPLER COMPLETE: CPT | Performed by: INTERNAL MEDICINE

## 2023-11-13 PROCEDURE — G0378 HOSPITAL OBSERVATION PER HR: HCPCS

## 2023-11-13 PROCEDURE — 99223 1ST HOSP IP/OBS HIGH 75: CPT | Performed by: INTERNAL MEDICINE

## 2023-11-13 PROCEDURE — 93005 ELECTROCARDIOGRAM TRACING: CPT | Performed by: EMERGENCY MEDICINE

## 2023-11-13 PROCEDURE — 6360000002 HC RX W HCPCS: Performed by: FAMILY MEDICINE

## 2023-11-13 PROCEDURE — 2700000000 HC OXYGEN THERAPY PER DAY

## 2023-11-13 PROCEDURE — C8929 TTE W OR WO FOL WCON,DOPPLER: HCPCS

## 2023-11-13 PROCEDURE — 36415 COLL VENOUS BLD VENIPUNCTURE: CPT

## 2023-11-13 PROCEDURE — 93010 ELECTROCARDIOGRAM REPORT: CPT | Performed by: INTERNAL MEDICINE

## 2023-11-13 PROCEDURE — 2580000003 HC RX 258: Performed by: INTERNAL MEDICINE

## 2023-11-13 PROCEDURE — 96372 THER/PROPH/DIAG INJ SC/IM: CPT

## 2023-11-13 PROCEDURE — 84484 ASSAY OF TROPONIN QUANT: CPT

## 2023-11-13 PROCEDURE — 80307 DRUG TEST PRSMV CHEM ANLYZR: CPT

## 2023-11-13 PROCEDURE — 6360000004 HC RX CONTRAST MEDICATION: Performed by: EMERGENCY MEDICINE

## 2023-11-13 PROCEDURE — 6370000000 HC RX 637 (ALT 250 FOR IP): Performed by: FAMILY MEDICINE

## 2023-11-13 PROCEDURE — 71045 X-RAY EXAM CHEST 1 VIEW: CPT

## 2023-11-13 PROCEDURE — 71260 CT THORAX DX C+: CPT

## 2023-11-13 PROCEDURE — 6360000004 HC RX CONTRAST MEDICATION: Performed by: INTERNAL MEDICINE

## 2023-11-13 PROCEDURE — 96376 TX/PRO/DX INJ SAME DRUG ADON: CPT

## 2023-11-13 RX ORDER — BISACODYL 10 MG
10 SUPPOSITORY, RECTAL RECTAL DAILY PRN
Status: DISCONTINUED | OUTPATIENT
Start: 2023-11-13 | End: 2023-11-16 | Stop reason: HOSPADM

## 2023-11-13 RX ORDER — MAGNESIUM HYDROXIDE/ALUMINUM HYDROXICE/SIMETHICONE 120; 1200; 1200 MG/30ML; MG/30ML; MG/30ML
30 SUSPENSION ORAL EVERY 6 HOURS PRN
Status: DISCONTINUED | OUTPATIENT
Start: 2023-11-13 | End: 2023-11-16 | Stop reason: HOSPADM

## 2023-11-13 RX ORDER — ASPIRIN 81 MG/1
81 TABLET ORAL DAILY
Status: DISCONTINUED | OUTPATIENT
Start: 2023-11-13 | End: 2023-11-16 | Stop reason: HOSPADM

## 2023-11-13 RX ORDER — GUAIFENESIN/DEXTROMETHORPHAN 100-10MG/5
10 SYRUP ORAL EVERY 4 HOURS PRN
Status: DISCONTINUED | OUTPATIENT
Start: 2023-11-13 | End: 2023-11-16 | Stop reason: HOSPADM

## 2023-11-13 RX ORDER — PROCHLORPERAZINE EDISYLATE 5 MG/ML
10 INJECTION INTRAMUSCULAR; INTRAVENOUS ONCE
Status: DISCONTINUED | OUTPATIENT
Start: 2023-11-13 | End: 2023-11-16 | Stop reason: HOSPADM

## 2023-11-13 RX ORDER — CLONIDINE HYDROCHLORIDE 0.1 MG/1
0.1 TABLET ORAL EVERY 4 HOURS PRN
Status: DISCONTINUED | OUTPATIENT
Start: 2023-11-13 | End: 2023-11-16 | Stop reason: HOSPADM

## 2023-11-13 RX ORDER — LORAZEPAM 0.5 MG/1
0.5 TABLET ORAL ONCE
Status: DISCONTINUED | OUTPATIENT
Start: 2023-11-13 | End: 2023-11-13

## 2023-11-13 RX ORDER — FUROSEMIDE 40 MG/1
40 TABLET ORAL 2 TIMES DAILY
Status: DISCONTINUED | OUTPATIENT
Start: 2023-11-13 | End: 2023-11-16 | Stop reason: HOSPADM

## 2023-11-13 RX ORDER — ONDANSETRON 2 MG/ML
4 INJECTION INTRAMUSCULAR; INTRAVENOUS EVERY 6 HOURS PRN
Status: DISCONTINUED | OUTPATIENT
Start: 2023-11-13 | End: 2023-11-13

## 2023-11-13 RX ORDER — LANOLIN ALCOHOL/MO/W.PET/CERES
3 CREAM (GRAM) TOPICAL NIGHTLY PRN
Status: DISCONTINUED | OUTPATIENT
Start: 2023-11-13 | End: 2023-11-16 | Stop reason: HOSPADM

## 2023-11-13 RX ORDER — LORAZEPAM 0.5 MG/1
0.5 TABLET ORAL
Status: COMPLETED | OUTPATIENT
Start: 2023-11-13 | End: 2023-11-13

## 2023-11-13 RX ORDER — ACETAMINOPHEN 325 MG/1
650 TABLET ORAL EVERY 6 HOURS PRN
Status: DISCONTINUED | OUTPATIENT
Start: 2023-11-13 | End: 2023-11-16 | Stop reason: HOSPADM

## 2023-11-13 RX ORDER — POTASSIUM CHLORIDE 20 MEQ/1
20 TABLET, EXTENDED RELEASE ORAL DAILY
Status: ON HOLD | COMMUNITY
End: 2023-11-16 | Stop reason: HOSPADM

## 2023-11-13 RX ORDER — ENEMA 19; 7 G/133ML; G/133ML
1 ENEMA RECTAL AS NEEDED
Status: DISCONTINUED | OUTPATIENT
Start: 2023-11-13 | End: 2023-11-16 | Stop reason: HOSPADM

## 2023-11-13 RX ORDER — LACTULOSE 10 G/15ML
20 SOLUTION ORAL 3 TIMES DAILY
Status: DISCONTINUED | OUTPATIENT
Start: 2023-11-13 | End: 2023-11-16 | Stop reason: HOSPADM

## 2023-11-13 RX ORDER — SODIUM CHLORIDE 0.9 % (FLUSH) 0.9 %
5-40 SYRINGE (ML) INJECTION PRN
Status: DISCONTINUED | OUTPATIENT
Start: 2023-11-13 | End: 2023-11-16 | Stop reason: HOSPADM

## 2023-11-13 RX ORDER — PROCHLORPERAZINE EDISYLATE 5 MG/ML
10 INJECTION INTRAMUSCULAR; INTRAVENOUS ONCE
Status: COMPLETED | OUTPATIENT
Start: 2023-11-13 | End: 2023-11-13

## 2023-11-13 RX ORDER — MAGNESIUM SULFATE IN WATER 40 MG/ML
2000 INJECTION, SOLUTION INTRAVENOUS PRN
Status: DISCONTINUED | OUTPATIENT
Start: 2023-11-13 | End: 2023-11-16 | Stop reason: HOSPADM

## 2023-11-13 RX ORDER — SODIUM CHLORIDE 9 MG/ML
INJECTION, SOLUTION INTRAVENOUS PRN
Status: DISCONTINUED | OUTPATIENT
Start: 2023-11-13 | End: 2023-11-16 | Stop reason: HOSPADM

## 2023-11-13 RX ORDER — ONDANSETRON 4 MG/1
4 TABLET, ORALLY DISINTEGRATING ORAL EVERY 8 HOURS PRN
Status: DISCONTINUED | OUTPATIENT
Start: 2023-11-13 | End: 2023-11-13

## 2023-11-13 RX ORDER — PROCHLORPERAZINE EDISYLATE 5 MG/ML
10 INJECTION INTRAMUSCULAR; INTRAVENOUS EVERY 6 HOURS PRN
Status: DISCONTINUED | OUTPATIENT
Start: 2023-11-13 | End: 2023-11-16 | Stop reason: HOSPADM

## 2023-11-13 RX ORDER — ATORVASTATIN CALCIUM 80 MG/1
80 TABLET, FILM COATED ORAL DAILY
Status: DISCONTINUED | OUTPATIENT
Start: 2023-11-13 | End: 2023-11-16 | Stop reason: HOSPADM

## 2023-11-13 RX ORDER — ALBUTEROL SULFATE 2.5 MG/3ML
2.5 SOLUTION RESPIRATORY (INHALATION) ONCE
Status: COMPLETED | OUTPATIENT
Start: 2023-11-13 | End: 2023-11-13

## 2023-11-13 RX ORDER — POTASSIUM CHLORIDE 20 MEQ/1
40 TABLET, EXTENDED RELEASE ORAL PRN
Status: DISCONTINUED | OUTPATIENT
Start: 2023-11-13 | End: 2023-11-16 | Stop reason: HOSPADM

## 2023-11-13 RX ORDER — LANOLIN ALCOHOL/MO/W.PET/CERES
400 CREAM (GRAM) TOPICAL DAILY
Status: DISCONTINUED | OUTPATIENT
Start: 2023-11-13 | End: 2023-11-15

## 2023-11-13 RX ORDER — SODIUM CHLORIDE 0.9 % (FLUSH) 0.9 %
5-40 SYRINGE (ML) INJECTION EVERY 12 HOURS SCHEDULED
Status: DISCONTINUED | OUTPATIENT
Start: 2023-11-13 | End: 2023-11-16 | Stop reason: HOSPADM

## 2023-11-13 RX ORDER — OXYCODONE HYDROCHLORIDE 5 MG/1
5 TABLET ORAL EVERY 4 HOURS PRN
Status: DISCONTINUED | OUTPATIENT
Start: 2023-11-13 | End: 2023-11-16 | Stop reason: HOSPADM

## 2023-11-13 RX ORDER — AMIODARONE HYDROCHLORIDE 200 MG/1
200 TABLET ORAL DAILY
Status: DISCONTINUED | OUTPATIENT
Start: 2023-11-13 | End: 2023-11-16 | Stop reason: HOSPADM

## 2023-11-13 RX ORDER — POTASSIUM CHLORIDE 7.45 MG/ML
10 INJECTION INTRAVENOUS PRN
Status: DISCONTINUED | OUTPATIENT
Start: 2023-11-13 | End: 2023-11-16 | Stop reason: HOSPADM

## 2023-11-13 RX ORDER — ALPRAZOLAM 1 MG/1
1 TABLET ORAL DAILY PRN
COMMUNITY

## 2023-11-13 RX ORDER — CARVEDILOL 25 MG/1
25 TABLET ORAL 2 TIMES DAILY WITH MEALS
Status: DISCONTINUED | OUTPATIENT
Start: 2023-11-13 | End: 2023-11-16 | Stop reason: HOSPADM

## 2023-11-13 RX ORDER — POLYETHYLENE GLYCOL 3350 17 G/17G
17 POWDER, FOR SOLUTION ORAL DAILY PRN
Status: DISCONTINUED | OUTPATIENT
Start: 2023-11-13 | End: 2023-11-16 | Stop reason: HOSPADM

## 2023-11-13 RX ORDER — ENOXAPARIN SODIUM 100 MG/ML
40 INJECTION SUBCUTANEOUS DAILY
Status: DISCONTINUED | OUTPATIENT
Start: 2023-11-13 | End: 2023-11-16 | Stop reason: HOSPADM

## 2023-11-13 RX ORDER — FUROSEMIDE 10 MG/ML
60 INJECTION INTRAMUSCULAR; INTRAVENOUS ONCE
Status: COMPLETED | OUTPATIENT
Start: 2023-11-13 | End: 2023-11-13

## 2023-11-13 RX ORDER — GABAPENTIN 300 MG/1
300 CAPSULE ORAL 2 TIMES DAILY
Status: DISCONTINUED | OUTPATIENT
Start: 2023-11-13 | End: 2023-11-16 | Stop reason: HOSPADM

## 2023-11-13 RX ORDER — SPIRONOLACTONE 25 MG/1
25 TABLET ORAL DAILY
Status: DISCONTINUED | OUTPATIENT
Start: 2023-11-13 | End: 2023-11-16 | Stop reason: HOSPADM

## 2023-11-13 RX ORDER — HYDRALAZINE HYDROCHLORIDE 20 MG/ML
20 INJECTION INTRAMUSCULAR; INTRAVENOUS EVERY 4 HOURS PRN
Status: DISCONTINUED | OUTPATIENT
Start: 2023-11-13 | End: 2023-11-16 | Stop reason: HOSPADM

## 2023-11-13 RX ORDER — HYDROCODONE BITARTRATE AND ACETAMINOPHEN 10; 325 MG/1; MG/1
1 TABLET ORAL 4 TIMES DAILY PRN
COMMUNITY

## 2023-11-13 RX ORDER — IPRATROPIUM BROMIDE AND ALBUTEROL SULFATE 2.5; .5 MG/3ML; MG/3ML
1 SOLUTION RESPIRATORY (INHALATION) EVERY 4 HOURS PRN
Status: DISCONTINUED | OUTPATIENT
Start: 2023-11-13 | End: 2023-11-16 | Stop reason: HOSPADM

## 2023-11-13 RX ORDER — FAMOTIDINE 20 MG/1
10 TABLET, FILM COATED ORAL DAILY PRN
Status: DISCONTINUED | OUTPATIENT
Start: 2023-11-13 | End: 2023-11-16 | Stop reason: HOSPADM

## 2023-11-13 RX ADMIN — LORAZEPAM 0.5 MG: 0.5 TABLET ORAL at 22:13

## 2023-11-13 RX ADMIN — PROCHLORPERAZINE EDISYLATE 10 MG: 5 INJECTION INTRAMUSCULAR; INTRAVENOUS at 13:00

## 2023-11-13 RX ADMIN — AMIODARONE HYDROCHLORIDE 200 MG: 200 TABLET ORAL at 08:23

## 2023-11-13 RX ADMIN — ENOXAPARIN SODIUM 40 MG: 100 INJECTION SUBCUTANEOUS at 08:24

## 2023-11-13 RX ADMIN — IOPAMIDOL 100 ML: 755 INJECTION, SOLUTION INTRAVENOUS at 00:41

## 2023-11-13 RX ADMIN — LACTULOSE 20 G: 20 SOLUTION ORAL at 14:37

## 2023-11-13 RX ADMIN — OXYCODONE HYDROCHLORIDE 5 MG: 5 TABLET ORAL at 13:00

## 2023-11-13 RX ADMIN — OXYCODONE HYDROCHLORIDE 5 MG: 5 TABLET ORAL at 17:25

## 2023-11-13 RX ADMIN — GABAPENTIN 300 MG: 300 CAPSULE ORAL at 20:33

## 2023-11-13 RX ADMIN — LACTULOSE 20 G: 20 SOLUTION ORAL at 20:33

## 2023-11-13 RX ADMIN — ALBUTEROL SULFATE 2.5 MG: 2.5 SOLUTION RESPIRATORY (INHALATION) at 05:51

## 2023-11-13 RX ADMIN — CARVEDILOL 25 MG: 25 TABLET, FILM COATED ORAL at 08:23

## 2023-11-13 RX ADMIN — PROCHLORPERAZINE EDISYLATE 10 MG: 5 INJECTION INTRAMUSCULAR; INTRAVENOUS at 20:38

## 2023-11-13 RX ADMIN — ATORVASTATIN CALCIUM 80 MG: 80 TABLET, FILM COATED ORAL at 08:23

## 2023-11-13 RX ADMIN — FUROSEMIDE 60 MG: 10 INJECTION, SOLUTION INTRAMUSCULAR; INTRAVENOUS at 05:57

## 2023-11-13 RX ADMIN — FUROSEMIDE 40 MG: 40 TABLET ORAL at 20:33

## 2023-11-13 RX ADMIN — SODIUM CHLORIDE, PRESERVATIVE FREE 0.75 ML: 5 INJECTION INTRAVENOUS at 14:35

## 2023-11-13 RX ADMIN — ONDANSETRON 4 MG: 2 INJECTION INTRAMUSCULAR; INTRAVENOUS at 04:16

## 2023-11-13 RX ADMIN — LACTULOSE 20 G: 20 SOLUTION ORAL at 08:24

## 2023-11-13 RX ADMIN — CARVEDILOL 25 MG: 25 TABLET, FILM COATED ORAL at 17:21

## 2023-11-13 RX ADMIN — ASPIRIN 81 MG: 81 TABLET ORAL at 08:23

## 2023-11-13 RX ADMIN — GABAPENTIN 300 MG: 300 CAPSULE ORAL at 02:18

## 2023-11-13 RX ADMIN — MAGNESIUM GLUCONATE 500 MG ORAL TABLET 400 MG: 500 TABLET ORAL at 08:26

## 2023-11-13 RX ADMIN — OXYCODONE HYDROCHLORIDE 5 MG: 5 TABLET ORAL at 08:19

## 2023-11-13 RX ADMIN — SODIUM CHLORIDE, PRESERVATIVE FREE 10 ML: 5 INJECTION INTRAVENOUS at 08:27

## 2023-11-13 RX ADMIN — SODIUM CHLORIDE, PRESERVATIVE FREE 10 ML: 5 INJECTION INTRAVENOUS at 20:38

## 2023-11-13 RX ADMIN — PROCHLORPERAZINE EDISYLATE 10 MG: 5 INJECTION INTRAMUSCULAR; INTRAVENOUS at 05:57

## 2023-11-13 RX ADMIN — SPIRONOLACTONE 25 MG: 25 TABLET ORAL at 08:26

## 2023-11-13 RX ADMIN — GABAPENTIN 300 MG: 300 CAPSULE ORAL at 08:23

## 2023-11-13 ASSESSMENT — PAIN DESCRIPTION - LOCATION
LOCATION: CHEST;JAW

## 2023-11-13 ASSESSMENT — PAIN SCALES - GENERAL
PAINLEVEL_OUTOF10: 6
PAINLEVEL_OUTOF10: 2
PAINLEVEL_OUTOF10: 6
PAINLEVEL_OUTOF10: 6
PAINLEVEL_OUTOF10: 3

## 2023-11-13 ASSESSMENT — PAIN SCALES - WONG BAKER
WONGBAKER_NUMERICALRESPONSE: 0
WONGBAKER_NUMERICALRESPONSE: 0

## 2023-11-13 ASSESSMENT — PAIN DESCRIPTION - DESCRIPTORS
DESCRIPTORS: ACHING;DISCOMFORT;SORE
DESCRIPTORS: ACHING;DISCOMFORT

## 2023-11-13 ASSESSMENT — PAIN DESCRIPTION - ORIENTATION: ORIENTATION: RIGHT;LEFT

## 2023-11-13 NOTE — ED PROVIDER NOTES
consolidation, pleural effusion, or pneumothorax. Cardiomediastinal silhouette  unchanged. Dual-lead ICD, unchanged. No acute osseous abnormality. Impression    1. No acute cardiopulmonary process. Poonam Phoenix M.D.   11/12/2023 11:09:00 PM   CT CHEST PULMONARY EMBOLISM W CONTRAST    Narrative    EXAMINATION: CT CHEST PULMONARY EMBOLISM W CONTRAST      DATE:  11/13/2023 12:42 AM    INDICATION: ; Syncope, chest pain. concern for pulmonary embolism    COMPARISON: November 1, 2023 CT chest with contrast.    PROCEDURE: Iodinated contrast material was administered intravenously during   pulmonary arterial phase CT chest imaging. 3-D MIP images were performed under concurrent supervision not requiring an   independent workstation, in order to better assess the vasculature. CT dose lowering techniques were used, to include: automated exposure control,   adjustment for patient size, and or use of iterative reconstruction. FINDINGS:    Pulmonary artery: Well opacified. No filling defect. Cardiovascular:  No thoracic aortic aneurysm. Extensive coronary artery   atherosclerotic calcifications and/or stents. ICD with leads terminating in the   right atrium and right ventricle. Minor left ventricular enlargement. .    Mediastinum/Gabriela: No lymphadenopathy. Large hiatal hernia with nearly the entire   stomach in the thorax. Lungs/Pleura: Compressive atelectasis left lower lobe lung. Minor streaky   dependent atelectasis as well. No consolidative opacity. . No effusion, pleural   mass, thickening or pneumothorax. Chest wall and Axilla: Bilateral gynecomastia. Bones:  Unremarkable. Upper abdomen: Cholecystectomy clips. 3-D images corroborate 2-D findings. Impression    1. No evidence of pulmonary embolus or other acute abnormality in the chest.  2.  Large hiatal hernia redemonstrated.            Poonam Phoenix M.D.   11/13/2023 1:09:00 AM   CBC with Auto Differential chest.   2.  Large hiatal hernia redemonstrated. Carri Villatoro M.D.    11/13/2023 1:09:00 AM      XR CHEST PORTABLE   Final Result      1. No acute cardiopulmonary process. Carri Villatoro M.D.    11/12/2023 11:09:00 PM                        Voice dictation software was used during the making of this note. This software is not perfect and grammatical and other typographical errors may be present. This note has not been completely proofread for errors.      Belkys Díaz MD  11/13/23 0147       Belkys Díaz MD  11/13/23 2579

## 2023-11-13 NOTE — CONSULTS
[facility] CARDIOLOGY   Initial Cardiac Evaluation                  Primary Cardiologist: Dr. Cherry Zavala    Primary Care Physician: Dr. Jarod Wang Physician: Dr. Chrissy Maguire    Requesting Physician: Dr. Roman Hernandez    Evaluating Physician: Dr. Stacia Stevenson:     Patient is a 62 y.o. male with PMHx of NICM, dCM, HFrEF (EF 15-20%), s/p ICD (Biotronik DC), ETOH abuse, Cirrhosis, HTN and NSVT who presented to the ED via EMS for possible cardiac arrest. Apparently, Pt was noted to be unresponsive by his wife and she thought he was getting multiple shocks by his ICD. She started CPR. EMS arrived and noted a pulse so CPR was stopped. However, in route they noted intermittent VTach and shocked Pt x3. Pt doesn't remember any of these events. Pt states that he was recently admitted for PNA and has been taking his abx as directed. He states he drank 2 vodka mini bottles around noon on Sun and drank 2 more during the [employer] game which was about 4-5hrs later. He states that he was not intoxicated. States he has had a ETOH problem and states he did not drink enough today to cause him to be unresponsive. He reports prior today, he has not had any ETOH in 3-4 wks. He states that his chest hurts now (from CPR) but otherwise was not having any CP prior to above events. He states he has a \"nagging cough\" that has persisted since his discharge. He further states that he has noted trouble to \"getting my words out. \" States he knows what he wants to say but responses are slowed and he feels he is having trouble expressing what he wants to say. States this has been present prior to events tonight. He reports compliant with meds. Noted LE edema and asked Pt if this was baseline. Pt states \"wow, I didn't know my feet were so swollen, Dr. Cherry Zavala is going to be mad at me. \" Of note, Pt does admit to routinely taking narcotics throughout the day and took these meds along with the ETOH today.     In the ED: VSS, HR 70, EKG without acute

## 2023-11-13 NOTE — ED NOTES
[employer] contacted at this time to interrogate patient's pacemaker/defibrillator. They stated they are sending a representative at this time.       Matt Liao RN  11/12/23 6706

## 2023-11-13 NOTE — PROGRESS NOTES
Spoke with CT department about patient's head scan that was done at 0511 today. It had not been read yet. She said that Maged Miller (the night radiologist) had left but she would get in touch with someone who would get these results.

## 2023-11-13 NOTE — ED TRIAGE NOTES
Patient to ER from home after being shocked multiple times at home by defibrillator wife thought pt had no pulse called 911 and started compressions, FD arrived on scene and made her stop as he had a pulse, pt continued to go in and out of v-tach and continuously defibd x3 en-route with ems, pt has had 2 Lortab's today, and 4 mini bottles of vodka today.

## 2023-11-13 NOTE — H&P
Hospitalist History and Physical   Admit Date:  2023 10:29 PM   Name:  Rissa Padilla   Age:  62 y.o. Sex:  male  :  1965   MRN:  [id number]   Room:  2/2    Presenting/Chief Complaint: Chest Pain     Reason(s) for Admission: Toxic encephalopathy [G92.9]     History of Present Illness:   Rissa Padilla is a 62 y.o. male with medical history of CHF, ICD, cirrhosis, who presented with chest pain. Pt was watching football at home when he says he was shocked by his defibrillator; had sharp pain upper left chest.  He said he passed out. He does not recall this. He said he had two lortabs and 4 minibottles of alcohol this evening. His wife could not get him to respond so did CPR. Some question of EMS seeing ventricular arrhythmia but ICD was interrogated in ER and no shocks given and no arrhthymias seen. Pt was just discharged recently on ; see DC summary for details. Assessment & Plan:       Toxic encephalopathy  --better now. Observation  -suspect combination of etoh and narcotics      Other cirrhosis of liver (HCC)  -poor prognosis with continued drinking  -check ammonia      Non-ischemic cardiomyopathy ([address])    ICD (implantable cardioverter-defibrillator) in place  -cardiology consult      Primary hypertension  -cont home meds      Chronic systolic heart failure ([address])  -cont home meds      PT/OT evals and PPD needed/ordered? No  Diet: ADULT DIET; Regular; Low Sodium (2 gm)  VTE prophylaxis: Lovenox  Code status: Full Code      Non-peripheral Lines and Tubes (if present):             Hospital Problems:  Principal Problem:    Toxic encephalopathy  Active Problems:    Other cirrhosis of liver (HCC)    Non-ischemic cardiomyopathy ([address])    ICD (implantable cardioverter-defibrillator) in place    Primary hypertension    Chronic systolic heart failure ([address])  Resolved Problems:    * No resolved hospital problems.  *      Past History:     Past Medical History:   Diagnosis Date    Acute

## 2023-11-13 NOTE — CARE COORDINATION
Patient returned to ED and currently admitted for acute care. No further outreaches indicated at this time. Patient will be reassigned pending discharge disposition.

## 2023-11-13 NOTE — ED NOTES
Patient A/O x 1 at this time and the patient states he feels \"foggy. \" POC blood glucose 107 at this time. MD notified and stated to continue to monitor.       Fede Siddiqui RN  11/13/23 3654

## 2023-11-13 NOTE — ED NOTES
TRANSFER - OUT REPORT:    Verbal report given to EMERY Joel on Slick Leigh  being transferred to [facility] for routine progression of patient care       Report consisted of patient's Situation, Background, Assessment and   Recommendations(SBAR). Information from the following report(s) ED SBAR was reviewed with the receiving nurse. [place] Fall Assessment:    Presents to emergency department  because of falls (Syncope, seizure, or loss of consciousness): No  Age > 70: No  Altered Mental Status, Intoxication with alcohol or substance confusion (Disorientation, impaired judgment, poor safety awaremess, or inability to follow instructions): No  Impaired Mobility: Ambulates or transfers with assistive devices or assistance; Unable to ambulate or transer.: No  Nursing Judgement: No          Lines:   Peripheral IV 11/12/23 Left Antecubital (Active)   Site Assessment Clean, dry & intact 11/12/23 2235   Line Status Blood return noted 11/12/23 [address] [facility] [address] changed 11/12/23 2235   Phlebitis Assessment No symptoms 11/12/23 2235   Infiltration Assessment 0 11/12/23 2235   Alcohol Cap Used No 11/12/23 2235   Dressing Status Clean, dry & intact; New dressing applied 11/12/23 2235   Dressing Type Transparent 11/12/23 2235   Dressing Intervention New 11/12/23 2235        Opportunity for questions and clarification was provided.       Patient transported with:  Monitor, O2 @ 3lpm, and Registered Nurse          Ramírez Collado RN  11/13/23 9940

## 2023-11-13 NOTE — CARE COORDINATION
Pt presented to the ED via EMS. Apparently, pt was noted to be unresponsive by his wife and she thought he was getting multiple shocks by his ICD. She started CPR. EMS arrived and noted a pulse so CPR was stopped. However, in route, intermittent VTach noticed and shocked pt 3x. Pt admitted for toxic encephalopathy. Medical hx includes: NICM, dCM, HFrEF, ETOH abuse, Cirrhosis, HTN and NSVT. PTA, pt indep with all his ADLs. Lives with his spouse in a one story private residence. On RA. Denies DME needs. No current home care services. Reportedly, pt admits to routinely taking narcotics throughout the day with ETOH on the day he was admitted. Has not drank otherwise in 3-4 weeks. Will send referral to [address] [place] [address] and ask [facility] to follow up on [facility] resources. PCP established. [place] Medicare verified and able to afford home meds. No discharge needs identified, will remain available. 11/13/23 1321   Service Assessment   Patient Orientation Alert and Oriented   Cognition Alert   History Provided By Patient   Primary Caregiver Self   Support Systems Spouse/Significant Other;Children;Family Members;[place];Friends/Neighbors   PCP Verified by CM Yes  Cindy José)   Prior Functional Level Independent in ADLs/IADLs   Current Functional Level Independent in ADLs/IADLs   Can patient return to prior living arrangement Yes   Ability to make needs known: Good   Family able to assist with home care needs: Yes   Would you like for me to discuss the discharge plan with any other family members/significant others, and if so, who?  No   Financial Resources Medicare   Community Resources None   Social/Functional History   Lives With Spouse   Type of [address] [place] [address] Dr One level   [place]   Ambulation Assistance Independent   Transfer Assistance Independent   Active  Yes   Mode of Transportation Car   Occupation Retired   Discharge

## 2023-11-13 NOTE — NURSE NAVIGATOR
CHF teaching completed. CHF background completed. Refresher Teaching from previous admit emphasis on Call Cardiology STAT ,if any of the following are noted:  Short of Breath; with activity or without/ while reclined, Generalize weakness, edema are noted individually or grouped. Cardiac Diet  and salt/fluid restrictions covered. Daily WTs emphasized. Planner with summarization sheet provided with cardiology service and phone number and bathroom scale offered. Total time @ BS is 1 hour and pt 's FM verbalize understanding/past post test.  Pt instructed to call myself, if any questions occur.

## 2023-11-13 NOTE — PROGRESS NOTES
TRANSFER - IN REPORT:    Verbal report received from Solomon Hill RN on Slick Leigh  being received from ED for routine progression of patient care      Report consisted of patient's Situation, Background, Assessment and   Recommendations(SBAR). Information from the following report(s) Nurse Handoff Report, Index, ED Encounter Summary, ED SBAR, Adult Overview, Intake/Output, MAR, and Recent Results was reviewed with the receiving nurse. Opportunity for questions and clarification was provided. Assessment completed upon patient's arrival to unit and care assumed. Dual skin assessment completed with EMERY BARKLEY. Findings include: blister on top of 3rd toe on L foot, +2 pitting edema in BLE,  BLE hernan/discolored, sacrum and heels intact without redness, scattered scars, scar below belly button from gallbladder removal, dry skin/scabs on lips from healing cellulitis, and scattered bruising. No additional abnormalities noted.

## 2023-11-14 LAB
AMMONIA PLAS-SCNC: 43 UMOL/L (ref 11–32)
ANION GAP SERPL CALC-SCNC: 5 MMOL/L (ref 2–11)
BASOPHILS # BLD: 0 K/UL (ref 0–0.2)
BASOPHILS NFR BLD: 1 % (ref 0–2)
BUN SERPL-MCNC: 9 MG/DL (ref 6–23)
CALCIUM SERPL-MCNC: 7.8 MG/DL (ref 8.3–10.4)
CHLORIDE SERPL-SCNC: 108 MMOL/L (ref 101–110)
CO2 SERPL-SCNC: 30 MMOL/L (ref 21–32)
CREAT SERPL-MCNC: 1.1 MG/DL (ref 0.8–1.5)
DIFFERENTIAL METHOD BLD: ABNORMAL
EOSINOPHIL # BLD: 0.3 K/UL (ref 0–0.8)
EOSINOPHIL NFR BLD: 5 % (ref 0.5–7.8)
ERYTHROCYTE [DISTWIDTH] IN BLOOD BY AUTOMATED COUNT: 19.6 % (ref 11.9–14.6)
GLUCOSE SERPL-MCNC: 109 MG/DL (ref 65–100)
HAV IGM SER QL: NONREACTIVE
HBV CORE IGM SER QL: NONREACTIVE
HBV SURFACE AG SER QL: NONREACTIVE
HCT VFR BLD AUTO: 30.7 % (ref 41.1–50.3)
HCV AB SER QL: NONREACTIVE
HGB BLD-MCNC: 10 G/DL (ref 13.6–17.2)
IMM GRANULOCYTES # BLD AUTO: 0 K/UL (ref 0–0.5)
IMM GRANULOCYTES NFR BLD AUTO: 0 % (ref 0–5)
LYMPHOCYTES # BLD: 1.1 K/UL (ref 0.5–4.6)
LYMPHOCYTES NFR BLD: 17 % (ref 13–44)
MAGNESIUM SERPL-MCNC: 1.8 MG/DL (ref 1.8–2.4)
MCH RBC QN AUTO: 30.9 PG (ref 26.1–32.9)
MCHC RBC AUTO-ENTMCNC: 32.6 G/DL (ref 31.4–35)
MCV RBC AUTO: 94.8 FL (ref 82–102)
MONOCYTES # BLD: 0.5 K/UL (ref 0.1–1.3)
MONOCYTES NFR BLD: 8 % (ref 4–12)
NEUTS SEG # BLD: 4.4 K/UL (ref 1.7–8.2)
NEUTS SEG NFR BLD: 69 % (ref 43–78)
NRBC # BLD: 0 K/UL (ref 0–0.2)
PLATELET # BLD AUTO: 343 K/UL (ref 150–450)
PMV BLD AUTO: 9.4 FL (ref 9.4–12.3)
POTASSIUM SERPL-SCNC: 3.7 MMOL/L (ref 3.5–5.1)
RBC # BLD AUTO: 3.24 M/UL (ref 4.23–5.6)
SODIUM SERPL-SCNC: 143 MMOL/L (ref 133–143)
WBC # BLD AUTO: 6.3 K/UL (ref 4.3–11.1)

## 2023-11-14 PROCEDURE — 6360000002 HC RX W HCPCS: Performed by: INTERNAL MEDICINE

## 2023-11-14 PROCEDURE — 6370000000 HC RX 637 (ALT 250 FOR IP): Performed by: INTERNAL MEDICINE

## 2023-11-14 PROCEDURE — 99232 SBSQ HOSP IP/OBS MODERATE 35: CPT | Performed by: INTERNAL MEDICINE

## 2023-11-14 PROCEDURE — 82140 ASSAY OF AMMONIA: CPT

## 2023-11-14 PROCEDURE — 80048 BASIC METABOLIC PNL TOTAL CA: CPT

## 2023-11-14 PROCEDURE — 36415 COLL VENOUS BLD VENIPUNCTURE: CPT

## 2023-11-14 PROCEDURE — 83735 ASSAY OF MAGNESIUM: CPT

## 2023-11-14 PROCEDURE — 96376 TX/PRO/DX INJ SAME DRUG ADON: CPT

## 2023-11-14 PROCEDURE — G0378 HOSPITAL OBSERVATION PER HR: HCPCS

## 2023-11-14 PROCEDURE — 96372 THER/PROPH/DIAG INJ SC/IM: CPT

## 2023-11-14 PROCEDURE — 1100000000 HC RM PRIVATE

## 2023-11-14 PROCEDURE — 85025 COMPLETE CBC W/AUTO DIFF WBC: CPT

## 2023-11-14 PROCEDURE — 80074 ACUTE HEPATITIS PANEL: CPT

## 2023-11-14 PROCEDURE — 2580000003 HC RX 258: Performed by: INTERNAL MEDICINE

## 2023-11-14 RX ORDER — POTASSIUM CHLORIDE 20 MEQ/1
40 TABLET, EXTENDED RELEASE ORAL ONCE
Status: COMPLETED | OUTPATIENT
Start: 2023-11-14 | End: 2023-11-14

## 2023-11-14 RX ORDER — POTASSIUM CHLORIDE 20 MEQ/1
40 TABLET, EXTENDED RELEASE ORAL ONCE
Status: DISCONTINUED | OUTPATIENT
Start: 2023-11-14 | End: 2023-11-16 | Stop reason: HOSPADM

## 2023-11-14 RX ORDER — ALPRAZOLAM 0.5 MG/1
1 TABLET ORAL ONCE
Status: COMPLETED | OUTPATIENT
Start: 2023-11-14 | End: 2023-11-15

## 2023-11-14 RX ADMIN — SODIUM CHLORIDE, PRESERVATIVE FREE 10 ML: 5 INJECTION INTRAVENOUS at 09:14

## 2023-11-14 RX ADMIN — OXYCODONE HYDROCHLORIDE 5 MG: 5 TABLET ORAL at 02:19

## 2023-11-14 RX ADMIN — PROCHLORPERAZINE EDISYLATE 10 MG: 5 INJECTION INTRAMUSCULAR; INTRAVENOUS at 15:36

## 2023-11-14 RX ADMIN — PROCHLORPERAZINE EDISYLATE 10 MG: 5 INJECTION INTRAMUSCULAR; INTRAVENOUS at 09:12

## 2023-11-14 RX ADMIN — ATORVASTATIN CALCIUM 80 MG: 80 TABLET, FILM COATED ORAL at 09:05

## 2023-11-14 RX ADMIN — PROCHLORPERAZINE EDISYLATE 10 MG: 5 INJECTION INTRAMUSCULAR; INTRAVENOUS at 02:25

## 2023-11-14 RX ADMIN — GABAPENTIN 300 MG: 300 CAPSULE ORAL at 20:55

## 2023-11-14 RX ADMIN — FUROSEMIDE 40 MG: 40 TABLET ORAL at 20:56

## 2023-11-14 RX ADMIN — OXYCODONE HYDROCHLORIDE 5 MG: 5 TABLET ORAL at 07:07

## 2023-11-14 RX ADMIN — PROCHLORPERAZINE EDISYLATE 10 MG: 5 INJECTION INTRAMUSCULAR; INTRAVENOUS at 22:38

## 2023-11-14 RX ADMIN — LACTULOSE 20 G: 20 SOLUTION ORAL at 09:03

## 2023-11-14 RX ADMIN — ASPIRIN 81 MG: 81 TABLET ORAL at 09:03

## 2023-11-14 RX ADMIN — SPIRONOLACTONE 25 MG: 25 TABLET ORAL at 09:03

## 2023-11-14 RX ADMIN — ENOXAPARIN SODIUM 40 MG: 100 INJECTION SUBCUTANEOUS at 09:03

## 2023-11-14 RX ADMIN — OXYCODONE HYDROCHLORIDE 5 MG: 5 TABLET ORAL at 11:32

## 2023-11-14 RX ADMIN — POTASSIUM CHLORIDE 40 MEQ: 1500 TABLET, EXTENDED RELEASE ORAL at 09:12

## 2023-11-14 RX ADMIN — SODIUM CHLORIDE, PRESERVATIVE FREE 10 ML: 5 INJECTION INTRAVENOUS at 20:56

## 2023-11-14 RX ADMIN — AMIODARONE HYDROCHLORIDE 200 MG: 200 TABLET ORAL at 09:03

## 2023-11-14 RX ADMIN — GABAPENTIN 300 MG: 300 CAPSULE ORAL at 09:03

## 2023-11-14 RX ADMIN — CARVEDILOL 25 MG: 25 TABLET, FILM COATED ORAL at 09:03

## 2023-11-14 RX ADMIN — FAMOTIDINE 10 MG: 20 TABLET, FILM COATED ORAL at 15:38

## 2023-11-14 RX ADMIN — LACTULOSE 20 G: 20 SOLUTION ORAL at 14:34

## 2023-11-14 RX ADMIN — OXYCODONE HYDROCHLORIDE 5 MG: 5 TABLET ORAL at 15:36

## 2023-11-14 RX ADMIN — FUROSEMIDE 40 MG: 40 TABLET ORAL at 09:03

## 2023-11-14 RX ADMIN — LACTULOSE 20 G: 20 SOLUTION ORAL at 20:56

## 2023-11-14 RX ADMIN — OXYCODONE HYDROCHLORIDE 5 MG: 5 TABLET ORAL at 20:59

## 2023-11-14 RX ADMIN — CARVEDILOL 25 MG: 25 TABLET, FILM COATED ORAL at 18:19

## 2023-11-14 RX ADMIN — MAGNESIUM GLUCONATE 500 MG ORAL TABLET 400 MG: 500 TABLET ORAL at 09:03

## 2023-11-14 ASSESSMENT — PAIN DESCRIPTION - ORIENTATION
ORIENTATION: LEFT;RIGHT
ORIENTATION: RIGHT;LEFT
ORIENTATION: RIGHT;LOWER
ORIENTATION: RIGHT

## 2023-11-14 ASSESSMENT — PAIN DESCRIPTION - PAIN TYPE
TYPE: ACUTE PAIN
TYPE: ACUTE PAIN

## 2023-11-14 ASSESSMENT — PAIN SCALES - WONG BAKER: WONGBAKER_NUMERICALRESPONSE: 0

## 2023-11-14 ASSESSMENT — PAIN SCALES - GENERAL
PAINLEVEL_OUTOF10: 4
PAINLEVEL_OUTOF10: 4
PAINLEVEL_OUTOF10: 0
PAINLEVEL_OUTOF10: 6
PAINLEVEL_OUTOF10: 0
PAINLEVEL_OUTOF10: 6
PAINLEVEL_OUTOF10: 6

## 2023-11-14 ASSESSMENT — PAIN - FUNCTIONAL ASSESSMENT
PAIN_FUNCTIONAL_ASSESSMENT: PREVENTS OR INTERFERES SOME ACTIVE ACTIVITIES AND ADLS
PAIN_FUNCTIONAL_ASSESSMENT: ACTIVITIES ARE NOT PREVENTED
PAIN_FUNCTIONAL_ASSESSMENT: PREVENTS OR INTERFERES SOME ACTIVE ACTIVITIES AND ADLS
PAIN_FUNCTIONAL_ASSESSMENT: ACTIVITIES ARE NOT PREVENTED

## 2023-11-14 ASSESSMENT — PAIN DESCRIPTION - DESCRIPTORS
DESCRIPTORS: SORE
DESCRIPTORS: ACHING;DISCOMFORT
DESCRIPTORS: ACHING
DESCRIPTORS: ACHING;DISCOMFORT

## 2023-11-14 ASSESSMENT — PAIN DESCRIPTION - FREQUENCY: FREQUENCY: INTERMITTENT

## 2023-11-14 ASSESSMENT — PAIN DESCRIPTION - LOCATION
LOCATION: JAW
LOCATION: FOOT
LOCATION: JAW;LEG
LOCATION: JAW;CHEST

## 2023-11-14 ASSESSMENT — PAIN DESCRIPTION - ONSET: ONSET: GRADUAL

## 2023-11-14 NOTE — PROGRESS NOTES
Hospitalist Progress Note   Admit Date:  2023 10:29 PM   Name:  Felipe Tavarez   Age:  62 y.o. Sex:  male  :  1965   MRN:  [id number]   Room:  [facility]/    Presenting/Chief Complaint: Chest Pain     Reason(s) for Admission: Toxic encephalopathy [G92.9]  Syncope and collapse [R55]  Acute chest pain [R07.9]     Hospital Course:   Please refer to the admission H&P for details of presentation. In summary, Felipe Tavarez is a 62 y.o. male with medical history significant for CHF, status post ICD, liver cirrhosis who presented emergency with chest pain. Patient was watching for by home and he said he was shocked by his defibrillator and passed out. Patient did not have any recollection of this event. Patient reports having 2 Lortabs as well as for many bottles of alcohol on the evening prior to admission. Patient wife found him unresponsive and started him on CPR. Upon evaluation of his ICD in the ED, there were no shocks no arrhythmia noted. Patient was recently hospitalized on  to  for facial cellulitis and was discharged on cefpodoxime and doxycycline. Subjective/24 hr Events (23) : Patient is seen and examined at bedside. No acute events reported overnight by nursing staff. Patient sitting in his bed, wife is at bedside. No acute complaints or distress at this time. Patient denies fever, chills, chest pains, shortness of breath, n/v, abdominal pain. Patient swelling in his abdomen as well as lower extremity. Review of Systems: 10 point review of systems is otherwise negative with the exception of the elements mentioned above. Assessment & Plan:   Acute Toxic encephalopathy  Likely multifactorial given alcohol use, pain medications  -Appears to be back at baseline.  -On room air.  -elevated ammonia on admission and on lactulose but pt denies cirrhosis. Other cirrhosis of liver   Hyperammonemia  -Patient continues to drink.   He denies having liver 0.0 - 0.2 K/UL    Absolute Immature Granulocyte 0.0 0.0 - 0.5 K/UL   Comprehensive Metabolic Panel    Collection Time: 11/12/23 10:37 PM   Result Value Ref Range    Sodium 142 133 - 143 mmol/L    Potassium 4.1 3.5 - 5.1 mmol/L    Chloride 110 101 - 110 mmol/L    CO2 25 21 - 32 mmol/L    Anion Gap 7 2 - 11 mmol/L    Glucose 97 65 - 100 mg/dL    BUN 17 6 - 23 MG/DL    Creatinine 1.00 0.8 - 1.5 MG/DL    Est, Glom Filt Rate >60 >60 ml/min/1.73m2    Calcium 7.7 (L) 8.3 - 10.4 MG/DL    Total Bilirubin 0.9 0.2 - 1.1 MG/DL    ALT 71 (H) 12 - 65 U/L    AST 96 (H) 15 - 37 U/L    Alk Phosphatase 101 50 - 136 U/L    Total Protein 5.8 (L) 6.3 - 8.2 g/dL    Albumin 3.2 (L) 3.5 - 5.0 g/dL    Globulin 2.6 (L) 2.8 - 4.5 g/dL    Albumin/Globulin Ratio 1.2 0.4 - 1.6     Magnesium    Collection Time: 11/12/23 10:37 PM   Result Value Ref Range    Magnesium 2.3 1.8 - 2.4 mg/dL   Troponin    Collection Time: 11/12/23 10:37 PM   Result Value Ref Range    Troponin, High Sensitivity 151.2 (HH) 0 - 14 pg/mL   Ethanol    Collection Time: 11/12/23 10:37 PM   Result Value Ref Range    Ethanol Lvl 270 MG/DL   EKG 12 Lead    Collection Time: 11/13/23 12:04 AM   Result Value Ref Range    Ventricular Rate 70 BPM    Atrial Rate 70 BPM    P-R Interval 258 ms    QRS Duration 136 ms    Q-T Interval 470 ms    QTc Calculation (Bazett) 508 ms    P Axis 43 degrees    R Axis -29 degrees    T Axis 144 degrees    Diagnosis       Sinus rhythm  Prolonged CA interval  Left bundle branch block    Confirmed by TWYLA MOSLEY (), LUIS QUINONEZ ([id number]) on 11/13/2023 7:02:17 AM     Troponin    Collection Time: 11/13/23 12:05 AM   Result Value Ref Range    Troponin, High Sensitivity 138.6 (HH) 0 - 14 pg/mL   Lipase    Collection Time: 11/13/23 12:05 AM   Result Value Ref Range    Lipase 64 (L) 73 - 393 U/L   POCT Glucose    Collection Time: 11/13/23  3:02 AM   Result Value Ref Range    POC Glucose 107 (H) 65 - 100 mg/dL    Performed by: Jean-Paul    Urine Drug Screen

## 2023-11-15 ENCOUNTER — APPOINTMENT (OUTPATIENT)
Dept: ULTRASOUND IMAGING | Age: 58
DRG: 917 | End: 2023-11-15
Payer: COMMERCIAL

## 2023-11-15 LAB
AMMONIA PLAS-SCNC: 33 UMOL/L (ref 11–32)
ANION GAP SERPL CALC-SCNC: 9 MMOL/L (ref 2–11)
BASOPHILS # BLD: 0 K/UL (ref 0–0.2)
BASOPHILS NFR BLD: 1 % (ref 0–2)
BUN SERPL-MCNC: 7 MG/DL (ref 6–23)
CALCIUM SERPL-MCNC: 8.3 MG/DL (ref 8.3–10.4)
CHLORIDE SERPL-SCNC: 107 MMOL/L (ref 101–110)
CO2 SERPL-SCNC: 26 MMOL/L (ref 21–32)
CREAT SERPL-MCNC: 1 MG/DL (ref 0.8–1.5)
DIFFERENTIAL METHOD BLD: ABNORMAL
EOSINOPHIL # BLD: 0.3 K/UL (ref 0–0.8)
EOSINOPHIL NFR BLD: 5 % (ref 0.5–7.8)
ERYTHROCYTE [DISTWIDTH] IN BLOOD BY AUTOMATED COUNT: 19.1 % (ref 11.9–14.6)
GLUCOSE SERPL-MCNC: 104 MG/DL (ref 65–100)
HAV IGM SER QL: NONREACTIVE
HBV CORE IGM SER QL: NONREACTIVE
HBV SURFACE AG SER QL: REACTIVE
HCT VFR BLD AUTO: 31.8 % (ref 41.1–50.3)
HCV AB SER QL: NONREACTIVE
HGB BLD-MCNC: 10.3 G/DL (ref 13.6–17.2)
IMM GRANULOCYTES # BLD AUTO: 0 K/UL (ref 0–0.5)
IMM GRANULOCYTES NFR BLD AUTO: 0 % (ref 0–5)
LYMPHOCYTES # BLD: 1.3 K/UL (ref 0.5–4.6)
LYMPHOCYTES NFR BLD: 20 % (ref 13–44)
MAGNESIUM SERPL-MCNC: 1.7 MG/DL (ref 1.8–2.4)
MCH RBC QN AUTO: 30.4 PG (ref 26.1–32.9)
MCHC RBC AUTO-ENTMCNC: 32.4 G/DL (ref 31.4–35)
MCV RBC AUTO: 93.8 FL (ref 82–102)
MONOCYTES # BLD: 0.6 K/UL (ref 0.1–1.3)
MONOCYTES NFR BLD: 9 % (ref 4–12)
NEUTS SEG # BLD: 4.3 K/UL (ref 1.7–8.2)
NEUTS SEG NFR BLD: 65 % (ref 43–78)
NRBC # BLD: 0 K/UL (ref 0–0.2)
PLATELET # BLD AUTO: 311 K/UL (ref 150–450)
PMV BLD AUTO: 9.7 FL (ref 9.4–12.3)
POTASSIUM SERPL-SCNC: 3.9 MMOL/L (ref 3.5–5.1)
RBC # BLD AUTO: 3.39 M/UL (ref 4.23–5.6)
SODIUM SERPL-SCNC: 142 MMOL/L (ref 133–143)
WBC # BLD AUTO: 6.5 K/UL (ref 4.3–11.1)

## 2023-11-15 PROCEDURE — 2580000003 HC RX 258: Performed by: INTERNAL MEDICINE

## 2023-11-15 PROCEDURE — 83735 ASSAY OF MAGNESIUM: CPT

## 2023-11-15 PROCEDURE — 80048 BASIC METABOLIC PNL TOTAL CA: CPT

## 2023-11-15 PROCEDURE — 85025 COMPLETE CBC W/AUTO DIFF WBC: CPT

## 2023-11-15 PROCEDURE — 6370000000 HC RX 637 (ALT 250 FOR IP): Performed by: INTERNAL MEDICINE

## 2023-11-15 PROCEDURE — 87340 HEPATITIS B SURFACE AG IA: CPT

## 2023-11-15 PROCEDURE — 97161 PT EVAL LOW COMPLEX 20 MIN: CPT

## 2023-11-15 PROCEDURE — 6360000002 HC RX W HCPCS: Performed by: INTERNAL MEDICINE

## 2023-11-15 PROCEDURE — 82140 ASSAY OF AMMONIA: CPT

## 2023-11-15 PROCEDURE — 76705 ECHO EXAM OF ABDOMEN: CPT

## 2023-11-15 PROCEDURE — 36415 COLL VENOUS BLD VENIPUNCTURE: CPT

## 2023-11-15 PROCEDURE — 97165 OT EVAL LOW COMPLEX 30 MIN: CPT

## 2023-11-15 PROCEDURE — 97112 NEUROMUSCULAR REEDUCATION: CPT

## 2023-11-15 PROCEDURE — 1100000000 HC RM PRIVATE

## 2023-11-15 PROCEDURE — 99232 SBSQ HOSP IP/OBS MODERATE 35: CPT | Performed by: INTERNAL MEDICINE

## 2023-11-15 PROCEDURE — 80074 ACUTE HEPATITIS PANEL: CPT

## 2023-11-15 PROCEDURE — 97530 THERAPEUTIC ACTIVITIES: CPT

## 2023-11-15 RX ORDER — MAGNESIUM SULFATE IN WATER 40 MG/ML
2000 INJECTION, SOLUTION INTRAVENOUS ONCE
Status: COMPLETED | OUTPATIENT
Start: 2023-11-15 | End: 2023-11-15

## 2023-11-15 RX ORDER — LANOLIN ALCOHOL/MO/W.PET/CERES
400 CREAM (GRAM) TOPICAL 2 TIMES DAILY
Status: DISCONTINUED | OUTPATIENT
Start: 2023-11-15 | End: 2023-11-16 | Stop reason: HOSPADM

## 2023-11-15 RX ORDER — ALPRAZOLAM 0.5 MG/1
1 TABLET ORAL NIGHTLY PRN
Status: DISCONTINUED | OUTPATIENT
Start: 2023-11-15 | End: 2023-11-16 | Stop reason: HOSPADM

## 2023-11-15 RX ORDER — POTASSIUM CHLORIDE 20 MEQ/1
40 TABLET, EXTENDED RELEASE ORAL
Status: COMPLETED | OUTPATIENT
Start: 2023-11-15 | End: 2023-11-16

## 2023-11-15 RX ADMIN — PROCHLORPERAZINE EDISYLATE 10 MG: 5 INJECTION INTRAMUSCULAR; INTRAVENOUS at 09:30

## 2023-11-15 RX ADMIN — ATORVASTATIN CALCIUM 80 MG: 80 TABLET, FILM COATED ORAL at 08:07

## 2023-11-15 RX ADMIN — SPIRONOLACTONE 25 MG: 25 TABLET ORAL at 08:07

## 2023-11-15 RX ADMIN — MAGNESIUM GLUCONATE 500 MG ORAL TABLET 400 MG: 500 TABLET ORAL at 20:11

## 2023-11-15 RX ADMIN — OXYCODONE HYDROCHLORIDE 5 MG: 5 TABLET ORAL at 16:02

## 2023-11-15 RX ADMIN — FUROSEMIDE 40 MG: 40 TABLET ORAL at 08:07

## 2023-11-15 RX ADMIN — ALPRAZOLAM 1 MG: 0.5 TABLET ORAL at 22:29

## 2023-11-15 RX ADMIN — OXYCODONE HYDROCHLORIDE 5 MG: 5 TABLET ORAL at 09:30

## 2023-11-15 RX ADMIN — ENOXAPARIN SODIUM 40 MG: 100 INJECTION SUBCUTANEOUS at 08:07

## 2023-11-15 RX ADMIN — AMIODARONE HYDROCHLORIDE 200 MG: 200 TABLET ORAL at 08:07

## 2023-11-15 RX ADMIN — LACTULOSE 20 G: 20 SOLUTION ORAL at 16:02

## 2023-11-15 RX ADMIN — GABAPENTIN 300 MG: 300 CAPSULE ORAL at 20:11

## 2023-11-15 RX ADMIN — MAGNESIUM SULFATE HEPTAHYDRATE 2000 MG: 40 INJECTION, SOLUTION INTRAVENOUS at 09:35

## 2023-11-15 RX ADMIN — FUROSEMIDE 40 MG: 40 TABLET ORAL at 20:11

## 2023-11-15 RX ADMIN — LACTULOSE 20 G: 20 SOLUTION ORAL at 20:11

## 2023-11-15 RX ADMIN — CARVEDILOL 25 MG: 25 TABLET, FILM COATED ORAL at 18:14

## 2023-11-15 RX ADMIN — SODIUM CHLORIDE, PRESERVATIVE FREE 10 ML: 5 INJECTION INTRAVENOUS at 20:12

## 2023-11-15 RX ADMIN — MAGNESIUM GLUCONATE 500 MG ORAL TABLET 400 MG: 500 TABLET ORAL at 08:07

## 2023-11-15 RX ADMIN — ALPRAZOLAM 1 MG: 0.5 TABLET ORAL at 00:01

## 2023-11-15 RX ADMIN — PROCHLORPERAZINE EDISYLATE 10 MG: 5 INJECTION INTRAMUSCULAR; INTRAVENOUS at 22:29

## 2023-11-15 RX ADMIN — PROCHLORPERAZINE EDISYLATE 10 MG: 5 INJECTION INTRAMUSCULAR; INTRAVENOUS at 16:02

## 2023-11-15 RX ADMIN — CARVEDILOL 25 MG: 25 TABLET, FILM COATED ORAL at 08:07

## 2023-11-15 RX ADMIN — LACTULOSE 20 G: 20 SOLUTION ORAL at 08:08

## 2023-11-15 RX ADMIN — ASPIRIN 81 MG: 81 TABLET ORAL at 08:07

## 2023-11-15 RX ADMIN — SODIUM CHLORIDE, PRESERVATIVE FREE 10 ML: 5 INJECTION INTRAVENOUS at 19:29

## 2023-11-15 RX ADMIN — OXYCODONE HYDROCHLORIDE 5 MG: 5 TABLET ORAL at 21:46

## 2023-11-15 RX ADMIN — GABAPENTIN 300 MG: 300 CAPSULE ORAL at 08:07

## 2023-11-15 RX ADMIN — POTASSIUM CHLORIDE 40 MEQ: 1500 TABLET, EXTENDED RELEASE ORAL at 09:30

## 2023-11-15 ASSESSMENT — PAIN SCALES - GENERAL
PAINLEVEL_OUTOF10: 0
PAINLEVEL_OUTOF10: 0
PAINLEVEL_OUTOF10: 6
PAINLEVEL_OUTOF10: 5
PAINLEVEL_OUTOF10: 0
PAINLEVEL_OUTOF10: 0

## 2023-11-15 ASSESSMENT — PAIN DESCRIPTION - DESCRIPTORS: DESCRIPTORS: ACHING;DISCOMFORT

## 2023-11-15 ASSESSMENT — PAIN SCALES - WONG BAKER
WONGBAKER_NUMERICALRESPONSE: 0
WONGBAKER_NUMERICALRESPONSE: 0

## 2023-11-15 ASSESSMENT — PAIN DESCRIPTION - LOCATION: LOCATION: GENERALIZED

## 2023-11-15 NOTE — PROGRESS NOTES
ACUTE OCCUPATIONAL THERAPY GOALS:   (Developed with and agreed upon by patient and/or caregiver.)  1. Patient will perform lower body dressing with MOD I.  2. Patient will perform upper and lower body bathing with MOD I.  3. Patient will perform toilet transfers with MOD I.  4. Patient will participate in 30 + minutes of ADL/ therapeutic exercise/therapeutic activity with min rest breaks to increase activity tolerance for self care. 5. Patient will perform standing grooming tasks x5 mins with MOD I.  6. Patient will perform ADL functional mobility in room with MOD I. Goals to be achieved in 7 days. OCCUPATIONAL THERAPY Initial Assessment and Daily Note       OT Visit Days: 1  Acknowledge Orders  Time  OT Charge Capture  Rehab Caseload Tracker      Tracey Gupta is a 62 y.o. male   PRIMARY DIAGNOSIS: Toxic encephalopathy  Toxic encephalopathy [G92.9]  Syncope and collapse [R55]  Acute chest pain [R07.9]       Reason for Referral: Generalized Muscle Weakness (M62.81)  Other lack of cordination (R27.8)  History of falling (Z91.81)  Inpatient: Payor: [employer] / Plan: Hailee Joseph / Product Type: *No Product type* /     ASSESSMENT:     REHAB RECOMMENDATIONS:   Recommendation to date pending progress:  Setting:  Home Health Therapy    Equipment:    Rolling Orpah Dally transfer bench      ASSESSMENT:  Mr. Lele Vo is a 62year old admitted for toxic encephalopathy. Patient had a recent admission 11/1-11/8 for facial cellulitis. He reports he is typically independent with ADL tasks yet has had increased difficulty at home with bathing/shower transfers and states he has had one fall in the shower. Patient states he lives with his spouse and does not use any assistive devices.  Today he needed min assist without any assistive device for functional transfers/mobility a little unsteady, provided a rolling walker and he needed contact guard assist, discussed with patient recommendation for using a rolling walker Tested    PLAN:   FREQUENCY/DURATION   OT Plan of Care: 3 times/week for duration of hospital stay or until stated goals are met, whichever comes first.    PROBLEM LIST:   (Skilled intervention is medically necessary to address:)  Decreased ADL/Functional Activities  Decreased Activity Tolerance  Decreased Balance  Decreased Strength   INTERVENTIONS PLANNED:  (Benefits and precautions of occupational therapy have been discussed with the patient.)  Self Care Training  Therapeutic Activity  Therapeutic Exercise/HEP  Neuromuscular Re-education  Manual Therapy  Education         TREATMENT:     EVALUATION: LOW COMPLEXITY: (Untimed Charge)    TREATMENT:   Neuromuscular Re-education (12 Minutes): Patient participated in neuromuscular re-education including weight shifting, postural training, visual scanning activity, and standing tolerance activity   with minimal verbal cues, tactile cues, and visual cues to improve standing balance, proprioception, posture, and coordination in order to prepare for discharge home  and prepare for self care. .     TREATMENT GRID:  N/A    AFTER TREATMENT PRECAUTIONS: Bed, Bed/Chair Locked, Call light within reach, Heels floated, and Needs within reach    INTERDISCIPLINARY COLLABORATION:  RN/ PCT and PT/ PTA    EDUCATION:  Education Given To: Patient  Education Provided: Role of Therapy;Plan of Care    TOTAL TREATMENT DURATION AND TIME:  Time In: 1410  Time Out: 111 [address]  Minutes: 17    Vasyl Figueroa OT

## 2023-11-15 NOTE — PROGRESS NOTES
ACUTE PHYSICAL THERAPY GOALS:   (Developed with and agreed upon by patient and/or caregiver.)    (1.) Gabrielle Narayan  will move from supine to sit and sit to supine  with INDEPENDENCE within 7 treatment day(s). (2.) Gabrielle Narayan will transfer from bed to chair and chair to bed with MODIFIED INDEPENDENCE using the least restrictive device within 7 treatment day(s). (3.) Gabrielle Narayan will ambulate with MODIFIED INDEPENDENCE for 250 feet with the least restrictive device within 7 treatment day(s). (4.) Gabrielle Narayan will perform standing static and dynamic balance activities x 20 minutes with SUPERVISION to improve safety within 7 treatment day(s). (5.) Gabrielle Narayan will ascend and descend 6 stairs using 1 hand rail(s) with SUPERVISION to improve functional mobility and safety within 7 treatment day(s). (6.) Gabrielle Narayan will perform therapeutic exercises x 20 min for HEP with INDEPENDENCE to improve strength, endurance, and functional mobility within 7 treatment day(s). PHYSICAL THERAPY Initial Assessment, Daily Note, and PM  (Link to Caseload Tracking: PT Visit Days : 1  Acknowledge Orders  Time In/Out  PT Charge Capture  Rehab Caseload Tracker    Gabrielle Narayan is a 62 y.o. male   PRIMARY DIAGNOSIS: Toxic encephalopathy  Toxic encephalopathy [G92.9]  Syncope and collapse [R55]  Acute chest pain [R07.9]       Reason for Referral: Generalized Muscle Weakness (M62.81)  Difficulty in walking, Not elsewhere classified (R26.2)  Inpatient: Payor: Renay Fajardo / Plan: Alberto Holland / Product Type: *No Product type* /     ASSESSMENT:     REHAB RECOMMENDATIONS:   Recommendation to date pending progress:  Setting:  Home Health Therapy    Equipment:    Rolling Walker     ASSESSMENT:  Mr. Mariano Victor  is a 62year old M who presents with toxic encephalopathy. At baseline, pt is independent but reports a decline in mobility over the last month.  He reports increased difficulty with ambulating longer distances, transferring in/out

## 2023-11-15 NOTE — PROGRESS NOTES
Hospitalist Progress Note   Admit Date:  2023 10:29 PM   Name:  Lan Marquez   Age:  62 y.o. Sex:  male  :  1965   MRN:  [id number]   Room:  310/    Presenting/Chief Complaint: Chest Pain     Reason(s) for Admission: Toxic encephalopathy [G92.9]  Syncope and collapse [R55]  Acute chest pain [R07.9]     Hospital Course:   Please refer to the admission H&P for details of presentation. In summary, Lan Marquez is a 62 y.o. male with medical history significant for CHF, status post ICD, liver cirrhosis who presented emergency with chest pain. Patient was watching for by home and he said he was shocked by his defibrillator and passed out. Patient did not have any recollection of this event. Patient reports having 2 Lortabs as well as for many bottles of alcohol on the evening prior to admission. Patient wife found him unresponsive and started him on CPR. Upon evaluation of his ICD in the ED, there were no shocks no arrhythmia noted. Patient was recently hospitalized on  to  for facial cellulitis and was discharged on cefpodoxime and doxycycline. Subjective/24 hr Events (11/15/23) : Patient is seen and examined at bedside. No acute events reported overnight by nursing staff. Laying in bed. Feeling tired and sluggish today. Complains of abdominal fullness. Now on RA. No respiratory symptoms. Patient denies fever, chills, chest pains, shortness of breath, n/v.    Review of Systems: 10 point review of systems is otherwise negative with the exception of the elements mentioned above. Assessment & Plan:   Acute Toxic encephalopathy  Likely multifactorial given alcohol use, pain medications  -Appears to be back at baseline. AAOx3.  -elevated ammonia on admission and on lactulose but pt denies cirrhosis. Other cirrhosis of liver   Hyperammonemia  Patient continues to drink. He denies having liver cirrhosis.   Elevated ALT and AST  - check US Abdomen pending.  - 0.0 - 2.0 %    Immature Granulocytes 0 0.0 - 5.0 %    Neutrophils Absolute 4.4 1.7 - 8.2 K/UL    Lymphocytes Absolute 1.1 0.5 - 4.6 K/UL    Monocytes Absolute 0.5 0.1 - 1.3 K/UL    Eosinophils Absolute 0.3 0.0 - 0.8 K/UL    Basophils Absolute 0.0 0.0 - 0.2 K/UL    Absolute Immature Granulocyte 0.0 0.0 - 0.5 K/UL   Ammonia    Collection Time: 11/14/23  3:40 AM   Result Value Ref Range    Ammonia 43 (H) 11 - 32 UMOL/L   Magnesium    Collection Time: 11/14/23  3:40 AM   Result Value Ref Range    Magnesium 1.8 1.8 - 2.4 mg/dL   Hepatitis Panel, Acute    Collection Time: 11/14/23  3:40 AM   Result Value Ref Range    Hep A IgM NONREACTIVE NR      Hep B Core Ab, IgM NONREACTIVE NR      Hepatitis B Surface Ag NONREACTIVE NR      Hepatitis C Ab NONREACTIVE NR     Basic Metabolic Panel w/ Reflex to MG    Collection Time: 11/15/23  4:07 AM   Result Value Ref Range    Sodium 142 133 - 143 mmol/L    Potassium 3.9 3.5 - 5.1 mmol/L    Chloride 107 101 - 110 mmol/L    CO2 26 21 - 32 mmol/L    Anion Gap 9 2 - 11 mmol/L    Glucose 104 (H) 65 - 100 mg/dL    BUN 7 6 - 23 MG/DL    Creatinine 1.00 0.8 - 1.5 MG/DL    Est, Glom Filt Rate >60 >60 ml/min/1.73m2    Calcium 8.3 8.3 - 10.4 MG/DL   CBC with Auto Differential    Collection Time: 11/15/23  4:07 AM   Result Value Ref Range    WBC 6.5 4.3 - 11.1 K/uL    RBC 3.39 (L) 4.23 - 5.6 M/uL    Hemoglobin 10.3 (L) 13.6 - 17.2 g/dL    Hematocrit 31.8 (L) 41.1 - 50.3 %    MCV 93.8 82 - 102 FL    MCH 30.4 26.1 - 32.9 PG    MCHC 32.4 31.4 - 35.0 g/dL    RDW 19.1 (H) 11.9 - 14.6 %    Platelets 117 714 - 094 K/uL    MPV 9.7 9.4 - 12.3 FL    nRBC 0.00 0.0 - 0.2 K/uL    Differential Type AUTOMATED      Neutrophils % 65 43 - 78 %    Lymphocytes % 20 13 - 44 %    Monocytes % 9 4.0 - 12.0 %    Eosinophils % 5 0.5 - 7.8 %    Basophils % 1 0.0 - 2.0 %    Immature Granulocytes 0 0.0 - 5.0 %    Neutrophils Absolute 4.3 1.7 - 8.2 K/UL    Lymphocytes Absolute 1.3 0.5 - 4.6 K/UL    Monocytes Absolute 0.6 0.1 -

## 2023-11-16 VITALS
WEIGHT: 175.3 LBS | HEIGHT: 71 IN | TEMPERATURE: 98.4 F | HEART RATE: 79 BPM | OXYGEN SATURATION: 95 % | SYSTOLIC BLOOD PRESSURE: 100 MMHG | DIASTOLIC BLOOD PRESSURE: 71 MMHG | RESPIRATION RATE: 16 BRPM | BODY MASS INDEX: 24.54 KG/M2

## 2023-11-16 PROBLEM — G92.9 TOXIC ENCEPHALOPATHY: Status: RESOLVED | Noted: 2023-11-13 | Resolved: 2023-11-16

## 2023-11-16 LAB
AMMONIA PLAS-SCNC: 37 UMOL/L (ref 11–32)
ANION GAP SERPL CALC-SCNC: 6 MMOL/L (ref 2–11)
BASOPHILS # BLD: 0 K/UL (ref 0–0.2)
BASOPHILS NFR BLD: 1 % (ref 0–2)
BUN SERPL-MCNC: 11 MG/DL (ref 6–23)
CALCIUM SERPL-MCNC: 8.4 MG/DL (ref 8.3–10.4)
CHLORIDE SERPL-SCNC: 109 MMOL/L (ref 101–110)
CO2 SERPL-SCNC: 26 MMOL/L (ref 21–32)
CREAT SERPL-MCNC: 1.4 MG/DL (ref 0.8–1.5)
DIFFERENTIAL METHOD BLD: ABNORMAL
EOSINOPHIL # BLD: 0.4 K/UL (ref 0–0.8)
EOSINOPHIL NFR BLD: 5 % (ref 0.5–7.8)
ERYTHROCYTE [DISTWIDTH] IN BLOOD BY AUTOMATED COUNT: 19.4 % (ref 11.9–14.6)
GLUCOSE SERPL-MCNC: 105 MG/DL (ref 65–100)
HCT VFR BLD AUTO: 33.1 % (ref 41.1–50.3)
HGB BLD-MCNC: 10.5 G/DL (ref 13.6–17.2)
IMM GRANULOCYTES # BLD AUTO: 0 K/UL (ref 0–0.5)
IMM GRANULOCYTES NFR BLD AUTO: 0 % (ref 0–5)
LYMPHOCYTES # BLD: 1.5 K/UL (ref 0.5–4.6)
LYMPHOCYTES NFR BLD: 19 % (ref 13–44)
MCH RBC QN AUTO: 30.5 PG (ref 26.1–32.9)
MCHC RBC AUTO-ENTMCNC: 31.7 G/DL (ref 31.4–35)
MCV RBC AUTO: 96.2 FL (ref 82–102)
MONOCYTES # BLD: 0.6 K/UL (ref 0.1–1.3)
MONOCYTES NFR BLD: 8 % (ref 4–12)
NEUTS SEG # BLD: 5.3 K/UL (ref 1.7–8.2)
NEUTS SEG NFR BLD: 67 % (ref 43–78)
NRBC # BLD: 0 K/UL (ref 0–0.2)
PLATELET # BLD AUTO: 336 K/UL (ref 150–450)
PMV BLD AUTO: 9.5 FL (ref 9.4–12.3)
POTASSIUM SERPL-SCNC: 4.4 MMOL/L (ref 3.5–5.1)
RBC # BLD AUTO: 3.44 M/UL (ref 4.23–5.6)
SODIUM SERPL-SCNC: 141 MMOL/L (ref 133–143)
WBC # BLD AUTO: 7.8 K/UL (ref 4.3–11.1)

## 2023-11-16 PROCEDURE — 80048 BASIC METABOLIC PNL TOTAL CA: CPT

## 2023-11-16 PROCEDURE — 6360000002 HC RX W HCPCS: Performed by: INTERNAL MEDICINE

## 2023-11-16 PROCEDURE — 99232 SBSQ HOSP IP/OBS MODERATE 35: CPT | Performed by: INTERNAL MEDICINE

## 2023-11-16 PROCEDURE — 82140 ASSAY OF AMMONIA: CPT

## 2023-11-16 PROCEDURE — 6370000000 HC RX 637 (ALT 250 FOR IP): Performed by: INTERNAL MEDICINE

## 2023-11-16 PROCEDURE — 85025 COMPLETE CBC W/AUTO DIFF WBC: CPT

## 2023-11-16 PROCEDURE — 36415 COLL VENOUS BLD VENIPUNCTURE: CPT

## 2023-11-16 PROCEDURE — 2580000003 HC RX 258: Performed by: INTERNAL MEDICINE

## 2023-11-16 RX ORDER — UBROGEPANT 50 MG/1
1 TABLET ORAL DAILY PRN
Qty: 1 TABLET | Refills: 0 | Status: SHIPPED | OUTPATIENT
Start: 2023-11-16

## 2023-11-16 RX ORDER — SPIRONOLACTONE 25 MG/1
25 TABLET ORAL DAILY PRN
Qty: 1 TABLET | Refills: 0
Start: 2023-11-16

## 2023-11-16 RX ORDER — LACTULOSE 10 G/15ML
10 SOLUTION ORAL 3 TIMES DAILY
Qty: 1350 ML | Refills: 1 | Status: SHIPPED | OUTPATIENT
Start: 2023-11-16

## 2023-11-16 RX ADMIN — OXYCODONE HYDROCHLORIDE 5 MG: 5 TABLET ORAL at 04:44

## 2023-11-16 RX ADMIN — PROCHLORPERAZINE EDISYLATE 10 MG: 5 INJECTION INTRAMUSCULAR; INTRAVENOUS at 11:03

## 2023-11-16 RX ADMIN — PROCHLORPERAZINE EDISYLATE 10 MG: 5 INJECTION INTRAMUSCULAR; INTRAVENOUS at 04:44

## 2023-11-16 RX ADMIN — ATORVASTATIN CALCIUM 80 MG: 80 TABLET, FILM COATED ORAL at 08:20

## 2023-11-16 RX ADMIN — SPIRONOLACTONE 25 MG: 25 TABLET ORAL at 08:20

## 2023-11-16 RX ADMIN — OXYCODONE HYDROCHLORIDE 5 MG: 5 TABLET ORAL at 08:23

## 2023-11-16 RX ADMIN — FUROSEMIDE 40 MG: 40 TABLET ORAL at 08:20

## 2023-11-16 RX ADMIN — POTASSIUM CHLORIDE 40 MEQ: 1500 TABLET, EXTENDED RELEASE ORAL at 08:18

## 2023-11-16 RX ADMIN — AMIODARONE HYDROCHLORIDE 200 MG: 200 TABLET ORAL at 08:19

## 2023-11-16 RX ADMIN — SODIUM CHLORIDE, PRESERVATIVE FREE 10 ML: 5 INJECTION INTRAVENOUS at 08:26

## 2023-11-16 RX ADMIN — GABAPENTIN 300 MG: 300 CAPSULE ORAL at 08:20

## 2023-11-16 RX ADMIN — MAGNESIUM GLUCONATE 500 MG ORAL TABLET 400 MG: 500 TABLET ORAL at 08:20

## 2023-11-16 RX ADMIN — CARVEDILOL 25 MG: 25 TABLET, FILM COATED ORAL at 08:19

## 2023-11-16 RX ADMIN — ASPIRIN 81 MG: 81 TABLET ORAL at 08:20

## 2023-11-16 RX ADMIN — ENOXAPARIN SODIUM 40 MG: 100 INJECTION SUBCUTANEOUS at 08:21

## 2023-11-16 ASSESSMENT — PAIN SCALES - GENERAL
PAINLEVEL_OUTOF10: 0
PAINLEVEL_OUTOF10: 6
PAINLEVEL_OUTOF10: 7
PAINLEVEL_OUTOF10: 0

## 2023-11-16 ASSESSMENT — PAIN DESCRIPTION - DESCRIPTORS: DESCRIPTORS: ACHING;DISCOMFORT

## 2023-11-16 NOTE — PROGRESS NOTES
Patient educated multiple times throughout the shift about using urinal so we can measure intake/output. Patient states he cannot use urinal because he sits on the toilet to use restroom. He states he has been having frequent bowel movements due to the lactulose so he cannot accurately record urine output.

## 2023-11-16 NOTE — PROGRESS NOTES
Discharge instructions reviewed with patient. Prescriptions given for urbevy and lactulose and med info sheets provided for all new medications. Opportunity for questions provided. Patient voiced understanding of all discharge instructions. IV and tele box removed.

## 2023-11-16 NOTE — PLAN OF CARE
Problem: Discharge Planning  Goal: Discharge to home or other facility with appropriate resources  11/16/2023 1050 by Ana Laura Price RN  Outcome: Completed  11/16/2023 1023 by Cesar James RN  Outcome: Progressing     Problem: Pain  Goal: Verbalizes/displays adequate comfort level or baseline comfort level  11/16/2023 1050 by Ana Laura Price RN  Outcome: Completed  11/16/2023 1023 by Cesar James RN  Outcome: Progressing     Problem: Safety - Adult  Goal: Free from fall injury  Outcome: Completed     Problem: Chronic Conditions and Co-morbidities  Goal: Patient's chronic conditions and co-morbidity symptoms are monitored and maintained or improved  Outcome: Completed

## 2023-11-16 NOTE — CARE COORDINATION
Discharge order is in. PT/OT consulted and recommended HH, pt agreeable. Was provided a [facility] List, but verbalized no agency preference. Referral sent to [facility] for RN/PT/OT. No other discharge needs identified. Pts spouse will transport him home. Tx goals met. 11/16/23 1110   [address] Discharge   Transition of Care Consult (CM Consult) Discharge [facility] Discharge None   [place] Resource Information Provided? No   Mode of Transport at Discharge Other (see comment)  (Spouse)   Confirm Follow Up Transport Self   Condition of Participation: Discharge Planning   The Patient and/or Patient Representative was provided with a Choice of Provider? Patient   The Patient and/Or Patient Representative agree with the Discharge Plan? Yes   Freedom of Choice list was provided with basic dialogue that supports the patient's individualized plan of care/goals, treatment preferences, and shares the quality data associated with the providers?   Yes

## 2023-11-16 NOTE — DISCHARGE SUMMARY
mouth 2 times daily  Qty: 180 tablet, Refills: 3      baclofen (LIORESAL) 10 MG tablet Take 1 tablet by mouth 3 times daily as needed      albuterol sulfate  (90 Base) MCG/ACT inhaler Inhale 2 puffs into the lungs every 4 hours as needed      aspirin 81 MG EC tablet Take 1 tablet by mouth daily      atorvastatin (LIPITOR) 80 MG tablet Take 1 tablet by mouth daily      azelastine (ASTELIN) 0.1 % nasal spray 1 spray by Nasal route as needed      furosemide (LASIX) 40 MG tablet Take 1 tablet by mouth daily Pt taking 1x daily      naloxone 4 MG/0.1ML LIQD nasal spray Use 1 spray intranasally, then discard. Repeat with new spray every 2 min as needed for opioid overdose symptoms, alternating nostrils. rizatriptan (MAXALT-MLT) 10 MG disintegrating tablet Take 1 tablet by mouth daily as needed for Migraine TAKE ONE TABLET BY MOUTH ONCE AS NEEDED           STOP taking these medications       potassium chloride (KLOR-CON M) 20 MEQ extended release tablet Comments:   Reason for Stopping:         cefpodoxime (VANTIN) 200 MG tablet Comments:   Reason for Stopping:         doxycycline hyclate (VIBRA-TABS) 100 MG tablet Comments:   Reason for Stopping:         losartan (COZAAR) 25 MG tablet Comments:   Reason for Stopping:         apixaban (ELIQUIS) 5 MG TABS tablet Comments:   Reason for Stopping:         levalbuterol (XOPENEX HFA) 45 MCG/ACT inhaler Comments:   Reason for Stopping:               Some of the medications may be marked as \"stop taking\" by the system; but in reality pt or family reported already being off these meds; defer to outpatient/prescribing providers.     Procedures done this admission:  * No surgery found *    Consults this admission:  IP CONSULT TO CARDIOLOGY    Echocardiogram results:  11/12/23    ECHO (TTE) COMPLETE (PRN CONTRAST/BUBBLE/STRAIN/3D) 11/13/2023  2:58 PM (Final)    Interpretation Summary    Left Ventricle: Severely reduced left ventricular systolic function with a visually findings extend inferiorly to the subhyoid level it may reflect cellulitis in the absence of trauma. No peripherally enhancing collection to indicate an abscess. Ac Kim MD Neuroradiologist [email or web address] Thank you for this referral. This exam was interpreted by a fellowship trained neuroradiologist with a Certificate of Added Qualification (CAQ). If the patient's healthcare provider has any questions, a [facility] neuroradiologist can be reached directly at [phone] at any time. Ac Kim M.D. 11/1/2023 6:49:00 PM    CT CHEST W CONTRAST    Result Date: 11/1/2023  1. Small patchy opacity left upper lobe posteriorly, possible focal infiltrate. 2.  Trace bilateral pleural effusions. Overlying linear probable atelectasis. 3.  Unchanged intrathoracic stomach compared to 2/9/2022. Thank you for the referral of this patient. This exam was interpreted by an [facility] certified diagnostic radiologist with fellowship training. If there are any questions regarding this exam please feel  free to contact us directly at [phone]. Thank you for the referral of this patient. This exam was interpreted by an [facility] certified diagnostic radiologist with fellowship training. If there are any questions regarding this exam please feel  free to contact us directly at [phone].   Austen Bautista D.O. 11/1/2023 6:46:00 PM       Labs: Results:       BMP, Mg, Phos Recent Labs     11/14/23  0340 11/15/23  0407 11/16/23  0455    142 141   K 3.7 3.9 4.4    107 109   CO2 30 26 26   ANIONGAP 5 9 6   BUN 9 7 11   CREATININE 1.10 1.00 1.40   LABGLOM >60 >60 58*   CALCIUM 7.8* 8.3 8.4   GLUCOSE 109* 104* 105*   MG 1.8 1.7*  --       CBC Recent Labs     11/14/23  0340 11/15/23  0407 11/16/23  0455   WBC 6.3 6.5 7.8   RBC 3.24* 3.39* 3.44*   HGB 10.0* 10.3* 10.5*   HCT 30.7* 31.8* 33.1*   MCV 94.8 93.8 96.2   MCH 30.9

## 2023-11-17 ENCOUNTER — CARE COORDINATION (OUTPATIENT)
Dept: CARE COORDINATION | Facility: CLINIC | Age: 58
End: 2023-11-17

## 2023-11-17 NOTE — CARE COORDINATION
Care Transitions Outreach Attempt    Call within 2 business days of discharge: Yes   Attempted to reach patient for transitions of care follow up. Unable to reach patient. Patient: Deena Rodríguez Patient : 1965 MRN: [id number]    Last Discharge Facility       Date Complaint Diagnosis Description Type Department Provider    23 Chest Pain Acute chest pain . .. ED to Hosp-Admission (Discharged) (ADMITTED) Roman Koo MD; Dani Devine. .. Was this an external facility discharge? No Discharge Facility Name: [facility]    Noted following upcoming appointments from discharge chart review:   [facility] follow up appointment(s):   Future Appointments   Date Time Provider [address]   2023  1:40 PM Nani Kaufman DO P [facility] AMB   2024  3:15 PM Dami Flornetino MD [facility] AMB     Non-[facility]  follow up appointment(s): na    [place] outreach initial call. Left message, identified self, reason for call, return [facility] [address] [phone].

## 2023-11-18 LAB — HBV SURFACE AG SERPL QL IA: NEGATIVE

## 2023-11-20 ENCOUNTER — CARE COORDINATION (OUTPATIENT)
Dept: CARE COORDINATION | Facility: CLINIC | Age: 58
End: 2023-11-20

## 2023-11-20 NOTE — CARE COORDINATION
Care Transitions Outreach Attempt    Call within 2 business days of discharge: Yes   Attempted to reach patient for transitions of care follow up. Unable to reach patient. Patient: Niurka Briseno Patient : 1965 MRN: [id number]    Last Discharge Facility       Date Complaint Diagnosis Description Type Department Provider    23 Chest Pain Acute chest pain . .. ED to Hosp-Admission (Discharged) (ADMITTED) Baltazar Teixeira MD; Anahy Young. .. Was this an external facility discharge? No Discharge Facility Name: [facility]    Noted following upcoming appointments from discharge chart review:   [facility] follow up appointment(s):   Future Appointments   Date Time Provider [address]   2023  1:40 PM Cherry Johnson DO [facility] AMB   2024  3:15 PM oSle Florentino MD [facility] AMB     Non-[facility]  follow up appointment(s): na    [place] outreach call 2nd attempt. Left message, identified self, reason for call, return call contact information, [place] [address] [phone].

## 2023-11-21 ENCOUNTER — CARE COORDINATION (OUTPATIENT)
Dept: CARE COORDINATION | Facility: CLINIC | Age: 58
End: 2023-11-21

## 2023-11-21 NOTE — CARE COORDINATION
[place] outreach call 3rd and final attempt was unsuccessful. No further outreach indicated at this time.
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
## 2024-02-05 ENCOUNTER — OFFICE VISIT (OUTPATIENT)
Age: 59
End: 2024-02-05
Payer: COMMERCIAL

## 2024-02-05 ENCOUNTER — NURSE ONLY (OUTPATIENT)
Age: 59
End: 2024-02-05

## 2024-02-05 VITALS
HEIGHT: 71 IN | SYSTOLIC BLOOD PRESSURE: 120 MMHG | HEART RATE: 76 BPM | BODY MASS INDEX: 22.96 KG/M2 | DIASTOLIC BLOOD PRESSURE: 82 MMHG | WEIGHT: 164 LBS

## 2024-02-05 DIAGNOSIS — I42.8 NON-ISCHEMIC CARDIOMYOPATHY (HCC): Primary | Chronic | ICD-10-CM

## 2024-02-05 DIAGNOSIS — Z95.810 ICD (IMPLANTABLE CARDIOVERTER-DEFIBRILLATOR) IN PLACE: Primary | ICD-10-CM

## 2024-02-05 DIAGNOSIS — I10 PRIMARY HYPERTENSION: Chronic | ICD-10-CM

## 2024-02-05 DIAGNOSIS — I42.8 NON-ISCHEMIC CARDIOMYOPATHY (HCC): ICD-10-CM

## 2024-02-05 DIAGNOSIS — G43.C0 PERIODIC HEADACHE SYNDROMES IN CHILD OR ADULT, NOT INTRACTABLE: ICD-10-CM

## 2024-02-05 DIAGNOSIS — I47.10 SVT (SUPRAVENTRICULAR TACHYCARDIA): ICD-10-CM

## 2024-02-05 DIAGNOSIS — Z95.810 ICD (IMPLANTABLE CARDIOVERTER-DEFIBRILLATOR) IN PLACE: Chronic | ICD-10-CM

## 2024-02-05 DIAGNOSIS — I50.22 CHRONIC SYSTOLIC HEART FAILURE (HCC): ICD-10-CM

## 2024-02-05 DIAGNOSIS — F41.9 ANXIETY: ICD-10-CM

## 2024-02-05 PROCEDURE — 99214 OFFICE O/P EST MOD 30 MIN: CPT | Performed by: INTERNAL MEDICINE

## 2024-02-05 PROCEDURE — 3074F SYST BP LT 130 MM HG: CPT | Performed by: INTERNAL MEDICINE

## 2024-02-05 PROCEDURE — 3079F DIAST BP 80-89 MM HG: CPT | Performed by: INTERNAL MEDICINE

## 2024-02-05 RX ORDER — RIZATRIPTAN BENZOATE 10 MG/1
10 TABLET, ORALLY DISINTEGRATING ORAL DAILY PRN
Qty: 30 TABLET | Refills: 3 | Status: SHIPPED | OUTPATIENT
Start: 2024-02-05

## 2024-02-05 RX ORDER — UBROGEPANT 50 MG/1
1 TABLET ORAL DAILY PRN
Qty: 30 TABLET | Refills: 3 | Status: SHIPPED | OUTPATIENT
Start: 2024-02-05

## 2024-02-05 RX ORDER — BACLOFEN 10 MG/1
10 TABLET ORAL 3 TIMES DAILY PRN
Qty: 90 TABLET | Refills: 11 | Status: SHIPPED | OUTPATIENT
Start: 2024-02-05

## 2024-02-05 RX ORDER — PROMETHAZINE HYDROCHLORIDE 25 MG/1
25 TABLET ORAL EVERY 6 HOURS PRN
Qty: 120 TABLET | Refills: 1 | Status: SHIPPED | OUTPATIENT
Start: 2024-02-05

## 2024-02-05 RX ORDER — ATORVASTATIN CALCIUM 80 MG/1
80 TABLET, FILM COATED ORAL DAILY
Qty: 90 TABLET | Refills: 3 | Status: SHIPPED | OUTPATIENT
Start: 2024-02-05

## 2024-02-05 RX ORDER — ALPRAZOLAM 1 MG/1
1 TABLET ORAL NIGHTLY PRN
Qty: 5 TABLET | Refills: 0 | Status: SHIPPED | OUTPATIENT
Start: 2024-02-05 | End: 2024-02-10

## 2024-02-05 RX ORDER — SPIRONOLACTONE 25 MG/1
25 TABLET ORAL DAILY PRN
Qty: 90 TABLET | Refills: 3 | Status: SHIPPED | OUTPATIENT
Start: 2024-02-05

## 2024-02-05 RX ORDER — LANOLIN ALCOHOL/MO/W.PET/CERES
400 CREAM (GRAM) TOPICAL DAILY
Qty: 90 TABLET | Refills: 3 | Status: SHIPPED | OUTPATIENT
Start: 2024-02-05

## 2024-02-05 RX ORDER — CARVEDILOL 25 MG/1
25 TABLET ORAL 2 TIMES DAILY
Qty: 180 TABLET | Refills: 3 | Status: SHIPPED | OUTPATIENT
Start: 2024-02-05

## 2024-02-05 ASSESSMENT — ENCOUNTER SYMPTOMS
SHORTNESS OF BREATH: 0
COUGH: 0
CHEST TIGHTNESS: 0
WHEEZING: 0

## 2024-03-07 ENCOUNTER — HOSPITAL ENCOUNTER (EMERGENCY)
Age: 59
Discharge: HOME | End: 2024-03-07
Payer: MEDICARE

## 2024-03-07 ENCOUNTER — APPOINTMENT (OUTPATIENT)
Dept: GENERAL RADIOLOGY | Age: 59
End: 2024-03-07
Payer: MEDICARE

## 2024-03-07 VITALS
WEIGHT: 180 LBS | TEMPERATURE: 99 F | HEIGHT: 71 IN | RESPIRATION RATE: 16 BRPM | SYSTOLIC BLOOD PRESSURE: 151 MMHG | HEART RATE: 81 BPM | DIASTOLIC BLOOD PRESSURE: 100 MMHG | OXYGEN SATURATION: 98 % | BODY MASS INDEX: 25.2 KG/M2

## 2024-03-07 DIAGNOSIS — S62.101A CLOSED FRACTURE OF RIGHT WRIST, INITIAL ENCOUNTER: Primary | ICD-10-CM

## 2024-03-07 PROCEDURE — 29125 APPL SHORT ARM SPLINT STATIC: CPT

## 2024-03-07 PROCEDURE — 99283 EMERGENCY DEPT VISIT LOW MDM: CPT

## 2024-03-07 PROCEDURE — 6370000000 HC RX 637 (ALT 250 FOR IP): Performed by: NURSE PRACTITIONER

## 2024-03-07 PROCEDURE — 73090 X-RAY EXAM OF FOREARM: CPT

## 2024-03-07 PROCEDURE — 73130 X-RAY EXAM OF HAND: CPT

## 2024-03-07 RX ORDER — HYDROCODONE BITARTRATE AND ACETAMINOPHEN 5; 325 MG/1; MG/1
1 TABLET ORAL
Status: COMPLETED | OUTPATIENT
Start: 2024-03-07 | End: 2024-03-07

## 2024-03-07 RX ORDER — ONDANSETRON 4 MG/1
4 TABLET, ORALLY DISINTEGRATING ORAL
Status: COMPLETED | OUTPATIENT
Start: 2024-03-07 | End: 2024-03-07

## 2024-03-07 RX ADMIN — HYDROCODONE BITARTRATE AND ACETAMINOPHEN 1 TABLET: 5; 325 TABLET ORAL at 21:03

## 2024-03-07 RX ADMIN — ONDANSETRON 4 MG: 4 TABLET, ORALLY DISINTEGRATING ORAL at 21:03

## 2024-03-07 ASSESSMENT — PAIN DESCRIPTION - ORIENTATION: ORIENTATION: RIGHT

## 2024-03-07 ASSESSMENT — PAIN SCALES - GENERAL: PAINLEVEL_OUTOF10: 10

## 2024-03-07 ASSESSMENT — PAIN - FUNCTIONAL ASSESSMENT: PAIN_FUNCTIONAL_ASSESSMENT: 0-10

## 2024-03-07 ASSESSMENT — PAIN DESCRIPTION - LOCATION: LOCATION: HAND;ARM

## 2024-03-08 ENCOUNTER — TELEPHONE (OUTPATIENT)
Dept: ORTHOPEDIC SURGERY | Age: 59
End: 2024-03-08

## 2024-03-08 DIAGNOSIS — I42.8 NON-ISCHEMIC CARDIOMYOPATHY (HCC): Chronic | ICD-10-CM

## 2024-03-08 DIAGNOSIS — I47.10 SVT (SUPRAVENTRICULAR TACHYCARDIA): ICD-10-CM

## 2024-03-08 DIAGNOSIS — I50.22 CHRONIC SYSTOLIC HEART FAILURE (HCC): ICD-10-CM

## 2024-03-08 DIAGNOSIS — Z95.810 ICD (IMPLANTABLE CARDIOVERTER-DEFIBRILLATOR) IN PLACE: Chronic | ICD-10-CM

## 2024-03-08 RX ORDER — AMIODARONE HYDROCHLORIDE 200 MG/1
200 TABLET ORAL DAILY
Qty: 90 TABLET | Refills: 1 | Status: SHIPPED | OUTPATIENT
Start: 2024-03-08

## 2024-03-08 NOTE — DISCHARGE INSTRUCTIONS
Do not get the splint wet.  Do not take the splint off.  Elevate your arm on a pillow when at rest.  Wear sling for comfort.  Follow-up with orthopedics.  Please call them tomorrow to schedule an appointment.  Continue taking the hydrocodone you are already prescribed by your pain management doctor.  Return to the emergency department for any new, worsening, or concerning symptoms.

## 2024-03-08 NOTE — TELEPHONE ENCOUNTER
Requested Prescriptions     Signed Prescriptions Disp Refills    amiodarone (CORDARONE) 200 MG tablet 90 tablet 1     Sig: Take 1 tablet by mouth daily     Authorizing Provider: KRISTY RAMOS     Ordering User: CAT MARTINEZ       Rx verified.      Spoke to patient to explain orders made for lab work and cxr for \"amiodarone surveillances\" as per MD.  Opportunity for questions and answers provided.  Patient verbalized understanding of all.    Pt. Stated he had an appointment at the [facility] coming up and will get the ordered labs and cxr at that time.  Pt. Aware that a refill was called in and the Amiodarone should last until next visit 8/7/24

## 2024-03-08 NOTE — ED PROVIDER NOTES
Emergency Department Provider Note       PCP: None, None   Age: 59 y.o.   Sex: male     DISPOSITION Decision To Discharge 03/07/2024 09:39:23 PM       ICD-10-CM    1. Closed fracture of right wrist, initial encounter  S62.101A [facility]          Medical Decision Making     59-year-old male presents to the emergency department today with complaint of right wrist pain after what appears to have been a mechanical fall that occurred yesterday.  Patient appears in no acute distress.  He is neurovascularly intact to the right upper extremity.  No open wounds noted.  Fracture noted on imaging.  Verbal consent obtained and a sugar-tong splint applied.  Patient tolerated well.  Educated on splint care.  Educated on follow-up with orthopedics.  Patient placed in sling.  Patient is on hydrocodone for chronic pain therefore no pain medications to be prescribed today.  ER return precautions discussed.         1 acute, uncomplicated illness or injury.  Parental controlled substances given in the ED.  Shared medical decision making was utilized in creating the patients health plan today.    I independently ordered and reviewed each unique test.       I interpreted the X-rays distal radius and ulnar fracture noted. Agree with radiologist impression.              History     59-year-old male presents emergency department today with complaint of right wrist pain after a fall that occurred last night.  Patient states that he had stood up from sitting when he slipped and fell, landing on his right side.  He denies any head injury, loss of consciousness, or other injury.  He denies any treatment for his symptoms.  He states his son brought him to the emergency department tonight.    The history is provided by the patient.     Physical Exam     Vitals signs and nursing note reviewed:  Vitals:    03/07/24 1954   BP: (!) 157/102   Pulse: 81   Resp: 16   Temp: 99 °F (37.2 °C)   TempSrc: Oral   SpO2: 99%

## 2024-03-08 NOTE — ED NOTES
I have reviewed discharge instructions with the patient.  The patient verbalized understanding.    Patient left ED via Discharge Method: ambulatory to Home with family.    Opportunity for questions and clarification provided.       Patient given 0 scripts.         To continue your aftercare when you leave the hospital, you may receive an automated call from our care team to check in on how you are doing.  This is a free service and part of our promise to provide the best care and service to meet your aftercare needs.” If you have questions, or wish to unsubscribe from this service please call [phone].  Thank you for Choosing our [facility] Emergency Department.

## 2024-03-08 NOTE — TELEPHONE ENCOUNTER
Requested Prescriptions     Pending Prescriptions Disp Refills    amiodarone (CORDARONE) 200 MG tablet 90 tablet 1     Sig: Take 1 tablet by mouth daily

## 2024-03-08 NOTE — ED TRIAGE NOTES
Pt ambulatory to triage. Pt reports right lower arm and hand swelling and pain after falling last night. Pt states he slipped and fell. Pt denies hitting his head, denies loc. Pt denies taking anything for medicine

## 2024-03-08 NOTE — TELEPHONE ENCOUNTER
Dr. Salinas can see him Tuesday at  at 12:40 p.m.  Please make sure he stays in his splint until then.

## 2024-03-08 NOTE — TELEPHONE ENCOUNTER
MEDICATION REFILL REQUEST      Name of Medication:  Amiodarone   Dose:  200 mg  Frequency:  daily   Quantity:    Days' supply:  90      Pharmacy Name/Location:  [facility] in [facility] / Please call in today because he is out  and has been out since yesterday.  If there is anything else, Please call in

## 2024-03-11 ENCOUNTER — TELEPHONE (OUTPATIENT)
Dept: ORTHOPEDIC SURGERY | Age: 59
End: 2024-03-11

## 2024-03-11 PROCEDURE — 93295 DEV INTERROG REMOTE 1/2/MLT: CPT | Performed by: INTERNAL MEDICINE

## 2024-03-11 PROCEDURE — 93296 REM INTERROG EVL PM/IDS: CPT | Performed by: INTERNAL MEDICINE

## 2024-03-12 ENCOUNTER — OFFICE VISIT (OUTPATIENT)
Dept: ORTHOPEDIC SURGERY | Age: 59
End: 2024-03-12
Payer: MEDICARE

## 2024-03-12 VITALS — BODY MASS INDEX: 25.2 KG/M2 | HEIGHT: 71 IN | WEIGHT: 180 LBS

## 2024-03-12 DIAGNOSIS — S52.531A CLOSED COLLES' FRACTURE OF RIGHT RADIUS, INITIAL ENCOUNTER: Primary | ICD-10-CM

## 2024-03-12 PROCEDURE — 3017F COLORECTAL CA SCREEN DOC REV: CPT | Performed by: ORTHOPAEDIC SURGERY

## 2024-03-12 PROCEDURE — G8427 DOCREV CUR MEDS BY ELIG CLIN: HCPCS | Performed by: ORTHOPAEDIC SURGERY

## 2024-03-12 PROCEDURE — G8419 CALC BMI OUT NRM PARAM NOF/U: HCPCS | Performed by: ORTHOPAEDIC SURGERY

## 2024-03-12 PROCEDURE — G8484 FLU IMMUNIZE NO ADMIN: HCPCS | Performed by: ORTHOPAEDIC SURGERY

## 2024-03-12 PROCEDURE — 1036F TOBACCO NON-USER: CPT | Performed by: ORTHOPAEDIC SURGERY

## 2024-03-12 PROCEDURE — 99205 OFFICE O/P NEW HI 60 MIN: CPT | Performed by: ORTHOPAEDIC SURGERY

## 2024-03-12 RX ORDER — OXYCODONE HYDROCHLORIDE AND ACETAMINOPHEN 5; 325 MG/1; MG/1
1 TABLET ORAL EVERY 4 HOURS PRN
Qty: 12 TABLET | Refills: 0 | Status: SHIPPED | OUTPATIENT
Start: 2024-03-12 | End: 2024-03-15 | Stop reason: HOSPADM

## 2024-03-12 NOTE — PROGRESS NOTES
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  He has multiple medical comorbidities, most significantly, his CHF so will obtain cardiology clearance. Based on review of his medical record, he has experienced multiple syncopal events, and it is likely that this was the cause of his current injury. He is requesting pain medication. He has an appointment with his pain management specialist today. I have given a limited prescription for percocet, and cautioned him not to take this is combination with his norco. He should discuss this with his pain management specialist.      Patient understands risks and benefits of open reduction with internal fixation of right distal radius fracture including but not limited to nerve injury, vessel injury, infection, failure to achieve desired results and possible need for additional surgery. Patient understands and wishes to proceed with surgery.     On Exam:   The patient is alert and oriented  Cardiovascular: regular rate and rhythm  Respiratory: Non labored breathing       Patient voiced accordance and understanding of the information provided and the formulated plan. All questions were answered to the patient's satisfaction during the encounter.        Gisele Salinas MD  Orthopaedic Surgery  03/12/24  1:56 PM

## 2024-03-12 NOTE — H&P (VIEW-ONLY)
Orthopaedic Hand Clinic Note    Name: Drake Melchor  YOB: 1965  Gender: male  MRN: [id number]      CC: Patient referred for evaluation of hand pain    HPI: Drake Melchor is a 59 y.o. male with a chief complaint of right wrist pain. The injury occurred 5 days ago. The patient states that he stood up, and does not know why he fell. The pain is located diffusely about the wrist. Denies numbness or paresthesias of the fingers. Evaluation has included x-rays. Treatment to date has included splint. Denies loss of consciousness, head injury or neck pain. He has had multiple syncopal/near syncopal events. He has a history of CHF, liver cirrhosis , DVT, SVT. He is in pain management.       ROS/Meds/PSH/PMH/FH/SH: I personally reviewed the patients standard intake form.  Pertinents are discussed in the HPI    Physical Examination  Musculoskeletal Exam:  Examination of the left upper extremity demonstrates no open wounds. Sensation is intact throughout, cap refill in all fingers < 5 seconds. Finger motion is limited with moderate swelling of the hand and fingers. Tenderness over left  distal radius. positive tenderness over the ulnar aspect of the wrist. No tenderness at elbow, anatomic snuffbox, hand, digits    Imaging / Electrodiagnostic Tests:     I independently reviewed and interpreted radiographs of the left wrist.  They demonstrate dorsally angulated and displaced distal radius fracture which appears extraarticular, ulnar styloid fracture    Assessment:     ICD-10-CM    1. Closed Colles' fracture of right radius, initial encounter  S52.531A oxyCODONE-acetaminophen (PERCOCET) 5-325 MG per tablet     Case Request     Ambulatory referral to Occupational Therapy          Plan:   We discussed the diagnosis and different treatment options. We discussed observation, casting, further imaging, and surgical fixation and the risks, benefits and alternatives of all these options.  He understands that nonsurgical  treatment will result in malunion with permanent deformity and impairment of wrist function    After discussing in detail the patient elects to proceed with surgical treatment.  He has multiple medical comorbidities, most significantly, his CHF so will obtain cardiology clearance. Based on review of his medical record, he has experienced multiple syncopal events, and it is likely that this was the cause of his current injury. He is requesting pain medication. He has an appointment with his pain management specialist today. I have given a limited prescription for percocet, and cautioned him not to take this is combination with his norco. He should discuss this with his pain management specialist.      Patient understands risks and benefits of open reduction with internal fixation of right distal radius fracture including but not limited to nerve injury, vessel injury, infection, failure to achieve desired results and possible need for additional surgery. Patient understands and wishes to proceed with surgery.     On Exam:   The patient is alert and oriented  Cardiovascular: regular rate and rhythm  Respiratory: Non labored breathing       Patient voiced accordance and understanding of the information provided and the formulated plan. All questions were answered to the patient's satisfaction during the encounter.        Gisele Salinas MD  Orthopaedic Surgery  03/12/24  1:56 PM

## 2024-03-13 ENCOUNTER — TELEPHONE (OUTPATIENT)
Age: 59
End: 2024-03-13

## 2024-03-13 ENCOUNTER — TELEPHONE (OUTPATIENT)
Dept: ORTHOPEDIC SURGERY | Age: 59
End: 2024-03-13

## 2024-03-13 NOTE — TELEPHONE ENCOUNTER
Informed Mell, Surgery Scheduler POA of clearance note in Epic. Return call, prn.  Mell confirmed can see note. She will update MD staff.   cgh

## 2024-03-13 NOTE — TELEPHONE ENCOUNTER
I called and spoke with patient, he states he missed a call from our office.  I advised that I am not sure who would have called him, but that it may have been a reminder call for his appointment tomorrow with Nilam Medina.  Patient also states he hasn't heard anything about his surgery for Friday.  I advised him we are waiting on Dr. Florentino's office to let us know about his cardiac clearance and we will call him either today or tomorrow with more information.  Patient voiced understanding.

## 2024-03-13 NOTE — TELEPHONE ENCOUNTER
Patient having Open reduction internal fixation right distal radius fracture on 03/15/24 with Dr. Salinas  under Axillary block. Requesting Risk assessment and any medication hold. Fax: [phone]

## 2024-03-14 ENCOUNTER — OFFICE VISIT (OUTPATIENT)
Dept: ORTHOPEDIC SURGERY | Age: 59
End: 2024-03-14
Payer: MEDICARE

## 2024-03-14 ENCOUNTER — ANESTHESIA EVENT (OUTPATIENT)
Dept: SURGERY | Age: 59
End: 2024-03-14
Payer: MEDICARE

## 2024-03-14 VITALS — BODY MASS INDEX: 23.38 KG/M2 | HEIGHT: 71 IN | WEIGHT: 167 LBS

## 2024-03-14 DIAGNOSIS — M51.36 DDD (DEGENERATIVE DISC DISEASE), LUMBAR: ICD-10-CM

## 2024-03-14 DIAGNOSIS — M25.562 LEFT KNEE PAIN, UNSPECIFIED CHRONICITY: ICD-10-CM

## 2024-03-14 DIAGNOSIS — M47.816 LUMBAR SPONDYLOSIS: ICD-10-CM

## 2024-03-14 DIAGNOSIS — M54.50 LOW BACK PAIN, UNSPECIFIED BACK PAIN LATERALITY, UNSPECIFIED CHRONICITY, UNSPECIFIED WHETHER SCIATICA PRESENT: ICD-10-CM

## 2024-03-14 DIAGNOSIS — M43.16 SPONDYLOLISTHESIS OF LUMBAR REGION: ICD-10-CM

## 2024-03-14 DIAGNOSIS — M25.561 RIGHT KNEE PAIN, UNSPECIFIED CHRONICITY: Primary | ICD-10-CM

## 2024-03-14 PROCEDURE — G8484 FLU IMMUNIZE NO ADMIN: HCPCS | Performed by: PHYSICIAN ASSISTANT

## 2024-03-14 PROCEDURE — 3017F COLORECTAL CA SCREEN DOC REV: CPT | Performed by: PHYSICIAN ASSISTANT

## 2024-03-14 PROCEDURE — G8427 DOCREV CUR MEDS BY ELIG CLIN: HCPCS | Performed by: PHYSICIAN ASSISTANT

## 2024-03-14 PROCEDURE — 1036F TOBACCO NON-USER: CPT | Performed by: PHYSICIAN ASSISTANT

## 2024-03-14 PROCEDURE — G8420 CALC BMI NORM PARAMETERS: HCPCS | Performed by: PHYSICIAN ASSISTANT

## 2024-03-14 PROCEDURE — 99204 OFFICE O/P NEW MOD 45 MIN: CPT | Performed by: PHYSICIAN ASSISTANT
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
## 2024-03-14 NOTE — PERIOP NOTE
Preop department called to notify patient of arrival time for scheduled procedure. Instructions given to   - Arrive at OPC Entrance [address].  - Remain NPO after midnight, unless otherwise indicated, including gum, mints, and ice chips.   - Have a responsible adult to drive patient to the hospital, stay during surgery, and patient will need supervision 24 hours after anesthesia.   - Use antibacterial soap in shower the night before surgery and on the morning of surgery.       Was patient contacted: yes, spoke to patient  Voicemail left:   Numbers contacted: [phone]   Arrival time: 0900

## 2024-03-14 NOTE — PROGRESS NOTES
Name: Drake Melchor  YOB: 1965  Gender: male  MRN: [id number]    CC:   Chief Complaint   Patient presents with    Knee Pain     Osito knees         HPI: Drake Melchor is a 59 y.o. male  has a past medical history of Acid reflux, Acute gastroenteritis, Acute on chronic systolic heart failure (HCC), Acute respiratory failure due to COVID-19 (HCC), Acute systolic congestive heart failure (HCC), Acute systolic heart failure (HCC), AGE (acute gastroenteritis), ELIZABETH (acute kidney injury) (HCC), Anorexia, Anxiety associated with depression, Arthritis, CAD (coronary artery disease), CHF (congestive heart failure) ([facility]), Chronic alcoholism (HCC), Chronic back pain, Chronic neck pain, Chronic systolic heart failure (HCC), Demand ischemia, Dental caries, Depression, Elevated brain natriuretic peptide (BNP) level, Generalized abdominal pain, GERD (gastroesophageal reflux disease), Gynecomastia, Heart failure ([facility]), Hiatal hernia, History of blood clots, Hyperbilirubinemia, Hypertension, Hypokalemia, Hypomagnesemia, ICD (implantable cardioverter-defibrillator) in place, Leukopenia, Macrocytic anemia, NSTEMI (non-ST elevated myocardial infarction) ([facility]), Schatzki's ring, Situational depression, SVT (supraventricular tachycardia), Tachycardia, and Ventricular tachycardia ([facility]).   here for evaluation of bilateral knee pain.  The pain has been present for years and is becoming worse. The pain is really generalized in both knees and radiates up the thighs. He denies any groin pain.   he describes the pain as dull, aching, and fatiguing  Patient's states after even short distances of walking his knees and thighs just feel very weak and tired.  The pain is worse with going up and/or down stairs, standing, and walking  He notes it is much easier for him to walk longer distances if he has a shopping cart to lean over.  Treatment so far has been activity modification, Tylenol, ibuprofen, ice, and heat with little relief.  Patient

## 2024-03-15 ENCOUNTER — APPOINTMENT (OUTPATIENT)
Dept: GENERAL RADIOLOGY | Age: 59
End: 2024-03-15
Attending: ORTHOPAEDIC SURGERY
Payer: MEDICARE

## 2024-03-15 ENCOUNTER — TELEPHONE (OUTPATIENT)
Dept: ORTHOPEDIC SURGERY | Age: 59
End: 2024-03-15

## 2024-03-15 ENCOUNTER — HOSPITAL ENCOUNTER (OUTPATIENT)
Age: 59
Setting detail: OUTPATIENT SURGERY
Discharge: HOME | End: 2024-03-15
Attending: ORTHOPAEDIC SURGERY | Admitting: ORTHOPAEDIC SURGERY
Payer: MEDICARE

## 2024-03-15 ENCOUNTER — ANESTHESIA (OUTPATIENT)
Dept: SURGERY | Age: 59
End: 2024-03-15
Payer: MEDICARE

## 2024-03-15 VITALS
TEMPERATURE: 99.5 F | DIASTOLIC BLOOD PRESSURE: 75 MMHG | HEART RATE: 66 BPM | SYSTOLIC BLOOD PRESSURE: 157 MMHG | RESPIRATION RATE: 18 BRPM | OXYGEN SATURATION: 99 % | WEIGHT: 180 LBS | BODY MASS INDEX: 25.2 KG/M2 | HEIGHT: 71 IN

## 2024-03-15 DIAGNOSIS — S52.531A CLOSED COLLES' FRACTURE OF RIGHT RADIUS, INITIAL ENCOUNTER: Primary | ICD-10-CM

## 2024-03-15 LAB — POTASSIUM BLD-SCNC: 5.1 MMOL/L (ref 3.5–5.1)

## 2024-03-15 PROCEDURE — 7100000000 HC PACU RECOVERY - FIRST 15 MIN: Performed by: ORTHOPAEDIC SURGERY

## 2024-03-15 PROCEDURE — 6370000000 HC RX 637 (ALT 250 FOR IP): Performed by: ANESTHESIOLOGY

## 2024-03-15 PROCEDURE — 64415 NJX AA&/STRD BRCH PLXS IMG: CPT | Performed by: ANESTHESIOLOGY

## 2024-03-15 PROCEDURE — 6360000002 HC RX W HCPCS: Performed by: ANESTHESIOLOGY

## 2024-03-15 PROCEDURE — 7100000001 HC PACU RECOVERY - ADDTL 15 MIN: Performed by: ORTHOPAEDIC SURGERY

## 2024-03-15 PROCEDURE — 3600000004 HC SURGERY LEVEL 4 BASE: Performed by: ORTHOPAEDIC SURGERY

## 2024-03-15 PROCEDURE — 7100000010 HC PHASE II RECOVERY - FIRST 15 MIN: Performed by: ORTHOPAEDIC SURGERY

## 2024-03-15 PROCEDURE — 2709999900 HC NON-CHARGEABLE SUPPLY: Performed by: ORTHOPAEDIC SURGERY

## 2024-03-15 PROCEDURE — 2500000003 HC RX 250 WO HCPCS

## 2024-03-15 PROCEDURE — 2720000010 HC SURG SUPPLY STERILE: Performed by: ORTHOPAEDIC SURGERY

## 2024-03-15 PROCEDURE — 3700000001 HC ADD 15 MINUTES (ANESTHESIA): Performed by: ORTHOPAEDIC SURGERY

## 2024-03-15 PROCEDURE — 3700000000 HC ANESTHESIA ATTENDED CARE: Performed by: ORTHOPAEDIC SURGERY

## 2024-03-15 PROCEDURE — 84132 ASSAY OF SERUM POTASSIUM: CPT

## 2024-03-15 PROCEDURE — C1713 ANCHOR/SCREW BN/BN,TIS/BN: HCPCS | Performed by: ORTHOPAEDIC SURGERY

## 2024-03-15 PROCEDURE — 6360000002 HC RX W HCPCS

## 2024-03-15 PROCEDURE — 2580000003 HC RX 258: Performed by: ANESTHESIOLOGY

## 2024-03-15 PROCEDURE — 3600000014 HC SURGERY LEVEL 4 ADDTL 15MIN: Performed by: ORTHOPAEDIC SURGERY

## 2024-03-15 PROCEDURE — 6360000002 HC RX W HCPCS: Performed by: ORTHOPAEDIC SURGERY

## 2024-03-15 DEVICE — IMPLANTABLE DEVICE: Type: IMPLANTABLE DEVICE | Site: WRIST | Status: FUNCTIONAL

## 2024-03-15 DEVICE — PEG BNE FIX L18MM DIA2MM DST RAD TI OPT 2 LOK SMOOTH: Type: IMPLANTABLE DEVICE | Site: WRIST | Status: FUNCTIONAL

## 2024-03-15 DEVICE — PLATE BNE 3 H R DST RAD VOLAR TI STD FOR 3.5MM SCR GEMINUS: Type: IMPLANTABLE DEVICE | Site: WRIST | Status: FUNCTIONAL

## 2024-03-15 DEVICE — PEG BNE FIX L20MM DIA2MM DST RAD TI SMOOTH FOR VOLAR: Type: IMPLANTABLE DEVICE | Site: WRIST | Status: FUNCTIONAL

## 2024-03-15 DEVICE — PEG BNE FIX L18MM DIA2.7MM DST RAD TI THRD NONLOCKING FOR: Type: IMPLANTABLE DEVICE | Site: WRIST | Status: FUNCTIONAL

## 2024-03-15 DEVICE — SCREW BNE L18MM DIA3.5MM CORT DST RAD VOLAR TI NONLOCKING: Type: IMPLANTABLE DEVICE | Site: WRIST | Status: FUNCTIONAL

## 2024-03-15 RX ORDER — SODIUM CHLORIDE 9 MG/ML
INJECTION, SOLUTION INTRAVENOUS PRN
Status: DISCONTINUED | OUTPATIENT
Start: 2024-03-15 | End: 2024-03-15 | Stop reason: HOSPADM

## 2024-03-15 RX ORDER — PROCHLORPERAZINE EDISYLATE 5 MG/ML
5 INJECTION INTRAMUSCULAR; INTRAVENOUS
Status: DISCONTINUED | OUTPATIENT
Start: 2024-03-15 | End: 2024-03-15 | Stop reason: HOSPADM

## 2024-03-15 RX ORDER — SODIUM CHLORIDE 0.9 % (FLUSH) 0.9 %
5-40 SYRINGE (ML) INJECTION PRN
Status: DISCONTINUED | OUTPATIENT
Start: 2024-03-15 | End: 2024-03-15 | Stop reason: HOSPADM

## 2024-03-15 RX ORDER — SODIUM CHLORIDE 0.9 % (FLUSH) 0.9 %
5-40 SYRINGE (ML) INJECTION EVERY 12 HOURS SCHEDULED
Status: DISCONTINUED | OUTPATIENT
Start: 2024-03-15 | End: 2024-03-15 | Stop reason: HOSPADM

## 2024-03-15 RX ORDER — ACETAMINOPHEN 500 MG
1000 TABLET ORAL ONCE
Status: COMPLETED | OUTPATIENT
Start: 2024-03-15 | End: 2024-03-15

## 2024-03-15 RX ORDER — HALOPERIDOL 5 MG/ML
1 INJECTION INTRAMUSCULAR
Status: DISCONTINUED | OUTPATIENT
Start: 2024-03-15 | End: 2024-03-15 | Stop reason: HOSPADM

## 2024-03-15 RX ORDER — LIDOCAINE HYDROCHLORIDE 20 MG/ML
INJECTION, SOLUTION EPIDURAL; INFILTRATION; INTRACAUDAL; PERINEURAL PRN
Status: DISCONTINUED | OUTPATIENT
Start: 2024-03-15 | End: 2024-03-15 | Stop reason: SDUPTHER

## 2024-03-15 RX ORDER — OXYCODONE HYDROCHLORIDE 5 MG/1
5 TABLET ORAL
Status: DISCONTINUED | OUTPATIENT
Start: 2024-03-15 | End: 2024-03-15 | Stop reason: HOSPADM

## 2024-03-15 RX ORDER — NALOXONE HYDROCHLORIDE 0.4 MG/ML
INJECTION, SOLUTION INTRAMUSCULAR; INTRAVENOUS; SUBCUTANEOUS PRN
Status: DISCONTINUED | OUTPATIENT
Start: 2024-03-15 | End: 2024-03-15 | Stop reason: HOSPADM

## 2024-03-15 RX ORDER — OXYCODONE HYDROCHLORIDE AND ACETAMINOPHEN 5; 325 MG/1; MG/1
1 TABLET ORAL EVERY 4 HOURS PRN
Qty: 30 TABLET | Refills: 0 | Status: SHIPPED | OUTPATIENT
Start: 2024-03-15 | End: 2024-03-22

## 2024-03-15 RX ORDER — PROPOFOL 10 MG/ML
INJECTION, EMULSION INTRAVENOUS CONTINUOUS PRN
Status: DISCONTINUED | OUTPATIENT
Start: 2024-03-15 | End: 2024-03-15 | Stop reason: SDUPTHER

## 2024-03-15 RX ORDER — HYDROMORPHONE HYDROCHLORIDE 2 MG/ML
0.5 INJECTION, SOLUTION INTRAMUSCULAR; INTRAVENOUS; SUBCUTANEOUS EVERY 5 MIN PRN
Status: DISCONTINUED | OUTPATIENT
Start: 2024-03-15 | End: 2024-03-15 | Stop reason: HOSPADM

## 2024-03-15 RX ORDER — SODIUM CHLORIDE, SODIUM LACTATE, POTASSIUM CHLORIDE, CALCIUM CHLORIDE 600; 310; 30; 20 MG/100ML; MG/100ML; MG/100ML; MG/100ML
INJECTION, SOLUTION INTRAVENOUS CONTINUOUS
Status: DISCONTINUED | OUTPATIENT
Start: 2024-03-15 | End: 2024-03-15 | Stop reason: HOSPADM

## 2024-03-15 RX ORDER — IPRATROPIUM BROMIDE AND ALBUTEROL SULFATE 2.5; .5 MG/3ML; MG/3ML
1 SOLUTION RESPIRATORY (INHALATION)
Status: DISCONTINUED | OUTPATIENT
Start: 2024-03-15 | End: 2024-03-15 | Stop reason: HOSPADM

## 2024-03-15 RX ORDER — MIDAZOLAM HYDROCHLORIDE 2 MG/2ML
2 INJECTION, SOLUTION INTRAMUSCULAR; INTRAVENOUS
Status: COMPLETED | OUTPATIENT
Start: 2024-03-15 | End: 2024-03-15

## 2024-03-15 RX ORDER — LIDOCAINE HYDROCHLORIDE 10 MG/ML
1 INJECTION, SOLUTION INFILTRATION; PERINEURAL
Status: DISCONTINUED | OUTPATIENT
Start: 2024-03-15 | End: 2024-03-15 | Stop reason: HOSPADM

## 2024-03-15 RX ORDER — DEXAMETHASONE SODIUM PHOSPHATE 10 MG/ML
INJECTION, SOLUTION INTRAMUSCULAR; INTRAVENOUS
Status: COMPLETED | OUTPATIENT
Start: 2024-03-15 | End: 2024-03-15

## 2024-03-15 RX ORDER — FENTANYL CITRATE 50 UG/ML
100 INJECTION, SOLUTION INTRAMUSCULAR; INTRAVENOUS
Status: COMPLETED | OUTPATIENT
Start: 2024-03-15 | End: 2024-03-15

## 2024-03-15 RX ADMIN — EPINEPHRINE 15 ML: 1 INJECTION, SOLUTION, CONCENTRATE INTRAVENOUS at 09:51

## 2024-03-15 RX ADMIN — MIDAZOLAM 2 MG: 1 INJECTION INTRAMUSCULAR; INTRAVENOUS at 09:51

## 2024-03-15 RX ADMIN — LIDOCAINE HYDROCHLORIDE 50 MG: 20 INJECTION, SOLUTION EPIDURAL; INFILTRATION; INTRACAUDAL; PERINEURAL at 10:40

## 2024-03-15 RX ADMIN — HYDROMORPHONE HYDROCHLORIDE 0.5 MG: 2 INJECTION INTRAMUSCULAR; INTRAVENOUS; SUBCUTANEOUS at 11:55

## 2024-03-15 RX ADMIN — SODIUM CHLORIDE, POTASSIUM CHLORIDE, SODIUM LACTATE AND CALCIUM CHLORIDE: 600; 310; 30; 20 INJECTION, SOLUTION INTRAVENOUS at 09:35

## 2024-03-15 RX ADMIN — ROPIVACAINE HYDROCHLORIDE 15 ML: 5 INJECTION, SOLUTION EPIDURAL; INFILTRATION; PERINEURAL at 09:51

## 2024-03-15 RX ADMIN — FENTANYL CITRATE 50 MCG: 50 INJECTION INTRAMUSCULAR; INTRAVENOUS at 09:51

## 2024-03-15 RX ADMIN — PROPOFOL 100 MCG/KG/MIN: 10 INJECTION, EMULSION INTRAVENOUS at 10:40

## 2024-03-15 RX ADMIN — DEXAMETHASONE SODIUM PHOSPHATE 4 MG: 10 INJECTION, SOLUTION INTRAMUSCULAR; INTRAVENOUS at 09:51

## 2024-03-15 RX ADMIN — ACETAMINOPHEN 1000 MG: 500 TABLET, FILM COATED ORAL at 09:35

## 2024-03-15 RX ADMIN — HYDROMORPHONE HYDROCHLORIDE 0.5 MG: 2 INJECTION INTRAMUSCULAR; INTRAVENOUS; SUBCUTANEOUS at 11:50

## 2024-03-15 RX ADMIN — Medication 2000 MG: at 10:42

## 2024-03-15 ASSESSMENT — PAIN DESCRIPTION - DESCRIPTORS: DESCRIPTORS: ACHING

## 2024-03-15 ASSESSMENT — PAIN - FUNCTIONAL ASSESSMENT
PAIN_FUNCTIONAL_ASSESSMENT: 0-10
PAIN_FUNCTIONAL_ASSESSMENT: PREVENTS OR INTERFERES SOME ACTIVE ACTIVITIES AND ADLS

## 2024-03-15 ASSESSMENT — ENCOUNTER SYMPTOMS: SHORTNESS OF BREATH: 1

## 2024-03-15 NOTE — PERIOP NOTE
PACU DISCHARGE NOTE    Vital signs stable, pain well controlled, alert and oriented times three or at baseline, follow up per surgeon, no anesthetic complications.

## 2024-03-15 NOTE — ANESTHESIA PRE PROCEDURE
05:58 AM    AST 96 11/12/2023 10:37 PM    ALT 71 11/12/2023 10:37 PM       POC Tests:   Recent Labs     03/15/24  0925   POCK 5.1       Coags:   Lab Results   Component Value Date/Time    PROTIME 14.2 05/05/2022 05:58 AM    PROTIME 13.5 05/05/2022 05:42 AM    INR 1.1 05/05/2022 05:58 AM    INR 1.1 05/05/2022 05:42 AM    APTT 28.6 08/24/2021 09:33 AM       HCG (If Applicable): No results found for: \"PREGTESTUR\", \"PREGSERUM\", \"HCG\", \"HCGQUANT\"     ABGs:   Lab Results   Component Value Date/Time    PHART 7.46 07/03/2020 12:05 AM    PO2ART 84 07/03/2020 12:05 AM    FQQ0XJH 36.1 07/03/2020 12:05 AM    TTG3KPO 25.7 07/03/2020 12:05 AM    BEART 2 07/03/2020 12:05 AM        Type & Screen (If Applicable):  No results found for: \"LABABO\", \"LABRH\"    Drug/Infectious Status (If Applicable):  Lab Results   Component Value Date/Time    HEPCAB NONREACTIVE 11/15/2023 12:20 PM       COVID-19 Screening (If Applicable):   Lab Results   Component Value Date/Time    COVID19 NOT DETECTED 11/02/2023 12:36 AM           Anesthesia Evaluation  Patient summary reviewed and Nursing notes reviewed  Airway: Mallampati: II     Neck ROM: full     Dental: normal exam         Pulmonary: breath sounds clear to auscultation  (+)  COPD:  shortness of breath:                                    Cardiovascular:    (+) hypertension:, angina:, pacemaker: AICD, dysrhythmias: SVT and ventricular tachycardia, CHF: systolic        Rhythm: regular                   ROS comment: EF 20%     Neuro/Psych:   (+) depression/anxiety             GI/Hepatic/Renal:   (+) GERD:          Endo/Other:                     Abdominal:             Vascular:   + DVT.       Other Findings:             Anesthesia Plan      TIVA     ASA 4       Induction: intravenous.      Anesthetic plan and risks discussed with patient.              Post-op pain plan if not by surgeon: single peripheral nerve block            EVAN MARTIN MD   3/15/2024

## 2024-03-15 NOTE — TELEPHONE ENCOUNTER
They received a Rx for Oxycodone. Patient is already on hydrocodone qid from  a [facility]. Do you still want him to have the Oxy? Please give them a call.

## 2024-03-15 NOTE — ANESTHESIA PROCEDURE NOTES
(ANESTHESIA USE ONLY) (Mixture components: EPINEPHrine PF 1 MG/ML Soln, 0.005 mL; mepivacaine 1.5 % Soln, 1 mL) - Perineural   15 mL - 3/15/2024 9:51:00 AM  ROPivacaine 0.5% with EPINEPHrine 1:[id number] injection (ANESTHESIA USE ONLY) (Mixture components: EPINEPHrine PF 1 MG/ML Soln, 0.005 mL; ROPivacaine 0.5% Soln, 1 mL) - Perineural   15 mL - 3/15/2024 9:51:00 AM

## 2024-03-15 NOTE — DISCHARGE INSTRUCTIONS
next office visit.     ACTIVITY  As tolerated and as directed by your doctor.   Bathe or shower as directed by your doctor.     DIET  Clear liquids until no nausea or vomiting; then light diet for the first day.  Advance to regular diet on second day, unless your doctor orders otherwise.   If nausea and vomiting continues, call your doctor.     PAIN  Take pain medication as directed by your doctor.   Call your doctor if pain is NOT relieved by medication.   DO NOT take aspirin of blood thinners unless directed by your doctor.     DRESSING CARE       CALL YOUR DOCTOR IF   Excessive bleeding that does not stop after holding pressure over the area  Temperature of 101 degrees F or above  Excessive redness, swelling or bruising, and/ or green or yellow, smelly discharge from incision    AFTER ANESTHESIA   For the first 24 hours: DO NOT Drive, Drink alcoholic beverages, or Make important decisions.   Be aware of dizziness following anesthesia and while taking pain medication.     APPOINTMENT DATE/ TIME    YOUR DOCTOR'S PHONE NUMBER       DISCHARGE SUMMARY from Nurse    PATIENT INSTRUCTIONS:    After general anesthesia or intravenous sedation, for 24 hours or while taking prescription Narcotics:  Limit your activities  Do not drive and operate hazardous machinery  Do not make important personal or business decisions  Do  not drink alcoholic beverages  If you have not urinated within 8 hours after discharge, please contact your surgeon on call.    *  Please give a list of your current medications to your Primary Care Provider.    *  Please update this list whenever your medications are discontinued, doses are      changed, or new medications (including over-the-counter products) are added.    *  Please carry medication information at all times in case of emergency situations.      These are general instructions for a healthy lifestyle:    No smoking/ No tobacco products/ Avoid exposure to second hand smoke    Surgeon  General's Warning:  Quitting smoking now greatly reduces serious risk to your health.    Obesity, smoking, and sedentary lifestyle greatly increases your risk for illness    A healthy diet, regular physical exercise & weight monitoring are important for maintaining a healthy lifestyle    You may be retaining fluid if you have a history of heart failure or if you experience any of the following symptoms:  Weight gain of 3 pounds or more overnight or 5 pounds in a week, increased swelling in our hands or feet or shortness of breath while lying flat in bed.  Please call your doctor as soon as you notice any of these symptoms; do not wait until your next office visit.    Recognize signs and symptoms of STROKE:    F-face looks uneven    A-arms unable to move or move unevenly    S-speech slurred or non-existent    T-time-call 911 as soon as signs and symptoms begin-DO NOT go       Back to bed or wait to see if you get better-TIME IS BRAIN.

## 2024-03-15 NOTE — OP NOTE
OPERATIVE NOTE    Drake Melchor   [id number]  3/15/2024     PRE-OP DIAGNOSIS: Fracture, Colles, right, closed [S52.531A]       POST-OP DIAGNOSIS: * No post-op diagnosis entered *    LATERALITY: Right    PROCEDURES PERFORMED:  Open treatment of Right two part extra-articular distal radius fracture with internal fixation CPT 84987      SURGEON:  Gisele Salinas MD    IMPLANTS:  Implant Name Type Inv. Item Serial No.  Lot No. LRB No. Used Action   PLATE BNE 3 H R DST RAD VOLAR TI STD FOR 3.5MM SCR GEMINUS - [id number]  PLATE BNE 3 H R DST RAD VOLAR TI STD FOR 3.5MM SCR GEMINUS  SKELETAL DYNAMICS [facility] [id number] Right 1 Implanted   PLATE BNE 3 H R DST RAD VOLAR TI STD FOR 3.5MM SCR GEMINUS - [id number]  PLATE BNE 3 H R DST RAD VOLAR TI STD FOR 3.5MM SCR GEMINUS  SKELETAL DYNAMICS [facility] [id number] Right 1 Implanted   SCREW BNE L18MM DIA3.5MM FRANCO DST RAD VOLAR TI NONLOCKING - [id number]  SCREW BNE L18MM DIA3.5MM FRANCO DST RAD VOLAR TI NONLOCKING  SKELETAL DYNAMICS [facility] [id number] Right 1 Implanted   SCREW BONE L15MM DIA3.5MM FRANCO DSTL RAD TI LCK FOR VOLAR - [id number]  SCREW BONE L15MM DIA3.5MM FRANCO DSTL RAD TI LCK FOR VOLAR  SKELETAL DYNAMICS [facility] [id number] Right 2 Implanted   PEG BNE FIX L18MM DIA2MM DST RAD TI OPT 2 DUANE SMOOTH - [id number]  PEG BNE FIX L18MM DIA2MM DST RAD TI OPT 2 DUANE SMOOTH  SKELETAL DYNAMICS [facility] [id number] Right 2 Implanted   PEG BNE FIX L20MM DIA2MM DST RAD TI SMOOTH FOR VOLAR - [id number]  PEG BNE FIX L20MM DIA2MM DST RAD TI SMOOTH FOR VOLAR  SKELETAL DYNAMICS [facility] [id number] Right 4 Implanted   PEG BNE FIX L18MM DIA2.7MM DST RAD TI THRD NONLOCKING FOR - [id number]  PEG BNE FIX L18MM DIA2.7MM DST RAD TI THRD NONLOCKING FOR  SKELETAL DYNAMICS [facility] [id number] Right 1 Implanted       Surgeon(s):  Friend, Gisele E, MD    ANESTHESIA: Regional    ESTIMATED BLOOD LOSS: minimal    COMPLICATIONS: None    TOURNIQUET TIME:   Total Tourniquet Time Documented:  Arm  (Right) - 18  L-shaped fashion and was retracted ulnarly to expose the distal radius.      Dissection was taken down to bone.  The fracture was identified and inspected.    Fracture was reduced with manipulation and placement of a [place] elevator into the fracture to unhinge the volar cortex. Reduction was confirmed with fluoroscopy.  A distal radius plate was chosen and placed along the volar surface.  Once plate position was confirmed, the plate was secured to the bone with naomi wires through the aiming guides.  I then drilled, measured, and placed nonlocking screw into the oblong hole of the plate. Six locking pegs of appropriate length were then placed distally.  The plate was secured at the shaft with two additional locking screws . AP, Lateral, Lunate facet, and skyline views were then obtained to ensure appropriate fracture reduction, and that no screws penetrated the distal radial articular surface or dorsal cortex.  The DRUJ was then assessed, and found to be stable in supination, neutral and pronation.     The tourniquet was deflated and hemostasis was ensured.  The wounds were then thoroughly irrigated and closed in layered fashion with 4-0 vicryl and 4-0 stratafix.    A sterile dressing was applied followed by a Volar splint    The patient was awakened and taken to PACU in stable condition. All sponge and needle counts were correct at the end of the case. I was present and scrubbed for the entire procedure. Fluoroscopy was utilized for this case and images were saved for reference.      DISPOSITION : Home    MECHANICAL VTE (DVT) PROPHYLAXIS: sequential compression devices    CHEMICAL VTE (DVT) PROPHYLAXIS: None warranted for this case    WEIGHT BEARING STATUS:   NWB on the Right upper extremity      FOLLOW-UP: in 10-14 days for suture removal and radiographs      Gisele Salinas MD  Orthopaedic Surgery  03/15/24  1:26 PM

## 2024-03-15 NOTE — ANESTHESIA POSTPROCEDURE EVALUATION
Department of Anesthesiology  Postprocedure Note    Patient: Drake Melchor  MRN: [id number]  YOB: 1965  Date of evaluation: 3/15/2024    Procedure Summary       Date: 03/15/24 Room / Location: [facility] OP OR 02 / [facility]    Anesthesia Start: 1039 Anesthesia Stop: 1130    Procedure: OPEN REDUCTION INTERNAL FIXATION right distal radius/ Biotronik ICD/Pacemaker (Right: Wrist) Diagnosis:       Fracture, Colles, right, closed      (Fracture, Colles, right, closed [S52.531A])    Surgeons: Gisele Salinas MD Responsible Provider: Danie Dupont MD    Anesthesia Type: TIVA ASA Status: 4            Anesthesia Type: TIVA    Sae Phase I: Sae Score: 7    Sae Phase II:      Anesthesia Post Evaluation    Patient location during evaluation: PACU  Patient participation: complete - patient participated  Level of consciousness: awake  Pain score: 0  Airway patency: patent  Nausea & Vomiting: no nausea  Cardiovascular status: hemodynamically stable  Respiratory status: acceptable and nonlabored ventilation  Hydration status: stable  Multimodal analgesia pain management approach    No notable events documented.

## 2024-03-15 NOTE — TELEPHONE ENCOUNTER
I called and spoke with Shantal at [facility], advised her that patient had a bad fracture and had surgery to fix it this morning.  Also advised her that Dr. Salinas is aware of his other meds and has told the patient not to take the hydrocodone with his oxycodone.  Shantal voiced understanding and states she will re-iterate this to the patient.

## 2024-03-28 ENCOUNTER — EVALUATION (OUTPATIENT)
Age: 59
End: 2024-03-28

## 2024-03-28 ENCOUNTER — OFFICE VISIT (OUTPATIENT)
Dept: ORTHOPEDIC SURGERY | Age: 59
End: 2024-03-28

## 2024-03-28 DIAGNOSIS — S52.531A CLOSED COLLES' FRACTURE OF RIGHT RADIUS, INITIAL ENCOUNTER: ICD-10-CM

## 2024-03-28 DIAGNOSIS — S52.531A CLOSED COLLES' FRACTURE OF RIGHT RADIUS, INITIAL ENCOUNTER: Primary | ICD-10-CM

## 2024-03-28 DIAGNOSIS — M25.631 STIFFNESS OF RIGHT WRIST JOINT: Primary | ICD-10-CM

## 2024-03-28 DIAGNOSIS — Z78.9 DECREASED ACTIVITIES OF DAILY LIVING (ADL): ICD-10-CM

## 2024-03-28 DIAGNOSIS — M25.641 STIFFNESS OF FINGER JOINT OF RIGHT HAND: ICD-10-CM

## 2024-03-28 DIAGNOSIS — M25.611 STIFFNESS OF RIGHT SHOULDER JOINT: ICD-10-CM

## 2024-03-28 DIAGNOSIS — M25.60 DECREASED RANGE OF MOTION: ICD-10-CM

## 2024-03-28 PROCEDURE — 99024 POSTOP FOLLOW-UP VISIT: CPT | Performed by: ORTHOPAEDIC SURGERY

## 2024-03-28 NOTE — PROGRESS NOTES
approved this evaluation and plan of care as noted by the electronic signature attached to note.    GOALS       Short term goals:  4/25/2024  (4 weeks)  Patient will be I with HEP and use of orthosis.   Increase AROM affected wrist flexion to  45 ° to improve function  Increase AROM affected wrist extension to  50 ° to improve function  Increase AROM of affected wrist UD/RD by 15° to improve function with activities like opening containers/bottles.  Increase AROM affected forearm supination to  80 ° to improve function with ADL's.  Increase AROM affected forearm pronation to 80 ° to improve function with ADL's.  Increase elbow ext/flexion to full on affected side to improve function with feeding/facial self care and activities that require reach.  Increase active composite digit flexion to full on affected side to improve ability to grasp and hold objects.  Pt will increase active thumb opposition on affected side so pt lacks no more than 1.0 cm to base of fifth digit to improve grasp.  Pts Quick DASH functional assessment score will be less than 30 % functional deficit       Long term goals: 6/26/2024 (90 days)  Pt will have 55 °  of active wrist ext in the affected extremity for ability to perform activities like grooming and typing.  Pt will have 50 °  of active wrist flexion in the affected extremity to improve function with writing and grooming.  Pt will have 85 °  of AROM affected forearm supination to improve ability  with tasks like opening doors, hold a plate.  Pt will have 85 °  of AROM affected forearm pronation to improve ability with tasks like writing and placing/acquiring items on tabletop.  Pt will have at least 25 lbs of  strength in hand on affected side to improve ability to grasp and hold an object.  Minimal edema in affected hand/wrist/forearm  Pt will be able to lift and carry items (ie grocery bags, laundry basket etc) with affected UE pain 2 of 10 or less.  Pt will be able to sleep

## 2024-03-28 NOTE — PROGRESS NOTES
Orthopaedic Hand Surgery Note    Name: Drake Melchor  Age: 59 y.o.  YOB: 1965  Gender: male  MRN: [id number]    Post Operative Visit: OPEN REDUCTION INTERNAL FIXATION right distal radius/ Biotronik ICD/Pacemaker - Right    HPI: Patient is status post OPEN REDUCTION INTERNAL FIXATION right distal radius/ Biotronik ICD/Pacemaker - Right on 3/15/2024. Doing well. Pleased with current progress. Denies fevers or chills. He had already removed the fiberglass portion of his surgical dressing    Physical Examination:  Wound healing well. Sensation is intact in all fingers. Motor exam reveals no deficits. good finger range of motion. Limited wrist motion due to pain.    Imaging:     Wrist  XR: AP, Lateral, Oblique and wrist facet view     Clinical Indication:  1. Closed Colles' fracture of right radius, initial encounter         Report: AP, lateral, oblique and wrist facet x-ray on the right demonstrates distal radius fracture status post plate and screw osteosynthesis in acceptable alignment, no hardware complication is noted    Impression: Distal radius fracture status post osteosynthesis as described above     Gisele Salinas MD     Assessment:   1. Closed Colles' fracture of right radius, initial encounter         Status post OPEN REDUCTION INTERNAL FIXATION right distal radius/ Biotronik ICD/Pacemaker - Right on 3/15/2024    Plan:  We discussed the treatment and therapy plan. Patient will start occupational therapy to avoid stiffness. We will continue protective splinting at all times until 6 weeks post-op and after 6 weeks only in unprotected environments. Therapy will focus on motion, edema control and scar management. Therapy will progress into strengthening phase six weeks after surgery pending my examination at that time. Patient will avoid lifting, pushing or pulling more than 2 lbs until next assessment and XRs in 4 weeks.      Gisele Salinas MD  03/28/24  9:42 AM

## 2024-04-01 ENCOUNTER — TREATMENT (OUTPATIENT)
Age: 59
End: 2024-04-01
Payer: MEDICARE

## 2024-04-01 DIAGNOSIS — M25.611 STIFFNESS OF RIGHT SHOULDER JOINT: ICD-10-CM

## 2024-04-01 DIAGNOSIS — M25.531 RIGHT WRIST PAIN: ICD-10-CM

## 2024-04-01 DIAGNOSIS — Z78.9 DECREASED ACTIVITIES OF DAILY LIVING (ADL): ICD-10-CM

## 2024-04-01 DIAGNOSIS — M25.60 DECREASED RANGE OF MOTION: ICD-10-CM

## 2024-04-01 DIAGNOSIS — M25.631 STIFFNESS OF RIGHT WRIST JOINT: Primary | ICD-10-CM

## 2024-04-01 PROCEDURE — 97010 HOT OR COLD PACKS THERAPY: CPT

## 2024-04-01 PROCEDURE — 97110 THERAPEUTIC EXERCISES: CPT

## 2024-04-01 NOTE — PROGRESS NOTES
2 sets - 10 reps  - Thumb AROM IP Blocking  - 5 x daily - 7 x weekly - 2 sets - 10 reps  OT Protocols

## 2024-04-04 ENCOUNTER — HOSPITAL ENCOUNTER (OUTPATIENT)
Dept: INTERVENTIONAL RADIOLOGY/VASCULAR | Age: 59
Discharge: HOME | End: 2024-04-07

## 2024-04-04 ENCOUNTER — OFFICE VISIT (OUTPATIENT)
Dept: ORTHOPEDIC SURGERY | Age: 59
End: 2024-04-04

## 2024-04-04 ENCOUNTER — HOSPITAL ENCOUNTER (OUTPATIENT)
Dept: CT IMAGING | Age: 59
Discharge: HOME | End: 2024-04-07

## 2024-04-04 ENCOUNTER — TREATMENT (OUTPATIENT)
Age: 59
End: 2024-04-04
Payer: MEDICARE

## 2024-04-04 VITALS — HEART RATE: 65 BPM | OXYGEN SATURATION: 90 % | DIASTOLIC BLOOD PRESSURE: 82 MMHG | SYSTOLIC BLOOD PRESSURE: 128 MMHG

## 2024-04-04 DIAGNOSIS — M25.631 STIFFNESS OF RIGHT WRIST JOINT: Primary | ICD-10-CM

## 2024-04-04 DIAGNOSIS — S52.531A CLOSED COLLES' FRACTURE OF RIGHT RADIUS, INITIAL ENCOUNTER: Primary | ICD-10-CM

## 2024-04-04 DIAGNOSIS — M54.50 LOW BACK PAIN, UNSPECIFIED BACK PAIN LATERALITY, UNSPECIFIED CHRONICITY, UNSPECIFIED WHETHER SCIATICA PRESENT: ICD-10-CM

## 2024-04-04 DIAGNOSIS — M25.611 STIFFNESS OF RIGHT SHOULDER JOINT: ICD-10-CM

## 2024-04-04 DIAGNOSIS — Z78.9 DECREASED ACTIVITIES OF DAILY LIVING (ADL): ICD-10-CM

## 2024-04-04 DIAGNOSIS — M25.60 DECREASED RANGE OF MOTION: ICD-10-CM

## 2024-04-04 DIAGNOSIS — M25.641 STIFFNESS OF FINGER JOINT OF RIGHT HAND: ICD-10-CM

## 2024-04-04 PROCEDURE — 97110 THERAPEUTIC EXERCISES: CPT

## 2024-04-04 PROCEDURE — 97763 ORTHC/PROSTC MGMT SBSQ ENC: CPT

## 2024-04-04 PROCEDURE — 97010 HOT OR COLD PACKS THERAPY: CPT

## 2024-04-04 ASSESSMENT — PAIN DESCRIPTION - LOCATION: LOCATION: LEG;KNEE;BACK

## 2024-04-04 ASSESSMENT — PAIN SCALES - GENERAL: PAINLEVEL_OUTOF10: 8
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
## 2024-04-04 NOTE — DISCHARGE INSTRUCTIONS
If you have any questions about your procedure, please call the Interventional Radiology department at [phone].      After business hours (5pm) and weekends, call the answering service at [phone] and ask for the Radiologist on call to be paged.            Si tiene Preguntas acerca del procedimiento, por favor llame al departamento de Radiología Intervencional al [phone].      Después de horas de oficina (5 pm) y los fines de semana, llamar al servicio de llamadas al [phone] y pregunte por el Radiologo de emiliana.

## 2024-04-04 NOTE — PROGRESS NOTES
Pt informed of department running about an hour behind schedule due to unforseen circumstances. 2 phone calls made for him to let his ride know we will call him when he is ready to go.

## 2024-04-09 ENCOUNTER — HOSPITAL ENCOUNTER (OUTPATIENT)
Dept: INTERVENTIONAL RADIOLOGY/VASCULAR | Age: 59
Discharge: HOME | End: 2024-04-12
Payer: COMMERCIAL

## 2024-04-09 ENCOUNTER — HOSPITAL ENCOUNTER (OUTPATIENT)
Dept: CT IMAGING | Age: 59
Discharge: HOME | End: 2024-04-12
Payer: COMMERCIAL

## 2024-04-09 VITALS
HEART RATE: 65 BPM | OXYGEN SATURATION: 96 % | SYSTOLIC BLOOD PRESSURE: 120 MMHG | DIASTOLIC BLOOD PRESSURE: 79 MMHG | HEIGHT: 71 IN | BODY MASS INDEX: 25.2 KG/M2 | TEMPERATURE: 98.2 F | WEIGHT: 180 LBS | RESPIRATION RATE: 18 BRPM

## 2024-04-09 PROCEDURE — 72132 CT LUMBAR SPINE W/DYE: CPT

## 2024-04-09 PROCEDURE — 2709999900 IR MYELOGRAM LUMBOSACRAL

## 2024-04-09 PROCEDURE — 2500000003 HC RX 250 WO HCPCS: Performed by: PHYSICIAN ASSISTANT

## 2024-04-09 PROCEDURE — 6360000004 HC RX CONTRAST MEDICATION: Performed by: PHYSICIAN ASSISTANT

## 2024-04-09 RX ORDER — IOPAMIDOL 408 MG/ML
INJECTION, SOLUTION INTRATHECAL PRN
Status: COMPLETED | OUTPATIENT
Start: 2024-04-09 | End: 2024-04-09

## 2024-04-09 RX ORDER — LIDOCAINE HYDROCHLORIDE 20 MG/ML
INJECTION, SOLUTION INFILTRATION; PERINEURAL PRN
Status: COMPLETED | OUTPATIENT
Start: 2024-04-09 | End: 2024-04-09

## 2024-04-09 RX ADMIN — LIDOCAINE HYDROCHLORIDE 5 ML: 20 INJECTION, SOLUTION INFILTRATION; PERINEURAL at 09:45

## 2024-04-09 RX ADMIN — IOPAMIDOL 15 ML: 408 INJECTION, SOLUTION INTRATHECAL at 09:46

## 2024-04-09 ASSESSMENT — PAIN - FUNCTIONAL ASSESSMENT: PAIN_FUNCTIONAL_ASSESSMENT: 0-10

## 2024-04-09 NOTE — DISCHARGE INSTRUCTIONS
If you have any questions about your procedure, please call the Interventional Radiology department at [phone].   After business hours (5pm) and weekends, call the answering service at [phone] and ask for the Radiologist on call to be paged.     Si tiene Preguntas acerca del procedimiento, por favor llame al departamento de Radiología Intervencional al [phone].   Después de horas de oficina (5 pm) y los fines de semana, llamar al servicio de llamadas al [phone] y pregunte por el Radiologo de emiliana.      Interventional Radiology General Nurse Discharge    After general anesthesia or intravenous sedation, for 24 hours or while taking prescription Narcotics:  Limit your activities  Do not drive and operate hazardous machinery  Do not make important personal or business decisions  Do  not drink alcoholic beverages  If you have not urinated within 8 hours after discharge, please contact your surgeon on call.    * Please give a list of your current medications to your Primary Care Provider.  * Please update this list whenever your medications are discontinued, doses are     changed, or new medications (including over-the-counter products) are added.  * Please carry medication information at all times in case of emergency situations.    These are general instructions for a healthy lifestyle:    No smoking/ No tobacco products/ Avoid exposure to second hand smoke  Surgeon General's Warning:  Quitting smoking now greatly reduces serious risk to your health.    Obesity, smoking, and sedentary lifestyle greatly increases your risk for illness  A healthy diet, regular physical exercise & weight monitoring are important for maintaining a healthy lifestyle    You may be retaining fluid if you have a history of heart failure or if you experience any of the following symptoms:  Weight gain of 3 pounds or more overnight or 5 pounds in a week, increased swelling in our hands or feet or shortness of breath

## 2024-04-09 NOTE — OR NURSING
Recovery period without difficulty. Pt alert and oriented and denies pain. Dressing is clean, dry, and intact. Reviewed discharge instructions with patient.

## 2024-04-11 ENCOUNTER — TELEPHONE (OUTPATIENT)
Dept: ORTHOPEDIC SURGERY | Age: 59
End: 2024-04-11

## 2024-04-12 ENCOUNTER — TELEPHONE (OUTPATIENT)
Dept: ORTHOPEDIC SURGERY | Age: 59
End: 2024-04-12

## 2024-04-12 NOTE — TELEPHONE ENCOUNTER
This patient   saw  Nilam   and  she  says  he  now  needs to  see NATASHA or  SERGIO   and go over  his  MRI   results   He  is  asking  for [facility]  office  and  can  someone  see  him   soon  at [facility]?

## 2024-04-13 NOTE — PROGRESS NOTES
Orthopaedic Hand Surgery Note    Name: Drake Melchor  Age: 59 y.o.  YOB: 1965  Gender: male  MRN: [id number]    Post Operative Visit: OPEN REDUCTION INTERNAL FIXATION right distal radius/ Biotronik ICD/Pacemaker - Right    HPI: Patient is status post OPEN REDUCTION INTERNAL FIXATION right distal radius/ Biotronik ICD/Pacemaker - Right on 3/15/2024. He returns today after using his wrist to push up from a seated position. He was not wearing his brace at the time. He felt a pop and has increased pain    Physical Examination:  Wound healing well. Sensation is intact in all fingers. Motor exam reveals no deficits. good finger range of motion. Limited wrist motion due to pain. There is slightly increased swelling at the dorsum of the distal forearm    Imaging:     Wrist  XR: AP, Lateral, Oblique and wrist facet view     Clinical Indication:  1. Closed Colles' fracture of right radius, initial encounter         Report: AP, lateral, oblique and wrist facet x-ray on the right demonstrates distal radius fracture status post plate and screw osteosynthesis in acceptable alignment; no changes since prior radiographs    Impression: Distal radius fracture status post osteosynthesis as described above     Gsiele Salinas MD     Assessment:   1. Closed Colles' fracture of right radius, initial encounter         Status post OPEN REDUCTION INTERNAL FIXATION right distal radius/ Biotronik ICD/Pacemaker - Right on 3/15/2024    Plan:  We discussed the treatment and therapy plan. Reinforced weight bearing restrictions today; he can continue hand therapy. He will follow up at his previously scheduled appointment    Gisele Salinas MD  04/13/24  11:03 AM

## 2024-04-16 ENCOUNTER — TELEPHONE (OUTPATIENT)
Age: 59
End: 2024-04-16

## 2024-04-16 NOTE — TELEPHONE ENCOUNTER
Patient called about missed appointments and wants to speak with his treating therapist.  Declined rescheduling at this moment.

## 2024-04-17 ENCOUNTER — TELEPHONE (OUTPATIENT)
Age: 59
End: 2024-04-17

## 2024-04-17 NOTE — TELEPHONE ENCOUNTER
Patient called stating he needs the following :    Needs for Dr Florentino to refer to a PCP    Please call and advise.

## 2024-04-18 NOTE — TELEPHONE ENCOUNTER
Lvm for patient to call back.     Patient will need to call around and find a primary doctor that is accepting new patients.  does not have specific recommendation due to not knowing who is accepting new patients at this time.

## 2024-04-19 NOTE — PROGRESS NOTES
----- Message from Hugh Álvarez [phone] . Thanksdj   Interdisciplinary team rounds were held 12/18/2019 with the following team members:Care Management, Nursing, Nutrition, Pharmacy, Physical Therapy and Physician. Plan of care discussed. See clinical pathway and/or care plan for interventions and desired outcomes.

## 2024-04-24 ENCOUNTER — OFFICE VISIT (OUTPATIENT)
Dept: ORTHOPEDIC SURGERY | Age: 59
End: 2024-04-24
Payer: COMMERCIAL

## 2024-04-24 DIAGNOSIS — I73.9 CLAUDICATION IN PERIPHERAL VASCULAR DISEASE (HCC): ICD-10-CM

## 2024-04-24 DIAGNOSIS — I82.5Z2 CHRONIC DEEP VEIN THROMBOSIS (DVT) OF DISTAL VEIN OF LEFT LOWER EXTREMITY (HCC): ICD-10-CM

## 2024-04-24 DIAGNOSIS — M47.816 LUMBAR FACET ARTHROPATHY: ICD-10-CM

## 2024-04-24 DIAGNOSIS — M25.561 PAIN IN BOTH KNEES, UNSPECIFIED CHRONICITY: Primary | ICD-10-CM

## 2024-04-24 DIAGNOSIS — M54.16 LUMBAR RADICULOPATHY: ICD-10-CM

## 2024-04-24 DIAGNOSIS — M25.562 PAIN IN BOTH KNEES, UNSPECIFIED CHRONICITY: Primary | ICD-10-CM

## 2024-04-24 PROCEDURE — 1036F TOBACCO NON-USER: CPT | Performed by: NURSE PRACTITIONER

## 2024-04-24 PROCEDURE — G8428 CUR MEDS NOT DOCUMENT: HCPCS | Performed by: NURSE PRACTITIONER

## 2024-04-24 PROCEDURE — 3017F COLORECTAL CA SCREEN DOC REV: CPT | Performed by: NURSE PRACTITIONER

## 2024-04-24 PROCEDURE — 99204 OFFICE O/P NEW MOD 45 MIN: CPT | Performed by: NURSE PRACTITIONER

## 2024-04-24 PROCEDURE — G8419 CALC BMI OUT NRM PARAM NOF/U: HCPCS | Performed by: NURSE PRACTITIONER

## 2024-04-24 NOTE — PROGRESS NOTES
Name: Drake Melchor  YOB: 1965  Gender: male  MRN: [id number]    CC: Chronic low back pain, bilateral leg pain left worse than right    HPI: This is a 59 y.o. year old male who has a very extensive medical history.  He has severe CHF with an EF of anywhere from 15 to 30%.  He has an AICD.  Known history of hypertension, EtOH use, chronic pain and is under the care of Dr. Cris Crandall for pain management.  He receives Norco from her.  He is never had any spine injections.  He was recently evaluated by the joint replacement team for bilateral knee pain.  He has had a history of a femoral medial condyle fracture 2 years ago.  At that time he was found to have DVT in the left lower extremity.  He has been on Eliquis since.  Patient tells me that he stopped the Eliquis just because he wanted to.  His doctor is aware of this.  He is aware of the high risk of stroke associated with this.  He has complaints of his legs hurting in the posterior aspect.  Some degree of leg weakness and primarily left lower back pain.  He states that he did a lot of running in the [employer].  He feels that this contributed to his back pain complaints.  He also hurts in the upper thoracic spine down his entire back.  He tells me that he feels like he needs to call his cardiologist because he is having shortness of breath especially with lying flat for the past 3 nights.      The patient denies any change in bowel or bladder function since the onset of the symptoms. he  has not had lumbar surgery in the past.      Thus far, the patient has tried oral steroids, opiod pain medicine, and physician directed home exercise program    Current pain level: 8/10  Activities limited by pain: Exertional activities           4/24/2024     1:14 PM   AMB PAIN ASSESSMENT   Location of Pain Back   Location Modifiers Right;Left   Severity of Pain 8   Frequency of Pain Intermittent   Result of Injury No   Work-Related Injury No   Are there other pain

## 2024-05-14 NOTE — PROCEDURES
ESOPHAGOGASTRODUODENOSCOPY    DATE of PROCEDURE: 7/10/2018    PT NAME: Yoly Hanson     [id number]    MEDICATION:   MAC       INSTRUMENT: GIFH 190    SPECIAL PROCEDURE: 56 fr camara  BLOOD LOSS- 0 or min. SPEC- no  IMPLANT- none    PROCEDURE:  Standard EGD w/ dilation      ASSESSMENT:  1. schatzki's ring- expected amt of trauma seen with dilation  2. Moderate hiatal hernia  3. Mild diffuse gastropathy    PLAN:   1. Trial of reglan  2.  F/U inpt    C Jaylen Olvera MD You can access the FollowMyHealth Patient Portal offered by [facility] by registering at the following website: [email or web address]. By joining [facility]’s FollowMyHealth portal, you will also be able to view your health information using other applications (apps) compatible with our system.

## 2024-06-10 ENCOUNTER — TELEPHONE (OUTPATIENT)
Age: 59
End: 2024-06-10

## 2024-06-12 ENCOUNTER — TELEPHONE (OUTPATIENT)
Age: 59
End: 2024-06-12

## 2024-06-12 PROCEDURE — 93296 REM INTERROG EVL PM/IDS: CPT | Performed by: INTERNAL MEDICINE

## 2024-06-12 PROCEDURE — 93295 DEV INTERROG REMOTE 1/2/MLT: CPT | Performed by: INTERNAL MEDICINE

## 2024-06-12 NOTE — TELEPHONE ENCOUNTER
Patient called stating he has the following concerns :    Ok'd for oral surgery.  Patient is very anxious about being put to sleep for the procedure.  Would like Dr Florentino opinion.  Requesting this be done in the hospital.  Dentist agrees this would be better performed in the hospital.  Teeth and gums are infected and is on an antibiotic. (Amoxicillin)    Please call and advise.

## 2024-06-25 ENCOUNTER — TELEPHONE (OUTPATIENT)
Age: 59
End: 2024-06-25

## 2024-06-25 NOTE — TELEPHONE ENCOUNTER
Has the names of several PCPs that Dr Florentino said call back with lela and he would tell him which one to go to Please call   And he dont mean to bother us

## 2024-07-17 ENCOUNTER — OFFICE VISIT (OUTPATIENT)
Dept: PRIMARY CARE CLINIC | Facility: CLINIC | Age: 59
End: 2024-07-17
Payer: COMMERCIAL

## 2024-07-17 VITALS
DIASTOLIC BLOOD PRESSURE: 66 MMHG | HEART RATE: 93 BPM | SYSTOLIC BLOOD PRESSURE: 100 MMHG | WEIGHT: 183 LBS | HEIGHT: 71 IN | BODY MASS INDEX: 25.62 KG/M2 | RESPIRATION RATE: 18 BRPM | TEMPERATURE: 98.6 F

## 2024-07-17 DIAGNOSIS — F41.9 ANXIETY: ICD-10-CM

## 2024-07-17 DIAGNOSIS — G43.C0 PERIODIC HEADACHE SYNDROMES IN CHILD OR ADULT, NOT INTRACTABLE: Primary | ICD-10-CM

## 2024-07-17 PROCEDURE — 3078F DIAST BP <80 MM HG: CPT | Performed by: PHYSICIAN ASSISTANT

## 2024-07-17 PROCEDURE — 99203 OFFICE O/P NEW LOW 30 MIN: CPT | Performed by: PHYSICIAN ASSISTANT

## 2024-07-17 PROCEDURE — 3074F SYST BP LT 130 MM HG: CPT | Performed by: PHYSICIAN ASSISTANT

## 2024-07-17 PROCEDURE — 1036F TOBACCO NON-USER: CPT | Performed by: PHYSICIAN ASSISTANT

## 2024-07-17 PROCEDURE — G8419 CALC BMI OUT NRM PARAM NOF/U: HCPCS | Performed by: PHYSICIAN ASSISTANT

## 2024-07-17 PROCEDURE — 3017F COLORECTAL CA SCREEN DOC REV: CPT | Performed by: PHYSICIAN ASSISTANT

## 2024-07-17 PROCEDURE — G8427 DOCREV CUR MEDS BY ELIG CLIN: HCPCS | Performed by: PHYSICIAN ASSISTANT

## 2024-07-17 RX ORDER — PROMETHAZINE HYDROCHLORIDE 25 MG/1
25 TABLET ORAL EVERY 6 HOURS PRN
Qty: 120 TABLET | Refills: 0 | Status: SHIPPED | OUTPATIENT
Start: 2024-07-17

## 2024-07-17 RX ORDER — UBROGEPANT 50 MG/1
1 TABLET ORAL DAILY PRN
Qty: 30 TABLET | Refills: 1 | Status: SHIPPED | OUTPATIENT
Start: 2024-07-17

## 2024-07-17 RX ORDER — RIZATRIPTAN BENZOATE 10 MG/1
10 TABLET, ORALLY DISINTEGRATING ORAL DAILY PRN
Qty: 30 TABLET | Refills: 1 | Status: SHIPPED | OUTPATIENT
Start: 2024-07-17

## 2024-07-17 SDOH — ECONOMIC STABILITY: FOOD INSECURITY: WITHIN THE PAST 12 MONTHS, THE FOOD YOU BOUGHT JUST DIDN'T LAST AND YOU DIDN'T HAVE MONEY TO GET MORE.: NEVER TRUE

## 2024-07-17 SDOH — ECONOMIC STABILITY: FOOD INSECURITY: WITHIN THE PAST 12 MONTHS, YOU WORRIED THAT YOUR FOOD WOULD RUN OUT BEFORE YOU GOT MONEY TO BUY MORE.: NEVER TRUE

## 2024-07-17 SDOH — ECONOMIC STABILITY: INCOME INSECURITY: HOW HARD IS IT FOR YOU TO PAY FOR THE VERY BASICS LIKE FOOD, HOUSING, MEDICAL CARE, AND HEATING?: NOT HARD AT ALL

## 2024-07-17 ASSESSMENT — ENCOUNTER SYMPTOMS
ABDOMINAL PAIN: 0
DIARRHEA: 0
SHORTNESS OF BREATH: 0
VOMITING: 0
CONSTIPATION: 0
EYE PAIN: 0
SORE THROAT: 0

## 2024-07-17 ASSESSMENT — PATIENT HEALTH QUESTIONNAIRE - PHQ9: DEPRESSION UNABLE TO ASSESS: PT REFUSES
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
## 2024-07-24 ENCOUNTER — OFFICE VISIT (OUTPATIENT)
Dept: BEHAVIORAL/MENTAL HEALTH CLINIC | Facility: CLINIC | Age: 59
End: 2024-07-24
Payer: COMMERCIAL

## 2024-07-24 VITALS — HEIGHT: 71 IN | BODY MASS INDEX: 25.48 KG/M2 | WEIGHT: 182 LBS

## 2024-07-24 DIAGNOSIS — F41.9 ANXIETY DISORDER, UNSPECIFIED TYPE: ICD-10-CM

## 2024-07-24 DIAGNOSIS — G47.00 INSOMNIA, UNSPECIFIED TYPE: ICD-10-CM

## 2024-07-24 DIAGNOSIS — F43.23 ADJUSTMENT DISORDER WITH MIXED ANXIETY AND DEPRESSED MOOD: Primary | ICD-10-CM

## 2024-07-24 PROCEDURE — 90792 PSYCH DIAG EVAL W/MED SRVCS: CPT | Performed by: PSYCHIATRY & NEUROLOGY

## 2024-07-24 RX ORDER — TRAZODONE HYDROCHLORIDE 50 MG/1
50 TABLET ORAL NIGHTLY
Qty: 30 TABLET | Refills: 1 | Status: SHIPPED | OUTPATIENT
Start: 2024-07-24

## 2024-07-24 RX ORDER — ESCITALOPRAM OXALATE 10 MG/1
10 TABLET ORAL DAILY
Qty: 30 TABLET | Refills: 1 | Status: SHIPPED | OUTPATIENT
Start: 2024-07-24

## 2024-07-24 NOTE — PROGRESS NOTES
spray intranasally, then discard. Repeat with new spray every 2 min as needed for opioid overdose symptoms, alternating nostrils. (Patient not taking: Reported on 4/24/2024)       No current facility-administered medications for this visit.       Reviewed and updated this visit by provider:  Tobacco  Allergies  Meds  Problems  Med Hx  Surg Hx  Fam Hx       OBJECTIVE EXAMINATION  There were no vitals filed for this visit.  Physical Exam:  General Appearance: Alert, cooperative, in no acute distress, appears stated age  Head: Normocephalic, atraumatic  Eyes: conjunctiva clear, PERRL, EOMI  Ears: Normal structure, intact.  Lungs/Heart: No observed wheezing, respirations unlabored. Normal chest rise.  Musculoskeletal: Normal strength and range of motion. No abnormal movements.    MENTAL STATUS EXAM:  Orientation: Oriented to person, place, time and situation  Appearance: Well groomed, good hygiene  Psychomotor: No slowing or agitation  Speech: Normal rate, rhythm, tone and quantity, no slurring  Mood: labile, irritable at times  Affect: congruent  Thought content: No hallucinations or delusions, paranoia  SI/plan: denies  HI/plan: denies  Thought process: Linear and goal directed  Insight/Judgement: poor / fair  Attention: intact  Memory: grossly intact  Gait: wnl    ASSESSMENT/PLAN:  Drake Melchor is a 59 y.o. old male who has a psychiatric history of depression, anxiety who presented for evaluation.    Diagnosis Orders   1. Adjustment disorder with mixed anxiety and depressed mood  escitalopram (LEXAPRO) 10 MG tablet      2. Anxiety disorder, unspecified type  escitalopram (LEXAPRO) 10 MG tablet      3. Insomnia, unspecified type  traZODone (DESYREL) 50 MG tablet        Psychotherapy:  Endorses he has EAP he is trying to get set up.  Pertinent labs/imaging:  Lab Results   Component Value Date    TRIG 251 (H) 05/06/2022    HDL 48 05/06/2022    LDL 26.8 05/06/2022    CHOL 125 05/06/2022    GLUCOSE 105 (H) 11/16/2023

## 2024-08-03 ENCOUNTER — HOSPITAL ENCOUNTER (EMERGENCY)
Age: 59
Discharge: ANOTHER HOSPITAL | DRG: 392 | End: 2024-08-03
Attending: EMERGENCY MEDICINE
Payer: COMMERCIAL

## 2024-08-03 ENCOUNTER — HOSPITAL ENCOUNTER (INPATIENT)
Age: 59
LOS: 6 days | Discharge: HOME | DRG: 392 | End: 2024-08-09
Attending: FAMILY MEDICINE | Admitting: INTERNAL MEDICINE
Payer: COMMERCIAL

## 2024-08-03 ENCOUNTER — APPOINTMENT (OUTPATIENT)
Dept: CT IMAGING | Age: 59
DRG: 392 | End: 2024-08-03
Payer: COMMERCIAL

## 2024-08-03 ENCOUNTER — APPOINTMENT (OUTPATIENT)
Dept: GENERAL RADIOLOGY | Age: 59
DRG: 392 | End: 2024-08-03
Payer: COMMERCIAL

## 2024-08-03 VITALS
OXYGEN SATURATION: 99 % | TEMPERATURE: 98.5 F | HEIGHT: 71 IN | SYSTOLIC BLOOD PRESSURE: 139 MMHG | RESPIRATION RATE: 17 BRPM | BODY MASS INDEX: 24.92 KG/M2 | HEART RATE: 75 BPM | WEIGHT: 178 LBS | DIASTOLIC BLOOD PRESSURE: 97 MMHG

## 2024-08-03 DIAGNOSIS — R11.2 INTRACTABLE NAUSEA AND VOMITING: Primary | ICD-10-CM

## 2024-08-03 DIAGNOSIS — K44.9 HIATAL HERNIA: ICD-10-CM

## 2024-08-03 DIAGNOSIS — G43.C0 PERIODIC HEADACHE SYNDROMES IN CHILD OR ADULT, NOT INTRACTABLE: ICD-10-CM

## 2024-08-03 PROBLEM — F10.10 ALCOHOL ABUSE: Status: ACTIVE | Noted: 2018-09-05

## 2024-08-03 PROBLEM — R19.7 DIARRHEA OF PRESUMED INFECTIOUS ORIGIN: Status: ACTIVE | Noted: 2017-09-06

## 2024-08-03 PROBLEM — K22.2 SCHATZKI'S RING: Status: ACTIVE | Noted: 2018-09-06

## 2024-08-03 PROBLEM — N17.9 ACUTE RENAL FAILURE (HCC): Status: ACTIVE | Noted: 2017-09-06

## 2024-08-03 PROBLEM — G89.4 CHRONIC PAIN DISORDER: Chronic | Status: ACTIVE | Noted: 2022-05-05

## 2024-08-03 PROBLEM — I95.1 ORTHOSTATIC HYPOTENSION: Status: ACTIVE | Noted: 2017-09-06

## 2024-08-03 PROBLEM — K52.9 ACUTE GASTROENTERITIS: Status: ACTIVE | Noted: 2017-09-06

## 2024-08-03 PROBLEM — I42.9 CARDIOMYOPATHY (HCC): Status: ACTIVE | Noted: 2017-09-06

## 2024-08-03 PROBLEM — E78.5 HYPERLIPIDEMIA: Status: ACTIVE | Noted: 2018-09-05

## 2024-08-03 PROBLEM — R00.0 TACHYCARDIA: Status: ACTIVE | Noted: 2018-02-04

## 2024-08-03 LAB
ALBUMIN SERPL-MCNC: 4.4 G/DL (ref 3.5–5)
ALBUMIN/GLOB SERPL: 1.2 (ref 0.4–1.6)
ALP SERPL-CCNC: 117 U/L (ref 45–117)
ALT SERPL-CCNC: 11 U/L (ref 13–61)
ANION GAP SERPL CALC-SCNC: 23 MMOL/L (ref 9–18)
AST SERPL-CCNC: 45 U/L (ref 15–37)
BASOPHILS # BLD: 0.1 K/UL (ref 0–0.2)
BASOPHILS NFR BLD: 1 % (ref 0–2)
BILIRUB SERPL-MCNC: 1.5 MG/DL (ref 0.2–1.1)
BUN SERPL-MCNC: 14 MG/DL (ref 6–23)
CALCIUM SERPL-MCNC: 9.3 MG/DL (ref 8.3–10.4)
CHLORIDE SERPL-SCNC: 100 MMOL/L (ref 98–107)
CO2 SERPL-SCNC: 17 MMOL/L (ref 21–32)
CREAT SERPL-MCNC: 1.24 MG/DL (ref 0.8–1.5)
DIFFERENTIAL METHOD BLD: ABNORMAL
EKG ATRIAL RATE: 85 BPM
EKG DIAGNOSIS: NORMAL
EKG P AXIS: 64 DEGREES
EKG P-R INTERVAL: 138 MS
EKG Q-T INTERVAL: 425 MS
EKG QRS DURATION: 152 MS
EKG QTC CALCULATION (BAZETT): 506 MS
EKG R AXIS: -54 DEGREES
EKG T AXIS: 70 DEGREES
EKG VENTRICULAR RATE: 85 BPM
EOSINOPHIL # BLD: 0.1 K/UL (ref 0–0.8)
EOSINOPHIL NFR BLD: 1 % (ref 0.5–7.8)
ERYTHROCYTE [DISTWIDTH] IN BLOOD BY AUTOMATED COUNT: 20.7 % (ref 11.9–14.6)
GLOBULIN SER CALC-MCNC: 3.7 G/DL (ref 2.8–4.5)
GLUCOSE SERPL-MCNC: 96 MG/DL (ref 65–100)
HCT VFR BLD AUTO: 37.3 % (ref 41.1–50.3)
HGB BLD-MCNC: 12 G/DL (ref 13.6–17.2)
IMM GRANULOCYTES # BLD AUTO: 0 K/UL (ref 0–0.5)
IMM GRANULOCYTES NFR BLD AUTO: 0 % (ref 0–5)
LYMPHOCYTES # BLD: 1.1 K/UL (ref 0.5–4.6)
LYMPHOCYTES NFR BLD: 17 % (ref 13–44)
MAGNESIUM SERPL-MCNC: 1.9 MG/DL (ref 1.2–2.6)
MCH RBC QN AUTO: 26.9 PG (ref 26.1–32.9)
MCHC RBC AUTO-ENTMCNC: 32.2 G/DL (ref 31.4–35)
MCV RBC AUTO: 83.6 FL (ref 82–102)
MONOCYTES # BLD: 0.3 K/UL (ref 0.1–1.3)
MONOCYTES NFR BLD: 4 % (ref 4–12)
NEUTS SEG # BLD: 5.2 K/UL (ref 1.7–8.2)
NEUTS SEG NFR BLD: 77 % (ref 43–78)
NRBC # BLD: 0 K/UL (ref 0–0.2)
NT PRO BNP: 443 PG/ML (ref 0–450)
PLATELET # BLD AUTO: 334 K/UL (ref 150–450)
PMV BLD AUTO: 8.4 FL (ref 9.4–12.3)
POTASSIUM SERPL-SCNC: 4.3 MMOL/L (ref 3.5–5.1)
POTASSIUM SERPL-SCNC: ABNORMAL MMOL/L (ref 3.5–5.1)
PROT SERPL-MCNC: 8.1 G/DL (ref 6.4–8.2)
RBC # BLD AUTO: 4.46 M/UL (ref 4.23–5.6)
SODIUM SERPL-SCNC: 140 MMOL/L (ref 133–143)
TROPONIN T SERPL HS-MCNC: 13.8 NG/L (ref 0–22)
TROPONIN T SERPL HS-MCNC: 14.2 NG/L (ref 0–22)
WBC # BLD AUTO: 6.8 K/UL (ref 4.3–11.1)

## 2024-08-03 PROCEDURE — 93005 ELECTROCARDIOGRAM TRACING: CPT

## 2024-08-03 PROCEDURE — 85025 COMPLETE CBC W/AUTO DIFF WBC: CPT

## 2024-08-03 PROCEDURE — 99285 EMERGENCY DEPT VISIT HI MDM: CPT

## 2024-08-03 PROCEDURE — 71260 CT THORAX DX C+: CPT

## 2024-08-03 PROCEDURE — 84484 ASSAY OF TROPONIN QUANT: CPT

## 2024-08-03 PROCEDURE — 74177 CT ABD & PELVIS W/CONTRAST: CPT

## 2024-08-03 PROCEDURE — 96361 HYDRATE IV INFUSION ADD-ON: CPT

## 2024-08-03 PROCEDURE — 6360000002 HC RX W HCPCS

## 2024-08-03 PROCEDURE — 80053 COMPREHEN METABOLIC PANEL: CPT

## 2024-08-03 PROCEDURE — 2500000003 HC RX 250 WO HCPCS

## 2024-08-03 PROCEDURE — 2580000003 HC RX 258: Performed by: INTERNAL MEDICINE

## 2024-08-03 PROCEDURE — 1100000003 HC PRIVATE W/ TELEMETRY

## 2024-08-03 PROCEDURE — 96375 TX/PRO/DX INJ NEW DRUG ADDON: CPT

## 2024-08-03 PROCEDURE — 2500000003 HC RX 250 WO HCPCS: Performed by: INTERNAL MEDICINE

## 2024-08-03 PROCEDURE — 83880 ASSAY OF NATRIURETIC PEPTIDE: CPT

## 2024-08-03 PROCEDURE — 96374 THER/PROPH/DIAG INJ IV PUSH: CPT

## 2024-08-03 PROCEDURE — 71045 X-RAY EXAM CHEST 1 VIEW: CPT

## 2024-08-03 PROCEDURE — 2580000003 HC RX 258

## 2024-08-03 PROCEDURE — 83735 ASSAY OF MAGNESIUM: CPT

## 2024-08-03 PROCEDURE — 6370000000 HC RX 637 (ALT 250 FOR IP): Performed by: INTERNAL MEDICINE

## 2024-08-03 PROCEDURE — 6360000002 HC RX W HCPCS: Performed by: INTERNAL MEDICINE

## 2024-08-03 PROCEDURE — 84132 ASSAY OF SERUM POTASSIUM: CPT

## 2024-08-03 PROCEDURE — 93010 ELECTROCARDIOGRAM REPORT: CPT | Performed by: INTERNAL MEDICINE

## 2024-08-03 PROCEDURE — 1100000000 HC RM PRIVATE

## 2024-08-03 PROCEDURE — 6360000004 HC RX CONTRAST MEDICATION

## 2024-08-03 RX ORDER — METOCLOPRAMIDE HYDROCHLORIDE 5 MG/ML
10 INJECTION INTRAMUSCULAR; INTRAVENOUS EVERY 6 HOURS PRN
Status: DISCONTINUED | OUTPATIENT
Start: 2024-08-03 | End: 2024-08-05

## 2024-08-03 RX ORDER — POLYETHYLENE GLYCOL 3350 17 G/17G
17 POWDER, FOR SOLUTION ORAL DAILY PRN
Status: DISCONTINUED | OUTPATIENT
Start: 2024-08-03 | End: 2024-08-09 | Stop reason: HOSPADM

## 2024-08-03 RX ORDER — ALBUTEROL SULFATE 2.5 MG/3ML
2.5 SOLUTION RESPIRATORY (INHALATION) EVERY 6 HOURS PRN
Status: DISCONTINUED | OUTPATIENT
Start: 2024-08-03 | End: 2024-08-06

## 2024-08-03 RX ORDER — METOCLOPRAMIDE HYDROCHLORIDE 5 MG/ML
10 INJECTION INTRAMUSCULAR; INTRAVENOUS ONCE
Status: COMPLETED | OUTPATIENT
Start: 2024-08-03 | End: 2024-08-03

## 2024-08-03 RX ORDER — FAMOTIDINE 20 MG/1
10 TABLET, FILM COATED ORAL DAILY PRN
Status: DISCONTINUED | OUTPATIENT
Start: 2024-08-03 | End: 2024-08-09 | Stop reason: HOSPADM

## 2024-08-03 RX ORDER — HYDROCODONE BITARTRATE AND ACETAMINOPHEN 10; 325 MG/1; MG/1
1 TABLET ORAL 4 TIMES DAILY PRN
Status: DISCONTINUED | OUTPATIENT
Start: 2024-08-03 | End: 2024-08-05

## 2024-08-03 RX ORDER — SODIUM CHLORIDE 0.9 % (FLUSH) 0.9 %
5-40 SYRINGE (ML) INJECTION EVERY 12 HOURS SCHEDULED
Status: DISCONTINUED | OUTPATIENT
Start: 2024-08-03 | End: 2024-08-09 | Stop reason: HOSPADM

## 2024-08-03 RX ORDER — SODIUM CHLORIDE 9 MG/ML
INJECTION, SOLUTION INTRAVENOUS PRN
Status: DISCONTINUED | OUTPATIENT
Start: 2024-08-03 | End: 2024-08-09 | Stop reason: HOSPADM

## 2024-08-03 RX ORDER — POTASSIUM CHLORIDE 7.45 MG/ML
10 INJECTION INTRAVENOUS PRN
Status: DISCONTINUED | OUTPATIENT
Start: 2024-08-03 | End: 2024-08-09 | Stop reason: HOSPADM

## 2024-08-03 RX ORDER — PROMETHAZINE HYDROCHLORIDE 25 MG/1
25 TABLET ORAL EVERY 6 HOURS PRN
Status: DISCONTINUED | OUTPATIENT
Start: 2024-08-03 | End: 2024-08-08

## 2024-08-03 RX ORDER — MAGNESIUM HYDROXIDE/ALUMINUM HYDROXICE/SIMETHICONE 120; 1200; 1200 MG/30ML; MG/30ML; MG/30ML
30 SUSPENSION ORAL EVERY 6 HOURS PRN
Status: DISCONTINUED | OUTPATIENT
Start: 2024-08-03 | End: 2024-08-09 | Stop reason: HOSPADM

## 2024-08-03 RX ORDER — CARVEDILOL 25 MG/1
25 TABLET ORAL 2 TIMES DAILY WITH MEALS
Status: DISCONTINUED | OUTPATIENT
Start: 2024-08-04 | End: 2024-08-09 | Stop reason: HOSPADM

## 2024-08-03 RX ORDER — SODIUM CHLORIDE 9 MG/ML
INJECTION, SOLUTION INTRAVENOUS CONTINUOUS
Status: ACTIVE | OUTPATIENT
Start: 2024-08-03 | End: 2024-08-05

## 2024-08-03 RX ORDER — SODIUM CHLORIDE 0.9 % (FLUSH) 0.9 %
5-40 SYRINGE (ML) INJECTION PRN
Status: DISCONTINUED | OUTPATIENT
Start: 2024-08-03 | End: 2024-08-09 | Stop reason: HOSPADM

## 2024-08-03 RX ORDER — AMIODARONE HYDROCHLORIDE 200 MG/1
200 TABLET ORAL DAILY
Status: DISCONTINUED | OUTPATIENT
Start: 2024-08-04 | End: 2024-08-09 | Stop reason: HOSPADM

## 2024-08-03 RX ORDER — ONDANSETRON 2 MG/ML
4 INJECTION INTRAMUSCULAR; INTRAVENOUS
Status: DISCONTINUED | OUTPATIENT
Start: 2024-08-03 | End: 2024-08-03

## 2024-08-03 RX ORDER — DIPHENHYDRAMINE HYDROCHLORIDE 50 MG/ML
10 INJECTION INTRAMUSCULAR; INTRAVENOUS
Status: COMPLETED | OUTPATIENT
Start: 2024-08-03 | End: 2024-08-03

## 2024-08-03 RX ORDER — POTASSIUM CHLORIDE 20 MEQ/1
40 TABLET, EXTENDED RELEASE ORAL PRN
Status: DISCONTINUED | OUTPATIENT
Start: 2024-08-03 | End: 2024-08-09 | Stop reason: HOSPADM

## 2024-08-03 RX ORDER — SPIRONOLACTONE 25 MG/1
25 TABLET ORAL DAILY PRN
Status: DISCONTINUED | OUTPATIENT
Start: 2024-08-03 | End: 2024-08-09 | Stop reason: HOSPADM

## 2024-08-03 RX ORDER — 0.9 % SODIUM CHLORIDE 0.9 %
250 INTRAVENOUS SOLUTION INTRAVENOUS ONCE
Status: COMPLETED | OUTPATIENT
Start: 2024-08-03 | End: 2024-08-03

## 2024-08-03 RX ORDER — BISACODYL 10 MG
10 SUPPOSITORY, RECTAL RECTAL DAILY PRN
Status: DISCONTINUED | OUTPATIENT
Start: 2024-08-03 | End: 2024-08-09 | Stop reason: HOSPADM

## 2024-08-03 RX ORDER — ATORVASTATIN CALCIUM 80 MG/1
80 TABLET, FILM COATED ORAL DAILY
Status: DISCONTINUED | OUTPATIENT
Start: 2024-08-04 | End: 2024-08-09 | Stop reason: HOSPADM

## 2024-08-03 RX ORDER — SODIUM CHLORIDE 9 MG/ML
INJECTION, SOLUTION INTRAVENOUS CONTINUOUS
Status: DISCONTINUED | OUTPATIENT
Start: 2024-08-03 | End: 2024-08-03

## 2024-08-03 RX ORDER — BACLOFEN 10 MG/1
10 TABLET ORAL 3 TIMES DAILY PRN
Status: DISCONTINUED | OUTPATIENT
Start: 2024-08-03 | End: 2024-08-09 | Stop reason: HOSPADM

## 2024-08-03 RX ORDER — ONDANSETRON 4 MG/1
4 TABLET, ORALLY DISINTEGRATING ORAL EVERY 8 HOURS PRN
Status: DISCONTINUED | OUTPATIENT
Start: 2024-08-03 | End: 2024-08-05

## 2024-08-03 RX ORDER — FUROSEMIDE 40 MG/1
40 TABLET ORAL DAILY
Status: DISCONTINUED | OUTPATIENT
Start: 2024-08-04 | End: 2024-08-06

## 2024-08-03 RX ORDER — GABAPENTIN 300 MG/1
300 CAPSULE ORAL 2 TIMES DAILY
Status: DISCONTINUED | OUTPATIENT
Start: 2024-08-03 | End: 2024-08-09 | Stop reason: HOSPADM

## 2024-08-03 RX ORDER — MAGNESIUM SULFATE IN WATER 40 MG/ML
2000 INJECTION, SOLUTION INTRAVENOUS PRN
Status: DISCONTINUED | OUTPATIENT
Start: 2024-08-03 | End: 2024-08-09 | Stop reason: HOSPADM

## 2024-08-03 RX ORDER — ONDANSETRON 2 MG/ML
4 INJECTION INTRAMUSCULAR; INTRAVENOUS EVERY 6 HOURS PRN
Status: DISCONTINUED | OUTPATIENT
Start: 2024-08-03 | End: 2024-08-05

## 2024-08-03 RX ORDER — PROMETHAZINE HYDROCHLORIDE 25 MG/ML
6.25 INJECTION, SOLUTION INTRAMUSCULAR; INTRAVENOUS ONCE
Status: DISCONTINUED | OUTPATIENT
Start: 2024-08-03 | End: 2024-08-03

## 2024-08-03 RX ORDER — ALBUTEROL SULFATE 90 UG/1
2 AEROSOL, METERED RESPIRATORY (INHALATION) EVERY 6 HOURS PRN
Status: DISCONTINUED | OUTPATIENT
Start: 2024-08-03 | End: 2024-08-03 | Stop reason: ALTCHOICE

## 2024-08-03 RX ORDER — KETOROLAC TROMETHAMINE 30 MG/ML
30 INJECTION, SOLUTION INTRAMUSCULAR; INTRAVENOUS
Status: COMPLETED | OUTPATIENT
Start: 2024-08-03 | End: 2024-08-03

## 2024-08-03 RX ORDER — HYDROMORPHONE HYDROCHLORIDE 1 MG/ML
1 INJECTION, SOLUTION INTRAMUSCULAR; INTRAVENOUS; SUBCUTANEOUS EVERY 4 HOURS PRN
Status: DISCONTINUED | OUTPATIENT
Start: 2024-08-03 | End: 2024-08-09 | Stop reason: HOSPADM

## 2024-08-03 RX ORDER — DROPERIDOL 2.5 MG/ML
0.62 INJECTION, SOLUTION INTRAMUSCULAR; INTRAVENOUS ONCE
Status: COMPLETED | OUTPATIENT
Start: 2024-08-03 | End: 2024-08-03

## 2024-08-03 RX ADMIN — GABAPENTIN 300 MG: 300 CAPSULE ORAL at 23:01

## 2024-08-03 RX ADMIN — HYDROMORPHONE HYDROCHLORIDE 1 MG: 1 INJECTION, SOLUTION INTRAMUSCULAR; INTRAVENOUS; SUBCUTANEOUS at 23:21

## 2024-08-03 RX ADMIN — ONDANSETRON 4 MG: 2 INJECTION INTRAMUSCULAR; INTRAVENOUS at 22:46

## 2024-08-03 RX ADMIN — SODIUM CHLORIDE, PRESERVATIVE FREE 10 ML: 5 INJECTION INTRAVENOUS at 23:00

## 2024-08-03 RX ADMIN — KETOROLAC TROMETHAMINE 30 MG: 30 INJECTION, SOLUTION INTRAMUSCULAR at 14:20

## 2024-08-03 RX ADMIN — SODIUM CHLORIDE 250 ML: 9 INJECTION, SOLUTION INTRAVENOUS at 18:32

## 2024-08-03 RX ADMIN — IOPAMIDOL 100 ML: 755 INJECTION, SOLUTION INTRAVENOUS at 15:24

## 2024-08-03 RX ADMIN — DROPERIDOL 0.62 MG: 2.5 INJECTION, SOLUTION INTRAMUSCULAR; INTRAVENOUS at 17:34

## 2024-08-03 RX ADMIN — METOCLOPRAMIDE 10 MG: 5 INJECTION, SOLUTION INTRAMUSCULAR; INTRAVENOUS at 13:46

## 2024-08-03 RX ADMIN — FAMOTIDINE 20 MG: 10 INJECTION, SOLUTION INTRAVENOUS at 14:20

## 2024-08-03 RX ADMIN — SODIUM CHLORIDE: 9 INJECTION, SOLUTION INTRAVENOUS at 22:51

## 2024-08-03 RX ADMIN — DIPHENHYDRAMINE HYDROCHLORIDE 10 MG: 50 INJECTION INTRAMUSCULAR; INTRAVENOUS at 13:46

## 2024-08-03 ASSESSMENT — PAIN DESCRIPTION - PAIN TYPE
TYPE: ACUTE PAIN
TYPE: ACUTE PAIN

## 2024-08-03 ASSESSMENT — PAIN DESCRIPTION - LOCATION
LOCATION: ABDOMEN;CHEST
LOCATION: ABDOMEN;CHEST
LOCATION: CHEST
LOCATION: ABDOMEN;CHEST

## 2024-08-03 ASSESSMENT — PAIN - FUNCTIONAL ASSESSMENT
PAIN_FUNCTIONAL_ASSESSMENT: ACTIVITIES ARE NOT PREVENTED
PAIN_FUNCTIONAL_ASSESSMENT: ACTIVITIES ARE NOT PREVENTED
PAIN_FUNCTIONAL_ASSESSMENT: 0-10

## 2024-08-03 ASSESSMENT — PAIN SCALES - GENERAL
PAINLEVEL_OUTOF10: 8
PAINLEVEL_OUTOF10: 9

## 2024-08-03 ASSESSMENT — PAIN DESCRIPTION - DESCRIPTORS
DESCRIPTORS: THROBBING;PRESSURE
DESCRIPTORS: PRESSURE;THROBBING

## 2024-08-03 ASSESSMENT — PAIN DESCRIPTION - FREQUENCY
FREQUENCY: CONTINUOUS
FREQUENCY: CONTINUOUS

## 2024-08-03 ASSESSMENT — PAIN DESCRIPTION - ORIENTATION
ORIENTATION: RIGHT
ORIENTATION: RIGHT;LEFT

## 2024-08-03 ASSESSMENT — PAIN DESCRIPTION - ONSET
ONSET: ON-GOING
ONSET: ON-GOING

## 2024-08-03 NOTE — ED TRIAGE NOTES
Pt to ED via wheelchair with c/o sob, chest pain, and emesis that began last night. Hx CHF and ICD in place.

## 2024-08-03 NOTE — ED PROVIDER NOTES
intact.      Conjunctiva/sclera: Conjunctivae normal.   Cardiovascular:      Rate and Rhythm: Normal rate.      Pulses: Normal pulses.      Heart sounds: Normal heart sounds.   Pulmonary:      Effort: Pulmonary effort is normal. No respiratory distress.      Breath sounds: Normal breath sounds. No stridor. No wheezing, rhonchi or rales.   Abdominal:      General: Bowel sounds are normal.      Palpations: Abdomen is soft.      Tenderness: There is abdominal tenderness (There is some tenderness to palpation in right mid and lower abdomen.). There is no guarding or rebound.   Musculoskeletal:         General: Normal range of motion.      Cervical back: Normal range of motion.   Skin:     General: Skin is warm and dry.      Capillary Refill: Capillary refill takes less than 2 seconds.      Coloration: Skin is not jaundiced.   Neurological:      General: No focal deficit present.      Mental Status: He is alert and oriented to person, place, and time.   Psychiatric:         Mood and Affect: Mood normal.         Behavior: Behavior normal.         Thought Content: Thought content normal.         Judgment: Judgment normal.        Procedures     Procedures    Orders Placed This Encounter   Procedures    XR CHEST PORTABLE    CT ABDOMEN PELVIS W IV CONTRAST Additional Contrast? None    CT CHEST PULMONARY EMBOLISM W CONTRAST    CBC with Auto Differential    Troponin    Magnesium    Comprehensive Metabolic Panel    Brain Natriuretic Peptide    Potassium    Cardiac Monitor - ED Only    PO CHALLENGE    Pulse oximetry, continuous    EKG 12 Lead    Saline lock IV        Medications given during this emergency department visit:  Medications   sodium chloride 0.9 % bolus 250 mL (250 mLs IntraVENous New Bag 8/3/24 1832)   metoclopramide (REGLAN) injection 10 mg (10 mg IntraVENous Given 8/3/24 1346)   diphenhydrAMINE (BENADRYL) injection 10 mg (10 mg IntraVENous Given 8/3/24 1346)   ketorolac (TORADOL) injection 30 mg (30 mg IntraVENous  adjustment of the  mA and/or kV according to patient size (this includes techniques or standardized  protocols for targeted exams where dose is matched to indication/reason for  exam).              Electronically signed by ISA WHITESIDE   CBC with Auto Differential   Result Value Ref Range    WBC 6.8 4.3 - 11.1 K/uL    RBC 4.46 4.23 - 5.60 M/uL    Hemoglobin 12.0 (L) 13.6 - 17.2 g/dL    Hematocrit 37.3 (L) 41.1 - 50.3 %    MCV 83.6 82.0 - 102.0 FL    MCH 26.9 26.1 - 32.9 PG    MCHC 32.2 31.4 - 35.0 g/dL    RDW 20.7 (H) 11.9 - 14.6 %    Platelets 334 150 - 450 K/uL    MPV 8.4 (L) 9.4 - 12.3 FL    nRBC 0.00 0.0 - 0.2 K/uL    Differential Type AUTOMATED      Neutrophils % 77 43 - 78 %    Lymphocytes % 17 13 - 44 %    Monocytes % 4 4.0 - 12.0 %    Eosinophils % 1 0.5 - 7.8 %    Basophils % 1 0.0 - 2.0 %    Immature Granulocytes % 0 0.0 - 5.0 %    Neutrophils Absolute 5.2 1.7 - 8.2 K/UL    Lymphocytes Absolute 1.1 0.5 - 4.6 K/UL    Monocytes Absolute 0.3 0.1 - 1.3 K/UL    Eosinophils Absolute 0.1 0.0 - 0.8 K/UL    Basophils Absolute 0.1 0.0 - 0.2 K/UL    Immature Granulocytes Absolute 0.0 0.0 - 0.5 K/UL   Troponin   Result Value Ref Range    Troponin T 14.2 0 - 22 ng/L   Troponin   Result Value Ref Range    Troponin T 13.8 0 - 22 ng/L   Magnesium   Result Value Ref Range    Magnesium 1.9 1.2 - 2.6 mg/dL   Comprehensive Metabolic Panel   Result Value Ref Range    Sodium 140 133 - 143 mmol/L    Potassium SPECIMEN HEMOLYZED, RESULTS MAY BE AFFECTED 3.5 - 5.1 mmol/L    Chloride 100 98 - 107 mmol/L    CO2 17 (L) 21 - 32 mmol/L    Anion Gap 23 (H) 9 - 18 mmol/L    Glucose 96 65 - 100 mg/dL    BUN 14 6 - 23 MG/DL    Creatinine 1.24 0.8 - 1.5 MG/DL    Est, Glom Filt Rate 67 >60 ml/min/1.73m2    Calcium 9.3 8.3 - 10.4 MG/DL    Total Bilirubin 1.5 (H) 0.2 - 1.1 MG/DL    ALT 11 (L) 13.0 - 61.0 U/L    AST 45 (H) 15 - 37 U/L    Alk Phosphatase 117 45.0 - 117.0 U/L    Total Protein 8.1 6.4 - 8.2 g/dL    Albumin 4.4 3.5 - 5.0 g/dL

## 2024-08-03 NOTE — ED NOTES
TRANSFER - OUT REPORT:    Verbal report given to EMERY Mendoza on Drake Melchor  being transferred to [facility] for routine progression of patient care       Report consisted of patient's Situation, Background, Assessment and   Recommendations(SBAR).     Information from the following report(s) Intake/Output, MAR, Recent Results, Cardiac Rhythm Sinus Rhythm, and Neuro Assessment was reviewed with the receiving nurse.    Lines:   Peripheral IV 08/03/24 Left;Posterior Forearm (Active)        Opportunity for questions and clarification was provided.      Patient transported with:  Tech

## 2024-08-03 NOTE — PROGRESS NOTES
TRANSFER - IN REPORT:    Verbal report received from EMERY Iqbal on Drake Melchor  being received from [facility] ED for routine progression of patient care      Report consisted of patient's Situation, Background, Assessment and   Recommendations(SBAR).     Information from the following report(s) ED SBAR, Adult Overview, MAR, and Recent Results was reviewed with the receiving nurse.    Opportunity for questions and clarification was provided.      Assessment will be completed upon patient's arrival to unit and care assumed.

## 2024-08-04 PROBLEM — Z76.5 DRUG-SEEKING BEHAVIOR: Chronic | Status: ACTIVE | Noted: 2022-05-09

## 2024-08-04 PROBLEM — E78.5 HYPERLIPIDEMIA: Chronic | Status: ACTIVE | Noted: 2018-09-05

## 2024-08-04 PROBLEM — I47.29 NSVT (NONSUSTAINED VENTRICULAR TACHYCARDIA) (HCC): Chronic | Status: ACTIVE | Noted: 2022-03-04

## 2024-08-04 PROBLEM — G25.3 MYOCLONIC JERKING WHILE SLEEPING: Chronic | Status: ACTIVE | Noted: 2022-05-06

## 2024-08-04 PROBLEM — I50.22 CHRONIC SYSTOLIC CONGESTIVE HEART FAILURE (HCC): Chronic | Status: ACTIVE | Noted: 2020-10-02

## 2024-08-04 PROBLEM — F10.10 ALCOHOL ABUSE: Chronic | Status: ACTIVE | Noted: 2018-09-05

## 2024-08-04 PROBLEM — K22.4 ESOPHAGEAL DYSMOTILITY: Chronic | Status: ACTIVE | Noted: 2022-02-14

## 2024-08-04 PROBLEM — K22.2 SCHATZKI'S RING: Chronic | Status: ACTIVE | Noted: 2018-09-06

## 2024-08-04 LAB
ANION GAP SERPL CALC-SCNC: 20 MMOL/L (ref 9–18)
BASOPHILS # BLD: 0.1 K/UL (ref 0–0.2)
BASOPHILS NFR BLD: 1 % (ref 0–2)
BUN SERPL-MCNC: 12 MG/DL (ref 6–23)
CALCIUM SERPL-MCNC: 8.7 MG/DL (ref 8.8–10.2)
CHLORIDE SERPL-SCNC: 102 MMOL/L (ref 98–107)
CHOLEST SERPL-MCNC: 136 MG/DL (ref 0–200)
CO2 SERPL-SCNC: 16 MMOL/L (ref 20–28)
CREAT SERPL-MCNC: 1.3 MG/DL (ref 0.8–1.3)
DIFFERENTIAL METHOD BLD: ABNORMAL
EOSINOPHIL # BLD: 0.1 K/UL (ref 0–0.8)
EOSINOPHIL NFR BLD: 1 % (ref 0.5–7.8)
ERYTHROCYTE [DISTWIDTH] IN BLOOD BY AUTOMATED COUNT: 20.8 % (ref 11.9–14.6)
GLUCOSE SERPL-MCNC: 67 MG/DL (ref 70–99)
HCT VFR BLD AUTO: 35.4 % (ref 41.1–50.3)
HDLC SERPL-MCNC: 50 MG/DL (ref 40–60)
HDLC SERPL: 2.7 (ref 0–5)
HGB BLD-MCNC: 10.7 G/DL (ref 13.6–17.2)
IMM GRANULOCYTES # BLD AUTO: 0 K/UL (ref 0–0.5)
IMM GRANULOCYTES NFR BLD AUTO: 0 % (ref 0–5)
LDLC SERPL CALC-MCNC: 45 MG/DL (ref 0–100)
LYMPHOCYTES # BLD: 1.1 K/UL (ref 0.5–4.6)
LYMPHOCYTES NFR BLD: 19 % (ref 13–44)
MCH RBC QN AUTO: 26.8 PG (ref 26.1–32.9)
MCHC RBC AUTO-ENTMCNC: 30.2 G/DL (ref 31.4–35)
MCV RBC AUTO: 88.7 FL (ref 82–102)
MONOCYTES # BLD: 0.4 K/UL (ref 0.1–1.3)
MONOCYTES NFR BLD: 7 % (ref 4–12)
NEUTS SEG # BLD: 4 K/UL (ref 1.7–8.2)
NEUTS SEG NFR BLD: 72 % (ref 43–78)
NRBC # BLD: 0 K/UL (ref 0–0.2)
PLATELET # BLD AUTO: 292 K/UL (ref 150–450)
PMV BLD AUTO: 9.3 FL (ref 9.4–12.3)
POTASSIUM SERPL-SCNC: 4.5 MMOL/L (ref 3.5–5.1)
RBC # BLD AUTO: 3.99 M/UL (ref 4.23–5.6)
SODIUM SERPL-SCNC: 138 MMOL/L (ref 136–145)
TRIGL SERPL-MCNC: 206 MG/DL (ref 0–150)
VLDLC SERPL CALC-MCNC: 41 MG/DL (ref 6–23)
WBC # BLD AUTO: 5.6 K/UL (ref 4.3–11.1)

## 2024-08-04 PROCEDURE — 36415 COLL VENOUS BLD VENIPUNCTURE: CPT

## 2024-08-04 PROCEDURE — 80061 LIPID PANEL: CPT

## 2024-08-04 PROCEDURE — 2500000003 HC RX 250 WO HCPCS: Performed by: INTERNAL MEDICINE

## 2024-08-04 PROCEDURE — 85025 COMPLETE CBC W/AUTO DIFF WBC: CPT

## 2024-08-04 PROCEDURE — 80048 BASIC METABOLIC PNL TOTAL CA: CPT

## 2024-08-04 PROCEDURE — 1100000003 HC PRIVATE W/ TELEMETRY

## 2024-08-04 PROCEDURE — 1100000000 HC RM PRIVATE

## 2024-08-04 PROCEDURE — 6370000000 HC RX 637 (ALT 250 FOR IP): Performed by: INTERNAL MEDICINE

## 2024-08-04 PROCEDURE — 2580000003 HC RX 258: Performed by: INTERNAL MEDICINE

## 2024-08-04 PROCEDURE — 6360000002 HC RX W HCPCS: Performed by: INTERNAL MEDICINE

## 2024-08-04 RX ADMIN — ATORVASTATIN CALCIUM 80 MG: 80 TABLET, FILM COATED ORAL at 07:20

## 2024-08-04 RX ADMIN — ONDANSETRON 4 MG: 2 INJECTION INTRAMUSCULAR; INTRAVENOUS at 19:39

## 2024-08-04 RX ADMIN — CARVEDILOL 25 MG: 25 TABLET, FILM COATED ORAL at 07:20

## 2024-08-04 RX ADMIN — HYDROMORPHONE HYDROCHLORIDE 1 MG: 1 INJECTION, SOLUTION INTRAMUSCULAR; INTRAVENOUS; SUBCUTANEOUS at 19:37

## 2024-08-04 RX ADMIN — SODIUM CHLORIDE, PRESERVATIVE FREE 10 ML: 5 INJECTION INTRAVENOUS at 19:35

## 2024-08-04 RX ADMIN — METOCLOPRAMIDE HYDROCHLORIDE 10 MG: 5 INJECTION INTRAMUSCULAR; INTRAVENOUS at 22:43

## 2024-08-04 RX ADMIN — GABAPENTIN 300 MG: 300 CAPSULE ORAL at 08:30

## 2024-08-04 RX ADMIN — PANTOPRAZOLE SODIUM 40 MG: 40 INJECTION, POWDER, FOR SOLUTION INTRAVENOUS at 07:21

## 2024-08-04 RX ADMIN — SODIUM CHLORIDE, PRESERVATIVE FREE 10 ML: 5 INJECTION INTRAVENOUS at 08:37

## 2024-08-04 RX ADMIN — GABAPENTIN 300 MG: 300 CAPSULE ORAL at 19:34

## 2024-08-04 RX ADMIN — METOCLOPRAMIDE HYDROCHLORIDE 10 MG: 5 INJECTION INTRAMUSCULAR; INTRAVENOUS at 00:16

## 2024-08-04 RX ADMIN — HYDROMORPHONE HYDROCHLORIDE 1 MG: 1 INJECTION, SOLUTION INTRAMUSCULAR; INTRAVENOUS; SUBCUTANEOUS at 04:52

## 2024-08-04 RX ADMIN — ONDANSETRON 4 MG: 2 INJECTION INTRAMUSCULAR; INTRAVENOUS at 04:52

## 2024-08-04 RX ADMIN — HYDROMORPHONE HYDROCHLORIDE 1 MG: 1 INJECTION, SOLUTION INTRAMUSCULAR; INTRAVENOUS; SUBCUTANEOUS at 13:45

## 2024-08-04 RX ADMIN — SODIUM CHLORIDE, PRESERVATIVE FREE 10 ML: 5 INJECTION INTRAVENOUS at 08:36

## 2024-08-04 RX ADMIN — METOCLOPRAMIDE HYDROCHLORIDE 10 MG: 5 INJECTION INTRAMUSCULAR; INTRAVENOUS at 07:21

## 2024-08-04 RX ADMIN — CARVEDILOL 25 MG: 25 TABLET, FILM COATED ORAL at 16:28

## 2024-08-04 RX ADMIN — FUROSEMIDE 40 MG: 40 TABLET ORAL at 07:21

## 2024-08-04 RX ADMIN — AMIODARONE HYDROCHLORIDE 200 MG: 200 TABLET ORAL at 07:21

## 2024-08-04 RX ADMIN — METOCLOPRAMIDE HYDROCHLORIDE 10 MG: 5 INJECTION INTRAMUSCULAR; INTRAVENOUS at 16:28

## 2024-08-04 RX ADMIN — ONDANSETRON 4 MG: 2 INJECTION INTRAMUSCULAR; INTRAVENOUS at 12:19

## 2024-08-04 ASSESSMENT — PAIN DESCRIPTION - DESCRIPTORS
DESCRIPTORS: PRESSURE;THROBBING
DESCRIPTORS: PRESSURE;THROBBING
DESCRIPTORS: ACHING;DISCOMFORT;STABBING

## 2024-08-04 ASSESSMENT — PAIN SCALES - GENERAL
PAINLEVEL_OUTOF10: 10
PAINLEVEL_OUTOF10: 7
PAINLEVEL_OUTOF10: 8
PAINLEVEL_OUTOF10: 7
PAINLEVEL_OUTOF10: 8

## 2024-08-04 ASSESSMENT — PAIN DESCRIPTION - ORIENTATION
ORIENTATION: INNER;MID;RIGHT
ORIENTATION: RIGHT;LEFT
ORIENTATION: MID

## 2024-08-04 ASSESSMENT — PAIN DESCRIPTION - LOCATION
LOCATION: ABDOMEN
LOCATION: ABDOMEN;CHEST
LOCATION: ABDOMEN;CHEST

## 2024-08-04 ASSESSMENT — PAIN - FUNCTIONAL ASSESSMENT
PAIN_FUNCTIONAL_ASSESSMENT: ACTIVITIES ARE NOT PREVENTED
PAIN_FUNCTIONAL_ASSESSMENT: ACTIVITIES ARE NOT PREVENTED

## 2024-08-04 NOTE — CONSULTS
03:42 AM     08/04/2024 03:42 AM    CO2 16 08/04/2024 03:42 AM    BUN 12 08/04/2024 03:42 AM    CREATININE 1.30 08/04/2024 03:42 AM    GLUCOSE 67 08/04/2024 03:42 AM    CALCIUM 8.7 08/04/2024 03:42 AM    LABGLOM 63 08/04/2024 03:42 AM    LABGLOM 58 11/16/2023 04:55 AM        Review of most recent LFTs (and lipase if done)  Lab Results   Component Value Date    ALT 11 (L) 08/03/2024    AST 45 (H) 08/03/2024    ALKPHOS 117 08/03/2024    BILITOT 1.5 (H) 08/03/2024     Lab Results   Component Value Date    LIPASE 64 (L) 11/13/2023       Lab Results   Component Value Date/Time    INR 1.1 05/05/2022 05:58 AM    INR 1.1 05/05/2022 05:42 AM    APTT 28.6 08/24/2021 09:33 AM       Review of most recent HgbA1c  Hemoglobin A1C   Date Value Ref Range Status   05/06/2022 5.7 4.20 - 6.30 % Final       Nutritional assessment screen for wound healing issues:  No results found for: \"LABPROT\", \"LABALBU\"    @lastcovr@    Xray Result (most recent):  XR CHEST PORTABLE 08/03/2024    Narrative  Chest X-ray    INDICATION: Chest pain and shortness of breath starting last night; history of  heart failure    COMPARISON:  None    TECHNIQUE: AP/PA view of the chest was obtained.    FINDINGS: Dual lead left-sided cardiac pacing device prominent perihilar  vasculature. There is cardiomegaly.  The bony thorax is intact.    Impression  Cardiomegaly and pulmonary vascular congestion      Electronically signed by Elsa Aldridge    CT Result (most recent):  CT CHEST PULMONARY EMBOLISM W CONTRAST 08/03/2024    Narrative  CT CHEST PULMONARY EMBOLISM W CONTRAST    HISTORY: Chest pain and shortness of breath, history of clots, not  anticoagulated.    COMPARISON: None.    TECHNIQUE: Following the noncontrasted , axial images of the thorax were  obtained following infusion of 100 cc of Isovue 370.  Post-processing on an  independent workstation was performed to reconstruct MIP images for evaluation  of the thoracic vasculature.    FINDINGS: There is  cirrhosis of liver (HCC)    ICD (implantable cardioverter-defibrillator) in place    NSVT (nonsustained ventricular tachycardia) (HCC)    Esophageal dysmotility    Chronic systolic congestive heart failure (HCC)    Myoclonic jerking while sleeping    Drug-seeking behavior    Hyperlipidemia    Schatzki's ring    Alcohol abuse  Resolved Problems:    * No resolved hospital problems. *     Patient Active Problem List    Diagnosis Date Noted    Other cirrhosis of liver (HCC) 01/24/2023     Priority: Medium    Stable angina pectoris (HCC) 01/24/2023     Priority: Medium    Acute deep vein thrombosis (DVT) of popliteal vein of left lower extremity (HCC) 07/21/2022     Priority: Medium    Acute deep vein thrombosis (DVT) of calf muscle vein of left lower extremity (HCC) 07/21/2022     Priority: Medium    Encounter for palliative care 07/21/2022     Priority: Medium    Advanced care planning/counseling discussion 07/21/2022     Priority: Medium    Dyspnea 07/21/2022     Priority: Medium    Nondisplaced fracture of medial condyle of right femur, subsequent encounter for closed fracture with routine healing 07/20/2022     Priority: Medium    Chest pain 07/19/2022     Priority: Medium    Paraesophageal hiatal hernia 08/03/2024    Fracture, Col[place], right, closed 03/12/2024    Pneumonia 11/02/2023    Facial cellulitis 11/01/2023    Lactic acidosis 11/01/2023    Drug-seeking behavior 05/09/2022    Chronic bronchitis, unspecified chronic bronchitis type (HCC) 05/09/2022    Myoclonic jerking while sleeping 05/06/2022    Chronic pain syndrome 05/05/2022    Chronic pain disorder 05/05/2022     Last Assessment & Plan:    Formatting of this note might be different from the original.   On 40 morphine equivalents daily, currently n.p.o.   -Fentanyl patch      NSVT (nonsustained ventricular tachycardia) (HCC) 03/04/2022    Hypomagnesemia 02/26/2022    ICD (implantable cardioverter-defibrillator) in place 02/26/2022    Esophageal

## 2024-08-04 NOTE — PROGRESS NOTES
Hospitalist Progress Note   Admit Date:  8/3/2024  9:04 PM   Name:  Drake Melchor   Age:  59 y.o.  Sex:  male  :  1965   MRN:  [id number]   Room:  [facility]    Presenting/Chief Complaint: No chief complaint on file.     Reason(s) for Admission: Paraesophageal hiatal hernia [K44.9]     Hospital Course:   Pt was admitted this morning by a colleague.  Please see H&P for details.      Assessment & Plan:     I agree with the assessment and plan in H&P with following comments/changes:  Await surgery input.  Is NPO  Stop IVF and resume diet if no surgery planned.  Doesn't need telemetry    No charge billed to patient for this follow up.      Anticipated Discharge Arrangements:   Home    PT/OT evals and PPD ordered?  Not ordered; patient not expected to need rehab  Diet:  Diet NPO Exceptions are: Ice Chips, Sips of Water with Meds  VTE prophylaxis: SCD's   Code status: Full Code      Non-peripheral Lines and Tubes (if present):          Telemetry (if present):  Cardiac/Telemetry Monitor On: Portable telemetry pack applied        Hospital Problems:  Principal Problem:    Paraesophageal hiatal hernia  Active Problems:    Other cirrhosis of liver (HCC)    ICD (implantable cardioverter-defibrillator) in place    NSVT (nonsustained ventricular tachycardia) (HCC)    Esophageal dysmotility    CHF (congestive heart failure) (HCC)    Myoclonic jerking while sleeping    Drug-seeking behavior    Hyperlipidemia    Schatzki's ring    Alcohol abuse  Resolved Problems:    * No resolved hospital problems. *      Objective:   Patient Vitals for the past 24 hrs:   Temp Pulse Resp BP SpO2   24 0743 98.1 °F (36.7 °C) 72 18 133/89 97 %   24 0319 99 °F (37.2 °C) 78 18 132/81 96 %   24 2328 98.6 °F (37 °C) 79 16 (!) 140/91 94 %   24 2304 -- 78 -- -- --   24 2111 98.4 °F (36.9 °C) 75 20 (!) 139/93 98 %       Oxygen Therapy  SpO2: 97 %  O2 Device: None (Room air)    Estimated body mass index is 24.24 kg/m² as  Monocytes % 7 4.0 - 12.0 %    Eosinophils % 1 0.5 - 7.8 %    Basophils % 1 0.0 - 2.0 %    Immature Granulocytes % 0 0.0 - 5.0 %    Neutrophils Absolute 4.0 1.7 - 8.2 K/UL    Lymphocytes Absolute 1.1 0.5 - 4.6 K/UL    Monocytes Absolute 0.4 0.1 - 1.3 K/UL    Eosinophils Absolute 0.1 0.0 - 0.8 K/UL    Basophils Absolute 0.1 0.0 - 0.2 K/UL    Immature Granulocytes Absolute 0.0 0.0 - 0.5 K/UL   Lipid Panel    Collection Time: 08/04/24  3:42 AM   Result Value Ref Range    Cholesterol, Total 136 0 - 200 MG/DL    Triglycerides 206 (H) 0 - 150 MG/DL    HDL 50 40 - 60 MG/DL    LDL Cholesterol 45 0 - 100 MG/DL    VLDL Cholesterol Calculated 41 (H) 6 - 23 MG/DL    Chol/HDL Ratio 2.7 0.0 - 5.0         Current Meds:  Current Facility-Administered Medications   Medication Dose Route Frequency    amiodarone (CORDARONE) tablet 200 mg  200 mg Oral Daily    atorvastatin (LIPITOR) tablet 80 mg  80 mg Oral Daily    baclofen (LIORESAL) tablet 10 mg  10 mg Oral TID PRN    carvedilol (COREG) tablet 25 mg  25 mg Oral BID with meals    furosemide (LASIX) tablet 40 mg  40 mg Oral Daily    gabapentin (NEURONTIN) capsule 300 mg  300 mg Oral BID    HYDROcodone-acetaminophen (NORCO)  MG per tablet 1 tablet  1 tablet Oral 4x Daily PRN    promethazine (PHENERGAN) tablet 25 mg  25 mg Oral Q6H PRN    spironolactone (ALDACTONE) tablet 25 mg  25 mg Oral Daily PRN    sodium chloride flush 0.9 % injection 5-40 mL  5-40 mL IntraVENous 2 times per day    sodium chloride flush 0.9 % injection 5-40 mL  5-40 mL IntraVENous PRN    0.9 % sodium chloride infusion   IntraVENous PRN    potassium chloride (KLOR-CON M) extended release tablet 40 mEq  40 mEq Oral PRN    Or    potassium bicarb-citric acid (EFFER-K) effervescent tablet 40 mEq  40 mEq Oral PRN    Or    potassium chloride 10 mEq/100 mL IVPB (Peripheral Line)  10 mEq IntraVENous PRN    potassium chloride 10 mEq/100 mL IVPB (Peripheral Line)  10 mEq IntraVENous PRN    magnesium sulfate 2000 mg in 50

## 2024-08-04 NOTE — H&P
Hospitalist History and Physical   Admit Date:  8/3/2024  9:04 PM   Name:  Drake Melchor   Age:  59 y.o.  Sex:  male  :  1965   MRN:  [id number]   Room:  [facility]    Presenting/Chief Complaint: No chief complaint on file.     Reason(s) for Admission: Paraesophageal hiatal hernia [K44.9]     History of Present Illness:   Drake Melchor is a 59 y.o. male with medical history of coronary artery disease, hypertension, history of DVT in left lower extremity  Not currently on anticoagulation, CHF cirrhosis of the liver nonischemic cardiomyopathy, status post ICD and hypertension and also history of a hiatal hernia presents to the hospital and symptoms emergency room where he was complaining of chest congestion associate with some nausea vomiting that started around 3:30 in the morning yesterday morning actually at this time and also has complaints of mild right lower quadrant tenderness and hence this was associated with intractable vomiting.  Patient went to see him to the emergency room where he was further worked up with a CBC came back normal as well as a BMP patient continued to complain of chest discomfort prompted him to get a CTA of the chest and that was about showed a large hiatal hernia in the middle chest containing the entire stomach up to the antrum which is excellent reason for this pain and apparently they called surgery spoke to Jennie Norton.  They requested transfer the patient to [facility] so that they can see the patient here and hence the patient was transferred symptoms ED to hospital service in [facility].  Patient at this time he is having some nausea beyond that does not have significant amount of pain he was admitted before for this paraesophageal hernia in the past really does not remember if the entire stomach contents and went up into the chest versus partial at any rate patient is to be seen by surgery abscess need to relieve redness will keep the patient n.p.o. IV fluids antinausea  following:    Height as of this encounter: 1.803 m (5' 11\").    Weight as of this encounter: 78.8 kg (173 lb 12.8 oz).  No intake or output data in the 24 hours ending 08/04/24 0257      Physical Exam:  132/84 74 13 97.9 96%  General:    Well nourished.    Head:  Normocephalic, atraumatic  Eyes:  Sclerae appear normal.  Pupils equally round.  ENT:  Nares appear normal.  Moist oral mucosa  Neck:  No restricted ROM.  Trachea midline   CV:   RRR.  No m/r/g.  No jugular venous distension.  Lungs:   CTAB.  No wheezing, rhonchi, or rales.  Symmetric expansion.  Abdomen:   Soft, nontender, nondistended.  Extremities: No cyanosis or clubbing.  No edema  Skin:     No rashes.  Normal coloration.   Warm and dry.    Neuro:  CN II-XII grossly intact.  Sensation intact.   Psych:  Normal mood and affect.      Orders Placed This Encounter   Medications    DISCONTD: albuterol sulfate HFA (PROVENTIL;VENTOLIN;PROAIR) 108 (90 Base) MCG/ACT inhaler 2 puff     Order Specific Question:   Initiate RT Bronchodilator Protocol     Answer:   Yes - Inpatient Protocol    amiodarone (CORDARONE) tablet 200 mg    atorvastatin (LIPITOR) tablet 80 mg    baclofen (LIORESAL) tablet 10 mg    carvedilol (COREG) tablet 25 mg    furosemide (LASIX) tablet 40 mg    gabapentin (NEURONTIN) capsule 300 mg    HYDROcodone-acetaminophen (NORCO)  MG per tablet 1 tablet    promethazine (PHENERGAN) tablet 25 mg    spironolactone (ALDACTONE) tablet 25 mg    sodium chloride flush 0.9 % injection 5-40 mL    sodium chloride flush 0.9 % injection 5-40 mL    0.9 % sodium chloride infusion    OR Linked Order Group     potassium chloride (KLOR-CON M) extended release tablet 40 mEq     potassium bicarb-citric acid (EFFER-K) effervescent tablet 40 mEq     potassium chloride 10 mEq/100 mL IVPB (Peripheral Line)    potassium chloride 10 mEq/100 mL IVPB (Peripheral Line)    magnesium sulfate 2000 mg in 50 mL IVPB premix    OR Linked Order Group     ondansetron (ZOFRAN-ODT)

## 2024-08-04 NOTE — PROGRESS NOTES
4 Eyes Skin Assessment     NAME:  Drake Melchor  YOB: 1965  MEDICAL RECORD NUMBER:  [id number]    The patient is being assessed for  Admission    I agree that at least one RN has performed a thorough Head to Toe Skin Assessment on the patient. ALL assessment sites listed below have been assessed.      Areas assessed by both nurses:    Head, Face, Ears, Shoulders, Back, Chest, Arms, Elbows, Hands, Sacrum. Buttock, Coccyx, Ischium, Legs. Feet and Heels, and Under Medical Devices         Does the Patient have a Wound? No noted wound(s)       Juan Ramon Prevention initiated by RN: No  Wound Care Orders initiated by RN: No    Pressure Injury (Stage 3,4, Unstageable, DTI, NWPT, and Complex wounds) if present, place Wound referral order by RN under : No    New Ostomies, if present place, Ostomy referral order under : No     Nurse 1 eSignature: Electronically signed by JOHN ORDONEZ RN on 8/4/24 at 12:46 AM EDT    **SHARE this note so that the co-signing nurse can place an eSignature**    Nurse 2 eSignature: {Esignature:[id number]}

## 2024-08-04 NOTE — CARE COORDINATION
CM met with patient to discuss discharge plan and needs.  Patient alert/orient x4.  Patient verified demographic, no changes needed.  Patient denies any issues with purchasing or affording medications.  Patient states he lives with spouse in a one level home with five steps to enter into the home.  Patient states he's independent with his ADL's and iADL's and has no DME's in the home.  Patient has no consults placed at this time.  CM will continue to follow for any needs, concerns or questions that may arise.         08/04/24 1526   Service Assessment   Patient Orientation Alert and Oriented;Person;Place;Situation;Self   Cognition Alert   History Provided By Patient;Medical Record   Primary Caregiver Self   Support Systems Spouse/Significant Other   Patient's Healthcare Decision Maker is: Legal Next of Kin   PCP Verified by CM Yes   Last Visit to PCP Within last 3 months   Prior Functional Level Independent in ADLs/IADLs   Current Functional Level Independent in ADLs/IADLs   Can patient return to prior living arrangement Yes   Ability to make needs known: Good   Family able to assist with home care needs: Yes   Would you like for me to discuss the discharge plan with any other family members/significant others, and if so, who? Yes   Financial Resources Medicare  (BCBS)   Community Resources None   Social/Functional History   Lives With Spouse   Type of Home House   Home Layout One level   Home Access Stairs to enter with rails   Entrance Stairs - Number of Steps 5   Entrance Stairs - Rails Both   Bathroom Shower/Tub Tub/Shower unit   Bathroom Toilet Standard   Bathroom Equipment None   Bathroom Accessibility Accessible   Home Equipment None   Receives Help From Family   ADL Assistance Independent   Homemaking Assistance Independent   Homemaking Responsibilities Yes   Ambulation Assistance Independent   Transfer Assistance Independent   Active  No   Patient's  Info Spouse / family assisit (Brother and

## 2024-08-05 LAB
ANION GAP SERPL CALC-SCNC: 15 MMOL/L (ref 9–18)
BASOPHILS # BLD: 0.1 K/UL (ref 0–0.2)
BASOPHILS NFR BLD: 1 % (ref 0–2)
BUN SERPL-MCNC: 11 MG/DL (ref 6–23)
C. DIFFICILE TOXIN MOLECULAR: NEGATIVE
CALCIUM SERPL-MCNC: 9 MG/DL (ref 8.8–10.2)
CHLORIDE SERPL-SCNC: 101 MMOL/L (ref 98–107)
CO2 SERPL-SCNC: 21 MMOL/L (ref 20–28)
CREAT SERPL-MCNC: 1.23 MG/DL (ref 0.8–1.3)
DIFFERENTIAL METHOD BLD: ABNORMAL
EOSINOPHIL # BLD: 0.2 K/UL (ref 0–0.8)
EOSINOPHIL NFR BLD: 4 % (ref 0.5–7.8)
ERYTHROCYTE [DISTWIDTH] IN BLOOD BY AUTOMATED COUNT: 20.4 % (ref 11.9–14.6)
GLUCOSE SERPL-MCNC: 90 MG/DL (ref 70–99)
HCT VFR BLD AUTO: 35.9 % (ref 41.1–50.3)
HGB BLD-MCNC: 11.5 G/DL (ref 13.6–17.2)
IMM GRANULOCYTES # BLD AUTO: 0 K/UL (ref 0–0.5)
IMM GRANULOCYTES NFR BLD AUTO: 0 % (ref 0–5)
LYMPHOCYTES # BLD: 1.6 K/UL (ref 0.5–4.6)
LYMPHOCYTES NFR BLD: 30 % (ref 13–44)
MAGNESIUM SERPL-MCNC: 1.7 MG/DL (ref 1.8–2.4)
MCH RBC QN AUTO: 27.6 PG (ref 26.1–32.9)
MCHC RBC AUTO-ENTMCNC: 32 G/DL (ref 31.4–35)
MCV RBC AUTO: 86.1 FL (ref 82–102)
MONOCYTES # BLD: 0.4 K/UL (ref 0.1–1.3)
MONOCYTES NFR BLD: 7 % (ref 4–12)
NEUTS SEG # BLD: 3 K/UL (ref 1.7–8.2)
NEUTS SEG NFR BLD: 58 % (ref 43–78)
NRBC # BLD: 0 K/UL (ref 0–0.2)
PLATELET # BLD AUTO: 269 K/UL (ref 150–450)
PMV BLD AUTO: 9 FL (ref 9.4–12.3)
POTASSIUM SERPL-SCNC: 3.5 MMOL/L (ref 3.5–5.1)
RBC # BLD AUTO: 4.17 M/UL (ref 4.23–5.6)
SODIUM SERPL-SCNC: 137 MMOL/L (ref 136–145)
WBC # BLD AUTO: 5.1 K/UL (ref 4.3–11.1)

## 2024-08-05 PROCEDURE — 80048 BASIC METABOLIC PNL TOTAL CA: CPT

## 2024-08-05 PROCEDURE — 85025 COMPLETE CBC W/AUTO DIFF WBC: CPT

## 2024-08-05 PROCEDURE — 1100000000 HC RM PRIVATE

## 2024-08-05 PROCEDURE — 2500000003 HC RX 250 WO HCPCS: Performed by: INTERNAL MEDICINE

## 2024-08-05 PROCEDURE — 87493 C DIFF AMPLIFIED PROBE: CPT

## 2024-08-05 PROCEDURE — 6360000002 HC RX W HCPCS: Performed by: INTERNAL MEDICINE

## 2024-08-05 PROCEDURE — 2580000003 HC RX 258: Performed by: INTERNAL MEDICINE

## 2024-08-05 PROCEDURE — 6370000000 HC RX 637 (ALT 250 FOR IP): Performed by: INTERNAL MEDICINE

## 2024-08-05 PROCEDURE — 36415 COLL VENOUS BLD VENIPUNCTURE: CPT

## 2024-08-05 PROCEDURE — 6360000002 HC RX W HCPCS: Performed by: HOSPITALIST

## 2024-08-05 PROCEDURE — 83735 ASSAY OF MAGNESIUM: CPT

## 2024-08-05 PROCEDURE — 76937 US GUIDE VASCULAR ACCESS: CPT

## 2024-08-05 RX ORDER — ENOXAPARIN SODIUM 100 MG/ML
40 INJECTION SUBCUTANEOUS EVERY 24 HOURS
Status: DISCONTINUED | OUTPATIENT
Start: 2024-08-05 | End: 2024-08-05

## 2024-08-05 RX ORDER — PROCHLORPERAZINE EDISYLATE 5 MG/ML
10 INJECTION INTRAMUSCULAR; INTRAVENOUS EVERY 6 HOURS PRN
Status: DISCONTINUED | OUTPATIENT
Start: 2024-08-05 | End: 2024-08-05

## 2024-08-05 RX ORDER — OXYCODONE HYDROCHLORIDE 5 MG/1
10 TABLET ORAL EVERY 4 HOURS PRN
Status: DISCONTINUED | OUTPATIENT
Start: 2024-08-05 | End: 2024-08-09 | Stop reason: HOSPADM

## 2024-08-05 RX ORDER — ALPRAZOLAM 0.5 MG/1
0.5 TABLET ORAL NIGHTLY PRN
Status: DISCONTINUED | OUTPATIENT
Start: 2024-08-05 | End: 2024-08-09 | Stop reason: HOSPADM

## 2024-08-05 RX ORDER — PROCHLORPERAZINE EDISYLATE 5 MG/ML
10 INJECTION INTRAMUSCULAR; INTRAVENOUS ONCE
Status: COMPLETED | OUTPATIENT
Start: 2024-08-05 | End: 2024-08-05

## 2024-08-05 RX ORDER — OXYCODONE HYDROCHLORIDE 5 MG/1
5 TABLET ORAL EVERY 4 HOURS PRN
Status: DISCONTINUED | OUTPATIENT
Start: 2024-08-05 | End: 2024-08-09 | Stop reason: HOSPADM

## 2024-08-05 RX ORDER — ENOXAPARIN SODIUM 100 MG/ML
40 INJECTION SUBCUTANEOUS NIGHTLY
Status: DISCONTINUED | OUTPATIENT
Start: 2024-08-05 | End: 2024-08-09 | Stop reason: HOSPADM

## 2024-08-05 RX ADMIN — METOCLOPRAMIDE HYDROCHLORIDE 10 MG: 5 INJECTION INTRAMUSCULAR; INTRAVENOUS at 05:58

## 2024-08-05 RX ADMIN — AMIODARONE HYDROCHLORIDE 200 MG: 200 TABLET ORAL at 08:18

## 2024-08-05 RX ADMIN — SODIUM CHLORIDE: 9 INJECTION, SOLUTION INTRAVENOUS at 07:04

## 2024-08-05 RX ADMIN — GABAPENTIN 300 MG: 300 CAPSULE ORAL at 20:09

## 2024-08-05 RX ADMIN — OXYCODONE 10 MG: 5 TABLET ORAL at 13:26

## 2024-08-05 RX ADMIN — PROMETHAZINE HYDROCHLORIDE 25 MG: 25 TABLET ORAL at 17:31

## 2024-08-05 RX ADMIN — FUROSEMIDE 40 MG: 40 TABLET ORAL at 08:18

## 2024-08-05 RX ADMIN — HYDROMORPHONE HYDROCHLORIDE 1 MG: 1 INJECTION, SOLUTION INTRAMUSCULAR; INTRAVENOUS; SUBCUTANEOUS at 05:58

## 2024-08-05 RX ADMIN — SODIUM CHLORIDE, PRESERVATIVE FREE 10 ML: 5 INJECTION INTRAVENOUS at 20:28

## 2024-08-05 RX ADMIN — ENOXAPARIN SODIUM 40 MG: 100 INJECTION SUBCUTANEOUS at 20:09

## 2024-08-05 RX ADMIN — SODIUM CHLORIDE, PRESERVATIVE FREE 10 ML: 5 INJECTION INTRAVENOUS at 20:30

## 2024-08-05 RX ADMIN — CARVEDILOL 25 MG: 25 TABLET, FILM COATED ORAL at 17:05

## 2024-08-05 RX ADMIN — SODIUM CHLORIDE, PRESERVATIVE FREE 10 ML: 5 INJECTION INTRAVENOUS at 08:14

## 2024-08-05 RX ADMIN — HYDROMORPHONE HYDROCHLORIDE 1 MG: 1 INJECTION, SOLUTION INTRAMUSCULAR; INTRAVENOUS; SUBCUTANEOUS at 20:09

## 2024-08-05 RX ADMIN — HYDROMORPHONE HYDROCHLORIDE 1 MG: 1 INJECTION, SOLUTION INTRAMUSCULAR; INTRAVENOUS; SUBCUTANEOUS at 01:19

## 2024-08-05 RX ADMIN — GABAPENTIN 300 MG: 300 CAPSULE ORAL at 08:18

## 2024-08-05 RX ADMIN — PROMETHAZINE HYDROCHLORIDE 25 MG: 25 INJECTION INTRAMUSCULAR; INTRAVENOUS at 11:44

## 2024-08-05 RX ADMIN — OXYCODONE 10 MG: 5 TABLET ORAL at 17:31

## 2024-08-05 RX ADMIN — CARVEDILOL 25 MG: 25 TABLET, FILM COATED ORAL at 08:18

## 2024-08-05 RX ADMIN — ATORVASTATIN CALCIUM 80 MG: 80 TABLET, FILM COATED ORAL at 08:18

## 2024-08-05 RX ADMIN — PANTOPRAZOLE SODIUM 40 MG: 40 INJECTION, POWDER, FOR SOLUTION INTRAVENOUS at 08:18

## 2024-08-05 RX ADMIN — PROCHLORPERAZINE EDISYLATE 10 MG: 5 INJECTION INTRAMUSCULAR; INTRAVENOUS at 02:23

## 2024-08-05 RX ADMIN — SODIUM CHLORIDE: 9 INJECTION, SOLUTION INTRAVENOUS at 10:32

## 2024-08-05 ASSESSMENT — PAIN DESCRIPTION - PAIN TYPE
TYPE: ACUTE PAIN
TYPE: ACUTE PAIN

## 2024-08-05 ASSESSMENT — PAIN DESCRIPTION - FREQUENCY
FREQUENCY: CONTINUOUS
FREQUENCY: CONTINUOUS
FREQUENCY: INTERMITTENT

## 2024-08-05 ASSESSMENT — PAIN DESCRIPTION - ORIENTATION
ORIENTATION: MID
ORIENTATION: ANTERIOR
ORIENTATION: ANTERIOR
ORIENTATION: MID
ORIENTATION: ANTERIOR;MID

## 2024-08-05 ASSESSMENT — PAIN SCALES - GENERAL
PAINLEVEL_OUTOF10: 4
PAINLEVEL_OUTOF10: 9
PAINLEVEL_OUTOF10: 8
PAINLEVEL_OUTOF10: 7
PAINLEVEL_OUTOF10: 10
PAINLEVEL_OUTOF10: 8
PAINLEVEL_OUTOF10: 6
PAINLEVEL_OUTOF10: 7

## 2024-08-05 ASSESSMENT — PAIN DESCRIPTION - DESCRIPTORS
DESCRIPTORS: THROBBING;PRESSURE
DESCRIPTORS: ACHING;PRESSURE
DESCRIPTORS: ACHING
DESCRIPTORS: PRESSURE;THROBBING
DESCRIPTORS: THROBBING;PRESSURE

## 2024-08-05 ASSESSMENT — PAIN - FUNCTIONAL ASSESSMENT
PAIN_FUNCTIONAL_ASSESSMENT: ACTIVITIES ARE NOT PREVENTED

## 2024-08-05 ASSESSMENT — PAIN DESCRIPTION - LOCATION
LOCATION: ABDOMEN;CHEST
LOCATION: ABDOMEN;CHEST
LOCATION: CHEST;ABDOMEN
LOCATION: ABDOMEN;CHEST
LOCATION: ABDOMEN;CHEST

## 2024-08-05 ASSESSMENT — PAIN DESCRIPTION - ONSET
ONSET: GRADUAL

## 2024-08-05 NOTE — PROGRESS NOTES
Hospitalist Progress Note   Admit Date:  8/3/2024  9:04 PM   Name:  Drake Melchor   Age:  59 y.o.  Sex:  male  :  1965   MRN:  [id number]   [place]  [facility] [place]    Presenting/Chief Complaint: No chief complaint on file.     Reason(s) for Admission: Paraesophageal hiatal hernia [K44.9]     Hospital Course:   Drake Melchor is a 59 y.o. male with medical history of coronary artery disease, hypertension, history of DVT in left lower extremity not currently on anticoagulation, CHF cirrhosis of the liver nonischemic cardiomyopathy, status post ICD and hypertension and also history of a hiatal hernia presented to the hospital and symptoms emergency room where he was complaining of chest congestion associate with some nausea vomiting that started around 3:30 in the morning.   Patient went to see him to the emergency room where he was further worked up with a CBC came back normal as well as a BMP patient continued to complain of chest discomfort prompted him to get a CTA of the chest and that was about showed a large hiatal hernia in the middle chest containing the entire stomach up to the antrum which is excellent reason for this pain and apparently they called surgery spoke to Jennie Norton.  They requested transfer the patient to [facility] so that they can see the patient       Subjective & 24hr Events:   Pt reports the zofran, reglan, and compazine not working on his nausea.  Will try phenergan.  Still reports moderate-severe pain substernal pain.  Getting IV dilaudid PRN.  No other complaints.  No PO intake for a few days.      Assessment & Plan:       Paraesophageal hiatal hernia  -surgery consult pending, re-notified.  -dilaudid IV  -phenergan      Other cirrhosis of liver (HCC)  -avoid hepatotoxic meds.  -complicates care and increases risk      ICD (implantable cardioverter-defibrillator) in place    NSVT (nonsustained ventricular tachycardia) (HCC)  -cont amiodarone      Chronic systolic congestive heart failure

## 2024-08-05 NOTE — CARE COORDINATION
Pt chart reviewed. During IDT rounds, MD reports pt is NPO, surgery consulted. No needs expressed at this time for case management. Discharge to be determined. MSW will follow patient for care.

## 2024-08-05 NOTE — PLAN OF CARE
Problem: Pain  Goal: Verbalizes/displays adequate comfort level or baseline comfort level  Outcome: Progressing     Problem: ABCDS Injury Assessment  Goal: Absence of physical injury  Outcome: Progressing  Flowsheets (Taken 8/4/2024 2035)  Absence of Physical Injury: Implement safety measures based on patient assessment     Problem: Safety - Adult  Goal: Free from fall injury  Outcome: Progressing  Flowsheets (Taken 8/4/2024 2035)  Free From Fall Injury: Instruct family/caregiver on patient safety

## 2024-08-06 LAB
ANION GAP SERPL CALC-SCNC: 15 MMOL/L (ref 9–18)
BASOPHILS # BLD: 0 K/UL (ref 0–0.2)
BASOPHILS NFR BLD: 1 % (ref 0–2)
BUN SERPL-MCNC: 9 MG/DL (ref 6–23)
CALCIUM SERPL-MCNC: 8.6 MG/DL (ref 8.8–10.2)
CHLORIDE SERPL-SCNC: 97 MMOL/L (ref 98–107)
CO2 SERPL-SCNC: 20 MMOL/L (ref 20–28)
CREAT SERPL-MCNC: 1.41 MG/DL (ref 0.8–1.3)
DIFFERENTIAL METHOD BLD: ABNORMAL
EOSINOPHIL # BLD: 0.2 K/UL (ref 0–0.8)
EOSINOPHIL NFR BLD: 4 % (ref 0.5–7.8)
ERYTHROCYTE [DISTWIDTH] IN BLOOD BY AUTOMATED COUNT: 20.8 % (ref 11.9–14.6)
GLUCOSE SERPL-MCNC: 105 MG/DL (ref 70–99)
HCT VFR BLD AUTO: 37.5 % (ref 41.1–50.3)
HGB BLD-MCNC: 11.7 G/DL (ref 13.6–17.2)
IMM GRANULOCYTES # BLD AUTO: 0 K/UL (ref 0–0.5)
IMM GRANULOCYTES NFR BLD AUTO: 0 % (ref 0–5)
LYMPHOCYTES # BLD: 1.6 K/UL (ref 0.5–4.6)
LYMPHOCYTES NFR BLD: 32 % (ref 13–44)
MCH RBC QN AUTO: 27.1 PG (ref 26.1–32.9)
MCHC RBC AUTO-ENTMCNC: 31.2 G/DL (ref 31.4–35)
MCV RBC AUTO: 87 FL (ref 82–102)
MONOCYTES # BLD: 0.4 K/UL (ref 0.1–1.3)
MONOCYTES NFR BLD: 8 % (ref 4–12)
NEUTS SEG # BLD: 2.9 K/UL (ref 1.7–8.2)
NEUTS SEG NFR BLD: 56 % (ref 43–78)
NRBC # BLD: 0 K/UL (ref 0–0.2)
PLATELET # BLD AUTO: 200 K/UL (ref 150–450)
PMV BLD AUTO: 8.9 FL (ref 9.4–12.3)
POTASSIUM SERPL-SCNC: 3.6 MMOL/L (ref 3.5–5.1)
RBC # BLD AUTO: 4.31 M/UL (ref 4.23–5.6)
SODIUM SERPL-SCNC: 132 MMOL/L (ref 136–145)
WBC # BLD AUTO: 5.1 K/UL (ref 4.3–11.1)

## 2024-08-06 PROCEDURE — 6370000000 HC RX 637 (ALT 250 FOR IP): Performed by: NURSE PRACTITIONER

## 2024-08-06 PROCEDURE — 2500000003 HC RX 250 WO HCPCS: Performed by: INTERNAL MEDICINE

## 2024-08-06 PROCEDURE — 94761 N-INVAS EAR/PLS OXIMETRY MLT: CPT

## 2024-08-06 PROCEDURE — 6370000000 HC RX 637 (ALT 250 FOR IP): Performed by: INTERNAL MEDICINE

## 2024-08-06 PROCEDURE — 6360000002 HC RX W HCPCS: Performed by: INTERNAL MEDICINE

## 2024-08-06 PROCEDURE — 1100000000 HC RM PRIVATE

## 2024-08-06 PROCEDURE — 94640 AIRWAY INHALATION TREATMENT: CPT

## 2024-08-06 PROCEDURE — 80048 BASIC METABOLIC PNL TOTAL CA: CPT

## 2024-08-06 PROCEDURE — 85025 COMPLETE CBC W/AUTO DIFF WBC: CPT

## 2024-08-06 PROCEDURE — 2580000003 HC RX 258: Performed by: INTERNAL MEDICINE

## 2024-08-06 PROCEDURE — 76937 US GUIDE VASCULAR ACCESS: CPT

## 2024-08-06 PROCEDURE — 94760 N-INVAS EAR/PLS OXIMETRY 1: CPT

## 2024-08-06 PROCEDURE — 36415 COLL VENOUS BLD VENIPUNCTURE: CPT

## 2024-08-06 RX ORDER — ONDANSETRON 2 MG/ML
4 INJECTION INTRAMUSCULAR; INTRAVENOUS EVERY 6 HOURS PRN
Status: DISCONTINUED | OUTPATIENT
Start: 2024-08-06 | End: 2024-08-09 | Stop reason: HOSPADM

## 2024-08-06 RX ORDER — HYDROMORPHONE HYDROCHLORIDE 2 MG/ML
1.5 INJECTION, SOLUTION INTRAMUSCULAR; INTRAVENOUS; SUBCUTANEOUS ONCE
Status: COMPLETED | OUTPATIENT
Start: 2024-08-06 | End: 2024-08-06

## 2024-08-06 RX ORDER — SODIUM CHLORIDE 9 MG/ML
INJECTION, SOLUTION INTRAVENOUS CONTINUOUS
Status: ACTIVE | OUTPATIENT
Start: 2024-08-06 | End: 2024-08-06

## 2024-08-06 RX ORDER — ALBUTEROL SULFATE 2.5 MG/3ML
2.5 SOLUTION RESPIRATORY (INHALATION)
Status: DISCONTINUED | OUTPATIENT
Start: 2024-08-06 | End: 2024-08-09 | Stop reason: HOSPADM

## 2024-08-06 RX ADMIN — SODIUM CHLORIDE, PRESERVATIVE FREE 10 ML: 5 INJECTION INTRAVENOUS at 20:19

## 2024-08-06 RX ADMIN — ONDANSETRON 4 MG: 2 INJECTION INTRAMUSCULAR; INTRAVENOUS at 14:48

## 2024-08-06 RX ADMIN — ALBUTEROL SULFATE 2.5 MG: 2.5 SOLUTION RESPIRATORY (INHALATION) at 14:18

## 2024-08-06 RX ADMIN — AMIODARONE HYDROCHLORIDE 200 MG: 200 TABLET ORAL at 09:08

## 2024-08-06 RX ADMIN — PROMETHAZINE HYDROCHLORIDE 25 MG: 25 INJECTION INTRAMUSCULAR; INTRAVENOUS at 11:34

## 2024-08-06 RX ADMIN — ATORVASTATIN CALCIUM 80 MG: 80 TABLET, FILM COATED ORAL at 09:08

## 2024-08-06 RX ADMIN — FUROSEMIDE 40 MG: 40 TABLET ORAL at 09:08

## 2024-08-06 RX ADMIN — GABAPENTIN 300 MG: 300 CAPSULE ORAL at 20:21

## 2024-08-06 RX ADMIN — ONDANSETRON 4 MG: 2 INJECTION INTRAMUSCULAR; INTRAVENOUS at 22:43

## 2024-08-06 RX ADMIN — OXYCODONE 10 MG: 5 TABLET ORAL at 02:26

## 2024-08-06 RX ADMIN — HYDROMORPHONE HYDROCHLORIDE 1 MG: 1 INJECTION, SOLUTION INTRAMUSCULAR; INTRAVENOUS; SUBCUTANEOUS at 22:40

## 2024-08-06 RX ADMIN — OXYCODONE 10 MG: 5 TABLET ORAL at 20:26

## 2024-08-06 RX ADMIN — ALBUTEROL SULFATE 2.5 MG: 2.5 SOLUTION RESPIRATORY (INHALATION) at 19:33

## 2024-08-06 RX ADMIN — ENOXAPARIN SODIUM 40 MG: 100 INJECTION SUBCUTANEOUS at 20:21

## 2024-08-06 RX ADMIN — PROMETHAZINE HYDROCHLORIDE 25 MG: 25 INJECTION INTRAMUSCULAR; INTRAVENOUS at 20:21

## 2024-08-06 RX ADMIN — OXYCODONE 10 MG: 5 TABLET ORAL at 15:52

## 2024-08-06 RX ADMIN — OXYCODONE 10 MG: 5 TABLET ORAL at 09:08

## 2024-08-06 RX ADMIN — ALPRAZOLAM 0.5 MG: 0.5 TABLET ORAL at 00:03

## 2024-08-06 RX ADMIN — GABAPENTIN 300 MG: 300 CAPSULE ORAL at 09:08

## 2024-08-06 RX ADMIN — PANTOPRAZOLE SODIUM 40 MG: 40 INJECTION, POWDER, FOR SOLUTION INTRAVENOUS at 10:29

## 2024-08-06 RX ADMIN — CARVEDILOL 25 MG: 25 TABLET, FILM COATED ORAL at 09:08

## 2024-08-06 RX ADMIN — SODIUM CHLORIDE: 9 INJECTION, SOLUTION INTRAVENOUS at 11:33

## 2024-08-06 RX ADMIN — HYDROMORPHONE HYDROCHLORIDE 1 MG: 1 INJECTION, SOLUTION INTRAMUSCULAR; INTRAVENOUS; SUBCUTANEOUS at 17:43

## 2024-08-06 RX ADMIN — HYDROMORPHONE HYDROCHLORIDE 1.5 MG: 2 INJECTION INTRAMUSCULAR; INTRAVENOUS; SUBCUTANEOUS at 10:30

## 2024-08-06 RX ADMIN — CARVEDILOL 25 MG: 25 TABLET, FILM COATED ORAL at 15:56

## 2024-08-06 ASSESSMENT — PAIN DESCRIPTION - DESCRIPTORS
DESCRIPTORS: ACHING;SHARP
DESCRIPTORS: ACHING;SORE;SHOOTING
DESCRIPTORS: THROBBING;PRESSURE
DESCRIPTORS: ACHING;PRESSURE
DESCRIPTORS: PRESSURE;THROBBING
DESCRIPTORS: ACHING;SHARP
DESCRIPTORS: THROBBING;PRESSURE

## 2024-08-06 ASSESSMENT — PAIN - FUNCTIONAL ASSESSMENT

## 2024-08-06 ASSESSMENT — PAIN DESCRIPTION - FREQUENCY
FREQUENCY: CONTINUOUS

## 2024-08-06 ASSESSMENT — PAIN SCALES - GENERAL
PAINLEVEL_OUTOF10: 0
PAINLEVEL_OUTOF10: 10
PAINLEVEL_OUTOF10: 8
PAINLEVEL_OUTOF10: 0
PAINLEVEL_OUTOF10: 8
PAINLEVEL_OUTOF10: 4
PAINLEVEL_OUTOF10: 8
PAINLEVEL_OUTOF10: 4
PAINLEVEL_OUTOF10: 7
PAINLEVEL_OUTOF10: 9
PAINLEVEL_OUTOF10: 0

## 2024-08-06 ASSESSMENT — PAIN DESCRIPTION - ONSET
ONSET: GRADUAL
ONSET: ON-GOING
ONSET: GRADUAL

## 2024-08-06 ASSESSMENT — PAIN DESCRIPTION - ORIENTATION
ORIENTATION: MID
ORIENTATION: MID
ORIENTATION: ANTERIOR;MID

## 2024-08-06 ASSESSMENT — PAIN DESCRIPTION - PAIN TYPE
TYPE: ACUTE PAIN

## 2024-08-06 ASSESSMENT — PAIN DESCRIPTION - LOCATION
LOCATION: ABDOMEN;HEAD
LOCATION: ABDOMEN;HEAD
LOCATION: HEAD;ABDOMEN
LOCATION: ABDOMEN;HEAD
LOCATION: ABDOMEN;CHEST

## 2024-08-06 NOTE — PROGRESS NOTES
Hospitalist Progress Note   Admit Date:  8/3/2024  9:04 PM   Name:  Drake Melchor   Age:  59 y.o.  Sex:  male  :  1965   MRN:  [id number]   Room:  [facility]    Presenting/Chief Complaint: No chief complaint on file.     Reason(s) for Admission: Paraesophageal hiatal hernia [K44.9]     Hospital Course:   Drake Melchor is a 59 y.o. male with medical history of coronary artery disease, hypertension, history of DVT in lower extremity not currently on anticoagulation, CHF, status post ICD, and hypertension.  Patient presented to emergency department secondary to nausea and vomiting and noted to have large hiatal hernia.  Case was discussed with general surgery team and recommended transfer to [facility].  Patient to be seen by general surgery team at this time.      Subjective & 24hr Events:   Patient resting comfortably on examination this morning.  Patient was slightly frustrated this morning as he was unable to receive IV pain medication last night given he has a difficult IV insertion.  Patient to have IV line placed today.  Patient had no other complaints on exam.      Assessment & Plan:     Paraesophageal hiatal hernia  General surgery consulted for further recommendations  Pain regimen in place  Continue Phenergan    Cirrhosis of the liver  Avoid hepatotoxic meds  Stable    ICD  NSVT  Continue amiodarone    Chronic systolic heart failure  Patient appears euvolemic on exam  Will give gentle IV fluids    CKD  Baseline kidney function appears to be around 1-1.4  Continue gentle IV fluids  Continue to monitor    Hyponatremia  Likely related to poor p.o. intake  Continue to monitor    Wheezing  Patient denies any shortness of breath on exam  Continue nebulizers scheduled at this time      Anticipated Discharge Arrangements:   Home    PT/OT evals ordered?  Not ordered; patient not expected to need rehab  Diet:  ADULT DIET; Clear Liquid; No Carbonated Beverages  ADULT ORAL NUTRITION SUPPLEMENT; Breakfast,  SubCUTAneous Nightly    ALPRAZolam (XANAX) tablet 0.5 mg  0.5 mg Oral Nightly PRN    amiodarone (CORDARONE) tablet 200 mg  200 mg Oral Daily    atorvastatin (LIPITOR) tablet 80 mg  80 mg Oral Daily    baclofen (LIORESAL) tablet 10 mg  10 mg Oral TID PRN    carvedilol (COREG) tablet 25 mg  25 mg Oral BID with meals    gabapentin (NEURONTIN) capsule 300 mg  300 mg Oral BID    promethazine (PHENERGAN) tablet 25 mg  25 mg Oral Q6H PRN    spironolactone (ALDACTONE) tablet 25 mg  25 mg Oral Daily PRN    sodium chloride flush 0.9 % injection 5-40 mL  5-40 mL IntraVENous 2 times per day    sodium chloride flush 0.9 % injection 5-40 mL  5-40 mL IntraVENous PRN    0.9 % sodium chloride infusion   IntraVENous PRN    potassium chloride (KLOR-CON M) extended release tablet 40 mEq  40 mEq Oral PRN    Or    potassium bicarb-citric acid (EFFER-K) effervescent tablet 40 mEq  40 mEq Oral PRN    Or    potassium chloride 10 mEq/100 mL IVPB (Peripheral Line)  10 mEq IntraVENous PRN    potassium chloride 10 mEq/100 mL IVPB (Peripheral Line)  10 mEq IntraVENous PRN    magnesium sulfate 2000 mg in 50 mL IVPB premix  2,000 mg IntraVENous PRN    polyethylene glycol (GLYCOLAX) packet 17 g  17 g Oral Daily PRN    bisacodyl (DULCOLAX) suppository 10 mg  10 mg Rectal Daily PRN    famotidine (PEPCID) tablet 10 mg  10 mg Oral Daily PRN    aluminum & magnesium hydroxide-simethicone (MAALOX) 200-200-20 MG/5ML suspension 30 mL  30 mL Oral Q6H PRN    sodium chloride flush 0.9 % injection 5-40 mL  5-40 mL IntraVENous 2 times per day    sodium chloride flush 0.9 % injection 5-40 mL  5-40 mL IntraVENous PRN    0.9 % sodium chloride infusion   IntraVENous PRN    magnesium sulfate 2000 mg in 50 mL IVPB premix  2,000 mg IntraVENous PRN    aluminum & magnesium hydroxide-simethicone (MAALOX) 200-200-20 MG/5ML suspension 30 mL  30 mL Oral Q6H PRN    pantoprazole (PROTONIX) 40 mg in sodium chloride (PF) 0.9 % 10 mL injection  40 mg IntraVENous Daily

## 2024-08-06 NOTE — PROGRESS NOTES
Writer communication with Dr. Dowell    This patient had an IV placed via US by IV team during the day. Pt IV infiltrated last night and I reached out to [place] nurse who was working to get Dr. Brady/Rani to help start a new IV with US machine. However, they have not been available to assist due to all the intubations and codes. The patient has IV dilaudid for pain and is needing it but has no IV access. I called pharmacy and confirmed dilaudid can be given IM. Do you think he can get a dose IM until IV team can reestablish an IV? Please advise      Per Dr. Dowell:    Give 1 mg IM hydromorphone once    Writer informed pt that MD wrote for IM pain control until new IV access can be established. Pt declined receiving dilaudid IM route stating \"it will hurt\". Writer voiced understanding.     Order not written due to pt refusal.

## 2024-08-06 NOTE — PROGRESS NOTES
US Guided PIV access    Called for assistance with IV access. Ultrasound was used to find the vein which was compressible and does not have any features of an artery or nerve bundle. Skin was cleaned and disinfected prior to IV puncture. Under real-time ultrasound guidance, peripheral access was obtained in the right forearm using 22 G 1.75\" Peripheral IV catheter x 1 attempt. Blood return was present and IV flushed without difficulty. IV dressing applied, no immediate complications noted, and patient tolerated the procedure well.

## 2024-08-06 NOTE — PLAN OF CARE
Problem: Respiratory - Adult  Goal: Achieves optimal ventilation and oxygenation  Outcome: Progressing  Flowsheets (Taken 8/6/2024 1620)  Achieves optimal ventilation and oxygenation:   Assess for changes in respiratory status   Position to facilitate oxygenation and minimize respiratory effort   Respiratory therapy support as indicated   Assess for changes in mentation and behavior   Oxygen supplementation based on oxygen saturation or arterial blood gases   Encourage broncho-pulmonary hygiene including cough, deep breathe, incentive spirometry   Assess and instruct to report shortness of breath or any respiratory difficulty

## 2024-08-06 NOTE — PROGRESS NOTES
Writer communication with Alee Valdez NP:    This patient is requesting xanax for anxiety. Did confirm med is listed on home med list. No order on MAR at this time. Please advise.    Per NP: Will put in.

## 2024-08-06 NOTE — PROGRESS NOTES
IV infiltrated. Charge RN made aware that patient will need US guided placement for next IV d/t poor hx of gaining access. Pt made aware IV team will be assisting with placement. All needs currently met at this time.

## 2024-08-07 LAB
ANION GAP SERPL CALC-SCNC: 15 MMOL/L (ref 9–18)
BASOPHILS # BLD: 0 K/UL (ref 0–0.2)
BASOPHILS NFR BLD: 1 % (ref 0–2)
BUN SERPL-MCNC: 7 MG/DL (ref 6–23)
CALCIUM SERPL-MCNC: 8.2 MG/DL (ref 8.8–10.2)
CHLORIDE SERPL-SCNC: 98 MMOL/L (ref 98–107)
CO2 SERPL-SCNC: 19 MMOL/L (ref 20–28)
CREAT SERPL-MCNC: 1.37 MG/DL (ref 0.8–1.3)
DIFFERENTIAL METHOD BLD: ABNORMAL
EOSINOPHIL # BLD: 0.2 K/UL (ref 0–0.8)
EOSINOPHIL NFR BLD: 5 % (ref 0.5–7.8)
ERYTHROCYTE [DISTWIDTH] IN BLOOD BY AUTOMATED COUNT: 20.9 % (ref 11.9–14.6)
GLUCOSE SERPL-MCNC: 106 MG/DL (ref 70–99)
HCT VFR BLD AUTO: 34.8 % (ref 41.1–50.3)
HGB BLD-MCNC: 10.7 G/DL (ref 13.6–17.2)
IMM GRANULOCYTES # BLD AUTO: 0 K/UL (ref 0–0.5)
IMM GRANULOCYTES NFR BLD AUTO: 0 % (ref 0–5)
LYMPHOCYTES # BLD: 1.1 K/UL (ref 0.5–4.6)
LYMPHOCYTES NFR BLD: 24 % (ref 13–44)
MCH RBC QN AUTO: 27.3 PG (ref 26.1–32.9)
MCHC RBC AUTO-ENTMCNC: 30.7 G/DL (ref 31.4–35)
MCV RBC AUTO: 88.8 FL (ref 82–102)
MONOCYTES # BLD: 0.2 K/UL (ref 0.1–1.3)
MONOCYTES NFR BLD: 5 % (ref 4–12)
NEUTS SEG # BLD: 3 K/UL (ref 1.7–8.2)
NEUTS SEG NFR BLD: 65 % (ref 43–78)
NRBC # BLD: 0 K/UL (ref 0–0.2)
PLATELET # BLD AUTO: 219 K/UL (ref 150–450)
PMV BLD AUTO: 9.2 FL (ref 9.4–12.3)
POTASSIUM SERPL-SCNC: 3.7 MMOL/L (ref 3.5–5.1)
RBC # BLD AUTO: 3.92 M/UL (ref 4.23–5.6)
SODIUM SERPL-SCNC: 131 MMOL/L (ref 136–145)
WBC # BLD AUTO: 4.6 K/UL (ref 4.3–11.1)

## 2024-08-07 PROCEDURE — 1100000000 HC RM PRIVATE

## 2024-08-07 PROCEDURE — 2500000003 HC RX 250 WO HCPCS: Performed by: INTERNAL MEDICINE

## 2024-08-07 PROCEDURE — 6360000002 HC RX W HCPCS: Performed by: INTERNAL MEDICINE

## 2024-08-07 PROCEDURE — 94760 N-INVAS EAR/PLS OXIMETRY 1: CPT

## 2024-08-07 PROCEDURE — 6370000000 HC RX 637 (ALT 250 FOR IP): Performed by: NURSE PRACTITIONER

## 2024-08-07 PROCEDURE — 36415 COLL VENOUS BLD VENIPUNCTURE: CPT

## 2024-08-07 PROCEDURE — 6370000000 HC RX 637 (ALT 250 FOR IP): Performed by: INTERNAL MEDICINE

## 2024-08-07 PROCEDURE — 80048 BASIC METABOLIC PNL TOTAL CA: CPT

## 2024-08-07 PROCEDURE — 2580000003 HC RX 258: Performed by: INTERNAL MEDICINE

## 2024-08-07 PROCEDURE — 94640 AIRWAY INHALATION TREATMENT: CPT

## 2024-08-07 PROCEDURE — 85025 COMPLETE CBC W/AUTO DIFF WBC: CPT

## 2024-08-07 PROCEDURE — 94761 N-INVAS EAR/PLS OXIMETRY MLT: CPT

## 2024-08-07 RX ORDER — SODIUM CHLORIDE 9 MG/ML
INJECTION, SOLUTION INTRAVENOUS CONTINUOUS
Status: DISCONTINUED | OUTPATIENT
Start: 2024-08-07 | End: 2024-08-08

## 2024-08-07 RX ADMIN — SODIUM CHLORIDE: 9 INJECTION, SOLUTION INTRAVENOUS at 12:24

## 2024-08-07 RX ADMIN — HYDROMORPHONE HYDROCHLORIDE 1 MG: 1 INJECTION, SOLUTION INTRAMUSCULAR; INTRAVENOUS; SUBCUTANEOUS at 21:18

## 2024-08-07 RX ADMIN — METHYLPREDNISOLONE SODIUM SUCCINATE 40 MG: 40 INJECTION INTRAMUSCULAR; INTRAVENOUS at 12:25

## 2024-08-07 RX ADMIN — HYDROMORPHONE HYDROCHLORIDE 1 MG: 1 INJECTION, SOLUTION INTRAMUSCULAR; INTRAVENOUS; SUBCUTANEOUS at 09:20

## 2024-08-07 RX ADMIN — OXYCODONE 10 MG: 5 TABLET ORAL at 17:08

## 2024-08-07 RX ADMIN — OXYCODONE 10 MG: 5 TABLET ORAL at 03:17

## 2024-08-07 RX ADMIN — PROMETHAZINE HYDROCHLORIDE 25 MG: 25 INJECTION INTRAMUSCULAR; INTRAVENOUS at 10:25

## 2024-08-07 RX ADMIN — CARVEDILOL 25 MG: 25 TABLET, FILM COATED ORAL at 09:14

## 2024-08-07 RX ADMIN — SODIUM CHLORIDE: 9 INJECTION, SOLUTION INTRAVENOUS at 22:42

## 2024-08-07 RX ADMIN — ALBUTEROL SULFATE 2.5 MG: 2.5 SOLUTION RESPIRATORY (INHALATION) at 07:22

## 2024-08-07 RX ADMIN — PROMETHAZINE HYDROCHLORIDE 25 MG: 25 TABLET ORAL at 03:17

## 2024-08-07 RX ADMIN — GABAPENTIN 300 MG: 300 CAPSULE ORAL at 09:14

## 2024-08-07 RX ADMIN — OXYCODONE 10 MG: 5 TABLET ORAL at 12:49

## 2024-08-07 RX ADMIN — ALBUTEROL SULFATE 2.5 MG: 2.5 SOLUTION RESPIRATORY (INHALATION) at 15:05

## 2024-08-07 RX ADMIN — CARVEDILOL 25 MG: 25 TABLET, FILM COATED ORAL at 18:01

## 2024-08-07 RX ADMIN — SODIUM CHLORIDE, PRESERVATIVE FREE 10 ML: 5 INJECTION INTRAVENOUS at 21:02

## 2024-08-07 RX ADMIN — PROMETHAZINE HYDROCHLORIDE 25 MG: 25 TABLET ORAL at 21:02

## 2024-08-07 RX ADMIN — SODIUM CHLORIDE, PRESERVATIVE FREE 10 ML: 5 INJECTION INTRAVENOUS at 09:17

## 2024-08-07 RX ADMIN — ALBUTEROL SULFATE 2.5 MG: 2.5 SOLUTION RESPIRATORY (INHALATION) at 19:14

## 2024-08-07 RX ADMIN — GABAPENTIN 300 MG: 300 CAPSULE ORAL at 21:02

## 2024-08-07 RX ADMIN — ATORVASTATIN CALCIUM 80 MG: 80 TABLET, FILM COATED ORAL at 09:14

## 2024-08-07 RX ADMIN — ENOXAPARIN SODIUM 40 MG: 100 INJECTION SUBCUTANEOUS at 21:02

## 2024-08-07 RX ADMIN — ALPRAZOLAM 0.5 MG: 0.5 TABLET ORAL at 22:41

## 2024-08-07 RX ADMIN — AMIODARONE HYDROCHLORIDE 200 MG: 200 TABLET ORAL at 09:14

## 2024-08-07 RX ADMIN — SODIUM CHLORIDE, PRESERVATIVE FREE 10 ML: 5 INJECTION INTRAVENOUS at 09:16

## 2024-08-07 RX ADMIN — PANTOPRAZOLE SODIUM 40 MG: 40 INJECTION, POWDER, FOR SOLUTION INTRAVENOUS at 09:15

## 2024-08-07 ASSESSMENT — PAIN DESCRIPTION - LOCATION
LOCATION: ABDOMEN
LOCATION: ABDOMEN
LOCATION: ABDOMEN;CHEST
LOCATION: ABDOMEN
LOCATION: ABDOMEN

## 2024-08-07 ASSESSMENT — PAIN SCALES - GENERAL
PAINLEVEL_OUTOF10: 7
PAINLEVEL_OUTOF10: 9
PAINLEVEL_OUTOF10: 8
PAINLEVEL_OUTOF10: 9
PAINLEVEL_OUTOF10: 7
PAINLEVEL_OUTOF10: 5
PAINLEVEL_OUTOF10: 0
PAINLEVEL_OUTOF10: 4
PAINLEVEL_OUTOF10: 4
PAINLEVEL_OUTOF10: 5
PAINLEVEL_OUTOF10: 6
PAINLEVEL_OUTOF10: 8

## 2024-08-07 ASSESSMENT — PAIN - FUNCTIONAL ASSESSMENT
PAIN_FUNCTIONAL_ASSESSMENT: PREVENTS OR INTERFERES SOME ACTIVE ACTIVITIES AND ADLS
PAIN_FUNCTIONAL_ASSESSMENT: ACTIVITIES ARE NOT PREVENTED
PAIN_FUNCTIONAL_ASSESSMENT: PREVENTS OR INTERFERES SOME ACTIVE ACTIVITIES AND ADLS

## 2024-08-07 ASSESSMENT — PAIN DESCRIPTION - DESCRIPTORS
DESCRIPTORS: ACHING
DESCRIPTORS: ACHING
DESCRIPTORS: ACHING;THROBBING
DESCRIPTORS: ACHING
DESCRIPTORS: ACHING
DESCRIPTORS: THROBBING;PRESSURE
DESCRIPTORS: THROBBING
DESCRIPTORS: ACHING

## 2024-08-07 ASSESSMENT — PAIN SCALES - WONG BAKER
WONGBAKER_NUMERICALRESPONSE: HURTS LITTLE MORE
WONGBAKER_NUMERICALRESPONSE: HURTS EVEN MORE

## 2024-08-07 ASSESSMENT — PAIN DESCRIPTION - PAIN TYPE
TYPE: ACUTE PAIN

## 2024-08-07 ASSESSMENT — PAIN DESCRIPTION - ONSET
ONSET: ON-GOING

## 2024-08-07 ASSESSMENT — PAIN DESCRIPTION - ORIENTATION
ORIENTATION: MID
ORIENTATION: MID

## 2024-08-07 ASSESSMENT — PAIN DESCRIPTION - FREQUENCY
FREQUENCY: CONTINUOUS

## 2024-08-07 NOTE — PROGRESS NOTES
Hospitalist Progress Note   Admit Date:  8/3/2024  9:04 PM   Name:  Drake Melchor   Age:  59 y.o.  Sex:  male  :  1965   MRN:  [id number]   Room:  [facility]/    Presenting/Chief Complaint: No chief complaint on file.     Reason(s) for Admission: Paraesophageal hiatal hernia [K44.9]     Hospital Course:   Drake Melchor is a 59 y.o. male with medical history of coronary artery disease, hypertension, history of DVT in lower extremity not currently on anticoagulation, CHF, status post ICD, and hypertension.  Patient presented to emergency department secondary to nausea and vomiting and noted to have large hiatal hernia.  Case was discussed with general surgery team and recommended transfer to [facility].     still awaiting general surgery input about any possible procedure      Subjective & 24hr Events:   Patient resting comfortably on examination.  Patient denies any shortness of breath on exam, but still noted to have wheezing on auscultation.  Patient states that he has not talked with general surgery team today and is waiting to see them.      Assessment & Plan:     Paraesophageal hiatal hernia  General surgery consulted for further recommendations  Diet per general surgery  Pain regimen in place  Continue Phenergan    Cirrhosis of the liver  Avoid hepatotoxic meds  Stable    ICD  NSVT  Continue amiodarone    Chronic systolic heart failure  Patient appears euvolemic on exam  Continue to monitor    CKD  Baseline kidney function appears to be around 1-1.4  Continue gentle IV fluids  Continue to monitor    Hyponatremia  Likely related to poor p.o. intake  Will place on IV fluids at this time  Continue to monitor    Wheezing  Patient denies any shortness of breath on exam  Continue nebulizers scheduled at this time  Will start on steroids today      Anticipated Discharge Arrangements:   Home.  Hopeful discharge tomorrow pending repeat sodium and surgery evaluation    PT/OT evals ordered?  Not ordered;

## 2024-08-07 NOTE — PLAN OF CARE
Problem: Pain  Goal: Verbalizes/displays adequate comfort level or baseline comfort level  Outcome: Progressing     Problem: ABCDS Injury Assessment  Goal: Absence of physical injury  Outcome: Progressing     Problem: Safety - Adult  Goal: Free from fall injury  Outcome: Progressing  Flowsheets (Taken 8/6/2024 2016)  Free From Fall Injury: Instruct family/caregiver on patient safety     Problem: Chronic Conditions and Co-morbidities  Goal: Patient's chronic conditions and co-morbidity symptoms are monitored and maintained or improved  Outcome: Progressing  Flowsheets (Taken 8/6/2024 2016)  Care Plan - Patient's Chronic Conditions and Co-Morbidity Symptoms are Monitored and Maintained or Improved: Monitor and assess patient's chronic conditions and comorbid symptoms for stability, deterioration, or improvement     Problem: Respiratory - Adult  Goal: Achieves optimal ventilation and oxygenation  8/6/2024 2149 by Sole Wagoner RN  Outcome: Progressing  Flowsheets (Taken 8/6/2024 2016)  Achieves optimal ventilation and oxygenation:   Assess for changes in respiratory status   Assess for changes in mentation and behavior   Position to facilitate oxygenation and minimize respiratory effort  8/6/2024 1620 by Felicity Milligan RCP  Outcome: Progressing  Flowsheets (Taken 8/6/2024 1620)  Achieves optimal ventilation and oxygenation:   Assess for changes in respiratory status   Position to facilitate oxygenation and minimize respiratory effort   Respiratory therapy support as indicated   Assess for changes in mentation and behavior   Oxygen supplementation based on oxygen saturation or arterial blood gases   Encourage broncho-pulmonary hygiene including cough, deep breathe, incentive spirometry   Assess and instruct to report shortness of breath or any respiratory difficulty

## 2024-08-07 NOTE — CARE COORDINATION
Discharge planning was discussed with the Interdisciplinary Team. The patient is a potential discharge home tomorrow.

## 2024-08-07 NOTE — PLAN OF CARE
Problem: Respiratory - Adult  Goal: Achieves optimal ventilation and oxygenation  8/7/2024 0728 by Brooks Aragon, RCP  Outcome: Progressing  Flowsheets (Taken 8/7/2024 0728)  Achieves optimal ventilation and oxygenation:   Assess for changes in respiratory status   Position to facilitate oxygenation and minimize respiratory effort   Respiratory therapy support as indicated   Assess for changes in mentation and behavior   Oxygen supplementation based on oxygen saturation or arterial blood gases   Encourage broncho-pulmonary hygiene including cough, deep breathe, incentive spirometry   Assess and instruct to report shortness of breath or any respiratory difficulty

## 2024-08-07 NOTE — PROGRESS NOTES
Pt resting in bed with call bell in reach.  A&Ox4.  Ambulates in room independently.  PRN pain medicine given 3x.  IV Phenergan given 1x.  IVF infusing as ordered.  Hourly rounds completed.  Bed locked and lowered into lowest position.  Call light and personal items within reach.  Will give report to oncoming nurse.

## 2024-08-07 NOTE — PROGRESS NOTES
Admit Date: 8/3/2024    General Surgery following for paraesophageal hernia.  HPI: Draek Melchor is a 59 y.o. male with a past medical history of CAD, HTN, DVT to LLE, CHF, Cirrhosis, S/p ICD, HTN and hiatal hernia who presented to the ED 8/3/24 with C/o chest congestion with associated nausea and vomiting x 24 hours.    Pt admitted by Hospitalist and General Surgery consulted for a large hiatal hernia noted on CT.     8/3/24 CT Chest  IMPRESSION:     1.  There is no pulmonary embolism, aortic dissection, pericardial fluid or  thoracic aneurysm.  2.  No acute lung findings.  3.  There is a large hiatal hernia containing the entire stomach, up to the  antrum.     Past Surgical History: Denies previous abdominal surgery     Pt does not currently take blood thinners.       Subjective:     A/OX4 with NAD noted.  Respirations even and unlabored on room air.  Tolerating clear liquid diet with some nausea (controlled with IV Phenergan) no vomiting.  Abdominal pain controlled with as needed pain medications.  Positive flatus.  Last bowel movement = 2024.  Afebrile.  BP 100s-120s/70s-90s; otherwise VSS.  Labs unremarkable.  BUN/CR .37 CKD at baseline    Objective:       Vitals:    24 0316 24 0722 24 0827 24 1123   BP: (!) 112/96  (!) 118/91 103/78   Pulse: 72 65 68 68   Resp: 18 15 17 18   Temp: 98.1 °F (36.7 °C)  97.3 °F (36.3 °C) 97.5 °F (36.4 °C)   TempSrc: Oral  Axillary Oral   SpO2: 98% 97% 98% 100%   Weight:       Height:           Temp (24hrs), Av.6 °F (36.4 °C), Min:97.3 °F (36.3 °C), Max:98.1 °F (36.7 °C)  .  I&O reviewed as documented.    Voiding with no measurable urine occurrences past 24h    Physical Exam  Constitutional:       General: He is not in acute distress.  HENT:      Mouth/Throat:      Mouth: Mucous membranes are moist.   Cardiovascular:      Rate and Rhythm: Normal rate and regular rhythm.      Pulses: Normal pulses.      Heart sounds: Normal heart sounds. No murmur  heard.     No friction rub. No gallop.   Pulmonary:      Effort: Pulmonary effort is normal. No respiratory distress.      Breath sounds: Wheezing present. No rhonchi.      Comments: Patient taking breathing treatments intermittently for wheezing  Abdominal:      General: Bowel sounds are normal. There is no distension.      Comments: ABD diffusely TTP.  No rebound or guarding   Genitourinary:     Comments: voiding  Musculoskeletal:         General: No swelling. Normal range of motion.      Cervical back: No rigidity.   Skin:     General: Skin is warm and dry.      Capillary Refill: Capillary refill takes less than 2 seconds.   Neurological:      Mental Status: He is alert and oriented to person, place, and time.   Psychiatric:         Behavior: Behavior normal.        Labs:   Recent Results (from the past 24 hour(s))   Basic Metabolic Panel w/ Reflex to MG    Collection Time: 08/07/24  3:53 AM   Result Value Ref Range    Sodium 131 (L) 136 - 145 mmol/L    Potassium 3.7 3.5 - 5.1 mmol/L    Chloride 98 98 - 107 mmol/L    CO2 19 (L) 20 - 28 mmol/L    Anion Gap 15 9 - 18 mmol/L    Glucose 106 (H) 70 - 99 mg/dL    BUN 7 6 - 23 MG/DL    Creatinine 1.37 (H) 0.80 - 1.30 MG/DL    Est, Glom Filt Rate 59 (L) >60 ml/min/1.73m2    Calcium 8.2 (L) 8.8 - 10.2 MG/DL   CBC with Auto Differential    Collection Time: 08/07/24  3:53 AM   Result Value Ref Range    WBC 4.6 4.3 - 11.1 K/uL    RBC 3.92 (L) 4.23 - 5.6 M/uL    Hemoglobin 10.7 (L) 13.6 - 17.2 g/dL    Hematocrit 34.8 (L) 41.1 - 50.3 %    MCV 88.8 82 - 102 FL    MCH 27.3 26.1 - 32.9 PG    MCHC 30.7 (L) 31.4 - 35.0 g/dL    RDW 20.9 (H) 11.9 - 14.6 %    Platelets 219 150 - 450 K/uL    MPV 9.2 (L) 9.4 - 12.3 FL    nRBC 0.00 0.0 - 0.2 K/uL    Differential Type AUTOMATED      Neutrophils % 65 43 - 78 %    Lymphocytes % 24 13 - 44 %    Monocytes % 5 4.0 - 12.0 %    Eosinophils % 5 0.5 - 7.8 %    Basophils % 1 0.0 - 2.0 %    Immature Granulocytes % 0 0.0 - 5.0 %    Neutrophils

## 2024-08-08 LAB
ANION GAP SERPL CALC-SCNC: 12 MMOL/L (ref 9–18)
BUN SERPL-MCNC: 4 MG/DL (ref 6–23)
CALCIUM SERPL-MCNC: 8.3 MG/DL (ref 8.8–10.2)
CHLORIDE SERPL-SCNC: 101 MMOL/L (ref 98–107)
CO2 SERPL-SCNC: 20 MMOL/L (ref 20–28)
CREAT SERPL-MCNC: 1.15 MG/DL (ref 0.8–1.3)
GLUCOSE SERPL-MCNC: 137 MG/DL (ref 70–99)
POTASSIUM SERPL-SCNC: 4.2 MMOL/L (ref 3.5–5.1)
SODIUM SERPL-SCNC: 133 MMOL/L (ref 136–145)

## 2024-08-08 PROCEDURE — 6360000002 HC RX W HCPCS: Performed by: INTERNAL MEDICINE

## 2024-08-08 PROCEDURE — 1100000000 HC RM PRIVATE

## 2024-08-08 PROCEDURE — 6370000000 HC RX 637 (ALT 250 FOR IP): Performed by: INTERNAL MEDICINE

## 2024-08-08 PROCEDURE — 94640 AIRWAY INHALATION TREATMENT: CPT

## 2024-08-08 PROCEDURE — 2580000003 HC RX 258: Performed by: INTERNAL MEDICINE

## 2024-08-08 PROCEDURE — 2500000003 HC RX 250 WO HCPCS: Performed by: INTERNAL MEDICINE

## 2024-08-08 PROCEDURE — 80048 BASIC METABOLIC PNL TOTAL CA: CPT

## 2024-08-08 PROCEDURE — 36415 COLL VENOUS BLD VENIPUNCTURE: CPT

## 2024-08-08 RX ADMIN — PANTOPRAZOLE SODIUM 40 MG: 40 INJECTION, POWDER, FOR SOLUTION INTRAVENOUS at 09:53

## 2024-08-08 RX ADMIN — HYDROMORPHONE HYDROCHLORIDE 1 MG: 1 INJECTION, SOLUTION INTRAMUSCULAR; INTRAVENOUS; SUBCUTANEOUS at 10:45

## 2024-08-08 RX ADMIN — HYDROMORPHONE HYDROCHLORIDE 1 MG: 1 INJECTION, SOLUTION INTRAMUSCULAR; INTRAVENOUS; SUBCUTANEOUS at 02:43

## 2024-08-08 RX ADMIN — BACLOFEN 10 MG: 10 TABLET ORAL at 02:43

## 2024-08-08 RX ADMIN — OXYCODONE 10 MG: 5 TABLET ORAL at 18:39

## 2024-08-08 RX ADMIN — ALBUTEROL SULFATE 2.5 MG: 2.5 SOLUTION RESPIRATORY (INHALATION) at 18:50

## 2024-08-08 RX ADMIN — GABAPENTIN 300 MG: 300 CAPSULE ORAL at 09:53

## 2024-08-08 RX ADMIN — METHYLPREDNISOLONE SODIUM SUCCINATE 40 MG: 40 INJECTION INTRAMUSCULAR; INTRAVENOUS at 09:52

## 2024-08-08 RX ADMIN — ENOXAPARIN SODIUM 40 MG: 100 INJECTION SUBCUTANEOUS at 21:07

## 2024-08-08 RX ADMIN — ATORVASTATIN CALCIUM 80 MG: 80 TABLET, FILM COATED ORAL at 09:53

## 2024-08-08 RX ADMIN — SODIUM CHLORIDE, PRESERVATIVE FREE 10 ML: 5 INJECTION INTRAVENOUS at 09:53

## 2024-08-08 RX ADMIN — OXYCODONE 10 MG: 5 TABLET ORAL at 03:45

## 2024-08-08 RX ADMIN — ALBUTEROL SULFATE 2.5 MG: 2.5 SOLUTION RESPIRATORY (INHALATION) at 08:17

## 2024-08-08 RX ADMIN — CARVEDILOL 25 MG: 25 TABLET, FILM COATED ORAL at 18:37

## 2024-08-08 RX ADMIN — OXYCODONE 10 MG: 5 TABLET ORAL at 13:05

## 2024-08-08 RX ADMIN — GABAPENTIN 300 MG: 300 CAPSULE ORAL at 21:06

## 2024-08-08 RX ADMIN — HYDROMORPHONE HYDROCHLORIDE 1 MG: 1 INJECTION, SOLUTION INTRAMUSCULAR; INTRAVENOUS; SUBCUTANEOUS at 21:02

## 2024-08-08 RX ADMIN — HYDROMORPHONE HYDROCHLORIDE 1 MG: 1 INJECTION, SOLUTION INTRAMUSCULAR; INTRAVENOUS; SUBCUTANEOUS at 16:44

## 2024-08-08 RX ADMIN — OXYCODONE 10 MG: 5 TABLET ORAL at 22:55

## 2024-08-08 RX ADMIN — ONDANSETRON 4 MG: 2 INJECTION INTRAMUSCULAR; INTRAVENOUS at 10:45

## 2024-08-08 RX ADMIN — AMIODARONE HYDROCHLORIDE 200 MG: 200 TABLET ORAL at 09:53

## 2024-08-08 RX ADMIN — SODIUM CHLORIDE, PRESERVATIVE FREE 10 ML: 5 INJECTION INTRAVENOUS at 21:10

## 2024-08-08 RX ADMIN — SODIUM CHLORIDE, PRESERVATIVE FREE 10 ML: 5 INJECTION INTRAVENOUS at 21:06

## 2024-08-08 RX ADMIN — PROMETHAZINE HYDROCHLORIDE 25 MG: 25 INJECTION INTRAMUSCULAR; INTRAVENOUS at 02:57

## 2024-08-08 RX ADMIN — CARVEDILOL 25 MG: 25 TABLET, FILM COATED ORAL at 09:53

## 2024-08-08 RX ADMIN — SODIUM CHLORIDE, PRESERVATIVE FREE 10 ML: 5 INJECTION INTRAVENOUS at 09:54

## 2024-08-08 ASSESSMENT — PAIN DESCRIPTION - PAIN TYPE
TYPE: ACUTE PAIN
TYPE: ACUTE PAIN

## 2024-08-08 ASSESSMENT — PAIN DESCRIPTION - FREQUENCY
FREQUENCY: CONTINUOUS
FREQUENCY: CONTINUOUS

## 2024-08-08 ASSESSMENT — PAIN SCALES - GENERAL
PAINLEVEL_OUTOF10: 9
PAINLEVEL_OUTOF10: 5
PAINLEVEL_OUTOF10: 4
PAINLEVEL_OUTOF10: 7
PAINLEVEL_OUTOF10: 0
PAINLEVEL_OUTOF10: 8
PAINLEVEL_OUTOF10: 8
PAINLEVEL_OUTOF10: 5
PAINLEVEL_OUTOF10: 7
PAINLEVEL_OUTOF10: 8
PAINLEVEL_OUTOF10: 7
PAINLEVEL_OUTOF10: 7

## 2024-08-08 ASSESSMENT — PAIN DESCRIPTION - LOCATION
LOCATION: ABDOMEN
LOCATION: ABDOMEN;CHEST
LOCATION: ABDOMEN
LOCATION: ABDOMEN;CHEST

## 2024-08-08 ASSESSMENT — PAIN DESCRIPTION - ONSET
ONSET: ON-GOING
ONSET: ON-GOING

## 2024-08-08 ASSESSMENT — PAIN DESCRIPTION - DESCRIPTORS
DESCRIPTORS: THROBBING
DESCRIPTORS: THROBBING

## 2024-08-08 ASSESSMENT — PAIN DESCRIPTION - ORIENTATION
ORIENTATION: MID
ORIENTATION: MID

## 2024-08-08 NOTE — CARE COORDINATION
Pt discussed during IDR and chart screened by CM for d/c planning.  Plan to d/c IV phenergan.  Will try to advance diet from CLD to regular.  If pt can tolerate diet then he can be d/c home.  CM will continue to follow.  LOS = 5 days

## 2024-08-08 NOTE — PLAN OF CARE
Problem: Pain  Goal: Verbalizes/displays adequate comfort level or baseline comfort level  Outcome: Progressing     Problem: ABCDS Injury Assessment  Goal: Absence of physical injury  Outcome: Progressing  Flowsheets (Taken 8/8/2024 0800 by Glenny Stewart RN)  Absence of Physical Injury: Implement safety measures based on patient assessment     Problem: Safety - Adult  Goal: Free from fall injury  Outcome: Progressing  Flowsheets (Taken 8/8/2024 0800 by Glenny Stewart RN)  Free From Fall Injury: Instruct family/caregiver on patient safety     Problem: Chronic Conditions and Co-morbidities  Goal: Patient's chronic conditions and co-morbidity symptoms are monitored and maintained or improved  Outcome: Progressing  Flowsheets (Taken 8/8/2024 0800 by Pat, Glenny, RN)  Care Plan - Patient's Chronic Conditions and Co-Morbidity Symptoms are Monitored and Maintained or Improved:   Monitor and assess patient's chronic conditions and comorbid symptoms for stability, deterioration, or improvement   Collaborate with multidisciplinary team to address chronic and comorbid conditions and prevent exacerbation or deterioration   Update acute care plan with appropriate goals if chronic or comorbid symptoms are exacerbated and prevent overall improvement and discharge     Problem: Respiratory - Adult  Goal: Achieves optimal ventilation and oxygenation  Outcome: Progressing  Flowsheets (Taken 8/8/2024 0800 by Glenny Stewart RN)  Achieves optimal ventilation and oxygenation:   Assess for changes in respiratory status   Assess for changes in mentation and behavior   Position to facilitate oxygenation and minimize respiratory effort

## 2024-08-08 NOTE — PLAN OF CARE
Problem: Pain  Goal: Verbalizes/displays adequate comfort level or baseline comfort level  Outcome: Progressing     Problem: ABCDS Injury Assessment  Goal: Absence of physical injury  Outcome: Progressing     Problem: Safety - Adult  Goal: Free from fall injury  Outcome: Progressing  Flowsheets (Taken 8/7/2024 2045)  Free From Fall Injury: Instruct family/caregiver on patient safety     Problem: Chronic Conditions and Co-morbidities  Goal: Patient's chronic conditions and co-morbidity symptoms are monitored and maintained or improved  Outcome: Progressing  Flowsheets (Taken 8/7/2024 2045)  Care Plan - Patient's Chronic Conditions and Co-Morbidity Symptoms are Monitored and Maintained or Improved: Monitor and assess patient's chronic conditions and comorbid symptoms for stability, deterioration, or improvement     Problem: Respiratory - Adult  Goal: Achieves optimal ventilation and oxygenation  Outcome: Progressing

## 2024-08-08 NOTE — PROGRESS NOTES
Admit Date: 8/3/2024    General Surgery following for paraesophageal hernia.  HPI: Drake Melchor is a 59 y.o. male with a past medical history of CAD, HTN, DVT to LLE, CHF, Cirrhosis, S/p ICD, HTN and hiatal hernia who presented to the ED 8/3/24 with C/o chest congestion with associated nausea and vomiting x 24 hours.    Pt admitted by Hospitalist and General Surgery consulted for a large hiatal hernia noted on CT.     8/3/24 CT Chest  IMPRESSION:     1.  There is no pulmonary embolism, aortic dissection, pericardial fluid or  thoracic aneurysm.  2.  No acute lung findings.  3.  There is a large hiatal hernia containing the entire stomach, up to the  antrum.     Past Surgical History: Denies previous abdominal surgery     Pt does not currently take blood thinners.       Subjective:     A/OX4 with NAD noted.  Respirations even and unlabored on room air.  Tolerating soft and bite size diet with some nausea (controlled with IV Phenergan) no vomiting.  Abdominal pain controlled with as needed pain medications.  Positive flatus.  Last bowel movement = 2024.  Afebrile.  BP 100s-130s/70s-90s; otherwise VSS.        Objective:       Vitals:    24 0743 24 1115 24 1208 24 1335   BP: 118/75  (!) 135/96    Pulse: 81  77    Resp: 18 18 18 16   Temp: 97.9 °F (36.6 °C)  97.5 °F (36.4 °C)    TempSrc: Oral  Axillary    SpO2: 100%  100%    Weight:       Height:           Temp (24hrs), Av.7 °F (36.5 °C), Min:97.3 °F (36.3 °C), Max:98.1 °F (36.7 °C)  .  I&O reviewed as documented.    Voiding with no measurable urine occurrences past 24h    Physical Exam  Constitutional:       General: He is not in acute distress.  HENT:      Mouth/Throat:      Mouth: Mucous membranes are moist.   Cardiovascular:      Rate and Rhythm: Normal rate and regular rhythm.      Pulses: Normal pulses.      Heart sounds: Normal heart sounds. No murmur heard.     No friction rub. No gallop.   Pulmonary:      Effort: Pulmonary effort is  and  expiration.  2.  Severe esophageal dysmotility.  3.  Nonspecific esophagitis.  2/11/2022 EGD  POSTOPERATIVE DIAGNOSIS: Reflux esophagitis, hiatal hernia     General Surgery following for paraesophageal hernia    Of note, patient was informed on Monday, 8/5/2024 that hiatal hernia repair will be planned electively with outpatient workup.  8/5/24 He was started on clears with clear liquid nutritional supplementation.    PLAN 8/8/24  Tolerating soft and bite size diet with bowel function.  Encourage nutritional supplementation but pt does not like the consistency of ensure.   Pt has appointment with Dr. Frederick 8/15/24 9:30 for hiatal hernia repair planning.    Pt expresses understanding of surgical plan.    Attending care per hospitalist.    DAVINA Freedman - NP

## 2024-08-08 NOTE — PROGRESS NOTES
Hospitalist Progress Note   Admit Date:  8/3/2024  9:04 PM   Name:  Drake Melchor   Age:  59 y.o.  Sex:  male  :  1965   MRN:  [id number]   Room:  [facility]    Presenting/Chief Complaint: No chief complaint on file.     Reason(s) for Admission: Paraesophageal hiatal hernia [K44.9]     Hospital Course:   Drake Melchor is a 59 y.o. male with medical history of coronary artery disease, hypertension, history of DVT in lower extremity not currently on anticoagulation, CHF, status post ICD, and hypertension.  Patient presented to emergency department secondary to nausea and vomiting and noted to have large hiatal hernia.  Case was discussed with general surgery team and recommended transfer to [facility].     still awaiting general surgery input about any possible procedure   patient still having difficulty tolerating diet at this time with ongoing nausea and pain      Subjective & 24hr Events:   Patient still having difficulty tolerating diet at this time complaining of nausea and pain.  Patient denied any other complaints on exam      Assessment & Plan:     Paraesophageal hiatal hernia  General surgery consulted for further recommendations  Diet per general surgery  Pain regimen in place    Cirrhosis of the liver  Avoid hepatotoxic meds  Stable    ICD  NSVT  Continue amiodarone    Chronic systolic heart failure  Patient appears euvolemic on exam  Continue to monitor    CKD  Baseline kidney function appears to be around 1-1.4  Kidney function at baseline  Continue to monitor    Hyponatremia  Likely related to poor p.o. intake  Continue to monitor    Wheezing  Patient denies any shortness of breath on exam  Continue nebulizers scheduled at this time  Continue steroid      Anticipated Discharge Arrangements:   Home.  Patient still having difficulty tolerating diet at this time.  Will plan for discharge tomorrow if patient unable to tolerate      PT/OT evals ordered?  Not ordered; patient not expected to  need rehab  Diet:  ADULT ORAL NUTRITION SUPPLEMENT; Breakfast, Lunch, Dinner; Clear Liquid Oral Supplement  ADULT DIET; Regular  VTE prophylaxis: Lovenox  Code status: Full Code      Non-peripheral Lines and Tubes (if present):          Telemetry (if present):  Cardiac/Telemetry Monitor On: No        Hospital Problems:  Principal Problem:    Paraesophageal hiatal hernia  Active Problems:    Other cirrhosis of liver (HCC)    ICD (implantable cardioverter-defibrillator) in place    NSVT (nonsustained ventricular tachycardia) (HCC)    Esophageal dysmotility    Chronic systolic congestive heart failure (HCC)    Myoclonic jerking while sleeping    Schatzki's ring    Alcohol abuse  Resolved Problems:    * No resolved hospital problems. *      Objective:   Patient Vitals for the past 24 hrs:   Temp Pulse Resp BP SpO2   08/08/24 1208 97.5 °F (36.4 °C) 77 18 (!) 135/96 100 %   08/08/24 1115 -- -- 18 -- --   08/08/24 0743 97.9 °F (36.6 °C) 81 18 118/75 100 %   08/08/24 0234 98.1 °F (36.7 °C) 82 18 126/86 95 %   08/07/24 2234 97.7 °F (36.5 °C) 84 17 119/84 100 %   08/07/24 1950 97.5 °F (36.4 °C) 81 19 127/86 100 %   08/07/24 1914 -- 70 17 -- 100 %   08/07/24 1738 -- -- 16 -- --   08/07/24 1554 97.3 °F (36.3 °C) 77 14 118/81 98 %   08/07/24 1505 -- 62 15 -- 98 %       Oxygen Therapy  SpO2: 100 %  Pulse Oximeter Device Mode: Intermittent  Pulse Oximeter Device Location: Finger  O2 Device: None (Room air)    Estimated body mass index is 24.94 kg/m² as calculated from the following:    Height as of this encounter: 1.803 m (5' 11\").    Weight as of this encounter: 81.1 kg (178 lb 12.8 oz).    Intake/Output Summary (Last 24 hours) at 8/8/2024 1347  Last data filed at 8/8/2024 1346  Gross per 24 hour   Intake 2560.27 ml   Output 2620 ml   Net -59.73 ml         Physical Exam:   Physical Exam  Vitals and nursing note reviewed.   Constitutional:       General: He is not in acute distress.  Eyes:      Conjunctiva/sclera: Conjunctivae

## 2024-08-08 NOTE — DISCHARGE SUMMARY
Hospitalist Discharge Summary   Admit Date:  8/3/2024  9:04 PM   DC Note date: 2024  Name:  Drake Melchor   Age:  59 y.o.  Sex:  male  :  1965   MRN:  [id number]   Room:  [facility]  PCP:  Merlene Zavala PA    Presenting Complaint: No chief complaint on file.     Initial Admission Diagnosis: Paraesophageal hiatal hernia [K44.9]     Problem List for this Hospitalization (present on admission):    Principal Problem:    Paraesophageal hiatal hernia  Active Problems:    Other cirrhosis of liver (HCC)    ICD (implantable cardioverter-defibrillator) in place    NSVT (nonsustained ventricular tachycardia) (HCC)    Esophageal dysmotility    Chronic systolic congestive heart failure (HCC)    Myoclonic jerking while sleeping    Schatzki's ring    Alcohol abuse  Resolved Problems:    * No resolved hospital problems. *    Paraesophageal hiatal hernia  Outpatient follow-up with general surgery for definitive treatment  Patient tolerating oral diet     Cirrhosis of the liver  Avoid hepatotoxic meds  Stable     ICD  NSVT  Continue amiodarone     Chronic systolic heart failure  Patient appears euvolemic on exam  Continue to monitor     CKD  Kidney function at baseline currently     Hyponatremia  Continue to follow-up with primary care provider     Wheezing  Oral prednisone x 5 days  Scant wheezing on exam but patient denies any shortness of breath while sitting or with exertion    Hospital Course:  59-year-old male past medical history significant for CAD, hypertension, history of DVT, CHF, status post ICD, and hypertension.  Patient presented to emergency department secondary to nausea and vomiting and was noted to have large hiatal hernia.  Patient was seen by general surgery team who are recommending outpatient follow-up at this time and patient has appointment set up for next week.  Patient is stable for discharge at this time and should follow-up with general surgery.    Disposition: Home  Diet: ADULT ORAL NUTRITION  and/or kV according to patient size (this includes techniques or standardized protocols for targeted exams where dose is matched to indication/reason for exam). Electronically signed by ISA WHITESIDE    XR CHEST PORTABLE    Result Date: 8/3/2024  Cardiomegaly and pulmonary vascular congestion Electronically signed by Elsa Aldridge       Labs: Results:       BMP, Mg, Phos Recent Labs     08/07/24  0353 08/08/24  0646   * 133*   K 3.7 4.2   CL 98 101   CO2 19* 20   ANIONGAP 15 12   BUN 7 4*   CREATININE 1.37* 1.15   LABGLOM 59* 73   CALCIUM 8.2* 8.3*   GLUCOSE 106* 137*      CBC Recent Labs     08/07/24  0353   WBC 4.6   RBC 3.92*   HGB 10.7*   HCT 34.8*   MCV 88.8   MCH 27.3   MCHC 30.7*   RDW 20.9*      MPV 9.2*   NRBC 0.00   LYMPHOPCT 24   MONOPCT 5   BASOPCT 1   IMMGRAN 0   LYMPHSABS 1.1   EOSABS 0.2   MONOSABS 0.2   BASOSABS 0.0   ABSIMMGRAN 0.0      LFT No results for input(s): \"BILITOT\", \"BILIDIR\", \"ALKPHOS\", \"AST\", \"ALT\", \"LABALBU\", \"GLOB\" in the last 72 hours.    Invalid input(s): \"PROT\"   Cardiac  Lab Results   Component Value Date/Time    TROPHS 138.6 11/13/2023 12:05 AM    TROPHS 151.2 11/12/2023 10:37 PM    TROPHS 96.9 07/22/2022 02:56 AM      Coags Lab Results   Component Value Date/Time    PROTIME 14.2 05/05/2022 05:58 AM    PROTIME 13.5 05/05/2022 05:42 AM    PROTIME 11.7 07/07/2020 04:15 AM    PROTIME 12.4 07/06/2020 09:33 AM    INR 1.1 05/05/2022 05:58 AM    INR 1.1 05/05/2022 05:42 AM    INR 0.8 07/07/2020 04:15 AM    INR 0.9 07/06/2020 09:33 AM    APTT 28.6 08/24/2021 09:33 AM    APTT 25.9 07/07/2020 04:15 AM    APTT 29.0 07/06/2020 09:33 AM      A1c Lab Results   Component Value Date/Time    LABA1C 5.7 05/06/2022 05:47 AM    LABA1C 5.8 12/23/2021 11:08 AM     05/06/2022 05:47 AM     12/23/2021 11:08 AM      Lipids Lab Results   Component Value Date/Time    CHOL 136 08/04/2024 03:42 AM    HDL 50 08/04/2024 03:42 AM    CHOLHDLRATIO 2.7 08/04/2024 03:42 AM    TRIG 206

## 2024-08-09 VITALS
TEMPERATURE: 98.1 F | HEART RATE: 66 BPM | HEIGHT: 71 IN | SYSTOLIC BLOOD PRESSURE: 117 MMHG | DIASTOLIC BLOOD PRESSURE: 98 MMHG | BODY MASS INDEX: 25.76 KG/M2 | OXYGEN SATURATION: 97 % | RESPIRATION RATE: 18 BRPM | WEIGHT: 184 LBS

## 2024-08-09 PROCEDURE — 6370000000 HC RX 637 (ALT 250 FOR IP): Performed by: INTERNAL MEDICINE

## 2024-08-09 PROCEDURE — 6360000002 HC RX W HCPCS: Performed by: INTERNAL MEDICINE

## 2024-08-09 PROCEDURE — 2500000003 HC RX 250 WO HCPCS: Performed by: INTERNAL MEDICINE

## 2024-08-09 PROCEDURE — 6370000000 HC RX 637 (ALT 250 FOR IP): Performed by: NURSE PRACTITIONER

## 2024-08-09 PROCEDURE — 94761 N-INVAS EAR/PLS OXIMETRY MLT: CPT

## 2024-08-09 PROCEDURE — 94640 AIRWAY INHALATION TREATMENT: CPT

## 2024-08-09 RX ORDER — PROMETHAZINE HYDROCHLORIDE 25 MG/1
25 TABLET ORAL EVERY 8 HOURS PRN
Qty: 15 TABLET | Refills: 0 | Status: SHIPPED | OUTPATIENT
Start: 2024-08-09

## 2024-08-09 RX ORDER — PREDNISONE 20 MG/1
40 TABLET ORAL DAILY
Qty: 6 TABLET | Refills: 0 | Status: SHIPPED | OUTPATIENT
Start: 2024-08-09 | End: 2024-08-12

## 2024-08-09 RX ADMIN — GABAPENTIN 300 MG: 300 CAPSULE ORAL at 08:11

## 2024-08-09 RX ADMIN — OXYCODONE 10 MG: 5 TABLET ORAL at 05:04

## 2024-08-09 RX ADMIN — ALBUTEROL SULFATE 2.5 MG: 2.5 SOLUTION RESPIRATORY (INHALATION) at 07:51

## 2024-08-09 RX ADMIN — OXYCODONE 10 MG: 5 TABLET ORAL at 08:53

## 2024-08-09 RX ADMIN — ATORVASTATIN CALCIUM 80 MG: 80 TABLET, FILM COATED ORAL at 08:11

## 2024-08-09 RX ADMIN — HYDROMORPHONE HYDROCHLORIDE 1 MG: 1 INJECTION, SOLUTION INTRAMUSCULAR; INTRAVENOUS; SUBCUTANEOUS at 07:06

## 2024-08-09 RX ADMIN — ALPRAZOLAM 0.5 MG: 0.5 TABLET ORAL at 00:09

## 2024-08-09 RX ADMIN — HYDROMORPHONE HYDROCHLORIDE 1 MG: 1 INJECTION, SOLUTION INTRAMUSCULAR; INTRAVENOUS; SUBCUTANEOUS at 02:36

## 2024-08-09 RX ADMIN — AMIODARONE HYDROCHLORIDE 200 MG: 200 TABLET ORAL at 08:11

## 2024-08-09 RX ADMIN — CARVEDILOL 25 MG: 25 TABLET, FILM COATED ORAL at 08:11

## 2024-08-09 ASSESSMENT — PAIN DESCRIPTION - LOCATION
LOCATION: ABDOMEN

## 2024-08-09 ASSESSMENT — PAIN SCALES - GENERAL
PAINLEVEL_OUTOF10: 7
PAINLEVEL_OUTOF10: 7
PAINLEVEL_OUTOF10: 0
PAINLEVEL_OUTOF10: 7

## 2024-08-09 NOTE — DISCHARGE INSTRUCTIONS
Follow-up with general surgery as outpatient for definitive treatment of paraesophageal hernia  Follow-up with primary care provider

## 2024-08-09 NOTE — PLAN OF CARE
Problem: Pain  Goal: Verbalizes/displays adequate comfort level or baseline comfort level  8/9/2024 0921 by Enma Martinez RN  Outcome: Adequate for Discharge  8/8/2024 1932 by Farzaneh Reyes RN  Outcome: Progressing     Problem: ABCDS Injury Assessment  Goal: Absence of physical injury  8/9/2024 0921 by Enma Martinez RN  Outcome: Adequate for Discharge  8/8/2024 1932 by Farzaneh Reyes RN  Outcome: Progressing  Flowsheets (Taken 8/8/2024 0800 by Glenny Stewart RN)  Absence of Physical Injury: Implement safety measures based on patient assessment     Problem: Safety - Adult  Goal: Free from fall injury  8/9/2024 0921 by Enma Martinez RN  Outcome: Adequate for Discharge  8/8/2024 1932 by Farzaneh Reyes RN  Outcome: Progressing  Flowsheets (Taken 8/8/2024 0800 by Glenny Stewart RN)  Free From Fall Injury: Instruct family/caregiver on patient safety     Problem: Chronic Conditions and Co-morbidities  Goal: Patient's chronic conditions and co-morbidity symptoms are monitored and maintained or improved  8/9/2024 0921 by Enma Martinez RN  Outcome: Adequate for Discharge  8/8/2024 1932 by Farzaneh Reyes RN  Outcome: Progressing  Flowsheets (Taken 8/8/2024 0800 by Pat, Glenny, RN)  Care Plan - Patient's Chronic Conditions and Co-Morbidity Symptoms are Monitored and Maintained or Improved:   Monitor and assess patient's chronic conditions and comorbid symptoms for stability, deterioration, or improvement   Collaborate with multidisciplinary team to address chronic and comorbid conditions and prevent exacerbation or deterioration   Update acute care plan with appropriate goals if chronic or comorbid symptoms are exacerbated and prevent overall improvement and discharge     Problem: Respiratory - Adult  Goal: Achieves optimal ventilation and oxygenation  8/9/2024 0921 by Enma Martinez RN  Outcome: Adequate for Discharge  8/8/2024 1932 by Farzaneh Reyes

## 2024-08-09 NOTE — CARE COORDINATION
Pt to d/c home today.  Family will provide transportation.  There were no PT/OT consults ordered during this hospitalization.  No supportive care needs identified.  Pt agrees with d/c plan.  Milestones met.  Pt to f/u with PCP as OP.  LOS = 6 days       08/04/24 1526   Service Assessment   Patient Orientation Alert and Oriented;Person;Place;Situation;Self   Cognition Alert   History Provided By Patient;Medical Record   Primary Caregiver Self   Support Systems Spouse/Significant Other   Patient's Healthcare Decision Maker is: Legal Next of Kin   PCP Verified by CM Yes   Last Visit to PCP Within last 3 months   Prior Functional Level Independent in ADLs/IADLs   Current Functional Level Independent in ADLs/IADLs   Can patient return to prior living arrangement Yes   Ability to make needs known: Good   Family able to assist with home care needs: Yes   Would you like for me to discuss the discharge plan with any other family members/significant others, and if so, who? Yes   Financial Resources Medicare;Other (Comment)  (BCBS)   Community Resources None   CM/SW Referral Other (see comment)  (N/A)   Social/Functional History   Lives With Spouse   Type of Home House   Home Layout One level   Home Access Stairs to enter with rails   Entrance Stairs - Number of Steps 5   Entrance Stairs - Rails Both   Bathroom Shower/Tub Tub/Shower unit   Bathroom Toilet Standard   Bathroom Equipment None   Bathroom Accessibility Accessible   Home Equipment None   Receives Help From Family   ADL Assistance Independent   Homemaking Assistance Independent   Homemaking Responsibilities Yes   Ambulation Assistance Independent   Transfer Assistance Independent   Active  No   Patient's  Info Spouse / family assisit (Brother and sister)   Mode of Transportation Car   Occupation On disability   Discharge Planning   Type of Residence House   Living Arrangements Spouse/Significant Other   Current Services Prior To Admission None   Potential

## 2024-08-12 ENCOUNTER — CARE COORDINATION (OUTPATIENT)
Dept: CARE COORDINATION | Facility: CLINIC | Age: 59
End: 2024-08-12

## 2024-08-12 NOTE — CARE COORDINATION
Care Transitions Note    Initial Call - Call within 2 business days of discharge: Yes    Patient Current Location:  Home: [address] [place] [address] [place] [address]    Care Transition Nurse contacted the patient by telephone to perform post hospital discharge assessment, verified name and  as identifiers. Provided introduction to self, and explanation of the Care Transition Nurse role.     Patient: Drake Melchor    Patient : 1965   MRN: [id number]    Reason for Admission: Paraesophageal hiatal hernia   Discharge Date: 24  RURS: Readmission Risk Score: 17.3      Last Discharge Facility       Date Complaint Diagnosis Description Type Department Provider    8/3/24  Periodic headache syndromes in child or adult, not intractable Admission (Discharged) [id number] Brayden Lora MD    8/3/24 Chest Pain Intractable nausea and vomiting ... ED (TRANSFER) Gordo Beauchamp MD            Was this an external facility discharge? No    Additional needs identified to be addressed with provider   No needs identified             Method of communication with provider: none.    Patients top risk factors for readmission: medical condition-Paraesophageal hiatal hernia ,CHF, Cirrhosis    Interventions to address risk factors:   Reviewed discharge instructions and offered opportunity to ask any questions regarding discharge instructions.  Confirmed medications obtained and taking as ordered  Reviewed upcoming follow up appointments:  [facility] 8/15/2024 at 9:30 am  CTN contact PCP office for hospital follow up appointment-2024 at 10:00 am      Care Transition Nurse reviewed discharge instructions, medical action plan, and red flags with patient. The patient was given an opportunity to ask questions; all questions answered at this time.. The patient verbalized understanding.   Were discharge instructions available to patient? Yes.   Reviewed appropriate site of care based on symptoms and resources

## 2024-08-15 ENCOUNTER — PREP FOR PROCEDURE (OUTPATIENT)
Dept: SURGERY | Age: 59
End: 2024-08-15

## 2024-08-15 ENCOUNTER — OFFICE VISIT (OUTPATIENT)
Dept: SURGERY | Age: 59
End: 2024-08-15
Payer: COMMERCIAL

## 2024-08-15 ENCOUNTER — TELEPHONE (OUTPATIENT)
Age: 59
End: 2024-08-15

## 2024-08-15 VITALS
BODY MASS INDEX: 27.44 KG/M2 | WEIGHT: 196 LBS | DIASTOLIC BLOOD PRESSURE: 92 MMHG | HEIGHT: 71 IN | SYSTOLIC BLOOD PRESSURE: 128 MMHG | HEART RATE: 81 BPM

## 2024-08-15 DIAGNOSIS — K44.9 HIATAL HERNIA: Primary | ICD-10-CM

## 2024-08-15 PROCEDURE — G8427 DOCREV CUR MEDS BY ELIG CLIN: HCPCS | Performed by: SURGERY

## 2024-08-15 PROCEDURE — 3080F DIAST BP >= 90 MM HG: CPT | Performed by: SURGERY

## 2024-08-15 PROCEDURE — 3017F COLORECTAL CA SCREEN DOC REV: CPT | Performed by: SURGERY

## 2024-08-15 PROCEDURE — 99204 OFFICE O/P NEW MOD 45 MIN: CPT | Performed by: SURGERY

## 2024-08-15 PROCEDURE — 1036F TOBACCO NON-USER: CPT | Performed by: SURGERY

## 2024-08-15 PROCEDURE — G8419 CALC BMI OUT NRM PARAM NOF/U: HCPCS | Performed by: SURGERY

## 2024-08-15 PROCEDURE — 1111F DSCHRG MED/CURRENT MED MERGE: CPT | Performed by: SURGERY

## 2024-08-15 PROCEDURE — 3074F SYST BP LT 130 MM HG: CPT | Performed by: SURGERY

## 2024-08-15 RX ORDER — SODIUM CHLORIDE 9 MG/ML
INJECTION, SOLUTION INTRAVENOUS PRN
OUTPATIENT
Start: 2024-08-15

## 2024-08-15 RX ORDER — SODIUM CHLORIDE 0.9 % (FLUSH) 0.9 %
5-40 SYRINGE (ML) INJECTION EVERY 12 HOURS SCHEDULED
OUTPATIENT
Start: 2024-08-15

## 2024-08-15 RX ORDER — SODIUM CHLORIDE 0.9 % (FLUSH) 0.9 %
5-40 SYRINGE (ML) INJECTION PRN
OUTPATIENT
Start: 2024-08-15

## 2024-08-15 NOTE — TELEPHONE ENCOUNTER
Cardiac Clearance        Physician or Practice Requesting:[facility]  Dr vicente   :   Contact Phone Number: [phone]  Fax Number:   Date of Surgery/Procedure: 8/26/24  Type of Surgery or Procedure: Hernia repair    Type of Anesthesia: general   Type of Clearance Requested: Cardiac Clearance  Medication to Hold:  Days to Hold:   Wants it in epic

## 2024-08-15 NOTE — TELEPHONE ENCOUNTER
Patient called stating he has the following questions :    Pertaining to upcoming procedure for a hernia  Please call and advise.

## 2024-08-15 NOTE — TELEPHONE ENCOUNTER
Moderate risk for anesthesia and surgery but medically maximized at present time.  Okay to hold aspirin 4 to 5 days prior to the procedure if necessary.  Continue other cardiac meds without interruption through the procedure.
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
## 2024-08-15 NOTE — TELEPHONE ENCOUNTER
Last OV 02/05/24    ASSESSMENT and PLAN:      Diagnoses and all orders for this visit:      Chronic systolic heart failure (HCC)-worsening recently, multifactorial- EF chronically 20-25% -   dietary and medication compliance emphasized today.  Echo as noted above.  Emphasize compliance with guideline directed medical therapy for heart failure as noted above.  Continue current doses of losartan, carvedilol, spironolactone.      Non-ischemic cardiomyopathy (HCC)-acute on chronic systolic heart failure exacerbation, multifactorial, see below. Echo as noted above.  He looks great today.  Continue meds, healthy diet and lifestyle, keep routine follow-up.      Ventricular tachycardia (HCC)- ICD in place, see below- no VT/shocks on recent interrogation.  Adjusted defibrillation settings to have a monitoring zone at 120 bpm-continue amiodarone.  He should not drive.  Recheck amiodarone surveillance labs and imaging/PFTs in the next few weeks.      Essential hypertension with goal blood pressure less than 130/80-looks great improved, excellent, continue meds and healthy diet and lifestyle      ICD (implantable cardioverter-defibrillator) in place-compliant with medications and no recent arrhythmias (no A. fib and no V. tach, no ICD shocks).     PAF- no recent recurrence.  No AF on interrogation, OK to hold off on OAC for now.  Coumadin would be a logistical/compliance issue.  We will just follow A-fib burden on device interrogations and resume Eliquis in the future if A-fib recurs.  Interrogation today shows 0% A-fib burden.      Nausea and vomiting- looks great today, resolved after cessation of all alcohol.     Migraine headaches-doing great, refilling meds for 1 month and encouraged him to get a PCP, gave him the hotline number to call today.     Chronic anxiety-he is almost out of Ativan.  I refilled 5 doses to take only as needed.  I emphasized that I cannot give him any more of these medications and he needs to

## 2024-08-21 ENCOUNTER — OFFICE VISIT (OUTPATIENT)
Dept: PRIMARY CARE CLINIC | Facility: CLINIC | Age: 59
End: 2024-08-21

## 2024-08-21 VITALS
RESPIRATION RATE: 18 BRPM | HEIGHT: 71 IN | TEMPERATURE: 98.1 F | DIASTOLIC BLOOD PRESSURE: 88 MMHG | HEART RATE: 74 BPM | BODY MASS INDEX: 26.6 KG/M2 | SYSTOLIC BLOOD PRESSURE: 127 MMHG | OXYGEN SATURATION: 98 % | WEIGHT: 190 LBS

## 2024-08-21 DIAGNOSIS — Z09 HOSPITAL DISCHARGE FOLLOW-UP: ICD-10-CM

## 2024-08-21 DIAGNOSIS — K44.9 PARAESOPHAGEAL HIATAL HERNIA: ICD-10-CM

## 2024-08-21 DIAGNOSIS — G43.C0 PERIODIC HEADACHE SYNDROMES IN CHILD OR ADULT, NOT INTRACTABLE: ICD-10-CM

## 2024-08-21 DIAGNOSIS — R06.2 WHEEZING: Primary | ICD-10-CM

## 2024-08-21 RX ORDER — RIZATRIPTAN BENZOATE 10 MG/1
10 TABLET, ORALLY DISINTEGRATING ORAL DAILY PRN
Qty: 30 TABLET | Refills: 1 | Status: SHIPPED | OUTPATIENT
Start: 2024-08-21

## 2024-08-21 ASSESSMENT — PATIENT HEALTH QUESTIONNAIRE - PHQ9
1. LITTLE INTEREST OR PLEASURE IN DOING THINGS: NOT AT ALL
SUM OF ALL RESPONSES TO PHQ QUESTIONS 1-9: 0
SUM OF ALL RESPONSES TO PHQ9 QUESTIONS 1 & 2: 0
2. FEELING DOWN, DEPRESSED OR HOPELESS: NOT AT ALL

## 2024-08-21 NOTE — PROGRESS NOTES
prednisone prescribed did not seem to help at all. Denies fevers, chills, body aches.         No Known Allergies  Current Outpatient Medications   Medication Sig Dispense Refill    rizatriptan (MAXALT-MLT) 10 MG disintegrating tablet Take 1 tablet by mouth daily as needed for Migraine TAKE ONE TABLET BY MOUTH ONCE AS NEEDED 30 tablet 1    promethazine (PHENERGAN) 25 MG tablet Take 1 tablet by mouth every 8 hours as needed for Nausea 15 tablet 0    escitalopram (LEXAPRO) 10 MG tablet Take 1 tablet by mouth daily 30 tablet 1    Ubrogepant (UBRELVY) 50 MG TABS Take 1 tablet by mouth daily as needed (migraine) 30 tablet 1    amiodarone (CORDARONE) 200 MG tablet Take 1 tablet by mouth daily 90 tablet 1    baclofen (LIORESAL) 10 MG tablet Take 1 tablet by mouth 3 times daily as needed (spasms) 90 tablet 11    carvedilol (COREG) 25 MG tablet Take 1 tablet by mouth 2 times daily 180 tablet 3    magnesium oxide (MAG-OX) 400 (240 Mg) MG tablet Take 1 tablet by mouth daily 90 tablet 3    spironolactone (ALDACTONE) 25 MG tablet Take 1 tablet by mouth daily as needed (edema) Pt takes 3x/week 90 tablet 3    atorvastatin (LIPITOR) 80 MG tablet Take 1 tablet by mouth daily 90 tablet 3    HYDROcodone-acetaminophen (NORCO)  MG per tablet Take 1 tablet by mouth 4 times daily as needed for Pain.      gabapentin (NEURONTIN) 300 MG capsule Take 1 capsule by mouth 2 times daily.      sildenafil (VIAGRA) 100 MG tablet Take 1 tablet by mouth as needed for Erectile Dysfunction (as directed) 10 tablet 6    albuterol sulfate  (90 Base) MCG/ACT inhaler Inhale 2 puffs into the lungs every 4 hours as needed      aspirin 81 MG EC tablet Take 1 tablet by mouth Five times weekly      azelastine (ASTELIN) 0.1 % nasal spray 1 spray by Nasal route as needed      furosemide (LASIX) 40 MG tablet Take 1 tablet by mouth daily Pt taking 1x daily      ALPRAZolam (XANAX PO) Take by mouth Takes prn for anxiety   Or po qhs (Patient not taking:

## 2024-08-23 ASSESSMENT — ENCOUNTER SYMPTOMS
VOMITING: 1
WHEEZING: 1
EYE PAIN: 0
ABDOMINAL PAIN: 0
COUGH: 1
CHEST TIGHTNESS: 0
ABDOMINAL DISTENTION: 0
SHORTNESS OF BREATH: 0
CONSTIPATION: 0
STRIDOR: 0
DIARRHEA: 0
SORE THROAT: 0
NAUSEA: 1
APNEA: 0

## 2024-08-28 ENCOUNTER — TELEPHONE (OUTPATIENT)
Dept: BEHAVIORAL/MENTAL HEALTH CLINIC | Facility: CLINIC | Age: 59
End: 2024-08-28

## 2024-08-28 NOTE — TELEPHONE ENCOUNTER
Pt called stating he has been calling and calling but no one would answer his calls.   I asked patient what I could help him with.  Pt said he has an EAP and gets 3 visits for free.  I asked him if he wanted an appt but he repeated \"I have an EAP and get 3 visits free\".   I told him that I could ask my manager about that but I don't know about an EAP program that we have.  Pt said ok and to call him back.  I called pt back and told that we don't have and EAP but that we bill his insurance and they pay us.  Pt got very angry and said \"and now we are talking about my finances.  I can pay my own bills, I just need help\".  Again I said what can I help you with?   He said I need help.  I said do you need an appointment?   He said \"there is nothing he can do for me\".  I just need to talk to someone.  I said again I'm 's nurse, what can I do for you?  Do you need an appointment?  Pt started taking about me bringing up his finances and how he could pay his own way.  I stopped him and asked him again, do you want an appointment?   He was very rude to me during the entire phone call, very angry.   Finally, I gave him an appt for Thursday.

## 2024-09-17 ENCOUNTER — TELEPHONE (OUTPATIENT)
Age: 59
End: 2024-09-17

## 2024-09-18 ENCOUNTER — OFFICE VISIT (OUTPATIENT)
Age: 59
End: 2024-09-18
Payer: COMMERCIAL

## 2024-09-18 VITALS
WEIGHT: 187 LBS | HEIGHT: 71 IN | HEART RATE: 83 BPM | DIASTOLIC BLOOD PRESSURE: 80 MMHG | BODY MASS INDEX: 26.18 KG/M2 | SYSTOLIC BLOOD PRESSURE: 138 MMHG

## 2024-09-18 DIAGNOSIS — I95.1 ORTHOSTATIC HYPOTENSION: ICD-10-CM

## 2024-09-18 DIAGNOSIS — I42.8 NON-ISCHEMIC CARDIOMYOPATHY (HCC): Chronic | ICD-10-CM

## 2024-09-18 DIAGNOSIS — I50.22 CHRONIC SYSTOLIC CONGESTIVE HEART FAILURE (HCC): Primary | Chronic | ICD-10-CM

## 2024-09-18 DIAGNOSIS — R06.02 SOB (SHORTNESS OF BREATH): ICD-10-CM

## 2024-09-18 DIAGNOSIS — I47.29 NSVT (NONSUSTAINED VENTRICULAR TACHYCARDIA) (HCC): Chronic | ICD-10-CM

## 2024-09-18 DIAGNOSIS — I10 PRIMARY HYPERTENSION: Chronic | ICD-10-CM

## 2024-09-18 PROCEDURE — 3075F SYST BP GE 130 - 139MM HG: CPT | Performed by: INTERNAL MEDICINE

## 2024-09-18 PROCEDURE — G8419 CALC BMI OUT NRM PARAM NOF/U: HCPCS | Performed by: INTERNAL MEDICINE

## 2024-09-18 PROCEDURE — G8427 DOCREV CUR MEDS BY ELIG CLIN: HCPCS | Performed by: INTERNAL MEDICINE

## 2024-09-18 PROCEDURE — 3079F DIAST BP 80-89 MM HG: CPT | Performed by: INTERNAL MEDICINE

## 2024-09-18 PROCEDURE — 1036F TOBACCO NON-USER: CPT | Performed by: INTERNAL MEDICINE

## 2024-09-18 PROCEDURE — 3017F COLORECTAL CA SCREEN DOC REV: CPT | Performed by: INTERNAL MEDICINE

## 2024-09-18 PROCEDURE — 99214 OFFICE O/P EST MOD 30 MIN: CPT | Performed by: INTERNAL MEDICINE

## 2024-09-18 ASSESSMENT — ENCOUNTER SYMPTOMS
SHORTNESS OF BREATH: 1
COUGH: 1

## 2024-09-25 ENCOUNTER — OFFICE VISIT (OUTPATIENT)
Dept: BEHAVIORAL/MENTAL HEALTH CLINIC | Facility: CLINIC | Age: 59
End: 2024-09-25

## 2024-09-25 DIAGNOSIS — F43.23 ADJUSTMENT DISORDER WITH MIXED ANXIETY AND DEPRESSED MOOD: Primary | ICD-10-CM

## 2024-09-25 DIAGNOSIS — F41.9 ANXIETY DISORDER, UNSPECIFIED TYPE: ICD-10-CM

## 2024-09-25 DIAGNOSIS — G47.00 INSOMNIA, UNSPECIFIED TYPE: ICD-10-CM

## 2024-09-25 RX ORDER — PAROXETINE 10 MG/1
10 TABLET, FILM COATED ORAL DAILY
Qty: 30 TABLET | Refills: 1 | Status: SHIPPED | OUTPATIENT
Start: 2024-09-25

## 2024-09-25 RX ORDER — ALPRAZOLAM 0.5 MG
0.5 TABLET ORAL DAILY PRN
Qty: 30 TABLET | Refills: 0 | Status: SHIPPED | OUTPATIENT
Start: 2024-09-25 | End: 2024-10-25

## 2024-09-30 DIAGNOSIS — G47.00 INSOMNIA, UNSPECIFIED TYPE: ICD-10-CM

## 2024-09-30 DIAGNOSIS — F41.9 ANXIETY DISORDER, UNSPECIFIED TYPE: ICD-10-CM

## 2024-09-30 RX ORDER — ALPRAZOLAM 0.5 MG
0.5 TABLET ORAL DAILY PRN
Qty: 30 TABLET | Refills: 0 | Status: SHIPPED | OUTPATIENT
Start: 2024-09-30 | End: 2024-10-30

## 2024-09-30 NOTE — TELEPHONE ENCOUNTER
Pt called.   For some reason his Xanax didn't go to his pharmacy.   Can you send it?       Requested Prescriptions     Pending Prescriptions Disp Refills    ALPRAZolam (XANAX) 0.5 MG tablet 30 tablet 0     Sig: Take 1 tablet by mouth daily as needed for Anxiety for up to 30 days. Takes prn for anxiety   Or po qhs Max Daily Amount: 0.5 mg

## 2024-10-08 ENCOUNTER — TELEPHONE (OUTPATIENT)
Dept: PRIMARY CARE CLINIC | Facility: CLINIC | Age: 59
End: 2024-10-08

## 2024-10-08 NOTE — TELEPHONE ENCOUNTER
Patient called requesting a refill on Phenergan medication, I asked patient if he can be seen this week he said he couldn't. I also offered that if provider is unable to prescribe that medication until he is seen if he can come next week and we can provide a ride patient declined and said he will call to schedule when he can. Patient was advice to call back and schedule a follow up since he missed his Virtual visit follow up with provider on 09/27 and was called that following week to reschedule apt.

## 2024-10-09 DIAGNOSIS — G43.C0 PERIODIC HEADACHE SYNDROMES IN CHILD OR ADULT, NOT INTRACTABLE: ICD-10-CM

## 2024-10-09 RX ORDER — PROMETHAZINE HYDROCHLORIDE 25 MG/1
25 TABLET ORAL EVERY 8 HOURS PRN
Qty: 15 TABLET | Refills: 0 | Status: SHIPPED | OUTPATIENT
Start: 2024-10-09

## 2024-10-16 ENCOUNTER — OFFICE VISIT (OUTPATIENT)
Dept: PRIMARY CARE CLINIC | Facility: CLINIC | Age: 59
End: 2024-10-16

## 2024-10-16 VITALS
SYSTOLIC BLOOD PRESSURE: 100 MMHG | DIASTOLIC BLOOD PRESSURE: 64 MMHG | TEMPERATURE: 98.9 F | HEIGHT: 71 IN | HEART RATE: 64 BPM | WEIGHT: 187 LBS | BODY MASS INDEX: 26.18 KG/M2 | RESPIRATION RATE: 18 BRPM | OXYGEN SATURATION: 97 %

## 2024-10-16 DIAGNOSIS — R05.1 ACUTE COUGH: ICD-10-CM

## 2024-10-16 DIAGNOSIS — J01.00 ACUTE NON-RECURRENT MAXILLARY SINUSITIS: Primary | ICD-10-CM

## 2024-10-16 DIAGNOSIS — G43.C0 PERIODIC HEADACHE SYNDROMES IN CHILD OR ADULT, NOT INTRACTABLE: ICD-10-CM

## 2024-10-16 RX ORDER — SUMATRIPTAN 50 MG/1
50 TABLET, FILM COATED ORAL
Qty: 9 TABLET | Refills: 1 | Status: SHIPPED | OUTPATIENT
Start: 2024-10-16 | End: 2024-10-16

## 2024-10-16 RX ORDER — BENZONATATE 100 MG/1
100-200 CAPSULE ORAL 3 TIMES DAILY PRN
Qty: 30 CAPSULE | Refills: 0 | Status: SHIPPED | OUTPATIENT
Start: 2024-10-16

## 2024-10-16 NOTE — PROGRESS NOTES
Drake Melchor (:  1965) is a 59 y.o. male here for evaluation of the following chief complaint(s):  Follow-up (Patient presents for a follow up, states he needs a new prescription for his migraines, insurance won't pay none of the ubrelvy and maxalt), Medication Refill (Requesting phenergan refill), and URI (Patient states he's been having like a sinus infection symptoms, runny nose, nasal and chest congestion, ongoing or 2-3wks, not getting better, denies any fever.)      Assessment & Plan   ASSESSMENT/PLAN:  1. Acute non-recurrent maxillary sinusitis  Comments:  Have chest x ray done as well- order still active  Orders:  -     amoxicillin-clavulanate (AUGMENTIN) 875-125 MG per tablet; Take 1 tablet by mouth 2 times daily for 10 days, Disp-20 tablet, R-0Normal  2. Acute cough  Comments:  see above  Orders:  -     benzonatate (TESSALON) 100 MG capsule; Take 1-2 capsules by mouth 3 times daily as needed for Cough, Disp-30 capsule, R-0Normal  -     amoxicillin-clavulanate (AUGMENTIN) 875-125 MG per tablet; Take 1 tablet by mouth 2 times daily for 10 days, Disp-20 tablet, R-0Normal  3. 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  Orders:  -     SUMAtriptan (IMITREX) 50 MG tablet; Take 1 tablet by mouth once as needed for Migraine, Disp-9 tablet, R-1Normal  -     promethazine (PHENERGAN) 25 MG tablet; Take 1 tablet by mouth every 8 hours as needed for Nausea, Disp-15 tablet, R-0Normal      No results found for any visits on 10/16/24.     No follow-ups on file.         Subjective   SUBJECTIVE/OBJECTIVE:  Pt here today c/o sinus congestion, drainage, cough for weeks. He has been having cough since recent admission, two chest x rays have been ordered but he never had them done. He was having wheezing but currently denies wheezing/SOB. At this time he says the sinus congestion, drainage, pressure is bothering him the most. He would also like a

## 2024-10-18 ENCOUNTER — CLINICAL DOCUMENTATION (OUTPATIENT)
Dept: PRIMARY CARE CLINIC | Facility: CLINIC | Age: 59
End: 2024-10-18

## 2024-10-18 RX ORDER — PROMETHAZINE HYDROCHLORIDE 25 MG/1
25 TABLET ORAL EVERY 8 HOURS PRN
Qty: 15 TABLET | Refills: 0 | Status: SHIPPED | OUTPATIENT
Start: 2024-10-18

## 2024-10-18 ASSESSMENT — ENCOUNTER SYMPTOMS
SINUS PRESSURE: 1
VOMITING: 0
COUGH: 1
DIARRHEA: 0
EYE PAIN: 0
SHORTNESS OF BREATH: 0
CONSTIPATION: 0
RHINORRHEA: 1
ABDOMINAL PAIN: 0
SINUS PAIN: 1

## 2024-10-18 NOTE — PROGRESS NOTES
Patient called stating that the pharmacy never gave him all the medications that he was missing the Augmentin medication. I told patient that I will call the pharmacy and verify what happen and that I will call him back.    I called the pharmacy and they verified that they did give him all the medication and that he was in there cursing the  and manager. Manager showed him camera video of him picking up 5 medications that he must've lost it on his way home. Pharmacist then explained to me that if the provider could send in another Rx that he can pick it up but insurance will not pay for it.     I called patient back and explained the situation of the medication and how he must of missed placed it. Patient did not want to hear it and started to get upset and say that he will put in a complain because he is never getting help. I explained to him that I am just telling him what the pharmacy told me and that the provider is going to put in a new prescription and he can pick it up in a few. He will have to pay for it out of pocket due to the insurance not covering it.     Patient continued to be upset and say that this always happens with every pharmacy that he goes to. I told him that I am sorry about that situation but it was out of my hand and I am not a [facility] employee just his doctors office. I told him to give us a call when ever he needed anything and that I will try my best to help him out.

## 2024-10-23 ENCOUNTER — TELEPHONE (OUTPATIENT)
Dept: PRIMARY CARE CLINIC | Facility: CLINIC | Age: 59
End: 2024-10-23

## 2024-10-23 DIAGNOSIS — G43.C0 PERIODIC HEADACHE SYNDROMES IN CHILD OR ADULT, NOT INTRACTABLE: ICD-10-CM

## 2024-10-23 RX ORDER — PROMETHAZINE HYDROCHLORIDE 25 MG/1
25 TABLET ORAL EVERY 8 HOURS PRN
Qty: 15 TABLET | Refills: 1 | Status: SHIPPED | OUTPATIENT
Start: 2024-10-23

## 2024-10-23 NOTE — TELEPHONE ENCOUNTER
Spoke with patient and verbalized understanding, pt stated that he will probably not get the surgery done, he will call the surgeon to let them know.

## 2024-10-23 NOTE — TELEPHONE ENCOUNTER
Pt called and stated that he wanted to let us know that he found his abx in his med bag, for some reason pharmacy put 2 meds in a bag and that confused him. The other thing he wanted to ask the provider to give him 90 days supply of the phenergan, he stated that he explained you during the visit that he gets nauseous almost every day especially with his headaches, he takes this medication very often and he doesn't want to be off right away, pt asked for provider to look at previous record where he given 90days supply of his \"nausea medication\", pt wanted to send message to provider.

## 2024-10-24 ENCOUNTER — PREP FOR PROCEDURE (OUTPATIENT)
Dept: SURGERY | Age: 59
End: 2024-10-24

## 2024-10-28 ENCOUNTER — TELEPHONE (OUTPATIENT)
Dept: SURGERY | Age: 59
End: 2024-10-28

## 2024-10-29 ENCOUNTER — CLINICAL DOCUMENTATION (OUTPATIENT)
Dept: PRIMARY CARE CLINIC | Facility: CLINIC | Age: 59
End: 2024-10-29

## 2024-10-29 NOTE — PROGRESS NOTES
Patient called and said he has been taking his medication, he believes the cough is not getting better and has some mucus build up. Patient does not want to be seen he just called to let the provider know how he is feeling.  Told patient that if he gets worse to give us a call so he can be seen. Patient was advice to give pulmonology a call and let them know how he is feeling as well to see if they recommend anything since he does not want to be seen here at [facility].

## 2024-11-06 ENCOUNTER — TELEPHONE (OUTPATIENT)
Dept: PRIMARY CARE CLINIC | Facility: CLINIC | Age: 59
End: 2024-11-06

## 2024-11-06 NOTE — TELEPHONE ENCOUNTER
Patient called stating that he still has a cough that wont go away, he would like to know if you would send in something different for the cough or what would you suggest.

## 2024-11-13 ENCOUNTER — TELEPHONE (OUTPATIENT)
Age: 59
End: 2024-11-13

## 2024-11-13 NOTE — TELEPHONE ENCOUNTER
Bio monitor has been disconnected , patient sent multiple letters.     Monitor appears not working. Offered appt tomorrow, patient declined and requested Friday.       Appt scheduled to assess device. Advised ER for increasing issues or symptoms.

## 2024-11-15 ENCOUNTER — NURSE ONLY (OUTPATIENT)
Age: 59
End: 2024-11-15

## 2024-11-15 DIAGNOSIS — I47.29 NSVT (NONSUSTAINED VENTRICULAR TACHYCARDIA) (HCC): ICD-10-CM

## 2024-11-15 DIAGNOSIS — I50.22 CHRONIC SYSTOLIC HEART FAILURE (HCC): ICD-10-CM

## 2024-11-15 DIAGNOSIS — I42.8 NON-ISCHEMIC CARDIOMYOPATHY (HCC): ICD-10-CM

## 2024-11-15 DIAGNOSIS — I50.22 CHRONIC SYSTOLIC CONGESTIVE HEART FAILURE (HCC): Primary | ICD-10-CM

## 2024-11-25 ENCOUNTER — OFFICE VISIT (OUTPATIENT)
Dept: BEHAVIORAL/MENTAL HEALTH CLINIC | Facility: CLINIC | Age: 59
End: 2024-11-25
Payer: MEDICARE

## 2024-11-25 VITALS — BODY MASS INDEX: 25.23 KG/M2 | WEIGHT: 180.2 LBS | HEIGHT: 71 IN

## 2024-11-25 DIAGNOSIS — F43.23 ADJUSTMENT DISORDER WITH MIXED ANXIETY AND DEPRESSED MOOD: Primary | ICD-10-CM

## 2024-11-25 DIAGNOSIS — G47.00 INSOMNIA, UNSPECIFIED TYPE: ICD-10-CM

## 2024-11-25 DIAGNOSIS — F41.9 ANXIETY DISORDER, UNSPECIFIED TYPE: ICD-10-CM

## 2024-11-25 PROCEDURE — G8427 DOCREV CUR MEDS BY ELIG CLIN: HCPCS | Performed by: PSYCHIATRY & NEUROLOGY

## 2024-11-25 PROCEDURE — 1036F TOBACCO NON-USER: CPT | Performed by: PSYCHIATRY & NEUROLOGY

## 2024-11-25 PROCEDURE — 3017F COLORECTAL CA SCREEN DOC REV: CPT | Performed by: PSYCHIATRY & NEUROLOGY

## 2024-11-25 PROCEDURE — 99214 OFFICE O/P EST MOD 30 MIN: CPT | Performed by: PSYCHIATRY & NEUROLOGY

## 2024-11-25 PROCEDURE — G8419 CALC BMI OUT NRM PARAM NOF/U: HCPCS | Performed by: PSYCHIATRY & NEUROLOGY

## 2024-11-25 PROCEDURE — G8484 FLU IMMUNIZE NO ADMIN: HCPCS | Performed by: PSYCHIATRY & NEUROLOGY

## 2024-11-25 RX ORDER — ESCITALOPRAM OXALATE 10 MG/1
10 TABLET ORAL DAILY
COMMUNITY
Start: 2024-10-17

## 2024-11-25 NOTE — PROGRESS NOTES
atraumatic  Eyes: conjunctiva clear, PERRL, EOMI  Ears: Normal structure, intact.  Lungs/Heart: No observed wheezing, respirations unlabored. Normal chest rise.  Musculoskeletal: Normal range of motion. No abnormal movements.    MENTAL STATUS EXAM:  Orientation: Oriented to person, place, time and situation  Appearance: Well groomed, good hygiene  Psychomotor: No slowing or agitation  Speech: Normal rate, rhythm, tone and quantity, no slurring  Mood: pleasant  Affect: congruent  Thought content: No hallucinations or delusions, paranoia  SI/plan: denies  HI/plan: denies  Thought process: Linear and goal directed  Insight/Judgement: fair / fair  Attention: intact  Memory: grossly intact  Gait: wnl    ASSESSMENT/PLAN:  Drake Melchor is a 59 y.o. old male who has a psychiatric history of depression, anxiety who presented for evaluation.   Diagnosis Orders   1. Adjustment disorder with mixed anxiety and depressed mood        2. Anxiety disorder, unspecified type        3. Insomnia, unspecified type        Psychotherapy:  Participating in therapy.  Pertinent labs/imaging:  Lab Results   Component Value Date    TRIG 206 (H) 08/04/2024    HDL 50 08/04/2024    LDL 45 08/04/2024    CHOL 136 08/04/2024    GLUCOSE 137 (H) 08/08/2024     Lab Results   Component Value Date    TSH 2.460 08/07/2023     Questionnaires:   PHQ:      8/21/2024     9:59 AM 7/17/2024     2:03 PM 4/27/2023     3:56 PM   PHQ-9    Depression Unable to Assess  Pt refuses    Little interest or pleasure in doing things 0  0   Feeling down, depressed, or hopeless 0  0   PHQ-2 Score 0  0   PHQ-9 Total Score 0  0      GAD7:       No data to display              Return to Clinic: 2 months OR sooner if needed  I have reviewed the SCRIPTS database report for this patient as required for prescription of controlled substances and did not note any discrepancies.  Medication side effects were discussed and benefits v. risks were presented. Treatment plan was discussed and

## 2024-12-02 ENCOUNTER — APPOINTMENT (OUTPATIENT)
Dept: GENERAL RADIOLOGY | Age: 59
End: 2024-12-02
Payer: MEDICARE

## 2024-12-02 ENCOUNTER — HOSPITAL ENCOUNTER (EMERGENCY)
Age: 59
Discharge: HOME | End: 2024-12-02
Attending: EMERGENCY MEDICINE
Payer: MEDICARE

## 2024-12-02 VITALS
SYSTOLIC BLOOD PRESSURE: 123 MMHG | OXYGEN SATURATION: 99 % | HEIGHT: 71 IN | BODY MASS INDEX: 25.48 KG/M2 | HEART RATE: 84 BPM | TEMPERATURE: 98 F | WEIGHT: 182 LBS | RESPIRATION RATE: 25 BRPM | DIASTOLIC BLOOD PRESSURE: 95 MMHG

## 2024-12-02 DIAGNOSIS — R07.89 ATYPICAL CHEST PAIN: Primary | ICD-10-CM

## 2024-12-02 DIAGNOSIS — I50.9 ACUTE ON CHRONIC CONGESTIVE HEART FAILURE, UNSPECIFIED HEART FAILURE TYPE (HCC): ICD-10-CM

## 2024-12-02 LAB
ALBUMIN SERPL-MCNC: 3.9 G/DL (ref 3.5–5)
ALBUMIN/GLOB SERPL: 1.5 (ref 1–1.9)
ALT SERPL-CCNC: 15 U/L (ref 12–65)
ANION GAP SERPL CALC-SCNC: 12 MMOL/L (ref 7–16)
AST SERPL-CCNC: 30 U/L (ref 15–37)
BASOPHILS # BLD: 0 K/UL (ref 0–0.2)
BASOPHILS NFR BLD: 1 % (ref 0–2)
BILIRUB SERPL-MCNC: 0.5 MG/DL (ref 0–1.2)
BUN SERPL-MCNC: 13 MG/DL (ref 6–23)
CALCIUM SERPL-MCNC: 8.7 MG/DL (ref 8.8–10.2)
CHLORIDE SERPL-SCNC: 107 MMOL/L (ref 98–107)
CO2 SERPL-SCNC: 19 MMOL/L (ref 20–29)
CREAT SERPL-MCNC: 1.02 MG/DL (ref 0.8–1.3)
DIFFERENTIAL METHOD BLD: ABNORMAL
EKG ATRIAL RATE: 94 BPM
EKG DIAGNOSIS: NORMAL
EKG P AXIS: 62 DEGREES
EKG P-R INTERVAL: 203 MS
EKG Q-T INTERVAL: 376 MS
EKG QRS DURATION: 140 MS
EKG QTC CALCULATION (BAZETT): 471 MS
EKG R AXIS: -39 DEGREES
EKG T AXIS: 110 DEGREES
EKG VENTRICULAR RATE: 94 BPM
EOSINOPHIL # BLD: 0.2 K/UL (ref 0–0.8)
EOSINOPHIL NFR BLD: 3 % (ref 0.5–7.8)
ERYTHROCYTE [DISTWIDTH] IN BLOOD BY AUTOMATED COUNT: 20.4 % (ref 11.9–14.6)
GLOBULIN SER CALC-MCNC: 2.6 G/DL (ref 2.3–3.5)
GLUCOSE SERPL-MCNC: 109 MG/DL (ref 65–100)
HCT VFR BLD AUTO: 35.3 % (ref 41.1–50.3)
HGB BLD-MCNC: 11.3 G/DL (ref 13.6–17.2)
IMM GRANULOCYTES # BLD AUTO: 0 K/UL (ref 0–0.5)
IMM GRANULOCYTES NFR BLD AUTO: 0 % (ref 0–5)
LYMPHOCYTES # BLD: 2 K/UL (ref 0.5–4.6)
LYMPHOCYTES NFR BLD: 27 % (ref 13–44)
MCH RBC QN AUTO: 24.2 PG (ref 26.1–32.9)
MCHC RBC AUTO-ENTMCNC: 32 G/DL (ref 31.4–35)
MCV RBC AUTO: 75.6 FL (ref 82–102)
MONOCYTES # BLD: 0.4 K/UL (ref 0.1–1.3)
MONOCYTES NFR BLD: 6 % (ref 4–12)
NEUTS SEG # BLD: 4.8 K/UL (ref 1.7–8.2)
NEUTS SEG NFR BLD: 64 % (ref 43–78)
NRBC # BLD: 0 K/UL (ref 0–0.2)
NT PRO BNP: 669 PG/ML (ref 0–450)
PLATELET # BLD AUTO: 369 K/UL (ref 150–450)
PMV BLD AUTO: 9 FL (ref 9.4–12.3)
POTASSIUM SERPL-SCNC: 4.4 MMOL/L (ref 3.5–5.1)
PROT SERPL-MCNC: 6.5 G/DL (ref 6.3–8.2)
RBC # BLD AUTO: 4.67 M/UL (ref 4.23–5.6)
SODIUM SERPL-SCNC: 138 MMOL/L (ref 133–143)
TROPONIN T SERPL HS-MCNC: 16.9 NG/L (ref 0–22)
TROPONIN T SERPL HS-MCNC: 17 NG/L (ref 0–22)
WBC # BLD AUTO: 7.4 K/UL (ref 4.3–11.1)

## 2024-12-02 PROCEDURE — 83880 ASSAY OF NATRIURETIC PEPTIDE: CPT

## 2024-12-02 PROCEDURE — 6360000002 HC RX W HCPCS: Performed by: EMERGENCY MEDICINE

## 2024-12-02 PROCEDURE — 96375 TX/PRO/DX INJ NEW DRUG ADDON: CPT

## 2024-12-02 PROCEDURE — 93005 ELECTROCARDIOGRAM TRACING: CPT | Performed by: EMERGENCY MEDICINE

## 2024-12-02 PROCEDURE — 96374 THER/PROPH/DIAG INJ IV PUSH: CPT

## 2024-12-02 PROCEDURE — 80053 COMPREHEN METABOLIC PANEL: CPT

## 2024-12-02 PROCEDURE — 85025 COMPLETE CBC W/AUTO DIFF WBC: CPT

## 2024-12-02 PROCEDURE — 71046 X-RAY EXAM CHEST 2 VIEWS: CPT

## 2024-12-02 PROCEDURE — 99285 EMERGENCY DEPT VISIT HI MDM: CPT

## 2024-12-02 PROCEDURE — 84484 ASSAY OF TROPONIN QUANT: CPT

## 2024-12-02 PROCEDURE — 93010 ELECTROCARDIOGRAM REPORT: CPT | Performed by: INTERNAL MEDICINE

## 2024-12-02 RX ORDER — DIPHENHYDRAMINE HYDROCHLORIDE 50 MG/ML
12.5 INJECTION INTRAMUSCULAR; INTRAVENOUS
Status: COMPLETED | OUTPATIENT
Start: 2024-12-02 | End: 2024-12-02

## 2024-12-02 RX ORDER — DROPERIDOL 2.5 MG/ML
1.25 INJECTION, SOLUTION INTRAMUSCULAR; INTRAVENOUS ONCE
Status: COMPLETED | OUTPATIENT
Start: 2024-12-02 | End: 2024-12-02

## 2024-12-02 RX ADMIN — DROPERIDOL 1.25 MG: 2.5 INJECTION, SOLUTION INTRAMUSCULAR; INTRAVENOUS at 09:05

## 2024-12-02 RX ADMIN — DIPHENHYDRAMINE HYDROCHLORIDE 12.5 MG: 50 INJECTION INTRAMUSCULAR; INTRAVENOUS at 09:05

## 2024-12-02 ASSESSMENT — ENCOUNTER SYMPTOMS
COLOR CHANGE: 0
CHEST TIGHTNESS: 0
ABDOMINAL PAIN: 0
ORTHOPNEA: 0
EYE REDNESS: 0
VOMITING: 0
BACK PAIN: 0
PHOTOPHOBIA: 0
TROUBLE SWALLOWING: 0
SHORTNESS OF BREATH: 1

## 2024-12-02 ASSESSMENT — PAIN - FUNCTIONAL ASSESSMENT: PAIN_FUNCTIONAL_ASSESSMENT: 0-10

## 2024-12-02 ASSESSMENT — PAIN SCALES - GENERAL
PAINLEVEL_OUTOF10: 8
PAINLEVEL_OUTOF10: 4

## 2024-12-02 NOTE — DISCHARGE INSTRUCTIONS
As we discussed, your testing done here today shows no signs of any acute or life-threatening illnesses.  However you are noted to have some increased fluid on your lungs.  We are increasing your Lasix dose to 40 mg twice daily just for the next 3 days.  After that resume normal dosing  Follow-up with your primary care provider  Follow-up with your cardiology  Return to the ER for any new, worsening or life-threatening symptoms

## 2024-12-02 NOTE — ED PROVIDER NOTES
Emergency Department Provider Note       PCP: Merlene Zavala PA   Age: 59 y.o.   Sex: male     DISPOSITION    No diagnosis found.    Medical Decision Making     Pain is described as very atypical for acute coronary syndrome.  However patient does have significant risk factors.  Plan for cardiac enzymes chest x-ray EKG and basic labs.  Will continue to monitor symptoms.  Patient does have reported history of chronic pain as well as anxiety.    9:03 AM EST  Initial laboratory testing here is reassuring.     {Complexity:26419}  {Risk:11861}  I independently ordered and reviewed each unique test.    {external source:43089}   {Historian (state who, why needed, what they said):77327}  ED cardiac monitoring rhythm strip was ordered and interpreted:  sinus rhythm, no evidence of an arrhythmia  ST Segments:ST segment depression - NO STEMI   Rate: 94  {test reviewed:14495}  {EK}  EKG interpretation, independent of cardiologist: Normal sinus rhythm, rate 94, normal axis, left bundle branch block, no gross ST segment changes noted in comparison to previous EKG  {Admitted or Consultants involved.:87291}  {MIPS URI - Strep - Sinusitis - Pregnant - Head Trauma - Overdose - Agitation:66887}  {SEP1 yes/no:75202}  {Critical Care:56687}    History     Patient presents the ER with complaints of chest discomfort.  States symptoms started yesterday.  States this happened while he was helping put up some questions decorations.  Reports anywhere from 15 to 30 seconds of sharp chest pain on the left side.  Denies any associated diaphoresis.  Denies any fevers or chills.  Denies any cough.    The history is provided by the patient.   Chest Pain  Pain location:  L chest  Pain quality: sharp and shooting    Pain quality: not throbbing    Pain radiates to:  Does not radiate  Duration: 30 seconds.  Chronicity:  New  Context: not intercourse, not lifting and not stress    Worsened by:  Nothing  Associated symptoms: shortness of 
   Sodium 138 133 - 143 mmol/L    Potassium 4.4 3.5 - 5.1 mmol/L    Chloride 107 98 - 107 mmol/L    CO2 19 (L) 20 - 29 mmol/L    Anion Gap 12 7 - 16 mmol/L    Glucose 109 (H) 65 - 100 mg/dL    BUN 13 6 - 23 MG/DL    Creatinine 1.02 0.80 - 1.30 MG/DL    Est, Glom Filt Rate 85 >60 ml/min/1.73m2    Calcium 8.7 (L) 8.8 - 10.2 MG/DL    Total Bilirubin 0.5 0.0 - 1.2 MG/DL    ALT 15 12 - 65 U/L    AST 30 15 - 37 U/L    Total Protein 6.5 6.3 - 8.2 g/dL    Albumin 3.9 3.5 - 5.0 g/dL    Globulin 2.6 2.3 - 3.5 g/dL    Albumin/Globulin Ratio 1.5 1.0 - 1.9     Troponin now then q90 min for 2 occurances   Result Value Ref Range    Troponin T 17.0 0 - 22 ng/L   Troponin now then q90 min for 2 occurances   Result Value Ref Range    Troponin T 16.9 0 - 22 ng/L   Brain Natriuretic Peptide   Result Value Ref Range    NT Pro- (H) 0 - 450 PG/ML   EKG 12 Lead   Result Value Ref Range    Ventricular Rate 94 BPM    Atrial Rate 94 BPM    P-R Interval 203 ms    QRS Duration 140 ms    Q-T Interval 376 ms    QTc Calculation (Bazett) 471 ms    P Axis 62 degrees    R Axis -39 degrees    T Axis 110 degrees    Diagnosis       Sinus rhythm  Borderline prolonged MS interval  Left bundle branch block    Confirmed by MD DANK (), LOLI ([id number]) on 12/2/2024 10:09:17 AM           XR CHEST (2 VW)   Final Result   Findings/impression: There are prominent interstitial markings seen throughout   the lungs of which a component is likely chronic in nature although superimposed   acute component not excluded. Stable appearance of the cardiomediastinal   silhouette. Cardiac pacing/defibrillating device in place.      Electronically signed by Miki Roger                   No results for input(s): \"COVID19\" in the last 72 hours.     Voice dictation software was used during the making of this note.  This software is not perfect and grammatical and other typographical errors may be present.  This note has not been completely proofread for errors.

## 2024-12-02 NOTE — ED TRIAGE NOTES
Pt came into ED c/o chest pain starting yesterday and shortness of breath since last night, history of CHF has pacemaker defibrillator

## 2024-12-04 ENCOUNTER — TELEPHONE (OUTPATIENT)
Dept: PRIMARY CARE CLINIC | Facility: CLINIC | Age: 59
End: 2024-12-04

## 2024-12-04 DIAGNOSIS — G43.C0 PERIODIC HEADACHE SYNDROMES IN CHILD OR ADULT, NOT INTRACTABLE: ICD-10-CM

## 2024-12-04 RX ORDER — PROMETHAZINE HYDROCHLORIDE 25 MG/1
25 TABLET ORAL EVERY 8 HOURS PRN
Qty: 15 TABLET | Refills: 1 | OUTPATIENT
Start: 2024-12-04

## 2024-12-04 NOTE — TELEPHONE ENCOUNTER
Please let patient know this nausea medication is not intended for daily use, it is supposed to be used as needed. His frequent nausea may be multifactorial ( due to GI issues and also exacerbated by his headaches). I suggest following up with GI regardless, but please also ask him how often he is getting migraines and does he have vomiting associated with the migraines? Does the nausea ever worsen after eating certain foods?

## 2024-12-04 NOTE — TELEPHONE ENCOUNTER
Patient called requesting a refill on his nausea medication, he also asked why the provider is only sending him 15 tablets instead of a 3 month supply of it? Patient stated that his old doctor used to send him a 3 month supply of this medication.

## 2024-12-09 DIAGNOSIS — F41.9 ANXIETY DISORDER, UNSPECIFIED TYPE: ICD-10-CM

## 2024-12-09 DIAGNOSIS — F43.23 ADJUSTMENT DISORDER WITH MIXED ANXIETY AND DEPRESSED MOOD: ICD-10-CM

## 2024-12-09 RX ORDER — PAROXETINE 10 MG/1
10 TABLET, FILM COATED ORAL EVERY MORNING
Qty: 30 TABLET | Refills: 1 | Status: SHIPPED | OUTPATIENT
Start: 2024-12-09

## 2024-12-09 RX ORDER — ALPRAZOLAM 1 MG/1
1 TABLET ORAL NIGHTLY PRN
Qty: 30 TABLET | Refills: 0 | Status: SHIPPED | OUTPATIENT
Start: 2024-12-09 | End: 2025-01-08

## 2024-12-09 RX ORDER — ALPRAZOLAM 0.5 MG
0.5 TABLET ORAL NIGHTLY PRN
COMMUNITY
End: 2024-12-09 | Stop reason: SDUPTHER

## 2024-12-09 RX ORDER — PAROXETINE 10 MG/1
10 TABLET, FILM COATED ORAL EVERY MORNING
COMMUNITY
End: 2024-12-09 | Stop reason: SDUPTHER

## 2024-12-09 NOTE — TELEPHONE ENCOUNTER
Pt called to get refills.     Requested Prescriptions     Pending Prescriptions Disp Refills    PARoxetine (PAXIL) 10 MG tablet 30 tablet 1     Sig: Take 1 tablet by mouth every morning    ALPRAZolam (XANAX) 0.5 MG tablet 30 tablet 0     Sig: Take 1 tablet by mouth nightly as needed for Sleep for up to 30 days. Max Daily Amount: 0.5 mg

## 2024-12-11 ENCOUNTER — TELEPHONE (OUTPATIENT)
Dept: PRIMARY CARE CLINIC | Facility: CLINIC | Age: 59
End: 2024-12-11

## 2024-12-11 NOTE — TELEPHONE ENCOUNTER
Patient was called back and informed that provider will not refill the medication, he needs to contact GI to get further evaluation. Provider does recommend for patient to get his surgery that it will help him with his condition. Patient was also told that if the nausea is unbearable to please go directly to the ER.   Patient stated he is not getting surgery and that he will see what he can do he did not specify if he will call GI to get schedule. I asked patient if he would like me to call them and get an appointment scheduled for him and he declined.

## 2024-12-11 NOTE — TELEPHONE ENCOUNTER
Patient called requesting Phenergan refill and if provider can refill it until he sees a Gastro. Patient was explained that on 12/14 provider told him that phenergan is not for long term use and that she is not going to refill the medication. He needs to call GI and schedule an appointment with them to get further evaluation. Patient insisted and asked to let provider know he needs a refill until he makes appointment with GI.

## 2025-01-15 ENCOUNTER — OFFICE VISIT (OUTPATIENT)
Dept: ORTHOPEDIC SURGERY | Age: 60
End: 2025-01-15
Payer: MEDICARE

## 2025-01-15 DIAGNOSIS — M25.562 PAIN IN BOTH KNEES, UNSPECIFIED CHRONICITY: ICD-10-CM

## 2025-01-15 DIAGNOSIS — M25.561 CHRONIC PAIN OF BOTH KNEES: ICD-10-CM

## 2025-01-15 DIAGNOSIS — G89.29 CHRONIC PAIN OF BOTH KNEES: ICD-10-CM

## 2025-01-15 DIAGNOSIS — M25.561 PAIN IN BOTH KNEES, UNSPECIFIED CHRONICITY: ICD-10-CM

## 2025-01-15 DIAGNOSIS — M25.561 RIGHT KNEE PAIN, UNSPECIFIED CHRONICITY: ICD-10-CM

## 2025-01-15 DIAGNOSIS — M22.2X1 PATELLOFEMORAL ARTHRALGIA OF BOTH KNEES: Primary | ICD-10-CM

## 2025-01-15 DIAGNOSIS — M22.2X2 PATELLOFEMORAL ARTHRALGIA OF BOTH KNEES: Primary | ICD-10-CM

## 2025-01-15 DIAGNOSIS — M25.562 CHRONIC PAIN OF BOTH KNEES: ICD-10-CM

## 2025-01-15 PROCEDURE — 1036F TOBACCO NON-USER: CPT | Performed by: PHYSICIAN ASSISTANT

## 2025-01-15 PROCEDURE — G8419 CALC BMI OUT NRM PARAM NOF/U: HCPCS | Performed by: PHYSICIAN ASSISTANT

## 2025-01-15 PROCEDURE — 99214 OFFICE O/P EST MOD 30 MIN: CPT | Performed by: PHYSICIAN ASSISTANT

## 2025-01-15 PROCEDURE — G8427 DOCREV CUR MEDS BY ELIG CLIN: HCPCS | Performed by: PHYSICIAN ASSISTANT

## 2025-01-15 PROCEDURE — 3017F COLORECTAL CA SCREEN DOC REV: CPT | Performed by: PHYSICIAN ASSISTANT

## 2025-01-15 NOTE — PROGRESS NOTES
drugs (NSAIDs).   The patient was prescribed Diclofenac topical.  ----Injection: If the patient's symptoms do not improve with activity modification and conservative management, we will consider a steroid injection into the knee(s).         4--this is an undiagnosed new problem with uncertain prognosis  Approximately 31 minutes was spent reviewing the medical record, imaging, performing history and physical examination, discussing the diagnosis and treatment plan with the patient, and completing documentation for this visit.     Nilam Reyna PA

## 2025-01-17 ENCOUNTER — TELEPHONE (OUTPATIENT)
Dept: ORTHOPEDIC SURGERY | Age: 60
End: 2025-01-17

## 2025-01-17 NOTE — TELEPHONE ENCOUNTER
[facility] [address]  [address] [facility]  [place]    Please  call in  his  cream  prescription today

## 2025-01-17 NOTE — TELEPHONE ENCOUNTER
Let pt know we sent rx this week let us know if any issues on Monday but it has been sent & answered all pt questions

## 2025-01-22 ENCOUNTER — TELEPHONE (OUTPATIENT)
Dept: ORTHOPEDIC SURGERY | Age: 60
End: 2025-01-22

## 2025-01-22 NOTE — TELEPHONE ENCOUNTER
Spoke with the pt states his knees both gave out on him again but does not want to come in for an appt at this time, he is ok just wants to report it & let us know. States he will update us if iyt happens again also does not want an injectio or see another specialist at this time.

## 2025-03-12 PROCEDURE — 93296 REM INTERROG EVL PM/IDS: CPT | Performed by: INTERNAL MEDICINE

## 2025-03-12 PROCEDURE — 93295 DEV INTERROG REMOTE 1/2/MLT: CPT | Performed by: INTERNAL MEDICINE

## 2025-04-21 ASSESSMENT — ENCOUNTER SYMPTOMS
CHEST TIGHTNESS: 0
SORE THROAT: 0
ABDOMINAL DISTENTION: 0
ABDOMINAL PAIN: 0
COUGH: 1
CONSTIPATION: 0
WHEEZING: 0
DIARRHEA: 0
PHOTOPHOBIA: 0
SHORTNESS OF BREATH: 1

## 2025-04-21 NOTE — PROGRESS NOTES
[facility] CARDIOLOGY  [address] [place] [address], [address]  [place] [address]  PHONE: [phone]        NAME:  Drake Melchor  : 1965  MRN: [id number]     PCP:  Unknown, Provider      SUBJECTIVE:   Drake Melchor is a 60 y.o. male seen for a follow up visit regarding the following:     Chief Complaint   Patient presents with    Congestive Heart Failure       HPI:    Known chronic severe nonischemic cardiomyopathy with May 2022 echo showing ejection fraction 15 to 20%.  Last year he was admitted with recurrent symptomatic sustained VT with numerous defibrillator shocks, eventually resolved with high-dose amiodarone therapy and remains asymptomatic from an arrhythmia standpoint with recent device interrogation showing no malignant arrhythmias.    He has stopped drinking and smoking since our last visit and remains compliant with a healthy diet, lifestyle, and medications.   He is clinically euvolemic with stable vital signs, but has slowly worsening dental caries and frequent tooth infections.  He is seeing a dentist who recommends multiple tooth extractions.    Echocardiogram 2023:  Left Ventricle Severely reduced left ventricular systolic function with a visually estimated EF of 20 - 25%. Left ventricle is severely dilated. Normal wall thickness. Severe global hypokinesis present, worst anteroseptal and apical. Indeterminate diastolic function.   Left Atrium Left atrium is moderately dilated. LA Vol Index is  44 ml/m2.   Right Ventricle Right ventricle size is normal. Lead present in the right ventricle. Normal systolic function. TAPSE is 2.2 cm.   Right Atrium Right atrium size is normal.   Aortic Valve Mild sclerosis of the aortic valve cusp. Trace regurgitation. No stenosis.   Mitral Valve Valve structure is normal. Mild regurgitation. No stenosis noted.   Tricuspid Valve Valve structure is normal. Trace regurgitation. No stenosis noted. Unable to assess RVSP due to inadequate or

## 2025-04-24 ENCOUNTER — OFFICE VISIT (OUTPATIENT)
Age: 60
End: 2025-04-24
Payer: COMMERCIAL

## 2025-04-24 VITALS
HEIGHT: 71 IN | SYSTOLIC BLOOD PRESSURE: 104 MMHG | WEIGHT: 177 LBS | DIASTOLIC BLOOD PRESSURE: 62 MMHG | BODY MASS INDEX: 24.78 KG/M2 | HEART RATE: 86 BPM

## 2025-04-24 DIAGNOSIS — I47.29 NSVT (NONSUSTAINED VENTRICULAR TACHYCARDIA) (HCC): Chronic | ICD-10-CM

## 2025-04-24 DIAGNOSIS — I95.1 ORTHOSTATIC HYPOTENSION: ICD-10-CM

## 2025-04-24 DIAGNOSIS — E78.2 MIXED HYPERLIPIDEMIA: Chronic | ICD-10-CM

## 2025-04-24 DIAGNOSIS — I50.22 CHRONIC SYSTOLIC CONGESTIVE HEART FAILURE (HCC): Primary | Chronic | ICD-10-CM

## 2025-04-24 PROCEDURE — 3017F COLORECTAL CA SCREEN DOC REV: CPT | Performed by: INTERNAL MEDICINE

## 2025-04-24 PROCEDURE — 99214 OFFICE O/P EST MOD 30 MIN: CPT | Performed by: INTERNAL MEDICINE

## 2025-04-24 PROCEDURE — 1036F TOBACCO NON-USER: CPT | Performed by: INTERNAL MEDICINE

## 2025-04-24 PROCEDURE — 3078F DIAST BP <80 MM HG: CPT | Performed by: INTERNAL MEDICINE

## 2025-04-24 PROCEDURE — G8420 CALC BMI NORM PARAMETERS: HCPCS | Performed by: INTERNAL MEDICINE

## 2025-04-24 PROCEDURE — 93000 ELECTROCARDIOGRAM COMPLETE: CPT | Performed by: INTERNAL MEDICINE

## 2025-04-24 PROCEDURE — 3074F SYST BP LT 130 MM HG: CPT | Performed by: INTERNAL MEDICINE

## 2025-04-24 PROCEDURE — G8427 DOCREV CUR MEDS BY ELIG CLIN: HCPCS | Performed by: INTERNAL MEDICINE

## 2025-04-24 RX ORDER — BACLOFEN 10 MG/1
10 TABLET ORAL 3 TIMES DAILY PRN
Qty: 90 TABLET | Refills: 11 | Status: SHIPPED | OUTPATIENT
Start: 2025-04-24

## 2025-04-24 RX ORDER — SPIRONOLACTONE 25 MG/1
25 TABLET ORAL DAILY PRN
Qty: 90 TABLET | Refills: 3 | Status: SHIPPED | OUTPATIENT
Start: 2025-04-24

## 2025-04-24 RX ORDER — ATORVASTATIN CALCIUM 80 MG/1
80 TABLET, FILM COATED ORAL DAILY
Qty: 90 TABLET | Refills: 3 | Status: SHIPPED | OUTPATIENT
Start: 2025-04-24

## 2025-04-24 RX ORDER — SILDENAFIL 100 MG/1
100 TABLET, FILM COATED ORAL AS NEEDED
Qty: 10 TABLET | Refills: 6 | Status: SHIPPED | OUTPATIENT
Start: 2025-04-24

## 2025-04-24 RX ORDER — LANOLIN ALCOHOL/MO/W.PET/CERES
400 CREAM (GRAM) TOPICAL DAILY
Qty: 90 TABLET | Refills: 3 | Status: SHIPPED | OUTPATIENT
Start: 2025-04-24

## 2025-04-25 RX ORDER — CARVEDILOL 25 MG/1
25 TABLET ORAL 2 TIMES DAILY
Qty: 180 TABLET | Refills: 3 | Status: SHIPPED | OUTPATIENT
Start: 2025-04-25

## 2025-04-25 RX ORDER — FUROSEMIDE 40 MG/1
40 TABLET ORAL DAILY
Qty: 90 TABLET | Refills: 3 | Status: CANCELLED | OUTPATIENT
Start: 2025-04-25

## 2025-04-25 NOTE — TELEPHONE ENCOUNTER
Patient states he has questions regarding his prescriptions from yesterday and a letter that he was supposed to get to give to his dentist.

## 2025-04-25 NOTE — TELEPHONE ENCOUNTER
Spoke to pt and he is wanting refills on Carvedilol and Lasix.     Patient also has concerns about the note for his dental work. Pt stated the note needs to say that the pt needs his teeth extracted for medical purposes. Pt stated that its because of his heart he needs the extreactions done. I informed the pt that Dr. Florentino put in his note that he needs to extractions. Pt requested the note be faxed to Dr. Meyer ([phone]). Faxed OV note to fax #.       Requested Prescriptions     Pending Prescriptions Disp Refills    carvedilol (COREG) 25 MG tablet 180 tablet 3     Sig: Take 1 tablet by mouth 2 times daily

## 2025-05-15 ENCOUNTER — TELEPHONE (OUTPATIENT)
Age: 60
End: 2025-05-15

## 2025-05-15 NOTE — TELEPHONE ENCOUNTER
Patient having significant SOB- difficulty speaking due to SOB and cough. Audible wheezing noted. Patient reports \"not feeling right\". + dizziness, nausea, BLE edema to ankles. Reports cough started out productive with green sputum, now nonproductive. Denies CP. Advised patient to go to ER for evaluation- stressed importance of being seen asap. Patient voices understanding of instructions given.

## 2025-05-15 NOTE — TELEPHONE ENCOUNTER
Pt states he is having SOB and has a cough that started Monday. Pt states that 5/10/25 he started having SOB. Pt states SOB seems to be more with physical activity and the coughing seems to make it worse. He states the SOB is prgressing. Pt also states he's slighty dizzy and feeling weird. Pt is currently coughing and SOB.

## 2025-05-30 RX ORDER — METHYLPREDNISOLONE ACETATE 40 MG/ML
80 INJECTION, SUSPENSION INTRA-ARTICULAR; INTRALESIONAL; INTRAMUSCULAR; SOFT TISSUE ONCE
Status: CANCELLED | OUTPATIENT
Start: 2025-05-31

## 2025-06-02 ENCOUNTER — OFFICE VISIT (OUTPATIENT)
Dept: ORTHOPEDIC SURGERY | Age: 60
End: 2025-06-02
Payer: MEDICARE

## 2025-06-02 DIAGNOSIS — M23.51 RECURRENT RIGHT KNEE INSTABILITY: ICD-10-CM

## 2025-06-02 DIAGNOSIS — M25.561 CHRONIC PAIN OF RIGHT KNEE: ICD-10-CM

## 2025-06-02 DIAGNOSIS — M22.2X1 PATELLOFEMORAL ARTHRALGIA OF BOTH KNEES: Primary | ICD-10-CM

## 2025-06-02 DIAGNOSIS — G89.29 CHRONIC PAIN OF RIGHT KNEE: ICD-10-CM

## 2025-06-02 DIAGNOSIS — M22.2X2 PATELLOFEMORAL ARTHRALGIA OF BOTH KNEES: Primary | ICD-10-CM

## 2025-06-02 PROCEDURE — G8420 CALC BMI NORM PARAMETERS: HCPCS | Performed by: PHYSICIAN ASSISTANT

## 2025-06-02 PROCEDURE — G8427 DOCREV CUR MEDS BY ELIG CLIN: HCPCS | Performed by: PHYSICIAN ASSISTANT

## 2025-06-02 PROCEDURE — 99214 OFFICE O/P EST MOD 30 MIN: CPT | Performed by: PHYSICIAN ASSISTANT

## 2025-06-02 PROCEDURE — 3017F COLORECTAL CA SCREEN DOC REV: CPT | Performed by: PHYSICIAN ASSISTANT

## 2025-06-02 PROCEDURE — 1036F TOBACCO NON-USER: CPT | Performed by: PHYSICIAN ASSISTANT

## 2025-06-02 NOTE — PROGRESS NOTES
Name: Drake Melchor  YOB: 1965  Gender: male  MRN: [id number]    CC:   Chief Complaint   Patient presents with    Follow-up     Osito knees         HPI: Drake Melchor is a 60 y.o. male  has a past medical history of Acid reflux, Acute gastroenteritis, Acute on chronic systolic heart failure (HCC), Acute respiratory failure due to COVID-19 (HCC), Acute systolic congestive heart failure (HCC), Acute systolic heart failure (HCC), AGE (acute gastroenteritis), ELIZABETH (acute kidney injury), Anorexia, Anxiety associated with depression, Arthritis, CAD (coronary artery disease), CHF (congestive heart failure) ([facility]), Chronic alcoholism (HCC), Chronic back pain, Chronic neck pain, Chronic systolic heart failure (HCC), Demand ischemia (HCC), Dental caries, Depression, Elevated brain natriuretic peptide (BNP) level, Generalized abdominal pain, GERD (gastroesophageal reflux disease), Gynecomastia, Heart failure (HCC), Hiatal hernia, History of blood clots, Hyperbilirubinemia, Hypertension, Hypokalemia, Hypomagnesemia, ICD (implantable cardioverter-defibrillator) in place, Leukopenia, Macrocytic anemia, NSTEMI (non-ST elevated myocardial infarction) ([facility]), Schatzki's ring, Situational depression, SVT (supraventricular tachycardia), Tachycardia, and Ventricular tachycardia ([facility]).   here for follow up evaluation of bilateral knee pain.    01/15/25: The pain has been present for years and is becoming worse. The pain is really generalized in both knees and radiates up the thighs. He denies any groin pain.   he describes the pain as dull, aching, and fatiguing --and more of a weakness in both knees  Patient's states after even short distances of walking his knees and thighs just feel very weak and tired.  The pain is worse with going up and/or down stairs, kneeling, squatting, standing, and walking  Treatment so far has been activity modification, Tylenol, ibuprofen, ice, and heat with little relief.  Patient does have a pacemaker

## 2025-06-11 ENCOUNTER — HOSPITAL ENCOUNTER (OUTPATIENT)
Dept: CT IMAGING | Age: 60
Discharge: HOME | End: 2025-06-13
Payer: COMMERCIAL

## 2025-06-11 DIAGNOSIS — M22.2X1 PATELLOFEMORAL ARTHRALGIA OF BOTH KNEES: ICD-10-CM

## 2025-06-11 DIAGNOSIS — G89.29 CHRONIC PAIN OF RIGHT KNEE: ICD-10-CM

## 2025-06-11 DIAGNOSIS — M25.561 CHRONIC PAIN OF RIGHT KNEE: ICD-10-CM

## 2025-06-11 DIAGNOSIS — M23.51 RECURRENT RIGHT KNEE INSTABILITY: ICD-10-CM

## 2025-06-11 DIAGNOSIS — M22.2X2 PATELLOFEMORAL ARTHRALGIA OF BOTH KNEES: ICD-10-CM

## 2025-06-11 PROCEDURE — 73700 CT LOWER EXTREMITY W/O DYE: CPT

## 2025-06-23 ENCOUNTER — TELEPHONE (OUTPATIENT)
Dept: ORTHOPEDIC SURGERY | Age: 60
End: 2025-06-23

## 2025-06-23 NOTE — TELEPHONE ENCOUNTER
----- Message from Ary QUINTANA sent at 6/23/2025  1:18 PM EDT -----  Regarding: Nick  Specialty Message to Provider  Specialty Message to Provider    Relationship to Patient: Self     Patient Message: wants to get a L knee CT scan done so both can go over at same time  --------------------------------------------------------------------------------------------------------------------------    Call Back Information: OK to leave message on voicemail  Preferred Call Back Number: Phone [phone]

## 2025-07-02 ENCOUNTER — OFFICE VISIT (OUTPATIENT)
Dept: ORTHOPEDIC SURGERY | Age: 60
End: 2025-07-02
Payer: MEDICARE

## 2025-07-02 DIAGNOSIS — M25.561 CHRONIC PAIN OF RIGHT KNEE: Primary | ICD-10-CM

## 2025-07-02 DIAGNOSIS — M22.2X2 PATELLOFEMORAL ARTHRALGIA OF BOTH KNEES: ICD-10-CM

## 2025-07-02 DIAGNOSIS — G89.29 CHRONIC PAIN OF RIGHT KNEE: Primary | ICD-10-CM

## 2025-07-02 DIAGNOSIS — M22.2X1 PATELLOFEMORAL ARTHRALGIA OF BOTH KNEES: ICD-10-CM

## 2025-07-02 PROCEDURE — 99214 OFFICE O/P EST MOD 30 MIN: CPT | Performed by: PHYSICIAN ASSISTANT

## 2025-07-02 PROCEDURE — G8427 DOCREV CUR MEDS BY ELIG CLIN: HCPCS | Performed by: PHYSICIAN ASSISTANT

## 2025-07-02 PROCEDURE — 3017F COLORECTAL CA SCREEN DOC REV: CPT | Performed by: PHYSICIAN ASSISTANT

## 2025-07-02 PROCEDURE — 1036F TOBACCO NON-USER: CPT | Performed by: PHYSICIAN ASSISTANT

## 2025-07-02 PROCEDURE — G8420 CALC BMI NORM PARAMETERS: HCPCS | Performed by: PHYSICIAN ASSISTANT

## 2025-07-02 NOTE — PROGRESS NOTES
Name: Drake Melchor  YOB: 1965  Gender: male  MRN: [id number]    CC:   Chief Complaint   Patient presents with    Follow-up     Ct results rt knee          HPI: Drake Melchor is a 60 y.o. male  has a past medical history of Acid reflux, Acute gastroenteritis, Acute on chronic systolic heart failure (HCC), Acute respiratory failure due to COVID-19 (HCC), Acute systolic congestive heart failure (HCC), Acute systolic heart failure (HCC), AGE (acute gastroenteritis), ELIZABETH (acute kidney injury), Anorexia, Anxiety associated with depression, Arthritis, CAD (coronary artery disease), CHF (congestive heart failure) ([facility]), Chronic alcoholism (HCC), Chronic back pain, Chronic neck pain, Chronic systolic heart failure (HCC), Demand ischemia (HCC), Dental caries, Depression, Elevated brain natriuretic peptide (BNP) level, Generalized abdominal pain, GERD (gastroesophageal reflux disease), Gynecomastia, Heart failure (HCC), Hiatal hernia, History of blood clots, Hyperbilirubinemia, Hypertension, Hypokalemia, Hypomagnesemia, ICD (implantable cardioverter-defibrillator) in place, Leukopenia, Macrocytic anemia, NSTEMI (non-ST elevated myocardial infarction) ([facility]), Schatzki's ring, Situational depression, SVT (supraventricular tachycardia), Tachycardia, and Ventricular tachycardia ([facility]).   here for follow up evaluation of bilateral knee pain.    01/15/25: The pain has been present for years and is becoming worse. The pain is really generalized in both knees and radiates up the thighs. He denies any groin pain.   he describes the pain as dull, aching, and fatiguing--and more of a weakness in both knees  Patient's states after even short distances of walking his knees and thighs just feel very weak and tired.  The pain is worse with going up and/or down stairs, kneeling, squatting, standing, and walking  Treatment so far has been activity modification, Tylenol, ibuprofen, ice, and heat with little relief.  Patient does have a

## 2025-07-15 ENCOUNTER — TELEPHONE (OUTPATIENT)
Dept: ORTHOPEDIC SURGERY | Age: 60
End: 2025-07-15
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
## 2025-08-20 ENCOUNTER — APPOINTMENT (OUTPATIENT)
Dept: GENERAL RADIOLOGY | Age: 60
End: 2025-08-20
Payer: MEDICARE

## 2025-08-20 ENCOUNTER — HOSPITAL ENCOUNTER (EMERGENCY)
Age: 60
Discharge: HOME | End: 2025-08-20
Attending: EMERGENCY MEDICINE
Payer: MEDICARE

## 2025-08-20 ENCOUNTER — APPOINTMENT (OUTPATIENT)
Dept: CT IMAGING | Age: 60
End: 2025-08-20
Payer: MEDICARE

## 2025-08-20 VITALS
TEMPERATURE: 98 F | OXYGEN SATURATION: 95 % | DIASTOLIC BLOOD PRESSURE: 88 MMHG | SYSTOLIC BLOOD PRESSURE: 121 MMHG | RESPIRATION RATE: 17 BRPM | HEART RATE: 103 BPM

## 2025-08-20 DIAGNOSIS — R04.2 HEMOPTYSIS: ICD-10-CM

## 2025-08-20 DIAGNOSIS — G43.C0 PERIODIC HEADACHE SYNDROMES IN CHILD OR ADULT, NOT INTRACTABLE: ICD-10-CM

## 2025-08-20 DIAGNOSIS — R07.89 ATYPICAL CHEST PAIN: Primary | ICD-10-CM

## 2025-08-20 DIAGNOSIS — I50.9 ACUTE ON CHRONIC CONGESTIVE HEART FAILURE, UNSPECIFIED HEART FAILURE TYPE (HCC): ICD-10-CM

## 2025-08-20 LAB
ALBUMIN SERPL-MCNC: 3.8 G/DL (ref 3.2–4.6)
ALBUMIN/GLOB SERPL: 1.6 (ref 1–1.9)
ALP SERPL-CCNC: 72 U/L (ref 40–129)
ALT SERPL-CCNC: 6 U/L (ref 12–65)
ANION GAP SERPL CALC-SCNC: 15 MMOL/L (ref 7–16)
AST SERPL-CCNC: 21 U/L (ref 15–37)
BASOPHILS # BLD: 0.04 K/UL (ref 0–0.2)
BASOPHILS NFR BLD: 0.6 % (ref 0–2)
BILIRUB SERPL-MCNC: 0.6 MG/DL (ref 0–1.2)
BUN SERPL-MCNC: 14 MG/DL (ref 8–23)
CALCIUM SERPL-MCNC: 8.3 MG/DL (ref 8.8–10.2)
CHLORIDE SERPL-SCNC: 107 MMOL/L (ref 98–107)
CO2 SERPL-SCNC: 17 MMOL/L (ref 20–29)
CREAT SERPL-MCNC: 1.01 MG/DL (ref 0.8–1.3)
DIFFERENTIAL METHOD BLD: ABNORMAL
EOSINOPHIL # BLD: 0.11 K/UL (ref 0–0.8)
EOSINOPHIL NFR BLD: 1.7 % (ref 0.5–7.8)
ERYTHROCYTE [DISTWIDTH] IN BLOOD BY AUTOMATED COUNT: 17.4 % (ref 11.9–14.6)
FLUAV RNA SPEC QL NAA+PROBE: NOT DETECTED
FLUBV RNA SPEC QL NAA+PROBE: NOT DETECTED
GLOBULIN SER CALC-MCNC: 2.4 G/DL (ref 2.3–3.5)
GLUCOSE SERPL-MCNC: 111 MG/DL (ref 70–99)
HCT VFR BLD AUTO: 34.3 % (ref 41.1–50.3)
HGB BLD-MCNC: 11.5 G/DL (ref 13.6–17.2)
IMM GRANULOCYTES # BLD AUTO: 0.01 K/UL (ref 0–0.5)
IMM GRANULOCYTES NFR BLD AUTO: 0.2 % (ref 0–5)
LYMPHOCYTES # BLD: 1.29 K/UL (ref 0.5–4.6)
LYMPHOCYTES NFR BLD: 19.5 % (ref 13–44)
MAGNESIUM SERPL-MCNC: 1.7 MG/DL (ref 1.8–2.4)
MCH RBC QN AUTO: 29.5 PG (ref 26.1–32.9)
MCHC RBC AUTO-ENTMCNC: 33.5 G/DL (ref 31.4–35)
MCV RBC AUTO: 87.9 FL (ref 82–102)
MONOCYTES # BLD: 0.45 K/UL (ref 0.1–1.3)
MONOCYTES NFR BLD: 6.8 % (ref 4–12)
NEUTS SEG # BLD: 4.72 K/UL (ref 1.7–8.2)
NEUTS SEG NFR BLD: 71.2 % (ref 43–78)
NRBC # BLD: 0 K/UL (ref 0–0.2)
NT PRO BNP: 2201 PG/ML (ref 0–450)
PLATELET # BLD AUTO: 178 K/UL (ref 150–450)
PMV BLD AUTO: 9.5 FL (ref 9.4–12.3)
POTASSIUM SERPL-SCNC: 4.3 MMOL/L (ref 3.5–5.1)
PROT SERPL-MCNC: 6.2 G/DL (ref 6.3–8.2)
RBC # BLD AUTO: 3.9 M/UL (ref 4.23–5.6)
SARS-COV-2 RDRP RESP QL NAA+PROBE: NOT DETECTED
SODIUM SERPL-SCNC: 139 MMOL/L (ref 133–143)
SOURCE: NORMAL
TROPONIN T SERPL HS-MCNC: 21.1 NG/L (ref 0–22)
WBC # BLD AUTO: 6.6 K/UL (ref 4.3–11.1)

## 2025-08-20 PROCEDURE — 71260 CT THORAX DX C+: CPT

## 2025-08-20 PROCEDURE — 87636 SARSCOV2 & INF A&B AMP PRB: CPT

## 2025-08-20 PROCEDURE — 71045 X-RAY EXAM CHEST 1 VIEW: CPT

## 2025-08-20 PROCEDURE — 83735 ASSAY OF MAGNESIUM: CPT

## 2025-08-20 PROCEDURE — 85025 COMPLETE CBC W/AUTO DIFF WBC: CPT

## 2025-08-20 PROCEDURE — 96374 THER/PROPH/DIAG INJ IV PUSH: CPT

## 2025-08-20 PROCEDURE — 96375 TX/PRO/DX INJ NEW DRUG ADDON: CPT

## 2025-08-20 PROCEDURE — 96376 TX/PRO/DX INJ SAME DRUG ADON: CPT

## 2025-08-20 PROCEDURE — 83880 ASSAY OF NATRIURETIC PEPTIDE: CPT

## 2025-08-20 PROCEDURE — 99285 EMERGENCY DEPT VISIT HI MDM: CPT

## 2025-08-20 PROCEDURE — 6360000002 HC RX W HCPCS: Performed by: EMERGENCY MEDICINE

## 2025-08-20 PROCEDURE — 84484 ASSAY OF TROPONIN QUANT: CPT

## 2025-08-20 PROCEDURE — 6370000000 HC RX 637 (ALT 250 FOR IP): Performed by: EMERGENCY MEDICINE

## 2025-08-20 PROCEDURE — 6360000004 HC RX CONTRAST MEDICATION: Performed by: EMERGENCY MEDICINE

## 2025-08-20 PROCEDURE — 2500000003 HC RX 250 WO HCPCS: Performed by: EMERGENCY MEDICINE

## 2025-08-20 PROCEDURE — 80053 COMPREHEN METABOLIC PANEL: CPT

## 2025-08-20 RX ORDER — IPRATROPIUM BROMIDE AND ALBUTEROL SULFATE 2.5; .5 MG/3ML; MG/3ML
1 SOLUTION RESPIRATORY (INHALATION)
Status: COMPLETED | OUTPATIENT
Start: 2025-08-20 | End: 2025-08-20

## 2025-08-20 RX ORDER — HYDROCODONE BITARTRATE AND ACETAMINOPHEN 7.5; 325 MG/1; MG/1
1 TABLET ORAL
Refills: 0 | Status: DISCONTINUED | OUTPATIENT
Start: 2025-08-20 | End: 2025-08-20

## 2025-08-20 RX ORDER — DIPHENOXYLATE HYDROCHLORIDE AND ATROPINE SULFATE 2.5; .025 MG/1; MG/1
1 TABLET ORAL
Status: COMPLETED | OUTPATIENT
Start: 2025-08-20 | End: 2025-08-20

## 2025-08-20 RX ORDER — PROMETHAZINE HYDROCHLORIDE 25 MG/1
25 TABLET ORAL EVERY 8 HOURS PRN
Qty: 15 TABLET | Refills: 1 | Status: SHIPPED | OUTPATIENT
Start: 2025-08-20

## 2025-08-20 RX ORDER — ONDANSETRON 2 MG/ML
4 INJECTION INTRAMUSCULAR; INTRAVENOUS
Status: COMPLETED | OUTPATIENT
Start: 2025-08-20 | End: 2025-08-20

## 2025-08-20 RX ORDER — IOPAMIDOL 755 MG/ML
100 INJECTION, SOLUTION INTRAVASCULAR
Status: COMPLETED | OUTPATIENT
Start: 2025-08-20 | End: 2025-08-20

## 2025-08-20 RX ORDER — MORPHINE SULFATE 2 MG/ML
2 INJECTION, SOLUTION INTRAMUSCULAR; INTRAVENOUS
Refills: 0 | Status: COMPLETED | OUTPATIENT
Start: 2025-08-20 | End: 2025-08-20

## 2025-08-20 RX ORDER — HYDROCODONE BITARTRATE AND ACETAMINOPHEN 5; 325 MG/1; MG/1
1 TABLET ORAL ONCE
Refills: 0 | Status: COMPLETED | OUTPATIENT
Start: 2025-08-20 | End: 2025-08-20

## 2025-08-20 RX ORDER — FUROSEMIDE 40 MG/1
40 TABLET ORAL DAILY
Qty: 60 TABLET | Refills: 0 | Status: SHIPPED | OUTPATIENT
Start: 2025-08-20 | End: 2025-08-25

## 2025-08-20 RX ORDER — HYOSCYAMINE SULFATE 0.12 MG/1
0.25 TABLET SUBLINGUAL
Status: COMPLETED | OUTPATIENT
Start: 2025-08-20 | End: 2025-08-20

## 2025-08-20 RX ADMIN — IPRATROPIUM BROMIDE AND ALBUTEROL SULFATE 1 DOSE: 2.5; .5 SOLUTION RESPIRATORY (INHALATION) at 13:52

## 2025-08-20 RX ADMIN — IOPAMIDOL 100 ML: 755 INJECTION, SOLUTION INTRAVENOUS at 15:36

## 2025-08-20 RX ADMIN — ONDANSETRON 4 MG: 2 INJECTION INTRAMUSCULAR; INTRAVENOUS at 15:17

## 2025-08-20 RX ADMIN — HYOSCYAMINE SULFATE 0.25 MG: 0.12 TABLET ORAL; SUBLINGUAL at 13:51

## 2025-08-20 RX ADMIN — HYDROCODONE BITARTRATE AND ACETAMINOPHEN 1 TABLET: 5; 325 TABLET ORAL at 17:05

## 2025-08-20 RX ADMIN — DIPHENOXYLATE HYDROCHLORIDE AND ATROPINE SULFATE 1 TABLET: 2.5; .025 TABLET ORAL at 13:51

## 2025-08-20 RX ADMIN — MORPHINE SULFATE 2 MG: 2 INJECTION, SOLUTION INTRAMUSCULAR; INTRAVENOUS at 13:49

## 2025-08-20 RX ADMIN — ONDANSETRON 4 MG: 2 INJECTION, SOLUTION INTRAMUSCULAR; INTRAVENOUS at 13:49

## 2025-08-20 RX ADMIN — NITROGLYCERIN 1 INCH: 20 OINTMENT TOPICAL at 14:48

## 2025-08-20 ASSESSMENT — PAIN DESCRIPTION - LOCATION
LOCATION: CHEST

## 2025-08-20 ASSESSMENT — PAIN - FUNCTIONAL ASSESSMENT
PAIN_FUNCTIONAL_ASSESSMENT: 0-10

## 2025-08-20 ASSESSMENT — PAIN SCALES - GENERAL
PAINLEVEL_OUTOF10: 6
PAINLEVEL_OUTOF10: 5
PAINLEVEL_OUTOF10: 9
PAINLEVEL_OUTOF10: 6

## 2025-09-03 ENCOUNTER — TELEPHONE (OUTPATIENT)
Age: 60
End: 2025-09-03

## 2025-09-03 DIAGNOSIS — R06.02 SHORTNESS OF BREATH: Primary | ICD-10-CM

## (undated) DEVICE — GOWN,REINF,POLY,ECL,PP SLV,XL: Brand: MEDLINE

## (undated) DEVICE — BNDG,ELSTC,MATRIX,STRL,2"X5YD,LF,HOOK&LP: Brand: MEDLINE

## (undated) DEVICE — STRIP,CLOSURE,WOUND,MEDI-STRIP,1/2X4: Brand: MEDLINE

## (undated) DEVICE — SYRINGE IRRIG 60ML SFT PLIABLE BLB EZ TO GRP 1 HND USE W/

## (undated) DEVICE — KENDALL RADIOLUCENT FOAM MONITORING ELECTRODE RECTANGULAR SHAPE: Brand: KENDALL

## (undated) DEVICE — CONTAINER SPEC FRMLN 120ML --

## (undated) DEVICE — BLOCK BITE AD 60FR W/ VELC STRP ADDRESSES MOST PT AND

## (undated) DEVICE — [HIGH FLOW INSUFFLATOR,  DO NOT USE IF PACKAGE IS DAMAGED,  KEEP DRY,  KEEP AWAY FROM SUNLIGHT,  PROTECT FROM HEAT AND RADIOACTIVE SOURCES.]: Brand: PNEUMOSURE

## (undated) DEVICE — SOLUTION IV 1000ML 0.9% SOD CHL

## (undated) DEVICE — CONNECTOR TBNG OD5-7MM O2 END DISP

## (undated) DEVICE — SOLUTION IRRIG 1000ML 0.9% SOD CHL USP POUR PLAS BTL

## (undated) DEVICE — GENERAL LAPAROSCOPY: Brand: MEDLINE INDUSTRIES, INC.

## (undated) DEVICE — BLADELESS OPTICAL TROCAR WITH FIXATION CANNULA: Brand: VERSAPORT

## (undated) DEVICE — BUTTON SWITCH PENCIL BLADE ELECTRODE, HOLSTER: Brand: EDGE

## (undated) DEVICE — BIT DRL L40MM DIA2.5MM SLD SIDE CUT FOR GEMINUS VOLAR DST

## (undated) DEVICE — DRESSING,GAUZE,XEROFORM,CURAD,1"X8",ST: Brand: CURAD

## (undated) DEVICE — TUBING, SUCTION, 1/4" X 10', STRAIGHT: Brand: MEDLINE

## (undated) DEVICE — APPLIER CLP M/L SHFT DIA5MM 15 LIG LIGAMAX 5

## (undated) DEVICE — MASTISOL ADHESIVE LIQ 2/3ML

## (undated) DEVICE — SUTURE COAT VCRL SZ 4-0 L18IN ABSRB UD L19MM PS-2 1/2 CIR J496G

## (undated) DEVICE — GUIDE SURG DIA1.5MM AIMING FOR GEMINUS VOLAR DST RAD

## (undated) DEVICE — (D)PREP SKN CHLRAPRP APPL 26ML -- CONVERT TO ITEM 371833

## (undated) DEVICE — DRIVER SURG SQ TIP DIA2MM PEG TORQ LIMITING FOR GEMINUS

## (undated) DEVICE — BIT DRL L40MM DIA2MM SLD SIDE CUT FOR GEMINUS VOLAR DST RAD

## (undated) DEVICE — MOUTHPIECE ENDOSCP 20X27MM --

## (undated) DEVICE — NEEDLE HYPO 21GA L1.5IN INTRAMUSCULAR S STL LATCH BVL UP

## (undated) DEVICE — DISPOSABLE BIPOLAR CODE, 12' (3.66 M): Brand: CONMED

## (undated) DEVICE — SUTURE VCRL SZ 4-0 L27IN ABSRB UD L19MM PS-2 3/8 CIR PRIM J426H

## (undated) DEVICE — SHEARS ENDOSCP L36CM DIA5MM ULTRASONIC CRV TIP W/ ADV

## (undated) DEVICE — CANNULA NSL ORAL AD FOR CAPNOFLEX CO2 O2 AIRLFE

## (undated) DEVICE — SINGLE USE BIOPSY VALVE MAJ-210: Brand: SINGLE USE BIOPSY VALVE (STERILE)

## (undated) DEVICE — GLOVE SURG SZ 65 THK91MIL LTX FREE SYN POLYISOPRENE

## (undated) DEVICE — DRAPE,U/SHT,SPLIT,FILM,60X84,STERILE: Brand: MEDLINE

## (undated) DEVICE — DRAPE TWL SURG 16X26IN BLU ORB04] ALLCARE INC]

## (undated) DEVICE — GLOVE SURG SZ 65 L12IN FNGR THK79MIL GRN LTX FREE

## (undated) DEVICE — CONTAINER PREFIL FRMLN 40ML --

## (undated) DEVICE — SYR LR LCK 1ML GRAD NSAF 30ML --

## (undated) DEVICE — DRIVER SURG UNIV QUIK CONN T10 FOR VOLAR DST RAD PLATING

## (undated) DEVICE — BRONCHOSCOPY PACK: Brand: MEDLINE INDUSTRIES, INC.

## (undated) DEVICE — KIT,ANTI FOG,W/SPONGE & FLUID,SOFT PACK: Brand: MEDLINE

## (undated) DEVICE — HAND PACK: Brand: MEDLINE INDUSTRIES, INC.

## (undated) DEVICE — SINGLE USE SUCTION VALVE MAJ-209: Brand: SINGLE USE SUCTION VALVE (STERILE)

## (undated) DEVICE — REM POLYHESIVE ADULT PATIENT RETURN ELECTRODE: Brand: VALLEYLAB

## (undated) DEVICE — PADDING CAST W3INXL4YD COT BLEND MIC PLEAT UNDERCAST SPEC

## (undated) DEVICE — K WIRE FIX L127MM DIA1.5MM DST VOLAR RAD STD TIP GEMINUS
Type: IMPLANTABLE DEVICE | Site: WRIST | Status: NON-FUNCTIONAL
Removed: 2024-03-15

## (undated) DEVICE — FORCEPS BX L240CM JAW DIA2.8MM L CAP W/ NDL MIC MESH TOOTH

## (undated) DEVICE — SUTURE VCRL SZ 0 L27IN ABSRB UD L26MM CT-2 1/2 CIR J270H

## (undated) DEVICE — C-ARM: Brand: UNBRANDED

## (undated) DEVICE — SHEAR RMFG HARMONIC 5MMX36CM -- LAWSON OEM ITEM 322219